# Patient Record
Sex: FEMALE | Race: WHITE | NOT HISPANIC OR LATINO | Employment: UNEMPLOYED | URBAN - METROPOLITAN AREA
[De-identification: names, ages, dates, MRNs, and addresses within clinical notes are randomized per-mention and may not be internally consistent; named-entity substitution may affect disease eponyms.]

---

## 2017-01-06 ENCOUNTER — COMMUNICATION - HEALTHEAST (OUTPATIENT)
Dept: FAMILY MEDICINE | Facility: CLINIC | Age: 62
End: 2017-01-06

## 2017-02-09 ENCOUNTER — COMMUNICATION - HEALTHEAST (OUTPATIENT)
Dept: FAMILY MEDICINE | Facility: CLINIC | Age: 62
End: 2017-02-09

## 2017-03-06 ENCOUNTER — OFFICE VISIT - HEALTHEAST (OUTPATIENT)
Dept: FAMILY MEDICINE | Facility: CLINIC | Age: 62
End: 2017-03-06

## 2017-03-06 ENCOUNTER — RECORDS - HEALTHEAST (OUTPATIENT)
Dept: GENERAL RADIOLOGY | Facility: CLINIC | Age: 62
End: 2017-03-06

## 2017-03-06 ENCOUNTER — COMMUNICATION - HEALTHEAST (OUTPATIENT)
Dept: FAMILY MEDICINE | Facility: CLINIC | Age: 62
End: 2017-03-06

## 2017-03-06 ENCOUNTER — RECORDS - HEALTHEAST (OUTPATIENT)
Dept: MAMMOGRAPHY | Facility: CLINIC | Age: 62
End: 2017-03-06

## 2017-03-06 DIAGNOSIS — M17.0 PRIMARY OSTEOARTHRITIS OF BOTH KNEES: ICD-10-CM

## 2017-03-06 DIAGNOSIS — M50.90 CERVICAL DISC DISEASE: ICD-10-CM

## 2017-03-06 DIAGNOSIS — M79.674 PAIN OF TOE OF RIGHT FOOT: ICD-10-CM

## 2017-03-06 DIAGNOSIS — M17.0 BILATERAL PRIMARY OSTEOARTHRITIS OF KNEE: ICD-10-CM

## 2017-03-06 DIAGNOSIS — R60.9 EDEMA: ICD-10-CM

## 2017-03-06 DIAGNOSIS — Z12.31 ENCOUNTER FOR SCREENING MAMMOGRAM FOR MALIGNANT NEOPLASM OF BREAST: ICD-10-CM

## 2017-03-06 DIAGNOSIS — G56.03 CARPAL TUNNEL SYNDROME ON BOTH SIDES: ICD-10-CM

## 2017-03-12 ENCOUNTER — COMMUNICATION - HEALTHEAST (OUTPATIENT)
Dept: FAMILY MEDICINE | Facility: CLINIC | Age: 62
End: 2017-03-12

## 2017-03-13 ENCOUNTER — COMMUNICATION - HEALTHEAST (OUTPATIENT)
Dept: FAMILY MEDICINE | Facility: CLINIC | Age: 62
End: 2017-03-13

## 2017-03-23 ENCOUNTER — OFFICE VISIT - HEALTHEAST (OUTPATIENT)
Dept: BEHAVIORAL HEALTH | Facility: CLINIC | Age: 62
End: 2017-03-23

## 2017-03-23 DIAGNOSIS — F32.A DEPRESSION, UNSPECIFIED DEPRESSION TYPE: ICD-10-CM

## 2017-03-23 DIAGNOSIS — F41.9 ANXIETY DISORDER, UNSPECIFIED TYPE: ICD-10-CM

## 2017-03-23 DIAGNOSIS — F43.10 PTSD (POST-TRAUMATIC STRESS DISORDER): ICD-10-CM

## 2017-03-27 ENCOUNTER — RECORDS - HEALTHEAST (OUTPATIENT)
Dept: ADMINISTRATIVE | Facility: OTHER | Age: 62
End: 2017-03-27

## 2017-04-04 ENCOUNTER — OFFICE VISIT - HEALTHEAST (OUTPATIENT)
Dept: FAMILY MEDICINE | Facility: CLINIC | Age: 62
End: 2017-04-04

## 2017-04-04 DIAGNOSIS — G56.03 CARPAL TUNNEL SYNDROME ON BOTH SIDES: ICD-10-CM

## 2017-04-04 DIAGNOSIS — Z01.818 PREOP EXAMINATION: ICD-10-CM

## 2017-04-04 DIAGNOSIS — R60.9 EDEMA: ICD-10-CM

## 2017-04-04 DIAGNOSIS — M19.049 PRIMARY LOCALIZED OSTEOARTHROSIS, HAND, UNSPECIFIED LATERALITY: ICD-10-CM

## 2017-04-04 DIAGNOSIS — J43.9 COPD (CHRONIC OBSTRUCTIVE PULMONARY DISEASE) WITH EMPHYSEMA (H): ICD-10-CM

## 2017-04-04 ASSESSMENT — MIFFLIN-ST. JEOR: SCORE: 1595.17

## 2017-04-11 ENCOUNTER — RECORDS - HEALTHEAST (OUTPATIENT)
Dept: ADMINISTRATIVE | Facility: OTHER | Age: 62
End: 2017-04-11

## 2017-04-16 ENCOUNTER — COMMUNICATION - HEALTHEAST (OUTPATIENT)
Dept: FAMILY MEDICINE | Facility: CLINIC | Age: 62
End: 2017-04-16

## 2017-04-16 DIAGNOSIS — K21.00 REFLUX ESOPHAGITIS: ICD-10-CM

## 2017-04-17 ENCOUNTER — RECORDS - HEALTHEAST (OUTPATIENT)
Dept: ADMINISTRATIVE | Facility: OTHER | Age: 62
End: 2017-04-17

## 2017-04-19 ENCOUNTER — OFFICE VISIT - HEALTHEAST (OUTPATIENT)
Dept: PODIATRY | Facility: CLINIC | Age: 62
End: 2017-04-19

## 2017-04-19 DIAGNOSIS — L57.0 KERATOMA: ICD-10-CM

## 2017-04-20 ENCOUNTER — COMMUNICATION - HEALTHEAST (OUTPATIENT)
Dept: SCHEDULING | Facility: CLINIC | Age: 62
End: 2017-04-20

## 2017-04-20 DIAGNOSIS — R60.9 EDEMA: ICD-10-CM

## 2017-04-27 ENCOUNTER — OFFICE VISIT - HEALTHEAST (OUTPATIENT)
Dept: BEHAVIORAL HEALTH | Facility: CLINIC | Age: 62
End: 2017-04-27

## 2017-04-27 DIAGNOSIS — F32.A DEPRESSION, UNSPECIFIED DEPRESSION TYPE: ICD-10-CM

## 2017-04-27 DIAGNOSIS — F43.10 PTSD (POST-TRAUMATIC STRESS DISORDER): ICD-10-CM

## 2017-04-27 DIAGNOSIS — F41.9 ANXIETY DISORDER, UNSPECIFIED TYPE: ICD-10-CM

## 2017-04-28 ENCOUNTER — COMMUNICATION - HEALTHEAST (OUTPATIENT)
Dept: FAMILY MEDICINE | Facility: CLINIC | Age: 62
End: 2017-04-28

## 2017-04-28 ENCOUNTER — COMMUNICATION - HEALTHEAST (OUTPATIENT)
Dept: BEHAVIORAL HEALTH | Facility: CLINIC | Age: 62
End: 2017-04-28

## 2017-04-28 DIAGNOSIS — J43.9 COPD (CHRONIC OBSTRUCTIVE PULMONARY DISEASE) WITH EMPHYSEMA (H): ICD-10-CM

## 2017-05-01 ENCOUNTER — AMBULATORY - HEALTHEAST (OUTPATIENT)
Dept: FAMILY MEDICINE | Facility: CLINIC | Age: 62
End: 2017-05-01

## 2017-05-01 DIAGNOSIS — F43.10 POST TRAUMATIC STRESS DISORDER: ICD-10-CM

## 2017-05-01 DIAGNOSIS — F33.9 MAJOR DEPRESSIVE DISORDER, RECURRENT EPISODE (H): ICD-10-CM

## 2017-05-08 ENCOUNTER — RECORDS - HEALTHEAST (OUTPATIENT)
Dept: ADMINISTRATIVE | Facility: OTHER | Age: 62
End: 2017-05-08

## 2017-05-10 ENCOUNTER — OFFICE VISIT - HEALTHEAST (OUTPATIENT)
Dept: BEHAVIORAL HEALTH | Facility: CLINIC | Age: 62
End: 2017-05-10

## 2017-05-10 DIAGNOSIS — F33.9 MAJOR DEPRESSIVE DISORDER, RECURRENT EPISODE (H): ICD-10-CM

## 2017-05-10 ASSESSMENT — MIFFLIN-ST. JEOR: SCORE: 1587.91

## 2017-05-16 ENCOUNTER — COMMUNICATION - HEALTHEAST (OUTPATIENT)
Dept: SCHEDULING | Facility: CLINIC | Age: 62
End: 2017-05-16

## 2017-05-17 ENCOUNTER — OFFICE VISIT - HEALTHEAST (OUTPATIENT)
Dept: FAMILY MEDICINE | Facility: CLINIC | Age: 62
End: 2017-05-17

## 2017-05-17 DIAGNOSIS — B37.2 CANDIDAL INTERTRIGO: ICD-10-CM

## 2017-05-17 DIAGNOSIS — L85.3 DRY SKIN DERMATITIS: ICD-10-CM

## 2017-05-31 ENCOUNTER — OFFICE VISIT - HEALTHEAST (OUTPATIENT)
Dept: BEHAVIORAL HEALTH | Facility: CLINIC | Age: 62
End: 2017-05-31

## 2017-05-31 DIAGNOSIS — F43.10 PTSD (POST-TRAUMATIC STRESS DISORDER): ICD-10-CM

## 2017-05-31 DIAGNOSIS — F33.2 SEVERE EPISODE OF RECURRENT MAJOR DEPRESSIVE DISORDER, WITHOUT PSYCHOTIC FEATURES (H): ICD-10-CM

## 2017-06-04 ENCOUNTER — COMMUNICATION - HEALTHEAST (OUTPATIENT)
Dept: SCHEDULING | Facility: CLINIC | Age: 62
End: 2017-06-04

## 2017-06-04 DIAGNOSIS — R60.9 EDEMA: ICD-10-CM

## 2017-06-05 ENCOUNTER — AMBULATORY - HEALTHEAST (OUTPATIENT)
Dept: NURSING | Facility: CLINIC | Age: 62
End: 2017-06-05

## 2017-06-05 ENCOUNTER — COMMUNICATION - HEALTHEAST (OUTPATIENT)
Dept: FAMILY MEDICINE | Facility: CLINIC | Age: 62
End: 2017-06-05

## 2017-06-08 ENCOUNTER — AMBULATORY - HEALTHEAST (OUTPATIENT)
Dept: NURSING | Facility: CLINIC | Age: 62
End: 2017-06-08

## 2017-06-08 DIAGNOSIS — Z59.89 DISTRESSED ABOUT HOUSING ISSUES: ICD-10-CM

## 2017-06-08 SDOH — ECONOMIC STABILITY - INCOME SECURITY: OTHER PROBLEMS RELATED TO HOUSING AND ECONOMIC CIRCUMSTANCES: Z59.89

## 2017-06-12 ENCOUNTER — RECORDS - HEALTHEAST (OUTPATIENT)
Dept: ADMINISTRATIVE | Facility: OTHER | Age: 62
End: 2017-06-12

## 2017-06-28 ENCOUNTER — OFFICE VISIT - HEALTHEAST (OUTPATIENT)
Dept: FAMILY MEDICINE | Facility: CLINIC | Age: 62
End: 2017-06-28

## 2017-06-28 DIAGNOSIS — R06.83 SNORING: ICD-10-CM

## 2017-06-28 DIAGNOSIS — R60.9 EDEMA: ICD-10-CM

## 2017-06-28 DIAGNOSIS — R60.0 BILATERAL EDEMA OF LOWER EXTREMITY: ICD-10-CM

## 2017-06-28 DIAGNOSIS — L98.9 SKIN LESION OF LEFT LEG: ICD-10-CM

## 2017-06-28 ASSESSMENT — MIFFLIN-ST. JEOR: SCORE: 1609.23

## 2017-07-02 ENCOUNTER — COMMUNICATION - HEALTHEAST (OUTPATIENT)
Dept: FAMILY MEDICINE | Facility: CLINIC | Age: 62
End: 2017-07-02

## 2017-07-02 DIAGNOSIS — R60.9 EDEMA: ICD-10-CM

## 2017-07-05 ENCOUNTER — OFFICE VISIT - HEALTHEAST (OUTPATIENT)
Dept: BEHAVIORAL HEALTH | Facility: CLINIC | Age: 62
End: 2017-07-05

## 2017-07-05 DIAGNOSIS — F33.2 SEVERE EPISODE OF RECURRENT MAJOR DEPRESSIVE DISORDER, WITHOUT PSYCHOTIC FEATURES (H): ICD-10-CM

## 2017-07-05 DIAGNOSIS — F43.10 PTSD (POST-TRAUMATIC STRESS DISORDER): ICD-10-CM

## 2017-07-05 DIAGNOSIS — F41.9 ANXIETY DISORDER, UNSPECIFIED TYPE: ICD-10-CM

## 2017-07-13 ENCOUNTER — COMMUNICATION - HEALTHEAST (OUTPATIENT)
Dept: FAMILY MEDICINE | Facility: CLINIC | Age: 62
End: 2017-07-13

## 2017-07-18 ENCOUNTER — COMMUNICATION - HEALTHEAST (OUTPATIENT)
Dept: FAMILY MEDICINE | Facility: CLINIC | Age: 62
End: 2017-07-18

## 2017-07-18 ENCOUNTER — OFFICE VISIT - HEALTHEAST (OUTPATIENT)
Dept: FAMILY MEDICINE | Facility: CLINIC | Age: 62
End: 2017-07-18

## 2017-07-18 DIAGNOSIS — K21.00 REFLUX ESOPHAGITIS: ICD-10-CM

## 2017-07-18 DIAGNOSIS — M25.561 ACUTE PAIN OF RIGHT KNEE: ICD-10-CM

## 2017-07-18 DIAGNOSIS — R60.0 BILATERAL EDEMA OF LOWER EXTREMITY: ICD-10-CM

## 2017-07-18 DIAGNOSIS — R60.9 EDEMA: ICD-10-CM

## 2017-07-18 DIAGNOSIS — F33.9 MAJOR DEPRESSIVE DISORDER, RECURRENT EPISODE (H): ICD-10-CM

## 2017-07-26 ENCOUNTER — AMBULATORY - HEALTHEAST (OUTPATIENT)
Dept: BEHAVIORAL HEALTH | Facility: CLINIC | Age: 62
End: 2017-07-26

## 2017-07-26 ENCOUNTER — OFFICE VISIT - HEALTHEAST (OUTPATIENT)
Dept: BEHAVIORAL HEALTH | Facility: CLINIC | Age: 62
End: 2017-07-26

## 2017-07-26 DIAGNOSIS — F43.10 PTSD (POST-TRAUMATIC STRESS DISORDER): ICD-10-CM

## 2017-07-26 DIAGNOSIS — F33.2 SEVERE EPISODE OF RECURRENT MAJOR DEPRESSIVE DISORDER, WITHOUT PSYCHOTIC FEATURES (H): ICD-10-CM

## 2017-07-27 ENCOUNTER — OFFICE VISIT - HEALTHEAST (OUTPATIENT)
Dept: SLEEP MEDICINE | Facility: CLINIC | Age: 62
End: 2017-07-27

## 2017-07-27 DIAGNOSIS — E66.9 OBESITY: ICD-10-CM

## 2017-07-27 DIAGNOSIS — G47.10 HYPERSOMNIA, UNSPECIFIED: ICD-10-CM

## 2017-07-27 DIAGNOSIS — G47.8 SLEEP DYSFUNCTION WITH SLEEP STAGE DISTURBANCE: ICD-10-CM

## 2017-07-27 DIAGNOSIS — R06.83 SNORING: ICD-10-CM

## 2017-07-27 ASSESSMENT — MIFFLIN-ST. JEOR: SCORE: 1613.77

## 2017-08-09 ENCOUNTER — OFFICE VISIT - HEALTHEAST (OUTPATIENT)
Dept: BEHAVIORAL HEALTH | Facility: CLINIC | Age: 62
End: 2017-08-09

## 2017-08-09 DIAGNOSIS — F33.2 SEVERE EPISODE OF RECURRENT MAJOR DEPRESSIVE DISORDER, WITHOUT PSYCHOTIC FEATURES (H): ICD-10-CM

## 2017-08-09 DIAGNOSIS — F41.9 ANXIETY DISORDER, UNSPECIFIED TYPE: ICD-10-CM

## 2017-08-09 DIAGNOSIS — F43.10 PTSD (POST-TRAUMATIC STRESS DISORDER): ICD-10-CM

## 2017-08-10 ENCOUNTER — RECORDS - HEALTHEAST (OUTPATIENT)
Dept: SLEEP MEDICINE | Age: 62
End: 2017-08-10

## 2017-08-10 ENCOUNTER — RECORDS - HEALTHEAST (OUTPATIENT)
Dept: ADMINISTRATIVE | Facility: OTHER | Age: 62
End: 2017-08-10

## 2017-08-10 DIAGNOSIS — R06.83 SNORING: ICD-10-CM

## 2017-08-10 DIAGNOSIS — G47.10 HYPERSOMNIA, UNSPECIFIED: ICD-10-CM

## 2017-08-10 DIAGNOSIS — G47.8 OTHER SLEEP DISORDERS: ICD-10-CM

## 2017-08-14 ENCOUNTER — OFFICE VISIT - HEALTHEAST (OUTPATIENT)
Dept: PODIATRY | Facility: CLINIC | Age: 62
End: 2017-08-14

## 2017-08-14 DIAGNOSIS — B35.1 NAIL FUNGUS: ICD-10-CM

## 2017-08-14 DIAGNOSIS — L60.2 ONYCHAUXIS: ICD-10-CM

## 2017-08-15 ENCOUNTER — OFFICE VISIT - HEALTHEAST (OUTPATIENT)
Dept: BEHAVIORAL HEALTH | Facility: CLINIC | Age: 62
End: 2017-08-15

## 2017-08-15 DIAGNOSIS — F33.2 SEVERE EPISODE OF RECURRENT MAJOR DEPRESSIVE DISORDER, WITHOUT PSYCHOTIC FEATURES (H): ICD-10-CM

## 2017-08-15 DIAGNOSIS — F33.9 MAJOR DEPRESSIVE DISORDER, RECURRENT EPISODE (H): ICD-10-CM

## 2017-08-15 ASSESSMENT — MIFFLIN-ST. JEOR: SCORE: 1615.13

## 2017-08-22 ENCOUNTER — COMMUNICATION - HEALTHEAST (OUTPATIENT)
Dept: SLEEP MEDICINE | Facility: CLINIC | Age: 62
End: 2017-08-22

## 2017-08-29 ENCOUNTER — RECORDS - HEALTHEAST (OUTPATIENT)
Dept: ADMINISTRATIVE | Facility: OTHER | Age: 62
End: 2017-08-29

## 2017-09-06 ENCOUNTER — OFFICE VISIT - HEALTHEAST (OUTPATIENT)
Dept: FAMILY MEDICINE | Facility: CLINIC | Age: 62
End: 2017-09-06

## 2017-09-06 ENCOUNTER — OFFICE VISIT - HEALTHEAST (OUTPATIENT)
Dept: BEHAVIORAL HEALTH | Facility: CLINIC | Age: 62
End: 2017-09-06

## 2017-09-06 DIAGNOSIS — E55.9 VITAMIN D DEFICIENCY: ICD-10-CM

## 2017-09-06 DIAGNOSIS — F33.2 SEVERE EPISODE OF RECURRENT MAJOR DEPRESSIVE DISORDER, WITHOUT PSYCHOTIC FEATURES (H): ICD-10-CM

## 2017-09-06 DIAGNOSIS — F43.10 PTSD (POST-TRAUMATIC STRESS DISORDER): ICD-10-CM

## 2017-09-06 DIAGNOSIS — R53.82 CHRONIC FATIGUE: ICD-10-CM

## 2017-09-06 DIAGNOSIS — F10.10 ALCOHOL ABUSE: ICD-10-CM

## 2017-09-07 ENCOUNTER — COMMUNICATION - HEALTHEAST (OUTPATIENT)
Dept: FAMILY MEDICINE | Facility: CLINIC | Age: 62
End: 2017-09-07

## 2017-09-13 ENCOUNTER — AMBULATORY - HEALTHEAST (OUTPATIENT)
Dept: SLEEP MEDICINE | Facility: CLINIC | Age: 62
End: 2017-09-13

## 2017-09-13 ENCOUNTER — OFFICE VISIT - HEALTHEAST (OUTPATIENT)
Dept: SLEEP MEDICINE | Facility: CLINIC | Age: 62
End: 2017-09-13

## 2017-09-13 DIAGNOSIS — G47.10 HYPERSOMNIA, UNSPECIFIED: ICD-10-CM

## 2017-09-13 DIAGNOSIS — G47.8 SLEEP DYSFUNCTION WITH SLEEP STAGE DISTURBANCE: ICD-10-CM

## 2017-09-13 DIAGNOSIS — G47.33 OSA (OBSTRUCTIVE SLEEP APNEA): ICD-10-CM

## 2017-09-13 DIAGNOSIS — G47.69 SLEEP-RELATED MOVEMENT DISORDER: ICD-10-CM

## 2017-09-13 ASSESSMENT — MIFFLIN-ST. JEOR: SCORE: 1605.6

## 2017-09-20 ENCOUNTER — OFFICE VISIT - HEALTHEAST (OUTPATIENT)
Dept: BEHAVIORAL HEALTH | Facility: CLINIC | Age: 62
End: 2017-09-20

## 2017-09-20 ENCOUNTER — AMBULATORY - HEALTHEAST (OUTPATIENT)
Dept: SLEEP MEDICINE | Facility: CLINIC | Age: 62
End: 2017-09-20

## 2017-09-20 DIAGNOSIS — F33.2 SEVERE EPISODE OF RECURRENT MAJOR DEPRESSIVE DISORDER, WITHOUT PSYCHOTIC FEATURES (H): ICD-10-CM

## 2017-10-04 ENCOUNTER — OFFICE VISIT - HEALTHEAST (OUTPATIENT)
Dept: FAMILY MEDICINE | Facility: CLINIC | Age: 62
End: 2017-10-04

## 2017-10-04 DIAGNOSIS — J06.9 ACUTE URI: ICD-10-CM

## 2017-10-11 ENCOUNTER — OFFICE VISIT - HEALTHEAST (OUTPATIENT)
Dept: BEHAVIORAL HEALTH | Facility: CLINIC | Age: 62
End: 2017-10-11

## 2017-10-11 DIAGNOSIS — F43.10 PTSD (POST-TRAUMATIC STRESS DISORDER): ICD-10-CM

## 2017-10-11 DIAGNOSIS — F33.2 SEVERE EPISODE OF RECURRENT MAJOR DEPRESSIVE DISORDER, WITHOUT PSYCHOTIC FEATURES (H): ICD-10-CM

## 2017-10-17 ENCOUNTER — OFFICE VISIT - HEALTHEAST (OUTPATIENT)
Dept: FAMILY MEDICINE | Facility: CLINIC | Age: 62
End: 2017-10-17

## 2017-10-17 DIAGNOSIS — R20.0 NUMBNESS OF TOES: ICD-10-CM

## 2017-10-17 DIAGNOSIS — L91.8 SKIN TAG: ICD-10-CM

## 2017-10-17 ASSESSMENT — MIFFLIN-ST. JEOR: SCORE: 1610.59

## 2017-10-25 ENCOUNTER — OFFICE VISIT - HEALTHEAST (OUTPATIENT)
Dept: BEHAVIORAL HEALTH | Facility: CLINIC | Age: 62
End: 2017-10-25

## 2017-10-25 DIAGNOSIS — F43.10 PTSD (POST-TRAUMATIC STRESS DISORDER): ICD-10-CM

## 2017-10-25 DIAGNOSIS — F33.2 SEVERE EPISODE OF RECURRENT MAJOR DEPRESSIVE DISORDER, WITHOUT PSYCHOTIC FEATURES (H): ICD-10-CM

## 2017-11-08 ENCOUNTER — OFFICE VISIT - HEALTHEAST (OUTPATIENT)
Dept: BEHAVIORAL HEALTH | Facility: CLINIC | Age: 62
End: 2017-11-08

## 2017-11-08 DIAGNOSIS — F43.10 PTSD (POST-TRAUMATIC STRESS DISORDER): ICD-10-CM

## 2017-11-08 DIAGNOSIS — F33.2 SEVERE EPISODE OF RECURRENT MAJOR DEPRESSIVE DISORDER, WITHOUT PSYCHOTIC FEATURES (H): ICD-10-CM

## 2017-11-22 ENCOUNTER — OFFICE VISIT - HEALTHEAST (OUTPATIENT)
Dept: BEHAVIORAL HEALTH | Facility: CLINIC | Age: 62
End: 2017-11-22

## 2017-11-22 DIAGNOSIS — F43.10 PTSD (POST-TRAUMATIC STRESS DISORDER): ICD-10-CM

## 2017-11-22 DIAGNOSIS — F33.2 SEVERE EPISODE OF RECURRENT MAJOR DEPRESSIVE DISORDER, WITHOUT PSYCHOTIC FEATURES (H): ICD-10-CM

## 2017-11-25 ENCOUNTER — COMMUNICATION - HEALTHEAST (OUTPATIENT)
Dept: BEHAVIORAL HEALTH | Facility: CLINIC | Age: 62
End: 2017-11-25

## 2017-11-25 DIAGNOSIS — F33.9 MAJOR DEPRESSIVE DISORDER, RECURRENT EPISODE (H): ICD-10-CM

## 2017-11-29 ENCOUNTER — OFFICE VISIT - HEALTHEAST (OUTPATIENT)
Dept: SLEEP MEDICINE | Facility: CLINIC | Age: 62
End: 2017-11-29

## 2017-11-29 DIAGNOSIS — G47.8 SLEEP DYSFUNCTION WITH SLEEP STAGE DISTURBANCE: ICD-10-CM

## 2017-11-29 DIAGNOSIS — G47.10 HYPERSOMNIA: ICD-10-CM

## 2017-11-29 DIAGNOSIS — G47.33 OSA ON CPAP: ICD-10-CM

## 2017-11-29 ASSESSMENT — MIFFLIN-ST. JEOR: SCORE: 1587.91

## 2017-12-14 ENCOUNTER — OFFICE VISIT - HEALTHEAST (OUTPATIENT)
Dept: BEHAVIORAL HEALTH | Facility: CLINIC | Age: 62
End: 2017-12-14

## 2017-12-14 DIAGNOSIS — F43.10 PTSD (POST-TRAUMATIC STRESS DISORDER): ICD-10-CM

## 2017-12-14 DIAGNOSIS — F33.2 SEVERE EPISODE OF RECURRENT MAJOR DEPRESSIVE DISORDER, WITHOUT PSYCHOTIC FEATURES (H): ICD-10-CM

## 2017-12-26 ENCOUNTER — OFFICE VISIT - HEALTHEAST (OUTPATIENT)
Dept: BEHAVIORAL HEALTH | Facility: CLINIC | Age: 62
End: 2017-12-26

## 2017-12-26 DIAGNOSIS — F33.2 SEVERE EPISODE OF RECURRENT MAJOR DEPRESSIVE DISORDER, WITHOUT PSYCHOTIC FEATURES (H): ICD-10-CM

## 2017-12-26 DIAGNOSIS — F33.9 MAJOR DEPRESSIVE DISORDER, RECURRENT EPISODE (H): ICD-10-CM

## 2017-12-26 ASSESSMENT — MIFFLIN-ST. JEOR: SCORE: 1601.52

## 2017-12-27 ENCOUNTER — COMMUNICATION - HEALTHEAST (OUTPATIENT)
Dept: FAMILY MEDICINE | Facility: CLINIC | Age: 62
End: 2017-12-27

## 2017-12-27 DIAGNOSIS — R60.0 BILATERAL EDEMA OF LOWER EXTREMITY: ICD-10-CM

## 2017-12-29 ENCOUNTER — OFFICE VISIT - HEALTHEAST (OUTPATIENT)
Dept: BEHAVIORAL HEALTH | Facility: CLINIC | Age: 62
End: 2017-12-29

## 2017-12-29 DIAGNOSIS — F33.2 SEVERE EPISODE OF RECURRENT MAJOR DEPRESSIVE DISORDER, WITHOUT PSYCHOTIC FEATURES (H): ICD-10-CM

## 2017-12-29 DIAGNOSIS — F43.10 PTSD (POST-TRAUMATIC STRESS DISORDER): ICD-10-CM

## 2018-01-04 ENCOUNTER — OFFICE VISIT - HEALTHEAST (OUTPATIENT)
Dept: BEHAVIORAL HEALTH | Facility: CLINIC | Age: 63
End: 2018-01-04

## 2018-01-04 DIAGNOSIS — F33.2 SEVERE EPISODE OF RECURRENT MAJOR DEPRESSIVE DISORDER, WITHOUT PSYCHOTIC FEATURES (H): ICD-10-CM

## 2018-01-04 DIAGNOSIS — F43.10 PTSD (POST-TRAUMATIC STRESS DISORDER): ICD-10-CM

## 2018-01-05 ENCOUNTER — OFFICE VISIT - HEALTHEAST (OUTPATIENT)
Dept: FAMILY MEDICINE | Facility: CLINIC | Age: 63
End: 2018-01-05

## 2018-01-05 DIAGNOSIS — F10.10 ALCOHOL ABUSE: ICD-10-CM

## 2018-01-05 DIAGNOSIS — Z23 NEED FOR TETANUS BOOSTER: ICD-10-CM

## 2018-01-05 DIAGNOSIS — G56.03 CARPAL TUNNEL SYNDROME ON BOTH SIDES: ICD-10-CM

## 2018-01-05 DIAGNOSIS — J43.9 COPD (CHRONIC OBSTRUCTIVE PULMONARY DISEASE) WITH EMPHYSEMA (H): ICD-10-CM

## 2018-01-05 DIAGNOSIS — E66.01 MORBID OBESITY (H): ICD-10-CM

## 2018-01-05 DIAGNOSIS — Z00.00 ROUTINE GENERAL MEDICAL EXAMINATION AT A HEALTH CARE FACILITY: ICD-10-CM

## 2018-01-05 DIAGNOSIS — R79.89 ELEVATED LFTS: ICD-10-CM

## 2018-01-05 DIAGNOSIS — E55.9 VITAMIN D DEFICIENCY: ICD-10-CM

## 2018-01-05 DIAGNOSIS — R60.0 BILATERAL EDEMA OF LOWER EXTREMITY: ICD-10-CM

## 2018-01-05 DIAGNOSIS — F33.2 SEVERE EPISODE OF RECURRENT MAJOR DEPRESSIVE DISORDER, WITHOUT PSYCHOTIC FEATURES (H): ICD-10-CM

## 2018-01-05 LAB
ALBUMIN SERPL-MCNC: 3.6 G/DL (ref 3.5–5)
ALP SERPL-CCNC: 92 U/L (ref 45–120)
ALT SERPL W P-5'-P-CCNC: 17 U/L (ref 0–45)
ANION GAP SERPL CALCULATED.3IONS-SCNC: 10 MMOL/L (ref 5–18)
AST SERPL W P-5'-P-CCNC: 15 U/L (ref 0–40)
BASOPHILS # BLD AUTO: 0 THOU/UL (ref 0–0.2)
BASOPHILS NFR BLD AUTO: 1 % (ref 0–2)
BILIRUB SERPL-MCNC: 0.8 MG/DL (ref 0–1)
BUN SERPL-MCNC: 9 MG/DL (ref 8–22)
CALCIUM SERPL-MCNC: 9.6 MG/DL (ref 8.5–10.5)
CHLORIDE BLD-SCNC: 100 MMOL/L (ref 98–107)
CHOLEST SERPL-MCNC: 224 MG/DL
CO2 SERPL-SCNC: 30 MMOL/L (ref 22–31)
CREAT SERPL-MCNC: 0.66 MG/DL (ref 0.6–1.1)
EOSINOPHIL # BLD AUTO: 0.4 THOU/UL (ref 0–0.4)
EOSINOPHIL NFR BLD AUTO: 5 % (ref 0–6)
ERYTHROCYTE [DISTWIDTH] IN BLOOD BY AUTOMATED COUNT: 12.5 % (ref 11–14.5)
FASTING STATUS PATIENT QL REPORTED: YES
GFR SERPL CREATININE-BSD FRML MDRD: >60 ML/MIN/1.73M2
GLUCOSE BLD-MCNC: 99 MG/DL (ref 70–125)
HCT VFR BLD AUTO: 45.1 % (ref 35–47)
HDLC SERPL-MCNC: 55 MG/DL
HGB BLD-MCNC: 15.1 G/DL (ref 12–16)
LDLC SERPL CALC-MCNC: 146 MG/DL
LYMPHOCYTES # BLD AUTO: 2.6 THOU/UL (ref 0.8–4.4)
LYMPHOCYTES NFR BLD AUTO: 36 % (ref 20–40)
MCH RBC QN AUTO: 31.4 PG (ref 27–34)
MCHC RBC AUTO-ENTMCNC: 33.4 G/DL (ref 32–36)
MCV RBC AUTO: 94 FL (ref 80–100)
MONOCYTES # BLD AUTO: 0.5 THOU/UL (ref 0–0.9)
MONOCYTES NFR BLD AUTO: 7 % (ref 2–10)
NEUTROPHILS # BLD AUTO: 3.7 THOU/UL (ref 2–7.7)
NEUTROPHILS NFR BLD AUTO: 51 % (ref 50–70)
PLATELET # BLD AUTO: 280 THOU/UL (ref 140–440)
PMV BLD AUTO: 6.8 FL (ref 7–10)
POTASSIUM BLD-SCNC: 4.8 MMOL/L (ref 3.5–5)
PROT SERPL-MCNC: 6.8 G/DL (ref 6–8)
RBC # BLD AUTO: 4.8 MILL/UL (ref 3.8–5.4)
SODIUM SERPL-SCNC: 140 MMOL/L (ref 136–145)
TRIGL SERPL-MCNC: 114 MG/DL
TSH SERPL DL<=0.005 MIU/L-ACNC: 0.63 UIU/ML (ref 0.3–5)
WBC: 7.2 THOU/UL (ref 4–11)

## 2018-01-05 ASSESSMENT — MIFFLIN-ST. JEOR: SCORE: 1556.62

## 2018-01-08 ENCOUNTER — COMMUNICATION - HEALTHEAST (OUTPATIENT)
Dept: FAMILY MEDICINE | Facility: CLINIC | Age: 63
End: 2018-01-08

## 2018-01-08 LAB — 25(OH)D3 SERPL-MCNC: 22.8 NG/ML (ref 30–80)

## 2018-01-09 ENCOUNTER — AMBULATORY - HEALTHEAST (OUTPATIENT)
Dept: PODIATRY | Age: 63
End: 2018-01-09

## 2018-01-09 DIAGNOSIS — B35.1 NAIL FUNGUS: ICD-10-CM

## 2018-01-09 DIAGNOSIS — L60.2 ONYCHAUXIS: ICD-10-CM

## 2018-01-16 ENCOUNTER — AMBULATORY - HEALTHEAST (OUTPATIENT)
Dept: FAMILY MEDICINE | Facility: CLINIC | Age: 63
End: 2018-01-16

## 2018-01-16 ENCOUNTER — COMMUNICATION - HEALTHEAST (OUTPATIENT)
Dept: FAMILY MEDICINE | Facility: CLINIC | Age: 63
End: 2018-01-16

## 2018-01-16 ENCOUNTER — OFFICE VISIT - HEALTHEAST (OUTPATIENT)
Dept: BEHAVIORAL HEALTH | Facility: CLINIC | Age: 63
End: 2018-01-16

## 2018-01-16 DIAGNOSIS — E55.9 VITAMIN D DEFICIENCY: ICD-10-CM

## 2018-01-16 DIAGNOSIS — F43.10 PTSD (POST-TRAUMATIC STRESS DISORDER): ICD-10-CM

## 2018-01-16 DIAGNOSIS — F33.2 SEVERE EPISODE OF RECURRENT MAJOR DEPRESSIVE DISORDER, WITHOUT PSYCHOTIC FEATURES (H): ICD-10-CM

## 2018-01-24 ENCOUNTER — COMMUNICATION - HEALTHEAST (OUTPATIENT)
Dept: BEHAVIORAL HEALTH | Facility: CLINIC | Age: 63
End: 2018-01-24

## 2018-01-31 ENCOUNTER — OFFICE VISIT - HEALTHEAST (OUTPATIENT)
Dept: BEHAVIORAL HEALTH | Facility: CLINIC | Age: 63
End: 2018-01-31

## 2018-01-31 DIAGNOSIS — F33.2 SEVERE EPISODE OF RECURRENT MAJOR DEPRESSIVE DISORDER, WITHOUT PSYCHOTIC FEATURES (H): ICD-10-CM

## 2018-01-31 DIAGNOSIS — F43.10 PTSD (POST-TRAUMATIC STRESS DISORDER): ICD-10-CM

## 2018-02-07 ENCOUNTER — OFFICE VISIT - HEALTHEAST (OUTPATIENT)
Dept: BEHAVIORAL HEALTH | Facility: CLINIC | Age: 63
End: 2018-02-07

## 2018-02-07 DIAGNOSIS — F33.2 SEVERE EPISODE OF RECURRENT MAJOR DEPRESSIVE DISORDER, WITHOUT PSYCHOTIC FEATURES (H): ICD-10-CM

## 2018-02-07 DIAGNOSIS — F43.10 PTSD (POST-TRAUMATIC STRESS DISORDER): ICD-10-CM

## 2018-02-12 ENCOUNTER — COMMUNICATION - HEALTHEAST (OUTPATIENT)
Dept: BEHAVIORAL HEALTH | Facility: CLINIC | Age: 63
End: 2018-02-12

## 2018-02-12 DIAGNOSIS — F33.9 MAJOR DEPRESSIVE DISORDER, RECURRENT EPISODE (H): ICD-10-CM

## 2018-02-12 DIAGNOSIS — F33.2 SEVERE EPISODE OF RECURRENT MAJOR DEPRESSIVE DISORDER, WITHOUT PSYCHOTIC FEATURES (H): ICD-10-CM

## 2018-02-14 ENCOUNTER — OFFICE VISIT - HEALTHEAST (OUTPATIENT)
Dept: BEHAVIORAL HEALTH | Facility: CLINIC | Age: 63
End: 2018-02-14

## 2018-02-14 DIAGNOSIS — F43.10 PTSD (POST-TRAUMATIC STRESS DISORDER): ICD-10-CM

## 2018-02-14 DIAGNOSIS — F33.2 SEVERE EPISODE OF RECURRENT MAJOR DEPRESSIVE DISORDER, WITHOUT PSYCHOTIC FEATURES (H): ICD-10-CM

## 2018-02-19 ENCOUNTER — AMBULATORY - HEALTHEAST (OUTPATIENT)
Dept: FAMILY MEDICINE | Facility: CLINIC | Age: 63
End: 2018-02-19

## 2018-02-19 DIAGNOSIS — Z23 NEED FOR TD VACCINE: ICD-10-CM

## 2018-02-20 ENCOUNTER — OFFICE VISIT - HEALTHEAST (OUTPATIENT)
Dept: BEHAVIORAL HEALTH | Facility: CLINIC | Age: 63
End: 2018-02-20

## 2018-02-20 DIAGNOSIS — F33.2 SEVERE EPISODE OF RECURRENT MAJOR DEPRESSIVE DISORDER, WITHOUT PSYCHOTIC FEATURES (H): ICD-10-CM

## 2018-02-20 ASSESSMENT — MIFFLIN-ST. JEOR: SCORE: 1540.29

## 2018-03-20 ENCOUNTER — COMMUNICATION - HEALTHEAST (OUTPATIENT)
Dept: FAMILY MEDICINE | Facility: CLINIC | Age: 63
End: 2018-03-20

## 2018-03-20 DIAGNOSIS — R60.9 EDEMA: ICD-10-CM

## 2018-03-21 ENCOUNTER — OFFICE VISIT - HEALTHEAST (OUTPATIENT)
Dept: FAMILY MEDICINE | Facility: CLINIC | Age: 63
End: 2018-03-21

## 2018-03-21 ENCOUNTER — COMMUNICATION - HEALTHEAST (OUTPATIENT)
Dept: FAMILY MEDICINE | Facility: CLINIC | Age: 63
End: 2018-03-21

## 2018-03-21 ENCOUNTER — RECORDS - HEALTHEAST (OUTPATIENT)
Dept: GENERAL RADIOLOGY | Facility: CLINIC | Age: 63
End: 2018-03-21

## 2018-03-21 ENCOUNTER — OFFICE VISIT - HEALTHEAST (OUTPATIENT)
Dept: BEHAVIORAL HEALTH | Facility: CLINIC | Age: 63
End: 2018-03-21

## 2018-03-21 DIAGNOSIS — R05.9 COUGH: ICD-10-CM

## 2018-03-21 DIAGNOSIS — F43.10 PTSD (POST-TRAUMATIC STRESS DISORDER): ICD-10-CM

## 2018-03-21 DIAGNOSIS — J20.9 ACUTE BRONCHITIS: ICD-10-CM

## 2018-03-21 DIAGNOSIS — F33.2 SEVERE EPISODE OF RECURRENT MAJOR DEPRESSIVE DISORDER, WITHOUT PSYCHOTIC FEATURES (H): ICD-10-CM

## 2018-03-21 DIAGNOSIS — R60.9 EDEMA: ICD-10-CM

## 2018-03-21 DIAGNOSIS — M17.11 PRIMARY OSTEOARTHRITIS OF RIGHT KNEE: ICD-10-CM

## 2018-03-21 LAB
FLUAV AG SPEC QL IA: NORMAL
FLUBV AG SPEC QL IA: NORMAL

## 2018-04-09 ENCOUNTER — AMBULATORY - HEALTHEAST (OUTPATIENT)
Dept: NURSING | Facility: CLINIC | Age: 63
End: 2018-04-09

## 2018-04-13 ENCOUNTER — COMMUNICATION - HEALTHEAST (OUTPATIENT)
Dept: BEHAVIORAL HEALTH | Facility: CLINIC | Age: 63
End: 2018-04-13

## 2018-04-20 ENCOUNTER — OFFICE VISIT - HEALTHEAST (OUTPATIENT)
Dept: BEHAVIORAL HEALTH | Facility: CLINIC | Age: 63
End: 2018-04-20

## 2018-04-20 DIAGNOSIS — G47.00 INSOMNIA: ICD-10-CM

## 2018-04-20 DIAGNOSIS — F10.20 SEVERE ALCOHOL USE DISORDER (H): ICD-10-CM

## 2018-04-20 LAB
AMPHETAMINES UR QL SCN: NORMAL
BARBITURATES UR QL: NORMAL
BENZODIAZ UR QL: NORMAL
CANNABINOIDS UR QL SCN: NORMAL
COCAINE UR QL: NORMAL
CREAT UR-MCNC: 320.8 MG/DL
METHADONE UR QL SCN: NORMAL
OPIATES UR QL SCN: NORMAL
OXYCODONE UR QL: NORMAL
PCP UR QL SCN: NORMAL

## 2018-04-20 ASSESSMENT — MIFFLIN-ST. JEOR: SCORE: 1537.45

## 2018-04-23 LAB
MISCELLANEOUS TEST DEPT. - HE HISTORICAL: NORMAL
PERFORMING LAB: NORMAL
SPECIMEN STATUS: NORMAL
TEST NAME: NORMAL

## 2018-05-03 ENCOUNTER — OFFICE VISIT - HEALTHEAST (OUTPATIENT)
Dept: BEHAVIORAL HEALTH | Facility: CLINIC | Age: 63
End: 2018-05-03

## 2018-05-03 DIAGNOSIS — F43.10 PTSD (POST-TRAUMATIC STRESS DISORDER): ICD-10-CM

## 2018-05-03 DIAGNOSIS — F33.2 SEVERE EPISODE OF RECURRENT MAJOR DEPRESSIVE DISORDER, WITHOUT PSYCHOTIC FEATURES (H): ICD-10-CM

## 2018-05-03 DIAGNOSIS — F10.20 SEVERE ALCOHOL USE DISORDER (H): ICD-10-CM

## 2018-05-22 ENCOUNTER — COMMUNICATION - HEALTHEAST (OUTPATIENT)
Dept: PHARMACY | Facility: CLINIC | Age: 63
End: 2018-05-22

## 2018-06-07 ENCOUNTER — OFFICE VISIT - HEALTHEAST (OUTPATIENT)
Dept: BEHAVIORAL HEALTH | Facility: CLINIC | Age: 63
End: 2018-06-07

## 2018-06-07 DIAGNOSIS — F43.10 PTSD (POST-TRAUMATIC STRESS DISORDER): ICD-10-CM

## 2018-06-07 DIAGNOSIS — F33.2 SEVERE EPISODE OF RECURRENT MAJOR DEPRESSIVE DISORDER, WITHOUT PSYCHOTIC FEATURES (H): ICD-10-CM

## 2018-06-12 ENCOUNTER — OFFICE VISIT - HEALTHEAST (OUTPATIENT)
Dept: FAMILY MEDICINE | Facility: CLINIC | Age: 63
End: 2018-06-12

## 2018-06-12 DIAGNOSIS — K21.00 REFLUX ESOPHAGITIS: ICD-10-CM

## 2018-06-12 DIAGNOSIS — R60.0 PERIPHERAL EDEMA: ICD-10-CM

## 2018-06-12 DIAGNOSIS — F33.9 MAJOR DEPRESSIVE DISORDER, RECURRENT EPISODE (H): ICD-10-CM

## 2018-06-12 DIAGNOSIS — E87.6 HYPOKALEMIA: ICD-10-CM

## 2018-06-12 DIAGNOSIS — F10.20 ALCOHOL USE DISORDER, SEVERE, DEPENDENCE (H): ICD-10-CM

## 2018-06-12 LAB
ALBUMIN SERPL-MCNC: 3.8 G/DL (ref 3.5–5)
ALP SERPL-CCNC: 88 U/L (ref 45–120)
ALT SERPL W P-5'-P-CCNC: 45 U/L (ref 0–45)
ANION GAP SERPL CALCULATED.3IONS-SCNC: 10 MMOL/L (ref 5–18)
AST SERPL W P-5'-P-CCNC: 42 U/L (ref 0–40)
BILIRUB DIRECT SERPL-MCNC: 0.2 MG/DL
BILIRUB SERPL-MCNC: 0.5 MG/DL (ref 0–1)
BUN SERPL-MCNC: 8 MG/DL (ref 8–22)
CALCIUM SERPL-MCNC: 9.8 MG/DL (ref 8.5–10.5)
CHLORIDE BLD-SCNC: 104 MMOL/L (ref 98–107)
CO2 SERPL-SCNC: 29 MMOL/L (ref 22–31)
CREAT SERPL-MCNC: 0.67 MG/DL (ref 0.6–1.1)
GFR SERPL CREATININE-BSD FRML MDRD: >60 ML/MIN/1.73M2
GLUCOSE BLD-MCNC: 108 MG/DL (ref 70–125)
POTASSIUM BLD-SCNC: 4.3 MMOL/L (ref 3.5–5)
PROT SERPL-MCNC: 6.6 G/DL (ref 6–8)
SODIUM SERPL-SCNC: 143 MMOL/L (ref 136–145)

## 2018-06-12 ASSESSMENT — MIFFLIN-ST. JEOR: SCORE: 1585.08

## 2018-06-13 ENCOUNTER — COMMUNICATION - HEALTHEAST (OUTPATIENT)
Dept: FAMILY MEDICINE | Facility: CLINIC | Age: 63
End: 2018-06-13

## 2018-06-21 ENCOUNTER — AMBULATORY - HEALTHEAST (OUTPATIENT)
Dept: NURSING | Facility: CLINIC | Age: 63
End: 2018-06-21

## 2018-06-21 ENCOUNTER — OFFICE VISIT - HEALTHEAST (OUTPATIENT)
Dept: BEHAVIORAL HEALTH | Facility: CLINIC | Age: 63
End: 2018-06-21

## 2018-06-21 DIAGNOSIS — F33.2 SEVERE EPISODE OF RECURRENT MAJOR DEPRESSIVE DISORDER, WITHOUT PSYCHOTIC FEATURES (H): ICD-10-CM

## 2018-06-21 DIAGNOSIS — F17.200 TOBACCO USE DISORDER: ICD-10-CM

## 2018-06-21 DIAGNOSIS — F10.20 ALCOHOL USE DISORDER, SEVERE, DEPENDENCE (H): ICD-10-CM

## 2018-06-21 DIAGNOSIS — F10.20 SEVERE ALCOHOL USE DISORDER (H): ICD-10-CM

## 2018-06-21 LAB
AMPHETAMINES UR QL SCN: ABNORMAL
BARBITURATES UR QL: ABNORMAL
BENZODIAZ UR QL: ABNORMAL
CANNABINOIDS UR QL SCN: ABNORMAL
COCAINE UR QL: ABNORMAL
CREAT UR-MCNC: 222 MG/DL
METHADONE UR QL SCN: ABNORMAL
OPIATES UR QL SCN: ABNORMAL
OXYCODONE UR QL: ABNORMAL
PCP UR QL SCN: ABNORMAL

## 2018-06-21 ASSESSMENT — MIFFLIN-ST. JEOR: SCORE: 1548.8

## 2018-06-24 LAB
ETHYL GLUCURONIDE UR CFM-MCNC: NORMAL NG/ML
ETHYL SULFATE UR CFM-MCNC: NORMAL NG/ML

## 2018-06-26 ENCOUNTER — OFFICE VISIT - HEALTHEAST (OUTPATIENT)
Dept: BEHAVIORAL HEALTH | Facility: CLINIC | Age: 63
End: 2018-06-26

## 2018-06-26 DIAGNOSIS — F10.20 SEVERE ALCOHOL USE DISORDER (H): ICD-10-CM

## 2018-06-26 DIAGNOSIS — F33.2 SEVERE EPISODE OF RECURRENT MAJOR DEPRESSIVE DISORDER, WITHOUT PSYCHOTIC FEATURES (H): ICD-10-CM

## 2018-06-26 DIAGNOSIS — F43.10 PTSD (POST-TRAUMATIC STRESS DISORDER): ICD-10-CM

## 2018-06-26 LAB
7-NH-CLONAZEPAM-BY GC/MS: NEGATIVE NG/ML
7-NH-FLUNITRAZEPAM-BY GC/MS: NEGATIVE NG/ML
ALPHA OH-ALPRAZOLAM-BY GC/MS: NEGATIVE NG/ML
ALPHA OH-TRIAZOLAM-BY GC/MS: NEGATIVE NG/ML
INTERPRETATION: NORMAL
LORAZEPAM-BY GC/MS: NEGATIVE NG/ML
NORDIAZEPAM-BY GC/MS: NEGATIVE NG/ML
OH-ETHYL-FLURAZEPAM-BY GC/MS: NEGATIVE NG/ML
OXAZEPAM-BY GC/MS: 372 NG/ML
TEMAZEPAM-BY GC/MS: NEGATIVE NG/ML

## 2018-07-06 ENCOUNTER — COMMUNICATION - HEALTHEAST (OUTPATIENT)
Dept: BEHAVIORAL HEALTH | Facility: CLINIC | Age: 63
End: 2018-07-06

## 2018-07-09 ENCOUNTER — OFFICE VISIT - HEALTHEAST (OUTPATIENT)
Dept: FAMILY MEDICINE | Facility: CLINIC | Age: 63
End: 2018-07-09

## 2018-07-09 DIAGNOSIS — J43.9 COPD (CHRONIC OBSTRUCTIVE PULMONARY DISEASE) WITH EMPHYSEMA (H): ICD-10-CM

## 2018-07-09 DIAGNOSIS — F33.9 MAJOR DEPRESSION, RECURRENT (H): ICD-10-CM

## 2018-07-09 DIAGNOSIS — J30.2 SEASONAL ALLERGIES: ICD-10-CM

## 2018-07-09 DIAGNOSIS — M17.11 PRIMARY OSTEOARTHRITIS OF RIGHT KNEE: ICD-10-CM

## 2018-07-09 DIAGNOSIS — E66.01 MORBID OBESITY (H): ICD-10-CM

## 2018-07-09 DIAGNOSIS — M19.049 PRIMARY LOCALIZED OSTEOARTHROSIS OF HAND, UNSPECIFIED LATERALITY: ICD-10-CM

## 2018-07-09 DIAGNOSIS — M17.12 PRIMARY OSTEOARTHRITIS OF LEFT KNEE: ICD-10-CM

## 2018-07-16 ENCOUNTER — COMMUNICATION - HEALTHEAST (OUTPATIENT)
Dept: BEHAVIORAL HEALTH | Facility: CLINIC | Age: 63
End: 2018-07-16

## 2018-07-16 DIAGNOSIS — F10.20 ALCOHOL USE DISORDER, SEVERE, DEPENDENCE (H): ICD-10-CM

## 2018-07-20 ENCOUNTER — OFFICE VISIT - HEALTHEAST (OUTPATIENT)
Dept: BEHAVIORAL HEALTH | Facility: CLINIC | Age: 63
End: 2018-07-20

## 2018-07-20 DIAGNOSIS — F43.10 PTSD (POST-TRAUMATIC STRESS DISORDER): ICD-10-CM

## 2018-07-20 DIAGNOSIS — F33.2 SEVERE EPISODE OF RECURRENT MAJOR DEPRESSIVE DISORDER, WITHOUT PSYCHOTIC FEATURES (H): ICD-10-CM

## 2018-07-20 DIAGNOSIS — F10.20 ALCOHOL USE DISORDER, SEVERE, DEPENDENCE (H): ICD-10-CM

## 2018-07-24 ENCOUNTER — OFFICE VISIT - HEALTHEAST (OUTPATIENT)
Dept: BEHAVIORAL HEALTH | Facility: CLINIC | Age: 63
End: 2018-07-24

## 2018-07-24 DIAGNOSIS — F10.20 SEVERE ALCOHOL USE DISORDER (H): ICD-10-CM

## 2018-07-31 ENCOUNTER — OFFICE VISIT - HEALTHEAST (OUTPATIENT)
Dept: BEHAVIORAL HEALTH | Facility: CLINIC | Age: 63
End: 2018-07-31

## 2018-07-31 ENCOUNTER — AMBULATORY - HEALTHEAST (OUTPATIENT)
Dept: BEHAVIORAL HEALTH | Facility: CLINIC | Age: 63
End: 2018-07-31

## 2018-07-31 DIAGNOSIS — F10.20 SEVERE ALCOHOL USE DISORDER (H): ICD-10-CM

## 2018-07-31 DIAGNOSIS — F43.10 PTSD (POST-TRAUMATIC STRESS DISORDER): ICD-10-CM

## 2018-07-31 DIAGNOSIS — F33.2 SEVERE EPISODE OF RECURRENT MAJOR DEPRESSIVE DISORDER, WITHOUT PSYCHOTIC FEATURES (H): ICD-10-CM

## 2018-07-31 LAB
AMPHETAMINES UR QL SCN: ABNORMAL
BARBITURATES UR QL: ABNORMAL
BENZODIAZ UR QL: ABNORMAL
CANNABINOIDS UR QL SCN: ABNORMAL
COCAINE UR QL: ABNORMAL
CREAT UR-MCNC: 241.1 MG/DL
METHADONE UR QL SCN: ABNORMAL
OPIATES UR QL SCN: ABNORMAL
OXYCODONE UR QL: ABNORMAL
PCP UR QL SCN: ABNORMAL

## 2018-07-31 ASSESSMENT — MIFFLIN-ST. JEOR: SCORE: 1576.01

## 2018-08-01 ENCOUNTER — COMMUNICATION - HEALTHEAST (OUTPATIENT)
Dept: FAMILY MEDICINE | Facility: CLINIC | Age: 63
End: 2018-08-01

## 2018-08-01 DIAGNOSIS — K21.00 REFLUX ESOPHAGITIS: ICD-10-CM

## 2018-08-02 LAB
ETHYL GLUCURONIDE UR CFM-MCNC: NORMAL NG/ML
ETHYL SULFATE UR CFM-MCNC: 8830 NG/ML

## 2018-08-03 ENCOUNTER — COMMUNICATION - HEALTHEAST (OUTPATIENT)
Dept: FAMILY MEDICINE | Facility: CLINIC | Age: 63
End: 2018-08-03

## 2018-08-07 ENCOUNTER — COMMUNICATION - HEALTHEAST (OUTPATIENT)
Dept: BEHAVIORAL HEALTH | Facility: CLINIC | Age: 63
End: 2018-08-07

## 2018-08-09 ENCOUNTER — AMBULATORY - HEALTHEAST (OUTPATIENT)
Dept: PODIATRY | Facility: CLINIC | Age: 63
End: 2018-08-09

## 2018-08-09 DIAGNOSIS — B35.1 NAIL FUNGUS: ICD-10-CM

## 2018-08-09 DIAGNOSIS — L60.2 ONYCHAUXIS: ICD-10-CM

## 2018-08-15 ENCOUNTER — COMMUNICATION - HEALTHEAST (OUTPATIENT)
Dept: FAMILY MEDICINE | Facility: CLINIC | Age: 63
End: 2018-08-15

## 2018-08-15 DIAGNOSIS — R60.0 PERIPHERAL EDEMA: ICD-10-CM

## 2018-08-21 ENCOUNTER — OFFICE VISIT - HEALTHEAST (OUTPATIENT)
Dept: BEHAVIORAL HEALTH | Facility: CLINIC | Age: 63
End: 2018-08-21

## 2018-08-21 DIAGNOSIS — F33.2 SEVERE EPISODE OF RECURRENT MAJOR DEPRESSIVE DISORDER, WITHOUT PSYCHOTIC FEATURES (H): ICD-10-CM

## 2018-08-21 DIAGNOSIS — F43.10 PTSD (POST-TRAUMATIC STRESS DISORDER): ICD-10-CM

## 2018-08-21 DIAGNOSIS — F10.20 SEVERE ALCOHOL USE DISORDER (H): ICD-10-CM

## 2018-09-04 ENCOUNTER — COMMUNICATION - HEALTHEAST (OUTPATIENT)
Dept: FAMILY MEDICINE | Facility: CLINIC | Age: 63
End: 2018-09-04

## 2018-09-04 ENCOUNTER — COMMUNICATION - HEALTHEAST (OUTPATIENT)
Dept: BEHAVIORAL HEALTH | Facility: CLINIC | Age: 63
End: 2018-09-04

## 2018-09-04 DIAGNOSIS — F10.20 ALCOHOL USE DISORDER, SEVERE, DEPENDENCE (H): ICD-10-CM

## 2018-09-04 DIAGNOSIS — F33.9 MAJOR DEPRESSIVE DISORDER, RECURRENT EPISODE (H): ICD-10-CM

## 2018-09-05 ENCOUNTER — COMMUNICATION - HEALTHEAST (OUTPATIENT)
Dept: FAMILY MEDICINE | Facility: CLINIC | Age: 63
End: 2018-09-05

## 2018-09-07 ENCOUNTER — AMBULATORY - HEALTHEAST (OUTPATIENT)
Dept: BEHAVIORAL HEALTH | Facility: CLINIC | Age: 63
End: 2018-09-07

## 2018-09-13 ENCOUNTER — AMBULATORY - HEALTHEAST (OUTPATIENT)
Dept: BEHAVIORAL HEALTH | Facility: CLINIC | Age: 63
End: 2018-09-13

## 2018-09-13 DIAGNOSIS — F10.20 SEVERE ALCOHOL USE DISORDER (H): ICD-10-CM

## 2018-09-13 LAB
AMPHETAMINES UR QL SCN: NORMAL
BARBITURATES UR QL: NORMAL
BENZODIAZ UR QL: NORMAL
CANNABINOIDS UR QL SCN: NORMAL
COCAINE UR QL: NORMAL
CREAT UR-MCNC: 177.5 MG/DL
METHADONE UR QL SCN: NORMAL
OPIATES UR QL SCN: NORMAL
OXYCODONE UR QL: NORMAL
PCP UR QL SCN: NORMAL

## 2018-09-16 LAB
ETHYL GLUCURONIDE UR CFM-MCNC: NORMAL NG/ML
ETHYL SULFATE UR CFM-MCNC: NORMAL NG/ML

## 2018-09-17 ENCOUNTER — OFFICE VISIT - HEALTHEAST (OUTPATIENT)
Dept: BEHAVIORAL HEALTH | Facility: CLINIC | Age: 63
End: 2018-09-17

## 2018-09-17 DIAGNOSIS — F10.20 SEVERE ALCOHOL USE DISORDER (H): ICD-10-CM

## 2018-09-17 DIAGNOSIS — F43.10 PTSD (POST-TRAUMATIC STRESS DISORDER): ICD-10-CM

## 2018-09-17 DIAGNOSIS — F33.2 SEVERE EPISODE OF RECURRENT MAJOR DEPRESSIVE DISORDER, WITHOUT PSYCHOTIC FEATURES (H): ICD-10-CM

## 2018-09-27 ENCOUNTER — OFFICE VISIT - HEALTHEAST (OUTPATIENT)
Dept: BEHAVIORAL HEALTH | Facility: CLINIC | Age: 63
End: 2018-09-27

## 2018-09-27 DIAGNOSIS — F10.20 SEVERE ALCOHOL USE DISORDER (H): ICD-10-CM

## 2018-09-27 DIAGNOSIS — F10.20 ALCOHOL USE DISORDER, SEVERE, DEPENDENCE (H): ICD-10-CM

## 2018-09-27 LAB
AMPHETAMINES UR QL SCN: NORMAL
BARBITURATES UR QL: NORMAL
BENZODIAZ UR QL: NORMAL
CANNABINOIDS UR QL SCN: NORMAL
COCAINE UR QL: NORMAL
CREAT UR-MCNC: 224.2 MG/DL
METHADONE UR QL SCN: NORMAL
OPIATES UR QL SCN: NORMAL
OXYCODONE UR QL: NORMAL
PCP UR QL SCN: NORMAL

## 2018-09-27 ASSESSMENT — MIFFLIN-ST. JEOR: SCORE: 1576.01

## 2018-09-30 LAB
ETHYL GLUCURONIDE UR CFM-MCNC: NORMAL NG/ML
ETHYL SULFATE UR CFM-MCNC: NORMAL NG/ML

## 2018-10-08 ENCOUNTER — COMMUNICATION - HEALTHEAST (OUTPATIENT)
Dept: BEHAVIORAL HEALTH | Facility: CLINIC | Age: 63
End: 2018-10-08

## 2018-10-08 ENCOUNTER — OFFICE VISIT - HEALTHEAST (OUTPATIENT)
Dept: BEHAVIORAL HEALTH | Facility: CLINIC | Age: 63
End: 2018-10-08

## 2018-10-08 DIAGNOSIS — F10.20 SEVERE ALCOHOL USE DISORDER (H): ICD-10-CM

## 2018-10-09 ENCOUNTER — AMBULATORY - HEALTHEAST (OUTPATIENT)
Dept: BEHAVIORAL HEALTH | Facility: CLINIC | Age: 63
End: 2018-10-09

## 2018-10-10 ENCOUNTER — OFFICE VISIT - HEALTHEAST (OUTPATIENT)
Dept: BEHAVIORAL HEALTH | Facility: CLINIC | Age: 63
End: 2018-10-10

## 2018-10-10 DIAGNOSIS — F10.20 ALCOHOL USE DISORDER, SEVERE, DEPENDENCE (H): ICD-10-CM

## 2018-10-10 DIAGNOSIS — F33.2 SEVERE EPISODE OF RECURRENT MAJOR DEPRESSIVE DISORDER, WITHOUT PSYCHOTIC FEATURES (H): ICD-10-CM

## 2018-10-10 DIAGNOSIS — F43.10 PTSD (POST-TRAUMATIC STRESS DISORDER): ICD-10-CM

## 2018-10-22 ENCOUNTER — OFFICE VISIT - HEALTHEAST (OUTPATIENT)
Dept: FAMILY MEDICINE | Facility: CLINIC | Age: 63
End: 2018-10-22

## 2018-10-22 DIAGNOSIS — L82.1 SEBORRHEIC KERATOSIS: ICD-10-CM

## 2018-10-22 DIAGNOSIS — N89.8 VAGINAL DISCHARGE: ICD-10-CM

## 2018-10-22 DIAGNOSIS — R39.9 UTI SYMPTOMS: ICD-10-CM

## 2018-10-22 LAB
ALBUMIN UR-MCNC: NEGATIVE MG/DL
APPEARANCE UR: CLEAR
BILIRUB UR QL STRIP: NEGATIVE
CLUE CELLS: NORMAL
COLOR UR AUTO: YELLOW
GLUCOSE UR STRIP-MCNC: NEGATIVE MG/DL
HGB UR QL STRIP: NEGATIVE
KETONES UR STRIP-MCNC: NEGATIVE MG/DL
LEUKOCYTE ESTERASE UR QL STRIP: NEGATIVE
NITRATE UR QL: NEGATIVE
PH UR STRIP: 5.5 [PH] (ref 5–8)
SP GR UR STRIP: 1.02 (ref 1–1.03)
TRICHOMONAS, WET PREP: NORMAL
UROBILINOGEN UR STRIP-ACNC: NORMAL
YEAST, WET PREP: NORMAL

## 2018-10-23 LAB
C TRACH DNA SPEC QL PROBE+SIG AMP: NEGATIVE
N GONORRHOEA DNA SPEC QL NAA+PROBE: NEGATIVE

## 2018-10-30 ENCOUNTER — COMMUNICATION - HEALTHEAST (OUTPATIENT)
Dept: FAMILY MEDICINE | Facility: CLINIC | Age: 63
End: 2018-10-30

## 2018-11-26 ENCOUNTER — COMMUNICATION - HEALTHEAST (OUTPATIENT)
Dept: FAMILY MEDICINE | Facility: CLINIC | Age: 63
End: 2018-11-26

## 2018-11-30 ENCOUNTER — OFFICE VISIT - HEALTHEAST (OUTPATIENT)
Dept: FAMILY MEDICINE | Facility: CLINIC | Age: 63
End: 2018-11-30

## 2018-11-30 DIAGNOSIS — J06.9 ACUTE URI: ICD-10-CM

## 2019-01-03 ENCOUNTER — OFFICE VISIT - HEALTHEAST (OUTPATIENT)
Dept: BEHAVIORAL HEALTH | Facility: CLINIC | Age: 64
End: 2019-01-03

## 2019-01-03 DIAGNOSIS — F43.10 PTSD (POST-TRAUMATIC STRESS DISORDER): ICD-10-CM

## 2019-01-03 DIAGNOSIS — F10.20 ALCOHOL USE DISORDER, SEVERE, DEPENDENCE (H): ICD-10-CM

## 2019-01-03 DIAGNOSIS — F33.0 MILD EPISODE OF RECURRENT MAJOR DEPRESSIVE DISORDER (H): ICD-10-CM

## 2019-01-04 ENCOUNTER — OFFICE VISIT - HEALTHEAST (OUTPATIENT)
Dept: FAMILY MEDICINE | Facility: CLINIC | Age: 64
End: 2019-01-04

## 2019-01-04 DIAGNOSIS — J43.8 OTHER EMPHYSEMA (H): ICD-10-CM

## 2019-01-04 DIAGNOSIS — K21.00 REFLUX ESOPHAGITIS: ICD-10-CM

## 2019-01-04 DIAGNOSIS — L82.1 SEBORRHEIC KERATOSIS: ICD-10-CM

## 2019-01-04 DIAGNOSIS — H69.92 DYSFUNCTION OF LEFT EUSTACHIAN TUBE: ICD-10-CM

## 2019-01-07 ENCOUNTER — RECORDS - HEALTHEAST (OUTPATIENT)
Dept: ADMINISTRATIVE | Facility: OTHER | Age: 64
End: 2019-01-07

## 2019-01-13 ENCOUNTER — COMMUNICATION - HEALTHEAST (OUTPATIENT)
Dept: BEHAVIORAL HEALTH | Facility: CLINIC | Age: 64
End: 2019-01-13

## 2019-01-13 DIAGNOSIS — F10.20 SEVERE ALCOHOL USE DISORDER (H): ICD-10-CM

## 2019-02-19 ENCOUNTER — OFFICE VISIT - HEALTHEAST (OUTPATIENT)
Dept: BEHAVIORAL HEALTH | Facility: CLINIC | Age: 64
End: 2019-02-19

## 2019-02-19 ENCOUNTER — AMBULATORY - HEALTHEAST (OUTPATIENT)
Dept: BEHAVIORAL HEALTH | Facility: CLINIC | Age: 64
End: 2019-02-19

## 2019-02-19 DIAGNOSIS — F43.10 PTSD (POST-TRAUMATIC STRESS DISORDER): ICD-10-CM

## 2019-02-19 DIAGNOSIS — F10.20 SEVERE ALCOHOL USE DISORDER (H): ICD-10-CM

## 2019-02-19 DIAGNOSIS — F33.0 MILD EPISODE OF RECURRENT MAJOR DEPRESSIVE DISORDER (H): ICD-10-CM

## 2019-03-19 ENCOUNTER — AMBULATORY - HEALTHEAST (OUTPATIENT)
Dept: BEHAVIORAL HEALTH | Facility: CLINIC | Age: 64
End: 2019-03-19

## 2019-03-23 ENCOUNTER — COMMUNICATION - HEALTHEAST (OUTPATIENT)
Dept: SCHEDULING | Facility: CLINIC | Age: 64
End: 2019-03-23

## 2019-03-24 ENCOUNTER — RECORDS - HEALTHEAST (OUTPATIENT)
Dept: ADMINISTRATIVE | Facility: OTHER | Age: 64
End: 2019-03-24

## 2019-03-27 ENCOUNTER — COMMUNICATION - HEALTHEAST (OUTPATIENT)
Dept: CARE COORDINATION | Facility: CLINIC | Age: 64
End: 2019-03-27

## 2019-04-01 ENCOUNTER — OFFICE VISIT - HEALTHEAST (OUTPATIENT)
Dept: BEHAVIORAL HEALTH | Facility: CLINIC | Age: 64
End: 2019-04-01

## 2019-04-01 DIAGNOSIS — F10.20 SEVERE ALCOHOL USE DISORDER (H): ICD-10-CM

## 2019-04-01 LAB
AMPHETAMINES UR QL SCN: NORMAL
BARBITURATES UR QL: NORMAL
BENZODIAZ UR QL: NORMAL
CANNABINOIDS UR QL SCN: NORMAL
COCAINE UR QL: NORMAL
CREAT UR-MCNC: 109.5 MG/DL
METHADONE UR QL SCN: NORMAL
OPIATES UR QL SCN: NORMAL
OXYCODONE UR QL: NORMAL
PCP UR QL SCN: NORMAL

## 2019-04-01 ASSESSMENT — MIFFLIN-ST. JEOR: SCORE: 1616.83

## 2019-04-03 ENCOUNTER — COMMUNICATION - HEALTHEAST (OUTPATIENT)
Dept: BEHAVIORAL HEALTH | Facility: CLINIC | Age: 64
End: 2019-04-03

## 2019-04-04 LAB
ETHYL GLUCURONIDE UR CFM-MCNC: NORMAL NG/ML
ETHYL SULFATE UR CFM-MCNC: NORMAL NG/ML
FENTANYL UR-MCNC: <1 NG/ML
NORFENTANYL UR-MCNC: <1 NG/ML

## 2019-04-11 ENCOUNTER — COMMUNICATION - HEALTHEAST (OUTPATIENT)
Dept: SCHEDULING | Facility: CLINIC | Age: 64
End: 2019-04-11

## 2019-05-07 ENCOUNTER — COMMUNICATION - HEALTHEAST (OUTPATIENT)
Dept: BEHAVIORAL HEALTH | Facility: CLINIC | Age: 64
End: 2019-05-07

## 2019-05-07 DIAGNOSIS — F10.20 ALCOHOL USE DISORDER, SEVERE, DEPENDENCE (H): ICD-10-CM

## 2019-05-09 ENCOUNTER — COMMUNICATION - HEALTHEAST (OUTPATIENT)
Dept: CARE COORDINATION | Facility: CLINIC | Age: 64
End: 2019-05-09

## 2019-05-13 ENCOUNTER — COMMUNICATION - HEALTHEAST (OUTPATIENT)
Dept: BEHAVIORAL HEALTH | Facility: CLINIC | Age: 64
End: 2019-05-13

## 2019-05-13 ENCOUNTER — OFFICE VISIT - HEALTHEAST (OUTPATIENT)
Dept: FAMILY MEDICINE | Facility: CLINIC | Age: 64
End: 2019-05-13

## 2019-05-13 DIAGNOSIS — R14.0 ABDOMINAL BLOATING: ICD-10-CM

## 2019-05-13 DIAGNOSIS — F10.20 SEVERE ALCOHOL USE DISORDER (H): ICD-10-CM

## 2019-05-13 DIAGNOSIS — M54.42 CHRONIC LEFT-SIDED LOW BACK PAIN WITH LEFT-SIDED SCIATICA: ICD-10-CM

## 2019-05-13 DIAGNOSIS — G89.29 CHRONIC LEFT-SIDED LOW BACK PAIN WITH LEFT-SIDED SCIATICA: ICD-10-CM

## 2019-05-13 LAB
ALBUMIN SERPL-MCNC: 3.7 G/DL (ref 3.5–5)
ALP SERPL-CCNC: 82 U/L (ref 45–120)
ALT SERPL W P-5'-P-CCNC: 55 U/L (ref 0–45)
ANION GAP SERPL CALCULATED.3IONS-SCNC: 15 MMOL/L (ref 5–18)
AST SERPL W P-5'-P-CCNC: 44 U/L (ref 0–40)
BASOPHILS # BLD AUTO: 0 THOU/UL (ref 0–0.2)
BASOPHILS NFR BLD AUTO: 1 % (ref 0–2)
BILIRUB SERPL-MCNC: 0.3 MG/DL (ref 0–1)
BUN SERPL-MCNC: 5 MG/DL (ref 8–22)
CALCIUM SERPL-MCNC: 10.1 MG/DL (ref 8.5–10.5)
CHLORIDE BLD-SCNC: 100 MMOL/L (ref 98–107)
CO2 SERPL-SCNC: 26 MMOL/L (ref 22–31)
CREAT SERPL-MCNC: 0.65 MG/DL (ref 0.6–1.1)
EOSINOPHIL # BLD AUTO: 0.3 THOU/UL (ref 0–0.4)
EOSINOPHIL NFR BLD AUTO: 4 % (ref 0–6)
ERYTHROCYTE [DISTWIDTH] IN BLOOD BY AUTOMATED COUNT: 12.8 % (ref 11–14.5)
GFR SERPL CREATININE-BSD FRML MDRD: >60 ML/MIN/1.73M2
GLUCOSE BLD-MCNC: 85 MG/DL (ref 70–125)
HCT VFR BLD AUTO: 44.3 % (ref 35–47)
HGB BLD-MCNC: 14.9 G/DL (ref 12–16)
LYMPHOCYTES # BLD AUTO: 1.9 THOU/UL (ref 0.8–4.4)
LYMPHOCYTES NFR BLD AUTO: 30 % (ref 20–40)
MCH RBC QN AUTO: 32.3 PG (ref 27–34)
MCHC RBC AUTO-ENTMCNC: 33.6 G/DL (ref 32–36)
MCV RBC AUTO: 96 FL (ref 80–100)
MONOCYTES # BLD AUTO: 0.6 THOU/UL (ref 0–0.9)
MONOCYTES NFR BLD AUTO: 10 % (ref 2–10)
NEUTROPHILS # BLD AUTO: 3.4 THOU/UL (ref 2–7.7)
NEUTROPHILS NFR BLD AUTO: 55 % (ref 50–70)
PLATELET # BLD AUTO: 246 THOU/UL (ref 140–440)
PMV BLD AUTO: 10 FL (ref 8.5–12.5)
POTASSIUM BLD-SCNC: 3.8 MMOL/L (ref 3.5–5)
PROT SERPL-MCNC: 7 G/DL (ref 6–8)
RBC # BLD AUTO: 4.62 MILL/UL (ref 3.8–5.4)
SODIUM SERPL-SCNC: 141 MMOL/L (ref 136–145)
WBC: 6.2 THOU/UL (ref 4–11)

## 2019-05-14 ENCOUNTER — COMMUNICATION - HEALTHEAST (OUTPATIENT)
Dept: FAMILY MEDICINE | Facility: CLINIC | Age: 64
End: 2019-05-14

## 2019-05-14 ENCOUNTER — HOSPITAL ENCOUNTER (OUTPATIENT)
Dept: CT IMAGING | Facility: CLINIC | Age: 64
Discharge: HOME | End: 2019-05-14
Attending: FAMILY MEDICINE

## 2019-05-14 DIAGNOSIS — R14.0 ABDOMINAL BLOATING: ICD-10-CM

## 2019-05-16 ENCOUNTER — OFFICE VISIT - HEALTHEAST (OUTPATIENT)
Dept: BEHAVIORAL HEALTH | Facility: CLINIC | Age: 64
End: 2019-05-16

## 2019-05-16 DIAGNOSIS — F10.20 ALCOHOL USE DISORDER, SEVERE, DEPENDENCE (H): ICD-10-CM

## 2019-05-16 DIAGNOSIS — F10.20 SEVERE ALCOHOL USE DISORDER (H): ICD-10-CM

## 2019-05-16 DIAGNOSIS — F17.200 TOBACCO USE DISORDER: ICD-10-CM

## 2019-05-16 ASSESSMENT — MIFFLIN-ST. JEOR: SCORE: 1619.1

## 2019-05-21 ENCOUNTER — OFFICE VISIT - HEALTHEAST (OUTPATIENT)
Dept: BEHAVIORAL HEALTH | Facility: CLINIC | Age: 64
End: 2019-05-21

## 2019-05-21 ENCOUNTER — OFFICE VISIT - HEALTHEAST (OUTPATIENT)
Dept: PHYSICAL THERAPY | Facility: REHABILITATION | Age: 64
End: 2019-05-21

## 2019-05-21 DIAGNOSIS — M54.50 BILATERAL LOW BACK PAIN WITHOUT SCIATICA, UNSPECIFIED CHRONICITY: ICD-10-CM

## 2019-05-21 DIAGNOSIS — J43.9 COPD (CHRONIC OBSTRUCTIVE PULMONARY DISEASE) WITH EMPHYSEMA (H): ICD-10-CM

## 2019-05-21 DIAGNOSIS — F43.10 PTSD (POST-TRAUMATIC STRESS DISORDER): ICD-10-CM

## 2019-05-21 DIAGNOSIS — F33.2 SEVERE EPISODE OF RECURRENT MAJOR DEPRESSIVE DISORDER, WITHOUT PSYCHOTIC FEATURES (H): ICD-10-CM

## 2019-05-21 DIAGNOSIS — F10.20 SEVERE ALCOHOL USE DISORDER (H): ICD-10-CM

## 2019-05-21 LAB
ETHYL GLUCURONIDE UR CFM-MCNC: NORMAL NG/ML
ETHYL SULFATE UR CFM-MCNC: NORMAL NG/ML

## 2019-05-24 ENCOUNTER — AMBULATORY - HEALTHEAST (OUTPATIENT)
Dept: ADDICTION MEDICINE | Facility: CLINIC | Age: 64
End: 2019-05-24

## 2019-05-24 ENCOUNTER — OFFICE VISIT - HEALTHEAST (OUTPATIENT)
Dept: ADDICTION MEDICINE | Facility: CLINIC | Age: 64
End: 2019-05-24

## 2019-05-24 DIAGNOSIS — F10.20 ALCOHOL USE DISORDER, SEVERE, DEPENDENCE (H): ICD-10-CM

## 2019-05-29 ENCOUNTER — OFFICE VISIT - HEALTHEAST (OUTPATIENT)
Dept: ADDICTION MEDICINE | Facility: CLINIC | Age: 64
End: 2019-05-29

## 2019-05-29 DIAGNOSIS — F10.20 ALCOHOL USE DISORDER, SEVERE, DEPENDENCE (H): ICD-10-CM

## 2019-05-30 ENCOUNTER — OFFICE VISIT - HEALTHEAST (OUTPATIENT)
Dept: BEHAVIORAL HEALTH | Facility: CLINIC | Age: 64
End: 2019-05-30

## 2019-05-30 ENCOUNTER — AMBULATORY - HEALTHEAST (OUTPATIENT)
Dept: ADDICTION MEDICINE | Facility: CLINIC | Age: 64
End: 2019-05-30

## 2019-05-30 DIAGNOSIS — F33.2 SEVERE EPISODE OF RECURRENT MAJOR DEPRESSIVE DISORDER, WITHOUT PSYCHOTIC FEATURES (H): ICD-10-CM

## 2019-05-30 DIAGNOSIS — F41.9 ANXIETY: ICD-10-CM

## 2019-05-30 DIAGNOSIS — F10.20 SEVERE ALCOHOL USE DISORDER (H): ICD-10-CM

## 2019-05-30 DIAGNOSIS — F10.20 ALCOHOL USE DISORDER, SEVERE, DEPENDENCE (H): ICD-10-CM

## 2019-05-30 ASSESSMENT — MIFFLIN-ST. JEOR: SCORE: 1614.56

## 2019-05-31 ENCOUNTER — COMMUNICATION - HEALTHEAST (OUTPATIENT)
Dept: ADDICTION MEDICINE | Facility: CLINIC | Age: 64
End: 2019-05-31

## 2019-06-01 LAB
ETHYL GLUCURONIDE UR CFM-MCNC: 673 NG/ML
ETHYL SULFATE UR CFM-MCNC: 104 NG/ML

## 2019-06-03 ENCOUNTER — OFFICE VISIT - HEALTHEAST (OUTPATIENT)
Dept: ADDICTION MEDICINE | Facility: CLINIC | Age: 64
End: 2019-06-03

## 2019-06-03 DIAGNOSIS — F10.20 ALCOHOL USE DISORDER, SEVERE, DEPENDENCE (H): ICD-10-CM

## 2019-06-04 ENCOUNTER — AMBULATORY - HEALTHEAST (OUTPATIENT)
Dept: BEHAVIORAL HEALTH | Facility: CLINIC | Age: 64
End: 2019-06-04

## 2019-06-05 ENCOUNTER — OFFICE VISIT - HEALTHEAST (OUTPATIENT)
Dept: ADDICTION MEDICINE | Facility: CLINIC | Age: 64
End: 2019-06-05

## 2019-06-05 DIAGNOSIS — F10.20 ALCOHOL USE DISORDER, SEVERE, DEPENDENCE (H): ICD-10-CM

## 2019-06-06 ENCOUNTER — AMBULATORY - HEALTHEAST (OUTPATIENT)
Dept: ADDICTION MEDICINE | Facility: CLINIC | Age: 64
End: 2019-06-06

## 2019-06-07 ENCOUNTER — COMMUNICATION - HEALTHEAST (OUTPATIENT)
Dept: ADDICTION MEDICINE | Facility: CLINIC | Age: 64
End: 2019-06-07

## 2019-06-07 ENCOUNTER — AMBULATORY - HEALTHEAST (OUTPATIENT)
Dept: ADDICTION MEDICINE | Facility: CLINIC | Age: 64
End: 2019-06-07

## 2019-06-11 ENCOUNTER — COMMUNICATION - HEALTHEAST (OUTPATIENT)
Dept: ADDICTION MEDICINE | Facility: CLINIC | Age: 64
End: 2019-06-11

## 2019-06-13 ENCOUNTER — AMBULATORY - HEALTHEAST (OUTPATIENT)
Dept: ADDICTION MEDICINE | Facility: CLINIC | Age: 64
End: 2019-06-13

## 2019-06-14 ENCOUNTER — AMBULATORY - HEALTHEAST (OUTPATIENT)
Dept: ADDICTION MEDICINE | Facility: CLINIC | Age: 64
End: 2019-06-14

## 2019-06-17 ENCOUNTER — AMBULATORY - HEALTHEAST (OUTPATIENT)
Dept: ADDICTION MEDICINE | Facility: CLINIC | Age: 64
End: 2019-06-17

## 2019-06-18 ENCOUNTER — COMMUNICATION - HEALTHEAST (OUTPATIENT)
Dept: BEHAVIORAL HEALTH | Facility: CLINIC | Age: 64
End: 2019-06-18

## 2019-06-20 ENCOUNTER — OFFICE VISIT - HEALTHEAST (OUTPATIENT)
Dept: BEHAVIORAL HEALTH | Facility: CLINIC | Age: 64
End: 2019-06-20

## 2019-06-20 ENCOUNTER — AMBULATORY - HEALTHEAST (OUTPATIENT)
Dept: BEHAVIORAL HEALTH | Facility: CLINIC | Age: 64
End: 2019-06-20

## 2019-06-20 DIAGNOSIS — F33.2 SEVERE EPISODE OF RECURRENT MAJOR DEPRESSIVE DISORDER, WITHOUT PSYCHOTIC FEATURES (H): ICD-10-CM

## 2019-06-20 DIAGNOSIS — F10.20 SEVERE ALCOHOL USE DISORDER (H): ICD-10-CM

## 2019-06-20 DIAGNOSIS — F43.10 PTSD (POST-TRAUMATIC STRESS DISORDER): ICD-10-CM

## 2019-06-21 ENCOUNTER — OFFICE VISIT - HEALTHEAST (OUTPATIENT)
Dept: ADDICTION MEDICINE | Facility: CLINIC | Age: 64
End: 2019-06-21

## 2019-06-21 DIAGNOSIS — F10.20 ALCOHOL USE DISORDER, SEVERE, DEPENDENCE (H): ICD-10-CM

## 2019-06-25 ENCOUNTER — OFFICE VISIT - HEALTHEAST (OUTPATIENT)
Dept: ADDICTION MEDICINE | Facility: CLINIC | Age: 64
End: 2019-06-25

## 2019-06-25 ENCOUNTER — AMBULATORY - HEALTHEAST (OUTPATIENT)
Dept: ADDICTION MEDICINE | Facility: CLINIC | Age: 64
End: 2019-06-25

## 2019-06-25 ENCOUNTER — COMMUNICATION - HEALTHEAST (OUTPATIENT)
Dept: ADDICTION MEDICINE | Facility: CLINIC | Age: 64
End: 2019-06-25

## 2019-06-25 DIAGNOSIS — F10.20 ALCOHOL USE DISORDER, SEVERE, DEPENDENCE (H): ICD-10-CM

## 2019-07-03 ENCOUNTER — OFFICE VISIT - HEALTHEAST (OUTPATIENT)
Dept: BEHAVIORAL HEALTH | Facility: CLINIC | Age: 64
End: 2019-07-03

## 2019-07-03 DIAGNOSIS — F43.10 PTSD (POST-TRAUMATIC STRESS DISORDER): ICD-10-CM

## 2019-07-03 DIAGNOSIS — F33.2 SEVERE EPISODE OF RECURRENT MAJOR DEPRESSIVE DISORDER, WITHOUT PSYCHOTIC FEATURES (H): ICD-10-CM

## 2019-07-03 DIAGNOSIS — F10.20 SEVERE ALCOHOL USE DISORDER (H): ICD-10-CM

## 2019-07-05 ENCOUNTER — COMMUNICATION - HEALTHEAST (OUTPATIENT)
Dept: BEHAVIORAL HEALTH | Facility: CLINIC | Age: 64
End: 2019-07-05

## 2019-07-05 ENCOUNTER — AMBULATORY - HEALTHEAST (OUTPATIENT)
Dept: ADDICTION MEDICINE | Facility: CLINIC | Age: 64
End: 2019-07-05

## 2019-07-05 ENCOUNTER — OFFICE VISIT - HEALTHEAST (OUTPATIENT)
Dept: ADDICTION MEDICINE | Facility: CLINIC | Age: 64
End: 2019-07-05

## 2019-07-05 DIAGNOSIS — F41.9 ANXIETY: ICD-10-CM

## 2019-07-05 DIAGNOSIS — F10.20 ALCOHOL USE DISORDER, SEVERE, DEPENDENCE (H): ICD-10-CM

## 2019-07-08 ENCOUNTER — OFFICE VISIT - HEALTHEAST (OUTPATIENT)
Dept: ADDICTION MEDICINE | Facility: CLINIC | Age: 64
End: 2019-07-08

## 2019-07-08 DIAGNOSIS — F10.20 ALCOHOL USE DISORDER, SEVERE, DEPENDENCE (H): ICD-10-CM

## 2019-07-10 ENCOUNTER — OFFICE VISIT - HEALTHEAST (OUTPATIENT)
Dept: ADDICTION MEDICINE | Facility: CLINIC | Age: 64
End: 2019-07-10

## 2019-07-10 DIAGNOSIS — F10.20 ALCOHOL USE DISORDER, SEVERE, DEPENDENCE (H): ICD-10-CM

## 2019-07-11 ENCOUNTER — AMBULATORY - HEALTHEAST (OUTPATIENT)
Dept: ADDICTION MEDICINE | Facility: CLINIC | Age: 64
End: 2019-07-11

## 2019-07-12 ENCOUNTER — COMMUNICATION - HEALTHEAST (OUTPATIENT)
Dept: FAMILY MEDICINE | Facility: CLINIC | Age: 64
End: 2019-07-12

## 2019-07-12 ENCOUNTER — OFFICE VISIT - HEALTHEAST (OUTPATIENT)
Dept: ADDICTION MEDICINE | Facility: CLINIC | Age: 64
End: 2019-07-12

## 2019-07-12 DIAGNOSIS — F10.20 ALCOHOL USE DISORDER, SEVERE, DEPENDENCE (H): ICD-10-CM

## 2019-07-15 ENCOUNTER — OFFICE VISIT - HEALTHEAST (OUTPATIENT)
Dept: BEHAVIORAL HEALTH | Facility: CLINIC | Age: 64
End: 2019-07-15

## 2019-07-15 ENCOUNTER — COMMUNICATION - HEALTHEAST (OUTPATIENT)
Dept: BEHAVIORAL HEALTH | Facility: CLINIC | Age: 64
End: 2019-07-15

## 2019-07-15 ENCOUNTER — OFFICE VISIT - HEALTHEAST (OUTPATIENT)
Dept: ADDICTION MEDICINE | Facility: CLINIC | Age: 64
End: 2019-07-15

## 2019-07-15 DIAGNOSIS — F10.20 ALCOHOL USE DISORDER, SEVERE, DEPENDENCE (H): ICD-10-CM

## 2019-07-15 DIAGNOSIS — F33.1 MODERATE EPISODE OF RECURRENT MAJOR DEPRESSIVE DISORDER (H): ICD-10-CM

## 2019-07-15 LAB
AMPHETAMINES UR QL SCN: NORMAL
BARBITURATES UR QL: NORMAL
BENZODIAZ UR QL: NORMAL
CANNABINOIDS UR QL SCN: NORMAL
COCAINE UR QL: NORMAL
CREAT UR-MCNC: 226.8 MG/DL
METHADONE UR QL SCN: NORMAL
OPIATES UR QL SCN: NORMAL
OXYCODONE UR QL: NORMAL
PCP UR QL SCN: NORMAL

## 2019-07-15 ASSESSMENT — MIFFLIN-ST. JEOR: SCORE: 1614.56

## 2019-07-17 ENCOUNTER — COMMUNICATION - HEALTHEAST (OUTPATIENT)
Dept: ADDICTION MEDICINE | Facility: CLINIC | Age: 64
End: 2019-07-17

## 2019-07-18 LAB
ETHYL GLUCURONIDE UR CFM-MCNC: NORMAL NG/ML
ETHYL SULFATE UR CFM-MCNC: NORMAL NG/ML

## 2019-07-19 ENCOUNTER — AMBULATORY - HEALTHEAST (OUTPATIENT)
Dept: ADDICTION MEDICINE | Facility: CLINIC | Age: 64
End: 2019-07-19

## 2019-07-19 ENCOUNTER — OFFICE VISIT - HEALTHEAST (OUTPATIENT)
Dept: ADDICTION MEDICINE | Facility: CLINIC | Age: 64
End: 2019-07-19

## 2019-07-19 DIAGNOSIS — F10.20 ALCOHOL USE DISORDER, SEVERE, DEPENDENCE (H): ICD-10-CM

## 2019-07-22 ENCOUNTER — COMMUNICATION - HEALTHEAST (OUTPATIENT)
Dept: ADDICTION MEDICINE | Facility: CLINIC | Age: 64
End: 2019-07-22

## 2019-07-24 ENCOUNTER — OFFICE VISIT - HEALTHEAST (OUTPATIENT)
Dept: ADDICTION MEDICINE | Facility: CLINIC | Age: 64
End: 2019-07-24

## 2019-07-24 ENCOUNTER — OFFICE VISIT - HEALTHEAST (OUTPATIENT)
Dept: FAMILY MEDICINE | Facility: CLINIC | Age: 64
End: 2019-07-24

## 2019-07-24 DIAGNOSIS — F10.20 ALCOHOL USE DISORDER, SEVERE, DEPENDENCE (H): ICD-10-CM

## 2019-07-24 DIAGNOSIS — R07.89 ATYPICAL CHEST PAIN: ICD-10-CM

## 2019-07-24 DIAGNOSIS — R06.02 SHORTNESS OF BREATH: ICD-10-CM

## 2019-07-24 DIAGNOSIS — Z23 NEED FOR TETANUS BOOSTER: ICD-10-CM

## 2019-07-24 DIAGNOSIS — L98.9 SKIN LESION OF RIGHT LEG: ICD-10-CM

## 2019-07-24 DIAGNOSIS — Z82.49 FAMILY HISTORY OF CORONARY ARTERY DISEASE: ICD-10-CM

## 2019-07-24 ASSESSMENT — MIFFLIN-ST. JEOR: SCORE: 1617.28

## 2019-07-26 ENCOUNTER — OFFICE VISIT - HEALTHEAST (OUTPATIENT)
Dept: ADDICTION MEDICINE | Facility: CLINIC | Age: 64
End: 2019-07-26

## 2019-07-26 DIAGNOSIS — F10.20 ALCOHOL USE DISORDER, SEVERE, DEPENDENCE (H): ICD-10-CM

## 2019-07-28 ENCOUNTER — AMBULATORY - HEALTHEAST (OUTPATIENT)
Dept: ADDICTION MEDICINE | Facility: CLINIC | Age: 64
End: 2019-07-28

## 2019-07-29 ENCOUNTER — OFFICE VISIT - HEALTHEAST (OUTPATIENT)
Dept: ADDICTION MEDICINE | Facility: CLINIC | Age: 64
End: 2019-07-29

## 2019-07-29 ENCOUNTER — AMBULATORY - HEALTHEAST (OUTPATIENT)
Dept: LAB | Facility: CLINIC | Age: 64
End: 2019-07-29

## 2019-07-29 DIAGNOSIS — F10.20 ALCOHOL USE DISORDER, SEVERE, DEPENDENCE (H): ICD-10-CM

## 2019-07-29 DIAGNOSIS — F10.20 SEVERE ALCOHOL USE DISORDER (H): ICD-10-CM

## 2019-07-29 LAB
AMPHETAMINES UR QL SCN: NORMAL
BARBITURATES UR QL: NORMAL
BENZODIAZ UR QL: NORMAL
CANNABINOIDS UR QL SCN: NORMAL
COCAINE UR QL: NORMAL
CREAT UR-MCNC: 297.6 MG/DL
ETHANOL UR CFM-MCNC: <10 MG/DL
METHADONE UR QL SCN: NORMAL
OPIATES UR QL SCN: NORMAL
OXYCODONE UR QL: NORMAL
PCP UR QL SCN: NORMAL

## 2019-07-31 ENCOUNTER — OFFICE VISIT - HEALTHEAST (OUTPATIENT)
Dept: BEHAVIORAL HEALTH | Facility: CLINIC | Age: 64
End: 2019-07-31

## 2019-07-31 ENCOUNTER — OFFICE VISIT - HEALTHEAST (OUTPATIENT)
Dept: ADDICTION MEDICINE | Facility: CLINIC | Age: 64
End: 2019-07-31

## 2019-07-31 DIAGNOSIS — F10.20 ALCOHOL USE DISORDER, SEVERE, DEPENDENCE (H): ICD-10-CM

## 2019-07-31 DIAGNOSIS — F33.1 MODERATE EPISODE OF RECURRENT MAJOR DEPRESSIVE DISORDER (H): ICD-10-CM

## 2019-07-31 DIAGNOSIS — F43.10 PTSD (POST-TRAUMATIC STRESS DISORDER): ICD-10-CM

## 2019-07-31 LAB — DRUGS UR SCN NOM: NORMAL

## 2019-08-01 ENCOUNTER — AMBULATORY - HEALTHEAST (OUTPATIENT)
Dept: ADDICTION MEDICINE | Facility: CLINIC | Age: 64
End: 2019-08-01

## 2019-08-02 ENCOUNTER — OFFICE VISIT - HEALTHEAST (OUTPATIENT)
Dept: ADDICTION MEDICINE | Facility: CLINIC | Age: 64
End: 2019-08-02

## 2019-08-02 DIAGNOSIS — F10.20 ALCOHOL USE DISORDER, SEVERE, DEPENDENCE (H): ICD-10-CM

## 2019-08-06 ENCOUNTER — AMBULATORY - HEALTHEAST (OUTPATIENT)
Dept: ADDICTION MEDICINE | Facility: CLINIC | Age: 64
End: 2019-08-06

## 2019-08-07 ENCOUNTER — AMBULATORY - HEALTHEAST (OUTPATIENT)
Dept: ADDICTION MEDICINE | Facility: CLINIC | Age: 64
End: 2019-08-07

## 2019-08-08 ENCOUNTER — AMBULATORY - HEALTHEAST (OUTPATIENT)
Dept: ADDICTION MEDICINE | Facility: CLINIC | Age: 64
End: 2019-08-08

## 2019-08-08 ENCOUNTER — COMMUNICATION - HEALTHEAST (OUTPATIENT)
Dept: ADDICTION MEDICINE | Facility: CLINIC | Age: 64
End: 2019-08-08

## 2019-08-09 ENCOUNTER — OFFICE VISIT - HEALTHEAST (OUTPATIENT)
Dept: ADDICTION MEDICINE | Facility: CLINIC | Age: 64
End: 2019-08-09

## 2019-08-09 ENCOUNTER — AMBULATORY - HEALTHEAST (OUTPATIENT)
Dept: ADDICTION MEDICINE | Facility: CLINIC | Age: 64
End: 2019-08-09

## 2019-08-09 DIAGNOSIS — F10.20 ALCOHOL USE DISORDER, SEVERE, DEPENDENCE (H): ICD-10-CM

## 2019-08-12 ENCOUNTER — OFFICE VISIT - HEALTHEAST (OUTPATIENT)
Dept: ADDICTION MEDICINE | Facility: CLINIC | Age: 64
End: 2019-08-12

## 2019-08-12 ENCOUNTER — OFFICE VISIT - HEALTHEAST (OUTPATIENT)
Dept: BEHAVIORAL HEALTH | Facility: CLINIC | Age: 64
End: 2019-08-12

## 2019-08-12 DIAGNOSIS — F33.1 MODERATE EPISODE OF RECURRENT MAJOR DEPRESSIVE DISORDER (H): ICD-10-CM

## 2019-08-12 DIAGNOSIS — F10.20 ALCOHOL USE DISORDER, SEVERE, DEPENDENCE (H): ICD-10-CM

## 2019-08-12 DIAGNOSIS — F43.10 POSTTRAUMATIC STRESS DISORDER: ICD-10-CM

## 2019-08-12 DIAGNOSIS — F41.9 ANXIETY: ICD-10-CM

## 2019-08-12 LAB
AMPHETAMINES UR QL SCN: NORMAL
BARBITURATES UR QL: NORMAL
BENZODIAZ UR QL: NORMAL
CANNABINOIDS UR QL SCN: NORMAL
COCAINE UR QL: NORMAL
CREAT UR-MCNC: 48.7 MG/DL
METHADONE UR QL SCN: NORMAL
OPIATES UR QL SCN: NORMAL
OXYCODONE UR QL: NORMAL
PCP UR QL SCN: NORMAL

## 2019-08-12 ASSESSMENT — MIFFLIN-ST. JEOR: SCORE: 1628.17

## 2019-08-15 ENCOUNTER — AMBULATORY - HEALTHEAST (OUTPATIENT)
Dept: ADDICTION MEDICINE | Facility: CLINIC | Age: 64
End: 2019-08-15

## 2019-08-15 LAB
ETHYL GLUCURONIDE UR CFM-MCNC: NORMAL NG/ML
ETHYL SULFATE UR CFM-MCNC: 5010 NG/ML

## 2019-08-16 ENCOUNTER — AMBULATORY - HEALTHEAST (OUTPATIENT)
Dept: ADDICTION MEDICINE | Facility: CLINIC | Age: 64
End: 2019-08-16

## 2019-08-16 ENCOUNTER — OFFICE VISIT - HEALTHEAST (OUTPATIENT)
Dept: ADDICTION MEDICINE | Facility: CLINIC | Age: 64
End: 2019-08-16

## 2019-08-16 DIAGNOSIS — F10.20 ALCOHOL USE DISORDER, SEVERE, DEPENDENCE (H): ICD-10-CM

## 2019-08-17 ENCOUNTER — COMMUNICATION - HEALTHEAST (OUTPATIENT)
Dept: BEHAVIORAL HEALTH | Facility: CLINIC | Age: 64
End: 2019-08-17

## 2019-08-17 DIAGNOSIS — F33.1 MODERATE EPISODE OF RECURRENT MAJOR DEPRESSIVE DISORDER (H): ICD-10-CM

## 2019-08-20 ENCOUNTER — AMBULATORY - HEALTHEAST (OUTPATIENT)
Dept: ADDICTION MEDICINE | Facility: CLINIC | Age: 64
End: 2019-08-20

## 2019-08-23 ENCOUNTER — OFFICE VISIT - HEALTHEAST (OUTPATIENT)
Dept: BEHAVIORAL HEALTH | Facility: CLINIC | Age: 64
End: 2019-08-23

## 2019-08-23 DIAGNOSIS — F10.20 ALCOHOL USE DISORDER, SEVERE, DEPENDENCE (H): ICD-10-CM

## 2019-08-23 DIAGNOSIS — F43.10 PTSD (POST-TRAUMATIC STRESS DISORDER): ICD-10-CM

## 2019-08-23 DIAGNOSIS — F33.1 MODERATE EPISODE OF RECURRENT MAJOR DEPRESSIVE DISORDER (H): ICD-10-CM

## 2019-08-26 ENCOUNTER — RECORDS - HEALTHEAST (OUTPATIENT)
Dept: ADMINISTRATIVE | Facility: OTHER | Age: 64
End: 2019-08-26

## 2019-08-27 ENCOUNTER — OFFICE VISIT - HEALTHEAST (OUTPATIENT)
Dept: CARDIOLOGY | Facility: CLINIC | Age: 64
End: 2019-08-27

## 2019-08-27 DIAGNOSIS — R07.2 PRECORDIAL PAIN: ICD-10-CM

## 2019-08-27 DIAGNOSIS — R06.02 SOB (SHORTNESS OF BREATH): ICD-10-CM

## 2019-08-27 ASSESSMENT — MIFFLIN-ST. JEOR: SCORE: 1632.7

## 2019-08-29 ENCOUNTER — OFFICE VISIT - HEALTHEAST (OUTPATIENT)
Dept: BEHAVIORAL HEALTH | Facility: CLINIC | Age: 64
End: 2019-08-29

## 2019-08-29 DIAGNOSIS — F10.20 ALCOHOL USE DISORDER, SEVERE, DEPENDENCE (H): ICD-10-CM

## 2019-08-29 DIAGNOSIS — F33.1 MODERATE EPISODE OF RECURRENT MAJOR DEPRESSIVE DISORDER (H): ICD-10-CM

## 2019-08-29 DIAGNOSIS — F43.10 PTSD (POST-TRAUMATIC STRESS DISORDER): ICD-10-CM

## 2019-09-05 ENCOUNTER — HOSPITAL ENCOUNTER (OUTPATIENT)
Dept: NUCLEAR MEDICINE | Facility: CLINIC | Age: 64
Discharge: HOME | End: 2019-09-05
Attending: INTERNAL MEDICINE

## 2019-09-05 ENCOUNTER — HOSPITAL ENCOUNTER (OUTPATIENT)
Dept: CARDIOLOGY | Facility: CLINIC | Age: 64
Discharge: HOME | End: 2019-09-05
Attending: INTERNAL MEDICINE

## 2019-09-05 ENCOUNTER — RECORDS - HEALTHEAST (OUTPATIENT)
Dept: FAMILY MEDICINE | Facility: CLINIC | Age: 64
End: 2019-09-05

## 2019-09-05 ENCOUNTER — COMMUNICATION - HEALTHEAST (OUTPATIENT)
Dept: FAMILY MEDICINE | Facility: CLINIC | Age: 64
End: 2019-09-05

## 2019-09-05 DIAGNOSIS — R06.02 SOB (SHORTNESS OF BREATH): ICD-10-CM

## 2019-09-05 DIAGNOSIS — R60.0 PERIPHERAL EDEMA: ICD-10-CM

## 2019-09-05 DIAGNOSIS — R07.2 PRECORDIAL PAIN: ICD-10-CM

## 2019-09-05 DIAGNOSIS — K21.00 REFLUX ESOPHAGITIS: ICD-10-CM

## 2019-09-05 LAB
AORTIC ROOT: 3.1 CM
AORTIC VALVE MEAN VELOCITY: 101 CM/S
ASCENDING AORTA: 2.9 CM
AV DIMENSIONLESS INDEX VTI: 0.9
AV MEAN GRADIENT: 5 MMHG
AV PEAK GRADIENT: 9.5 MMHG
AV VALVE AREA: 3.1 CM2
AV VELOCITY RATIO: 0.9
BSA FOR ECHO PROCEDURE: 2.22 M2
CV BLOOD PRESSURE: ABNORMAL MMHG
CV ECHO HEIGHT: 64 IN
CV ECHO WEIGHT: 242 LBS
CV STRESS CURRENT BP HE: NORMAL
CV STRESS CURRENT HR HE: 78
CV STRESS CURRENT HR HE: 80
CV STRESS CURRENT HR HE: 81
CV STRESS CURRENT HR HE: 82
CV STRESS CURRENT HR HE: 82
CV STRESS CURRENT HR HE: 83
CV STRESS CURRENT HR HE: 85
CV STRESS CURRENT HR HE: 88
CV STRESS CURRENT HR HE: 88
CV STRESS CURRENT HR HE: 95
CV STRESS CURRENT HR HE: 96
CV STRESS CURRENT HR HE: 99
CV STRESS CURRENT HR HE: 99
CV STRESS DEVIATION TIME HE: NORMAL
CV STRESS ECHO PERCENT HR HE: NORMAL
CV STRESS EXERCISE STAGE HE: NORMAL
CV STRESS FINAL RESTING BP HE: NORMAL
CV STRESS FINAL RESTING HR HE: 78
CV STRESS MAX HR HE: 99
CV STRESS MAX TREADMILL GRADE HE: 0
CV STRESS MAX TREADMILL SPEED HE: 0
CV STRESS PEAK DIA BP HE: NORMAL
CV STRESS PEAK SYS BP HE: NORMAL
CV STRESS PHASE HE: NORMAL
CV STRESS PROTOCOL HE: NORMAL
CV STRESS RESTING PT POSITION HE: NORMAL
CV STRESS ST DEVIATION AMOUNT HE: NORMAL
CV STRESS ST DEVIATION ELEVATION HE: NORMAL
CV STRESS ST EVELATION AMOUNT HE: NORMAL
CV STRESS TEST TYPE HE: NORMAL
CV STRESS TOTAL STAGE TIME MIN 1 HE: NORMAL
DOP CALC AO PEAK VEL: 154 CM/S
DOP CALC AO VTI: 30.5 CM
DOP CALC LVOT AREA: 3.46 CM2
DOP CALC LVOT DIAMETER: 2.1 CM
DOP CALC LVOT PEAK VEL: 132 CM/S
DOP CALC LVOT STROKE VOLUME: 93.1 CM3
DOP CALCLVOT PEAK VEL VTI: 26.9 CM
EJECTION FRACTION: 69 % (ref 55–75)
FRACTIONAL SHORTENING: 37 % (ref 28–44)
INTERVENTRICULAR SEPTUM IN END DIASTOLE: 1.2 CM (ref 0.6–0.9)
IVS/PW RATIO: 1.1
LA AREA 1: 15.6 CM2
LA AREA 2: 17.6 CM2
LEFT ATRIUM LENGTH: 5.56 CM
LEFT ATRIUM SIZE: 3.2 CM
LEFT ATRIUM VOLUME INDEX: 18.9 ML/M2
LEFT ATRIUM VOLUME: 42 ML
LEFT VENTRICLE CARDIAC INDEX: 2.9 L/MIN/M2
LEFT VENTRICLE CARDIAC OUTPUT: 6.4 L/MIN
LEFT VENTRICLE DIASTOLIC VOLUME INDEX: 38.7 CM3/M2 (ref 29–61)
LEFT VENTRICLE DIASTOLIC VOLUME: 86 CM3 (ref 46–106)
LEFT VENTRICLE HEART RATE: 69 BPM
LEFT VENTRICLE MASS INDEX: 86.9 G/M2
LEFT VENTRICLE SYSTOLIC VOLUME INDEX: 12.2 CM3/M2 (ref 8–24)
LEFT VENTRICLE SYSTOLIC VOLUME: 27 CM3 (ref 14–42)
LEFT VENTRICULAR INTERNAL DIMENSION IN DIASTOLE: 4.6 CM (ref 3.8–5.2)
LEFT VENTRICULAR INTERNAL DIMENSION IN SYSTOLE: 2.9 CM (ref 2.2–3.5)
LEFT VENTRICULAR MASS: 192.9 G
LEFT VENTRICULAR OUTFLOW TRACT MEAN GRADIENT: 3 MMHG
LEFT VENTRICULAR OUTFLOW TRACT MEAN VELOCITY: 84.8 CM/S
LEFT VENTRICULAR OUTFLOW TRACT PEAK GRADIENT: 7 MMHG
LEFT VENTRICULAR POSTERIOR WALL IN END DIASTOLE: 1.1 CM (ref 0.6–0.9)
LV STROKE VOLUME INDEX: 41.9 ML/M2
MITRAL VALVE E/A RATIO: 0.7
MV AVERAGE E/E' RATIO: 5.6 CM/S
MV DECELERATION TIME: 222 MS
MV E'TISSUE VEL-LAT: 13.1 CM/S
MV E'TISSUE VEL-MED: 8.87 CM/S
MV LATERAL E/E' RATIO: 4.7
MV MEDIAL E/E' RATIO: 7
MV PEAK A VELOCITY: 84.4 CM/S
MV PEAK E VELOCITY: 61.7 CM/S
NUC REST DIASTOLIC VOLUME INDEX: 3872 LBS
NUC REST SYSTOLIC VOLUME INDEX: 64 IN
NUC STRESS EJECTION FRACTION: 78 %
STRESS ECHO BASELINE BP: NORMAL
STRESS ECHO BASELINE HR: 78
STRESS ECHO CALCULATED PERCENT HR: 63 %
STRESS ECHO LAST STRESS BP: NORMAL
STRESS ECHO LAST STRESS HR: 88
TRICUSPID VALVE ANULAR PLANE SYSTOLIC EXCURSION: 2.2 CM

## 2019-09-05 ASSESSMENT — MIFFLIN-ST. JEOR: SCORE: 1632.7

## 2019-09-25 ENCOUNTER — AMBULATORY - HEALTHEAST (OUTPATIENT)
Dept: BEHAVIORAL HEALTH | Facility: CLINIC | Age: 64
End: 2019-09-25

## 2019-09-25 ENCOUNTER — OFFICE VISIT - HEALTHEAST (OUTPATIENT)
Dept: BEHAVIORAL HEALTH | Facility: CLINIC | Age: 64
End: 2019-09-25

## 2019-09-25 DIAGNOSIS — F33.1 MODERATE EPISODE OF RECURRENT MAJOR DEPRESSIVE DISORDER (H): ICD-10-CM

## 2019-09-25 DIAGNOSIS — F10.20 ALCOHOL USE DISORDER, SEVERE, DEPENDENCE (H): ICD-10-CM

## 2019-09-25 DIAGNOSIS — F43.10 PTSD (POST-TRAUMATIC STRESS DISORDER): ICD-10-CM

## 2019-09-25 ASSESSMENT — ANXIETY QUESTIONNAIRES
1. FEELING NERVOUS, ANXIOUS, OR ON EDGE: NEARLY EVERY DAY
7. FEELING AFRAID AS IF SOMETHING AWFUL MIGHT HAPPEN: MORE THAN HALF THE DAYS
IF YOU CHECKED OFF ANY PROBLEMS ON THIS QUESTIONNAIRE, HOW DIFFICULT HAVE THESE PROBLEMS MADE IT FOR YOU TO DO YOUR WORK, TAKE CARE OF THINGS AT HOME, OR GET ALONG WITH OTHER PEOPLE: EXTREMELY DIFFICULT
5. BEING SO RESTLESS THAT IT IS HARD TO SIT STILL: SEVERAL DAYS
GAD7 TOTAL SCORE: 18
2. NOT BEING ABLE TO STOP OR CONTROL WORRYING: NEARLY EVERY DAY
6. BECOMING EASILY ANNOYED OR IRRITABLE: NEARLY EVERY DAY
4. TROUBLE RELAXING: NEARLY EVERY DAY
3. WORRYING TOO MUCH ABOUT DIFFERENT THINGS: NEARLY EVERY DAY

## 2019-10-02 ENCOUNTER — COMMUNICATION - HEALTHEAST (OUTPATIENT)
Dept: BEHAVIORAL HEALTH | Facility: CLINIC | Age: 64
End: 2019-10-02

## 2019-10-02 DIAGNOSIS — F33.1 MODERATE EPISODE OF RECURRENT MAJOR DEPRESSIVE DISORDER (H): ICD-10-CM

## 2019-10-10 ENCOUNTER — OFFICE VISIT - HEALTHEAST (OUTPATIENT)
Dept: BEHAVIORAL HEALTH | Facility: CLINIC | Age: 64
End: 2019-10-10

## 2019-10-10 DIAGNOSIS — F43.10 POSTTRAUMATIC STRESS DISORDER: ICD-10-CM

## 2019-10-10 DIAGNOSIS — F10.20 SEVERE ALCOHOL USE DISORDER (H): ICD-10-CM

## 2019-10-10 DIAGNOSIS — F41.9 ANXIETY: ICD-10-CM

## 2019-10-10 DIAGNOSIS — F10.20 ALCOHOL USE DISORDER, SEVERE, DEPENDENCE (H): ICD-10-CM

## 2019-10-10 DIAGNOSIS — F33.1 MODERATE EPISODE OF RECURRENT MAJOR DEPRESSIVE DISORDER (H): ICD-10-CM

## 2019-10-10 LAB
AMPHETAMINES UR QL SCN: NORMAL
BARBITURATES UR QL: NORMAL
BENZODIAZ UR QL: NORMAL
CANNABINOIDS UR QL SCN: NORMAL
COCAINE UR QL: NORMAL
CREAT UR-MCNC: 157.2 MG/DL
METHADONE UR QL SCN: NORMAL
OPIATES UR QL SCN: NORMAL
OXYCODONE UR QL: NORMAL
PCP UR QL SCN: NORMAL

## 2019-10-13 LAB
ETHYL GLUCURONIDE UR CFM-MCNC: NORMAL NG/ML
ETHYL SULFATE UR CFM-MCNC: NORMAL NG/ML

## 2019-10-15 ENCOUNTER — AMBULATORY - HEALTHEAST (OUTPATIENT)
Dept: BEHAVIORAL HEALTH | Facility: CLINIC | Age: 64
End: 2019-10-15

## 2019-10-15 ENCOUNTER — OFFICE VISIT - HEALTHEAST (OUTPATIENT)
Dept: BEHAVIORAL HEALTH | Facility: CLINIC | Age: 64
End: 2019-10-15

## 2019-10-15 DIAGNOSIS — F43.10 PTSD (POST-TRAUMATIC STRESS DISORDER): ICD-10-CM

## 2019-10-15 DIAGNOSIS — F33.1 MODERATE EPISODE OF RECURRENT MAJOR DEPRESSIVE DISORDER (H): ICD-10-CM

## 2019-10-15 DIAGNOSIS — F10.20 SEVERE ALCOHOL USE DISORDER (H): ICD-10-CM

## 2019-10-15 ASSESSMENT — PATIENT HEALTH QUESTIONNAIRE - PHQ9: SUM OF ALL RESPONSES TO PHQ QUESTIONS 1-9: 7

## 2019-10-18 ENCOUNTER — AMBULATORY - HEALTHEAST (OUTPATIENT)
Dept: PULMONOLOGY | Facility: OTHER | Age: 64
End: 2019-10-18

## 2019-10-18 ENCOUNTER — OFFICE VISIT - HEALTHEAST (OUTPATIENT)
Dept: FAMILY MEDICINE | Facility: CLINIC | Age: 64
End: 2019-10-18

## 2019-10-18 DIAGNOSIS — R06.09 DOE (DYSPNEA ON EXERTION): ICD-10-CM

## 2019-10-18 DIAGNOSIS — R06.09 DYSPNEA ON EXERTION: ICD-10-CM

## 2019-10-18 DIAGNOSIS — E87.6 HYPOKALEMIA: ICD-10-CM

## 2019-10-18 DIAGNOSIS — J43.9 PULMONARY EMPHYSEMA, UNSPECIFIED EMPHYSEMA TYPE (H): ICD-10-CM

## 2019-10-21 ASSESSMENT — PATIENT HEALTH QUESTIONNAIRE - PHQ9: SUM OF ALL RESPONSES TO PHQ QUESTIONS 1-9: 4

## 2019-10-24 ENCOUNTER — COMMUNICATION - HEALTHEAST (OUTPATIENT)
Dept: FAMILY MEDICINE | Facility: CLINIC | Age: 64
End: 2019-10-24

## 2019-10-24 DIAGNOSIS — J43.9 COPD (CHRONIC OBSTRUCTIVE PULMONARY DISEASE) WITH EMPHYSEMA (H): ICD-10-CM

## 2019-11-01 ENCOUNTER — COMMUNICATION - HEALTHEAST (OUTPATIENT)
Dept: FAMILY MEDICINE | Facility: CLINIC | Age: 64
End: 2019-11-01

## 2019-11-01 DIAGNOSIS — J43.9 COPD (CHRONIC OBSTRUCTIVE PULMONARY DISEASE) WITH EMPHYSEMA (H): ICD-10-CM

## 2019-11-02 ENCOUNTER — COMMUNICATION - HEALTHEAST (OUTPATIENT)
Dept: RESPIRATORY THERAPY | Facility: CLINIC | Age: 64
End: 2019-11-02

## 2019-11-04 ENCOUNTER — COMMUNICATION - HEALTHEAST (OUTPATIENT)
Dept: RESPIRATORY THERAPY | Facility: CLINIC | Age: 64
End: 2019-11-04

## 2019-11-04 ENCOUNTER — AMBULATORY - HEALTHEAST (OUTPATIENT)
Dept: CARE COORDINATION | Facility: CLINIC | Age: 64
End: 2019-11-04

## 2019-11-04 DIAGNOSIS — F10.20 CHRONIC ALCOHOL DEPENDENCE, CONTINUOUS (H): ICD-10-CM

## 2019-11-04 DIAGNOSIS — J43.9 PULMONARY EMPHYSEMA, UNSPECIFIED EMPHYSEMA TYPE (H): ICD-10-CM

## 2019-11-04 DIAGNOSIS — R06.09 DYSPNEA ON EXERTION: ICD-10-CM

## 2019-11-05 ENCOUNTER — COMMUNICATION - HEALTHEAST (OUTPATIENT)
Dept: SCHEDULING | Facility: CLINIC | Age: 64
End: 2019-11-05

## 2019-11-05 ENCOUNTER — COMMUNICATION - HEALTHEAST (OUTPATIENT)
Dept: FAMILY MEDICINE | Facility: CLINIC | Age: 64
End: 2019-11-05

## 2019-11-05 ENCOUNTER — OFFICE VISIT - HEALTHEAST (OUTPATIENT)
Dept: BEHAVIORAL HEALTH | Facility: CLINIC | Age: 64
End: 2019-11-05

## 2019-11-05 DIAGNOSIS — F33.1 MODERATE EPISODE OF RECURRENT MAJOR DEPRESSIVE DISORDER (H): ICD-10-CM

## 2019-11-05 DIAGNOSIS — R05.9 COUGH: ICD-10-CM

## 2019-11-05 DIAGNOSIS — F10.20 SEVERE ALCOHOL USE DISORDER (H): ICD-10-CM

## 2019-11-05 DIAGNOSIS — F43.10 PTSD (POST-TRAUMATIC STRESS DISORDER): ICD-10-CM

## 2019-11-06 ENCOUNTER — COMMUNICATION - HEALTHEAST (OUTPATIENT)
Dept: NURSING | Facility: CLINIC | Age: 64
End: 2019-11-06

## 2019-11-07 ENCOUNTER — COMMUNICATION - HEALTHEAST (OUTPATIENT)
Dept: NURSING | Facility: CLINIC | Age: 64
End: 2019-11-07

## 2019-11-08 ENCOUNTER — COMMUNICATION - HEALTHEAST (OUTPATIENT)
Dept: RESPIRATORY THERAPY | Facility: CLINIC | Age: 64
End: 2019-11-08

## 2019-11-08 ENCOUNTER — OFFICE VISIT - HEALTHEAST (OUTPATIENT)
Dept: BEHAVIORAL HEALTH | Facility: CLINIC | Age: 64
End: 2019-11-08

## 2019-11-08 DIAGNOSIS — F33.1 MODERATE EPISODE OF RECURRENT MAJOR DEPRESSIVE DISORDER (H): ICD-10-CM

## 2019-11-08 DIAGNOSIS — F10.20 ALCOHOL USE DISORDER, SEVERE, DEPENDENCE (H): ICD-10-CM

## 2019-11-08 DIAGNOSIS — F10.20 SEVERE ALCOHOL USE DISORDER (H): ICD-10-CM

## 2019-11-08 LAB
AMPHETAMINES UR QL SCN: ABNORMAL
BARBITURATES UR QL: ABNORMAL
BENZODIAZ UR QL: ABNORMAL
CANNABINOIDS UR QL SCN: ABNORMAL
COCAINE UR QL: ABNORMAL
CREAT UR-MCNC: 112.8 MG/DL
METHADONE UR QL SCN: ABNORMAL
OPIATES UR QL SCN: ABNORMAL
OXYCODONE UR QL: ABNORMAL
PCP UR QL SCN: ABNORMAL

## 2019-11-08 ASSESSMENT — ANXIETY QUESTIONNAIRES
1. FEELING NERVOUS, ANXIOUS, OR ON EDGE: MORE THAN HALF THE DAYS
2. NOT BEING ABLE TO STOP OR CONTROL WORRYING: MORE THAN HALF THE DAYS
7. FEELING AFRAID AS IF SOMETHING AWFUL MIGHT HAPPEN: SEVERAL DAYS
3. WORRYING TOO MUCH ABOUT DIFFERENT THINGS: MORE THAN HALF THE DAYS
GAD7 TOTAL SCORE: 13
6. BECOMING EASILY ANNOYED OR IRRITABLE: MORE THAN HALF THE DAYS
5. BEING SO RESTLESS THAT IT IS HARD TO SIT STILL: MORE THAN HALF THE DAYS
4. TROUBLE RELAXING: MORE THAN HALF THE DAYS

## 2019-11-08 ASSESSMENT — PATIENT HEALTH QUESTIONNAIRE - PHQ9: SUM OF ALL RESPONSES TO PHQ QUESTIONS 1-9: 10

## 2019-11-08 ASSESSMENT — MIFFLIN-ST. JEOR: SCORE: 1625.91

## 2019-11-12 ENCOUNTER — OFFICE VISIT - HEALTHEAST (OUTPATIENT)
Dept: ADDICTION MEDICINE | Facility: CLINIC | Age: 64
End: 2019-11-12

## 2019-11-12 DIAGNOSIS — F10.20 ALCOHOL USE DISORDER, SEVERE, DEPENDENCE (H): ICD-10-CM

## 2019-11-15 ENCOUNTER — COMMUNICATION - HEALTHEAST (OUTPATIENT)
Dept: RESPIRATORY THERAPY | Facility: CLINIC | Age: 64
End: 2019-11-15

## 2019-11-26 ENCOUNTER — RECORDS - HEALTHEAST (OUTPATIENT)
Dept: PULMONOLOGY | Facility: OTHER | Age: 64
End: 2019-11-26

## 2019-11-26 ENCOUNTER — OFFICE VISIT - HEALTHEAST (OUTPATIENT)
Dept: PULMONOLOGY | Facility: OTHER | Age: 64
End: 2019-11-26

## 2019-11-26 ENCOUNTER — RECORDS - HEALTHEAST (OUTPATIENT)
Dept: ADMINISTRATIVE | Facility: OTHER | Age: 64
End: 2019-11-26

## 2019-11-26 DIAGNOSIS — R06.09 OTHER FORMS OF DYSPNEA: ICD-10-CM

## 2019-11-26 DIAGNOSIS — F17.200 TOBACCO DEPENDENCE SYNDROME: ICD-10-CM

## 2019-11-26 ASSESSMENT — MIFFLIN-ST. JEOR: SCORE: 1597.55

## 2019-11-27 ENCOUNTER — COMMUNICATION - HEALTHEAST (OUTPATIENT)
Dept: PULMONOLOGY | Facility: OTHER | Age: 64
End: 2019-11-27

## 2019-12-13 ENCOUNTER — OFFICE VISIT - HEALTHEAST (OUTPATIENT)
Dept: ADDICTION MEDICINE | Facility: CLINIC | Age: 64
End: 2019-12-13

## 2019-12-13 ENCOUNTER — AMBULATORY - HEALTHEAST (OUTPATIENT)
Dept: ADDICTION MEDICINE | Facility: CLINIC | Age: 64
End: 2019-12-13

## 2019-12-13 DIAGNOSIS — F10.20 ALCOHOL USE DISORDER, SEVERE, DEPENDENCE (H): ICD-10-CM

## 2019-12-17 ENCOUNTER — AMBULATORY - HEALTHEAST (OUTPATIENT)
Dept: ADDICTION MEDICINE | Facility: CLINIC | Age: 64
End: 2019-12-17

## 2019-12-17 ENCOUNTER — AMBULATORY - HEALTHEAST (OUTPATIENT)
Dept: PULMONOLOGY | Facility: OTHER | Age: 64
End: 2019-12-17

## 2019-12-17 DIAGNOSIS — R07.81 PLEURITIC CHEST PAIN: ICD-10-CM

## 2019-12-19 ENCOUNTER — COMMUNICATION - HEALTHEAST (OUTPATIENT)
Dept: CARE COORDINATION | Facility: CLINIC | Age: 64
End: 2019-12-19

## 2019-12-20 ENCOUNTER — AMBULATORY - HEALTHEAST (OUTPATIENT)
Dept: ADDICTION MEDICINE | Facility: CLINIC | Age: 64
End: 2019-12-20

## 2019-12-23 ENCOUNTER — OFFICE VISIT - HEALTHEAST (OUTPATIENT)
Dept: FAMILY MEDICINE | Facility: CLINIC | Age: 64
End: 2019-12-23

## 2019-12-23 DIAGNOSIS — B49 FUNGAL INFECTION OF LUNG: ICD-10-CM

## 2019-12-23 DIAGNOSIS — R60.0 BILATERAL EDEMA OF LOWER EXTREMITY: ICD-10-CM

## 2019-12-23 DIAGNOSIS — F10.10 ALCOHOL ABUSE: ICD-10-CM

## 2019-12-24 ENCOUNTER — AMBULATORY - HEALTHEAST (OUTPATIENT)
Dept: ADDICTION MEDICINE | Facility: CLINIC | Age: 64
End: 2019-12-24

## 2019-12-27 ENCOUNTER — OFFICE VISIT - HEALTHEAST (OUTPATIENT)
Dept: BEHAVIORAL HEALTH | Facility: CLINIC | Age: 64
End: 2019-12-27

## 2019-12-27 DIAGNOSIS — F10.20 ALCOHOL USE DISORDER, SEVERE, DEPENDENCE (H): ICD-10-CM

## 2019-12-27 LAB
AMPHETAMINES UR QL SCN: NORMAL
BARBITURATES UR QL: NORMAL
BENZODIAZ UR QL: NORMAL
CANNABINOIDS UR QL SCN: NORMAL
COCAINE UR QL: NORMAL
CREAT UR-MCNC: 9.9 MG/DL
METHADONE UR QL SCN: NORMAL
OPIATES UR QL SCN: NORMAL
OXYCODONE UR QL: NORMAL
PCP UR QL SCN: NORMAL

## 2019-12-27 ASSESSMENT — ANXIETY QUESTIONNAIRES
6. BECOMING EASILY ANNOYED OR IRRITABLE: SEVERAL DAYS
1. FEELING NERVOUS, ANXIOUS, OR ON EDGE: SEVERAL DAYS
5. BEING SO RESTLESS THAT IT IS HARD TO SIT STILL: NOT AT ALL
3. WORRYING TOO MUCH ABOUT DIFFERENT THINGS: NOT AT ALL
GAD7 TOTAL SCORE: 2
4. TROUBLE RELAXING: NOT AT ALL
7. FEELING AFRAID AS IF SOMETHING AWFUL MIGHT HAPPEN: NOT AT ALL
2. NOT BEING ABLE TO STOP OR CONTROL WORRYING: NOT AT ALL

## 2019-12-27 ASSESSMENT — MIFFLIN-ST. JEOR: SCORE: 1641.78

## 2019-12-27 ASSESSMENT — PATIENT HEALTH QUESTIONNAIRE - PHQ9: SUM OF ALL RESPONSES TO PHQ QUESTIONS 1-9: 2

## 2019-12-30 LAB
ETHYL GLUCURONIDE UR CFM-MCNC: NORMAL NG/ML
ETHYL SULFATE UR CFM-MCNC: 7190 NG/ML

## 2020-01-06 ENCOUNTER — COMMUNICATION - HEALTHEAST (OUTPATIENT)
Dept: FAMILY MEDICINE | Facility: CLINIC | Age: 65
End: 2020-01-06

## 2020-01-13 ENCOUNTER — OFFICE VISIT - HEALTHEAST (OUTPATIENT)
Dept: FAMILY MEDICINE | Facility: CLINIC | Age: 65
End: 2020-01-13

## 2020-01-13 DIAGNOSIS — L98.9 SKIN LESION: ICD-10-CM

## 2020-01-13 DIAGNOSIS — B02.9 HERPES ZOSTER WITHOUT COMPLICATION: ICD-10-CM

## 2020-01-28 ENCOUNTER — HOSPITAL ENCOUNTER (OUTPATIENT)
Dept: CT IMAGING | Facility: HOSPITAL | Age: 65
Discharge: HOME | End: 2020-01-28
Attending: INTERNAL MEDICINE

## 2020-01-28 ENCOUNTER — OFFICE VISIT - HEALTHEAST (OUTPATIENT)
Dept: PULMONOLOGY | Facility: OTHER | Age: 65
End: 2020-01-28

## 2020-01-28 DIAGNOSIS — F17.200 TOBACCO DEPENDENCE SYNDROME: ICD-10-CM

## 2020-01-28 DIAGNOSIS — R07.81 PLEURITIC CHEST PAIN: ICD-10-CM

## 2020-01-28 DIAGNOSIS — G47.33 OSA (OBSTRUCTIVE SLEEP APNEA): ICD-10-CM

## 2020-01-28 ASSESSMENT — MIFFLIN-ST. JEOR: SCORE: 1630.44

## 2020-01-29 ENCOUNTER — COMMUNICATION - HEALTHEAST (OUTPATIENT)
Dept: FAMILY MEDICINE | Facility: CLINIC | Age: 65
End: 2020-01-29

## 2020-01-29 ENCOUNTER — COMMUNICATION - HEALTHEAST (OUTPATIENT)
Dept: CARE COORDINATION | Facility: CLINIC | Age: 65
End: 2020-01-29

## 2020-01-29 ENCOUNTER — AMBULATORY - HEALTHEAST (OUTPATIENT)
Dept: NURSING | Facility: CLINIC | Age: 65
End: 2020-01-29

## 2020-01-29 DIAGNOSIS — Z86.59 HISTORY OF MAJOR DEPRESSION: ICD-10-CM

## 2020-01-29 DIAGNOSIS — F10.10 ALCOHOL ABUSE: ICD-10-CM

## 2020-01-29 DIAGNOSIS — J44.1 COPD EXACERBATION (H): ICD-10-CM

## 2020-01-30 ENCOUNTER — COMMUNICATION - HEALTHEAST (OUTPATIENT)
Dept: NURSING | Facility: CLINIC | Age: 65
End: 2020-01-30

## 2020-01-31 ENCOUNTER — COMMUNICATION - HEALTHEAST (OUTPATIENT)
Dept: NURSING | Facility: CLINIC | Age: 65
End: 2020-01-31

## 2020-02-03 ENCOUNTER — COMMUNICATION - HEALTHEAST (OUTPATIENT)
Dept: BEHAVIORAL HEALTH | Facility: CLINIC | Age: 65
End: 2020-02-03

## 2020-02-03 DIAGNOSIS — F33.1 MODERATE EPISODE OF RECURRENT MAJOR DEPRESSIVE DISORDER (H): ICD-10-CM

## 2020-02-03 DIAGNOSIS — F41.9 ANXIETY: ICD-10-CM

## 2020-02-11 ENCOUNTER — COMMUNICATION - HEALTHEAST (OUTPATIENT)
Dept: BEHAVIORAL HEALTH | Facility: CLINIC | Age: 65
End: 2020-02-11

## 2020-02-18 ENCOUNTER — OFFICE VISIT - HEALTHEAST (OUTPATIENT)
Dept: BEHAVIORAL HEALTH | Facility: CLINIC | Age: 65
End: 2020-02-18

## 2020-02-18 DIAGNOSIS — F10.20 ALCOHOL USE DISORDER, SEVERE, DEPENDENCE (H): ICD-10-CM

## 2020-02-18 ASSESSMENT — ANXIETY QUESTIONNAIRES
4. TROUBLE RELAXING: NEARLY EVERY DAY
7. FEELING AFRAID AS IF SOMETHING AWFUL MIGHT HAPPEN: SEVERAL DAYS
6. BECOMING EASILY ANNOYED OR IRRITABLE: NEARLY EVERY DAY
2. NOT BEING ABLE TO STOP OR CONTROL WORRYING: NEARLY EVERY DAY
3. WORRYING TOO MUCH ABOUT DIFFERENT THINGS: NEARLY EVERY DAY
5. BEING SO RESTLESS THAT IT IS HARD TO SIT STILL: NEARLY EVERY DAY
1. FEELING NERVOUS, ANXIOUS, OR ON EDGE: NEARLY EVERY DAY
GAD7 TOTAL SCORE: 19

## 2020-02-18 ASSESSMENT — PATIENT HEALTH QUESTIONNAIRE - PHQ9: SUM OF ALL RESPONSES TO PHQ QUESTIONS 1-9: 20

## 2020-02-18 ASSESSMENT — MIFFLIN-ST. JEOR: SCORE: 1628.17

## 2020-02-24 ENCOUNTER — COMMUNICATION - HEALTHEAST (OUTPATIENT)
Dept: BEHAVIORAL HEALTH | Facility: CLINIC | Age: 65
End: 2020-02-24

## 2020-02-25 ENCOUNTER — OFFICE VISIT - HEALTHEAST (OUTPATIENT)
Dept: BEHAVIORAL HEALTH | Facility: CLINIC | Age: 65
End: 2020-02-25

## 2020-02-25 DIAGNOSIS — F10.20 ALCOHOL USE DISORDER, SEVERE, DEPENDENCE (H): ICD-10-CM

## 2020-02-25 ASSESSMENT — ANXIETY QUESTIONNAIRES
3. WORRYING TOO MUCH ABOUT DIFFERENT THINGS: NOT AT ALL
1. FEELING NERVOUS, ANXIOUS, OR ON EDGE: MORE THAN HALF THE DAYS
GAD7 TOTAL SCORE: 5
4. TROUBLE RELAXING: NOT AT ALL
5. BEING SO RESTLESS THAT IT IS HARD TO SIT STILL: NOT AT ALL
6. BECOMING EASILY ANNOYED OR IRRITABLE: MORE THAN HALF THE DAYS
2. NOT BEING ABLE TO STOP OR CONTROL WORRYING: SEVERAL DAYS
7. FEELING AFRAID AS IF SOMETHING AWFUL MIGHT HAPPEN: NOT AT ALL

## 2020-02-25 ASSESSMENT — PATIENT HEALTH QUESTIONNAIRE - PHQ9: SUM OF ALL RESPONSES TO PHQ QUESTIONS 1-9: 3

## 2020-02-25 ASSESSMENT — MIFFLIN-ST. JEOR: SCORE: 1619.1

## 2020-02-28 ENCOUNTER — OFFICE VISIT - HEALTHEAST (OUTPATIENT)
Dept: BEHAVIORAL HEALTH | Facility: CLINIC | Age: 65
End: 2020-02-28

## 2020-02-28 DIAGNOSIS — F10.20 ALCOHOL USE DISORDER, SEVERE, DEPENDENCE (H): ICD-10-CM

## 2020-02-28 DIAGNOSIS — F17.200 TOBACCO DEPENDENCE SYNDROME: ICD-10-CM

## 2020-02-28 ASSESSMENT — ANXIETY QUESTIONNAIRES
GAD7 TOTAL SCORE: 5
4. TROUBLE RELAXING: NOT AT ALL
3. WORRYING TOO MUCH ABOUT DIFFERENT THINGS: SEVERAL DAYS
1. FEELING NERVOUS, ANXIOUS, OR ON EDGE: SEVERAL DAYS
2. NOT BEING ABLE TO STOP OR CONTROL WORRYING: MORE THAN HALF THE DAYS
6. BECOMING EASILY ANNOYED OR IRRITABLE: SEVERAL DAYS
5. BEING SO RESTLESS THAT IT IS HARD TO SIT STILL: NOT AT ALL
7. FEELING AFRAID AS IF SOMETHING AWFUL MIGHT HAPPEN: NOT AT ALL

## 2020-02-28 ASSESSMENT — MIFFLIN-ST. JEOR: SCORE: 1610.02

## 2020-02-28 ASSESSMENT — PATIENT HEALTH QUESTIONNAIRE - PHQ9: SUM OF ALL RESPONSES TO PHQ QUESTIONS 1-9: 7

## 2020-03-02 LAB
ETHYL GLUCURONIDE UR CFM-MCNC: 2010 NG/ML
ETHYL SULFATE UR CFM-MCNC: 613 NG/ML

## 2020-03-03 ENCOUNTER — OFFICE VISIT - HEALTHEAST (OUTPATIENT)
Dept: BEHAVIORAL HEALTH | Facility: CLINIC | Age: 65
End: 2020-03-03

## 2020-03-03 DIAGNOSIS — F33.1 MODERATE EPISODE OF RECURRENT MAJOR DEPRESSIVE DISORDER (H): ICD-10-CM

## 2020-03-03 DIAGNOSIS — F43.10 PTSD (POST-TRAUMATIC STRESS DISORDER): ICD-10-CM

## 2020-03-03 DIAGNOSIS — F10.20 ALCOHOL USE DISORDER, SEVERE, DEPENDENCE (H): ICD-10-CM

## 2020-03-03 ASSESSMENT — ANXIETY QUESTIONNAIRES
2. NOT BEING ABLE TO STOP OR CONTROL WORRYING: NOT AT ALL
4. TROUBLE RELAXING: NOT AT ALL
7. FEELING AFRAID AS IF SOMETHING AWFUL MIGHT HAPPEN: NOT AT ALL
6. BECOMING EASILY ANNOYED OR IRRITABLE: SEVERAL DAYS
3. WORRYING TOO MUCH ABOUT DIFFERENT THINGS: NOT AT ALL
1. FEELING NERVOUS, ANXIOUS, OR ON EDGE: SEVERAL DAYS
GAD7 TOTAL SCORE: 2
5. BEING SO RESTLESS THAT IT IS HARD TO SIT STILL: NOT AT ALL

## 2020-03-03 ASSESSMENT — PATIENT HEALTH QUESTIONNAIRE - PHQ9: SUM OF ALL RESPONSES TO PHQ QUESTIONS 1-9: 3

## 2020-03-03 ASSESSMENT — MIFFLIN-ST. JEOR: SCORE: 1605.49

## 2020-03-17 ENCOUNTER — COMMUNICATION - HEALTHEAST (OUTPATIENT)
Dept: BEHAVIORAL HEALTH | Facility: HOSPITAL | Age: 65
End: 2020-03-17

## 2020-03-17 ENCOUNTER — OFFICE VISIT - HEALTHEAST (OUTPATIENT)
Dept: BEHAVIORAL HEALTH | Facility: CLINIC | Age: 65
End: 2020-03-17

## 2020-03-17 DIAGNOSIS — F33.1 MODERATE EPISODE OF RECURRENT MAJOR DEPRESSIVE DISORDER (H): ICD-10-CM

## 2020-03-17 DIAGNOSIS — F10.20 ALCOHOL USE DISORDER, SEVERE, DEPENDENCE (H): ICD-10-CM

## 2020-03-17 DIAGNOSIS — F10.20 CHRONIC ALCOHOL DEPENDENCE, CONTINUOUS (H): ICD-10-CM

## 2020-03-24 ENCOUNTER — OFFICE VISIT - HEALTHEAST (OUTPATIENT)
Dept: BEHAVIORAL HEALTH | Facility: CLINIC | Age: 65
End: 2020-03-24

## 2020-03-24 DIAGNOSIS — F10.20 ALCOHOL USE DISORDER, SEVERE, DEPENDENCE (H): ICD-10-CM

## 2020-03-24 DIAGNOSIS — F33.1 MODERATE EPISODE OF RECURRENT MAJOR DEPRESSIVE DISORDER (H): ICD-10-CM

## 2020-04-03 ENCOUNTER — COMMUNICATION - HEALTHEAST (OUTPATIENT)
Dept: BEHAVIORAL HEALTH | Facility: CLINIC | Age: 65
End: 2020-04-03

## 2020-04-03 ENCOUNTER — COMMUNICATION - HEALTHEAST (OUTPATIENT)
Dept: FAMILY MEDICINE | Facility: CLINIC | Age: 65
End: 2020-04-03

## 2020-04-03 DIAGNOSIS — F33.1 MODERATE EPISODE OF RECURRENT MAJOR DEPRESSIVE DISORDER (H): ICD-10-CM

## 2020-04-03 DIAGNOSIS — R60.0 BILATERAL EDEMA OF LOWER EXTREMITY: ICD-10-CM

## 2020-04-16 ENCOUNTER — OFFICE VISIT - HEALTHEAST (OUTPATIENT)
Dept: SLEEP MEDICINE | Facility: CLINIC | Age: 65
End: 2020-04-16

## 2020-04-16 DIAGNOSIS — Z91.199 NO-SHOW FOR APPOINTMENT: ICD-10-CM

## 2020-04-17 ENCOUNTER — OFFICE VISIT - HEALTHEAST (OUTPATIENT)
Dept: BEHAVIORAL HEALTH | Facility: CLINIC | Age: 65
End: 2020-04-17

## 2020-04-17 ENCOUNTER — COMMUNICATION - HEALTHEAST (OUTPATIENT)
Dept: FAMILY MEDICINE | Facility: CLINIC | Age: 65
End: 2020-04-17

## 2020-04-17 DIAGNOSIS — F10.20 ALCOHOL USE DISORDER, SEVERE, DEPENDENCE (H): ICD-10-CM

## 2020-04-17 DIAGNOSIS — F51.01 PRIMARY INSOMNIA: ICD-10-CM

## 2020-04-17 DIAGNOSIS — J30.2 SEASONAL ALLERGIES: ICD-10-CM

## 2020-04-17 DIAGNOSIS — F41.1 GENERALIZED ANXIETY DISORDER: ICD-10-CM

## 2020-04-17 DIAGNOSIS — F43.10 PTSD (POST-TRAUMATIC STRESS DISORDER): ICD-10-CM

## 2020-04-17 ASSESSMENT — ANXIETY QUESTIONNAIRES
2. NOT BEING ABLE TO STOP OR CONTROL WORRYING: NEARLY EVERY DAY
4. TROUBLE RELAXING: NEARLY EVERY DAY
6. BECOMING EASILY ANNOYED OR IRRITABLE: NEARLY EVERY DAY
5. BEING SO RESTLESS THAT IT IS HARD TO SIT STILL: NOT AT ALL
GAD7 TOTAL SCORE: 17
3. WORRYING TOO MUCH ABOUT DIFFERENT THINGS: NEARLY EVERY DAY
1. FEELING NERVOUS, ANXIOUS, OR ON EDGE: NEARLY EVERY DAY
7. FEELING AFRAID AS IF SOMETHING AWFUL MIGHT HAPPEN: MORE THAN HALF THE DAYS

## 2020-04-17 ASSESSMENT — PATIENT HEALTH QUESTIONNAIRE - PHQ9: SUM OF ALL RESPONSES TO PHQ QUESTIONS 1-9: 16

## 2020-04-27 ENCOUNTER — COMMUNICATION - HEALTHEAST (OUTPATIENT)
Dept: PULMONOLOGY | Facility: OTHER | Age: 65
End: 2020-04-27

## 2020-04-27 ENCOUNTER — OFFICE VISIT - HEALTHEAST (OUTPATIENT)
Dept: PULMONOLOGY | Facility: OTHER | Age: 65
End: 2020-04-27

## 2020-04-27 DIAGNOSIS — F17.200 TOBACCO DEPENDENCE: ICD-10-CM

## 2020-04-27 DIAGNOSIS — J32.9 CHRONIC SINUSITIS, UNSPECIFIED LOCATION: ICD-10-CM

## 2020-04-27 ASSESSMENT — MIFFLIN-ST. JEOR: SCORE: 1600.95

## 2020-04-28 ENCOUNTER — AMBULATORY - HEALTHEAST (OUTPATIENT)
Dept: PULMONOLOGY | Facility: OTHER | Age: 65
End: 2020-04-28

## 2020-04-28 DIAGNOSIS — H69.92 DYSFUNCTION OF LEFT EUSTACHIAN TUBE: ICD-10-CM

## 2020-05-07 ENCOUNTER — OFFICE VISIT - HEALTHEAST (OUTPATIENT)
Dept: SLEEP MEDICINE | Facility: CLINIC | Age: 65
End: 2020-05-07

## 2020-05-07 DIAGNOSIS — G47.10 HYPERSOMNIA: ICD-10-CM

## 2020-05-07 DIAGNOSIS — G47.33 OBSTRUCTIVE SLEEP APNEA: ICD-10-CM

## 2020-05-14 ENCOUNTER — COMMUNICATION - HEALTHEAST (OUTPATIENT)
Dept: BEHAVIORAL HEALTH | Facility: CLINIC | Age: 65
End: 2020-05-14

## 2020-05-14 ENCOUNTER — COMMUNICATION - HEALTHEAST (OUTPATIENT)
Dept: SCHEDULING | Facility: CLINIC | Age: 65
End: 2020-05-14

## 2020-05-18 ENCOUNTER — OFFICE VISIT - HEALTHEAST (OUTPATIENT)
Dept: BEHAVIORAL HEALTH | Facility: CLINIC | Age: 65
End: 2020-05-18

## 2020-05-18 DIAGNOSIS — F10.20 ALCOHOL USE DISORDER, SEVERE, DEPENDENCE (H): ICD-10-CM

## 2020-05-18 DIAGNOSIS — F43.10 PTSD (POST-TRAUMATIC STRESS DISORDER): ICD-10-CM

## 2020-05-18 DIAGNOSIS — F41.1 GENERALIZED ANXIETY DISORDER: ICD-10-CM

## 2020-05-18 DIAGNOSIS — F33.1 MODERATE EPISODE OF RECURRENT MAJOR DEPRESSIVE DISORDER (H): ICD-10-CM

## 2020-05-18 ASSESSMENT — ANXIETY QUESTIONNAIRES
3. WORRYING TOO MUCH ABOUT DIFFERENT THINGS: NOT AT ALL
2. NOT BEING ABLE TO STOP OR CONTROL WORRYING: SEVERAL DAYS
GAD7 TOTAL SCORE: 8
6. BECOMING EASILY ANNOYED OR IRRITABLE: NEARLY EVERY DAY
5. BEING SO RESTLESS THAT IT IS HARD TO SIT STILL: SEVERAL DAYS
4. TROUBLE RELAXING: SEVERAL DAYS
1. FEELING NERVOUS, ANXIOUS, OR ON EDGE: MORE THAN HALF THE DAYS
7. FEELING AFRAID AS IF SOMETHING AWFUL MIGHT HAPPEN: NOT AT ALL

## 2020-05-18 ASSESSMENT — PATIENT HEALTH QUESTIONNAIRE - PHQ9: SUM OF ALL RESPONSES TO PHQ QUESTIONS 1-9: 6

## 2020-06-09 ENCOUNTER — OFFICE VISIT - HEALTHEAST (OUTPATIENT)
Dept: BEHAVIORAL HEALTH | Facility: CLINIC | Age: 65
End: 2020-06-09

## 2020-06-09 DIAGNOSIS — F10.20 ALCOHOL USE DISORDER, SEVERE, DEPENDENCE (H): ICD-10-CM

## 2020-06-09 DIAGNOSIS — F43.10 PTSD (POST-TRAUMATIC STRESS DISORDER): ICD-10-CM

## 2020-06-09 DIAGNOSIS — F33.1 MODERATE EPISODE OF RECURRENT MAJOR DEPRESSIVE DISORDER (H): ICD-10-CM

## 2020-06-18 ENCOUNTER — COMMUNICATION - HEALTHEAST (OUTPATIENT)
Dept: SCHEDULING | Facility: CLINIC | Age: 65
End: 2020-06-18

## 2020-06-18 ENCOUNTER — OFFICE VISIT - HEALTHEAST (OUTPATIENT)
Dept: FAMILY MEDICINE | Facility: CLINIC | Age: 65
End: 2020-06-18

## 2020-06-18 ENCOUNTER — NURSE TRIAGE (OUTPATIENT)
Dept: NURSING | Facility: CLINIC | Age: 65
End: 2020-06-18

## 2020-06-18 DIAGNOSIS — Z20.822 SUSPECTED COVID-19 VIRUS INFECTION: ICD-10-CM

## 2020-07-02 ENCOUNTER — COMMUNICATION - HEALTHEAST (OUTPATIENT)
Dept: BEHAVIORAL HEALTH | Facility: CLINIC | Age: 65
End: 2020-07-02

## 2020-07-02 ENCOUNTER — OFFICE VISIT - HEALTHEAST (OUTPATIENT)
Dept: BEHAVIORAL HEALTH | Facility: CLINIC | Age: 65
End: 2020-07-02

## 2020-07-02 DIAGNOSIS — F43.10 PTSD (POST-TRAUMATIC STRESS DISORDER): ICD-10-CM

## 2020-07-02 DIAGNOSIS — F33.1 MODERATE EPISODE OF RECURRENT MAJOR DEPRESSIVE DISORDER (H): ICD-10-CM

## 2020-07-02 DIAGNOSIS — F10.20 ALCOHOL USE DISORDER, SEVERE, DEPENDENCE (H): ICD-10-CM

## 2020-07-07 ENCOUNTER — AMBULATORY - HEALTHEAST (OUTPATIENT)
Dept: BEHAVIORAL HEALTH | Facility: CLINIC | Age: 65
End: 2020-07-07

## 2020-07-07 ENCOUNTER — OFFICE VISIT - HEALTHEAST (OUTPATIENT)
Dept: BEHAVIORAL HEALTH | Facility: CLINIC | Age: 65
End: 2020-07-07

## 2020-07-07 DIAGNOSIS — F41.1 GENERALIZED ANXIETY DISORDER: ICD-10-CM

## 2020-07-07 DIAGNOSIS — F33.0 MILD EPISODE OF RECURRENT MAJOR DEPRESSIVE DISORDER (H): ICD-10-CM

## 2020-07-07 DIAGNOSIS — F43.10 PTSD (POST-TRAUMATIC STRESS DISORDER): ICD-10-CM

## 2020-07-07 DIAGNOSIS — F10.20 ALCOHOL USE DISORDER, SEVERE, DEPENDENCE (H): ICD-10-CM

## 2020-07-14 ENCOUNTER — COMMUNICATION - HEALTHEAST (OUTPATIENT)
Dept: BEHAVIORAL HEALTH | Facility: CLINIC | Age: 65
End: 2020-07-14

## 2020-07-23 ENCOUNTER — OFFICE VISIT - HEALTHEAST (OUTPATIENT)
Dept: PULMONOLOGY | Facility: OTHER | Age: 65
End: 2020-07-23

## 2020-07-23 DIAGNOSIS — F17.200 TOBACCO DEPENDENCE SYNDROME: ICD-10-CM

## 2020-07-24 ENCOUNTER — OFFICE VISIT - HEALTHEAST (OUTPATIENT)
Dept: BEHAVIORAL HEALTH | Facility: CLINIC | Age: 65
End: 2020-07-24

## 2020-07-24 DIAGNOSIS — F41.1 GENERALIZED ANXIETY DISORDER: ICD-10-CM

## 2020-07-24 DIAGNOSIS — F43.10 PTSD (POST-TRAUMATIC STRESS DISORDER): ICD-10-CM

## 2020-07-24 DIAGNOSIS — F10.20 ALCOHOL USE DISORDER, SEVERE, DEPENDENCE (H): ICD-10-CM

## 2020-07-24 ASSESSMENT — ANXIETY QUESTIONNAIRES
5. BEING SO RESTLESS THAT IT IS HARD TO SIT STILL: NOT AT ALL
4. TROUBLE RELAXING: NOT AT ALL
7. FEELING AFRAID AS IF SOMETHING AWFUL MIGHT HAPPEN: MORE THAN HALF THE DAYS
1. FEELING NERVOUS, ANXIOUS, OR ON EDGE: NEARLY EVERY DAY
2. NOT BEING ABLE TO STOP OR CONTROL WORRYING: SEVERAL DAYS
3. WORRYING TOO MUCH ABOUT DIFFERENT THINGS: SEVERAL DAYS
6. BECOMING EASILY ANNOYED OR IRRITABLE: MORE THAN HALF THE DAYS
GAD7 TOTAL SCORE: 9

## 2020-07-24 ASSESSMENT — PATIENT HEALTH QUESTIONNAIRE - PHQ9: SUM OF ALL RESPONSES TO PHQ QUESTIONS 1-9: 2

## 2020-07-28 ENCOUNTER — COMMUNICATION - HEALTHEAST (OUTPATIENT)
Dept: BEHAVIORAL HEALTH | Facility: CLINIC | Age: 65
End: 2020-07-28

## 2020-08-03 ENCOUNTER — COMMUNICATION - HEALTHEAST (OUTPATIENT)
Dept: PHARMACY | Facility: CLINIC | Age: 65
End: 2020-08-03

## 2020-08-03 DIAGNOSIS — K21.00 REFLUX ESOPHAGITIS: ICD-10-CM

## 2020-08-26 ENCOUNTER — COMMUNICATION - HEALTHEAST (OUTPATIENT)
Dept: BEHAVIORAL HEALTH | Facility: CLINIC | Age: 65
End: 2020-08-26

## 2020-08-26 DIAGNOSIS — F33.1 MODERATE EPISODE OF RECURRENT MAJOR DEPRESSIVE DISORDER (H): ICD-10-CM

## 2020-08-27 ENCOUNTER — OFFICE VISIT - HEALTHEAST (OUTPATIENT)
Dept: FAMILY MEDICINE | Facility: CLINIC | Age: 65
End: 2020-08-27

## 2020-08-27 ENCOUNTER — COMMUNICATION - HEALTHEAST (OUTPATIENT)
Dept: SCHEDULING | Facility: CLINIC | Age: 65
End: 2020-08-27

## 2020-08-27 DIAGNOSIS — R09.81 NASAL CONGESTION: ICD-10-CM

## 2020-08-27 DIAGNOSIS — H92.02 LEFT EAR PAIN: ICD-10-CM

## 2020-08-27 DIAGNOSIS — J02.9 PHARYNGITIS, UNSPECIFIED ETIOLOGY: ICD-10-CM

## 2020-08-27 DIAGNOSIS — J43.9 PULMONARY EMPHYSEMA, UNSPECIFIED EMPHYSEMA TYPE (H): ICD-10-CM

## 2020-09-03 ENCOUNTER — COMMUNICATION - HEALTHEAST (OUTPATIENT)
Dept: BEHAVIORAL HEALTH | Facility: CLINIC | Age: 65
End: 2020-09-03

## 2020-09-29 ENCOUNTER — OFFICE VISIT - HEALTHEAST (OUTPATIENT)
Dept: FAMILY MEDICINE | Facility: CLINIC | Age: 65
End: 2020-09-29

## 2020-09-29 ENCOUNTER — RECORDS - HEALTHEAST (OUTPATIENT)
Dept: MAMMOGRAPHY | Facility: CLINIC | Age: 65
End: 2020-09-29

## 2020-09-29 DIAGNOSIS — R63.5 UNEXPLAINED WEIGHT GAIN: ICD-10-CM

## 2020-09-29 DIAGNOSIS — L82.0 INFLAMED SEBORRHEIC KERATOSIS: ICD-10-CM

## 2020-09-29 DIAGNOSIS — E83.42 HYPOMAGNESEMIA: ICD-10-CM

## 2020-09-29 DIAGNOSIS — Z12.31 ENCOUNTER FOR SCREENING MAMMOGRAM FOR MALIGNANT NEOPLASM OF BREAST: ICD-10-CM

## 2020-09-29 DIAGNOSIS — K76.0 FATTY (CHANGE OF) LIVER, NOT ELSEWHERE CLASSIFIED: ICD-10-CM

## 2020-09-29 DIAGNOSIS — G60.9 HEREDITARY AND IDIOPATHIC PERIPHERAL NEUROPATHY: ICD-10-CM

## 2020-09-29 DIAGNOSIS — R79.89 ELEVATED LFTS: ICD-10-CM

## 2020-09-29 DIAGNOSIS — Z86.59 HISTORY OF MAJOR DEPRESSION: ICD-10-CM

## 2020-09-29 DIAGNOSIS — E55.9 VITAMIN D DEFICIENCY: ICD-10-CM

## 2020-09-29 DIAGNOSIS — Z12.31 VISIT FOR SCREENING MAMMOGRAM: ICD-10-CM

## 2020-09-29 DIAGNOSIS — Z11.59 ENCOUNTER FOR HCV SCREENING TEST FOR LOW RISK PATIENT: ICD-10-CM

## 2020-09-29 DIAGNOSIS — R22.42 LUMP OF LEFT THIGH: ICD-10-CM

## 2020-09-29 DIAGNOSIS — Z23 NEED FOR TETANUS BOOSTER: ICD-10-CM

## 2020-09-29 DIAGNOSIS — J43.9 PULMONARY EMPHYSEMA, UNSPECIFIED EMPHYSEMA TYPE (H): ICD-10-CM

## 2020-09-29 DIAGNOSIS — Z00.00 ENCOUNTER FOR MEDICARE ANNUAL WELLNESS EXAM: ICD-10-CM

## 2020-09-29 DIAGNOSIS — Z12.11 COLON CANCER SCREENING: ICD-10-CM

## 2020-09-29 LAB
ALBUMIN SERPL-MCNC: 4 G/DL (ref 3.5–5)
ALP SERPL-CCNC: 99 U/L (ref 45–120)
ALT SERPL W P-5'-P-CCNC: 79 U/L (ref 0–45)
ANION GAP SERPL CALCULATED.3IONS-SCNC: 13 MMOL/L (ref 5–18)
AST SERPL W P-5'-P-CCNC: 103 U/L (ref 0–40)
BASOPHILS # BLD AUTO: 0.1 THOU/UL (ref 0–0.2)
BASOPHILS NFR BLD AUTO: 1 % (ref 0–2)
BILIRUB SERPL-MCNC: 0.5 MG/DL (ref 0–1)
BUN SERPL-MCNC: 8 MG/DL (ref 8–22)
CALCIUM SERPL-MCNC: 9.3 MG/DL (ref 8.5–10.5)
CHLORIDE BLD-SCNC: 102 MMOL/L (ref 98–107)
CHOLEST SERPL-MCNC: 200 MG/DL
CO2 SERPL-SCNC: 26 MMOL/L (ref 22–31)
CREAT SERPL-MCNC: 0.62 MG/DL (ref 0.6–1.1)
EOSINOPHIL # BLD AUTO: 0.2 THOU/UL (ref 0–0.4)
EOSINOPHIL NFR BLD AUTO: 3 % (ref 0–6)
ERYTHROCYTE [DISTWIDTH] IN BLOOD BY AUTOMATED COUNT: 11.7 % (ref 11–14.5)
FASTING STATUS PATIENT QL REPORTED: NO
GFR SERPL CREATININE-BSD FRML MDRD: >60 ML/MIN/1.73M2
GLUCOSE BLD-MCNC: 98 MG/DL (ref 70–125)
HCT VFR BLD AUTO: 47.8 % (ref 35–47)
HDLC SERPL-MCNC: 64 MG/DL
HGB BLD-MCNC: 15.7 G/DL (ref 12–16)
LDLC SERPL CALC-MCNC: 113 MG/DL
LYMPHOCYTES # BLD AUTO: 2.4 THOU/UL (ref 0.8–4.4)
LYMPHOCYTES NFR BLD AUTO: 38 % (ref 20–40)
MAGNESIUM SERPL-MCNC: 1.8 MG/DL (ref 1.8–2.6)
MCH RBC QN AUTO: 32.4 PG (ref 27–34)
MCHC RBC AUTO-ENTMCNC: 32.9 G/DL (ref 32–36)
MCV RBC AUTO: 99 FL (ref 80–100)
MONOCYTES # BLD AUTO: 0.6 THOU/UL (ref 0–0.9)
MONOCYTES NFR BLD AUTO: 10 % (ref 2–10)
NEUTROPHILS # BLD AUTO: 2.9 THOU/UL (ref 2–7.7)
NEUTROPHILS NFR BLD AUTO: 48 % (ref 50–70)
PLATELET # BLD AUTO: 206 THOU/UL (ref 140–440)
PMV BLD AUTO: 7.7 FL (ref 7–10)
POTASSIUM BLD-SCNC: 3.9 MMOL/L (ref 3.5–5)
PROT SERPL-MCNC: 7.3 G/DL (ref 6–8)
RBC # BLD AUTO: 4.85 MILL/UL (ref 3.8–5.4)
SODIUM SERPL-SCNC: 141 MMOL/L (ref 136–145)
TRIGL SERPL-MCNC: 117 MG/DL
TSH SERPL DL<=0.005 MIU/L-ACNC: 0.75 UIU/ML (ref 0.3–5)
VIT B12 SERPL-MCNC: 285 PG/ML (ref 213–816)
WBC: 6.1 THOU/UL (ref 4–11)

## 2020-09-29 ASSESSMENT — MIFFLIN-ST. JEOR: SCORE: 1613.43

## 2020-09-30 ENCOUNTER — COMMUNICATION - HEALTHEAST (OUTPATIENT)
Dept: FAMILY MEDICINE | Facility: CLINIC | Age: 65
End: 2020-09-30

## 2020-09-30 LAB
25(OH)D3 SERPL-MCNC: 21 NG/ML (ref 30–80)
HCV AB SERPL QL IA: NEGATIVE

## 2020-10-12 ENCOUNTER — OFFICE VISIT - HEALTHEAST (OUTPATIENT)
Dept: BEHAVIORAL HEALTH | Facility: CLINIC | Age: 65
End: 2020-10-12

## 2020-10-12 DIAGNOSIS — F10.20 ALCOHOL USE DISORDER, SEVERE, DEPENDENCE (H): ICD-10-CM

## 2020-10-12 DIAGNOSIS — F43.10 PTSD (POST-TRAUMATIC STRESS DISORDER): ICD-10-CM

## 2020-10-12 DIAGNOSIS — F41.1 GENERALIZED ANXIETY DISORDER: ICD-10-CM

## 2020-10-12 DIAGNOSIS — F33.1 MODERATE EPISODE OF RECURRENT MAJOR DEPRESSIVE DISORDER (H): ICD-10-CM

## 2020-10-13 ENCOUNTER — RECORDS - HEALTHEAST (OUTPATIENT)
Dept: ADMINISTRATIVE | Facility: OTHER | Age: 65
End: 2020-10-13

## 2020-10-13 ENCOUNTER — COMMUNICATION - HEALTHEAST (OUTPATIENT)
Dept: FAMILY MEDICINE | Facility: CLINIC | Age: 65
End: 2020-10-13

## 2020-10-13 ENCOUNTER — HOSPITAL ENCOUNTER (INPATIENT)
Facility: CLINIC | Age: 65
LOS: 2 days | Discharge: HOME | DRG: 897 | End: 2020-10-15
Attending: EMERGENCY MEDICINE | Admitting: PSYCHIATRY & NEUROLOGY
Payer: MEDICARE

## 2020-10-13 ENCOUNTER — AMBULATORY - HEALTHEAST (OUTPATIENT)
Dept: FAMILY MEDICINE | Facility: CLINIC | Age: 65
End: 2020-10-13

## 2020-10-13 ENCOUNTER — TELEPHONE (OUTPATIENT)
Dept: BEHAVIORAL HEALTH | Facility: CLINIC | Age: 65
End: 2020-10-13

## 2020-10-13 DIAGNOSIS — F10.220 ALCOHOL DEPENDENCE WITH UNCOMPLICATED INTOXICATION (H): ICD-10-CM

## 2020-10-13 DIAGNOSIS — Z20.828 CONTACT WITH AND (SUSPECTED) EXPOSURE TO OTHER VIRAL COMMUNICABLE DISEASES: ICD-10-CM

## 2020-10-13 DIAGNOSIS — E55.9 VITAMIN D DEFICIENCY: ICD-10-CM

## 2020-10-13 DIAGNOSIS — F17.200 NICOTINE DEPENDENCE, UNCOMPLICATED, UNSPECIFIED NICOTINE PRODUCT TYPE: Primary | ICD-10-CM

## 2020-10-13 DIAGNOSIS — E53.8 VITAMIN B12 DEFICIENCY (NON ANEMIC): ICD-10-CM

## 2020-10-13 LAB
ALBUMIN SERPL-MCNC: 3.4 G/DL (ref 3.4–5)
ALCOHOL BREATH TEST: 0.01 (ref 0–0.01)
ALP SERPL-CCNC: 101 U/L (ref 40–150)
ALT SERPL W P-5'-P-CCNC: 85 U/L (ref 0–50)
AMPHETAMINES UR QL SCN: NEGATIVE
ANION GAP SERPL CALCULATED.3IONS-SCNC: 4 MMOL/L (ref 3–14)
AST SERPL W P-5'-P-CCNC: 121 U/L (ref 0–45)
BARBITURATES UR QL: NEGATIVE
BASOPHILS # BLD AUTO: 0 10E9/L (ref 0–0.2)
BASOPHILS NFR BLD AUTO: 0.7 %
BENZODIAZ UR QL: NEGATIVE
BILIRUB SERPL-MCNC: 0.3 MG/DL (ref 0.2–1.3)
BUN SERPL-MCNC: 9 MG/DL (ref 7–30)
CALCIUM SERPL-MCNC: 9.2 MG/DL (ref 8.5–10.1)
CANNABINOIDS UR QL SCN: NEGATIVE
CHLORIDE SERPL-SCNC: 106 MMOL/L (ref 94–109)
CO2 SERPL-SCNC: 30 MMOL/L (ref 20–32)
COCAINE UR QL: NEGATIVE
CREAT SERPL-MCNC: 0.52 MG/DL (ref 0.52–1.04)
DIFFERENTIAL METHOD BLD: NORMAL
EOSINOPHIL # BLD AUTO: 0.2 10E9/L (ref 0–0.7)
EOSINOPHIL NFR BLD AUTO: 3.6 %
ERYTHROCYTE [DISTWIDTH] IN BLOOD BY AUTOMATED COUNT: 14.6 % (ref 10–15)
ETHANOL UR QL SCN: NEGATIVE
GFR SERPL CREATININE-BSD FRML MDRD: >90 ML/MIN/{1.73_M2}
GGT SERPL-CCNC: 475 U/L (ref 0–40)
GLUCOSE SERPL-MCNC: 111 MG/DL (ref 70–99)
HCT VFR BLD AUTO: 45.5 % (ref 35–47)
HGB BLD-MCNC: 15.1 G/DL (ref 11.7–15.7)
IMM GRANULOCYTES # BLD: 0 10E9/L (ref 0–0.4)
IMM GRANULOCYTES NFR BLD: 0.2 %
LIPASE SERPL-CCNC: 229 U/L (ref 73–393)
LYMPHOCYTES # BLD AUTO: 1.2 10E9/L (ref 0.8–5.3)
LYMPHOCYTES NFR BLD AUTO: 25.6 %
MCH RBC QN AUTO: 31.9 PG (ref 26.5–33)
MCHC RBC AUTO-ENTMCNC: 33.2 G/DL (ref 31.5–36.5)
MCV RBC AUTO: 96 FL (ref 78–100)
MONOCYTES # BLD AUTO: 0.4 10E9/L (ref 0–1.3)
MONOCYTES NFR BLD AUTO: 9.4 %
NEUTROPHILS # BLD AUTO: 2.7 10E9/L (ref 1.6–8.3)
NEUTROPHILS NFR BLD AUTO: 60.5 %
NRBC # BLD AUTO: 0 10*3/UL
NRBC BLD AUTO-RTO: 0 /100
OPIATES UR QL SCN: NEGATIVE
PLATELET # BLD AUTO: 182 10E9/L (ref 150–450)
POTASSIUM SERPL-SCNC: 4.1 MMOL/L (ref 3.4–5.3)
PROT SERPL-MCNC: 7.5 G/DL (ref 6.8–8.8)
RBC # BLD AUTO: 4.73 10E12/L (ref 3.8–5.2)
SARS-COV-2 RNA SPEC QL NAA+PROBE: NORMAL
SODIUM SERPL-SCNC: 140 MMOL/L (ref 133–144)
SPECIMEN SOURCE: NORMAL
TSH SERPL DL<=0.005 MIU/L-ACNC: 0.99 MU/L (ref 0.4–4)
VIT B12 SERPL-MCNC: 292 PG/ML (ref 193–986)
WBC # BLD AUTO: 4.5 10E9/L (ref 4–11)

## 2020-10-13 PROCEDURE — 80307 DRUG TEST PRSMV CHEM ANLYZR: CPT | Performed by: EMERGENCY MEDICINE

## 2020-10-13 PROCEDURE — 99285 EMERGENCY DEPT VISIT HI MDM: CPT | Mod: 25 | Performed by: EMERGENCY MEDICINE

## 2020-10-13 PROCEDURE — 96361 HYDRATE IV INFUSION ADD-ON: CPT | Performed by: EMERGENCY MEDICINE

## 2020-10-13 PROCEDURE — 82607 VITAMIN B-12: CPT | Performed by: EMERGENCY MEDICINE

## 2020-10-13 PROCEDURE — 250N000013 HC RX MED GY IP 250 OP 250 PS 637: Performed by: EMERGENCY MEDICINE

## 2020-10-13 PROCEDURE — 258N000003 HC RX IP 258 OP 636

## 2020-10-13 PROCEDURE — 80320 DRUG SCREEN QUANTALCOHOLS: CPT | Performed by: EMERGENCY MEDICINE

## 2020-10-13 PROCEDURE — 96374 THER/PROPH/DIAG INJ IV PUSH: CPT | Performed by: EMERGENCY MEDICINE

## 2020-10-13 PROCEDURE — 85025 COMPLETE CBC W/AUTO DIFF WBC: CPT | Performed by: EMERGENCY MEDICINE

## 2020-10-13 PROCEDURE — 80053 COMPREHEN METABOLIC PANEL: CPT | Performed by: EMERGENCY MEDICINE

## 2020-10-13 PROCEDURE — 128N000004 HC R&B CD ADULT

## 2020-10-13 PROCEDURE — 83690 ASSAY OF LIPASE: CPT | Performed by: EMERGENCY MEDICINE

## 2020-10-13 PROCEDURE — 99284 EMERGENCY DEPT VISIT MOD MDM: CPT | Performed by: EMERGENCY MEDICINE

## 2020-10-13 PROCEDURE — U0003 INFECTIOUS AGENT DETECTION BY NUCLEIC ACID (DNA OR RNA); SEVERE ACUTE RESPIRATORY SYNDROME CORONAVIRUS 2 (SARS-COV-2) (CORONAVIRUS DISEASE [COVID-19]), AMPLIFIED PROBE TECHNIQUE, MAKING USE OF HIGH THROUGHPUT TECHNOLOGIES AS DESCRIBED BY CMS-2020-01-R: HCPCS | Performed by: EMERGENCY MEDICINE

## 2020-10-13 PROCEDURE — 84443 ASSAY THYROID STIM HORMONE: CPT | Performed by: EMERGENCY MEDICINE

## 2020-10-13 PROCEDURE — C9803 HOPD COVID-19 SPEC COLLECT: HCPCS | Performed by: EMERGENCY MEDICINE

## 2020-10-13 PROCEDURE — 82075 ASSAY OF BREATH ETHANOL: CPT | Performed by: EMERGENCY MEDICINE

## 2020-10-13 PROCEDURE — 82977 ASSAY OF GGT: CPT | Performed by: EMERGENCY MEDICINE

## 2020-10-13 PROCEDURE — 250N000011 HC RX IP 250 OP 636: Performed by: EMERGENCY MEDICINE

## 2020-10-13 RX ORDER — LOPERAMIDE HCL 2 MG
2 CAPSULE ORAL 4 TIMES DAILY PRN
Status: DISCONTINUED | OUTPATIENT
Start: 2020-10-13 | End: 2020-10-15 | Stop reason: HOSPADM

## 2020-10-13 RX ORDER — ALBUTEROL SULFATE 90 UG/1
2 AEROSOL, METERED RESPIRATORY (INHALATION) EVERY 4 HOURS PRN
Status: DISCONTINUED | OUTPATIENT
Start: 2020-10-13 | End: 2020-10-15 | Stop reason: HOSPADM

## 2020-10-13 RX ORDER — DULOXETIN HYDROCHLORIDE 30 MG/1
30 CAPSULE, DELAYED RELEASE ORAL DAILY
COMMUNITY
Start: 2020-04-03 | End: 2021-02-17

## 2020-10-13 RX ORDER — FOLIC ACID 1 MG/1
1 TABLET ORAL DAILY
Status: DISCONTINUED | OUTPATIENT
Start: 2020-10-13 | End: 2020-10-13

## 2020-10-13 RX ORDER — LANOLIN ALCOHOL/MO/W.PET/CERES
100 CREAM (GRAM) TOPICAL DAILY
Status: DISCONTINUED | OUTPATIENT
Start: 2020-10-14 | End: 2020-10-13

## 2020-10-13 RX ORDER — ALUMINA, MAGNESIA, AND SIMETHICONE 2400; 2400; 240 MG/30ML; MG/30ML; MG/30ML
30 SUSPENSION ORAL EVERY 4 HOURS PRN
Status: DISCONTINUED | OUTPATIENT
Start: 2020-10-13 | End: 2020-10-15 | Stop reason: HOSPADM

## 2020-10-13 RX ORDER — MULTIPLE VITAMINS W/ MINERALS TAB 9MG-400MCG
1 TAB ORAL DAILY
Status: DISCONTINUED | OUTPATIENT
Start: 2020-10-14 | End: 2020-10-15 | Stop reason: HOSPADM

## 2020-10-13 RX ORDER — HYDROXYZINE HYDROCHLORIDE 25 MG/1
25 TABLET, FILM COATED ORAL EVERY 4 HOURS PRN
Status: DISCONTINUED | OUTPATIENT
Start: 2020-10-13 | End: 2020-10-14 | Stop reason: DRUGHIGH

## 2020-10-13 RX ORDER — AMMONIUM LACTATE 12 G/100G
CREAM TOPICAL 2 TIMES DAILY PRN
Status: DISCONTINUED | OUTPATIENT
Start: 2020-10-13 | End: 2020-10-15 | Stop reason: HOSPADM

## 2020-10-13 RX ORDER — SODIUM CHLORIDE, SODIUM LACTATE, POTASSIUM CHLORIDE, CALCIUM CHLORIDE 600; 310; 30; 20 MG/100ML; MG/100ML; MG/100ML; MG/100ML
INJECTION, SOLUTION INTRAVENOUS
Status: COMPLETED
Start: 2020-10-13 | End: 2020-10-13

## 2020-10-13 RX ORDER — ONDANSETRON 2 MG/ML
4 INJECTION INTRAMUSCULAR; INTRAVENOUS ONCE
Status: COMPLETED | OUTPATIENT
Start: 2020-10-13 | End: 2020-10-13

## 2020-10-13 RX ORDER — TRAZODONE HYDROCHLORIDE 50 MG/1
50 TABLET, FILM COATED ORAL
Status: DISCONTINUED | OUTPATIENT
Start: 2020-10-13 | End: 2020-10-15 | Stop reason: HOSPADM

## 2020-10-13 RX ORDER — LANOLIN ALCOHOL/MO/W.PET/CERES
100 CREAM (GRAM) TOPICAL DAILY
Status: DISCONTINUED | OUTPATIENT
Start: 2020-10-13 | End: 2020-10-15 | Stop reason: HOSPADM

## 2020-10-13 RX ORDER — ACETAMINOPHEN 325 MG/1
650 TABLET ORAL EVERY 4 HOURS PRN
Status: DISCONTINUED | OUTPATIENT
Start: 2020-10-13 | End: 2020-10-15 | Stop reason: HOSPADM

## 2020-10-13 RX ORDER — ATENOLOL 50 MG/1
50 TABLET ORAL DAILY PRN
Status: DISCONTINUED | OUTPATIENT
Start: 2020-10-13 | End: 2020-10-15 | Stop reason: HOSPADM

## 2020-10-13 RX ORDER — NALTREXONE HYDROCHLORIDE 50 MG/1
100 TABLET, FILM COATED ORAL DAILY
COMMUNITY
End: 2021-02-17

## 2020-10-13 RX ORDER — POTASSIUM CHLORIDE 1500 MG/1
20 TABLET, EXTENDED RELEASE ORAL DAILY
COMMUNITY
Start: 2020-04-06 | End: 2021-10-25

## 2020-10-13 RX ORDER — NICOTINE 21 MG/24HR
1 PATCH, TRANSDERMAL 24 HOURS TRANSDERMAL DAILY
Status: DISCONTINUED | OUTPATIENT
Start: 2020-10-14 | End: 2020-10-13

## 2020-10-13 RX ORDER — DIAZEPAM 5 MG
5-20 TABLET ORAL EVERY 30 MIN PRN
Status: DISCONTINUED | OUTPATIENT
Start: 2020-10-13 | End: 2020-10-13

## 2020-10-13 RX ORDER — HYDROXYZINE PAMOATE 25 MG/1
25 CAPSULE ORAL 3 TIMES DAILY PRN
Status: ON HOLD | COMMUNITY
Start: 2020-07-24 | End: 2020-10-15

## 2020-10-13 RX ORDER — NICOTINE 21 MG/24HR
1 PATCH, TRANSDERMAL 24 HOURS TRANSDERMAL EVERY 24 HOURS
Status: DISCONTINUED | OUTPATIENT
Start: 2020-10-13 | End: 2020-10-15 | Stop reason: HOSPADM

## 2020-10-13 RX ORDER — ONDANSETRON 4 MG/1
4 TABLET, ORALLY DISINTEGRATING ORAL EVERY 6 HOURS PRN
Status: DISCONTINUED | OUTPATIENT
Start: 2020-10-13 | End: 2020-10-15 | Stop reason: HOSPADM

## 2020-10-13 RX ORDER — DIAZEPAM 5 MG
5-20 TABLET ORAL EVERY 30 MIN PRN
Status: DISCONTINUED | OUTPATIENT
Start: 2020-10-13 | End: 2020-10-15 | Stop reason: HOSPADM

## 2020-10-13 RX ORDER — AMMONIUM LACTATE 12 G/100G
CREAM TOPICAL DAILY PRN
COMMUNITY
Start: 2019-11-04 | End: 2021-10-25

## 2020-10-13 RX ORDER — BISACODYL 10 MG
10 SUPPOSITORY, RECTAL RECTAL DAILY PRN
Status: DISCONTINUED | OUTPATIENT
Start: 2020-10-13 | End: 2020-10-15 | Stop reason: HOSPADM

## 2020-10-13 RX ORDER — GABAPENTIN 300 MG/1
300 CAPSULE ORAL DAILY
Status: ON HOLD | COMMUNITY
Start: 2020-07-24 | End: 2020-10-15

## 2020-10-13 RX ORDER — BUDESONIDE AND FORMOTEROL FUMARATE DIHYDRATE 80; 4.5 UG/1; UG/1
2 AEROSOL RESPIRATORY (INHALATION) 2 TIMES DAILY
COMMUNITY
Start: 2019-12-11 | End: 2021-05-26

## 2020-10-13 RX ORDER — ALBUTEROL SULFATE 90 UG/1
2 AEROSOL, METERED RESPIRATORY (INHALATION) EVERY 4 HOURS PRN
COMMUNITY
Start: 2019-11-02 | End: 2022-11-02

## 2020-10-13 RX ORDER — FOLIC ACID 1 MG/1
1 TABLET ORAL DAILY
Status: DISCONTINUED | OUTPATIENT
Start: 2020-10-14 | End: 2020-10-15 | Stop reason: HOSPADM

## 2020-10-13 RX ORDER — CETIRIZINE HYDROCHLORIDE 10 MG/1
10 TABLET ORAL DAILY PRN
Status: ON HOLD | COMMUNITY
Start: 2020-04-17 | End: 2021-02-19

## 2020-10-13 RX ORDER — DULOXETIN HYDROCHLORIDE 30 MG/1
30 CAPSULE, DELAYED RELEASE ORAL DAILY
Status: DISCONTINUED | OUTPATIENT
Start: 2020-10-14 | End: 2020-10-15 | Stop reason: HOSPADM

## 2020-10-13 RX ORDER — BUMETANIDE 1 MG/1
1 TABLET ORAL DAILY
COMMUNITY
Start: 2020-04-06 | End: 2022-11-02

## 2020-10-13 RX ORDER — BUDESONIDE AND FORMOTEROL FUMARATE DIHYDRATE 80; 4.5 UG/1; UG/1
2 AEROSOL RESPIRATORY (INHALATION) 2 TIMES DAILY
Status: DISCONTINUED | OUTPATIENT
Start: 2020-10-14 | End: 2020-10-13 | Stop reason: CLARIF

## 2020-10-13 RX ORDER — CETIRIZINE HYDROCHLORIDE 10 MG/1
10 TABLET ORAL DAILY
Status: DISCONTINUED | OUTPATIENT
Start: 2020-10-14 | End: 2020-10-15 | Stop reason: HOSPADM

## 2020-10-13 RX ORDER — FUROSEMIDE 40 MG
40 TABLET ORAL
COMMUNITY
End: 2020-10-13

## 2020-10-13 RX ADMIN — FOLIC ACID 1 MG: 1 TABLET ORAL at 13:06

## 2020-10-13 RX ADMIN — DIAZEPAM 10 MG: 5 TABLET ORAL at 13:03

## 2020-10-13 RX ADMIN — DIAZEPAM 10 MG: 5 TABLET ORAL at 17:26

## 2020-10-13 RX ADMIN — DIAZEPAM 5 MG: 5 TABLET ORAL at 15:22

## 2020-10-13 RX ADMIN — SODIUM CHLORIDE, POTASSIUM CHLORIDE, SODIUM LACTATE AND CALCIUM CHLORIDE 1000 ML: 600; 310; 30; 20 INJECTION, SOLUTION INTRAVENOUS at 12:19

## 2020-10-13 RX ADMIN — ONDANSETRON 4 MG: 2 INJECTION INTRAMUSCULAR; INTRAVENOUS at 13:03

## 2020-10-13 RX ADMIN — THIAMINE HCL TAB 100 MG 100 MG: 100 TAB at 13:06

## 2020-10-13 RX ADMIN — NICOTINE 1 PATCH: 14 PATCH TRANSDERMAL at 15:18

## 2020-10-13 NOTE — ED NOTES
ED to Behavioral Floor Handoff    SITUATION  Patsy Santamaria is a 65 year old female who speaks English and lives in a home alone The patient arrived in the ED by private car from home with a complaint of Withdrawal (Her psychiatrist, Dr. Grey, recommended she come here.  pt blew a 0.007. Last drink at 9am.  Relapsed 10/3/20 after being sober for about 13-15 months.  Has been drinking 1-2 pints fátima per day.  Seeking detox.  She first sought treatment in 2016 and was drinking more alcohol at the time and didn't know she needed to detox.  She did have a couple seizures that time.  After that, she has not had anymore seizures.  She is a smoker and smokes 1 pack daily.  She would like a patch but not the strongest)  .The patient's current symptoms started/worsened 1 day(s) ago and during this time the symptoms have increased.   In the ED, pt was diagnosed with   Final diagnoses:   None        Initial vitals were: BP: (!) 140/95  Pulse: 88  Temp: 97.6  F (36.4  C)  Resp: 16  Weight: 112 kg (247 lb)  SpO2: 97 %   --------  Is the patient diabetic? No   If yes, last blood glucose? --     If yes, was this treated in the ED? --  --------  Is the patient inebriated (ETOH) Yes or Impaired on other substances? No  MSSA done? Yes  Last MSSA score: --    Were withdrawal symptoms treated? Yes  Does the patient have a seizure history? Yes. If yes, date of most recent seizure--  --------  Is the patient patient experiencing suicidal ideation? denies current or recent suicidal ideation     Homicidal ideation? denies current or recent homicidal ideation or behaviors.    Self-injurious behavior/urges? denies current or recent self injurious behavior or ideation.  ------  Was pt aggressive in the ED No  Was a code called No  Is the pt now cooperative? Yes  -------  Meds given in ED:   Medications   diazepam (VALIUM) tablet 5-20 mg (5 mg Oral Given 10/13/20 5102)   thiamine (B-1) tablet 100 mg (100 mg Oral Given 10/13/20 1306)    folic acid (FOLVITE) tablet 1 mg (1 mg Oral Given 10/13/20 1306)   nicotine Patch in Place (has no administration in time range)   nicotine (NICODERM CQ) 14 MG/24HR 24 hr patch 1 patch (1 patch Transdermal Patch/Med Applied 10/13/20 3328)   lactated ringers BOLUS 1,000 mL (1,000 mLs Intravenous New Bag 10/13/20 1219)   ondansetron (ZOFRAN) injection 4 mg (4 mg Intravenous Given 10/13/20 1303)      Family present during ED course? No  Family currently present? No    BACKGROUND  Does the patient have a cognitive impairment or developmental disability? No  Allergies:   Allergies   Allergen Reactions     Sulfa Drugs    .   Social demographics are   Social History     Socioeconomic History     Marital status: Single     Spouse name: Not on file     Number of children: Not on file     Years of education: Not on file     Highest education level: Not on file   Occupational History     Not on file   Social Needs     Financial resource strain: Not on file     Food insecurity     Worry: Not on file     Inability: Not on file     Transportation needs     Medical: Not on file     Non-medical: Not on file   Tobacco Use     Smoking status: Current Every Day Smoker     Packs/day: 1.25     Years: 25.00     Pack years: 31.25     Types: Cigarettes     Smokeless tobacco: Never Used   Substance and Sexual Activity     Alcohol use: Not on file     Drug use: Not on file     Sexual activity: Not on file   Lifestyle     Physical activity     Days per week: Not on file     Minutes per session: Not on file     Stress: Not on file   Relationships     Social connections     Talks on phone: Not on file     Gets together: Not on file     Attends Bahai service: Not on file     Active member of club or organization: Not on file     Attends meetings of clubs or organizations: Not on file     Relationship status: Not on file     Intimate partner violence     Fear of current or ex partner: Not on file     Emotionally abused: Not on file      Physically abused: Not on file     Forced sexual activity: Not on file   Other Topics Concern     Not on file   Social History Narrative     Not on file        ASSESSMENT  Labs results   Labs Ordered and Resulted from Time of ED Arrival Up to the Time of Departure from the ED   COMPREHENSIVE METABOLIC PANEL - Abnormal; Notable for the following components:       Result Value    Glucose 111 (*)     ALT 85 (*)      (*)     All other components within normal limits   ALCOHOL BREATH TEST POCT - Normal   DRUG ABUSE SCREEN 6 CHEM DEP URINE ([facility])   CBC WITH PLATELETS DIFFERENTIAL   LIPASE   COVID-19 VIRUS (CORONAVIRUS) BY PCR   CARDIAC CONTINUOUS MONITORING   PERIPHERAL IV CATHETER   MSSA SCORE AND VS   NOTIFY      Imaging Studies: No results found for this or any previous visit (from the past 24 hour(s)).   Most recent vital signs BP (!) 148/83   Pulse 78   Temp 97.6  F (36.4  C) (Oral)   Resp 12   Wt 112 kg (247 lb)   SpO2 98%   BMI 42.40 kg/m     Abnormal labs/tests/findings requiring intervention:---   Pain control: good  Nausea control: good    RECOMMENDATION  Are any infection precautions needed (MRSA, VRE, etc.)? No If yes, what infection? --  ---  Does the patient have mobility issues? independently. If yes, what device does the pt use? ---  ---  Is patient on 72 hour hold or commitment? No If on 72 hour hold, have hold and rights been given to patient? No  Are admitting orders written if after 10 p.m. ?No  Tasks needing to be completed:---     Bob Pierre RN   [facility]-- [phone] [place] [facility]   [phone] [facility]

## 2020-10-13 NOTE — TELEPHONE ENCOUNTER
Pt presents in [place] ed seekingdetox.  B: pt relapsed on etoh about 3-4 eks ago after 1 year sobriety. Drinks to intoxication, currently 0.07 breathalyzer. Hx withdrawla seizure 2016. Denies any MH symptoms, no medical issues besides currently havingmild withdrawal symptoms.   A: etoh detox. Cooperative, vol.  R:   Patient cleared and ready for behavioral bed placement: Yes

## 2020-10-13 NOTE — ED NOTES
Sign out Provider: Vasquez  Sign out Plan: 65-year-old female with a history of alcohol dependence who presents for detox.  Patient does have detox bed and is currently awaiting admission.  [facility] protocol has been written for.    Reassessment: No acute events.  Patient admitted to detox.      Disposition: Admit           Shayy Palm MD  10/14/20 0023

## 2020-10-13 NOTE — PROGRESS NOTES
10/13/20 6714   Patient Belongings   Did you bring any home meds/supplements to the hospital?  No   Patient Belongings locker;returned to patient at discharge   Patient Belongings Remaining with Patient other (see comments)   Patient Belongings Put in Hospital Secure Location (Security or Locker, etc.) other (see comments)   Belongings Search Yes   Clothing Search Yes   Second Staff Barb W   Comment see note   Storage Bin   Jacket, purse, shoes w/laces  Medical Bin   Cell phone, , keys, makeup, inhalers, cigarettes   Security Envelope   $40, [place] 's license, Medicare health insurance card, [employer] visa card  A             Admission:  I am responsible for any personal items that are not sent to the safe or pharmacy.  [facility] is not responsible for loss, theft or damage of any property in my possession.  Signature:  _________________________________ Date: _______  Time: _____                                              Staff Signature:  ____________________________ Date: ________  Time: _____      2nd Staff person, if patient is unable/unwilling to sign:    Signature: ________________________________ Date: ________  Time: _____   Discharge:  [facility] has returned all of my personal belongings:  Signature: _________________________________ Date: ________  Time: _____                                          Staff Signature:  ____________________________ Date: ________  Time: _____

## 2020-10-13 NOTE — ED NOTES
ED to Behavioral Floor Handoff    SITUATION  Patsy Santamaria is a 65 year old female who speaks English and lives in a home alone The patient arrived in the ED by private car from clinic with a complaint of Withdrawal (Her psychiatrist, Dr. Grey, recommended she come here.  pt blew a 0.007. Last drink at 9am.  Relapsed 10/3/20 after being sober for about 13-15 months.  Has been drinking 1-2 pints fátima per day.  Seeking detox.  She first sought treatment in 2016 and was drinking more alcohol at the time and didn't know she needed to detox.  She did have a couple seizures that time.  After that, she has not had anymore seizures.  She is a smoker and smokes 1 pack daily.  She would like a patch but not the strongest)  .The patient's current symptoms started/worsened 10 day(s) ago and during this time the symptoms have increased.   In the ED, pt was diagnosed with   Final diagnoses:   None        Initial vitals were: BP: (!) 140/95  Pulse: 88  Temp: 97.6  F (36.4  C)  Resp: 16  Weight: 112 kg (247 lb)  SpO2: 97 %   --------  Is the patient diabetic? No   If yes, last blood glucose? --     If yes, was this treated in the ED? --  --------  Is the patient inebriated (ETOH) Yes or Impaired on other substances? No  MSSA done? Yes  Last MSSA score: --    Were withdrawal symptoms treated? Yes  Does the patient have a seizure history? Yes. If yes, date of most recent seizure--2016  --------  Is the patient patient experiencing suicidal ideation? denies current or recent suicidal ideation     Homicidal ideation? denies current or recent homicidal ideation or behaviors.    Self-injurious behavior/urges? denies current or recent self injurious behavior or ideation.  ------  Was pt aggressive in the ED No  Was a code called No  Is the pt now cooperative? Yes  -------  Meds given in ED:   Medications   lactated ringers BOLUS 1,000 mL (has no administration in time range)   lactated ringers injection (has no administration in  time range)      Family present during ED course? No  Family currently present? No    BACKGROUND  Does the patient have a cognitive impairment or developmental disability? No  Allergies:   Allergies   Allergen Reactions     Sulfa Drugs    .   Social demographics are   Social History     Socioeconomic History     Marital status:      Spouse name: Not on file     Number of children: Not on file     Years of education: Not on file     Highest education level: Not on file   Occupational History     Not on file   Social Needs     Financial resource strain: Not on file     Food insecurity     Worry: Not on file     Inability: Not on file     Transportation needs     Medical: Not on file     Non-medical: Not on file   Tobacco Use     Smoking status: Current Every Day Smoker     Packs/day: 1.25     Years: 25.00     Pack years: 31.25     Types: Cigarettes     Smokeless tobacco: Never Used   Substance and Sexual Activity     Alcohol use: Not on file     Drug use: Not on file     Sexual activity: Not on file   Lifestyle     Physical activity     Days per week: Not on file     Minutes per session: Not on file     Stress: Not on file   Relationships     Social connections     Talks on phone: Not on file     Gets together: Not on file     Attends Religion service: Not on file     Active member of club or organization: Not on file     Attends meetings of clubs or organizations: Not on file     Relationship status: Not on file     Intimate partner violence     Fear of current or ex partner: Not on file     Emotionally abused: Not on file     Physically abused: Not on file     Forced sexual activity: Not on file   Other Topics Concern     Not on file   Social History Narrative     Not on file        ASSESSMENT  Labs results   Labs Ordered and Resulted from Time of ED Arrival Up to the Time of Departure from the ED   ALCOHOL BREATH TEST POCT - Normal   DRUG ABUSE SCREEN 6 CHEM DEP URINE ([facility])   CBC WITH PLATELETS  DIFFERENTIAL   COMPREHENSIVE METABOLIC PANEL   LIPASE   CARDIAC CONTINUOUS MONITORING   PERIPHERAL IV CATHETER      Imaging Studies: No results found for this or any previous visit (from the past 24 hour(s)).   Most recent vital signs BP (!) 140/95   Pulse 88   Temp 97.6  F (36.4  C) (Oral)   Resp 16   Wt 112 kg (247 lb)   SpO2 96%   BMI 42.40 kg/m     Abnormal labs/tests/findings requiring intervention:---   Pain control: none  Nausea control: none    RECOMMENDATION  Are any infection precautions needed (MRSA, VRE, etc.)? No If yes, what infection? --  ---  Does the patient have mobility issues? independently. If yes, what device does the pt use? ---  ---  Is patient on 72 hour hold or commitment? No If on 72 hour hold, have hold and rights been given to patient? N/A  Are admitting orders written if after 10 p.m. ?N/A  Tasks needing to be completed:---     Trisha Schultz RN   [facility]-- [phone] [facility]

## 2020-10-13 NOTE — ED PROVIDER NOTES
[facility] EMERGENCY DEPARTMENT ([facility])    10/13/20      History     Chief Complaint   Patient presents with     Withdrawal     Her psychiatrist, Dr. Grey, recommended she come here.  pt blew a 0.007. Last drink at 9am.  Relapsed 10/3/20 after being sober for about 13-15 months.  Has been drinking 1-2 pints fátima per day.  Seeking detox.  She first sought treatment in 2016 and was drinking more alcohol at the time and didn't know she needed to detox.  She did have a couple seizures that time.  After that, she has not had anymore seizures.  She is a smoker and smokes 1 pack daily.  She would like a patch but not the strongest     HPI  Patsy Santamaria is a 65 year old female with a past medical history significant for alcohol abuse, PTSD, anxiety, and depression who presents to the Emergency Department for tremulousness with relapsing alcohol use.  She contacted her psychiatrist who recommended presentation to the emergency department for inpatient detox services.  She reports last drink at 0900 this morning, relapsed on 10/3/2020 after greater than one year of sobriety.  She reports a history of withdrawal seizures in 2016.  She denies fever/chills/sweats, denies headache, denies blurred vision, denies chest pain, denies acute shortness of breath, denies abdominal pain or bloody stools although relates recent increased frequency of loose stools, denies hematuria/dysuria, denies again rash.  Due to nausea, she has not been able to take her prescribed Lasix and has noted some lower extremity edema.      I have reviewed the Medications, Allergies, Past Medical and Surgical History, and Social History in the Light[employer] system.  PAST MEDICAL HISTORY:   Past Medical History:   Diagnosis Date     GERD (gastroesophageal reflux disease)      Leg edema        PAST SURGICAL HISTORY:   Past Surgical History:   Procedure Laterality Date     APPENDECTOMY        SECTION         Past medical history, past  surgical history, medications, and allergies were reviewed with the patient. Additional pertinent items: None    FAMILY HISTORY: No family history on file.    SOCIAL HISTORY:   Social History     Tobacco Use     Smoking status: Current Every Day Smoker     Packs/day: 1.25     Years: 25.00     Pack years: 31.25     Types: Cigarettes     Smokeless tobacco: Never Used   Substance Use Topics     Alcohol use: Not on file     Social history was reviewed with the patient. Additional pertinent items: None      Patient's Medications   New Prescriptions    No medications on file   Previous Medications    ALBUTEROL (PROAIR HFA/PROVENTIL HFA/VENTOLIN HFA) 108 (90 BASE) MCG/ACT INHALER    Inhale 2 puffs into the lungs    AMMONIUM LACTATE (AMLACTIN) 12 % EXTERNAL CREAM    Apply 2 Application topically    BUDESONIDE-FORMOTEROL (SYMBICORT) 80-4.5 MCG/ACT INHALER    Inhale 2 puffs into the lungs    BUMETANIDE (BUMEX) 1 MG TABLET    Take 1 mg by mouth    CETIRIZINE (ZYRTEC) 10 MG TABLET    Take 10 mg by mouth    DULOXETINE (CYMBALTA) 30 MG CAPSULE    Take 90 mg by mouth    GABAPENTIN (NEURONTIN) 300 MG CAPSULE    Use 1-2 tablets three times daily    HYDROXYZINE (VISTARIL) 25 MG CAPSULE    Take 25 mg by mouth    NALTREXONE (DEPADE/REVIA) 50 MG TABLET    Take 100 mg by mouth    OMEPRAZOLE (PRILOSEC) 20 MG DR CAPSULE    Take 20 mg by mouth    POTASSIUM CHLORIDE ER (KLOR-CON M) 20 MEQ CR TABLET    Take 20 mEq by mouth    TRAZODONE HCL OR    As needed for sleep.   Modified Medications    No medications on file   Discontinued Medications    FLUCONAZOLE 100 MG OR TABS    2 tabs po today, then 1 tab daily for 2 weeks    FUROSEMIDE (LASIX) 40 MG TABLET    Take 40 mg by mouth    HYDROCODONE-ACETAMINOPHEN 5-325 MG OR TABS    1-2 TABLET EVERY 4 TO 6 HOURS AS NEEDED    HYDROXYZINE (ATARAX) 25 MG TABLET    Take 1-2 tablets (25-50 mg) by mouth every 6 hours as needed for itching          Allergies   Allergen Reactions     Sulfa Drugs         Review  of Systems  A complete review of systems was performed with pertinent positives and negatives noted in the HPI, and all other systems negative.    Physical Exam   BP: (!) 140/95  Pulse: 88  Temp: 97.6  F (36.4  C)  Resp: 16  Weight: 112 kg (247 lb)  SpO2: 97 %      Physical Exam  Vitals signs and nursing note reviewed.   Constitutional:       Appearance: Normal appearance. She is not toxic-appearing.   HENT:      Head: Normocephalic and atraumatic.      Nose: No rhinorrhea.      Mouth/Throat:      Mouth: Mucous membranes are moist.      Pharynx: Oropharynx is clear.   Eyes:      General: No scleral icterus.     Extraocular Movements: Extraocular movements intact.      Conjunctiva/sclera: Conjunctivae normal.      Pupils: Pupils are equal, round, and reactive to light.   Neck:      Musculoskeletal: Normal range of motion and neck supple.   Cardiovascular:      Rate and Rhythm: Normal rate and regular rhythm.   Pulmonary:      Effort: Pulmonary effort is normal. No respiratory distress.      Breath sounds: No stridor.   Abdominal:      General: Abdomen is flat. There is no distension.   Musculoskeletal:      Comments: Mild bilateral lower extremity edema   Skin:     General: Skin is warm and dry.      Capillary Refill: Capillary refill takes less than 2 seconds.      Coloration: Skin is not jaundiced or pale.   Neurological:      General: No focal deficit present.      Mental Status: She is alert and oriented to person, place, and time.      Cranial Nerves: No cranial nerve deficit.   Psychiatric:         Attention and Perception: Attention normal.         Mood and Affect: Mood is depressed.         Speech: Speech normal.         Behavior: Behavior is cooperative.         Thought Content: Thought content does not include homicidal or suicidal ideation.         Cognition and Memory: Cognition is not impaired.         ED Course   12:08 PM  The patient was seen and examined by Rasheed Ovalle MD in Room ED07.     ED Course  as of Oct 13 1613   Tue Oct 13, 2020   1341 Will be on inpatient detox waitlist      1401 AST(!): 121   1448 ALT(!): 85   1448 Creatinine: 0.52   1449 Alcohol Breath Test: 0.007   1449 Anion Gap: 4   1531 Lipase: 229   1554 Detox bed now available          Results for orders placed or performed during the hospital encounter of 10/13/20 (from the past 24 hour(s))   Alcohol breath test POCT   Result Value Ref Range    Alcohol Breath Test 0.007 0.00 - 0.01     Medications   lactated ringers BOLUS 1,000 mL (has no administration in time range)             Assessments & Plan (with Medical Decision Making)   This is a 65-year-old woman presenting for evaluation of recurrent alcohol use with requested consideration for inpatient detoxification.  Differential diagnosis includes but is not limited to alcoholic ketoacidosis, alcoholic hepatitis, electrolyte derangement, dehydration, high risk withdrawal, vitamin deficiency.  Diagnostic evaluation will include screening CBC/CMP/urine drug screen per protocol.  MSSA orders placed.  At time of initial evaluation, there is no immediate inpatient detoxification bed placement, will be placed on wait list.    I have reviewed the nursing notes.    I have reviewed the findings, diagnosis, plan and need for follow up with the patient.    Final Diagnosis:  Alcohol withdrawal    Disposition:  Inpatient alcohol detoxification    I, Bony Hernandez, am serving as a trained medical scribe to document services personally performed by Rasheed Ovalle MD, based on the provider's statements to me.      IRasheed MD, was physically present and have reviewed and verified the accuracy of this note documented by Bony Hernandez.     10/13/2020   [facility] EMERGENCY DEPARTMENT     Wilmar Ovalle MD  10/13/20 1613

## 2020-10-13 NOTE — ED NOTES
Her psychiatrist, Dr. Grey, recommended she come here.    pt blew a 0.007. Last drink at 9am.    Relapsed 10/3/20 after being sober for about 13-15 months.  Has been drinking 1-2 pints fátima per day.  Seeking detox.    She first sought treatment in 2016 and was drinking more alcohol at the time and didn't know she needed to detox.  She did have a couple seizures that time.  After that, she has not had anymore seizures.      She is a smoker and smokes 1 pack daily.  She would like a patch but not the strongest patch because a high dose patch will give her nightmares.    Patient is voluntary.    She has been informed of safety screen, cameras, & that the detox unit is a locked unit.  She is aware that we need to collect covid and blood specimens.

## 2020-10-13 NOTE — TELEPHONE ENCOUNTER
BAILEY rahman/Dennis    - intake awaiting collected covid19 before being able to proceed with admission 340PM ed aware (covid19 now collected)  - 351PM intake spoke to ed RN and pt is not needed on continuous cardiac monitoring and is officially medically cleared and pt was placed on cardiac monitoring due to order set there is no medical need  - ed and unit aware of admission 430PM

## 2020-10-14 ENCOUNTER — RECORDS - HEALTHEAST (OUTPATIENT)
Dept: ADMINISTRATIVE | Facility: OTHER | Age: 65
End: 2020-10-14

## 2020-10-14 LAB
LABORATORY COMMENT REPORT: NORMAL
SARS-COV-2 RNA SPEC QL NAA+PROBE: NEGATIVE
SPECIMEN SOURCE: NORMAL

## 2020-10-14 PROCEDURE — 250N000013 HC RX MED GY IP 250 OP 250 PS 637: Performed by: NURSE PRACTITIONER

## 2020-10-14 PROCEDURE — 36415 COLL VENOUS BLD VENIPUNCTURE: CPT | Performed by: EMERGENCY MEDICINE

## 2020-10-14 PROCEDURE — 250N000011 HC RX IP 250 OP 636: Performed by: NURSE PRACTITIONER

## 2020-10-14 PROCEDURE — 99207 PR CONSULT E&M CHANGED TO SUBSEQUENT LEVEL: CPT | Performed by: PHYSICIAN ASSISTANT

## 2020-10-14 PROCEDURE — 82010 KETONE BODYS QUAN: CPT | Performed by: EMERGENCY MEDICINE

## 2020-10-14 PROCEDURE — H2032 ACTIVITY THERAPY, PER 15 MIN: HCPCS

## 2020-10-14 PROCEDURE — 250N000013 HC RX MED GY IP 250 OP 250 PS 637: Performed by: EMERGENCY MEDICINE

## 2020-10-14 PROCEDURE — 250N000013 HC RX MED GY IP 250 OP 250 PS 637: Performed by: PHYSICIAN ASSISTANT

## 2020-10-14 PROCEDURE — 250N000013 HC RX MED GY IP 250 OP 250 PS 637: Performed by: PSYCHIATRY & NEUROLOGY

## 2020-10-14 PROCEDURE — 99223 1ST HOSP IP/OBS HIGH 75: CPT | Mod: AI | Performed by: PSYCHIATRY & NEUROLOGY

## 2020-10-14 PROCEDURE — A9270 NON-COVERED ITEM OR SERVICE: HCPCS | Performed by: NURSE PRACTITIONER

## 2020-10-14 PROCEDURE — HZ2ZZZZ DETOXIFICATION SERVICES FOR SUBSTANCE ABUSE TREATMENT: ICD-10-PCS | Performed by: INTERNAL MEDICINE

## 2020-10-14 PROCEDURE — 128N000004 HC R&B CD ADULT

## 2020-10-14 PROCEDURE — 99232 SBSQ HOSP IP/OBS MODERATE 35: CPT | Performed by: PHYSICIAN ASSISTANT

## 2020-10-14 RX ORDER — HYDROXYZINE PAMOATE 25 MG/1
25 CAPSULE ORAL 3 TIMES DAILY PRN
Status: DISCONTINUED | OUTPATIENT
Start: 2020-10-14 | End: 2020-10-15 | Stop reason: HOSPADM

## 2020-10-14 RX ORDER — BUMETANIDE 1 MG/1
1 TABLET ORAL DAILY
Status: DISCONTINUED | OUTPATIENT
Start: 2020-10-14 | End: 2020-10-15 | Stop reason: HOSPADM

## 2020-10-14 RX ORDER — POTASSIUM CHLORIDE 1.5 G/1.58G
40 POWDER, FOR SOLUTION ORAL DAILY
Status: DISCONTINUED | OUTPATIENT
Start: 2020-10-14 | End: 2020-10-15 | Stop reason: HOSPADM

## 2020-10-14 RX ADMIN — ALBUTEROL SULFATE 2 PUFF: 90 AEROSOL, METERED RESPIRATORY (INHALATION) at 16:28

## 2020-10-14 RX ADMIN — DICLOFENAC SODIUM 2 G: 10 GEL TOPICAL at 12:53

## 2020-10-14 RX ADMIN — POTASSIUM CHLORIDE 40 MEQ: 1.5 POWDER, FOR SOLUTION ORAL at 12:53

## 2020-10-14 RX ADMIN — TRAZODONE HYDROCHLORIDE 50 MG: 50 TABLET ORAL at 20:48

## 2020-10-14 RX ADMIN — FLUTICASONE FUROATE AND VILANTEROL TRIFENATATE 1 PUFF: 100; 25 POWDER RESPIRATORY (INHALATION) at 08:00

## 2020-10-14 RX ADMIN — HYDROXYZINE HYDROCHLORIDE 25 MG: 25 TABLET, FILM COATED ORAL at 00:34

## 2020-10-14 RX ADMIN — BUMETANIDE 1 MG: 1 TABLET ORAL at 12:53

## 2020-10-14 RX ADMIN — OMEPRAZOLE 20 MG: 20 CAPSULE, DELAYED RELEASE ORAL at 07:59

## 2020-10-14 RX ADMIN — FOLIC ACID 1 MG: 1 TABLET ORAL at 07:59

## 2020-10-14 RX ADMIN — DICLOFENAC SODIUM 2 G: 10 GEL TOPICAL at 16:32

## 2020-10-14 RX ADMIN — DIAZEPAM 5 MG: 5 TABLET ORAL at 07:59

## 2020-10-14 RX ADMIN — DICLOFENAC SODIUM 2 G: 10 GEL TOPICAL at 20:48

## 2020-10-14 RX ADMIN — DIAZEPAM 5 MG: 5 TABLET ORAL at 00:34

## 2020-10-14 RX ADMIN — CETIRIZINE HYDROCHLORIDE 10 MG: 10 TABLET, FILM COATED ORAL at 07:59

## 2020-10-14 RX ADMIN — MULTIPLE VITAMINS W/ MINERALS TAB 1 TABLET: TAB at 07:59

## 2020-10-14 RX ADMIN — DIAZEPAM 10 MG: 5 TABLET ORAL at 12:52

## 2020-10-14 RX ADMIN — ONDANSETRON 4 MG: 4 TABLET, ORALLY DISINTEGRATING ORAL at 00:34

## 2020-10-14 RX ADMIN — THIAMINE HCL TAB 100 MG 100 MG: 100 TAB at 07:59

## 2020-10-14 RX ADMIN — DULOXETINE HYDROCHLORIDE 30 MG: 30 CAPSULE, DELAYED RELEASE ORAL at 07:59

## 2020-10-14 SDOH — HEALTH STABILITY: MENTAL HEALTH: HOW OFTEN DO YOU HAVE A DRINK CONTAINING ALCOHOL?: NOT ASKED

## 2020-10-14 SDOH — HEALTH STABILITY: MENTAL HEALTH: HOW OFTEN DO YOU HAVE 6 OR MORE DRINKS ON ONE OCCASION?: NOT ASKED

## 2020-10-14 SDOH — HEALTH STABILITY: MENTAL HEALTH: HOW MANY STANDARD DRINKS CONTAINING ALCOHOL DO YOU HAVE ON A TYPICAL DAY?: NOT ASKED

## 2020-10-14 ASSESSMENT — ACTIVITIES OF DAILY LIVING (ADL)
DRESS: INDEPENDENT
HYGIENE/GROOMING: INDEPENDENT
LAUNDRY: WITH SUPERVISION
ORAL_HYGIENE: INDEPENDENT

## 2020-10-14 NOTE — PROGRESS NOTES
Spoke with medicine regarding use of tylenol given pt's elevated liver functions.   Medicine states tylenol is okay to use in very limited amounts.

## 2020-10-14 NOTE — PROGRESS NOTES
[place] Prescription Drug Control Monitoring Note:      NARX SCORES  Narcotic  060  Sedative  030  Stimulant  000  Explanation and Guidance  OVERDOSE RISK SCORE     190    (Range 000-999)  Explanation and Guidance  ADDITIONAL RISK INDICATORS ( 0 )     Explanation and Guidance   This [facility] report is based on search criteria supplied and the data entered by the dispensing pharmacy. For more information about any prescription, please contact the dispensing pharmacy or the prescriber. [facility] scores and reports are intended to aid, not replace, medical decision making. None of the information presented should be used as sole justification for providing or refusing to provide medications. The information on this report is not warranted as accurate or complete.   Graphs   RX GRAPH  Narcotic Buprenorphine Sedative Stimulant Other     All Prescribers    Prescribers    2 - Yanique Wade    1 - Emily A Brunner,     Timeline  10/14  2m  6m  1y  2y    Buprenorphine mg    16    4    0  28    Timeline  10/14  2m  6m  1y  2y  Morphine MgEq (MME)    200    80    0  320    Timeline  10/14  2m  6m  1y  2y    Lorazepam MgEq (LME)    10    2    0  18    Timeline  10/14  2m  6m  1y  2y  *Per CDC guidance, the MME conversion factors prescribed or provided as part of the medication-assisted treatment for opioid use disorder should not be used to benchmark against dosage thresholds meant for opioids prescribed for pain. Buprenorphine products have no agreed upon morphine equivalency, and as partial opioid agonists, are not expected to be associated with overdose risk in the same dose-dependent manner as doses for full agonist opioids. MME = morphine milligram equivalents. LME = Lorazepam milligram equivalents. mg = dose in milligrams.     Summary     Summary  Total Prescriptions:     3   Total Prescribers:     2   Total Pharmacies:     1   Narcotics* (excluding Buprenorphine)  Current Qty:     0   Current MME/day:     0.00  30  Day Avg MME/day:     0.00   Sedatives*  Current Qty:     0   Current LME/day:    0.00  30 Day Avg LME/day:    0.00  Buprenorphine*  Current Qty:     0   Current mg/day:    0.00  30 Day Avg mg/day:    0.00  Rx Data   PRESCRIPTIONS  Total Prescriptions:  3  Total Private Pay:  0  Fill Date  ID  Written  Sold  Drug  Qty  Days  Prescriber  Rx #  Pharmacy  Refill  Daily Dose *  Pymt Type    07/08/2020   1   06/18/2020 07/08/2020   Virtussin Ac  Mg/5 Ml Lq   180.00  9  [facility]   [id number]   [facility] ([id number])   0/0  6.00 MME  [place]   [place]  06/01/2020   1   05/18/2020 06/03/2020   Gabapentin 300 Mg Capsule   60.00  10  Em Bru   [id number]   [facility] ([id number])   0/0    Medicare   [place]  04/17/2020   1   04/17/2020 04/17/2020   Gabapentin 300 Mg Capsule   60.00  10  Em Bru   [id number]   [facility] ([id number])   0/0    Medicare [place]  *Per CDC guidance, the MME conversion factors prescribed or provided as part of the medication-assisted treatment for opioid use disorder should not be used to benchmark against dosage thresholds meant for opioids prescribed for pain. Buprenorphine products have no agreed upon morphine equivalency, and as partial opioid agonists, are not expected to be associated with overdose risk in the same dose-dependent manner as doses for full agonist opioids. MME = morphine milligram equivalents. LME = Lorazepam milligram equivalents. mg = dose in milligrams.     Providers  Total Providers: 2   Name   Address   City   State   Zipcode   Phone   Emily A Brunner, MD  [address]  [address]   [place]  [phone]  Yanique Cali  [address] [place]  [address] [phone]  Pharmacies  Total Pharmacies: 1   Name   Address   City   State   Zipcode   Phone   [facility] ([id number])  [address]  [address]   [place]  [phone]    The report provided is based upon the search criteria entered and the corresponding data as it has been reported by dispenser(s). If  erroneous information is identified or additional information is needed, please contact the dispenser or the prescriber provided on the report. Date Sold signifies the date the prescription was sold (left the pharmacy). The absence of Date Sold does not necessarily indicate the prescription was not dispensed. Fill Date represents the date the medication was filled or prepared by the pharmacy. Note, federal regulation (CFR Title 42: Part 2) requires patient consent prior to releasing certain patient data from federally funded opioid treatment programs (OTPs). As such, controlled substances dispensed from OTPs for medication-assisted treatment may not appear in the [place]  report. Morphine milligram equivalent (MME) conversion factors published by the CDC are used in the MME calculation. Per the CDC, the MME conversion factor is intended only for analytic purposes where prescription data are used to retrospectively calculate daily MME to inform analyses of risks associated with opioid prescribing. This value does not constitute clinical guidance or recommendations for converting patients from one form of opioid analgesic to another. Per the CDC, the conversion factors for drugs prescribed or provided, as part of medication-assisted treatment for opioid use disorder should not be used to benchmark against MME dosage thresholds meant for opioids prescribed for pain. Buprenorphine products listed in the CDC s MME file do not have an associated conversion factor. Lastly, the CDC notes, in clinical practice, calculating MME for methadone often involves a sliding-scale approach, whereby the conversion factor increases with increasing dose. The conversion factor of 3 for methadone presented in this file could underestimate MME for a given patient. This report contains confidential information, including patient identifiers, and is not a public record. The information on this report must be treated as protected health  information and is only to be disclosed to others as authorized by applicable state and Federal regulations.

## 2020-10-14 NOTE — PLAN OF CARE
"  Problem: Alcohol Withdrawal  Goal: Alcohol Withdrawal Symptom Control  Outcome: Declining     SBAR        S = Situation:   Voluntary Admit      B  = Background:   65 year old female with hx of COPD, PTSD, Depression, and Anxiety admitted to [facility] for alcohol detox.  Pt states that she relapsed 10/3/20 after 14 months of sobriety. She states she was going through some emotional problems with family and \"couldn't hold it together anymore.\" Pt states she binge drinks 2 pints of fátima. Her last use was around 8am today. She called her psychiatrist today and was told to come to detox. Pt states she has had one prior withdrawal seizure in 2016. Denies SI/HI/SIB/ Dts.       A  =  Assessment:  Pt MSSA scores of 8 and 5 given 10 mg of valium per unit protocol. Pt states she hasnt been taking her bumetanide for 3 days and has lower extremities edema. Pt otherwise appears comfortable. Mood is calm. Affect is blunted/flat.     /89   Pulse 79   Temp 97.7  F (36.5  C) (Temporal)   Resp 16   Ht 1.588 m (5' 2.5\")   Wt 112 kg (247 lb)   SpO2 97%   BMI 44.46 kg/m          R =   Request or Recommendation:   Continue to monitor the patient's withdrawal and to medicate her as needed.     Pt wants detox only           "

## 2020-10-14 NOTE — PHARMACY-ADMISSION MEDICATION HISTORY
Admission Medication History Completed by Pharmacy    See [facility] Admission Navigator for allergy information, preferred outpatient pharmacy, prior to admission medications and immunization status.     Medication History Sources:     Patient, Dispense Report, Care Everywhere     Changes made to PTA medication list (reason):    Added: None    Deleted: None    Changed:   o Albuterol 108 mcg/act inhaler ---> added directions (per patient and Care Everywhere)  o Budesonide formoterol 80-4.5 mcg/act inhaler: inhale 2 puffs --> inhale 2 puffs into the lungs 2 times daily (per patient and Care Everywhere)  o Bumetanide --> added frequency  o Cetrizine --> added frequency  o Duloxetine 30 mg capsule: take 90 mg by mouth --> take 30 mg by mouth daily (per patient)  o Gabapentin 300 mg cap: use 1-2 tablets 3 times daily --> take 300 mg by mouth daily (per patient)  o Hydroxyzine 25 mg cap: take 25 mg by mouth --> take 25 mg by mouth 3 times daily as needed (per patient)  o Natrexone --> added frequency  o Omeprazole --> added frequency  o Potassium chloride ER 20 mEq tab: take 20 mEq by mouth --> take 40 mEq by mouth daily (per patient)  o Trazodone 50 mg tablet: as needed for sleep --> take 75 mg by mouth nightly as needed (per patient)    Additional Information:    The patient confirms use of albuterol and Symbicort inhalers at this time.     The patient states recently just starting gabapentin again. She expressed concern that this medication may be causing weight gain.     She states that she is supposed to take 3 tablets of Duloxetine daily but only takes 1 tablet daily currently.     She mentions not always taking her potassium tablets as they are large and hard to swallow.     The patient states using 1.5 tablets of trazodone nightly. When asked regarding current directions for 0.5 tablet at night, she states this change was made months ago.     She mentions taking 2 hydroxyzine tablets daily on average.     Patient  confirms current allergies and state no new allergies at this time.     Patient preferred pharmacy: [facility]       Prior to Admission medications    Medication Sig Last Dose Taking? Auth Provider   albuterol (PROAIR HFA/PROVENTIL HFA/VENTOLIN HFA) 108 (90 Base) MCG/ACT inhaler Inhale 2 puffs into the lungs every 4 hours as needed  10/12/2020 Yes Reported, Patient   ammonium lactate (AMLACTIN) 12 % external cream Apply 2 Application topically 10/13/2020 at am Yes Reported, Patient   budesonide-formoterol (SYMBICORT) 80-4.5 MCG/ACT Inhaler Inhale 2 puffs into the lungs 2 times daily  10/13/2020 at am Yes Reported, Patient   bumetanide (BUMEX) 1 MG tablet Take 1 mg by mouth daily  10/11/2020 Yes Reported, Patient   cetirizine (ZYRTEC) 10 MG tablet Take 10 mg by mouth daily as needed  Past Month Yes Reported, Patient   DULoxetine (CYMBALTA) 30 MG capsule Take 30 mg by mouth daily  Past Week Yes Reported, Patient   gabapentin (NEURONTIN) 300 MG capsule Take 300 mg by mouth daily  10/12/2020 at am Yes Reported, Patient   hydrOXYzine (VISTARIL) 25 MG capsule Take 25 mg by mouth 3 times daily as needed  Past Month Yes Reported, Patient   naltrexone (DEPADE/REVIA) 50 MG tablet Take 100 mg by mouth daily  10/12/2020 at am Yes Reported, Patient   omeprazole (PRILOSEC) 20 MG DR capsule Take 20 mg by mouth daily  10/12/2020 at am Yes Reported, Patient   potassium chloride ER (KLOR-CON M) 20 MEQ CR tablet Take 40 mEq by mouth daily  Past Month Yes Reported, Patient   traZODone (DESYREL) 50 MG tablet Take 75 mg by mouth nightly as needed  Past Month Yes Reported, Patient       Date completed: 10/13/20    Medication history completed by: ESDRAS OrtizII Pharmacy Intern

## 2020-10-14 NOTE — H&P
Telemedicine Visit: The patient's condition can be safely assessed and treated via synchronous audio and visual telemedicine encounter.   Start Time: 10.23  Stop Time: 10.41  Reason for Telemedicine Visit: Covid-19   Originating Site (Patient Location): [facility]  Distant Site (Provider Location): Provider Remote Setting   Consent: The patient/guardian has verbally consented to: the potential risks and benefits of telemedicine (video visit) versus in person care; bill my insurance or make self-payment for services provided; and responsibility for payment of non-covered services.   Mode of Communication: Video Conference via polycom  As the provider I attest to compliance with applicable laws and regulations related to telemedicine.       The patient/guardian has been notified of the following:   This telemedicine visit is conducted live between you and your clinician. We have found that certain health care needs can be provided without the need for a physical exam. This service lets us provide the care you need with a telemedicine conversation.      Patsy Santamaria is a 65 year old female who was referred by by her addiction doctor  CHIEF COMPLAINT: Relapse    HISTORY OF PRESENT ILLNESS:    Patient came to the emergency room wanting help  She has been sober for 13 to 15 months and then after dealing with a stressful family situation she relapsed  She reports that she started drinking in October and got out of control she is drinking more and more she got sick and talk to her doctor who advised  her to the hospital  Patient has been using the following substances:   Started at age 30s, became a problem at 2007    Patient has tolerance, withdrawal, progressive use, loss of control, spending more time and more amount than intended. Patient has made attempts to quit, is experiencing cravings, and reports negative consequences.  She is drinking 1 to 2 pints of fátima  She does have history of seizures  She lost her job  and money in the past from it    Denies using any drugs  Smokes 1 pack a day    She stopped taking her Cymbalta for depression and is feeling very depressed isolating sleeping too much not having any motivation her energy is down her appetite is down she lost interest          Denies thoughts of suicide or harming others.      Denies auditory or visual hallucinations.         Substance Age first use First became regular or problematic Most recent use   Alcohol   as above       Cannabis  none     Cocaine NONE       Stimulants NONE       Opioids NONE       Sedatives NONE       Hallucinogens NONE       Inhalants NONE       Other         OTC drugs NONE       Nicotine         PSYCHIATRIC REVIEW OF SYSTEMS:         Psychiatric Review of Systems:   Depression:    As above    [place]:     Denies: sleeplessness, increased goal-directed activities, abrupt increase in energy, pressured speech  Psychosis:     Denies: visual hallucinations, auditory hallucinations, paranoia  Anxiety:    Denied excessive worries that are difficult to control for the past 6 months,   Chronic anxiety , not able to stop worrying impacting sleep, poor conc, irritable , muscle tension    panic attacks sob, heart racing sweaty shaky , nausea    Denies: worries that are difficult to control for the past 6 months, panic attacks  PTSD: She does not like people behind her she does not like things around her neck she gets jumpy if someone is behind her she has nightmares and flashbacks  Reports: re-experiencing past trauma, nightmares, itrust issues, flashbacks,increased arousal, avoidance of traumatic stimuli, impaired function.    OCD:     Denies: obsessions, checking, symmetry, cleaning, skin picking.  ED:     Denies: restriction, binging, purging.                  PSYCHIATRIC HISTORY     Previous diagnoses:   Depression PTSD      Past court commitments: none  SIB /SUICIDE ATTEMPTS NONE  Psych Hosp : Once she was drinking and made suicidal  statements    Inpatient cd trt for inpatient  Out pt cd trt she denied        SOCIAL HISTORY                                                                         Her father  when she was 8 years old for his single parent parent.  Eighth grade level of education        Family History:   Denies any family history of mental illness or addiction           Physical ROS:   The patient endorsed needing a water pill because her legs are swollen up. The remainder of 10-point review of systems was negative except as noted in HPI.         PTA Medications:     Medications Prior to Admission   Medication Sig Dispense Refill Last Dose     albuterol (PROAIR HFA/PROVENTIL HFA/VENTOLIN HFA) 108 (90 Base) MCG/ACT inhaler Inhale 2 puffs into the lungs every 4 hours as needed    10/12/2020     ammonium lactate (AMLACTIN) 12 % external cream Apply 2 Application topically   10/13/2020 at am     budesonide-formoterol (SYMBICORT) 80-4.5 MCG/ACT Inhaler Inhale 2 puffs into the lungs 2 times daily    10/13/2020 at am     bumetanide (BUMEX) 1 MG tablet Take 1 mg by mouth daily    10/11/2020     cetirizine (ZYRTEC) 10 MG tablet Take 10 mg by mouth daily as needed    Past Month     DULoxetine (CYMBALTA) 30 MG capsule Take 30 mg by mouth daily    Past Week     gabapentin (NEURONTIN) 300 MG capsule Take 300 mg by mouth daily    10/12/2020 at am     hydrOXYzine (VISTARIL) 25 MG capsule Take 25 mg by mouth 3 times daily as needed    Past Month     naltrexone (DEPADE/REVIA) 50 MG tablet Take 100 mg by mouth daily    10/12/2020 at am     omeprazole (PRILOSEC) 20 MG DR capsule Take 20 mg by mouth daily    10/12/2020 at am     potassium chloride ER (KLOR-CON M) 20 MEQ CR tablet Take 40 mEq by mouth daily    Past Month     traZODone (DESYREL) 50 MG tablet Take 75 mg by mouth nightly as needed    Past Month          Allergies:     Allergies   Allergen Reactions     Sulfa Drugs           Labs:     Recent Results (from the past 48 hour(s))   Alcohol  breath test POCT    Collection Time: 10/13/20 11:39 AM   Result Value Ref Range    Alcohol Breath Test 0.007 0.00 - 0.01   CBC with platelets differential    Collection Time: 10/13/20 12:22 PM   Result Value Ref Range    WBC 4.5 4.0 - 11.0 10e9/L    RBC Count 4.73 3.8 - 5.2 10e12/L    Hemoglobin 15.1 11.7 - 15.7 g/dL    Hematocrit 45.5 35.0 - 47.0 %    MCV 96 78 - 100 fl    MCH 31.9 26.5 - 33.0 pg    MCHC 33.2 31.5 - 36.5 g/dL    RDW 14.6 10.0 - 15.0 %    Platelet Count 182 150 - 450 10e9/L    Diff Method Automated Method     % Neutrophils 60.5 %    % Lymphocytes 25.6 %    % Monocytes 9.4 %    % Eosinophils 3.6 %    % Basophils 0.7 %    % Immature Granulocytes 0.2 %    Nucleated RBCs 0 0 /100    Absolute Neutrophil 2.7 1.6 - 8.3 10e9/L    Absolute Lymphocytes 1.2 0.8 - 5.3 10e9/L    Absolute Monocytes 0.4 0.0 - 1.3 10e9/L    Absolute Eosinophils 0.2 0.0 - 0.7 10e9/L    Absolute Basophils 0.0 0.0 - 0.2 10e9/L    Abs Immature Granulocytes 0.0 0 - 0.4 10e9/L    Absolute Nucleated RBC 0.0    Comprehensive metabolic panel    Collection Time: 10/13/20 12:22 PM   Result Value Ref Range    Sodium 140 133 - 144 mmol/L    Potassium 4.1 3.4 - 5.3 mmol/L    Chloride 106 94 - 109 mmol/L    Carbon Dioxide 30 20 - 32 mmol/L    Anion Gap 4 3 - 14 mmol/L    Glucose 111 (H) 70 - 99 mg/dL    Urea Nitrogen 9 7 - 30 mg/dL    Creatinine 0.52 0.52 - 1.04 mg/dL    GFR Estimate >90 >60 mL/min/[1.73_m2]    GFR Estimate If Black >90 >60 mL/min/[1.73_m2]    Calcium 9.2 8.5 - 10.1 mg/dL    Bilirubin Total 0.3 0.2 - 1.3 mg/dL    Albumin 3.4 3.4 - 5.0 g/dL    Protein Total 7.5 6.8 - 8.8 g/dL    Alkaline Phosphatase 101 40 - 150 U/L    ALT 85 (H) 0 - 50 U/L     (H) 0 - 45 U/L   Lipase    Collection Time: 10/13/20 12:22 PM   Result Value Ref Range    Lipase 229 73 - 393 U/L   GGT    Collection Time: 10/13/20 12:22 PM   Result Value Ref Range     (H) 0 - 40 U/L   TSH with free T4 reflex    Collection Time: 10/13/20 12:22 PM   Result  "Value Ref Range    TSH 0.99 0.40 - 4.00 mU/L   Vitamin B12    Collection Time: 10/13/20 12:22 PM   Result Value Ref Range    Vitamin B12 292 193 - 986 pg/mL   Drug abuse screen 6 urine (chem dep)    Collection Time: 10/13/20 12:23 PM   Result Value Ref Range    Amphetamine Qual Urine Negative NEG^Negative    Barbiturates Qual Urine Negative NEG^Negative    Benzodiazepine Qual Urine Negative NEG^Negative    Cannabinoids Qual Urine Negative NEG^Negative    Cocaine Qual Urine Negative NEG^Negative    Ethanol Qual Urine Negative NEG^Negative    Opiates Qualitative Urine Negative NEG^Negative   Asymptomatic COVID-19 Virus (Coronavirus) by PCR    Collection Time: 10/13/20  3:44 PM    Specimen: Nasopharyngeal   Result Value Ref Range    COVID-19 Virus PCR to [facility] - Source Nasopharyngeal     COVID-19 Virus PCR to [facility] - Result       Test received-See reflex to IDDL test SARS CoV2 (COVID-19) Virus RT-PCR          Physical and Psychiatric Examination:     BP (!) 156/100   Pulse 69   Temp 97.3  F (36.3  C) (Temporal)   Resp 16   Ht 1.588 m (5' 2.5\")   Wt 112 kg (247 lb)   SpO2 96%   BMI 44.46 kg/m    Weight is 247 lbs 0 oz  Body mass index is 44.46 kg/m .    Physical Exam:     ROS: 10 point ROS neg other than the symptoms noted above in the HPI.            Past Medical History:   PAST MEDICAL HISTORY:   Past Medical History:   Diagnosis Date     Alcohol abuse      Anxiety      Chronic Lower Extremity Edema      COPD (chronic obstructive pulmonary disease) (H)      Depression      GERD (gastroesophageal reflux disease)      Peripheral neuropathy      Withdrawal seizures (H)        PAST SURGICAL HISTORY:   Past Surgical History:   Procedure Laterality Date     APPENDECTOMY        SECTION         -    -           MENTAL STATUS EXAM:      Constitutional: General appearance of patient:      Appearance:  awake, alert, appeared as age stated, adequate groomed and slightly unkempt  Attitude:  cooperative  Eye " Contact:  good  Mood:   Anxious  Affect:  congruent   Speech:  clear, coherent normal rate   Psychomotor Behavior:  no evidence of tardive dyskinesia, dystonia, or tics  Thought Process:  logical, linear and goal oriented  Associations:  no loose associations  Thought Content:  no evidence of psychotic thought and active suicidal ideation present  Denied any active suicidal /homicidation ideation plan intent   Insight:  fair  Judgment:  fair  Oriented to:  time, person, and place  Attention Span and Concentration:  intact  Recent and Remote Memory:  intact  Language:  english with appropriate syntax and vocabulary  Fund of Knowledge: appropriate  Muscle Strength and Tone: normal  Gait and Station: Normal     There are no abnormal or psychotic thoughts, no preoccupations, no overvalued ideas, no rumination, no obsessions, no compulsions, no somatic concerns, no hypochrondriasis, no ideas of reference, and no delusions.  Patient denies homicidal thoughts.   Patient denies suicidal thoughts.  Patient appears to have good judgment and good insight.     Musculoskeletal: Patient shows no abnormalities of motor activity: there is no tremor, no tic, and no dystonia.  There is no apparent muscle atrophy, strength and tone appear normal, and there are no abnormal movements.  Patient has normal gait and stance.    DISCUSSION:         Assessment:   Inpatient psychiatric hospitalization is warranted at this time for safety, stabilization, and possible adjustment in medications.          Diagnoses:       Alcohol use disorder severe alcohol withdrawal severe  Major depressive disorder moderate recurrent without psychosis  PTSD chronic  Nicotine use disorder       Plan:   Psychiatric treatment/inteventions:  Medications:    Patient be detox of alcohol using [facility] protocol on Valium  Patient has elevated blood pressure 156/100 tremor agitation sweats  Patient has required 35 mg of Valium since admission    PTSD chronic  Patient be  "put back on Cymbalta  Patient will be on add Vistaril 25 mg 3 times daily  Major depressive disorder recurrent moderate  Without psychosis    Patient report back on Cymbalta    Elevated transaminases as below   ALT 85 most likely from alcoholism nonviral suspected but we will put an internal medicine consult    Edema  Patient complains of edema she takes\"'s a water pill  Last medicine to review it and ordered as patient needs to be on it    Laboratory/Imaging:    Liver Function Studies -   Recent Labs   Lab Test 10/13/20  1222   PROTTOTAL 7.5   ALBUMIN 3.4   BILITOTAL 0.3   ALKPHOS 101   *   ALT 85*      Last Comprehensive Metabolic Panel:  Sodium   Date Value Ref Range Status   10/13/2020 140 133 - 144 mmol/L Final     Potassium   Date Value Ref Range Status   10/13/2020 4.1 3.4 - 5.3 mmol/L Final     Chloride   Date Value Ref Range Status   10/13/2020 106 94 - 109 mmol/L Final     Carbon Dioxide   Date Value Ref Range Status   10/13/2020 30 20 - 32 mmol/L Final     Anion Gap   Date Value Ref Range Status   10/13/2020 4 3 - 14 mmol/L Final     Glucose   Date Value Ref Range Status   10/13/2020 111 (H) 70 - 99 mg/dL Final     Urea Nitrogen   Date Value Ref Range Status   10/13/2020 9 7 - 30 mg/dL Final     Creatinine   Date Value Ref Range Status   10/13/2020 0.52 0.52 - 1.04 mg/dL Final     GFR Estimate   Date Value Ref Range Status   10/13/2020 >90 >60 mL/min/[1.73_m2] Final     Comment:     Non  GFR Calc  Starting 12/18/2018, serum creatinine based estimated GFR (eGFR) will be   calculated using the Chronic Kidney Disease Epidemiology Collaboration   (CKD-EPI) equation.       Calcium   Date Value Ref Range Status   10/13/2020 9.2 8.5 - 10.1 mg/dL Final     Bilirubin Total   Date Value Ref Range Status   10/13/2020 0.3 0.2 - 1.3 mg/dL Final     Alkaline Phosphatase   Date Value Ref Range Status   10/13/2020 101 40 - 150 U/L Final     ALT   Date Value Ref Range Status   10/13/2020 85 " (H) 0 - 50 U/L Final     AST   Date Value Ref Range Status   10/13/2020 121 (H) 0 - 45 U/L Final                  Patient will be treated in therapeutic milieu with appropriate individual and group therapies as described.     Medical treatment/interventions:  Medical concerns:   - Consults: IM consult placed. Appreciate assistance.     Legal Status: Voluntary     Safety Assessment:   Checks: Status 15  Pt has not required locked seclusion or restraints in the past 24 hours to maintain safety, please refer to RN documentation for further details.    The risks, benefits, alternatives and side effects have been discussed and are understood by the patient.     Disposition: Pending clinical stabilization. Pt does  appear interested in just detox wants to follow-up with her therapist      Patient has been unable to stop using drugs in the community due to both physical and psychological symptoms.  Continued use will put the patient at risk for medical and/or psychiatric complications.    I HAVE REVIEWED LABS WITH PT AND TALKED ABOUT RESULTS WITH PT  I HAVE REVIEWED AND SUMMARIZED OLD RECORDS including his medication reconcilation of his home medications  and PDMP   I HAVE SPOKEN WITH RN ABOUT MEDICATIONS AND DETOX SCORES  I HAVE SPOKEN WITH CM ABOUT PTS TREATMETN OPTIONS     Patient be transferred to Dr. Loaiza

## 2020-10-14 NOTE — PLAN OF CARE
Problem: Alcohol Withdrawal  Goal: Alcohol Withdrawal Symptom Control  Description: 1. Detoxification from Alcohol using the [facility] valium protocol  2. Patient will complete assessment paperwork  3.  Patient will meet with  to discuss treatment and discharge planning  4. Physical examination and Lab evaluation by MD  5. Patients oral intake will be greater than 75 % of meals to meet estimated needs  6. Adequate fluid intake    Outcome: No Change    Pt being monitored for alcohol withdrawal. Pt medicated x 2 with valium 5 mg in am and 10 mg at noon.     Pt has received a total of 45 mg of valium since admission.     BP elevated. + Hand tremors.   Pt out on unit.   Good oral intake.   Pt reports her anxiety level a 6-7 on a 0-10 severe scale.   States her depression is related to her situation of her relapse. Denies SI.     Pt states she is currently on disability for her MH/CD issues.     Pt started on medications for her lower extremity edema.     Pt given potassium replacement per orders.     Discharge plans pending.

## 2020-10-14 NOTE — PLAN OF CARE
Behavioral Team Discussion: (10/14/2020)    Continued Stay Criteria/Rationale: Patient admitted for Chemical Use Issues.  Plan: The following services will be provided to the patient; psychiatric assessment, medication management, therapeutic milieu, individual and group support, and skills groups.   Participants: 3A Provider: Dr. Cornelio Collins MD; 3A RN's: Patsy Francis, RN; 3A CM's: Rosalie Barth.  Summary/Recommendation: Providers will assess today for treatment recommendations, discharge planning, and aftercare plans. CM will meet with pt for discharge planning.   Medical/Physical: Per ED note:    GERD (gastroesophageal reflux disease)       Leg edema      Precautions:   Behavioral Orders   Procedures     Code 1 - Restrict to Unit     Routine Programming     As clinically indicated     Seizure precautions     Status 15     Every 15 minutes.     Withdrawal precautions     Rationale for change in precautions or plan: N/A  Progress: Initial.

## 2020-10-14 NOTE — CONSULTS
[facility]   Consult Note - Hospitalist Service     Date of Admission: 10/13/2020  Consult Requested by: Sonia Mccallum  Reason for Consult: Alcohol Withdrawal    Assessment & Plan   Patsy Santamaria is a 65 year old female with a history of COPD, alcohol abuse, withdrawal seizures, chronic BLE edema, peripheral neuropathy, GERD, OA, depression, and anxiety admitted to  for alcohol detox.    #Alcohol Use Disorder  #Alcohol Withdrawal  #Hx of Alcohol Withdrawal Seizure (2016)  Drinking 2 pints daily PTA. One prior withdrawal seizure in 2016. No hx of DTs.  - Seizure Precautions  - Remainder of cares per Psychiatry.    #COPD - Baseline GAR. Slight dry cough. No hypoxia or wheezing.   - Continue PTA Symbicort (sub for Breo Ellipta here) and Albuterol inhaler PRN  - Notify Medicine if fevers, wheezing, hypoxia, or worsening SOB.    #Elevated BP without diagnosis of HTN - Likely due to withdrawal. BP 150s/90s-100s today.  - Monitor. Notify Medicine if BP >180/110.    #Transmanitis - , ALT 85, TBili and Alk Phos wnl on 10/13. LFTs stable from prior. Likely due to ETOH use and hepatic steatosis visualized on OSH US from 1/2020.  - Follow up with PCP for repeat LFTs in 1-2 weeks.    #Chronic BLE Edema - Echo (2/2020) with normal LVEF and RV size/fxn, no valvular abnormalities. Non-compliant with meds PTA.  - Continue PTA Bumex 1 mg daily and KCl 40 mEq daily.    #Left Thumb Pain - Likely due to bone spurs/OA. Hx of R thumb bone spurs requiring surgery. No recent injuries. Exam unremarkable.  - Start Diclofenac gel QID  - Orthopedic follow up    #Peripheral Neuropathy - Discontinued Gabapentin due to weight gain. Vit B12 wnl.  - Follow up with PCP for management.     #GERD  - Continue PTA Prilosec    Medicine will sign off. Please page the on-call JINA for any intercurrent medical issues which arise.    SHABBIR Stroud  Hospitalist  Service  _____________________________________________________________________    Chief Complaint   Alcohol Detox    History is obtained from the patient    History of Present Illness   Patsy Santamaria is a 65 year old female with a history of COPD, alcohol abuse, withdrawal seizures, chronic BLE edema, peripheral neuropathy, GERD, OA, depression, and anxiety admitted to  for alcohol detox. Prior to admission, she had been drinking 2 pints daily since October. One prior withdrawal seizure which occurred in 2016. Denies any hitsory of DTs. Current withdrawal symptoms include diaphoresis, headache, and tingling. She previously had tremors, n/v/d, and abdominal pain which have since resolved.    Aside from withdrawal, she endorses a slight dry cough. She has chronic dyspnea on exertion which she feels has slightly worsened lately due to weight gain and non-compliance with Bumex. She has L thumb pain which she feels is secondary to bone spurs as she had similar symptoms of the R thumb due to bone spurs which required surgical removal. She denies any injury to the L thumb or hand and is requesting Diclofenac gel for management. Denies fevers, rhinorrhea, sore throat, chest pain, or dysuria.    Review of Systems   The 10 point Review of Systems is negative other than noted in the HPI or here.     Past Medical History    I have reviewed this patient's medical history and updated it with pertinent information if needed.   Past Medical History:   Diagnosis Date     Alcohol abuse      Anxiety      Chronic Lower Extremity Edema      COPD (chronic obstructive pulmonary disease) (H)      Depression      GERD (gastroesophageal reflux disease)      Osteoarthritis     Bilateral Knees     Peripheral neuropathy      Withdrawal seizures (H)     x 1 in 2016       Past Surgical History   I have reviewed this patient's surgical history and updated it with pertinent information if needed.  Past Surgical History:   Procedure Laterality  Date     APPENDECTOMY       ARTHROSCOPY KNEE Right       SECTION       THUMB SURGERY      Removal of bone spurs       Social History   I have reviewed this patient's social history and updated it with pertinent information if needed.  Social History     Tobacco Use     Smoking status: Current Every Day Smoker     Packs/day: 1.25     Years: 25.00     Pack years: 31.25     Types: Cigarettes     Smokeless tobacco: Never Used   Substance Use Topics     Alcohol use: 2 pints daily          Drug use: Denies       Family History   I have reviewed this patient's family history and updated it with pertinent information if needed.   Family History   Problem Relation Age of Onset     CABG Mother      Anuerysm Father          of ruptured anuerysm at 46     Medications   Medications Prior to Admission   Medication Sig Dispense Refill Last Dose     albuterol (PROAIR HFA/PROVENTIL HFA/VENTOLIN HFA) 108 (90 Base) MCG/ACT inhaler Inhale 2 puffs into the lungs every 4 hours as needed    10/12/2020     ammonium lactate (AMLACTIN) 12 % external cream Apply 2 Application topically   10/13/2020 at am     budesonide-formoterol (SYMBICORT) 80-4.5 MCG/ACT Inhaler Inhale 2 puffs into the lungs 2 times daily    10/13/2020 at am     bumetanide (BUMEX) 1 MG tablet Take 1 mg by mouth daily    10/11/2020     cetirizine (ZYRTEC) 10 MG tablet Take 10 mg by mouth daily as needed    Past Month     DULoxetine (CYMBALTA) 30 MG capsule Take 30 mg by mouth daily    Past Week     gabapentin (NEURONTIN) 300 MG capsule Take 300 mg by mouth daily    10/12/2020 at am     hydrOXYzine (VISTARIL) 25 MG capsule Take 25 mg by mouth 3 times daily as needed    Past Month     naltrexone (DEPADE/REVIA) 50 MG tablet Take 100 mg by mouth daily    10/12/2020 at am     omeprazole (PRILOSEC) 20 MG DR capsule Take 20 mg by mouth daily    10/12/2020 at am     potassium chloride ER (KLOR-CON M) 20 MEQ CR tablet Take 40 mEq by mouth daily    Past Month     traZODone  (DESYREL) 50 MG tablet Take 75 mg by mouth nightly as needed    Past Month       Allergies   Allergies   Allergen Reactions     Sulfa Drugs        Physical Exam   Vital Signs: Temp: 98  F (36.7  C) Temp src: Temporal BP: (!) 154/96 Pulse: 64   Resp: 16 SpO2: 96 % O2 Device: None (Room air)    Weight: 247 lbs 0 oz    General: Awake. Non-toxic appearing. NAD.  HEENT: NC/AT. Anicteric sclera. MMM.  CV: RRR  Respiratory: Normal effort on RA. Lungs CTAB.  GI: Abdomen is soft, non-tender, and non-distended. Bowel sounds present.  Extremities: 1+ BLE non-pitting edema. Warm and well perfused.  Neuro: Alert. Answers questions appropriately. Moves all extremities.  Skin: No rashes or jaundice on exposed areas.      Data   Results for orders placed or performed during the hospital encounter of 10/13/20 (from the past 24 hour(s))   CBC with platelets differential   Result Value Ref Range    WBC 4.5 4.0 - 11.0 10e9/L    RBC Count 4.73 3.8 - 5.2 10e12/L    Hemoglobin 15.1 11.7 - 15.7 g/dL    Hematocrit 45.5 35.0 - 47.0 %    MCV 96 78 - 100 fl    MCH 31.9 26.5 - 33.0 pg    MCHC 33.2 31.5 - 36.5 g/dL    RDW 14.6 10.0 - 15.0 %    Platelet Count 182 150 - 450 10e9/L    Diff Method Automated Method     % Neutrophils 60.5 %    % Lymphocytes 25.6 %    % Monocytes 9.4 %    % Eosinophils 3.6 %    % Basophils 0.7 %    % Immature Granulocytes 0.2 %    Nucleated RBCs 0 0 /100    Absolute Neutrophil 2.7 1.6 - 8.3 10e9/L    Absolute Lymphocytes 1.2 0.8 - 5.3 10e9/L    Absolute Monocytes 0.4 0.0 - 1.3 10e9/L    Absolute Eosinophils 0.2 0.0 - 0.7 10e9/L    Absolute Basophils 0.0 0.0 - 0.2 10e9/L    Abs Immature Granulocytes 0.0 0 - 0.4 10e9/L    Absolute Nucleated RBC 0.0    Comprehensive metabolic panel   Result Value Ref Range    Sodium 140 133 - 144 mmol/L    Potassium 4.1 3.4 - 5.3 mmol/L    Chloride 106 94 - 109 mmol/L    Carbon Dioxide 30 20 - 32 mmol/L    Anion Gap 4 3 - 14 mmol/L    Glucose 111 (H) 70 - 99 mg/dL    Urea Nitrogen 9 7 -  30 mg/dL    Creatinine 0.52 0.52 - 1.04 mg/dL    GFR Estimate >90 >60 mL/min/[1.73_m2]    GFR Estimate If Black >90 >60 mL/min/[1.73_m2]    Calcium 9.2 8.5 - 10.1 mg/dL    Bilirubin Total 0.3 0.2 - 1.3 mg/dL    Albumin 3.4 3.4 - 5.0 g/dL    Protein Total 7.5 6.8 - 8.8 g/dL    Alkaline Phosphatase 101 40 - 150 U/L    ALT 85 (H) 0 - 50 U/L     (H) 0 - 45 U/L   Lipase   Result Value Ref Range    Lipase 229 73 - 393 U/L   GGT   Result Value Ref Range     (H) 0 - 40 U/L   TSH with free T4 reflex   Result Value Ref Range    TSH 0.99 0.40 - 4.00 mU/L   Vitamin B12   Result Value Ref Range    Vitamin B12 292 193 - 986 pg/mL   Drug abuse screen 6 urine (chem dep)   Result Value Ref Range    Amphetamine Qual Urine Negative NEG^Negative    Barbiturates Qual Urine Negative NEG^Negative    Benzodiazepine Qual Urine Negative NEG^Negative    Cannabinoids Qual Urine Negative NEG^Negative    Cocaine Qual Urine Negative NEG^Negative    Ethanol Qual Urine Negative NEG^Negative    Opiates Qualitative Urine Negative NEG^Negative   Asymptomatic COVID-19 Virus (Coronavirus) by PCR    Specimen: Nasopharyngeal   Result Value Ref Range    COVID-19 Virus PCR to [facility] - Source Nasopharyngeal     COVID-19 Virus PCR to [facility] - Result       Test received-See reflex to IDDL test SARS CoV2 (COVID-19) Virus RT-PCR   SARS-CoV-2 COVID-19 Virus (Coronavirus) RT-PCR Nasopharyngeal    Specimen: Nasopharyngeal   Result Value Ref Range    SARS-CoV-2 Virus Specimen Source Nasopharyngeal     SARS-CoV-2 PCR Result NEGATIVE     SARS-CoV-2 PCR Comment       Testing was performed using the Simplexa COVID-19 Direct Assay on the [employer] MDX   instrument. Additional information about this Emergency Use Authorization (EUA) assay can   be found via the Lab Guide.

## 2020-10-15 ENCOUNTER — RECORDS - HEALTHEAST (OUTPATIENT)
Dept: ADMINISTRATIVE | Facility: OTHER | Age: 65
End: 2020-10-15

## 2020-10-15 VITALS
SYSTOLIC BLOOD PRESSURE: 126 MMHG | DIASTOLIC BLOOD PRESSURE: 88 MMHG | TEMPERATURE: 97.6 F | WEIGHT: 247 LBS | RESPIRATION RATE: 16 BRPM | HEIGHT: 63 IN | HEART RATE: 62 BPM | BODY MASS INDEX: 43.77 KG/M2 | OXYGEN SATURATION: 96 %

## 2020-10-15 LAB — B-OH-BUTYR SERPL-MCNC: 2.1 MG/DL (ref 0–3)

## 2020-10-15 PROCEDURE — 250N000013 HC RX MED GY IP 250 OP 250 PS 637: Performed by: NURSE PRACTITIONER

## 2020-10-15 PROCEDURE — 250N000013 HC RX MED GY IP 250 OP 250 PS 637: Performed by: EMERGENCY MEDICINE

## 2020-10-15 PROCEDURE — 250N000013 HC RX MED GY IP 250 OP 250 PS 637: Performed by: PHYSICIAN ASSISTANT

## 2020-10-15 RX ORDER — HYDROXYZINE PAMOATE 25 MG/1
25 CAPSULE ORAL 3 TIMES DAILY PRN
Qty: 90 CAPSULE | Refills: 1 | Status: ON HOLD | OUTPATIENT
Start: 2020-10-15 | End: 2021-02-19

## 2020-10-15 RX ORDER — MULTIPLE VITAMINS W/ MINERALS TAB 9MG-400MCG
1 TAB ORAL DAILY
Qty: 90 EACH | Refills: 1 | Status: SHIPPED | OUTPATIENT
Start: 2020-10-16 | End: 2021-02-17

## 2020-10-15 RX ORDER — FOLIC ACID 1 MG/1
1 TABLET ORAL DAILY
Qty: 90 TABLET | Refills: 1 | Status: SHIPPED | OUTPATIENT
Start: 2020-10-16 | End: 2021-02-17

## 2020-10-15 RX ADMIN — OMEPRAZOLE 20 MG: 20 CAPSULE, DELAYED RELEASE ORAL at 07:16

## 2020-10-15 RX ADMIN — MULTIPLE VITAMINS W/ MINERALS TAB 1 TABLET: TAB at 08:34

## 2020-10-15 RX ADMIN — FOLIC ACID 1 MG: 1 TABLET ORAL at 08:34

## 2020-10-15 RX ADMIN — DICLOFENAC SODIUM 2 G: 10 GEL TOPICAL at 08:37

## 2020-10-15 RX ADMIN — POTASSIUM CHLORIDE 40 MEQ: 1.5 POWDER, FOR SOLUTION ORAL at 08:34

## 2020-10-15 RX ADMIN — DULOXETINE HYDROCHLORIDE 30 MG: 30 CAPSULE, DELAYED RELEASE ORAL at 08:34

## 2020-10-15 RX ADMIN — BUMETANIDE 1 MG: 1 TABLET ORAL at 08:34

## 2020-10-15 RX ADMIN — ACETAMINOPHEN 650 MG: 325 TABLET, FILM COATED ORAL at 07:16

## 2020-10-15 RX ADMIN — DICLOFENAC SODIUM 2 G: 10 GEL TOPICAL at 13:57

## 2020-10-15 RX ADMIN — THIAMINE HCL TAB 100 MG 100 MG: 100 TAB at 08:34

## 2020-10-15 RX ADMIN — FLUTICASONE FUROATE AND VILANTEROL TRIFENATATE 1 PUFF: 100; 25 POWDER RESPIRATORY (INHALATION) at 08:37

## 2020-10-15 ASSESSMENT — ACTIVITIES OF DAILY LIVING (ADL)
ORAL_HYGIENE: INDEPENDENT
HYGIENE/GROOMING: INDEPENDENT
DRESS: INDEPENDENT
LAUNDRY: WITH SUPERVISION

## 2020-10-15 NOTE — DISCHARGE SUMMARY
Cornelio Collins MD   Physician   Behavioral Health   H&P   Signed   Date of Service: 10/14/2020 10:27 AM   Creation Time: 10/14/2020 10:27 AM          Substance Age first use First became regular or problematic   Alcohol as above    Cannabis none    Cocaine NONE    Stimulants NONE    Opioids NONE    Sedatives NONE    Hallucinogens NONE    Inhalants NONE    Other     OTC drugs NONE    Nicotine     PSYCHIATRIC REVIEW OF SYSTEMS:    Psychiatric Review of Systems:   Depression:   As above  [place]:     Denies: sleeplessness, increased goal-directed activities, abrupt increase in energy, pressured speech   Psychosis:     Denies: visual hallucinations, auditory hallucinations, paranoia   Anxiety:   Denied excessive worries that are difficult to control for the past 6 months,   Chronic anxiety , not able to stop worrying impacting sleep, poor conc, irritable , muscle tension   panic attacks sob, heart racing sweaty shaky , nausea     Denies: worries that are difficult to control for the past 6 months, panic attacks   PTSD: She does not like people behind her she does not like things around her neck she gets jumpy if someone is behind her she has nightmares and flashbacks  Reports: re-experiencing past trauma, nightmares, itrust issues, flashbacks,increased arousal, avoidance of traumatic stimuli, impaired function.  OCD:     Denies: obsessions, checking, symmetry, cleaning, skin picking.   ED:     Denies: restriction, binging, purging.    PSYCHIATRIC HISTORY   Previous diagnoses:   Depression PTSD   Past court commitments: none   SIB /SUICIDE ATTEMPTS NONE   Psych Hosp : Once she was drinking and made suicidal statements   Inpatient cd trt for inpatient   Out pt cd trt she denied    SOCIAL HISTORY    Her father  when she was 8 years old for his single parent parent. Eighth grade level of education    Family History:   Denies any family history of mental illness or addiction    Physical ROS:   PTA Medications:    Prescriptions Prior to Admission        Medications Prior to Admission      10/13/2020 at am      10/11/2020      Past Month      Past Week      10/12/2020 at am      Past Month      10/12/2020 at am      10/12/2020 at am      Past Month      Past Month     She stabilized and completed dertox.  She was discharged to [facility].      Current Facility-Administered Medications:      acetaminophen (TYLENOL) tablet 650 mg, 650 mg, Oral, Q4H PRN, Michelle Mccallum APRN CNP, 650 mg at 10/15/20 0716     albuterol (PROAIR HFA/PROVENTIL HFA/VENTOLIN HFA) 108 (90 Base) MCG/ACT inhaler 2 puff, 2 puff, Inhalation, Q4H PRN, Michelle Mccallum APRN CNP, 2 puff at 10/14/20 1628     alum & mag hydroxide-simethicone (MAALOX  ES) suspension 30 mL, 30 mL, Oral, Q4H PRN, Michelle Mccallum APRN CNP     ammonium lactate (AMLACTIN) 12 % cream, , Topical, BID PRN, Michelle Mccallum APRN CNP     atenolol (TENORMIN) tablet 50 mg, 50 mg, Oral, Daily PRN, Michelle Mccallum APRN CNP     bisacodyl (DULCOLAX) Suppository 10 mg, 10 mg, Rectal, Daily PRN, Michelle Mccallum APRN CNP     bumetanide (BUMEX) tablet 1 mg, 1 mg, Oral, Daily, Valeria Gray PA, 1 mg at 10/15/20 0834     cetirizine (zyrTEC) tablet 10 mg, 10 mg, Oral, Daily, Michelle Mccallum APRN CNP, 10 mg at 10/14/20 0759     diazepam (VALIUM) tablet 5-20 mg, 5-20 mg, Oral, Q30 Min PRN, Wilmar Ovalle MD, 10 mg at 10/14/20 1252     diclofenac (VOLTAREN) 1 % topical gel 2 g, 2 g, Transdermal, 4x Daily, Valeria Gray PA, 2 g at 10/15/20 1357     DULoxetine (CYMBALTA) DR capsule 30 mg, 30 mg, Oral, Daily, Michelle Mccallum APRN CNP, 30 mg at 10/15/20 0834     fluticasone-vilanterol (BREO ELLIPTA) 100-25 MCG/INH inhaler 1 puff, 1 puff, Inhalation, Daily, Cornelio Collins MD, 1 puff at 10/15/20 0837     folic acid (FOLVITE) tablet 1 mg, 1 mg, Oral, Daily, Michelle Mccallum APRN CNP, 1 mg at 10/15/20 0834     hydrOXYzine  (VISTARIL) capsule 25 mg, 25 mg, Oral, TID PRN, Cornelio Collins MD     loperamide (IMODIUM) capsule 2 mg, 2 mg, Oral, 4x Daily PRN, Michelle Mccallum APRN CNP     magnesium hydroxide (MILK OF MAGNESIA) suspension 30 mL, 30 mL, Oral, At Bedtime PRN, Michelle Mccallum APRN CNP     multivitamin w/minerals (THERA-VIT-M) tablet 1 tablet, 1 tablet, Oral, Daily, Michelle Mccallum APRN CNP, 1 tablet at 10/15/20 0834     nicotine (NICODERM CQ) 14 MG/24HR 24 hr patch 1 patch, 1 patch, Transdermal, Q24H, Wilmar Ovalle MD, Stopped at 10/14/20 1436     nicotine Patch in Place, , Transdermal, Q8H, Wilmar Ovalle MD, Stopped at 10/14/20 0600     omeprazole (priLOSEC) CR capsule 20 mg, 20 mg, Oral, QAM AC, Michelle Mccallum APRN CNP, 20 mg at 10/15/20 0716     ondansetron (ZOFRAN-ODT) ODT tab 4 mg, 4 mg, Oral, Q6H PRN, Michelle Mccallum APRN CNP, 4 mg at 10/14/20 0034     potassium chloride (KLOR-CON) Packet 40 mEq, 40 mEq, Oral, Daily, Valeria Gray PA, 40 mEq at 10/15/20 0834     thiamine (B-1) tablet 100 mg, 100 mg, Oral, Daily, Wilmar Ovalle MD, 100 mg at 10/15/20 0834     traZODone (DESYREL) tablet 50 mg, 50 mg, Oral, At Bedtime PRN, Michelle Mccallum APRN CNP, 50 mg at 10/14/20 2048    Current Outpatient Medications:      albuterol (PROAIR HFA/PROVENTIL HFA/VENTOLIN HFA) 108 (90 Base) MCG/ACT inhaler, Inhale 2 puffs into the lungs every 4 hours as needed , Disp: , Rfl:      ammonium lactate (AMLACTIN) 12 % external cream, Apply 2 Application topically, Disp: , Rfl:      budesonide-formoterol (SYMBICORT) 80-4.5 MCG/ACT Inhaler, Inhale 2 puffs into the lungs 2 times daily , Disp: , Rfl:      bumetanide (BUMEX) 1 MG tablet, Take 1 mg by mouth daily , Disp: , Rfl:      cetirizine (ZYRTEC) 10 MG tablet, Take 10 mg by mouth daily as needed , Disp: , Rfl:      DULoxetine (CYMBALTA) 30 MG capsule, Take 30 mg by mouth daily , Disp: , Rfl:      [START ON 10/16/2020] folic acid  (FOLVITE) 1 MG tablet, Take 1 tablet (1 mg) by mouth daily, Disp: 90 tablet, Rfl: 1     hydrOXYzine (VISTARIL) 25 MG capsule, Take 1 capsule (25 mg) by mouth 3 times daily as needed for anxiety, Disp: 90 capsule, Rfl: 1     [START ON 10/16/2020] multivitamin w/minerals (THERA-VIT-M) tablet, Take 1 tablet by mouth daily, Disp: 90 each, Rfl: 1     naltrexone (DEPADE/REVIA) 50 MG tablet, Take 100 mg by mouth daily , Disp: , Rfl:      nicotine (NICODERM CQ) 7 MG/24HR 24 hr patch, Place 1 patch onto the skin every 24 hours, Disp: 30 patch, Rfl: 1     omeprazole (PRILOSEC) 20 MG DR capsule, Take 20 mg by mouth daily , Disp: , Rfl:      potassium chloride ER (KLOR-CON M) 20 MEQ CR tablet, Take 40 mEq by mouth daily , Disp: , Rfl:      traZODone (DESYREL) 50 MG tablet, Take 75 mg by mouth nightly as needed , Disp: , Rfl:   Recent Results (from the past 168 hour(s))   Alcohol breath test POCT    Collection Time: 10/13/20 11:39 AM   Result Value Ref Range    Alcohol Breath Test 0.007 0.00 - 0.01   CBC with platelets differential    Collection Time: 10/13/20 12:22 PM   Result Value Ref Range    WBC 4.5 4.0 - 11.0 10e9/L    RBC Count 4.73 3.8 - 5.2 10e12/L    Hemoglobin 15.1 11.7 - 15.7 g/dL    Hematocrit 45.5 35.0 - 47.0 %    MCV 96 78 - 100 fl    MCH 31.9 26.5 - 33.0 pg    MCHC 33.2 31.5 - 36.5 g/dL    RDW 14.6 10.0 - 15.0 %    Platelet Count 182 150 - 450 10e9/L    Diff Method Automated Method     % Neutrophils 60.5 %    % Lymphocytes 25.6 %    % Monocytes 9.4 %    % Eosinophils 3.6 %    % Basophils 0.7 %    % Immature Granulocytes 0.2 %    Nucleated RBCs 0 0 /100    Absolute Neutrophil 2.7 1.6 - 8.3 10e9/L    Absolute Lymphocytes 1.2 0.8 - 5.3 10e9/L    Absolute Monocytes 0.4 0.0 - 1.3 10e9/L    Absolute Eosinophils 0.2 0.0 - 0.7 10e9/L    Absolute Basophils 0.0 0.0 - 0.2 10e9/L    Abs Immature Granulocytes 0.0 0 - 0.4 10e9/L    Absolute Nucleated RBC 0.0    Comprehensive metabolic panel    Collection Time: 10/13/20 12:22  PM   Result Value Ref Range    Sodium 140 133 - 144 mmol/L    Potassium 4.1 3.4 - 5.3 mmol/L    Chloride 106 94 - 109 mmol/L    Carbon Dioxide 30 20 - 32 mmol/L    Anion Gap 4 3 - 14 mmol/L    Glucose 111 (H) 70 - 99 mg/dL    Urea Nitrogen 9 7 - 30 mg/dL    Creatinine 0.52 0.52 - 1.04 mg/dL    GFR Estimate >90 >60 mL/min/[1.73_m2]    GFR Estimate If Black >90 >60 mL/min/[1.73_m2]    Calcium 9.2 8.5 - 10.1 mg/dL    Bilirubin Total 0.3 0.2 - 1.3 mg/dL    Albumin 3.4 3.4 - 5.0 g/dL    Protein Total 7.5 6.8 - 8.8 g/dL    Alkaline Phosphatase 101 40 - 150 U/L    ALT 85 (H) 0 - 50 U/L     (H) 0 - 45 U/L   Lipase    Collection Time: 10/13/20 12:22 PM   Result Value Ref Range    Lipase 229 73 - 393 U/L   GGT    Collection Time: 10/13/20 12:22 PM   Result Value Ref Range     (H) 0 - 40 U/L   TSH with free T4 reflex    Collection Time: 10/13/20 12:22 PM   Result Value Ref Range    TSH 0.99 0.40 - 4.00 mU/L   Vitamin B12    Collection Time: 10/13/20 12:22 PM   Result Value Ref Range    Vitamin B12 292 193 - 986 pg/mL   Drug abuse screen 6 urine (chem dep)    Collection Time: 10/13/20 12:23 PM   Result Value Ref Range    Amphetamine Qual Urine Negative NEG^Negative    Barbiturates Qual Urine Negative NEG^Negative    Benzodiazepine Qual Urine Negative NEG^Negative    Cannabinoids Qual Urine Negative NEG^Negative    Cocaine Qual Urine Negative NEG^Negative    Ethanol Qual Urine Negative NEG^Negative    Opiates Qualitative Urine Negative NEG^Negative   Asymptomatic COVID-19 Virus (Coronavirus) by PCR    Collection Time: 10/13/20  3:44 PM    Specimen: Nasopharyngeal   Result Value Ref Range    COVID-19 Virus PCR to [facility] - Source Nasopharyngeal     COVID-19 Virus PCR to [facility] - Result       Test received-See reflex to IDDL test SARS CoV2 (COVID-19) Virus RT-PCR   SARS-CoV-2 COVID-19 Virus (Coronavirus) RT-PCR Nasopharyngeal    Collection Time: 10/13/20  3:44 PM    Specimen: Nasopharyngeal   Result Value Ref Range     SARS-CoV-2 Virus Specimen Source Nasopharyngeal     SARS-CoV-2 PCR Result NEGATIVE     SARS-CoV-2 PCR Comment       Testing was performed using the Simplexa COVID-19 Direct Assay on the [employer] MDX   instrument. Additional information about this Emergency Use Authorization (EUA) assay can   be found via the Lab Guide.     Beta hydroxybutyrate level    Collection Time: 10/14/20  7:33 AM   Result Value Ref Range    Betahydroxybutyrate 2.1 0.0 - 3.0 mg/dL       Video-Visit Details    Type of service:  Video Visit    Video Start Time (time video started): 1430    Video End Time (time video stopped): 1450    Originating Location (pt. Location): [facility]    Distant Location (provider location): Provider remote location    Mode of Communication:  Video Conference via [employer]    Physician has received verbal consent for a Video Visit from the patient? Yes      Jose Loaiza MD

## 2020-10-15 NOTE — PLAN OF CARE
Problem: Behavioral Health Plan of Care  Goal: Plan of Care Review  Outcome: Adequate for Discharge  Flowsheets  Taken 10/15/2020 1143  Plan of Care Reviewed With: patient  Patient Agreement with Plan of Care: agrees  Taken 10/15/2020 1112  Plan of Care Reviewed With: patient  Patient Agreement with Plan of Care: agrees      Pt out on unit. Alcohol is complete. Pt reports her mood as good/positive.  Denies feeling depressed or anxious. Denies SI.   Reports sleep and appetite as good.   Denies SE of medications.   Pt out on unit.     Pt plans to return home and attend meetings and Muslim group.     Pt social with peers.     Reviewed discharge instructions with patient and she verbalized understanding.     Pt plans to take a private cab ride home.     BP low at noon but WNL upon recheck.   Pt states voltaren cream has been helpful for arthritis in her thumb.     See discharge instructions.

## 2020-10-15 NOTE — PROGRESS NOTES
10/14/20 1200   Art Therapy   Type of Intervention structured groups   Response participates with encouragement   Hours 1   Treatment Detail    (Art Therapy)12 step art/ discussion   Art Therapy Goal-to cope, express, contribute, regulate and sublimate emotions through the creative arts process and Art Therapy directives within a group setting.     Outcome- pt attended group . She reported feeling good. She did her project about step 12 . She made an image about gonzalez and connection to others and helping/ kindness to  others. Pt was pleasant, cooperative and engaged.

## 2020-10-15 NOTE — PLAN OF CARE
Problem: Alcohol Withdrawal  Goal: Alcohol Withdrawal Symptom Control  Description: 1. Detoxification from Alcohol using the [facility] valium protocol  2. Patient will complete assessment paperwork  3.  Patient will meet with  to discuss treatment and discharge planning  4. Physical examination and Lab evaluation by MD  5. Patients oral intake will be greater than 75 % of meals to meet estimated needs  6. Adequate fluid intake    Outcome: Completed    No medications for alcohol withdrawal x 24 hours. Pt alcohol withdrawal is complete.

## 2020-10-15 NOTE — DISCHARGE INSTRUCTIONS
Behavioral Discharge Planning and Instructions  THANK YOU FOR CHOOSING [address]  [phone]    Summary: You were admitted to [facility] on 10/13/20 for detoxification from alcohol.  A medical exam was performed that included lab work. You have met with a  and opted to return home and follow-up with therapist and addiction medicine psychiatrist.  Please take care and make your recovery a daily priority, Patsy! It was a pleasure working with you and the entire treatment team here wishes you the very best in your recovery!     Recommendation:  Therapy, psychiatry, and sober support groups and network.     Main Diagnoses:  Per Dr. Cornelio Collins MD: psychiatrist  Alcohol use disorder severe alcohol withdrawal severe  Major depressive disorder moderate recurrent without psychosis  PTSD chronic  Nicotine use disorder    Major Treatments, Procedures and Findings: Your alcohol withdrawal was treated with valium using the Modified Selective Severity Assessment (MSSA) protocol  You declined a chemical dependency assessment. You had labs drawn and those results were reviewed with you. Please take a copy of your lab work with you to your next primary care physician appointment.    Symptoms to Report:  If you experience more anxiety, confusion, sleeplessness, deep sadness or thoughts of suicide, notify your treatment team or notify your primary care physician. IF ANY OF THE SYMPTOMS YOU ARE EXPERIENCING ARE A MEDICAL EMERGENCY CALL 911 IMMEDIATELY.     Lifestyle Adjustment: Health Action Plan:  1.Create a daily schedule  2. Eat Healthy  3. Plan Enjoyable Sober Activities  4. Use Problem Solving Skills and Deal with Issues as they Arise.   5. Be Physically Active  6. Take your medications as prescribed  7. Get enough restful sleep  8. Practice Relaxation  9. Spend time with Supportive People  10. No use of alcohol, illegal drugs or addictive medications other than what is currently prescribed.    11.AA, NA Sponsor are excellent resources for support      Disposition: Home    Facts about COVID19 at www.cdc.gov/COVID19 and www.[place].gov[place]covid[place]    Keeping hands clean is one of the most important steps we can take to avoid getting sick and spreading germs to others.  Please wash your hands frequently and lather with soap for at least 20 seconds!    Medical Follow-Up:  Dr. Roberto  [facility]   [address] [place] [address]  [phone]  You are aware you should make a follow up appointment with your primary care doctor for medical and medication management      Psychiatry Follow-Up:  [facility]  Dr. Grey  Appointment: October 24th at 11:00 am  [address] [place], [place] [id number] [phone]    Therapy Follow-Up:  [facility]  Appointment: you have indicated you will call to schedule a follow-up appointment  [address]  [place] [address]  [phone]  Resources:     Recovery apps for your phone to locate current in person and zoom recovery meetings  [place] - meeting don  AA  - meeting don  Meeting guide - meeting don  Quick NA meeting - meeting don  Mk- has various apps          *due to covid-19 AA/NA meetings are being held online*  AA meetings can be found online; search for them at: http://aa-intergroup.org/directory.php  AA meetings via Z[place]OM for [place] area can be found online at: https://a[place].org/find-a-meeting/holiday-closings/  NA meetings via [place]OM for [place] area can be found online at: https://sites.google.com/view/[place]regionofnarcoticsanonymous/home?authuser=2  AA/NA and Sponsors are excellent resources for support and you can find one at any support group meeting.   Alcoholics Anonymous (www.alcoholics-anonymous.org): for local information 24 hours/day  AA Intergroup service office in [place] (http://www.aastpaul.org/) [phone]  AA Intergroup service office in [facility]: [phone]. (http://www.a[place].org/)  Narcotics  [place] (www.nam[place].org) (657) 525-4044  https://aafairviewriverside.org/meetings  SMART Recovery - self management for addiction recovery:  www.smartrecBevalleyy.org  Pathways ~ A Health Crisis Resource & Support Center:  747.608.4260.  https://prescribetoprevent.org/patient-education/videos/  http://www.harmreduction.org  [facility] [phone]  Support Group:  AA/NA and Sponsor/support.  National [place] on Mental Illness (www.[place].nba.org): 335.472.7308 or 824-647-5518.  Alcoholics Anonymous (www.alcoholics-anonymous.org): Check your phone book for your local chapter.  Suicide Awareness Voices of Education (SAVE) (www.save.org): 583-269-IUVB (7483)  National Suicide Prevention Line (www.mentalhealthmn.org): 451-682-UZJA (5812)  Mental Health Consumer/Survivor Network of [place] (www.mhcsn.net): 401.405.5685 or 859-295-3102  Mental Health Association of [place] (www.mentalhealth.org): 918.746.4330 or 377-238-3643   Substance Abuse and Mental Health Services (www.samhsa.gov)  [place] Opioid Prevention Coalition: www.opioidcoalition.org    [place] Recovery Connection ([facility])  [facility] connects people seeking recovery to resources that help foster and sustain long-term recovery.  Whether you are seeking resources for treatment, transportation, housing, job training, education, health care or other pathways to recovery, [facility] is a great place to start.  220.939.8562.  www.[facility].org    General Medication Instructions:   See your medication sheet(s) for instructions.   Take all medications as prescribed.  Make no changes unless your doctor suggests them.   Go to all your doctor visits.  Be sure to have all your required lab tests. This way, your medicines can be refilled on time.  Do not use any forms of alcohol.    Please Note:  If you have any questions at anytime after you are discharged please call [facility] detox unit [facility] at [phone].  [facility], Behavioral Intake  [phone]  Medical Records call [phone]  Outpatient Behavioral Intake call [phone]  LP+ Wait List/Bed Availability call [phone]    Please remember to take all of your behavioral discharge planning and lab paperwork to any follow up appointments, it contains your lab results, diagnosis, medication list and discharge recommendations.      THANK YOU FOR CHOOSING [facility]

## 2020-11-16 ENCOUNTER — COMMUNICATION - HEALTHEAST (OUTPATIENT)
Dept: FAMILY MEDICINE | Facility: CLINIC | Age: 65
End: 2020-11-16

## 2020-11-16 DIAGNOSIS — K13.0 ANGULAR CHEILITIS: ICD-10-CM

## 2020-12-10 ENCOUNTER — COMMUNICATION - HEALTHEAST (OUTPATIENT)
Dept: CARE COORDINATION | Facility: CLINIC | Age: 65
End: 2020-12-10

## 2020-12-28 ENCOUNTER — COMMUNICATION - HEALTHEAST (OUTPATIENT)
Dept: SCHEDULING | Facility: CLINIC | Age: 65
End: 2020-12-28

## 2020-12-31 ENCOUNTER — AMBULATORY - HEALTHEAST (OUTPATIENT)
Dept: SURGERY | Facility: CLINIC | Age: 65
End: 2020-12-31

## 2020-12-31 DIAGNOSIS — Z11.59 ENCOUNTER FOR SCREENING FOR OTHER VIRAL DISEASES: ICD-10-CM

## 2021-01-04 ENCOUNTER — RECORDS - HEALTHEAST (OUTPATIENT)
Dept: ADMINISTRATIVE | Facility: OTHER | Age: 66
End: 2021-01-04

## 2021-01-11 ENCOUNTER — AMBULATORY - HEALTHEAST (OUTPATIENT)
Dept: PULMONOLOGY | Facility: OTHER | Age: 66
End: 2021-01-11

## 2021-01-11 ENCOUNTER — OFFICE VISIT - HEALTHEAST (OUTPATIENT)
Dept: PULMONOLOGY | Facility: OTHER | Age: 66
End: 2021-01-11

## 2021-01-11 DIAGNOSIS — G47.33 OSA (OBSTRUCTIVE SLEEP APNEA): ICD-10-CM

## 2021-01-11 DIAGNOSIS — F17.200 TOBACCO DEPENDENCE SYNDROME: ICD-10-CM

## 2021-01-11 DIAGNOSIS — J44.9 CHRONIC OBSTRUCTIVE PULMONARY DISEASE, UNSPECIFIED COPD TYPE (H): ICD-10-CM

## 2021-01-11 ASSESSMENT — MIFFLIN-ST. JEOR: SCORE: 1675.8

## 2021-01-12 ENCOUNTER — AMBULATORY - HEALTHEAST (OUTPATIENT)
Dept: PULMONOLOGY | Facility: OTHER | Age: 66
End: 2021-01-12

## 2021-01-18 ENCOUNTER — OFFICE VISIT - HEALTHEAST (OUTPATIENT)
Dept: FAMILY MEDICINE | Facility: CLINIC | Age: 66
End: 2021-01-18

## 2021-01-18 DIAGNOSIS — J44.9 CHRONIC OBSTRUCTIVE PULMONARY DISEASE, UNSPECIFIED COPD TYPE (H): ICD-10-CM

## 2021-01-18 DIAGNOSIS — Z01.818 PREOP EXAMINATION: ICD-10-CM

## 2021-01-18 DIAGNOSIS — L60.8 TOENAIL DEFORMITY: ICD-10-CM

## 2021-01-18 DIAGNOSIS — F10.20 SEVERE ALCOHOL USE DISORDER (H): ICD-10-CM

## 2021-01-18 DIAGNOSIS — E66.01 MORBID OBESITY (H): ICD-10-CM

## 2021-01-18 DIAGNOSIS — K92.2 GASTROINTESTINAL HEMORRHAGE, UNSPECIFIED GASTROINTESTINAL HEMORRHAGE TYPE: ICD-10-CM

## 2021-01-18 DIAGNOSIS — F33.1 MODERATE EPISODE OF RECURRENT MAJOR DEPRESSIVE DISORDER (H): ICD-10-CM

## 2021-01-18 LAB
ANION GAP SERPL CALCULATED.3IONS-SCNC: 17 MMOL/L (ref 5–18)
BUN SERPL-MCNC: 9 MG/DL (ref 8–22)
CALCIUM SERPL-MCNC: 9.3 MG/DL (ref 8.5–10.5)
CHLORIDE BLD-SCNC: 99 MMOL/L (ref 98–107)
CO2 SERPL-SCNC: 26 MMOL/L (ref 22–31)
CREAT SERPL-MCNC: 0.65 MG/DL (ref 0.6–1.1)
GFR SERPL CREATININE-BSD FRML MDRD: >60 ML/MIN/1.73M2
GLUCOSE BLD-MCNC: 117 MG/DL (ref 70–125)
HGB BLD-MCNC: 15.5 G/DL (ref 12–16)
POTASSIUM BLD-SCNC: 3.6 MMOL/L (ref 3.5–5)
SODIUM SERPL-SCNC: 142 MMOL/L (ref 136–145)

## 2021-01-18 ASSESSMENT — MIFFLIN-ST. JEOR: SCORE: 1646.88

## 2021-02-01 ENCOUNTER — COMMUNICATION - HEALTHEAST (OUTPATIENT)
Dept: FAMILY MEDICINE | Facility: CLINIC | Age: 66
End: 2021-02-01

## 2021-02-02 ENCOUNTER — OFFICE VISIT - HEALTHEAST (OUTPATIENT)
Dept: FAMILY MEDICINE | Facility: CLINIC | Age: 66
End: 2021-02-02

## 2021-02-02 DIAGNOSIS — M48.02 SPINAL STENOSIS IN CERVICAL REGION: ICD-10-CM

## 2021-02-02 DIAGNOSIS — M54.12 CERVICAL RADICULOPATHY: ICD-10-CM

## 2021-02-04 ENCOUNTER — COMMUNICATION - HEALTHEAST (OUTPATIENT)
Dept: FAMILY MEDICINE | Facility: CLINIC | Age: 66
End: 2021-02-04

## 2021-02-04 DIAGNOSIS — M54.2 NECK PAIN: ICD-10-CM

## 2021-02-04 DIAGNOSIS — M54.12 CERVICAL RADICULOPATHY: ICD-10-CM

## 2021-02-04 DIAGNOSIS — M48.02 SPINAL STENOSIS IN CERVICAL REGION: ICD-10-CM

## 2021-02-05 ENCOUNTER — OFFICE VISIT - HEALTHEAST (OUTPATIENT)
Dept: PODIATRY | Facility: CLINIC | Age: 66
End: 2021-02-05

## 2021-02-05 DIAGNOSIS — B35.1 ONYCHOMYCOSIS: ICD-10-CM

## 2021-02-06 ENCOUNTER — COMMUNICATION - HEALTHEAST (OUTPATIENT)
Dept: SCHEDULING | Facility: CLINIC | Age: 66
End: 2021-02-06

## 2021-02-09 ENCOUNTER — HOSPITAL ENCOUNTER (OUTPATIENT)
Dept: PHYSICAL MEDICINE AND REHAB | Facility: CLINIC | Age: 66
Discharge: HOME | End: 2021-02-09
Attending: FAMILY MEDICINE

## 2021-02-09 DIAGNOSIS — R29.2 HYPER REFLEXIA: ICD-10-CM

## 2021-02-09 DIAGNOSIS — M48.02 SPINAL STENOSIS IN CERVICAL REGION: ICD-10-CM

## 2021-02-09 DIAGNOSIS — M54.12 CERVICAL RADICULOPATHY: ICD-10-CM

## 2021-02-09 DIAGNOSIS — M48.02 CERVICAL STENOSIS OF SPINAL CANAL: ICD-10-CM

## 2021-02-09 DIAGNOSIS — M54.16 LUMBAR RADICULAR PAIN: ICD-10-CM

## 2021-02-09 DIAGNOSIS — M54.12 CERVICAL RADICULAR PAIN: ICD-10-CM

## 2021-02-09 ASSESSMENT — MIFFLIN-ST. JEOR: SCORE: 1714.92

## 2021-02-10 ENCOUNTER — COMMUNICATION - HEALTHEAST (OUTPATIENT)
Dept: PHYSICAL MEDICINE AND REHAB | Facility: CLINIC | Age: 66
End: 2021-02-10

## 2021-02-11 ENCOUNTER — RECORDS - HEALTHEAST (OUTPATIENT)
Dept: ADMINISTRATIVE | Facility: OTHER | Age: 66
End: 2021-02-11

## 2021-02-11 ENCOUNTER — COMMUNICATION - HEALTHEAST (OUTPATIENT)
Dept: PHYSICAL MEDICINE AND REHAB | Facility: CLINIC | Age: 66
End: 2021-02-11

## 2021-02-11 DIAGNOSIS — M48.02 CERVICAL STENOSIS OF SPINAL CANAL: ICD-10-CM

## 2021-02-11 ASSESSMENT — MIFFLIN-ST. JEOR: SCORE: 1646.88

## 2021-02-16 ENCOUNTER — SURGERY - HEALTHEAST (OUTPATIENT)
Dept: SURGERY | Facility: CLINIC | Age: 66
End: 2021-02-16
Payer: COMMERCIAL

## 2021-02-17 ENCOUNTER — TELEPHONE (OUTPATIENT)
Dept: BEHAVIORAL HEALTH | Facility: CLINIC | Age: 66
End: 2021-02-17

## 2021-02-17 ENCOUNTER — RECORDS - HEALTHEAST (OUTPATIENT)
Dept: ADMINISTRATIVE | Facility: OTHER | Age: 66
End: 2021-02-17

## 2021-02-17 ENCOUNTER — HOSPITAL ENCOUNTER (INPATIENT)
Facility: CLINIC | Age: 66
LOS: 2 days | Discharge: HOME | DRG: 897 | End: 2021-02-19
Attending: EMERGENCY MEDICINE | Admitting: PSYCHIATRY & NEUROLOGY
Payer: COMMERCIAL

## 2021-02-17 DIAGNOSIS — F10.10 ALCOHOL ABUSE: ICD-10-CM

## 2021-02-17 DIAGNOSIS — Z11.52 ENCOUNTER FOR SCREENING LABORATORY TESTING FOR SEVERE ACUTE RESPIRATORY SYNDROME CORONAVIRUS 2 (SARS-COV-2): ICD-10-CM

## 2021-02-17 DIAGNOSIS — F10.220 ALCOHOL DEPENDENCE WITH UNCOMPLICATED INTOXICATION (H): Primary | ICD-10-CM

## 2021-02-17 LAB
ALBUMIN SERPL-MCNC: 3.6 G/DL (ref 3.4–5)
ALCOHOL BREATH TEST: 0.16 (ref 0–0.01)
ALP SERPL-CCNC: 120 U/L (ref 40–150)
ALT SERPL W P-5'-P-CCNC: 114 U/L (ref 0–50)
AMPHETAMINES UR QL SCN: NEGATIVE
ANION GAP SERPL CALCULATED.3IONS-SCNC: 8 MMOL/L (ref 3–14)
AST SERPL W P-5'-P-CCNC: 157 U/L (ref 0–45)
BARBITURATES UR QL: NEGATIVE
BASOPHILS # BLD AUTO: 0 10E9/L (ref 0–0.2)
BASOPHILS NFR BLD AUTO: 0.7 %
BENZODIAZ UR QL: NEGATIVE
BILIRUB SERPL-MCNC: 0.4 MG/DL (ref 0.2–1.3)
BUN SERPL-MCNC: 12 MG/DL (ref 7–30)
CALCIUM SERPL-MCNC: 9.8 MG/DL (ref 8.5–10.1)
CANNABINOIDS UR QL SCN: NEGATIVE
CHLORIDE SERPL-SCNC: 105 MMOL/L (ref 94–109)
CO2 SERPL-SCNC: 29 MMOL/L (ref 20–32)
COCAINE UR QL: NEGATIVE
CREAT SERPL-MCNC: 0.52 MG/DL (ref 0.52–1.04)
DIFFERENTIAL METHOD BLD: NORMAL
EOSINOPHIL # BLD AUTO: 0.2 10E9/L (ref 0–0.7)
EOSINOPHIL NFR BLD AUTO: 3.6 %
ERYTHROCYTE [DISTWIDTH] IN BLOOD BY AUTOMATED COUNT: 13.2 % (ref 10–15)
ETHANOL UR QL SCN: POSITIVE
GFR SERPL CREATININE-BSD FRML MDRD: >90 ML/MIN/{1.73_M2}
GLUCOSE SERPL-MCNC: 117 MG/DL (ref 70–99)
HCT VFR BLD AUTO: 45.4 % (ref 35–47)
HGB BLD-MCNC: 15.5 G/DL (ref 11.7–15.7)
IMM GRANULOCYTES # BLD: 0 10E9/L (ref 0–0.4)
IMM GRANULOCYTES NFR BLD: 0.3 %
LABORATORY COMMENT REPORT: NORMAL
LYMPHOCYTES # BLD AUTO: 1.9 10E9/L (ref 0.8–5.3)
LYMPHOCYTES NFR BLD AUTO: 33.1 %
MCH RBC QN AUTO: 32.9 PG (ref 26.5–33)
MCHC RBC AUTO-ENTMCNC: 34.1 G/DL (ref 31.5–36.5)
MCV RBC AUTO: 96 FL (ref 78–100)
MONOCYTES # BLD AUTO: 0.6 10E9/L (ref 0–1.3)
MONOCYTES NFR BLD AUTO: 9.4 %
NEUTROPHILS # BLD AUTO: 3.1 10E9/L (ref 1.6–8.3)
NEUTROPHILS NFR BLD AUTO: 52.9 %
NRBC # BLD AUTO: 0 10*3/UL
NRBC BLD AUTO-RTO: 0 /100
OPIATES UR QL SCN: NEGATIVE
PLATELET # BLD AUTO: 182 10E9/L (ref 150–450)
POTASSIUM SERPL-SCNC: 4.1 MMOL/L (ref 3.4–5.3)
PROT SERPL-MCNC: 7.4 G/DL (ref 6.8–8.8)
RBC # BLD AUTO: 4.71 10E12/L (ref 3.8–5.2)
SARS-COV-2 RNA RESP QL NAA+PROBE: NEGATIVE
SODIUM SERPL-SCNC: 142 MMOL/L (ref 133–144)
SPECIMEN SOURCE: NORMAL
WBC # BLD AUTO: 5.9 10E9/L (ref 4–11)

## 2021-02-17 PROCEDURE — 250N000013 HC RX MED GY IP 250 OP 250 PS 637: Performed by: EMERGENCY MEDICINE

## 2021-02-17 PROCEDURE — 99285 EMERGENCY DEPT VISIT HI MDM: CPT | Performed by: EMERGENCY MEDICINE

## 2021-02-17 PROCEDURE — 80320 DRUG SCREEN QUANTALCOHOLS: CPT | Performed by: EMERGENCY MEDICINE

## 2021-02-17 PROCEDURE — U0003 INFECTIOUS AGENT DETECTION BY NUCLEIC ACID (DNA OR RNA); SEVERE ACUTE RESPIRATORY SYNDROME CORONAVIRUS 2 (SARS-COV-2) (CORONAVIRUS DISEASE [COVID-19]), AMPLIFIED PROBE TECHNIQUE, MAKING USE OF HIGH THROUGHPUT TECHNOLOGIES AS DESCRIBED BY CMS-2020-01-R: HCPCS | Performed by: EMERGENCY MEDICINE

## 2021-02-17 PROCEDURE — C9803 HOPD COVID-19 SPEC COLLECT: HCPCS | Performed by: EMERGENCY MEDICINE

## 2021-02-17 PROCEDURE — U0005 INFEC AGEN DETEC AMPLI PROBE: HCPCS | Performed by: EMERGENCY MEDICINE

## 2021-02-17 PROCEDURE — 250N000013 HC RX MED GY IP 250 OP 250 PS 637: Performed by: PSYCHIATRY & NEUROLOGY

## 2021-02-17 PROCEDURE — HZ2ZZZZ DETOXIFICATION SERVICES FOR SUBSTANCE ABUSE TREATMENT: ICD-10-PCS | Performed by: EMERGENCY MEDICINE

## 2021-02-17 PROCEDURE — 80307 DRUG TEST PRSMV CHEM ANLYZR: CPT | Performed by: EMERGENCY MEDICINE

## 2021-02-17 PROCEDURE — 85025 COMPLETE CBC W/AUTO DIFF WBC: CPT | Performed by: EMERGENCY MEDICINE

## 2021-02-17 PROCEDURE — 128N000004 HC R&B CD ADULT

## 2021-02-17 PROCEDURE — 99284 EMERGENCY DEPT VISIT MOD MDM: CPT | Performed by: EMERGENCY MEDICINE

## 2021-02-17 PROCEDURE — 250N000011 HC RX IP 250 OP 636: Performed by: EMERGENCY MEDICINE

## 2021-02-17 PROCEDURE — 80053 COMPREHEN METABOLIC PANEL: CPT | Performed by: EMERGENCY MEDICINE

## 2021-02-17 RX ORDER — IBUPROFEN 600 MG/1
600 TABLET, FILM COATED ORAL 2 TIMES DAILY
Status: DISCONTINUED | OUTPATIENT
Start: 2021-02-17 | End: 2021-02-19 | Stop reason: HOSPADM

## 2021-02-17 RX ORDER — ONDANSETRON 4 MG/1
4 TABLET, ORALLY DISINTEGRATING ORAL ONCE
Status: DISCONTINUED | OUTPATIENT
Start: 2021-02-17 | End: 2021-02-19 | Stop reason: HOSPADM

## 2021-02-17 RX ORDER — GABAPENTIN 300 MG/1
300-600 CAPSULE ORAL AT BEDTIME
COMMUNITY
End: 2022-03-15

## 2021-02-17 RX ORDER — MAGNESIUM HYDROXIDE/ALUMINUM HYDROXICE/SIMETHICONE 120; 1200; 1200 MG/30ML; MG/30ML; MG/30ML
30 SUSPENSION ORAL EVERY 4 HOURS PRN
Status: DISCONTINUED | OUTPATIENT
Start: 2021-02-17 | End: 2021-02-19 | Stop reason: HOSPADM

## 2021-02-17 RX ORDER — CICLOPIROX 80 MG/ML
1 SOLUTION TOPICAL DAILY
Status: ON HOLD | COMMUNITY
Start: 2021-02-05 | End: 2021-02-19

## 2021-02-17 RX ORDER — HYDROXYZINE HYDROCHLORIDE 25 MG/1
25 TABLET, FILM COATED ORAL EVERY 4 HOURS PRN
Status: DISCONTINUED | OUTPATIENT
Start: 2021-02-17 | End: 2021-02-19 | Stop reason: HOSPADM

## 2021-02-17 RX ORDER — POTASSIUM CHLORIDE 750 MG/1
20 TABLET, EXTENDED RELEASE ORAL DAILY
Status: DISCONTINUED | OUTPATIENT
Start: 2021-02-18 | End: 2021-02-19 | Stop reason: HOSPADM

## 2021-02-17 RX ORDER — DIAZEPAM 5 MG
5-20 TABLET ORAL EVERY 30 MIN PRN
Status: DISCONTINUED | OUTPATIENT
Start: 2021-02-17 | End: 2021-02-19 | Stop reason: HOSPADM

## 2021-02-17 RX ORDER — LANOLIN ALCOHOL/MO/W.PET/CERES
100 CREAM (GRAM) TOPICAL DAILY
Status: DISCONTINUED | OUTPATIENT
Start: 2021-02-18 | End: 2021-02-19 | Stop reason: HOSPADM

## 2021-02-17 RX ORDER — MULTIPLE VITAMINS W/ MINERALS TAB 9MG-400MCG
1 TAB ORAL DAILY
Status: DISCONTINUED | OUTPATIENT
Start: 2021-02-18 | End: 2021-02-19 | Stop reason: HOSPADM

## 2021-02-17 RX ORDER — TRAZODONE HYDROCHLORIDE 50 MG/1
50 TABLET, FILM COATED ORAL
Status: DISCONTINUED | OUTPATIENT
Start: 2021-02-17 | End: 2021-02-19 | Stop reason: HOSPADM

## 2021-02-17 RX ORDER — ONDANSETRON 4 MG/1
4 TABLET, ORALLY DISINTEGRATING ORAL EVERY 6 HOURS PRN
Status: DISCONTINUED | OUTPATIENT
Start: 2021-02-17 | End: 2021-02-19 | Stop reason: HOSPADM

## 2021-02-17 RX ORDER — FOLIC ACID 1 MG/1
1 TABLET ORAL DAILY
Status: DISCONTINUED | OUTPATIENT
Start: 2021-02-18 | End: 2021-02-19 | Stop reason: HOSPADM

## 2021-02-17 RX ORDER — IBUPROFEN 600 MG/1
600 TABLET, FILM COATED ORAL ONCE
Status: COMPLETED | OUTPATIENT
Start: 2021-02-17 | End: 2021-02-17

## 2021-02-17 RX ORDER — ALBUTEROL SULFATE 90 UG/1
2 AEROSOL, METERED RESPIRATORY (INHALATION) EVERY 4 HOURS PRN
Status: DISCONTINUED | OUTPATIENT
Start: 2021-02-17 | End: 2021-02-19 | Stop reason: HOSPADM

## 2021-02-17 RX ORDER — IBUPROFEN 600 MG/1
600 TABLET, FILM COATED ORAL 2 TIMES DAILY
Status: ON HOLD | COMMUNITY
End: 2021-02-19

## 2021-02-17 RX ORDER — ONDANSETRON 4 MG/1
4 TABLET, ORALLY DISINTEGRATING ORAL ONCE
Status: COMPLETED | OUTPATIENT
Start: 2021-02-17 | End: 2021-02-17

## 2021-02-17 RX ORDER — GABAPENTIN 300 MG/1
300-600 CAPSULE ORAL AT BEDTIME
Status: DISCONTINUED | OUTPATIENT
Start: 2021-02-17 | End: 2021-02-19 | Stop reason: HOSPADM

## 2021-02-17 RX ORDER — LORAZEPAM 1 MG/1
1 TABLET ORAL ONCE
Status: COMPLETED | OUTPATIENT
Start: 2021-02-17 | End: 2021-02-17

## 2021-02-17 RX ORDER — AMMONIUM LACTATE 12 G/100G
CREAM TOPICAL 2 TIMES DAILY PRN
Status: DISCONTINUED | OUTPATIENT
Start: 2021-02-17 | End: 2021-02-19 | Stop reason: HOSPADM

## 2021-02-17 RX ORDER — BUMETANIDE 0.5 MG/1
1 TABLET ORAL DAILY
Status: DISCONTINUED | OUTPATIENT
Start: 2021-02-18 | End: 2021-02-19 | Stop reason: HOSPADM

## 2021-02-17 RX ADMIN — GABAPENTIN 300 MG: 300 CAPSULE ORAL at 21:53

## 2021-02-17 RX ADMIN — LORAZEPAM 1 MG: 1 TABLET ORAL at 18:05

## 2021-02-17 RX ADMIN — IBUPROFEN 600 MG: 600 TABLET, FILM COATED ORAL at 18:07

## 2021-02-17 RX ADMIN — IBUPROFEN 600 MG: 600 TABLET, FILM COATED ORAL at 21:53

## 2021-02-17 RX ADMIN — ONDANSETRON 4 MG: 4 TABLET, ORALLY DISINTEGRATING ORAL at 18:07

## 2021-02-17 RX ADMIN — DIAZEPAM 5 MG: 5 TABLET ORAL at 21:53

## 2021-02-17 RX ADMIN — HYDROXYZINE HYDROCHLORIDE 25 MG: 25 TABLET, FILM COATED ORAL at 21:53

## 2021-02-17 ASSESSMENT — ACTIVITIES OF DAILY LIVING (ADL)
DRESS: SCRUBS (BEHAVIORAL HEALTH);INDEPENDENT
HYGIENE/GROOMING: HANDWASHING;INDEPENDENT
ADL_ASSESSMENT: WDL
ORAL_HYGIENE: INDEPENDENT

## 2021-02-17 ASSESSMENT — MIFFLIN-ST. JEOR: SCORE: 1619.1

## 2021-02-17 NOTE — TELEPHONE ENCOUNTER
S: Dr Fernandez, calling with clinical for possible IP CD detox admission. Pt is in the [facility] ED.     B: 65 YO female presented to ED for alcohol detox; pt reports she's been drinking several pints to one liter fátima per day. Hx of withdrawal symptoms including seizure in 2016, no hx of  DT's. Denies other drug use. Denies SI / HI. No recent illness. Alcohol level .161. Chronic medical: COPD - no oxygen. Asymptomatic for COVID. Lab work and COVID swab to be collected.    UDS: in process  CMP: needs to be collected  CBC: needs to be collected  COVID: needs to be collected    Per epic review: hx on 3A in October 2020 / [place].      A: Voluntary and cooperative    R: Pt added to work list for possible detox admission; medical clearance is pending the outcome of the labs  1510: Medical clearance is pending the outcome of the lab work; passed to evening staff to track.     Patient cleared and ready for behavioral bed placement: No

## 2021-02-17 NOTE — ED PROVIDER NOTES
ED Provider Note  [facility]      History     Chief Complaint   Patient presents with     Addiction Problem     detoxing for alcohol, pt reported her psychiatrist adviced her to go to detox, pt was in treatment before when she started drinking again, last drink this morning 11am     The history is provided by the patient and medical records.     Patsy Santamaria is a 66 year old female with a past medical history significant for alcohol dependence, alcohol withdrawal seizures, COPD, anxiety and depression who presents to the ED for detox.  Patient has been drinking between 1 pint and 1 L of fátima per day.  She does have a history of withdrawal seizures 4 years ago.  Last use was earlier today.  She denies any other drug use.  No suicidal or homicidal ideation.  No recent illness.  Past Medical History  Past Medical History:   Diagnosis Date     Alcohol abuse      Anxiety      Chronic Lower Extremity Edema      COPD (chronic obstructive pulmonary disease) (H)      Depression      GERD (gastroesophageal reflux disease)      Osteoarthritis     Bilateral Knees     Peripheral neuropathy      Withdrawal seizures (H)     x 1 in      Past Surgical History:   Procedure Laterality Date     APPENDECTOMY       ARTHROSCOPY KNEE Right       SECTION       THUMB SURGERY      Removal of bone spurs     albuterol (PROAIR HFA/PROVENTIL HFA/VENTOLIN HFA) 108 (90 Base) MCG/ACT inhaler  ammonium lactate (AMLACTIN) 12 % external cream  budesonide-formoterol (SYMBICORT) 80-4.5 MCG/ACT Inhaler  bumetanide (BUMEX) 1 MG tablet  cetirizine (ZYRTEC) 10 MG tablet  DULoxetine (CYMBALTA) 30 MG capsule  folic acid (FOLVITE) 1 MG tablet  hydrOXYzine (VISTARIL) 25 MG capsule  multivitamin w/minerals (THERA-VIT-M) tablet  naltrexone (DEPADE/REVIA) 50 MG tablet  nicotine (NICODERM CQ) 7 MG/24HR 24 hr patch  omeprazole (PRILOSEC) 20 MG DR capsule  potassium chloride ER (KLOR-CON M) 20 MEQ CR tablet  traZODone  (DESYREL) 50 MG tablet      Allergies   Allergen Reactions     Sulfa Drugs      Family History  Family History   Problem Relation Age of Onset     CABG Mother      Anuerysm Father          of ruptured anuerysm at 46     Social History   Social History     Tobacco Use     Smoking status: Current Every Day Smoker     Packs/day: 1.25     Years: 25.00     Pack years: 31.25     Types: Cigarettes     Smokeless tobacco: Never Used   Substance Use Topics     Alcohol use: Not on file     Comment: 2 pints daily     Drug use: Not on file     Comment: Denies      Past medical history, past surgical history, medications, allergies, family history, and social history were reviewed with the patient. No additional pertinent items.       Care everywhere was reviewed and the following past medical history was obtained: Alcohol withdrawal seizures, alcoholic cirrhosis, COPD, anxiety and depression    Review of Systems  A complete review of systems was performed with pertinent positives and negatives noted in the HPI, and all other systems negative.    Physical Exam   BP: 111/64  Pulse: 78  Temp: 97.3  F (36.3  C)  Resp: 18  Weight: 111.1 kg (245 lb)  SpO2: 94 %  Physical Exam  Vitals signs and nursing note reviewed.   Constitutional:       General: She is not in acute distress.     Appearance: She is not diaphoretic.      Comments: Smells of alcohol.    HENT:      Head: Atraumatic.      Mouth/Throat:      Pharynx: No oropharyngeal exudate.   Eyes:      General: No scleral icterus.     Pupils: Pupils are equal, round, and reactive to light.   Cardiovascular:      Heart sounds: Normal heart sounds.   Pulmonary:      Effort: No respiratory distress.      Breath sounds: Normal breath sounds.   Abdominal:      General: Bowel sounds are normal.      Palpations: Abdomen is soft.      Tenderness: There is no abdominal tenderness.   Musculoskeletal:         General: No tenderness.   Skin:     General: Skin is warm.      Findings: No rash.    Psychiatric:         Thought Content: Thought content does not include homicidal or suicidal ideation.         ED Course      Procedures                      No results found for any visits on 02/17/21.  Medications - No data to display     Assessments & Plan (with Medical Decision Making)   This is a 66-year-old female who presents for detox from alcohol.  Patient has been drinking approximately 1 pint to 1 L of fátima per day.  No other drug use.  No suicidal or homicidal ideation.  No recent illness.  Patient is awake alert and cooperative.  Alcohol level is 0.161.  Lab work is pending at this time.  We will admit to detox pending lab work and Covid screen.    I have reviewed the nursing notes. I have reviewed the findings, diagnosis, plan and need for follow up with the patient.    New Prescriptions    No medications on file       Final diagnoses:   None       --  Vinay Fernandez DO  [facility] EMERGENCY DEPARTMENT  2/17/2021     Vinay Fernandez,   02/17/21 1748

## 2021-02-18 ENCOUNTER — RECORDS - HEALTHEAST (OUTPATIENT)
Dept: ADMINISTRATIVE | Facility: OTHER | Age: 66
End: 2021-02-18

## 2021-02-18 LAB
CHOLEST SERPL-MCNC: 226 MG/DL
DEPRECATED CALCIDIOL+CALCIFEROL SERPL-MC: 21 UG/L (ref 20–75)
FOLATE SERPL-MCNC: 5.8 NG/ML
GGT SERPL-CCNC: 648 U/L (ref 0–40)
HCT VFR BLD AUTO: 47.4 % (ref 35–47)
HDLC SERPL-MCNC: 59 MG/DL
LDLC SERPL CALC-MCNC: 132 MG/DL
NONHDLC SERPL-MCNC: 167 MG/DL
TRIGL SERPL-MCNC: 174 MG/DL
TSH SERPL DL<=0.005 MIU/L-ACNC: 0.66 MU/L (ref 0.4–4)
VIT B12 SERPL-MCNC: 381 PG/ML (ref 193–986)

## 2021-02-18 PROCEDURE — 99207 PR CONSULT E&M CHANGED TO INITIAL LEVEL: CPT | Performed by: NURSE PRACTITIONER

## 2021-02-18 PROCEDURE — 128N000004 HC R&B CD ADULT

## 2021-02-18 PROCEDURE — 82746 ASSAY OF FOLIC ACID SERUM: CPT | Performed by: PSYCHIATRY & NEUROLOGY

## 2021-02-18 PROCEDURE — 250N000013 HC RX MED GY IP 250 OP 250 PS 637: Performed by: PSYCHIATRY & NEUROLOGY

## 2021-02-18 PROCEDURE — 99222 1ST HOSP IP/OBS MODERATE 55: CPT | Performed by: NURSE PRACTITIONER

## 2021-02-18 PROCEDURE — 80061 LIPID PANEL: CPT | Performed by: PSYCHIATRY & NEUROLOGY

## 2021-02-18 PROCEDURE — 99223 1ST HOSP IP/OBS HIGH 75: CPT | Mod: AI | Performed by: PSYCHIATRY & NEUROLOGY

## 2021-02-18 PROCEDURE — 250N000013 HC RX MED GY IP 250 OP 250 PS 637: Performed by: NURSE PRACTITIONER

## 2021-02-18 PROCEDURE — 84443 ASSAY THYROID STIM HORMONE: CPT | Performed by: PSYCHIATRY & NEUROLOGY

## 2021-02-18 PROCEDURE — 85014 HEMATOCRIT: CPT | Performed by: PSYCHIATRY & NEUROLOGY

## 2021-02-18 PROCEDURE — 82306 VITAMIN D 25 HYDROXY: CPT | Performed by: PSYCHIATRY & NEUROLOGY

## 2021-02-18 PROCEDURE — 82607 VITAMIN B-12: CPT | Performed by: PSYCHIATRY & NEUROLOGY

## 2021-02-18 PROCEDURE — 36415 COLL VENOUS BLD VENIPUNCTURE: CPT | Performed by: PSYCHIATRY & NEUROLOGY

## 2021-02-18 PROCEDURE — 82977 ASSAY OF GGT: CPT | Performed by: PSYCHIATRY & NEUROLOGY

## 2021-02-18 RX ORDER — LIDOCAINE 4 G/G
1-3 PATCH TOPICAL
Status: DISCONTINUED | OUTPATIENT
Start: 2021-02-18 | End: 2021-02-19 | Stop reason: HOSPADM

## 2021-02-18 RX ORDER — DULOXETIN HYDROCHLORIDE 20 MG/1
20 CAPSULE, DELAYED RELEASE ORAL 2 TIMES DAILY
Status: DISCONTINUED | OUTPATIENT
Start: 2021-02-18 | End: 2021-02-19 | Stop reason: HOSPADM

## 2021-02-18 RX ORDER — METHOCARBAMOL 500 MG/1
500 TABLET, FILM COATED ORAL 4 TIMES DAILY PRN
Status: DISCONTINUED | OUTPATIENT
Start: 2021-02-18 | End: 2021-02-19 | Stop reason: HOSPADM

## 2021-02-18 RX ADMIN — DIAZEPAM 10 MG: 5 TABLET ORAL at 08:50

## 2021-02-18 RX ADMIN — BUMETANIDE 1 MG: 0.5 TABLET ORAL at 08:50

## 2021-02-18 RX ADMIN — ALBUTEROL SULFATE 2 PUFF: 90 AEROSOL, METERED RESPIRATORY (INHALATION) at 21:07

## 2021-02-18 RX ADMIN — METHOCARBAMOL 500 MG: 500 TABLET ORAL at 16:28

## 2021-02-18 RX ADMIN — UMECLIDINIUM 1 PUFF: 62.5 AEROSOL, POWDER ORAL at 08:43

## 2021-02-18 RX ADMIN — LIDOCAINE 3 PATCH: 560 PATCH PERCUTANEOUS; TOPICAL; TRANSDERMAL at 21:08

## 2021-02-18 RX ADMIN — DULOXETINE HYDROCHLORIDE 20 MG: 20 CAPSULE, DELAYED RELEASE ORAL at 12:13

## 2021-02-18 RX ADMIN — OMEPRAZOLE 20 MG: 20 CAPSULE, DELAYED RELEASE ORAL at 08:50

## 2021-02-18 RX ADMIN — FLUTICASONE FUROATE AND VILANTEROL TRIFENATATE 1 PUFF: 100; 25 POWDER RESPIRATORY (INHALATION) at 08:42

## 2021-02-18 RX ADMIN — GABAPENTIN 300 MG: 300 CAPSULE ORAL at 21:09

## 2021-02-18 RX ADMIN — IBUPROFEN 600 MG: 600 TABLET, FILM COATED ORAL at 08:50

## 2021-02-18 RX ADMIN — METHOCARBAMOL 500 MG: 500 TABLET ORAL at 21:10

## 2021-02-18 RX ADMIN — MULTIPLE VITAMINS W/ MINERALS TAB 1 TABLET: TAB at 08:50

## 2021-02-18 RX ADMIN — DULOXETINE HYDROCHLORIDE 20 MG: 20 CAPSULE, DELAYED RELEASE ORAL at 21:10

## 2021-02-18 RX ADMIN — DIAZEPAM 10 MG: 5 TABLET ORAL at 12:13

## 2021-02-18 RX ADMIN — HYDROXYZINE HYDROCHLORIDE 25 MG: 25 TABLET, FILM COATED ORAL at 21:09

## 2021-02-18 RX ADMIN — POTASSIUM CHLORIDE 20 MEQ: 750 TABLET, EXTENDED RELEASE ORAL at 08:49

## 2021-02-18 RX ADMIN — FOLIC ACID 1 MG: 1 TABLET ORAL at 08:50

## 2021-02-18 RX ADMIN — THIAMINE HCL TAB 100 MG 100 MG: 100 TAB at 08:50

## 2021-02-18 ASSESSMENT — ACTIVITIES OF DAILY LIVING (ADL)
HYGIENE/GROOMING: HANDWASHING;SHOWER;INDEPENDENT
DRESS: SCRUBS (BEHAVIORAL HEALTH);INDEPENDENT
ORAL_HYGIENE: INDEPENDENT

## 2021-02-18 NOTE — PROGRESS NOTES
02/17/21 1421   Patient Belongings   Did you bring any home meds/supplements to the hospital?  No   Patient Belongings locker;returned to patient at discharge   Patient Belongings Put in Hospital Secure Location (Security or Locker, etc.) other (see comments)   Belongings Search Yes   Clothing Search Yes   Second Staff Ifeoma RN, and Meagan ATKINS   Comment see note   Storage bin   Scarf, mask, shoes w/laces, gloves, jacket  Medical room Bin   Cell phone, 2x inhalers, lighter, , keys  Security Envelope   [place], [place] card, badge, [employer] visa card, social security card, $40  A             Admission:  I am responsible for any personal items that are not sent to the safe or pharmacy.  [facility] is not responsible for loss, theft or damage of any property in my possession.  Signature:  _________________________________ Date: _______  Time: _____                                              Staff Signature:  ____________________________ Date: ________  Time: _____      2nd Staff person, if patient is unable/unwilling to sign:    Signature: ________________________________ Date: ________  Time: _____   Discharge:  [place] has returned all of my personal belongings:  Signature: _________________________________ Date: ________  Time: _____                                          Staff Signature:  ____________________________ Date: ________  Time: _____

## 2021-02-18 NOTE — PHARMACY-ADMISSION MEDICATION HISTORY
Admission Medication History Completed by Pharmacy    See [facility] Admission Navigator for allergy information, preferred outpatient pharmacy, prior to admission medications and immunization status.     Medication History Sources:     [facility], Surescripts    Changes made to PTA medication list (reason):    Added: ciclopriox, gabapentin, ibuprofen, Incruse Ellipta, diclofenac gel    Deleted: folic acid, multivitamin, naltrexone, nicotine patch    Changed:   o Duloxetine from 30 mg to 60 mg twice daily  o Potassium chloride from 40 mEq to 20 mEq once daily  o Trazodone from 75 mg at bedtime as needed to 100 mg at bedtime as needed    Additional Information:    Patient reports she is not taking her duloxetine consistently, she reports that it has been about 2 weeks since she took her last dose.    Reports inconsistent with her potassium as well, she takes this on and off and has not filled this in a long time. Reports a similar story with her Bumex.    Gabapentin prescribed 300 mg 3 times daily, in Care everywhere and per patient she is taking 300-600 mg at bedtime.    Prior to Admission medications    Medication Sig Last Dose Taking? Auth Provider   albuterol (PROAIR HFA/PROVENTIL HFA/VENTOLIN HFA) 108 (90 Base) MCG/ACT inhaler Inhale 2 puffs into the lungs every 4 hours as needed  PRN Yes Reported, Patient   ammonium lactate (AMLACTIN) 12 % external cream Apply 2 Application topically 2/17/2021 at Unknown time Yes Reported, Patient   budesonide-formoterol (SYMBICORT) 80-4.5 MCG/ACT Inhaler Inhale 2 puffs into the lungs 2 times daily  Past Week at Unknown time Yes Reported, Patient   bumetanide (BUMEX) 1 MG tablet Take 1 mg by mouth daily  2/17/2021 at Unknown time Yes Reported, Patient   cetirizine (ZYRTEC) 10 MG tablet Take 10 mg by mouth daily as needed  PRN Yes Reported, Patient   ciclopirox (PENLAC) 8 % external solution Apply 1 Application topically daily  2/16/2021 at Unknown time Yes  Reported, Patient   diclofenac (VOLTAREN) 1 % topical gel Apply 2 g topically 3 times daily as needed  2/16/2021 at Unknown time Yes Reported, Patient   DULoxetine HCl 60 MG CSDR Take 60 mg by mouth 2 times daily Past Month at Unknown time Yes Reported, Patient   gabapentin (NEURONTIN) 300 MG capsule Take 300-600 mg by mouth At Bedtime  Past Week at Unknown time Yes Reported, Patient   hydrOXYzine (VISTARIL) 25 MG capsule Take 1 capsule (25 mg) by mouth 3 times daily as needed for anxiety PRN Yes Jose Loaiza MD   ibuprofen (ADVIL/MOTRIN) 600 MG tablet Take 600 mg by mouth 2 times daily 2/16/2021 at Unknown time Yes Reported, Patient   omeprazole (PRILOSEC) 20 MG DR capsule Take 20 mg by mouth daily  2/16/2021 at Unknown time Yes Reported, Patient   potassium chloride ER (KLOR-CON M) 20 MEQ CR tablet Take 20 mEq by mouth daily  Past Week at Unknown time Yes Reported, Patient   traZODone (DESYREL) 100 MG tablet Take 100 mg by mouth nightly as needed  Past Week at Unknown time Yes Reported, Patient   umeclidinium (INCRUSE ELLIPTA) 62.5 MCG/INH inhaler Inhale 1 puff into the lungs daily Past Week at Unknown time Yes Reported, Patient       Date completed: 02/17/21    Medication history completed by: Meka Hays

## 2021-02-18 NOTE — PLAN OF CARE
Problem: Alcohol Withdrawal  Goal: Alcohol Withdrawal Symptom Control  Outcome: No Change    66 yr-old-female voluntarily admitted to 3A detox for alcohol withdrawal. Patient drinking 2 liters Rahel daily x 2 weeks following a relapse and smoking 5-10 cigarettes daily. Blood alcohol level of .161 after driving herself to the [facility] ED because she is unable to stop drinking on her own. Covid Negative, elevated liver enzymes. Patient reports recent weight gain over than 10 lbs last 2 months and newly diagnosed DDD, after MRI of spine/neck due to neck and right shoulder pain and reports still needing to follow up with Spinal Doctor.    Hx of COPD, seizures in 2016, when last here on 3A, denies any other drug use.   Arrive to unit at 1915, cooperative with safety search and admission; writer observes edema in feet and slight limitation when bending over to reach her feet, but sits on bed and elevates feet on bed and completes safely with time. Patient speaking in full 8-10 word sentences, declines need for medical bed. She states she was fine last time and given extra pillows to elevate HOB. Patient able to communicate needs, Alert and orient x 4, gait balanced and steady, states she lives alone and has 4 kids she is in communication with.     Stressors include covid, argument with her child, chronic arthritic pain and new neck and right shoulder pain related to degenerative disc disease.    Patient receive ativan, ibuprofen and zofran in ED at 1800 for MSSA of 8 prior to coming on unit.    Patient denies any SI/SIB/HI and completes admission interview with writer. O/C provider consulted for orders, reconciliation of PTA medications following review from pharmacy IP Consult. Withdrawal, seizure precautions and MSSA with valium and comfort medications. All PTA medications resumed, EXCEPT Zyrtec, Cymbalta and Ciclopirox NOT ordered/TORB and placed.    2100 MSSA score of 8, valium 5 mg given, prn hydroxyzine,  ibuprofen, gabapentin 300 mg given at HS.

## 2021-02-18 NOTE — PROGRESS NOTES
PDMP as of 2/18/2021:     Patsy Santamaria   Risk Indicators   NARX SCORES  Narcotic  160  Sedative  080  Stimulant  000  Explanation and Guidance  OVERDOSE RISK SCORE     190    (Range 000-999)  Explanation and Guidance  ADDITIONAL RISK INDICATORS ( 0 )     Explanation and Guidance   This [facility] report is based on search criteria supplied and the data entered by the dispensing pharmacy. For more information about any prescription, please contact the dispensing pharmacy or the prescriber. [facility] scores and reports are intended to aid, not replace, medical decision making. None of the information presented should be used as sole justification for providing or refusing to provide medications. The information on this report is not warranted as accurate or complete.   Graphs   RX GRAPH  Narcotic Buprenorphine Sedative Stimulant Other     All Prescribers    Prescribers    3 - Brandon Martinez    2 - Emily A Brunner,     1 - Yanique Wade    Timeline  02/18  2m  6m  1y  2y    Buprenorphine mg    16    4    0  28    Timeline  02/18  2m  6m  1y  2y  Morphine MgEq (MME)    200    80    0  320    Timeline  02/18  2m  6m  1y  2y    Lorazepam MgEq (LME)    10    2    0  18    Timeline  02/18  2m  6m  1y  2y  *Per CDC guidance, the MME conversion factors prescribed or provided as part of the medication-assisted treatment for opioid use disorder should not be used to benchmark against dosage thresholds meant for opioids prescribed for pain. Buprenorphine products have no agreed upon morphine equivalency, and as partial opioid agonists, are not expected to be associated with overdose risk in the same dose-dependent manner as doses for full agonist opioids. MME = morphine milligram equivalents. LME = Lorazepam milligram equivalents. mg = dose in milligrams.     Summary     Summary  Total Prescriptions:     7   Total Prescribers:     3   Total Pharmacies:     1   Narcotics* (excluding Buprenorphine)  Current Qty:     0    Current MME/day:     0.00  30 Day Avg MME/day:     6.50   Sedatives*  Current Qty:     0   Current LME/day:    0.00  30 Day Avg LME/day:    0.00  Buprenorphine*  Current Qty:     0   Current mg/day:    0.00  30 Day Avg mg/day:    0.00  Rx Data   PRESCRIPTIONS  Total Prescriptions:  7  Total Private Pay:  0  Fill Date  ID  Written  Sold  Drug  Qty  Days  Prescriber  Rx #  Pharmacy  Refill  Daily Dose *  Pymt Type    02/09/2021   1   02/09/2021 02/09/2021   Oxycodone-Acetaminophen 5-325   10.00  4  Ja Hol   [id number]   [facility] ([id number])   0/0  18.75 MME  [place]  02/05/2021   1   02/05/2021 02/05/2021   Oxycodone Hcl 5 Mg Tablet   8.00  2  Ch Uls   [id number]   [facility] ([id number])   0/0  30.00 MME  [place]  02/02/2021   1   02/02/2021 02/02/2021   Oxycodone Hcl 5 Mg Tablet   8.00  2  [facility]   [id number]   [facility] ([id number])   0/0  30.00 MME  [place]  01/13/2021   1   01/13/2021 01/13/2021   Gabapentin 300 Mg Capsule   90.00  30  Em Bru   [id number]   [facility] ([id number])   0/0    [place]  07/08/2020   1   06/18/2020 07/08/2020   Virtussin Ac  Mg/5 Ml Lq   180.00  9  [facility]   [id number]   [facility] ([id number])   0/0  6.00 MME  [facility]   [place]  06/01/2020   1   05/18/2020 06/03/2020   Gabapentin 300 Mg Capsule   60.00  10  Em Bru   [id number]   [facility] ([id number])   0/0    [place]  04/17/2020   1   04/17/2020 04/17/2020   Gabapentin 300 Mg Capsule   60.00  10  Em Bru   [id number]   [facility] ([id number])   0/0    [place]  *Per CDC guidance, the MME conversion factors prescribed or provided as part of the medication-assisted treatment for opioid use disorder should not be used to benchmark against dosage thresholds meant for opioids prescribed for pain. Buprenorphine products have no agreed upon morphine equivalency, and as partial opioid agonists, are not expected to be associated with overdose risk in the same dose-dependent manner as doses for full agonist opioids. MME = morphine milligram equivalents. LME = Lorazepam milligram equivalents. mg  = dose in milligrams.     Providers  Total Providers: 3   Name   Address   City   State   Zipcode   Phone   Brandon Martinez  [address]   [facility]  [address]  [phone]  Yanique Cali  [address] [place]  [address]  [phone]  Emily A Brunner, MD  [address]   [place]  [address]  [phone]  Pharmacies  Total Pharmacies: 1   Name   Address   City   State   Zipcode   Phone   [facility] ([id number])  [address]   [place]  [address]  [phone]    The report provided is based upon the search criteria entered and the corresponding data as it has been reported by dispenser(s). If erroneous information is identified or additional information is needed, please contact the dispenser or the prescriber provided on the report. Date Sold signifies the date the prescription was sold (left the pharmacy). The absence of Date Sold does not necessarily indicate the prescription was not dispensed. Fill Date represents the date the medication was filled or prepared by the pharmacy. Note, federal regulation (CFR Title 42: Part 2) requires patient consent prior to releasing certain patient data from federally funded opioid treatment programs (OTPs). As such, controlled substances dispensed from OTPs for medication-assisted treatment may not appear in the [place]  report. Morphine milligram equivalent (MME) conversion factors published by the CDC are used in the MME calculation. Per the CDC, the MME conversion factor is intended only for analytic purposes where prescription data are used to retrospectively calculate daily MME to inform analyses of risks associated with opioid prescribing. This value does not constitute clinical guidance or recommendations for converting patients from one form of opioid analgesic to another. Per the CDC, the conversion factors for drugs prescribed or provided, as part of medication-assisted treatment for opioid use  disorder should not be used to benchmark against MME dosage thresholds meant for opioids prescribed for pain. Buprenorphine products listed in the CDC s MME file do not have an associated conversion factor. Lastly, the CDC notes, in clinical practice, calculating MME for methadone often involves a sliding-scale approach, whereby the conversion factor increases with increasing dose. The conversion factor of 3 for methadone presented in this file could underestimate MME for a given patient. This report contains confidential information, including patient identifiers, and is not a public record. The information on this report must be treated as protected health information and is only to be disclosed to others as authorized by applicable state and Federal regulations.           Powered By       [place] Prescription Monitoring Program  [place] Board of Pharmacy  [facility] [facility] [address]  [place] [address]  1 [phone]     [employer] 2021. All Rights Reserved. Privacy Policy

## 2021-02-18 NOTE — PLAN OF CARE
Behavioral Team Discussion: (2/18/2021)    Continued Stay Criteria/Rationale: Patient admitted for alcohol withdrawal, complicated.  Plan: The following services will be provided to the patient; psychiatric assessment, medication management, therapeutic milieu, individual and group support, and skills groups.   Participants: 3A Provider: Dr. Cornelio Collins MD; 3A RN's: Jovanni Flowers, RN; 3A CM's: Jojo Mcdermott [facility]  Summary/Recommendation: Providers will assess today for treatment recommendations, discharge planning, and aftercare plans. CM will meet with pt for discharge planning.   Medical/Physical: Internal medicine consult to be completed 2/18/2021 .  Precautions:   Behavioral Orders   Procedures     Code 1 - Restrict to Unit     Routine Programming     As clinically indicated     Seizure precautions     Status 15     Every 15 minutes.     Withdrawal precautions     Rationale for change in precautions or plan: N/A  Progress: No Change.

## 2021-02-18 NOTE — PROGRESS NOTES
Met with Pt to initiate discharge planning.  Pt is planning to contact [facility] following discharge to coordinate OP treatment.  Offered Pt assessment and referral to [facility] but she adamantly refused stating she knew the intake person well and would be more comfortable following up on her own.  Advised Pt to seek assistance if needs arise.  Pt acknowledged.  Pt signed and was provided copy of rights under medicare form.

## 2021-02-18 NOTE — CONSULTS
Internal Medicine Consult - Initial Visit       Patsy Santamaria MRN# [id number]   YOB: 1955 Age: 66 year old   Date of Admission: 2/17/2021  PCP: Yanique Cali  Date of Service: 2/18/2021    Referring Provider: Cornelio Collins MD  Reason for Consult: Medical co-management of detox          Assessment and Recommendations:   Patsy Santamaria is a 66 year old female with a history of alcohol use disorder, withdrawal seizures, COPD, MELVI, osteoarthritis, degenerative disc disease, depression, anxiety, and recently diagnosed spinal stenosis admitted to station 3A for alcohol withdrawal and detox.        # Alcohol withdrawal, hx of alcohol use disorder - MSSA 9 this shift.  Drinking about 1-2 pints of hard liquor daily for the last 2 1/2 to 3 weeks. Started drinking mostly to manage her worsening shoulder and neck pain. Reports history of withdrawal seizures occurring last in 2016.  Not currently on anti-seizure meds.   - Seizure precautions   - Continue MSSA   - Folvite, multi-vites, thiamine supplementation   - Further management per Psychiatry     # Elevated LFTs - , .  Likely 2/2 alcohol use.    - Repeat in 1-2 days to ensure downtrend   - Please notify IM if new/worsening abdominal pain, nausea, vomiting     # Recent hematochezia - Presented to ED at [facility] on 12/28/2020 w/ complaints of bloody stools and LLQ pain c/f GI bleed.  Tagged RBC scan at that time without e/o acute bleed.  Scheduled for EGD/colonoscopy but cancelled due to issues w/ neck and shoulder pain, as below.  Plans to reschedule in the future.    Hgb currently stable at 15.5.    - Hold NSAIDs for now   - Follow up outpatient w/ GI to re-scheduled EGD/colonoscopy  - Please notify IM if pt having bloody stools or melena     # COPD - Stable.  Follows w/ Pulmonology, last seen by Dr. Paige on 1/11/21.  On Symbicort BID, Incruse Ellipta daily, and PRN Albuterol PTA.  Hx tobacco use.   - Continue PTA  Symbicort BID, Incruse Ellipta daily, and PRN Albuterol  - Prefers nicotine gum to patch     # MELVI - Recently started using CPAP at home, as she was able to obtain a special mask that helps her feel less claustrophobic.  Does not want us to order one here.      # Acute on chronic neck and shoulder pain 2/2 spinal stenosis   # Degenerative disc disease   # Arthritis   Follows w/ Ortho/Spine providers outpatient.  Recently seen in ED for worsening neck and shoulder pain.  MRI on with severe spinal stenosis at C4-C5 and C5-C6 w/ potential for spinal cord signal abnormality, degenerative changes at L5-S1, and stenosis at L4-S1.  Had been prescribed Percocet outpatient but caused terrible nausea and vomiting.  Started on Flexeril, but did not find that helpful either.      - Per chart review, pt has been referred for Neurosurgery evaluation but appointment not yet scheduled  - Pain management:  Start lidocaine patches, Robaxin 500mg QID PRN, Tylenol 975mg TID PRN; ordered soft care mattress   - Continue PTA Gabapentin 300-600mg at HS, Voltaren gel PRN    - No NSAIDs due to recent GI bleed     # GERD  # Hx H.pylori    - Continue daily PPI     # Chronic bilateral lower extremity swelling - On Bumex 1mg daily PTA for about the last year.  Was on Lasix prior to that.  Also takes potassium supplements.  Reports that she has regularly taken the Bumex, but has difficulty w/ taking the potassium due to pill size.   - Continue PTA Bumex      # Depression, anxiety, PTSD -  On Duloxetine 20mg BID and Trazodone 100mg at HS PRN.  Pleasant mood and affect.   - Defer to Psychiatry     Medicine will continue to follow along peripherally for repeat labs.  Recommendations relayed to primary team via this progress note.  Thank you for the opportunity to be involved in this patient's care.      Dari Wong, CNP, APRN  Internal Medicine [facility] Hospitalist  [facility]  Pager [phone]           History of Present  Illness:   History is obtained from the patient and medical record.     This patient is a 66 year old female with a history of alcohol use disorder, withdrawal seizures, COPD, MELVI, osteoarthritis, degenerative disc disease, depression, anxiety, and recently diagnosed spinal stenosis admitted to station 3A for alcohol withdrawal and detox.        Internal Medicine service was asked to see patient for medical co-management of detox.  Patsy is seen in her room.  She is feeling okay today.  She reports feeling a little shaky, and tends to feel anxious and irritable with withdrawals.  She reports drinking about 1 to 2 pints of hard liquor over the last 2-1/2 to 3 weeks as a means to control her pain.  She reports having withdrawal seizures in the past but none since 2016.  She is not currently on any antiseizure medications.  She was recently diagnosed with spinal stenosis affecting her cervical and lumbar spine, and has been referred to neurosurgery for further work-up.  She has chronic lower extremity edema for which she takes Bumex.  She also has sleep apnea, and has only recently started to use CPAP.  She did not bring her home machine, but does not want to use ours as it makes her too claustrophobic.  Currently she denies chest pain, dyspnea, abdominal pain, nausea, vomiting, and dysuria.  Denies numbness and tingling in bilateral upper extremities today.            Review of Systems:   A 10 point ROS was performed and negative unless otherwise noted in HPI.           Past Medical History:   Reviewed and updated in Epic.  Past Medical History:   Diagnosis Date     Alcohol abuse      Anxiety      Chronic Lower Extremity Edema      COPD (chronic obstructive pulmonary disease) (H)      Depression      GERD (gastroesophageal reflux disease)      Osteoarthritis     Bilateral Knees     Peripheral neuropathy      Withdrawal seizures (H)     x 1 in 2016             Past Surgical History:   Reviewed and updated in Epic.  Past  Surgical History:   Procedure Laterality Date     APPENDECTOMY       ARTHROSCOPY KNEE Right       SECTION       THUMB SURGERY      Removal of bone spurs             Social History:   Reviewed and updated in [facility].  Social History     Socioeconomic History     Marital status: Single     Spouse name: Not on file     Number of children: Not on file     Years of education: Not on file     Highest education level: Not on file   Occupational History     Not on file   Social Needs     Financial resource strain: Not on file     Food insecurity     Worry: Not on file     Inability: Not on file     Transportation needs     Medical: Not on file     Non-medical: Not on file   Tobacco Use     Smoking status: Current Every Day Smoker     Packs/day: 1.25     Years: 25.00     Pack years: 31.25     Types: Cigarettes     Smokeless tobacco: Never Used   Substance and Sexual Activity     Alcohol use: Not on file     Comment: 2 pints daily     Drug use: Not on file     Comment: Denies     Sexual activity: Not on file   Lifestyle     Physical activity     Days per week: Not on file     Minutes per session: Not on file     Stress: Not on file   Relationships     Social connections     Talks on phone: Not on file     Gets together: Not on file     Attends Yazdanism service: Not on file     Active member of club or organization: Not on file     Attends meetings of clubs or organizations: Not on file     Relationship status: Not on file     Intimate partner violence     Fear of current or ex partner: Not on file     Emotionally abused: Not on file     Physically abused: Not on file     Forced sexual activity: Not on file   Other Topics Concern     Not on file   Social History Narrative     Not on file              Family History:   Reviewed and updated in Epic.  Family History   Problem Relation Age of Onset     CABG Mother      Anuerysm Father          of ruptured anuerysm at 46             Allergies:     Allergies   Allergen  "Reactions     Percocet [Oxycodone-Acetaminophen]      Patient reports \"vomiting,grossly ill two weeks ago\"     [facility]              Medications:     Current Facility-Administered Medications   Medication     albuterol (PROAIR HFA/PROVENTIL HFA/VENTOLIN HFA) 108 (90 Base) MCG/ACT inhaler 2 puff     alum & mag hydroxide-simethicone (MAALOX) suspension 30 mL     ammonium lactate (AMLACTIN) 12 % cream     bumetanide (BUMEX) tablet 1 mg     diazepam (VALIUM) tablet 5-20 mg     diclofenac (VOLTAREN) 1 % topical gel 2 g     fluticasone-vilanterol (BREO ELLIPTA) 100-25 MCG/INH inhaler 1 puff     folic acid (FOLVITE) tablet 1 mg     gabapentin (NEURONTIN) capsule 300-600 mg     hydrOXYzine (ATARAX) tablet 25 mg     ibuprofen (ADVIL/MOTRIN) tablet 600 mg     multivitamin w/minerals (THERA-VIT-M) tablet 1 tablet     nicotine (NICORETTE) gum 2-4 mg     omeprazole (priLOSEC) CR capsule 20 mg     ondansetron (ZOFRAN-ODT) ODT tab 4 mg     ondansetron (ZOFRAN-ODT) ODT tab 4 mg     potassium chloride ER (KLOR-CON M) CR tablet 20 mEq     thiamine (B-1) tablet 100 mg     traZODone (DESYREL) tablet 50 mg     umeclidinium (INCRUSE ELLIPTA) 62.5 MCG/INH inhaler 1 puff            Physical Exam:   Blood pressure (!) 157/93, pulse 74, temperature 97.8  F (36.6  C), temperature source Temporal, resp. rate 16, height 1.575 m (5' 2\"), weight 111.6 kg (246 lb), SpO2 94 %, not currently breastfeeding.  Body mass index is 44.99 kg/m .    GENERAL: Alert and oriented x 3. Well nourished, well developed.  No acute distress.    HEENT: Normocephalic, atraumatic. Anicteric sclera. Mucous membranes moist.   CV: RRR. S1, S2. No murmurs appreciated.   RESPIRATORY: Effort normal on room air. Lungs CTAB with no wheezing, rales, or rhonchi.   GI: Abdomen soft and non distended, bowel sounds present x all 4 quadrants. No tenderness, rebound, or guarding.   NEUROLOGICAL: No focal deficits. Follows commands.  Strength equal in upper and lower extremities. " Sensation intact.   MUSCULOSKELETAL: No joint swelling or tenderness. Moves all extremities.   EXTREMITIES: No gross deformities. Trace 1+ dependent edema in lower extremities. Dry skin.   SKIN: Grossly warm, dry, and intact. No jaundice. No rashes.             Data:   I personally reviewed the following studies:    ROUTINE IP LABS (Last four results)  CMP   Recent Labs   Lab 02/17/21  1517      POTASSIUM 4.1   CHLORIDE 105   CO2 29   ANIONGAP 8   *   BUN 12   CR 0.52   JOSEFA 9.8   PROTTOTAL 7.4   ALBUMIN 3.6   BILITOTAL 0.4   ALKPHOS 120   *   *     CBC   Recent Labs   Lab 02/18/21  0804 02/17/21  1517   WBC  --  5.9   RBC  --  4.71   HGB  --  15.5   HCT 47.4* 45.4   MCV  --  96   MCH  --  32.9   MCHC  --  34.1   RDW  --  13.2   PLT  --  182     INR No lab results found in last 7 days.        Unresulted Labs Ordered in the Past 30 Days of this Admission     Date and Time Order Name Status Description    2/18/2021 0030 Vitamin D In process     2/18/2021 0030 Folate In process     2/18/2021 0030 Vitamin B12 In process     2/18/2021 0030 TSH with free T4 reflex and/or T3 as indicated In process     2/18/2021 0030 Lipid panel In process     2/18/2021 0030 GGT In process

## 2021-02-18 NOTE — PLAN OF CARE
Patient experienced a largely positive day on [place] 3A. She reports absence of any suicidal ideation, and is of generally good spirits.

## 2021-02-18 NOTE — ED NOTES
ED to Behavioral Floor Handoff    SITUATION  Patsy Santamaria is a 66 year old female who speaks English and lives in a home alone The patient arrived in the ED by private car from home with a complaint of Addiction Problem (detoxing for alcohol, pt reported her psychiatrist adviced her to go to detox, pt was in treatment before when she started drinking again, last drink this morning 11am)  .The patient's current symptoms started/worsened 2 week(s) ago and during this time the symptoms have increased.   In the ED, pt was diagnosed with   Final diagnoses:   None        Initial vitals were: BP: 111/64  Pulse: 78  Temp: 97.3  F (36.3  C)  Resp: 18  Weight: 111.1 kg (245 lb)  SpO2: 94 %   --------  Is the patient diabetic? No   If yes, last blood glucose? --     If yes, was this treated in the ED? --  --------  Is the patient inebriated (ETOH) Yes or Impaired on other substances? Yes  MSSA done? Yes  Last MSSA score: -- 8   Were withdrawal symptoms treated? Yes  Does the patient have a seizure history? Yes. If yes, date of most recent seizure--2016  --------  Is the patient patient experiencing suicidal ideation? denies current or recent suicidal ideation     Homicidal ideation? denies current or recent homicidal ideation or behaviors.    Self-injurious behavior/urges? denies current or recent self injurious behavior or ideation.  ------  Was pt aggressive in the ED No  Was a code called No  Is the pt now cooperative? Yes  -------  Meds given in ED:   Medications   LORazepam (ATIVAN) tablet 1 mg (1 mg Oral Given 2/17/21 1805)   ibuprofen (ADVIL/MOTRIN) tablet 600 mg (600 mg Oral Given 2/17/21 1807)   ondansetron (ZOFRAN-ODT) ODT tab 4 mg (4 mg Oral Given 2/17/21 1807)      Family present during ED course? No  Family currently present? No    BACKGROUND  Does the patient have a cognitive impairment or developmental disability? No  Allergies:   Allergies   Allergen Reactions     Sulfa Drugs    .   Social demographics are    Social History     Socioeconomic History     Marital status: Single     Spouse name: Not on file     Number of children: Not on file     Years of education: Not on file     Highest education level: Not on file   Occupational History     Not on file   Social Needs     Financial resource strain: Not on file     Food insecurity     Worry: Not on file     Inability: Not on file     Transportation needs     Medical: Not on file     Non-medical: Not on file   Tobacco Use     Smoking status: Current Every Day Smoker     Packs/day: 1.25     Years: 25.00     Pack years: 31.25     Types: Cigarettes     Smokeless tobacco: Never Used   Substance and Sexual Activity     Alcohol use: Not on file     Comment: 2 pints daily     Drug use: Not on file     Comment: Denies     Sexual activity: Not on file   Lifestyle     Physical activity     Days per week: Not on file     Minutes per session: Not on file     Stress: Not on file   Relationships     Social connections     Talks on phone: Not on file     Gets together: Not on file     Attends Ep[employer] service: Not on file     Active member of club or organization: Not on file     Attends meetings of clubs or organizations: Not on file     Relationship status: Not on file     Intimate partner violence     Fear of current or ex partner: Not on file     Emotionally abused: Not on file     Physically abused: Not on file     Forced sexual activity: Not on file   Other Topics Concern     Not on file   Social History Narrative     Not on file        ASSESSMENT  Labs results   Labs Ordered and Resulted from Time of ED Arrival Up to the Time of Departure from the ED   COMPREHENSIVE METABOLIC PANEL - Abnormal; Notable for the following components:       Result Value    Glucose 117 (*)      (*)      (*)     All other components within normal limits   DRUG ABUSE SCREEN 6 CHEM DEP URINE ([facility]) - Abnormal; Notable for the following components:    Ethanol Qual Urine Positive (*)      All other components within normal limits   ALCOHOL BREATH TEST POCT - Abnormal; Notable for the following components:    Alcohol Breath Test 0.161 (*)     All other components within normal limits   CBC WITH PLATELETS DIFFERENTIAL   SARS-COV-2 (COVID-19) VIRUS RT-PCR      Imaging Studies: No results found for this or any previous visit (from the past 24 hour(s)).   Most recent vital signs BP (!) 142/84   Pulse 91   Temp 97.7  F (36.5  C) (Oral)   Resp 18   Wt 111.1 kg (245 lb)   SpO2 95%   Breastfeeding No   BMI 44.10 kg/m     Abnormal labs/tests/findings requiring intervention:---   Pain control: good  Nausea control: good    RECOMMENDATION  Are any infection precautions needed (MRSA, VRE, etc.)? No If yes, what infection? --  ---  Does the patient have mobility issues? independently. If yes, what device does the pt use? ---  ---  Is patient on 72 hour hold or commitment? No If on 72 hour hold, have hold and rights been given to patient? N/A  Are admitting orders written if after 10 p.m. ?N/A  Tasks needing to be completed:---     LIOR ROY, RN   ascom--    [phone] [place] [facility]   [phone] [facility]

## 2021-02-18 NOTE — H&P
Patsy Santamaria is a 65 year old female who was referred by by her addiction doctor  CHIEF COMPLAINT: Relapse     HISTORY OF PRESENT ILLNESS:    Patsy Santamaria is a 66 year old female with a past medical history significant for alcohol dependence, alcohol withdrawal seizures, COPD, anxiety and depression.     she is here after a relapse for 2.5 weeks.she was here in detox in 10/20. She is under the care of DR Grey .   Patient has been drinking between 1 pint and 1 L of fátima per day.  She does have a history of withdrawal seizures 4 years ago.  Patient has been using the following substances:   Started at age 30s, became a problem at 2007     Patient has tolerance, withdrawal, progressive use, loss of control, spending more time and more amount than intended. Patient has made attempts to quit, is experiencing cravings, and reports negative consequences.    She does have history of seizures  She lost her job and money in the past from it     Denies using any drugs  Smokes 1 pack a day     She  Stopped  taking her Cymbalta for depression and is feeling very depressed isolating sleeping too much not having any motivation her energy is down her appetite is down she lost interest        Denies thoughts of suicide or harming others.       Denies auditory or visual hallucinations.            Substance Age first use First became regular or problematic Most recent use   Alcohol   as above       Cannabis  none       Cocaine NONE       Stimulants NONE       Opioids NONE       Sedatives NONE       Hallucinogens NONE       Inhalants NONE       Other         OTC drugs NONE       Nicotine            PSYCHIATRIC REVIEW OF SYSTEMS:          Psychiatric Review of Systems:   Depression:    As above     [place]pana:     Denies: sleeplessness, increased goal-directed activities, abrupt increase in energy, pressured speech  Psychosis:     Denies: visual hallucinations, auditory hallucinations, paranoia  Anxiety:    Denied excessive  "worries that are difficult to control for the past 6 months,   Chronic anxiety , not able to stop worrying impacting sleep, poor conc, irritable , muscle tension     panic attacks sob, heart racing sweaty shaky , nausea    Denies: worries that are difficult to control for the past 6 months, panic attacks  PTSD: She does not like people behind her she does not like things around her neck she gets jumpy if someone is behind her she has nightmares and flashbacks  Reports: re-experiencing past trauma, nightmares, itrust issues, flashbacks,increased arousal, avoidance of traumatic stimuli, impaired function.     OCD:     Denies: obsessions, checking, symmetry, cleaning, skin picking.  ED:     Denies: restriction, binging, purging.                        PSYCHIATRIC HISTORY      Previous diagnoses:   Depression PTSD        Past court commitments: none  SIB /SUICIDE ATTEMPTS NONE  Psych Hosp : Once she was drinking and made suicidal statements     Inpatient cd trt for inpatient  Out pt cd trt she denied           SOCIAL HISTORY                                                                         Her father  when she was 8 years old for his single parent parent.  Eighth grade level of education         Family History:   Denies any family history of mental illness or addiction             Physical ROS:   The patient endorsed needing a water pill because her legs are swollen up. The remainder of 10-point review of systems was negative except as noted in HPI.          PTA Medications:      Prescriptions Prior to Admission          Allergies:           Allergies   Allergen Reactions     Sulfa Drugs             Labs:      Recent Results          Physical and Psychiatric Examination:      Blood pressure 134/86, pulse 79, temperature 97.5  F (36.4  C), temperature source Temporal, resp. rate 16, height 1.575 m (5' 2\"), weight 111.6 kg (246 lb), SpO2 94 %, not currently breastfeeding.       Physical Exam:      ROS: 10 point " ROS neg other than the symptoms noted above in the HPI.            Past Medical History:   PAST MEDICAL HISTORY:   Past Medical History        Past Medical History:   Diagnosis Date     Alcohol abuse       Anxiety       Chronic Lower Extremity Edema       COPD (chronic obstructive pulmonary disease) (H)       Depression       GERD (gastroesophageal reflux disease)       Peripheral neuropathy       Withdrawal seizures (H)              PAST SURGICAL HISTORY:   Past Surgical History         Past Surgical History:   Procedure Laterality Date     APPENDECTOMY          SECTION                -     -                         MENTAL STATUS EXAM:        Constitutional: General appearance of patient:        Appearance:  awake, alert, appeared as age stated, adequate groomed and slightly unkempt  Attitude:  cooperative  Eye Contact:  good  Mood:   not too bad   Affect:  congruent   Speech:  clear, coherent normal rate   Psychomotor Behavior:  no evidence of tardive dyskinesia, dystonia, or tics  Thought Process:  logical, linear and goal oriented  Associations:  no loose associations  Thought Content:  no evidence of psychotic thought and active suicidal ideation present  Denied any active suicidal /homicidation ideation plan intent   Insight:  fair  Judgment:  fair  Oriented to:  time, person, and place  Attention Span and Concentration:  intact  Recent and Remote Memory:  intact  Language:  english with appropriate syntax and vocabulary  Fund of Knowledge: appropriate  Muscle Strength and Tone: normal  Gait and Station: Normal      There are no abnormal or psychotic thoughts, no preoccupations, no overvalued ideas, no rumination, no obsessions, no compulsions, no somatic concerns, no hypochrondriasis, no ideas of reference, and no delusions.  Patient denies homicidal thoughts.   Patient denies suicidal thoughts.  Patient appears to have good judgment and good insight.      Musculoskeletal: Patient shows no  abnormalities of motor activity: there is no tremor, no tic, and no dystonia.  There is no apparent muscle atrophy, strength and tone appear normal, and there are no abnormal movements.  Patient has normal gait and stance.     DISCUSSION:           Assessment:   Inpatient psychiatric hospitalization is warranted at this time for safety, stabilization, and possible adjustment in medications.          Diagnoses:       Alcohol use disorder severe alcohol withdrawal severe  Major depressive disorder moderate recurrent without psychosis  PTSD chronic  Nicotine use disorder  Medication noncompliance       Plan:      Alcohol use disorder severe alcohol withdrawal severe    Patient has elevated blood pressure 134/86 pulse is 79 patient has eating disturbance tremor agitation sweats patient scored a 9 received 10 mg of diazepam since her admission patient required 50 mg of diazepam and 1 mg of Ativan      Major depressive disorder moderate recurrent without psychosis  Patient has stopped taking her Cymbalta was working well for her when she was sober will restart it at 20 mg twice a day    Patient was taking naltrexone for craving but she stopped it we will restart it    Patient has elevated liver enzymes AST is 157 ALT is 114 most likely from alcoholism nonviral suspected elevated transaminases from chronic alcoholism    Nicotine replacement given to patient  Patient has hematocrit of 47.4 we will put internal medicine consult    A 10-point review of systems is reviewed and is negative except for psychiatric symptoms above.       Allergies reviewed    Patient wants to do outpatient treatment at [facility] she will work with the         Patient has severe exacerbationof his chronic alcoholism  , he been unable to stop using drugs in the community due to both physical and psychological symptoms.  Continued use will put the patient at risk for medical and/or psychiatric complications.   I HAVE REVIEWED LABS WITH  PT AND TALKED ABOUT RESULTS WITH PT  I HAVE REVIEWED AND SUMMARIZED OLD RECORDS including his medication reconcilation of his home medications  and PDMP   I HAVE SPOKEN WITH RN ABOUT MEDICATIONS AND withdrawl SCORES  I HAVE SPOKEN WITH CM ABOUT PTS TREATMETN OPTIONS

## 2021-02-19 VITALS
HEIGHT: 62 IN | RESPIRATION RATE: 16 BRPM | TEMPERATURE: 97.8 F | BODY MASS INDEX: 45.27 KG/M2 | DIASTOLIC BLOOD PRESSURE: 93 MMHG | HEART RATE: 75 BPM | OXYGEN SATURATION: 96 % | SYSTOLIC BLOOD PRESSURE: 146 MMHG | WEIGHT: 246 LBS

## 2021-02-19 PROCEDURE — 250N000013 HC RX MED GY IP 250 OP 250 PS 637: Performed by: PSYCHIATRY & NEUROLOGY

## 2021-02-19 PROCEDURE — 99239 HOSP IP/OBS DSCHRG MGMT >30: CPT | Performed by: PSYCHIATRY & NEUROLOGY

## 2021-02-19 RX ORDER — DULOXETIN HYDROCHLORIDE 20 MG/1
20 CAPSULE, DELAYED RELEASE ORAL 2 TIMES DAILY
Qty: 60 CAPSULE | Refills: 0 | Status: SHIPPED | OUTPATIENT
Start: 2021-02-19 | End: 2021-05-26

## 2021-02-19 RX ORDER — TRAZODONE HYDROCHLORIDE 50 MG/1
50 TABLET, FILM COATED ORAL
Qty: 30 TABLET | Refills: 0 | Status: ON HOLD | OUTPATIENT
Start: 2021-02-19 | End: 2021-05-30

## 2021-02-19 RX ORDER — MULTIPLE VITAMINS W/ MINERALS TAB 9MG-400MCG
1 TAB ORAL DAILY
Qty: 30 TABLET | Refills: 1 | Status: SHIPPED | OUTPATIENT
Start: 2021-02-20 | End: 2021-05-26

## 2021-02-19 RX ORDER — LANOLIN ALCOHOL/MO/W.PET/CERES
100 CREAM (GRAM) TOPICAL DAILY
Qty: 30 TABLET | Refills: 0 | Status: SHIPPED | OUTPATIENT
Start: 2021-02-20 | End: 2021-05-26

## 2021-02-19 RX ADMIN — MULTIPLE VITAMINS W/ MINERALS TAB 1 TABLET: TAB at 08:54

## 2021-02-19 RX ADMIN — FOLIC ACID 1 MG: 1 TABLET ORAL at 08:55

## 2021-02-19 RX ADMIN — POTASSIUM CHLORIDE 20 MEQ: 750 TABLET, EXTENDED RELEASE ORAL at 08:55

## 2021-02-19 RX ADMIN — OMEPRAZOLE 20 MG: 20 CAPSULE, DELAYED RELEASE ORAL at 08:54

## 2021-02-19 RX ADMIN — BUMETANIDE 1 MG: 0.5 TABLET ORAL at 08:56

## 2021-02-19 RX ADMIN — FLUTICASONE FUROATE AND VILANTEROL TRIFENATATE 1 PUFF: 100; 25 POWDER RESPIRATORY (INHALATION) at 08:57

## 2021-02-19 RX ADMIN — DULOXETINE HYDROCHLORIDE 20 MG: 20 CAPSULE, DELAYED RELEASE ORAL at 08:54

## 2021-02-19 RX ADMIN — THIAMINE HCL TAB 100 MG 100 MG: 100 TAB at 08:55

## 2021-02-19 RX ADMIN — UMECLIDINIUM 1 PUFF: 62.5 AEROSOL, POWDER ORAL at 08:57

## 2021-02-19 ASSESSMENT — ACTIVITIES OF DAILY LIVING (ADL)
LAUNDRY: WITH SUPERVISION
HYGIENE/GROOMING: INDEPENDENT
DRESS: INDEPENDENT
ORAL_HYGIENE: INDEPENDENT

## 2021-02-19 NOTE — PLAN OF CARE
Problem: Adult Inpatient Plan of Care  Goal: Readiness for Transition of Care  Outcome: Adequate for Discharge     Problem: Behavioral Health Plan of Care  Goal: Absence of New-Onset Illness or Injury  Outcome: Adequate for Discharge    Pt visible in the milieu this shift. A&O x4. Up independently. Calm and cooperative. Denies SI/SIB/HI/AH/VH. Elevated BP. Pt denies acute physical distress. Denies pain. Med compliant. No PRNs given.

## 2021-02-19 NOTE — PROGRESS NOTES
Pt states she is ready for discharge. RN updated AVS with follow-up appointments, advance care directive information, and medication instructions. Reviewed lab results, AVS, medications, and belongings with patient and signed documents. CM provided pt with information to retreive taxi cab for discharge. Pt reports feeling safe and denies SI/SIB/HI/AH/VH. Pt left unit ambulatory at around 1355.

## 2021-02-19 NOTE — PLAN OF CARE
"  Problem: Alcohol Withdrawal  Goal: Alcohol Withdrawal Symptom Control  Outcome: Improving    Patient remains in detox for alcohol, MSSA scores of 2 and 5; patient reports \"feeling much better than earlier today,\" denies any SI/SIB, GI upset, abdominal pain or bloody stools although reports slight loose stool x 2 today; using prn Lidocaine patches and Robaxin for Neck/Right shoulder pain- and prn hydroxyzine for anxiety- helpful, reports appetite improving, no tremor, takes shower and in lounge watching television and coloring. No fall/seizure and appears to have had a pleasant evening.     "

## 2021-02-19 NOTE — DISCHARGE INSTRUCTIONS
Behavioral Discharge Planning and Instructions  THANK YOU FOR CHOOSING THE [facility]  3A  [phone]    Summary: You were admitted to Station 3A on 2/17/21 for detoxification from alcohol.  A medical exam was performed that included lab work. You have met with a  and opted to pursue treatment at [facility] on your own.  You have declined assessment and referral assistance from us.  Please take care and make your recovery a priority, Patsy!    Advance Directives:   Scanned document on file with [place]? No scanned doc  Is document scanned? No. Copy Requested.  Honoring Choices Your Rights Handout: Informed and given  Was more information offered? Materials given    Recommendation:  If you need more support to maintain sobriety call [phone] to schedule a chemical assessment and follow the recommendations of that assessment.      Main Diagnosis: Per Dr. Cornelio Collins MD;  303.90 (F10.20) Alcohol Use Disorder Severe      Major Treatments, Procedures and Findings:  You were detoxed from alcohol with the Modified Selective Severity Protocol using Valium. You have met with a  to develop a treatment plan for discharge.  You have had labs drawn and a copy of those labs will be sent home with you.  Please bring your lab results with to your follow up doctor appointment.    Symptoms to Report:  If you experience more anxiety, confusion, sleeplessness, deep sadness or thoughts of suicide, notify your treatment team or notify your primary care physician. IF ANY OF THE SYMPTOMS YOU ARE EXPERIENCING ARE A MEDICAL EMERGENCY CALL 911 IMMEDIATELY.     Lifestyle Adjustment: Adjust your lifestyle to get enough sleep, relaxation, exercise and  good nutrition. Continue to develop healthy coping skills to decrease stress and promote a sober living environment. Do not use alcohol, illegal drugs or addictive medications other than what is currently prescribed. AA, NA, and  Sponsor  "are excellent resources for support.     Primary Provider: Dr. Black     Disposition: Home      Facts about COVID19 at www.cdc.gov/COVID19 and www.[place].gov/covid19    Keeping hands clean is one of the most important steps we can take to avoid getting sick and spreading germs to others.  Please wash your hands frequently and lather with soap for at least 20 seconds!    Follow-up Appointment:   Appointment Date/Time: 03/01/2021 at 2:00 PM  Psychiatrist/Primary Care Giver: Dr. Black  Address: [facility]  Phone Number: [phone]  Appointment Date/Time: 02/23/2021 at 11:30 AM Psychiatrist/Primary Care Giver: Dr. Grey Address: [address] [place] [address] [place], [place] Phone Number: [phone]. [phone]   Resources:     Resources for on line recovery meetings:      *due to covid-19 AA/NA meetings are being held online*      AA meetings can be found online; search for them at: http://aa-intergroup.org/directory.php  AA meetings via [place] for [place] area can be found online at: https://a[place].org/find-a-meeting/holiday-closings/  NA meetings via [place] for [place] area can be found online at: https://sites.iHandle.com/view/[place]regionofnarcoticsanonymous/home?authuser=2    Www.[employer]  has online resources for meeting and recovery care including Podcast \"Let's Talk:Addiction & Recovery Podcasts    Www.[place]recovery.org     DISCHARGE RESOURCES:  -SMART Recovery - self management for addiction recovery:  www.smartrecovery.org    -Pathways ~ A Health Crisis Resource & Support Center: 150.146.7726.  -[facility] 607-373-2202   -[facility] Behavioral Intake 134-997-8893 or 827-294-9384.  -Suicide Awareness Voices of Education (SAVE) (www.save.org): 817-346-OUAT (8642)  -National Suicide Prevention Line (www.mentalhealthmn.org): 495-437-VMKM (1891)  -National Andrews on Mental Illness (www.[place].nba.org): 591-458-0361 or 914-675-2656.  -Kxwu8eozu: text the word LIFE to 27656 for immediate support " and crisis intervention  -Mental Health Consumer/Survivor Network of [place] (www.mhcsn.net): 343.580.4562 or 231-095-3535  -Mental Health Association of [place] (www.mentalhealth.org): 951.237.1240 or 002-605-0290     -Substance Abuse and Mental Health Services (www.samhsa.gov)  -Harm Reduction Coalition (www. Harmreduction.org)  -www.prescribetoprevent.org or http://prescribetoprevent.org/video  -Poison control 1-015-236-0293   **[place] Opioid Prevention Coalition: www.opioidcoalition.org    Sober Support Group Information:  AA/NA & Sponsor/Support  -Alcoholics Anonymous (www.alcoholics-anonymous.org): for local information 24 hours/day  -AA Intergroup service office in [place] (http://www.aastpaul.org/) 539.438.4621  -AA Intergroup service office in [facility]: 378.606.2734. (http://www.a[place].org/)  -Narcotics Anonymous (www.nam[place].org) (265) 989-9809   **Sober Fun Activities: www.soberFacishareactivities.[employer]/[facility]//[facility] Recovery Connection ([facility])  [facility] connects people seeking recovery to resources that help foster and sustain long-term recovery.  Whether you are seeking resources for treatment, transportation, housing, job training, education, health care or other pathways to recovery, [facility] is a great place to start.    Phone: 665.847.1805.  www.[employer] (Great listing of all types of recovery and non-recovery related resources)      General Medication Instructions:   See your medication sheet(s) for instructions.   Take all medicines as directed.  Make no changes unless your doctor suggests them.   Go to all your doctor visits.  Be sure to have all your required lab tests. This way, your medicines can be refilled on time.  Do not use any drugs not prescribed by your provider.  AA/NA and Sponsors are excellent resources for support  Avoid alcohol.    Any follow up concerns:  Nursing questions call the Unit -[facility] [phone]  Medical Record call  [phone]  Outpatient Behavioral Intake call [phone]  LP+ Wait List/Bed Availability call [phone]    The entire treatment team has appreciated the opportunity to work with you Ptasy.  We wish you the best in the future and with your lifelong recovery goals. Please bring this discharge folder with you to all follow up appointments.  It contains your lab results, diagnosis, medication list and discharge recommendations.    THANK YOU FOR CHOOSING THE [facility]

## 2021-02-19 NOTE — PROGRESS NOTES
MSSA score of 5, patient required no valium for alcohol withdrawal this shift and is observed to sleep throughout the noc.

## 2021-02-19 NOTE — DISCHARGE SUMMARY
Patsy Santamaria MRN# [id number]   Age: 66 year old YOB: 1955     Date of Admission:  2/17/2021  Date of Discharge:  2/19/2021  Admitting Physician:  Cornelio Collins MD  Discharge Physician:  Cornelio Collins MD      DISCHARGE  DX     Alcohol use disorder severe   Major depressive disorder moderate recurrent without psychosis  PTSD chronic  Nicotine use disorder  Medication noncompliance         Event Leading to Hospitalization:     See Admission note by admitting provider for patient encounter. for additional details.          Hospital Course:   PATIENT was admitted to [facility] attending  under DR collins, please review the detailed admit note on    The patient was placed under status 15 (15 minute checks) to ensure patient safety.   MSSA protocol was initiated due to the patient's history of alcohol abuse and concern for withdrawal symptoms.  CBC, BMP and utox obtained.    All outpatient medications were continued    PATIENTdid participate in groups and was visible in the milieu.     The patient's symptoms of alcohol withdrawal improved.     Patients energy motivation , sleep appetite improved.  Pt completed detox . It was un eventful.      Discussed with patient medications for craving.  Spoke with patient about triggers coping skills relapse prevention.    CONSULTS DONE DURING PATIENTS HOSPITALIZATION.  Patient was seen by medicine on date2/18/21    This as per their medical consult          Assessment and Recommendations:   Patsy Santamaria is a 66 year old female with a history of alcohol use disorder, withdrawal seizures, COPD, MELVI, osteoarthritis, degenerative disc disease, depression, anxiety, and recently diagnosed spinal stenosis admitted to station 3[facility] for alcohol withdrawal and detox.         # Alcohol withdrawal, hx of alcohol use disorder - MSSA 9 this shift.  Drinking about 1-2 pints of hard liquor daily for the last 2 1/2 to 3 weeks. Started drinking mostly to manage her  worsening shoulder and neck pain. Reports history of withdrawal seizures occurring last in 2016.  Not currently on anti-seizure meds.   - Seizure precautions   - Continue MSSA   - Folvite, multi-vites, thiamine supplementation   - Further management per Psychiatry      # Elevated LFTs - , .  Likely 2/2 alcohol use.    - Repeat in 1-2 days to ensure downtrend   - Please notify IM if new/worsening abdominal pain, nausea, vomiting      # Recent hematochezia - Presented to ED at [facility] on 12/28/2020 w/ complaints of bloody stools and LLQ pain c/f GI bleed.  Tagged RBC scan at that time without e/o acute bleed.  Scheduled for EGD/colonoscopy but cancelled due to issues w/ neck and shoulder pain, as below.  Plans to reschedule in the future.    Hgb currently stable at 15.5.    - Hold NSAIDs for now   - Follow up outpatient w/ GI to re-scheduled EGD/colonoscopy  - Please notify IM if pt having bloody stools or melena      # COPD - Stable.  Follows w/ Pulmonology, last seen by Dr. Paige on 1/11/21.  On Symbicort BID, Incruse Ellipta daily, and PRN Albuterol PTA.  Hx tobacco use.   - Continue PTA Symbicort BID, Incruse Ellipta daily, and PRN Albuterol  - Prefers nicotine gum to patch      # MELVI - Recently started using CPAP at home, as she was able to obtain a special mask that helps her feel less claustrophobic.  Does not want us to order one here.       # Acute on chronic neck and shoulder pain 2/2 spinal stenosis   # Degenerative disc disease   # Arthritis   Follows w/ Ortho/Spine providers outpatient.  Recently seen in ED for worsening neck and shoulder pain.  MRI on with severe spinal stenosis at C4-C5 and C5-C6 w/ potential for spinal cord signal abnormality, degenerative changes at L5-S1, and stenosis at L4-S1.  Had been prescribed Percocet outpatient but caused terrible nausea and vomiting.  Started on Flexeril, but did not find that helpful either.      - Per chart review, pt has been referred for  Neurosurgery evaluation but appointment not yet scheduled  - Pain management:  Start lidocaine patches, Robaxin 500mg QID PRN, Tylenol 975mg TID PRN; ordered soft care mattress   - Continue PTA Gabapentin 300-600mg at HS, Voltaren gel PRN    - No NSAIDs due to recent GI bleed      # GERD  # Hx H.pylori    - Continue daily PPI      # Chronic bilateral lower extremity swelling - On Bumex 1mg daily PTA for about the last year.  Was on Lasix prior to that.  Also takes potassium supplements.  Reports that she has regularly taken the Bumex, but has difficulty w/ taking the potassium due to pill size.   - Continue PTA Bumex       # Depression, anxiety, PTSD -  On Duloxetine 20mg BID and Trazodone 100mg at HS PRN.  Pleasant mood and affect.   - Defer to Psychiatry            Pt was seen by cm  As per recommendations from cm  Met with Pt to initiate discharge planning.  Pt is planning to contact [facility] following discharge to coordinate OP treatment.  Offered Pt assessment and referral to [facility] but she adamantly refused stating she knew the intake person well and would be more comfortable following up on her own.  Advised Pt to seek assistance if needs arise.  Pt acknowledged.  Pt signed and was provided copy of rights under medicare           Labs:reviewed with patient       Recent Results (from the past 48 hour(s))   Alcohol breath test POCT    Collection Time: 02/17/21  1:03 PM   Result Value Ref Range    Alcohol Breath Test 0.161 (A) 0.00 - 0.01   Drug abuse screen 6 urine (chem dep)    Collection Time: 02/17/21  2:58 PM   Result Value Ref Range    Amphetamine Qual Urine Negative NEG^Negative    Barbiturates Qual Urine Negative NEG^Negative    Benzodiazepine Qual Urine Negative NEG^Negative    Cannabinoids Qual Urine Negative NEG^Negative    Cocaine Qual Urine Negative NEG^Negative    Ethanol Qual Urine Positive (A) NEG^Negative    Opiates Qualitative Urine Negative NEG^Negative   CBC with platelets differential     Collection Time: 02/17/21  3:17 PM   Result Value Ref Range    WBC 5.9 4.0 - 11.0 10e9/L    RBC Count 4.71 3.8 - 5.2 10e12/L    Hemoglobin 15.5 11.7 - 15.7 g/dL    Hematocrit 45.4 35.0 - 47.0 %    MCV 96 78 - 100 fl    MCH 32.9 26.5 - 33.0 pg    MCHC 34.1 31.5 - 36.5 g/dL    RDW 13.2 10.0 - 15.0 %    Platelet Count 182 150 - 450 10e9/L    Diff Method Automated Method     % Neutrophils 52.9 %    % Lymphocytes 33.1 %    % Monocytes 9.4 %    % Eosinophils 3.6 %    % Basophils 0.7 %    % Immature Granulocytes 0.3 %    Nucleated RBCs 0 0 /100    Absolute Neutrophil 3.1 1.6 - 8.3 10e9/L    Absolute Lymphocytes 1.9 0.8 - 5.3 10e9/L    Absolute Monocytes 0.6 0.0 - 1.3 10e9/L    Absolute Eosinophils 0.2 0.0 - 0.7 10e9/L    Absolute Basophils 0.0 0.0 - 0.2 10e9/L    Abs Immature Granulocytes 0.0 0 - 0.4 10e9/L    Absolute Nucleated RBC 0.0    Comprehensive metabolic panel    Collection Time: 02/17/21  3:17 PM   Result Value Ref Range    Sodium 142 133 - 144 mmol/L    Potassium 4.1 3.4 - 5.3 mmol/L    Chloride 105 94 - 109 mmol/L    Carbon Dioxide 29 20 - 32 mmol/L    Anion Gap 8 3 - 14 mmol/L    Glucose 117 (H) 70 - 99 mg/dL    Urea Nitrogen 12 7 - 30 mg/dL    Creatinine 0.52 0.52 - 1.04 mg/dL    GFR Estimate >90 >60 mL/min/[1.73_m2]    GFR Estimate If Black >90 >60 mL/min/[1.73_m2]    Calcium 9.8 8.5 - 10.1 mg/dL    Bilirubin Total 0.4 0.2 - 1.3 mg/dL    Albumin 3.6 3.4 - 5.0 g/dL    Protein Total 7.4 6.8 - 8.8 g/dL    Alkaline Phosphatase 120 40 - 150 U/L     (H) 0 - 50 U/L     (H) 0 - 45 U/L   Asymptomatic SARS-CoV-2 COVID-19 Virus (Coronavirus) by PCR    Collection Time: 02/17/21  3:18 PM    Specimen: Nasopharyngeal   Result Value Ref Range    SARS-CoV-2 Virus Specimen Source Nasopharyngeal     SARS-CoV-2 PCR Result NEGATIVE     SARS-CoV-2 PCR Comment       Testing was performed using the [employer] Xpress SARS-CoV-2 Assay on the Cepheid Gene-Xpert   Instrument Systems. Additional information about this Emergency  Use Authorization (EUA)   assay can be found via the Lab Guide.     GGT    Collection Time: 02/18/21  8:04 AM   Result Value Ref Range     (H) 0 - 40 U/L   Lipid panel    Collection Time: 02/18/21  8:04 AM   Result Value Ref Range    Cholesterol 226 (H) <200 mg/dL    Triglycerides 174 (H) <150 mg/dL    HDL Cholesterol 59 >49 mg/dL    LDL Cholesterol Calculated 132 (H) <100 mg/dL    Non HDL Cholesterol 167 (H) <130 mg/dL   TSH with free T4 reflex and/or T3 as indicated    Collection Time: 02/18/21  8:04 AM   Result Value Ref Range    TSH 0.66 0.40 - 4.00 mU/L   Vitamin B12    Collection Time: 02/18/21  8:04 AM   Result Value Ref Range    Vitamin B12 381 193 - 986 pg/mL   Folate    Collection Time: 02/18/21  8:04 AM   Result Value Ref Range    Folate 5.8 >5.4 ng/mL   Hematocrit    Collection Time: 02/18/21  8:04 AM   Result Value Ref Range    Hematocrit 47.4 (H) 35.0 - 47.0 %   Vitamin D    Collection Time: 02/18/21  8:04 AM   Result Value Ref Range    Vitamin D Deficiency screening 21 20 - 75 ug/L         Recent Results (from the past 240 hour(s))   Alcohol breath test POCT    Collection Time: 02/17/21  1:03 PM   Result Value Ref Range    Alcohol Breath Test 0.161 (A) 0.00 - 0.01   Drug abuse screen 6 urine (chem dep)    Collection Time: 02/17/21  2:58 PM   Result Value Ref Range    Amphetamine Qual Urine Negative NEG^Negative    Barbiturates Qual Urine Negative NEG^Negative    Benzodiazepine Qual Urine Negative NEG^Negative    Cannabinoids Qual Urine Negative NEG^Negative    Cocaine Qual Urine Negative NEG^Negative    Ethanol Qual Urine Positive (A) NEG^Negative    Opiates Qualitative Urine Negative NEG^Negative   CBC with platelets differential    Collection Time: 02/17/21  3:17 PM   Result Value Ref Range    WBC 5.9 4.0 - 11.0 10e9/L    RBC Count 4.71 3.8 - 5.2 10e12/L    Hemoglobin 15.5 11.7 - 15.7 g/dL    Hematocrit 45.4 35.0 - 47.0 %    MCV 96 78 - 100 fl    MCH 32.9 26.5 - 33.0 pg    MCHC 34.1 31.5 -  36.5 g/dL    RDW 13.2 10.0 - 15.0 %    Platelet Count 182 150 - 450 10e9/L    Diff Method Automated Method     % Neutrophils 52.9 %    % Lymphocytes 33.1 %    % Monocytes 9.4 %    % Eosinophils 3.6 %    % Basophils 0.7 %    % Immature Granulocytes 0.3 %    Nucleated RBCs 0 0 /100    Absolute Neutrophil 3.1 1.6 - 8.3 10e9/L    Absolute Lymphocytes 1.9 0.8 - 5.3 10e9/L    Absolute Monocytes 0.6 0.0 - 1.3 10e9/L    Absolute Eosinophils 0.2 0.0 - 0.7 10e9/L    Absolute Basophils 0.0 0.0 - 0.2 10e9/L    Abs Immature Granulocytes 0.0 0 - 0.4 10e9/L    Absolute Nucleated RBC 0.0    Comprehensive metabolic panel    Collection Time: 02/17/21  3:17 PM   Result Value Ref Range    Sodium 142 133 - 144 mmol/L    Potassium 4.1 3.4 - 5.3 mmol/L    Chloride 105 94 - 109 mmol/L    Carbon Dioxide 29 20 - 32 mmol/L    Anion Gap 8 3 - 14 mmol/L    Glucose 117 (H) 70 - 99 mg/dL    Urea Nitrogen 12 7 - 30 mg/dL    Creatinine 0.52 0.52 - 1.04 mg/dL    GFR Estimate >90 >60 mL/min/[1.73_m2]    GFR Estimate If Black >90 >60 mL/min/[1.73_m2]    Calcium 9.8 8.5 - 10.1 mg/dL    Bilirubin Total 0.4 0.2 - 1.3 mg/dL    Albumin 3.6 3.4 - 5.0 g/dL    Protein Total 7.4 6.8 - 8.8 g/dL    Alkaline Phosphatase 120 40 - 150 U/L     (H) 0 - 50 U/L     (H) 0 - 45 U/L   Asymptomatic SARS-CoV-2 COVID-19 Virus (Coronavirus) by PCR    Collection Time: 02/17/21  3:18 PM    Specimen: Nasopharyngeal   Result Value Ref Range    SARS-CoV-2 Virus Specimen Source Nasopharyngeal     SARS-CoV-2 PCR Result NEGATIVE     SARS-CoV-2 PCR Comment       Testing was performed using the Xpert Xpress SARS-CoV-2 Assay on the Cepheid Gene-Xpert   Instrument Systems. Additional information about this Emergency Use Authorization (EUA)   assay can be found via the Lab Guide.     GGT    Collection Time: 02/18/21  8:04 AM   Result Value Ref Range     (H) 0 - 40 U/L   Lipid panel    Collection Time: 02/18/21  8:04 AM   Result Value Ref Range    Cholesterol 226 (H)  <200 mg/dL    Triglycerides 174 (H) <150 mg/dL    HDL Cholesterol 59 >49 mg/dL    LDL Cholesterol Calculated 132 (H) <100 mg/dL    Non HDL Cholesterol 167 (H) <130 mg/dL   TSH with free T4 reflex and/or T3 as indicated    Collection Time: 02/18/21  8:04 AM   Result Value Ref Range    TSH 0.66 0.40 - 4.00 mU/L   Vitamin B12    Collection Time: 02/18/21  8:04 AM   Result Value Ref Range    Vitamin B12 381 193 - 986 pg/mL   Folate    Collection Time: 02/18/21  8:04 AM   Result Value Ref Range    Folate 5.8 >5.4 ng/mL   Hematocrit    Collection Time: 02/18/21  8:04 AM   Result Value Ref Range    Hematocrit 47.4 (H) 35.0 - 47.0 %   Vitamin D    Collection Time: 02/18/21  8:04 AM   Result Value Ref Range    Vitamin D Deficiency screening 21 20 - 75 ug/L            Because this patient meets criteria for an Alcohol Use Disorder, I performed the following brief intervention on the date of this note:              1) Expressed concern that the patient is drinking at unhealthy levels known to increase their risk of alcohol related problems              2) Gave feedback linking alcohol use and health, including personalized feedback explaining how alcohol use can interact with their medical and/or psychiatric problems, and with prescribed medications.              3) Advised patient to abstain.    PT counseled on nicotine cessation and nicotine replacement provided    Discussed with patient many issues of addiction,triggers, relapse, and establishing a solid recovery program.    DISCHARGE MENTAL STATUS EXAMINATION:  The patient is alert, oriented x3.  Good fund of knowledge.  Good use of language.  Recent and remote memory, language, fund of knowledge are all adequate.  Euthymic mood congruent affect  Speech normal rate/rhythm linear tp no loose asso,The patient does not have any active suicidal or homicidal ideation.  Does not have any auditory or visual hallucination.  Fair insight/judgment At this time, the patient was  stable to be discharged.        Pt was not determined to not be a danger to himself or others. At the current time of discharge, the patient does not meet criteria for involuntary hospitalization. On the day of discharge, the patient reports that they do not have suicidal or homicidal ideation and would never hurt themselves or others. Steps taken to minimize risk include: assessing patient s behavior and thought process daily during hospital stay, discharging patient with adequate plan for follow up for mental and physical health and discussing safety plan of returning to the hospital should the patient ever have thoughts of harming themselves or others. Therefore, based on all available evidence including the factors cited above, the patient does not appear to be at imminent risk for self-harm, and is appropriate for outpatient level of care.     Educated about side effects/risk vs benefits /alternative including non treatment.Pt consented to be on medication.     .Total time spent on discharge summary more than 35 min  More than  20 min  planning, coordination of care, medication reconciliation and performance of physical exam on day of discharge.Care was coordinated with unit RN and unit therapist       Patsy Santamaria   [place] Medication Instructions URI:[id number]    Printed on:02/19/21 1023   Medication Information                      albuterol (PROAIR HFA/PROVENTIL HFA/VENTOLIN HFA) 108 (90 Base) MCG/ACT inhaler  Inhale 2 puffs into the lungs every 4 hours as needed              ammonium lactate (AMLACTIN) 12 % external cream  Apply 2 Application topically             budesonide-formoterol (SYMBICORT) 80-4.5 MCG/ACT Inhaler  Inhale 2 puffs into the lungs 2 times daily              bumetanide (BUMEX) 1 MG tablet  Take 1 mg by mouth daily              cetirizine (ZYRTEC) 10 MG tablet  Take 10 mg by mouth daily as needed              ciclopirox (PENLAC) 8 % external solution  Apply 1 Application topically  "daily              diclofenac (VOLTAREN) 1 % topical gel  Apply 2 g topically 3 times daily as needed              DULoxetine HCl 60 MG CSDR  Take 60 mg by mouth 2 times daily             gabapentin (NEURONTIN) 300 MG capsule  Take 300-600 mg by mouth At Bedtime              hydrOXYzine (VISTARIL) 25 MG capsule  Take 1 capsule (25 mg) by mouth 3 times daily as needed for anxiety             ibuprofen (ADVIL/MOTRIN) 600 MG tablet  Take 600 mg by mouth 2 times daily             omeprazole (PRILOSEC) 20 MG DR capsule  Take 20 mg by mouth daily              potassium chloride ER (KLOR-CON M) 20 MEQ CR tablet  Take 20 mEq by mouth daily              traZODone (DESYREL) 100 MG tablet  Take 100 mg by mouth nightly as needed              umeclidinium (INCRUSE ELLIPTA) 62.5 MCG/INH inhaler  Inhale 1 puff into the lungs daily                Primary Provider: Dr. Black      Disposition: Home        Facts about COVID19 at www.cdc.gov/COVID19 and www.[place].gov/covid19     Keeping hands clean is one of the most important steps we can take to avoid getting sick and spreading germs to others.  Please wash your hands frequently and lather with soap for at least 20 seconds!     Follow-up Appointment:   Appointment Date/Time: 03/01/2021 at 2:00 PM  Psychiatrist/Primary Care Giver: Dr. Black  Address: [facility] ([facility]  Phone Number: [phone]  Appointment Date/Time: 02/23/2021 at 11:30 AM Psychiatrist/Primary Care Giver: Dr. Grey Address: [address] [place] [address] [address] [place] Phone Number: [phone]   Resources:      Resources for on line recovery meetings:        *due to covid-19 AA/NA meetings are being held online*          \"Much or all of the text in this note was generated through the use of Dragon Dictate voice to text software. Errors in spelling or words which appear to be out of contact are unintentional, may be present due having escaped editing\"     "

## 2021-02-23 ENCOUNTER — RECORDS - HEALTHEAST (OUTPATIENT)
Dept: RADIOLOGY | Facility: CLINIC | Age: 66
End: 2021-02-23

## 2021-02-23 ENCOUNTER — AMBULATORY - HEALTHEAST (OUTPATIENT)
Dept: NEUROSURGERY | Facility: CLINIC | Age: 66
End: 2021-02-23

## 2021-02-23 DIAGNOSIS — M54.9 BACK PAIN: ICD-10-CM

## 2021-02-23 DIAGNOSIS — M54.2 NECK PAIN: ICD-10-CM

## 2021-02-25 ENCOUNTER — HOSPITAL ENCOUNTER (OUTPATIENT)
Dept: RADIOLOGY | Facility: HOSPITAL | Age: 66
Discharge: HOME | End: 2021-02-25
Attending: NEUROLOGICAL SURGERY

## 2021-02-25 ENCOUNTER — COMMUNICATION - HEALTHEAST (OUTPATIENT)
Dept: FAMILY MEDICINE | Facility: CLINIC | Age: 66
End: 2021-02-25

## 2021-02-25 ENCOUNTER — OFFICE VISIT - HEALTHEAST (OUTPATIENT)
Dept: NEUROSURGERY | Facility: CLINIC | Age: 66
End: 2021-02-25

## 2021-02-25 DIAGNOSIS — D17.79 EPIDURAL LIPOMATOSIS: ICD-10-CM

## 2021-02-25 DIAGNOSIS — G95.20 CERVICAL SPINAL CORD COMPRESSION (H): ICD-10-CM

## 2021-02-25 DIAGNOSIS — M54.2 NECK PAIN: ICD-10-CM

## 2021-02-25 DIAGNOSIS — M50.30 DDD (DEGENERATIVE DISC DISEASE), CERVICAL: ICD-10-CM

## 2021-02-25 DIAGNOSIS — M54.9 BACK PAIN: ICD-10-CM

## 2021-02-25 DIAGNOSIS — G89.29 CHRONIC BILATERAL LOW BACK PAIN WITHOUT SCIATICA: ICD-10-CM

## 2021-02-25 DIAGNOSIS — M47.22 OSTEOARTHRITIS OF SPINE WITH RADICULOPATHY, CERVICAL REGION: ICD-10-CM

## 2021-02-25 DIAGNOSIS — M54.50 CHRONIC BILATERAL LOW BACK PAIN WITHOUT SCIATICA: ICD-10-CM

## 2021-02-25 DIAGNOSIS — R60.0 BILATERAL EDEMA OF LOWER EXTREMITY: ICD-10-CM

## 2021-02-25 ASSESSMENT — MIFFLIN-ST. JEOR: SCORE: 1657.31

## 2021-03-08 ENCOUNTER — OFFICE VISIT - HEALTHEAST (OUTPATIENT)
Dept: FAMILY MEDICINE | Facility: CLINIC | Age: 66
End: 2021-03-08

## 2021-03-08 DIAGNOSIS — F10.20 CHRONIC ALCOHOL DEPENDENCE, CONTINUOUS (H): ICD-10-CM

## 2021-03-08 DIAGNOSIS — E87.6 HYPOKALEMIA: ICD-10-CM

## 2021-03-08 LAB
ALBUMIN SERPL-MCNC: 3.8 G/DL (ref 3.5–5)
ALP SERPL-CCNC: 116 U/L (ref 45–120)
ALT SERPL W P-5'-P-CCNC: 69 U/L (ref 0–45)
ANION GAP SERPL CALCULATED.3IONS-SCNC: 12 MMOL/L (ref 5–18)
AST SERPL W P-5'-P-CCNC: 72 U/L (ref 0–40)
BILIRUB SERPL-MCNC: 0.6 MG/DL (ref 0–1)
BUN SERPL-MCNC: 10 MG/DL (ref 8–22)
CALCIUM SERPL-MCNC: 10.1 MG/DL (ref 8.5–10.5)
CHLORIDE BLD-SCNC: 93 MMOL/L (ref 98–107)
CO2 SERPL-SCNC: 35 MMOL/L (ref 22–31)
CREAT SERPL-MCNC: 0.68 MG/DL (ref 0.6–1.1)
GFR SERPL CREATININE-BSD FRML MDRD: >60 ML/MIN/1.73M2
GLUCOSE BLD-MCNC: 134 MG/DL (ref 70–125)
POTASSIUM BLD-SCNC: 4 MMOL/L (ref 3.5–5)
PROT SERPL-MCNC: 7.1 G/DL (ref 6–8)
SODIUM SERPL-SCNC: 140 MMOL/L (ref 136–145)

## 2021-03-16 ENCOUNTER — AMBULATORY - HEALTHEAST (OUTPATIENT)
Dept: BEHAVIORAL HEALTH | Facility: CLINIC | Age: 66
End: 2021-03-16

## 2021-03-16 ENCOUNTER — OFFICE VISIT - HEALTHEAST (OUTPATIENT)
Dept: BEHAVIORAL HEALTH | Facility: CLINIC | Age: 66
End: 2021-03-16

## 2021-03-16 DIAGNOSIS — F41.1 GENERALIZED ANXIETY DISORDER: ICD-10-CM

## 2021-03-16 DIAGNOSIS — F10.20 ALCOHOL USE DISORDER, SEVERE, DEPENDENCE (H): ICD-10-CM

## 2021-03-16 DIAGNOSIS — F33.1 MODERATE EPISODE OF RECURRENT MAJOR DEPRESSIVE DISORDER (H): ICD-10-CM

## 2021-03-16 ASSESSMENT — PATIENT HEALTH QUESTIONNAIRE - PHQ9: SUM OF ALL RESPONSES TO PHQ QUESTIONS 1-9: 11

## 2021-03-16 ASSESSMENT — ANXIETY QUESTIONNAIRES
4. TROUBLE RELAXING: MORE THAN HALF THE DAYS
3. WORRYING TOO MUCH ABOUT DIFFERENT THINGS: SEVERAL DAYS
7. FEELING AFRAID AS IF SOMETHING AWFUL MIGHT HAPPEN: NOT AT ALL
IF YOU CHECKED OFF ANY PROBLEMS ON THIS QUESTIONNAIRE, HOW DIFFICULT HAVE THESE PROBLEMS MADE IT FOR YOU TO DO YOUR WORK, TAKE CARE OF THINGS AT HOME, OR GET ALONG WITH OTHER PEOPLE: SOMEWHAT DIFFICULT
2. NOT BEING ABLE TO STOP OR CONTROL WORRYING: SEVERAL DAYS
GAD7 TOTAL SCORE: 7
5. BEING SO RESTLESS THAT IT IS HARD TO SIT STILL: NOT AT ALL
6. BECOMING EASILY ANNOYED OR IRRITABLE: MORE THAN HALF THE DAYS
1. FEELING NERVOUS, ANXIOUS, OR ON EDGE: SEVERAL DAYS

## 2021-03-17 ENCOUNTER — COMMUNICATION - HEALTHEAST (OUTPATIENT)
Dept: FAMILY MEDICINE | Facility: CLINIC | Age: 66
End: 2021-03-17

## 2021-03-27 ENCOUNTER — AMBULATORY - HEALTHEAST (OUTPATIENT)
Dept: NURSING | Facility: CLINIC | Age: 66
End: 2021-03-27

## 2021-03-29 ENCOUNTER — COMMUNICATION - HEALTHEAST (OUTPATIENT)
Dept: PHARMACY | Facility: CLINIC | Age: 66
End: 2021-03-29

## 2021-03-29 ENCOUNTER — OFFICE VISIT - HEALTHEAST (OUTPATIENT)
Dept: BEHAVIORAL HEALTH | Facility: CLINIC | Age: 66
End: 2021-03-29

## 2021-03-29 DIAGNOSIS — F10.20 ALCOHOL USE DISORDER, SEVERE, DEPENDENCE (H): ICD-10-CM

## 2021-03-29 LAB
AMPHETAMINES UR QL SCN: ABNORMAL
BARBITURATES UR QL: ABNORMAL
BENZODIAZ UR QL: ABNORMAL
CANNABINOIDS UR QL SCN: ABNORMAL
COCAINE UR QL: ABNORMAL
CREAT UR-MCNC: 159.6 MG/DL
METHADONE UR QL SCN: ABNORMAL
OPIATES UR QL SCN: ABNORMAL
OXYCODONE UR QL: ABNORMAL
PCP UR QL SCN: ABNORMAL

## 2021-03-29 ASSESSMENT — ANXIETY QUESTIONNAIRES
GAD7 TOTAL SCORE: 2
3. WORRYING TOO MUCH ABOUT DIFFERENT THINGS: NOT AT ALL
IF YOU CHECKED OFF ANY PROBLEMS ON THIS QUESTIONNAIRE, HOW DIFFICULT HAVE THESE PROBLEMS MADE IT FOR YOU TO DO YOUR WORK, TAKE CARE OF THINGS AT HOME, OR GET ALONG WITH OTHER PEOPLE: SOMEWHAT DIFFICULT
2. NOT BEING ABLE TO STOP OR CONTROL WORRYING: SEVERAL DAYS
1. FEELING NERVOUS, ANXIOUS, OR ON EDGE: SEVERAL DAYS
4. TROUBLE RELAXING: NOT AT ALL
6. BECOMING EASILY ANNOYED OR IRRITABLE: NOT AT ALL
5. BEING SO RESTLESS THAT IT IS HARD TO SIT STILL: NOT AT ALL
7. FEELING AFRAID AS IF SOMETHING AWFUL MIGHT HAPPEN: NOT AT ALL

## 2021-03-29 ASSESSMENT — PATIENT HEALTH QUESTIONNAIRE - PHQ9: SUM OF ALL RESPONSES TO PHQ QUESTIONS 1-9: 6

## 2021-04-01 LAB
ETHYL GLUCURONIDE UR CFM-MCNC: NORMAL NG/ML
ETHYL SULFATE UR CFM-MCNC: 9334 NG/ML

## 2021-04-12 ENCOUNTER — COMMUNICATION - HEALTHEAST (OUTPATIENT)
Dept: BEHAVIORAL HEALTH | Facility: CLINIC | Age: 66
End: 2021-04-12

## 2021-04-13 ENCOUNTER — COMMUNICATION - HEALTHEAST (OUTPATIENT)
Dept: FAMILY MEDICINE | Facility: CLINIC | Age: 66
End: 2021-04-13

## 2021-04-19 ENCOUNTER — COMMUNICATION - HEALTHEAST (OUTPATIENT)
Dept: SCHEDULING | Facility: CLINIC | Age: 66
End: 2021-04-19

## 2021-04-20 ENCOUNTER — COMMUNICATION - HEALTHEAST (OUTPATIENT)
Dept: SCHEDULING | Facility: CLINIC | Age: 66
End: 2021-04-20

## 2021-04-21 ENCOUNTER — COMMUNICATION - HEALTHEAST (OUTPATIENT)
Dept: FAMILY MEDICINE | Facility: CLINIC | Age: 66
End: 2021-04-21

## 2021-04-21 ENCOUNTER — AMBULATORY - HEALTHEAST (OUTPATIENT)
Dept: FAMILY MEDICINE | Facility: CLINIC | Age: 66
End: 2021-04-21

## 2021-04-21 DIAGNOSIS — Z20.822 SUSPECTED COVID-19 VIRUS INFECTION: ICD-10-CM

## 2021-04-26 ENCOUNTER — OFFICE VISIT - HEALTHEAST (OUTPATIENT)
Dept: BEHAVIORAL HEALTH | Facility: CLINIC | Age: 66
End: 2021-04-26

## 2021-04-26 DIAGNOSIS — F10.20 ALCOHOL USE DISORDER, SEVERE, DEPENDENCE (H): ICD-10-CM

## 2021-04-26 DIAGNOSIS — F33.1 MODERATE EPISODE OF RECURRENT MAJOR DEPRESSIVE DISORDER (H): ICD-10-CM

## 2021-04-27 ENCOUNTER — COMMUNICATION - HEALTHEAST (OUTPATIENT)
Dept: ADDICTION MEDICINE | Facility: CLINIC | Age: 66
End: 2021-04-27

## 2021-04-27 ENCOUNTER — OFFICE VISIT - HEALTHEAST (OUTPATIENT)
Dept: ADDICTION MEDICINE | Facility: CLINIC | Age: 66
End: 2021-04-27

## 2021-04-27 DIAGNOSIS — F17.200 TOBACCO USE DISORDER, MODERATE, DEPENDENCE: ICD-10-CM

## 2021-04-27 DIAGNOSIS — F10.20 ALCOHOL USE DISORDER, SEVERE, DEPENDENCE (H): ICD-10-CM

## 2021-05-04 ENCOUNTER — AMBULATORY - HEALTHEAST (OUTPATIENT)
Dept: NURSING | Facility: CLINIC | Age: 66
End: 2021-05-04

## 2021-05-07 ENCOUNTER — COMMUNICATION - HEALTHEAST (OUTPATIENT)
Dept: BEHAVIORAL HEALTH | Facility: CLINIC | Age: 66
End: 2021-05-07

## 2021-05-10 ENCOUNTER — COMMUNICATION - HEALTHEAST (OUTPATIENT)
Dept: ADDICTION MEDICINE | Facility: CLINIC | Age: 66
End: 2021-05-10

## 2021-05-13 ENCOUNTER — TELEPHONE (OUTPATIENT)
Dept: ADDICTION MEDICINE | Facility: CLINIC | Age: 66
End: 2021-05-13

## 2021-05-13 NOTE — TELEPHONE ENCOUNTER
Pt called in to writer asking about status on waitlist. Pt has Medicare for insurance and [facility] is unable to accept pts with Medicare. Pt was upset about this news and hung up on writer.

## 2021-05-25 ENCOUNTER — RECORDS - HEALTHEAST (OUTPATIENT)
Dept: ADMINISTRATIVE | Facility: CLINIC | Age: 66
End: 2021-05-25

## 2021-05-26 ENCOUNTER — RECORDS - HEALTHEAST (OUTPATIENT)
Dept: ADMINISTRATIVE | Facility: OTHER | Age: 66
End: 2021-05-26

## 2021-05-26 ENCOUNTER — APPOINTMENT (OUTPATIENT)
Dept: GENERAL RADIOLOGY | Facility: CLINIC | Age: 66
DRG: 897 | End: 2021-05-26
Attending: FAMILY MEDICINE
Payer: COMMERCIAL

## 2021-05-26 ENCOUNTER — HOSPITAL ENCOUNTER (INPATIENT)
Facility: CLINIC | Age: 66
LOS: 4 days | Discharge: HOME | DRG: 897 | End: 2021-05-30
Attending: FAMILY MEDICINE | Admitting: HOSPITALIST
Payer: COMMERCIAL

## 2021-05-26 DIAGNOSIS — F33.9 EPISODE OF RECURRENT MAJOR DEPRESSIVE DISORDER, UNSPECIFIED DEPRESSION EPISODE SEVERITY (H): ICD-10-CM

## 2021-05-26 DIAGNOSIS — J44.1 COPD EXACERBATION (H): Primary | ICD-10-CM

## 2021-05-26 DIAGNOSIS — F10.229 ALCOHOL DEPENDENCE WITH INTOXICATION WITH COMPLICATION (H): ICD-10-CM

## 2021-05-26 DIAGNOSIS — Z11.52 ENCOUNTER FOR SCREENING LABORATORY TESTING FOR SEVERE ACUTE RESPIRATORY SYNDROME CORONAVIRUS 2 (SARS-COV-2): ICD-10-CM

## 2021-05-26 DIAGNOSIS — F17.210 CIGARETTE SMOKER: ICD-10-CM

## 2021-05-26 DIAGNOSIS — R60.9 EDEMA, UNSPECIFIED TYPE: ICD-10-CM

## 2021-05-26 DIAGNOSIS — R06.2 WHEEZING: ICD-10-CM

## 2021-05-26 LAB
ALBUMIN SERPL-MCNC: 3.5 G/DL (ref 3.4–5)
ALBUMIN UR-MCNC: 10 MG/DL
ALCOHOL BREATH TEST: 0 (ref 0–0.01)
ALP SERPL-CCNC: 130 U/L (ref 40–150)
ALT SERPL W P-5'-P-CCNC: 91 U/L (ref 0–50)
AMPHETAMINES UR QL SCN: NEGATIVE
ANION GAP SERPL CALCULATED.3IONS-SCNC: 10 MMOL/L (ref 3–14)
APPEARANCE UR: CLEAR
AST SERPL W P-5'-P-CCNC: 194 U/L (ref 0–45)
BACTERIA #/AREA URNS HPF: ABNORMAL /HPF
BARBITURATES UR QL: NEGATIVE
BASOPHILS # BLD AUTO: 0 10E9/L (ref 0–0.2)
BASOPHILS NFR BLD AUTO: 0.7 %
BENZODIAZ UR QL: NEGATIVE
BILIRUB SERPL-MCNC: 2.3 MG/DL (ref 0.2–1.3)
BILIRUB UR QL STRIP: ABNORMAL
BUN SERPL-MCNC: 10 MG/DL (ref 7–30)
CALCIUM SERPL-MCNC: 9.3 MG/DL (ref 8.5–10.1)
CANNABINOIDS UR QL SCN: NEGATIVE
CHLORIDE SERPL-SCNC: 95 MMOL/L (ref 94–109)
CO2 SERPL-SCNC: 32 MMOL/L (ref 20–32)
COCAINE UR QL: NEGATIVE
COLOR UR AUTO: ABNORMAL
CREAT SERPL-MCNC: 0.61 MG/DL (ref 0.52–1.04)
D DIMER PPP FEU-MCNC: <0.3 UG/ML FEU (ref 0–0.5)
DIFFERENTIAL METHOD BLD: ABNORMAL
EOSINOPHIL # BLD AUTO: 0.1 10E9/L (ref 0–0.7)
EOSINOPHIL NFR BLD AUTO: 1.2 %
ERYTHROCYTE [DISTWIDTH] IN BLOOD BY AUTOMATED COUNT: 14.6 % (ref 10–15)
ETHANOL UR QL SCN: NEGATIVE
GFR SERPL CREATININE-BSD FRML MDRD: >90 ML/MIN/{1.73_M2}
GLUCOSE SERPL-MCNC: 109 MG/DL (ref 70–99)
GLUCOSE UR STRIP-MCNC: NEGATIVE MG/DL
HCT VFR BLD AUTO: 45.1 % (ref 35–47)
HGB BLD-MCNC: 15.4 G/DL (ref 11.7–15.7)
HGB UR QL STRIP: NEGATIVE
IMM GRANULOCYTES # BLD: 0 10E9/L (ref 0–0.4)
IMM GRANULOCYTES NFR BLD: 0.3 %
INTERPRETATION ECG - MUSE: NORMAL
KETONES UR STRIP-MCNC: NEGATIVE MG/DL
LABORATORY COMMENT REPORT: NORMAL
LEUKOCYTE ESTERASE UR QL STRIP: NEGATIVE
LIPASE SERPL-CCNC: 158 U/L (ref 73–393)
LYMPHOCYTES # BLD AUTO: 1.5 10E9/L (ref 0.8–5.3)
LYMPHOCYTES NFR BLD AUTO: 24.9 %
MCH RBC QN AUTO: 31.6 PG (ref 26.5–33)
MCHC RBC AUTO-ENTMCNC: 34.1 G/DL (ref 31.5–36.5)
MCV RBC AUTO: 93 FL (ref 78–100)
MONOCYTES # BLD AUTO: 0.8 10E9/L (ref 0–1.3)
MONOCYTES NFR BLD AUTO: 13.7 %
NEUTROPHILS # BLD AUTO: 3.5 10E9/L (ref 1.6–8.3)
NEUTROPHILS NFR BLD AUTO: 59.2 %
NITRATE UR QL: NEGATIVE
NRBC # BLD AUTO: 0 10*3/UL
NRBC BLD AUTO-RTO: 0 /100
NT-PROBNP SERPL-MCNC: 70 PG/ML (ref 0–900)
OPIATES UR QL SCN: NEGATIVE
PH UR STRIP: 6 PH (ref 5–7)
PLATELET # BLD AUTO: 144 10E9/L (ref 150–450)
POTASSIUM SERPL-SCNC: 3.1 MMOL/L (ref 3.4–5.3)
PROT SERPL-MCNC: 7.7 G/DL (ref 6.8–8.8)
RBC # BLD AUTO: 4.87 10E12/L (ref 3.8–5.2)
RBC #/AREA URNS AUTO: <1 /HPF (ref 0–2)
SARS-COV-2 RNA RESP QL NAA+PROBE: NEGATIVE
SODIUM SERPL-SCNC: 137 MMOL/L (ref 133–144)
SOURCE: ABNORMAL
SP GR UR STRIP: 1.02 (ref 1–1.03)
SPECIMEN SOURCE: NORMAL
SQUAMOUS #/AREA URNS AUTO: 11 /HPF (ref 0–1)
TROPONIN I BLD-MCNC: 0 UG/L (ref 0–0.08)
TROPONIN I SERPL-MCNC: <0.015 UG/L (ref 0–0.04)
TROPONIN I SERPL-MCNC: <0.015 UG/L (ref 0–0.04)
TSH SERPL DL<=0.005 MIU/L-ACNC: 1.8 MU/L (ref 0.4–4)
UROBILINOGEN UR STRIP-MCNC: 12 MG/DL (ref 0–2)
WBC # BLD AUTO: 6 10E9/L (ref 4–11)
WBC #/AREA URNS AUTO: 1 /HPF (ref 0–5)

## 2021-05-26 PROCEDURE — 99285 EMERGENCY DEPT VISIT HI MDM: CPT | Mod: 25 | Performed by: FAMILY MEDICINE

## 2021-05-26 PROCEDURE — 99223 1ST HOSP IP/OBS HIGH 75: CPT | Mod: AI | Performed by: HOSPITALIST

## 2021-05-26 PROCEDURE — HZ2ZZZZ DETOXIFICATION SERVICES FOR SUBSTANCE ABUSE TREATMENT: ICD-10-PCS | Performed by: HOSPITALIST

## 2021-05-26 PROCEDURE — 250N000009 HC RX 250: Performed by: FAMILY MEDICINE

## 2021-05-26 PROCEDURE — 120N000002 HC R&B MED SURG/OB UMMC

## 2021-05-26 PROCEDURE — 96375 TX/PRO/DX INJ NEW DRUG ADDON: CPT | Performed by: FAMILY MEDICINE

## 2021-05-26 PROCEDURE — 36415 COLL VENOUS BLD VENIPUNCTURE: CPT | Performed by: HOSPITALIST

## 2021-05-26 PROCEDURE — 85025 COMPLETE CBC W/AUTO DIFF WBC: CPT | Performed by: FAMILY MEDICINE

## 2021-05-26 PROCEDURE — 80320 DRUG SCREEN QUANTALCOHOLS: CPT | Performed by: FAMILY MEDICINE

## 2021-05-26 PROCEDURE — 94640 AIRWAY INHALATION TREATMENT: CPT | Performed by: FAMILY MEDICINE

## 2021-05-26 PROCEDURE — 96374 THER/PROPH/DIAG INJ IV PUSH: CPT | Performed by: FAMILY MEDICINE

## 2021-05-26 PROCEDURE — 84443 ASSAY THYROID STIM HORMONE: CPT | Performed by: FAMILY MEDICINE

## 2021-05-26 PROCEDURE — 83690 ASSAY OF LIPASE: CPT | Performed by: FAMILY MEDICINE

## 2021-05-26 PROCEDURE — 99285 EMERGENCY DEPT VISIT HI MDM: CPT | Performed by: FAMILY MEDICINE

## 2021-05-26 PROCEDURE — 80053 COMPREHEN METABOLIC PANEL: CPT | Performed by: FAMILY MEDICINE

## 2021-05-26 PROCEDURE — 83880 ASSAY OF NATRIURETIC PEPTIDE: CPT | Performed by: FAMILY MEDICINE

## 2021-05-26 PROCEDURE — 81001 URINALYSIS AUTO W/SCOPE: CPT | Performed by: FAMILY MEDICINE

## 2021-05-26 PROCEDURE — 84484 ASSAY OF TROPONIN QUANT: CPT | Performed by: FAMILY MEDICINE

## 2021-05-26 PROCEDURE — 80307 DRUG TEST PRSMV CHEM ANLYZR: CPT | Performed by: FAMILY MEDICINE

## 2021-05-26 PROCEDURE — 87635 SARS-COV-2 COVID-19 AMP PRB: CPT | Performed by: FAMILY MEDICINE

## 2021-05-26 PROCEDURE — C9803 HOPD COVID-19 SPEC COLLECT: HCPCS | Performed by: FAMILY MEDICINE

## 2021-05-26 PROCEDURE — 5A09457 ASSISTANCE WITH RESPIRATORY VENTILATION, 24-96 CONSECUTIVE HOURS, CONTINUOUS POSITIVE AIRWAY PRESSURE: ICD-10-PCS | Performed by: HOSPITALIST

## 2021-05-26 PROCEDURE — 71046 X-RAY EXAM CHEST 2 VIEWS: CPT

## 2021-05-26 PROCEDURE — 250N000011 HC RX IP 250 OP 636: Performed by: FAMILY MEDICINE

## 2021-05-26 PROCEDURE — 93005 ELECTROCARDIOGRAM TRACING: CPT | Performed by: FAMILY MEDICINE

## 2021-05-26 PROCEDURE — 84484 ASSAY OF TROPONIN QUANT: CPT

## 2021-05-26 PROCEDURE — 85379 FIBRIN DEGRADATION QUANT: CPT | Performed by: FAMILY MEDICINE

## 2021-05-26 PROCEDURE — 250N000013 HC RX MED GY IP 250 OP 250 PS 637: Performed by: FAMILY MEDICINE

## 2021-05-26 PROCEDURE — 84484 ASSAY OF TROPONIN QUANT: CPT | Performed by: HOSPITALIST

## 2021-05-26 RX ORDER — ONDANSETRON 2 MG/ML
4 INJECTION INTRAMUSCULAR; INTRAVENOUS ONCE
Status: COMPLETED | OUTPATIENT
Start: 2021-05-26 | End: 2021-05-26

## 2021-05-26 RX ORDER — NICOTINE 21 MG/24HR
1 PATCH, TRANSDERMAL 24 HOURS TRANSDERMAL DAILY
Status: DISCONTINUED | OUTPATIENT
Start: 2021-05-27 | End: 2021-05-30 | Stop reason: HOSPADM

## 2021-05-26 RX ORDER — IPRATROPIUM BROMIDE AND ALBUTEROL SULFATE 2.5; .5 MG/3ML; MG/3ML
3 SOLUTION RESPIRATORY (INHALATION) ONCE
Status: COMPLETED | OUTPATIENT
Start: 2021-05-26 | End: 2021-05-26

## 2021-05-26 RX ORDER — BUMETANIDE 1 MG/1
1 TABLET ORAL DAILY
Status: DISCONTINUED | OUTPATIENT
Start: 2021-05-27 | End: 2021-05-26

## 2021-05-26 RX ORDER — BUMETANIDE 1 MG/1
1 TABLET ORAL DAILY
Status: DISCONTINUED | OUTPATIENT
Start: 2021-05-26 | End: 2021-05-30 | Stop reason: HOSPADM

## 2021-05-26 RX ORDER — LANOLIN ALCOHOL/MO/W.PET/CERES
100 CREAM (GRAM) TOPICAL DAILY
Status: DISCONTINUED | OUTPATIENT
Start: 2021-05-27 | End: 2021-05-30 | Stop reason: HOSPADM

## 2021-05-26 RX ORDER — ONDANSETRON 2 MG/ML
4 INJECTION INTRAMUSCULAR; INTRAVENOUS EVERY 6 HOURS PRN
Status: DISCONTINUED | OUTPATIENT
Start: 2021-05-26 | End: 2021-05-30 | Stop reason: HOSPADM

## 2021-05-26 RX ORDER — DIAZEPAM 5 MG
5-20 TABLET ORAL EVERY 30 MIN PRN
Status: DISCONTINUED | OUTPATIENT
Start: 2021-05-26 | End: 2021-05-26

## 2021-05-26 RX ORDER — DIAZEPAM 5 MG
5-20 TABLET ORAL EVERY 30 MIN PRN
Status: DISCONTINUED | OUTPATIENT
Start: 2021-05-26 | End: 2021-05-30 | Stop reason: HOSPADM

## 2021-05-26 RX ORDER — PROCHLORPERAZINE 25 MG
25 SUPPOSITORY, RECTAL RECTAL EVERY 12 HOURS PRN
Status: DISCONTINUED | OUTPATIENT
Start: 2021-05-26 | End: 2021-05-30 | Stop reason: HOSPADM

## 2021-05-26 RX ORDER — MAGNESIUM HYDROXIDE/ALUMINUM HYDROXICE/SIMETHICONE 120; 1200; 1200 MG/30ML; MG/30ML; MG/30ML
30 SUSPENSION ORAL EVERY 4 HOURS PRN
Status: DISCONTINUED | OUTPATIENT
Start: 2021-05-26 | End: 2021-05-30 | Stop reason: HOSPADM

## 2021-05-26 RX ORDER — POTASSIUM CHLORIDE 750 MG/1
20 TABLET, EXTENDED RELEASE ORAL DAILY
Status: DISCONTINUED | OUTPATIENT
Start: 2021-05-27 | End: 2021-05-30 | Stop reason: HOSPADM

## 2021-05-26 RX ORDER — PREDNISONE 10 MG/1
40 TABLET ORAL DAILY
Status: DISCONTINUED | OUTPATIENT
Start: 2021-05-27 | End: 2021-05-30 | Stop reason: HOSPADM

## 2021-05-26 RX ORDER — POLYETHYLENE GLYCOL 3350 17 G/17G
17 POWDER, FOR SOLUTION ORAL DAILY PRN
Status: DISCONTINUED | OUTPATIENT
Start: 2021-05-26 | End: 2021-05-30 | Stop reason: HOSPADM

## 2021-05-26 RX ORDER — DIAZEPAM 10 MG
10 TABLET ORAL ONCE
Status: COMPLETED | OUTPATIENT
Start: 2021-05-26 | End: 2021-05-26

## 2021-05-26 RX ORDER — FOLIC ACID 1 MG/1
1 TABLET ORAL DAILY
Status: DISCONTINUED | OUTPATIENT
Start: 2021-05-27 | End: 2021-05-30 | Stop reason: HOSPADM

## 2021-05-26 RX ORDER — MULTIPLE VITAMINS W/ MINERALS TAB 9MG-400MCG
1 TAB ORAL DAILY
Status: DISCONTINUED | OUTPATIENT
Start: 2021-05-27 | End: 2021-05-30 | Stop reason: HOSPADM

## 2021-05-26 RX ORDER — IPRATROPIUM BROMIDE AND ALBUTEROL SULFATE 2.5; .5 MG/3ML; MG/3ML
3 SOLUTION RESPIRATORY (INHALATION)
Status: DISCONTINUED | OUTPATIENT
Start: 2021-05-27 | End: 2021-05-30 | Stop reason: HOSPADM

## 2021-05-26 RX ORDER — LIDOCAINE 40 MG/G
CREAM TOPICAL
Status: DISCONTINUED | OUTPATIENT
Start: 2021-05-26 | End: 2021-05-30 | Stop reason: HOSPADM

## 2021-05-26 RX ORDER — POTASSIUM CHLORIDE 1.5 G/1.58G
40 POWDER, FOR SOLUTION ORAL ONCE
Status: COMPLETED | OUTPATIENT
Start: 2021-05-26 | End: 2021-05-26

## 2021-05-26 RX ORDER — NALTREXONE HYDROCHLORIDE 50 MG/1
50 TABLET, FILM COATED ORAL DAILY
COMMUNITY
End: 2021-10-25

## 2021-05-26 RX ORDER — ACETAMINOPHEN 500 MG
1000 TABLET ORAL ONCE
Status: COMPLETED | OUTPATIENT
Start: 2021-05-26 | End: 2021-05-26

## 2021-05-26 RX ORDER — NALTREXONE HYDROCHLORIDE 50 MG/1
50 TABLET, FILM COATED ORAL DAILY
Status: DISCONTINUED | OUTPATIENT
Start: 2021-05-27 | End: 2021-05-30 | Stop reason: HOSPADM

## 2021-05-26 RX ORDER — HYDROXYZINE HYDROCHLORIDE 25 MG/1
25-50 TABLET, FILM COATED ORAL 3 TIMES DAILY PRN
Status: DISCONTINUED | OUTPATIENT
Start: 2021-05-26 | End: 2021-05-30 | Stop reason: HOSPADM

## 2021-05-26 RX ORDER — LEVOFLOXACIN 500 MG/1
500 TABLET, FILM COATED ORAL DAILY
Status: DISCONTINUED | OUTPATIENT
Start: 2021-05-27 | End: 2021-05-30 | Stop reason: HOSPADM

## 2021-05-26 RX ORDER — ONDANSETRON 4 MG/1
4 TABLET, ORALLY DISINTEGRATING ORAL EVERY 6 HOURS PRN
Status: DISCONTINUED | OUTPATIENT
Start: 2021-05-26 | End: 2021-05-30 | Stop reason: HOSPADM

## 2021-05-26 RX ORDER — METHOCARBAMOL 750 MG/1
750 TABLET, FILM COATED ORAL 3 TIMES DAILY PRN
Status: DISCONTINUED | OUTPATIENT
Start: 2021-05-26 | End: 2021-05-30 | Stop reason: HOSPADM

## 2021-05-26 RX ORDER — HYDROXYZINE HYDROCHLORIDE 25 MG/1
25-50 TABLET, FILM COATED ORAL 3 TIMES DAILY PRN
COMMUNITY
End: 2022-07-01

## 2021-05-26 RX ORDER — METHOCARBAMOL 750 MG/1
750 TABLET, FILM COATED ORAL 3 TIMES DAILY PRN
COMMUNITY
End: 2021-10-25

## 2021-05-26 RX ORDER — DIAZEPAM 10 MG/2ML
5 INJECTION, SOLUTION INTRAMUSCULAR; INTRAVENOUS ONCE
Status: COMPLETED | OUTPATIENT
Start: 2021-05-26 | End: 2021-05-26

## 2021-05-26 RX ORDER — TRAZODONE HYDROCHLORIDE 50 MG/1
50 TABLET, FILM COATED ORAL
Status: DISCONTINUED | OUTPATIENT
Start: 2021-05-26 | End: 2021-05-30 | Stop reason: HOSPADM

## 2021-05-26 RX ORDER — PROCHLORPERAZINE MALEATE 5 MG
10 TABLET ORAL EVERY 6 HOURS PRN
Status: DISCONTINUED | OUTPATIENT
Start: 2021-05-26 | End: 2021-05-30 | Stop reason: HOSPADM

## 2021-05-26 RX ADMIN — POTASSIUM CHLORIDE 40 MEQ: 1.5 POWDER, FOR SOLUTION ORAL at 13:41

## 2021-05-26 RX ADMIN — IPRATROPIUM BROMIDE AND ALBUTEROL SULFATE 3 ML: .5; 3 SOLUTION RESPIRATORY (INHALATION) at 14:35

## 2021-05-26 RX ADMIN — ACETAMINOPHEN 1000 MG: 500 TABLET, FILM COATED ORAL at 17:31

## 2021-05-26 RX ADMIN — DIAZEPAM 10 MG: 10 TABLET ORAL at 18:03

## 2021-05-26 RX ADMIN — BUMETANIDE 1 MG: 1 TABLET ORAL at 17:31

## 2021-05-26 RX ADMIN — ONDANSETRON 4 MG: 2 INJECTION INTRAMUSCULAR; INTRAVENOUS at 14:37

## 2021-05-26 RX ADMIN — DIAZEPAM 5 MG: 5 INJECTION, SOLUTION INTRAMUSCULAR; INTRAVENOUS at 14:38

## 2021-05-26 ASSESSMENT — PATIENT HEALTH QUESTIONNAIRE - PHQ9
SUM OF ALL RESPONSES TO PHQ QUESTIONS 1-9: 7
SUM OF ALL RESPONSES TO PHQ QUESTIONS 1-9: 10
SUM OF ALL RESPONSES TO PHQ QUESTIONS 1-9: 4
SUM OF ALL RESPONSES TO PHQ QUESTIONS 1-9: 2

## 2021-05-26 NOTE — TELEPHONE ENCOUNTER
"RN Triage [facility]    RN Phone Assessment  Pt fell on Tuesday 03/19/19 and hit her head.   Slipped on wet floor in bathroom, misjudged toilet seat and slipped. She was not at home, she [place]  She did not lose consciousness.   Pulled herself up and went to her car and fell asleep in the parking lot for about an hour.   She did not drive home, she was accompanied.   Hit the back of her head on the left side, still has a bump about the size of golf ball. Bump is very tender to touch.   No bleeding.  Nausea and vomiting a few days ago but she can't remember when, could be related to drinking but not sure.   She states she has had a headache ever since.  \"plus I am drinking again\", patient states she is drinking alcohol  She asked for a rule 28 but could not get it.   \"Equilibrium is off\"  Is able to move head up, down, side to side.   Head feels heavy at times.   Left eye is blurry, more so since she hit her head. Prescription glasses do not correct the vision.  Tingling in hands.   Short of breath, smoker.   Pt has taken Tylenol PM for her headache but did not relieve pain.     Reviewed Care Advice per Protocol  Due to ongoing symptoms of severe headache, blurred vision, and tingling in hands since patient fell, recommend ED for assessment. She has someone who will drive her. Pt verbalizes understanding. States no further questions. Encouraged to call back as needed.     Shi Amaya RN, [facility] Nurse Triage    Reason for Disposition    Large swelling or bruise > 2 inches (5 cm)    [1] SEVERE headache AND [2] not improved 2 hours after pain medicine/ice packs    Protocols used: HEAD INJURY-A-AH      "

## 2021-05-26 NOTE — ED PROVIDER NOTES
"    [facility] [place] ([facility])  21     History     Chief Complaint   Patient presents with     Shortness of Breath     Drug / Alcohol Assessment     HPI  Patsy Santamaria is a 66 year old female with a past medical history significant for alcohol abuse with a history of withdrawal seizures (2016), COPD, GERD, peripheral neuropathy, and anxiety who presents here to the Emergency Department due to increasing shortness of breath as well as requesting detox from alcohol.  Patient reports she drinks 1 L daily, her last drink was about 12 hours prior to arrival.     Past Medical History  Past Medical History:   Diagnosis Date     Alcohol abuse      Anxiety      Chronic Lower Extremity Edema      COPD (chronic obstructive pulmonary disease) (H)      Depression      GERD (gastroesophageal reflux disease)      Osteoarthritis     Bilateral Knees     Peripheral neuropathy      Withdrawal seizures (H)     x 1 in 2016     Past Surgical History:   Procedure Laterality Date     APPENDECTOMY       ARTHROSCOPY KNEE Right       SECTION       THUMB SURGERY      Removal of bone spurs     bumetanide (BUMEX) 1 MG tablet  nicotine (NICORETTE) 2 MG gum  omeprazole (PRILOSEC) 20 MG DR capsule  traZODone (DESYREL) 50 MG tablet  albuterol (PROAIR HFA/PROVENTIL HFA/VENTOLIN HFA) 108 (90 Base) MCG/ACT inhaler  ammonium lactate (AMLACTIN) 12 % external cream  budesonide-formoterol (SYMBICORT) 80-4.5 MCG/ACT Inhaler  diclofenac (VOLTAREN) 1 % topical gel  DULoxetine (CYMBALTA) 20 MG capsule  gabapentin (NEURONTIN) 300 MG capsule  multivitamin w/minerals (THERA-VIT-M) tablet  potassium chloride ER (KLOR-CON M) 20 MEQ CR tablet  thiamine (B-1) 100 MG tablet  umeclidinium (INCRUSE ELLIPTA) 62.5 MCG/INH inhaler      Allergies   Allergen Reactions     Percocet [Oxycodone-Acetaminophen]      Patient reports \"vomiting,grossly ill two weeks ago\"     Sulfa Drugs      Family History  Family History   Problem Relation Age " of Onset     CABG Mother      Anuerysm Father          of ruptured anuerysm at 46     Social History   Social History     Tobacco Use     Smoking status: Current Every Day Smoker     Packs/day: 1.25     Years: 25.00     Pack years: 31.25     Types: Cigarettes     Smokeless tobacco: Never Used   Substance Use Topics     Alcohol use: Not on file     Comment: 2 pints daily     Drug use: Not on file     Comment: Denies      Past medical history, past surgical history, medications, allergies, family history, and social history were reviewed with the patient. No additional pertinent items.       Review of Systems  A complete review of systems was performed with pertinent positives and negatives noted in the HPI, and all other systems negative.    Physical Exam   BP: 138/88  Pulse: 96  Temp: 98.2  F (36.8  C)  Resp: 18  Weight: 116.1 kg (256 lb)  SpO2: 96 %  Physical Exam  Constitutional:       General: She is not in acute distress.     Appearance: She is not diaphoretic.   HENT:      Head: Atraumatic.      Mouth/Throat:      Pharynx: No oropharyngeal exudate.   Eyes:      General: No scleral icterus.     Pupils: Pupils are equal, round, and reactive to light.   Cardiovascular:      Heart sounds: Normal heart sounds.   Pulmonary:      Effort: No respiratory distress.      Breath sounds: Wheezing present.   Abdominal:      General: Bowel sounds are normal.      Palpations: Abdomen is soft.      Tenderness: There is no abdominal tenderness.   Musculoskeletal:         General: No tenderness.      Right lower leg: Edema present.      Left lower leg: Edema present.   Skin:     General: Skin is warm.      Findings: No rash.   Neurological:      General: No focal deficit present.      Mental Status: She is oriented to person, place, and time.      Cranial Nerves: No cranial nerve deficit.      Motor: No weakness.      Coordination: Coordination normal.   Psychiatric:         Mood and Affect: Mood is anxious and depressed.          Thought Content: Thought content does not include suicidal ideation.         ED Course      Procedures        The medical record was reviewed and interpreted.  Current labs reviewed and interpreted.       Results for orders placed or performed during the hospital encounter of 05/26/21   XR Chest 2 Views     Status: None    Narrative    CHEST TWO VIEWS   5/26/2021 11:54 AM     HISTORY: Shortness of breath.    COMPARISON: None available      Impression    IMPRESSION: PA and lateral views of the chest were obtained.  Cardiomediastinal silhouette is within normal limits. Mild left  basilar pulmonary opacities, likely atelectasis, otherwise no  suspicious focal pulmonary opacities. No significant pleural effusion  or pneumothorax.    MIHAELA OBRIEN MD   CBC with platelets differential     Status: Abnormal   Result Value Ref Range    WBC 6.0 4.0 - 11.0 10e9/L    RBC Count 4.87 3.8 - 5.2 10e12/L    Hemoglobin 15.4 11.7 - 15.7 g/dL    Hematocrit 45.1 35.0 - 47.0 %    MCV 93 78 - 100 fl    MCH 31.6 26.5 - 33.0 pg    MCHC 34.1 31.5 - 36.5 g/dL    RDW 14.6 10.0 - 15.0 %    Platelet Count 144 (L) 150 - 450 10e9/L    Diff Method Automated Method     % Neutrophils 59.2 %    % Lymphocytes 24.9 %    % Monocytes 13.7 %    % Eosinophils 1.2 %    % Basophils 0.7 %    % Immature Granulocytes 0.3 %    Nucleated RBCs 0 0 /100    Absolute Neutrophil 3.5 1.6 - 8.3 10e9/L    Absolute Lymphocytes 1.5 0.8 - 5.3 10e9/L    Absolute Monocytes 0.8 0.0 - 1.3 10e9/L    Absolute Eosinophils 0.1 0.0 - 0.7 10e9/L    Absolute Basophils 0.0 0.0 - 0.2 10e9/L    Abs Immature Granulocytes 0.0 0 - 0.4 10e9/L    Absolute Nucleated RBC 0.0    Comprehensive metabolic panel     Status: Abnormal   Result Value Ref Range    Sodium 137 133 - 144 mmol/L    Potassium 3.1 (L) 3.4 - 5.3 mmol/L    Chloride 95 94 - 109 mmol/L    Carbon Dioxide 32 20 - 32 mmol/L    Anion Gap 10 3 - 14 mmol/L    Glucose 109 (H) 70 - 99 mg/dL    Urea Nitrogen 10 7 - 30 mg/dL     Creatinine 0.61 0.52 - 1.04 mg/dL    GFR Estimate >90 >60 mL/min/[1.73_m2]    GFR Estimate If Black >90 >60 mL/min/[1.73_m2]    Calcium 9.3 8.5 - 10.1 mg/dL    Bilirubin Total 2.3 (H) 0.2 - 1.3 mg/dL    Albumin 3.5 3.4 - 5.0 g/dL    Protein Total 7.7 6.8 - 8.8 g/dL    Alkaline Phosphatase 130 40 - 150 U/L    ALT 91 (H) 0 - 50 U/L     (H) 0 - 45 U/L   Lipase     Status: None   Result Value Ref Range    Lipase 158 73 - 393 U/L   Troponin I     Status: None   Result Value Ref Range    Troponin I ES <0.015 0.000 - 0.045 ug/L   TSH     Status: None   Result Value Ref Range    TSH 1.80 0.40 - 4.00 mU/L   UA with Microscopic     Status: Abnormal   Result Value Ref Range    Color Urine Orange     Appearance Urine Clear     Glucose Urine Negative NEG^Negative mg/dL    Bilirubin Urine Small (A) NEG^Negative    Ketones Urine Negative NEG^Negative mg/dL    Specific Gravity Urine 1.020 1.003 - 1.035    Blood Urine Negative NEG^Negative    pH Urine 6.0 5.0 - 7.0 pH    Protein Albumin Urine 10 (A) NEG^Negative mg/dL    Urobilinogen mg/dL 12.0 (H) 0.0 - 2.0 mg/dL    Nitrite Urine Negative NEG^Negative    Leukocyte Esterase Urine Negative NEG^Negative    Source Unspecified Urine     WBC Urine 1 0 - 5 /HPF    RBC Urine <1 0 - 2 /HPF    Bacteria Urine None (A) NEG^Negative /HPF    Squamous Epithelial /HPF Urine 11 (H) 0 - 1 /HPF   Drug abuse screen 6 urine (chem dep)     Status: None   Result Value Ref Range    Amphetamine Qual Urine Negative NEG^Negative    Barbiturates Qual Urine Negative NEG^Negative    Benzodiazepine Qual Urine Negative NEG^Negative    Cannabinoids Qual Urine Negative NEG^Negative    Cocaine Qual Urine Negative NEG^Negative    Ethanol Qual Urine Negative NEG^Negative    Opiates Qualitative Urine Negative NEG^Negative   D dimer quantitative     Status: None   Result Value Ref Range    D Dimer <0.3 0.0 - 0.50 ug/ml FEU   Nt probnp inpatient (BNP)     Status: None   Result Value Ref Range    N-Terminal Pro BNP  Inpatient 70 0 - 900 pg/mL   Asymptomatic SARS-CoV-2 COVID-19 Virus (Coronavirus) by PCR     Status: None    Specimen: Nasopharyngeal   Result Value Ref Range    SARS-CoV-2 Virus Specimen Source Nasopharyngeal     SARS-CoV-2 PCR Result NEGATIVE     SARS-CoV-2 PCR Comment (Note)    EKG 12 lead     Status: None   Result Value Ref Range    Interpretation ECG Click View Image link to view waveform and result    Troponin POCT     Status: None   Result Value Ref Range    Troponin I 0.00 0.00 - 0.08 ug/L   Alcohol breath test POCT     Status: Normal   Result Value Ref Range    Alcohol Breath Test 0.000 0.00 - 0.01     Medications   bumetanide (BUMEX) tablet 1 mg (has no administration in time range)   acetaminophen (TYLENOL) tablet 1,000 mg (has no administration in time range)   potassium chloride (KLOR-CON) Packet 40 mEq (40 mEq Oral Given 5/26/21 1341)   ondansetron (ZOFRAN) injection 4 mg (4 mg Intravenous Given 5/26/21 1437)   diazepam (VALIUM) injection 5 mg (5 mg Intravenous Given 5/26/21 1438)   ipratropium - albuterol 0.5 mg/2.5 mg/3 mL (DUONEB) neb solution 3 mL (3 mLs Nebulization Given 5/26/21 1435)        Assessments & Plan (with Medical Decision Making)       I have reviewed the nursing notes. I have reviewed the findings, diagnosis, plan and need for follow up with the patient.    Patient with alcohol dependence wheezing peripheral edema at this time patient will be admitted medically to initiate detox and make sure that she is medically stable prior to transfer to station 3A.    Final diagnoses:   Alcohol dependence with intoxication with complication (H)   Wheezing   Edema, unspecified type       --  Francis Morgan MD  [facility] EMERGENCY DEPARTMENT  5/26/2021     Francis Morgan MD  05/26/21 [id number]

## 2021-05-26 NOTE — ED NOTES
Lab called wondering about orange urine output from Patsy. Pt asked, and stated that she has not been drinking water, only Rahel (liquor). Lab informed- satisfy with pt's response. Will continue to monitor urine output.

## 2021-05-26 NOTE — ED NOTES
"[facility]   ED Nurse to Floor Handoff     Patsy Santamaria is a 66 year old female who speaks English and lives alone,  in a home  They arrived in the ED by car from home    ED Chief Complaint: Shortness of Breath and Drug / Alcohol Assessment    ED Dx;   Final diagnoses:   None         Needed?: No    Allergies:   Allergies   Allergen Reactions     Percocet [Oxycodone-Acetaminophen]      Patient reports \"vomiting,grossly ill two weeks ago\"     Sulfa Drugs    .  Past Medical Hx:   Past Medical History:   Diagnosis Date     Alcohol abuse      Anxiety      Chronic Lower Extremity Edema      COPD (chronic obstructive pulmonary disease) (H)      Depression      GERD (gastroesophageal reflux disease)      Osteoarthritis     Bilateral Knees     Peripheral neuropathy      Withdrawal seizures (H)     x 1 in 2016      Baseline Mental status: WDL  Current Mental Status changes: at basesline    Infection present or suspected this encounter: no  Sepsis suspected: No  Isolation type: No active isolations  Patient tested for COVID 19 prior to admission: YES     Activity level - Baseline/Home:  Independent  Activity Level - Current:   Stand with Assist    Bariatric equipment needed?: No    In the ED these meds were given:   Medications   potassium chloride (KLOR-CON) Packet 40 mEq (40 mEq Oral Given 5/26/21 1341)   ondansetron (ZOFRAN) injection 4 mg (4 mg Intravenous Given 5/26/21 1437)   diazepam (VALIUM) injection 5 mg (5 mg Intravenous Given 5/26/21 1438)   ipratropium - albuterol 0.5 mg/2.5 mg/3 mL (DUONEB) neb solution 3 mL (3 mLs Nebulization Given 5/26/21 1435)       Drips running?  No    Home pump  No    Current LDAs  Peripheral IV 05/26/21 Left Upper arm (Active)   Site Assessment WDL 05/26/21 1104   Line Status Saline locked 05/26/21 1104   Dressing Intervention New dressing  05/26/21 1104   Phlebitis Scale 0-->no symptoms 05/26/21 1104   Number of days: 0 "       Labs results:   Labs Ordered and Resulted from Time of ED Arrival Up to the Time of Departure from the ED   CBC WITH PLATELETS DIFFERENTIAL - Abnormal; Notable for the following components:       Result Value    Platelet Count 144 (*)     All other components within normal limits   COMPREHENSIVE METABOLIC PANEL - Abnormal; Notable for the following components:    Potassium 3.1 (*)     Glucose 109 (*)     Bilirubin Total 2.3 (*)     ALT 91 (*)      (*)     All other components within normal limits   ALCOHOL BREATH TEST POCT - Normal   LIPASE   TROPONIN I   TSH   ROUTINE UA WITH MICROSCOPIC   DRUG ABUSE SCREEN 6 CHEM DEP URINE ([facility])   D DIMER QUANTITATIVE   NT PROBNP INPATIENT   SARS-COV-2 (COVID-19) VIRUS RT-PCR   ISTAT TROPONIN NURSING POCT   TROPONIN POCT       Imaging Studies:   Recent Results (from the past 24 hour(s))   XR Chest 2 Views    Narrative    CHEST TWO VIEWS   5/26/2021 11:54 AM     HISTORY: Shortness of breath.    COMPARISON: None available      Impression    IMPRESSION: PA and lateral views of the chest were obtained.  Cardiomediastinal silhouette is within normal limits. Mild left  basilar pulmonary opacities, likely atelectasis, otherwise no  suspicious focal pulmonary opacities. No significant pleural effusion  or pneumothorax.    MIHAELA OBRIEN MD       Recent vital signs:   /83   Pulse 84   Temp 98.2  F (36.8  C) (Oral)   Resp 23   Wt 116.1 kg (256 lb)   SpO2 96%   BMI 46.82 kg/m      Tu Coma Scale Score: 15 (05/26/21 1031)       Cardiac Rhythm: Other  Pt needs tele? Yes  Skin/wound Issues: None    Code Status: Full Code    Pain control: pt had none    Nausea control: fair    Abnormal labs/tests/findings requiring intervention: see Epic    Family present during ED course? No   Family Comments/Social Situation comments: lives alone in apartment    Tasks needing completion: see BARBRA Vang, RN  asc --   [phone] [facility]  [phone] [facility]

## 2021-05-26 NOTE — PHARMACY-ADMISSION MEDICATION HISTORY
Admission Medication History Completed by Pharmacy    See [facility] Admission Navigator for allergy information, preferred outpatient pharmacy, prior to admission medications and immunization status.     Medication History Sources:     Patient    Care Everywhere    Dispense Report/Fill Hx    Changes made to PTA medication list (reason):    Added:   o Hydroxyzine 25 mg tabs; 1-2T PO TID PRN anxiety (per patient and fill hx)  o Methocarbamol 750 mg tabs; 1T PO TID PRN neck pain/muscle spasms (per patient and fill hx)  o Naltrexone 50 mg tabs; 1T PO daily (per patient and fill hx)    Deleted:   o Budesonide-formoterol 80-4.5 mcg/act inhaler; 2P PO BID (per patient and fill hx)  o Duloxetine 20 mg caps; 1C PO BID (stopped per patient and fill hx)  o Multivitamin tabs; 1T PO daily (stopped per patient)  o Thiamine 100 mg tabs; 1T PO daily (stopped per patient)    Changed:   o Ammonium lactate 12% cream; apply topically daily --> apply topically daily PRN dry skin (per patient and Care Everywhere)    Additional Information:    Patient reports that she is not confident in reported last doses due to EtOH use. She states she has taken most of her medications in the past week, but struggled to remember the exact day.     Duloxetine: patient states that she stopped taking this medication ~ 2.5 weeks ago because she felt as though she was not seeing any benefit for her depression.     Potassium: patient reports that she should be taking this medication daily, but instead takes it off and on due to difficulty swallowing the large tablet.     Prior to Admission medications    Medication Sig Last Dose Taking? Auth Provider   albuterol (PROAIR HFA/PROVENTIL HFA/VENTOLIN HFA) 108 (90 Base) MCG/ACT inhaler Inhale 2 puffs into the lungs every 4 hours as needed  5/25/2021 Yes Reported, Patient   ammonium lactate (AMLACTIN) 12 % external cream Apply topically daily as needed for dry skin  Past Week Yes Reported, Patient   bumetanide  (BUMEX) 1 MG tablet Take 1 mg by mouth daily  5/25/2021 Yes Reported, Patient   diclofenac (VOLTAREN) 1 % topical gel Apply 2 g topically 3 times daily as needed (knee pain)  Past Week Yes Reported, Patient   gabapentin (NEURONTIN) 300 MG capsule Take 300-600 mg by mouth At Bedtime  Past Week Yes Reported, Patient   hydrOXYzine (ATARAX) 25 MG tablet Take 25-50 mg by mouth 3 times daily as needed for anxiety 5/26/2021 at AM Yes Unknown, Entered By History   methocarbamol (ROBAXIN) 750 MG tablet Take 750 mg by mouth 3 times daily as needed for muscle spasms or other (neck pain) Past Week Yes Unknown, Entered By History   naltrexone (DEPADE/REVIA) 50 MG tablet Take 50 mg by mouth daily Past Week Yes Unknown, Entered By History   nicotine (NICORETTE) 2 MG gum Place 1 each (2 mg) inside cheek every hour as needed for smoking cessation Past Month Yes Cornelio Collins MD   omeprazole (PRILOSEC) 20 MG DR capsule Take 20 mg by mouth daily  5/25/2021 Yes Reported, Patient   potassium chloride ER (KLOR-CON M) 20 MEQ CR tablet Take 20 mEq by mouth daily  Past Month Yes Reported, Patient   traZODone (DESYREL) 50 MG tablet Take 1 tablet (50 mg) by mouth nightly as needed for sleep (may repeat after 60 minutes) Past Week Yes Cornelio Collins MD   umeclidinium (INCRUSE ELLIPTA) 62.5 MCG/INH inhaler Inhale 1 puff into the lungs daily Past Week Yes Reported, Patient     Date completed: 05/26/21    Medication history completed by:  Kiersten Swanson, Pharmacy Intern

## 2021-05-26 NOTE — ED TRIAGE NOTES
Patient states she has had increasing SOB x 1 week. Patient also here for ETOH detox. Patient drinking 1L per day. Last drink 2300 last night. Reports seizure hx in 2016.

## 2021-05-27 ENCOUNTER — RECORDS - HEALTHEAST (OUTPATIENT)
Dept: ADMINISTRATIVE | Facility: CLINIC | Age: 66
End: 2021-05-27

## 2021-05-27 ENCOUNTER — APPOINTMENT (OUTPATIENT)
Dept: CARDIOLOGY | Facility: CLINIC | Age: 66
DRG: 897 | End: 2021-05-27
Attending: HOSPITALIST
Payer: COMMERCIAL

## 2021-05-27 ENCOUNTER — DOCUMENTATION ONLY (OUTPATIENT)
Dept: MEDSURG UNIT | Facility: CLINIC | Age: 66
End: 2021-05-27

## 2021-05-27 ENCOUNTER — APPOINTMENT (OUTPATIENT)
Dept: ULTRASOUND IMAGING | Facility: CLINIC | Age: 66
DRG: 897 | End: 2021-05-27
Attending: HOSPITALIST
Payer: COMMERCIAL

## 2021-05-27 VITALS — HEIGHT: 64 IN | BODY MASS INDEX: 40.12 KG/M2 | WEIGHT: 235 LBS

## 2021-05-27 VITALS — BODY MASS INDEX: 45.18 KG/M2 | WEIGHT: 255 LBS | HEIGHT: 63 IN

## 2021-05-27 LAB
ANION GAP SERPL CALCULATED.3IONS-SCNC: 6 MMOL/L (ref 3–14)
BUN SERPL-MCNC: 14 MG/DL (ref 7–30)
CALCIUM SERPL-MCNC: 9 MG/DL (ref 8.5–10.1)
CHLORIDE SERPL-SCNC: 97 MMOL/L (ref 94–109)
CO2 SERPL-SCNC: 34 MMOL/L (ref 20–32)
CREAT SERPL-MCNC: 0.7 MG/DL (ref 0.52–1.04)
ERYTHROCYTE [DISTWIDTH] IN BLOOD BY AUTOMATED COUNT: 14.8 % (ref 10–15)
GFR SERPL CREATININE-BSD FRML MDRD: >90 ML/MIN/{1.73_M2}
GLUCOSE SERPL-MCNC: 118 MG/DL (ref 70–99)
HBA1C MFR BLD: 5.9 % (ref 0–5.6)
HCT VFR BLD AUTO: 42.4 % (ref 35–47)
HGB BLD-MCNC: 14.3 G/DL (ref 11.7–15.7)
MAGNESIUM SERPL-MCNC: 1.3 MG/DL (ref 1.6–2.3)
MAGNESIUM SERPL-MCNC: 1.5 MG/DL (ref 1.6–2.3)
MCH RBC QN AUTO: 32.2 PG (ref 26.5–33)
MCHC RBC AUTO-ENTMCNC: 33.7 G/DL (ref 31.5–36.5)
MCV RBC AUTO: 96 FL (ref 78–100)
PLATELET # BLD AUTO: 131 10E9/L (ref 150–450)
POTASSIUM SERPL-SCNC: 3.5 MMOL/L (ref 3.4–5.3)
RBC # BLD AUTO: 4.44 10E12/L (ref 3.8–5.2)
SODIUM SERPL-SCNC: 137 MMOL/L (ref 133–144)
TROPONIN I SERPL-MCNC: <0.015 UG/L (ref 0–0.04)
WBC # BLD AUTO: 5 10E9/L (ref 4–11)

## 2021-05-27 PROCEDURE — 94640 AIRWAY INHALATION TREATMENT: CPT | Mod: 76

## 2021-05-27 PROCEDURE — 93970 EXTREMITY STUDY: CPT | Mod: 26 | Performed by: RADIOLOGY

## 2021-05-27 PROCEDURE — 93970 EXTREMITY STUDY: CPT

## 2021-05-27 PROCEDURE — 83735 ASSAY OF MAGNESIUM: CPT | Performed by: HOSPITALIST

## 2021-05-27 PROCEDURE — 99222 1ST HOSP IP/OBS MODERATE 55: CPT | Performed by: PSYCHIATRY & NEUROLOGY

## 2021-05-27 PROCEDURE — 250N000009 HC RX 250: Performed by: HOSPITALIST

## 2021-05-27 PROCEDURE — 85027 COMPLETE CBC AUTOMATED: CPT | Performed by: HOSPITALIST

## 2021-05-27 PROCEDURE — 255N000002 HC RX 255 OP 636: Performed by: INTERNAL MEDICINE

## 2021-05-27 PROCEDURE — 250N000013 HC RX MED GY IP 250 OP 250 PS 637: Performed by: FAMILY MEDICINE

## 2021-05-27 PROCEDURE — 250N000013 HC RX MED GY IP 250 OP 250 PS 637: Performed by: INTERNAL MEDICINE

## 2021-05-27 PROCEDURE — 999N000157 HC STATISTIC RCP TIME EA 10 MIN

## 2021-05-27 PROCEDURE — 83735 ASSAY OF MAGNESIUM: CPT | Performed by: INTERNAL MEDICINE

## 2021-05-27 PROCEDURE — 250N000013 HC RX MED GY IP 250 OP 250 PS 637: Performed by: HOSPITALIST

## 2021-05-27 PROCEDURE — 999N000208 ECHOCARDIOGRAM COMPLETE

## 2021-05-27 PROCEDURE — 250N000009 HC RX 250: Performed by: INTERNAL MEDICINE

## 2021-05-27 PROCEDURE — 99232 SBSQ HOSP IP/OBS MODERATE 35: CPT | Performed by: INTERNAL MEDICINE

## 2021-05-27 PROCEDURE — 84484 ASSAY OF TROPONIN QUANT: CPT | Performed by: HOSPITALIST

## 2021-05-27 PROCEDURE — 93306 TTE W/DOPPLER COMPLETE: CPT | Mod: 26 | Performed by: INTERNAL MEDICINE

## 2021-05-27 PROCEDURE — 36415 COLL VENOUS BLD VENIPUNCTURE: CPT | Performed by: HOSPITALIST

## 2021-05-27 PROCEDURE — 250N000011 HC RX IP 250 OP 636: Performed by: HOSPITALIST

## 2021-05-27 PROCEDURE — 120N000002 HC R&B MED SURG/OB UMMC

## 2021-05-27 PROCEDURE — 94640 AIRWAY INHALATION TREATMENT: CPT

## 2021-05-27 PROCEDURE — 83036 HEMOGLOBIN GLYCOSYLATED A1C: CPT | Performed by: HOSPITALIST

## 2021-05-27 PROCEDURE — 250N000012 HC RX MED GY IP 250 OP 636 PS 637: Performed by: HOSPITALIST

## 2021-05-27 PROCEDURE — 36415 COLL VENOUS BLD VENIPUNCTURE: CPT | Performed by: INTERNAL MEDICINE

## 2021-05-27 PROCEDURE — 80048 BASIC METABOLIC PNL TOTAL CA: CPT | Performed by: HOSPITALIST

## 2021-05-27 PROCEDURE — 250N000011 HC RX IP 250 OP 636: Performed by: INTERNAL MEDICINE

## 2021-05-27 RX ORDER — MAGNESIUM SULFATE HEPTAHYDRATE 40 MG/ML
4 INJECTION, SOLUTION INTRAVENOUS ONCE
Status: COMPLETED | OUTPATIENT
Start: 2021-05-27 | End: 2021-05-27

## 2021-05-27 RX ORDER — MIRTAZAPINE 15 MG/1
15 TABLET, FILM COATED ORAL AT BEDTIME
Status: DISCONTINUED | OUTPATIENT
Start: 2021-05-27 | End: 2021-05-30 | Stop reason: HOSPADM

## 2021-05-27 RX ORDER — BUDESONIDE 0.5 MG/2ML
0.5 INHALANT ORAL 2 TIMES DAILY
Status: DISCONTINUED | OUTPATIENT
Start: 2021-05-27 | End: 2021-05-30 | Stop reason: HOSPADM

## 2021-05-27 RX ORDER — ESCITALOPRAM OXALATE 10 MG/1
10 TABLET ORAL DAILY
Status: DISCONTINUED | OUTPATIENT
Start: 2021-05-28 | End: 2021-05-30 | Stop reason: HOSPADM

## 2021-05-27 RX ADMIN — BUMETANIDE 1 MG: 1 TABLET ORAL at 12:04

## 2021-05-27 RX ADMIN — OMEPRAZOLE 20 MG: 20 CAPSULE, DELAYED RELEASE ORAL at 08:58

## 2021-05-27 RX ADMIN — HUMAN ALBUMIN MICROSPHERES AND PERFLUTREN 5 ML: 10; .22 INJECTION, SOLUTION INTRAVENOUS at 11:58

## 2021-05-27 RX ADMIN — NICOTINE 1 PATCH: 21 PATCH, EXTENDED RELEASE TRANSDERMAL at 08:59

## 2021-05-27 RX ADMIN — ONDANSETRON 4 MG: 2 INJECTION INTRAMUSCULAR; INTRAVENOUS at 02:59

## 2021-05-27 RX ADMIN — MIRTAZAPINE 15 MG: 15 TABLET, FILM COATED ORAL at 22:08

## 2021-05-27 RX ADMIN — LEVOFLOXACIN 500 MG: 500 TABLET, FILM COATED ORAL at 08:58

## 2021-05-27 RX ADMIN — METHOCARBAMOL 750 MG: 750 TABLET ORAL at 03:38

## 2021-05-27 RX ADMIN — ONDANSETRON 4 MG: 4 TABLET, ORALLY DISINTEGRATING ORAL at 16:58

## 2021-05-27 RX ADMIN — BUDESONIDE 0.5 MG: 0.5 INHALANT RESPIRATORY (INHALATION) at 22:16

## 2021-05-27 RX ADMIN — HYDROXYZINE HYDROCHLORIDE 25 MG: 25 TABLET, FILM COATED ORAL at 03:38

## 2021-05-27 RX ADMIN — POTASSIUM CHLORIDE 20 MEQ: 750 TABLET, EXTENDED RELEASE ORAL at 08:58

## 2021-05-27 RX ADMIN — MULTIPLE VITAMINS W/ MINERALS TAB 1 TABLET: TAB at 08:57

## 2021-05-27 RX ADMIN — IPRATROPIUM BROMIDE AND ALBUTEROL SULFATE 3 ML: .5; 3 SOLUTION RESPIRATORY (INHALATION) at 08:03

## 2021-05-27 RX ADMIN — IPRATROPIUM BROMIDE AND ALBUTEROL SULFATE 3 ML: .5; 3 SOLUTION RESPIRATORY (INHALATION) at 16:40

## 2021-05-27 RX ADMIN — DIAZEPAM 10 MG: 5 TABLET ORAL at 14:04

## 2021-05-27 RX ADMIN — DIAZEPAM 10 MG: 5 TABLET ORAL at 17:59

## 2021-05-27 RX ADMIN — FOLIC ACID 1 MG: 1 TABLET ORAL at 08:58

## 2021-05-27 RX ADMIN — PROCHLORPERAZINE MALEATE 10 MG: 5 TABLET ORAL at 14:38

## 2021-05-27 RX ADMIN — ONDANSETRON 4 MG: 2 INJECTION INTRAMUSCULAR; INTRAVENOUS at 10:49

## 2021-05-27 RX ADMIN — MAGNESIUM SULFATE IN WATER 4 G: 40 INJECTION, SOLUTION INTRAVENOUS at 18:41

## 2021-05-27 RX ADMIN — IPRATROPIUM BROMIDE AND ALBUTEROL SULFATE 3 ML: .5; 3 SOLUTION RESPIRATORY (INHALATION) at 22:15

## 2021-05-27 RX ADMIN — DIAZEPAM 5 MG: 5 TABLET ORAL at 10:49

## 2021-05-27 RX ADMIN — PROCHLORPERAZINE MALEATE 10 MG: 5 TABLET ORAL at 22:08

## 2021-05-27 RX ADMIN — IPRATROPIUM BROMIDE AND ALBUTEROL SULFATE 3 ML: .5; 3 SOLUTION RESPIRATORY (INHALATION) at 12:18

## 2021-05-27 RX ADMIN — DIAZEPAM 10 MG: 5 TABLET ORAL at 22:08

## 2021-05-27 RX ADMIN — DIAZEPAM 5 MG: 5 TABLET ORAL at 08:55

## 2021-05-27 RX ADMIN — THIAMINE HCL TAB 100 MG 100 MG: 100 TAB at 08:58

## 2021-05-27 RX ADMIN — NALTREXONE HYDROCHLORIDE 50 MG: 50 TABLET, FILM COATED ORAL at 08:58

## 2021-05-27 RX ADMIN — PREDNISONE 40 MG: 10 TABLET ORAL at 08:59

## 2021-05-27 NOTE — TELEPHONE ENCOUNTER
Phone call to CD inpt. Brandie Rosales.  Per Brandie, the rule 25 from [employer] Has not been received yet.  This has been communicated to Dr. Brunner.  Per Brandie, it can take up to 10 days to receive the rule 25, lizbeth is aware and waiting for the fax.

## 2021-05-27 NOTE — TELEPHONE ENCOUNTER
FYI : In the pt's chart, it shows that pt went to the ER yesterday and is still there. I did call and left a message for the patient to call back.    Please let us know if you want us to do anything further. Thanks.

## 2021-05-27 NOTE — PROGRESS NOTES
[facility]    Medicine Progress Note - Hospitalist Service       Date of Admission:  2021  Assessment & Plan       66 year old female  with Hx of COPD, tobacco use disorder 1 pack/day, alcohol use disorder, ongoing withdrawal seizures, chronic lower extremity edema, obesity, GERD, anxiety, depression, obstructive sleep apnea noncompliant to CPAP.  Patient came to the ED for evaluation of shortness of breath and seeking detox from alcohol.     # Shortness of breath with cough.    - Suspect this is COPD exacerbation.  She could have some acute bronchitis triggering this.  She smokes 1 pack/day.  Over last 1 week she has had increased shortness of breath and cough with yellowish expectoration.  Chest x-ray was negative for any pneumonia.  Differential diagnosis includes cardiomyopathy from alcohol abuse, coronary artery disease. PE is in the differential diagnosis was considered less likely.  -Start prednisone 40 mg p.o. daily, DuoNeb inhalation 4 times a day, Levaquin 500 mg p.o. daily for 5 days  -Troponin negative. TTE as below with normal EF.   -At some point of time she needs a stress test.  She has family history of coronary disease in her father who  at the age of 46 from heart attack  -Recommend tobacco cessation  -Patient has gained 30 pounds of weight in last 1 year.  She is physically very deconditioned as well.  -She needs to start using her CPAP back again  -Do good incentive spirometry     #Alcohol use disorder, history of alcohol withdrawal seizures.  -[facility] protocol with Valium  -CD consult. SW consult.   -Start multivitamin, folic acid, thiamine     #Depression  -Psychiatry recommended to start Lexapro and Remeron     #Obesity, obstructive sleep apnea  -Resume CPAP  -Recommend to lose weight as outpatient  -Check HbA1c in AM to look for Diabetes     #Bilateral lower extremity edema.  It is chronic in nature she is chronically on Bumex 1  mg p.o. daily  -Continue Bumex 1 mg p.o. daily along with potassium supplements  -Get bilateral lower extremity DVT scans     #Tobacco use disorder  -Start nicotine patch 1 pack/day  -Recommend tobacco cessation       Diet: Regular Diet Adult    DVT Prophylaxis: Pneumatic Compression Devices  Viveros Catheter: not present  Code Status: Full Code           Disposition Plan   Expected discharge: TBD   Entered: Robert Chapman MD 05/27/2021, 3:00 PM       The patient's care was discussed with the Patient.    Robert Chapman MD  Hospitalist Service  [facility]  Contact information available via [facility] Paging/Directory    ______________________________________________________________________    Interval History   No acute events overnight  Breathing is improving.   Alcohol withdrawal is improving, mild tremors   No chest pain or palpitations   No nausea or vomit.     Data reviewed today: I reviewed all medications, new labs and imaging results over the last 24 hours. I personally reviewed no images or EKG's today.    Physical Exam   Vital Signs: Temp: 97.1  F (36.2  C) Temp src: Oral BP: 106/63 Pulse: 87   Resp: 14 SpO2: 92 % O2 Device: Nasal cannula Oxygen Delivery: 2 LPM  Weight: 256 lbs 0 oz  Constitutional: Obesity noted awake, alert, cooperative, no apparent distress.  Eyes: Conjunctiva and pupils examined and normal.  Respiratory: Air entry is good on both side did not really appreciate any wheezing or crackles.  ED physician however told me she had some wheezing on exam   Cardiovascular: Regular rate and rhythm, normal S1 and S2, and no murmur noted.  GI: Soft, non-distended, non-tender, normal bowel sounds.  Lymph/Hematologic: No anterior cervical or supraclavicular adenopathy.  Skin: No rashes, no cyanosis.  Bilateral lower extremity edema noted.  1+.  Musculoskeletal: No joint swelling, erythema or tenderness.  Neurologic: Cranial nerves 2-12 intact, normal  strength and sensation.  Psychiatric: Alert, flat affect     Data   Recent Labs   Lab 05/27/21  0228 05/26/21  2245 05/26/21  1134 05/26/21  1103   WBC 5.0  --   --  6.0   HGB 14.3  --   --  15.4   MCV 96  --   --  93   *  --   --  144*     --   --  137   POTASSIUM 3.5  --   --  3.1*   CHLORIDE 97  --   --  95   CO2 34*  --   --  32   BUN 14  --   --  10   CR 0.70  --   --  0.61   ANIONGAP 6  --   --  10   JOSEFA 9.0  --   --  9.3   *  --   --  109*   ALBUMIN  --   --   --  3.5   PROTTOTAL  --   --   --  7.7   BILITOTAL  --   --   --  2.3*   ALKPHOS  --   --   --  130   ALT  --   --   --  91*   AST  --   --   --  194*   LIPASE  --   --   --  158   TROPI <0.015 <0.015  --  <0.015   TROPONIN  --   --  0.00  --      Recent Results (from the past 24 hour(s))   US Lower Extremity Venous Duplex Bilateral    Narrative    Exam: Ultrasound of the deep venous system of bilateral legs dated  5/27/2021 10:00 AM    Clinical information: Rule out DVT, edema and swelling    Comparison: None    Ordering provider: Thaddeus Renteria    Technique: Gray-scale evaluation with compression and Doppler  assessment of deep venous system for spontaneous and phasic flow, as  well as the presence of distal augmentation. Color flow images  obtained as needed. Gray-scale images with compression of the great  saphenous vein obtained as needed.    Findings:    Right leg:    CFV: Thrombus: No, Phasic: Yes  Femoral vein, proximal: Thrombus: No, Phasic: Yes  Femoral vein, mid: Thrombus: No, Phasic: Yes  Femoral vein, distal: Thrombus: No, Phasic: Yes  Popliteal vein: Thrombus: No, Phasic: Yes  PTV: Thrombus: No  Peroneal vein: Thrombus: No    Left leg:    CFV: Thrombus: No, Phasic: Yes  Femoral vein, proximal: Thrombus: No, Phasic: Yes  Femoral vein, mid: Thrombus: No, Phasic: Yes  Femoral vein, distal: Thrombus: No, Phasic: Yes  Popliteal vein: Thrombus: No, Phasic: Yes  PTV: Thrombus: Thrombus: No  Peroneal vein: Thrombus: No    There  "is a 2.0 x 1.4 x 3.0 cm cyst in the left popliteal fossa with no  associated vascularity.      Impression    Impression:    Right leg: No deep venous thrombosis.     Left leg: No deep venous thrombosis. 3 cm popliteal fossa cyst.    Reference: \"Duplex Ultrasound in the Diagnosis of Lower-Extremity Deep  Venous Thrombosis\"- Dianelys Dickens MD, S; Ubaldo Cuellar MD  (Circulation. 2014;129:917-921. http://circ.ahajournals.org )    I have personally reviewed the examination and initial interpretation  and I agree with the findings.    ELDER NAZARIO   Echo Complete    Narrative    [id number]  [id number]  [id number]  [id number]^[email or web address]^TOBIN     [facility] [place]  Echocardiography Laboratory  [address] [place] [id number]     Name: MINNA WOOD  MRN: [id number]  : 1955  Study Date: 2021 11:15 AM  Age: 66 yrs  Gender: Female  [place] [facility] [place]  Reason For Study: SOB  Ordering Physician: TOBIN BYERS  Performed By: NIGEL Skelton     BSA: 2.1 m2  Height: 62 in  Weight: 256 lb  HR: 93  BP: 124/63 mmHg  ______________________________________________________________________________  Procedure  Complete Portable Echo Adult. Contrast Optison. Technically difficult  study.Extremely difficult acoustic windows despite the use of contrast for  endcardial border definition. Patient was given 5 ml mixture of 3 ml Optison  and 6 ml saline. 4 ml wasted.  ______________________________________________________________________________  Interpretation Summary  Technically difficult study.Extremely difficult acoustic windows despite the  use of contrast for endcardial border definition. Minimal information  available.  Global and regional left ventricular function is normal with an EF of 60-65%.  Based on limited views the global right ventricular function is probably  normal.     There is no prior study for direct " comparison.  ______________________________________________________________________________  Left Ventricle  Global and regional left ventricular function is normal with an EF of 60-65%.     Right Ventricle  Based on limited views the global right ventricular function is probably  normal.     Atria  The atria cannot be assessed.     Mitral Valve  The mitral valve is normal.     Aortic Valve  The aortic valve cannot be assessed.     Tricuspid Valve  The tricuspid valve cannot be assessed.     Pulmonic Valve  The pulmonic valve cannot be assessed.     Vessels  The aorta root cannot be assessed. The thoracic aorta cannot be assessed. The  inferior vena cava cannot be assessed.     Pericardium  No pericardial effusion is present.     Compared to Previous Study  There is no prior study for direct comparison.  ______________________________________________________________________________  MMode/2D Measurements & Calculations     IVSd: 0.91 cm  LVIDd: 5.3 cm  LVIDs: 3.7 cm  LVPWd: 1.0 cm  FS: 30.4 %  LV mass(C)d: 191.6 grams  LV mass(C)dI: 90.2 grams/m2  asc Aorta Diam: 2.8 cm     EF(MOD-bp): 69.9 %  RWT: 0.38     Doppler Measurements & Calculations  MV E max bailey: 68.1 cm/sec  MV A max bailey: 69.1 cm/sec  MV E/A: 0.99  MV dec time: 0.17 sec  E/E' av.3  Lateral E/e': 6.6  Medial E/e': 8.0     ______________________________________________________________________________  Report approved by: Kim Hanley 2021 12:29 PM           Medications       bumetanide  1 mg Oral Daily     [START ON 2021] escitalopram  10 mg Oral Daily     folic acid  1 mg Oral Daily     ipratropium - albuterol 0.5 mg/2.5 mg/3 mL  3 mL Nebulization 4x daily     levofloxacin  500 mg Oral Daily     mirtazapine  15 mg Oral At Bedtime     multivitamin w/minerals  1 tablet Oral Daily     naltrexone  50 mg Oral Daily     nicotine  1 patch Transdermal Daily     nicotine   Transdermal Q8H     omeprazole  20 mg Oral Daily     potassium  chloride ER  20 mEq Oral Daily     predniSONE  40 mg Oral Daily     sodium chloride (PF)  3 mL Intracatheter Q8H     thiamine  100 mg Oral Daily

## 2021-05-27 NOTE — TELEPHONE ENCOUNTER
Agree patient needs to be seen - urgently.  ER is best so her alcohol use can be addressed as well ( readily available).

## 2021-05-27 NOTE — PLAN OF CARE
VS: VSS   O2: Requiring 2-3L to keep sats >90%   Output: Voiding adequately   Last BM: 5/27; +fl. C/o sharp abdominal pain this afternoon; MD notified; no new orders at this time.   Activity: WBAT; ind in room   Up for meals? Yes   Skin: CDI   Pain: Robaxin early this AM   CMS: Tingling in toes   Dressing: None   Diet: Regular; nausea managed with zofran and compazine.   LDA: PIV SL   Equipment: IV pole   Plan: TBD; psychiatry saw pt today; pt is interested in inpatient treatment. CD consult placed.    Additional Info: Seizure pads in place  MSSA protocol.   Magnesium replaced for level of 1.3; recheck ordered tomorrow.

## 2021-05-27 NOTE — TELEPHONE ENCOUNTER
SHE HAD A RULE 25 WITH [facility] (SHE BELIEVES ON Monday).  HAD A MESSAGE FROM KAYLAH TO CALL CLINIC.  SAW DR BRUNNER April 1.  WAS TOLD THEY WOULD TRY TO EXPEDITE THE PROCESS SO THAT SHE COULD GET INTO INPATIENT CD TREATMENT

## 2021-05-27 NOTE — H&P
[facility]    History and Physical  Hospitalist       Date of Admission:  2021  Date of Service (when I saw the patient): 21    Assessment & Plan     Patsy Santamaria is a 66 year old female  with Hx of COPD, tobacco use disorder 1 pack/day, alcohol use disorder, ongoing withdrawal seizures, chronic lower extremity edema, obesity, GERD, anxiety, depression, obstructive sleep apnea noncompliant to CPAP.  Patient came to the ED for evaluation of shortness of breath and seeking detox from alcohol.    # Shortness of breath with cough.  Suspect this is COPD exacerbation.  She could have some acute bronchitis triggering this.  She smokes 1 pack/day.  Over last 1 week she has had increased shortness of breath and cough with yellowish expectoration.  Chest x-ray was negative for any pneumonia.  Differential diagnosis includes cardiomyopathy from alcohol abuse, coronary artery disease. PE is in the differential diagnosis was considered less likely.  -Start prednisone 40 mg p.o. daily, DuoNeb inhalation 4 times a day, Levaquin 500 mg p.o. daily for 5 days  -Cycle troponins, get 2D echocardiogram in the morning  -At some point of time she needs a stress test.  She has family history of coronary disease in her father who  at the age of 46 from heart attack  -Recommend tobacco cessation  -Patient has gained 30 pounds of weight in last 1 year.  She is physically very deconditioned as well.  -She needs to start using her CPAP back again  -Do good incentive spirometry    #Alcohol use disorder, history of alcohol withdrawal seizures.  -Start [facility] protocol with Valium  -CD consult, she is interested in inpatient  Treatment for alcohol abuse after she is done with the detox  -Start multivitamin, folic acid, thiamine    #Depression, likely exacerbated by alcohol use problem.  She reports that her medications are not working.  Denies any suicidal ideation or homicidal  thoughts.  -Consult psychiatry  -May benefit from psychology evaluation at some point of time  #Obesity, obstructive sleep apnea  -Resume CPAP  -Recommend to lose weight as outpatient  -Check HbA1c in AM to look for Diabetes    #Bilateral lower extremity edema.  It is chronic in nature she is chronically on Bumex 1 mg p.o. daily  -Continue Bumex 1 mg p.o. daily along with potassium supplements  -Get bilateral lower extremity DVT scans    #Tobacco use disorder  -Start nicotine patch 1 pack/day  -Recommend tobacco cessation    # Acute Hepatitis from ETOH. Trend LFTs. Abstaining from ETOH.   -Trend LFTs    DVT Prophylaxis: Pneumatic Compression Devices  Code Status: Full Code    Disposition: TBD.    Thaddeus Renteria MD    Primary Care Physician   EJ WELLER    Chief Complaint   Sob, Alcohol withdrawl    History of Present Illness      Patsy Santamaria is a 66 year old female  with Hx of COPD, tobacco use disorder 1 pack/day, alcohol use disorder, ongoing withdrawal seizures, chronic lower extremity edema, obesity, GERD, anxiety, depression, obstructive sleep apnea noncompliant to CPAP.  She came to the ER seeking alcohol withdrawal treatment and shortness of breath.  Patient lives alone in her apartment.  She has been depressed lately.  Since last 1 month or so she has not been taking her usual depression medications.  She has gained 30 pounds of weight over the last 1 year.  She smokes 1 pack/day.  For last 1 week she has been coughing more than usual.  She has some yellowish expectoration.  She is more short of breath.  Walking from her bed to the bathroom will make her short of breath.  She has occasional wheezing.  She has been more short of breath for last 1 week.  She typically starts drinking fátima around 1230 1:00 in the afternoon and she will keep drinking rest of the day until she goes to bed at 11 or 12 in the night.  She has been drinking and smoking for very long time.  She has a  history of alcohol withdrawal seizures before.  Last drink was yesterday night at 11 PM.  Patient came to the ED for evaluation of shortness of breath and alcohol withdrawal as she has not drank today.  Her alcohol breath test was negative.  Urine drug screen was negative.  Patient is being admitted for further evaluation treatment.    She denies any fever sweats or chills.  There is no chest pain.  However when she walks she feels short of breath and pounding in her heart.  Denies any orthopnea or PND.  She does have chronic leg edema and she takes Bumex for that.  There is no history of DVT or PE.  No history of coronary disease no history of heart failure.    She has Incruse Ellipta at home and she has not been using it regularly.  She does have albuterol inhaler sometimes she uses that.  She smokes 1 pack/day.  She also has obstructive sleep apnea but has not been using her CPAP.      Past Medical History    I have reviewed this patient's medical history and updated it with pertinent information if needed.   Past Medical History:   Diagnosis Date     Alcohol abuse      Anxiety      Chronic Lower Extremity Edema      COPD (chronic obstructive pulmonary disease) (H)      Depression      GERD (gastroesophageal reflux disease)      Osteoarthritis     Bilateral Knees     Peripheral neuropathy      Withdrawal seizures (H)     x 1 in 2016       Past Surgical History   I have reviewed this patient's surgical history and updated it with pertinent information if needed.  Past Surgical History:   Procedure Laterality Date     APPENDECTOMY       ARTHROSCOPY KNEE Right       SECTION       THUMB SURGERY      Removal of bone spurs       Prior to Admission Medications   Prior to Admission Medications   Prescriptions Last Dose Informant Patient Reported? Taking?   albuterol (PROAIR HFA/PROVENTIL HFA/VENTOLIN HFA) 108 (90 Base) MCG/ACT inhaler 2021 Self Yes Yes   Sig: Inhale 2 puffs into the lungs every 4 hours as  "needed    ammonium lactate (AMLACTIN) 12 % external cream Past Week Self Yes Yes   Sig: Apply topically daily as needed for dry skin    bumetanide (BUMEX) 1 MG tablet 5/25/2021 Self Yes Yes   Sig: Take 1 mg by mouth daily    diclofenac (VOLTAREN) 1 % topical gel Past Week Self Yes Yes   Sig: Apply 2 g topically 3 times daily as needed (knee pain)    gabapentin (NEURONTIN) 300 MG capsule Past Week Self Yes Yes   Sig: Take 300-600 mg by mouth At Bedtime    hydrOXYzine (ATARAX) 25 MG tablet 5/26/2021 at AM  Yes Yes   Sig: Take 25-50 mg by mouth 3 times daily as needed for anxiety   methocarbamol (ROBAXIN) 750 MG tablet Past Week  Yes Yes   Sig: Take 750 mg by mouth 3 times daily as needed for muscle spasms or other (neck pain)   naltrexone (DEPADE/REVIA) 50 MG tablet Past Week  Yes Yes   Sig: Take 50 mg by mouth daily   nicotine (NICORETTE) 2 MG gum Past Month  No Yes   Sig: Place 1 each (2 mg) inside cheek every hour as needed for smoking cessation   omeprazole (PRILOSEC) 20 MG DR capsule 5/25/2021 Self Yes Yes   Sig: Take 20 mg by mouth daily    potassium chloride ER (KLOR-CON M) 20 MEQ CR tablet Past Month Self Yes Yes   Sig: Take 20 mEq by mouth daily    traZODone (DESYREL) 50 MG tablet Past Week  No Yes   Sig: Take 1 tablet (50 mg) by mouth nightly as needed for sleep (may repeat after 60 minutes)   umeclidinium (INCRUSE ELLIPTA) 62.5 MCG/INH inhaler Past Week Self Yes Yes   Sig: Inhale 1 puff into the lungs daily      Facility-Administered Medications: None     Allergies   Allergies   Allergen Reactions     Percocet [Oxycodone-Acetaminophen]      Patient reports \"vomiting,grossly ill two weeks ago\"     Sulfa Drugs Itching and Rash       Social History   I have reviewed this patient's social history and updated it with pertinent information if needed. Patsy Santamaria  reports that she has been smoking cigarettes. She has a 31.25 pack-year smoking history. She has never used smokeless tobacco.  Drinks a liter of " fátima every day    Family History   I have reviewed this patient's family history and updated it with pertinent information if needed.   Family History   Problem Relation Age of Onset     CABG Mother      Anuerysm Father          of ruptured anuerysm at 46       Review of Systems   The 10 point Review of Systems is negative other than noted in the HPI or here.     Physical Exam   Temp: 97.8  F (36.6  C) Temp src: Oral BP: 124/63 Pulse: 78   Resp: 20 SpO2: 95 % O2 Device: None (Room air)    Vital Signs with Ranges  Temp:  [97.8  F (36.6  C)-98.3  F (36.8  C)] 97.8  F (36.6  C)  Pulse:  [68-96] 78  Resp:  [18-31] 20  BP: ()/(62-88) 124/63  SpO2:  [91 %-99 %] 95 %  256 lbs 0 oz    Constitutional: Obesity noted awake, alert, cooperative, no apparent distress.  Eyes: Conjunctiva and pupils examined and normal.  HEENT: Moist mucous membranes, neck is thick difficult to appreciate any jugular venous distention  Respiratory: Air entry is good on both side did not really appreciate any wheezing or crackles.  ED physician however told me she had some wheezing on exam   Cardiovascular: Regular rate and rhythm, normal S1 and S2, and no murmur noted.  GI: Soft, non-distended, non-tender, normal bowel sounds.  Lymph/Hematologic: No anterior cervical or supraclavicular adenopathy.  Skin: No rashes, no cyanosis.  Bilateral lower extremity edema noted.  1+.  Musculoskeletal: No joint swelling, erythema or tenderness.  Neurologic: Cranial nerves 2-12 intact, normal strength and sensation.  Psychiatric: Alert, oriented to person, place and time, no obvious anxiety or depression.      Data   Data reviewed today:      EKG: Sinus, STT changes noted  Imaging: CXR see findings below.     Recent Labs   Lab 21  1134 21  1103   WBC  --  6.0   HGB  --  15.4   MCV  --  93   PLT  --  144*   NA  --  137   POTASSIUM  --  3.1*   CHLORIDE  --  95   CO2  --  32   BUN  --  10   CR  --  0.61   ANIONGAP  --  10   JOSEFA  --  9.3    GLC  --  109*   ALBUMIN  --  3.5   PROTTOTAL  --  7.7   BILITOTAL  --  2.3*   ALKPHOS  --  130   ALT  --  91*   AST  --  194*   LIPASE  --  158   TROPI  --  <0.015   TROPONIN 0.00  --        Recent Results (from the past 24 hour(s))   XR Chest 2 Views    Narrative    CHEST TWO VIEWS   5/26/2021 11:54 AM     HISTORY: Shortness of breath.    COMPARISON: None available      Impression    IMPRESSION: PA and lateral views of the chest were obtained.  Cardiomediastinal silhouette is within normal limits. Mild left  basilar pulmonary opacities, likely atelectasis, otherwise no  suspicious focal pulmonary opacities. No significant pleural effusion  or pneumothorax.    MIHAELA OBRIEN MD

## 2021-05-27 NOTE — PROGRESS NOTES
Correct pharmacy verified with patient and confirmed in snapshot? [x] yes []no    Charge captured ? [x] yes  [] no    Medications Phoned  to Pharmacy [] yes [x]no  Name of Pharmacist:  List Medications, including dose, quantity and instructions      Medication Prescriptions given to patient   [] yes  [x] no   List the name of the drug the prescription was written for.       Medications ordered this visit were e-scribed.  Verified by order class [x] yes  [] no    Medication changes or discontinuations were communicated to patient's pharmacy: [] yes  [x] no    UA collected [x] yes  [] no    [place] Prescription Monitoring Program Reviewed? [x] yes  [] no    Referrals were made to:  Inpatient cd    Future appointment was made: [] yes  [x] no    Dictation completed at time of chart check: [] yes  [x] no    I have checked the documentation for today s encounters and the above information has been reviewed and completed.

## 2021-05-27 NOTE — UTILIZATION REVIEW
Inpatient appropriate    Admission Status; Secondary Review Determination      Under the authority of the Utilization Management Committee, the utilization review process indicated a secondary review on the above patient. The review outcome is based on review of the medical records, discussions with staff, and applying clinical experience noted on the date of the review.    (x) Inpatient Status Appropriate - This patient's medical care is consistent with medical management for inpatient care and reasonable inpatient medical practice.    RATIONALE FOR DETERMINATION  48-year-old female with history of COPD, tobacco use disorder, alcohol use disorder, alcohol withdrawal seizures was admitted to [facility] on 5/26/2021 for COPD acute exacerbation and alcohol withdrawal.  Patient was hypoxic at admission with O2 sats of 87%.  She is on supplemental oxygen and is being treated with oral antibiotics and steroid.  She also has alcohol withdrawal and is being treated with symptoms triggered diazepam.  Patient will need ongoing hospitalization for management of both her COPD acute exacerbation and alcohol withdrawal after which she will likely be discharged to adult chemical dependency treatment.  Inpatient care is appropriate at this time.    At the time of admission with the information available to the attending physician more than 2 nights Hospital complex care was anticipated, based on patient risk of adverse outcome if treated as outpatient and complex care required. Inpatient admission is appropriate based on the Medicare guidelines.    This document was produced using voice recognition software      The information on this document is developed by the utilization review team in order for the business office to ensure compliance. This only denotes the appropriateness of proper admission status and does not reflect the quality of care rendered.  The definitions of Inpatient Status and Observation Status used in making the  determination above are those provided in the CMS Coverage Manual, Chapter 1 and Chapter 6, section 70.4.    Sincerely,    Utilization Review  Physician Advisor  [facility].

## 2021-05-27 NOTE — PLAN OF CARE
Patient has been medicated with Valium 5 mg for MSSA scores of 11 and 8.  She is mildly tremulous.  Feeling nauseated this mid-morning, after returning from US of lower extremity.  She is going to be seen by ECHO today, also.  She has been able to ambulate with standby assist.  Remains oriented and cooperative.  Has been using oxygen @ 2L/NC.  Have weaned down to 1 L, maintaining sats in low 90s, and will continue to wean once done with ECHO.

## 2021-05-27 NOTE — TELEPHONE ENCOUNTER
"Triage call:   Relapse with ETOH - went to ER and was given Librium 4/6/19- not helping. Patient has started drinking again as she is afraid of having seizures. Patient is experiencing shortness of breath- anytime she moves. Reports pain in her left back and she can hardly walk. Indicates a Headache for 2 weeks. Intermittent abdominal pain. Patient has had minimal appetite and has been trying to keep hydrated. Constipation to diarrhea. Patient indicates that she is waiting to hear about rule 25, Patient indicates that she called and left a message for social work as instructed from ER visit but hasn't heard back. Patient states \"I'm at the end of my rope here, I just don't know what to do.\"    Triaged to go back to the ER- advised her to be open to a detox facility as offered at previous ER visit if that is the only option- she indicated that if that was her only option then she would go. Patient wants help. Patient indicates that she will go back to the ER and will not drive herself.    Elsi Gonzalez RN [facility] Care Connection Triage/Med Refill 4/11/2019 8:57 AM    Reason for Disposition    Patient sounds very sick or weak to the triager    Protocols used: ALCOHOL ABUSE AND [id number]      "

## 2021-05-27 NOTE — PROGRESS NOTES
"TCM DISCHARGE FOLLOW UP CALL    Discharge Date:  3/25/2019  Reason for hospital stay (discharge diagnosis)::  Alcohol withdrawal  Are you feeling better, the same or worse since your discharge?:  Patient is feeling the same (States \"I'm not doing too good; went back to drinking.\"  Pt acknowledges she needs help and has a Rule 25 assessment tomorrow and plans to go to treatment asap.  She has a f/u w/her psychiatrist Dr. Brunner on 4/1/19)  Do you feel like you have a plan in the event of a health emergency?: Yes (Call clinic, ER)    As part of your discharge plan, were  home care services ordered for you?: No    Did you receive any new medications, or was there a change to your medications?: Yes    Are you taking those medications, or do you have any established regiment?:  RN reviewed discharge medications w/pt.  Pt states she hasn't started the new medications yet as she had to wait until she got paid; she's going to  her prescriptions today from the pharmacy, and will ask [facility] where to find the magnesium oxide & thiamine.  Pt states she hasn't been using Afrin, she uses Flonase nasal spray for sinus congestion & allergies.  Do you have any follow up visits scheduled with your PCP or Specialist?:  Yes, with PCP and Yes, with Specialist (Dr. Wade 3/29/19)  (RN) Is PCP appt scheduled soon enough (within 14 days of discharge date)?: Yes    Who are you seeing and when is it scheduled?:  Dr. Brunner (psychiatrist) 4/1/19      Meka Styles RN Care Manager, Population Health    "

## 2021-05-27 NOTE — PLAN OF CARE
Alert and oriented. SBA to bathroom. Voiding well. Pt on 2L oxygen NC due to de-sating when sleeping. According to pt she uses CPAP at home, even though she says she does not use her CPAP as much. MSSA score was 5 at the start of shift. Will monitor for withdrawal symptoms. Able to make her needs known. Using call light as it is within reach.

## 2021-05-27 NOTE — PROGRESS NOTES
Prior Authorization **INITIATED**    Medication: Methocarbamol 750mg tablets  Insurance Company: [employer] - Phone [phone] Fax [phone]  Pharmacy Filling the Rx: n/a - Patient has not yet been discharged, and there are no outpatient orders for this medication yet  Start Date: 5/27/2021  Reference #: CoverMyMeds Key: [id number]  Comments:  Proactive Prior Authorization      Tatiana Sawyer CPhT  [place] [facility]charge Pharmacy Liaison  Pronouns: She/Her/Hers    [facility]  [facility]  [address] [place] [address] [address] [address]   [email or web address]  [email or web address]   Phone: [phone]  Pager: [phone]  Fax: [phone]

## 2021-05-27 NOTE — CONSULTS
"Consult Date: 05/27/2021    This was a [employer] virtual consultation.  Prior to interviewing the patient the nursing staff got permission from the patient to use a video consultation.  I was in the  work room and the patient was in her room at the [facility] in 505, bed 1.  The interview lasted from 1:25 to 1:36.    IDENTIFICATION:  Ms. Patsy Santamaria is a 66-year-old / white female, mother of 4, who is currently hospitalized with alcohol withdrawal, depression and an exacerbation of COPD.  I am asked to evaluate her depression by Dr. Renteria.    Prior to interviewing this patient, I had an opportunity to review the electronic medical record and note the patient has had multiple previous detox hospitalizations and has carried a diagnosis of depression for some time.    In interviewing Ms. Santamaria.  She reports that she had been on Cymbalta.  She was not sure if it worked or not, so she stopped it and has not been on it for several months.  In reviewing the electronic medical record I note that her AST has slowly been creeping up.  Therefore, for now, I am going to try to avoid using Cymbalta.  Currently, the patient reports very poor sleep and if she uses trazodone she still sleeps poorly, but then feels tired all day.  She has low energy \"low ambition\" decreased interest, anhedonia, some feelings of hopelessness, but no suicidal ideation.  She reports that she has a psychiatrist named Dr. Grey, but she apparently has not seen Dr. Grey very often since COVID.  She also has a therapist she liked very much named, Araceli, but she has had a great deal of difficulty getting through to make an appointment with Araceli.  She has found this extremely frustrating.  Similarly in April of this year, she had a chemical dependency assessment.  She was planning to do inpatient at [facility] but she was never able to get a hold of anybody.  She says it takes 30-40 minutes just to talk to someone and she " "seems somewhat upset with \"[facility].\"  I think it would be quite beneficial if she could get a CD consult during this hospitalization and perhaps some social work help to get into a chemical dependency program.  She does meet full criteria for depression and I am going to recommend Lexapro 10 in the morning and Remeron 15 at night.  She did have a bad experience with Zoloft.  This may also happen with Lexapro.  It appeared she had some type of panic attack.  I did discuss this with the patient.  I also suggested that Remeron could offer similar hangover feelings that trazodone had, but I think it is worth a try and I suggested that if it was a problem she simply should not take the Remeron.  I strongly encouraged her to do her best to get back in with her therapist and her psychiatrist.    PAST MEDICAL HISTORY:  Alcohol abuse, anxiety, chronic lower extremity edema, COPD, depression, gastroesophageal reflux disease, osteoarthritis, peripheral neuropathy and a history of withdrawal seizures.    ALLERGIES:  SHE HAS ALLERGIES TO PERCOCET AND SULFA DRUGS.    FAMILY HISTORY:  The patient's mother drank quite a bit, but it is unclear to the patient whether there is any other family members with alcohol use disorder.  She is unaware of any family history of psychiatric illness.    SOCIAL HISTORY:  The patient is  and her  has passed away.  She has 4 children, a number of grandchildren and 1 great grandchild.  She smokes about a pack a day and she drinks about a liter of fátima every day.  She is living alone.  She does have family members who are supportive.  She reports that her sister is always trying to get her into chemical dependency treatment and she gets along with most of her children.    REVIEW OF SYSTEMS:  On my interview, the patient denied headache or problems with vision or hearing.  She does have shortness of breath, particularly with exercise.  She denied chest pain, abdominal pain, " diarrhea or constipation, genitourinary symptoms or problems with muscles, skin or joints, other than her osteoarthritis.    MENTAL STATUS EXAM:  On my interview, the patient was pleasant and cooperative.  Her mood was described as depressed.  Her affect was slightly restricted.  Her speech was coherent and goal oriented.  Her associations were tight.  Her thought process is logical and linear.  Content of thought was without current psychosis and she denies suicidal ideation.  Recent and remote memory, concentration, fund of knowledge and use of language were at baseline.  She is alert and oriented x3.  Insight and judgment are intact.  Muscle strength and tone appear to be at her baseline.  She reports that walking is going a little better for her now and her recent vitals include a blood pressure of 106/63, temperature of 97.6, pulse of 68, respiration rate of 16 with 96% oxygen saturation.    IMPRESSION:  Alcohol use disorder and major depressive disorder.    RECOMMENDATIONS:    1.  Chemical dependency consult.  2.  Social work consult.  3.  Lexapro 10 mg p.o. q.a.m.  4.  Remeron 15 mg at bedtime.     We really should do whatever we can to help this patient get into a Chem depth treatment.  She would like to go inpatient.    Wilmar Carrion MD        D: 2021   T: 2021   MT: lynn    Name:     MINNA WOOD  MRN:      [id number]        Account:      [id number]   :      1955           Consult Date: 2021     Document: [id number]

## 2021-05-28 ENCOUNTER — RECORDS - HEALTHEAST (OUTPATIENT)
Dept: ADMINISTRATIVE | Facility: OTHER | Age: 66
End: 2021-05-28

## 2021-05-28 LAB — MAGNESIUM SERPL-MCNC: 2.3 MG/DL (ref 1.6–2.3)

## 2021-05-28 PROCEDURE — 250N000012 HC RX MED GY IP 250 OP 636 PS 637: Performed by: HOSPITALIST

## 2021-05-28 PROCEDURE — 94640 AIRWAY INHALATION TREATMENT: CPT

## 2021-05-28 PROCEDURE — 99232 SBSQ HOSP IP/OBS MODERATE 35: CPT | Performed by: INTERNAL MEDICINE

## 2021-05-28 PROCEDURE — 999N000157 HC STATISTIC RCP TIME EA 10 MIN

## 2021-05-28 PROCEDURE — 83735 ASSAY OF MAGNESIUM: CPT | Performed by: INTERNAL MEDICINE

## 2021-05-28 PROCEDURE — 120N000002 HC R&B MED SURG/OB UMMC

## 2021-05-28 PROCEDURE — 36416 COLLJ CAPILLARY BLOOD SPEC: CPT | Performed by: INTERNAL MEDICINE

## 2021-05-28 PROCEDURE — 999N000216 HC STATISTIC ADULT CD FACE TO FACE-NO CHRG

## 2021-05-28 PROCEDURE — 250N000013 HC RX MED GY IP 250 OP 250 PS 637: Performed by: HOSPITALIST

## 2021-05-28 PROCEDURE — 250N000013 HC RX MED GY IP 250 OP 250 PS 637: Performed by: INTERNAL MEDICINE

## 2021-05-28 PROCEDURE — 250N000013 HC RX MED GY IP 250 OP 250 PS 637: Performed by: FAMILY MEDICINE

## 2021-05-28 PROCEDURE — 94640 AIRWAY INHALATION TREATMENT: CPT | Mod: 76

## 2021-05-28 PROCEDURE — 250N000009 HC RX 250: Performed by: HOSPITALIST

## 2021-05-28 PROCEDURE — 250N000009 HC RX 250: Performed by: INTERNAL MEDICINE

## 2021-05-28 RX ADMIN — DIAZEPAM 5 MG: 5 TABLET ORAL at 22:31

## 2021-05-28 RX ADMIN — NALTREXONE HYDROCHLORIDE 50 MG: 50 TABLET, FILM COATED ORAL at 09:42

## 2021-05-28 RX ADMIN — IPRATROPIUM BROMIDE AND ALBUTEROL SULFATE 3 ML: .5; 3 SOLUTION RESPIRATORY (INHALATION) at 16:01

## 2021-05-28 RX ADMIN — FOLIC ACID 1 MG: 1 TABLET ORAL at 09:42

## 2021-05-28 RX ADMIN — PREDNISONE 40 MG: 10 TABLET ORAL at 09:40

## 2021-05-28 RX ADMIN — BUDESONIDE 0.5 MG: 0.5 INHALANT RESPIRATORY (INHALATION) at 21:56

## 2021-05-28 RX ADMIN — DIAZEPAM 10 MG: 5 TABLET ORAL at 05:11

## 2021-05-28 RX ADMIN — IPRATROPIUM BROMIDE AND ALBUTEROL SULFATE 3 ML: .5; 3 SOLUTION RESPIRATORY (INHALATION) at 21:56

## 2021-05-28 RX ADMIN — IPRATROPIUM BROMIDE AND ALBUTEROL SULFATE 3 ML: .5; 3 SOLUTION RESPIRATORY (INHALATION) at 07:51

## 2021-05-28 RX ADMIN — DIAZEPAM 10 MG: 5 TABLET ORAL at 10:03

## 2021-05-28 RX ADMIN — IPRATROPIUM BROMIDE AND ALBUTEROL SULFATE 3 ML: .5; 3 SOLUTION RESPIRATORY (INHALATION) at 11:20

## 2021-05-28 RX ADMIN — MULTIPLE VITAMINS W/ MINERALS TAB 1 TABLET: TAB at 09:41

## 2021-05-28 RX ADMIN — ESCITALOPRAM OXALATE 10 MG: 10 TABLET ORAL at 09:41

## 2021-05-28 RX ADMIN — BUDESONIDE 0.5 MG: 0.5 INHALANT RESPIRATORY (INHALATION) at 07:51

## 2021-05-28 RX ADMIN — MIRTAZAPINE 15 MG: 15 TABLET, FILM COATED ORAL at 22:31

## 2021-05-28 RX ADMIN — METHOCARBAMOL 750 MG: 750 TABLET ORAL at 18:55

## 2021-05-28 RX ADMIN — HYDROXYZINE HYDROCHLORIDE 25 MG: 25 TABLET, FILM COATED ORAL at 22:31

## 2021-05-28 RX ADMIN — POTASSIUM CHLORIDE 20 MEQ: 750 TABLET, EXTENDED RELEASE ORAL at 09:41

## 2021-05-28 RX ADMIN — BUMETANIDE 1 MG: 1 TABLET ORAL at 09:42

## 2021-05-28 RX ADMIN — THIAMINE HCL TAB 100 MG 100 MG: 100 TAB at 09:41

## 2021-05-28 RX ADMIN — METHOCARBAMOL 750 MG: 750 TABLET ORAL at 12:52

## 2021-05-28 RX ADMIN — NICOTINE 1 PATCH: 21 PATCH, EXTENDED RELEASE TRANSDERMAL at 09:42

## 2021-05-28 RX ADMIN — HYDROXYZINE HYDROCHLORIDE 25 MG: 25 TABLET, FILM COATED ORAL at 15:05

## 2021-05-28 RX ADMIN — LEVOFLOXACIN 500 MG: 500 TABLET, FILM COATED ORAL at 09:41

## 2021-05-28 RX ADMIN — OMEPRAZOLE 20 MG: 20 CAPSULE, DELAYED RELEASE ORAL at 09:41

## 2021-05-28 RX ADMIN — PROCHLORPERAZINE MALEATE 10 MG: 5 TABLET ORAL at 12:52

## 2021-05-28 ASSESSMENT — ANXIETY QUESTIONNAIRES
GAD7 TOTAL SCORE: 7
GAD7 TOTAL SCORE: 5
GAD7 TOTAL SCORE: 19
GAD7 TOTAL SCORE: 9
GAD7 TOTAL SCORE: 17
GAD7 TOTAL SCORE: 13
GAD7 TOTAL SCORE: 5
GAD7 TOTAL SCORE: 2
GAD7 TOTAL SCORE: 18
GAD7 TOTAL SCORE: 2
GAD7 TOTAL SCORE: 8
GAD7 TOTAL SCORE: 2

## 2021-05-28 NOTE — PLAN OF CARE
VS:     Pt A/O X 4. Afebrile. VSS. Lungs-clear bilaterally with both anterior and posterior. IS encouraged. Denies chest pain. Pt having shortness of breath, is on 3 LPM O2 via nasal cannula with O2 sats at 97%.      Output:     Bowels- active in all four quadrants. Last BM 5/28, pt had BM this am. Voids spontaneously without difficulty in the bathroom.      Activity:     Pt up in room and to bathroom independently with steady gait.     Skin: Intact.     Pain:     Pt has generalized pain that is well tolerated with prn Robaxin and prn Atarax.      CMS:     CMS and Neuro's are intact. Denies numbness and tingling in all extremities.      Dressing:     No dressing in place.    Diet:     Pt is on a regular diet and appetite was fair this shift.       LDA:     PIV is patent in the left arm and SL.      Equipment:     Seizure pad on bed. No PCDs in place, pt up in room independently ad tre and ambulates frequently.      Plan:     Pt is able to make needs known and the call light is within the pt's reach. Continue to monitor.       Additional Info:     MSSA scores of 9 and 5. Given prn Valium per MSSA protocol.

## 2021-05-28 NOTE — PROGRESS NOTES
Nicotine Inhaler not covered by insurance, so this was discontinued through [facility], and they stated the Nicotine Nasal Spray was accepted by the insurance.  Call placed to Patsy about the change, and message left for her to call so we could explain the change, but if she does not reach us by 1530, that her pharmacy would also be able to discuss the change with her.

## 2021-05-28 NOTE — PROGRESS NOTES
ASSESSMENT/PLAN:  1. Abdominal bloating  CT Abdomen Pelvis With Oral With IV Contrast    Comprehensive Metabolic Panel    HM1(CBC and Differential)    HM1 (CBC with Diff)   2. Chronic left-sided low back pain with left-sided sciatica  Ambulatory referral to PT/OT   3. Severe alcohol use disorder (H)  CANCELED: Ethyl Glucuronide and Ethyl Sulfate, Urine, Quantitative (CDCO ETG/S)       This is a 63 yo female with:  1.  Recent hospitalization for alcoholism - 4/16/19-5/7/19 - remains sober currently; feels better  2.  Abdominal bloating - likely related to alcoholism - check CT abdomen/pelvis, check labs  3.  Chronic left sided low back pain - left sciatica :  No clear h/o trauma - refer to PT  Return in about 1 month (around 6/10/2019).      There are no discontinued medications.  There are no Patient Instructions on file for this visit.    Chief Complaint:  Chief Complaint   Patient presents with     Follow-up       HPI:   Patsy Santamaria is a 64 y.o. female c/o  Has been having back problems for months now - workup was negative for UTI/kidney stones.  Got some muscle relaxers - didn't help  Can't walk more than 2 blocks  Doesn't matter what kind of shoes she wears - the back hurts all the time   Lost 12 pounds - stomach is all bloated and fat     Out of hospital on 5/7 - [place] - 1/2 block away from hospital  Has psychiatrist appointment on 5/16 - is going to ask her about pet therapy dog  Going back to meetings    Stomach is bloated - just in past month   Weight had gone up - was 252#, then lost 12 pounds -     Back of knee is tight - on left -   Comes from back down leg    PMH:   Patient Active Problem List    Diagnosis Date Noted     Dyspnea on exertion      Anxiety 03/25/2019     Hypomagnesemia 03/25/2019     Primary osteoarthritis of left knee 07/10/2018     Seasonal allergies 07/09/2018     Severe episode of recurrent major depressive disorder, without psychotic features (H)       Alcoholic hepatitis      Peripheral edema      Diuretic-induced hypokalemia      Alcohol use disorder, severe, dependence (H) 2018     Primary osteoarthritis of right knee 2018     Alcohol withdrawal (H) 2018     Chronic alcohol dependence, continuous (H) 2018     Low backache 2018     Morbid obesity (H) 2018     Bilateral edema of lower extremity 2017     Snoring 2017     Carpal tunnel syndrome on both sides 2017     Trochanteric bursitis of left hip 10/25/2016     Alcohol withdrawal seizure without complication (H) 2016     Hypoxia 2016     Alcohol abuse 2016     Elevated LFTs 2016     New onset seizure (H) 2016     Claustrophobia 2015     Cervical disc disease 2015     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) 2015     Post traumatic stress disorder 2015     Chronic Reflux Esophagitis      Peripheral Neuropathy      Localized Primary Osteoarthritis Of The Carpometacarpal Joint      Ganglion Of The Right Foot      Major depression, recurrent (H)      Nicotine Dependence      Vitamin D deficiency      Past Medical History:   Diagnosis Date     Acute solar dermatitis      Alcohol abuse      Anxiety      Arthritis      Chronic alcohol dependence, continuous (H) 3/16/2018     Chronic reflux esophagitis      COPD (chronic obstructive pulmonary disease) (H)      Dermatitis      Ganglion     right foot     H. pylori infection      Low backache 3/16/2018     Menopause     age 52     Past Surgical History:   Procedure Laterality Date     APPENDECTOMY       [place] APPENDECTOMY      Description: Appendectomy;  Recorded: 2008;  Comments: childhood     [place]  DELIVERY ONLY      Description:  Section;  Recorded: 2008;     [place] EXCIS TENDN/CAPSULE LESN,FOOT      Description: Excision Of Cyst Of Tendon Sheath Of Foot;  Proc Date: 10/28/2011;  Comments: [facility]     [place]  HEMORRHOIDECTOMY INTERNAL RUBBER BAND LIGATIONS      Description: Hemorrhoidectomy;  Recorded: 09/01/2008;     PA KNEE SCOPE,DIAGNOSTIC      Description: Arthroscopy Knee Right;  Proc Date: 10/11/2005;  Comments: for right knee patella subluxation with lateral retinacular release; subpatellar chondroplasty     PA LATERAL RETINACULAR RELEASE OPEN      Description: Knee Lateral Retinacular Release;  Proc Date: 10/11/2005;     PA LIGATE FALLOPIAN TUBE      Description: Tubal Ligation;  Recorded: 09/01/2008;     SKIN BIOPSY       TONSILLECTOMY       Social History     Socioeconomic History     Marital status: Single     Spouse name: Not on file     Number of children: Not on file     Years of education: Not on file     Highest education level: Not on file   Occupational History     Occupation: [employer]     Employer: [employer]     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[facility])   Social Needs     Financial resource strain: Not on file     Food insecurity:     Worry: Not on file     Inability: Not on file     Transportation needs:     Medical: Not on file     Non-medical: Not on file   Tobacco Use     Smoking status: Current Every Day Smoker     Packs/day: 1.00     Years: 48.00     Pack years: 48.00     Types: Cigarettes     Smokeless tobacco: Never Used     Tobacco comment: 1 pack per day   Substance and Sexual Activity     Alcohol use: Not Currently     Comment: 1 liter a day- vodka     Drug use: No     Sexual activity: Yes     Partners: Male     Birth control/protection: None   Lifestyle     Physical activity:     Days per week: Not on file     Minutes per session: Not on file     Stress: Not on file   Relationships     Social connections:     Talks on phone: Not on file     Gets together: Not on file     Attends Zoroastrianism service: Not on file     Active member of club or organization: Not on file     Attends meetings of clubs or organizations: Not on file     Relationship status: Not on file     Intimate partner  violence:     Fear of current or ex partner: Not on file     Emotionally abused: Not on file     Physically abused: Not on file     Forced sexual activity: Not on file   Other Topics Concern     Not on file   Social History Narrative     Not on file     Family History   Problem Relation Age of Onset     Heart disease Mother      Heart disease Father        Meds:    Current Outpatient Medications:      albuterol (PROAIR HFA;PROVENTIL HFA;VENTOLIN HFA) 90 mcg/actuation inhaler, Inhale 2 puffs 4 (four) times a day as needed for wheezing or shortness of breath., Disp: 1 Inhaler, Rfl: 0     budesonide-formoterol (SYMBICORT) 80-4.5 mcg/actuation inhaler, Inhale 2 puffs 2 (two) times a day., Disp: 1 Inhaler, Rfl: 12     cetirizine (ZYRTEC) 10 MG tablet, Take 10 mg by mouth daily as needed for allergies., Disp: , Rfl:      cyclobenzaprine (FLEXERIL) 10 MG tablet, Take 1 tablet (10 mg total) by mouth at bedtime as needed for muscle spasms., Disp: 30 tablet, Rfl: 0     diclofenac sodium (VOLTAREN) 1 % Gel, Apply 1 g topically 2 (two) times a day as needed (pain)., Disp: 100 g, Rfl: 0     DULoxetine (CYMBALTA) 60 MG capsule, Take 1 capsule (60 mg total) by mouth at bedtime., Disp: 30 capsule, Rfl: 0     fluticasone (FLONASE) 50 mcg/actuation nasal spray, Apply 1 spray into each nostril daily as needed for rhinitis., Disp: , Rfl:      furosemide (LASIX) 80 MG tablet, TAKE ONE TABLET BY MOUTH ONE TIME DAILY , Disp: 90 tablet, Rfl: 2     gabapentin (NEURONTIN) 300 MG capsule, Take 1 capsule (300 mg total) by mouth at bedtime., Disp: 30 capsule, Rfl: 0     hydrOXYzine pamoate (VISTARIL) 50 MG capsule, Take 1 capsule (50 mg total) by mouth at bedtime as needed (anxiety/insomnia)., Disp: 30 capsule, Rfl: 0     ipratropium-albuterol (DUO-NEB) 0.5-2.5 mg/3 mL nebulizer, Take 3 mL by nebulization every 6 (six) hours as needed (wheezing, short of breath)., Disp: 90 mL, Rfl: 1     omeprazole (PRILOSEC) 20 MG capsule, Take 1 capsule (20  mg total) by mouth at bedtime., Disp: 30 capsule, Rfl: 0     potassium chloride (K-DUR,KLOR-CON) 20 MEQ tablet, Take 1 tablet (20 mEq total) by mouth daily., Disp: , Rfl: 0     naltrexone (DEPADE) 50 mg tablet, Take 2 tablets (100 mg total) by mouth daily., Disp: 60 tablet, Rfl: 1     nicotine (NICOTROL) 10 mg/mL Spry, 1-2 sprays every hour up to no more than 20 daily, Disp: 40 mL, Rfl: 2    Allergies:  Allergies   Allergen Reactions     Codeine Nausea Only     Hydrochlorothiazide Rash     phototoxicity - med was d/lora       Diclofenac Nausea Only     Tolerates the topical gel     Sulfa (Sulfonamide Antibiotics) Rash     Sulfasalazine Rash       ROS:  Pertinent positives as noted in HPI; otherwise 12 point ROS negative.      Physical Exam:  EXAM:  /70 (Patient Site: Right Arm, Patient Position: Sitting, Cuff Size: Adult Large)   Pulse 82   Wt (!) 242 lb (109.8 kg)   LMP 03/06/2002   SpO2 97%   Breastfeeding? No   BMI 41.54 kg/m     Gen:  NAD, appears well, well-hydrated  HEENT:  TMs nl, oropharynx benign, nasal mucosa nl, conjunctiva clear  Neck:  Supple, no adenopathy, no thyromegaly, no carotid bruits, no JVD  Lungs:  Clear to auscultation bilaterally  Cor:  RRR no murmur  Abd:  Soft, nontender, sl distended; BS+, no masses, no guarding or rebound, no HSM  Extr:  Neg.  Neuro:  No asymmetry, Nl motor tone/strength, nl sensation, reflexes =, gait nl, nl coordination, CN intact,   Skin:  Warm/dry        Results:  Results for orders placed or performed in visit on 05/13/19   Comprehensive Metabolic Panel   Result Value Ref Range    Sodium 141 136 - 145 mmol/L    Potassium 3.8 3.5 - 5.0 mmol/L    Chloride 100 98 - 107 mmol/L    CO2 26 22 - 31 mmol/L    Anion Gap, Calculation 15 5 - 18 mmol/L    Glucose 85 70 - 125 mg/dL    BUN 5 (L) 8 - 22 mg/dL    Creatinine 0.65 0.60 - 1.10 mg/dL    GFR MDRD Af Amer >60 >60 mL/min/1.73m2    GFR MDRD Non Af Amer >60 >60 mL/min/1.73m2    Bilirubin, Total 0.3 0.0 - 1.0 mg/dL     Calcium 10.1 8.5 - 10.5 mg/dL    Protein, Total 7.0 6.0 - 8.0 g/dL    Albumin 3.7 3.5 - 5.0 g/dL    Alkaline Phosphatase 82 45 - 120 U/L    AST 44 (H) 0 - 40 U/L    ALT 55 (H) 0 - 45 U/L   HM1 (CBC with Diff)   Result Value Ref Range    WBC 6.2 4.0 - 11.0 thou/uL    RBC 4.62 3.80 - 5.40 mill/uL    Hemoglobin 14.9 12.0 - 16.0 g/dL    Hematocrit 44.3 35.0 - 47.0 %    MCV 96 80 - 100 fL    MCH 32.3 27.0 - 34.0 pg    MCHC 33.6 32.0 - 36.0 g/dL    RDW 12.8 11.0 - 14.5 %    Platelets 246 140 - 440 thou/uL    MPV 10.0 8.5 - 12.5 fL    Neutrophils % 55 50 - 70 %    Lymphocytes % 30 20 - 40 %    Monocytes % 10 2 - 10 %    Eosinophils % 4 0 - 6 %    Basophils % 1 0 - 2 %    Neutrophils Absolute 3.4 2.0 - 7.7 thou/uL    Lymphocytes Absolute 1.9 0.8 - 4.4 thou/uL    Monocytes Absolute 0.6 0.0 - 0.9 thou/uL    Eosinophils Absolute 0.3 0.0 - 0.4 thou/uL    Basophils Absolute 0.0 0.0 - 0.2 thou/uL

## 2021-05-28 NOTE — TELEPHONE ENCOUNTER
Since Dr Brunner had a 915 inpt spot open on 5.16 the same time pt was scheduled to see Oliverio, I just moved the patient over to Brunner on 5.16

## 2021-05-28 NOTE — PROGRESS NOTES
A/Ox's 4. Pt denied pain. MSSA Scored 9. Pt did not want to wake up for additional scoring and wanted to sleep. Pt is on 3L oxygen via NC. CMS intact. Nausea better after PO compazine per patient.  Denied any, CP, SOB, lightheadedness or dizziness. Voiding without pain or difficulty. Passing flatus. Up with SBA. Resting in bed at this time with call light in reach. Able to make needs known. Continue to monitor.

## 2021-05-28 NOTE — PROGRESS NOTES
"TCM DISCHARGE FOLLOW UP CALL    Discharge Date:  5/7/2019  Reason for hospital stay (discharge diagnosis)::  Alcohol use disorder, severe, dependence, Major depression, recurrent, COPD  Are you feeling better, the same or worse since your discharge?:  Patient is feeling better (Doing well since home.  Still having back pain.)  Do you feel like you have a plan in the event of a health emergency?: Yes (Call friend, [phone])    As part of your discharge plan, were  home care services ordered for you?: No    Did you receive any new medications, or was there a change to your medications?: Yes    Are you taking those medications, or do you have any established regiment?:  RN reviewed discharge medications w/pt.  Pt states \"I'm following the same routine they had me on at the hospital,\" taking all my medications except naltrexone (waiting for refill from pharmacy, wasn't ready yesterday), and Symbicort as she didn't get a prescription for it and doesn't have it at home; she is taking her Albuterol inhaler PRN.  States she is taking Flexeril at HS for back pain, Vistaril at HS, \"not as anxious when I wake up in the morning,\" Voltaren gel for knee & back pain, & potassium once a day.  Pt confirmed she's stopped folic acid, hydroxyzine HCl, mupirocin, & thiamine, as instructed.  Do you have any follow up visits scheduled with your PCP or Specialist?:  Yes, with PCP and Yes, with Specialist (Dr. Wade 5/13/19)  (RN) Is PCP appt scheduled soon enough (within 14 days of discharge date)?: Yes    Who are you seeing and when is it scheduled?:  Dr. Brunner (psychiatrist) 5/16/19      Meka Styles RN Care Manager, Population Health    "

## 2021-05-28 NOTE — PROGRESS NOTES
[facility]    Medicine Progress Note - Hospitalist Service       Date of Admission:  2021  Assessment & Plan       66 year old female  with Hx of COPD, tobacco use disorder 1 pack/day, alcohol use disorder, ongoing withdrawal seizures, chronic lower extremity edema, obesity, GERD, anxiety, depression, obstructive sleep apnea noncompliant to CPAP.  Patient came to the ED for evaluation of shortness of breath and seeking detox from alcohol.     # Shortness of breath with cough.    - Suspect this is COPD exacerbation.  She could have some acute bronchitis triggering this.  She smokes 1 pack/day.  Over last 1 week she has had increased shortness of breath and cough with yellowish expectoration.  Chest x-ray was negative for any pneumonia.  Differential diagnosis includes cardiomyopathy from alcohol abuse, coronary artery disease. PE is in the differential diagnosis was considered less likely.  -Continue on prednisone 40 mg p.o. daily, DuoNeb inhalation 4 times a day, Levaquin 500 mg p.o. daily for 5 days  -Troponin negative. TTE as below with normal EF.   -At some point of time she needs a stress test. She has family history of coronary disease in her father who  at the age of 46 from heart attack  -Recommend tobacco cessation  -Patient has gained 30 pounds of weight in last 1 year.  She is physically very deconditioned as well. PT / OT  -She needs to start using her CPAP back again  -Incentive spirometry     #Alcohol use disorder, history of alcohol withdrawal seizures.  -Continue on MSSA protocol with Valium  -CD consult. SW consult.   -Continue on multivitamin, folic acid, thiamine  -Alcohol withdrawal is improving     #Depression  -Psychiatry recommended to start Lexapro and Remeron     #Obesity, obstructive sleep apnea  -Continue on CPAP  -Recommend to lose weight as outpatient  -Check HbA1c in AM to look for Diabetes     #Bilateral lower extremity edema.  It  is chronic in nature she is chronically on Bumex 1 mg p.o. daily  -Continue Bumex 1 mg p.o. daily along with potassium supplements  -Bilateral No deep venous thrombosis     #Tobacco use disorder  -Start nicotine patch 1 pack/day  -Recommend tobacco cessation       Diet: Regular Diet Adult    DVT Prophylaxis: Pneumatic Compression Devices  Viveros Catheter: not present  Code Status: Full Code           Disposition Plan   Expected discharge: TBD   Entered: Robert Chapman MD 05/28/2021, 2:49 PM       The patient's care was discussed with the Patient.    Robert Chapman MD  Hospitalist Service  [facility]  Contact information available via [facility] Paging/Directory    ______________________________________________________________________    Interval History   No acute events overnight  She was pleasant   Breathing is improving.   Alcohol withdrawal is improving  No chest pain or palpitations   No nausea or vomit.     Data reviewed today: I reviewed all medications, new labs and imaging results over the last 24 hours. I personally reviewed no images or EKG's today.    Physical Exam   Vital Signs: Temp: 95.7  F (35.4  C) Temp src: Oral BP: 133/66 Pulse: 75   Resp: 18 SpO2: 97 % O2 Device: Nasal cannula Oxygen Delivery: 2 LPM  Weight: 256 lbs 0 oz  Constitutional: Obesity noted awake, alert, cooperative, no apparent distress.  Eyes: Conjunctiva and pupils examined and normal.  Respiratory: Normal respiratory effort, bilateral wheezing, no crackles.   Cardiovascular: RRR, normal S1 and S2, and no murmur noted.  GI: Soft, non-distended, non-tender, normal bowel sounds.  Lymph/Hematologic: No anterior cervical or supraclavicular adenopathy.  Skin: No rashes, no cyanosis.  Bilateral lower extremity edema noted.  1+.  Musculoskeletal: No joint swelling, erythema or tenderness.  Neurologic: Cranial nerves 2-12 intact, normal strength and sensation.  Psychiatric: Alert, flat  affect     Data   Recent Labs   Lab 05/27/21  0228 05/26/21  2245 05/26/21  1134 05/26/21  1103   WBC 5.0  --   --  6.0   HGB 14.3  --   --  15.4   MCV 96  --   --  93   *  --   --  144*     --   --  137   POTASSIUM 3.5  --   --  3.1*   CHLORIDE 97  --   --  95   CO2 34*  --   --  32   BUN 14  --   --  10   CR 0.70  --   --  0.61   ANIONGAP 6  --   --  10   JOSEFA 9.0  --   --  9.3   *  --   --  109*   ALBUMIN  --   --   --  3.5   PROTTOTAL  --   --   --  7.7   BILITOTAL  --   --   --  2.3*   ALKPHOS  --   --   --  130   ALT  --   --   --  91*   AST  --   --   --  194*   LIPASE  --   --   --  158   TROPI <0.015 <0.015  --  <0.015   TROPONIN  --   --  0.00  --      No results found for this or any previous visit (from the past 24 hour(s)).  Medications       budesonide  0.5 mg Nebulization BID     bumetanide  1 mg Oral Daily     escitalopram  10 mg Oral Daily     folic acid  1 mg Oral Daily     ipratropium - albuterol 0.5 mg/2.5 mg/3 mL  3 mL Nebulization 4x daily     levofloxacin  500 mg Oral Daily     mirtazapine  15 mg Oral At Bedtime     multivitamin w/minerals  1 tablet Oral Daily     naltrexone  50 mg Oral Daily     nicotine  1 patch Transdermal Daily     nicotine   Transdermal Q8H     omeprazole  20 mg Oral Daily     potassium chloride ER  20 mEq Oral Daily     predniSONE  40 mg Oral Daily     sodium chloride (PF)  3 mL Intracatheter Q8H     thiamine  100 mg Oral Daily

## 2021-05-28 NOTE — PROGRESS NOTES
Acupuncture Clinical Internship Intake and Treatment Documentation   [facility]    Date:  5/28/2021  Patient Name:  Patsy Santamaria   YOB: 1955     Repeat Patient:  no  Has patient had acupoint/acupressure treatment before:  no    Signed consent placed in the medical record:  yes  Patient/Family verbalizes understanding of risks and benefits:  yes  Required information provided to patient:  yes    Diagnosis:  Wheezing [R06.2]  Alcohol dependence with intoxication with complication (H) [F10.229]  Edema, unspecified type [R60.9]    Patient condition and treatment:  Wheezing, low back pain, anxiety    Reason for Intervention Today/Chief Complaint:  Wheezing, low back pain, anxierty    Isolation:  No  Type:  None    PRE-SCORE:  moderate    Other Western medical information:  Chronic edema, alcohol withdrawal    Medications   Current Facility-Administered Medications:      alum & mag hydroxide-simethicone (MAALOX) suspension 30 mL, 30 mL, Oral, Q4H PRN, Thaddeus Renteria MD     budesonide (PULMICORT) neb solution 0.5 mg, 0.5 mg, Nebulization, BID, Robert Chapman MD, 0.5 mg at 05/28/21 0751     bumetanide (BUMEX) tablet 1 mg, 1 mg, Oral, Daily, Francis Morgan MD, 1 mg at 05/28/21 0942     diazepam (VALIUM) tablet 5-20 mg, 5-20 mg, Oral, Q30 Min PRN, Thaddeus Renteria MD, 10 mg at 05/28/21 1003     escitalopram (LEXAPRO) tablet 10 mg, 10 mg, Oral, Daily, Robert Chapman MD, 10 mg at 05/28/21 0941     folic acid (FOLVITE) tablet 1 mg, 1 mg, Oral, Daily, Thaddeus Renteria MD, 1 mg at 05/28/21 0942     hydrOXYzine (ATARAX) tablet 25-50 mg, 25-50 mg, Oral, TID PRN, Thaddeus Renteria MD, 25 mg at 05/27/21 0338     ipratropium - albuterol 0.5 mg/2.5 mg/3 mL (DUONEB) neb solution 3 mL, 3 mL, Nebulization, 4x daily, Thaddeus Renteria MD, 3 mL at 05/28/21 1120     levofloxacin (LEVAQUIN) tablet 500 mg, 500 mg, Oral, Daily, Thaddeus Renteria MD, 500 mg at 05/28/21  0941     lidocaine (LMX4) cream, , Topical, Q1H PRN, Thaddeus Renteria MD     lidocaine 1 % 0.1-1 mL, 0.1-1 mL, Other, Q1H PRN, Thaddeus Renteria MD     melatonin tablet 1 mg, 1 mg, Oral, At Bedtime PRN, Thaddeus Renteria MD     methocarbamol (ROBAXIN) tablet 750 mg, 750 mg, Oral, TID PRN, Thaddeus Renteria MD, 750 mg at 05/28/21 1252     mirtazapine (REMERON) tablet 15 mg, 15 mg, Oral, At Bedtime, oRbert Chapman MD, 15 mg at 05/27/21 2208     multivitamin w/minerals (THERA-VIT-M) tablet 1 tablet, 1 tablet, Oral, Daily, Thaddeus Renteria MD, 1 tablet at 05/28/21 0941     naltrexone (DEPADE/REVIA) tablet 50 mg, 50 mg, Oral, Daily, Thaddeus Renteria MD, 50 mg at 05/28/21 0942     nicotine (NICODERM CQ) 21 MG/24HR 24 hr patch 1 patch, 1 patch, Transdermal, Daily, Thaddeus Renteria MD, 1 patch at 05/28/21 0942     nicotine Patch in Place, , Transdermal, Q8H, Thaddeus Renteria MD     omeprazole (priLOSEC) CR capsule 20 mg, 20 mg, Oral, Daily, Thaddeus Renteria MD, 20 mg at 05/28/21 0941     ondansetron (ZOFRAN-ODT) ODT tab 4 mg, 4 mg, Oral, Q6H PRN, 4 mg at 05/27/21 1658 **OR** ondansetron (ZOFRAN) injection 4 mg, 4 mg, Intravenous, Q6H PRN, Thaddeus Renteria MD, 4 mg at 05/27/21 1049     polyethylene glycol (MIRALAX) Packet 17 g, 17 g, Oral, Daily PRN, Thaddeus Renteria MD     potassium chloride ER (KLOR-CON M) CR tablet 20 mEq, 20 mEq, Oral, Daily, Thaddeus Renteria MD, 20 mEq at 05/28/21 0941     predniSONE (DELTASONE) tablet 40 mg, 40 mg, Oral, Daily, Thaddeus Renteria MD, 40 mg at 05/28/21 0940     prochlorperazine (COMPAZINE) injection 10 mg, 10 mg, Intravenous, Q6H PRN **OR** prochlorperazine (COMPAZINE) tablet 10 mg, 10 mg, Oral, Q6H PRN, 10 mg at 05/28/21 1252 **OR** prochlorperazine (COMPAZINE) suppository 25 mg, 25 mg, Rectal, Q12H PRN, Thaddeus Renteria MD     sodium chloride (PF) 0.9% PF flush 3 mL, 3 mL, Intracatheter, Q8H, Thaddeus Renteria MD, 3 mL at 05/28/21 0950     sodium chloride (PF) 0.9% PF flush 3 mL, 3  mL, Intracatheter, q1 min prn, Thaddeus Renteria MD     thiamine (B-1) tablet 100 mg, 100 mg, Oral, Daily, Thaddeus Renteria MD, 100 mg at 05/28/21 0941     traZODone (DESYREL) tablet 50 mg, 50 mg, Oral, At Bedtime PRN, Thaddeus Renteria MD  No current outpatient medications on file.       Pre-Treatment Assessment  Chief Complaint/ Reason for Intervention Today:  Anxiety, alcohol withdrawal, L low back pain  Chief Complaint Pre-Score:  moderate   Describe:  Achy low back pain, anxiety, trembling hands  Pain Location:  L low back  Pre Session Pain:  Moderate  Pre Session Anxiety:  Moderate  Pre Session Nausea:  Moderate    10 Traditional Chinese Medicine Assessment Questions  - Cold/ Heat:  NA   - Sweat:  NA  - Headaches/Body aches:  SEE MC   - Chest/Abdomen:  SOB (COPD) A  - Digestion:  Nausea, low appetite,   - Bowel Movement/Urination:  At least one BM day   - Hearing/Vision:  NA  - Sleep (prior to hospital):  Has trouble falling asleep  - Energy:  Low energy    - Emotions:  Anxiety, depression, irritability on and off   - Ob Gyn:  NA  - Miscellaneous:  NA    Traditional Chinese Medicine Assessment  - TONGUE:  Not observed due to covid   - PULSE:  Thin, weak in kidney position   - OBSERVATIONS:  Tired, water accumulation      Traditional Chinese Medicine Diagnosis  - BRANCH:  Stomach Qi rebelling, water accumulation, UB AND GB stagnation  - ROOT:  Blood deficiency, kidney deficiency, liver stagnation     TP: regulate lung, calm lazo, tonify kidney, open UB/GB channel    Traditional Chinese Medicine Treatment  - ACUPUNCTURE:  Ki 3  Ub 60  NADA  GB 34 L  Gb 31 R  Kathie 3 L  YIN RESTREPO  Trent mariana gabriel R  PC 6 R  ABIMBOLA 7 R  HT 7 R          - NEEDLE COUNT: In: 22  Out: 22    Time In: 2:25     Magnet informed consent signed and given:  no    Post Treatment Assessment  Chief complaint post score:  better  Post Session Observation:  Patient seemed happy   Patient/Family Education:  yes  Verbal information provided:  yes  Written  information provided:  no  All questions answered at time of treatment:  yes    Treatment/Procedure(s) performed by:  Radha Adames    Date: 5/28/2021     I attest that this acupuncture treatment was done under my supervision and this note is complete and true.     Supervising acupuncturist:  Sean Gregg LAc [facility] license #: [id number]  P: [phone]

## 2021-05-28 NOTE — CONSULTS
Care Management Initial Consult    General Information  Assessment completed with: Patient, VM-chart review  Type of CM/SW Visit: Initial Assessment    Primary Care Provider verified and updated as needed: Yes   Readmission within the last 30 days: no previous admission in last 30 days         Advance Care Planning: Advance Care Planning Reviewed: no concerns identified        Communication Assessment  Patient's communication style: spoken language (English or Bilingual)    Hearing Difficulty or Deaf: no   Wear Glasses or Blind: no    Cognitive  Cognitive/Neuro/Behavioral: WDL                      Living Environment:   People in home: alone     Current living Arrangements: apartment      Able to return to prior arrangements: yes     Family/Social Support:  Care provided by: self  Provides care for: no one  Marital Status: Single  Sibling(s)          Description of Support System: Supportive, Involved    Support Assessment: Adequate family and caregiver support  Pt reported that she has two sisters that live in the [facility] area and they are supportive.     Current Resources:   Patient receiving home care services: No  Community Resources: None  Equipment currently used at home: None  Supplies currently used at home: None    Employment/Financial:  Employment Status:  Not discussed   Financial Concerns: No concerns identified      Lifestyle & Psychosocial Needs:        Socioeconomic History     Marital status: Single     Spouse name: Not on file     Number of children: Not on file     Years of education: Not on file     Highest education level: Not on file     Tobacco Use     Smoking status: Current Every Day Smoker     Packs/day: 1.25     Years: 25.00     Pack years: 31.25     Types: Cigarettes     Smokeless tobacco: Never Used       Functional Status:  Prior to admission patient needed assistance: Pt was ind with ADLS/IADLS PTA.        Mental Health Status:  Mental Health Status: Current Concern. Pt is diagnosed with  anxiety and depression - see Dr. Carrion's (psychiatriy) note from 05/27/21. Pt has an outpatient psychiatrist and therapist that she sees.     Chemical Dependency Status:  Chemical Dependency Status: Current Concern. Pt is diagnosed with alcohol use disorder - see Dr. Carrion's (psychiatry) note from 05/27/21.  The pt was also seen by PRINCE Dewitt for a CD consult, see her note from 05/28/21.  The pt plans to pursue outpatient CD treatment with [facility].         Values/Beliefs:  Spiritual, Cultural Beliefs, Mu-ism Practices, Values that affect care: no             Additional Information:  SW met with pt at bedside. SW introduced herself and explained the role of SW in discharge planning.  The pt was alert and oriented X3. The pt was very pleasant during the assessment.     JESU provided the pt with a print off from PRINCE Dewitt containing the contact information for [facility] for CD treatment (see 's 5/28/21 note).  The pt indicated that she plans to follow up with [facility] for outpatient treatment.     Regarding transportation, the pt stated that she does not think she has enough money to pay for a cab home. SW suggested asking if one of her sisters could transport her - the pt stated that she does not like to ask them for favors because it is embarrassing. SW provided validation and support.  SW relayed that transportation could be arranged via  EMS, but that it would be expensive (base rate of $70, plus $4 per mile).  The pt relayed that she would call and ask her sister for transportation.  The pt denied having any other concerns.     GOOD Larry  [facility]  5 Ortho & 8A   Ph: [phone]  Pager: [phone]

## 2021-05-28 NOTE — TELEPHONE ENCOUNTER
This medication was not ordered on discharge May 7,2019.    Date of Last Office Visit: 4/1/19  Date of Next Office Visit: 5/16/19  [facility]  No shows since last visit: none  Cancellations since last visit: none  ED visits since last visit:  4/6/19 for ETOH intoxication; Inpt. CD 4/16-5/7/19  Medication naltrexone 50mg tablet, two tablets daily date last ordered: 10/19/2018  Qty: 60  Refills: 0  Lapse in therapy greater than 7 days: most likely  Medication refill request verified as identical to current order: current refill request is the same as previous orders from the clinic but the naloxone on the medication list is 50mg daily, but order not printed or e-scripted and from provider Dr. Liu.  Result of Last DAM, VPA, Li+ Level, CBC, or Carbamazepine Level (at or since last visit):  4/1/19  DAM negative; Ethyl gluc. >96824, Ethyl sulfate >62851; fentanyl and metabolite  4/22/19   BNP and D-dimer  4/26/19 wet prep, vaginal  4/29/19   Basic metabolic panel (AST = 111, ALT - 153  4/30/19   Chlamydia = negative  4/6/19   Thyroid cascade; Hemogram with PLT (wbc - 3.3, RDW = 15, platelets 132) and DAM = positive for benzodiazepines  5/3/19   Urinalysis = cloudy, trace leukucytes, many bacteria,wbc 5-10, squam. Epithial. >100, few mucous and urine culture = negative     [] Medication refilled per [facility] M-1.   [x] Medication unable to be refilled by RN due to criteria not met as indicated below:     []Eligibility - not seen in last year    []Supervision - no future appointment    [x]Compliance     []Verification - order discrepancy    []Controlled Medication    []Medication not included in RN Protocol    []90 - day supply request    []Other   Current Medication list:    Patsy Santamaria    (MRN [id number])   Your Current Medications Are     albuterol (PROAIR HFA;PROVENTIL HFA;VENTOLIN HFA) 90 mcg/actuation inhaler Inhale 2 puffs 4 (four) times a day as needed for wheezing or shortness of breath.    budesonide-formoterol (SYMBICORT) 80-4.5 mcg/actuation inhaler Inhale 2 puffs 2 (two) times a day.   cetirizine (ZYRTEC) 10 MG tablet Take 10 mg by mouth daily as needed for allergies.   cyclobenzaprine (FLEXERIL) 10 MG tablet Take 1 tablet (10 mg total) by mouth at bedtime as needed for muscle spasms.   diclofenac sodium (VOLTAREN) 1 % Gel Apply 1 g topically 2 (two) times a day as needed (pain).   DULoxetine (CYMBALTA) 60 MG capsule Take 1 capsule (60 mg total) by mouth at bedtime.   fluticasone (FLONASE) 50 mcg/actuation nasal spray Apply 1 spray into each nostril daily as needed for rhinitis.   furosemide (LASIX) 80 MG tablet TAKE ONE TABLET BY MOUTH ONE TIME DAILY    gabapentin (NEURONTIN) 300 MG capsule Take 1 capsule (300 mg total) by mouth at bedtime.   hydrOXYzine pamoate (VISTARIL) 50 MG capsule Take 1 capsule (50 mg total) by mouth at bedtime as needed (anxiety/insomnia).   ipratropium-albuterol (DUO-NEB) 0.5-2.5 mg/3 mL nebulizer Take 3 mL by nebulization every 6 (six) hours as needed (wheezing, short of breath).   naltrexone (DEPADE) 50 mg tablet Take 1 tablet (50 mg total) by mouth daily.   omeprazole (PRILOSEC) 20 MG capsule Take 1 capsule (20 mg total) by mouth at bedtime.   potassium chloride (K-DUR,KLOR-CON) 20 MEQ tablet Take 1 tablet (20 mEq total) by mouth daily.   diclofenac sodium (VOLTAREN) 1 % Gel (Discontinued) Apply to affected area three times a day prn   DULoxetine (CYMBALTA) 60 MG capsule (Discontinued) TAKE ONE CAPSULE BY MOUTH AT BEDTIME    folic acid (FOLVITE) 1 MG tablet (Discontinued) Take 1 tablet (1 mg total) by mouth daily.   hydrOXYzine HCl (ATARAX) 50 MG tablet (Discontinued) Take 1 tablet (50 mg total) by mouth at bedtime as needed for anxiety (insomnia).   KLOR-CON M20 20 mEq tablet (Discontinued) TAKE ONE TABLET BY MOUTH TWICE DAILY    mupirocin (BACTROBAN) 2 % ointment (Discontinued) Apply topically 2 (two) times a day as needed.   omeprazole (PRILOSEC) 20 MG capsule  (Discontinued) TAKE ONE CAPSULE BY MOUTH AT BEDTIME    thiamine 100 MG tablet (Discontinued) Take 1 tablet (100 mg total) by mouth daily.   Allergies     Codeine   Hydrochlorothiazide   Diclofenac   Sulfa (sulfonamide Antibiotics)   Sulfasalazine   Preferred Pharmacy     [id number] - [place], [place] - [address]   [address] [place] [address]   Phone: [phone] Fax: [phone]       Medication Plan of Care at last office visit with MD/CNP:      Diagnoses/Plan:  1. Patient had her medication of naltrexone discontinued, as she has been drinking on a daily basis for the past 2 weeks while on this medication, and it clearly has not been effective at reducing her cravings, and there is evidence for this medication elevating liver enzymes at times.  2. Recommended that Patsy restart gabapentin at night, and prescribed 300 mg to use prn at bedtime. We reviewed risks and benefits of this medication.  3. I strongly reiterated alcohol use can lead to death from both intoxication and withdrawal, and we discussed the symptoms of both these syndromes. Patient was recommended not to stop drinking abruptly without being under the care of a physician.   4. ETG positive, fentanyl negative, pdmp pending and DAM was negative.   5. Patient to continue on current psychiatric medications for depression and PTSD. May need psychiatric consult as an inpatient, but that is not clear at this time.  6. Patient encouraged to attend 12 step meetings and find a sponsor.  7. I contacted Anita Kenny, inpatient admission coordinator, to see if I could expedite the patient's admission to 2700. Patient was asked to obtain a Rule 25, and reported she had done so, however we were unable to obtain a copy of this record despite clinic staff calling over to the clinic that performed the rule 25 multiple times.  7. Patient to be seen in 2 weeks and sooner prn.

## 2021-05-28 NOTE — PROGRESS NOTES
Correct pharmacy verified with patient and confirmed in snapshot? [x] yes []no    Charge captured ? [x] yes  [] no    Medications Phoned  to Pharmacy [] yes [x]no  Name of Pharmacist:  List Medications, including dose, quantity and instructions      Medication Prescriptions given to patient   [] yes  [x] no   List the name of the drug the prescription was written for.       Medications ordered this visit were e-scribed.  Verified by order class [x] yes  [] no  Naltrexone 50 mg  Nicotrol    Medication changes or discontinuations were communicated to patient's pharmacy: [] yes  [x] no    UA collected [x] yes  [] no    [place] Prescription Monitoring Program Reviewed? [x] yes  [] no    Referrals were made to:  none    Future appointment was made: [x] yes  [] no    Dictation completed at time of chart check: [] yes  [x] no    I have checked the documentation for today s encounters and the above information has been reviewed and completed.

## 2021-05-29 ENCOUNTER — RECORDS - HEALTHEAST (OUTPATIENT)
Dept: ADMINISTRATIVE | Facility: CLINIC | Age: 66
End: 2021-05-29

## 2021-05-29 LAB — MAGNESIUM SERPL-MCNC: 2.1 MG/DL (ref 1.6–2.3)

## 2021-05-29 PROCEDURE — 250N000013 HC RX MED GY IP 250 OP 250 PS 637: Performed by: HOSPITALIST

## 2021-05-29 PROCEDURE — 250N000013 HC RX MED GY IP 250 OP 250 PS 637: Performed by: INTERNAL MEDICINE

## 2021-05-29 PROCEDURE — 999N000157 HC STATISTIC RCP TIME EA 10 MIN

## 2021-05-29 PROCEDURE — 94640 AIRWAY INHALATION TREATMENT: CPT

## 2021-05-29 PROCEDURE — 36415 COLL VENOUS BLD VENIPUNCTURE: CPT | Performed by: HOSPITALIST

## 2021-05-29 PROCEDURE — 120N000002 HC R&B MED SURG/OB UMMC

## 2021-05-29 PROCEDURE — 250N000009 HC RX 250: Performed by: INTERNAL MEDICINE

## 2021-05-29 PROCEDURE — 94640 AIRWAY INHALATION TREATMENT: CPT | Mod: 76

## 2021-05-29 PROCEDURE — 250N000012 HC RX MED GY IP 250 OP 636 PS 637: Performed by: HOSPITALIST

## 2021-05-29 PROCEDURE — 250N000013 HC RX MED GY IP 250 OP 250 PS 637: Performed by: FAMILY MEDICINE

## 2021-05-29 PROCEDURE — 83735 ASSAY OF MAGNESIUM: CPT | Performed by: HOSPITALIST

## 2021-05-29 PROCEDURE — 250N000009 HC RX 250: Performed by: HOSPITALIST

## 2021-05-29 PROCEDURE — 99232 SBSQ HOSP IP/OBS MODERATE 35: CPT | Performed by: INTERNAL MEDICINE

## 2021-05-29 RX ADMIN — IPRATROPIUM BROMIDE AND ALBUTEROL SULFATE 3 ML: .5; 3 SOLUTION RESPIRATORY (INHALATION) at 20:54

## 2021-05-29 RX ADMIN — OMEPRAZOLE 20 MG: 20 CAPSULE, DELAYED RELEASE ORAL at 08:19

## 2021-05-29 RX ADMIN — IPRATROPIUM BROMIDE AND ALBUTEROL SULFATE 3 ML: .5; 3 SOLUTION RESPIRATORY (INHALATION) at 16:22

## 2021-05-29 RX ADMIN — MIRTAZAPINE 15 MG: 15 TABLET, FILM COATED ORAL at 21:29

## 2021-05-29 RX ADMIN — BUMETANIDE 1 MG: 1 TABLET ORAL at 08:18

## 2021-05-29 RX ADMIN — NALTREXONE HYDROCHLORIDE 50 MG: 50 TABLET, FILM COATED ORAL at 08:19

## 2021-05-29 RX ADMIN — DIAZEPAM 5 MG: 5 TABLET ORAL at 16:10

## 2021-05-29 RX ADMIN — HYDROXYZINE HYDROCHLORIDE 25 MG: 25 TABLET, FILM COATED ORAL at 17:12

## 2021-05-29 RX ADMIN — ESCITALOPRAM OXALATE 10 MG: 10 TABLET ORAL at 08:19

## 2021-05-29 RX ADMIN — BUDESONIDE 0.5 MG: 0.5 INHALANT RESPIRATORY (INHALATION) at 20:54

## 2021-05-29 RX ADMIN — FOLIC ACID 1 MG: 1 TABLET ORAL at 08:19

## 2021-05-29 RX ADMIN — LEVOFLOXACIN 500 MG: 500 TABLET, FILM COATED ORAL at 08:19

## 2021-05-29 RX ADMIN — IPRATROPIUM BROMIDE AND ALBUTEROL SULFATE 3 ML: .5; 3 SOLUTION RESPIRATORY (INHALATION) at 11:21

## 2021-05-29 RX ADMIN — POTASSIUM CHLORIDE 20 MEQ: 750 TABLET, EXTENDED RELEASE ORAL at 08:19

## 2021-05-29 RX ADMIN — HYDROXYZINE HYDROCHLORIDE 25 MG: 25 TABLET, FILM COATED ORAL at 08:32

## 2021-05-29 RX ADMIN — THIAMINE HCL TAB 100 MG 100 MG: 100 TAB at 08:19

## 2021-05-29 RX ADMIN — MULTIPLE VITAMINS W/ MINERALS TAB 1 TABLET: TAB at 08:18

## 2021-05-29 RX ADMIN — PREDNISONE 40 MG: 10 TABLET ORAL at 08:18

## 2021-05-29 RX ADMIN — DIAZEPAM 5 MG: 5 TABLET ORAL at 12:01

## 2021-05-29 NOTE — PROGRESS NOTES
Correct pharmacy verified with patient and confirmed in snapshot? [x] yes []no    Charge captured ? [x] yes  [] no    Medications Phoned  to Pharmacy [] yes [x]no  Name of Pharmacist:  List Medications, including dose, quantity and instructions      Medication Prescriptions given to patient   [] yes  [x] no   List the name of the drug the prescription was written for.       Medications ordered this visit were e-scribed.  Verified by order class [x] yes  [] no  Hydroxyzine 50 mg  Naltrexone 50 mg  Cymbalta 60 mg    Medication changes or discontinuations were communicated to patient's pharmacy: [] yes  [x] no    UA collected [x] yes  [] no    [place] Prescription Monitoring Program Reviewed? [x] yes  [] no    Referrals were made to: none     Future appointment was made: [x] yes  [] no    Dictation completed at time of chart check: [] yes  [x] no    I have checked the documentation for today s encounters and the above information has been reviewed and completed.

## 2021-05-29 NOTE — PROGRESS NOTES
"Addiction Services - Initial Services Plan     Patient  Name: Patsy Santamaria  MRN: [id number]   : 1955  Admit Date: 19       Patient describes their immediate need: To learn recovery skills to prevent relapse.     Are there any immediate Safety Needs such as (physical, stability, mobility):  Pt is able to get medical care as needed. Pt denies immediate concerns.     Immediate Health Needs and Plan:   None    Vulnerable Adult: No     Issues to be addressed in the first sessions:   Orientation to program    Patient strengths and needs:   Strengths identified as \"I'm caring, giving, supportive, good worker, reliable, dependable.\"  Needs identified as \"My anxiey stops me from being a lot of people. Big crowds I get irritated in. I don;t have the patience. Maybe upgrading my computer skills because it is hindering at my age. I don;t have a lot of confidence in my computer skills.\"    Plan for patient for time between intake and completion of the treatment plan:   Attend all group therapy sessions as directed, complete all written and oral assignments as directed, and remain clean and sober. A relapse, if any, must be reported to staff immediately in order to ensure you are receiving the proper level of care. If you cannot attend a group therapy session you must call contact information provided in intake folder and leave a message before or during group hours.           Vulnerable Adult Review   [X] Review of the Facility Abuse Prevention plan was reviewed with the patient   [X] No Individual Abuse Plan is necessary   [ ] In addition to the Facility Abuse Prevention plan, an Individual Abuse Plan will be put in place       Staff Name/Title: Farshad Cox   Date: 2019  Time: 10:51 AM        "

## 2021-05-29 NOTE — PLAN OF CARE
VS: /71 (BP Location: Right arm)   Pulse 70   Temp 97.3  F (36.3  C) (Oral)   Resp 18   Wt 116.1 kg (256 lb)   SpO2 94%   BMI 46.82 kg/m       O2: Room air >90%   Output: Voiding adequately and spontaneously    Last BM: 5/28   Activity: Up ad tre in room. Steady gait. Ambulated with writer in hallway and tolerated well   Up for meals? yes   Skin: intact   Pain: Denies. Robaxin available    CMS: A&O x4. Pt was given valium at 1600 for MSSA of 9. Recheck 4 hr later was 3. Pt was given PRN atarax at 1700. Pt reported minimal anxiety from 3724-7877.   Dressing: none   Diet: Regular. Tolerating well. Pt reports improving appetite, though had some nausea after lunch. Pt ate dinner with no nausea and requested a snack before bedtime    LDA: PIV SL. Flushes well   Equipment: Seizure pads, call lights    Plan: Pt to discharge home with outpatient chemical dependency pending clearance from medical team.    Additional Info: MSSA due at 0000

## 2021-05-29 NOTE — PLAN OF CARE
VS:     VSS, declined continuous pulse ox   Output:     Last BM 5/28. Voids spontaneously without difficulty in the bathroom.    Activity:     Pt up in room and to bathroom independently with steady gait.      Skin: Intact.      Pain:     Pt has generalized pain that is well tolerated with prn Robaxin and prn Atarax.       CMS:     CMS and Neuro's are intact. Denies numbness and tingling in all extremities.      Dressing:     No dressing in place.    Diet:     Regular diet      LDA:     SL PIV      Equipment:     Seizure pad on bed. No PCDs in place, pt up in room independently ad tre and ambulates frequently.       Plan:     Pt is able to make needs known and the call light is within the pt's reach. Continue to monitor. Possible discharge today w/ outpt chem dep.      Additional Info:     MSSA score of 9, given one dose of PRN valium.

## 2021-05-29 NOTE — PROGRESS NOTES
Weekly Progress Note  Patsy Santamaria  1955  [id number]      D) Pt attended 1 groups this week with 0 absences. Patient attended 1 individual sessions this week. Patient is in phase 1 of SRP.  A) Staff facilitated groups and reviewed tx progress. Assessed for [facility]. R) No VAP needed at this time.   Any significant events, defines as events that impact patients relationship with others inside and outside of treatment: Not at this time   Indicate any changes or monitoring of physical or mental health problems: Patient is seeing Dr. Brunner in the [facility] for concerns as well as Araceli Hamilton.     Indicate involvement by any outside supports: Family and friends   IAPP reviewed and modified as needed. NA  Pt working on the following dimensions:  Dimension #1 - Withdrawal Potential - Risk 0. Patient reports last use of alcohol on 05/16/19. Patient denies withdrawal symptoms at this time  Specific goals from treatment plan addressed this week: The patient goal is to maintain abstinence. The patient has not endorsed any use over the last week and reports no current concerns.    Effectiveness of strategies: Strategies appear to be effective    Dimension #2 - Biomedical - Risk 1. Patient reports COPD, Osteoarthritis, and chronic back pain. Patient has primary care provider and is able to seek cares as needed. The patient is medication compliant as well.   Specific goals from treatment plan addressed this week: The patient goal is to manage biomedical concerns. She did report some issues with sleeping over the last week. She reports she was advised to increase her anxiety medication to help with sleep disturbances.    Effectiveness of strategies: Strategies appear to be effective.     Dimension #3 - Emotional/Behavioral/Cognitive - Risk 2. Patient reports depression and PTSD. Patient has mental health care provider. Patient reports recently experiencing mental health symptoms. Patient denies suicidal or homicidal ideation.  Patient reports previous suicide attempt.  Specific goals from treatment plan addressed this week: The patient goal is to manage mental health symptoms. She is medication compliant at this time and meeting with providers as scheduled. The patient reports she has been struggling with anger and frustration from her sister wanting to know everything about her attending group-we discussed boundary setting and she does not have to disclose things should she choose not to.    Effectiveness of strategies: Strategies appear to be effective.    Dimension #4 - Treatment Acceptance/Resistance - Risk 0. Patient verbalizes a desire for change, she has internal motivation and is ready to engage.   Specific goals from treatment plan addressed this week: Patient goal is to engage in the group. She reports her motivation to be at an 8-9/10. She is an active and engaged group member.   Effectiveness of strategies: Strategies appear to be effective.     Dimension #5 - Relapse Potential - Risk 3. Patient lacks healthy, positive coping skills. Patient lacks skills to prevent relapse. Patient reports having difficulty maintaining sobriety outside of a structured facility. Patient has had multiple treatment episodes. Patient has achieved periods of sobriety in the past  Specific goals from treatment plan addressed this week: The patient goal is to develop coping skills and discuss in group how they were effective or ineffective. The patient reported that her anger was a huge trigger for her over the last week she attributed it to not having her air conditioner put it and for a coping skills she talked with her sister and slept.    Effectiveness of strategies: Strategies appear to be effective    Dimension #6 - Recovery Environment - Risk 3. Patient is unemployed. Patient has stable housing. Patient is not engaged in structured daily activities, she does report a desire to engage in volunteer work. Patient reports positive, sober support.  Patient denies current legals. History of DUI.   Specific goals from treatment plan addressed this week: The patient goal is to develop structured activities. She reports she has been gardening and engaging in community activities at her apartment complex. She reports she is also looking for a part time job.    Effectiveness of strategies: Strategies appear to be effective  T) Treatment plan updated no.  Patient notified and in agreement No.  Patient educated on welcome. Patient has completed 10 of 108 program hours at this time. Projected discharge date is 8/30/19. Current discharge plan is phase III.     Marisel Matthews [facility]  11/2/2018, 12:11 PM        Psycho-Educational Curriculum  Date Attended  Psycho-Educational Curriculum  Date Attended    8 Dimensions of wellness  6/3 Grief and Loss     Emotional   Stages of grief    Physical  6/5 Memorialized     Intellectual  6/5 Letters to loved ones     Occupational  6/5 Grief group    Spiritual  6/5 Loneliness    Environmental   Purpose and Hobbies    Financial  6/5 Values    Social  6/5 Hobbies    Access to Care   Tana      Service Access   Volunteer Work     Pain Management   Experiential Learning     Memory   Value of movement     Physical Wellness  Relationships     Medication   Self-Love     PAWS   Resentment    Hygiene (Sleep, Physical, etc)   Communication Skills     Emotional Wellbeing   Amends     Healthy vs. Unhealthy Feelings  Family     Affirmations  Social Anxieties     Co-Occurring Disorders  Legacy     Anxiety   Support(+ & -)    Depression  Assertive Communication     Grounding  Codependency    Trauma/Victim Identity   Boundaries    MH/CD Acceptance   Defense Mechanisms     Sober Structure   Relapse Prevention     Sober support groups   Triggers and High Risk Situations    Needds  Relapse Prevention Plan     Spirituality   Coping Skills     Schedule   Addictive Thoughts    Sobriety Vs. Recovery   Relapse Process     Power of Now   What is Addiction      Truth in life   Impulsive/Compulsive Behaviors     Recovery Investement   Cross Addiction     Recovery Influences   Early Recovery     Educational Videos   Mindfulness    No Kidding Me 2!   Wise mind  5/29   Anonymous People       Torrey's Story       Pleasure Unwoven

## 2021-05-29 NOTE — PROGRESS NOTES
Patsy Santamaria attended 3 hours of group therapy today.    Total group size of 4.    6/12/2019 1:28 PM Marisel Matthews     --LATE ENTRY---

## 2021-05-29 NOTE — PROGRESS NOTES
[facility] SUBSTANCE USE DISORDER  DISCHARGE SUMMARY  Name:  Patsy Santamaria   :  PRINCE Dominguez   Admit Date: 19   Discharge Date: 19   :  1955   Hours Completed: 10   Initial Diagnosis:  Alcohol Use Disorder, Severe   Final Diagnosis:  Alcohol Use Disorder, Sever    Discharge Address:    [address] [place] [address] [address] [place] [address] Funding Source:    Medicare A & B      Discharge Type:  Absent Without Leave (AWOL)    Client was receiving residential services at the time of discharge:   No    Reasons for and circumstances of service termination:  The patient has not attended group since 19. The patient did call in for group on 19 reporting that she had drank and was not feeling well. She was a NCNS for group on 6/10/19, 19 and 19. This writer attempted to make contact with the patient on 19 with no success. Patient will be recommended to have an updated assessment for treatment service recommendation.      If program discharge status was At Staff Request, the license [place] must identify the following:    Other interested parties conferred with: Dr. Brunner     Referrals provided: Patient will be referred to Ivana Polanco to obtain an updated assessment.     Alternatives considered and attempted before deciding to discharge:  NA     Dimension/Course of Treatment/Individualized Care:   1.  Withdrawal Potential - Intake Risk level -  0 Discharge Risk level - 1  Narrative supporting risk description:  The patient reported at time of intake that her date of last use was on 19. She did call on 19 and reported she drank on 19.   Treatment plan goals and progress towards those goals:  The patient goal was to maintain abstinence. She was not successful in this as evidence by her use. At this time it is unknown if the patient has continued to use or if there are any withdrawal concerns.    2.  Biomedical Conditions and Complications - Intake Risk  level -  1 Discharge Risk level - 1  Narrative supporting risk description:  The patient has a diagnoses of COPD, Osteoarthritis and chronic back pain. The patient has a primary care provider whom she can get her needs met through. The patient did report a desire to engage in silver sneakers while enrolled however it is unknown if she was able to do so.   Treatment plan goals and progress towards those goals:  The patient goal was to manage biomedical concerns. While enrolled she did not endorse any emergent biomedical concerns.    3.  Emotional/Behavioral/Cognitive Conditions and Complications - Intake Risk level -  2 Discharge Risk level - 2  Narrative supporting risk description:  The patient has a mental health diagnoses of Depression and PTSD. She sees Dr. Brunner in the [facility] for her medication needs and Araceli Hamilton for her psychotherapy. The patient reported no SI/SIB/HI while enrolled in treatment services.   Treatment plan goals and progress towards those goals:  The patient goal was to manage her mental health and follow through with all appointments. She did report missing an appointment with Araceli Hamilton however had intentions to reschedule. The patient reported no emergent mental health concerns while enrolled. Her next appointment with Dr. Brunner is on 6/18/[facility] at [facility] and 6/20/19 with Araceli Hamilton.    4.  Readiness for Change - Intake Risk level -  0 Discharge Risk level - 1  Narrative supporting risk description:  The patient has been AWOL since 6/10/19 with date of last contact being on 6/7/19. She has not been following treatment expectations with her attendance as well as with reported use. At this time it is unknown what the patients level of motivation is.   Treatment plan goals and progress towards those goals:  The patient goal was to follow through with intentions to treat chemical dependency concerns. She has not been successful in this as evidence by her AWOL status.   "  5.  Relapse/Continued Use/Continued Problem Potential - Intake Risk level -  3 Discharge Risk level - 3  Narrative supporting risk description:  The patient has some coping skills however they are rarely and inconsistently applied. The patient has some understanding of addiction concepts. She did report use while enrolled in programming and at this time it is unknown what her date of last use was. Patient remains at a a high risk for further substance use.   Treatment plan goals and progress towards those goals:  The patient goal was to develop and implement coping skills to prevent relapse or increase in mental health concerns. She was minimally successful in this she did report a relapse and has been minimally engaged in her coping strategies.    6.  Recovery Environment - Intake Risk level -  3 Discharge Risk level - 3  Narrative supporting risk description:  The patient is residing on her own in an apartment where she reports feeling safe. She reports she is not employed however is looking for work. She has minimal structured activities as well as minimal sober support outside of her family. At this time she has no legal involvement.   Treatment plan goals and progress towards those goals:  The patient goal was to develop sober structured activities- she was minimally successful in this as she reported attending some sober support groups however outside of that she was primarily in her home. Patient has no set daily structure or activities at this time.    Strengths:  Patient reports \"I'm caring, giving, supportive, good worker, reliable, dependable.\"  Needs: Reassessment of chemical health needs, sober structure, medication management   Services Provided: Intake, assessment, treatment planning, education, group discussion, film, lectures, 1x1 therapy, and recommendations at discharge.      Program Involvement: Poor  Attendance: Poor  Ability to relate in group/   Other program activities: Poor  Assignment " Completion: Poor  Overall Behavior: Fair and When in group she was a great participant and engaged  Reported Family/Significant   Other Involvement: Poor    Prognosis: Poor      Recommendations       Obtain new assessment for CD placement     Mental Health Referral  Individual Therapy and Med Compliance      Physical Health Referral:  Primary Care Provider        Counselor Name and Title:  PRINCE Dominguez        Date:  6/17/2019  Time:  3:05 PM    Reviewed this charting and agree with patient's need for a repeat assessment for return residential placement

## 2021-05-29 NOTE — PROGRESS NOTES
LATE ENTRY:     The patient and this writer met for a 45 minute 1x1 session to discuss patient goals and treatment plan. The patient reported no emergent concerns she did report some urges however stated she was managing them well and was utilizing her coping skills. The patient reported she was interested in joining silver sneakers and engaging in exercise groups. She reported she is working with Araceli Stewart for individual therapy and reports that it has been beneficial for her. She has some relational issues with men and reports most recently she has been struggling with her relationship with her son. The patient goals at this time are to work on employment, boundaries and relationships. She did not endorse any SI/SIB/HI at the time of this appointment.     PRINCE Dominguez 6/13/2019 11:50 AM

## 2021-05-29 NOTE — PROGRESS NOTES
Patsy Santamaria attended 3 hours of group therapy today.    Total group size of 3.    6/13/2019 9:48 AM Marisel Matthews     --LATE ENTRY --

## 2021-05-29 NOTE — PROGRESS NOTES
Per outpatient staffing discussion on 6/13/19 the patient would be discharged if NS or no call on 6/14/19. The patient did not call or present to group on 6/14/19 so is being discharged AWOL. Look for a discharge summary in the next 5 calendar days.     PRINCE Dominguez 6/14/2019 12:17 PM

## 2021-05-29 NOTE — PROGRESS NOTES
"Intake Note:     D) Patsy Santamaria is a 64 y.o.   White or  female who is referred to SR via 2700 with funding from Medicare. Patient orientated x 3. Patient meets criteria for Alcohol Use Disorder, Severe, (F10.20) (303.90).  Patient appears appropriate for SR.   A) Met with patient for 45 minutes.  Completed intake assessment and preliminary paperwork. Patient was given and explained counselor & supervisor license number and contact info, Patient Bill of Rights, program rules/regulations, Program Abuse Prevention Plan, confidentiality & HIPPA policies, grievance procedure, presented ROIs, TB & HIV/AIDS policies & resources, and Vulnerable Adult policy.   Conducted Vulnerable Adult Assessment.   R)No special Vulnerable Adult needed at this time.  Patient signed and agreed to counselor & supervisor license number and contact info., Patient Bill of Rights, group rules/regulations, Program Abuse Prevention Plan, confidentiality & HIPPA policies, grievance procedure,  ROIs, TB & HIV/AIDS policies & resources, and Vulnerable Adult policy. Patient scored low risk on C-SSRS screen. Patient denied suicidal ideation/intent/plan/means at this time.     Opioid Use Disorder: No   Provided \"Options for Opioid Treatment in [place] and Overdose Prevention\" NA     Dimension #1 - Withdrawal Potential - Risk 0. Patient reports last use of alcohol on 05/16/19. Patient denies withdrawal symptoms at this time.    Dimension #2 - Biomedical - Risk 1. Patient reports COPD, Osteoarthritis, and chronic back pain. Patient has primary care provider. Patient is able to seek cares as needed.    Dimension #3 - Emotional , Behavioral and Cognitive - Risk 2. Patient reports depression and PTSD. Patient has mental health care provider. Patient reports recently experiencing mental health symptoms. Patient denies suicidal or homicidal ideation. Patient reports previous suicide attempt.     Dimension #4 - Readiness for Change - " Risk 0. Patient verbalizes a desire for change.    Dimension #5 - Relapse Potential - Risk 3. Patient lacks healthy, positive coping skills. Patient lacks skills to prevent relapse. Patient reports having difficulty maintaining sobriety outside of a structured facility. Patient has had multiple treatment episodes. Patient has achieved periods of sobriety in the past.    Dimension #6 - Recovery Environment - Risk 3. Patient is unemployed. Patient has stable housing. Patient is not engaged in structured daily activities. Patient reports positive, sober support. Patient denies current legals. History of DUI.    T) Explained counselor & supervisor license number and contact info, Patient Bill of Rights, program rules/regulations, Program Abuse Prevention Plan, confidentiality & HIPPA policies, grievance procedure, presented ROIs, TB & HIV/AIDS policies & resources, and Vulnerable Adult policy. Patient expected to start group on 05/29/19.      Farshad Cox  5/24/2019, 10:50 AM

## 2021-05-29 NOTE — PROGRESS NOTES
[facility]    Medicine Progress Note - Hospitalist Service       Date of Admission:  2021  Assessment & Plan       66 year old female  with Hx of COPD, tobacco use disorder 1 pack/day, alcohol use disorder, ongoing withdrawal seizures, chronic lower extremity edema, obesity, GERD, anxiety, depression, obstructive sleep apnea noncompliant to CPAP.  Patient came to the ED for evaluation of shortness of breath and seeking detox from alcohol.     # Shortness of breath with cough.    - Suspect this is COPD exacerbation.  She could have some acute bronchitis triggering this.  She smokes 1 pack/day.  Over last 1 week she has had increased shortness of breath and cough with yellowish expectoration.  Chest x-ray was negative for any pneumonia.  Differential diagnosis includes cardiomyopathy from alcohol abuse, coronary artery disease. PE is in the differential diagnosis was considered less likely.  -Continue on prednisone 40 mg p.o. daily, DuoNeb inhalation 4 times a day, Levaquin 500 mg p.o. daily for 5 days. Pulmicort BID.  -Troponin negative. TTE as below with normal EF.   -At some point of time she needs a stress test. She has family history of coronary disease in her father who  at the age of 46 from heart attack  -Recommend tobacco cessation  -Patient has gained 30 pounds of weight in last 1 year.  She is physically very deconditioned as well. PT / OT  -She needs to start using her CPAP back again  -Incentive spirometry     #Alcohol use disorder, history of alcohol withdrawal seizures.  -Continue on MSSA protocol with Valium  -CD consult. SW consult.   -Continue on multivitamin, folic acid, thiamine  -Alcohol withdrawal is improving     #Depression  -Psychiatry recommended to start Lexapro and Remeron     #Obesity, obstructive sleep apnea  -Continue on CPAP  -Recommend to lose weight as outpatient  -HbA1c normal       #Bilateral lower extremity edema.  It is  chronic in nature she is chronically on Bumex 1 mg p.o. daily  -Continue Bumex 1 mg p.o. daily along with potassium supplements  -Bilateral No deep venous thrombosis     #Tobacco use disorder  -Start nicotine patch 1 pack/day  -Recommend tobacco cessation       Diet: Regular Diet Adult    DVT Prophylaxis: Pneumatic Compression Devices  Viveros Catheter: not present  Code Status: Full Code           Disposition Plan   Expected discharge: TBD   Entered: Robert Chapman MD 05/29/2021, 1:30 PM       The patient's care was discussed with the Patient.    Robert Chapman MD  Hospitalist Service  [facility]  Contact information available via [facility] Paging/Directory    ______________________________________________________________________    Interval History   No acute events overnight  She was pleasant   Breathing is improving.   Alcohol withdrawal is improving  No chest pain or palpitations   No nausea or vomit.     Data reviewed today: I reviewed all medications, new labs and imaging results over the last 24 hours. I personally reviewed no images or EKG's today.    Physical Exam   Vital Signs: Temp: 97.7  F (36.5  C) Temp src: Oral BP: 114/64 Pulse: 67   Resp: 18 SpO2: 94 % O2 Device: None (Room air) Oxygen Delivery: 1 LPM  Weight: 256 lbs 0 oz  Constitutional: Obesity noted awake, alert, cooperative, no apparent distress.  Respiratory: Normal respiratory effort, mild bilateral wheezing, no crackles.   Cardiovascular: RRR, normal S1 and S2, and no murmur noted.  GI: Soft, non-distended, non-tender, normal bowel sounds.  Neurologic: Cranial nerves 2-12 intact, normal strength and sensation.  Psychiatric: Alert, flat affect     Data   Recent Labs   Lab 05/27/21  0228 05/26/21  2245 05/26/21  1134 05/26/21  1103   WBC 5.0  --   --  6.0   HGB 14.3  --   --  15.4   MCV 96  --   --  93   *  --   --  144*     --   --  137   POTASSIUM 3.5  --   --  3.1*   CHLORIDE  97  --   --  95   CO2 34*  --   --  32   BUN 14  --   --  10   CR 0.70  --   --  0.61   ANIONGAP 6  --   --  10   JOSEFA 9.0  --   --  9.3   *  --   --  109*   ALBUMIN  --   --   --  3.5   PROTTOTAL  --   --   --  7.7   BILITOTAL  --   --   --  2.3*   ALKPHOS  --   --   --  130   ALT  --   --   --  91*   AST  --   --   --  194*   LIPASE  --   --   --  158   TROPI <0.015 <0.015  --  <0.015   TROPONIN  --   --  0.00  --      No results found for this or any previous visit (from the past 24 hour(s)).  Medications       budesonide  0.5 mg Nebulization BID     bumetanide  1 mg Oral Daily     escitalopram  10 mg Oral Daily     folic acid  1 mg Oral Daily     ipratropium - albuterol 0.5 mg/2.5 mg/3 mL  3 mL Nebulization 4x daily     levofloxacin  500 mg Oral Daily     mirtazapine  15 mg Oral At Bedtime     multivitamin w/minerals  1 tablet Oral Daily     naltrexone  50 mg Oral Daily     nicotine  1 patch Transdermal Daily     nicotine   Transdermal Q8H     omeprazole  20 mg Oral Daily     potassium chloride ER  20 mEq Oral Daily     predniSONE  40 mg Oral Daily     sodium chloride (PF)  3 mL Intracatheter Q8H     thiamine  100 mg Oral Daily

## 2021-05-29 NOTE — PROGRESS NOTES
"Mental Health Visit Note    6/20/2019    Start time: 11:00am    Stop Time: 11:45am   Session # 4    Session Type: Patient is presenting for an Individual session.    Patsy Santamaria is a 64 y.o. female is being seen today for    Chief Complaint   Patient presents with      Follow Up     Psychotherapy follow-up visit for depression, anxiety and substance use   .     New symptoms or complaints: Ongoing alcohol use    Functional Impairment:   Personal: 4  Family: 3  Work: 4  Social:4    Clinical assessment of mental status:   Grooming: Well groomed  Attire: Appropriate  Age: Appears Stated  Behavior Towards Examiner: Cooperative  Motor Activity: Excessive and Agitated   Eye Contact: Appropriate  Mood: Elevated  Affect: Labile, Tearful, Irritable and Anxious  Speech/Language: Rapid, Loud, Slurred and Stuttering/Slammering  Attention: Distractible  Concentration: Brief  Thought Process: Flight of ideas  Thought Content: Hallucinations: none reported  Delusions: none reported  Orientation: X 3  Memory: No Evidence of Impairment  Judgement: Impairment and Severe  Estimated Intelligence: Average  Demonstrated Insight: Adequate  Fund of Knowledge: adequate    Suicidal/Homicidal Ideation present: None Reported This Session    Patient's impression of their current status:   The patient initiated session by stating, \"I am not in a good place.\" She was shaking and crying, admitting to ongoing alcohol use. She has already been drinking today. She reportedly stopped going to treatment and expressed shame and regret about this. She had stopped going to AA meetings. Her son came to stay with her for a few days and she felt sad when he left. She continues to feel sad about missing her family and \"not having a family unit anymore.\" She feels very alone and is hurting in many ways. Her SO broke-up with her yesterday which was devastating. She feels very upset and scared to be alone again. She expressed feelings of worthlessness and " "hopelessness. She stated, \"I feel disgusted with myself.\" The patient expressed self-blame for \"not being able to stop drinking.\" She eventually agreed that she is struggling with an addiction that she can't fight alone. She agreed to receive help and reengage in treatment.      Therapist impression of patients current state:   The patient arrived on-time for a follow-up psychotherapy visit. The patient appeared intoxicated today, agitated and very tearful. Her language and speech was slurred and rapid. The patient expressed deep regret and shame about current alcohol use patterns. She appeared very open and honest with the therapist throughout the visit. It was clear that the patient needs to return for CD treatment as soon as possible. Therapist called Valeria Tran at [facility] and was able to schedule an assessment for patient on 6/21/19 at 10:30am. The therapist provided unconditional positive regard and reassurance, informing patient that today is her opportunity to reset and get help. The patient was affirmed for her ability to show-up today and was provided with lots of encouragement about moving forward in the right direction. The patient is agreeable to participate in CD treatment again, understanding that she needs help and has benefited from treatment in the past. The therapist plans to coordinate with her care team in regards to future care plans. The patient will benefit from following any recommendations resulting after tomorrow's evaluation.     Type of psychotherapeutic technique provided: Client centered and Solution-focused    Progress toward short term goals:The patient demonstrated courage today by presenting for a follow-up therapy visit after relapsing with alcohol. She appeared intoxicated today and agreed that she must return for CD treatment. She was cooperative during the planning process, agreeable to participate in a CD assessment tomorrow, 6/21.    Review of long term goals: " Treatment Plan updated   Due to patient's intoxication and relapse, treatment plan should be updated again when patient returns for her next therapy encounter.    Diagnosis:   1. Severe alcohol use disorder (H)    2. PTSD (post-traumatic stress disorder)    3. Severe episode of recurrent major depressive disorder, without psychotic features (H)        Plan and Follow up: The patient is scheduled for a CD assessment at [facility] on 6/21/2019 at 10:30am. Therapist helped create a calendar event with reminders on patient's phone in addition to providing a paper reminder card.   Therapist will plan to meet again with patient after learning about her substance use treatment plan.       Discharge Criteria/Planning: Client has chronic symptoms and ongoing therapy for maintenance stability recommended.   Patient strongly recommended to participate in chemical dependency treatment, reestablishing care before returning for outpatient psychotherapy. Therapist is willing to see patient while she participates in CD treatment as well.     Performed and documented by JULIO Mendoza 6/20/2019

## 2021-05-29 NOTE — PROGRESS NOTES
[facility] Certification Request    May 21, 2019    Patient: Patsy Santamaria  MR Number: [id number]  YOB: 1955  Date of Visit: 5/21/2019      Dear , :    Thank you for this referral.   We are seeing Patsy Santamaria for Physical Therapy of Chronic left-sided low back pain with left-sided sciatica.    Medicare and/or Medicaid requires physician review and approval of the treatment plan. Please review the plan of care and verify that you agree with the therapy plan of care by co-signing this note.      Plan of Care  Authorization / Certification Start Date: 05/21/19  Authorization / Certification End Date: 07/31/19  Authorization / Certification Number of Visits: Medicare  Communication with: Referral Source  Patient Related Instruction: Nature of Condition;Treatment plan and rationale;Self Care instruction;Basis of treatment;Body mechanics;Posture;Next steps  Times per Week: 1-2  Number of Weeks: 8-10  Number of Visits: up to 12  Discharge Planning: home program, self management  Precautions / Restrictions : none  Therapeutic Exercise: ROM;Stretching;Strengthening  Neuromuscular Reeducation: posture;kinesio tape;core  Manual Therapy: strain counterstrain;myofascial release      Goals:  Pt. will demonstrate/verbalize independence in self-management of condition in : 12 weeks  Pt. will be independent with home exercise program in : 6 weeks  Pt. will have improved quality of sleep: waking less times/night;in 6 weeks;Comment  Comment:: not waking due to pain, with bed mobility  Pt. will be able to walk : 30 minutes;with less pain;with less difficulty;for household mobility;for community mobility;for exercise/recreation;in 12 weeks  Patient will stand : 30 minutes;with less pain;with less difficultty;for home chores;in 6 weeks  Pt. will bend: to dress;to clean;with less pain;with less difficulty;in 6 weeks;Comment;for self care  Comment:: able to dress lower body without back  pain    No data recorded      If you have any questions or concerns, please don't hesitate to call.    Sincerely,      Carmen Ahmadi, PT        Physician recommendation:     ___ Follow therapist's recommendation        ___ Modify therapy      *Physician co-signature indicates they certify the need for these services furnished within this plan and while under their care.    [facility]   [facility]-Pelvic Initial Evaluation    Patient Name: Patsy Santamaria  Date of evaluation:5/21/2019  Visit #1  Referral Diagnosis: Chronic left-sided low back pain with left-sided sciatica  Referring provider: Suzanne Cali*  Visit Diagnosis:     ICD-10-CM    1. Bilateral low back pain without sciatica, unspecified chronicity M54.5    2. 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 female who presents to therapy today with chief complaints of low back pain. Onset date of sx was 4/19.  Functional impairments include standing, walking, bending, household tasks, lifting, sleeping.  Clinical findings include posture deficits, decreased trunk ROM with pain, (-) neural tension tests, decreased LE/hip flexibility, tenderness of SI joint, lateral thigh, ant pelvis, pain/decreased segmental mobility of with PA pressures.          Pt. is appropriate for skilled PT intervention as outlined in the Plan of Care (POC).    Goals:  Pt. will demonstrate/verbalize independence in self-management of condition in : 12 weeks  Pt. will be independent with home exercise program in : 6 weeks  Pt. will have improved quality of sleep: waking less times/night;in 6 weeks;Comment  Comment:: not waking due to pain, with bed mobility  Pt. will be able to walk : 30 minutes;with less pain;with less difficulty;for household mobility;for community mobility;for exercise/recreation;in 12 weeks  Patient will stand : 30 minutes;with less pain;with less difficultty;for home chores;in  "6 weeks  Pt. will bend: to dress;to clean;with less pain;with less difficulty;in 6 weeks;Comment;for self care  Comment:: able to dress lower body without back pain    No data recorded    Patient's expectations/goals are realistic.    Barriers to Learning or Achieving Goals:  No Barriers.       Plan / Patient Instructions:        Plan of Care:   Authorization / Certification Start Date: 05/21/19  Authorization / Certification End Date: 07/31/19  Authorization / Certification Number of Visits: Medicare  Communication with: Referral Source  Patient Related Instruction: Nature of Condition;Treatment plan and rationale;Self Care instruction;Basis of treatment;Body mechanics;Posture;Next steps  Times per Week: 1-2  Number of Weeks: 8-10  Number of Visits: up to 12  Discharge Planning: home program, self management  Precautions / Restrictions : none  Therapeutic Exercise: ROM;Stretching;Strengthening  Neuromuscular Reeducation: posture;kinesio tape;core  Manual Therapy: strain counterstrain;myofascial release      POC and pathology of condition were reviewed with patient.  Pt. is in agreement with the Plan of Care  A Home Exercise Program (HEP) was initiated today.    Plan for next visit: manual therapy, posture/body mechanics education, progression of home program.      Subjective:         Social information:   Living Situation:apartment   Occupation:unemployed   Work Status:NA   Equipment Available: None    History of Present Illness:    Patsy is a 64 y.o. female who presents to therapy today with complaints of (L) low back pain, radiates to (B) LB. Denies LE symptoms. Date of onset/duration of symptoms is 4/19. Onset was related to a fall, fell backwards onto the floor, missing the toilet. It was sore for a while afterwards, then \"It went away\". Symptoms are intermittent and not improving. She denies history of similar symptoms. She describes their previous level of function as not limited  She likes to walk, but " limited by pain. Pain with standing, walking, turning in bed.  She was in inpatient treatment for chemical dependence, completed on 19. She was taking mm relaxants during hospital admission. Has been using arthritis cream on back.     Pain Ratin  Pain rating at best: 1  Pain rating at worst: 10  Pain description: pain    Functional limitations are described as occurring with:   bending  lifting  performing routine daily activities  sleeping  standing 10 min  walking 10 min  sitting 15 min  Waking 2x/night with turning in bed  Vacuuming    Patient reports benefit from:  sitting, arthritis cream, sleeping with pillow between knees    Imaging per Epic:  EXAM: CT ABDOMEN PELVIS W ORAL W IV CONTRAST  LOCATION: [facility]  DATE/TIME: 2019 9:49 AM     INDICATION: Increased girth/ bloating.  COMPARISON: None.  TECHNIQUE: Helical enhanced thin-section CT scan of the abdomen and pelvis was performed following injection of IV contrast. Multiplanar reformats were obtained. Dose reduction techniques were used.  CONTRAST: Iohexol (Omni) 100 mL.   FINDINGS:   LUNG BASES: Negative.  ABDOMEN: No significant findings in the liver, spleen, kidneys, pancreas, or adrenal glands. Moderate amount of stool throughout the colon, but this is within normal limits. No masses or ascites.  PELVIS: Probable small fibroid in the uterus. Pelvis otherwise negative. No adnexal masses.  MUSCULOSKELETAL: Negative.  IMPRESSION:   CONCLUSION:   1.  No significant findings to explain the patient's symptoms. No masses or ascites. No obstruction.       Past Medical History:   Diagnosis Date     Acute solar dermatitis      Alcohol abuse      Anxiety      Arthritis      Chronic alcohol dependence, continuous (H) 3/16/2018     Chronic reflux esophagitis      COPD (chronic obstructive pulmonary disease) (H)      Dermatitis      Ganglion     right foot     H. pylori infection      Low backache 3/16/2018     Menopause     age 52     Past  Surgical History:   Procedure Laterality Date     APPENDECTOMY       RI APPENDECTOMY      Description: Appendectomy;  Recorded: 2008;  Comments: childhood     RI  DELIVERY ONLY      Description:  Section;  Recorded: 2008;     RI EXCIS TENDN/CAPSULE LESN,FOOT      Description: Excision Of Cyst Of Tendon Sheath Of Foot;  Proc Date: 10/28/2011;  Comments: [facility]     RI HEMORRHOIDECTOMY INTERNAL RUBBER BAND LIGATIONS      Description: Hemorrhoidectomy;  Recorded: 2008;     RI KNEE SCOPE,DIAGNOSTIC      Description: Arthroscopy Knee Right;  Proc Date: 10/11/2005;  Comments: for right knee patella subluxation with lateral retinacular release; subpatellar chondroplasty     RI LATERAL RETINACULAR RELEASE OPEN      Description: Knee Lateral Retinacular Release;  Proc Date: 10/11/2005;     RI LIGATE FALLOPIAN TUBE      Description: Tubal Ligation;  Recorded: 2008;     SKIN BIOPSY       TONSILLECTOMY       Patient Active Problem List   Diagnosis     Chronic Reflux Esophagitis     Peripheral Neuropathy     Localized Primary Osteoarthritis Of The Carpometacarpal Joint     Ganglion Of The Right Foot     Major depression, recurrent (H)     Nicotine Dependence     Vitamin D deficiency     Post traumatic stress disorder     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)     Cervical disc disease     Claustrophobia     New onset seizure (H)     Hypoxia     Alcohol abuse     Elevated LFTs     Alcohol withdrawal seizure without complication (H)     Trochanteric bursitis of left hip     Carpal tunnel syndrome on both sides     Bilateral edema of lower extremity     Snoring     Morbid obesity (H)     Chronic alcohol dependence, continuous (H)     Low backache     Alcohol withdrawal (H)     Primary osteoarthritis of right knee     Alcohol use disorder, severe, dependence (H)     Alcoholic hepatitis     Peripheral edema     Diuretic-induced hypokalemia     Severe  episode of recurrent major depressive disorder, without psychotic features (H)     Seasonal allergies     Primary osteoarthritis of left knee     Anxiety     Hypomagnesemia     Dyspnea on exertion            Objective:      Note: Items left blank indicates the item was not performed or not indicated at the time of the evaluation.    Patient Outcome Measures :    Modified Oswestry Low Back Pain Disablity Questionnaire  in %: 46     Scores range from 0-100%, where a score of 0% represents minimal pain and maximal function. The minimal clinically important difference is a score reduction of 12%.    Examination  1. Bilateral low back pain without sciatica, unspecified chronicity     2. 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)       Precautions/Restrictions: None  Involved side: Bilateral  Posture Observation:      General sitting posture is  fair.  General standing posture is fair.  Lumbopelvic complex: Mildly increased lumbar lordosis  midl (L) trunk shift  (R) shoulder elevated    Lumbar ROM:    % of normal  Date:      *Indicate scale AROM AROM AROM   Lumbar Flexion 75% (L) LB     Lumbar Extension 25-30% (L) LB      Right Left Right Left Right Left   Lumbar Sidebending 60% 70% (L) LB       Lumbar Rotation         Thoracic Flexion      Thoracic Extension      Thoracic Sidebending         Thoracic Rotation           Lower Extremity Strength:     Date:      LE strength/5 Right Left Right Left Right Left   Hip Flexion (L1-3)         Hip Extension (L5-S1)         Hip Abduction (L4-5)         Hip Adduction (L2-3)         Hip External Rotation         Hip Internal Rotation         Knee Extension (L3-4)         Knee Flexion         Ankle Dorsiflexion (L4-5)         Great Toe Extension (L5)         Ankle Plantar flexion (S1)         Abdominals        Sensation           Reflex Testing  Lumbar Dermatomes Right Left UE Reflexes Right Left   Iliac Crest and Groin (L1)   Biceps (C5-6)     Anterior Medial  Thigh (L2)   Brachioradialis (C5-6)     Anterior Thigh, Medial Epicondyle Femur (L3)   Triceps (C7-8)     Lateral Thigh, Anterior Knee, Medial Leg/Malleolus (L4)   Felicity s test     Lateral Leg, Dorsal Foot (L5)   LE Reflexes     Lateral Foot (S1)   Patellar (L3-4)     Posterior Leg (S2)   Achilles (S1-2)     Other:   Babinski Response         Lumbar Special Tests:     Lumbar Special Tests Right Left SI Tests Right  Left   Quadrant test   SI Compression     Straight leg raise 67 (-) 65 (-) SI Distraction     Crossover response   POSH Test     Slump neg neg Sacral Thrust     Sit-up test  FADIR     Trunk extensor endurance test  ZACK     Prone instability test  Resisted Abduction     Pubic shotgun  Other:       LE Screen/flexibility:  Hip IR  (R) WFL     (L) 17 (+)  Hip ER (R) WFL    (L) WFL (+)    Palpation: tenderness L>R iliacus, (L) SI joint, (B) lat thigh    Passive Mobility - Joint Integrity:  Mod pain with PA pressures to L3-5 .  Moderately decreased segmental mobility of lumbar spine .    Treatment Today     TREATMENT MINUTES COMMENTS   Evaluation 35    Self-care/ Home management     Manual therapy 10 Induction, indirect, direct techniques utilized as appropriate for optimal tissue release.   MFR/SCS - (L) LPL5, prone LS traction   Neuromuscular Re-education     Therapeutic Activity     Therapeutic Exercises 13 Plan of care and goals developed in collaboration with patient.   Discussed findings, anatomy, instructed in exercises.  Exercises per flow sheet.    Gait training     Modality__________________                Total 58    Blank areas are intentional and mean the treatment did not include these items.     PT Evaluation Code: (Please list factors)  Patient History/Comorbidities: peripheral neuropathy, PTSD, depression, COPD, see problem list  Examination: 2  Clinical Presentation: evolving  Clinical Decision Making: low    Patient History/  Comorbidities Examination  (body structures and functions,  activity limitations, and/or participation restrictions) Clinical Presentation Clinical Decision Making (Complexity)   No documented Comorbidities or personal factors 1-2 Elements Stable and/or uncomplicated Low   1-2 documented comorbidities or personal factor 3 Elements Evolving clinical presentation with changing characteristics Moderate   3-4 documented comorbidities or personal factors 4 or more Unstable and unpredictable High                Carmen OROZCO Galdino  5/21/2019  4:49 PM      [facility] Discharge Summary  Patient Name: Patsy Santamaria  Date: 6/26/2019  Referral Diagnosis: Chronic left-sided low back pain with left-sided sciatica  Referring provider: Delmer Cali  Visit Diagnosis:   1. Bilateral low back pain without sciatica, unspecified chronicity     2. 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)         Goals:  Pt. will demonstrate/verbalize independence in self-management of condition in : 12 weeks  Pt. will be independent with home exercise program in : 6 weeks  Pt. will have improved quality of sleep: waking less times/night;in 6 weeks;Comment  Comment:: not waking due to pain, with bed mobility  Pt. will be able to walk : 30 minutes;with less pain;with less difficulty;for household mobility;for community mobility;for exercise/recreation;in 12 weeks  Patient will stand : 30 minutes;with less pain;with less difficultty;for home chores;in 6 weeks  Pt. will bend: to dress;to clean;with less pain;with less difficulty;in 6 weeks;Comment;for self care  Comment:: able to dress lower body without back pain    No data recorded    Patient was seen for 1 visit with no missed appointments.  The patient discontinued therapy, did not return.   Patient did not schedule follow up appointments.  She was instructed in a home program.   Patient's current objective and goal status is not known.     Therapy will be discontinued at this time.  The patient will need a new referral  to resume.    Thank you for your referral.  Carmen Ahmadi  6/26/2019  9:22 AM

## 2021-05-29 NOTE — PROGRESS NOTES
Weekly Progress Note  Patsy Santamaria  1955  [id number]      D) Pt attended 1 groups this week with 0 absences. Patient attended 0 individual sessions this week. Patient is in phase 1 of SRP.  A) Staff facilitated groups and reviewed tx progress. Assessed for [facility]. R) No VAP needed at this time.   Any significant events, defines as events that impact patients relationship with others inside and outside of treatment: Not at this time   Indicate any changes or monitoring of physical or mental health problems: Patient is seeing Dr. Brunner in the [facility] for concerns as well as Araceli Hamilton.     Indicate involvement by any outside supports: Family and friends   IAPP reviewed and modified as needed. NA  Pt working on the following dimensions:  Dimension #1 - Withdrawal Potential - Risk 0. Patient reports last use of alcohol on 05/16/19. Patient denies withdrawal symptoms at this time  Specific goals from treatment plan addressed this week: The patient goal is to maintain abstinence. She reports no use over the last week.   Effectiveness of strategies: Strategies appear to be effective    Dimension #2 - Biomedical - Risk 1. Patient reports COPD, Osteoarthritis, and chronic back pain. Patient has primary care provider and is able to seek cares as needed. The patient is medication compliant as well.   Specific goals from treatment plan addressed this week: The patient goal is to manage biomedical concerns. She did not endorse any concerns over the last week.   Effectiveness of strategies: Strategies appear to be effective.     Dimension #3 - Emotional/Behavioral/Cognitive - Risk 2. Patient reports depression and PTSD. Patient has mental health care provider. Patient reports recently experiencing mental health symptoms. Patient denies suicidal or homicidal ideation. Patient reports previous suicide attempt.  Specific goals from treatment plan addressed this week: The patient goal is to manage mental health symptoms. She is  medication compliant at this time and meeting with providers as scheduled. She has not endorsed any emergent emotional or behavioral concerns. She did report that she needs to work on relationships and anger with those close to her.   Effectiveness of strategies: Strategies appear to be effective.    Dimension #4 - Treatment Acceptance/Resistance - Risk 0. Patient verbalizes a desire for change, she has internal motivation and is ready to engage.   Specific goals from treatment plan addressed this week: The patient verbalized a need to try something different and engage in outpatient treatment. She was an active and engaged group member.   Effectiveness of strategies: Strategies appear to be effective.     Dimension #5 - Relapse Potential - Risk 3. Patient lacks healthy, positive coping skills. Patient lacks skills to prevent relapse. Patient reports having difficulty maintaining sobriety outside of a structured facility. Patient has had multiple treatment episodes. Patient has achieved periods of sobriety in the past  Specific goals from treatment plan addressed this week: The patient goal is to develop coping skills and discuss in group how they were effective or ineffective. She discussed some urges and triggers related to people using in her building however reports she went to her unit and engaged in sober hobbies.   Effectiveness of strategies: Strategies appear to be effective    Dimension #6 - Recovery Environment - Risk 3. Patient is unemployed. Patient has stable housing. Patient is not engaged in structured daily activities, she does report a desire to engage in volunteer work. Patient reports positive, sober support. Patient denies current legals. History of DUI.   Specific goals from treatment plan addressed this week: Patient set goals related to structure and getting out of the home. She will review these goals weekly and set new ones. She reports getting around sober people.   Effectiveness of  strategies: Strategies appear to be effective  T) Treatment plan updated yes and co-signed by Dr. Brunner.  Patient notified and in agreement Yes.  Patient educated on welcome. Patient has completed 3 of 108 program hours at this time. Projected discharge date is 8/30/19. Current discharge plan is phase III.     Marisel SWIFT Matthews [facility]  11/2/2018, 12:11 PM        Psycho-Educational Curriculum  Date Attended  Psycho-Educational Curriculum  Date Attended    8 Dimensions of wellness   Grief and Loss     Emotional   Stages of grief    Physical   Memorialized     Intellectual   Letters to loved ones     Occupational   Grief group    Spiritual   Loneliness    Environmental   Purpose and Hobbies    Financial   Values    Social   Hobbies    Access to Care   Tana      Service Access   Volunteer Work     Pain Management   Experiential Learning     Memory   Value of movement     Physical Wellness  Relationships     Medication   Self-Love     PAWS   Resentment    Hygiene (Sleep, Physical, etc)   Communication Skills     Emotional Wellbeing   Amends     Healthy vs. Unhealthy Feelings  Family     Affirmations  Social Anxieties     Co-Occurring Disorders  Legacy     Anxiety   Support(+ & -)    Depression  Assertive Communication     Grounding  Codependency    Trauma/Victim Identity   Boundaries    MH/CD Acceptance   Defense Mechanisms     Sober Structure   Relapse Prevention     Sober support groups   Triggers and High Risk Situations    Needds  Relapse Prevention Plan     Spirituality   Coping Skills     Schedule   Addictive Thoughts    Sobriety Vs. Recovery   Relapse Process     Power of Now   What is Addiction     Truth in life   Impulsive/Compulsive Behaviors     Recovery Investement   Cross Addiction     Recovery Influences   Early Recovery     Educational Videos   Mindfulness    No Kidding Me 2!   Wise mind  5/29   Anonymous People       Torrey's Story       Pleasure Unwoven

## 2021-05-29 NOTE — PROGRESS NOTES
Therapist called and left voice message for patient inquiring about status, as she was a no-show for today's therapy visit scheduled for 3pm. Therapist requested patient call back in order to reschedule.

## 2021-05-29 NOTE — PROGRESS NOTES
Outpatient Mental Health Treatment Plan    Name:  Patsy Santamaria  :  1955  MRN:  [id number]    Treatment Plan:  Updated Treatment Plan  Intake/initial treatment plan date:    Intake: 3/23/2017  Initial treatment plan date: 2017  Benefit and risks and alternatives have been discussed: Yes  Is this treatment appropriate with minimal intrusion/restrictions: Yes  Estimated duration of treatment:  Approximately 5 sessions.  Anticipated frequency of services:  Every 2-3 weeks  Necessity for frequency: This frequency is needed to establish therapeutic goals and for continuity of care in order to monitor progress.  Necessity for treatment: To address cognitive, behavioral, and/or emotional barriers in order to work toward goals and to improve quality of life.    Session Type: Patient is presenting for an Individual session.    Plan:           ?   ? Anxiety    Goal:  Decrease average anxiety level from 3 to 1.   Strategies: ? [x]Learn and practice relaxation techniques and other coping strategies (e.g., thought stopping, reframing, meditation)     ? [x] Increase involvement in meaningful activities     ? [x] Discuss sleep hygiene     ? [x] Explore thoughts and expectations about self and others     ? [x] Identify and monitor triggers for panic/anxiety symptoms     ? [x] Implement physical activity routine (with physician approval)     ? [x] Consider introduction of bibliotherapy and/or videos     ? [x] Continue compliance with medical treatment plan (or explore barriers)   ?Degree to which this is a problem: 3  Degree to which goal is met: 1  Date of Review: 2019       ? Depression    Goal:  Decrease average depression level from 4 to 3.   Strategies:    ?[x] Decrease social isolation     [x] Increase involvement in meaningful activities     ?[x] Discuss sleep hygiene     ?[x] Explore thoughts and expectations about self and others     ?[x] Process grief (loss of significant person, independence, role,  etc.)     ?[x] Assess for suicide risk     ?[x] Implement physical activity routine (with physician approval)     [x] Consider introduction of bibliotherapy and/or videos     [x] Continue compliance with medical treatment plan (or explore barriers) ?  Degree to which this is a problem: 4  Degree to which goal is met: 1  Date of Review: September 2019    Substance use  Goal:  Maintain sobriety from alcohol use.   Strategies: ? [x] Participate in outpatient chemical dependency program (patient has intake appointment at [facility] on 6/21/19 at 10:30am)     ? [x] Discuss barriers to participating in AA or other peer-facilitated groups         [x] Address environmental factors which may interfere with sobriety     ? [x] Explore short-term versus long-term consequences of use     ? [x] Continue compliance with medical treatment plan (or explore barriers)  Degree to which this is a problem: 4  Degree to which goal is met: 1  Date of Review: September 2019       Functional Impairment:  1=Not at all/Rarely  2=Some days  3=Most Days  4=Every Day    Personal : 3  Family : 2  Social : 3   Work/school : 4    Diagnosis:  (EXAMPLE of DSM V: Major depressive disorder, recurrent, moderate; Generalized Anxiety disorder; borderline personality per patient PHI; fibromyalgia, History of breast cancer in remission; Problem with primary relationship.)   1. PTSD (post-traumatic stress disorder)    2. Alcohol use disorder, severe, dependence (H)    3. Mild episode of recurrent major depressive disorder (H)        Clinical assessments and measures completed:.   WHODAS 2.0 12-item version 17   Scores presented in qualifiers to represent level of disability.  MILD Problem (slight, low, ...) 5-24%  H1= 7  H2= 10  H3= 10     PHQ-9 = 4 (2/19/2019)  PHQ-9 = 25 (6/20/2019)    ALISIA-7 = 7 (2/19/2019)  ALISIA-7 = 19 (6/20/2019)     Strengths:  The patient is resourceful and articulate. She is a good advocate for her needs. She is very independent.  "  Limitations:  The patient often feels uncomfortable talking about her feelings and past problems. There are some barriers to obtaining employment. Patient is often isolated.  Cultural Considerations: The patient is a 64 year old  female with a history of complex trauma. She was born in [place]. [place] in [place] before relocating to [place] [facility] of [place] around age 10. She noted the difficult transition and shared that moving from [place] small town to a big city was a \"culture shock.\" Patient became pregnant at age 16 and has lived on her own ever since.    Persons responsible for this plan: Patient and Provider            Psychotherapist Signature           Patient Signature:              Guardian Signature             Provider: Performed and documented by JULIO Mendoza   Date:  6/20/2019      "

## 2021-05-29 NOTE — PROGRESS NOTES
No weekly will be entered for the week as the patient has not attended group since 6/5/19 she reported use on 6/6/19 and was encouraged to attend group on 6/7/19. At this time there has been attempted contact with no success. Due to the patients lack of attendance this writer has been unable to assess patient needs so no weekly will be entered.     PRINCE Dominguez 6/13/2019 12:23 PM

## 2021-05-29 NOTE — PROGRESS NOTES
This writer talked with the patient for 5 minutes. Patient reports via phone she drank 1/2 pint of alcohol on 6/6/19. She stated she was dealing with stress and family concerns and wasn't thinking and went back to old habits. She reports she is feeling well. She has a structured weekend plan and is looking forward to following that. She reports she will be present for group on 6/10/19. She did not endorse any SI/SIB/HI on the phone.     PRINCE Dominguez 6/7/2019 3:08 PM

## 2021-05-29 NOTE — PROGRESS NOTES
Mental Health Visit Note    5/21/2019    Start time: 2:00pm    Stop Time: 2:45pm   Session # 3    Session Type: Patient is presenting for an Individual session.    Patsy Santamaria is a 64 y.o. female is being seen today for    Chief Complaint   Patient presents with      Follow Up     Psychotherapy follow-up visit for depression and alcohol use/relapse   .     New symptoms or complaints: Relapse with alcohol March 2019    Functional Impairment:   Personal: 3  Family: 3  Work: 4  Social:3    Clinical assessment of mental status:   Grooming: Well groomed  Attire: Appropriate  Age: Appears Stated  Behavior Towards Examiner: Cooperative and slightly guarded  Motor Activity: Within normal   Eye Contact: Appropriate  Mood: Anxious  Affect: Congruent w/content of speech, Flat, Anxious and Depressed  Speech/Language: Within normal  Attention: Distractible  Concentration: Brief  Thought Process: Within normal  Thought Content: Hallucinations: none reported  Delusions: none reported  Orientation: X 3  Memory: No Evidence of Impairment  Judgement: Impairment and Minimal  Estimated Intelligence: Average  Demonstrated Insight: Adequate  Fund of Knowledge: adequate    Suicidal/Homicidal Ideation present: None Reported This Session    Patient's impression of their current status:   The patient reported that she made a choice to participate in substance abuse treatment. She relapsed in March 2019 and was eventually admitted to [facility] from 4/16/19/-5/7/19. She is planning to start outpatient treatment through [facility] and attend AA meetings again. She was prompted to review factors leading up to relapse. She was eventually able to outline a combination of factors that contributed to feeling more depressed, returning to old habits and increasing high risk exposures. For instance, she started to feel lonely and bored at her apartment. She started to isolate more from others. She stopped taking her medications consistently. She  started engaging in memories about the past, triggered by an invitation to her step-daughter's wedding. This made her think about the family that she lost. She was able to hide her relapse for a while but slowly started drinking more and more each day (up to 1 liter/day before inpatient). She now reports that she can't do sobriety on her own - she is seeking help and assistance towards recovery.     She requested to end session early today in order to be on time for a physical therapy visit across town.      Therapist impression of patients current state:   The patient arrived on-time for a follow-up psychotherapy visit. Her last visit occurred on 2/19/19. She no-showed her visit on 3/19/19 and was unreachable for some time. The therapist did suspect possible relapse but was unable to get in touch with patient until now. She did choose to be hospitalized and is very agreeable to participate in outpatient treatment after her most recent relapse with alcohol in March 2019. She was initially guarded about suspected reasons or contributing factors to the relapse. With prompting, she was able to recognize that there were a combination of factors that led to the inevitable relapse. She is willing to follow treatment recommendations again and work towards sobriety. She requested help obtaining a service animal and was instructed to check with her building management about requirements. She was also affirmed for coming back to therapy today. She agreed that it would be beneficial for her to re-engage in therapy visits on a biweekly basis while she rebuilds her skills with the goal of returning to a sober lifestyle.     Type of psychotherapeutic technique provided: Client centered and Solution-focused    Progress toward short term goals:Progress as expected, as patient recognizes that returning for more frequent and consistent therapy visits will be a coping strategy for sobriety maintenance    Review of long term goals: Not  done at today's visit   Treatment plan last updated on 2/19/19.  Therapist will update tx plan with patient during next follow-up visit in June 2019.    Diagnosis:   1. Severe alcohol use disorder (H)    2. Severe episode of recurrent major depressive disorder, without psychotic features (H)    3. PTSD (post-traumatic stress disorder)        Plan and Follow up: The patient will be scheduled for a follow-up session on 6/4. She was instructed to call the clinic to reschedule if this date conflicts with outpatient CD treatment.  She is scheduled to start outpatient CD treatment at [facility] next week.      Discharge Criteria/Planning: Client has chronic symptoms and ongoing therapy for maintenance stability recommended.     Performed and documented by JULIO Mendoza 5/21/2019

## 2021-05-29 NOTE — PLAN OF CARE
VS: Blood pressure 114/64, pulse 67, temperature 97.7  F (36.5  C), temperature source Oral, resp. rate 18, weight 116.1 kg (256 lb), SpO2 94 %, not currently breastfeeding.  Denies SOB, CP, new N/T   O2: RA. LS CTA, diminished bilaterally   Output: Voids sponatneous w/o diff in BR   Last BM: 5/28 per report   Activity: Up in room independently   Skin: Intact   Pain: Denies. Robaxin available   CMS: AxO. Intact.   Reports anxiety, pt takes anxiety medication every morning at home. PRN atarax given   Dressing: NA   Diet: Regular   LDA: PIV SL   Equipment: Seizure pads. Call light. IS   Plan: Discharge to home with outpt chem dep pending medical clearance   Additional Info: MSSA score 4 and 9 this shift. 5 mg valium given x1, continue to monitor

## 2021-05-29 NOTE — PROGRESS NOTES
Patsy Santamaria attended 3 hours of group therapy today.    Total group size of 3.    6/12/2019 12:26 PM Marisel Matthews     --LATE ENTRY---

## 2021-05-29 NOTE — TELEPHONE ENCOUNTER
Writer called patient due to no show today with Dr. Boles, left message to call back to reschedule.

## 2021-05-30 VITALS
RESPIRATION RATE: 16 BRPM | TEMPERATURE: 97.3 F | WEIGHT: 256 LBS | OXYGEN SATURATION: 99 % | BODY MASS INDEX: 46.82 KG/M2 | SYSTOLIC BLOOD PRESSURE: 115 MMHG | DIASTOLIC BLOOD PRESSURE: 73 MMHG | HEART RATE: 71 BPM

## 2021-05-30 VITALS — WEIGHT: 233 LBS | BODY MASS INDEX: 39.78 KG/M2 | HEIGHT: 64 IN

## 2021-05-30 VITALS — WEIGHT: 229 LBS | BODY MASS INDEX: 41.22 KG/M2

## 2021-05-30 VITALS — WEIGHT: 234.6 LBS | BODY MASS INDEX: 40.05 KG/M2 | HEIGHT: 64 IN

## 2021-05-30 PROCEDURE — 999N000157 HC STATISTIC RCP TIME EA 10 MIN

## 2021-05-30 PROCEDURE — 250N000013 HC RX MED GY IP 250 OP 250 PS 637: Performed by: FAMILY MEDICINE

## 2021-05-30 PROCEDURE — 94640 AIRWAY INHALATION TREATMENT: CPT

## 2021-05-30 PROCEDURE — 250N000012 HC RX MED GY IP 250 OP 636 PS 637: Performed by: HOSPITALIST

## 2021-05-30 PROCEDURE — 250N000009 HC RX 250: Performed by: INTERNAL MEDICINE

## 2021-05-30 PROCEDURE — 250N000013 HC RX MED GY IP 250 OP 250 PS 637: Performed by: HOSPITALIST

## 2021-05-30 PROCEDURE — 250N000009 HC RX 250: Performed by: HOSPITALIST

## 2021-05-30 PROCEDURE — 250N000013 HC RX MED GY IP 250 OP 250 PS 637: Performed by: INTERNAL MEDICINE

## 2021-05-30 PROCEDURE — 99239 HOSP IP/OBS DSCHRG MGMT >30: CPT | Performed by: INTERNAL MEDICINE

## 2021-05-30 RX ORDER — LEVOFLOXACIN 500 MG/1
500 TABLET, FILM COATED ORAL DAILY
Qty: 1 TABLET | Refills: 0 | Status: SHIPPED | OUTPATIENT
Start: 2021-05-31 | End: 2021-06-01

## 2021-05-30 RX ORDER — ESCITALOPRAM OXALATE 10 MG/1
10 TABLET ORAL DAILY
Qty: 30 TABLET | Refills: 0 | Status: SHIPPED | OUTPATIENT
Start: 2021-05-31 | End: 2022-03-15

## 2021-05-30 RX ORDER — PREDNISONE 20 MG/1
40 TABLET ORAL DAILY
Qty: 2 TABLET | Refills: 0 | Status: SHIPPED | OUTPATIENT
Start: 2021-05-31 | End: 2021-06-01

## 2021-05-30 RX ORDER — FOLIC ACID 1 MG/1
1 TABLET ORAL DAILY
Qty: 30 TABLET | Refills: 0 | Status: SHIPPED | OUTPATIENT
Start: 2021-05-31 | End: 2021-10-25

## 2021-05-30 RX ORDER — MIRTAZAPINE 15 MG/1
15 TABLET, FILM COATED ORAL AT BEDTIME
Qty: 30 TABLET | Refills: 0 | Status: SHIPPED | OUTPATIENT
Start: 2021-05-30 | End: 2022-03-15

## 2021-05-30 RX ORDER — IPRATROPIUM BROMIDE AND ALBUTEROL SULFATE 2.5; .5 MG/3ML; MG/3ML
3 SOLUTION RESPIRATORY (INHALATION) EVERY 4 HOURS PRN
Qty: 15 ML | Refills: 1 | Status: SHIPPED | OUTPATIENT
Start: 2021-05-30 | End: 2022-04-01

## 2021-05-30 RX ORDER — NICOTINE 21 MG/24HR
1 PATCH, TRANSDERMAL 24 HOURS TRANSDERMAL DAILY
Qty: 14 PATCH | Refills: 0 | Status: SHIPPED | OUTPATIENT
Start: 2021-05-31 | End: 2021-10-25

## 2021-05-30 RX ADMIN — ESCITALOPRAM OXALATE 10 MG: 10 TABLET ORAL at 07:51

## 2021-05-30 RX ADMIN — BUDESONIDE 0.5 MG: 0.5 INHALANT RESPIRATORY (INHALATION) at 08:40

## 2021-05-30 RX ADMIN — HYDROXYZINE HYDROCHLORIDE 50 MG: 25 TABLET, FILM COATED ORAL at 10:19

## 2021-05-30 RX ADMIN — LEVOFLOXACIN 500 MG: 500 TABLET, FILM COATED ORAL at 07:51

## 2021-05-30 RX ADMIN — OMEPRAZOLE 20 MG: 20 CAPSULE, DELAYED RELEASE ORAL at 07:51

## 2021-05-30 RX ADMIN — POTASSIUM CHLORIDE 20 MEQ: 750 TABLET, EXTENDED RELEASE ORAL at 07:51

## 2021-05-30 RX ADMIN — BUMETANIDE 1 MG: 1 TABLET ORAL at 07:51

## 2021-05-30 RX ADMIN — IPRATROPIUM BROMIDE AND ALBUTEROL SULFATE 3 ML: .5; 3 SOLUTION RESPIRATORY (INHALATION) at 08:40

## 2021-05-30 RX ADMIN — NALTREXONE HYDROCHLORIDE 50 MG: 50 TABLET, FILM COATED ORAL at 07:51

## 2021-05-30 RX ADMIN — MULTIPLE VITAMINS W/ MINERALS TAB 1 TABLET: TAB at 07:52

## 2021-05-30 RX ADMIN — PREDNISONE 40 MG: 10 TABLET ORAL at 07:51

## 2021-05-30 RX ADMIN — THIAMINE HCL TAB 100 MG 100 MG: 100 TAB at 07:51

## 2021-05-30 RX ADMIN — FOLIC ACID 1 MG: 1 TABLET ORAL at 07:51

## 2021-05-30 NOTE — PROGRESS NOTES
Patsy Santamaria attended 3 hours of group on 7/26/2019.     The group topic was Relapse Prevention, patient was responsive to topic.     Patients engagement in the group session: high     Total number of patients present 7.     Supervising MD: Dr. Carlos Matthews, [facility], 7/26/2019, 1:42 PM

## 2021-05-30 NOTE — PLAN OF CARE
VS: VSS. Denies CP/SOB    O2: >90% on RA    Output: Voiding adequate amounts w/o pain or difficulty    Last BM: 5/30   Activity: Up independently, steady gait    Up for meals? Declined    Skin: Intact    Pain: Denies    CMS: Intact    Dressing: None    Diet: Regular ,tolerating well    LDA: PIV removed    Equipment: Seizure pads    Additional Info: MSSA of 5     DISCHARGE SUMMARY    Pt discharging to: Home  Transportation: Sister   AVS given and discussed: Yes, no further questions   Medications given: Yes   Belongings returned: Yes  Comments: Escorted safely to elevators

## 2021-05-30 NOTE — PROGRESS NOTES
Individual Treatment Plan [facility]     Patient  Name: Patsy Santamaria  MRN: [id number]   : 1955  Admit Date: 19  Date of Initial Service Plan: 19  Tentative Discharge Date: 10/03/19  Counselor: Farshad Cox  Diagnoses: Alcohol Use Disorder, Severe, (F10.20) (303.90)    Dimension 1: Acute Intoxication/Withdrawal Potential, Risk level: 0  Problem Statement from Comprehensive Assessment:   Patient reports last use of alcohol on 19. Patient has experienced withdrawal symptoms in the past.    Problem: Patient reports date of last use of alcohol to be 19  Goal: Maintain abstinence  Must be reached to complete treatment? Yes  Methods/Strategies (must include amount and frequency):   1. Attend group Monday, Wednesday and Friday 8:30am-11:30pm. Share thoughts, feelings, and urges to use.   2. Report any relapses to counselor immediately.  Target Date: 10/03/19  Completion Date:       Dimension 2: Biomedical Conditions/Complications, Risk level: 1  Problem Statement from Comprehensive Assessment:  Patient reports COPD, Osteoarthritis, and chronic back pain. Patient has primary care provider. Patient is able to seek cares as needed.    Problem: Patient reports COPD, Osteoarthritis, and chronic back pain  Goal: Stable health  Must be reached to complete treatment? yes  Methods/Strategies (must include amount and frequency):   1. Continue to follow recommendations from your personal care provider regarding medical concerns.   2. Inform staff immediately of any changes in your health that may affect your active participation in group therapy or attendance.   Target Date: 10/03/19  Completion Date:     Problem: Patient reports current tobacco use.   Goal: Patient to receive information about smoking cessation.   Must be reached to complete treatment? No  Methods/Strategies (must include amount and frequency):   1. Staff to provide patient with nicotine cessation information and help  on how to quit use.   2. Patient to report any progress on stopping nicotine use to staff.   Target Date: 10/03/19  Completion Date:     Problem: Patient is required to meet with provider in the clinic. Medicare appointment scheduled for 07/15/19   Goal: Patient will follow-up with Dr. Brunner for Medicare appointment in the [facility] as directed.  Must be reached to completed treatment? Yes  Methods/Strategies (must include amount and frequency):   1. Patient will meet with Dr. Brunner as scheduled to go over treatment plan and individual needs as required.   2. Patient will attend follow-up if MD requires.   Target Date: 10/03/19  Completion Date:     Dimension 3: Emotional/Behavioral/Cognitive, Risk level: 2  Problem Statement from Comprehensive Assessment:  Patient reports depression and PTSD. Patient has mental health care provider. Patient reports recently experiencing mental health symptoms. Patient denies suicidal or homicidal ideation. Patient reports previous suicide attempt.    Problem: Patient has a diagnoses of depression and PTSD  Goal: Manage mental health symptoms   Must be reached to complete treatment? No  Methods/Strategies (must include amount and frequency):   1. Patient to begin using coping skills learned in therapeutic group (such as grounding, breathing, thought challenging, mindfulness, etc), and share in daily check-in any benefits or challenges that you experience using these skills.  Target Date: 10/03/19  Completion Date:     Dimension 4: Readiness to Change, Risk level 0  Problem Statement from Comprehensive Assessment:  Patient verbalizes a desire for change.    Problem: Ongoing motivation.  Goal: Follow through with intentions to treat chemical dependency concerns while meeting OP treatment expectations in order to graduate successfully from the program.   Must be reached to complete treatment? Yes   Methods/Strategies (must include amount and frequency):   1. Complete all requested  assignments, participate in group and follow through with aftercare plans.   2. If for any reason you will not be in group or will be tardy please contact staff at [phone] ([place])   Target Date: 10/03/19  Completion Date:     Dimension 5: Relapse/Continued Use/Continued Problem Potential, Risk level: 3  Problem Statement from Comprehensive Assessment:  Patient lacks healthy, positive coping skills. Patient lacks skills to prevent relapse. Patient may not fully recognize impact substance use has on mental health. Patient has had multiple treatment episodes. Patient has achieved periods of sobriety in the past.    Problem: Continued use.  Goal: Develop relapse prevention skills  Must be reached to complete treatment? Yes  Methods/Strategies (must include amount and frequency):   Patient to share in daily check-in any urges and addictive thinking to better understand her pattern of use and to prevent relapse in the future.   Target Date: 10/03/19  Completion Date:     Dimension 6: Recovery Environment, Risk level: 3  Problem Statement from Comprehensive Assessment:  Patient is unemployed. Patient has stable housing. Patient is not engaged in structured daily activities. Patient reports positive, sober support. Patient denies current legals. History of DUI.     Problem: Lack of sober support   Goal: Gain sober support  Must be reached to complete treatment? yes  Methods/Strategies (must include amount and frequency):   1. Explore and identify sober support meetings to regularly attend.   2. Obtain a sponsor prior to phase II of treatment.   Target Date: 10/03/19  Completion Date:       Resources  Resources to which the patient is being referred for problems when problems are to be addressed concurrently by another provider:       By signing this document, I am acknowledging that I was actively and directly involved in the development of my treatment plan.           Patient   Signature_________________________________________         Date__________________        Staff Signature  Farshad Cox    Date: 7/5/2019, 12:22 PM

## 2021-05-30 NOTE — PROGRESS NOTES
Patsy Santamaria attended 3 hours of group on 7/10/2019.     The group topic was Sober Structure, patient was responsive to topic.     Patients engagement in the group session: high     Total number of patients present 8.     Supervising MD: Dr. Oliverio Matthews, [facility], 7/10/2019, 3:41 PM

## 2021-05-30 NOTE — PROGRESS NOTES
Patsy Santamaria attended 3 hours of group on 7/29/2019.     The group topic was PAWS, patient was responsive to topic.     Patients engagement in the group session: medium     Total number of patients present 9.     Supervising MD: Dr. Oliverio Matthews, [facility], 7/29/2019, 2:43 PM

## 2021-05-30 NOTE — PROGRESS NOTES
Patsy Santamaria attended 2 hours of group on 7/15/2019.     The group topic was Relapse Prevention-Grief Issues, patient was responsive to topic.     Patients engagement in the group session: high     Total number of patients present 9.     Supervising MD: Dr. Oliverio Chavez, [facility], 7/15/2019, 12:07 PM

## 2021-05-30 NOTE — PROGRESS NOTES
Patsy Santamaria attended 3 hours of group on 7/19/2019.     The group topic was Relapse Prevention, patient was responsive to topic.     Patients engagement in the group session: medium     Total number of patients present 8.     Supervising MD: Dr. Oliverio Matthews, [facility], 7/19/2019, 2:29 PM

## 2021-05-30 NOTE — PLAN OF CARE
VS:     VSS   Output:     Last BM 5/28. Voids spontaneously without difficulty in the bathroom.   Activity:     Pt up in room and to bathroom independently with steady gait.   Skin: Intact.   Pain:     Pt has generalized pain, no PRNs requested overnight.   CMS:     CMS and Neuro's are intact. Denies numbness and tingling in all extremities.   Dressing:     N/A   Diet:     Regular diet   LDA:     SL PIV   Equipment:     Seizure pad on bed. No PCDs in place, pt up in room independently ad tre and ambulates frequently.       Plan:     Pt is able to make needs known and the call light is within the pt's reach. Continue to monitor. Possible discharge today w/ outpt chem dep pending medical clearance.   Additional Info:     MSSA score of 2; 24 hours without valium will be this afternoon.

## 2021-05-30 NOTE — DISCHARGE SUMMARY
[facility]  Hospitalist Discharge Summary      Date of Admission:  5/26/2021  Date of Discharge:  5/30/2021  Discharging Provider: Robert Chapman MD      Discharge Diagnoses   Alcohol withdrawal   COPD exacerbation     Follow-ups Needed After Discharge   Follow-up Appointments     Adult [facility] Follow-up and recommended labs and tests      Follow up with primary care provider, EJ WELLER,   within 7 days for hospital follow- up.  No follow up labs or test are   needed.      Appointments on [place] and/or [facility] (with [facility] or [facility]   provider or service). Call [phone] if you haven't heard regarding   these appointments within 7 days of discharge.             Unresulted Labs Ordered in the Past 30 Days of this Admission     No orders found from 4/26/2021 to 5/27/2021.          Discharge Disposition   Discharged to home  Condition at discharge: Stable      Hospital Course   Patsy Santamaria is a 66 year old female  with Hx of COPD, tobacco use disorder 1 pack/day, alcohol use disorder, ongoing withdrawal seizures, chronic lower extremity edema, obesity, GERD, anxiety, depression, obstructive sleep apnea noncompliant to CPAP. Patient came to the ED for evaluation of shortness of breath and seeking detox from alcohol. She was admitted to the medical floor and received supportive management. It was considered she had a COPD exacerbation and was started on PO Prednisone, PO Levaquin and frequent nebulization's. Respiratory status improved. She also was placed on a MSSA protocol and received valium as needed. Alcohol withdrawal resolved without complications. CD and SW evaluated the patient, she will follow-up outpatient for possible inpatient alcohol dependence treatment.   Psychiatry evaluated the patient and recommended to start Lexapro and Remeron for depression.   She was hemodynamically stable on the day of discharge.         Consultations This Hospital Stay   PSYCHIATRY IP CONSULT  CHEMICAL DEPENDENCY IP CONSULT  CARE MANAGEMENT / SOCIAL WORK IP CONSULT    Code Status   Full Code    Time Spent on this Encounter   I, Robert Chapman MD, personally saw the patient today and spent greater than 30 minutes discharging this patient.       Robert Chapman MD  [facility] MED SURG ORTHOPEDIC  [address] [address]  Phone: [phone]  Fax: [phone]  ______________________________________________________________________    Physical Exam   Vital Signs: Temp: 97.3  F (36.3  C) Temp src: Oral BP: 115/73 Pulse: 71   Resp: 16 SpO2: 99 % O2 Device: None (Room air)    Weight: 256 lbs 0 oz  Constitutional: Obesity noted awake, alert, cooperative, no apparent distress.  Eyes: Conjunctiva and pupils examined and normal.  Respiratory: Normal respiratory effort, bilateral wheezing, no crackles.   Cardiovascular: RRR, normal S1 and S2, and no murmur noted.  GI: Soft, non-distended, non-tender, normal bowel sounds.  Musculoskeletal: No joint swelling, erythema or tenderness.  Neurologic: Cranial nerves 2-12 intact, normal strength and sensation.  Psychiatric: Alert, flat affect           Primary Care Physician   EJ WELLER    Discharge Orders      Reason for your hospital stay    66 year old female  with Hx of COPD, tobacco use disorder 1 pack/day, alcohol use disorder, ongoing withdrawal seizures, chronic lower extremity edema, obesity, GERD, anxiety, depression, obstructive sleep apnea noncompliant to CPAP.  Patient came to the ED for evaluation of shortness of breath and seeking detox from alcohol.        Adult [facility] Follow-up and recommended labs and tests    Follow up with primary care provider, EJ WELLER, within 7 days for hospital follow- up.  No follow up labs or test are needed.      Appointments on [place] and/or [facility] (with [facility] or [facility]  provider or service). Call [phone] if you haven't heard regarding these appointments within 7 days of discharge.     Activity    Your activity upon discharge: activity as tolerated     Full Code     Diet    Follow this diet upon discharge: Orders Placed This Encounter      Regular Diet Adult       Significant Results and Procedures   Results for orders placed or performed during the hospital encounter of 05/26/21   XR Chest 2 Views    Narrative    CHEST TWO VIEWS   5/26/2021 11:54 AM     HISTORY: Shortness of breath.    COMPARISON: None available      Impression    IMPRESSION: PA and lateral views of the chest were obtained.  Cardiomediastinal silhouette is within normal limits. Mild left  basilar pulmonary opacities, likely atelectasis, otherwise no  suspicious focal pulmonary opacities. No significant pleural effusion  or pneumothorax.    MIHAELA OBRIEN MD   US Lower Extremity Venous Duplex Bilateral    Narrative    Exam: Ultrasound of the deep venous system of bilateral legs dated  5/27/2021 10:00 AM    Clinical information: Rule out DVT, edema and swelling    Comparison: None    Ordering provider: Thaddeus Renteria    Technique: Gray-scale evaluation with compression and Doppler  assessment of deep venous system for spontaneous and phasic flow, as  well as the presence of distal augmentation. Color flow images  obtained as needed. Gray-scale images with compression of the great  saphenous vein obtained as needed.    Findings:    Right leg:    CFV: Thrombus: No, Phasic: Yes  Femoral vein, proximal: Thrombus: No, Phasic: Yes  Femoral vein, mid: Thrombus: No, Phasic: Yes  Femoral vein, distal: Thrombus: No, Phasic: Yes  Popliteal vein: Thrombus: No, Phasic: Yes  PTV: Thrombus: No  Peroneal vein: Thrombus: No    Left leg:    CFV: Thrombus: No, Phasic: Yes  Femoral vein, proximal: Thrombus: No, Phasic: Yes  Femoral vein, mid: Thrombus: No, Phasic: Yes  Femoral vein, distal: Thrombus: No, Phasic: Yes  Popliteal  "vein: Thrombus: No, Phasic: Yes  PTV: Thrombus: Thrombus: No  Peroneal vein: Thrombus: No    There is a 2.0 x 1.4 x 3.0 cm cyst in the left popliteal fossa with no  associated vascularity.      Impression    Impression:    Right leg: No deep venous thrombosis.     Left leg: No deep venous thrombosis. 3 cm popliteal fossa cyst.    Reference: \"Duplex Ultrasound in the Diagnosis of Lower-Extremity Deep  Venous Thrombosis\"- Dianelys Dickens MD, S; Ubaldo Cuellar MD  (Circulation. 2014;129:917-921. http://circ.ahajournals.org )    I have personally reviewed the examination and initial interpretation  and I agree with the findings.    ELDER NAZARIO   Echo Complete    Narrative    [id number]  [id number]  [id number]  [id number]^ZEESHAN^TOBIN     [facility]  Echocardiography Laboratory  [address] [place] [id number]     Name: MINNA WOOD  MRN: [id number]  : 1955  Study Date: 2021 11:15 AM  Age: 66 yrs  Gender: Female  Patient Location: [facility]  Reason For Study: SOB  Ordering Physician: TOBIN BYERS  Performed By: NIGEL Skelton     BSA: 2.1 m2  Height: 62 in  Weight: 256 lb  HR: 93  BP: 124/63 mmHg  ______________________________________________________________________________  Procedure  Complete Portable Echo Adult. Contrast Optison. Technically difficult  study.Extremely difficult acoustic windows despite the use of contrast for  endcardial border definition. Patient was given 5 ml mixture of 3 ml Optison  and 6 ml saline. 4 ml wasted.  ______________________________________________________________________________  Interpretation Summary  Technically difficult study.Extremely difficult acoustic windows despite the  use of contrast for endcardial border definition. Minimal information  available.  Global and regional left ventricular function is normal with an EF of 60-65%.  Based on limited views the global right ventricular function is " probably  normal.     There is no prior study for direct comparison.  ______________________________________________________________________________  Left Ventricle  Global and regional left ventricular function is normal with an EF of 60-65%.     Right Ventricle  Based on limited views the global right ventricular function is probably  normal.     Atria  The atria cannot be assessed.     Mitral Valve  The mitral valve is normal.     Aortic Valve  The aortic valve cannot be assessed.     Tricuspid Valve  The tricuspid valve cannot be assessed.     Pulmonic Valve  The pulmonic valve cannot be assessed.     Vessels  The aorta root cannot be assessed. The thoracic aorta cannot be assessed. The  inferior vena cava cannot be assessed.     Pericardium  No pericardial effusion is present.     Compared to Previous Study  There is no prior study for direct comparison.  ______________________________________________________________________________  MMode/2D Measurements & Calculations     IVSd: 0.91 cm  LVIDd: 5.3 cm  LVIDs: 3.7 cm  LVPWd: 1.0 cm  FS: 30.4 %  LV mass(C)d: 191.6 grams  LV mass(C)dI: 90.2 grams/m2  asc Aorta Diam: 2.8 cm     EF(MOD-bp): 69.9 %  RWT: 0.38     Doppler Measurements & Calculations  MV E max bailey: 68.1 cm/sec  MV A max bailey: 69.1 cm/sec  MV E/A: 0.99  MV dec time: 0.17 sec  E/E' av.3  Lateral E/e': 6.6  Medial E/e': 8.0     ______________________________________________________________________________  Report approved by: Kim Hanley 2021 12:29 PM               Discharge Medications   Current Discharge Medication List      START taking these medications    Details   escitalopram (LEXAPRO) 10 MG tablet Take 1 tablet (10 mg) by mouth daily  Qty: 30 tablet, Refills: 0    Associated Diagnoses: Episode of recurrent major depressive disorder, unspecified depression episode severity (H)      folic acid (FOLVITE) 1 MG tablet Take 1 tablet (1 mg) by mouth daily  Qty: 30 tablet, Refills: 0     Associated Diagnoses: Alcohol dependence with intoxication with complication (H)      ipratropium - albuterol 0.5 mg/2.5 mg/3 mL (DUONEB) 0.5-2.5 (3) MG/3ML neb solution Take 1 vial (3 mLs) by nebulization every 4 hours as needed for shortness of breath / dyspnea or wheezing  Qty: 15 mL, Refills: 1    Associated Diagnoses: COPD exacerbation (H)      levofloxacin (LEVAQUIN) 500 MG tablet Take 1 tablet (500 mg) by mouth daily for 1 day  Qty: 1 tablet, Refills: 0    Associated Diagnoses: COPD exacerbation (H)      mirtazapine (REMERON) 15 MG tablet Take 1 tablet (15 mg) by mouth At Bedtime  Qty: 30 tablet, Refills: 0    Associated Diagnoses: Episode of recurrent major depressive disorder, unspecified depression episode severity (H)      nicotine (NICODERM CQ) 21 MG/24HR 24 hr patch Place 1 patch onto the skin daily  Qty: 14 patch, Refills: 0    Associated Diagnoses: Alcohol dependence with intoxication with complication (H)      predniSONE (DELTASONE) 20 MG tablet Take 2 tablets (40 mg) by mouth daily for 1 dose  Qty: 2 tablet, Refills: 0    Associated Diagnoses: COPD exacerbation (H)         CONTINUE these medications which have NOT CHANGED    Details   albuterol (PROAIR HFA/PROVENTIL HFA/VENTOLIN HFA) 108 (90 Base) MCG/ACT inhaler Inhale 2 puffs into the lungs every 4 hours as needed     Comments: Pharmacy may dispense brand covered by insurance (Proair, or proventil or ventolin or generic albuterol inhaler)      ammonium lactate (AMLACTIN) 12 % external cream Apply topically daily as needed for dry skin       bumetanide (BUMEX) 1 MG tablet Take 1 mg by mouth daily       diclofenac (VOLTAREN) 1 % topical gel Apply 2 g topically 3 times daily as needed (knee pain)       gabapentin (NEURONTIN) 300 MG capsule Take 300-600 mg by mouth At Bedtime       hydrOXYzine (ATARAX) 25 MG tablet Take 25-50 mg by mouth 3 times daily as needed for anxiety      methocarbamol (ROBAXIN) 750 MG tablet Take 750 mg by mouth 3 times  "daily as needed for muscle spasms or other (neck pain)      naltrexone (DEPADE/REVIA) 50 MG tablet Take 50 mg by mouth daily      nicotine (NICORETTE) 2 MG gum Place 1 each (2 mg) inside cheek every hour as needed for smoking cessation  Qty: 30 each, Refills: 0    Associated Diagnoses: Alcohol dependence with uncomplicated intoxication (H)      omeprazole (PRILOSEC) 20 MG DR capsule Take 20 mg by mouth daily       potassium chloride ER (KLOR-CON M) 20 MEQ CR tablet Take 20 mEq by mouth daily       umeclidinium (INCRUSE ELLIPTA) 62.5 MCG/INH inhaler Inhale 1 puff into the lungs daily         STOP taking these medications       traZODone (DESYREL) 50 MG tablet Comments:   Reason for Stopping:             Allergies   Allergies   Allergen Reactions     Percocet [Oxycodone-Acetaminophen]      Patient reports \"vomiting,grossly ill two weeks ago\"     Sulfa Drugs Itching and Rash     "

## 2021-05-30 NOTE — PROGRESS NOTES
Correct pharmacy verified with patient and confirmed in snapshot? [x] yes []no    Charge captured ? [x] yes  [] no    Medications Phoned  to Pharmacy [] yes [x]no  Name of Pharmacist:  List Medications, including dose, quantity and instructions      Medication Prescriptions given to patient   [] yes  [x] no   List the name of the drug the prescription was written for.       Medications ordered this visit were e-scribed.  Verified by order class [x] yes  [] no    Medication changes or discontinuations were communicated to patient's pharmacy: [] yes  [x] no    UA collected [x] yes  [] no    [place] Prescription Monitoring Program Reviewed? [x] yes  [] no    Referrals were made to:  none  Future appointment was made: [x] yes  [] no    Dictation completed at time of chart check: [] yes  [x] no    I have checked the documentation for today s encounters and the above information has been reviewed and completed.

## 2021-05-30 NOTE — PROGRESS NOTES
Weekly Progress Note  Patsy Santamaria  1955  [id number]      D) Pt attended 2 groups this week with 0 absences. Patient attended 0 individual sessions this week. Patient is in phase I of SRP.  A) Staff facilitated groups and reviewed tx progress. [facility] for [facility]. R) No VAP needed at this time.   Any significant events, defines as events that impact patients relationship with others inside and outside of treatment: Lack of boundaries in relationships  Indicate any changes or monitoring of physical or mental health problems: None at this time     Indicate involvement by any outside supports: Family and friends   IAPP reviewed and modified as needed. NA  Patient was staffed with Dr. Duron and Latoya Wyatt [facility] on 7/11/19.   Pt working on the following dimensions:  Dimension #1 - Withdrawal Potential - Risk 0. Patient reports last use of alcohol on 06/25/19. Patient has experienced withdrawal symptoms in the past.  Specific goals from treatment plan addressed this week: The patient goal is to maintain abstinence. She reports no use over the last week.   Effectiveness of strategies: Strategies appear to be effective     Dimension #2 - Biomedical - Risk 1. Patient reports COPD, Osteoarthritis, and chronic back pain. Patient has primary care provider. Patient is able to seek cares as needed.  Specific goals from treatment plan addressed this week: The patient goal is to manage biomedical concerns. Over the last week she has not endorsed any emergent biomedical concerns.   Effectiveness of strategies: Strategies appear to be effective.     Dimension #3 - Emotional/Behavioral/Cognitive - Risk 2. Patient reports depression and PTSD. Patient has mental health care provider. Patient reports recently experiencing mental health symptoms. Patient denies suicidal or homicidal ideation. Patient reports previous suicide attempt. .  Specific goals from treatment plan addressed this week: The patient goal is to manage  mental health. She reports upcoming appointments with Dr. Brunner and Araceli botello her therapist.   Effectiveness of strategies: Strategies appear to be effective     Dimension #4 - Treatment Acceptance/Resistance - Risk 0. Patient verbalizes a desire for change  Specific goals from treatment plan addressed this week: The patient goal is to follow though with intentions to treat chemical dependency concerns. She reports she is motivated for change at 10/10. She is an active and engaged group member.   Effectiveness of strategies: Strategies appear to be effective.     Dimension #5 - Relapse Potential - Risk 3. Patient lacks healthy, positive coping skills. Patient lacks skills to prevent relapse. Patient may not fully recognize impact substance use has on mental health. Patient has had multiple treatment episodes. Patient has achieved periods of sobriety in the past  Specific goals from treatment plan addressed this week: The patient goal is to identify an implement coping skills. She reports some using dreams over the last week however she talked with friends and went out to lunch to cope with them.   Effectiveness of strategies: Strategies appear to be effective.     Dimension #6 - Recovery Environment - Risk 3. Patient is unemployed. Patient has stable housing. Patient is not engaged in structured daily activities. Patient reports positive, sober support. Patient denies current legals. History of DUI  Specific goals from treatment plan addressed this week: The patient goal is to develop sober structure. She reports she is engaged in volunteer work at the [employer] and is looking to get a part time job as well. She is going to sober support groups at this time.   Effectiveness of strategies: Strategies appear to be effective.     T) Treatment plan updated yes and co-signed by Dr. Duron .  Patient notified and in agreement Yes.  Patient educated on Sober Structure. Patient has completed 6 of 108 program  hours at this time. Projected discharge date is 10/23/2019. Current discharge plan is Phase III.     Marisel MatthewsPRINCE  7/11/2019, 4:36 PM          Psycho-Educational Curriculum  Date Attended  Psycho-Educational Curriculum  Date Attended    8 Dimensions of wellness   Grief and Loss     Emotional   Stages of grief    Physical   Memorialized     Intellectual   Letters to loved ones     Occupational   Grief group    Spiritual   Loneliness    Environmental   Purpose and Hobbies    Financial   Values    Social   Hobbies    Access to Care   Tana      Service Access   Volunteer Work     Pain Management   Experiential Learning     Memory   Value of movement     Physical Wellness  Relationships     Medication   Self-Love     PAWS   Resentment    Hygiene (Sleep, Physical, etc)   Communication Skills     Emotional Wellbeing   Amends     Healthy vs. Unhealthy Feelings  Family     Affirmations  Social Anxieties     Co-Occurring Disorders  Legacy     Anxiety   Support(+ & -)    Depression  Assertive Communication     Grounding  Codependency    Trauma/Victim Identity   Boundaries    MH/CD Acceptance   Defense Mechanisms     Sober Structure  7/8-7/12 Relapse Prevention     Sober support groups   Triggers and High Risk Situations    Needds  Relapse Prevention Plan     Spirituality   Coping Skills     Schedule   Addictive Thoughts    Sobriety Vs. Recovery   Relapse Process     Power of Now   What is Addiction     Truth in life   Impulsive/Compulsive Behaviors     Recovery Investement   Cross Addiction     Recovery Influences   Early Recovery     Educational Videos   Mindfulness    No Kidding Me 2!       Anonymous People       Torrey's Story       Pleasure Unwoven

## 2021-05-30 NOTE — PROGRESS NOTES
----LATE ENTRY---  Weekly Progress Note  Patsy Santamaria  1955  [id number]      D) Pt attended 2 groups this week with 1 absences. Patient attended 0 individual sessions this week. Patient is in phase I of SRP.  A) Staff facilitated groups and reviewed tx progress. Assessed for [facility]. R) No VAP needed at this time.   Any significant events, defines as events that impact patients relationship with others inside and outside of treatment: Lack of boundaries in relationships  Indicate any changes or monitoring of physical or mental health problems: None at this time     Indicate involvement by any outside supports: Family and friends   IAPP reviewed and modified as needed. NA  Patient was staffed with Dr. Duron and Latoya Wyatt [facility] on 7/25/19.   Pt working on the following dimensions:  Dimension #1 - Withdrawal Potential - Risk 0. Patient reports last use of alcohol on 06/25/19. Patient has experienced withdrawal symptoms in the past.  Specific goals from treatment plan addressed this week: The patient goal is to maintain abstinence. The patient continues to report abstinence.   Effectiveness of strategies: Strategies appear to be effective     Dimension #2 - Biomedical - Risk 1. Patient reports COPD, Osteoarthritis, and chronic back pain. Patient has primary care provider. Patient is able to seek cares as needed.  Specific goals from treatment plan addressed this week: The patient goal is to manage biomedical concerns. The patient reports she did go and see her PCP over the last week and felt that the appointment was effective and good.   Effectiveness of strategies: Strategies appear to be effective.     Dimension #3 - Emotional/Behavioral/Cognitive - Risk 2. Patient reports depression and PTSD. Patient has mental health care provider. Patient reports recently experiencing mental health symptoms. Patient denies suicidal or homicidal ideation. Patient reports previous suicide attempt. .  Specific goals from  treatment plan addressed this week: The patient goal is to manage mental health. The patient reports upcoming appointments with provider in the clinics. Stable mental health at this time. No changes   Effectiveness of strategies: Strategies appear to be effective     Dimension #4 - Treatment Acceptance/Resistance - Risk 0. Patient verbalizes a desire for change  Specific goals from treatment plan addressed this week: The patient goal is to follow though with intentions to treat chemical dependency concerns. The patient verbalizes a desire to initiate changes and has been motivated and engaged in the group setting. She is on a treatment contract and has been compliant with that.    Effectiveness of strategies: Strategies appear to be effective.     Dimension #5 - Relapse Potential - Risk 3. Patient lacks healthy, positive coping skills. Patient lacks skills to prevent relapse. Patient may not fully recognize impact substance use has on mental health. Patient has had multiple treatment episodes. Patient has achieved periods of sobriety in the past  Specific goals from treatment plan addressed this week: The patient goal is to identify an implement coping skills. The patient reports no relapses, urges or triggers and appears to be implementing her coping skills.   Effectiveness of strategies: Strategies appear to be effective.     Dimension #6 - Recovery Environment - Risk 3. Patient is unemployed. Patient has stable housing. Patient is not engaged in structured daily activities. Patient reports positive, sober support. Patient denies current legals. History of DUI  Specific goals from treatment plan addressed this week: The patient goal is to develop sober structure. The patient has been working to attend sober support groups  Effectiveness of strategies: Strategies appear to be partially effective.     T) Treatment plan updated no.  Patient notified and in agreement NA.  Patient educated on Relapse Prevention .   Patient has completed 16 of 108 program hours at this time. Projected discharge date is 10/23/2019. Current discharge plan is Phase III.     PRINCE Dominguez  7/28/2019, 8:15 AM          Psycho-Educational Curriculum  Date Attended  Psycho-Educational Curriculum  Date Attended    8 Dimensions of wellness   Grief and Loss     Emotional   Stages of grief    Physical   Memorialized     Intellectual   Letters to loved ones     Occupational   Grief group    Spiritual   Loneliness    Environmental   Purpose and Hobbies    Financial   Values    Social   Hobbies    Access to Care   Tana      Service Access   Volunteer Work     Pain Management   Experiential Learning     Memory   Value of movement     Physical Wellness  Relationships     Medication   Self-Love     PAWS   Resentment    Hygiene (Sleep, Physical, etc)   Communication Skills     Emotional Wellbeing   Amends     Healthy vs. Unhealthy Feelings  Family     Affirmations  Social Anxieties     Co-Occurring Disorders  Legacy     Anxiety   Support(+ & -)    Depression  Assertive Communication     Grounding  Codependency    Trauma/Victim Identity   Boundaries    MH/CD Acceptance   Defense Mechanisms     Sober Structure  7/8-7/12 Relapse Prevention  7/15-7/19   Sober support groups   Triggers and High Risk Situations    Needds  Relapse Prevention Plan     Spirituality   Coping Skills     Schedule   Addictive Thoughts    Sobriety Vs. Recovery   Relapse Process     Power of Now   What is Addiction     Truth in life   Impulsive/Compulsive Behaviors     Recovery Investement   Cross Addiction     Recovery Influences   Early Recovery     Educational Videos   Mindfulness    No Kidding Me 2!       Anonymous People       Torrey's Story       Pleasure Unwoven

## 2021-05-30 NOTE — PROGRESS NOTES
Patsy Santamaria attended 2 hours of group on 7/24/2019.     The group topic was Relapse Prevention, patient was responsive to topic.     Patients engagement in the group session: high     Total number of patients present 11.     Supervising MD: Dr. Oliverio Matthews, [facility], 7/24/2019, 1:35 PM

## 2021-05-30 NOTE — PROGRESS NOTES
Weekly Progress Note  Patsy Santamaria  1955  [id number]      D) Pt attended 2 groups this week with 1 absences. Patient attended 0 individual sessions this week. Patient is in phase I of SRP.  A) Staff facilitated groups and reviewed tx progress. [facility] for [facility]. R) No VAP needed at this time.   Any significant events, defines as events that impact patients relationship with others inside and outside of treatment: Lack of boundaries in relationships  Indicate any changes or monitoring of physical or mental health problems: None at this time     Indicate involvement by any outside supports: Family and friends   IAPP reviewed and modified as needed. NA  Patient was staffed with Dr. Duron and Latoya Wyatt [facility] on 7/11/19.   Pt working on the following dimensions:  Dimension #1 - Withdrawal Potential - Risk 0. Patient reports last use of alcohol on 06/25/19. Patient has experienced withdrawal symptoms in the past.  Specific goals from treatment plan addressed this week: The patient goal is to maintain abstinence. The patient reports maintained abstinence over the last week.   Effectiveness of strategies: Strategies appear to be effective     Dimension #2 - Biomedical - Risk 1. Patient reports COPD, Osteoarthritis, and chronic back pain. Patient has primary care provider. Patient is able to seek cares as needed.  Specific goals from treatment plan addressed this week: The patient goal is to manage biomedical concerns. The patient has not endorsed any new or worsening biomedical concerns.   Effectiveness of strategies: Strategies appear to be effective.     Dimension #3 - Emotional/Behavioral/Cognitive - Risk 2. Patient reports depression and PTSD. Patient has mental health care provider. Patient reports recently experiencing mental health symptoms. Patient denies suicidal or homicidal ideation. Patient reports previous suicide attempt. .  Specific goals from treatment plan addressed this week: The patient  goal is to manage mental health. The patient reports upcoming appointments with provider in the clinics. Stable mental health at this time.   Effectiveness of strategies: Strategies appear to be effective     Dimension #4 - Treatment Acceptance/Resistance - Risk 0. Patient verbalizes a desire for change  Specific goals from treatment plan addressed this week: The patient goal is to follow though with intentions to treat chemical dependency concerns. The patient verbalizes a desire to initiate changes and has been motivated and engaged in the group setting.    Effectiveness of strategies: Strategies appear to be effective.     Dimension #5 - Relapse Potential - Risk 3. Patient lacks healthy, positive coping skills. Patient lacks skills to prevent relapse. Patient may not fully recognize impact substance use has on mental health. Patient has had multiple treatment episodes. Patient has achieved periods of sobriety in the past  Specific goals from treatment plan addressed this week: The patient goal is to identify an implement coping skills. The patient reports her biggest trigger at this time is the heat, she reports it makes it hard for her to breathe and she wants to cool down so beer sounds good to her-instead she engages with family and friends,.   Effectiveness of strategies: Strategies appear to be effective.     Dimension #6 - Recovery Environment - Risk 3. Patient is unemployed. Patient has stable housing. Patient is not engaged in structured daily activities. Patient reports positive, sober support. Patient denies current legals. History of DUI  Specific goals from treatment plan addressed this week: The patient goal is to develop sober structure. No sober support groups at this time she has been encouraged.    Effectiveness of strategies: Strategies appear to be partially effective.     T) Treatment plan updated no.  Patient notified and in agreement NA.  Patient educated on Sober Structure  & Relapse  Prevention.  Patient has completed 12 of 108 program hours at this time. Projected discharge date is 10/23/2019. Current discharge plan is Phase III.     MariselPRINCE Berger  7/19/2019, 2:57 PM          Psycho-Educational Curriculum  Date Attended  Psycho-Educational Curriculum  Date Attended    8 Dimensions of wellness   Grief and Loss     Emotional   Stages of grief    Physical   Memorialized     Intellectual   Letters to loved ones     Occupational   Grief group    Spiritual   Loneliness    Environmental   Purpose and Hobbies    Financial   Values    Social   Hobbies    Access to Care   Tana      Service Access   Volunteer Work     Pain Management   Experiential Learning     Memory   Value of movement     Physical Wellness  Relationships     Medication   Self-Love     PAWS   Resentment    Hygiene (Sleep, Physical, etc)   Communication Skills     Emotional Wellbeing   Amends     Healthy vs. Unhealthy Feelings  Family     Affirmations  Social Anxieties     Co-Occurring Disorders  Legacy     Anxiety   Support(+ & -)    Depression  Assertive Communication     Grounding  Codependency    Trauma/Victim Identity   Boundaries    MH/CD Acceptance   Defense Mechanisms     Sober Structure  7/8-7/12 Relapse Prevention  7/15-7/19   Sober support groups   Triggers and High Risk Situations    Needds  Relapse Prevention Plan     Spirituality   Coping Skills     Schedule   Addictive Thoughts    Sobriety Vs. Recovery   Relapse Process     Power of Now   What is Addiction     Truth in life   Impulsive/Compulsive Behaviors     Recovery Investement   Cross Addiction     Recovery Influences   Early Recovery     Educational Videos   Mindfulness    No Kidding Me 2!       Anonymous People       Torrey's Story       Pleasure Unwoven

## 2021-05-30 NOTE — PROGRESS NOTES
Patsy Santamaria attended 3 hours of group on 7/12/2019.     The group topic was Sober Structure, patient was responsive to topic.     Patients engagement in the group session: high     Total number of patients present 8.     Supervising MD: Dr. Oliverio Matthews, [facility], 7/12/2019, 11:33 AM

## 2021-05-30 NOTE — PROGRESS NOTES
"Intake Note:     D) Patsy Santamaria is a 64 y.o.   White or  female who is referred to SR OP via Self with funding from Medicare. Patient orientated x 3. Patient meets criteria for Alcohol Use Disorder, Severe, (F10.20) (303.90).  Patient appears appropriate for SR OP.   A) Met with patient for 45 minutes.  Completed intake assessment and preliminary paperwork. Patient was given and explained counselor & supervisor license number and contact info, Patient Bill of Rights, program rules/regulations, Program Abuse Prevention Plan, confidentiality & HIPPA policies, grievance procedure, presented ROIs, TB & HIV/AIDS policies & resources, and Vulnerable Adult policy.   Conducted Vulnerable Adult Assessment.   R)No special Vulnerable Adult needed at this time.  Patient signed and agreed to counselor & supervisor license number and contact info., Patient Bill of Rights, group rules/regulations, Program Abuse Prevention Plan, confidentiality & HIPPA policies, grievance procedure,  ROIs, TB & HIV/AIDS policies & resources, and Vulnerable Adult policy. Patient scored low risk on C-SSRS screen. Patient denied suicidal ideation/intent/plan/means at this time.     Opioid Use Disorder: No   Provided \"Options for Opioid Treatment in [place] and Overdose Prevention\" NA     Dimension #1 - Withdrawal Potential - Risk 0. Patient reports last use of alcohol on 06/25/19. Patient has experienced withdrawal symptoms in the past.    Dimension #2 - Biomedical - Risk 1. Patient reports COPD, Osteoarthritis, and chronic back pain. Patient has primary care provider. Patient is able to seek cares as needed.    Dimension #3 - Emotional , Behavioral and Cognitive - Risk 2. Patient reports depression and PTSD. Patient has mental health care provider. Patient reports recently experiencing mental health symptoms. Patient denies suicidal or homicidal ideation. Patient reports previous suicide attempt.     Dimension #4 - Readiness " for Change - Risk 0. Patient verbalizes a desire for change.    Dimension #5 - Relapse Potential - Risk 3. Patient lacks healthy, positive coping skills. Patient lacks skills to prevent relapse. Patient may not fully recognize impact substance use has on mental health. Patient has had multiple treatment episodes. Patient has achieved periods of sobriety in the past.    Dimension #6 - Recovery Environment - Risk 3. Patient is unemployed. Patient has stable housing. Patient is not engaged in structured daily activities. Patient reports positive, sober support. Patient denies current legals. History of DUI.    T) Explained counselor & supervisor license number and contact info, Patient Bill of Rights, program rules/regulations, Program Abuse Prevention Plan, confidentiality & HIPPA policies, grievance procedure, presented ROIs, TB & HIV/AIDS policies & resources, and Vulnerable Adult policy. Patient signed treatment contract at today's intake appointment. Patient expected to start group on 07/08/19.      Farshad Cox  7/5/2019, 11:49 AM

## 2021-05-30 NOTE — PROGRESS NOTES
"ASSESSMENT/PLAN:  1. Shortness of breath  Ambulatory referral to Cardiology   2. Atypical chest pain  Ambulatory referral to Cardiology   3. Family history of coronary artery disease  Ambulatory referral to Cardiology   4. Need for tetanus booster     5. Skin lesion of right leg  Ambulatory referral to Dermatology       This is a 65 yo female with:  1.  Shortness of breath/Atypical chest pain/family history of heart disease:  Patient has some issues with shortness of breath - vague symptoms, but perhaps associated with some chest pain.  She had mentioned this to her mental health provider who told her she needs an echo.  I think it is reasonable to have cardiology evaluation; may need stress testing.  2.  Skin lesion - right leg - distal lesion, possible dermatofibroma - will seek advice per Dermatology  3.  Health Maintenance - due for tetanus booster.  Return in about 3 months (around 10/24/2019) for Recheck.      There are no discontinued medications.  There are no Patient Instructions on file for this visit.    Chief Complaint:  Chief Complaint   Patient presents with     sleep issues     Shortness of Breath       HPI:   Patsy Santamaria is a 64 y.o. female c/o  Psych wants her evaluated for echocardiogram due to shortness of breath    Can't sleep at night   Taking 2 Vistaril  (100mg) at night; \"taroxolone\" (?Naltrexone), duloxetine  \"I just can't get sleepy at night\"   Was just seen last week -     Still having swelling in lower extremities in spite of diuretics    Still smoking       PMH:   Patient Active Problem List    Diagnosis Date Noted     Dyspnea on exertion      Anxiety 03/25/2019     Hypomagnesemia 03/25/2019     Primary osteoarthritis of left knee 07/10/2018     Seasonal allergies 07/09/2018     Severe episode of recurrent major depressive disorder, without psychotic features (H)      Alcoholic hepatitis      Peripheral edema      Diuretic-induced hypokalemia      Alcohol use disorder, severe, " dependence (H) 2018     Primary osteoarthritis of right knee 2018     Alcohol withdrawal (H) 2018     Chronic alcohol dependence, continuous (H) 2018     Low backache 2018     Morbid obesity (H) 2018     Bilateral edema of lower extremity 2017     Snoring 2017     Carpal tunnel syndrome on both sides 2017     Trochanteric bursitis of left hip 10/25/2016     Alcohol withdrawal seizure without complication (H) 2016     Hypoxia 2016     Alcohol abuse 2016     Elevated LFTs 2016     New onset seizure (H) 2016     Claustrophobia 2015     Cervical disc disease 2015     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) 2015     Post traumatic stress disorder 2015     Chronic Reflux Esophagitis      Peripheral Neuropathy      Localized Primary Osteoarthritis Of The Carpometacarpal Joint      Ganglion Of The Right Foot      Major depression, recurrent (H)      Nicotine Dependence      Vitamin D deficiency      Past Medical History:   Diagnosis Date     Acute solar dermatitis      Alcohol abuse      Anxiety      Arthritis      Chronic alcohol dependence, continuous (H) 3/16/2018     Chronic reflux esophagitis      COPD (chronic obstructive pulmonary disease) (H)      Dermatitis      Ganglion     right foot     H. pylori infection      Low backache 3/16/2018     Menopause     age 52     Past Surgical History:   Procedure Laterality Date     APPENDECTOMY       DE APPENDECTOMY      Description: Appendectomy;  Recorded: 2008;  Comments: childhood     DE  DELIVERY ONLY      Description:  Section;  Recorded: 2008;     DE EXCIS TENDN/CAPSULE LESN,FOOT      Description: Excision Of Cyst Of Tendon Sheath Of Foot;  Proc Date: 10/28/2011;  Comments: [facility]     DE HEMORRHOIDECTOMY INTERNAL RUBBER BAND LIGATIONS      Description: Hemorrhoidectomy;  Recorded: 2008;     DE  KNEE SCOPE,DIAGNOSTIC      Description: Arthroscopy Knee Right;  Proc Date: 10/11/2005;  Comments: for right knee patella subluxation with lateral retinacular release; subpatellar chondroplasty     TX LATERAL RETINACULAR RELEASE OPEN      Description: Knee Lateral Retinacular Release;  Proc Date: 10/11/2005;     TX LIGATE FALLOPIAN TUBE      Description: Tubal Ligation;  Recorded: 09/01/2008;     SKIN BIOPSY       TONSILLECTOMY       Social History     Socioeconomic History     Marital status: Single     Spouse name: Not on file     Number of children: Not on file     Years of education: Not on file     Highest education level: Not on file   Occupational History     Occupation: [employer]     Employer: [employer]     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[facility])   Social Needs     Financial resource strain: Not on file     Food insecurity:     Worry: Not on file     Inability: Not on file     Transportation needs:     Medical: Not on file     Non-medical: Not on file   Tobacco Use     Smoking status: Current Every Day Smoker     Packs/day: 1.00     Years: 48.00     Pack years: 48.00     Types: Cigarettes     Smokeless tobacco: Never Used     Tobacco comment: 1 pack per day   Substance and Sexual Activity     Alcohol use: Not Currently     Comment: 1 liter a day- vodka     Drug use: No     Sexual activity: Yes     Partners: Male     Birth control/protection: None   Lifestyle     Physical activity:     Days per week: Not on file     Minutes per session: Not on file     Stress: Not on file   Relationships     Social connections:     Talks on phone: Not on file     Gets together: Not on file     Attends Church service: Not on file     Active member of club or organization: Not on file     Attends meetings of clubs or organizations: Not on file     Relationship status: Not on file     Intimate partner violence:     Fear of current or ex partner: Not on file     Emotionally abused: Not on file     Physically abused:  Not on file     Forced sexual activity: Not on file   Other Topics Concern     Not on file   Social History Narrative     Not on file     Family History   Problem Relation Age of Onset     Heart disease Mother      Heart disease Father        Meds:    Current Outpatient Medications:      albuterol (PROAIR HFA;PROVENTIL HFA;VENTOLIN HFA) 90 mcg/actuation inhaler, Inhale 2 puffs 4 (four) times a day as needed for wheezing or shortness of breath., Disp: 1 Inhaler, Rfl: 0     cetirizine (ZYRTEC) 10 MG tablet, Take 10 mg by mouth daily as needed for allergies., Disp: , Rfl:      diclofenac sodium (VOLTAREN) 1 % Gel, Apply 1 g topically 2 (two) times a day as needed (pain)., Disp: 100 g, Rfl: 0     DULoxetine (CYMBALTA) 60 MG capsule, Take 1 capsule (60 mg total) by mouth daily., Disp: 30 capsule, Rfl: 0     fluticasone (FLONASE) 50 mcg/actuation nasal spray, Apply 1 spray into each nostril daily as needed for rhinitis., Disp: , Rfl:      furosemide (LASIX) 80 MG tablet, TAKE ONE TABLET BY MOUTH ONE TIME DAILY , Disp: 90 tablet, Rfl: 2     hydrOXYzine pamoate (VISTARIL) 50 MG capsule, TAKE TWO CAPSULES BY MOUTH DAILY AT BEDTIME AS NEEDED FOR ANXIETY/insomnia, Disp: 60 capsule, Rfl: 0     ipratropium-albuterol (DUO-NEB) 0.5-2.5 mg/3 mL nebulizer, Take 3 mL by nebulization every 6 (six) hours as needed (wheezing, short of breath)., Disp: 90 mL, Rfl: 1     naltrexone (DEPADE) 50 mg tablet, Take 2 tablets (100 mg total) by mouth daily., Disp: 60 tablet, Rfl: 0     omeprazole (PRILOSEC) 20 MG capsule, Take 1 capsule (20 mg total) by mouth at bedtime., Disp: 30 capsule, Rfl: 0     potassium chloride (K-DUR,KLOR-CON) 20 MEQ tablet, Take 1 tablet (20 mEq total) by mouth daily., Disp: , Rfl: 0     nicotine (NICOTROL) 10 mg/mL Spry, 1-2 sprays every hour up to no more than 20 daily, Disp: 40 mL, Rfl: 2    Allergies:  Allergies   Allergen Reactions     Codeine Nausea Only     Hydrochlorothiazide Rash     phototoxicity - med was  "d/lora       Diclofenac Nausea Only     Tolerates the topical gel     Sulfa (Sulfonamide Antibiotics) Rash     Sulfasalazine Rash       ROS:  Pertinent positives as noted in HPI; otherwise 12 point ROS negative.      Physical Exam:  EXAM:  /90 (Patient Site: Left Arm, Patient Position: Sitting, Cuff Size: Adult Large)   Pulse 72   Temp 98.2  F (36.8  C) (Oral)   Resp 16   Ht 5' 4\" (1.626 m)   Wt (!) 238 lb 9.6 oz (108.2 kg)   LMP 03/06/2002   SpO2 98% Comment: ra  Breastfeeding? No   BMI 40.96 kg/m     Gen:  NAD, appears well, well-hydrated  HEENT:  TMs nl, oropharynx benign, nasal mucosa nl, conjunctiva clear  Neck:  Supple, no adenopathy, no thyromegaly, no carotid bruits, no JVD  Lungs:  Clear to auscultation bilaterally  Cor:  RRR no murmur  Abd:  Soft, nontender, BS+, no masses, no guarding or rebound, no HSM  Extr:  + tr LE edema; some DJD - knees;   Neuro:  No asymmetry, Nl motor tone/strength, nl sensation, reflexes =, gait nl, nl coordination, CN intact,   Skin:  Warm/dry        Results:  Results for orders placed or performed in visit on 05/13/19   Comprehensive Metabolic Panel   Result Value Ref Range    Sodium 141 136 - 145 mmol/L    Potassium 3.8 3.5 - 5.0 mmol/L    Chloride 100 98 - 107 mmol/L    CO2 26 22 - 31 mmol/L    Anion Gap, Calculation 15 5 - 18 mmol/L    Glucose 85 70 - 125 mg/dL    BUN 5 (L) 8 - 22 mg/dL    Creatinine 0.65 0.60 - 1.10 mg/dL    GFR MDRD Af Amer >60 >60 mL/min/1.73m2    GFR MDRD Non Af Amer >60 >60 mL/min/1.73m2    Bilirubin, Total 0.3 0.0 - 1.0 mg/dL    Calcium 10.1 8.5 - 10.5 mg/dL    Protein, Total 7.0 6.0 - 8.0 g/dL    Albumin 3.7 3.5 - 5.0 g/dL    Alkaline Phosphatase 82 45 - 120 U/L    AST 44 (H) 0 - 40 U/L    ALT 55 (H) 0 - 45 U/L   HM1 (CBC with Diff)   Result Value Ref Range    WBC 6.2 4.0 - 11.0 thou/uL    RBC 4.62 3.80 - 5.40 mill/uL    Hemoglobin 14.9 12.0 - 16.0 g/dL    Hematocrit 44.3 35.0 - 47.0 %    MCV 96 80 - 100 fL    MCH 32.3 27.0 - 34.0 pg    " MCHC 33.6 32.0 - 36.0 g/dL    RDW 12.8 11.0 - 14.5 %    Platelets 246 140 - 440 thou/uL    MPV 10.0 8.5 - 12.5 fL    Neutrophils % 55 50 - 70 %    Lymphocytes % 30 20 - 40 %    Monocytes % 10 2 - 10 %    Eosinophils % 4 0 - 6 %    Basophils % 1 0 - 2 %    Neutrophils Absolute 3.4 2.0 - 7.7 thou/uL    Lymphocytes Absolute 1.9 0.8 - 4.4 thou/uL    Monocytes Absolute 0.6 0.0 - 0.9 thou/uL    Eosinophils Absolute 0.3 0.0 - 0.4 thou/uL    Basophils Absolute 0.0 0.0 - 0.2 thou/uL

## 2021-05-30 NOTE — PROGRESS NOTES
"Addiction Services - Initial Services Plan     Patient  Name: Patsy Santamaria  MRN: [id number]   : 1955  Admit Date: 19      Patient describes their immediate need: To learn recovery skills to prevent relapse.     Are there any immediate Safety Needs such as (physical, stability, mobility):  Pt is able to get medical care as needed. Pt denies immediate concerns.     Immediate Health Needs and Plan:   None    Vulnerable Adult: No     Issues to be addressed in the first sessions:   Orientation to program.    Patient strengths and needs:   Strengths identified as \"[place] at times.\"  Needs identified as \"Exercising. Eating a better, healthier diet.\"    Plan for patient for time between intake and completion of the treatment plan:   Attend all group therapy sessions as directed, complete all written and oral assignments as directed, and remain clean and sober. A relapse, if any, must be reported to staff immediately in order to ensure you are receiving the proper level of care. If you cannot attend a group therapy session you must call contact information provided in intake folder and leave a message before or during group hours.         Vulnerable Adult Review   [X] Review of the Facility Abuse Prevention plan was reviewed with the patient   [X] No Individual Abuse Plan is necessary   [ ] In addition to the Facility Abuse Prevention plan, an Individual Abuse Plan will be put in place       Staff Name/Title: Farshad Cox   Date: 2019  Time: 11:45 AM        "

## 2021-05-30 NOTE — TELEPHONE ENCOUNTER
Date of Last Office Visit: 5/30/19  Date of Next Office Visit: 7/15/19  No shows since last visit: 6/18/19  Cancellations since last visit: 6/6/19  ED visits since last visit:  None   Medication hydroxyzine pamoate 50mg date last ordered: 5/30/19  Qty: 60  Refills: 0  Lapse in therapy greater than 7 days: yes (PRN)  Medication refill request verified as identical to current order: yes  Result of Last DAM, VPA, Li+ Level, CBC, or Carbamazepine Level (at or since last visit): N/A     [] Medication refilled per [facility] M-1.   [x] Medication unable to be refilled by RN due to criteria not met as indicated below:     []Eligibility - not seen in last year    []Supervision - no future appointment    [x]Compliance     []Verification - order discrepancy    []Controlled Medication    []Medication not included in RN Protocol    []90 - day supply request    []Other   Current Medication list:    albuterol (PROAIR HFA;PROVENTIL HFA;VENTOLIN HFA) 90 mcg/actuation inhaler Inhale 2 puffs 4 (four) times a day as needed for wheezing or shortness of breath.   budesonide-formoterol (SYMBICORT) 80-4.5 mcg/actuation inhaler Inhale 2 puffs 2 (two) times a day.   cetirizine (ZYRTEC) 10 MG tablet Take 10 mg by mouth daily as needed for allergies.   diclofenac sodium (VOLTAREN) 1 % Gel Apply 1 g topically 2 (two) times a day as needed (pain).   fluticasone (FLONASE) 50 mcg/actuation nasal spray Apply 1 spray into each nostril daily as needed for rhinitis.   furosemide (LASIX) 80 MG tablet TAKE ONE TABLET BY MOUTH ONE TIME DAILY    gabapentin (NEURONTIN) 300 MG capsule Take 1 capsule (300 mg total) by mouth at bedtime.   hydrOXYzine pamoate (VISTARIL) 50 MG capsule Take 2 capsules (100 mg total) by mouth at bedtime as needed (anxiety/insomnia).   ipratropium-albuterol (DUO-NEB) 0.5-2.5 mg/3 mL nebulizer Take 3 mL by nebulization every 6 (six) hours as needed (wheezing, short of breath).   naltrexone (DEPADE) 50 mg tablet Take 2 tablets (100 mg  total) by mouth daily.   nicotine (NICOTROL) 10 mg/mL Spry 1-2 sprays every hour up to no more than 20 daily   omeprazole (PRILOSEC) 20 MG capsule Take 1 capsule (20 mg total) by mouth at bedtime.   potassium chloride (K-DUR,KLOR-CON) 20 MEQ tablet Take 1 tablet (20 mEq total) by mouth daily.       Medication Plan of Care at last office visit with MD/CNP:    Diagnoses/Plan:  1. Increased naltrexone to 100 mg po daily. 30-day followed by 1 refill.   2. Patient to continue with her regular psychotherapy. Recommended considering increasing this to weekly therapy.  Will continue to monitor mood. Continue on gabapentin 300 mg po at bedtime. Cymbalta 60 mg po daily. Hydroxyzine  mg po prn.   3. Patient recommended to quit smoking. She was not interested in any prescriptions today. I prescribed an inhaler at her last visit, but she was not able to fill this as her insurance substituted a nasal inhaler, which she did not want to obtain.       4. etg 673/ets 104 indicating some ongoing use is continuing  5. Patient to be seen in 2 weeks and sooner prn. I will communicate continued use with patient's outpatient cd counselor so that a therapeutic plan can also be made in the CD program.

## 2021-05-31 ENCOUNTER — RECORDS - HEALTHEAST (OUTPATIENT)
Dept: ADMINISTRATIVE | Facility: CLINIC | Age: 66
End: 2021-05-31

## 2021-05-31 ENCOUNTER — PATIENT OUTREACH (OUTPATIENT)
Dept: CARE COORDINATION | Facility: CLINIC | Age: 66
End: 2021-05-31

## 2021-05-31 VITALS — WEIGHT: 238 LBS | HEIGHT: 64 IN | BODY MASS INDEX: 40.63 KG/M2

## 2021-05-31 VITALS — BODY MASS INDEX: 41 KG/M2 | WEIGHT: 237 LBS

## 2021-05-31 VITALS — WEIGHT: 229.6 LBS | HEIGHT: 63 IN | BODY MASS INDEX: 40.68 KG/M2

## 2021-05-31 VITALS — WEIGHT: 233 LBS | BODY MASS INDEX: 40.31 KG/M2

## 2021-05-31 VITALS — WEIGHT: 238 LBS | BODY MASS INDEX: 41.17 KG/M2

## 2021-05-31 VITALS — HEIGHT: 64 IN | WEIGHT: 238.7 LBS | BODY MASS INDEX: 40.75 KG/M2

## 2021-05-31 VITALS — WEIGHT: 236 LBS | BODY MASS INDEX: 40.29 KG/M2 | HEIGHT: 64 IN

## 2021-05-31 VITALS — BODY MASS INDEX: 39.78 KG/M2 | HEIGHT: 64 IN | WEIGHT: 233 LBS

## 2021-05-31 VITALS — BODY MASS INDEX: 41.17 KG/M2 | WEIGHT: 238 LBS

## 2021-05-31 VITALS — BODY MASS INDEX: 40.58 KG/M2 | WEIGHT: 237.7 LBS | HEIGHT: 64 IN

## 2021-05-31 VITALS — BODY MASS INDEX: 40.45 KG/M2 | HEIGHT: 64 IN | WEIGHT: 236.9 LBS

## 2021-05-31 VITALS — HEIGHT: 64 IN | BODY MASS INDEX: 40.8 KG/M2 | WEIGHT: 239 LBS

## 2021-05-31 NOTE — PROGRESS NOTES
Weekly Progress Note  Patsy Santamaria  1955  [id number]      D) Pt attended 2 groups this week with 1 absences. Patient attended 0 individual sessions this week. Patient is in phase I of SRP.  A) Staff facilitated groups and reviewed tx progress. Assessed for [facility]. R) No VAP needed at this time.   Any significant events, defines as events that impact patients relationship with others inside and outside of treatment: Patient is experiencing depression and conflict in her relationship with her significant other.    Indicate any changes or monitoring of physical or mental health problems: The patient identified some depressive symptoms that have prevented her from attending group.     Indicate involvement by any outside supports: Family and friends   IAPP reviewed and modified as needed. NA  Patient was staffed with Dr. Duron and Latoya [facility] on 8/8/19.   Pt working on the following dimensions:  Dimension #1 - Withdrawal Potential - Risk 1. Patient reports last use of alcohol on 06/25/19. Patient has experienced withdrawal symptoms in the past.  Specific goals from treatment plan addressed this week:   1. The patient goal is to maintain abstinence.   Effectiveness of strategies: Strategies appear to not be effective. The patient reports no use however her UA's have been positive. Patient dimensions change as she may be at risk for withdrawal potential.     Dimension #2 - Biomedical - Risk 1. Patient reports COPD, Osteoarthritis, and chronic back pain. Patient has primary care provider. Patient is able to seek cares as needed.  Specific goals from treatment plan addressed this week:   1. The patient goal is to manage biomedical concerns.   Effectiveness of strategies: Strategies appear to be effective. The patient is having a cardiology appointment on 8/27/19 she is able to get her medical needs met at this time. At this time she is not endorsing any new or emergent biomedical concerns. Should she  continue to use she may be at an increased risk for biomedical concerns.     Dimension #3 - Emotional/Behavioral/Cognitive - Risk 2. Patient reports depression and PTSD. Patient has mental health care provider. Patient reports recently experiencing mental health symptoms. Patient denies suicidal or homicidal ideation. Patient reports previous suicide attempt.   Specific goals from treatment plan addressed this week:   1. The patient goal is to manage mental health.   Effectiveness of strategies: Strategies appear to be partially effective. The patient reports depressive symptoms, her medications were changed by Dr. Brunner at the time of their appointment. The patient did not present to group on 8/14/19 to discuss medication changes so at this time it is unknown if the patient has remained medication compliant or if there are concerns.      Dimension #4 - Treatment Acceptance/Resistance - Risk 2. Patient verbalizes a desire for change  Specific goals from treatment plan addressed this week:   1. The patient goal is to follow though with intentions to treat chemical dependency concerns.   2. Follow treatment contract   Effectiveness of strategies: Strategies appear to not be effective. The patient is currently on a treatment contract and with her lack of communication and continued use she is at risk for discharge. We will review the treatment contract when she presents again and ensure she is in the correct level of care. Risk rating changed to reflect lack of follow through     Dimension #5 - Relapse Potential - Risk 4. Patient lacks healthy, positive coping skills. Patient lacks skills to prevent relapse. Patient may not fully recognize impact substance use has on mental health. Patient has had multiple treatment episodes. Patient has achieved periods of sobriety in the past  Specific goals from treatment plan addressed this week:   1. The patient goal is to identify an implement coping skills.   Effectiveness of  strategies: Strategies appear to not be effective. The patient reports no use however at this time her UA results continue to be positive. The patient has minimal coping skills to arrest use at this time. Risk rating changed to reflect most recent use     Dimension #6 - Recovery Environment - Risk 3. Patient is unemployed. Patient has stable housing. Patient is not engaged in structured daily activities. Patient reports positive, sober support. Patient denies current legals. History of DUI  Specific goals from treatment plan addressed this week:   1. The patient goal is to develop sober structure.   2. The patient goal is to attend sober support groups  Effectiveness of strategies: Strategies appear to not be effective. The patient has been isolating over the last week and has engaged in minimal activities. She reports no engagement outside of her appartment    T) Treatment plan updated no.  Patient notified and in agreement NA.  Patient educated on Access to Care .  Patient has completed 31 of 108 program hours at this time. Projected discharge date is 10/23/2019. Current discharge plan is Phase III.     Marisel Matthews [facility]  8/15/2019, 3:22 PM    ---THE PATIENT HAS NOT PRESENTED SINCE 8/12/19. AT THIS TIME IT IS UNKNOWN IF THE PATIENT IS STILL USING ALCOHOL. THE PATIENT WILL BE ASSESSED FOR APPROPRIATE LEVEL OF CARE. THE PATIENTS TREATMENT PLAN IS NOT BEING UPDATED AT THIS TIME DUE TO A LACK OF KNOWLEDGE OF WHAT IS GOING ON WITH THE PATIENT. ONCE THIS WRITER AND THE PATIENT DISCUSS USE/UA RESULTS A NEW TREATMENT PLAN WILL BE CREATED OR A DISCHARGE SUMMARY WILL BE ENTERED---      Psycho-Educational Curriculum  Date Attended  Psycho-Educational Curriculum  Date Attended    8 Dimensions of wellness  8/5-8/9 Grief and Loss  7/29-8/2   Emotional   Stages of grief    Physical   Memorialized     Intellectual   Letters to loved ones     Occupational   Grief group    Spiritual   Loneliness    Environmental   Purpose  and Hobbies    Financial   Values    Social   Hobbies    Access to Care  8/12-8/16 Tana      Service Access   Volunteer Work     Pain Management   Experiential Learning     Memory   Value of movement     Physical Wellness  Relationships     Medication   Self-Love     PAWS   Resentment    Hygiene (Sleep, Physical, etc)   Communication Skills     Emotional Wellbeing   Amends     Healthy vs. Unhealthy Feelings  Family     Affirmations  Social Anxieties     Co-Occurring Disorders  Legacy     Anxiety   Support(+ & -)    Depression  Assertive Communication     Grounding  Codependency    Trauma/Victim Identity   Boundaries    MH/CD Acceptance   Defense Mechanisms     Sober Structure  7/8-7/12 Relapse Prevention  7/15-7/19   Sober support groups   Triggers and High Risk Situations    Needds  Relapse Prevention Plan     Spirituality   Coping Skills     Schedule   Addictive Thoughts    Sobriety Vs. Recovery   Relapse Process     Power of Now   What is Addiction     Truth in life   Impulsive/Compulsive Behaviors     Recovery Investement   Cross Addiction     Recovery Influences   Early Recovery     Educational Videos   Mindfulness    No Kidding Me 2!       Anonymous People       Torrey's Story       Pleasure Unwoven

## 2021-05-31 NOTE — PROGRESS NOTES
Mental Health Visit Note    7/31/2019    Start time: 1:00pm    Stop Time: 1:50pm   Session # 6    Session Type: Patient is presenting for an Individual session.     Persons Present: Patient and Therapist    Patsy Santamaria is a 64 y.o. female is being seen today for    Chief Complaint   Patient presents with      Follow Up     Psychotherapy follow-up visit for anxiety, depression, PTSD and alcohol use disorder   .     New symptoms or complaints: None    Functional Impairment:   Personal: 4  Family: 4  Work: 4  Social:4    Clinical assessment of mental status:   Grooming: Well groomed  Attire: Appropriate  Age: Appears Stated  Behavior Towards Examiner: Cooperative  Motor Activity: Within normal   Eye Contact: Appropriate  Mood: Anxious  Affect: Anxious and Depressed  Speech/Language: Within normal  Attention: Distractible  Concentration: Brief  Thought Process: Anxious  Thought Content: Hallucinations: none reported  Delusions: none reported  Orientation: X 3  Memory: No Evidence of Impairment  Judgement: No Evidence of Impairment  Estimated Intelligence: Average  Demonstrated Insight: Adequate  Fund of Knowledge: adequate    Suicidal/Homicidal Ideation present: None Reported This Session    Patient's impression of their current status:   The patient discussed status update with partner. She has reportedly accepted that she wants more than he is willing to give. She wants to leave it as is because the friendship and companionship is more important to her than the intimacy. She does plan to discuss future plans and intentions with him next week, setting some clearer boundaries and parameters around the relationship. The patient has been attending group at [facility]. She enjoys the outpatient group and believes she is learning a lot. She reported that she has been participating in group discussions but hasn't been sharing at . She is still searching for a sponsor. She reported that her mood has been up and down.  She agrees that there are many unresolved problems in her life especially associated with her family relationships. She is trying to reduce patterns of social isolation but still has difficulty trusting others. She continues have trouble sleeping but hasn't used her CPAP machine yet.      Therapist impression of patients current state:   The patient arrived on-time for a follow-up psychotherapy visit. She presented with a fairly euthymic yet anxious mood. She appears more regulated compared to previous sessions and was receptive to therapist prompting to support more emotional processing using CBT modality.   The therapist strongly encouraged patient to improve her sleep hygiene and bed time habits in between visits. The therapist provided some suggestions regarding opportunities for meditation and relaxation before bed. She was strongly encouraged to use her CPAP machine, as it has been prescribed for some time. She was instructed to tell her doctor about sleep problems.   The patient appears to be taking her sobriety very seriously. She has been consistently attending her outpatient treatment group in addition to attending AA meetings. She has yet to find a sponsor and has been encouraged to share more about her personal story while attending group and meetings in order to build shame resilience.   Ongoing participation in psychotherapy services is strongly recommended, as patient continues to struggle with many unresolved feelings associated with painful life experiences.     Type of psychotherapeutic technique provided: Client centered, Solution-focused and CBT    Progress toward short term goals:Progress as expected, as patient has maintained sobriety from alcohol and is following her treatment plan for relapse prevention. She is making progress toward goals to improve self-care and reduce social isolation.    Review of long term goals: Not done at today's visit   Treatment plan updated on 6/20/19  Date of next  review: August 2019    Diagnosis:   1. Alcohol use disorder, severe, dependence (H)    2. Moderate episode of recurrent major depressive disorder (H)    3. PTSD (post-traumatic stress disorder)        Plan and Follow up: The patient is scheduled to return for a follow-up psychotherapy visit on 8/15/19.  Goal: improve sleep hygiene       Discharge Criteria/Planning: Client has chronic symptoms and ongoing therapy for maintenance stability recommended.     Performed and documented by JULIO Mendoza

## 2021-05-31 NOTE — PROGRESS NOTES
Individual Treatment Plan [facility]     Review per Dr. Brunner    Concerning patient missed her last appointment, also this sobriety date reported appears to be incorrect per urine toxicology. Patient had an etg/ets which was >10,000/10,000 as of 7/15/2019 and I would recommend that this be addressed with patient as soon as possible as she may need a return to a higher level of care.     Patient  Name: Patsy Santamaria  MRN: [id number]   : 1955  Admit Date: 19  Date of Initial Service Plan: 19  Tentative Discharge Date: 10/03/19  Counselor: Marisel Matthews [facility]  Diagnoses: Alcohol Use Disorder, Severe, (F10.20) (303.90)    Dimension 1: Acute Intoxication/Withdrawal Potential, Risk level: 0  Problem Statement from Comprehensive Assessment:   Patient reports last use of alcohol on 19. Patient has experienced withdrawal symptoms in the past.    Problem: Patient reports date of last use of alcohol to be 19  Goal: Maintain abstinence  Must be reached to complete treatment? Yes  Methods/Strategies (must include amount and frequency):   1. Attend group Monday, Wednesday and Friday 8:30am-11:30pm. Share thoughts, feelings, and urges to use.   2. Report any relapses to counselor immediately.  Target Date: 10/03/19  Completion Date:       Dimension 2: Biomedical Conditions/Complications, Risk level: 1  Problem Statement from Comprehensive Assessment:  Patient reports COPD, Osteoarthritis, and chronic back pain. Patient has primary care provider. Patient is able to seek cares as needed.    Problem: Patient reports COPD, Osteoarthritis, and chronic back pain  Goal: Stable health  Must be reached to complete treatment? yes  Methods/Strategies (must include amount and frequency):   1. Continue to follow recommendations from your personal care provider regarding medical concerns.   2. Inform staff immediately of any changes in your health that may affect your active participation in  group therapy or attendance.   Target Date: 10/03/19  Completion Date:     Problem: Patient reports current tobacco use.   Goal: Patient to receive information about smoking cessation.   Must be reached to complete treatment? No  Methods/Strategies (must include amount and frequency):   1. Staff to provide patient with nicotine cessation information and help on how to quit use.   2. Patient to report any progress on stopping nicotine use to staff.   Target Date: 10/03/19  Completion Date:     Problem: Patient is required to meet with provider in the clinic. Last Medicare appointment was scheduled on 07/15/19   Goal: Patient will follow-up with Dr. Brunner for Medicare appointment in the [facility] as directed.  Must be reached to completed treatment? Yes  Methods/Strategies (must include amount and frequency):   1. Patient will meet with Dr. Brunner as scheduled to go over treatment plan and individual needs as required.   2. Patient will attend follow-up if MD requires.   Target Date: 10/03/19  Completion Date:     Dimension 3: Emotional/Behavioral/Cognitive, Risk level: 2  Problem Statement from Comprehensive Assessment:  Patient reports depression and PTSD. Patient has mental health care provider. Patient reports recently experiencing mental health symptoms. Patient denies suicidal or homicidal ideation. Patient reports previous suicide attempt.    Problem: Patient has a diagnoses of depression and PTSD  Goal: Manage mental health symptoms   Must be reached to complete treatment? No  Methods/Strategies (must include amount and frequency):   1. Patient to begin using coping skills learned in therapeutic group (such as grounding, breathing, thought challenging, mindfulness, etc), and share in daily check-in any benefits or challenges that you experience using these skills.  Target Date: 10/03/19  Completion Date:     Dimension 4: Readiness to Change, Risk level 0  Problem Statement from Comprehensive Assessment:  Patient  verbalizes a desire for change.    Problem: Ongoing motivation.  Goal: Follow through with intentions to treat chemical dependency concerns while meeting OP treatment expectations in order to graduate successfully from the program.   Must be reached to complete treatment? Yes   Methods/Strategies (must include amount and frequency):   1. Complete all requested assignments, participate in group and follow through with aftercare plans.   2. If for any reason you will not be in group or will be tardy please contact staff at [phone] (Marisel)   Target Date: 10/03/19  Completion Date:     Problem: Patient has lacked follow through with goals  Goal: Follow through with goals in order to successfully complete OP  Must be reached to completed treatment? Yes  Methods/Strategies (must include amount and frequency):   1. Patient was placed on treatment contract.   2. Follow treatment contract expectations  3. Report any new concerns to counselor.   Target Date: 10/3/19  Completion Date:     Dimension 5: Relapse/Continued Use/Continued Problem Potential, Risk level: 3  Problem Statement from Comprehensive Assessment:  Patient lacks healthy, positive coping skills. Patient lacks skills to prevent relapse. Patient may not fully recognize impact substance use has on mental health. Patient has had multiple treatment episodes. Patient has achieved periods of sobriety in the past.    Problem: Continued use.  Goal: Develop relapse prevention skills  Must be reached to complete treatment? Yes  Methods/Strategies (must include amount and frequency):   Patient to share in daily check-in any urges and addictive thinking to better understand her pattern of use and to prevent relapse in the future.   Target Date: 10/03/19  Completion Date:     Dimension 6: Recovery Environment, Risk level: 3  Problem Statement from Comprehensive Assessment:  Patient is unemployed. Patient has stable housing. Patient is not engaged in structured  daily activities. Patient reports positive, sober support. Patient denies current legals. History of DUI.     Problem: Lack of sober support   Goal: Gain sober support  Must be reached to complete treatment? yes  Methods/Strategies (must include amount and frequency):   1. Explore and identify sober support meetings to regularly attend.   2. Obtain a sponsor prior to phase II of treatment.   Target Date: 10/03/19  Completion Date:       Resources  Resources to which the patient is being referred for problems when problems are to be addressed concurrently by another provider: Individual therapy, [facility] Providers       By signing this document, I am acknowledging that I was actively and directly involved in the development of my treatment plan.           Patient  Signature_________________________________________         Date__________________        Staff Signature  PRINCE Dominguez    Date: 8/6/2019, 12:22 PM

## 2021-05-31 NOTE — PROGRESS NOTES
Patsy Santamaria attended 3 hours of group on 8/16/2019.     The group topic was Access to Care, patient was responsive to topic.     Patients engagement in the group session: medium     Total number of patients present 11.     Supervising MD: Dr. Oliverio Matthews, [facility]  8/16/2019, 2:56 PM

## 2021-05-31 NOTE — PROGRESS NOTES
This writer talked with the patient and reported that due to continued use she does not meet criteria for this level of care. She did state that she is not wanting to stop drinking at this time.  She reports she would like an 2700 referral however does not for 100% certainty know if she would take it. The patient reports that she would like to continue to see Dr. Brunner and will continue with medication management. The patients discarge summary will be entered in patient medical records within the next 5 calendar days.     PRINCE Dominguez 8/16/2019 3:01 PM

## 2021-05-31 NOTE — PROGRESS NOTES
Mental Health Visit Note    8/29/2019    Start time: 10:02am    Stop Time: 10:45am   Session # 8    Session Type: Patient is presenting for an Individual session.     Persons Present: Patient and Therapist    Patsy Santamaria is a 64 y.o. female is being seen today for    Chief Complaint   Patient presents with      Follow Up     Psychotherapy follow-up visit for depression, PTSD and alcoholism    .     New symptoms or complaints: None    Functional Impairment:   Personal: 4  Family: 4  Work: 4  Social:4    Clinical assessment of mental status:   Grooming: Well groomed  Attire: Appropriate  Age: Appears Stated  Behavior Towards Examiner: Cooperative  Motor Activity: Within normal   Eye Contact: Appropriate  Mood: Anxious  Affect: Anxious and Depressed  Speech/Language: Within normal  Attention: Distractible  Concentration: Brief  Thought Process: Anxious  Thought Content: Hallucinations: none reported  Delusions: none reported  Orientation: X 3  Memory: No Evidence of Impairment  Judgement: Impairment and Minimal  Estimated Intelligence: Average  Demonstrated Insight: Adequate  Fund of Knowledge: adequate   I've re-evaluated the mental status of the patient and no changes were noted since the date of the last encounter, 8/23/19.      Suicidal/Homicidal Ideation present: None Reported This Session    Patient's impression of their current status:   The patient's impression of current status is that she feels tired today but has been working on maintaining sobriety over the last week. She missed her appointment with Dr. Brunner and plans to reschedule. She went to an NA meeting on Monday and found it helpful - she plans to return. She continues to experience interpersonal stress due to conflicts with her children. She discussed beliefs and thoughts about asking for help. She is fiercely independent and afraid of rejection. She agreed that learning how to ask for help would help her with her sobriety. She agreed to  talk to her friend about how to better support her (asking for what she needs).     Therapist impression of patients current state:   The patient arrived on-time for a follow-up psychotherapy visit. Therapist's impression of patient's current state is that she appeared tired and distracted, as evidenced by constant yawning and brief concentration. Her mood appeared slightly depressed but she had a pleasant demeanor toward the therapist. She was able to engage in dialogue and gain awareness about underlying beliefs. She was receptive to CBT strategies to help adjust unhelpful beliefs associated with asking for help. Learning how to ask for what she needs is her short-term goal which she will work on by completing an assigned homework task.     Type of psychotherapeutic technique provided: Client centered, Solution-focused and CBT    Progress toward short term goals:Progress as expected, as patient reportedly maintained sobriety over the past week. She is in the contemplative stage of change.    Review of long term goals: Not done at today's visit   Treatment plan updated on 6/20/19  Date of next review: September 2019    Diagnosis:   1. Alcohol use disorder, severe, dependence (H)    2. Moderate episode of recurrent major depressive disorder (H)    3. PTSD (post-traumatic stress disorder)        Plan and Follow up: The patient is scheduled to return for a follow-up psychotherapy visit on 9/25/19.  Patient plans to talk to support system and reflect upon how she might learn how to ask for help.      Discharge Criteria/Planning: Client has chronic symptoms and ongoing therapy for maintenance stability recommended.     Performed and documented by JULIO Mendoza

## 2021-05-31 NOTE — PROGRESS NOTES
Weekly Progress Note  Patsy Santamaria  1955  [id number]      D) Pt attended 3 groups this week with 0 absences. Patient attended 0 individual sessions this week. Patient is in phase I of SRP.  A) Staff facilitated groups and reviewed tx progress. Assessed for [facility]. R) No VAP needed at this time.   Any significant events, defines as events that impact patients relationship with others inside and outside of treatment: Lack of boundaries in relationships  Indicate any changes or monitoring of physical or mental health problems: None at this time     Indicate involvement by any outside supports: Family and friends   IAPP reviewed and modified as needed. NA  Patient was staffed with Dr. Duron and Latoya Wyatt [facility] on 8/1/19.   Pt working on the following dimensions:  Dimension #1 - Withdrawal Potential - Risk 0. Patient reports last use of alcohol on 06/25/19. Patient has experienced withdrawal symptoms in the past.  Specific goals from treatment plan addressed this week:   1. The patient goal is to maintain abstinence.   Effectiveness of strategies: Strategies appear to be effective. The patient reports maintained abstinence and UA results are congruent with this.     Dimension #2 - Biomedical - Risk 1. Patient reports COPD, Osteoarthritis, and chronic back pain. Patient has primary care provider. Patient is able to seek cares as needed.  Specific goals from treatment plan addressed this week:   1. The patient goal is to manage biomedical concerns.   Effectiveness of strategies: Strategies appear to be effective. The patient reports she saw her PCP and they have made referrals to other providers. She feels as though her needs are being met at this time.     Dimension #3 - Emotional/Behavioral/Cognitive - Risk 2. Patient reports depression and PTSD. Patient has mental health care provider. Patient reports recently experiencing mental health symptoms. Patient denies suicidal or homicidal ideation. Patient  reports previous suicide attempt.   Specific goals from treatment plan addressed this week:   1. The patient goal is to manage mental health.   Effectiveness of strategies: Strategies appear to be effective. The patient reports stable mental health at this time. She reports her biggest stressor and complaint at this time is her lack of sleep and the concerns related to that. She reports she is medication compliant at this time. She has been encouraged to schedule an appointment with her provider in the [facility].     Dimension #4 - Treatment Acceptance/Resistance - Risk 0. Patient verbalizes a desire for change  Specific goals from treatment plan addressed this week:   1. The patient goal is to follow though with intentions to treat chemical dependency concerns.   2. Follow treatment contract   Effectiveness of strategies: Strategies appear to be effective. The patient is a very active and engaged group member. She is compliant with the treatment contract at this time. No concerns.     Dimension #5 - Relapse Potential - Risk 3. Patient lacks healthy, positive coping skills. Patient lacks skills to prevent relapse. Patient may not fully recognize impact substance use has on mental health. Patient has had multiple treatment episodes. Patient has achieved periods of sobriety in the past  Specific goals from treatment plan addressed this week:   1. The patient goal is to identify an implement coping skills.   Effectiveness of strategies: Strategies appear to be effective. The patient reports no urges or triggers at this time. The patient is able to identify times when coping skills are needed.     Dimension #6 - Recovery Environment - Risk 3. Patient is unemployed. Patient has stable housing. Patient is not engaged in structured daily activities. Patient reports positive, sober support. Patient denies current legals. History of DUI  Specific goals from treatment plan addressed this week:   1. The patient goal is to develop  sober structure.   2. The patient goal is to attend sober support groups  Effectiveness of strategies: Strategies appear to effective. She is engaging outside of her apartment daily and is engaging in sober support groups.     T) Treatment plan updated no.  Patient notified and in agreement NA.  Patient educated on Grief and Loss .  Patient has completed 25 of 108 program hours at this time. Projected discharge date is 10/23/2019. Current discharge plan is Phase III.     PRINCE Dominguez  8/1/2019, 3:22 PM          Psycho-Educational Curriculum  Date Attended  Psycho-Educational Curriculum  Date Attended    8 Dimensions of wellness   Grief and Loss  7/29-8/2   Emotional   Stages of grief    Physical   Memorialized     Intellectual   Letters to loved ones     Occupational   Grief group    Spiritual   Loneliness    Environmental   Purpose and Hobbies    Financial   Values    Social   Hobbies    Access to Care   Tana      Service Access   Volunteer Work     Pain Management   Experiential Learning     Memory   Value of movement     Physical Wellness  Relationships     Medication   Self-Love     PAWS   Resentment    Hygiene (Sleep, Physical, etc)   Communication Skills     Emotional Wellbeing   Amends     Healthy vs. Unhealthy Feelings  Family     Affirmations  Social Anxieties     Co-Occurring Disorders  Legacy     Anxiety   Support(+ & -)    Depression  Assertive Communication     Grounding  Codependency    Trauma/Victim Identity   Boundaries    MH/CD Acceptance   Defense Mechanisms     Sober Structure  7/8-7/12 Relapse Prevention  7/15-7/19   Sober support groups   Triggers and High Risk Situations    Needds  Relapse Prevention Plan     Spirituality   Coping Skills     Schedule   Addictive Thoughts    Sobriety Vs. Recovery   Relapse Process     Power of Now   What is Addiction     Truth in life   Impulsive/Compulsive Behaviors     Recovery Investement   Cross Addiction     Recovery Influences   Early Recovery      Educational Videos   Mindfulness    No Kidding Me 2!       Anonymous People       Torrey's Story       Pleasure Unwoven

## 2021-05-31 NOTE — PROGRESS NOTES
Correct pharmacy verified with patient and confirmed in snapshot? [x] yes []no    Charge captured ? [x] yes  [] no    Medications Phoned  to Pharmacy [] yes [x]no  Name of Pharmacist:  List Medications, including dose, quantity and instructions      Medication Prescriptions given to patient   [] yes  [x] no   List the name of the drug the prescription was written for.       Medications ordered this visit were e-scribed.  Verified by order class [x] yes  [] no    Medication changes or discontinuations were communicated to patient's pharmacy: [] yes  [x] no    UA collected [x] yes  [] no    [place] Prescription Monitoring Program Reviewed? [x] yes  [] no    Referrals were made to: none     Future appointment was made: [x] yes  [] no    Dictation completed at time of chart check: [x] yes  [] no    I have checked the documentation for today s encounters and the above information has been reviewed and completed.

## 2021-05-31 NOTE — PROGRESS NOTES
[facility] SUBSTANCE USE DISORDER  DISCHARGE SUMMARY      Name:  Patsy Santamaria   :  PRINCE Dominguez   Admit Date: 19   Discharge Date: 19   :  1955   Hours Completed: 37   Initial Diagnosis:  Alcohol Use Disorder, severe (F10.20)   Final Diagnosis:  Alcohol Use Disorder, severe (F10.20)   Discharge Address:    [address] [place] [address]   Funding Source:    Medicare A&B     Discharge Type:  At Staff Request (ASR)    Client was receiving residential services at the time of discharge:   No    Reasons for and circumstances of service termination:  The has had multiple relapses while enrolled in the [facility] recovery program. She was placed on a treatment contract at the time of the intake so at this time is in violation of the contract and in need of a higher level of care. The patient had two positive UA results one on 7/15/19 and a second on 19. The patient reports continued use however stated that she did not know if she really wanted to be sober at this time. The patient did state that she was not 100% sure if she would go to inpatient at this time however was interested in a referral still being sent.      If program discharge status was At Staff Request, the license [place] must identify the following:    Other interested parties conferred with: Dr. Brunner     Referrals provided: [facility] at [facility]     Alternatives considered and attempted before deciding to discharge:  Patient was placed on a treatment contract.    Dimension/Course of Treatment/Individualized Care:   1.  Withdrawal Potential - Intake Risk level -  0 Discharge Risk level - 2  Narrative supporting risk description:  The patient reported ongoing use while enrolled in outpatient treatment with her date of last use being on 8/15/19. She reported no withdrawal concerns however had yet to abstain from alcohol for any period of time. The patient is at moderate risk for withdrawal  should she arrest use.   Treatment plan goals and progress towards those goals:  The patient goal was to maintain abstinence. She was not successful in this as evidence by her continued use.    2.  Biomedical Conditions and Complications - Intake Risk level -  1 Discharge Risk level - 1  Narrative supporting risk description:  The patient has medical conditions such as COPD, Osteoarthritis and chronic back pain. The patient does have a primary care provider and is able to seek medical care as needed.   Treatment plan goals and progress towards those goals:  The patient goal was to manage her biomedical concerns. She did report that she had some appointments with medical providers and a cardiologist. She did not endorse any emergent concerns while enrolled.    3.  Emotional/Behavioral/Cognitive Conditions and Complications - Intake Risk level -  2 Discharge Risk level - 2  Narrative supporting risk description:  The patient reports diagnoses as PTSD and Depression. She has a therapist (Araceli Hamilton) and an addiction medicine doctor (Dr. Brunner). She did endorse an increase in depression while enrolled however did discuss this with Dr. Brunner and medications were adjusted. The patient did not endorse any SI/SIB/HI while enrolled.   Treatment plan goals and progress towards those goals:  The patient goal was to manage mental health, she was partially successful in this as she continued to meet with providers when necessary. She did report an increase in depressive symptoms and reported that to be a reason why she had poor group attendance. She was willing to seek attention from her providers when experiencing the depression. She reports that although she will no longer be enrolled in the [facility] she would still like to see both of her providers on a regular basis.    4.  Readiness for Change - Intake Risk level -  0 Discharge Risk level - 2  Narrative supporting risk description:  The patient was verbally compliant  with the expectations of programming however lacked consistent behaviors. The patient was on a treatment contract however did not uphold the contract while enrolled. The patient has low motivation for change as evidence by her report that she was ambivalent about the desire to stop her use at this time.   Treatment plan goals and progress towards those goals:  The patient goal was to follow through with intentions to treat chemical dependency concerns and to follow through with treatment contract. She was not successful in either as evidence by her continued use and minimal motivation to seek additional services.    5.  Relapse/Continued Use/Continued Problem Potential - Intake Risk level -  3 Discharge Risk level - 4  Narrative supporting risk description:  The patient reports continued alcohol use while enrolled. At this time she has minimal recognition and understanding of relapse prevention. She is at a high risk for further substance use and mental health concerns. She does not have any coping skills to arrest use for any significant period of time. She has had multiple treatment episodes and has been able to achieve any significant periods of sobriety.   Treatment plan goals and progress towards those goals:  The patient goals to develop and implement coping skills. She was not successful in this as evidence by her continued use and lack of coping skills to prevent relapse.    6.  Recovery Environment - Intake Risk level -  3 Discharge Risk level - 3  Narrative supporting risk description:  The patient is unemployed and not currently looking for a job. She does have stable housing however would be better served in a sober environment. The patient is not engaged in any structured, sober daily activities. She reports some sober support however has minimal follow through with intentions to attend sober support groups. The patient does not currently have any legal involvement.   Treatment plan goals and progress  "towards those goals:  The patient goal was to develop sober structured schedule as well as additional sober support. She was not successful in this as she spends most of her time in her apartment alone. The patient attended minimal sober support groups and engaged with few sober supportive people.    Strengths: Patient reports \"willpower at times\"   Needs: Additional substance use services such as 2700, individual therapy, medication management, sober structured activities, sober support , health management.    Services Provided: Intake, assessment, treatment planning, education, group discussion, film, lectures, 1x1 therapy, and recommendations at discharge.      Program Involvement: Fair  Attendance: Poor  Ability to relate in group/   Other program activities: Fair  Assignment Completion: Poor  Overall Behavior: Poor  Reported Family/Significant   Other Involvement: NA    Prognosis: Poor      Recommendations       Identify and Maintain a Sober Social, Network of Friends and Referral to inpatient services or reassessment.     Mental Health Referral  Individual Therapy and Med Compliance    Physical Health Referral:    Primary Care Provider     Counselor Name and Title:  PRINCE Dominguez        Date:  8/20/2019  Time:  11:43 AM      "

## 2021-05-31 NOTE — PROGRESS NOTES
The patient and this writer met for 35 minutes to discuss patients goals and reports of depression. The patient reported that she has been experiencing some depression and believes it is related to her poor sleep routine. The patient reports she has been awake most of the evenings and sleeping a lot of the day. She reports that she believes her sleep routine is correlated to her depression. The patient and this writer discussed her sleep routine and habits and she reported she does not have a very consistent routine. The patient and this writer also talked about getting out of the house at minimum 1x per day and doing something for at least 5 minutes. She was open to this and willing to try. We will continue to monitor depressive symptoms on a regular basis. No SI/SIB/HI at time of encounter. Patient is also working on getting silver sneakers.     PRINCE Dominguez 8/12/2019 3:22 PM

## 2021-05-31 NOTE — PROGRESS NOTES
Patsy Santamaria attended 3 hours of group on 8/2/2019.     The group topic was Grief and Loss, patient was responsive to topic.     Patients engagement in the group session: medium     Total number of patients present 8.     Supervising MD: Dr. Carlos Matthews, [facility] [place]  8/2/2019, 1:58 PM

## 2021-05-31 NOTE — PROGRESS NOTES
Patsy Santamaria attended 3 hours of group on 8/12/2019.     The group topic was Access to Care, patient was responsive to topic.     Patients engagement in the group session: medium     Total number of patients present 8.     Supervising MD: Dr. Oliverio Matthews, [facility][note 3]  8/12/2019, 2:16 PM

## 2021-05-31 NOTE — PROGRESS NOTES
Patsy Santamaria attended 3 hours of group on 7/31/2019.     The group topic was Grief and Loss, patient was responsive to topic.     Patients engagement in the group session: medium     Total number of patients present 8.     Supervising MD: Dr. Oliverio Matthews, [facility]  7/31/2019, 1:39 PM

## 2021-05-31 NOTE — PROGRESS NOTES
The patient presented to group after being a [facility]NS for the last 2 group sessions. The patient reported that she just didn't have the motivation to call or come in. She reports she feels her depression is getting worse and that her medications are not working. She also stated that she is not sleeping well and attributes her increase in depression to the lack of sleep. The patient and this writer discussed a + UA result from 7/15/19 and she did report she drank around then. She reports she has not used since and stated she really wants to be in the group and sober. We discussed her treatment contract and the fact that we are not able to help her unless she is honest with the providers she is working with and attends her scheduled appointments. She reports she did reschedule an appointment with Dr. Brunner and is going to talk with her regarding her medications and sleep. When asked about relationship issues, she reported that her boyfriend and her have been arguing and not seeing eye to eye. We discussed boundaries and healthy relationships-she reported that this is not an abusive relationship and that she feels safe with her significant other. The patient did schedule a 1x1 with this writer for Monday, we will discuss further goals for programming at that time. The patient did not endorse any SI/SIB/HI at the time of this encounter.     PRINCE Dominguez 8/9/2019 12:22 PM

## 2021-05-31 NOTE — TELEPHONE ENCOUNTER
Refill request received for Duloxetine, but in speaking to [facility], they said it came in error and to refuse it, as they received refill for this on 08/12/2019.

## 2021-05-31 NOTE — PROGRESS NOTES
"Mental Health Visit Note    8/23/2019    Start time: 4:00pm    Stop Time: 4:40pm   Session # 7    Session Type: Patient is presenting for an Individual session.     Persons Present: Patient and Therapist    Patsy Santamaria is a 64 y.o. female is being seen today for    Chief Complaint   Patient presents with      Follow Up     Psychotherapy follow-up visit for depression and alcoholism   .     New symptoms or complaints: Relapse with alcohol, discontinuation of CD treatment    Functional Impairment:   Personal: 4  Family: 4  Work: 4  Social:4    Clinical assessment of mental status:   Grooming: Well groomed  Attire: Appropriate  Age: Appears Stated  Behavior Towards Examiner: Cooperative  Motor Activity: Within normal   Eye Contact: Appropriate  Mood: Anxious  Affect: Anxious and Depressed  Speech/Language: Within normal  Attention: Distractible  Concentration: Brief  Thought Process: Anxious  Thought Content: Hallucinations: none reported  Delusions: none reported  Orientation: X 3  Memory: No Evidence of Impairment  Judgement: Impairment and Minimal  Estimated Intelligence: Average  Demonstrated Insight: Adequate  Fund of Knowledge: adequate     Suicidal/Homicidal Ideation present: None Reported This Session    Patient's impression of their current status:   The patient's impression of current status is that she violated the contract of her CD treatment due to multiple relapse events with alcohol. She refused the recommendation for higher level of care, stating \"it won't do me any good.\" She reported that she continues to drink when feeling upset because \"it's what I know.\" After initially minimizing the extent of her alcohol abuse problem, patient shared that she \"feels like a piece of sh*t.\" She elaborated by expressing guilt, stating \"it was stupid to drink and get kicked out of group.\" She reported feeling down and depressed. She reported continued conflicts with her children which is a major source of " "distress in her life. She endorsed racing thoughts and reported that her \"mind won't shut down.\" She reported that she has not confided in her friend Ray or sister about relapsing because she is embarrassed. She understands that lying about her alcohol use is a major reflection of the extent to which it is a problem in her life, causing impairments in different areas of functioning.     Therapist impression of patients current state:   The patient arrived on-time for a follow-up psychotherapy visit. Therapist's initial impression was that patient was minimizing the extent to which her alcohol use is a problem (outlined above). After engaging in some processing, patient displayed a shift in her unhelpful thinking patterns, acknowledging underlying feelings of shame and guilt about continued alcohol use. Despite acknowledging the problem, patient exhibited little change talk in regards to problem solving. The therapist's impression is that patient's PTSD and depression is triggered by conflicts with her children. These conflicts exacerbate underlying symptoms and create overwhelming emotions with which the patient has difficulty managing without the use of alcohol. She would benefit from participating in a DBT skills group to emphasize self-regulation skills. She was encouraged to reconsider her interaction with members of her support system. She has some plans for relapse prevention but sobriety appears difficult at this time. Therapist strongly encouraged participation in CD treatment again.      Type of psychotherapeutic technique provided: Client centered, Solution-focused and CBT    Progress toward short term goals:Poor progress, as patient has experienced problems maintaining sobriety from alcohol.    Review of long term goals: Not done at today's visit   Treatment plan updated on 6/20/19  Date of next review: September 2019    Diagnosis:   1. Alcohol use disorder, severe, dependence (H)    2. Moderate episode " of recurrent major depressive disorder (H)    3. PTSD (post-traumatic stress disorder)        Plan and Follow up: The patient is scheduled to return for a follow-up psychotherapy visit on 8/29/19.  Therapy plan will be to schedule more frequent visits.  Patient encouraged to confide in members of support system because she is currently lying about her relapses.  Patient plans to obtain more community support.      Discharge Criteria/Planning: Client has chronic symptoms and ongoing therapy for maintenance stability recommended.     Performed and documented by JULIO Mendoza

## 2021-05-31 NOTE — PROGRESS NOTES
Weekly Progress Note  Patsy Santamaria  1955  [id number]      D) Pt attended 1 groups this week with 2 absences. Patient attended 0 individual sessions this week. Patient is in phase I of SRP.  A) Staff facilitated groups and reviewed tx progress. Assessed for [facility]. R) No VAP needed at this time.   Any significant events, defines as events that impact patients relationship with others inside and outside of treatment: Lack of boundaries in relationships. She has reported that she has had very low energy and attributes this to depression. She also reports that someone she is close with is having a hard time so she could not attend group. The patient will be assessed for mental health concerns, SI, HI, SIB, and DV when she presents to the clinic.   Indicate any changes or monitoring of physical or mental health problems: The patient identified some depressive symptoms that have prevented her from attending group.     Indicate involvement by any outside supports: Family and friends   IAPP reviewed and modified as needed. NA  Patient was staffed with Dr. Duron and Latoya Wyatt [facility] on 8/1/19.   Pt working on the following dimensions:  Dimension #1 - Withdrawal Potential - Risk 0. Patient reports last use of alcohol on 06/25/19. Patient has experienced withdrawal symptoms in the past.  Specific goals from treatment plan addressed this week:   1. The patient goal is to maintain abstinence.   Effectiveness of strategies: Strategies appear to be effective. The patient was given a UA on 7/29/19 that tested negative for alcohol      Dimension #2 - Biomedical - Risk 1. Patient reports COPD, Osteoarthritis, and chronic back pain. Patient has primary care provider. Patient is able to seek cares as needed.  Specific goals from treatment plan addressed this week:   1. The patient goal is to manage biomedical concerns.   Effectiveness of strategies: Strategies appear to be effective. The patient is having a cardiology  appointment on 8/27/19 she is able to get her medical needs met at this time. No emergent concerns.     Dimension #3 - Emotional/Behavioral/Cognitive - Risk 2. Patient reports depression and PTSD. Patient has mental health care provider. Patient reports recently experiencing mental health symptoms. Patient denies suicidal or homicidal ideation. Patient reports previous suicide attempt.   Specific goals from treatment plan addressed this week:   1. The patient goal is to manage mental health.   Effectiveness of strategies: Strategies appear to be effective. The patient reports her lack of attendance was due to depression. She also reports she missed her Dr. Brunner appointment due to not having the energy to come. She reports she will be in group on 8/9/19 so we will assess patient needs at that time.      Dimension #4 - Treatment Acceptance/Resistance - Risk 0. Patient verbalizes a desire for change  Specific goals from treatment plan addressed this week:   1. The patient goal is to follow though with intentions to treat chemical dependency concerns.   2. Follow treatment contract   Effectiveness of strategies: Strategies appear to be effective. The patient is currently on a treatment contract and with her lack of communication she was at risk for discharge. We will review the treatment contract when she presents again and ensure she is in the correct level of care.     Dimension #5 - Relapse Potential - Risk 3. Patient lacks healthy, positive coping skills. Patient lacks skills to prevent relapse. Patient may not fully recognize impact substance use has on mental health. Patient has had multiple treatment episodes. Patient has achieved periods of sobriety in the past  Specific goals from treatment plan addressed this week:   1. The patient goal is to identify an implement coping skills.   Effectiveness of strategies: Strategies appear to be effective. The patient reports she had been sober over the last week  however dealing with difficult emotions can be especially triggering for the patient.     Dimension #6 - Recovery Environment - Risk 3. Patient is unemployed. Patient has stable housing. Patient is not engaged in structured daily activities. Patient reports positive, sober support. Patient denies current legals. History of DUI  Specific goals from treatment plan addressed this week:   1. The patient goal is to develop sober structure.   2. The patient goal is to attend sober support groups  Effectiveness of strategies: Strategies appear to not be effective. The patient has been isolating over the last week and has engaged in minimal activities.     T) Treatment plan updated no.  Patient notified and in agreement NA.  Patient educated on 8 Dimensions of Wellness .  Patient has completed 28 of 108 program hours at this time. Projected discharge date is 10/23/2019. Current discharge plan is Phase III.     PRINCE Dominguez  8/8/2019, 12:28 PM          Psycho-Educational Curriculum  Date Attended  Psycho-Educational Curriculum  Date Attended    8 Dimensions of wellness  8/5-8/9 Grief and Loss  7/29-8/2   Emotional   Stages of grief    Physical   Memorialized     Intellectual   Letters to loved ones     Occupational   Grief group    Spiritual   Loneliness    Environmental   Purpose and Hobbies    Financial   Values    Social   Hobbies    Access to Care   Tana      Service Access   Volunteer Work     Pain Management   Experiential Learning     Memory   Value of movement     Physical Wellness  Relationships     Medication   Self-Love     PAWS   Resentment    Hygiene (Sleep, Physical, etc)   Communication Skills     Emotional Wellbeing   Amends     Healthy vs. Unhealthy Feelings  Family     Affirmations  Social Anxieties     Co-Occurring Disorders  Legacy     Anxiety   Support(+ & -)    Depression  Assertive Communication     Grounding  Codependency    Trauma/Victim Identity   Boundaries    MH/CD Acceptance   Defense  Mechanisms     Sober Structure  7/8-7/12 Relapse Prevention  7/15-7/19   Sober support groups   Triggers and High Risk Situations    Needds  Relapse Prevention Plan     Spirituality   Coping Skills     Schedule   Addictive Thoughts    Sobriety Vs. Recovery   Relapse Process     Power of Now   What is Addiction     Truth in life   Impulsive/Compulsive Behaviors     Recovery Investement   Cross Addiction     Recovery Influences   Early Recovery     Educational Videos   Mindfulness    No Kidding Me 2!       Anonymous People       Torrey's Story       Pleasure Unwoven

## 2021-05-31 NOTE — PROGRESS NOTES
Patsy Santamaria attended 3 hours of group on 8/9/2019.     The group topic was 8 Dimensions of Wellness, patient was responsive to topic.     Patients engagement in the group session: high     Total number of patients present 7.     Supervising MD: Dr. Carlos Matthews, [facility]  8/9/2019, 12:15 PM

## 2021-06-01 ENCOUNTER — RECORDS - HEALTHEAST (OUTPATIENT)
Dept: ADMINISTRATIVE | Facility: CLINIC | Age: 66
End: 2021-06-01

## 2021-06-01 ENCOUNTER — COMMUNICATION - HEALTHEAST (OUTPATIENT)
Dept: PULMONOLOGY | Facility: OTHER | Age: 66
End: 2021-06-01

## 2021-06-01 VITALS — HEIGHT: 63 IN | WEIGHT: 226 LBS | BODY MASS INDEX: 40.04 KG/M2

## 2021-06-01 VITALS — WEIGHT: 235 LBS | BODY MASS INDEX: 41.64 KG/M2 | HEIGHT: 63 IN

## 2021-06-01 VITALS — HEIGHT: 63 IN | WEIGHT: 233 LBS | BODY MASS INDEX: 41.29 KG/M2

## 2021-06-01 VITALS — BODY MASS INDEX: 41.88 KG/M2 | WEIGHT: 229 LBS

## 2021-06-01 VITALS — WEIGHT: 234.5 LBS | BODY MASS INDEX: 41.54 KG/M2

## 2021-06-01 VITALS — HEIGHT: 63 IN | BODY MASS INDEX: 40.22 KG/M2 | WEIGHT: 227 LBS

## 2021-06-01 VITALS — HEIGHT: 62 IN | WEIGHT: 228 LBS | BODY MASS INDEX: 41.96 KG/M2

## 2021-06-01 NOTE — TELEPHONE ENCOUNTER
Refill Approved    Rx renewed per Medication Renewal Policy. Medication was last renewed on 5/6/19.    Saima Hanks, [facility] Triage/Med Refill 9/5/2019     Requested Prescriptions   Pending Prescriptions Disp Refills     omeprazole (PRILOSEC) 20 MG capsule 30 capsule 0     Sig: Take 1 capsule (20 mg total) by mouth at bedtime.       GI Medications Refill Protocol Passed - 9/5/2019  1:17 PM        Passed - PCP or prescribing provider visit in last 12 or next 3 months.     Last office visit with prescriber/PCP: 7/24/2019 Yanique Cali MD OR same dept: 7/24/2019 Yanique Cali MD OR same specialty: 7/24/2019 Yanique Cali MD  Last physical: 1/5/2018 Last MTM visit: Visit date not found   Next visit within 3 mo: Visit date not found  Next physical within 3 mo: Visit date not found  Prescriber OR PCP: Yanique Cali MD  Last diagnosis associated with med order: 1. Chronic Reflux Esophagitis  - omeprazole (PRILOSEC) 20 MG capsule; Take 1 capsule (20 mg total) by mouth at bedtime.  Dispense: 30 capsule; Refill: 0    If protocol passes may refill for 12 months if within 3 months of last provider visit (or a total of 15 months).

## 2021-06-01 NOTE — PROGRESS NOTES
Mental Health Visit Note    9/25/2019    Start time: 3:05pm    Stop Time: 3:50pm   Session # 9    Session Type: Patient is presenting for an Individual session.     Persons Present: Patient and Therapist    Patsy Santamaria is a 64 y.o. female is being seen today for    Chief Complaint   Patient presents with      Follow Up     Psychotherapy follow-up visit for depression, alcohol abuse and PTSD   .     New symptoms or complaints: None    Functional Impairment:   Personal: 4  Family: 4  Work: 4  Social:4    Clinical assessment of mental status:   Grooming: Well groomed  Attire: Appropriate  Age: Appears Stated  Behavior Towards Examiner: Cooperative  Motor Activity: Within normal   Eye Contact: Appropriate  Mood: Anxious  Affect: Anxious and Depressed  Speech/Language: Within normal  Attention: Distractible  Concentration: Brief  Thought Process: Anxious  Thought Content: Hallucinations: none reported  Delusions: none reported  Orientation: X 3  Memory: No Evidence of Impairment  Judgement: Impairment and Minimal  Estimated Intelligence: Average  Demonstrated Insight: Adequate  Fund of Knowledge: adequate   I've re-evaluated the mental status of the patient and no changes were noted since the date of the last encounter, 8/29/19.      Suicidal/Homicidal Ideation present: None Reported This Session    Patient's impression of their current status:   The patient's impression of current status is that she has been feeling more anxious especially due to recent sobriety. She notices episodes of paranoia and irrational fears. She noted increased irritability, trouble relaxing and lack of sleep. She reported feeling nervous in crowds and feeling uncomfortable leaving her house. She noted many patterns of avoidance due to becoming easily overwhelmed. She reported racing thoughts and noted that her mind is always buzzing.   She recently heard about a job offer and plans to continue pursuing part-time work.      Therapist  impression of patients current state:   The patient arrived on-time for a follow-up psychotherapy visit. Therapist's impression of patient's current state is that she is exhibiting signs of anxiety associated with her PTSD. For instance, she endorsed patterns of hypervigilance, becoming easily overwhelmed and startled, feeling irritable and patterns of avoidance. The therapist provided additional psycho-education today regarding PTSD to improve patient's understanding and awareness of current symptoms. The patient will most likely benefit from continued practice of exposure therapy techniques which will be reviewed during future visits. The patient does agree to start improving her sleep habits and create a more relaxing bed time routine. She reported that she is attending NA on Monday nights in addition to avoiding high risk exposures.      Type of psychotherapeutic technique provided: Client centered, Solution-focused and CBT    Progress toward short term goals:Progress as expected, as patient has reportedly maintained sobriety from alcohol. She has been feeling more anxious and expressed a desire to improve strategies for managing symptoms.    Review of long term goals: Treatment Plan updated   Date of next review: December 2019  *Will complete PHQ-9 during next session  ALISIA-7 completed today, with score of 18, indicating severe symptoms of anxiety    Diagnosis:   1. Alcohol use disorder, severe, dependence (H)    2. Moderate episode of recurrent major depressive disorder (H)    3. PTSD (post-traumatic stress disorder)        Plan and Follow up: The patient is scheduled to return for a follow-up psychotherapy visit on 10/15/19.  Patient encouraged to continue seeking support for alcohol use disorder such as attending community based support groups.  Therapist and patient discussed exposure therapy techniques for help managing triggers.  Patient was strongly advised to consider how she might improve her sleep  hygiene.  Therapy plan is to continue helping patient learn techniques to manage symptoms of anxiety, which have increased in sobriety.       Discharge Criteria/Planning: Client has chronic symptoms and ongoing therapy for maintenance stability recommended.     Performed and documented by JULIO Mendoza

## 2021-06-01 NOTE — PROGRESS NOTES
Outpatient Mental Health Treatment Plan    Name:  Patsy Santamaria  :  1955  MRN:  [id number]    Treatment Plan:  Updated Treatment Plan  Intake/initial treatment plan date:    Intake: 3/23/2017  Initial treatment plan date: 2017  Benefit and risks and alternatives have been discussed: Yes  Is this treatment appropriate with minimal intrusion/restrictions: Yes  Estimated duration of treatment:  Approximately 5 sessions.  Anticipated frequency of services:  Every 2-3 weeks  Necessity for frequency: This frequency is needed to establish therapeutic goals and for continuity of care in order to monitor progress.  Necessity for treatment: To address cognitive, behavioral, and/or emotional barriers in order to work toward goals and to improve quality of life.    Session Type: Patient is presenting for an Individual session.    Plan:           ?   ? Anxiety    Goal:  Decrease average anxiety level from 4 to 2.   Strategies: ? [x]Learn and practice relaxation techniques and other coping strategies (e.g., thought stopping, reframing, meditation)     ? [x] Increase involvement in meaningful activities     ? [x] Discuss sleep hygiene     ? [x] Explore thoughts and expectations about self and others     ? [x] Identify and monitor triggers for panic/anxiety symptoms     ? [x] Implement physical activity routine (with physician approval)     ? [x] Consider introduction of bibliotherapy and/or videos     ? [x] Continue compliance with medical treatment plan (or explore barriers)   ?Degree to which this is a problem: 4  Degree to which goal is met: 1  Date of Review: 2019       ? Depression    Goal:  Decrease average depression level from 3 to 2.   Strategies:    ?[x] Decrease social isolation     [x] Increase involvement in meaningful activities     ?[x] Discuss sleep hygiene     ?[x] Explore thoughts and expectations about self and others     ?[x] Process grief (loss of significant person, independence, role,  etc.)     ?[x] Assess for suicide risk     ?[x] Implement physical activity routine (with physician approval)     [x] Consider introduction of bibliotherapy and/or videos     [x] Continue compliance with medical treatment plan (or explore barriers) ?  Degree to which this is a problem: 2.5  Degree to which goal is met: 1  Date of Review: December 2019    Substance use  Goal:  Maintain sobriety from alcohol use.   Strategies: ? [x] Participate in outpatient chemical dependency program (patient has intake appointment at [facility] on 6/21/19 at 10:30am)     ? [x] Discuss barriers to participating in AA or other peer-facilitated groups         [x] Address environmental factors which may interfere with sobriety     ? [x] Explore short-term versus long-term consequences of use     ? [x] Continue compliance with medical treatment plan (or explore barriers)  Degree to which this is a problem: 2  Degree to which goal is met: 3  Date of Review: December 2019       Functional Impairment:  1=Not at all/Rarely  2=Some days  3=Most Days  4=Every Day    Personal : 3  Family : 2  Social : 3   Work/school : 4    Diagnosis:  (EXAMPLE of DSM V: Major depressive disorder, recurrent, moderate; Generalized Anxiety disorder; borderline personality per patient PHI; fibromyalgia, History of breast cancer in remission; Problem with primary relationship.)   1. PTSD (post-traumatic stress disorder)    2. Alcohol use disorder, severe, dependence (H)    3. Mild episode of recurrent major depressive disorder (H)        Clinical assessments and measures completed:.   WHODAS 2.0 12-item version 17   Scores presented in qualifiers to represent level of disability.  MILD Problem (slight, low, ...) 5-24%  H1= 7  H2= 10  H3= 10     PHQ-9 = 4 (2/19/2019)  PHQ-9 = 25 (6/20/2019)  PHQ-9 = Did not complete during today's visit; will complete next session    ALISIA-7 = 7 (2/19/2019)  ALISIA-7 = 19 (6/20/2019)  ALISIA-7 = 18 (9/25/2019)     Strengths:  The patient is  "resourceful and articulate. She is a good advocate for her needs. She is very independent.   Limitations:  The patient often feels uncomfortable talking about her feelings and past problems. There are some barriers to obtaining employment. Patient is often isolated.  Cultural Considerations: The patient is a 64 year old  female with a history of complex trauma. She was born in [place]. [place] in [facility] [place] before relocating to \"the big city\" of [place] around age 10. She noted the difficult transition and shared that moving from a small town to a big city was a \"culture shock.\" Patient became pregnant at age 16 and has lived on her own ever since.    Persons responsible for this plan: Patient and Provider            Psychotherapist Signature           Patient Signature:              Guardian Signature             Provider: Performed and documented by JULIO Mendoza   Date:  9/25/2019      "

## 2021-06-01 NOTE — TELEPHONE ENCOUNTER
Medication Request  Medication name: Omeprazole 20 mg, once a day, 90 supply  Pharmacy Name and Location: [facility] #[id number]  Reason for request: Current medication  When did you use medication last?:  Yesterday  Patient offered appointment:  patient declined  Okay to leave a detailed message: yes

## 2021-06-02 ENCOUNTER — OFFICE VISIT - HEALTHEAST (OUTPATIENT)
Dept: ADDICTION MEDICINE | Facility: CLINIC | Age: 66
End: 2021-06-02

## 2021-06-02 VITALS — WEIGHT: 240 LBS | BODY MASS INDEX: 42.51 KG/M2

## 2021-06-02 VITALS — WEIGHT: 233 LBS | HEIGHT: 63 IN | BODY MASS INDEX: 41.29 KG/M2

## 2021-06-02 VITALS — BODY MASS INDEX: 42.88 KG/M2 | WEIGHT: 242 LBS | HEIGHT: 63 IN

## 2021-06-02 VITALS — BODY MASS INDEX: 40.92 KG/M2 | WEIGHT: 231 LBS

## 2021-06-02 VITALS — BODY MASS INDEX: 41.63 KG/M2 | WEIGHT: 235 LBS

## 2021-06-02 VITALS — BODY MASS INDEX: 43.09 KG/M2 | WEIGHT: 243.25 LBS

## 2021-06-02 DIAGNOSIS — F10.20 ALCOHOL USE DISORDER, SEVERE, DEPENDENCE (H): ICD-10-CM

## 2021-06-02 NOTE — PROGRESS NOTES
Outpatient Mental Health Treatment Plan    Name:  Patsy Santamaria  :  1955  MRN:  [id number]    Treatment Plan:  Updated Treatment Plan  Intake/initial treatment plan date:    Intake: 3/23/2017  Initial treatment plan date: 2017  Benefit and risks and alternatives have been discussed: Yes  Is this treatment appropriate with minimal intrusion/restrictions: Yes  Estimated duration of treatment:  Approximately 5 sessions.  Anticipated frequency of services:  Every 2-3 weeks  Necessity for frequency: This frequency is needed to establish therapeutic goals and for continuity of care in order to monitor progress.  Necessity for treatment: To address cognitive, behavioral, and/or emotional barriers in order to work toward goals and to improve quality of life.    Session Type: Patient is presenting for an Individual session.    Plan:           ?   ? Anxiety    Goal:  Decrease average anxiety level from 4 to 2.   Strategies: ? [x]Learn and practice relaxation techniques and other coping strategies (e.g., thought stopping, reframing, meditation)     ? [x] Increase involvement in meaningful activities     ? [x] Discuss sleep hygiene     ? [x] Explore thoughts and expectations about self and others     ? [x] Identify and monitor triggers for panic/anxiety symptoms     ? [x] Implement physical activity routine (with physician approval)     ? [x] Consider introduction of bibliotherapy and/or videos     ? [x] Continue compliance with medical treatment plan (or explore barriers)   ?Degree to which this is a problem: 4  Degree to which goal is met: 1  Date of Review: 2019       ? Depression    Goal:  Decrease average depression level from 3 to 2.   Strategies:    ?[x] Decrease social isolation     [x] Increase involvement in meaningful activities     ?[x] Discuss sleep hygiene     ?[x] Explore thoughts and expectations about self and others     ?[x] Process grief (loss of significant person, independence, role,  etc.)     ?[x] Assess for suicide risk     ?[x] Implement physical activity routine (with physician approval)     [x] Consider introduction of bibliotherapy and/or videos     [x] Continue compliance with medical treatment plan (or explore barriers) ?  Degree to which this is a problem: 2.5  Degree to which goal is met: 1  Date of Review: December 2019    Substance use  Goal:  Maintain sobriety from alcohol use.   Strategies: ? [x] Participate in outpatient chemical dependency program (patient has intake appointment at [facility] on 6/21/19 at 10:30am)     ? [x] Discuss barriers to participating in AA or other peer-facilitated groups         [x] Address environmental factors which may interfere with sobriety     ? [x] Explore short-term versus long-term consequences of use     ? [x] Continue compliance with medical treatment plan (or explore barriers)  Degree to which this is a problem: 2  Degree to which goal is met: 3  Date of Review: December 2019       Functional Impairment:  1=Not at all/Rarely  2=Some days  3=Most Days  4=Every Day    Personal : 3  Family : 2  Social : 3   Work/school : 4    Diagnosis:  (EXAMPLE of DSM V: Major depressive disorder, recurrent, moderate; Generalized Anxiety disorder; borderline personality per patient PHI; fibromyalgia, History of breast cancer in remission; Problem with primary relationship.)   1. PTSD (post-traumatic stress disorder)    2. Alcohol use disorder, severe, dependence (H)    3. Mild episode of recurrent major depressive disorder (H)        Clinical assessments and measures completed:.   WHODAS 2.0 12-item version 17   Scores presented in qualifiers to represent level of disability.  MILD Problem (slight, low, ...) 5-24%  H1= 7  H2= 10  H3= 10     PHQ-9 = 4 (2/19/2019)  PHQ-9 = 25 (6/20/2019)  PHQ-9 = 7 (10/15/2019)    ALISIA-7 = 7 (2/19/2019)  ALISIA-7 = 19 (6/20/2019)  ALISIA-7 = 18 (9/25/2019)     Strengths:  The patient is resourceful and articulate. She is a good advocate  "for her needs. She is very independent.   Limitations:  The patient often feels uncomfortable talking about her feelings and past problems. There are some barriers to obtaining employment. Patient is often isolated.  Cultural Considerations: The patient is a 64 year old  female with a history of complex trauma. She was born in [place]. She [place] in [facility] [facility] before relocating to \"[place] [facility] of [place] around age 10. She noted the difficult transition and shared that moving from a small town to a big city was a \"culture shock.\" Patient became pregnant at age 16 and has lived on her own ever since.    Persons responsible for this plan: Patient and Provider            Psychotherapist Signature           Patient Signature:              Guardian Signature             Provider: Performed and documented by JULIO Mendoza   Date:  10/15/2019      "

## 2021-06-02 NOTE — TELEPHONE ENCOUNTER
Refill Approved    Rx renewed per Medication Renewal Policy. Medication was last renewed on 1/5/18.    Yamel Reyes, [facility] Triage/Med Refill 10/24/2019     Requested Prescriptions   Pending Prescriptions Disp Refills     ipratropium-albuterol (DUO-NEB) 0.5-2.5 mg/3 mL nebulizer 90 mL 1     Sig: Take 3 mL by nebulization every 6 (six) hours as needed (wheezing, short of breath).       Asthma Medications Refill Protocol Passed - 10/24/2019 12:45 PM        Passed - PCP or prescribing provider visit in last year     Last office visit with prescriber/PCP: 10/18/2019 Yanique Cali MD OR same dept: 10/18/2019 Yanique Cali MD OR same specialty: 10/18/2019 Yanique Cali MD  Last physical: 1/5/2018 Last MTM visit: Visit date not found    Next appt within 3 mo: Visit date not found Next physical within 3 mo: Visit date not found  Prescriber OR PCP: Yanique Cali MD  Last diagnosis associated with med order: 1. 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  Dispense: 90 mL; Refill: 1    If protocol passes may refill for 6 months if within 3 months of last provider visit (or a total of 9 months).

## 2021-06-02 NOTE — PROGRESS NOTES
Correct pharmacy verified with patient and confirmed in snapshot? [x] yes []no    Charge captured ? [x] yes  [] no    Medications Phoned  to Pharmacy [] yes [x]no  Name of Pharmacist:  List Medications, including dose, quantity and instructions      Medication Prescriptions given to patient   [] yes  [x] no   List the name of the drug the prescription was written for.       Medications ordered this visit were e-scribed.  Verified by order class [x] yes  [] no   Fluoxetime and cymbalta  Medication changes or discontinuations were communicated to patient's pharmacy: [] yes  [x] no    UA collected [x] yes  [] no    [place] Prescription Monitoring Program Reviewed? [x] yes  [] no    Referrals were made to:      Future appointment was made: [x] yes  [] no  10/22/19  Dictation completed at time of chart check: [] yes  [x] no    I have checked the documentation for today s encounters and the above information has been reviewed and completed.

## 2021-06-02 NOTE — PROGRESS NOTES
Prior Authorization **APPROVED**    Authorization Effective Date: 5/27/2021  Authorization Expiration Date: 5/27/2022  Medication: Methocarbamol 750mg tablets **APPROVED**  Approved Dose/Quantity: Up to 3 tablets daily  Reference #: CoverMyMeds Key: [id number]   Insurance Company: [employer] - Phone [phone] Fax [phone]  Expected CoPay: $3.70     CoPay Card Available: No    Foundation Assistance Needed: n/a  Which Pharmacy is filling the prescription (Not needed for infusion/clinic administered): n/a - Patient has not yet been discharged, and there are no outpatient orders for this medication yet  Comments:  Proactive Prior Authorization      Tatiana Sawyer CPhT  [facility] Discharge Pharmacy Liaison  Pronouns: She/Her/Hers    [facility]  [facility]  [address] [place] [address] [address], [place] [address]   [email or web address]  [email or web address]   Phone: [phone]  Pager: [phone]  Fax: [phone]

## 2021-06-02 NOTE — PROGRESS NOTES
[facility]: Post-Discharge Note  SITUATION                                                      Admission:    Admission Date: 05/26/21   Reason for Admission: Alcohol withdrawal  Discharge:   Discharge Date: 05/30/21  Discharge Diagnosis: Alcohol withdrawal    BACKGROUND                                                      Patsy Santamaria is a 66 year old female  with Hx of COPD, tobacco use disorder 1 pack/day, alcohol use disorder, ongoing withdrawal seizures, chronic lower extremity edema, obesity, GERD, anxiety, depression, obstructive sleep apnea noncompliant to CPAP. Patient came to the ED for evaluation of shortness of breath and seeking detox from alcohol. She was admitted to the medical floor and received supportive management. It was considered she had a COPD exacerbation and was started on PO Prednisone, PO Levaquin and frequent nebulization's. Respiratory status improved. She also was placed on a MSSA protocol and received valium as needed. Alcohol withdrawal resolved without complications. CD and SW evaluated the patient, she will follow-up outpatient for possible inpatient alcohol dependence treatment.   Psychiatry evaluated the patient and recommended to start Lexapro and Remeron for depression.   She was hemodynamically stable on the day of discharge.        ASSESSMENT      Discharge Assessment  Patient reports symptoms are: Improved  Does the patient have all of their medications?: Yes  Does patient know what their new medications are for?: Yes  Does patient have a follow-up appointment scheduled?: Yes  Does patient have any other questions or concerns?: No    Post-op  Did the patient have surgery or a procedure: No  Fever: No  Chills: No  Eating & Drinking: eating and drinking without complaints/concerns  PO Intake: regular diet  Bowel Function: normal  Urinary Status: voiding without complaint/concerns        PLAN                                                      Outpatient Plan:      Follow-up Appointments     Adult [facility] Follow-up and recommended labs and tests      Follow up with primary care provider, EJ WELLER,   within 7 days for hospital follow- up.  No follow up labs or test are   needed.       Appointments on [place] and/or [facility] (with [facility]   provider or service). Call [phone] if you haven't heard regarding   these appointments within 7 days of discharge    No future appointments.        Meli Calderón, CMA

## 2021-06-02 NOTE — TELEPHONE ENCOUNTER
Left message for pt. To call back and schedule appointment and discuss medications    Date of Last Office Visit: 7/15/19  Date of Next Office Visit: None, patient was left message to call to schedule  No shows since last visit: 8/5/19, 8/27/19  Cancellations since last visit: 8/15/19  ED visits since last visit:  none  Medication duloxetine 60mg date last ordered: 8/12/19  Qty: 30  Refills: 0  Lapse in therapy greater than 7 days: Yes  Medication refill request verified as identical to current order: yes  Result of Last DAM, VPA, Li+ Level, CBC, or Carbamazepine Level (at or since last visit): N/A     [] Medication refilled per [facility] M-1.   [x] Medication unable to be refilled by RN due to criteria not met as indicated below:     []Eligibility - not seen in last year    [x]Supervision - no future appointment    [x]Compliance     []Verification - order discrepancy    []Controlled Medication    []Medication not included in RN Protocol    []90 - day supply request    []Other   Current Medication list:    albuterol (PROAIR HFA;PROVENTIL HFA;VENTOLIN HFA) 90 mcg/actuation inhaler Inhale 2 puffs 4 (four) times a day as needed for wheezing or shortness of breath.   cetirizine (ZYRTEC) 10 MG tablet Take 10 mg by mouth daily as needed for allergies.   diclofenac sodium (VOLTAREN) 1 % Gel Apply 1 g topically 2 (two) times a day as needed (pain).   DULoxetine (CYMBALTA) 60 MG capsule Take 1 capsule (60 mg total) by mouth daily.   FLUoxetine (PROZAC) 10 MG capsule Take 1 capsule (10 mg total) by mouth daily.   fluticasone (FLONASE) 50 mcg/actuation nasal spray Apply 1 spray into each nostril daily as needed for rhinitis.   furosemide (LASIX) 80 MG tablet Take 1 tablet (80 mg total) by mouth daily.   hydrOXYzine pamoate (VISTARIL) 50 MG capsule Take 1 capsule (50 mg total) by mouth at bedtime.   ipratropium-albuterol (DUO-NEB) 0.5-2.5 mg/3 mL nebulizer Take 3 mL by nebulization every 6 (six) hours as needed (wheezing, short of  breath).   naltrexone (DEPADE) 50 mg tablet Take 2 tablets (100 mg total) by mouth daily.   omeprazole (PRILOSEC) 20 MG capsule Take 1 capsule (20 mg total) by mouth at bedtime.   potassium chloride (K-DUR,KLOR-CON) 20 MEQ tablet Take 1 tablet (20 mEq total) by mouth daily.   prazosin (MINIPRESS) 1 MG capsule Take 1 capsule (1 mg total) by mouth at bedtime.       Medication Plan of Care at last office visit with MD/CNP:    Diagnoses/Plan:  1. Continue on naltrexone 100 mg po daily. Reports she does not need any more refills.  2. Patient to continue with her regular psychotherapy. Recommended considering increasing this to weekly therapy.  Will continue to monitor mood. Continue on gabapentin 300 mg po at bedtime. Cymbalta 60 mg po daily. Hydroxyzine  mg po prn.   3. Patient recommended to quit smoking. She will try calling [email or web address] to ask about obtaining a nicotine inhaler.      4. etg/ets >10,000/10,000  5. I did write patient a note stating that she would be appropriate for obtaining an emotional support animal, as she continues to feel this would benefit her, and has a history of relapsing multiple times while feeling bored.   6. Patient to be seen in 30 days and sooner prn. I will contact her for an earlier recommended appointment if etg/ets is positive, and she agreed today to see her pcp in 1-2 weeks to discuss her fatigue and shortness of breath, and I communicated with Dr. Wade via messenger today.

## 2021-06-02 NOTE — PROGRESS NOTES
Mental Health Visit Note    10/15/2019    Start time: 12:00pm    Stop Time: 12:50pm   Session # 10    Session Type: Patient is presenting for an Individual session.     Persons Present: Patient and Therapist    Patsy Santamaria is a 64 y.o. female is being seen today for    Chief Complaint   Patient presents with      Follow Up     Psychotherapy follow-up visit for depression and PTSD   .     New symptoms or complaints: None    Functional Impairment:   Personal: 4  Family: 4  Work: 4  Social:4    Clinical assessment of mental status:   Grooming: Well groomed  Attire: Appropriate  Age: Appears Stated  Behavior Towards Examiner: Cooperative  Motor Activity: Within normal   Eye Contact: Appropriate  Mood: Anxious  Affect: Anxious and Depressed  Speech/Language: Within normal  Attention: Distractible  Concentration: Brief  Thought Process: Anxious  Thought Content: Hallucinations: none reported  Delusions: none reported  Orientation: X 3  Memory: No Evidence of Impairment  Judgement: Impairment and Minimal  Estimated Intelligence: Average  Demonstrated Insight: Adequate  Fund of Knowledge: adequate   I've re-evaluated the mental status of the patient and no changes were noted since the date of the last encounter, 9/25/19.      Suicidal/Homicidal Ideation present: None Reported This Session    Patient's impression of their current status:   The patient's impression of current status is that she has been feeling more depressed with a bad attitude. She relapsed with alcohol but did report this to her doctor. She has difficulty asking for help and taking accountability but is making efforts to change this behavior pattern. She maintains the unhelpful belief that she is fine and can manage on her own which often leads to social isolation and high risk exposures. She feels discouraged about not being able to work. She hasn't found an AA or NA meeting that she really likes but also agrees that she hasn't fully engaged in the  process yet. She is caught in a cycle of inaction and avoidance.   While processing through grief and loss today, she understands that she continues to feel lonely and misses being part of a community - she doesn't have a sense of belonging that she had in the past and is looking to find this again in healthier ways.      Therapist impression of patients current state:   The patient arrived on-time for a follow-up psychotherapy visit. Therapist's impression of patient's current state is that continues to exhibit signs of PTSD including patterns of hypervigilance, becoming easily overwhelmed and startled, and feeling irritable. PTSD symptoms are largely maintained due to severe patterns of avoidance. She is struggling to re-frame and adjust unhelpful cognitions which is likely reinforcing unhelpful and unproductive behavior patterns. She did appear receptive to therapist efforts to help her engage in cognitive and emotional processing today, noting that she felt better at the end of session. She challenged her avoidance patterns today by attending her scheduled visit.     Type of psychotherapeutic technique provided: Client centered, Solution-focused and CBT    Progress toward short term goals:Progress as expected, as patient adequately engaged in cognitive processing today. She was honest and open about her continued struggle to maintain sobriety. She developed a plan of action to address current barriers to change.    Review of long term goals: Treatment Plan updated - including PHQ-9 score and review of goals  Date of next review: December 2019      Diagnosis:   1. PTSD (post-traumatic stress disorder)    2. Moderate episode of recurrent major depressive disorder (H)    3. Severe alcohol use disorder (H)        Plan and Follow up: The patient is scheduled to return for a follow-up psychotherapy visit on 11/5/19.  Patient is scheduled to see her PCP on 10/18 and Dr. Brunner on 10/22.    Patient encouraged to  continue seeking support for alcohol use disorder such as attending community based support groups. Expanding her social support network will be a mendoza factor in rebuilding a sense of community.    Therapy plan is to continue helping patient engage in cognitive processing, seeking resolution and reparation from negative life events. Therapy plan is associated with grief work. Exposure therapy techniques will also be emphasized to reduce patient's avoidance patterns.       Discharge Criteria/Planning: Client has chronic symptoms and ongoing therapy for maintenance stability recommended.     Performed and documented by JULIO Mendoza

## 2021-06-02 NOTE — TELEPHONE ENCOUNTER
"Medication Request  Medication name:   Cough syrup  Pharmacy Name and Location:   [id number]  Reason for request:   Patient states \"I am having bronchial issues right now.\"  Patient is coughing a lot.  When did you use medication last?:    Unknown  Patient offered appointment:  No, patient was seen by Provider on 10/18/19  Okay to leave a detailed message: yes    "

## 2021-06-02 NOTE — TELEPHONE ENCOUNTER
Refill Approved    Rx renewed per Medication Renewal Policy. Medication was last renewed on 5/24/19 .    Meka Castañeda, [facility] Triage/Med Refill 11/2/2019     Requested Prescriptions   Pending Prescriptions Disp Refills     VENTOLIN HFA 90 mcg/actuation inhaler [Pharmacy Med Name: Ventolin HFA Inhalation Aerosol Solution 108 (90 Base) MCG/ACT] 18 g 0     Sig: INHALE 2 PUFFS BY MOUTH EVERY 4 HOURS AS NEEDED FOR WHEEZING       Albuterol/Levalbuterol Refill Protocol Passed - 11/1/2019  1:17 PM        Passed - PCP or prescribing provider visit in last year     Last office visit with prescriber/PCP: 10/18/2019 Yanique Cali MD OR same dept: 10/18/2019 Yanique Cali MD OR same specialty: 10/18/2019 Yanique Cali MD Last physical: 1/5/2018       Next appt within 3 mo: Visit date not found  Next physical within 3 mo: Visit date not found  Prescriber OR PCP: Yanique Cali MD  Last diagnosis associated with med order: 1. 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 If patient requesting >1 inhaler per month refill x 6 months and have patient make appointment with provider.

## 2021-06-02 NOTE — PROGRESS NOTES
"ASSESSMENT/PLAN:  1. Hypokalemia  potassium chloride (KLOR-CON) 20 mEq packet   2. Pulmonary emphysema, unspecified emphysema type (H)  Ambulatory referral to Pulmonology   3. Dyspnea on exertion  Ambulatory referral to Pulmonology       This is a 65 yo female with:  1.  Shortness of breath/ dyspnea on exertion/ h/o COPD/ general fatigue - patient has had similar complaints for quite some time.  She notes that she has had a cardiac workup (we reviewed this) and wasn't found to have cardiac concerns.  Still worried that she isn't any better.  We discussed that her smoking/COPD probably plays a factor.  Will have pulmonary evaluation.      2.  Hypokalemia - hasn't been taking her potassium supplement because the pill is too big.  Will try different form (packets).   Return in about 3 months (around 1/18/2020) for Recheck, BP Check.      Medications Discontinued During This Encounter   Medication Reason     potassium chloride (K-DUR,KLOR-CON) 20 MEQ tablet Alternate therapy     There are no Patient Instructions on file for this visit.    Chief Complaint:  Chief Complaint   Patient presents with     Abdominal Pain     Shortness of Breath     Flu Vaccine       HPI:   Patsy Santamaria is a 64 y.o. female c/o  Still smoking  Shortness of breath x a few months  Saw the cardiologist - \"my heart is in impeccable condition\"  Gets winded very easily - can walk a block -  \"it depends on what I'm doing\"  Some pain in stomach sometimes by bellybutton - feels like a big knot  Doesn't come with eating     Having problem taking potassium pill      Supposed to get a dog today - pit/terrier mix  From [email or web address]   Hopeful that she can walk more and get more exercise        PMH:   Patient Active Problem List    Diagnosis Date Noted     Dyspnea on exertion      Anxiety 03/25/2019     Hypomagnesemia 03/25/2019     Primary osteoarthritis of left knee 07/10/2018     Seasonal allergies 07/09/2018     Severe episode of recurrent major " depressive disorder, without psychotic features (H)      Alcoholic hepatitis      Peripheral edema      Diuretic-induced hypokalemia      Alcohol use disorder, severe, dependence (H) 2018     Primary osteoarthritis of right knee 2018     Alcohol withdrawal (H) 2018     Chronic alcohol dependence, continuous (H) 2018     Low backache 2018     Morbid obesity (H) 2018     Bilateral edema of lower extremity 2017     Snoring 2017     Carpal tunnel syndrome on both sides 2017     Trochanteric bursitis of left hip 10/25/2016     Alcohol withdrawal seizure without complication (H) 2016     Hypoxia 2016     Alcohol abuse 2016     Elevated LFTs 2016     New onset seizure (H) 2016     Claustrophobia 2015     Cervical disc disease 2015     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) 2015     Post traumatic stress disorder 2015     Chronic Reflux Esophagitis      Peripheral Neuropathy      Localized Primary Osteoarthritis Of The Carpometacarpal Joint      Ganglion Of The Right Foot      Major depression, recurrent (H)      Nicotine Dependence      Vitamin D deficiency      Past Medical History:   Diagnosis Date     Acute solar dermatitis      Alcohol abuse      Anxiety      Arthritis      Chronic alcohol dependence, continuous (H) 3/16/2018     Chronic reflux esophagitis      COPD (chronic obstructive pulmonary disease) (H)      Dermatitis      Ganglion     right foot     H. pylori infection      Low backache 3/16/2018     Menopause     age 52     Past Surgical History:   Procedure Laterality Date     APPENDECTOMY       N[place] APPENDECTOMY      Description: Appendectomy;  Recorded: 2008;  Comments: childhood     NJ  DELIVERY ONLY      Description:  Section;  Recorded: 2008;     NJ EXCIS TENDN/CAPSULE LESN,FOOT      Description: Excision Of Cyst Of Tendon Sheath Of Foot;  Proc  Date: 10/28/2011;  Comments: [facility]     CO HEMORRHOIDECTOMY INTERNAL RUBBER BAND LIGATIONS      Description: Hemorrhoidectomy;  Recorded: 09/01/2008;     CO KNEE SCOPE,DIAGNOSTIC      Description: Arthroscopy Knee Right;  Proc Date: 10/11/2005;  Comments: for right knee patella subluxation with lateral retinacular release; subpatellar chondroplasty     CO LATERAL RETINACULAR RELEASE OPEN      Description: Knee Lateral Retinacular Release;  Proc Date: 10/11/2005;     CO LIGATE FALLOPIAN TUBE      Description: Tubal Ligation;  Recorded: 09/01/2008;     SKIN BIOPSY       TONSILLECTOMY       Social History     Socioeconomic History     Marital status: Single     Spouse name: Not on file     Number of children: Not on file     Years of education: Not on file     Highest education level: Not on file   Occupational History     Occupation: [employer]     Employer: [employer]     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[facility])   Social Needs     Financial resource strain: Not on file     Food insecurity:     Worry: Not on file     Inability: Not on file     Transportation needs:     Medical: Not on file     Non-medical: Not on file   Tobacco Use     Smoking status: Current Every Day Smoker     Packs/day: 1.00     Years: 48.00     Pack years: 48.00     Types: Cigarettes     Smokeless tobacco: Never Used     Tobacco comment: 1 pack per day   Substance and Sexual Activity     Alcohol use: Yes     Comment: one pint every 3 days for 2 weeks     Drug use: No     Sexual activity: Yes     Partners: Male     Birth control/protection: None   Lifestyle     Physical activity:     Days per week: Not on file     Minutes per session: Not on file     Stress: Not on file   Relationships     Social connections:     Talks on phone: Not on file     Gets together: Not on file     Attends Buddhist service: Not on file     Active member of club or organization: Not on file     Attends meetings of clubs or organizations: Not on  file     Relationship status: Not on file     Intimate partner violence:     Fear of current or ex partner: Not on file     Emotionally abused: Not on file     Physically abused: Not on file     Forced sexual activity: Not on file   Other Topics Concern     Not on file   Social History Narrative     Not on file     Family History   Problem Relation Age of Onset     Heart disease Mother      Heart disease Father        Meds:    Current Outpatient Medications:      acamprosate (CAMPRAL) 333 mg tablet, Take 2 tablets (666 mg total) by mouth 3 (three) times a day., Disp: 180 tablet, Rfl: 1     albuterol (PROAIR HFA;PROVENTIL HFA;VENTOLIN HFA) 90 mcg/actuation inhaler, Inhale 2 puffs 4 (four) times a day as needed for wheezing or shortness of breath., Disp: 1 Inhaler, Rfl: 0     cetirizine (ZYRTEC) 10 MG tablet, Take 10 mg by mouth daily as needed for allergies., Disp: , Rfl:      diclofenac sodium (VOLTAREN) 1 % Gel, Apply 1 g topically 2 (two) times a day as needed (pain)., Disp: 100 g, Rfl: 0     DULoxetine (CYMBALTA) 60 MG capsule, TAKE ONE CAPSULE BY MOUTH ONE TIME DAILY , Disp: 30 capsule, Rfl: 0     FLUoxetine (PROZAC) 10 MG capsule, Take 1 capsule (10 mg total) by mouth daily., Disp: 30 capsule, Rfl: 0     fluticasone (FLONASE) 50 mcg/actuation nasal spray, Apply 1 spray into each nostril daily as needed for rhinitis., Disp: , Rfl:      furosemide (LASIX) 80 MG tablet, Take 1 tablet (80 mg total) by mouth daily., Disp: 90 tablet, Rfl: 2     hydrOXYzine pamoate (VISTARIL) 50 MG capsule, Take 1 capsule (50 mg total) by mouth at bedtime. (Patient taking differently: Take 50 mg by mouth as needed.    ), Disp: 60 capsule, Rfl: 0     ipratropium-albuterol (DUO-NEB) 0.5-2.5 mg/3 mL nebulizer, Take 3 mL by nebulization every 6 (six) hours as needed (wheezing, short of breath)., Disp: 90 mL, Rfl: 1     naltrexone (DEPADE) 50 mg tablet, Take 2 tablets (100 mg total) by mouth daily., Disp: 60 tablet, Rfl: 0     omeprazole  (PRILOSEC) 20 MG capsule, Take 1 capsule (20 mg total) by mouth at bedtime., Disp: 90 capsule, Rfl: 3     potassium chloride (KLOR-CON) 20 mEq packet, Take 20 mEq by mouth 2 (two) times a day. Mix with water., Disp: 60 packet, Rfl: 3     prazosin (MINIPRESS) 1 MG capsule, Take 1 capsule (1 mg total) by mouth at bedtime., Disp: 30 capsule, Rfl: 0    Allergies:  Allergies   Allergen Reactions     Codeine Nausea Only     Hydrochlorothiazide Rash     phototoxicity - med was d/lora       Diclofenac Nausea Only     Tolerates the topical gel     Sulfa (Sulfonamide Antibiotics) Rash     Sulfasalazine Rash       ROS:  Pertinent positives as noted in HPI; otherwise 12 point ROS negative.      Physical Exam:  EXAM:  /82 (Patient Site: Left Arm, Patient Position: Sitting, Cuff Size: Adult Large)   Pulse 82   Resp 28   Wt (!) 250 lb (113.4 kg)   LMP 03/06/2002   SpO2 96%   BMI 42.91 kg/m     Gen:  NAD, appears well, well-hydrated, obese  HEENT:  TMs nl, oropharynx benign, nasal mucosa nl, conjunctiva clear  Neck:  Supple, no adenopathy, no thyromegaly, no carotid bruits, no JVD  Lungs:  Clear to auscultation bilaterally  Cor:  RRR no murmur  Abd:  Soft, nontender, BS+, no masses, no guarding or rebound, no HSM  Extr:  Neg., tr edema lower extremities  Neuro:  No asymmetry  Skin:  Warm/dry        Results:  Results for orders placed or performed during the hospital encounter of 09/05/19   Echo Complete   Result Value Ref Range    LV volume diastolic 86 46 - 106 cm3    LV volume systolic 27 14 - 42 cm3    HR 69 bpm    IVSd 1.2 (!) 0.6 - 0.9 cm    LVIDd 4.6 3.8 - 5.2 cm    LVIDs 2.9 2.2 - 3.5 cm    LVOT diam 2.1 cm    LVOT mean gradient 3 mmHg    LVOT peak VTI 26.9 cm    LVOT mean bailey 84.8 cm/s    LVOT peak bailey 132 cm/s    LVOT peak gradient 7 mmHg    LV PWd 1.1 (!) 0.6 - 0.9 cm    MV E' lat bailey 13.1 cm/s    MV E' med bailey 8.87 cm/s    AV mean bailey 101 cm/s    AV mean gradient 5 mmHg    AV VTI 30.5 cm    AV peak bailey 154  cm/s    AO root 3.1 cm    AO ascending 2.9 cm    LA size 3.2 cm    MV decel time 222 ms    MV peak A bailey 84.4 cm/s    MV peak E bailey 61.7 cm/s    LA area 2 17.6 cm2    LA area 1 15.6 cm2    LA length 5.56 cm    TAPSE 2.2 cm    BSA 2.22 m2    Hieght 64 in    Weight 3,872 lbs    /78 mmHg    IVS/PW ratio 1.1     LV FS 37.0 28 - 44 %    Echo LVEF calculated 69 55 - 75 %    LA volume 42.0 mL    LV mass 192.9 g    AV area 3.1 cm2    AV DIM IND bailey 0.9     MV E/A Ratio 0.7     LVOT area 3.46 cm2    LVOT SV 93.1 cm3    AV peak gradient 9.5 mmHg    LV systolic volume index 12.2 8 - 24 cm3/m2    LV diastolic volume index 38.7 29 - 61 cm3/m2    LA volume index 18.9 mL/m2    LV mass index 86.9 g/m2    LV SVi 41.9 ml/m2    MV med E/e' ratio 7.0     MV lat E/e' ratio 4.7     LV CO 6.4 l/min    LV Ci 2.9 l/min/m2    Height 64.0 in    Weight 242 lbs    MV Avg E/e' Ratio 5.6 cm/s    AV DIM IND VTI 0.9

## 2021-06-02 NOTE — TELEPHONE ENCOUNTER
"Returning Patsy's phone call and she already made a follow up for tomorrow morning. Pt reports being out of Cymbalta for 2 weeks and says her emotions are up and down. She endorses feeling restless, said she cries very easy, having hard time sleeping and feels manic - \"high\". She reports beng short of breath as well but told that her heart is OK. Pt denies any SI/HI. Advised to call 911 or go to the ED if any changes or safety concerns.     "

## 2021-06-02 NOTE — TELEPHONE ENCOUNTER
Phone call to Patsy.  Let her know her Cymbalta was refilled.  Asked her how she was doing.  Said she will be in to see Dr. Brunner tomorrow for her 2:15pm appointment, will speak with Dr. Brunner then.

## 2021-06-02 NOTE — TELEPHONE ENCOUNTER
Left VM for pt asking to give as a call back for Dr. Brunner's directives. Updated that she did appove her refill request.

## 2021-06-02 NOTE — TELEPHONE ENCOUNTER
Refill Request  Did you contact pharmacy: No  Medication name:   Requested Prescriptions     Pending Prescriptions Disp Refills     ipratropium-albuterol (DUO-NEB) 0.5-2.5 mg/3 mL nebulizer 90 mL 1     Sig: Take 3 mL by nebulization every 6 (six) hours as needed (wheezing, short of breath).     Who prescribed the medication:   EJ WELLER  Pharmacy Name and Location:   [facility] #[id number]  Is patient out of medication: Yes  Patient notified refills processed in 72 hours:  yes  Okay to leave a detailed message: yes    Patient would like to  medication today.

## 2021-06-03 VITALS — HEIGHT: 64 IN | WEIGHT: 238.6 LBS | BODY MASS INDEX: 40.74 KG/M2

## 2021-06-03 VITALS
SYSTOLIC BLOOD PRESSURE: 136 MMHG | BODY MASS INDEX: 42.91 KG/M2 | OXYGEN SATURATION: 96 % | RESPIRATION RATE: 28 BRPM | HEART RATE: 82 BPM | DIASTOLIC BLOOD PRESSURE: 82 MMHG | WEIGHT: 250 LBS

## 2021-06-03 VITALS — HEIGHT: 64 IN | BODY MASS INDEX: 40.63 KG/M2 | WEIGHT: 238 LBS

## 2021-06-03 VITALS — WEIGHT: 239 LBS | HEIGHT: 64 IN | BODY MASS INDEX: 40.8 KG/M2

## 2021-06-03 VITALS — WEIGHT: 242 LBS | BODY MASS INDEX: 41.32 KG/M2 | HEIGHT: 64 IN

## 2021-06-03 VITALS
OXYGEN SATURATION: 94 % | HEART RATE: 84 BPM | HEIGHT: 63 IN | DIASTOLIC BLOOD PRESSURE: 77 MMHG | WEIGHT: 244 LBS | BODY MASS INDEX: 43.23 KG/M2 | SYSTOLIC BLOOD PRESSURE: 118 MMHG

## 2021-06-03 VITALS
BODY MASS INDEX: 41.2 KG/M2 | HEART RATE: 93 BPM | WEIGHT: 240 LBS | RESPIRATION RATE: 18 BRPM | DIASTOLIC BLOOD PRESSURE: 95 MMHG | SYSTOLIC BLOOD PRESSURE: 145 MMHG | TEMPERATURE: 98.4 F

## 2021-06-03 VITALS — WEIGHT: 238 LBS | BODY MASS INDEX: 40.63 KG/M2 | HEIGHT: 64 IN

## 2021-06-03 VITALS — WEIGHT: 241 LBS | BODY MASS INDEX: 41.15 KG/M2 | HEIGHT: 64 IN

## 2021-06-03 VITALS — WEIGHT: 242 LBS | HEIGHT: 64 IN | BODY MASS INDEX: 41.32 KG/M2

## 2021-06-03 VITALS — WEIGHT: 242 LBS | BODY MASS INDEX: 41.54 KG/M2

## 2021-06-03 NOTE — TELEPHONE ENCOUNTER
Prior Authorization Request  Who s requesting:  Pharmacy  Pharmacy Name and Location: [facility] pharmacy , [facility].  Medication Name: Nicotrol 10 mg INH  Insurance Plan: Medicare A & B  Insurance Member ID Number:  [id number]  Informed patient that prior authorizations can take up to 10 business days for response:   Yes  Okay to leave a detailed message: Yes

## 2021-06-03 NOTE — PATIENT INSTRUCTIONS - HE
It was good to meet you in clinic today. This is what we discussed:    1. It is crucial that you quit smoking.  2. Use the nicotine patch 14 mg daily.  3. I sent Nicotrol inhaler to your pharmacy. They can prescribe this in the hospital for you to use as needed. The alternative is nicotine gum.  4. Continue Symbicort for now, two inhalations twice daily. Rinse, gargle, and spit water after use.  5. Try using Mucinex or Tessalon Perles as needed for cough.  6. Use the rescue inhaler as needed.  7. Use Duoneb up to four times daily as needed.  8. Use omeprazole (Prilosec) one pill daily.  9. I will see you in about 3 months.  10. Call any time with questions or concerning symptoms.    Vinay Paige MD  Pulmonary and Critical Care Medicine  [facility]  Office [phone]    [facility] [employer]  [phone] ([phone])  [email or web address]    For help in Slovenian, call  [phone].  People with hearing impairments may call  [phone] (EZY) Call [employer] for:    Free, one-to-one counseling    A quitting plan    Successful quitting techniques    Information on medications    Support services from your health plan  You may call [employer] at any time. If there are no operators on duty, you may listen to a taped message or leave your name and number and an   will call you.  [employer] hours are:  7 a.m. to 11 p.m. (Mon. thru Thurs.)  7 a.m. to 7 p.m. (Friday)  8 a.m. to 7 p.m. (Saturday/Sunday)     Source Program Program information   Clean Break Smokers  Treatment Program  [facility]  [place], [place]  [phone] ([phone])  -call for program locations  [email or web address]  Treatment  Program    Unique cognitive approach    Individualized follow-up    Regular support Five two-hour classes over  eight days. Three-month  program follow-up. 5-day free  trial period. Alternative phone  classes for disabled.  Payment plan for low income.  Regular fee is $420.     Source Program Program  information   American Lung  Association (ALA) [address]  [phone]  -OR-  [phone]([phone])    [facility] offers a wide variety of classes and services:    [place] from Smoking   on-line at www.lungusa.org   Eight-session program. Fee is $90    Audiotape program. Fee is $5    Self-help manual. Fee is $10    Free 24-hour web-based support.     Support groups for non-smokers  Nicotine Anonymous [phone]  Follows the model of Alcoholic Anonymous 12 steps  Program sites and times vary in [facility]. Call for more information.    The benefits of becoming a nonsmoker      Family, friends and co-workers won t be exposed to your second-hand smoke.    Your clothes, hair and fingers won t smell of tobacco.    You ll feel energized and have more stamina.    You ll eliminate the chance of causing a fire.    Chronic irritation of your throat will be reduced; your speaking voice may improve.    Shortness of breath and annoying cough will decrease.    You ll reduce your risk of developing an ulcer in your mouth.    Your children will be less likely to develop bronchitis and pneumonia.    You won t have to bear the 30  below zero temperature in the outdoor smoking section.    If you re a woman using birth control pills, you ll reduce your risk of stroke.    You ll have fewer colds (on average, nonsmokers have fewer colds).    You ll reduce your risk of disability and death from coronary heart disease and lung diseases,  such as cancer, chronic bronchitis and emphysema.    Your blood circulation will improve.    You ll save money (and have more to spend!) when you stop buying cigarettes.    You ll look and feel healthier and have a stronger sense of personal control.    For women, your risk of having a low birth weight infant will be reduced. Your baby s risk of  sudden infant death syndrome (SIDS) will decrease.   Your senses of taste and smell will improve.     You ll reduce your risk of infertility.    You ll be less likely to develop deep lines around the corners of your mouth and eyes.    Your teeth won t be stained and yellow.    You ll reduce your risk for tooth loss due to periodontal disease (bone loss around your teeth).  Research shows that the tooth loss rate for males is three teeth for every 10 years of smoking.  The rate for females is one and one-half teeth for every 10 years of smoking.    Wounds will heal more quickly, and you ll recover from surgery faster than a smoker.

## 2021-06-03 NOTE — PROGRESS NOTES
11-7-19  [facility] referral  Patient was trying to apply for MA while inpatient. Hx of ETOH. SW & Nursing Assessment needed due to insurance and medical needs.    Community Health Worker called and left a message for the patient.  If the patient is returning my call, please transfer the patient to Jackson Ronquillo CHW at [phone].   Patient has been mailed a unreachable letter and was provided with CHW contact information if they are interested in accessing Clinic Care Coordination.    Order for Care Management has been closed, no further outreach will be done at this time and patient can be re-referred.     Upcoming appt with PCP 11-18-19 at 3pm

## 2021-06-03 NOTE — PROGRESS NOTES
Correct pharmacy verified with patient and confirmed in snapshot? [x] yes []no    Charge captured ? [x] yes  [] no    Medications Phoned  to Pharmacy [] yes [x]no  Name of Pharmacist:  List Medications, including dose, quantity and instructions      Medication Prescriptions given to patient   [] yes  [x] no   List the name of the drug the prescription was written for.       Medications ordered this visit were e-scribed.  Verified by order class [x] yes  [] no  Campral 333 mg  Cymbalta 60 mg    Medication changes or discontinuations were communicated to patient's pharmacy: [] yes  [x] no    UA collected [x] yes  [] no    [place] Prescription Monitoring Program Reviewed? [x] yes  [] no    Referrals were made to: none     Future appointment was made: [x] yes  [] no    Dictation completed at time of chart check: [x] yes  [] no    I have checked the documentation for today s encounters and the above information has been reviewed and completed.

## 2021-06-03 NOTE — TELEPHONE ENCOUNTER
Spoke with Patsy, doing okay better from when she was in the ED earlier in the week, no questions for me to day. no issues getting and/or taking their medications.  Reminded when we will call again and to call before if there is a question.  Elena Hicks, LRT, COPD Educator

## 2021-06-03 NOTE — PROGRESS NOTES
Pulmonary Clinic Outpatient Consultation    Assessment and Plan:   64 year old female with a history of longstanding and active tobacco dependence, alcohol dependence currently undergoing inpatient treatment, MELVI, h/o alcohol withdrawal seizure, H pylori gastritis, depression, possible COPD, peripheral neuropathy, GERD, osteoarthritis, vitamin D deficiency, presenting for evaluation of dyspnea.    Tobacco dependence, dyspnea: PFTs are nonspecific, with a possible obstructive component suggestive of asthma or COPD, though FEV1/FVC ratio is nonobstructive, normal TLC and DLCO, no significant bronchodilator response. Obesity likely playing a significant role. Clearly, smoking cessation needs to be the primary focus.    Plan:  - nicotine 14-mg patch  - start nicotine inhaler as needed for tobacco craving after hospital discharge; sent to her pharmacy  - quit smoking  - can continue low-dose budesonide-formoterol two inhalations two times a day for now; advised her to rinse/gargle/spit water after use  - guaifenesin or benzonatate as needed for cough; should start to improve if she remains abstinent from tobacco  - albuterol HFA or nebulized ipratropium-albuterol as needed  - follow up in 3 months  - encouraged her to call any time with questions or concerning symptoms    I appreciate the opportunity to participate in the care of Ms. Santamaria. Please feel free to contact me at any time.    CCx: chronic dyspnea, tobacco dependence    HPI: 64 year old female with a history of longstanding and active tobacco dependence, alcohol dependence currently undergoing inpatient treatment, [facility]VI, h/o alcohol withdrawal seizure, H pylori gastritis, depression, possible COPD, peripheral neuropathy, GERD, osteoarthritis, vitamin D deficiency, presenting for evaluation of dyspnea. Has had years of dyspnea, with episodes of worsening dyspnea and cough, treated occasionally for bronchitis with prednisone and antibiotics. Recently treated for  RLL pneumonia. Started smoking at age 15, up to 1 ppd, currently smoking 1 ppd until admission to the hospital for alcohol dependence. She is inpatient currently. Wants to quit. PFTs with obesity-associated changes, possible obstructive flow-volume loop but normal TLC and DLCO, nonobstructive FEV1/FVC ratio, and no bronchodilator response.    ROS:  A 12-system review was obtained and was negative with the exception of the symptoms endorsed in the history of present illness.    PMH:  Past Medical History:   Diagnosis Date     Acute solar dermatitis      Alcohol abuse      Anxiety      Arthritis      Cancer (H) 2019    melanoma on left upper arm     Chronic alcohol dependence, continuous (H) 3/16/2018     Chronic reflux esophagitis      COPD (chronic obstructive pulmonary disease) (H)      Depression      Dermatitis      Ganglion     right foot     H. pylori infection      Low backache 3/16/2018     Menopause     age 52     Seizure (H) 2016    during alcohol withdrawal     Sleep apnea     has cpap, does not use       PSH:  Past Surgical History:   Procedure Laterality Date     APPENDECTOMY       NE APPENDECTOMY      Description: Appendectomy;  Recorded: 2008;  Comments: childhood     NE  DELIVERY ONLY      Description:  Section;  Recorded: 2008;     NE EXCIS TENDN/CAPSULE LESN,FOOT      Description: Excision Of Cyst Of Tendon Sheath Of Foot;  Proc Date: 10/28/2011;  Comments: [facility]     NE HEMORRHOIDECTOMY INTERNAL RUBBER BAND LIGATIONS      Description: Hemorrhoidectomy;  Recorded: 2008;     NE KNEE SCOPE,DIAGNOSTIC      Description: Arthroscopy Knee Right;  Proc Date: 10/11/2005;  Comments: for right knee patella subluxation with lateral retinacular release; subpatellar chondroplasty     NE LATERAL RETINACULAR RELEASE OPEN      Description: Knee Lateral Retinacular Release;  Proc Date: 10/11/2005;     NE LIGATE FALLOPIAN TUBE      Description: Tubal Ligation;   Recorded: 09/01/2008;     SKIN BIOPSY Left 07/2019    left upper arm     TONSILLECTOMY         Allergies:  Allergies   Allergen Reactions     Codeine Nausea Only     Hydrochlorothiazide Rash     phototoxicity - med was d/lora       Diclofenac Nausea Only     Tolerates the topical gel     Sulfa (Sulfonamide Antibiotics) Rash     Sulfasalazine Rash       Family HX:  Family History   Problem Relation Age of Onset     Heart disease Mother      Heart disease Father        Social Hx:  Social History     Socioeconomic History     Marital status: Single     Spouse name: Not on file     Number of children: Not on file     Years of education: Not on file     Highest education level: Not on file   Occupational History     Occupation: [employer]     Employer: [email or web address]     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[facility])   Social Needs     Financial resource strain: Not on file     Food insecurity:     Worry: Not on file     Inability: Not on file     Transportation needs:     Medical: Not on file     Non-medical: Not on file   Tobacco Use     Smoking status: Current Every Day Smoker     Packs/day: 1.00     Years: 49.00     Pack years: 49.00     Types: Cigarettes     Smokeless tobacco: Never Used     Tobacco comment: 1 pack per day   Substance and Sexual Activity     Alcohol use: Yes     Comment: one pint daily/vodka     Drug use: No     Sexual activity: Yes     Partners: Male     Birth control/protection: None   Lifestyle     Physical activity:     Days per week: Not on file     Minutes per session: Not on file     Stress: Not on file   Relationships     Social connections:     Talks on phone: Not on file     Gets together: Not on file     Attends Hindu service: Not on file     Active member of club or organization: Not on file     Attends meetings of clubs or organizations: Not on file     Relationship status: Not on file     Intimate partner violence:     Fear of current or ex partner: Not on file     Emotionally abused:  Not on file     Physically abused: Not on file     Forced sexual activity: Not on file   Other Topics Concern     Not on file   Social History Narrative     Not on file       Current Meds:  No current facility-administered medications for this visit.      Current Outpatient Medications   Medication Sig Dispense Refill     nicotine (NICOTROL) 10 mg inhaler Inhale 1 puff as needed for smoking cessation. 168 each 11     Facility-Administered Medications Ordered in Other Visits   Medication Dose Route Frequency Provider Last Rate Last Dose     acamprosate tablet 666 mg (CAMPRAL)  666 mg Oral TID Jennifer Lopez MD   666 mg at 11/26/19 1315     acetaminophen tablet 650 mg (TYLENOL)  650 mg Oral Q4H PRN Elpidio French PA-C   650 mg at 11/23/19 0204     albuterol inhaler 2 puff (PROAIR HFA;PROVENTIL HFA;VENTOLIN HFA)  2 puff Inhalation Q4H PRN Jennifer Lopez MD   2 puff at 11/25/19 1826     aluminum-magnesium hydroxide-simethicone 200-200-20 mg/5 mL suspension 30 mL (MAALOX ADVANCED)  30 mL Oral Q4H PRN Elpidio French PA-C         budesonide-formoterol 80-4.5 mcg/actuation inhaler 2 puff (SYMBICORT)  2 puff Inhalation BID Jennifer Lopez MD   2 puff at 11/26/19 0847     cetirizine tablet 10 mg (ZyrTEC)  10 mg Oral Daily PRN Jennifer Lopez MD         cholecalciferol (vitamin D3) tablet 400 Units  400 Units Oral DAILY Jennifer Lopez MD   400 Units at 11/26/19 0848     diclofenac sodium 1 % gel 1 g (VOLTAREN)  1 g Topical BID PRN Jennifer Lopez MD         DULoxetine DR capsule 60 mg (CYMBALTA)  60 mg Oral DAILY Jennifer Lopez MD   60 mg at 11/26/19 0848     fluticasone propionate 50 mcg/actuation nasal spray 1 spray (FLONASE)  1 spray Each Nare Daily PRN Jennifer Lopez MD         folic acid tablet 1 mg (FOLVITE)  1 mg Oral DAILY Elpidio French PA-C   1 mg at 11/26/19 0848     furosemide tablet 80 mg (LASIX)  80 mg Oral DAILY Jennifer Lopez MD   80 mg at 11/26/19 0848     guaiFENesin ER 12 hr tablet  "600 mg (MUCINEX)  600 mg Oral BID Jennifer Lopez MD   600 mg at 11/26/19 0848     hydrOXYzine pamoate capsule 25 mg (VISTARIL)  25 mg Oral TID PRN Jennifer Lopez MD   25 mg at 11/26/19 0854     hydrOXYzine pamoate capsule 50 mg (VISTARIL)  50 mg Oral Bedtime PRN Jennifer Lopez MD   50 mg at 11/25/19 2034     ipratropium-albuterol 0.5-2.5 mg/3 mL nebulizer solution 3 mL (DUO-NEB)  3 mL Nebulization Q6H PRN Jennifer Lopez MD   3 mL at 11/25/19 1005     melatonin tablet 3 mg  3 mg Oral Bedtime PRN Sperl, Elpidio D, PA-C         multivitamin therapeutic tablet 1 tablet  1 tablet Oral DAILY Sperl, Elpidio HYATT, PA-C   1 tablet at 11/26/19 0848     nicotine polacrilex gum 2 mg (NICORETTE)  2 mg Mouth/Throat Q2H PRN Jennifer Lopez MD         omeprazole capsule 20 mg (PriLOSEC)  20 mg Oral QHS Jennifer Lopez MD   20 mg at 11/25/19 2035     ondansetron injection 4 mg (ZOFRAN)  4 mg Intravenous Q6H PRN Sperl, Elpidio D, PA-C        Or     ondansetron injection 4 mg (ZOFRAN)  4 mg Intramuscular Q6H PRN Sperl, Elpidio HYATT, PA-C        Or     ondansetron tablet 4 mg (ZOFRAN)  4 mg Oral Q6H PRN Sperl, Elpidio D, PA-C   4 mg at 11/26/19 0853     polyethylene glycol packet 17 g (MIRALAX)  17 g Oral Daily PRN Sperl, Elpidio D, PA-C         potassium chloride packet 20 mEq (KLOR-CON)  20 mEq Oral BID Jennifer Lopez MD   20 mEq at 11/26/19 0848     senna-docusate 8.6-50 mg tablet 1 tablet (PERICOLACE)  1 tablet Oral BID PRN Sperl, Elpidio D, PA-C         thiamine tablet 100 mg  100 mg Oral DAILY Sperl, Elpidio D, PA-C   100 mg at 11/26/19 0848     traZODone tablet 50 mg (DESYREL)  50 mg Oral Bedtime PRN may repeat x1 Elpidio French PA-C   50 mg at 11/25/19 2035       Physical Exam:  /70   Pulse 96   Ht 5' 3.5\" (1.613 m)   Wt (!) 236 lb (107 kg)   LMP 03/06/2002   SpO2 94%   Breastfeeding No   BMI 41.15 kg/m    Gen: alert, oriented, no distress  HEENT: nasal turbinates are unremarkable, no oropharyngeal lesions, no cervical or " supraclavicular lymphadenopathy  CV: tachy, regular, no M/G/R  Resp: CTAB, no focal crackles or wheezes  Abd: soft, nontender, no palpable organomegaly  Skin: no apparent rashes  Ext: no cyanosis, clubbing or edema  Neuro: alert, nonfocal    Labs:  reviewed    Imaging studies:  CXR (11/8/19):  - images directly reviewed, formal interpretation follows:  IMPRESSION:   Heart size and vascularity are normal. Airspace infiltrate right lower lobe compatible with pneumonitis. Left lung is clear.    Pharmacologic MPI (9/5/19):  - no ischemic  - EF 78%    PFT's (11/26/19):  FEV1/FVC is 0.77 and is normal.  FEV1 is 1.82 L (76% predicted) and is reduced.  FVC is 2.36 L (78% predicted) and reduced.  There was no improvement in spirometry after a single inhaled dose of bronchodilator.  TLC is 4.54 L (93% predicted) and is normal.  RV is 2.22 L (115% predicted) and is normal.  RV/TLC is 0.49 and is increased.  DLCO is 103% predicted and is normal when it is corrected for hemoglobin.    Vinay Paige MD  [facility]  Cell [phone]  Office [phone]  Pager [phone]

## 2021-06-03 NOTE — PROGRESS NOTES
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 time during a period of 12 months or longer (with the exception that Criterion A4,  Craving or strong desire or urge to use alcohol/drug  may be met).   Specify if:   This additional specifier is used if the individual is in an environment where access to alcohol is restricted.    Mild: Presence of 2-3 symptoms  Moderate: Presence of 4-5 symptoms  Severe: Presence of 6 or more symptoms    This document is being reviewed and co-signed by a medical doctor. A follow up appointment will be scheduled if necessary to determine medical necessity for treatment services.     Staff Name and Title: Radha Doll   Date:  11/12/2019  Time:  1:42 PM

## 2021-06-03 NOTE — TELEPHONE ENCOUNTER
Central PA team  [phone]  Pool: HE PA MED ([id number])          PA has been initiated.       PA form completed and faxed insurance via Cover My Meds     Key:  [id number]      Medication:  NICOTROL 10MG INHALER     Insurance:  [employer]        Response will be received via fax and may take up to 5-10 business days depending on plan

## 2021-06-03 NOTE — TELEPHONE ENCOUNTER
Received MTM referral from RICHARD     Patient was not reachable after several attempts, will route to MTM Pharmacist/Provider as an FYI. Left MTM scheduling information on patients voicemail.    Thank you for the referral,    See Richi [facility] Pharmacy Coordinator

## 2021-06-03 NOTE — TELEPHONE ENCOUNTER
RN cannot approve Refill Request    RN can NOT refill this medication med is not covered by policy/route to provider     . Last office visit: 10/18/2019 Yanique Cali MD Last Physical: 1/5/2018 Last MTM visit: Visit date not found Last visit same specialty: Visit date not found.  Next visit within 3 mo: Visit date not found  Next physical within 3 mo: Visit date not found      Saima Hanks, [facility] Triage/Med Refill 11/5/2019    Requested Prescriptions   Pending Prescriptions Disp Refills     benzonatate (TESSALON) 100 MG capsule 15 capsule 0     Sig: Take 1 capsule (100 mg total) by mouth 3 (three) times a day as needed for cough.       There is no refill protocol information for this order

## 2021-06-03 NOTE — PROGRESS NOTES
Mental Health Visit Note    11/5/2019    Start time: 1:00pm    Stop Time: 1:50pm   Session # 11    Session Type: Patient is presenting for an Individual session.     Persons Present: Patient and Therapist    Patsy Santamaria is a 64 y.o. female is being seen today for    Chief Complaint   Patient presents with      Follow Up     Psychotherapy follow-up visit for depression and anxiety   .     New symptoms or complaints: None    Functional Impairment:   Personal: 4  Family: 4  Work: 4  Social:4    Clinical assessment of mental status:   Grooming: Well groomed  Attire: Appropriate  Age: Appears Stated  Behavior Towards Examiner: Cooperative  Motor Activity: Within normal   Eye Contact: Appropriate  Mood: Anxious  Affect: Anxious and Depressed  Speech/Language: Within normal  Attention: Distractible  Concentration: Brief  Thought Process: Anxious  Thought Content: Hallucinations: none reported  Delusions: none reported  Orientation: X 3  Memory: No Evidence of Impairment  Judgement: Impairment and Minimal  Estimated Intelligence: Average  Demonstrated Insight: Adequate  Fund of Knowledge: adequate   I've re-evaluated the mental status of the patient and no changes were noted since the date of the last encounter, 10/15/19.      Suicidal/Homicidal Ideation present: None Reported This Session    Patient's impression of their current status:   The patient's impression of current status is that recent health problems have exacerbated underlying anxiety symptoms. She was admitted to the hospital from Wednesday to Friday last week due to issues with COPD. She has follow-up appointments scheduled with her providers to address current health problems.  She described recent events while exploring thoughts and feelings in response to different situations. She still has a tendency to avoid situations that cause discomfort but she is working on this. She is slowly rebuilding relationships with family members. She discussed her  thoughts and feelings about the upcoming holiday season while reviewing expectations of self and others.      Therapist impression of patients current state:   The patient arrived on-time for a follow-up psychotherapy visit. Therapist's impression of patient's current state is that she continues to exhibit signs and symptoms of PTSD including an observed state of hyperarousal and hypervigilance with some intrusive sensory experiences and persecutory delucions. PTSD symptoms are largely maintained due to avoidant behavior patterns although she is working to adjust these maladaptive coping strategies. Recently, physical health problems have significantly impacted patient's overall status but she is taking steps toward wellness. She did appear receptive to therapist efforts to help her engage in cognitive and emotional processing today, demonstrating increased insight and receptiveness to CBT interventions.      Type of psychotherapeutic technique provided: Client centered, Solution-focused and CBT    Progress toward short term goals:Progress as expected, as patient was receptive to CBT interventions, evidenced by demonstrated insight about the connection between thoughts and feelings. Patient is repairing relationships within her support network and is challenging avoidant behavior patterns. She is trying to make changes to improve her physical health.    Review of long term goals: Not done at today's visit   Treatment plan updated on 10/15/19  Date of next review: December 2019      Diagnosis:   1. PTSD (post-traumatic stress disorder)    2. Moderate episode of recurrent major depressive disorder (H)    3. Severe alcohol use disorder (H)        Plan and Follow up: The patient is scheduled to return for a follow-up psychotherapy visit on 11/22/19.  Patient is scheduled to see her PCP on 11/18/19 and Dr. Brunner on 11/7/19.  Patient also has appointment on 11/26/19 at [facility].    Patient encouraged to continue  seeking support for alcohol use disorder such as attending community based support groups. Expanding her social support network will be a mendoza factor in rebuilding a sense of community.    Therapy plan is to help patient engage in cognitive processing using CBT interventions to alleviate signs and symptoms of depression. Therapy plan will be to incorporate grief work as needed. Exposure therapy techniques will also be emphasized to reduce patient's avoidance patterns with the long-term goal of reducing PTSD symptoms.       Discharge Criteria/Planning: Client has chronic symptoms and ongoing therapy for maintenance stability recommended.     Performed and documented by JULIO Mendoza

## 2021-06-04 VITALS
BODY MASS INDEX: 41.71 KG/M2 | WEIGHT: 243 LBS | OXYGEN SATURATION: 97 % | SYSTOLIC BLOOD PRESSURE: 134 MMHG | TEMPERATURE: 97.7 F | DIASTOLIC BLOOD PRESSURE: 86 MMHG | RESPIRATION RATE: 18 BRPM | HEART RATE: 82 BPM

## 2021-06-04 VITALS
SYSTOLIC BLOOD PRESSURE: 110 MMHG | DIASTOLIC BLOOD PRESSURE: 70 MMHG | BODY MASS INDEX: 40.29 KG/M2 | HEIGHT: 64 IN | WEIGHT: 236 LBS | OXYGEN SATURATION: 94 % | HEART RATE: 96 BPM

## 2021-06-04 VITALS
DIASTOLIC BLOOD PRESSURE: 84 MMHG | HEART RATE: 87 BPM | SYSTOLIC BLOOD PRESSURE: 135 MMHG | BODY MASS INDEX: 41.66 KG/M2 | WEIGHT: 244 LBS | HEIGHT: 64 IN

## 2021-06-04 NOTE — PROGRESS NOTES
Discharge Note    Patsy Santamaria is a 64 y.o.   White or  female who completed an intake on 12/13/19.  Patient  never presented to group and there has been no contact with client since.    Counselor Name and Title:  Radha Doll [facility]        Date:  12/27/2019  Time:  9:20 PM

## 2021-06-04 NOTE — PROGRESS NOTES
Weekly Progress Note 12/16/19--12/20/19  Patsy Santamaria  1955  [id number]      D) Pt attended ZERO groups  this week with 3 absences. A) Staff facilitated groups and reviewed tx progress. Assessed for [facility]. R) Unable to assess along six dimensions or for [facility] due to lack of attendance.   T) Patient has completed 1 of 120 program hours at this time. Patient is currently in phase 1. Projected discharge date is 06/13/2020. Current discharge plan is MH services/community supports. If patient does not present to group by 12/27/19, patient will be discharged AWOL.     OLIVIER Rossi, [facility]  12/20/2019, 4:32 PM       Weekly Educational Topics Date   1. Dual Diagnoses, week 1    2. Dual Diagnoses, week 2    3. Stress Management    4. Feelings/Emotions    5. Thinking    6. Change    7. Recovery Support    8. Relationships/Communication    9. Addiction 101    10. Relapse Prevention    11. Grief and Loss    12. Strengths    13. Wellness

## 2021-06-04 NOTE — PROGRESS NOTES
"TCM DISCHARGE FOLLOW UP CALL    Discharge Date:  12/17/2019  Reason for hospital stay (discharge diagnosis)::  Pneumonia of right lower lobe  Are you feeling better, the same or worse since your discharge?:  Patient is feeling better (Breathing improved.  Still has a little bit of CP & soreness under her breast & shoulder blade when taking deep breaths, but better today.)  Do you feel like you have a plan in the event of a health emergency?: Yes ([facility])    As part of your discharge plan, were  home care services ordered for you?: No    Did you receive any new medications, or was there a change to your medications?: Yes    Are you taking those medications, or do you have any established regiment?:  Pt states she's taking Augmentin 1 tab twice a day, and took the last dose of 60 mg of prednisone today, tomorrow will start 40 mg of prednisone for 3 days, then 20 mg for 3 days to complete steroid course.  States she's taking her inhalers as prescribed, albuterol and Symbicort, but hasn't taken her Duo-Nebs.  Pt asked if ok to take OTC Robitussin DM to loosen congestion.  RN advised calling her pharmacy and asking if she can take Mucinex or Robitussin w/her current medications; pt states she'll ask about Robitussin but won't take Mucinex \"because it made my cough so dry it was worse.\"  Encouraged pt to use her Duo-Nebs to help open airways so when coughing, it's more productive.  Pt agrees to use nebs as directed.   Do you have any follow up visits scheduled with your PCP or Specialist?:  Yes, with PCP and Yes, with Specialist  (RN) Is PCP appt scheduled soon enough (within 14 days of discharge date)?: Yes (Dr. Wade 12/23/19)    Who are you seeing and when is it scheduled?:  Pulmonologist, Dr. Paige, 1/28/20      Meka Styles RN Clinic Care Coordinator   "

## 2021-06-04 NOTE — PROGRESS NOTES
"ASSESSMENT/PLAN:  1. Fungal infection of lung  nystatin (MYCOSTATIN) 100,000 unit/mL suspension   2. Bilateral edema of lower extremity  bumetanide (BUMEX) 1 MG tablet    potassium chloride (K-DUR,KLOR-CON) 20 MEQ tablet   3. Alcohol abuse         This is a 65 yo female here for hospital follow up:  I have reviewed available hospital records, consults, notes, discharge summary as well as imaging and lab results.  In addition I have reviewed her medication list and made any adjustments as indicated in orders.    Post Discharge Medication Reconciliation Status: discharge medications reconciled, continue medications without change   1.  Fungal infection of the lung - patient had bronchoscopy with removal of \"foreign\" material - cultures suggest fungal infection.  Her breathing/dyspnea has improved; will treat with Nystatin suspension  2.  Bilateral Lower extremity edema - she continues to have edema despite use of Furosemide - will add Bumetanide, stop Furosemide;  Add potassium due to current hypokalemia  3.  Alcohol Abuse - patient was seen for alcohol abuse initially, but shortness of breath continued and worsened.  Discussed - patient notes she is currently sober and working hard to remain so.      Return in about 2 weeks (around 1/6/2020) for Recheck.      Medications Discontinued During This Encounter   Medication Reason     potassium chloride (KLOR-CON) 20 mEq packet Formulary change     There are no Patient Instructions on file for this visit.    Chief Complaint:  Chief Complaint   Patient presents with     Hospital Visit Follow Up       HPI:   Patsy Santamaria is a 64 y.o. female c/o  Was short of breath -   Pulmonary testing - \"saw something\" - sent her home with Advair two times a day  Has Albuterol for emergency if needs it    Started drinking again -   Was depressed -     Couldn't deal with pain/ \"couldn't breathe\" - though she was dying  Sore behind right rib  Has follow up in February with lung " clinic    Staying sober/clean for now -   Was supposed to start outpatient group last Monday, but was back in hospital -   EXAM: CTA CHEST PE RUN  LOCATION: [facility]  DATE/TIME: 12/13/2019 1:45 PM     INDICATION: Right pleuritic chest pain. Abnormal same-day x-ray. Opacities and fluid.  COMPARISON: 12/13/2019 radiograph.  TECHNIQUE: CT angiogram chest during arterial phase injection IV contrast. 2D and 3D MIP reconstructions were performed by the CT technologist. Dose reduction techniques were used.   CONTRAST: Iohexol (Omni) 80mL.     FINDINGS:  ANGIOGRAM CHEST: No pulmonary artery embolism. Nonaneurysmal aorta without dissection.     LUNGS AND PLEURA: Moderate to severe right lower lobe volume loss with atelectasis. Extensive right lower lobe predominant hypodense endobronchial contents. Small amount of atelectasis or scarring elsewhere bilaterally. Small right pleural effusion with   redistribution and loculation, including the major fissure. No pneumothorax.     MEDIASTINUM/AXILLAE: Mild mediastinal adenopathy with example low right peritracheal node measuring 15 x 24 mm (series 5, image 96). Few upper normal right hilar nodes. No pericardial effusion.     UPPER ABDOMEN: Cirrhotic liver morphology. Hepatic steatosis. Distended gallbladder.     MUSCULOSKELETAL: Changes spine.     IMPRESSION:      2.  No pulmonary embolism. No aortic aneurysm or dissection.     3.  Moderate to severe right lower lobe volume loss with extensive right lower lobe endobronchial contents; aspiration a differential versus mucoid impaction. Cannot exclude an obstructing endobronchial lesion or superimposed pneumonitis. Recommend 3   month follow-up chest CT for clearing.     4.  Small hypodense right pleural effusion with mild redistribution and loculation.     5.  Mild mediastinal adenopathy, recommend attention on follow-up as well.     6.  Cirrhotic liver morphology.                PMH:   Patient Active Problem List     Diagnosis Date Noted     Airway obstruction due to foreign body, initial encounter      Pneumonia of right lower lobe due to infectious organism (H) 12/14/2019     Severe alcohol use disorder (H) 11/21/2019     Chronic obstructive pulmonary disease, unspecified COPD type (H) 10/30/2019     Dyspnea on exertion      Anxiety 03/25/2019     Hypomagnesemia 03/25/2019     Primary osteoarthritis of left knee 07/10/2018     Seasonal allergies 07/09/2018     Mild episode of recurrent major depressive disorder (H)      Alcoholic hepatitis      Peripheral edema      Diuretic-induced hypokalemia      Alcohol use disorder, severe, dependence (H) 05/07/2018     Primary osteoarthritis of right knee 03/21/2018     Alcohol withdrawal (H) 03/17/2018     Chronic alcohol dependence, continuous (H) 03/16/2018     Low backache 03/16/2018     Morbid obesity (H) 01/05/2018     Bilateral edema of lower extremity 06/28/2017     Snoring 06/28/2017     Carpal tunnel syndrome on both sides 03/06/2017     Trochanteric bursitis of left hip 10/25/2016     Alcohol withdrawal seizure without complication (H) 05/14/2016     Hypoxia 05/13/2016     Alcohol abuse 05/13/2016     Elevated LFTs 05/13/2016     New onset seizure (H) 05/12/2016     Claustrophobia 12/18/2015     Cervical disc disease 12/14/2015     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) 09/16/2015     PTSD (post-traumatic stress disorder) 02/13/2015     Chronic Reflux Esophagitis      Peripheral Neuropathy      Localized Primary Osteoarthritis Of The Carpometacarpal Joint      Ganglion Of The Right Foot      Major depression, recurrent (H)      Nicotine Dependence      Vitamin D deficiency      Past Medical History:   Diagnosis Date     Alcoholic cirrhosis (H)      Anxiety      Arthritis      Chronic alcohol dependence, continuous (H) 03/16/2018    inpatient 11/2019, sober since then     Chronic reflux esophagitis      COPD (chronic obstructive pulmonary disease) (H)       Depression      Dermatitis      Ganglion     right foot     H. pylori infection      Melanoma (H) 2019    left upper arm     Menopause     age 52     Obesity (BMI 35.0-39.9 without comorbidity)      Seizure (H) 2016    during alcohol withdrawal     Sleep apnea     mild, doesnt tolerate pap therapy     Past Surgical History:   Procedure Laterality Date     APPENDECTOMY       DC APPENDECTOMY      Description: Appendectomy;  Recorded: 2008;  Comments: childhood     DC  DELIVERY ONLY      Description:  Section;  Recorded: 2008;     DC EXCIS TENDN/CAPSULE LESN,FOOT      Description: Excision Of Cyst Of Tendon Sheath Of Foot;  Proc Date: 10/28/2011;  Comments: [facility]     DC HEMORRHOIDECTOMY INTERNAL RUBBER BAND LIGATIONS      Description: Hemorrhoidectomy;  Recorded: 2008;     DC KNEE SCOPE,DIAGNOSTIC      Description: Arthroscopy Knee Right;  Proc Date: 10/11/2005;  Comments: for right knee patella subluxation with lateral retinacular release; subpatellar chondroplasty     DC LATERAL RETINACULAR RELEASE OPEN      Description: Knee Lateral Retinacular Release;  Proc Date: 10/11/2005;     DC LIGATE FALLOPIAN TUBE      Description: Tubal Ligation;  Recorded: 2008;     SKIN BIOPSY Left 2019    left upper arm     TONSILLECTOMY       Social History     Socioeconomic History     Marital status: Single     Spouse name: Not on file     Number of children: Not on file     Years of education: Not on file     Highest education level: Not on file   Occupational History     Occupation: lauren     Employer: [employer]     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[facility])   Social Needs     Financial resource strain: Not on file     Food insecurity:     Worry: Not on file     Inability: Not on file     Transportation needs:     Medical: Not on file     Non-medical: Not on file   Tobacco Use     Smoking status: Current Every Day Smoker     Packs/day: 1.00     Years:  49.00     Pack years: 49.00     Types: Cigarettes     Smokeless tobacco: Never Used     Tobacco comment: last 11/2019   Substance and Sexual Activity     Alcohol use: Not Currently     Comment: sober since 11/19/2019     Drug use: No     Sexual activity: Yes     Partners: Male     Birth control/protection: None   Lifestyle     Physical activity:     Days per week: Not on file     Minutes per session: Not on file     Stress: Not on file   Relationships     Social connections:     Talks on phone: Not on file     Gets together: Not on file     Attends Mu-ism service: Not on file     Active member of club or organization: Not on file     Attends meetings of clubs or organizations: Not on file     Relationship status: Not on file     Intimate partner violence:     Fear of current or ex partner: Not on file     Emotionally abused: Not on file     Physically abused: Not on file     Forced sexual activity: Not on file   Other Topics Concern     Not on file   Social History Narrative     Not on file     Family History   Problem Relation Age of Onset     Heart disease Mother      Heart disease Father        Meds:    Current Outpatient Medications:      albuterol (VENTOLIN HFA) 90 mcg/actuation inhaler, Inhale 2 puffs every 4 (four) hours as needed for wheezing., Disp: 18 g, Rfl: 3     ammonium lactate (AMLACTIN) 12 % cream, Apply 2 application topically 2 (two) times a day as needed. Apply 1-3 grams to each foot twice daily as needed, Disp: , Rfl: 11     benzonatate (TESSALON) 100 MG capsule, Take 1 capsule (100 mg total) by mouth 3 (three) times a day as needed for cough., Disp: 65 capsule, Rfl: 0     budesonide-formoterol (SYMBICORT) 80-4.5 mcg/actuation inhaler, Inhale 2 puffs 2 (two) times a day., Disp: 1 Inhaler, Rfl: 1     cetirizine (ZYRTEC) 10 MG tablet, Take 10 mg by mouth daily as needed for allergies., Disp: , Rfl:      diclofenac sodium (VOLTAREN) 1 % Gel, Apply 1 g topically 2 (two) times a day as needed  (pain)., Disp: 100 g, Rfl: 0     DULoxetine (CYMBALTA) 60 MG capsule, Take 1 capsule (60 mg total) by mouth daily., Disp: 30 capsule, Rfl: 0     fluticasone (FLONASE) 50 mcg/actuation nasal spray, Apply 1 spray into each nostril daily as needed for rhinitis., Disp: , Rfl:      hydrOXYzine pamoate (VISTARIL) 25 MG capsule, Take 1 capsule (25 mg total) by mouth 4 (four) times a day as needed for anxiety., Disp: 65 capsule, Rfl: 0     ipratropium-albuterol (DUO-NEB) 0.5-2.5 mg/3 mL nebulizer, Take 3 mL by nebulization every 6 (six) hours as needed (wheezing, short of breath)., Disp: 90 mL, Rfl: 1     lidocaine 4 % patch, Place 1 patch on the skin daily as needed for pain. Remove and discard patch with 12 hours or as directed by MD., Disp: 30 patch, Rfl: 0     nicotine (NICOTROL) 10 mg inhaler, Inhale 1 puff as needed for smoking cessation., Disp: 168 each, Rfl: 11     omeprazole (PRILOSEC) 20 MG capsule, Take 1 capsule (20 mg total) by mouth at bedtime., Disp: 90 capsule, Rfl: 3     traZODone (DESYREL) 50 MG tablet, Take 1 tablet (50 mg total) by mouth at bedtime as needed, may repeat once for sleep., Disp: 32 tablet, Rfl: 0     acamprosate (CAMPRAL) 333 mg tablet, Take 2 tablets (666 mg total) by mouth 3 (three) times a day., Disp: 180 tablet, Rfl: 0     bumetanide (BUMEX) 1 MG tablet, Take 1 tablet (1 mg total) by mouth daily., Disp: 30 tablet, Rfl: 1     nystatin (MYCOSTATIN) 100,000 unit/mL suspension, Take 5 mL (500,000 Units total) by mouth 4 (four) times a day for 10 days., Disp: 200 mL, Rfl: 0     potassium chloride (K-DUR,KLOR-CON) 20 MEQ tablet, Take 1 tablet (20 mEq total) by mouth 2 (two) times a day., Disp: 60 tablet, Rfl: 1    Allergies:  Allergies   Allergen Reactions     Codeine Nausea Only     Hydrochlorothiazide Rash     phototoxicity - med was d/lora       Diclofenac Nausea Only     Tolerates the topical gel     Sulfa (Sulfonamide Antibiotics) Rash     Sulfasalazine Rash       ROS:  Pertinent  positives as noted in HPI; otherwise 12 point ROS negative.      Physical Exam:  EXAM:  /86 (Patient Site: Right Arm, Patient Position: Sitting, Cuff Size: Adult Regular)   Pulse 82   Temp 97.7  F (36.5  C) (Oral)   Resp 18   Wt (!) 243 lb (110.2 kg)   LMP 03/06/2002   SpO2 97%   BMI 41.71 kg/m     Gen:  NAD, appears well, well-hydrated  HEENT:  TMs nl, oropharynx benign, nasal mucosa nl, conjunctiva clear  Neck:  Supple, no adenopathy, no thyromegaly, no carotid bruits, no JVD  Lungs: decreased breath sounds in bases of lungs bilaterally  Cor:  RRR no murmur  Abd:  Soft, nontender, BS+, no masses, no guarding or rebound, no HSM  Extr:  Neg.  Feet:  Sl decreased sensation at plantar feet bilaterally  Neuro:  No asymmetry  Skin:  Warm/dry        Results:  Results for orders placed or performed during the hospital encounter of 12/14/19   Culture/Gram Stain: Bronchial   Result Value Ref Range    Culture Usual Millie     Gram Stain Result 3+ Polymorphonuclear leukocytes     Gram Stain Result 1+ Gram negative bacilli     Gram Stain Result 1+ Gram positive bacilli     Gram Stain Result 1+ Gram positive cocci    KOH Prep   Result Value Ref Range    KOH Prep No Yeast or Fungal Elements Seen No Yeast or Fungal Elements Seen   Blood culture from PERIPHERAL SITE   Result Value Ref Range    Anaerobic Blood Culture Bottle No Growth No Growth, No organisms seen, bottle returned to instrument, Specimen not received, No Growth at 24 hours, No Growth at 48 hours, No Growth at 72 hours, No Growth at 96 hours, No Growth at 120 hours    Aerobic Blood Culture Bottle No Growth No Growth, No organisms seen, bottle returned to instrument, Specimen not received, No Growth at 24 hours, No Growth at 120 hours, No Growth at 48 hours, No Growth at 72 hours, No Growth at 96 hours   Blood Culture from PERIPHERAL SITE (2nd one)   Result Value Ref Range    Anaerobic Blood Culture Bottle No Growth No Growth, No organisms seen, bottle  returned to instrument, Specimen not received, No Growth at 24 hours, No Growth at 48 hours, No Growth at 72 hours, No Growth at 96 hours, No Growth at 120 hours    Aerobic Blood Culture Bottle No Growth No Growth, No organisms seen, bottle returned to instrument, Specimen not received, No Growth at 24 hours, No Growth at 120 hours, No Growth at 48 hours, No Growth at 72 hours, No Growth at 96 hours   Comprehensive Metabolic Panel   Result Value Ref Range    Sodium 138 136 - 145 mmol/L    Potassium 3.7 3.5 - 5.0 mmol/L    Chloride 101 98 - 107 mmol/L    CO2 27 22 - 31 mmol/L    Anion Gap, Calculation 10 5 - 18 mmol/L    Glucose 167 (H) 70 - 125 mg/dL    BUN 15 8 - 22 mg/dL    Creatinine 0.78 0.60 - 1.10 mg/dL    GFR MDRD Af Amer >60 >60 mL/min/1.73m2    GFR MDRD Non Af Amer >60 >60 mL/min/1.73m2    Bilirubin, Total 0.4 0.0 - 1.0 mg/dL    Calcium 9.7 8.5 - 10.5 mg/dL    Protein, Total 6.8 6.0 - 8.0 g/dL    Albumin 2.5 (L) 3.5 - 5.0 g/dL    Alkaline Phosphatase 78 45 - 120 U/L    AST 22 0 - 40 U/L    ALT 31 0 - 45 U/L   Erythrocyte Sedimentation Rate   Result Value Ref Range    Sed Rate 81 (H) 0 - 20 mm/hr   C-Reactive Protein   Result Value Ref Range    CRP 21.5 (H) 0.0 - 0.8 mg/dL   Procalcitonin   Result Value Ref Range    Procalcitonin 0.33 0.00 - 0.49 ng/mL   HM1 (CBC with Diff)   Result Value Ref Range    WBC 13.3 (H) 4.0 - 11.0 thou/uL    RBC 4.45 3.80 - 5.40 mill/uL    Hemoglobin 13.9 12.0 - 16.0 g/dL    Hematocrit 41.7 35.0 - 47.0 %    MCV 94 80 - 100 fL    MCH 31.2 27.0 - 34.0 pg    MCHC 33.3 32.0 - 36.0 g/dL    RDW 12.4 11.0 - 14.5 %    Platelets 280 140 - 440 thou/uL    MPV 9.2 8.5 - 12.5 fL    Neutrophils % 84 (H) 50 - 70 %    Lymphocytes % 9 (L) 20 - 40 %    Monocytes % 7 2 - 10 %    Eosinophils % 0 0 - 6 %    Basophils % 0 0 - 2 %    Neutrophils Absolute 11.2 (H) 2.0 - 7.7 thou/uL    Lymphocytes Absolute 1.2 0.8 - 4.4 thou/uL    Monocytes Absolute 0.9 0.0 - 0.9 thou/uL    Eosinophils Absolute 0.0 0.0 -  0.4 thou/uL    Basophils Absolute 0.0 0.0 - 0.2 thou/uL   Lactic Acid   Result Value Ref Range    Lactic Acid 0.9 0.5 - 2.2 mmol/L   Bronchial Alveolar Lavage Cell Count   Result Value Ref Range    BAL Cells/uL 616 /uL    Appearance, Fluid Cloudy     Volume, Lavage      Neutrophil % 94 (H) <=25 %    Lymphocyte % 4 <=78 %    Macrophage % 3 <=71 %    Eosinophil %      Epithelial %      Other Cells %     Vancomycin (Vancocin )   Result Value Ref Range    Vancomycin 15.4 <=25.0 ug/mL   Platelet Count - every other day x 3   Result Value Ref Range    Platelets 293 140 - 440 thou/uL   ECG 12 lead nursing unit performed   Result Value Ref Range    SYSTOLIC BLOOD PRESSURE 106 mmHg    DIASTOLIC BLOOD PRESSURE 61 mmHg    VENTRICULAR RATE 82 BPM    ATRIAL RATE 82 BPM    P-R INTERVAL 146 ms    QRS DURATION 94 ms    Q-T INTERVAL 378 ms    QTC CALCULATION (BEZET) 441 ms    P Axis 55 degrees    R AXIS 13 degrees    T AXIS 29 degrees    MUSE DIAGNOSIS       Normal sinus rhythm  Low voltage QRS  Nonspecific T wave abnormality  Abnormal ECG  When compared with ECG of 13-DEC-2019 11:22,  No significant change was found  Confirmed by SEE ED PROVIDER NOTE FOR, ECG INTERPRETATION ([id number]),  VINNIE JOHN ([id number]) on 12/14/2019 12:36:26 PM     Medical Cytology   Result Value Ref Range    Case Report       Medical Cytology                                  Case: [id number]                                   Authorizing Provider:  Lashae Shields MD        Collected:           12/15/2019 1107              Ordering Location:     [place] [address] Received:            12/16/2019 1231                                     Cardiac/Neuro ICU                                                            Pathologist:           Johann Jacinto MD                                                      Specimen:    Lung, Bronchial Alveolar Lavage, Lower Lobe, Right                                         Final Diagnosis        BRONCHOALVEOLAR LAVAGE, LOWER LOBE OF RIGHT LUNG:    -  OVERTLY PURULENT ACUTE INFLAMMATORY EXUDATE    -  NEGATIVE FOR ATYPICAL OR MALIGNANT CELLS    -  POSITIVE FOR CLUSTERS OF FUNGAL HYPHAE BY HISTOCHEMISTRY    Comment       The fungal clusters may represent an oropharyngeal contaminant incidentally picked up during the lavage. However, given the intensely purulent exudate in the specimen, this may represent a true fungal pneumonitis.    Positive and negative tissue controls stain appropriately.    Microscopic Description       Material examined consists of cell block sections and one monolayer preparation.  These demonstrate a large amount of mucoid debris and an intense or overtly purulent acute inflammatory exudate. Cell block sections also contain fragments of granular but acellular debris, and occasional compact clusters of GMS-positive tangles of fungal hyphae. Rare macrophages and bronchial epithelial cells are present in the monolayer preparation. No atypical or malignant features are identified.    Clinical Information       64yoF smoker with atelectasis, foreign body in airway    Specimen Description       25 ml cloudy red fluid.    1 [employer] slide    1 Cell block    Specimen Processing      Charges CPT:  19709, 38460, 61153   ICD-10:  J98.11     General Path Interpretation Negative for malignant cells Negative for malignant cells, Non-Diagnostic   Surgical pathology exam   Result Value Ref Range    Case Report       Surgical Pathology Report                         Case: [id number]                                    Authorizing Provider:  Lashae Shields MD        Collected:           12/15/2019 1107              Ordering Location:     [place] [address] Received:            12/16/2019 1114                                     Cardiac/Neuro ICU                                                            Pathologist:           Veronica Shen MD                                                     "    Specimen:    Bronchus, RLL Bite per container                                                           Final Diagnosis       LUNG, RIGHT LOWER LOBE, BRONCHUS, BITE BIOPSY:     -   AMORPHOUS DEBRIS, BACTERIAL ORGANISMS AND FOOD PARTICLES    -   NEGATIVE FOR MALIGNANCY     -   SEE COMMENT    Comment       The specimen is relatively scant consisting primarily of amorphous debris, bacterial organisms and food particles. There is no evidence of malignancy. Clinical correlation recommended.    Microscopic Description       Microscopic examination performed, substantiating the above diagnosis.    Clinical Information       Clinical history: 64yoF smoker with airway obstruction  Reason for procedure: Rule out malignancy    Gross Description       The specimen is received in formalin, labeled with the patient's name and \"bronchoscopic RLL bite,\" and consists of a nubbin of red-brown tissue 2 mm in diameter. The tissue is totally submitted. JPL:marshall    Charges CPT: 59103  ICD-10: J98.8     Result Flag               "

## 2021-06-04 NOTE — TELEPHONE ENCOUNTER
New Appointment Needed  What is the reason for the visit:    Same Date/Next Day Appt Request  What is the reason for your visit?: shingles    Provider Preference: Any available  How soon do you need to be seen?: tomorrow  Waitlist offered?: No  Okay to leave a detailed message:  Yes
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
## 2021-06-04 NOTE — PROGRESS NOTES
"Outpatient Substance Use Disorder Treatment - Initial Services Plan     Patient  Name: Patsy Santamaria  MRN: [id number]   : 1955  Admit Date: 19       Patient describes their immediate need: To learn recovery skills to prevent relapse. Dealing with emotions    Are there any immediate Safety Needs such as (physical, stability, mobility):  Pt is able to get medical care as needed. Pt denies immediate concerns.     Immediate Health Needs and Plan:   Patient is struggling with shortness of breath. Plan to present to ER.    Vulnerable Adult: No     Issues to be addressed in the first sessions:   Orientation to program    Patient strengths and needs:   Strengths identified as \"I love cooking. I'm good at listening to people. I'm a caring person. I love my family, love my grandchildren. I think I am easy to get along with.\"  Needs identified as \"To be a little more assertive and not aggressive.\"    Plan for patient for time between intake and completion of the treatment plan:   Attend all group therapy sessions as directed, complete all written and oral assignments as directed, and remain clean and sober. A relapse, if any, must be reported to staff immediately in order to ensure you are receiving the proper level of care. If you cannot attend a group therapy session you must call contact information provided in intake folder and leave a message before or during group hours.       Vulnerable Adult Review   [X] Review of the Facility Abuse Prevention plan was reviewed with the patient   [X] No Individual Abuse Plan is necessary   [ ] In addition to the Facility Abuse Prevention plan, an Individual Abuse Plan will be put in place       Staff Name/Title: Farshad Cox   Date: 2019  Time: 10:49 AM        "

## 2021-06-04 NOTE — PROGRESS NOTES
Weekly Progress Note  Patsy Santamaria  1955  [id number]      D) Pt attended 0/0 groups this week with, patient to start group on 12/16/19. Patient attended 1 individual sessions this week to complete intake.    A) Staff facilitated groups and reviewed tx progress. Assessed for [facility]. R) No VAP needed at this time.     Any significant events, defines as events that impact patients relationship with others inside and outside of treatment: No  Indicate any changes or monitoring of physical or mental health problems: Yes: patient reported pain at intake appointment, was escorted by staff to ED.   Indicate involvement by any outside supports: No  IAPP reviewed and modified as needed: NA    Pt working on the following dimensions:  Dimension #1 - Withdrawal Potential - Risk 0. Patient reports last use of alcohol as 11/9/19. Patient denies withdrawal symptoms at this time.  Specific goals from treatment plan addressed this week:  Patient has not yet attended group, intake completed on 12/13/19.   Effectiveness of strategies:  Patient will sign treatment plan during first group session.  Dimension #2 - Biomedical - Risk 1.Patient reports medical conditions are currently stable. Patient has primary care provider. Patient is able to seek cares as needed.  Specific goals from treatment plan addressed this week:  Patient has not yet attended group, intake completed on 12/13/19.   Effectiveness of strategies:  Patient will sign treatment plan during first group session.  Dimension #3 - Emotional/Behavioral/Cognitive - Risk 2. Patient has not yet attended group, intake completed on 12/13/19.   Active interventions to stabilize mental health symptoms:  Patient reports taking medications as prescribed.   Specific goals from treatment plan addressed this week:  Patient has not yet attended group, intake completed on 12/13/19.   Effectiveness of strategies:  Patient will sign treatment plan during first group session.  Dimension #4 -  Readiness for Change - Risk 1. Patient reports her use is a problem, and that she is motivated to address it, but patient has had multiple admission to treatment programs this year without successful completion.   Specific goals from treatment plan addressed this week:  Patient has not yet attended group, intake completed on 12/13/19.   Effectiveness of strategies:  Patient will sign treatment plan during first group session.  Dimension #5 - Relapse Potential - Risk 3. Patient lacks healthy, positive coping skills. Patient lacks skills to prevent relapse. Patient may not fully recognize impact substance cathy has on mental health. Patient has achieved periods of sobriety in the past. Patient reports multiple treatment episodes.  Specific goals from treatment plan addressed this week:  Patient has not yet attended group, intake completed on 12/13/19.   Effectiveness of strategies:  Patient will sign treatment plan during first group session.  Dimension #6 - Recovery Environment - Risk 3. Patient is retired. Patient has stable housing. Patient reports positive support system. Patient is not engaged in structured daily activities. Patient denies current legals. Patient reports prior legals.  Specific goals from treatment plan addressed this week:  Patient has not yet attended group, intake completed on 12/13/19.   Effectiveness of strategies:  Patient will sign treatment plan during first group session.  T) Treatment plan updated: Yes Patient notified and in agreement: No, patient to sign plan on 12/16/19.   Patient has completed 1 of 120 program hours at this time. Patient is currently in phase 1. Projected discharge date is 06/13/2020. Current discharge plan is MH services/community supports.     OLIVIER Rossi, [facility]  12/13/2019, 5:18 PM       Weekly Educational Topics Date   1. Dual Diagnoses, week 1    2. Dual Diagnoses, week 2    3. Stress Management    4. Feelings/Emotions    5. Thinking    6. Change    7.  Recovery Support    8. Relationships/Communication    9. Addiction 101    10. Relapse Prevention    11. Grief and Loss    12. Strengths    13. Wellness

## 2021-06-04 NOTE — PROGRESS NOTES
Correct pharmacy verified with patient and confirmed in snapshot? [x] yes []no    Charge captured ? [x] yes  [] no    Medications Phoned  to Pharmacy [] yes [x]no  Name of Pharmacist:  List Medications, including dose, quantity and instructions      Medication Prescriptions given to patient   [] yes  [x] no   List the name of the drug the prescription was written for.       Medications ordered this visit were e-scribed.  Verified by order class [] yes  [x] no    Medication changes or discontinuations were communicated to patient's pharmacy: [] yes  [x] no    UA collected [x] yes  [] no    [place] Prescription Monitoring Program Reviewed? [x] yes  [] no    Referrals were made to:none      Future appointment was made: [x] yes  [] no    Dictation completed at time of chart check: [] yes  [x] no    I have checked the documentation for today s encounters and the above information has been reviewed and completed.

## 2021-06-04 NOTE — PROGRESS NOTES
"Substance Use Disorder Outpatient Treatment  Intake Note:     D) Patsy Santamaria is a 64 y.o.   White or  female who is referred to [facility] via Self/2700 with funding from Medicare A&B. Patient orientated x 3. Patient meets criteria for Alcohol Use Disorder, severe (F10.20).  Patient appears appropriate for [facility].   A) Met with patient for 35 minutes.  Completed intake assessment and preliminary paperwork. Patient was given and explained counselor & supervisor license number and contact info, Patient Bill of Rights, program rules/regulations, Program Abuse Prevention Plan, confidentiality & HIPPA policies, grievance procedure, presented ROIs, TB & HIV/AIDS policies & resources, and Vulnerable Adult policy.   Conducted Vulnerable Adult Assessment.   R)No special Vulnerable Adult needed at this time.  Patient signed and agreed to counselor & supervisor license number and contact info, Patient Bill of Rights, group rules/regulations, Program Abuse Prevention Plan, confidentiality & HIPPA policies, grievance procedure,  ROIs, TB & HIV/AIDS policies & resources, and Vulnerable Adult policy. Patient scored low risk on C-SSRS screen. Patient denied suicidal ideation/intent/plan/means at this time.     Opioid Use Disorder: No   Provided \"Options for Opioid Treatment in [place] and Overdose Prevention\" NA     Dimension #1 - Withdrawal Potential - Risk 0. Patient reports last use of alcohol as 11/9/19. Patient denies withdrawal symptoms at this time.    Dimension #2 - Biomedical - Risk 1. Patient reports medical conditions are currently stable. Patient has primary care provider. Patient is able to seek cares as needed.    Dimension #3 - Emotional , Behavioral and Cognitive - Risk 2. Patient reports depression, anxiety, and PTSD. Patient has mental health care provider. Patient reports recently experiencing mental health symptoms. Patient denies suicidal or homicidal ideation.     Dimension #4 - Readiness for " Change - Risk 1. Patient is cooperative throughout assessment.    Dimension #5 - Relapse Potential - Risk 3. Patient lacks healthy, positive coping skills. Patient lacks skills to prevent relapse. Patient may not fully recognize impact substance cathy has on mental health. Patient has achieved periods of sobriety in the past. Patient reports multiple treatment episodes.    Dimension #6 - Recovery Environment - Risk 3. Patient is retired. Patient has stable housing. Patient reports positive support system. Patient is not engaged in structured daily activities. Patient denies current legals. Patient reports prior legals.    T) Explained counselor & supervisor license number and contact info, Patient Bill of Rights, program rules/regulations, Program Abuse Prevention Plan, confidentiality & HIPPA policies, grievance procedure, presented ROIs, TB & HIV/AIDS policies & resources, and Vulnerable Adult policy. Patient expected to start group on 12/16/19.      Farshad Cox  12/13/2019, 10:50 AM

## 2021-06-04 NOTE — PROGRESS NOTES
Patient completed Diagnostic Assessment for Mental Health on 11/26/19 with   Zion Dejesus, SHMUEL. Diagnoses at that time were:   Major Depressive Disorder, Recurrent, Mild (by history of outpatient therapist)   Post Traumatic Stress Disorder  Alcohol Use Disorder, Severe, Dependence     Assessment was reviewed by Co-occurring [facility] staff.     OLIVIER Rossi, [facility]  12/17/2019, 2:47 PM

## 2021-06-05 VITALS
BODY MASS INDEX: 42.56 KG/M2 | WEIGHT: 246 LBS | WEIGHT: 246 LBS | BODY MASS INDEX: 42.56 KG/M2 | BODY MASS INDEX: 42.56 KG/M2 | HEIGHT: 64 IN | HEIGHT: 64 IN | BODY MASS INDEX: 42.56 KG/M2

## 2021-06-05 VITALS — WEIGHT: 261 LBS | HEIGHT: 64 IN | BODY MASS INDEX: 44.56 KG/M2

## 2021-06-05 NOTE — TELEPHONE ENCOUNTER
Patient has an appointment 1/17/2020. But wants to be seem tomorrow. There are some openings Please call and help make appointment for patient to come in to be seen.

## 2021-06-05 NOTE — PROGRESS NOTES
Patient instructed in use of symbicort inhaler with a spacer device.  Patient states good understanding of how to use the inhaler device with a spacer.  Return demo completed in clinic with good technique demonstrated.  Printed instructions as well as phone numbers to call with any questions sent home with patient. Patient also instructed to rinse, gargle, spit after each use.

## 2021-06-05 NOTE — PROGRESS NOTES
"TCM DISCHARGE FOLLOW UP CALL    Discharge Date:  1/27/2020  Reason for hospital stay (discharge diagnosis)::  COPD exacerbation  Are you feeling better, the same or worse since your discharge?:  Patient is feeling the same (\"I feel like crap.\" Has GAR doing ADLs, decreased energy. Pt has drunk ~one pint of alcohol since discharge. She has slurring and stumbling over words.  Denies fever, Sputum is clear.)  Do you feel like you have a plan in the event of a health emergency?: Yes (Sister)    (RN) Patient provided with information to call us in the event of a health emergency: Yes (Instructed pt to call pulmology or primary clinic if she has increased dyspnea.)    As part of your discharge plan, were  home care services ordered for you?: No    Did you receive any new medications, or was there a change to your medications?: Yes    Are you taking those medications, or do you have any established regiment?:  Pt is taking prednisone daily. Using albuterol nebs four times a day. Instructed pt to start using her flutter valve. Pt states she is taking Campral and is drinking alcohol. She  Is using a spacer with Symbicort and is rinsing her mouth after.  Do you have any follow up visits scheduled with your PCP or Specialist?:  No  I'm glad to hear you're doing well and we want you to continue to do well. Your PCP would like to see you for a follow-up visit. Can we help set that up for your today?: No    (RN) Provided patient the PCP's phone number to call if they have any questions or concerns?: No    RN NOTES::  Pt saw pulmonologist yesterday.    "

## 2021-06-05 NOTE — PROGRESS NOTES
Pulmonary Clinic Follow-up Visit    Assessment and Plan:   64 year old female with a history of longstanding and active tobacco dependence, alcohol dependence, PTSD, untreated MELVI, h/o alcohol withdrawal seizure, H pylori gastritis, obesity, depression, bronchiectasis, peripheral neuropathy, GERD, osteoarthritis, vitamin D deficiency, presenting for follow-up.     Tobacco dependence, bronchiectasis, MELVI: Patient has been hospitalized repeatedly for respiratory failure, though in at least one case she had been drinking and aspirated food and mucus, requiring bronchoscopy for airway clearance. Continues to smoke and continues to have exertional dyspnea. Has not yet picked up the ipratropium-albuterol nebs. PFTs are nonspecific; FEV1/FVC ratio is nonobstructive, normal TLC and DLCO, no significant bronchodilator response. Obesity likely playing a significant role along with deconditioning. She continues to smoke 0.5 ppd. She continues to not use CPAP due to claustrophobia in the setting of PTSD; has an oronasal mask currently. Had been drinking heavily even after inpatient treatment (likely a significant factor in her aspiration and mucous plugging), but no alcohol since recent hospital discharge.     Plan:  - again advised her to quit smoking  - nicotine 14-mg patch  - use nicotine inhaler for cravings  - advised her to continue to stay alcohol-free and continue to attend AA meetings  - provided multiple resources in the AVS including contact information for [facility]  - advised her to fill the ipratropium-albuterol nebs and start using this four times a day with in-line Aerobika flutter valve  - continue budesonide-formoterol 80-4.5 mcg two inhalations two times a day; rinse/gargle/spit water after use  - advised her to contact her DME to get a different CPAP interface and will refer her to sleep medicine clinic  - albuterol HFA or nebulized ipratropium-albuterol as needed  - annual influenza vaccination; received for  this season  - administered Pneumovax-23 today in clinic  - follow up in 3 months  - encouraged her to call any time with questions or concerning symptoms     I appreciate the opportunity to participate in the care of Ms. Santamaria. Please feel free to contact me at any time.     CCx: chronic dyspnea, tobacco dependence    HPI: 64 year old female with a history of longstanding and active tobacco dependence, alcohol dependence, PTSD, untreated MELVI, h/o alcohol withdrawal seizure, H pylori gastritis, obesity, depression, possible COPD, peripheral neuropathy, GERD, osteoarthritis, vitamin D deficiency, presenting for follow-up. Patient has been hospitalized repeatedly for respiratory failure, though in at least one case she had been drinking and aspirated food and mucus, requiring bronchoscopy for airway clearance. Continues to smoke and continues to have exertional dyspnea. Has not yet picked up the ipratropium-albuterol nebs. PFTs are nonspecific, with a possible obstructive component suggestive of asthma or COPD, though FEV1/FVC ratio is nonobstructive, normal TLC and DLCO, no significant bronchodilator response. Obesity likely playing a significant role along with deconditioning. She continues to smoke 0.5 ppd. She continues to not use CPAP due to claustrophobia in the setting of PTSD; has an oronasal mask currently. Had been drinking heavily even after inpatient treatment (likely a significant factor in her aspiration and mucous plugging), but no alcohol since recent hospital discharge.    ROS:  A 12-system review was obtained and was negative with the exception of the symptoms endorsed in the history of present illness.    PMH:  Past Medical History:   Diagnosis Date     Alcohol withdrawal seizure without complication (H) 5/14/2016     Alcoholic cirrhosis (H)      Anxiety      Arthritis      Chronic alcohol dependence, continuous (H) 03/16/2018    inpatient 11/2019, sober since then     Chronic reflux esophagitis       COPD (chronic obstructive pulmonary disease) (H)      Depression      Dermatitis      Ganglion     right foot     H. pylori infection      Melanoma (H) 2019    left upper arm     Menopause     age 52     Obesity (BMI 35.0-39.9 without comorbidity)      Seizure (H) 2016    during alcohol withdrawal     Sleep apnea     mild, doesnt tolerate pap therapy     PSH:  Past Surgical History:   Procedure Laterality Date     APPENDECTOMY       OK APPENDECTOMY      Description: Appendectomy;  Recorded: 2008;  Comments: childhood     OK  DELIVERY ONLY      Description:  Section;  Recorded: 2008;     OK EXCIS TENDN/CAPSULE LESN,FOOT      Description: Excision Of Cyst Of Tendon Sheath Of Foot;  Proc Date: 10/28/2011;  Comments: [facility]     OK HEMORRHOIDECTOMY INTERNAL RUBBER BAND LIGATIONS      Description: Hemorrhoidectomy;  Recorded: 2008;     OK KNEE SCOPE,DIAGNOSTIC      Description: Arthroscopy Knee Right;  Proc Date: 10/11/2005;  Comments: for right knee patella subluxation with lateral retinacular release; subpatellar chondroplasty     OK LATERAL RETINACULAR RELEASE OPEN      Description: Knee Lateral Retinacular Release;  Proc Date: 10/11/2005;     OK LIGATE FALLOPIAN TUBE      Description: Tubal Ligation;  Recorded: 2008;     SKIN BIOPSY Left 2019    left upper arm     TONSILLECTOMY         Allergies:  Allergies   Allergen Reactions     Codeine Nausea Only     Hydrochlorothiazide Rash     phototoxicity - med was d/lora       Diclofenac Nausea Only     Tolerates the topical gel     Sulfa (Sulfonamide Antibiotics) Rash     Sulfasalazine Rash       Family HX:  Family History   Problem Relation Age of Onset     Heart disease Mother      Heart disease Father        Social Hx:  Social History     Socioeconomic History     Marital status: Single     Spouse name: Not on file     Number of children: Not on file     Years of education: Not on file     Highest education  level: Not on file   Occupational History     Occupation: lauren     Employer: [employer]     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)   Social Needs     Financial resource strain: Not on file     Food insecurity:     Worry: Not on file     Inability: Not on file     Transportation needs:     Medical: Not on file     Non-medical: Not on file   Tobacco Use     Smoking status: Current Every Day Smoker     Packs/day: 1.00     Years: 49.00     Pack years: 49.00     Types: Cigarettes     Smokeless tobacco: Never Used     Tobacco comment: last 11/2019   Substance and Sexual Activity     Alcohol use: Not Currently     Comment: sober since 11/19/2019     Drug use: No     Sexual activity: Yes     Partners: Male     Birth control/protection: None   Lifestyle     Physical activity:     Days per week: Not on file     Minutes per session: Not on file     Stress: Not on file   Relationships     Social connections:     Talks on phone: Not on file     Gets together: Not on file     Attends [employer] service: Not on file     Active member of club or organization: Not on file     Attends meetings of clubs or organizations: Not on file     Relationship status: Not on file     Intimate partner violence:     Fear of current or ex partner: Not on file     Emotionally abused: Not on file     Physically abused: Not on file     Forced sexual activity: Not on file   Other Topics Concern     Not on file   Social History Narrative     Not on file       Current Meds:  Current Outpatient Medications   Medication Sig Dispense Refill     acamprosate (CAMPRAL) 333 mg tablet Take 2 tablets (666 mg total) by mouth 3 (three) times a day. 180 tablet 0     albuterol (ACCUNEB) 0.63 mg/3 mL nebulizer solution Take 3 mL (0.63 mg total) by nebulization every 2 (two) hours as needed for wheezing. 10 vial 0     albuterol (VENTOLIN HFA) 90 mcg/actuation inhaler Inhale 2 puffs every 4 (four) hours as needed for wheezing. 18 g 3     ammonium lactate  "(AMLACTIN) 12 % cream Apply 2 application topically 2 (two) times a day as needed. Apply 1-3 grams to each foot twice daily as needed  11     budesonide-formoterol (SYMBICORT) 80-4.5 mcg/actuation inhaler Inhale 2 puffs 2 (two) times a day. 1 Inhaler 1     bumetanide (BUMEX) 1 MG tablet Take 1 tablet (1 mg total) by mouth daily. 30 tablet 1     cetirizine (ZYRTEC) 10 MG tablet Take 10 mg by mouth daily as needed for allergies.       diclofenac sodium (VOLTAREN) 1 % Gel Apply 1 g topically 2 (two) times a day as needed (pain). 100 g 0     DULoxetine (CYMBALTA) 60 MG capsule Take 1 capsule (60 mg total) by mouth daily. 30 capsule 0     fluticasone (FLONASE) 50 mcg/actuation nasal spray Apply 1 spray into each nostril daily as needed for rhinitis.       hydrOXYzine pamoate (VISTARIL) 25 MG capsule Take 1 capsule (25 mg total) by mouth 4 (four) times a day as needed for anxiety. 65 capsule 0     ipratropium-albuterol (DUO-NEB) 0.5-2.5 mg/3 mL nebulizer Take 3 mL by nebulization every 6 (six) hours as needed (wheezing, short of breath). 90 mL 1     nicotine (NICOTROL) 10 mg inhaler Inhale 1 puff as needed for smoking cessation. 168 each 11     nystatin (MYCOSTATIN) 100,000 unit/mL suspension Take 500,000 Units by mouth 2 (two) times a day.       omeprazole (PRILOSEC) 20 MG capsule Take 1 capsule (20 mg total) by mouth at bedtime. 90 capsule 3     potassium chloride (K-DUR,KLOR-CON) 20 MEQ tablet Take 1 tablet (20 mEq total) by mouth 2 (two) times a day. 60 tablet 1     predniSONE (DELTASONE) 5 MG tablet Take 20 mg by mouth daily with breakfast for 5 days. 20 tablet 0     traZODone (DESYREL) 50 MG tablet Take 1 tablet (50 mg total) by mouth at bedtime as needed, may repeat once for sleep. 32 tablet 0     nicotine (NICODERM CQ) 14 mg/24 hr Place 1 patch on the skin daily. 28 patch 11     No current facility-administered medications for this visit.        Physical Exam:  /80   Pulse 75   Resp 12   Ht 5' 3\" (1.6 m)  "  Wt (!) 245 lb (111.1 kg)   LMP 03/06/2002   SpO2 92%   Breastfeeding No   BMI 43.40 kg/m    Gen: alert, oriented, appears fatigued  HEENT: left nasal polyp, no oropharyngeal lesions, no cervical or supraclavicular lymphadenopathy  CV: RRR, no M/G/R  Resp: wheezing bilaterally, shallow breaths  Abd: soft, nontender, no palpable organomegaly  Skin: no apparent rashes  Ext: trace bilateral ankle edema  Neuro: alert, nonfocal    Labs:  reviewed    FINDINGS:   CT ANGIOGRAM CHEST, ABDOMEN, AND PELVIS: No thoracoabdominal aortic aneurysm nor dissection. 4 vessels arise from the aortic arch without significant stenosis. No central pulmonary embolus. Mild coronary artery calcifications.     The celiac, SMA and ARIELLA are widely patent. Single right and 2 left renal arteries are widely patent. Mild aortoiliac atherosclerosis with no significant stenosis.     LUNGS AND PLEURA: Bilateral lower lobe tubular bronchiectasis. There is some peripheral endobronchial mucous plugging in the right lower lobe with some peripheral subsegmental atelectasis. No focal airspace consolidation. Trace right pleural effusion +/-   some mild pleural thickening.     MEDIASTINUM/AXILLAE: No adenopathy. No pericardial effusion.     HEPATOBILIARY: Diffuse hepatic steatosis. Faint density within the gallbladder lumen could represent sludge +/- stones. No bile duct dilatation.     PANCREAS: No significant mass, duct dilatation, or inflammatory change.     SPLEEN: Normal.     ADRENAL GLANDS: Normal.     KIDNEYS/BLADDER: No significant mass, stones, or hydronephrosis.     BOWEL: Diverticulosis in the colon. No acute inflammatory change. No obstruction.      LYMPH NODES: No lymphadenopathy.     PELVIC ORGANS: Myomatous uterus. Normal-appearing ovaries. No abnormal fluid collection.     OTHER: Tiny fat-containing paraumbilical and bilateral inguinal hernias.     MUSCULOSKELETAL: Degenerative changes lower lumbar spine. No suspicious osseous  lesions.     IMPRESSION:   1.  CTA negative for thoracoabdominal aortic aneurysm/dissection and pulmonary embolus. No abnormality to account for patient's symptoms.  2.  Bilateral lower lobe bronchiectasis with some peripheral endobronchial mucous plugging and subsegmental atelectasis in the right lower lobe. Trace right pleural effusion +/- pleural thickening.  3.  Diffuse hepatic steatosis.  4.  Faint density within the gallbladder lumen could represent gallbladder sludge +/- stones. No bile duct dilatation.  5.  Colonic diverticulosis.    PFT's (11/26/19):  FEV1/FVC is 0.77 and is normal.  FEV1 is 1.82 L (76% predicted) and is reduced.  FVC is 2.36 L (78% predicted) and reduced.  There was no improvement in spirometry after a single inhaled dose of bronchodilator.  TLC is 4.54 L (93% predicted) and is normal.  RV is 2.22 L (115% predicted) and is normal.  RV/TLC is 0.49 and is increased.  DLCO is 103% predicted and is normal when it is corrected for hemoglobin.    Vinay Paige MD  Pulmonary and Critical Care Medicine  [facility]  Cell [phone]  Office [phone]  Pager [phone]

## 2021-06-05 NOTE — PROGRESS NOTES
Pt is requesting assist with maintaining sobriety, housekeeping assistance and home PT for weakness.

## 2021-06-05 NOTE — PROGRESS NOTES
Enrollment Attempt 1:  Community Health Worker left a message for the patient about CCC enrollment.  If the patient is trying to call the CHW back transfer them to [facility] at [phone].    Next Outreach: 1/31/20

## 2021-06-05 NOTE — PROGRESS NOTES
Community Health Worker called and left a message for the patient.  If the patient is returning my call, please transfer the patient to [facility] at ext. [phone].   Patient has been mailed a unreachable letter and was provided with CHW contact information if they are interested in accessing Clinic Care Coordination.  Order for Care Management has been closed, no further outreach will be done at this time and patient can be re-referred.

## 2021-06-05 NOTE — TELEPHONE ENCOUNTER
Date of Last Office Visit: 12/27/2020  Date of Next Office Visit: 02/06/2020  No shows since last visit: 1  1/13/2020  Cancellations since last visit: 0  ED visits since last visit:  None  Medication duloxetine 60 mg capsule date last ordered: 11/08/2019  Qty: 30 Refills: 0  Medication hydroxyzine pamoate 25 mg capsule date last ordered: 12/11/2019  Qty: 65  Refills: 0  Lapse in therapy greater than 7 days: No  Medication refill request verified as identical to current order: Yes  Result of Last DAM, VPA, Li+ Level, CBC, or Carbamazepine Level (at or since last visit):   12/27/2019 DAM=POS Benzo's  12/27/2019 ETG=POS Glu >70003 & Sul 7190   [] Medication refilled per [facility] M-1.   [] Medication unable to be refilled by RN due to criteria not met as indicated below:     []Eligibility - not seen in last year    []Supervision - no future appointment    []Compliance     []Verification - order discrepancy    []Controlled Medication    []Medication not included in RN Protocol    []90 - day supply request    [x]Other CMA pending medication refills   Current Medication list:   acamprosate (CAMPRAL) 333 mg tablet Take 2 tablets (666 mg total) by mouth 3 (three) times a day.   albuterol (ACCUNEB) 0.63 mg/3 mL nebulizer solution Take 3 mL (0.63 mg total) by nebulization every 2 (two) hours as needed for wheezing.   albuterol (VENTOLIN HFA) 90 mcg/actuation inhaler Inhale 2 puffs every 4 (four) hours as needed for wheezing.   ammonium lactate (AMLACTIN) 12 % cream Apply 2 application topically 2 (two) times a day as needed. Apply 1-3 grams to each foot twice daily as needed   budesonide-formoterol (SYMBICORT) 80-4.5 mcg/actuation inhaler Inhale 2 puffs 2 (two) times a day.   bumetanide (BUMEX) 1 MG tablet Take 1 tablet (1 mg total) by mouth daily.   cetirizine (ZYRTEC) 10 MG tablet Take 10 mg by mouth daily as needed for allergies.   diclofenac sodium (VOLTAREN) 1 % Gel Apply 1 g topically 2 (two) times a day as needed (pain).    DULoxetine (CYMBALTA) 60 MG capsule Take 1 capsule (60 mg total) by mouth daily.   fluticasone (FLONASE) 50 mcg/actuation nasal spray Apply 1 spray into each nostril daily as needed for rhinitis.   hydrOXYzine pamoate (VISTARIL) 25 MG capsule Take 1 capsule (25 mg total) by mouth 4 (four) times a day as needed for anxiety.   ipratropium-albuterol (DUO-NEB) 0.5-2.5 mg/3 mL nebulizer Take 3 mL by nebulization every 6 (six) hours as needed (wheezing, short of breath).   nicotine (NICODERM CQ) 14 mg/24 hr Place 1 patch on the skin daily.   nicotine (NICOTROL) 10 mg inhaler Inhale 1 puff as needed for smoking cessation.   nystatin (MYCOSTATIN) 100,000 unit/mL suspension Take 500,000 Units by mouth 2 (two) times a day.   omeprazole (PRILOSEC) 20 MG capsule Take 1 capsule (20 mg total) by mouth at bedtime.   potassium chloride (K-DUR,KLOR-CON) 20 MEQ tablet Take 1 tablet (20 mEq total) by mouth 2 (two) times a day.   predniSONE (DELTASONE) 5 MG tablet () Take 20 mg by mouth daily with breakfast for 5 days.   traZODone (DESYREL) 50 MG tablet Take 1 tablet (50 mg total) by mouth at bedtime as needed, may repeat once for sleep.       Medication Plan of Care at last office visit with MD/CNP:    Diagnoses/Plan:  1. Again changed patient from naltrexone to acamprosate per her request as she reports naltrexone is not helping her at this time and she finds acamprosate effective. This medication was changed in the med list, but not prescribed as she reports having a 2-week supply at home at this time.   2. Patient's mood is likely an ongoing issue, and will continue to monitor.   3. Patient again recommended to quit smoking, but appears to be precontemplative.   4. Patient etg/ets is positive with etg >10,000/ ets 7190, which was not what she reported verbally; will communicate this with her cd team.   5. Patient was referred to outpatient cd medicare treatment to be managed for ongoing alcohol use disorder. Ultimately,  patient may need to live in sober housing to maintain sobriety.  6. Patient to follow-up with PCP as scheduled for pneumonia.   7. Patient to rtc in 2 weeks and sooner prn.

## 2021-06-05 NOTE — PATIENT INSTRUCTIONS - HE
It was good to see you in clinic today. This is what we discussed:    1. Use the nicotine patch. Put one patch on per day.  2. Use the Nicotrol inhaler if you have cravings.  3. Quit smoking. I know you can do it!  4. Stay active with walking as much as possible.  5. Continue Symbicort two puffs twice daily through the spacer. Rinse, gargle, and spit water after use.  6. Use the nebulized ipratropium and albuterol (Duo-Neb) four times daily with the flutter valve in-line with the tubing or you can exhale through the flutter valve about 10 time after using the neb.  7. Contact your CPAP medical supplier about getting a different interface.  8. We will get you into the sleep medicine clinic.  9. We gave you bacterial pneumonia vaccine (Pneumovax-23) today in clinic.  10. I will see you in about 3 months.  11. Call any time with questions or concerning symptoms.    [facility] 21GRAMS  [phone] ([phone])  [email or web address]    For help in English, call  [phone].  People with hearing impairments may call  [phone] (VPY) Call 21GRAMS for:    Free, one-to-one counseling    A quitting plan    Successful quitting techniques    Information on medications    Support services from your health plan  You may call [facility] at any time. If there are no operators on duty, you may listen to a taped message or leave your name and number and an   will call you.  QUITPLAN hours are:  7 a.m. to 11 p.m. (Mon. thru Thurs.)  7 a.m. to 7 p.m. (Friday)  8 a.m. to 7 p.m. (Saturday/Sunday)     Source Program Program information   Clean Break Smokers  Treatment Program  [facility] [facility] [address]  [place], [place]  [phone] ([phone]  -call for program locations  [email or web address]  Treatment  Program    Unique cognitive approach    Individualized follow-up    Regular support Five two-hour classes over  eight days. Three-month  program follow-up. 5-day free  trial period. Alternative phone  classes for  disabled.  Payment plan for low income.  Regular fee is $420.     Source Program Program information   American Lung  Association ([facility]) [address]  [phone]  -OR-  [phone])    [facility] offers a wide variety of classes and services:    [place] from Smoking   on-line at www.lungusa.org   Eight-session program. Fee is $90    Audiotape program. Fee is $5    Self-help manual. Fee is $10    Free 24-hour web-based support.     Support groups for non-smokers  Nicotine Anonymous [phone]  Follows the model of Alcoholic Anonymous 12 steps  Program sites and times vary in [facility]. Call for more information.    The benefits of becoming a nonsmoker      Family, friends and co-workers won t be exposed to your second-hand smoke.    Your clothes, hair and fingers won t smell of tobacco.    You ll feel energized and have more stamina.    You ll eliminate the chance of causing a fire.    Chronic irritation of your throat will be reduced; your speaking voice may improve.    Shortness of breath and annoying cough will decrease.    You ll reduce your risk of developing an ulcer in your mouth.    Your children will be less likely to develop bronchitis and pneumonia.    You won t have to bear the 30  below zero temperature in the outdoor smoking section.    If you re a woman using birth control pills, you ll reduce your risk of stroke.    You ll have fewer colds (on average, nonsmokers have fewer colds).    You ll reduce your risk of disability and death from coronary heart disease and lung diseases,  such as cancer, chronic bronchitis and emphysema.    Your blood circulation will improve.    You ll save money (and have more to spend!) when you stop buying cigarettes.    You ll look and feel healthier and have a stronger sense of personal control.    For women, your risk of having a low birth weight infant will be reduced. Your baby s risk of  sudden infant  death syndrome (SIDS) will decrease.   Your senses of taste and smell will improve.    You ll reduce your risk of infertility.    You ll be less likely to develop deep lines around the corners of your mouth and eyes.    Your teeth won t be stained and yellow.    You ll reduce your risk for tooth loss due to periodontal disease (bone loss around your teeth).  Research shows that the tooth loss rate for males is three teeth for every 10 years of smoking.  The rate for females is one and one-half teeth for every 10 years of smoking.    Wounds will heal more quickly, and you ll recover from surgery faster than a smoker.

## 2021-06-05 NOTE — PROGRESS NOTES
ASSESSMENT/PLAN:  1. Herpes zoster without complication  gabapentin (NEURONTIN) 100 MG capsule   2. Skin lesion         This is a 65 yo female with painful rash on right posterior thoracic back (near T6-T8 distribution).  This is somewhat crusted now, but appears consistent with a resolving shingles rash.  Discussed etiology of this rash, discussed that it is too late to have effective relief from any acute intervention.  Focus now would be on neuralgia.  Will start Gabapentin - titrating up with medication.  Patient notes understanding of this ramping up of doses.     2.  Skin Lesion - on left arm - had previous biopsy/removal - patient tells me it was a melanoma.  No follow up.  We have no paperwork from specialist.  We requested this today and I reviewed it before patient left the clinic - it appears this was not a melanoma as identified by patient.  I would still recommend routine derm follow up (at least yearly for skin checks) for this patient - discussed at length.    Return in about 1 month (around 2/13/2020) for Recheck.      There are no discontinued medications.  There are no Patient Instructions on file for this visit.    Chief Complaint:  Chief Complaint   Patient presents with     poss shingles     Medication Refill       HPI:   Patsy Santamaria is a 64 y.o. female c/o  1.  Had skin cancer on left arm last July - ?melanoma - no recheck -   Now with dark spots on right face - worried about that  [facility] -   2.  Shingles - about T4 right side  X 1 week      PMH:   Patient Active Problem List    Diagnosis Date Noted     Airway obstruction due to foreign body, initial encounter      Pneumonia of right lower lobe due to infectious organism (H) 12/14/2019     Severe alcohol use disorder (H) 11/21/2019     Chronic obstructive pulmonary disease, unspecified COPD type (H) 10/30/2019     Dyspnea on exertion      Anxiety 03/25/2019     Hypomagnesemia 03/25/2019     Primary osteoarthritis of left knee  07/10/2018     Seasonal allergies 07/09/2018     Mild episode of recurrent major depressive disorder (H)      Alcoholic hepatitis      Peripheral edema      Diuretic-induced hypokalemia      Alcohol use disorder, severe, dependence (H) 05/07/2018     Primary osteoarthritis of right knee 03/21/2018     Alcohol withdrawal (H) 03/17/2018     Chronic alcohol dependence, continuous (H) 03/16/2018     Low backache 03/16/2018     Morbid obesity (H) 01/05/2018     Bilateral edema of lower extremity 06/28/2017     Snoring 06/28/2017     Carpal tunnel syndrome on both sides 03/06/2017     Trochanteric bursitis of left hip 10/25/2016     Alcohol withdrawal seizure without complication (H) 05/14/2016     Hypoxia 05/13/2016     Alcohol abuse 05/13/2016     Elevated LFTs 05/13/2016     New onset seizure (H) 05/12/2016     Claustrophobia 12/18/2015     Cervical disc disease 12/14/2015     Skin lesion 10/20/2015     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) 09/16/2015     PTSD (post-traumatic stress disorder) 02/13/2015     Chronic Reflux Esophagitis      Peripheral Neuropathy      Localized Primary Osteoarthritis Of The Carpometacarpal Joint      Ganglion Of The Right Foot      Major depression, recurrent (H)      Nicotine Dependence      Vitamin D deficiency      Past Medical History:   Diagnosis Date     Alcoholic cirrhosis (H)      Anxiety      Arthritis      Chronic alcohol dependence, continuous (H) 03/16/2018    inpatient 11/2019, sober since then     Chronic reflux esophagitis      COPD (chronic obstructive pulmonary disease) (H)      Depression      Dermatitis      Ganglion     right foot     H. pylori infection      Melanoma (H) 07/2019    left upper arm     Menopause     age 52     Obesity (BMI 35.0-39.9 without comorbidity)      Seizure (H) 2016    during alcohol withdrawal     Sleep apnea     mild, doesnt tolerate pap therapy     Past Surgical History:   Procedure Laterality Date     APPENDECTOMY        IN APPENDECTOMY      Description: Appendectomy;  Recorded: 2008;  Comments: childhood     IN  DELIVERY ONLY      Description:  Section;  Recorded: 2008;     IN EXCIS TENDN/CAPSULE LESN,FOOT      Description: Excision Of Cyst Of Tendon Sheath Of Foot;  Proc Date: 10/28/2011;  Comments: [facility]     IN HEMORRHOIDECTOMY INTERNAL RUBBER BAND LIGATIONS      Description: Hemorrhoidectomy;  Recorded: 2008;     IN KNEE SCOPE,DIAGNOSTIC      Description: Arthroscopy Knee Right;  Proc Date: 10/11/2005;  Comments: for right knee patella subluxation with lateral retinacular release; subpatellar chondroplasty     IN LATERAL RETINACULAR RELEASE OPEN      Description: Knee Lateral Retinacular Release;  Proc Date: 10/11/2005;     IN LIGATE FALLOPIAN TUBE      Description: Tubal Ligation;  Recorded: 2008;     SKIN BIOPSY Left 2019    left upper arm     TONSILLECTOMY       Social History     Socioeconomic History     Marital status: Single     Spouse name: Not on file     Number of children: Not on file     Years of education: Not on file     Highest education level: Not on file   Occupational History     Occupation: [employer]     Employer: [email or web address]     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[facility])   Social Needs     Financial resource strain: Not on file     Food insecurity:     Worry: Not on file     Inability: Not on file     Transportation needs:     Medical: Not on file     Non-medical: Not on file   Tobacco Use     Smoking status: Current Every Day Smoker     Packs/day: 1.00     Years: 49.00     Pack years: 49.00     Types: Cigarettes     Smokeless tobacco: Never Used     Tobacco comment: last 2019   Substance and Sexual Activity     Alcohol use: Not Currently     Comment: sober since 2019     Drug use: No     Sexual activity: Yes     Partners: Male     Birth control/protection: None   Lifestyle     Physical activity:     Days per week: Not on file      Minutes per session: Not on file     Stress: Not on file   Relationships     Social connections:     Talks on phone: Not on file     Gets together: Not on file     Attends Spiritism service: Not on file     Active member of club or organization: Not on file     Attends meetings of clubs or organizations: Not on file     Relationship status: Not on file     Intimate partner violence:     Fear of current or ex partner: Not on file     Emotionally abused: Not on file     Physically abused: Not on file     Forced sexual activity: Not on file   Other Topics Concern     Not on file   Social History Narrative     Not on file     Family History   Problem Relation Age of Onset     Heart disease Mother      Heart disease Father        Meds:    Current Outpatient Medications:      acamprosate (CAMPRAL) 333 mg tablet, Take 2 tablets (666 mg total) by mouth 3 (three) times a day., Disp: 180 tablet, Rfl: 0     albuterol (VENTOLIN HFA) 90 mcg/actuation inhaler, Inhale 2 puffs every 4 (four) hours as needed for wheezing., Disp: 18 g, Rfl: 3     ammonium lactate (AMLACTIN) 12 % cream, Apply 2 application topically 2 (two) times a day as needed. Apply 1-3 grams to each foot twice daily as needed, Disp: , Rfl: 11     benzonatate (TESSALON) 100 MG capsule, Take 1 capsule (100 mg total) by mouth 3 (three) times a day as needed for cough., Disp: 65 capsule, Rfl: 0     budesonide-formoterol (SYMBICORT) 80-4.5 mcg/actuation inhaler, Inhale 2 puffs 2 (two) times a day., Disp: 1 Inhaler, Rfl: 1     bumetanide (BUMEX) 1 MG tablet, Take 1 tablet (1 mg total) by mouth daily., Disp: 30 tablet, Rfl: 1     cetirizine (ZYRTEC) 10 MG tablet, Take 10 mg by mouth daily as needed for allergies., Disp: , Rfl:      diclofenac sodium (VOLTAREN) 1 % Gel, Apply 1 g topically 2 (two) times a day as needed (pain)., Disp: 100 g, Rfl: 0     DULoxetine (CYMBALTA) 60 MG capsule, Take 1 capsule (60 mg total) by mouth daily., Disp: 30 capsule, Rfl: 0      fluticasone (FLONASE) 50 mcg/actuation nasal spray, Apply 1 spray into each nostril daily as needed for rhinitis., Disp: , Rfl:      hydrOXYzine pamoate (VISTARIL) 25 MG capsule, Take 1 capsule (25 mg total) by mouth 4 (four) times a day as needed for anxiety., Disp: 65 capsule, Rfl: 0     ipratropium-albuterol (DUO-NEB) 0.5-2.5 mg/3 mL nebulizer, Take 3 mL by nebulization every 6 (six) hours as needed (wheezing, short of breath)., Disp: 90 mL, Rfl: 1     lidocaine 4 % patch, Place 1 patch on the skin daily as needed for pain. Remove and discard patch with 12 hours or as directed by MD., Disp: 30 patch, Rfl: 0     nicotine (NICOTROL) 10 mg inhaler, Inhale 1 puff as needed for smoking cessation., Disp: 168 each, Rfl: 11     omeprazole (PRILOSEC) 20 MG capsule, Take 1 capsule (20 mg total) by mouth at bedtime., Disp: 90 capsule, Rfl: 3     potassium chloride (K-DUR,KLOR-CON) 20 MEQ tablet, Take 1 tablet (20 mEq total) by mouth 2 (two) times a day., Disp: 60 tablet, Rfl: 1     traZODone (DESYREL) 50 MG tablet, Take 1 tablet (50 mg total) by mouth at bedtime as needed, may repeat once for sleep., Disp: 32 tablet, Rfl: 0     gabapentin (NEURONTIN) 100 MG capsule, Take 100 mg by mouth every morning  mg at bedtime., Disp: 120 capsule, Rfl: 1    Allergies:  Allergies   Allergen Reactions     Codeine Nausea Only     Hydrochlorothiazide Rash     phototoxicity - med was d/lora       Diclofenac Nausea Only     Tolerates the topical gel     Sulfa (Sulfonamide Antibiotics) Rash     Sulfasalazine Rash       ROS:  Pertinent positives as noted in HPI; otherwise 12 point ROS negative.      Physical Exam:  EXAM:  /89 (Patient Site: Left Arm, Patient Position: Sitting, Cuff Size: Adult Large)   Pulse (!) 102   Resp 16   Wt (!) 245 lb 14.4 oz (111.5 kg)   LMP 03/06/2002   BMI 42.21 kg/m     Gen:  NAD, appears well, well-hydrated  HEENT:  TMs nl, oropharynx benign, nasal mucosa nl, conjunctiva clear  Neck:  Supple, no  adenopathy, no thyromegaly, no carotid bruits, no JVD  Lungs:  Clear to auscultation bilaterally  Cor:  RRR no murmur  Abd:  Soft, nontender, BS+, no masses, no guarding or rebound, no HSM  Extr:  Neg.  Neuro:  No asymmetry  Skin:  Warm/dry; right posterior thoracic - about T6-T8 - crusting vesicular lesions - on a patch; also has odd looking previously biopsied lesion on left arm just proximal to lateral epicondyle        Results:

## 2021-06-05 NOTE — TELEPHONE ENCOUNTER
Received [facility]TM referral from Transition of Care     Patient was not reachable after several attempts, will route to MTM Pharmacist/Provider as an FYI. Left [facility]TM scheduling information on patients voicemail.    Thank you for the referral,  See [facility] Pharmacy Coordinator

## 2021-06-06 NOTE — TELEPHONE ENCOUNTER
Please connect with the patient regarding her withdrawal symptoms: shakes, tingling over her body, hot & cold chills, decreased appetite.      The patient went to the ER on Sunday 2.9.2020. Please reference notes.     The patient does have a return appt scheduled for 2.14.2020 w. Dr. Brunner.     Please connect when available.

## 2021-06-06 NOTE — TELEPHONE ENCOUNTER
Patient is calling back  She did go to ER this weekend  and was kept for 8 hrs and she said was shunned and sent home even though she could not walk. Stated she is drinking but only because she has to to avoid potential withdrawal issues., She is not feeling suicidal but wants to quit drinking and needs Dr Brunner's help. She said she did everything she was told and ended up being sent home [phone] pls advise

## 2021-06-06 NOTE — TELEPHONE ENCOUNTER
Called patient and she is calling central scheduling to make an appointment with Dr Brunner.  She said she will continue to drink to prevent withdrawal.  She has gone to ER, but she said keeps getting sent home.  She denies SI.  Patient was encouraged to go to ER if needing help with withdrawl or feeling unsafe.

## 2021-06-06 NOTE — TELEPHONE ENCOUNTER
Pt requesting a call back regarding some symptoms of a new medication she was prescribed while inpatient. Pt was started on Topiramate and states that ever since starting that medication she has been very shaky.

## 2021-06-06 NOTE — TELEPHONE ENCOUNTER
Talked with patient who stated that the Topamax was making her shake badly.  I offered her an appointment to see Dr. Brunner today but she stated that she was busy with other appointments.  She did stop taking the Topamax last night and will not resume this medication until she is seen in the office and can discuss this with Dr. Brunner at that time.

## 2021-06-06 NOTE — TELEPHONE ENCOUNTER
Spoke with patient and she said she has been trying to take vistaril and naltrexone to manage ETOH withdrawl, but it is not helping much.  She said in ER (2-9-20) she was given ativan which helped her withdrawal from ETOH and she is requesting a prescriptions of ativan until seen by Dr Brunner Friday 2-14-20.  Patient states she started drinking again on Tuesday to help with the shakes.  She denies SI or HI.  Patient was encourage to go to ER if she felt unsafe or experiencing withdrawal symptoms again for her safety.  She was informed that Dr. Brunner was out of the office today, but this would get sent to the doctor covering for her to address.

## 2021-06-06 NOTE — PROGRESS NOTES
Correct pharmacy verified with patient and confirmed in snapshot? [x] yes []no    Charge captured ? [x] yes  [] no    Medications Phoned  to Pharmacy [x] yes [x]no  Name of Pharmacist:Hebert  List Medications, including dose, quantity and instructions  Trazodone 50 mg  Take 1/2 tablet by mouth at bedtime, #15, refill 1    Medication Prescriptions given to patient   [] yes  [x] no   List the name of the drug the prescription was written for.       Medications ordered this visit were e-scribed.  Verified by order class [x] yes  [] no  Baclofen 10 mg  Hydroxyzine 25 mg    Medication changes or discontinuations were communicated to patient's pharmacy: [] yes  [x] no    UA collected [] yes  [x] no    [place] Prescription Monitoring Program Reviewed? [x] yes  [] no    Referrals were made to: none     Future appointment was made: [x] yes  [] no    Dictation completed at time of chart check: [] yes  [x] no    I have checked the documentation for today s encounters and the above information has been reviewed and completed.

## 2021-06-06 NOTE — TELEPHONE ENCOUNTER
VM left for patient :  Per Dr. Brunner's instructions, please call the patient and have her get on DR. Brunner's schedule for next Tuesday (3-) as patient missed phone visit today with Dr. Brunner.  Patient is aware this visit will be done via the phone.

## 2021-06-06 NOTE — PROGRESS NOTES
Correct pharmacy verified with patient and confirmed in snapshot? [x] yes []no    Charge captured ? [x] yes  [] no    Medications Phoned  to Pharmacy [] yes [x]no  Name of Pharmacist:  List Medications, including dose, quantity and instructions      Medication Prescriptions given to patient   [] yes  [x] no   List the name of the drug the prescription was written for.       Medications ordered this visit were e-scribed.  Verified by order class [x] yes  [] no  Baclofen 10 mg    Medication changes or discontinuations were communicated to patient's pharmacy: [] yes  [x] no    UA collected [] yes  [x] no    [place] Prescription Monitoring Program Reviewed? [x] yes  [] no    Referrals were made to: none     Future appointment was made: [x] yes  [] no    Dictation completed at time of chart check: [] yes  [x] no    I have checked the documentation for today s encounters and the above information has been reviewed and completed.

## 2021-06-06 NOTE — PROGRESS NOTES
Correct pharmacy verified with patient and confirmed in snapshot? [x] yes []no    Charge captured ? [x] yes  [] no    Medications Phoned  to Pharmacy [] yes [x]no  Name of Pharmacist:  List Medications, including dose, quantity and instructions      Medication Prescriptions given to patient   [] yes  [x] no   List the name of the drug the prescription was written for.       Medications ordered this visit were e-scribed.  Verified by order class [x] yes  [] no  Nicotrol 10 mg    Medication changes or discontinuations were communicated to patient's pharmacy: [] yes  [x] no    UA collected [x] yes  [] no    [place] Prescription Monitoring Program Reviewed? [x] yes  [] no    Referrals were made to:none      Future appointment was made: [x] yes  [] no    Dictation completed at time of chart check: [x] yes  [] no    I have checked the documentation for today s encounters and the above information has been reviewed and completed.

## 2021-06-06 NOTE — TELEPHONE ENCOUNTER
Please contact the patient and advise that she needs to go to the emergency room to be evaluated and managed for alcohol withdrawal with complications of alcohol withdrawal seizures in the past.  She does have multiple medical condition and is at high risk for detoxification at home.

## 2021-06-06 NOTE — TELEPHONE ENCOUNTER
Called patient and communicated Dr. Duron's directives.  Patient was agreeable to go to ER.  I asked her if she had a ride as she cannot drink and drive.  Patient said she only lives a half a block from [facility] so will walk over.

## 2021-06-07 NOTE — TELEPHONE ENCOUNTER
Medication Request  Medication name:    Disp  Refills  Start  End     cetirizine (ZYRTEC) 10 MG tablet         Sig - Route: Take 10 mg by mouth daily as needed for allergies. - Oral     Class: Historical Med       Requested Pharmacy: [facility]  Reason for request: Allergies  When did you use medication last?:  2 days ago  Patient offered appointment:  n/a  Okay to leave a detailed message: yes  [phone]      FYI: This medication is listed as an historical medication on the patient's current medication list. Patient stated that she's been buying OTC but has got very pricey and would like a prescription. Patient declined nurse triage.

## 2021-06-07 NOTE — PROGRESS NOTES
Medications Phoned  to Pharmacy [] yes [x]no  Name of Pharmacist:  List Medications, including dose, quantity and instructions    Medications ordered this visit were e-scribed.  Verified by order class [] yes  [x] no    Medication changes or discontinuations were communicated to patient's pharmacy: [] yes  [x] no    Future appointment was made: [] yes  [x] No    Dictation completed at time of chart check: [x] yes  [] no    I have checked the documentation for today s encounters and the above information has been reviewed and completed.

## 2021-06-07 NOTE — TELEPHONE ENCOUNTER
Prior Authorization Request  Who s requesting:  Pharmacy  Pharmacy Name and Location: [facility]  Medication Name: veramyst 27.5mcg/spray susp  Insurance Plan: Medicare A & B  Insurance Member ID Number:  [id number]  CoverMyMeds Key: N/A  Informed patient that prior authorizations can take up to 10 business days for response:   No  Okay to leave a detailed message: No

## 2021-06-07 NOTE — PROGRESS NOTES
"Patsy Santamaria is a 65 y.o. female who is being evaluated via a billable telephone visit.      The patient has been notified of following:     \"This telephone visit will be conducted via a call between you and your physician/provider. We have found that certain health care needs can be provided without the need for a physical exam.  This service lets us provide the care you need with a short phone conversation.  If a prescription is necessary we can send it directly to your pharmacy.  If lab work is needed we can place an order for that and you can then stop by our lab to have the test done at a later time.    Telephone visits are billed at different rates depending on your insurance coverage. During this emergency period, for some insurers they may be billed the same as an in-person visit.  Please reach out to your insurance provider with any questions.    If during the course of the call the physician/provider feels a telephone visit is not appropriate, you will not be charged for this service.\"    Patient has given verbal consent to a Telephone visit? Yes    What phone number would you like to be contacted at? [phone]     Patient would like to receive their AVS by AVS Preference: Mail a copy.    Sharee Cunningham LPN    Purpose of Call:    Since the patient's last clinical visit with me, she has not been using her CPAP machine because of an ill fitting mask. She needs a new prescription from me to get that mask.    Ideal Sleep-Wake Cycle(devoid of societal pressure):    Patient would try to initiate sleep at around 10-10:30PM with a sleep latency of variable length. The patient would have multiple awakenings. Final wake up time is around 10-12 in the morning.    Compliance Download data for 30 Days:  Pressure setting:APAP 6-10 cwp  Residual AHI:3.2 events per hour  Leak:Large  Compliance:zero  Mask Tolerance:Poor  Skin irritation:Yes    Assessment/Plan:  1. Obstructive sleep apnea     2. Hypersomnia        I " wrote for the patient to get a new mask from her DME. I would like her to increase her hours of usage and follow up with me annually.    I have reviewed the note as documented above.  This accurately captures the substance of my conversation with the patient.    Phone call contact time: 7 minutes    Sarmad Brown DO  Board Certified in Internal Medicine and Sleep Medicine    Patient verbalized understanding of these issues, agrees with the plan and all questions were answered today. Patient was given an opportuntity to voice any other symptoms or concerns not listed above. Patient did not have any other symptoms or concerns.

## 2021-06-07 NOTE — TELEPHONE ENCOUNTER
Date of Last Office Visit: 3/3/2020  Date of Next Office Visit: none (central scheduling will contact patient)  No shows since last visit: 1  Cancellations since last visit: 1 provider initiated  ED visits since last visit:  None    Medication Cymbalta 30 mg date last ordered: 2/2/2020  Qty: 90  Refills: 0    Lapse in therapy greater than 7 days: yes  Medication refill request verified as identical to current order: yes  Result of Last DAM, VPA, Li+ Level, CBC, or Carbamazepine Level (at or since last visit): n/a     [] Medication refilled per [facility] M-1.   [x] Medication unable to be refilled by RN due to criteria not met as indicated below:     []Eligibility - not seen in last year    []Supervision - no future appointment    [x]Compliance     []Verification - order discrepancy    []Controlled Medication    []Medication not included in RN Protocol    []90 - day supply request    []Other     Current Medication list:  Patsy Santamaria    (MRN [id number])   Your Current Medications Are     albuterol (ACCUNEB) 0.63 mg/3 mL nebulizer solution  Take 3 mL (0.63 mg total) by nebulization every 2 (two) hours as needed for wheezing.    albuterol (VENTOLIN HFA) 90 mcg/actuation inhaler  Inhale 2 puffs every 4 (four) hours as needed for wheezing.    ammonium lactate (AMLACTIN) 12 % cream  Apply 2 application topically 2 (two) times a day as needed. Apply 1-3 grams to each foot twice daily as needed    baclofen (LIORESAL) 10 MG tablet  Take 1 tablet (10 mg total) by mouth 3 (three) times a day.    budesonide-formoterol (SYMBICORT) 80-4.5 mcg/actuation inhaler  Inhale 2 puffs 2 (two) times a day.    bumetanide (BUMEX) 1 MG tablet  Take 1 tablet (1 mg total) by mouth daily.    cetirizine (ZYRTEC) 10 MG tablet  Take 10 mg by mouth daily as needed for allergies.    DULoxetine (CYMBALTA) 30 MG capsule  Take 3 capsules (90 mg total) by mouth daily.    fluticasone (FLONASE) 50 mcg/actuation nasal spray  Apply 1 spray into each nostril  daily as needed for rhinitis.    hydrOXYzine pamoate (VISTARIL) 25 MG capsule  Take 1 capsule (25 mg total) by mouth 4 (four) times a day as needed for anxiety.    naltrexone (DEPADE) 50 mg tablet  Take 100 mg by mouth daily.    nicotine (NICOTROL) 10 mg inhaler  Inhale 1 puff as needed for smoking cessation.    omeprazole (PRILOSEC) 20 MG capsule  Take 1 capsule (20 mg total) by mouth at bedtime.    potassium chloride (K-DUR,KLOR-CON) 20 MEQ tablet  Take 1 tablet (20 mEq total) by mouth 2 (two) times a day.    traZODone (DESYREL) 50 MG tablet  Take 0.5 tablets (25 mg total) by mouth at bedtime.        Medication Plan of Care at last office visit with MD/CNP:  Diagnoses/Plan:  1. Patient to continue on naltrexone 50 mg po daily, as well as baclofen 10 mg po three times a day. She was prescribed a 30-day supply of baclofen today to help with her alcohol use disorder.   2. Patient strongly encouraged to follow with her therapist, and reports she is open to doing so.   3. Patient also encouraged to continue follow-up with mobile SUDS and outpatient CD treatment.   4. We discussed close follow-up at this time, with patient to return to clinic in 2 weeks. If patient calls with issues, would consider increasing dose of baclofen to 20 mg po three times a day.   5. Urine toxicology is currently pending.

## 2021-06-07 NOTE — TELEPHONE ENCOUNTER
Printed script called into [facility]. Spoke to [facility] pharmacist. Cymbalta 30 mg, takes three times a day, #90, 0-RF. Verbal order read back to ensure accuracy.

## 2021-06-07 NOTE — TELEPHONE ENCOUNTER
Refill Approved    Rx renewed per Medication Renewal Policy. Medication was last renewed on 12/23/19.    Saima Hanks, [facility] Triage/Med Refill 4/6/2020     Requested Prescriptions   Pending Prescriptions Disp Refills     bumetanide (BUMEX) 1 MG tablet 30 tablet 1     Sig: Take 1 tablet (1 mg total) by mouth daily.       Diuretics/Combination Diuretics Refill Protocol  Passed - 4/3/2020  4:29 PM        Passed - Visit with PCP or prescribing provider visit in past 12 months     Last office visit with prescriber/PCP: 1/13/2020 Yanique Cali MD OR same dept: 1/13/2020 Yanique Cali MD OR same specialty: 1/13/2020 Yanique Cali MD  Last physical: 1/5/2018 Last MTM visit: Visit date not found   Next visit within 3 mo: Visit date not found  Next physical within 3 mo: Visit date not found  Prescriber OR PCP: Yanique Cali MD  Last diagnosis associated with med order: 1. Bilateral edema of lower extremity  - bumetanide (BUMEX) 1 MG tablet; Take 1 tablet (1 mg total) by mouth daily.  Dispense: 30 tablet; Refill: 1  - potassium chloride (K-DUR,KLOR-CON) 20 MEQ tablet; Take 1 tablet (20 mEq total) by mouth 2 (two) times a day.  Dispense: 60 tablet; Refill: 1    If protocol passes may refill for 12 months if within 3 months of last provider visit (or a total of 15 months).             Passed - Serum Potassium in past 12 months      Lab Results   Component Value Date    Potassium 4.0 02/18/2020             Passed - Serum Sodium in past 12 months      Lab Results   Component Value Date    Sodium 141 02/18/2020             Passed - Blood pressure on file in past 12 months     BP Readings from Last 1 Encounters:   02/21/20 122/88             Passed - Serum Creatinine in past 12 months      Creatinine   Date Value Ref Range Status   02/18/2020 0.65 0.60 - 1.10 mg/dL Final                potassium chloride (K-DUR,KLOR-CON) 20 MEQ tablet 60 tablet 1     Sig: Take 1  tablet (20 mEq total) by mouth 2 (two) times a day.       Potassium Supplements Refill Protocol Passed - 4/3/2020  4:29 PM        Passed - PCP or prescribing provider visit in past 12 months       Last office visit with prescriber/PCP: 1/13/2020 Yanique Cali MD OR same dept: 1/13/2020 Yanique Cali MD OR same specialty: 1/13/2020 Yanique Cali MD  Last physical: 1/5/2018 Last MTM visit: Visit date not found   Next visit within 3 mo: Visit date not found  Next physical within 3 mo: Visit date not found  Prescriber OR PCP: Yanique Cali MD  Last diagnosis associated with med order: 1. Bilateral edema of lower extremity  - bumetanide (BUMEX) 1 MG tablet; Take 1 tablet (1 mg total) by mouth daily.  Dispense: 30 tablet; Refill: 1  - potassium chloride (K-DUR,KLOR-CON) 20 MEQ tablet; Take 1 tablet (20 mEq total) by mouth 2 (two) times a day.  Dispense: 60 tablet; Refill: 1    If protocol passes may refill for 12 months if within 3 months of last provider visit (or a total of 15 months).             Passed - Potassium level in last 12 months     Lab Results   Component Value Date    Potassium 4.0 02/18/2020

## 2021-06-07 NOTE — TELEPHONE ENCOUNTER
Refill Request  Did you contact pharmacy: No  Medication name:   Requested Prescriptions     Pending Prescriptions Disp Refills     bumetanide (BUMEX) 1 MG tablet 30 tablet 1     Sig: Take 1 tablet (1 mg total) by mouth daily.     potassium chloride (K-DUR,KLOR-CON) 20 MEQ tablet 60 tablet 1     Sig: Take 1 tablet (20 mEq total) by mouth 2 (two) times a day.     Who prescribed the medication:   EJ WELLER  Requested Pharmacy: [facility] [address]  Is patient out of medication: Yes  Patient notified refills processed in 3 business days:  yes  Okay to leave a detailed message: yes

## 2021-06-07 NOTE — PROGRESS NOTES
This video/telephone visit will be conducted via a call between you and your physician/provider. We have found that certain health care needs can be provided without the need for an in-person physical exam. This service lets us provide the care you need with a video /telephone conversation. If a prescription is necessary we can send it directly to your pharmacy. If lab work is needed we can place an order for that and you can then stop by our lab to have the test done at a later time.    Just as we bill insurance for in-person visits, we also bill insurance for video/telephone visits. If you have questions about your insurance coverage, we recommend that you speak with your insurance company.    Patient has given verbal consent for video/Telephone visit? yes  CMA/LPN Kimberley GRAY    Patient verified allergies, medications and pharmacy via phone. PHQ : and ALISIA:  done verbally with writer. Patient states she is ready for visit.  PHQ-16  ALISIA-17    Nothing reported on [place]     ________________________________________  Medications Phoned  to Pharmacy [] yes [x]no  Name of Pharmacist:  List Medications, including dose, quantity and instructions    Medications ordered this visit were e-scribed.  Verified by order class [x] yes  [] no  Gabapentin 300 mg  Minipress 1 mg    Medication changes or discontinuations were communicated to patient's pharmacy: [] yes  [x] no    Dictation completed at time of chart check: [x] yes  [] no    I have checked the documentation for today s encounters and the above information has been reviewed and completed.

## 2021-06-07 NOTE — PROGRESS NOTES
"Patsy Santamaria is a 65 y.o. female who is being evaluated via a billable telephone visit.      The patient has been notified of following:     \"This telephone visit will be conducted via a call between you and your physician/provider. We have found that certain health care needs can be provided without the need for a physical exam.  This service lets us provide the care you need with a short phone conversation.  If a prescription is necessary we can send it directly to your pharmacy.  If lab work is needed we can place an order for that and you can then stop by our lab to have the test done at a later time.    Telephone visits are billed at different rates depending on your insurance coverage. During this emergency period, for some insurers they may be billed the same as an in-person visit.  Please reach out to your insurance provider with any questions.    If during the course of the call the physician/provider feels a telephone visit is not appropriate, you will not be charged for this service.\"    Patient has given verbal consent to a Telephone visit? Yes    Patient would like to receive their AVS by AVS Preference: Mail a copy.    Phone call duration: 11 minutes    Carlotta Wiggins CMA    Pulmonary Clinic Telephone Follow-up Visit    Assessment and Plan:   65 year old female with a history of longstanding and active tobacco dependence, alcohol dependence, PTSD, MELVI, h/o alcohol withdrawal seizure, H pylori gastritis, obesity, depression, bronchiectasis, peripheral neuropathy, GERD, osteoarthritis, vitamin D deficiency, presenting for telephone follow-up.     Tobacco dependence, bronchiectasis, MELVI: Occasional cough with phlegm. Has sinus congestion and drainage. Has cetirizine but has not refilled it yet. Her nasal fluticasone has . Doing some walking outside with no dyspnea. Some dyspnea with stairs. Using budesonide-formoterol two inhalations two times a day. Not needing nebulized " ipratropium-albuterol. Smoking about 0.5 ppd currently. Using nicotine inhaler, but with boredom with sheltering in place, it has been hard to quit. Nicotine patch gives nightmares. She is interested in trying bupropion. Has a support person calling every 2 weeks for alcohol dependence. She is going to call [facility] to get a nasal mask because the oronasal mask is too constricting; did not answer the phone for a recent sleep medicine telephone consultation for unclear reasons. Patient has been hospitalized repeatedly for respiratory failure in the past, though in at least one case she had been drinking alcohol and aspirated food and mucus, requiring bronchoscopy for airway clearance. Baseline FEV1/FVC ratio is nonobstructive, normal TLC and DLCO, no significant bronchodilator response. Obesity likely playing a significant role along with deconditioning.     Plan:  - start bupropion 150 mg two times a day  - continue use of nicotine inhaler  - quit smoking  - has telephone follow-up for alcohol dependence every 2 weeks; encouraged ongoing alcohol abstinence  - previously provided multiple smoking cessation resources including contact information for QUIT[employer]AN  - continue budesonide-formoterol 80-4.5 mcg two inhalations two times a day; rinse/gargle/spit water after use  - nebulized ipratropium-albuterol or albuterol HFA as needed; scheduled nebulized therapy would be ideal, but she prefers to use it as needed  - she will be contacting [facility] to change from an oronasal to a nasal mask to improve CPAP tolerance  - annual influenza vaccination  - received Pneumovax-23 today in January 2020; plan for Prevnar-13 at age 67 and booster of Pneumovax-23 in 2025  - follow up in 3 months  - encouraged her to call any time with questions or concerning symptoms    Vinay Paige MD  Pulmonary and Critical Care Medicine  [facility]  Cell [phone]  Office [phone]  Pager  [phone]    CCx: tobacco dependence, bronchiectasis, MELVI    HPI: 65 year old female with a history of longstanding and active tobacco dependence, alcohol dependence, PTSD, MELVI, h/o alcohol withdrawal seizure, H pylori gastritis, obesity, depression, bronchiectasis, peripheral neuropathy, GERD, osteoarthritis, vitamin D deficiency, presenting for telephone follow-up. Occasional cough with phlegm. Has sinus congestion and drainage. Has cetirizine but has not refilled it yet. Her nasal fluticasone has . Doing some walking outside with no dyspnea. Some dyspnea with stairs. Using budesonide-formoterol two inhalations two times a day. Not needing nebulized ipratropium-albuterol. Smoking about 0.5 ppd currently. Using nicotine inhaler, but with boredom with sheltering in place, it has been hard to quit. Nicotine patch gives nightmares. She is interested in trying bupropion. Has a support person calling every 2 weeks for alcohol dependence. She is going to call [facility] to get a nasal mask because the oronasal mask is too constricting; did not answer the phone for a recent sleep medicine telephone consultation for unclear reasons. Patient has been hospitalized repeatedly for respiratory failure in the past, though in at least one case she had been drinking alcohol and aspirated food and mucus, requiring bronchoscopy for airway clearance. Baseline FEV1/FVC ratio is nonobstructive, normal TLC and DLCO, no significant bronchodilator response. Obesity likely playing a significant role along with deconditioning.    ROS:  A 12-system review was obtained and was negative with the exception of the symptoms endorsed in the history of present illness.    PMH:  Past Medical History:   Diagnosis Date     Alcohol withdrawal seizure without complication (H) 2016     Alcoholic cirrhosis (H)      Anxiety      Arthritis      Chronic alcohol dependence, continuous (H) 2018    inpatient 2019, sober since  then     Chronic reflux esophagitis      COPD (chronic obstructive pulmonary disease) (H)      Depression      Dermatitis      Ganglion     right foot     H. pylori infection      Melanoma (H) 2019    left upper arm     Menopause     age 52     Obesity (BMI 35.0-39.9 without comorbidity)      Seizure (H) 2016    during alcohol withdrawal     Sleep apnea     mild, doesnt tolerate pap therapy       PSH:  Past Surgical History:   Procedure Laterality Date     APPENDECTOMY       MN APPENDECTOMY      Description: Appendectomy;  Recorded: 2008;  Comments: childhood     MN  DELIVERY ONLY      Description:  Section;  Recorded: 2008;     MN EXCIS TENDN/CAPSULE LESN,FOOT      Description: Excision Of Cyst Of Tendon Sheath Of Foot;  Proc Date: 10/28/2011;  Comments: [facility]     MN HEMORRHOIDECTOMY INTERNAL RUBBER BAND LIGATIONS      Description: Hemorrhoidectomy;  Recorded: 2008;     MN KNEE SCOPE,DIAGNOSTIC      Description: Arthroscopy Knee Right;  Proc Date: 10/11/2005;  Comments: for right knee patella subluxation with lateral retinacular release; subpatellar chondroplasty     MN LATERAL RETINACULAR RELEASE OPEN      Description: Knee Lateral Retinacular Release;  Proc Date: 10/11/2005;     MN LIGATE FALLOPIAN TUBE      Description: Tubal Ligation;  Recorded: 2008;     SKIN BIOPSY Left 2019    left upper arm     TONSILLECTOMY         Allergies:  Allergies   Allergen Reactions     Codeine Nausea Only     Hydrochlorothiazide Rash     phototoxicity - med was d/lora       Topamax [Topiramate]      Diclofenac Nausea Only     Tolerates the topical gel     Sulfa (Sulfonamide Antibiotics) Rash     Sulfasalazine Rash       Family HX:  Family History   Problem Relation Age of Onset     Heart disease Mother      Heart disease Father        Social Hx:  Social History     Socioeconomic History     Marital status: Single     Spouse name: Not on file     Number of children: Not  on file     Years of education: Not on file     Highest education level: Not on file   Occupational History     Occupation: lauren     Employer: [employer]     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[facility])   Social Needs     Financial resource strain: Not on file     Food insecurity     Worry: Not on file     Inability: Not on file     Transportation needs     Medical: Not on file     Non-medical: Not on file   Tobacco Use     Smoking status: Current Every Day Smoker     Packs/day: 1.00     Years: 49.00     Pack years: 49.00     Types: Cigarettes     Smokeless tobacco: Never Used     Tobacco comment: last 11/2019   Substance and Sexual Activity     Alcohol use: Not Currently     Comment: sober since 11/19/2019     Drug use: No     Sexual activity: Yes     Partners: Male     Birth control/protection: None   Lifestyle     Physical activity     Days per week: Not on file     Minutes per session: Not on file     Stress: Not on file   Relationships     Social connections     Talks on phone: Not on file     Gets together: Not on file     Attends Advent service: Not on file     Active member of club or organization: Not on file     Attends meetings of clubs or organizations: Not on file     Relationship status: Not on file     Intimate partner violence     Fear of current or ex partner: Not on file     Emotionally abused: Not on file     Physically abused: Not on file     Forced sexual activity: Not on file   Other Topics Concern     Not on file   Social History Narrative     Not on file       Current Meds:  Current Outpatient Medications   Medication Sig Dispense Refill     albuterol (ACCUNEB) 0.63 mg/3 mL nebulizer solution Take 3 mL (0.63 mg total) by nebulization every 2 (two) hours as needed for wheezing. 10 vial 0     albuterol (VENTOLIN HFA) 90 mcg/actuation inhaler Inhale 2 puffs every 4 (four) hours as needed for wheezing. 18 g 3     ammonium lactate (AMLACTIN) 12 % cream Apply 2 application topically 2  (two) times a day as needed. Apply 1-3 grams to each foot twice daily as needed  11     baclofen (LIORESAL) 10 MG tablet Take 1 tablet (10 mg total) by mouth 3 (three) times a day. 90 tablet 0     budesonide-formoterol (SYMBICORT) 80-4.5 mcg/actuation inhaler Inhale 2 puffs 2 (two) times a day. 1 Inhaler 1     bumetanide (BUMEX) 1 MG tablet Take 1 tablet (1 mg total) by mouth daily. 90 tablet 2     cetirizine (ZYRTEC) 10 MG tablet Take 1 tablet (10 mg total) by mouth daily as needed for allergies. 30 tablet 5     DULoxetine (CYMBALTA) 30 MG capsule Take 3 capsules (90 mg total) by mouth daily. 90 capsule 0     gabapentin (NEURONTIN) 300 MG capsule Use 1-2 tablets three times daily 60 capsule 0     hydrOXYzine pamoate (VISTARIL) 25 MG capsule Take 1 capsule (25 mg total) by mouth 4 (four) times a day as needed for anxiety. 120 capsule 1     naltrexone (DEPADE) 50 mg tablet Take 100 mg by mouth daily.       nicotine (NICOTROL) 10 mg inhaler Inhale 1 puff as needed for smoking cessation. 168 each 11     omeprazole (PRILOSEC) 20 MG capsule Take 1 capsule (20 mg total) by mouth at bedtime. 90 capsule 3     potassium chloride (K-DUR,KLOR-CON) 20 MEQ tablet Take 1 tablet (20 mEq total) by mouth 2 (two) times a day. 180 tablet 2     prazosin (MINIPRESS) 1 MG capsule Take 1 capsule (1 mg total) by mouth at bedtime. 30 capsule 0     traZODone (DESYREL) 50 MG tablet Take 0.5 tablets (25 mg total) by mouth at bedtime. 45 tablet 0     No current facility-administered medications for this visit.        Physical Exam:  No exam due to telephone visit    Labs:  reviewed    Imaging studies:  CTA C/A/P (January 2020):  - images directly reviewed, formal interpretation follows:  FINDINGS:   CT ANGIOGRAM CHEST, ABDOMEN, AND PELVIS: No thoracoabdominal aortic aneurysm nor dissection. 4 vessels arise from the aortic arch without significant stenosis. No central pulmonary embolus. Mild coronary artery calcifications.     The celiac, SMA  and ARIELLA are widely patent. Single right and 2 left renal arteries are widely patent. Mild aortoiliac atherosclerosis with no significant stenosis.     LUNGS AND PLEURA: Bilateral lower lobe tubular bronchiectasis. There is some peripheral endobronchial mucous plugging in the right lower lobe with some peripheral subsegmental atelectasis. No focal airspace consolidation. Trace right pleural effusion +/-   some mild pleural thickening.     MEDIASTINUM/AXILLAE: No adenopathy. No pericardial effusion.     HEPATOBILIARY: Diffuse hepatic steatosis. Faint density within the gallbladder lumen could represent sludge +/- stones. No bile duct dilatation.     PANCREAS: No significant mass, duct dilatation, or inflammatory change.     SPLEEN: Normal.     ADRENAL GLANDS: Normal.     KIDNEYS/BLADDER: No significant mass, stones, or hydronephrosis.     BOWEL: Diverticulosis in the colon. No acute inflammatory change. No obstruction.      LYMPH NODES: No lymphadenopathy.     PELVIC ORGANS: Myomatous uterus. Normal-appearing ovaries. No abnormal fluid collection.     OTHER: Tiny fat-containing paraumbilical and bilateral inguinal hernias.     MUSCULOSKELETAL: Degenerative changes lower lumbar spine. No suspicious osseous lesions.     IMPRESSION:   1.  CTA negative for thoracoabdominal aortic aneurysm/dissection and pulmonary embolus. No abnormality to account for patient's symptoms.  2.  Bilateral lower lobe bronchiectasis with some peripheral endobronchial mucous plugging and subsegmental atelectasis in the right lower lobe. Trace right pleural effusion +/- pleural thickening.  3.  Diffuse hepatic steatosis.  4.  Faint density within the gallbladder lumen could represent gallbladder sludge +/- stones. No bile duct dilatation.  5.  Colonic diverticulosis.    CXR (February 2020):  - images directly reviewed, formal interpretation follows:  IMPRESSION:   Linear scarring or atelectasis at the right base is unchanged. Lungs are otherwise  clear. Heart size and pulmonary vascularity are normal. No pneumothorax or pleural effusion. No bony fracture.     PFT's (11/26/19):  FEV1/FVC is 0.77 and is normal.  FEV1 is 1.82 L (76% predicted) and is reduced.  FVC is 2.36 L (78% predicted) and reduced.  There was no improvement in spirometry after a single inhaled dose of bronchodilator.  TLC is 4.54 L (93% predicted) and is normal.  RV is 2.22 L (115% predicted) and is normal.  RV/TLC is 0.49 and is increased.  DLCO is 103% predicted and is normal when it is corrected for hemoglobin.

## 2021-06-07 NOTE — PROGRESS NOTES
"Patsy Santamaria is a 65 y.o. female who is being evaluated via a billable telephone visit.      The patient has been notified of following:     \"This telephone visit will be conducted via a call between you and your physician/provider. We have found that certain health care needs can be provided without the need for a physical exam.  This service lets us provide the care you need with a short phone conversation.  If a prescription is necessary we can send it directly to your pharmacy.  If lab work is needed we can place an order for that and you can then stop by our lab to have the test done at a later time.    Telephone visits are billed at different rates depending on your insurance coverage. During this emergency period, for some insurers they may be billed the same as an in-person visit.  Please reach out to your insurance provider with any questions.    If during the course of the call the physician/provider feels a telephone visit is not appropriate, you will not be charged for this service.\"    Patient has given verbal consent to a Telephone visit? Yes    Patient would like to receive their AVS by AVS Preference: Mail a copy.    Patient did not answer the phone.  No visit was conducted.   "

## 2021-06-07 NOTE — TELEPHONE ENCOUNTER
Central PA team  [phone]  Pool: HE PA MED ([id number])          PA has been initiated.       PA form completed and faxed insurance via Cover My Meds     Key:  [id number]     Medication:  Flonase Sensimist    Insurance:  [employer]        Response will be received via fax and may take up to 5-10 business days depending on plan

## 2021-06-08 ENCOUNTER — OFFICE VISIT - HEALTHEAST (OUTPATIENT)
Dept: FAMILY MEDICINE | Facility: CLINIC | Age: 66
End: 2021-06-08

## 2021-06-08 DIAGNOSIS — J44.9 CHRONIC OBSTRUCTIVE PULMONARY DISEASE, UNSPECIFIED COPD TYPE (H): ICD-10-CM

## 2021-06-08 DIAGNOSIS — J43.9 COPD (CHRONIC OBSTRUCTIVE PULMONARY DISEASE) WITH EMPHYSEMA (H): ICD-10-CM

## 2021-06-08 DIAGNOSIS — J30.2 SEASONAL ALLERGIES: ICD-10-CM

## 2021-06-08 DIAGNOSIS — E66.01 MORBID OBESITY (H): ICD-10-CM

## 2021-06-08 ASSESSMENT — MIFFLIN-ST. JEOR: SCORE: 1683.17

## 2021-06-08 NOTE — TELEPHONE ENCOUNTER
Spoke to Patsy and her PCP Dr. Paige ordered the Wellbutrin for tobacco dependence.  She will report her symptoms to him and get a directive.

## 2021-06-08 NOTE — PROGRESS NOTES
Order for Durable Medical Equipment was processed and equipment ordered.     DME provider: [facility]    Date Faxed: 5/7/2020    Ordering Provider: Sarmad Brown DO    PAP Order Type: Mask/Supply order    [facility] Number[facility]

## 2021-06-08 NOTE — PATIENT INSTRUCTIONS - HE
Equipment Instructions    We will process your PAP order and send it to a Durable Medical Equipment (DME) provider.    The medical equipment company should call you within 7 days.  If you have not heard from the company, please contact them to see if they received your order and are planning to call you.    Please call us at [phone] if you are unable to contact the medical equipment company or if they do not have the order.    If you are starting a new PAP machine, please call us after you use it the first night to let us know how it went. This call also helps us know that you received your equipment and that everything is ready. Please use our central phone number [phone]    Contact information for [employer] company:    [facility] Tel: [phone]

## 2021-06-08 NOTE — PROGRESS NOTES
"Mental Health tele Visit Note    Patient: Patsy Santamaria    : 1955 MRN: [id number]    Date: 2020  Start time: 4:00pm   Stop Time: 4:40pm   Session # 1    The patient has been notified of the following:   \"We have found that certain health care needs can be provided without the need for a face to face visit.  This service lets us provide the care you need with a phone conversation.  I will have full access to your [facility] medical record during this entire phone call.   I will be taking notes for your medical record. Since this is like an office visit, we will bill your insurance company for this service.  There are potential benefits and risks of telephone visits (e.g. limits to patient confidentiality) that differ from in-person visits.?  Confidentiality still applies for telephone services, and nobody will record the visit.  It is important to be in a quiet, private space that is free of distractions (including cell phone or other devices) during the visit.?? If during the course of the call I believe a telephone visit is not appropriate, you will not be charged for this service\"  Consent has been obtained for this service by care team member: Yes, per verbal agreement   Session Type: Patient is participating in a telemedicine phone visit.  Patient does not know how to set up video visit due to lack of knowledge of technology.     Chief Complaint   Patient presents with      Follow Up     Psychotherapy follow-up visit for depression and alcohol dependency with a history of trauma     New symptoms or complaints: None reported    Functional Impairment:   Personal: 3  Family: 3  Work: 4  Social:2        ASSESSMENT: Current Emotional / Mental Status (status of significant symptoms):              Risk status (Self / Other harm or suicidal ideation)              Patient denies risks to personal safety              Patient denies current or recent suicidal ideation or behaviors.              Patient denies " "current or recent homicidal ideation or behaviors.              Patient denies current or recent self injurious behavior or ideation.              Patient denies other safety concerns.              Patient denies changes to risk factors (alcohol dependence; social isolation; unemployment)              Patient reports changes to protective factors including new male partner in her life              Recommended that patient call 911 or go to the local ED should there be a change in any of these risk factors.                Attitude:                                   Cooperative               Orientation:                             x3              Speech                          Rate / Production:       Normal                           Volume:                       Normal               Mood:                                      Irritable  Normal              Thought Content:                    Clear               Thought Form:                        Coherent  Logical               Insight:                                     Good     Patient's impression of their current status:   Patient reports feeling better physically. Patient would like to start walking again and building up strength. Patient reports trying to quit smoking but \"it's not happening.\" Patient reports occasionally drinking alcohol when feeling upset. Patient reports that her mood is up and down. Patient reports that she is with a new male partner about which she feels quite excited.    Patient does talk to SUDS person on the phone. Patient reports feeling slightly optimistic - \"I have been praying a lot and I think a change is coming.\" Patient states \"I'm not doing too bad.\"     Therapist impression of patients current state:   Patient's last visit occurred on 11/5/2019. Today's appointment was necessary to re-establish care and determine future care plans. Therapist and patient will update her treatment plan next visit. Patient requested that the " "therapist check-in to stay on track - \"I feel safer when I'm talking with you.\"    Type of psychotherapeutic technique provided: Client centered and CBT    Progress toward short term goals: Progress as expected, with patient initiating care after a gap in services    Review of long term goals:   Not done at today's visit  Treatment plan last updated on 10/15/2019  PHQ-9 on 5/18/2020 = 6   ALISIA-7 on 5/18/2020 = 8    Diagnosis:  1. Alcohol use disorder, severe, dependence (H)    2. Moderate episode of recurrent major depressive disorder (H)    3. PTSD (post-traumatic stress disorder)      Plan and Follow up:   May need updated standard DA - initial DA completed by this provider on 3/23/2017 -     Substance Use Disorder Treatment Comprehensive Assessment completed on 12/13/2019     Discharge Criteria/Planning: Client has chronic symptoms and ongoing therapy for maintenance stability recommended.    I have reviewed the note as documented above.  This accurately captures the substance of my conversation with the patient.  As the provider I attest to compliance with applicable laws and regulations related to telemedicine.  JULIO Mendoza    "

## 2021-06-08 NOTE — TELEPHONE ENCOUNTER
Pt called and has some questions regarding her wellbutrin.  She reports she took it yesterday but is reporting side effects and is wondering if she should continue taking it.  Experienced nausea, abdominal pain, and anxiety.  This has been prescribed to help her quit smoking.  Please call to discuss. She can be reached at [phone].

## 2021-06-08 NOTE — TELEPHONE ENCOUNTER
"RN Triage  Patsy calling today with complaint of cough and body aches.   Lives in a large building. She states her cough has been mucousy and worse than normal. She reports she still smokes frequently. She reports pain in her shoulders. She does report she feels short of breath at rest at times. She had chest pain \"the other day.\"     Given shortness of breath, worse than normal, I advised Patsy to be seen in ER. She agrees to this plan.    Kerri Koenig RN  [facility] Nurse Advisor    Reason for Disposition    HIGH RISK patient (e.g., age > 64 years, diabetes, heart or lung disease, weak immune system)    MODERATE difficulty breathing (e.g., speaks in phrases, SOB even at rest, pulse 100-120)    Protocols used: CORONAVIRUS (COVID-19) DIAGNOSED OR [id number] 4.22.20    COVID 19 Nurse Triage Plan/Patient Instructions    Please be aware that novel coronavirus (COVID-19) may be circulating in the community. If you develop symptoms such as fever, cough, or SOB or if you have concerns about the presence of another infection including coronavirus (COVID-19), please contact your health care provider or visit [email or web address].     Disposition/Instructions    Patient to go to ED and follow protocol based instructions. Follow System Ambulatory Workflow for COVID 19.     Bring Your Own Device:  Please also bring your smart device(s) (smart phones, tablets, laptops) and their charging cables for your personal use and to communicate with your care team during your visit.      Thank you for limiting contact with others, wearing a simple mask to cover your cough, practice good hand hygiene habits and accessing our virtual services where possible to limit the spread of this virus.    For more information about COVID19 and options for caring for yourself at home, please visit the CDC website at https://www.cdc.gov/coronavirus/2019-ncov/about/steps-when-sick.html  For more options for care at [facility], please visit " our website at [email or web address]    For more information, please use the [place] Department of Health COVID-19 Website: https://www.health.[facility].[place]/diseases/coronavirus/index.html  [place] Department of Health ([facility]) COVID-19 Hotlines (Interpreters available):      Health questions: Phone Number: [phone] or [phone] and Hours: 7 a.m. to 7 p.m.    Schools and  questions: Phone Number: [phone] or [phone] and Hours 7 a.m. to 7 p.m.

## 2021-06-08 NOTE — PROGRESS NOTES
This video/telephone visit will be conducted via a call between you and your physician/provider. We have found that certain health care needs can be provided without the need for an in-person physical exam. This service lets us provide the care you need with a video /telephone conversation. If a prescription is necessary we can send it directly to your pharmacy. If lab work is needed we can place an order for that and you can then stop by our lab to have the test done at a later time.    Just as we bill insurance for in-person visits, we also bill insurance for video/telephone visits. If you have questions about your insurance coverage, we recommend that you speak with your insurance company.    Patient has given verbal consent for a Telephone visit? yes  Patient would like the phone call to [phone]  Patient verified allergies, medications and pharmacy via phone. PHQ : 6 and ALISIA: 8 done verbally with writer. Patient states she is ready for visit.    Devi Vance, CMA

## 2021-06-09 NOTE — PROGRESS NOTES
Outpatient Mental Health Treatment Plan    Name:  Patsy Santamaria  :  1955  MRN:  [id number]    Treatment Plan:  Updated Treatment Plan  Intake/initial treatment plan date:    Intake: 3/23/2017  Initial treatment plan date: 2017  Benefit and risks and alternatives have been discussed: Yes  Is this treatment appropriate with minimal intrusion/restrictions: Yes  Estimated duration of treatment:  Approximately 5 sessions.  Anticipated frequency of services:  Every 2-3 weeks  Necessity for frequency: This frequency is needed to establish therapeutic goals and for continuity of care in order to monitor progress.  Necessity for treatment: To address cognitive, behavioral, and/or emotional barriers in order to work toward goals and to improve quality of life.    Session Type: Patient is presenting for an Individual session.via telephone due to COVID19    Plan:           ?   ? Anxiety    Goal:  Decrease average anxiety level from 3 to 2.   Strategies: ? [x]Learn and practice relaxation techniques and other coping strategies (e.g., thought stopping, reframing, meditation)     ? [x] Increase involvement in meaningful activities     ? [x] Discuss sleep hygiene     ? [x] Explore thoughts and expectations about self and others     ? [x] Identify and monitor triggers for panic/anxiety symptoms     ? [x] Implement physical activity routine (with physician approval)     ? [x] Consider introduction of bibliotherapy and/or videos     ? [x] Continue compliance with medical treatment plan (or explore barriers)   ?Degree to which this is a problem: 2  Degree to which goal is met: 2  Date of Review: 2020       ? Depression    Goal:  Decrease average depression level from 2 to 1.   Strategies:    ?[x] Decrease social isolation     [x] Increase involvement in meaningful activities     ?[x] Discuss sleep hygiene     ?[x] Explore thoughts and expectations about self and others     ?[x] Process grief (loss of significant  person, independence, role, etc.)     ?[x] Assess for suicide risk     ?[x] Implement physical activity routine (with physician approval)     [x] Consider introduction of bibliotherapy and/or videos     [x] Continue compliance with medical treatment plan (or explore barriers) ?  Degree to which this is a problem: 2.5  Degree to which goal is met: 2  Date of Review: September 2020    Substance use  Goal:  Maintain sobriety from alcohol use.   Strategies: ? [x] Participate in outpatient chemical dependency program (patient has intake appointment at [facility] on 6/21/19 at 10:30am)     ? [x] Discuss barriers to participating in AA or other peer-facilitated groups         [x] Address environmental factors which may interfere with sobriety     ? [x] Explore short-term versus long-term consequences of use     ? [x] Continue compliance with medical treatment plan (or explore barriers)  Degree to which this is a problem: 2  Degree to which goal is met: 3  Date of Review: September 2020       Functional Impairment:  1=Not at all/Rarely  2=Some days  3=Most Days  4=Every Day    Personal : 3  Family : 2  Social : 3   Work/school : 4    Diagnosis:  (EXAMPLE of DSM V: Major depressive disorder, recurrent, moderate; Generalized Anxiety disorder; borderline personality per patient PHI; fibromyalgia, History of breast cancer in remission; Problem with primary relationship.)   1. PTSD (post-traumatic stress disorder)    2. Alcohol use disorder, severe, dependence (H)    3. Mild episode of recurrent major depressive disorder (H)        Clinical assessments and measures completed:.   WHODAS 2.0 12-item version 17   Scores presented in qualifiers to represent level of disability.  MILD Problem (slight, low, ...) 5-24%  H1= 7  H2= 10  H3= 10     PHQ-9 = 4 (2/19/2019)  PHQ-9 = 25 (6/20/2019)  PHQ-9 = 7 (10/15/2019)  PHQ-9 = 6 (5/18/2020)     ALISIA-7 = 7 (2/19/2019)  ALISIA-7 = 19 (6/20/2019)  ALISIA-7 = 18 (9/25/2019)  ALISIA-7 = 8  (5/18/2020)    "  Strengths:  The patient is resourceful and articulate. She is a good advocate for her needs. She is very independent.   Limitations:  The patient often feels uncomfortable talking about her feelings and past problems. There are some barriers to obtaining employment. Patient is often isolated.  Cultural Considerations: The patient is a 64 year old  female with a history of complex trauma. She was born in [place]. [place] in [place] before relocating to [place] [facility] of [place] around age 10. She noted the difficult transition and shared that moving from a small town to a big city was a \"culture shock.\" Patient became pregnant at age 16 and has lived on her own ever since.    Persons responsible for this plan: Patient and Provider  Pt not able to sign due to virtual visit          Psychotherapist Signature           Patient Signature:              Guardian Signature             Provider: Performed and documented by JULIO Mendoza   Date:  7/7/2020      "

## 2021-06-09 NOTE — PROGRESS NOTES
________________________________________  Medications Phoned  to Pharmacy [] yes [x]N/A  Name of Pharmacist:  List Medications, including dose, quantity and instructions    Medications ordered this visit were e-scribed.  Verified by order class [x] yes  [] no    Medication changes or discontinuations were communicated to patient's pharmacy: [] yes  [x] N/A    Dictation completed at time of chart check: [x] yes  [] no    I have checked the documentation for today s encounters and the above information has been reviewed and completed.

## 2021-06-09 NOTE — PROGRESS NOTES
Patient was scheduled for a telephone therapy follow-up visit on 7/2/20 at 1:00pm. Patient informed therapist that she had company with her today so requested to reschedule. Therapist agreed to conduct a follow-up telephone visit next week instead.

## 2021-06-09 NOTE — PROGRESS NOTES
ASSESSMENT/PLAN:  1. Primary osteoarthritis of both knees  XR Knee Bilateral Plus [place] VW    Ambulatory referral to Orthopedics   2. Carpal tunnel syndrome on both sides  Ambulatory referral to Orthopedics   3. Pain of toe of right foot  Ambulatory referral to Podiatry   4. Cervical disc disease     5. Edema  furosemide (LASIX) 20 MG tablet    potassium chloride (KLOR-CON) 10 MEQ CR tablet    Basic Metabolic Panel       This is a 62-year-old female, seen today for several concerns    1.  Patient complains of bilateral knee pain.  X-rays were obtained, and would suggest osteoarthritis of bilateral knees.  There is some chronic appearing bone density perhaps from previous injury.  We will review radiologic review as well and see if the radiologist sees things we have missed.  I think the patient would benefit from orthopedic referral, and further discussion about conservative versus operative measurement.  2.  Patient has some numbness and tingling in her arms and pain that radiates into her hands, specifically on the thumb and forefinger.  I suspect that this is consistent with carpal tunnel syndrome.  Will refer to hand surgery for this.  3.  Patient has pain in the of her right foot.  Other than some redness from pressure and irritation, I do not see much here.  She feels that this is somewhat disabling to her.  We will refer to podiatry for further evaluation.  4.  She does have history of cervical disc disease, and this may attribute to her hand tingling, but again I am suspicious of carpal tunnel.  Will refer to hand for further evaluation probable EMG testing.  5.  Patient does have some lower extremity edema.  Feels that she is not doing well with the hydrochlorothiazide therapy and feels like she needs something stronger.  We will switch to furosemide therapy despite the fact that both hydrochlorothiazide and furosemide are listed as allergies.  She has had some intermittent rashes associated with these  "medications, none of that really corroborated and certainly not anaphylactic in nature.  I think it is reasonable to switch medications and follow.        Medications Discontinued During This Encounter   Medication Reason     escitalopram oxalate (LEXAPRO) 20 MG tablet Duplicate order     gabapentin (NEURONTIN) 300 MG capsule Non-compliance     omeprazole (PRILOSEC) 20 MG capsule Non-compliance     cholecalciferol, vitamin D3, 2,000 unit Tab Non-compliance     multivitamin therapeutic (THERAGRAN) tablet Non-compliance     triamterene-hydrochlorothiazide (MAXZIDE-25) 37.5-25 mg per tablet Alternate therapy     There are no Patient Instructions on file for this visit.    Chief Complaint:  Chief Complaint   Patient presents with     Numbness     pt c/o of numbness in arms and pain in her hands       HPI:   Patsy Santamaria is a 62 y.o. female c/o  Here for c/o numbness in arms and hands  When lifting - gets numb in hands  Right hand - pain at base of thumb - CMC arthritis - has had 2 cortisone shots - \"bone on bone\" - was told she may need surgery  Has osteoarthritis in knee - has had arthroscopic knee surgery in past   Needs medical rides  - due to knees - getting off/on buses is difficult  When sits for long periods of time, has stiffness  When moving, not too bad  If sits for more than an hour, \"I am so stiff\"  Not taking Gabapentin any more due to not helping; cancelled her medical assistance  Not taking Vitamin D  Stopped taking Nortriptyline  Taking Lexapro and water pill  When working, ankles swelled up so bad - even with water pill on board  Felt puffy and swollen    Quit working (started January 10 and quit January 28)  Couldn't do it - [place] 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; cooking, sweeping and mopping    Right middle finger hurts the most  Hasn't had any particular injury since she last worked  \"I know I've had a disc problem in my neck\" - had a cortisone shot in her neck  \"of course I went " "back to work then\"    Has a sore small toe - right foot  Doesn't know what's going on with this    Both hands are numb - but now, really just right hand  Numbness goes away - after getting up and flicking her hands  Never tested for carpal tunnel        PMH:   Patient Active Problem List    Diagnosis Date Noted     Carpal tunnel syndrome on both sides 2017     Trochanteric bursitis of left hip 10/25/2016     Alcohol withdrawal seizure without complication 2016     Hypoxia 2016     Alcohol abuse 2016     Elevated LFTs 2016     New onset seizure 2016     Claustrophobia 2015     Cervical disc disease 2015     COPD (chronic obstructive pulmonary disease) with emphysema 2015     Post traumatic stress disorder 2015     Chronic Reflux Esophagitis      Peripheral Neuropathy      Localized Primary Osteoarthritis Of The Carpometacarpal Joint      Ganglion Of The Right Foot      Major Depression, Recurrent      Nicotine Dependence      Vitamin D Deficiency      Past Medical History:   Diagnosis Date     Acute solar dermatitis      Anxiety      Chronic reflux esophagitis      Dermatitis      Ganglion     right foot     H. pylori infection      Menopause     age 52     Past Surgical History:   Procedure Laterality Date     CA APPENDECTOMY      Description: Appendectomy;  Recorded: 2008;  Comments: childhood     CA  DELIVERY ONLY      Description:  Section;  Recorded: 2008;     CA EXCIS TENDN/CAPSULE LESN,FOOT      Description: Excision Of Cyst Of Tendon Sheath Of Foot;  Proc Date: 10/28/2011;  Comments: [facility]     CA HEMORRHOIDECTOMY INTERNAL RUBBER BAND LIGATIONS      Description: Hemorrhoidectomy;  Recorded: 2008;     CA KNEE SCOPE,DIAGNOSTIC      Description: Arthroscopy Knee Right;  Proc Date: 10/11/2005;  Comments: for right knee patella subluxation with lateral retinacular release; subpatellar chondroplasty     CA " LATERAL RETINACULAR RELEASE OPEN      Description: Knee Lateral Retinacular Release;  Proc Date: 10/11/2005;     TX LIGATE FALLOPIAN TUBE      Description: Tubal Ligation;  Recorded: 09/01/2008;     Social History     Social History     Marital status: Single     Spouse name: N/A     Number of children: N/A     Years of education: N/A     Occupational History     [employer]     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)     Social History Main Topics     Smoking status: Current Every Day Smoker     Packs/day: 1.00     Types: Cigarettes     Smokeless tobacco: Not on file     Alcohol use Yes     Drug use: No     Sexual activity: Not on file     Other Topics Concern     Not on file     Social History Narrative       Meds:    Current Outpatient Prescriptions:      escitalopram oxalate (LEXAPRO) 20 MG tablet, Take 1 tablet (20 mg total) by mouth daily., Disp: 90 tablet, Rfl: 1     furosemide (LASIX) 20 MG tablet, Take 1 tablet (20 mg total) by mouth daily., Disp: 30 tablet, Rfl: 3     potassium chloride (KLOR-CON) 10 MEQ CR tablet, Take 1 tablet (10 mEq total) by mouth daily., Disp: 30 tablet, Rfl: 3    Allergies:  Allergies   Allergen Reactions     Codeine Nausea Only     Hydrochlorothiazide Rash     phototoxicity - med was d/lora       Sulfa (Sulfonamide Antibiotics)      Diclofenac Rash     Furosemide Rash       ROS:  Pertinent positives as noted in HPI; otherwise 12 point ROS negative.      Physical Exam:  EXAM:  Visit Vitals     /75 (Patient Site: Right Arm, Patient Position: Sitting, Cuff Size: Adult Large)     Pulse 76     Temp 97.8  F (36.6  C) (Oral)     Resp 20     Wt (!) 229 lb (103.9 kg)     LMP 03/06/2002     BMI 41.22 kg/m2      Gen:  NAD, appears well, well-hydrated  HEENT:  TMs nl, oropharynx benign, nasal mucosa nl, conjunctiva clear  Neck:  Supple, no adenopathy, no thyromegaly, no carotid bruits, no JVD  Lungs:  Clear to auscultation bilaterally  Cor:  RRR no murmur  Abd:  Soft, nontender,  BS+, no masses, no guarding or rebound, no HSM  Extr:  DJD deformities bilateral knees, tender to palpation at medial knees; neg Phalen/Tinel sign; bilateral LE edema 1+  Feet:  Small toe of right foot has redness at distal digit, but no callus  Neuro:  No asymmetry  Skin:  Warm/dry        Results:  Results for orders placed or performed in visit on 03/06/17   Basic Metabolic Panel   Result Value Ref Range    Sodium 141 136 - 145 mmol/L    Potassium 3.9 3.5 - 5.0 mmol/L    Chloride 102 98 - 107 mmol/L    CO2 29 22 - 31 mmol/L    Anion Gap, Calculation 10 5 - 18 mmol/L    Glucose 91 70 - 125 mg/dL    Calcium 9.5 8.5 - 10.5 mg/dL    BUN 13 8 - 22 mg/dL    Creatinine 0.65 0.60 - 1.10 mg/dL    GFR MDRD Af Amer >60 >60 mL/min/1.73m2    GFR MDRD Non Af Amer >60 >60 mL/min/1.73m2

## 2021-06-09 NOTE — TELEPHONE ENCOUNTER
Patsy calls with fever of 100 and  Headache and worsening cough.   Cough for about 2 weeks .  Today was overwhelming with cough. She was having difficulty due to the intense coughing today and gave herself a nebulizer treatment.  Her nephew just came back from [place] and has had interaction with him in recent days.  She has several  symptoms of coronavirus. Scheduled her for a telephone visit with Dr. Wade at 4:40 pm today. Patsy agrees to this plan.     Reason for Disposition    [1] Continuous (nonstop) coughing interferes with work or school AND [2] no improvement using cough treatment per protocol    Additional Information    Negative: SEVERE difficulty breathing (e.g., struggling for each breath, speaks in single words)    Negative: Difficult to awaken or acting confused (e.g., disoriented, slurred speech)    Negative: Bluish (or gray) lips or face now    Negative: Shock suspected (e.g., cold/pale/clammy skin, too weak to stand, low BP, rapid pulse)    Negative: Sounds like a life-threatening emergency to the triager    Negative: [1] COVID-19 exposure AND [2] no symptoms    Negative: COVID-19 and Breastfeeding, questions about    Negative: [1] Adult with possible COVID-19 symptoms AND [2] triager concerned about severity of symptoms or other causes    Negative: SEVERE or constant chest pain or pressure (Exception: mild central chest pain, present only when coughing)    Negative: MODERATE difficulty breathing (e.g., speaks in phrases, SOB even at rest, pulse 100-120)    Negative: Patient sounds very sick or weak to the triager    Negative: MILD difficulty breathing (e.g., minimal/no SOB at rest, SOB with walking, pulse <100)    Negative: Chest pain or pressure    Negative: Fever > 103 F (39.4 C)    Negative: [1] Fever > 101 F (38.3 C) AND [2] age > 60    Negative: [1] Fever > 100.0 F (37.8 C) AND [2] bedridden (e.g., nursing home patient, CVA, chronic illness, recovering from surgery)    Negative: HIGH RISK  patient (e.g., age > 64 years, diabetes, heart or lung disease, weak immune system)    Negative: Fever present > 3 days (72 hours)    Negative: [1] Fever returns after gone for over 24 hours AND [2] symptoms worse or not improved    Protocols used: CORONAVIRUS (COVID-19) DIAGNOSED OR [id number] 5.16.20

## 2021-06-09 NOTE — PROGRESS NOTES
"Patsy Santamaria is a 65 y.o. female who is being evaluated via a billable telephone visit.      The patient has been notified of following:     \"This telephone visit will be conducted via a call between you and your physician/provider. We have found that certain health care needs can be provided without the need for a physical exam.  This service lets us provide the care you need with a short phone conversation.  If a prescription is necessary we can send it directly to your pharmacy.  If lab work is needed we can place an order for that and you can then stop by our lab to have the test done at a later time.    Telephone visits are billed at different rates depending on your insurance coverage. During this emergency period, for some insurers they may be billed the same as an in-person visit.  Please reach out to your insurance provider with any questions.    If during the course of the call the physician/provider feels a telephone visit is not appropriate, you will not be charged for this service.\"    Patient has given verbal consent to a Telephone visit? Yes    What phone number would you like to be contacted at? [phone]    Patient would like to receive their AVS by AVS Preference: Mail a copy.    Additional provider notes:      ASSESSMENT/PLAN:  1. Suspected COVID-19 virus infection  Symptomatic COVID-19 Virus (CORONAVIRUS) PCR    codeine-guaiFENesin (GUAIFENESIN AC)  mg/5 mL liquid       This is a 66 yo female with cough, back pain (thought related to coughing).  She had cough x 2 weeks which she attributed to allergies, but today had acute worsening.  She used a neb with some relief earlier today.  She has no known exposure to COVID-19, but was with a nephew who had been in [place] a week ago, and lives in a 16-floor building.  She does have low grade fever.  It is certainly possible that she has COVID-19; it is also certainly possible that this is another viral respiratory infection vs more intense " "allergy symptoms.  I am willing to send some cough syrup (she tells me she does tolerate codeine in this version - we discussed that she cannot use this with Naltrexone ... she assures me that she rarely takes Naltrexone, using Gabapentin instead).  Will order COVID-19 testing as well at this time - she should anticipate a call from someone to schedule this.  If symptoms worsen more acutely, she should be seen more promptly.  She is agreeable.  We discussed social isolation/distancing while she is under investigation for COVID-19.   No follow-ups on file.      There are no discontinued medications.  There are no Patient Instructions on file for this visit.    Chief Complaint:  Chief Complaint   Patient presents with     COVID symptoms       HPI:   Patsy Santamaria is a 65 y.o. female c/o  Coughing and coughing  Cough x couple weeks - thought it was allergies  Today, couldn't lay down, couldn't stop  Did a neb today - that felt better -   Saw her grandson (he wasn't sick, but he did sneeze) the other day (he had been in [place] about a week ago) -     Had a little fever, but it's gone down, 99  Lives by self, but in a 16 floor building  Has been wearing mask    Back/ribs/shoulders hurt from coughing  Pain is what is getting her    Took some Vistaril to lessen the anxiety  Hasn't felt this bad in a long time     Has mucus \"stuck\" in her throat  Feels like she is being \"choked\" when she lays down  Coughed so hard today,      PMH:   Patient Active Problem List    Diagnosis Date Noted     Abdominal pain, epigastric      History of major depression 01/23/2020     Biliary colic      COPD exacerbation (H) 01/22/2020     Airway obstruction due to foreign body, initial encounter      Pneumonia of right lower lobe due to infectious organism 12/14/2019     Severe alcohol use disorder (H) 11/21/2019     Chronic obstructive pulmonary disease, unspecified COPD type (H) 10/30/2019     Dyspnea on exertion      Anxiety 03/25/2019     " Hypomagnesemia 03/25/2019     Primary osteoarthritis of left knee 07/10/2018     Seasonal allergies 07/09/2018     Mild episode of recurrent major depressive disorder (H)      Alcoholic hepatitis      Peripheral edema      Diuretic-induced hypokalemia      Alcohol use disorder, severe, dependence (H) 05/07/2018     Primary osteoarthritis of right knee 03/21/2018     Alcohol withdrawal syndrome without complication (H) 03/17/2018     Chronic alcohol dependence, continuous (H) 03/16/2018     Low backache 03/16/2018     Morbid obesity (H) 01/05/2018     Bilateral edema of lower extremity 06/28/2017     Snoring 06/28/2017     Carpal tunnel syndrome on both sides 03/06/2017     Trochanteric bursitis of left hip 10/25/2016     Hypoxia 05/13/2016     Alcohol abuse 05/13/2016     Elevated LFTs 05/13/2016     New onset seizure (H) 05/12/2016     Claustrophobia 12/18/2015     Cervical disc disease 12/14/2015     Skin lesion 10/20/2015     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) 09/16/2015     PTSD (post-traumatic stress disorder) 02/13/2015     Chronic Reflux Esophagitis      Peripheral Neuropathy      Localized Primary Osteoarthritis Of The Carpometacarpal Joint      Ganglion Of The Right Foot      Major depression, recurrent (H)      Nicotine Dependence      Vitamin D deficiency      Past Medical History:   Diagnosis Date     Alcohol withdrawal seizure without complication (H) 5/14/2016     Alcoholic cirrhosis (H)      Anxiety      Arthritis      Chronic alcohol dependence, continuous (H) 03/16/2018    inpatient 11/2019, sober since then     Chronic reflux esophagitis      COPD (chronic obstructive pulmonary disease) (H)      Depression      Dermatitis      Ganglion     right foot     H. pylori infection      Melanoma (H) 07/2019    left upper arm     Menopause     age 52     Obesity (BMI 35.0-39.9 without comorbidity)      Seizure (H) 2016    during alcohol withdrawal     Sleep apnea     mild,  doesnt tolerate pap therapy     Past Surgical History:   Procedure Laterality Date     APPENDECTOMY       MI APPENDECTOMY      Description: Appendectomy;  Recorded: 2008;  Comments: childhood     MI  DELIVERY ONLY      Description:  Section;  Recorded: 2008;     MI EXCIS TENDN/CAPSULE LESN,FOOT      Description: Excision Of Cyst Of Tendon Sheath Of Foot;  Proc Date: 10/28/2011;  Comments: [facility]     MI HEMORRHOIDECTOMY INTERNAL RUBBER BAND LIGATIONS      Description: Hemorrhoidectomy;  Recorded: 2008;     MI KNEE SCOPE,DIAGNOSTIC      Description: Arthroscopy Knee Right;  Proc Date: 10/11/2005;  Comments: for right knee patella subluxation with lateral retinacular release; subpatellar chondroplasty     MI LATERAL RETINACULAR RELEASE OPEN      Description: Knee Lateral Retinacular Release;  Proc Date: 10/11/2005;     MI LIGATE FALLOPIAN TUBE      Description: Tubal Ligation;  Recorded: 2008;     SKIN BIOPSY Left 2019    left upper arm     TONSILLECTOMY       Social History     Socioeconomic History     Marital status: Single     Spouse name: Not on file     Number of children: Not on file     Years of education: Not on file     Highest education level: Not on file   Occupational History     Occupation: [employer]     Employer: [employer]     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[facility])   Social Needs     Financial resource strain: Not on file     Food insecurity     Worry: Not on file     Inability: Not on file     Transportation needs     Medical: Not on file     Non-medical: Not on file   Tobacco Use     Smoking status: Current Every Day Smoker     Packs/day: 1.00     Years: 49.00     Pack years: 49.00     Types: Cigarettes     Smokeless tobacco: Never Used     Tobacco comment: last 2019   Substance and Sexual Activity     Alcohol use: Not Currently     Comment: sober since 2019     Drug use: No     Sexual activity: Yes     Partners: Male      Birth control/protection: None   Lifestyle     Physical activity     Days per week: Not on file     Minutes per session: Not on file     Stress: Not on file   Relationships     Social connections     Talks on phone: Not on file     Gets together: Not on file     Attends Restorationist service: Not on file     Active member of club or organization: Not on file     Attends meetings of clubs or organizations: Not on file     Relationship status: Not on file     Intimate partner violence     Fear of current or ex partner: Not on file     Emotionally abused: Not on file     Physically abused: Not on file     Forced sexual activity: Not on file   Other Topics Concern     Not on file   Social History Narrative     Not on file     Family History   Problem Relation Age of Onset     Heart disease Mother      Heart disease Father        Meds:    Current Outpatient Medications:      albuterol (ACCUNEB) 0.63 mg/3 mL nebulizer solution, Take 3 mL (0.63 mg total) by nebulization every 2 (two) hours as needed for wheezing., Disp: 10 vial, Rfl: 0     albuterol (VENTOLIN HFA) 90 mcg/actuation inhaler, Inhale 2 puffs every 4 (four) hours as needed for wheezing., Disp: 18 g, Rfl: 3     ammonium lactate (AMLACTIN) 12 % cream, Apply 2 application topically 2 (two) times a day as needed. Apply 1-3 grams to each foot twice daily as needed, Disp: , Rfl: 11     baclofen (LIORESAL) 10 MG tablet, Take 1 tablet (10 mg total) by mouth 3 (three) times a day., Disp: 90 tablet, Rfl: 0     budesonide-formoterol (SYMBICORT) 80-4.5 mcg/actuation inhaler, Inhale 2 puffs 2 (two) times a day., Disp: 1 Inhaler, Rfl: 1     bumetanide (BUMEX) 1 MG tablet, Take 1 tablet (1 mg total) by mouth daily., Disp: 90 tablet, Rfl: 2     cetirizine (ZYRTEC) 10 MG tablet, Take 1 tablet (10 mg total) by mouth daily as needed for allergies., Disp: 30 tablet, Rfl: 5     DULoxetine (CYMBALTA) 30 MG capsule, Take 3 capsules (90 mg total) by mouth daily., Disp: 90 capsule, Rfl:  0     gabapentin (NEURONTIN) 300 MG capsule, Use 1-2 tablets three times daily, Disp: 60 capsule, Rfl: 0     hydrOXYzine pamoate (VISTARIL) 25 MG capsule, Take 1 capsule (25 mg total) by mouth 4 (four) times a day as needed for anxiety., Disp: 120 capsule, Rfl: 1     naltrexone (DEPADE) 50 mg tablet, Take 100 mg by mouth daily., Disp: , Rfl:      nicotine (NICOTROL) 10 mg inhaler, Inhale 1 puff as needed for smoking cessation., Disp: 168 each, Rfl: 11     omeprazole (PRILOSEC) 20 MG capsule, Take 1 capsule (20 mg total) by mouth at bedtime., Disp: 90 capsule, Rfl: 3     potassium chloride (K-DUR,KLOR-CON) 20 MEQ tablet, Take 1 tablet (20 mEq total) by mouth 2 (two) times a day., Disp: 180 tablet, Rfl: 2     prazosin (MINIPRESS) 1 MG capsule, Take 1 capsule (1 mg total) by mouth at bedtime., Disp: 30 capsule, Rfl: 0     traZODone (DESYREL) 50 MG tablet, Take 0.5 tablets (25 mg total) by mouth at bedtime., Disp: 45 tablet, Rfl: 0     codeine-guaiFENesin (GUAIFENESIN AC)  mg/5 mL liquid, Take 5 mL by mouth every 6 (six) hours as needed for cough., Disp: 180 mL, Rfl: 0    Allergies:  Allergies   Allergen Reactions     Wellbutrin [Bupropion Hcl] Diarrhea     Codeine Nausea Only     Hydrochlorothiazide Rash     phototoxicity - med was d/lora       Topamax [Topiramate]      Diclofenac Nausea Only     Tolerates the topical gel     Sulfa (Sulfonamide Antibiotics) Rash     Sulfasalazine Rash       ROS:  Pertinent positives as noted in HPI; otherwise 12 point ROS negative.      Physical Exam:  EXAM:  LMP 03/06/2002      This is a telephone visit       Results:  Results for orders placed or performed in visit on 02/28/20   Ethyl Glucuronide and Ethyl Sulfate, Urine, Quantitative ([facility])   Result Value Ref Range    Ethyl Glucuronide, Urn, Quant 2010 ng/mL    Ethyl Sulfate, Urn, Quant 613 ng/mL       Phone call duration: 12 minutes  1659 - 1711

## 2021-06-09 NOTE — PROGRESS NOTES
Assessment/Plan:      Visit for Preoperative Exam.    1. Preop examination  Basic Metabolic Panel    Hemoglobin   2. Primary localized osteoarthrosis, hand, unspecified laterality     3. Carpal tunnel syndrome on both sides     4. COPD (chronic obstructive pulmonary disease) with emphysema     5. Edema           61 yo female with bilateral painful hands - scheduled for right CMC arthroplasty and right carpal tunnel repair.  Medically stable. Potassium slightly elevated - will have patient d/c potassium.  OK for surgery.      Subjective:     Scheduled Procedure: remove bone spur on right hand, carpal tunnel   Surgery Date:  04/11/2017  Surgery Location:  [facility] in [place]  Surgeon:  Dr. Olivarez    61 yo female with pain in right hand - off/on x years; worse in last year.  Now with swelling and pain at base of thumb.  Has been seen by Hand Surgeon and felt to be candidate for surgery - removal of bone spurs, and carpal tunnel repair.     Current Outpatient Prescriptions   Medication Sig Dispense Refill     escitalopram oxalate (LEXAPRO) 20 MG tablet Take 1 tablet (20 mg total) by mouth daily. 90 tablet 1     furosemide (LASIX) 20 MG tablet Take 1 tablet (20 mg total) by mouth daily. 30 tablet 3     potassium chloride (KLOR-CON) 10 MEQ CR tablet Take 1 tablet (10 mEq total) by mouth daily. 30 tablet 3     diclofenac (VOLTAREN) 75 MG EC tablet   1     omeprazole (PRILOSEC) 20 MG capsule   2     No current facility-administered medications for this visit.        Allergies   Allergen Reactions     Codeine Nausea Only     Hydrochlorothiazide Rash     phototoxicity - med was d/lora       Sulfa (Sulfonamide Antibiotics)      Diclofenac Rash       Immunization History   Administered Date(s) Administered     DT (pediatric) 01/01/2002     Influenza, inj, historic 10/17/2015     Influenza, seasonal,quad inj 6-35 mos 12/10/2010     Tdap 07/16/2008       Patient Active Problem List   Diagnosis     Chronic Reflux Esophagitis      Peripheral Neuropathy     Localized Primary Osteoarthritis Of The Carpometacarpal Joint     Ganglion Of The Right Foot     Major Depression, Recurrent     Nicotine Dependence     Vitamin D Deficiency     Post traumatic stress disorder     COPD (chronic obstructive pulmonary disease) with emphysema     Cervical disc disease     Claustrophobia     New onset seizure     Hypoxia     Alcohol abuse     Elevated LFTs     Alcohol withdrawal seizure without complication     Trochanteric bursitis of left hip     Carpal tunnel syndrome on both sides       Past Medical History:   Diagnosis Date     Acute solar dermatitis      Anxiety      Chronic reflux esophagitis      Dermatitis      Ganglion     right foot     H. 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[employer])     Social History Main Topics     Smoking status: Current Every Day Smoker     Packs/day: 1.00     Types: Cigarettes     Smokeless tobacco: Not on file     Alcohol use Yes     Drug use: No     Sexual activity: Not on file     Other Topics Concern     Not on file     Social History Narrative       Past Surgical History:   Procedure Laterality Date     NC APPENDECTOMY      Description: Appendectomy;  Recorded: 2008;  Comments: childhood     NC  DELIVERY ONLY      Description:  Section;  Recorded: 2008;     NC EXCIS TENDN/CAPSULE LESN,FOOT      Description: Excision Of Cyst Of Tendon Sheath Of Foot;  Proc Date: 10/28/2011;  Comments: [facility]     NC HEMORRHOIDECTOMY INTERNAL RUBBER BAND LIGATIONS      Description: Hemorrhoidectomy;  Recorded: 2008;     NC KNEE SCOPE,DIAGNOSTIC      Description: Arthroscopy Knee Right;  Proc Date: 10/11/2005;  Comments: for right knee patella subluxation with  "lateral retinacular release; subpatellar chondroplasty     IN LATERAL RETINACULAR RELEASE OPEN      Description: Knee Lateral Retinacular Release;  Proc Date: 10/11/2005;     IN LIGATE FALLOPIAN TUBE      Description: Tubal Ligation;  Recorded: 09/01/2008;       History of Present Illness  Recent Health  Fever: no  Chills: no  Fatigue: yes  Chest Pain: no  Cough: no  Dyspnea: yes when climbing stairs  Urinary Frequency: no  Nausea: no  Vomiting: no  Diarrhea: no  Abdominal Pain: no  Easy Bruising: yes  Lower Extremity Swelling: yes  Poor Exercise Tolerance: yes    Most recent Health Maintenance Visit:  6 month(s) ago    Pertinent History  Prior Anesthesia: yes  Previous Anesthesia Reaction:  no  Diabetes: no  Cardiovascular Disease: no  Pulmonary Disease: yes  Renal Disease: no  GI Disease: no  Sleep Apnea: no  Thromboembolic Problems: no  Clotting Disorder: no  Bleeding Disorder: no  Transfusion Reaction: no  Impaired Immunity: no  Steroid use in the last 6 months: no  Frequent Aspirin use: no    Family history of MI, Aneurysm, sudden death, clotting disorder and bleeding disorder   Family History   Problem Relation Age of Onset     Heart disease Mother      Heart disease Father          Social history of patient wears dentures or partial plates, there is no transfusion refusal, NSAID use and there are no concerns regarding care after surgery    After surgery, the patient plans to recover at home with home care.    Review of Systems  Pertinent positives as noted in HPI; otherwise 12 point ROS negative.              Objective:         Vitals:    04/04/17 1325   BP: 100/56   Pulse: 76   Resp: 16   Temp: 98  F (36.7  C)   TempSrc: Oral   Weight: (!) 234 lb 9.6 oz (106.4 kg)   Height: 5' 3.75\" (1.619 m)       Physical Exam:  EXAM:  /56  Pulse 76  Temp 98  F (36.7  C) (Oral)   Resp 16  Ht 5' 3.75\" (1.619 m)  Wt (!) 234 lb 9.6 oz (106.4 kg)  LMP 03/06/2002  BMI 40.59 kg/m2   Gen:  NAD, appears well, " well-hydrated, morbid obesity  HEENT:  TMs nl, oropharynx benign, nasal mucosa nl, conjunctiva clear  Neck:  Supple, no adenopathy, no thyromegaly, no carotid bruits, no JVD  Lungs:  Clear to auscultation bilaterally  Cor:  RRR no murmur  Abd:  Soft, nontender, BS+, no masses, no guarding or rebound, no HSM  Extr:  1st CMC joint swelling/tenderness right hand, decreased ROM of right thumb  Neuro:  No asymmetry  Skin:  Warm/dry          Results for orders placed or performed in visit on 04/04/17   Basic Metabolic Panel   Result Value Ref Range    Sodium 143 136 - 145 mmol/L    Potassium 5.1 (H) 3.5 - 5.0 mmol/L    Chloride 106 98 - 107 mmol/L    CO2 27 22 - 31 mmol/L    Anion Gap, Calculation 10 5 - 18 mmol/L    Glucose 99 70 - 125 mg/dL    Calcium 9.4 8.5 - 10.5 mg/dL    BUN 8 8 - 22 mg/dL    Creatinine 0.73 0.60 - 1.10 mg/dL    GFR MDRD Af Amer >60 >60 mL/min/1.73m2    GFR MDRD Non Af Amer >60 >60 mL/min/1.73m2   Hemoglobin   Result Value Ref Range    Hemoglobin 14.1 12.0 - 16.0 g/dL

## 2021-06-09 NOTE — PROGRESS NOTES
"Patsy Santamaria is a 65 y.o. female who is being evaluated via a billable telephone visit.      The patient has been notified of following:     \"This telephone visit will be conducted via a call between you and your physician/provider. We have found that certain health care needs can be provided without the need for a physical exam.  This service lets us provide the care you need with a short phone conversation.  If a prescription is necessary we can send it directly to your pharmacy.  If lab work is needed we can place an order for that and you can then stop by our lab to have the test done at a later time.    Telephone visits are billed at different rates depending on your insurance coverage. During this emergency period, for some insurers they may be billed the same as an in-person visit.  Please reach out to your insurance provider with any questions.    If during the course of the call the physician/provider feels a telephone visit is not appropriate, you will not be charged for this service.\"    Patient has given verbal consent to a Telephone visit? Yes    What phone number would you like to be contacted at? [phone]    Patient would like to receive their AVS by AVS Preference: Mail a copy.    Phone call duration: 10 minutes    Dianelys Regalado LPN    Pulmonary Clinic Follow-up Visit    Assessment and Plan:   65 year old female with a history of longstanding and active tobacco dependence, alcohol dependence, PTSD, MELVI, h/o alcohol withdrawal seizure, H pylori gastritis, obesity, depression, bronchiectasis, peripheral neuropathy, GERD, osteoarthritis, vitamin D deficiency, presenting for telephone follow-up.     Tobacco dependence, bronchiectasis, MELVI, obesity, possible asthma:  Humidity causing worse dyspnea. Otherwise no significant changes. Does not go out when it is hot and humid; has not been exercising much. Still has not been able to change the CPAP mask from oronasal to nasal; plans to contact " [facility]. Stopped bupropion due to lightheadedness, nausea, pruritus, diarrhea. Using nicotine inhaler and trying to cut back. Still smoking 0.5 ppd most days, some days no cigarettes at all. Using budesonide-formoterol regularly. She uses nebulized ipratropium-albuterol as needed rather than scheduled; has been using it four times a day when congested or chest heaviness, otherwise does not use it. Social distancing and wearing mask in public. Had nightmares with higher dose nicotine patch in the hospital but willing to try the 7-mg dose. Her son and other people in the building smoke around her in the back near the garden. Recall that she has been hospitalized repeatedly for respiratory failure in the past, though in at least one case she had been drinking alcohol and aspirated food and mucus, requiring bronchoscopy for airway clearance. Baseline FEV1/FVC ratio is nonobstructive, normal TLC and DLCO, no significant bronchodilator response. Obesity likely playing a significant role along with deconditioning.     Plan:  - bupropion stopped due to side effects  - start nicotine 7-mg patch  - continue use of nicotine inhaler  - again advised to quit smoking  - advised to avoid those smoking outside in her building and to encourage her son to quit smoking  - previously provided multiple smoking cessation resources including contact information for [employer]  - continue budesonide-formoterol 80-4.5 mcg two inhalations two times a day; rinse/gargle/spit water after use  - nebulized ipratropium-albuterol or albuterol HFA as needed; scheduled nebulized PEDRO-WALTER therapy would be ideal, but she prefers to use it as needed  - she will be contacting [facility] to change from an oronasal to a nasal mask to improve CPAP tolerance  - annual influenza vaccination  - received Pneumovax-23 today in January 2020; plan for Prevnar-13 at age 67 and booster of Pneumovax-23 in 2025  - follow up in 6  months  - encouraged her to call any time with questions or concerning symptoms    I appreciate the opportunity to participate in the care of Ms. Santamaria.  Please call any time if needed.    Vinay Paige MD  Pulmonary and Critical Care Medicine  [facility]  Cell [phone]  Office [phone]  Pager [phone]    CCx: tobacco dependence, bronchiectasis, MELVI, obesity, possible asthma    HPI: 65 year old female with a history of longstanding and active tobacco dependence, alcohol dependence, PTSD, MELVI, h/o alcohol withdrawal seizure, H pylori gastritis, obesity, depression, bronchiectasis, peripheral neuropathy, GERD, osteoarthritis, vitamin D deficiency, presenting for telephone follow-up. Humidity causing worse dyspnea. Otherwise no significant changes. Does not go out when it is hot and humid; has not been exercising much. Still has not been able to change the CPAP mask from oronasal to nasal; plans to contact [facility]. Stopped bupropion due to lightheadedness, nausea, pruritus, diarrhea. Using nicotine inhaler and trying to cut back. Still smoking 0.5 ppd most days, some days no cigarettes at all. Using budesonide-formoterol regularly. She uses nebulized ipratropium-albuterol as needed rather than scheduled; has been using it four times a day when congested or chest heaviness, otherwise does not use it. Social distancing and wearing mask in public. Had nightmares with higher dose nicotine patch in the hospital but willing to try the 7-mg dose. Her son and other people in the building smoke around her in the back near the garden. Recall that she has been hospitalized repeatedly for respiratory failure in the past, though in at least one case she had been drinking alcohol and aspirated food and mucus, requiring bronchoscopy for airway clearance. Baseline FEV1/FVC ratio is nonobstructive, normal TLC and DLCO, no significant bronchodilator response. Obesity likely playing a  significant role along with deconditioning.    ROS:  A 12-system review was obtained and was negative with the exception of the symptoms endorsed in the history of present illness.    PMH:  Past Medical History:   Diagnosis Date     Alcohol withdrawal seizure without complication (H) 2016     Alcoholic cirrhosis (H)      Anxiety      Arthritis      Chronic alcohol dependence, continuous (H) 2018    inpatient 2019, sober since then     Chronic reflux esophagitis      Depression      Dermatitis      Ganglion     right foot     H. pylori infection      Melanoma (H) 2019    left upper arm     Menopause     age 52     Obesity (BMI 35.0-39.9 without comorbidity)      Seizure (H) 2016    during alcohol withdrawal     Sleep apnea     mild, doesnt tolerate pap therapy       PSH:  Past Surgical History:   Procedure Laterality Date     APPENDECTOMY       OK APPENDECTOMY      Description: Appendectomy;  Recorded: 2008;  Comments: childhood     OK  DELIVERY ONLY      Description:  Section;  Recorded: 2008;     OK EXCIS TENDN/CAPSULE LESN,FOOT      Description: Excision Of Cyst Of Tendon Sheath Of Foot;  Proc Date: 10/28/2011;  Comments: [facility]     OK HEMORRHOIDECTOMY INTERNAL RUBBER BAND LIGATIONS      Description: Hemorrhoidectomy;  Recorded: 2008;     OK KNEE SCOPE,DIAGNOSTIC      Description: Arthroscopy Knee Right;  Proc Date: 10/11/2005;  Comments: for right knee patella subluxation with lateral retinacular release; subpatellar chondroplasty     OK LATERAL RETINACULAR RELEASE OPEN      Description: Knee Lateral Retinacular Release;  Proc Date: 10/11/2005;     OK LIGATE FALLOPIAN TUBE      Description: Tubal Ligation;  Recorded: 2008;     SKIN BIOPSY Left 2019    left upper arm     TONSILLECTOMY         Allergies:  Allergies   Allergen Reactions     Wellbutrin [Bupropion Hcl] Diarrhea     Codeine Nausea Only     Hydrochlorothiazide Rash      phototoxicity - med was d/lora       Topamax [Topiramate]      Diclofenac Nausea Only     Tolerates the topical gel     Sulfa (Sulfonamide Antibiotics) Rash     Sulfasalazine Rash       Family HX:  Family History   Problem Relation Age of Onset     Heart disease Mother      Heart disease Father        Social Hx:  Social History     Socioeconomic History     Marital status: Single     Spouse name: Not on file     Number of children: Not on file     Years of education: Not on file     Highest education level: Not on file   Occupational History     Occupation: [employer]     Employer: [employer]     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[facility])   Social Needs     Financial resource strain: Not on file     Food insecurity     Worry: Not on file     Inability: Not on file     Transportation needs     Medical: Not on file     Non-medical: Not on file   Tobacco Use     Smoking status: Current Every Day Smoker     Packs/day: 1.00     Years: 49.00     Pack years: 49.00     Types: Cigarettes     Smokeless tobacco: Never Used     Tobacco comment: last 11/2019   Substance and Sexual Activity     Alcohol use: Not Currently     Comment: sober since 11/19/2019     Drug use: No     Sexual activity: Yes     Partners: Male     Birth control/protection: None   Lifestyle     Physical activity     Days per week: Not on file     Minutes per session: Not on file     Stress: Not on file   Relationships     Social connections     Talks on phone: Not on file     Gets together: Not on file     Attends [employer] service: Not on file     Active member of club or organization: Not on file     Attends meetings of clubs or organizations: Not on file     Relationship status: Not on file     Intimate partner violence     Fear of current or ex partner: Not on file     Emotionally abused: Not on file     Physically abused: Not on file     Forced sexual activity: Not on file   Other Topics Concern     Not on file   Social History Narrative     Not on  file       Current Meds:  Current Outpatient Medications   Medication Sig Dispense Refill     albuterol (ACCUNEB) 0.63 mg/3 mL nebulizer solution Take 3 mL (0.63 mg total) by nebulization every 2 (two) hours as needed for wheezing. 10 vial 0     albuterol (VENTOLIN HFA) 90 mcg/actuation inhaler Inhale 2 puffs every 4 (four) hours as needed for wheezing. 18 g 3     ammonium lactate (AMLACTIN) 12 % cream Apply 2 application topically 2 (two) times a day as needed. Apply 1-3 grams to each foot twice daily as needed  11     baclofen (LIORESAL) 10 MG tablet Take 1 tablet (10 mg total) by mouth 3 (three) times a day. 90 tablet 0     budesonide-formoterol (SYMBICORT) 80-4.5 mcg/actuation inhaler Inhale 2 puffs 2 (two) times a day. 1 Inhaler 1     bumetanide (BUMEX) 1 MG tablet Take 1 tablet (1 mg total) by mouth daily. 90 tablet 2     cetirizine (ZYRTEC) 10 MG tablet Take 1 tablet (10 mg total) by mouth daily as needed for allergies. 30 tablet 5     DULoxetine (CYMBALTA) 30 MG capsule Take 3 capsules (90 mg total) by mouth daily. 90 capsule 0     gabapentin (NEURONTIN) 300 MG capsule Use 1-2 tablets three times daily 60 capsule 0     hydrOXYzine pamoate (VISTARIL) 25 MG capsule Take 1 capsule (25 mg total) by mouth 4 (four) times a day as needed for anxiety. 120 capsule 1     nicotine (NICOTROL) 10 mg inhaler Inhale 1 puff as needed for smoking cessation. 168 each 11     omeprazole (PRILOSEC) 20 MG capsule Take 1 capsule (20 mg total) by mouth at bedtime. 90 capsule 3     potassium chloride (K-DUR,KLOR-CON) 20 MEQ tablet Take 1 tablet (20 mEq total) by mouth 2 (two) times a day. 180 tablet 2     traZODone (DESYREL) 50 MG tablet Take 0.5 tablets (25 mg total) by mouth at bedtime. 45 tablet 0     naltrexone (DEPADE) 50 mg tablet Take 100 mg by mouth daily.       No current facility-administered medications for this visit.        Physical Exam:  no exam due to virtual visit    Labs:  reviewed    Imaging studies:  CTA C/A/P  (January 2020):  - images directly reviewed, formal interpretation follows:  FINDINGS:   CT ANGIOGRAM CHEST, ABDOMEN, AND PELVIS: No thoracoabdominal aortic aneurysm nor dissection. 4 vessels arise from the aortic arch without significant stenosis. No central pulmonary embolus. Mild coronary artery calcifications.     The celiac, SMA and ARIELLA are widely patent. Single right and 2 left renal arteries are widely patent. Mild aortoiliac atherosclerosis with no significant stenosis.     LUNGS AND PLEURA: Bilateral lower lobe tubular bronchiectasis. There is some peripheral endobronchial mucous plugging in the right lower lobe with some peripheral subsegmental atelectasis. No focal airspace consolidation. Trace right pleural effusion +/-   some mild pleural thickening.     MEDIASTINUM/AXILLAE: No adenopathy. No pericardial effusion.     HEPATOBILIARY: Diffuse hepatic steatosis. Faint density within the gallbladder lumen could represent sludge +/- stones. No bile duct dilatation.     PANCREAS: No significant mass, duct dilatation, or inflammatory change.     SPLEEN: Normal.     ADRENAL GLANDS: Normal.     KIDNEYS/BLADDER: No significant mass, stones, or hydronephrosis.     BOWEL: Diverticulosis in the colon. No acute inflammatory change. No obstruction.      LYMPH NODES: No lymphadenopathy.     PELVIC ORGANS: Myomatous uterus. Normal-appearing ovaries. No abnormal fluid collection.     OTHER: Tiny fat-containing paraumbilical and bilateral inguinal hernias.     MUSCULOSKELETAL: Degenerative changes lower lumbar spine. No suspicious osseous lesions.     IMPRESSION:   1.  CTA negative for thoracoabdominal aortic aneurysm/dissection and pulmonary embolus. No abnormality to account for patient's symptoms.  2.  Bilateral lower lobe bronchiectasis with some peripheral endobronchial mucous plugging and subsegmental atelectasis in the right lower lobe. Trace right pleural effusion +/- pleural thickening.  3.  Diffuse hepatic  steatosis.  4.  Faint density within the gallbladder lumen could represent gallbladder sludge +/- stones. No bile duct dilatation.  5.  Colonic diverticulosis.     CXR (February 2020):  - images directly reviewed, formal interpretation follows:  IMPRESSION:   Linear scarring or atelectasis at the right base is unchanged. Lungs are otherwise clear. Heart size and pulmonary vascularity are normal. No pneumothorax or pleural effusion. No bony fracture.    TTE (February 2020):  - Normal left ventricular size and wall thickness.  - Left ventricle ejection fraction is normal. The estimated left ventricular ejection fraction is 65%.  - Normal right ventricular size and systolic function.  - No hemodynamically significant valvular heart abnormalities.  - When compared to the previous study dated 9/5/2019, no significant change.     PFT's (November 2019):  FEV1/FVC is 0.77 and is normal.  FEV1 is 1.82 L (76% predicted) and is reduced.  FVC is 2.36 L (78% predicted) and reduced.  There was no improvement in spirometry after a single inhaled dose of bronchodilator.  TLC is 4.54 L (93% predicted) and is normal.  RV is 2.22 L (115% predicted) and is normal.  RV/TLC is 0.49 and is increased.  DLCO is 103% predicted and is normal when it is corrected for hemoglobin.

## 2021-06-09 NOTE — PROGRESS NOTES
"Mental Health tele Visit Note    Patient: Patsy Santamaria    : 1955 MRN: [id number]    Date: 2020  Start time: 2:00pm   Stop Time: 2:40pm   Session # 2    The patient has been notified of the following:   \"We have found that certain health care needs can be provided without the need for a face to face visit.  This service lets us provide the care you need with a phone conversation.  I will have full access to your [place] medical record during this entire phone call.   I will be taking notes for your medical record. Since this is like an office visit, we will bill your insurance company for this service.  There are potential benefits and risks of telephone visits (e.g. limits to patient confidentiality) that differ from in-person visits.?  Confidentiality still applies for telephone services, and nobody will record the visit.  It is important to be in a quiet, private space that is free of distractions (including cell phone or other devices) during the visit.?? If during the course of the call I believe a telephone visit is not appropriate, you will not be charged for this service\"  Consent has been obtained for this service by care team member: Yes, per verbal agreement   Session Type: Patient is participating in a telemedicine phone visit.  Patient does not know how to set up video visit due to lack of knowledge of technology.     Chief Complaint   Patient presents with      Follow Up     Psychotherapy follow-up visit for anxiety, depression and alcohol use disorder     New symptoms or complaints: None reported    Functional Impairment:   Personal: 3  Family: 3  Work: 4  Social:2        ASSESSMENT: Current Emotional / Mental Status (status of significant symptoms):              Risk status (Self / Other harm or suicidal ideation)              Patient denies risks to personal safety              Patient denies current or recent suicidal ideation or behaviors.              Patient denies current or " "recent homicidal ideation or behaviors.              Patient denies current or recent self injurious behavior or ideation.              Patient denies other safety concerns.              Patient denies changes to risk factors (alcohol dependence; social isolation; unemployment)              Patient reports changes to protective factors including new male partner in her life              Recommended that patient call 911 or go to the local ED should there be a change in any of these risk factors.                Attitude:                                   Cooperative               Orientation:                             x3              Speech                          Rate / Production:       Normal                           Volume:                       Normal               Mood:                                      Irritable  Normal              Thought Content:                    Clear               Thought Form:                        Coherent  Logical               Insight:                                     Good     Patient's impression of their current status:   Patient reports feeling better and \"having a better attitude.\" Her new relationship has been very positive which is helping her self-esteem and depression. She reports taking her medication as prescribed and having more energy.   She recently obtained a new air conditioner and is excited to sleep better.   Patient reports that she is sober and \"maintaining.\" She is feeling upset about not hearing very much from her SUDS person. She is supposed to receive a weekly check-in but \"this hasn't been happening.\"   Patient describes difficulty asking for help from others because she is fiercely independent but \"I'm learning little by little.\" Patient describes feeling guarded in order to protect herself from getting hurt again.    Therapist impression of patients current state:   Therapist prompted patient to express thoughts and feelings following a CBT " "framework. Therapist normalized patient emotions and validated patient experiences.   Patient requested that the therapist check-in to stay on track - \"I feel safer when I'm talking with you.\"    Type of psychotherapeutic technique provided: Client centered and CBT    Progress toward short term goals: Progress as expected, with patient's overall health improving.    Review of long term goals:   Treatment plan last updated today    Diagnosis:  1. Alcohol use disorder, severe, dependence (H)    2. Mild episode of recurrent major depressive disorder (H)    3. Generalized anxiety disorder    4. PTSD (post-traumatic stress disorder)      Plan and Follow up:   May need updated standard DA - initial DA completed by this provider on 3/23/2017 -     Substance Use Disorder Treatment Comprehensive Assessment completed on 12/13/2019     Discharge Criteria/Planning: Client has chronic symptoms and ongoing therapy for maintenance stability recommended.    I have reviewed the note as documented above.  This accurately captures the substance of my conversation with the patient.  As the provider I attest to compliance with applicable laws and regulations related to telemedicine.  JULIO Mendoza    "
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
## 2021-06-10 NOTE — PROGRESS NOTES
Correct pharmacy verified with patient and confirmed in snapshot? [x] yes []no    Medications Phoned  to Pharmacy [] yes [x]no  Name of Pharmacist:  List Medications, including dose, quantity and instructions      Medication Prescriptions given to patient   [] yes  [x] no   List the name of the drug the prescription was written for.       Medications ordered this visit were e-scribed.  Verified by order class [x] yes  [] no  Cymbalta 30  mg    Medication changes or discontinuations were communicated to patient's pharmacy: [x] yes  [] no  Lexapro 20 mg    UA collected [] yes    [x] no    [place] Prescription Monitoring Program Reviewed? [] yes  [x] no    Referrals were made to: none    Future appointment was made: [x] yes  [] no    Dictation completed at time of chart check: [x] yes  [] no    I have checked the documentation for today s encounters and the above information has been reviewed and completed.

## 2021-06-10 NOTE — TELEPHONE ENCOUNTER
"Pt is calling in about a sore throat she has had for about 4 days. Pt reports throat pain is about a \"5\", and it does hurt to swallow some foods. Pt is able to open her mouth completely, and does not feel she is dehydrated. Pt denies any difficulty breathing, chest pain,  cough, fever,body aches or chills.   Care advise given, use of warm chicken broth, or apple juice, and increasing fluids, and per protocol, pt would like to be evaluated by a physician today. Pt agrees with plan, and was transferred to scheduling and was able to make a telephone appointment for today. Pt was advised to call back if symptoms worsen. Pt verbalized understanding.    Ej Adair RN [facility] Triage/Medication Refill    Reason for Disposition    Patient wants to be seen    Additional Information    Negative: SEVERE difficulty breathing (e.g., struggling for each breath, speaks in single words)    Negative: Sounds like a life-threatening emergency to the triager    Negative: Throat culture results, call about    Negative: Productive cough is the main symptom    Negative: Runny nose is the main symptom    Negative: Drooling or spitting out saliva (because can't swallow)    Negative: Unable to open mouth completely    Negative: Drinking very little and has signs of dehydration (e.g., no urine > 12 hours, very dry mouth, very lightheaded)    Negative: Patient sounds very sick or weak to the triager    Negative: Difficulty breathing (per caller) but not severe    Negative: Fever > 103 F (39.4 C)    Negative: Refuses to drink anything for > 12 hours    Negative: SEVERE sore throat pain    Negative: Pus on tonsils (back of throat) and swollen neck lymph nodes ('glands')    Negative: Earache also present    Negative: Widespread rash (especially chest and abdomen)    Negative: Diabetes mellitus or weak immune system (e.g., HIV positive, cancer chemo, splenectomy, organ transplant, chronic steroids)    Negative: History of rheumatic " fever    Protocols used: SORE THROAT-A-OH

## 2021-06-10 NOTE — TELEPHONE ENCOUNTER
Therapist called and left patient voice message to call behavioral access in order to reschedule today's missed appointment.

## 2021-06-10 NOTE — TELEPHONE ENCOUNTER
Date of Last Office Visit:7/24/2020  Date of Next Office Visit: none  No shows since last visit: 1  Cancellations since last visit:none  ED visits since last visit:  none    Medication Trazodone 50 mg date last ordered:5/18/2020  Qty:45  Refills:0    Lapse in therapy greater than 7 days: yes  Medication refill request verified as identical to current order: yes  Result of Last DAM, VPA, Li+ Level, CBC, or Carbamazepine Level (at or since last visit): n/a     [] Medication refilled per [facility] M-1.   [x] Medication unable to be refilled by RN due to criteria not met as indicated below:     []Eligibility - not seen in last year    []Supervision - no future appointment    [x]Compliance      []Verification - order discrepancy    []Controlled Medication    []Medication not included in RN Protocol    []90 - day supply request    [x]Other Provider last week in clinic.  Current Medication list: Patsy Santamaria    (MRN [id number])   Your Current Medications Are     albuterol (ACCUNEB) 0.63 mg/3 mL nebulizer solution  Take 3 mL (0.63 mg total) by nebulization every 2 (two) hours as needed for wheezing.    albuterol (VENTOLIN HFA) 90 mcg/actuation inhaler  Inhale 2 puffs every 4 (four) hours as needed for wheezing.    ammonium lactate (AMLACTIN) 12 % cream  Apply 2 application topically 2 (two) times a day as needed. Apply 1-3 grams to each foot twice daily as needed    baclofen (LIORESAL) 10 MG tablet  Take 1 tablet (10 mg total) by mouth 3 (three) times a day.    budesonide-formoterol (SYMBICORT) 80-4.5 mcg/actuation inhaler  Inhale 2 puffs 2 (two) times a day.    bumetanide (BUMEX) 1 MG tablet  Take 1 tablet (1 mg total) by mouth daily.    cetirizine (ZYRTEC) 10 MG tablet  Take 1 tablet (10 mg total) by mouth daily as needed for allergies.    DULoxetine (CYMBALTA) 30 MG capsule  Take 3 capsules (90 mg total) by mouth daily.    gabapentin (NEURONTIN) 300 MG capsule  Use 1-2 tablets three times daily    hydrOXYzine pamoate  (VISTARIL) 25 MG capsule  Take 1 capsule (25 mg total) by mouth 4 (four) times a day as needed for anxiety.    naltrexone (DEPADE) 50 mg tablet  Take 100 mg by mouth daily.    nicotine (NICODERM CQ) 7 mg/24 hr  Place 1 patch on the skin daily.    nicotine (NICOTROL) 10 mg inhaler  Inhale 1 puff as needed for smoking cessation.    omeprazole (PRILOSEC) 20 MG capsule  Take 1 capsule (20 mg total) by mouth daily before breakfast.    potassium chloride (K-DUR,KLOR-CON) 20 MEQ tablet  Take 1 tablet (20 mEq total) by mouth 2 (two) times a day.    traZODone (DESYREL) 50 MG tablet  Take 0.5 tablets (25 mg total) by mouth at bedtime.        Medication Plan of Care at last office visit with MD/CNP:  Plan:  1. Patient to continue on gabapentin 300-600 mg po three times a day, naltrexone 50 mg po daily and baclofen 10 mg po three times a day. Understand she does not use naltrexone every day.   2. Patient encouraged to work on connecting to [employer] for increased support. She reports interest in doing so.   3. Continue on current mental health medications, including hydroxyzine prn anxiety and trazodone prn insomnia.   4. Urine toxicology will not be done given coronavirus epidemic.  5. Patient to follow-up in 1 month and sooner prn. Applauded patient on her ability to stay sober.

## 2021-06-10 NOTE — PROGRESS NOTES
Admission History & Physical  Patsy Santamaria, 1955, [id number]    [facility]  Yanqiue Cali MD, [phone]    Extended Emergency Contact Information  Primary Emergency Contact: Dary Galeas   [facility]  Home Phone: [phone]  Relation: Child  Secondary Emergency Contact: Radha Stewart   [place]  Mobile Phone: [phone]  Relation: Sibling     Assessment and Plan:   Assessment: Keratoma fifth toe right foot  Plan: Debrided hyperkeratotic lesion fifth toe right foot  Active Problems:    * No active hospital problems. *      Chief Complaint:  painful corn fifth toe right foot      HPI:    Patsy Santamaria is a 62 y.o. old female who presented to the clinic today complaining of a very painful corn along the outside of the toenail on her fifth toe.  The patient stated this can be quite painful when wearing normal shoes.  She has not had any associated redness or swelling.  It is a sharp pain which is relieved after removing her shoes.  She denies any trauma to the toe.  She denies any other previous treatment.  She has had this particular problem for several months.  History is provided by patient    Medical History  Active Ambulatory (Non-Hospital) Problems    Diagnosis     Carpal tunnel syndrome on both sides     Trochanteric bursitis of left hip     Alcohol withdrawal seizure without complication     Hypoxia     Alcohol abuse     Elevated LFTs     New onset seizure     Claustrophobia     Cervical disc disease     COPD (chronic obstructive pulmonary disease) with emphysema     Post traumatic stress disorder     Chronic Reflux Esophagitis     Peripheral Neuropathy     Localized Primary Osteoarthritis Of The Carpometacarpal Joint     Ganglion Of The Right Foot     Major Depression, Recurrent     Nicotine Dependence     Vitamin D Deficiency     Past Medical History:   Diagnosis Date     Acute solar dermatitis      Anxiety      Chronic reflux  esophagitis      Dermatitis      Ganglion      H. pylori infection      Menopause      Patient Active Problem List    Diagnosis Date Noted     Carpal tunnel syndrome on both sides 2017     Trochanteric bursitis of left hip 10/25/2016     Alcohol withdrawal seizure without complication 2016     Hypoxia 2016     Alcohol abuse 2016     Elevated LFTs 2016     New onset seizure 2016     Claustrophobia 2015     Cervical disc disease 2015     COPD (chronic obstructive pulmonary disease) with emphysema 2015     Post traumatic stress disorder 2015     Chronic Reflux Esophagitis      Peripheral Neuropathy      Localized Primary Osteoarthritis Of The Carpometacarpal Joint      Ganglion Of The Right Foot      Major Depression, Recurrent      Nicotine Dependence      Vitamin D Deficiency      Surgical History  She  has a past surgical history that includes  delivery only; appendectomy; ligate fallopian tube; hemorrhoidectomy internal rubber band ligations; knee scope,diagnostic; lateral retinacular release open; and excis tendn/capsule lesn,foot.   Past Surgical History:   Procedure Laterality Date     IA APPENDECTOMY      Description: Appendectomy;  Recorded: 2008;  Comments: childhood     IA  DELIVERY ONLY      Description:  Section;  Recorded: 2008;     IA EXCIS TENDN/CAPSULE LESN,FOOT      Description: Excision Of Cyst Of Tendon Sheath Of Foot;  Proc Date: 10/28/2011;  Comments: [facility]     IA HEMORRHOIDECTOMY INTERNAL RUBBER BAND LIGATIONS      Description: Hemorrhoidectomy;  Recorded: 2008;     IA KNEE SCOPE,DIAGNOSTIC      Description: Arthroscopy Knee Right;  Proc Date: 10/11/2005;  Comments: for right knee patella subluxation with lateral retinacular release; subpatellar chondroplasty     IA LATERAL RETINACULAR RELEASE OPEN      Description: Knee Lateral Retinacular Release;  Proc Date: 10/11/2005;     IA  LIGATE FALLOPIAN TUBE      Description: Tubal Ligation;  Recorded: 09/01/2008;    Social History  Reviewed, and she  reports that she has been smoking Cigarettes.  She has been smoking about 1.00 pack per day. She does not have any smokeless tobacco history on file. She reports that she drinks alcohol. She reports that she does not use illicit drugs.  Social History   Substance Use Topics     Smoking status: Current Every Day Smoker     Packs/day: 1.00     Types: Cigarettes     Smokeless tobacco: Not on file     Alcohol use Yes      Allergies  Allergies   Allergen Reactions     Codeine Nausea Only     Hydrochlorothiazide Rash     phototoxicity - med was d/lora       Sulfa (Sulfonamide Antibiotics)      Diclofenac Rash    Family History  Reviewed, and family history includes Heart disease in her father and mother.   Psychosocial Needs  Social History     Social History Narrative     Additional psychosocial needs reviewed per nursing assessment.       Prior to Admission Medications     (Not in a hospital admission)        Review of Systems - Negative     /76  Pulse 80  LMP 03/06/2002  SpO2 97%    Objective findings: Gen: The patient is alert and in no acute distress:      Integument: Nails bilateral feet are normal length but slightly thickened and discolored.  The fifth toenail right foot is 2 times normal thickness.  Skin bilaterally warm and intact.  There is a thick hyperkeratotic nucleated lesion along the lateral border of the fifth toenail right foot.      Vascular: DP and PT pulses +2/4 bilateral feet. Capillary refill less than 2 seconds bilateral feet.      Neurologic: Negative clonus, negative Babinski bilaterally.      Musculoskeletal: Range of motion within normal limits bilateral feet. Muscle power is 5 over 5 bilaterally in all compartments.        Assessment: Keratoma fifth toe right foot      Plan: Debrided the hyperkeratotic lesion fifth toe right foot.  I recommended the patient padded the  area as needed.  I recommended she return to the clinic if this lesion recurs quickly and I may recommend surgical excision with an exostectomy of the lateral border of the distal phalanx fifth toe right foot to prevent recurrence.

## 2021-06-10 NOTE — PROGRESS NOTES
"ASSESSMENT/PLAN:  1. Candidal intertrigo  nystatin (MYCOSTATIN) cream   2. Dry skin dermatitis  min oil-petrolat (AQUAPHOR) ointment       This is a 61 yo female - doing fairly well, staying straight at this point - comes in for rashes.  Today has bright red excoriated rash in skin folds consistent with candidal intertrigo.  Will treat with Nystatin cream.  Discussed keeping skin dry - using cotton to protect the skin folds.  Secondly, she has generally dry skin on arms and legs - no clear \"rash\" or skin lesions  I would recommend keeping skin moist with lotion/ointment.  I have written a prescription for Aquaphor.  Use liberally.         There are no discontinued medications.  There are no Patient Instructions on file for this visit.    Chief Complaint:  Chief Complaint   Patient presents with     heat rash in Rt groin since the weekend     dry skin on arms and legs     lotion not helping       HPI:   Patsy Santamaria is a 62 y.o. female c/o  1.  Rash in left groin - red/irritated  2.  Dry skin - itchy - had greasy cream in past      PMH:   Patient Active Problem List    Diagnosis Date Noted     Carpal tunnel syndrome on both sides 03/06/2017     Trochanteric bursitis of left hip 10/25/2016     Alcohol withdrawal seizure without complication 05/14/2016     Hypoxia 05/13/2016     Alcohol abuse 05/13/2016     Elevated LFTs 05/13/2016     New onset seizure 05/12/2016     Claustrophobia 12/18/2015     Cervical disc disease 12/14/2015     COPD (chronic obstructive pulmonary disease) with emphysema 09/16/2015     Post traumatic stress disorder 02/13/2015     Chronic Reflux Esophagitis      Peripheral Neuropathy      Localized Primary Osteoarthritis Of The Carpometacarpal Joint      Ganglion Of The Right Foot      Major Depression, Recurrent      Nicotine Dependence      Vitamin D Deficiency      Past Medical History:   Diagnosis Date     Acute solar dermatitis      Anxiety      Chronic reflux esophagitis      Dermatitis  "     Ganglion     right foot     H. pylori infection      Menopause     age 52     Past Surgical History:   Procedure Laterality Date     KS APPENDECTOMY      Description: Appendectomy;  Recorded: 2008;  Comments: childhood     KS  DELIVERY ONLY      Description:  Section;  Recorded: 2008;     KS EXCIS TENDN/CAPSULE LESN,FOOT      Description: Excision Of Cyst Of Tendon Sheath Of Foot;  Proc Date: 10/28/2011;  Comments: [facility]     KS HEMORRHOIDECTOMY INTERNAL RUBBER BAND LIGATIONS      Description: Hemorrhoidectomy;  Recorded: 2008;     KS KNEE SCOPE,DIAGNOSTIC      Description: Arthroscopy Knee Right;  Proc Date: 10/11/2005;  Comments: for right knee patella subluxation with lateral retinacular release; subpatellar chondroplasty     KS LATERAL RETINACULAR RELEASE OPEN      Description: Knee Lateral Retinacular Release;  Proc Date: 10/11/2005;     KS LIGATE FALLOPIAN TUBE      Description: Tubal Ligation;  Recorded: 2008;     Social History     Social History     Marital status: Single     Spouse name: N/A     Number of children: N/A     Years of education: N/A     Occupational History     [employer]     group [facility] [employer]     Social History Main Topics     Smoking status: Current Every Day Smoker     Packs/day: 1.00     Types: Cigarettes     Smokeless tobacco: Not on file      Comment: 1 pack per day     Alcohol use No      Comment: sober since 18     Drug use: No     Sexual activity: Not on file     Other Topics Concern     Not on file     Social History Narrative       Meds:    Current Outpatient Prescriptions:      diclofenac (VOLTAREN) 75 MG EC tablet, Take 75 mg by mouth daily. , Disp: , Rfl: 1     DULoxetine (CYMBALTA) 30 MG capsule, Take 1 capsule (30 mg total) by mouth daily., Disp: 30 capsule, Rfl: 1     furosemide (LASIX) 20 MG tablet, Take 2 tablets (40 mg total) by mouth daily., Disp: 60 tablet, Rfl: 0      "ipratropium-albuterol (DUO-NEB) 0.5-2.5 mg/3 mL nebulizer, Take 3 mL by nebulization every 6 (six) hours as needed (wheezing, short of breath)., Disp: 90 mL, Rfl: 1     omeprazole (PRILOSEC) 20 MG capsule, TAKE ONE CAPSULE BY MOUTH ONE TIME DAILY , Disp: 30 capsule, Rfl: 1     potassium chloride (KLOR-CON) 10 MEQ CR tablet, Take 1 tablet (10 mEq total) by mouth daily., Disp: 30 tablet, Rfl: 3     min oil-petrolat (AQUAPHOR) ointment, Apply topically to dry skin TID as needed, Disp: 396 g, Rfl: 1     nystatin (MYCOSTATIN) cream, Apply topically sparingly TID to affected areas only, Disp: 45 g, Rfl: 1     oxyCODONE-acetaminophen (PERCOCET) 5-325 mg per tablet, , Disp: , Rfl: 0    Allergies:  Allergies   Allergen Reactions     Codeine Nausea Only     Hydrochlorothiazide Rash     phototoxicity - med was d/lora       Sulfa (Sulfonamide Antibiotics)      Diclofenac Rash       ROS:  Pertinent positives as noted in HPI; otherwise 12 point ROS negative.      Physical Exam:  EXAM:  /60 (Patient Site: Left Arm, Patient Position: Sitting, Cuff Size: Adult Large)  Pulse 84  Temp 98.2  F (36.8  C) (Oral)   Wt (!) 233 lb (105.7 kg)  LMP 03/06/2002  BMI 40.31 kg/m2   Gen:  NAD, appears well, well-hydrated  HEENT:  TMs nl, oropharynx benign, nasal mucosa nl, conjunctiva clear  Neck:  Supple, no adenopathy, no thyromegaly, no carotid bruits, no JVD  Lungs:  Clear to auscultation bilaterally  Cor:  RRR no murmur  Abd:  Soft, nontender, BS+, no masses, no guarding or rebound, no HSM  Extr:  Neg.  Neuro:  No asymmetry  Skin:  Warm/dry, skin folds ([place] abdomen) - bright red sl excoriated rash; also has scaly/dry skin on extremities - no clear \"rash\"       "

## 2021-06-10 NOTE — PROGRESS NOTES
"Patsy Santamaria is a 65 y.o. female who is being evaluated via a billable telephone visit.      The patient has been notified of following:     \"This telephone visit will be conducted via a call between you and your physician/provider. We have found that certain health care needs can be provided without the need for a physical exam.  This service lets us provide the care you need with a short phone conversation.  If a prescription is necessary we can send it directly to your pharmacy.  If lab work is needed we can place an order for that and you can then stop by our lab to have the test done at a later time.    Telephone visits are billed at different rates depending on your insurance coverage. During this emergency period, for some insurers they may be billed the same as an in-person visit.  Please reach out to your insurance provider with any questions.    If during the course of the call the physician/provider feels a telephone visit is not appropriate, you will not be charged for this service.\"    Patient has given verbal consent to a Telephone visit? Yes    What phone number would you like to be contacted at? [phone]     Patient would like to receive their AVS by AVS Preference: Mail a copy.    Additional provider notes:       Subjective: This patient had a telephone visit, virtual visit, due to the coronavirus pandemic.    Patient has had a history of COPD he is on Symbicort    She is had a sore throat for 5 days earache at night no fever no cough.    She has had some nasal drainage.    She denies any COVID-19 exposures.    Patient states there is been no diarrhea no vomiting    Pain is been in the left ear.    Symptoms started about 5 days ago.    No rashes    Tobacco status: She  reports that she has been smoking cigarettes. She has a 24.50 pack-year smoking history. She has never used smokeless tobacco.    Patient Active Problem List    Diagnosis Date Noted     Abdominal pain, epigastric      History " of major depression 01/23/2020     Biliary colic      COPD exacerbation (H) 01/22/2020     Airway obstruction due to foreign body, initial encounter      Pneumonia of right lower lobe due to infectious organism 12/14/2019     Severe alcohol use disorder (H) 11/21/2019     Chronic obstructive pulmonary disease, unspecified COPD type (H) 10/30/2019     Dyspnea on exertion      Anxiety 03/25/2019     Hypomagnesemia 03/25/2019     Primary osteoarthritis of left knee 07/10/2018     Seasonal allergies 07/09/2018     Mild episode of recurrent major depressive disorder (H)      Alcoholic hepatitis      Peripheral edema      Diuretic-induced hypokalemia      Alcohol use disorder, severe, dependence (H) 05/07/2018     Primary osteoarthritis of right knee 03/21/2018     Alcohol withdrawal syndrome without complication (H) 03/17/2018     Chronic alcohol dependence, continuous (H) 03/16/2018     Low backache 03/16/2018     Morbid obesity (H) 01/05/2018     Bilateral edema of lower extremity 06/28/2017     Snoring 06/28/2017     Carpal tunnel syndrome on both sides 03/06/2017     Trochanteric bursitis of left hip 10/25/2016     Hypoxia 05/13/2016     Alcohol abuse 05/13/2016     Elevated LFTs 05/13/2016     New onset seizure (H) 05/12/2016     Claustrophobia 12/18/2015     Cervical disc disease 12/14/2015     Skin lesion 10/20/2015     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) 09/16/2015     PTSD (post-traumatic stress disorder) 02/13/2015     Chronic Reflux Esophagitis      Peripheral Neuropathy      Localized Primary Osteoarthritis Of The Carpometacarpal Joint      Ganglion Of The Right Foot      Major depression, recurrent (H)      Nicotine Dependence      Vitamin D deficiency        Current Outpatient Medications   Medication Sig Dispense Refill     albuterol (ACCUNEB) 0.63 mg/3 mL nebulizer solution Take 3 mL (0.63 mg total) by nebulization every 2 (two) hours as needed for wheezing. 10 vial 0      albuterol (VENTOLIN HFA) 90 mcg/actuation inhaler Inhale 2 puffs every 4 (four) hours as needed for wheezing. 18 g 3     ammonium lactate (AMLACTIN) 12 % cream Apply 2 application topically 2 (two) times a day as needed. Apply 1-3 grams to each foot twice daily as needed  11     baclofen (LIORESAL) 10 MG tablet Take 1 tablet (10 mg total) by mouth 3 (three) times a day. 90 tablet 0     budesonide-formoterol (SYMBICORT) 80-4.5 mcg/actuation inhaler Inhale 2 puffs 2 (two) times a day. 1 Inhaler 1     bumetanide (BUMEX) 1 MG tablet Take 1 tablet (1 mg total) by mouth daily. 90 tablet 2     cetirizine (ZYRTEC) 10 MG tablet Take 1 tablet (10 mg total) by mouth daily as needed for allergies. 30 tablet 5     DULoxetine (CYMBALTA) 30 MG capsule Take 3 capsules (90 mg total) by mouth daily. 90 capsule 0     gabapentin (NEURONTIN) 300 MG capsule Use 1-2 tablets three times daily 60 capsule 0     hydrOXYzine pamoate (VISTARIL) 25 MG capsule Take 1 capsule (25 mg total) by mouth 4 (four) times a day as needed for anxiety. 120 capsule 1     naltrexone (DEPADE) 50 mg tablet Take 100 mg by mouth daily.       nicotine (NICOTROL) 10 mg inhaler Inhale 1 puff as needed for smoking cessation. 168 each 11     omeprazole (PRILOSEC) 20 MG capsule Take 1 capsule (20 mg total) by mouth daily before breakfast. 90 capsule 3     potassium chloride (K-DUR,KLOR-CON) 20 MEQ tablet Take 1 tablet (20 mEq total) by mouth 2 (two) times a day. 180 tablet 2     traZODone (DESYREL) 50 MG tablet Take 0.5 tablets (25 mg total) by mouth at bedtime. 45 tablet 1     azithromycin (ZITHROMAX Z-RICA) 250 MG tablet Take 2 tablets (500 mg) on  Day 1,  followed by 1 tablet (250 mg) once daily on Days 2 through 5. 6 tablet 0     nicotine (NICODERM CQ) 7 mg/24 hr Place 1 patch on the skin daily. 30 patch 11     No current facility-administered medications for this visit.        ROS:   10 point review of systems positive as outlined above otherwise  negative    Objective:    LMP 03/06/2002   There is no height or weight on file to calculate BMI.      General: No acute distress    HEENT: States that her throat is sore, she feels some tenderness in the anterior cervical area but no adenopathy.  No posterior nodes    When she looks in her throat no exudate.    Patient denies any wheezing presently.  Nonlabored breathing when she talks.    No palpitations or rapid heartbeat regarding her heart.    Abdomen nontender    Extremities without edema    Skin was normal no rashes.        Results for orders placed or performed during the hospital encounter of 02/18/20   Basic Metabolic Panel   Result Value Ref Range    Sodium 141 136 - 145 mmol/L    Potassium 4.0 3.5 - 5.0 mmol/L    Chloride 97 (L) 98 - 107 mmol/L    CO2 31 22 - 31 mmol/L    Anion Gap, Calculation 13 5 - 18 mmol/L    Glucose 120 70 - 125 mg/dL    Calcium 9.2 8.5 - 10.5 mg/dL    BUN 6 (L) 8 - 22 mg/dL    Creatinine 0.65 0.60 - 1.10 mg/dL    GFR MDRD Af Amer >60 >60 mL/min/1.73m2    GFR MDRD Non Af Amer >60 >60 mL/min/1.73m2   ETOH Level   Result Value Ref Range    Alcohol, Blood 131 (H) None detected mg/dL   HM1 (CBC with Diff)   Result Value Ref Range    WBC 3.0 (L) 4.0 - 11.0 thou/uL    RBC 4.65 3.80 - 5.40 mill/uL    Hemoglobin 14.6 12.0 - 16.0 g/dL    Hematocrit 43.4 35.0 - 47.0 %    MCV 93 80 - 100 fL    MCH 31.4 27.0 - 34.0 pg    MCHC 33.6 32.0 - 36.0 g/dL    RDW 15.5 (H) 11.0 - 14.5 %    Platelets 140 140 - 440 thou/uL    MPV 9.4 8.5 - 12.5 fL    Neutrophils % 40 (L) 50 - 70 %    Lymphocytes % 41 (H) 20 - 40 %    Monocytes % 15 (H) 2 - 10 %    Eosinophils % 3 0 - 6 %    Basophils % 1 0 - 2 %    Neutrophils Absolute 1.2 (L) 2.0 - 7.7 thou/uL    Lymphocytes Absolute 1.2 0.8 - 4.4 thou/uL    Monocytes Absolute 0.4 0.0 - 0.9 thou/uL    Eosinophils Absolute 0.1 0.0 - 0.4 thou/uL    Basophils Absolute 0.0 0.0 - 0.2 thou/uL   Blood alcohol level   Result Value Ref Range    Alcohol, Blood <10 None detected  mg/dL   Uric Acid   Result Value Ref Range    Uric Acid 6.8 2.0 - 7.5 mg/dL   Echo Complete   Result Value Ref Range    BSA 2.21 m2    Hieght 64 in    Weight 3,840 lbs    /74 mmHg    HR 74 bpm    IVSd 0.881 0.6 - 0.9 cm    LVIDd 4.14 3.8 - 5.2 cm    LVIDs 2.59 2.2 - 3.5 cm    LVOT diam 2 cm    LVOT mean gradient 2 mmHg    LVOT peak VTI 19.1 cm    LVOT mean bailey 65 cm/s    LVOT peak bailey 99.2 cm/s    LVOT peak gradient 4 mmHg    LV PWd 0.923 (!) 0.6 - 0.9 cm    MV decel time 243 ms    MV peak A bailey 75 cm/s    MV peak E bailey 69 cm/s    MV mean bailey 60 cm/s    MV mean gradient 2 mmHg    MV VTI 27.4 cm    MV peak Velocity 84.6 cm/s    IVS/PW ratio 1.0     LV FS 37.4 28 - 44 %    LV mass 116.3 g    MV area cont eq 2.2 cm2    MV E/A Ratio 0.9     LVOT area 3.14 cm2    LVOT SV 60.0 cm3    MV peak gradient 2.9 mmHg    LV mass index 52.6 g/m2    LV SVi 27.1 ml/m2    LV CO 4.4 l/min    LV Ci 2.0 l/min/m2    Height 64.0 in    Weight 240 lbs    MVA VTI 2.19 cm2    Echo LVEF Estimated 65 %       Assessment:  1. Pharyngitis, unspecified etiology  azithromycin (ZITHROMAX Z-RICA) 250 MG tablet    Symptomatic COVID-19 Virus (CORONAVIRUS) PCR   2. Pulmonary emphysema, unspecified emphysema type (H)  Symptomatic COVID-19 Virus (CORONAVIRUS) PCR   3. Left ear pain  azithromycin (ZITHROMAX Z-RICA) 250 MG tablet    Symptomatic COVID-19 Virus (CORONAVIRUS) PCR   4. Nasal congestion       Pharyngitis: We will cover with a azithromycin history of ear pain as well question otitis media    Rule out COVID-19    Continue Symbicort regularly and albuterol as needed    Plan: Push fluids use Tylenol    Should be contacted regarding results of wound: 19    This transcription uses voice recognition software, which may contain typographical errors.    Phone call duration: 11 minutes, from 3:40 PM through 3:51 PM    Tom Cordoba MD
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
## 2021-06-10 NOTE — PROGRESS NOTES
Patient here today to initiate psychiatric care. States sober since 7/29/2016. States she sleeps about 4 hours a night, states its inturrupted sleep, states she has been taking naps lately. States she is starting to feel depressed, like she has no energy. States she has some anxiety, doesn't like people coming up behind her, crowds and loud. States she has been on lexapro for a couple years and feels like it isn't working as it use to.   States she has a lot of stuff from the past she has not delt with. States she is still in therapy. States she has had a lot of weight gain ruiz she is sad about that. States physically can not work full time anymore.

## 2021-06-11 NOTE — PROGRESS NOTES
"Mental Health Visit Note    This is a dual signature report. My supervising clinician is JULIO Kasper.    5/31/2017   Start time: 2:00pm    Stop Time: 3:00pm   Session # 2    Patsy Santamaria is a 62 y.o. female is being seen today for    Chief Complaint   Patient presents with      Follow Up     Psychotherapy follow-up visit to address sobriety maintenance, mental health symptoms and recurring bad memories/dreams     New symptoms or complaints: The patient expressed several significant sources of stress and recent challenges to her sobriety    Functional Impairment:   Personal: 4  Family: 3  Work: 4  Social:3    Clinical assessment of mental status:   Grooming: Well groomed  Attire: Appropriate  Age: Appears Stated  Behavior Towards Examiner: Cooperative  Motor Activity: Within normal   Eye Contact: Appropriate  Mood: Sad  Affect: Tearful and Depressed  Speech/Language: Within normal  Attention: Within normal  Concentration: Within normal  Thought Process: Within normal  Thought Content: Hallucinations: Within noraml  Delusions: Within normal  Orientation: X 3  Memory: No Evidence of Impairment  Judgement: No Evidence of Impairment  Estimated Intelligence: Average  Demonstrated Insight: Adequate  Fund of Knowledge: adequate    Suicidal/Homicidal Ideation present: None Reported This Session    Patient's impression of their current status:   The patient presented for a follow-up psychotherapy visit and reported that she recently attended her first visit with the psychiatrist. She reported initially feeling fearful due to the stigma attached to seeing a psychiatrist but reported that she is hopeful about the medication change and is eager to observe any benefits.   The patient reported that she is still living at the transitional home and reported that she has gained over 25 pounds since living there. The patient reported that there has been \"lots of chaos\" at the house lately. She reported that there " "have been 3 relapses and \"lots of negative energy.\" She described feeling stuck living in the home and shared that she no longer feels comfortable. The patient discussed how being around other people who are relapsing causes her to experience some \"stinkin thinkin\" patterns common for individuals in recovery. For instance, she shared having the thought that she could \"have a drink and there would be no consequences at the house.\" She shared how others in the home are getting away with cheating and using which causes her to feel irritated and question her own reasons for sobriety such as \"Why should I stay sober if nobody else is?\" On the other hand, the patient then described how she continues to attend AA meetings and is able to recognize when she is experiencing negative thought patterns regarding her sobriety. She described being influenced by her environment and sensitive to external stimuli. She also described her ability to identify relapse thinking patterns and apply relapse prevention skills. She stated, \"I definitely do not want to use and I understand the consequences of what using would do to me.\" She shared how relapsing would mean letting herself down. She noted how she constantly remembers the consequences attached to a relapse and reported being able to use her learned coping strategies when needed. Overall, she did not indicate any desire to use but instead highlighted the difficulties in being surrounding by and living with individuals who are not as committed to their sobriety as she is.  In addition to feeling irritated about her living situation, the patient reported that she has recently been experiencing bad dreams about her ex-partner. She reported that holidays are hard and cause her to feel lonely and empty. She reported that she has been dreaming about her ex-partner for the past 3 weeks and stated, \"I just don't know how to get past it; I thought I was over it.\" She expressed a desire to " "ask him why he left her and understand how he could hurt her by leaving the relationship unexpectedly. She reported that being reminded of her ex causes her to question her own self-worth and feel scared about dating anyone again for fear of getting hurt again. The patient described the difficulties attached to being alone and became tearful when describing the emptiness.    The patient reported that another stressor in her life is in regards to her son. She reportedly visits him every weekend and often attempts to help him around the house. She described a strained relationship with her daughter-in-law and reported that it's difficult to observe her son's relationship with his wife. She described her daughter-in-law as a \"bad mother\" and identified this situation as a significant source of stress.  The patient also reported that her 1 year anniversary of sobriety is occurring on 7/29/2017. She shared how it's typical for addicts to become emotional or overwhelmed or \"begin to fall apart\" around anniversary dates. The patient highlighted how she hadn't considered that this could be contributing to her stress but acknowledged being very determined to make it to her 1 year anniversary date.     Therapist impression of patients current state:   The patient appeared open-minded and cooperative during today's session. She utilized the session to outline various environmental stressors and exhibited an ability to gain insight regarding the ways in which she has been emotionally affected by external stimuli. She described her tendency to \"bottle up emotions\" and shared that she actually felt better after expressing her feelings during session. The patient appeared very tearful throughout session but also exhibited an ability to gain insight and increase self-awareness. The patient demonstrated a commitment to her sobriety and expressed a desire to share her honest thoughts even if they are related to using because " otherwise she can not obtain any help. The patient is beginning to learn how to utilize her support system in order to get her needs met. For instance, she articulated an understanding regarding benefits of attending psychotherapy and agreed to attend more frequently in order to process through her thoughts and feelings in a safe and supportive space. The patient appeared visibly more relaxed by the end of session after expressing her feelings. The therapist intends to continue providing positive reinforcement regarding the patient's demonstrated skills and efforts to learn strategies for self-improvement to achieve long-term change.     Type of psychotherapeutic technique provided: Insight oriented and CBT    Progress toward short term goals:Progress as expected, as the patient appeared open-minded and cooperative, demonstrating an ability to gain trust in the therapist    Review of long term goals: The patient highlighted several personal long-term goals such as gaining self-acceptance, maintaining sobriety and working through unresolved issues from her past.    Diagnosis:   1. Severe episode of recurrent major depressive disorder, without psychotic features    2. PTSD (post-traumatic stress disorder)        Plan and Follow up: The patient was encouraged to increase the frequency of sessions during this stressful time. She will return in 2 weeks.     Discharge Criteria/Planning: Client has chronic symptoms and ongoing therapy for maintenance stability recommended.    Performed and documented by GOOD Mendoza    I have read, discussed and reviewed the documentation as presented by GOOD Mendoza LICSW

## 2021-06-11 NOTE — PROGRESS NOTES
I met with Patsy today. She is currently living in transitional housing. She is not working and is attending AA. She is looking for new housing options. She can live at the Transitional housing for up to 2 years but does not want to live there that long. She applied for [facility] 8 housing through [facility]. We completed an online.See care coordination note.

## 2021-06-11 NOTE — PROGRESS NOTES
ASSESSMENT/PLAN:  1. Skin lesion of left leg  desoximetasone (TOPICORT) 0.25 % ointment   2. Bilateral edema of lower extremity  Compression stockings    furosemide (LASIX) 40 MG tablet   3. Edema     4. Snoring  Ambulatory referral to Sleep Medicine       This is a 62-year-old overweight female, here today for several concerns.  Biggest concern is for    1.  Skin lesion on her left leg.  She thinks that this is a bite of some sort.  Now has, scaled excoriations, and not necessarily open.  This certainly may be the result of a bite, but would treat with topical steroids for now.  If this does not improve, needs further evaluation.  Will treat with does not seem that is down to 0.25% ointment to be applied topically sparingly twice daily for no longer than 10-14 days.  If there is no improvement in that time, I need to see this back.  2.  Patient still has bilateral lower extremity edema, and would benefit from compression stockings.  I have sent her with a prescription for compression stockings, and encouraged her to take her furosemide therapy regularly.  We will recheck in 2 weeks as well.  During this time.  3.  Patient complains of significant snoring.  She has been told by people she lives with now that she has heavy snoring.  It is not clear if she has any sort of breathlessness during this time.  I would recommend a sleep study.  She is reluctantly agreeable.  I think this would help her insomnia as well as she probably does have some sleep apnea.      Medications Discontinued During This Encounter   Medication Reason     furosemide (LASIX) 20 MG tablet Reorder     There are no Patient Instructions on file for this visit.    Chief Complaint:  Chief Complaint   Patient presents with     Leg Swelling     Cough     Snoring     Insect Bite     left leg       HPI:   Patsy Santamaria is a 62 y.o. female c/o  1.  Has sore on left mid lateral calf - x 2 months  Thinks it's a bite  Pruritic - patient scratches at it  "frequently    2.  Legs still swell - taking water pill  No compression stockings    3.  Snores a lot - doesn't feel rested  People at the house tell her that she snores  Wakes up a lot at night - \"I've never slept a full night\"    4.  \"sinus\" is going around at the house      PMH:   Patient Active Problem List    Diagnosis Date Noted     Bilateral edema of lower extremity 2017     Snoring 2017     Carpal tunnel syndrome on both sides 2017     Trochanteric bursitis of left hip 10/25/2016     Alcohol withdrawal seizure without complication 2016     Hypoxia 2016     Alcohol abuse 2016     Elevated LFTs 2016     New onset seizure 2016     Claustrophobia 2015     Cervical disc disease 2015     COPD (chronic obstructive pulmonary disease) with emphysema 2015     Post traumatic stress disorder 2015     Chronic Reflux Esophagitis      Peripheral Neuropathy      Localized Primary Osteoarthritis Of The Carpometacarpal Joint      Ganglion Of The Right Foot      Major Depression, Recurrent      Nicotine Dependence      Vitamin D Deficiency      Past Medical History:   Diagnosis Date     Acute solar dermatitis      Anxiety      Chronic reflux esophagitis      Dermatitis      Ganglion     right foot     H. pylori infection      Menopause     age 52     Past Surgical History:   Procedure Laterality Date     RI APPENDECTOMY      Description: Appendectomy;  Recorded: 2008;  Comments: childhood     RI  DELIVERY ONLY      Description:  Section;  Recorded: 2008;     RI EXCIS TENDN/CAPSULE LESN,FOOT      Description: Excision Of Cyst Of Tendon Sheath Of Foot;  Proc Date: 10/28/2011;  Comments: [facility]     RI HEMORRHOIDECTOMY INTERNAL RUBBER BAND LIGATIONS      Description: Hemorrhoidectomy;  Recorded: 2008;     RI KNEE SCOPE,DIAGNOSTIC      Description: Arthroscopy Knee Right;  Proc Date: 10/11/2005;  Comments: for " right knee patella subluxation with lateral retinacular release; subpatellar chondroplasty     OK LATERAL RETINACULAR RELEASE OPEN      Description: Knee Lateral Retinacular Release;  Proc Date: 10/11/2005;     OK LIGATE FALLOPIAN TUBE      Description: Tubal Ligation;  Recorded: 09/01/2008;     Social History     Social History     Marital status: Single     Spouse name: N/A     Number of children: N/A     Years of education: N/A     Occupational History     [employer]     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[facility])     Social History Main Topics     Smoking status: Current Every Day Smoker     Packs/day: 1.00     Types: Cigarettes     Smokeless tobacco: Not on file      Comment: 1 pack per day     Alcohol use No      Comment: sober since 7/29/16     Drug use: No     Sexual activity: Not on file     Other Topics Concern     Not on file     Social History Narrative       Meds:    Current Outpatient Prescriptions:      diclofenac (VOLTAREN) 75 MG EC tablet, Take 75 mg by mouth daily. , Disp: , Rfl: 1     DULoxetine (CYMBALTA) 30 MG capsule, Take 1 capsule (30 mg total) by mouth daily., Disp: 30 capsule, Rfl: 1     furosemide (LASIX) 40 MG tablet, Take 1 tablet (40 mg total) by mouth 2 (two) times a day., Disp: 60 tablet, Rfl: 3     ipratropium-albuterol (DUO-NEB) 0.5-2.5 mg/3 mL nebulizer, Take 3 mL by nebulization every 6 (six) hours as needed (wheezing, short of breath)., Disp: 90 mL, Rfl: 1     min oil-petrolat (AQUAPHOR) ointment, Apply topically to dry skin TID as needed, Disp: 396 g, Rfl: 1     nystatin (MYCOSTATIN) cream, Apply topically sparingly TID to affected areas only, Disp: 45 g, Rfl: 1     omeprazole (PRILOSEC) 20 MG capsule, TAKE ONE CAPSULE BY MOUTH ONE TIME DAILY , Disp: 30 capsule, Rfl: 1     desoximetasone (TOPICORT) 0.25 % ointment, Apply topically sparingly BID to affected area only  - no longer than 14 days, Disp: 30 g, Rfl: 0     HYDROPHOR 42 % Oint ointment, , Disp: , Rfl: 1      "oxyCODONE-acetaminophen (PERCOCET) 5-325 mg per tablet, , Disp: , Rfl: 0     potassium chloride (KLOR-CON) 10 MEQ CR tablet, TAKE ONE TABLET BY MOUTH ONE TIME DAILY , Disp: 30 tablet, Rfl: 2     traMADol (ULTRAM) 50 mg tablet, , Disp: , Rfl: 1    Allergies:  Allergies   Allergen Reactions     Codeine Nausea Only     Hydrochlorothiazide Rash     phototoxicity - med was d/lora       Sulfa (Sulfonamide Antibiotics)      Diclofenac Rash     Sulfasalazine Rash       ROS:  Pertinent positives as noted in HPI; otherwise 12 point ROS negative.      Physical Exam:  EXAM:  /60  Pulse 80  Temp 98.1  F (36.7  C) (Oral)   Resp 16  Ht 5' 3.75\" (1.619 m)  Wt (!) 237 lb 11.2 oz (107.8 kg)  LMP 03/06/2002  BMI 41.12 kg/m2   Gen:  NAD, appears well, well-hydrated  HEENT:  TMs nl, oropharynx benign, nasal mucosa nl, conjunctiva clear  Neck:  Supple, no adenopathy, no thyromegaly, no carotid bruits, no JVD  Lungs:  Clear to auscultation bilaterally  Cor:  RRR no murmur  Abd:  Soft, nontender, BS+, no masses, no guarding or rebound, no HSM  Extr:  Neg.  Neuro:  No asymmetry  Skin:  Warm/dry        Results:  Results for orders placed or performed in visit on 04/04/17   Basic Metabolic Panel   Result Value Ref Range    Sodium 143 136 - 145 mmol/L    Potassium 5.1 (H) 3.5 - 5.0 mmol/L    Chloride 106 98 - 107 mmol/L    CO2 27 22 - 31 mmol/L    Anion Gap, Calculation 10 5 - 18 mmol/L    Glucose 99 70 - 125 mg/dL    Calcium 9.4 8.5 - 10.5 mg/dL    BUN 8 8 - 22 mg/dL    Creatinine 0.73 0.60 - 1.10 mg/dL    GFR MDRD Af Amer >60 >60 mL/min/1.73m2    GFR MDRD Non Af Amer >60 >60 mL/min/1.73m2   Hemoglobin   Result Value Ref Range    Hemoglobin 14.1 12.0 - 16.0 g/dL             "

## 2021-06-11 NOTE — PROGRESS NOTES
"  Assessment and Plan:     1. Encounter for Medicare annual wellness exam     2. Visit for screening mammogram  Mammo Screening Bilateral   3. Encounter for HCV screening test for low risk patient  Hepatitis C Antibody (Anti-HCV)   4. Need for tetanus booster  Tdap vaccine,  8yo or older,  IM   5. Colon cancer screening  Ambulatory referral for Colonoscopy   6. Peripheral Neuropathy  Vitamin B12   7. Vitamin D deficiency  Vitamin D, Total (25-Hydroxy)   8. Hypomagnesemia  Magnesium   9. History of major depression     10. Pulmonary emphysema, unspecified emphysema type (H)     11. Elevated LFTs  Comprehensive Metabolic Panel   12. Unexplained weight gain  Thyroid Stimulating Hormone (TSH)    Comprehensive Metabolic Panel    Lipid Cascade FASTING    HM1(CBC and Differential)   13. Fatty (change of) liver, not elsewhere classified   Lipid Cascade FASTING   14. Inflamed seborrheic keratosis  lidocaine 1%-EPINEPHrine 1:100,000 1 %-1:100,000 injection 5 mL (XYLOCAINE W/EPI)   15. Lump of left thigh     This is a 66 yo female here for annual wellness visit:  1.  Peripheral neuropathy - chronic - will check Vitamin B12 level (and TSH)   2.  Vitamin D deficiency - check Vitamin D  3.  Hypomagnesemia - check mg level - replace if necessary  4.  H/o major depression   PHQ-9 Total Score: 2 (7/24/2020 10:00 AM)  stable currently - notes she is doing well - currently dating a \"good\" person, life is less stressed  5.  Pulmonary emphysema - no new symptoms  6.  Elevated LFT - felt secondary to alcohol use/fatty liver - recheck   7.  Unexplained weight gain - feels like she is more active than usual (ever) and is still gaining weight - we discussed that she is replacing empty (alcohol) calories with food calories.  Will check metabolic labs as well.  8.  Fatty liver - as above  9.  Inflamed seborrheic keratoses - x 3 - on back and left breast - these were removed today - all of these are inflamed/irritated and rub on " bra/clothing  10.  Lump left leg - has soft tissue lump on mid lateral left thigh - non tender, no fluctuance, likely benign.  Will watch this and recheck in 2-3 months.     Patient has been advised of split billing requirements and indicates understanding: Yes      The patient's current medical problems were reviewed.    I have had an Advance Directives discussion with the patient.  The following health maintenance schedule was reviewed with the patient and provided in printed form in the after visit summary:   Health Maintenance   Topic Date Due     DEPRESSION ACTION PLAN  1955     COPD ACTION PLAN  1955     HEPATITIS B VACCINES (1 of 3 - Risk 3-dose series) 01/31/1976     ZOSTER VACCINES (1 of 2) 01/31/2007     MEDICARE ANNUAL WELLNESS VISIT  01/05/2019     COLORECTAL CANCER SCREENING  07/26/2020     PAP SMEAR  09/16/2020     HPV TEST  09/16/2020     TD 18+ HE  10/29/2020 (Originally 7/16/2018)     Pneumococcal Vaccine: Pediatrics (0 to 5 Years) and At-Risk Patients (6 to 64 Years) (2 of 3 - PCV13) 01/28/2021     MAMMOGRAM  09/29/2022     LIPID  09/29/2025     ADVANCE CARE PLANNING  09/29/2025     HEPATITIS C SCREENING  Completed     HIV SCREENING  Completed     SPIROMETRY  Completed     INFLUENZA VACCINE RULE BASED  Completed     TDAP ADULT ONE TIME DOSE  Completed        Subjective:   Chief Complaint: Patsy Santamaria is an 65 y.o. female here for an Annual Wellness visit.   HPI:    1.  Skin things on back - wants frozen - can feel when scratches with back scratcher   one under breast -   bump on nose  2.  Cold sores - wearing mask - gets crack in side of lips - dry  3.  Lump -leg - left - wuarter sized - lateral calf - midway between knee/ankle - x 1 year or more not getting any bigger; tender if pushed on  4.  When walking - (has been more active, walking more) - back on left side hurts - just stops and sits down a little while - annoying - no radiation to legs  Will get sharp pain when standing up  "to brush teeth - has to sit down  5. Wants mammogram   6.  Weight is going up - no alcohol - has been walking - more active than ever been  Quit the Gabapentin - doesn't take it because it makes her gain weight  7.  Blood work -   8.  Fill out release form for psychiatrist - Dr. Grey - used to be at [facility] - but this programming is being \"dismantled\" - has moved to [place]   9.  Hep C screenng -discussed  10.  COPD - uses inhalers - Albuterol , and two times a day ?Azmacort; has nebs if needs  11. Advanced care planning -              Review of Systems:    Please see above.  The rest of the review of systems are negative for all systems.    Patient Care Team:  Yanique Cali MD as PCP - General  Yanique Cali MD as Assigned PCP     Patient Active Problem List   Diagnosis     Chronic Reflux Esophagitis     Peripheral Neuropathy     Localized Primary Osteoarthritis Of The Carpometacarpal Joint     Ganglion Of The Right Foot     Major depression, recurrent (H)     Nicotine Dependence     Vitamin D deficiency     PTSD (post-traumatic stress disorder)     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)     Skin lesion     Cervical disc disease     Claustrophobia     New onset seizure (H)     Hypoxia     Alcohol abuse     Elevated LFTs     Trochanteric bursitis of left hip     Carpal tunnel syndrome on both sides     Bilateral edema of lower extremity     Snoring     Morbid obesity (H)     Chronic alcohol dependence, continuous (H)     Low backache     Alcohol withdrawal syndrome without complication (H)     Primary osteoarthritis of right knee     Alcohol use disorder, severe, dependence (H)     Alcoholic hepatitis     Peripheral edema     Diuretic-induced hypokalemia     Mild episode of recurrent major depressive disorder (H)     Seasonal allergies     Primary osteoarthritis of left knee     Anxiety     Hypomagnesemia     Dyspnea on exertion     Chronic obstructive " pulmonary disease, unspecified COPD type (H)     Severe alcohol use disorder (H)     Pneumonia of right lower lobe due to infectious organism     Airway obstruction due to foreign body, initial encounter     COPD exacerbation (H)     Biliary colic     History of major depression     Abdominal pain, epigastric     Past Medical History:   Diagnosis Date     Alcohol withdrawal seizure without complication (H) 2016     Alcoholic cirrhosis (H)      Anxiety      Arthritis      Chronic alcohol dependence, continuous (H) 2018    inpatient 2019, sober since then     Chronic reflux esophagitis      Depression      Dermatitis      Ganglion     right foot     H. pylori infection      Melanoma (H) 2019    left upper arm     Menopause     age 52     Obesity (BMI 35.0-39.9 without comorbidity)      Seizure (H)     during alcohol withdrawal     Sleep apnea     mild, doesnt tolerate pap therapy      Past Surgical History:   Procedure Laterality Date     APPENDECTOMY       CA APPENDECTOMY      Description: Appendectomy;  Recorded: 2008;  Comments: childhood     CA  DELIVERY ONLY      Description:  Section;  Recorded: 2008;     CA EXCIS TENDN/CAPSULE LESN,FOOT      Description: Excision Of Cyst Of Tendon Sheath Of Foot;  Proc Date: 10/28/2011;  Comments: [facility]     CA HEMORRHOIDECTOMY INTERNAL RUBBER BAND LIGATIONS      Description: Hemorrhoidectomy;  Recorded: 2008;     CA KNEE SCOPE,DIAGNOSTIC      Description: Arthroscopy Knee Right;  Proc Date: 10/11/2005;  Comments: for right knee patella subluxation with lateral retinacular release; subpatellar chondroplasty     CA LATERAL RETINACULAR RELEASE OPEN      Description: Knee Lateral Retinacular Release;  Proc Date: 10/11/2005;     CA LIGATE FALLOPIAN TUBE      Description: Tubal Ligation;  Recorded: 2008;     SKIN BIOPSY Left 2019    left upper arm     TONSILLECTOMY        Family History   Problem Relation  Age of Onset     Heart disease Mother      Heart disease Father       Social History     Socioeconomic History     Marital status: Single     Spouse name: Not on file     Number of children: Not on file     Years of education: Not on file     Highest education level: Not on file   Occupational History     Occupation: lauren     Employer: [employer]     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[facility])   Social Needs     Financial resource strain: Not on file     Food insecurity     Worry: Not on file     Inability: Not on file     Transportation needs     Medical: Not on file     Non-medical: Not on file   Tobacco Use     Smoking status: Current Every Day Smoker     Packs/day: 0.50     Years: 49.00     Pack years: 24.50     Types: Cigarettes     Smokeless tobacco: Never Used     Tobacco comment: last 11/2019   Substance and Sexual Activity     Alcohol use: Not Currently     Comment: sober since 11/19/2019     Drug use: No     Sexual activity: Yes     Partners: Male     Birth control/protection: None   Lifestyle     Physical activity     Days per week: Not on file     Minutes per session: Not on file     Stress: Not on file   Relationships     Social connections     Talks on phone: Not on file     Gets together: Not on file     Attends Episcopal service: Not on file     Active member of club or organization: Not on file     Attends meetings of clubs or organizations: Not on file     Relationship status: Not on file     Intimate partner violence     Fear of current or ex partner: Not on file     Emotionally abused: Not on file     Physically abused: Not on file     Forced sexual activity: Not on file   Other Topics Concern     Not on file   Social History Narrative     Not on file      Current Outpatient Medications   Medication Sig Dispense Refill     albuterol (ACCUNEB) 0.63 mg/3 mL nebulizer solution Take 3 mL (0.63 mg total) by nebulization every 2 (two) hours as needed for wheezing. 10 vial 0     albuterol  "(VENTOLIN HFA) 90 mcg/actuation inhaler Inhale 2 puffs every 4 (four) hours as needed for wheezing. 18 g 3     ammonium lactate (AMLACTIN) 12 % cream Apply 2 application topically 2 (two) times a day as needed. Apply 1-3 grams to each foot twice daily as needed  11     DULoxetine (CYMBALTA) 30 MG capsule Take 3 capsules (90 mg total) by mouth daily. 90 capsule 0     hydrOXYzine pamoate (VISTARIL) 25 MG capsule Take 1 capsule (25 mg total) by mouth 4 (four) times a day as needed for anxiety. 120 capsule 1     baclofen (LIORESAL) 10 MG tablet Take 1 tablet (10 mg total) by mouth 3 (three) times a day. 90 tablet 0     budesonide-formoterol (SYMBICORT) 80-4.5 mcg/actuation inhaler Inhale 2 puffs 2 (two) times a day. 1 Inhaler 1     bumetanide (BUMEX) 1 MG tablet Take 1 tablet (1 mg total) by mouth daily. 90 tablet 2     cetirizine (ZYRTEC) 10 MG tablet Take 1 tablet (10 mg total) by mouth daily as needed for allergies. 30 tablet 5     gabapentin (NEURONTIN) 300 MG capsule Use 1-2 tablets three times daily 60 capsule 0     naltrexone (DEPADE) 50 mg tablet Take 100 mg by mouth daily.       nicotine (NICODERM CQ) 7 mg/24 hr Place 1 patch on the skin daily. 30 patch 11     nicotine (NICOTROL) 10 mg inhaler Inhale 1 puff as needed for smoking cessation. 168 each 11     omeprazole (PRILOSEC) 20 MG capsule Take 1 capsule (20 mg total) by mouth daily before breakfast. 90 capsule 3     potassium chloride (K-DUR,KLOR-CON) 20 MEQ tablet Take 1 tablet (20 mEq total) by mouth 2 (two) times a day. 180 tablet 2     traZODone (DESYREL) 50 MG tablet Take 0.5 tablets (25 mg total) by mouth at bedtime. 45 tablet 1     No current facility-administered medications for this visit.       Objective:   Vital Signs:   Visit Vitals  /80 (Patient Site: Right Arm, Patient Position: Sitting, Cuff Size: Adult Large)   Pulse 77   Temp 97.6  F (36.4  C) (Tympanic)   Resp 18   Ht 5' 2.5\" (1.588 m)   Wt (!) 243 lb (110.2 kg)   LMP 03/06/2002   BMI " "43.74 kg/m           VisionScreening:   Hearing Screening    125Hz 250Hz 500Hz 1000Hz 2000Hz 3000Hz 4000Hz 6000Hz 8000Hz   Right ear:            Left ear:               Visual Acuity Screening    Right eye Left eye Both eyes   Without correction: 10/16 10/16 10/16   With correction:           PHYSICAL EXAM  EXAM:  /80 (Patient Site: Right Arm, Patient Position: Sitting, Cuff Size: Adult Large)   Pulse 77   Temp 97.6  F (36.4  C) (Tympanic)   Resp 18   Ht 5' 2.5\" (1.588 m)   Wt (!) 243 lb (110.2 kg)   LMP 03/06/2002   BMI 43.74 kg/m     Gen:  NAD, appears well, well-hydrated  HEENT:  TMs nl, oropharynx benign, nasal mucosa nl, conjunctiva clear  Neck:  Supple, no adenopathy, no thyromegaly, no carotid bruits, no JVD  Lungs:  Clear to auscultation bilaterally  Breast exam:  No breast lumps, no skin changes, no nipple discharge, no axillary adenopathy  Cor:  RRR no murmur  Abd:  Soft, nontender, BS+, no masses, no guarding or rebound, no HSM  Extr:  Neg.; 1.5 cm soft tissue lump of left mid lateral thigh  Neuro:  No asymmetry  Skin:  Warm/dry, has inflamed seborrheic keratoses, back/under left breast        No flowsheet data found.  A Mini-Cog score of 0-2 suggests the possibility of dementia, score of 3-5 suggests no dementia  Fall risk:  Decreased risk related to alcohol abstinence  Cognitive risk:  Increased due to years of alcohol use    Identified Health Risks:       Shave Biopsy Procedure Note    Pre-operative Diagnosis: inflamed seborrheic keratoses    Post-operative Diagnosis: same    Locations  2 on mid back, 1 under left breast    Indications: inflamed lesions that irritate and rub on clothing/bra    Anesthesia: Lidocaine 1% with epinephrine    Procedure Details   History of allergy to iodine: no    Patient informed of the risks (including bleeding and infection) and benefits of the   procedure and Verbal informed consent obtained.    The lesion and surrounding area were given a sterile prep " using betadyne and draped in the usual sterile fashion. A scalpel was used to shave an area of skin approximately 3 mm. (x 3 lesions)   Hemostasis achieved with hyfrecation. . A sterile dressing was applied.  The specimen was sent for pathologic examination. The patient tolerated the procedure well.    EBL: 2 ml    Findings:  3 inflamed seborrheic keratoses    Condition:  Stable    Complications:  none.    Plan:  1. Instructed to keep the wound dry and covered for 24-48h and clean thereafter.  2. Warning signs of infection were reviewed.    3. Recommended that the patient use OTC analgesics (Acetaminophen or Ibuprofen - if not allergic or intolerant) as needed for pain.   4. Return  Prn

## 2021-06-11 NOTE — PROGRESS NOTES
ASSESSMENT/PLAN:  1. Acute pain of right knee  diclofenac (VOLTAREN) 75 MG EC tablet    lidocaine HCl 4 % Crea    CANCELED: XR Knee Right 1 or 2 VWS   2. Edema  potassium chloride (KLOR-CON) 10 MEQ CR tablet   3. Bilateral edema of lower extremity  furosemide (LASIX) 40 MG tablet   4. Major depressive disorder, recurrent episode  DULoxetine (CYMBALTA) 30 MG capsule       This is a 62-year-old female, seen today for right knee pain.  The pain is been worse at night, and has been present for at least 2 weeks.  It is difficult for her to sleep due to the pain, it is difficult for her to get up and out of a chair after she has been seated.  We initially discussed an x-ray, but reviewed chart and reviewed her x-ray findings from March of this year.  These are indicated below.  There is some degenerative findings there.  She does have some varicosities overlying the area of her greatest pain, and I suspect she may have some mild thrombophlebitis.  I think it is reasonable for her to try some diclofenac as an anti-inflammatory, using this regularly.  Will use Diclofenac 75 mg daily to BID for 3-5 days (with food) and then back off to prn.  She could certainly use some pain relieving cream, will give her a prescription for lidocaine cream.  We will see her back in 3-4 weeks to assess the efficacy of this intervention.  We did discuss the role of heat in the nature of treatment of thrombophlebitis as well.    Patient has history of lower extremity edema, notes that she thinks her water pill is not working.  We have adjusted the dosing on her water pill, and her potassium.  We will see her back again in about 3-4 weeks to assess.    Patient has a history of major depressive disorder, has been on duloxetine therapy which is probably reasonable in terms of her other pain syndromes.  I have asked her to refill the fluoxetine and continue with this      Medications Discontinued During This Encounter   Medication Reason      oxyCODONE-acetaminophen (PERCOCET) 5-325 mg per tablet Therapy completed     potassium chloride (KLOR-CON) 10 MEQ CR tablet Reorder     furosemide (LASIX) 40 MG tablet Reorder     DULoxetine (CYMBALTA) 30 MG capsule Reorder     diclofenac (VOLTAREN) 75 MG EC tablet Reorder     There are no Patient Instructions on file for this visit.    Chief Complaint:  Chief Complaint   Patient presents with     Knee Pain     2 weeks, her right knee gets stiff when she sits. Her pain gets worse at night, making her unable to sleep.       HPI:   Patsy Santamaria is a 62 y.o. female c/o  Pain in right knee - stiff when sitting  Pain worse at night  No injury  Feels like she uses right knee more getting up in son's truck  Always uses right knee    Left lower leg lesion has healed - still discolored - soft in middle but not fluctuant and does not appear infected    PMH:   Patient Active Problem List    Diagnosis Date Noted     Bilateral edema of lower extremity 06/28/2017     Snoring 06/28/2017     Carpal tunnel syndrome on both sides 03/06/2017     Trochanteric bursitis of left hip 10/25/2016     Alcohol withdrawal seizure without complication 05/14/2016     Hypoxia 05/13/2016     Alcohol abuse 05/13/2016     Elevated LFTs 05/13/2016     New onset seizure 05/12/2016     Claustrophobia 12/18/2015     Cervical disc disease 12/14/2015     COPD (chronic obstructive pulmonary disease) with emphysema 09/16/2015     Post traumatic stress disorder 02/13/2015     Chronic Reflux Esophagitis      Peripheral Neuropathy      Localized Primary Osteoarthritis Of The Carpometacarpal Joint      Ganglion Of The Right Foot      Major Depression, Recurrent      Nicotine Dependence      Vitamin D Deficiency      Past Medical History:   Diagnosis Date     Acute solar dermatitis      Anxiety      Chronic reflux esophagitis      Dermatitis      Ganglion     right foot     H. pylori infection      Menopause     age 52     Past Surgical History:   Procedure  Laterality Date     MO APPENDECTOMY      Description: Appendectomy;  Recorded: 2008;  Comments: childhood     MO  DELIVERY ONLY      Description:  Section;  Recorded: 2008;     MO EXCIS TENDN/CAPSULE LESN,FOOT      Description: Excision Of Cyst Of Tendon Sheath Of Foot;  Proc Date: 10/28/2011;  Comments: [facility]     MO HEMORRHOIDECTOMY INTERNAL RUBBER BAND LIGATIONS      Description: Hemorrhoidectomy;  Recorded: 2008;     MO KNEE SCOPE,DIAGNOSTIC      Description: Arthroscopy Knee Right;  Proc Date: 10/11/2005;  Comments: for right knee patella subluxation with lateral retinacular release; subpatellar chondroplasty     MO LATERAL RETINACULAR RELEASE OPEN      Description: Knee Lateral Retinacular Release;  Proc Date: 10/11/2005;     MO LIGATE FALLOPIAN TUBE      Description: Tubal Ligation;  Recorded: 2008;     Social History     Social History     Marital status: Single     Spouse name: N/A     Number of children: N/A     Years of education: N/A     Occupational History     [employer]     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[employer])     Social History Main Topics     Smoking status: Current Every Day Smoker     Packs/day: 1.00     Types: Cigarettes     Smokeless tobacco: Never Used      Comment: 1 pack per day     Alcohol use No      Comment: sober since 18     Drug use: No     Sexual activity: Not on file     Other Topics Concern     Not on file     Social History Narrative       Meds:    Current Outpatient Prescriptions:      desoximetasone (TOPICORT) 0.25 % ointment, Apply topically sparingly BID to affected area only  - no longer than 14 days, Disp: 30 g, Rfl: 0     DULoxetine (CYMBALTA) 30 MG capsule, Take 1 capsule (30 mg total) by mouth daily., Disp: 30 capsule, Rfl: 1     furosemide (LASIX) 40 MG tablet, Take 1 tablet (40 mg total) by mouth 2 (two) times a day., Disp: 60 tablet, Rfl: 3     HYDROPHOR 42 % Oint ointment, , Disp: , Rfl: 1      "ipratropium-albuterol (DUO-NEB) 0.5-2.5 mg/3 mL nebulizer, Take 3 mL by nebulization every 6 (six) hours as needed (wheezing, short of breath)., Disp: 90 mL, Rfl: 1     min oil-petrolat (AQUAPHOR) ointment, Apply topically to dry skin TID as needed, Disp: 396 g, Rfl: 1     nystatin (MYCOSTATIN) cream, Apply topically sparingly TID to affected areas only, Disp: 45 g, Rfl: 1     potassium chloride (KLOR-CON) 10 MEQ CR tablet, TAKE ONE TABLET BY MOUTH ONE TIME DAILY, Disp: 30 tablet, Rfl: 2     diclofenac (VOLTAREN) 75 MG EC tablet, Take 1 tablet (75 mg total) by mouth 2 (two) times a day. Take with food., Disp: 60 tablet, Rfl: 1     lidocaine HCl 4 % Crea, Apply 1 application topically 2 (two) times a day as needed (pain)., Disp: 120 mL, Rfl: 0     omeprazole (PRILOSEC) 20 MG capsule, TAKE ONE CAPSULE BY MOUTH ONE TIME DAILY , Disp: 90 capsule, Rfl: 2     traMADol (ULTRAM) 50 mg tablet, , Disp: , Rfl: 1    Allergies:  Allergies   Allergen Reactions     Codeine Nausea Only     Hydrochlorothiazide Rash     phototoxicity - med was d/lora       Sulfa (Sulfonamide Antibiotics)      Diclofenac Rash     Sulfasalazine Rash       ROS:  Pertinent positives as noted in HPI; otherwise 12 point ROS negative.      Physical Exam:  EXAM:  /66 (Patient Site: Right Arm, Patient Position: Sitting, Cuff Size: Adult Large)  Pulse 80  Resp 16  Wt (!) 238 lb (108 kg)  LMP 03/06/2002  BMI 41.17 kg/m2   Gen:  NAD, appears well, well-hydrated  HEENT:  TMs nl, oropharynx benign, nasal mucosa nl, conjunctiva clear  Neck:  Supple, no adenopathy, no thyromegaly, no carotid bruits, no JVD  Lungs:  Clear to auscultation bilaterally  Cor:  RRR no murmur  Abd:  Soft, nontender, BS+, no masses, no guarding or rebound, no HSM  Extr: tender along lateral aspect right knee; swollen inflamed varicosities  Neuro:  No asymmetry  Skin:  Warm/dry, left lower leg lesion - now closed, no fluctuance although central lesion is still \"soft\" - "         [facility]  [facility] VW3/6/2017 9:01 AMINDICATION: Bilateral knee pain.COMPARISON: None.FINDINGS: Right knee: No fracture or dislocation. Mild osteophytic change in the patellofemoral compartment in the medial   compartment. No joint effusion.Left knee: There is a well-corticated bony density along the medial femoral epicondyle consistent with Hannah-Stieda lesion from presumed old trauma the medial collateral ligament. No acute fracture or dislocation.   There is mild-to-moderate osteoarthritic change in the patellofemoral compartment most pronounced in the lateral facet. No joint effusion.This report was electronically interpreted by: Dr. Oswald Couch MD ON 03/06/2017 at 10:01

## 2021-06-11 NOTE — PATIENT INSTRUCTIONS - HE
Patient Education   Personalized Prevention Plan  You are due for the preventive services outlined below.  Your care team is available to assist you in scheduling these services.  If you have already completed any of these items, please share that information with your care team to update in your medical record.  Health Maintenance   Topic Date Due     DEPRESSION ACTION PLAN  1955     HEPATITIS C SCREENING  1955     COPD ACTION PLAN  1955     HEPATITIS B VACCINES (1 of 3 - Risk 3-dose series) 01/31/1976     ZOSTER VACCINES (1 of 2) 01/31/2007     ADVANCE CARE PLANNING  04/29/2015     TD 18+ HE  07/16/2018     MEDICARE ANNUAL WELLNESS VISIT  01/05/2019     MAMMOGRAM  03/06/2019     COLORECTAL CANCER SCREENING  07/26/2020     PAP SMEAR  09/16/2020     HPV TEST  09/16/2020     LIPID  01/05/2023     HIV SCREENING  Completed     SPIROMETRY  Completed     PNEUMOCOCCAL IMMUNIZATION 19-64 MEDIUM RISK  Completed     INFLUENZA VACCINE RULE BASED  Completed     TDAP ADULT ONE TIME DOSE  Completed           Call your insurance company (number on back of your card) and ask them:  1.  Does my insurance cover the Shingrix (the new shingles shot)? , and if so, then  2.  Do I need to get that at my doctor's office or at my pharmacy - does it matter?

## 2021-06-11 NOTE — TELEPHONE ENCOUNTER
Outreach attempt # 2 - no answer.  Therapist left voice message for patient to call behavioral access scheduling line in order to set up a psychotherapy visit.

## 2021-06-11 NOTE — PROGRESS NOTES
Mental Health Visit Note    This is a dual signature report. My supervising clinician is JULIO Kasper.    7/5/2017   Start time: 2:00pm    Stop Time: 3:00pm   Session # 3    Patsy Santamaria is a 62 y.o. female is being seen today for    Chief Complaint   Patient presents with     MH Follow Up     Psychotherapy follow-up to address symptoms of depression, anxiety and evaluate maintenance of sobriety in addition to efforts to manage environmental stressors     New symptoms or complaints: The patient endorsed symptoms of anxiety that have increased due to stress associated with her current living situation    Functional Impairment:   Personal: 3  Family: 2  Work: 4  Social:3    Clinical assessment of mental status:   Grooming: Well groomed  Attire: Appropriate  Age: Appears Stated  Behavior Towards Examiner: Cooperative  Motor Activity: Within normal   Eye Contact: Appropriate  Mood: Mostly euthymic, at times anxious and depressed  Affect: Congruent w/content of speech, Flat, Anxious and Depressed  Speech/Language: Within normal  Attention: Within normal  Concentration: Within normal  Thought Process: Within normal  Thought Content: Hallucinations: None reported  Delusions: none reported  Orientation: X 3  Memory: No Evidence of Impairment  Judgement: No Evidence of Impairment  Estimated Intelligence: Average  Demonstrated Insight: Adequate  Fund of Knowledge: adequate    Suicidal/Homicidal Ideation present: None Reported This Session    Patient's impression of their current status:   The patient presented for a follow-up psychotherapy visit. Her last encounter was on 5/31/2017 and she articulated having some difficulty rescheduling her appointment. The patient reviewed her progress in managing identified stressors from last session. She reported ongoing dreams about her ex-boyfriend of 20 years and shared about the content of recent dreams such as his death. She revisited ongoing issues with her  "daughter-in-law and discussed her efforts to avoid \"getting in the middle\" of her son's relationship. However, the patient identified her son and daughter-in-law's relationship as a source of stress especially when she is asked to babysit or when her son indicates his intention to leave his wife. The patient described her efforts to be a supportive mom and help her son when he asks. However, this has caused increased stress particularly as she does not live in her own home and does not have personal space in which to babysit the grandchildren. She was encouraged to speak with the director regarding rules for visitors. The patient shared that she met with the [facility] which she found helpful. She reported that she is waiting to hear about housing after applying for several places. She shared that she is not yet ready to return to work due to her physical condition (arthritis). However, she plans to return to work eventually after healing from surgery and using this time to focus on improving her mental and physical health. The patient described recently \"feeling stuck\" in her negative feelings during which she finds it difficult to problem solve. She shared that talking in therapy helps her work through her thoughts and feelings, as this used to be a trigger for drinking alcohol from which she is now sober. The patient's 1 year sobriety anniversary is coming up on 7/29/2017 and she discussed how she might be intentional in congratulating her efforts without drawing too much attention to herself (this may include a pedicure or delicious meal; she reported that she does not want a party or too much attention). The patient shared that she tries to attend an AA meeting everyday and utilizes her support network to get out of the house (transitional house) when feeling stressed or stuck. The patient identified ways in which she has been focusing on planning healthy activities to do with healthy people as part of her " long-term relapse prevention plan. The patient also reported that she will be participating in a sleep study at the end of the month to determine if she has sleep apnea.     Therapist impression of patients current state:   The patient appeared open-minded and cooperative during today's session. She utilized the session to outline various environmental stressors and exhibited an ability to gain insight regarding the ways in which she has been emotionally affected by external stimuli. She continues to demonstrate progress in her ability to openly discuss her thoughts and feelings which is a healthier approach she has learned in managing stress especially considering her long history of chemical dependency and alcohol abuse. The patient has exhibited increased self-awareness regarding her efforts to develop healthier coping strategies in order to achieve long-term sobriety. She was provided with much positive reinforcement regarding her efforts to engage with her support network. Her positive support network appears to be growing particularly in regards to her increase in healthy relationships with sober peers whom she can call for help or support at any time. The patient has a self-reported history of isolation which she has demonstrated progress in actively changing. The patient was encouraged to continue attending psychotherapy especially as her 1 year sobriety anniversary approaches. The patient is learning how to live a (new) sober and healthy lifestyle which requires continued support, positive reinforcement and guidance as needed. The patient has articulated how therapy helps her problem solve and make informed decisions in an empowering way. Thus, ongoing participation in psychotherapy is strongly recommended.     Type of psychotherapeutic technique provided: Insight oriented and CBT    Progress toward short term goals:Progress as expected, as the patient appeared open-minded and cooperative, demonstrating an  ability to gain trust in the therapist    Review of long term goals: The patient highlighted several personal long-term goals such as gaining self-acceptance, maintaining sobriety and working through unresolved issues from her past.    Diagnosis:   1. Severe episode of recurrent major depressive disorder, without psychotic features    2. PTSD (post-traumatic stress disorder)    3. Anxiety disorder, unspecified type    R/O Generalized Anxiety Disorder  R/O Bipolar Disorder    Plan and Follow up: The patient will return in 3 weeks which will be the week of her 1 year anniversary for sobriety.     Discharge Criteria/Planning: Client has chronic symptoms and ongoing therapy for maintenance stability recommended.    Performed and documented by GOOD Mendoza    I have read, discussed and reviewed the documentation as presented by GOOD Mendoza LICSW

## 2021-06-12 NOTE — PROGRESS NOTES
Mental Health Visit Note    This is a dual signature report. My supervising clinician is JULIO Kasper.    8/9/2017   Start time: 9:00am    Stop Time: 10:00am   Session # 5    Patsy Santamaria is a 62 y.o. female is being seen today for    Chief Complaint   Patient presents with     MH Follow Up     Psychotherapy follow-up visit to address symptoms of anxiety, depression and PTSD in addition to assessing for sobriety maintenance     New symptoms or complaints: None    Functional Impairment:   Personal: 3  Family: 2  Work: 4  Social:3    Clinical assessment of mental status:   Grooming: Well groomed  Attire: Appropriate  Age: Appears Stated  Behavior Towards Examiner: Cooperative  Motor Activity: Within normal   Eye Contact: Appropriate  Mood: Mostly euthymic, at times anxious and depressed  Affect: Congruent w/content of speech, Flat, Anxious and Depressed  Speech/Language: Within normal  Attention: Within normal  Concentration: Within normal  Thought Process: Within normal  Thought Content: Hallucinations: None reported  Delusions: none reported  Orientation: X 3  Memory: No Evidence of Impairment  Judgement: No Evidence of Impairment  Estimated Intelligence: Average  Demonstrated Insight: Adequate  Fund of Knowledge: adequate    Suicidal/Homicidal Ideation present: None Reported This Session    Patient's impression of their current status:   The patient presented on-time for a follow-up psychotherapy visit. The patient spent a majority of time processing an event which transpired with her son regarding his children when she was babysitting. The patient described how her feelings were hurt due to comments he made towards her. The patient processed how she felt afraid that he may not have forgiven her for the past - she elaborated by sharing how she had to give up her children for adoption and shared that they recently came into her life again. She is currently contemplating how to support her children  "without sacrificing her own needs. She described feeling as though she is \"trying to make up for the past\" without being sure how to move forward.     Therapist impression of patients current state:   The patient appeared open-minded and cooperative during today's session. The patient appropriately utilized session to process difficult thoughts and feelings. The patient has developed increased awareness about the impact of past life events on current beliefs, thoughts and feelings. She exhibited an ability to reflect upon the meaning attached to certain events and began to brainstorm ways to resolve current conflicts. The patient was able to reduce symptoms of anxiety by engaging in cognitive behavioral exercises.  Future sessions will focus on establishing boundaries and creating healthy relationships with her children.    Type of psychotherapeutic technique provided: Insight oriented and CBT    Progress toward short term goals:Progress as expected, as the patient appeared open-minded and cooperative, demonstrating an ability to gain trust in the therapist    Review of long term goals: The patient highlighted several personal long-term goals such as gaining self-acceptance, maintaining sobriety and working through unresolved issues from her past.    Diagnosis:   1. Severe episode of recurrent major depressive disorder, without psychotic features    2. PTSD (post-traumatic stress disorder)    3. Anxiety disorder, unspecified type    Alcohol abuse, in remission  R/O Generalized Anxiety Disorder  R/O Bipolar Disorder    Plan and Follow up: The patient is scheduled to return for a follow-up appointment at [facility] on 8/23/17. She has been scheduled to transfer to see this provider at [facility] beginning September 2017.     Discharge Criteria/Planning: Client has chronic symptoms and ongoing therapy for maintenance stability recommended.    Performed and documented by GOOD Mendoza    I have read, " discussed and reviewed the documentation as presented by Araceli Hamilton, LGJESU Francisco, ROBSONSW

## 2021-06-12 NOTE — PROGRESS NOTES
"Mental Health tele Visit Note    Patient: Patsy Santamaria    : 1955 MRN: [id number]    Date: 10/12/2020  Start time: 3:10pm   Stop Time: 3:50pm   Session # 3    The patient has been notified of the following:   \"We have found that certain health care needs can be provided without the need for a face to face visit.  This service lets us provide the care you need with a phone conversation.  I will have full access to your [facility] medical record during this entire phone call.   I will be taking notes for your medical record. Since this is like an office visit, we will bill your insurance company for this service.  There are potential benefits and risks of telephone visits (e.g. limits to patient confidentiality) that differ from in-person visits.?  Confidentiality still applies for telephone services, and nobody will record the visit.  It is important to be in a quiet, private space that is free of distractions (including cell phone or other devices) during the visit.?? If during the course of the call I believe a telephone visit is not appropriate, you will not be charged for this service\"  Consent has been obtained for this service by care team member: Yes, per verbal agreement   Session Type: Patient is participating in a telemedicine phone visit.  Patient does not know how to set up video visit due to lack of knowledge of technology.     Chief Complaint   Patient presents with      Follow Up     Psychotherapy follow-up visit for depression and alcohol abuse     New symptoms or complaints: relapse with alcohol     Functional Impairment:   Personal: 3  Family: 3  Work: 4  Social:2        ASSESSMENT: Current Emotional / Mental Status (status of significant symptoms):              Risk status (Self / Other harm or suicidal ideation)              Patient denies risks to personal safety              Patient denies current or recent suicidal ideation or behaviors.              Patient denies current or recent " "homicidal ideation or behaviors.              Patient denies current or recent self injurious behavior or ideation.              Patient denies other safety concerns.              Patient denies changes to risk factors (alcohol dependence; social isolation; unemployment)              Patient reports changes to protective factors including new male partner in her life              Recommended that patient call 911 or go to the local ED should there be a change in any of these risk factors.                Attitude:                                   Cooperative               Orientation:                             x3              Speech                          Rate / Production:       Normal                           Volume:                       Normal               Mood:                                      Irritable  Normal              Thought Content:                    Clear               Thought Form:                        Coherent  Logical               Insight:                                     Good     Patient's impression of their current status:   Patient reports \"not doing well.\" She has relapsed with alcohol. She has plans to check into treatment at [facility]. She shares about recent stressors. She shares about her fears of being judged and having trouble asking for help from members of her family.     Therapist impression of patients current state:   Therapist prompted patient to express thoughts and feelings following a CBT framework. Therapist normalized patient emotions and validated patient experiences while also encouraging to follow through with plans to check into treatment.     Type of psychotherapeutic technique provided: Client centered and CBT    Progress toward short term goals: Poor progress, with patient returning to alcohol abuse and avoiding seeking help until now    Review of long term goals:   Treatment plan updated 7/7/20  Will update after patient completes chemical dependency " treatment     Diagnosis:  1. PTSD (post-traumatic stress disorder)    2. Alcohol use disorder, severe, dependence (H)    3. Generalized anxiety disorder    4. Moderate episode of recurrent major depressive disorder (H)      Plan and Follow up:   May need updated standard DA - initial DA completed by this provider on 3/23/2017 but haven't been able to update due to patient's poor compliance with scheduled appointments. She is recommended to participate in CD treatment before returning to outpatient psychotherapy services.     Substance Use Disorder Treatment Comprehensive Assessment completed on 12/13/2019     Discharge Criteria/Planning: Client has chronic symptoms and ongoing therapy for maintenance stability recommended. and Other: Patient needs to participate in alcohol dependency treatment before participating in outpatient psychotherapy services    I have reviewed the note as documented above.  This accurately captures the substance of my conversation with the patient.  As the provider I attest to compliance with applicable laws and regulations related to telemedicine.  ROBSON MendozaSW

## 2021-06-12 NOTE — PROGRESS NOTES
Subjective findings: The patient presented to the clinic today complaining about long thick painful nails both feet.  She stated that particularly both great toenails are quite thick and painful.  She indicated she is no longer able to trim her nails.    Objective findings: Nails bilateral feet are elongated and 2 times normal thickness.  Both great toenails are 3 times normal thickness.  Skin bilaterally warm and intact.  DP and PT pulses +2/4 bilateral feet. Capillary refill less than 2 seconds bilateral feet.  Negative clonus, negative Babinski bilaterally.  Range of motion within normal limits bilateral feet. Muscle power is 5 over 5 bilaterally in all compartments.        Assessment: Onychomycosis, onychauxis      Plan: Debrided nails 1 through 5 both feet today.  I recommended the patient return to the clinic every 3 months for continued foot care.
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
## 2021-06-12 NOTE — PROGRESS NOTES
Dear Dr. Yanique Cali MD  [address] [place] [address],    Thank you for the opportunity to participate in the care of Patsy Santamaria.     She is a 62 y.o.  female patient who comes to the sleep medicine clinic for review of her sleep study. The study was completed on 08/10/17 which showed the the patient had mild obstructive sleep apnea with an apnea hypopnea index of 8.6 events per hour with the lowest O2 sat of 74%.  The patient also had periodic limb movement sleep.      Past Medical History:   Diagnosis Date     Acute solar dermatitis      Anxiety      Chronic reflux esophagitis      Dermatitis      Ganglion     right foot     H. pylori infection      Menopause     age 52       Past Surgical History:   Procedure Laterality Date     WI APPENDECTOMY      Description: Appendectomy;  Recorded: 2008;  Comments: childhood     WI  DELIVERY ONLY      Description:  Section;  Recorded: 2008;     WI EXCIS TENDN/CAPSULE LESN,FOOT      Description: Excision Of Cyst Of Tendon Sheath Of Foot;  Proc Date: 10/28/2011;  Comments: [facility]     WI HEMORRHOIDECTOMY INTERNAL RUBBER BAND LIGATIONS      Description: Hemorrhoidectomy;  Recorded: 2008;     WI KNEE SCOPE,DIAGNOSTIC      Description: Arthroscopy Knee Right;  Proc Date: 10/11/2005;  Comments: for right knee patella subluxation with lateral retinacular release; subpatellar chondroplasty     WI LATERAL RETINACULAR RELEASE OPEN      Description: Knee Lateral Retinacular Release;  Proc Date: 10/11/2005;     WI LIGATE FALLOPIAN TUBE      Description: Tubal Ligation;  Recorded: 2008;       Social History     Social History     Marital status: Single     Spouse name: N/A     Number of children: N/A     Years of education: N/A     Occupational History     [employer]     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[facility])     Social History Main Topics     Smoking status: Current Every Day Smoker     Packs/day:  1.00     Types: Cigarettes     Smokeless tobacco: Never Used      Comment: 1 pack per day     Alcohol use No      Comment: sober since 7/29/16     Drug use: No     Sexual activity: Not on file     Other Topics Concern     Not on file     Social History Narrative         Current Outpatient Prescriptions   Medication Sig Dispense Refill     amoxicillin (AMOXIL) 500 MG capsule   0     desoximetasone (TOPICORT) 0.25 % ointment Apply topically sparingly BID to affected area only  - no longer than 14 days 30 g 0     diclofenac (VOLTAREN) 75 MG EC tablet Take 1 tablet (75 mg total) by mouth 2 (two) times a day. Take with food. 60 tablet 1     DULoxetine (CYMBALTA) 60 MG capsule Take 1 capsule (60 mg total) by mouth daily. 30 capsule 2     furosemide (LASIX) 40 MG tablet Take 1 tablet (40 mg total) by mouth 2 (two) times a day. 60 tablet 3     HYDROcodone-acetaminophen (NORCO )  mg per tablet   0     HYDROPHOR 42 % Oint ointment   1     hydrOXYzine (ATARAX) 25 MG tablet Take 25-50 mg by mouth.       ibuprofen (ADVIL,MOTRIN) 600 MG tablet   0     ipratropium-albuterol (DUO-NEB) 0.5-2.5 mg/3 mL nebulizer Take 3 mL by nebulization every 6 (six) hours as needed (wheezing, short of breath). 90 mL 1     lidocaine HCl 4 % Crea Apply 1 application topically 2 (two) times a day as needed (pain). 120 mL 0     min oil-petrolat (AQUAPHOR) ointment Apply topically to dry skin TID as needed 396 g 1     nystatin (MYCOSTATIN) cream Apply topically sparingly TID to affected areas only 45 g 1     omeprazole (PRILOSEC) 20 MG capsule TAKE ONE CAPSULE BY MOUTH ONE TIME DAILY  90 capsule 2     potassium chloride (KLOR-CON) 10 MEQ CR tablet TAKE ONE TABLET BY MOUTH ONE TIME DAILY 30 tablet 2     No current facility-administered medications for this visit.        Allergies   Allergen Reactions     Codeine Nausea Only     Hydrochlorothiazide Rash     phototoxicity - med was d/lora       Sulfa (Sulfonamide Antibiotics)      Diclofenac  "Rash     Sulfasalazine Rash       Physical Exam:  BP (!) 112/4 (Patient Site: Right Arm, Patient Position: Sitting, Cuff Size: Adult Large)  Pulse 73  Ht 5' 3.75\" (1.619 m)  Wt (!) 236 lb 14.4 oz (107.5 kg)  LMP 03/06/2002  SpO2 96%  BMI 40.98 kg/m2  BMI:Body mass index is 40.98 kg/(m^2).   GEN: NAD, obese  Psych: normal mood, normal affect     Labs/Studies:  - We reviewed the results of the overnight PSG as described on the HPI.     Lab Results   Component Value Date    WBC 7.3 09/06/2017    HGB 15.1 09/06/2017    HCT 47.0 09/06/2017    MCV 92 09/06/2017     09/06/2017         Chemistry        Component Value Date/Time     09/06/2017 1115    K 4.4 09/06/2017 1115     09/06/2017 1115    CO2 27 09/06/2017 1115    BUN 9 09/06/2017 1115    CREATININE 0.67 09/06/2017 1115    GLU 96 09/06/2017 1115        Component Value Date/Time    CALCIUM 9.8 09/06/2017 1115    ALKPHOS 104 09/06/2017 1115    AST 12 09/06/2017 1115    ALT 12 09/06/2017 1115    BILITOT 0.5 09/06/2017 1115            No results found for: FERRITIN        Assessment and Plan:  In summary Patsy Santamaria is a 62 y.o. year old female here for review of her sleep study.  1. Obstructive Sleep Apnea  We had an extensive conversation to review the results of her sleep study and to  her on the importance of treating sleep apnea. We discussed treatment options including oral appliance versus CPAP. Patient decided to proceed with CPAP. She will start using the device as soon as she receives it with the intention to use if for the entire night. We discussed some tips to increase PAP tolerance as well as the normal curve of adaptation. CPAP is going to provide improved respiratory function during the night but it can cause some sleep disruption that tends to improve with continuous usage. She should return to the clinic in 6 weeks to review compliance and efficacy monitoring.  2.  Hypersomnia  3.  Other sleep disturbance     Patient " verbalized understanding of these issues, agrees with the plan and all questions were answered today. Patient was given an opportuntity to voice any other symptoms or concerns not listed above. Patient did not have any other symptoms or concerns.      Patient told to return in 6 weeks. Patient instructed to stop at  to schedule appointment before leaving today.    Sarmad Brown DO  Board Certified in Internal Medicine and Sleep Medicine  [facility].    We spent a total of 15 minutes of face-to-face encounter and more than 50% of the encounter was used for counseling or coordination of care.    (Note created with Dragon voice recognition and unintended spelling errors and word substitutions may occur)

## 2021-06-12 NOTE — PROGRESS NOTES
Patient here today for follow up of medication management. States unsure if Cymbalta is working for her. States about 4 hours of seep with trouble staying asleep, states had a sleep study last week has not got results yet. States low energy. States depression 4/5 denies SI/HI, states anxiety only when upset.

## 2021-06-12 NOTE — PROGRESS NOTES
"Mental Health Visit Note    This is a dual signature report. My supervising clinician is JULIO Kasper.    7/26/2017   Start time: 9:00am    Stop Time: 10:00am   Session # 4    Patsy Santamaria is a 62 y.o. female is being seen today for    Chief Complaint   Patient presents with     MH Follow Up     Psychotherapy follow-up visit to address symptoms of depression and PTSD     New symptoms or complaints: None    Functional Impairment:   Personal: 3  Family: 2  Work: 4  Social:3    Clinical assessment of mental status:   Grooming: Well groomed  Attire: Appropriate  Age: Appears Stated  Behavior Towards Examiner: Cooperative  Motor Activity: Within normal   Eye Contact: Appropriate  Mood: Mostly euthymic, at times anxious and depressed  Affect: Congruent w/content of speech, Flat, Anxious and Depressed  Speech/Language: Within normal  Attention: Within normal  Concentration: Within normal  Thought Process: Within normal  Thought Content: Hallucinations: None reported  Delusions: none reported  Orientation: X 3  Memory: No Evidence of Impairment  Judgement: No Evidence of Impairment  Estimated Intelligence: Average  Demonstrated Insight: Adequate  Fund of Knowledge: adequate    Suicidal/Homicidal Ideation present: None Reported This Session    Patient's impression of their current status:   The patient presented for a follow-up psychotherapy visit. The patient reported that she recently applied for public housing and was informed that the background check will take about 2 months to complete. Thus, the patient reported being aware that it is a long process but she expressed feeling motivated about beginning the process to live more independently. The patient had previously requested that the therapist write a letter of recommendation for housing. The patient reported that no letter is needed at the moment.   The patient reflected upon beliefs when sharing about a \"fear of being judged\" which stems from " "difficult family relationships and experiences. The patient reported that her sisters were \"good girls\" and she was identified as the \"bad girl.\" The patient believes that this \"bad girl identity\" originated after the death of her father when she was 9 or 10 years old which was the catalyst for a series of significant life events. The patient shared that her father  in front of her from a heart attack. She described being unsure what was wrong with him and remembers watching him being taken away by an ambulance, after which point she never saw him again. She shared that she was never allowed to properly grieve, as her mother did not allow her to attend her father's  and his death was never discussed or processed amongst family members. The patient shared that after her dad , she and her sisters moved with their mom to [place] which was the beginning of a troubled life for the patient. She stated, \"I always wondered what would have happened to me if my dad hadn't ...I don't think I'd be where I am today or have had all of the problems in my life.\" The patient acknowledged attempts to fight feelings of sadness when reflecting upon the events surrounding the death of her father. However, the patient shared that only recently has she started to reflect upon life events that truly impacted her whereas before she would drink alcohol in order to avoid thinking about past experiences which she realized only made matters worse. She reported that she is finally realizing how everything is so interconnected which is allowing her to begin forgiving herself and move on in life in a more positive direction. The patient shared that she is motivated to explore the connections between life events which caused issues or emotional problems now that she is sober.     *The patient's 1 year sobriety date is 2017.     Therapist impression of patients current state:   The patient appeared open-minded and " "cooperative during today's session. The patient exhibited increased self-awareness during today's session when reflecting upon thoughts and feelings related to significant life events. The patient shared that she only recently starting reflecting upon past events when engaging in outpatient CD treatment for the second time and then extending this exploration into psychotherapy sessions with this provider. She shared how gaining self-awareness by reflecting upon significant life events \"helps me to understand that I'm not crazy - it helps me understand what happened to me.\" The patient agreed that self-exploration has given her an opportunity to begin dealing with and healing from deep emotional wounds that have remained open and untouched for many years. In this way, the patient was also able to indicate a strong source of motivation to continue with sobriety maintenance. Furthermore, the patient continues to demonstrate a strong willingness to continue engaging in the therapeutic process.  The patient discussed plans for her upcoming 1 year sobriety date. She indicated a desire to refrain from gaining too much attention and is not yet sure she wants to be celebrated or acknowledged at her two AA groups. The therapist empathized while also encouraged the patient to embrace this opportunity to celebrate a major achievement in her life. The therapist and patient discussed how reluctance for celebration may be connected to low self-esteem causing her to feel embarrassed or ashamed to acknowledge the victory. On the other hand, the patient wants to continue to live a sober life and stated, \"I'm not doing this for anyone else but myself.\" The therapist provided positive reinforcement and brainstormed options for the patient to quietly celebrate her accomplishment without shame or guilt. The therapist believes that acknowledging an important milestone would benefit the patient's self-confidence and empower her to practice " forgiveness, as she still harbors feelings of self-loathing and has difficulty identifying personal strengths. Future sessions will be dedicated to providing unconditional positive regard and supporting the patient's willingness to learn and practice strategies for self-improvement and change.     The patient was informed that the therapist would be transitioning over to the [facility] full-time beginning in September 2017. The patient was informed that she has a choice to continue seeing this provider at [facility] or transfer care to the new therapist starting at [facility]. The patient reported that she would be willing to attend therapy at [facility] in order to stay with this provider. The patient was scheduled for a follow-up visit at [facility] on 8/9/2017, at which time the therapist will assist the patient in scheduling future appointments at [facility].      Type of psychotherapeutic technique provided: Insight oriented and CBT    Progress toward short term goals:Progress as expected, as the patient appeared open-minded and cooperative, demonstrating an ability to gain trust in the therapist    Review of long term goals: The patient highlighted several personal long-term goals such as gaining self-acceptance, maintaining sobriety and working through unresolved issues from her past.    Diagnosis:   1. Severe episode of recurrent major depressive disorder, without psychotic features    2. PTSD (post-traumatic stress disorder)    Alcohol abuse, in remission  R/O Generalized Anxiety Disorder  R/O Bipolar Disorder    Plan and Follow up: The patient is scheduled to return in 2 weeks.     Discharge Criteria/Planning: Client has chronic symptoms and ongoing therapy for maintenance stability recommended.    Performed and documented by GOOD Mendoza    I have read, discussed and reviewed the documentation as [facility]

## 2021-06-12 NOTE — PROGRESS NOTES
Dear Dr. Yanique Martinez Md  [address] [place], [place] [id number]    Thank you for the opportunity to participate in the care of Ms. Patsy Santamaria.    She is a 62 y.o. female who comes to the clinic with a chief complaint of excessive daytime sleepiness that has been going on for about a month.  While she denies any episodes of witnessed apnea she has been told that she does snore loudly at night.  She also complains of frequent nocturnal awakening and fatigue for at least 5 years.  Her review of systems significant for weight fluctuations and heat and cold intolerance.     Past Medical History  Past Medical History:   Diagnosis Date     Acute solar dermatitis      Anxiety      Chronic reflux esophagitis      Dermatitis      Ganglion     right foot     H. pylori infection      Menopause     age 52        Past Surgical History  Past Surgical History:   Procedure Laterality Date     [place] APPENDECTOMY      Description: Appendectomy;  Recorded: 2008;  Comments: childhood     [place]  DELIVERY ONLY      Description:  Section;  Recorded: 2008;     [place] EXCIS TENDN/CAPSULE LESN,FOOT      Description: Excision Of Cyst Of Tendon Sheath Of Foot;  Proc Date: 10/28/2011;  Comments: [facility]     [place] HEMORRHOIDECTOMY INTERNAL RUBBER BAND LIGATIONS      Description: Hemorrhoidectomy;  Recorded: 2008;     KY KNEE SCOPE,DIAGNOSTIC      Description: Arthroscopy Knee Right;  Proc Date: 10/11/2005;  Comments: for right knee patella subluxation with lateral retinacular release; subpatellar chondroplasty     KY LATERAL RETINACULAR RELEASE OPEN      Description: Knee Lateral Retinacular Release;  Proc Date: 10/11/2005;     KY LIGATE FALLOPIAN TUBE      Description: Tubal Ligation;  Recorded: 2008;        Meds  Current Outpatient Prescriptions   Medication Sig Dispense Refill     desoximetasone (TOPICORT) 0.25 % ointment Apply topically sparingly BID to affected area only  - no longer  than 14 days 30 g 0     diclofenac (VOLTAREN) 75 MG EC tablet Take 1 tablet (75 mg total) by mouth 2 (two) times a day. Take with food. 60 tablet 1     DULoxetine (CYMBALTA) 30 MG capsule Take 1 capsule (30 mg total) by mouth daily. 30 capsule 1     furosemide (LASIX) 40 MG tablet Take 1 tablet (40 mg total) by mouth 2 (two) times a day. 60 tablet 3     HYDROPHOR 42 % Oint ointment   1     ipratropium-albuterol (DUO-NEB) 0.5-2.5 mg/3 mL nebulizer Take 3 mL by nebulization every 6 (six) hours as needed (wheezing, short of breath). 90 mL 1     lidocaine HCl 4 % Crea Apply 1 application topically 2 (two) times a day as needed (pain). 120 mL 0     min oil-petrolat (AQUAPHOR) ointment Apply topically to dry skin TID as needed 396 g 1     nystatin (MYCOSTATIN) cream Apply topically sparingly TID to affected areas only 45 g 1     omeprazole (PRILOSEC) 20 MG capsule TAKE ONE CAPSULE BY MOUTH ONE TIME DAILY  90 capsule 2     potassium chloride (KLOR-CON) 10 MEQ CR tablet TAKE ONE TABLET BY MOUTH ONE TIME DAILY 30 tablet 2     traMADol (ULTRAM) 50 mg tablet   1     No current facility-administered medications for this visit.         Allergies  Codeine; Hydrochlorothiazide; Sulfa (sulfonamide antibiotics); Diclofenac; and Sulfasalazine     Social History  Social History     Social History     Marital status: Single     Spouse name: N/A     Number of children: N/A     Years of education: N/A     Occupational History     [employer]     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[facility])     Social History Main Topics     Smoking status: Current Every Day Smoker     Packs/day: 1.00     Types: Cigarettes     Smokeless tobacco: Never Used      Comment: 1 pack per day     Alcohol use No      Comment: sober since 7/29/16     Drug use: No     Sexual activity: Not on file     Other Topics Concern     Not on file     Social History Narrative        Family History  Family History   Problem Relation Age of Onset     Heart disease Mother       Heart disease Father      Review of Systems:  Constitutional: Negative except as noted in HPI.   Eyes: Negative except as noted in HPI.   ENT: Negative except as noted in HPI.   Cardiovascular: Negative except as noted in HPI.   Respiratory: Negative except as noted in HPI.   Gastrointestinal: Negative except as noted in HPI.   Genitourinary: Negative except as noted in HPI.   Musculoskeletal: Negative except as noted in HPI.   Integumentary: Negative except as noted in HPI.   Neurological: Negative except as noted in HPI.   Psychiatric: Negative except as noted in HPI.   Endocrine: Negative except as noted in HPI.   Hematologic/Lymphatic: Negative except as noted in HPI.      STOP BANG 7/27/2017   Do you snore loudly (louder than talking or loud enough to be heard through closed doors)? 0   Do you often feel tired, fatigued, or sleepy during daytime? 1   Has anyone observed you stop breathing in your sleep? 0   Do you have or are you being treated for high blood pressure? 1   BMI more than 35 kg/m2 1   Age over 50 years old? 1   Neck circumference greater than 16 inches? 0   Gender male? 0   Total Score 4   Epworths Sleepiness Scale 7/27/2017   Sitting and reading 2   Watching TV 2   Sitting, inactive in a public place (e.g. a theatre or a meeting) 0   As a passenger in a car for an hour without a break 2   Lying down to rest in the afternoon when circumstances permit 3   Sitting and talking to someone 0   Sitting quietly after a lunch without alcohol 2   In a car, while stopped for a few minutes in traffic 0   Total score 11   Rooming 7/27/2017   Usual bedtime 1230-1 a   Sleep Latency 30+ mn   Awakenings 5   Wake Up Time 630   Energy Drinks 0   Coffee all day   Cola 0   Difficulty falling asleep Yes   Difficulty staying asleep Yes   Excessive daytime tiredness Yes   Excessive daytime sleepiness Yes   Dozing off while driving No   Shift Worker No   Sleep Walking? No   Sleep Talking? Yes   Kicking or punching?  "Yes   Restless legs symptoms Yes       Physical Exam:  Pulse 74  Ht 5' 3.75\" (1.619 m)  Wt (!) 238 lb 11.2 oz (108.3 kg)  LMP 03/06/2002  SpO2 96%  BMI 41.29 kg/m2  BMI:Body mass index is 41.29 kg/(m^2).   GEN: NAD, morbidly obese  Head: Normocephalic.  EYES: PERRLA, EOMI  ENT: Oropharynx is clear, mallampatti class 4+ airway.   Nasal mucosa is moist without erythema  Neck : Thyroid is within normal limits. Neck circ 15.5 inches  CV: Regular rate and rhythm, S1 & S2 positive.  LUNGS: Bilateral breathsounds heard.   ABDOMEN: Positive bowel sounds in all quadrants, soft, no rebound or guarding  MUSCULOSKELETAL: Bilateral 2+ leg swelling  SKIN: warm, dry, no rashes  Neurological: Alert, oriented to time, place, and person.  Psych: normal mood, normal affect     Labs/Studies:     Lab Results   Component Value Date    WBC 5.1 10/17/2016    HGB 14.1 04/04/2017    HCT 44.3 10/17/2016    MCV 96 10/17/2016     10/17/2016         Chemistry        Component Value Date/Time     04/04/2017 1414    K 5.1 (H) 04/04/2017 1414     04/04/2017 1414    CO2 27 04/04/2017 1414    BUN 8 04/04/2017 1414    CREATININE 0.73 04/04/2017 1414    GLU 99 04/04/2017 1414        Component Value Date/Time    CALCIUM 9.4 04/04/2017 1414    ALKPHOS 67 10/17/2016 0830    AST 19 10/17/2016 0830    ALT 19 10/17/2016 0830    BILITOT 0.4 10/17/2016 0830            No results found for: FERRITIN  Lab Results   Component Value Date    TSH 1.59 10/17/2016         Assessment and Plan:  In summary Patsy Santamaria is a 62 y.o. year old female here for sleep disturbance.  1.  Hypersomnia   Ms. Patsy Santamaria has high risk for obstructive sleep apnea based on the history of hypersomnia, snoring and a crowded airway. I educated the patient on the underlying pathophysiology of obstructive sleep apnea. We reviewed the risks associated with sleep apnea, including increased cardiovascular risk and overall death. We talked about treatments " briefly. I recommend getting an split-night nocturnal polysomnography. The patient should return to the clinic to discuss results and treatment option in a patient-centered approach.  2.  Snoring  3.  Other sleep disturbance  4.  Obesity    Patient verbalized understanding of these issues, agrees with the plan and all questions were answered today. Patient was given an opportuntity to voice any other symptoms or concerns not listed above. Patient did not have any other symptoms or concerns.      Patient told to return in one week after the sleep study is interpreted. Patient instructed to stop at  to schedule appointment before leaving today.      Sarmad Brown DO  Board Certified in Internal Medicine and Sleep Medicine  [facility].    (Note created with Dragon voice recognition and unintended spelling errors and word substitutions may occur)

## 2021-06-12 NOTE — PROGRESS NOTES
Correct pharmacy verified with patient and confirmed in snapshot? [x] yes []no    Medications Phoned  to Pharmacy [] yes [x]no  Name of Pharmacist:  List Medications, including dose, quantity and instructions      Medication Prescriptions given to patient   [] yes  [x] no   List the name of the drug the prescription was written for.       Medications ordered this visit were e-scribed.  Verified by order class [x] yes  [] no  Cymbalta 60 mg    Medication changes or discontinuations were communicated to patient's pharmacy: [] yes  [x] no    UA collected [] yes    [x] no    [place] Prescription Monitoring Program Reviewed? [] yes  [x] no    Referrals were made to: none    Future appointment was made: [x] yes  [] no    Dictation completed at time of chart check: [x] yes  [] no    I have checked the documentation for today s encounters and the above information has been reviewed and completed.

## 2021-06-12 NOTE — PROGRESS NOTES
Outpatient Mental Health Treatment Plan    Name:  Patsy Santamaria  :  1955  MRN:  [id number]    Treatment Plan:  Initial Treatment Plan  Intake/initial treatment plan date:  3/23/2017  Benefit and risks and alternatives have been discussed: Yes  Is this treatment appropriate with minimal intrusion/restrictions: Yes  Estimated duration of treatment:  Approximately 20 sessions.  Anticipated frequency of services:  Every 2 weeks  Necessity for frequency: This frequency is needed to establish therapeutic goals and for continuity of care in order to monitor progress.  Necessity for treatment: To address cognitive, behavioral, and/or emotional barriers in order to work toward goals and to improve quality of life.    Plan:      ? Depression    Goal:  Decrease average depression level from severe to mild.   Strategies:    ?[] Decrease social isolation     [] Increase involvement in meaningful activities     ?[] Discuss sleep hygiene     ?[] Explore thoughts and expectations about self and others     ?[] Process grief (loss of significant person, independence, role, etc.)     ?[] Assess for suicide risk     ?[] Implement physical activity routine (with physician approval)     [] Consider introduction of bibliotherapy and/or videos     [] Continue compliance with medical treatment plan (or explore barriers)    Degree to which this is a problem: 4  Degree to which goal is met: 1  Date of Review: 10/2/2017    Substance use  Goal:  Maintain sobriety from alochol.   Strategies: ? [] Continue attending AA and other peer-facilitated groups     ? [] Continue building positive and sober support network         [] Address environmental factors as they present and apply problem-solving skills     ? [] Continue to live in a sober environment      ? [] Continue compliance with medical and mental health treatment plan     Degree to which this is a problem: 1  Degree to which goal is met: 4  Date of Review: 10/2/2017     Functional  Impairment:  1=Not at all/Rarely  2=Some days  3=Most Days  4=Every Day    Personal : 4  Family : 3  Social : 3   Work/school : 2    Diagnosis:  (EXAMPLE of DSM V: Major depressive disorder, recurrent, moderate; Generalized Anxiety disorder; borderline personality per patient PHI; fibromyalgia, History of breast cancer in remission; Problem with primary relationship.)   1. Severe episode of recurrent major depressive disorder, without psychotic features    2. PTSD (post-traumatic stress disorder)    R/O Generalized Anxiety Disorder  R/O Bipolar Disorder      WHODAS 2.0 12-item version: 9       Scores presented in qualifiers to represent level of disability.  MILD Problem (slight, low, ...) 5-24%     H1= 10  H2= 5  H3= 25      Clinical assessments and measures completed:. ALISIA-7, PHQ-9, CAGE-AID and PANSI     Strengths:  Patient is highly motivated and willing to participate in psychotherapy services. She has been sober for almost 1 year (one year sobriety date is 7/29/2017) and has successful completed outpatient CD treatment.   Limitations:  The patient has never engaged in ongoing mental health treatment before. Patient is also living in a transitional home and is unable to work.  Cultural Considerations: Patient is a 62 year  female.    Persons responsible for this plan: Patient and Provider            Psychotherapist Signature           Patient Signature:              Guardian Signature             Provider: Performed and documented by GOOD Mendoza   Date:  7/26/2017      This note was created with help of Dragon dictation software.  Grammatical / typing errors are not intentional and inherent to the software.

## 2021-06-12 NOTE — PROGRESS NOTES
Order for Durable Medical Equipment was processed and equipment ordered.   DME provider: [facility]  Date Faxed: 9/13/17  Ordering Provider:   Equipment ordered: Cpap

## 2021-06-12 NOTE — PROGRESS NOTES
Left ear sx slight.  4 days    Wonders vit D    Always tired.  Groggy.  Denies bone pain    Had sleep study.  Results pending    No energy    62 cymbalta increased in august increased due to still depressed.  Wonders about the increase causing the fatigue    No bone pain    Has been in recovery for a year.  From alcohol.   No etoh for a year July 29.  No icterus    Obesity denies polyuria    ROS: as noted above    OBJECTIVE:   Vitals:    09/06/17 1054   BP: 104/74   Pulse: 68   Resp: 20   Temp: 97.5  F (36.4  C)      Head: atraumatic   Eyes: nl eom, anicteric   Ears: nl external ears   Neck: nl nodes, supple   Lungs: clear to ausc   Heart: regular rhythm  Back: no tenderness  Abd: soft nontender   Joints: uninflamed   Ext: nontender calves   Mental: euthymic  Neuro: no weakness  Gait: normal  bmi 41    ASSESSMENT/PLAN:    1. Vitamin D deficiency  Vitamin D, Total (25-Hydroxy)   2. Alcohol abuse  Comprehensive Metabolic Panel   3. Chronic fatigue  HM2(CBC w/o Differential)    Comprehensive Metabolic Panel    Thyroid Stimulating Hormone (TSH)   r/o other sources of sxs  Educated on waiting for cymbalta dose increase  follow up per labs  follow up primary

## 2021-06-13 NOTE — PROGRESS NOTES
Subjective: This patient comes in for evaluation is a 62-year-old female.  She has a skin tag or cutaneous horn in the right medial some orbital area along the nasal labial fold.  We discussed options elected to treat with liquid nitrogen rather than to cauterize.  It was fairly small we will see how she does with that please see below    Also she has had some numbness in the second toe along the medial aspect.  She denies any numbness elsewhere does not have any swelling or redness    In looking at her shoe wear she does have thong type sandals and she has some pressure along the base of the second toe medially where the thong hits the base of the toe.    Otherwise denies problems        Tobacco status: She  reports that she has been smoking Cigarettes.  She has been smoking about 1.00 pack per day. She has never used smokeless tobacco.    Patient Active Problem List    Diagnosis Date Noted     Bilateral edema of lower extremity 06/28/2017     Snoring 06/28/2017     Carpal tunnel syndrome on both sides 03/06/2017     Trochanteric bursitis of left hip 10/25/2016     Alcohol withdrawal seizure without complication 05/14/2016     Hypoxia 05/13/2016     Alcohol abuse 05/13/2016     Elevated LFTs 05/13/2016     New onset seizure 05/12/2016     Claustrophobia 12/18/2015     Cervical disc disease 12/14/2015     COPD (chronic obstructive pulmonary disease) with emphysema 09/16/2015     Post traumatic stress disorder 02/13/2015     Chronic Reflux Esophagitis      Peripheral Neuropathy      Localized Primary Osteoarthritis Of The Carpometacarpal Joint      Ganglion Of The Right Foot      Major Depression, Recurrent      Nicotine Dependence      Vitamin D Deficiency        Current Outpatient Prescriptions   Medication Sig Dispense Refill     DULoxetine (CYMBALTA) 60 MG capsule Take 1 capsule (60 mg total) by mouth daily. 30 capsule 2     furosemide (LASIX) 40 MG tablet Take 1 tablet (40 mg total) by mouth 2 (two) times a day.  "60 tablet 3     HYDROPHOR 42 % Oint ointment   1     ipratropium-albuterol (DUO-NEB) 0.5-2.5 mg/3 mL nebulizer Take 3 mL by nebulization every 6 (six) hours as needed (wheezing, short of breath). 90 mL 1     lidocaine HCl 4 % Crea Apply 1 application topically 2 (two) times a day as needed (pain). 120 mL 0     min oil-petrolat (AQUAPHOR) ointment Apply topically to dry skin TID as needed 396 g 1     omeprazole (PRILOSEC) 20 MG capsule TAKE ONE CAPSULE BY MOUTH ONE TIME DAILY  90 capsule 2     potassium chloride (KLOR-CON) 10 MEQ CR tablet TAKE ONE TABLET BY MOUTH ONE TIME DAILY 30 tablet 2     No current facility-administered medications for this visit.        ROS:   Review of systems negative other than as outlined above    Objective:    /80 (Patient Site: Right Arm, Patient Position: Sitting, Cuff Size: Adult Large)  Pulse 77  Temp 97.8  F (36.6  C) (Oral)   Resp 20  Ht 5' 3.75\" (1.619 m)  Wt (!) 238 lb (108 kg)  LMP 03/06/2002  SpO2 94% Comment: at rest with room air  BMI 41.17 kg/m2  Body mass index is 41.17 kg/(m^2).    General appearance no acute distress    Patient has been 3 mm growth question skin tag versus early cutaneous horn below the right eye medially.  No redness no swelling.  Eye exam was normal face otherwise normal no worrisome skin lesions.    Left foot as outlined above with numbness along medial edge of the second toe.  Mainly the numbness is distal.    There is no swelling through the foot normal pulse    Skin was normal otherwise.    Assessment:  1. Skin tag     2. Numbness of toes       Treatment with liquid nitrogen as discussed above.    Change in footwear.  I think she has a digital nerve compression that should resolve when she wears different foot wear.  Plan: As outlined above    This transcription uses voice recognition software, which may contain typographical errors.  "

## 2021-06-13 NOTE — PROGRESS NOTES
Chart Reviewed by Clinical Product Navigator    Review Results: Patient is current on annual preventive care exam; follows closely with care team, no present care gaps.     Clinical Product Navigator will remain available to patient and care team for ongoing care navigation needs.     Yamel Alcaraz RN  Clinical Product Navigator

## 2021-06-13 NOTE — PROGRESS NOTES
"Mental Health Visit Note    This is a dual signature report. My supervising clinician is JULIO Kasper.    10/25/2017   Start time: 9:00am    Stop Time: 9:55am   Session # 9    Patsy Santamaria is a 62 y.o. female is being seen today for    Chief Complaint   Patient presents with     MH Follow Up     Psychotherapy follow-up visit to address symptoms of depression, anxiety and PTSD in addition to monitoring sobriety from alcohol     New symptoms or complaints: None    Functional Impairment:   Personal: 3  Family: 3  Work: 4  Social:3    Clinical assessment of mental status:   Grooming: Well groomed  Attire: Appropriate  Age: Appears Stated  Behavior Towards Examiner: Cooperative  Motor Activity: Within normal   Eye Contact: Appropriate  Mood: Mostly euthymic, at times anxious and depressed  Affect: Congruent w/content of speech, Flat, Anxious and Depressed  Speech/Language: Within normal  Attention: Within normal  Concentration: Within normal  Thought Process: Within normal  Thought Content: Hallucinations: None reported  Delusions: none reported  Orientation: X 3  Memory: No Evidence of Impairment  Judgement: No Evidence of Impairment  Estimated Intelligence: Average  Demonstrated Insight: Adequate  Fund of Knowledge: adequate    Suicidal/Homicidal Ideation present: None Reported This Session    Patient's impression of their current status:   The patient recently updated her hair color and discussed how it feels important to her to keep up her appearance.  The patient explored thoughts and feelings related to a new possible romantic interest. The patient identified her efforts to establish clear expectations and assert her needs such as \"taking it slowly.\" The patient expressed feeling happy about the way things are developing with her new male friend. The patient shared that it's important to her that he treat her in a respectful way. She reflected upon previous relationships that were unhealthy. 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 She is planning on discussing possible payment or reviewing expectations with her son. The therapist helped the patient gain insight regarding how she is actually making demonstrated efforts to assert her needs, establish healthy boundaries and discuss expectations that reflect her new values in all areas of her life. This is a new skill that has developed from the introspective work she has been doing in treatment. The patient stated, \"hiding everything doesn't work. You just fill up with anger and resentment. I never knew myself before.\" The patient reflected upon the benefits of participating in psychotherapy, especially as she has embraced the opportunity to explore her past and gain acceptance about the impact of life events.    The patient plans to explore past holiday experiences in preparation for the upcoming holiday season.    Therapist impression of patients current state:   The patient presented on-time for a follow-up psychotherapy visit. The patient appeared open-minded and cooperative during today's session. The patient appropriately utilized session to process thoughts and feelings. The patient has developed increased awareness about the impact of past life events on current beliefs, thoughts and feelings. She exhibited an ability to reflect upon the meaning attached to certain events and began to brainstorm ways to resolve current conflicts. In this way, the patient has exhibited improved critical reasoning skills and increased self-awareness. The patient has demonstrated a strong commitment to her recovery and sobriety from alcohol. She has also demonstrated a strong effort to improve her overall health. The patient would like to eventually return to work and participate in a job that makes her feel happy. The patient is currently hoping to improve her sleep patterns after a recent sleep study. She will report back on the impact " of using the CPAP. The therapist will continue to encourage and guide the patient in practicing CBT techniques during future sessions to alleviate signs and symptoms of depression.    Type of psychotherapeutic technique provided: Insight oriented and CBT    Progress toward short term goals:Progress as expected, as the patient appeared open-minded and cooperative, demonstrating an ability to gain trust in the therapist    Review of long term goals: Not done at today's visit    Diagnosis:   1. Severe episode of recurrent major depressive disorder, without psychotic features    2. PTSD (post-traumatic stress disorder)    Alcohol abuse, in remission  R/O Generalized Anxiety Disorder  R/O Bipolar Disorder    Plan and Follow up: The patient is scheduled to return for a follow-up appointment on 11/8/2017.    Discharge Criteria/Planning: Client has chronic symptoms and ongoing therapy for maintenance stability recommended.    Performed and documented by GOOD Mendoza    I have read, discussed and reviewed the documentation as presented by GOOD Mendoza [facility]

## 2021-06-13 NOTE — PROGRESS NOTES
"Mental Health Visit Note    This is a dual signature report. My supervising clinician is JULIO Kasper.    9/20/2017   Start time: 9:00am    Stop Time: 9:55am   Session # 7    Patsy Santamaria is a 62 y.o. female is being seen today for    Chief Complaint   Patient presents with     MH Follow Up     Psychotherapy follow-up visit to address symptoms of depression     New symptoms or complaints: None    Functional Impairment:   Personal: 3  Family: 3  Work: 4  Social:3    Clinical assessment of mental status:   Grooming: Well groomed  Attire: Appropriate  Age: Appears Stated  Behavior Towards Examiner: Cooperative  Motor Activity: Within normal   Eye Contact: Appropriate  Mood: Mostly euthymic, at times anxious and depressed  Affect: Congruent w/content of speech, Flat, Anxious and Depressed  Speech/Language: Within normal  Attention: Within normal  Concentration: Within normal  Thought Process: Within normal  Thought Content: Hallucinations: None reported  Delusions: none reported  Orientation: X 3  Memory: No Evidence of Impairment  Judgement: No Evidence of Impairment  Estimated Intelligence: Average  Demonstrated Insight: Adequate  Fund of Knowledge: adequate    Suicidal/Homicidal Ideation present: None Reported This Session    Patient's impression of their current status:   Patient initiated session by sharing that she had \"another using dream\" during which she consumed alcohol and felt heavy (\"weighted down\"). She described feeling \"icky\" and foggy during the dream. She denied feeling triggered by this dream, as her reaction to the dream was negative and undesirable. For instance, she stated, \"I definitely do not want to drink again and I didn't like how I felt in the dream.\" The patient reported that she is glad to be sober.  The patient reported that her youngest son is visiting from out of town and shared some concerns about his current status. The patient also shared her thoughts and feelings about " "her other son's marriage. 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 The patient revisited the possibility of seeing her former significant other and is still undecided about the encounter. The patient did follow through on asking for more feedback from trusted individuals about whether or not she should see her ex. The patient reported that she is still waiting on housing and provided a detailed update about her current living situation. The patient discussed the importance of accountability and shared that her living situation does hold her accountable which she appreciates. The patient described feeling slightly complacent at the alf house but is actively searching for alternative options. At this time, there are no other safer options. The patient continues to describe feeling confident about her sobriety. She continues to attend daily AA meetings and attends recovery [employer] [facility]. The patient discussed her progress in therapy thus far and shared that she has learned how to \"open up\" and be more vulnerable while exploring past issues which contributed to substance abuse patterns. The patient shared that she has been able to explore her \"core issues\" which she used to feel embarrassed talking about. The patient described how she is making an effort to improve her overall health.     Therapist impression of patients current state:   The patient presented on-time for a follow-up psychotherapy visit. The patient appeared open-minded and cooperative during today's session. The patient appropriately utilized session to process thoughts and feelings. The patient has developed increased awareness about the impact of past life events on current beliefs, thoughts and feelings. She exhibited an ability to reflect upon the meaning attached to certain events and began to brainstorm ways to resolve current conflicts. The patient has demonstrated a strong commitment to her recovery and " sobriety from alcohol. She has also demonstrated a strong effort to improve her overall health. The patient would like to eventually return to work and participate in a job that makes her feel happy. The patient is currently hoping to improve her sleep patterns after a recent sleep study. She will report back on the impact of using the CPAP. The therapist will continue to encourage and guide the patient in practicing CBT techniques during future sessions to alleviate signs and symptoms of depression.    Type of psychotherapeutic technique provided: Insight oriented and CBT    Progress toward short term goals:Progress as expected, as the patient appeared open-minded and cooperative, demonstrating an ability to gain trust in the therapist    Review of long term goals: Not done at today's visit    Diagnosis:   1. Severe episode of recurrent major depressive disorder, without psychotic features    Alcohol abuse, in remission  R/O Generalized Anxiety Disorder  R/O Bipolar Disorder    Plan and Follow up: The patient is scheduled to return for a follow-up appointment on 10/11/2017.    Discharge Criteria/Planning: Client has chronic symptoms and ongoing therapy for maintenance stability recommended.    Performed and documented by GOOD Mendoza    I have read, discussed and reviewed the documentation as presented by GOOD [facility]endoza [facility]

## 2021-06-13 NOTE — PROGRESS NOTES
Mental Health Visit Note    This is a dual signature report. My supervising clinician is JULIO Kasper.    10/11/2017   Start time: 9:00am    Stop Time: 9:55am   Session # 8    Pasty Santamaria is a 62 y.o. female is being seen today for    Chief Complaint   Patient presents with     MH Follow Up     Psychotherapy follow-up visit to address symptoms of depression and anxiety in addition to monitoring sobriety maintenance     New symptoms or complaints: None    Functional Impairment:   Personal: 3  Family: 3  Work: 4  Social:3    Clinical assessment of mental status:   Grooming: Well groomed  Attire: Appropriate  Age: Appears Stated  Behavior Towards Examiner: Cooperative  Motor Activity: Within normal   Eye Contact: Appropriate  Mood: Mostly euthymic, at times anxious and depressed  Affect: Congruent w/content of speech, Flat, Anxious and Depressed  Speech/Language: Within normal  Attention: Within normal  Concentration: Within normal  Thought Process: Within normal  Thought Content: Hallucinations: None reported  Delusions: none reported  Orientation: X 3  Memory: No Evidence of Impairment  Judgement: No Evidence of Impairment  Estimated Intelligence: Average  Demonstrated Insight: Adequate  Fund of Knowledge: adequate    Suicidal/Homicidal Ideation present: None Reported This Session    Patient's impression of their current status:   The patient reported that she recently moved into a private room at her transitional housing facility. She is slightly worried that she may isolate more but is aware and working to find options that will help her challenge this tendency especially now that she will be living alone.   The patient reported that she hasn't tried her CPAP machine yet due to being sick for a week.   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  The patient noted slightly increased  symptoms of depression such as feeling down with low energy. She noted that trouble sleeping continues to be a problem. The patient will trying reading at bed time and improving her bed time routine. She will also try using her CPAP machine.  The patient discussed the potential of obtaining new romantic relationships while in recovery.      Therapist impression of patients current state:   The patient presented on-time for a follow-up psychotherapy visit. The patient appeared open-minded and cooperative during today's session. The patient appropriately utilized session to process thoughts and feelings. The patient has developed increased awareness about the impact of past life events on current beliefs, thoughts and feelings. She exhibited an ability to reflect upon the meaning attached to certain events and began to brainstorm ways to resolve current conflicts. In this way, the patient has exhibited improved critical reasoning skills and increased self-awareness. The patient has demonstrated a strong commitment to her recovery and sobriety from alcohol. She has also demonstrated a strong effort to improve her overall health. The patient would like to eventually return to work and participate in a job that makes her feel happy. The patient is currently hoping to improve her sleep patterns after a recent sleep study. She will report back on the impact of using the CPAP. The therapist will continue to encourage and guide the patient in practicing CBT techniques during future sessions to alleviate signs and symptoms of depression.    Type of psychotherapeutic technique provided: Insight oriented and CBT    Progress toward short term goals:Progress as expected, as the patient appeared open-minded and cooperative, demonstrating an ability to gain trust in the therapist    Review of long term goals: Not done at today's visit    Diagnosis:   1. Severe episode of recurrent major depressive disorder, without psychotic  features    2. PTSD (post-traumatic stress disorder)    Alcohol abuse, in remission  R/O Generalized Anxiety Disorder  R/O Bipolar Disorder    Plan and Follow up: The patient is scheduled to return for a follow-up appointment on 10/25/2017.    Discharge Criteria/Planning: Client has chronic symptoms and ongoing therapy for maintenance stability recommended.    Performed and documented by GOOD Mendoza    I have read, discussed and reviewed the documentation as presented by GOOD Mendoza LICSW

## 2021-06-13 NOTE — TELEPHONE ENCOUNTER
Medication Request  Medication name: The patient is requesting a medication for the cracks on both sides of her mouth.  Requested Pharmacy: [facility], [place]  Reason for request: The patient states she saw Yanique Cali MD a month ago and showed her the crack on one the side of her mouth but now has cracks on both sides.The patient states has tried Vaseline, Desitin and some type of ointment Desoximetasone without any relief.  When did you use medication last?:  today  Patient offered appointment:  N/A - electronic request  Okay to leave a detailed message: yes

## 2021-06-13 NOTE — PROGRESS NOTES
Patient was offered choice of vendor and chose [facility].  Patient Patsy Santamaria was set up at [facility] on 9/20/17. Patient received a [employer]med [id number] Auto. Pressures were set at 6-16 CM H2O.   Patient s ramp is 6 cm H2O for Auto and FLEX/EPR is EPR.  Patient received a [employer] ([employer] Mask name: Eson2  Nasal Size Small, Heated tubing and heated humidifier.    Pacee Her

## 2021-06-14 ENCOUNTER — OFFICE VISIT - HEALTHEAST (OUTPATIENT)
Dept: BEHAVIORAL HEALTH | Facility: CLINIC | Age: 66
End: 2021-06-14

## 2021-06-14 DIAGNOSIS — F10.20 ALCOHOL USE DISORDER, SEVERE, DEPENDENCE (H): ICD-10-CM

## 2021-06-14 DIAGNOSIS — F33.1 MODERATE EPISODE OF RECURRENT MAJOR DEPRESSIVE DISORDER (H): ICD-10-CM

## 2021-06-14 DIAGNOSIS — F43.10 PTSD (POST-TRAUMATIC STRESS DISORDER): ICD-10-CM

## 2021-06-14 NOTE — TELEPHONE ENCOUNTER
"Triage Call:    -Patient returning call as she is currently having severe pain in her R. Shoulder and R. Neck.  -Patient is scheduled for an appointment tomorrow, but cannot wait until tomorrow as her pain is causing her problems now.   -Patient states she has tried Tylenol, Gabapentin which is not helping her sx along with icing, which is not helping with her pain.   -Patient denies any injury to her shoulder and neck recently.   -Denies chest pain and shortness of breath currently.  -Pain states her pain is 10/10 currently.     -Per protocol, recommendations are for patient to go to ED now or PCP triage. Per protocol secondary triage is required and provider needs to make final decision regarding recommendatios   -Routing to PCP/Care Team to advise before clinic closes. Do you recommend patient go into the ED or [facility] tonight?    -Call patient back at [phone]   -Okay to leave a detailed message? YES    Soledad Copeland RN, BSN Nurse Triage Advisor 4:37 PM 2/1/2021       Reason for Disposition    [1] SEVERE pain AND [2] not improved 2 hours after pain medicine    Patient sounds very sick or weak to the triager    Additional Information    Negative: Passed out (i.e., lost consciousness, collapsed and was not responding)    Negative: Shock suspected (e.g., cold/pale/clammy skin, too weak to stand, low BP, rapid pulse)    Negative: [1] Similar pain previously AND [2] it was from \"heart attack\"    Negative: [1] Similar pain previously AND [2] it was from \"angina\" AND [3] not relieved by nitroglycerin    Negative: Sounds like a life-threatening emergency to the triager    Negative: Followed a shoulder injury    Negative: Chest pain    Negative: Difficulty breathing or unusual sweating (e.g., sweating without exertion)     Patient has COPD.    Negative: [1] Pain lasting > 5 minutes AND [2] pain also present in chest  (Exception: pain is clearly made worse by movement)    Negative: [1] Age > 40 AND [2] no obvious " "cause AND [3] pain even when not moving the arm    (Exception: pain is clearly made worse by moving arm or bending neck)    Negative: Shock suspected (e.g., cold/pale/clammy skin, too weak to stand, low BP, rapid pulse)    Negative: Difficult to awaken or acting confused (e.g., disoriented, slurred speech)    Negative: [1] Similar pain previously AND [2] it was from \"heart attack\"    Negative: [1] Similar pain previously AND [2] it was from \"angina\" AND [3] not relieved by nitroglycerin    Negative: Sounds like a life-threatening emergency to the triager    Negative: Followed a neck injury (e.g., MVA, sports, impact or collision)    Negative: Chest pain    Negative: Lymph node in the neck is swollen or painful to the touch    Negative: Sore throat is main symptom    Negative: [1] Stiff neck (can't put chin to chest) AND [2] headache    Negative: Difficulty breathing or unusual sweating (e.g., sweating without exertion)    Negative: [1] Stiff neck (can't put chin to chest) AND [2] fever    Negative: Weakness of an arm or hand    Negative: Problems with bowel or bladder control    Negative: Head is twisting to one side (or ask \"is it turning against your will?\")    Protocols used: SHOULDER PAIN-A-AH, NECK PAIN OR [id number]  COVID 19 Nurse Triage Plan/Patient Instructions    Please be aware that novel coronavirus (COVID-19) may be circulating in the community. If you develop symptoms such as fever, cough, or SOB or if you have concerns about the presence of another infection including coronavirus (COVID-19), please contact your health care provider or visit [email or web address].     Disposition/Instructions    ED Visit recommended. Follow protocol based instructions.      Bring Your Own Device:  Please also bring your smart device(s) (smart phones, tablets, laptops) and their charging cables for your personal use and to communicate with your care team during your visit.      Thank you for taking steps to prevent the " spread of this virus.  o Limit your contact with others.  o Wear a simple mask to cover your cough.  o Wash your hands well and often.    Resources    [facility]: About COVID-19: [email or web address]    CDC: What to Do If You're Sick: www.cdc.gov/coronavirus/2019-ncov/about/steps-when-sick.html    CDC: Ending Home Isolation: www.cdc.gov/coronavirus/2019-ncov/hcp/disposition-in-home-patients.html     CDC: Caring for Someone: www.cdc.gov/coronavirus/2019-ncov/if-you-are-sick/care-for-someone.html     [facility]: Interim Guidance for Hospital Discharge to Home: www.[email or web address].[email or web address]/diseases/coronavirus/hcp/hospdischarge.pdf    [facility] clinical trials (COVID-19 research studies): clinicalaffairs.[facility]-clinical-trials     Below are the COVID-19 hotlines at the [place] [facility] ([facility]). Interpreters are available.   o For health questions: Call 530-001-1715 or 1-293.147.6939 (7 a.m. to 7 p.m.)  For questions about schools and childcare: Call 093-069-9155 or 1-918.906.7402 (7 a.m. to 7 p.m.)

## 2021-06-14 NOTE — PROGRESS NOTES
[address] [address]  Dept: [phone]  Dept Fax: [phone]  Primary Provider: Yanique Cali MD  Pre-op Performing Provider: YANIQUE CALI    PREOPERATIVE EVALUATION:  Today's date: 1/18/2021    Patsy Santamaria is a 65 y.o. female who presents for a preoperative evaluation.    Surgical Information:  Surgery/Procedure: Colonoscopy  Surgery Location: [facility] OR  Surgeon: Scar Castro DO  Surgery Date: 2/16/2021  Time of Surgery: 7:30 am  Where patient plans to recover: At home alone  Fax number for surgical facility: Note does not need to be faxed, will be available electronically in Epic.    Type of Anesthesia Anticipated: to be determined    Subjective     HPI related to upcoming procedure: recent hospitalization for GI bleeding (blood in stool) - has recurrent constipation, no further bleeding since in the hospital;   Uses Miralax - 1-2x/week   Colonoscopy ordered - to be done at hospital due to COPD, weight    Preop Questions 1/18/2021   Have you ever had a heart attack or stroke? No   Have you ever had surgery on your heart or blood vessels, such as a stent placement, a coronary artery bypass, or surgery on an artery in your head, neck, heart, or legs? No   Do you have chest pain with activity? No   Do you have a history of  heart failure? No   Do you currently have a cold, bronchitis or symptoms of other infection? No   Do you have a cough, shortness of breath, or wheezing? YES - has COPD   Do you or anyone in your family have previous history of blood clots? YES - family history    Do you or does anyone in your family have a serious bleeding problem such as prolonged bleeding following surgeries or cuts? No   Have you ever had problems with anemia or been told to take iron pills? No   Have you had any abnormal blood loss such as black, tarry or bloody stools, or abnormal vaginal bleeding? YES - blood stools  "  Have you ever had a blood transfusion? No   Are you willing to have a blood transfusion if it is medically needed before, during, or after your surgery? Yes   Have you or any of your relatives ever had problems with anesthesia? No   Do you have sleep apnea, excessive snoring or daytime drowsiness? YES - sleep apnea   Do you have a CPAP machine? Yes   Do you have any artifical heart valves or other implanted medical devices like a pacemaker, defibrillator, or continuous glucose monitor? No   Do you have artificial joints? No   Are you allergic to latex? No     Health Care Directive:  Patient does not have a Health Care Directive or Living Will: Discussed advance care planning with patient; however, patient declined at this time.    Preoperative Review of :    reviewed - has had Gabapentin (x 3 rx) in last year; has also had one cough syrup prescription (with codeine)    See problem list for active medical problems.  Problems all longstanding and stable, except as noted/documented.  See ROS for pertinent symptoms related to these conditions.    DEPRESSION - Patient has a long history of Depression of moderate severity requiring medication for control with recent symptoms being stable..Current symptoms of depression include depressed mood.       Review of Systems   Breathing is \"off/on\" - has new inhaler now - Incruse Ellipta; has CPAP - will be starting to use that  Still smoking - 1 ppd   More short of breath in winter than summertime  Uses nebs instead of inhalers when more short of breath -     CONSTITUTIONAL: NEGATIVE for fever, chills, change in weight  INTEGUMENTARY/SKIN: NEGATIVE for worrisome rashes, moles or lesions  EYES: NEGATIVE for vision changes or irritation  ENT/MOUTH: NEGATIVE for ear, mouth and throat problems  RESP: NEGATIVE for significant cough or SOB  BREAST: NEGATIVE for masses, tenderness or discharge  CV: NEGATIVE for chest pain, palpitations or peripheral edema  GI: NEGATIVE for " nausea, abdominal pain, heartburn, or change in bowel habits  : NEGATIVE for frequency, dysuria, or hematuria  MUSCULOSKELETAL: NEGATIVE for significant arthralgias or myalgia  NEURO: NEGATIVE for weakness, dizziness or paresthesias  ENDOCRINE: NEGATIVE for temperature intolerance, skin/hair changes  HEME: NEGATIVE for bleeding problems  PSYCHIATRIC: NEGATIVE for changes in mood or affect    Patient Active Problem List    Diagnosis Date Noted     GI bleed 12/28/2020     Homeless 12/28/2020     Vitamin B12 deficiency (non anemic) 10/13/2020     Abdominal pain, epigastric      History of major depression 01/23/2020     Biliary colic      COPD exacerbation (H) 01/22/2020     Airway obstruction due to foreign body, initial encounter      Pneumonia of right lower lobe due to infectious organism 12/14/2019     Severe alcohol use disorder (H) 11/21/2019     Chronic obstructive pulmonary disease, unspecified COPD type (H) 10/30/2019     Dyspnea on exertion      Anxiety 03/25/2019     Hypomagnesemia 03/25/2019     Primary osteoarthritis of left knee 07/10/2018     Seasonal allergies 07/09/2018     Mild episode of recurrent major depressive disorder (H)      Alcoholic hepatitis      Peripheral edema      Diuretic-induced hypokalemia      Alcohol use disorder, severe, dependence (H) 05/07/2018     Primary osteoarthritis of right knee 03/21/2018     Chronic alcohol dependence, continuous (H) 03/16/2018     Low backache 03/16/2018     Morbid obesity (H) 01/05/2018     Bilateral edema of lower extremity 06/28/2017     Snoring 06/28/2017     Carpal tunnel syndrome on both sides 03/06/2017     Trochanteric bursitis of left hip 10/25/2016     Hypoxia 05/13/2016     Alcohol abuse 05/13/2016     Elevated LFTs 05/13/2016     Claustrophobia 12/18/2015     Cervical disc disease 12/14/2015     Skin lesion 10/20/2015     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) 09/16/2015     PTSD (post-traumatic stress disorder)  2015     Chronic Reflux Esophagitis      Peripheral Neuropathy      Localized Primary Osteoarthritis Of The Carpometacarpal Joint      Ganglion Of The Right Foot      Major depression, recurrent (H)      Nicotine Dependence      Vitamin D deficiency      Past Medical History:   Diagnosis Date     Alcohol withdrawal seizure without complication (H) 2016     Alcoholic cirrhosis (H)      Anxiety      Arthritis      Chronic alcohol dependence, continuous (H) 2018    inpatient 2019, sober since then     Chronic reflux esophagitis      Depression      Dermatitis      Ganglion     right foot     H. pylori infection      Melanoma (H) 2019    left upper arm     Menopause     age 52     Obesity (BMI 35.0-39.9 without comorbidity)      Seizure (H) 2016    during alcohol withdrawal     Sleep apnea     mild, doesnt tolerate pap therapy     Past Surgical History:   Procedure Laterality Date     APPENDECTOMY       DE APPENDECTOMY      Description: Appendectomy;  Recorded: 2008;  Comments: childhood     DE  DELIVERY ONLY      Description:  Section;  Recorded: 2008;     DE EXCIS TENDN/CAPSULE LESN,FOOT      Description: Excision Of Cyst Of Tendon Sheath Of Foot;  Proc Date: 10/28/2011;  Comments: [facility]     DE HEMORRHOIDECTOMY INTERNAL RUBBER BAND LIGATIONS      Description: Hemorrhoidectomy;  Recorded: 2008;     DE KNEE SCOPE,DIAGNOSTIC      Description: Arthroscopy Knee Right;  Proc Date: 10/11/2005;  Comments: for right knee patella subluxation with lateral retinacular release; subpatellar chondroplasty     DE LATERAL RETINACULAR RELEASE OPEN      Description: Knee Lateral Retinacular Release;  Proc Date: 10/11/2005;     DE LIGATE FALLOPIAN TUBE      Description: Tubal Ligation;  Recorded: 2008;     SKIN BIOPSY Left 2019    left upper arm     TONSILLECTOMY       Current Outpatient Medications   Medication Sig Dispense Refill     albuterol (ACCUNEB)  0.63 mg/3 mL nebulizer solution Take 3 mL (0.63 mg total) by nebulization every 2 (two) hours as needed for wheezing. 10 vial 0     albuterol (VENTOLIN HFA) 90 mcg/actuation inhaler Inhale 2 puffs every 4 (four) hours as needed for wheezing. 18 g 3     ammonium lactate (AMLACTIN) 12 % cream Apply 2 application topically 2 (two) times a day as needed. Apply 1-3 grams to each foot twice daily as needed  11     budesonide-formoterol (SYMBICORT) 80-4.5 mcg/actuation inhaler Inhale 2 puffs 2 (two) times a day. 1 Inhaler 1     bumetanide (BUMEX) 1 MG tablet Take 1 tablet (1 mg total) by mouth daily. 90 tablet 2     cetirizine (ZYRTEC) 10 MG tablet Take 1 tablet (10 mg total) by mouth daily as needed for allergies. 30 tablet 5     diclofenac sodium (VOLTAREN) 1 % Gel Apply 2 g topically 3 (three) times a day as needed.       DULoxetine (CYMBALTA) 30 MG capsule Take 3 capsules (90 mg total) by mouth daily. 90 capsule 0     hydrOXYzine pamoate (VISTARIL) 25 MG capsule Take 1 capsule (25 mg total) by mouth 4 (four) times a day as needed for anxiety. 120 capsule 1     naltrexone (DEPADE) 50 mg tablet Take 100 mg by mouth daily.       omeprazole (PRILOSEC) 20 MG capsule Take 1 capsule (20 mg total) by mouth daily before breakfast. 90 capsule 3     polyethylene glycol (MIRALAX) 17 gram packet Take 1 packet (17 g total) by mouth daily. 30 packet 0     potassium chloride (KLOR-CON) 10 MEQ CR tablet Take 20 mEq by mouth daily.       prazosin (MINIPRESS) 1 MG capsule Take 1 mg by mouth at bedtime.       traZODone (DESYREL) 100 MG tablet Take 100 mg by mouth at bedtime.       umeclidinium (INCRUSE ELLIPTA) 62.5 mcg/actuation DsDv inhaler Inhale 1 puff daily. 30 each 11     baclofen (LIORESAL) 10 MG tablet Take 10 mg by mouth 3 (three) times a day as needed.       nicotine polacrilex (NICORETTE) 4 MG gum Chew one piece of gum every 30 minutes as needed for nicotine craving. 170 each 11     No current facility-administered medications  "for this visit.        Allergies   Allergen Reactions     Wellbutrin [Bupropion Hcl] Diarrhea     Codeine Nausea Only     Hydrochlorothiazide Rash     phototoxicity - med was d/lora       Topamax [Topiramate]      Diclofenac Nausea Only     Tolerates the topical gel     Sulfa (Sulfonamide Antibiotics) Rash     Sulfasalazine Rash       Social History     Tobacco Use     Smoking status: Current Every Day Smoker     Packs/day: 0.50     Years: 49.00     Pack years: 24.50     Types: Cigarettes     Smokeless tobacco: Never Used     Tobacco comment: last 11/2019   Substance Use Topics     Alcohol use: Not Currently     Comment: sober since 11/19/2019      Family History   Problem Relation Age of Onset     Heart disease Mother      Heart disease Father      Social History     Substance and Sexual Activity   Drug Use No        Objective     /80 (Patient Site: Right Arm, Patient Position: Sitting, Cuff Size: Adult Large)   Pulse 78   Temp 97.4  F (36.3  C) (Temporal)   Resp 16   Ht 5' 3.75\" (1.619 m)   Wt (!) 246 lb (111.6 kg)   LMP 03/06/2002   SpO2 96%   BMI 42.56 kg/m    Physical Exam    GENERAL APPEARANCE: healthy, alert and no distress     EYES: EOMI, PERRL     HENT: ear canals and TM's normal and nose and mouth without ulcers or lesions     NECK: no adenopathy, no asymmetry, masses, or scars and thyroid normal to palpation     RESP: lungs clear to auscultation - no rales, rhonchi or wheezes     BREAST: normal without masses, tenderness or nipple discharge and no palpable axillary masses or adenopathy     CV: regular rates and rhythm, normal S1 S2, no S3 or S4 and no murmur, click or rub     ABDOMEN:  soft, nontender, no HSM or masses and bowel sounds normal     MS: extremities normal- no gross deformities noted, no evidence of inflammation in joints, FROM in all extremities.     SKIN: no suspicious lesions or rashes     NEURO: Normal strength and tone, sensory exam grossly normal, mentation intact and " speech normal     PSYCH: mentation appears normal. and affect normal/bright     LYMPHATICS: No cervical adenopathy    Recent Labs   Lab Test 01/18/21  1221 12/29/20  1138 12/28/20  1802 12/28/20  1802 12/28/20  1209 09/29/20  1437   HGB 15.5 13.9   < > 14.3 15.4 15.7   PLT  --   --   --   --  186 206   INR  --   --   --  1.02 1.01  --      --   --   --  140 141   K 3.6  --   --   --  3.6 3.9   CREATININE 0.65  --   --   --  0.65 0.62    < > = values in this interval not displayed.        PRE-OP Diagnostics:     Recent Results (from the past 240 hour(s))   Hemoglobin    Collection Time: 01/18/21 12:21 PM   Result Value Ref Range    Hemoglobin 15.5 12.0 - 16.0 g/dL   Basic Metabolic Panel    Collection Time: 01/18/21 12:21 PM   Result Value Ref Range    Sodium 142 136 - 145 mmol/L    Potassium 3.6 3.5 - 5.0 mmol/L    Chloride 99 98 - 107 mmol/L    CO2 26 22 - 31 mmol/L    Anion Gap, Calculation 17 5 - 18 mmol/L    Glucose 117 70 - 125 mg/dL    Calcium 9.3 8.5 - 10.5 mg/dL    BUN 9 8 - 22 mg/dL    Creatinine 0.65 0.60 - 1.10 mg/dL    GFR MDRD Af Amer >60 >60 mL/min/1.73m2    GFR MDRD Non Af Amer >60 >60 mL/min/1.73m2     ECG 12 lead nursing unit performed  Order: [id number]  Status:  Final result   Visible to patient:  No (not released)   Next appt:  01/26/2021 at 02:00 PM in Podiatry (Keyon Kapoor DPM)    Ref Range & Units 12/28/20 1158    SYSTOLIC BLOOD PRESSURE      DIASTOLIC BLOOD PRESSURE      VENTRICULAR RATE BPM 66     ATRIAL RATE BPM 66     P-R INTERVAL ms 150     QRS DURATION ms 94     Q-T INTERVAL ms 464     QTC CALCULATION (BEZET) ms 486     P Axis degrees 68     R AXIS degrees 13     T AXIS degrees 58     MUSE DIAGNOSIS  Normal sinus rhythm   Normal ECG   When compared with ECG of 15-FEB-2020 17:31,   No significant change was found   Confirmed by SEE ED PROVIDER NOTE FOR, ECG INTERPRETATION ([id number]),  SARA MURRELL ([id number]) on 12/28/2020 7:41:50 PM            Xr Chest 2 Views    Result Date:  1/18/2021  EXAM: XR CHEST 2 VIEWS LOCATION: [facility] DATE/TIME: 1/18/2021 12:37 PM INDICATION: Encounter for other preprocedural examination COMPARISON: PA and lateral views of the chest 02/15/2020     Diminished thickness of a band of subsegmental atelectasis in the lateral right base. Unchanged bands of atelectasis in the left base. No new airspace opacity or interstitial thickening. No peribronchial fluid cuffs. No pleural fluid or pneumothorax. Cardiac silhouette is normal in size. Mediastinal borders and hilar contours are normal.    Nm Gi Bleed    Result Date: 12/28/2020  EXAM: NM GI BLEED LOCATION: [facility] DATE/TIME: 12/28/2020 4:38 PM INDICATION: GI bleed LLQ pain with GIB COMPARISON: None. TECHNIQUE: 26.2 mCi technetium-99m, in vitro tagged RBCs, IV. Dynamic anterior planar imaging of the abdomen for 60 minutes. FINDINGS: Normal background activity. No scintigraphic evidence of gastrointestinal bleeding during the imaging period.     1.  No scintigraphic evidence of gastrointestinal bleeding during the imaging period.      REVISED CARDIAC RISK INDEX (RCRI)   The patient has the following serious cardiovascular risks for perioperative complications:   - No serious cardiac risks = 0 points    RCRI INTERPRETATION: 0 points: Class I (very low risk - 0.4% complication rate)           Assessment & Plan      The proposed surgical procedure is considered LOW risk.    1. Preop examination  XR Chest 2 Views    Hemoglobin    Basic Metabolic Panel   2. Gastrointestinal hemorrhage, unspecified gastrointestinal hemorrhage type     3. Severe alcohol use disorder (H)     4. Moderate episode of recurrent major depressive disorder (H)     5. Morbid obesity (H)     6. Chronic obstructive pulmonary disease, unspecified COPD type (H)     7. Toenail deformity  Ambulatory referral to Podiatry - [facility] (includes [facility] groups)     This is a 66 yo female with  recent h/o GI bleeding - needs preop prior to planned EGD - scheduled to be done at hospital.  Patient has alcohol dependence - overuses many times; this is underlying risk for her bleeding as well.     She has underlying history of depression - continue to encourage mental health follow up.   As well, she has COPD - generally stable - starting to use CPAP for ?sleep apnea as well.    Possible Sleep Apnea: using CPAP       Risks and Recommendations:  The patient has the following additional risks and recommendations for perioperative complications:   - No identified additional risk factors other than previously addressed    Medication Instructions:  Patient is to take all scheduled medications on the day of surgery    RECOMMENDATION:  APPROVAL GIVEN to proceed with proposed procedure, without further diagnostic evaluation.    Signed Electronically by: Yanique Cali MD    Copy of this evaluation report is provided to requesting physician.    Preop [facility] Preop Guidelines    Revised Cardiac Risk Index
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
## 2021-06-14 NOTE — PROGRESS NOTES
Mental Health Visit Note    This is a dual signature report. My supervising clinician is JULIO Kasper.    11/8/2017   Start time: 9:00am    Stop Time: 9:55am   Session # 10    Patsy Santamaria is a 62 y.o. female is being seen today for    Chief Complaint   Patient presents with     MH Follow Up     Psychotherapy follow-up visit to address symptoms of depression, anxiety and sobriety from alcohol     New symptoms or complaints: None    Functional Impairment:   Personal: 3  Family: 3  Work: 4  Social:3    Clinical assessment of mental status:   Grooming: Well groomed  Attire: Appropriate  Age: Appears Stated  Behavior Towards Examiner: Cooperative  Motor Activity: Within normal   Eye Contact: Appropriate  Mood: Mostly euthymic, at times anxious and depressed  Affect: Congruent w/content of speech, Flat, Anxious and Depressed  Speech/Language: Within normal  Attention: Within normal  Concentration: Within normal  Thought Process: Within normal  Thought Content: Hallucinations: None reported  Delusions: none reported  Orientation: X 3  Memory: No Evidence of Impairment  Judgement: No Evidence of Impairment  Estimated Intelligence: Average  Demonstrated Insight: Adequate  Fund of Knowledge: adequate    Suicidal/Homicidal Ideation present: None Reported This Session    Patient's impression of their current status:   The patient reported feeling tired today. She reported difficulty using her CPAP machine and was strongly encouraged to call her doctor to address this concern. The patient is still not achieving restful or quality sleep, which she noticed is impacting her mood during the day. She is currently struggling with low energy and low motivation. She discussed having many thoughts, ambitions and plans with little follow-through. This feels frustrating to her. The patient processed steps to begin addressing this problem. She agreed to utilize a daily schedule and create realistic expectations for daily  activities. She discussed plans to increase her amount of daily physical activity and may pursue a gym membership. She is thinking about obtaining part-time employment and is planning to ask her sister for help with applications. The patient is also contemplating what she would like to do for Thanksgiving.  The patient reported that she continues to attend AA meetings and denied any issues with sobriety maintenance.     Therapist impression of patients current state:   The patient presented on-time for a follow-up psychotherapy visit. The patient appeared open-minded and cooperative during today's session. The patient appropriately utilized session to process thoughts and feelings. The patient has developed increased awareness about the impact of past life events on current beliefs, thoughts and feelings. She exhibited an ability to reflect upon the meaning attached to certain events and began to brainstorm ways to resolve current conflicts. In this way, the patient has exhibited improved critical reasoning skills and increased self-awareness. The patient has demonstrated a strong commitment to her recovery and sobriety from alcohol. She has also demonstrated a strong effort to improve her overall health. The patient would like to eventually return to work and participate in a job that makes her feel happy. The patient is currently hoping to improve her sleep patterns after a recent sleep study. She will report back on the impact of using the CPAP. The therapist will continue to encourage and guide the patient in practicing CBT techniques during future sessions to alleviate signs and symptoms of depression.    Type of psychotherapeutic technique provided: Insight oriented and CBT    Progress toward short term goals:Progress as expected, as the patient appeared open-minded and cooperative, demonstrating an ability to appropriately utilize therapy session time    Review of long term goals: Not done at today's  visit    Diagnosis:   1. Severe episode of recurrent major depressive disorder, without psychotic features    2. PTSD (post-traumatic stress disorder)    Alcohol abuse, in remission  R/O Generalized Anxiety Disorder  R/O Bipolar Disorder    Plan and Follow up: The patient is scheduled to return for a follow-up appointment on 11/22/2017.    Discharge Criteria/Planning: Client has chronic symptoms and ongoing therapy for maintenance stability recommended.    Performed and documented by GOOD Mendoza    I have read, discussed and reviewed the documentation as presented by GOOD Mendoza LICSW

## 2021-06-14 NOTE — PROGRESS NOTES
"Mental Health Visit Note    This is a dual signature report. My supervising clinician is JULIO Kasper.    11/22/2017   Start time: 9:00am    Stop Time: 9:55am   Session # 11    Patsy Santamaria is a 62 y.o. female is being seen today for    Chief Complaint   Patient presents with     MH Follow Up     Psychotherapy follow-up visit to address symptoms of depression, anxiety and sobriety maintenance     New symptoms or complaints: None    Functional Impairment:   Personal: 3  Family: 3  Work: 4  Social:3    Clinical assessment of mental status:   Grooming: Well groomed  Attire: Appropriate  Age: Appears Stated  Behavior Towards Examiner: Cooperative  Motor Activity: Within normal   Eye Contact: Appropriate  Mood: Mostly euthymic, at times anxious and depressed  Affect: Congruent w/content of speech, Flat, Anxious and Depressed  Speech/Language: Within normal  Attention: Within normal  Concentration: Within normal  Thought Process: Within normal  Thought Content: Hallucinations: None reported  Delusions: none reported  Orientation: X 3  Memory: No Evidence of Impairment  Judgement: No Evidence of Impairment  Estimated Intelligence: Average  Demonstrated Insight: Adequate  Fund of Knowledge: adequate    Suicidal/Homicidal Ideation present: None Reported This Session    Patient's impression of their current status:   The patient reported that she currently feels uncomfortable about her weight. She described ways in which she is trying to eat healthier. She is planning on going to the gym with a friend who has a gym membership so the patient can go for free. The patient shared that weight loss will help alleviate pain and pressure on her knees.  The patient shared that she had a difficult time \"getting started\" with the daily schedules. She and the therapist discussed how to utilize the schedule and the therapist reminded her that there was no wrong way to begin to use it. The patient admitted that she felt " "nervous about doing the assignment (completing the daily schedules) incorrectly. The therapist and patient addressed this concern and created a new plan so that she would feel more comfortable with the assignment over the next 2 weeks.  The patient shared that she has been feeling \"ugh.\" She described feeling badly that she does not have a place to host Thanksgiving for her family - this brought up sad memories related to grief and loss from her past. The patient became tearful and shared that she misses her family unit she used to have. The patient described \"missing the love and warmth of being together.\" The therapist and patient processed these feelings.  The patient shared that she would just prefer to stay at the transitional home for Thanksgiving. She shared that she feels thankful everyday and \"didn't want Thanksgiving to be a huge production.\" The patient agreed that holidays create pressure and expectations that often result in disappointment. The patient and therapist discussed ways in which she could decide how to spend her Thanksgiving without feeling guilty.    The patient reported that she missed her psychiatry appointment. She is planning to reschedule.      Therapist impression of patients current state:   The patient presented on-time for a follow-up psychotherapy visit. The patient appeared open-minded and cooperative during today's session. The patient appropriately utilized session to process thoughts and feelings. The patient has developed increased awareness about the impact of past life events on current beliefs, thoughts and feelings. She exhibited an ability to reflect upon the meaning attached to certain events and began to brainstorm ways to resolve current conflicts. In this way, the patient has exhibited improved critical reasoning skills and increased self-awareness.   The patient has demonstrated a strong commitment to her recovery and sobriety from alcohol. She has also demonstrated " a strong effort to improve her overall health. She is interested in adapting more healthy eating habits and attending the gym with a friend. The patient would like to eventually return to work and participate in a job that makes her feel happy. The patient is currently hoping to improve her sleep patterns after a recent sleep study. She will report back on the impact of using the CPAP. The patient is motivated to utilize a daily schedule to increase her motivation and help with follow through. The therapist will continue to encourage and guide the patient in practicing CBT techniques during future sessions to alleviate signs and symptoms of depression.    Type of psychotherapeutic technique provided: Insight oriented and CBT    Progress toward short term goals:Progress as expected, as the patient appeared open-minded and cooperative, demonstrating an ability to appropriately utilize therapy session time    Review of long term goals: Not done at today's visit    Diagnosis:   1. Severe episode of recurrent major depressive disorder, without psychotic features    2. PTSD (post-traumatic stress disorder)    Alcohol abuse, in remission  R/O Generalized Anxiety Disorder  R/O Bipolar Disorder    Plan and Follow up: The patient is scheduled to return for a follow-up appointment on 12/6/2017.    Discharge Criteria/Planning: Client has chronic symptoms and ongoing therapy for maintenance stability recommended.    Performed and documented by GOOD Mendoza    I have read, discussed and reviewed the documentation as presented by GOOD Mendoza LICSW

## 2021-06-14 NOTE — TELEPHONE ENCOUNTER
Patient calling . Reports abdominal pain is serious , and having rectal bleeding with this pain. She was sent to ER .    Diana Barbosa RN  [facility] Triage/refill nurse    Reason for Disposition    SEVERE rectal bleeding (large blood clots; on and off, or constant bleeding)    Additional Information    SEVERE abdominal pain (e.g., excruciating)    Protocols used: RECTAL BLEEDING-A-[place]

## 2021-06-14 NOTE — PROGRESS NOTES
Patsy Santamaria is a 65 y.o. female who is being evaluated via a billable telephone visit.      What phone number would you like to be contacted at?[phone]    Phone call duration: 12 minutes    Pulmonary Clinic Follow-up Visit    Assessment and Plan:   65 year old female with a history of longstanding and active tobacco dependence, alcohol dependence, GI bleed, PTSD, MELVI, h/o alcohol withdrawal seizure, H pylori gastritis, obesity, depression, bronchiectasis, peripheral neuropathy, GERD, osteoarthritis, vitamin D deficiency, presenting for telephone follow-up.     Tobacco dependence, bronchiectasis, MELVI, obesity, possible asthma: Hospitalized for one night in late December with GI bleed; has planned colonoscopy in February 2021. Occasional dyspnea; stable. Using nebulized ipratropium-albuterol once daily. Wants to try a LAMA; will send prescription for umeclidinium. Currently smoking 1 ppd, which is up from 0-0.5 ppd when I last spoke with her in July 2020, likely worsened by alcohol dependence. Did not start the low-dose nicotine patch because nicotine patches have given her nightmares in the past when used during hospitalizations. Was not able to take bupropion in the past due to being on duloxetine and naltrexone. Received influenza vaccine for this season. She plans to get COVID-19 vaccine when more widely available. Recall from prior visits that she has been hospitalized repeatedly for respiratory failure in the past, though in at least one case she had been drinking alcohol and aspirated food and mucus, requiring bronchoscopy for airway clearance. Baseline FEV1/FVC ratio is nonobstructive, normal TLC and DLCO, no significant bronchodilator response. Obesity likely playing a significant role along with deconditioning.     Plan:  - start nicotine 4-mg gum as needed for cravings  - continue use of nicotine inhaler  - advised to quit smoking  - continues to work on alcohol dependence, which is likely  contributing to tobacco dependence  - previously provided multiple smoking cessation resources  - start umeclidinium one inhalation daily  - continue budesonide-formoterol 80-4.5 mcg two inhalations two times a day; rinse/gargle/spit water after use  - nebulized ipratropium-albuterol or albuterol HFA as needed  - continue CPAP via nasal mask; DME is [facility]  - annual influenza vaccination; received for this season  - received Pneumovax-23 in January 2020; plan for Prevnar-13 at age 67 and booster of Pneumovax-23 in 2025  - she plans to receive COVID-19 vaccination when more widely available  - follow up in 6 months or sooner if needed  - encouraged her to call any time with questions or concerning symptoms    Vinay Paige MD  Pulmonary and Critical Care Medicine  [facility]  Cell [phone]  Office [phone]  Pager [phone]    CCx: tobacco dependence, bronchiectasis, MELVI, obesity, possible asthma    HPI: 65 year old female with a history of longstanding and active tobacco dependence, alcohol dependence, GI bleed, PTSD, MELVI, h/o alcohol withdrawal seizure, H pylori gastritis, obesity, depression, bronchiectasis, peripheral neuropathy, GERD, osteoarthritis, vitamin D deficiency, presenting for telephone follow-up. Hospitalized for one night in late December with GI bleed; has planned colonoscopy in February 2021. Occasional dyspnea; stable. Using nebulized ipratropium-albuterol once daily. Wants to try a LAMA; will send prescription for umeclidinium. Currently smoking 1 ppd, which is up from 0-0.5 ppd when I last spoke with her in July 2020, likely worsened by alcohol dependence. Did not start the low-dose nicotine patch because nicotine patches have given her nightmares in the past when used during hospitalizations. Was not able to take bupropion in the past due to being on duloxetine and naltrexone. Received influenza vaccine for this season. She plans to get COVID-19  vaccine when more widely available. Recall from prior visits that she has been hospitalized repeatedly for respiratory failure in the past, though in at least one case she had been drinking alcohol and aspirated food and mucus, requiring bronchoscopy for airway clearance. Baseline FEV1/FVC ratio is nonobstructive, normal TLC and DLCO, no significant bronchodilator response. Obesity likely playing a significant role along with deconditioning.    ROS:  A 12-system review was obtained and was negative with the exception of the symptoms endorsed in the history of present illness.    PMH:  Past Medical History:   Diagnosis Date     Alcohol withdrawal seizure without complication (H) 2016     Alcoholic cirrhosis (H)      Anxiety      Arthritis      Chronic alcohol dependence, continuous (H) 2018    inpatient 2019, sober since then     Chronic reflux esophagitis      Depression      Dermatitis      Ganglion     right foot     H. pylori infection      Melanoma (H) 2019    left upper arm     Menopause     age 52     Obesity (BMI 35.0-39.9 without comorbidity)      Seizure (H)     during alcohol withdrawal     Sleep apnea     mild, doesnt tolerate pap therapy       PSH:  Past Surgical History:   Procedure Laterality Date     APPENDECTOMY       SC APPENDECTOMY      Description: Appendectomy;  Recorded: 2008;  Comments: childhood     SC  DELIVERY ONLY      Description:  Section;  Recorded: 2008;     SC EXCIS TENDN/CAPSULE LESN,FOOT      Description: Excision Of Cyst Of Tendon Sheath Of Foot;  Proc Date: 10/28/2011;  Comments: [facility]     SC HEMORRHOIDECTOMY INTERNAL RUBBER BAND LIGATIONS      Description: Hemorrhoidectomy;  Recorded: 2008;     SC KNEE SCOPE,DIAGNOSTIC      Description: Arthroscopy Knee Right;  Proc Date: 10/11/2005;  Comments: for right knee patella subluxation with lateral retinacular release; subpatellar chondroplasty     SC LATERAL  RETINACULAR RELEASE OPEN      Description: Knee Lateral Retinacular Release;  Proc Date: 10/11/2005;     PA LIGATE FALLOPIAN TUBE      Description: Tubal Ligation;  Recorded: 09/01/2008;     SKIN BIOPSY Left 07/2019    left upper arm     TONSILLECTOMY         Allergies:  Allergies   Allergen Reactions     Wellbutrin [Bupropion Hcl] Diarrhea     Codeine Nausea Only     Hydrochlorothiazide Rash     phototoxicity - med was d/lora       Topamax [Topiramate]      Diclofenac Nausea Only     Tolerates the topical gel     Sulfa (Sulfonamide Antibiotics) Rash     Sulfasalazine Rash       Family HX:  Family History   Problem Relation Age of Onset     Heart disease Mother      Heart disease Father        Social Hx:  Social History     Socioeconomic History     Marital status: Single     Spouse name: Not on file     Number of children: Not on file     Years of education: Not on file     Highest education level: Not on file   Occupational History     Occupation: [employer]     Employer: [employer]     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[facility])   Social Needs     Financial resource strain: Not on file     Food insecurity     Worry: Not on file     Inability: Not on file     Transportation needs     Medical: Not on file     Non-medical: Not on file   Tobacco Use     Smoking status: Current Every Day Smoker     Packs/day: 0.50     Years: 49.00     Pack years: 24.50     Types: Cigarettes     Smokeless tobacco: Never Used     Tobacco comment: last 11/2019   Substance and Sexual Activity     Alcohol use: Not Currently     Comment: sober since 11/19/2019     Drug use: No     Sexual activity: Yes     Partners: Male     Birth control/protection: None   Lifestyle     Physical activity     Days per week: Not on file     Minutes per session: Not on file     Stress: Not on file   Relationships     Social connections     Talks on phone: Not on file     Gets together: Not on file     Attends Restorationist service: Not on file     Active  member of club or organization: Not on file     Attends meetings of clubs or organizations: Not on file     Relationship status: Not on file     Intimate partner violence     Fear of current or ex partner: Not on file     Emotionally abused: Not on file     Physically abused: Not on file     Forced sexual activity: Not on file   Other Topics Concern     Not on file   Social History Narrative     Not on file       Current Meds:  Current Outpatient Medications   Medication Sig Dispense Refill     albuterol (ACCUNEB) 0.63 mg/3 mL nebulizer solution Take 3 mL (0.63 mg total) by nebulization every 2 (two) hours as needed for wheezing. 10 vial 0     albuterol (VENTOLIN HFA) 90 mcg/actuation inhaler Inhale 2 puffs every 4 (four) hours as needed for wheezing. 18 g 3     ammonium lactate (AMLACTIN) 12 % cream Apply 2 application topically 2 (two) times a day as needed. Apply 1-3 grams to each foot twice daily as needed  11     baclofen (LIORESAL) 10 MG tablet Take 10 mg by mouth 3 (three) times a day as needed.       budesonide-formoterol (SYMBICORT) 80-4.5 mcg/actuation inhaler Inhale 2 puffs 2 (two) times a day. 1 Inhaler 1     bumetanide (BUMEX) 1 MG tablet Take 1 tablet (1 mg total) by mouth daily. 90 tablet 2     DULoxetine (CYMBALTA) 30 MG capsule Take 3 capsules (90 mg total) by mouth daily. 90 capsule 0     hydrOXYzine pamoate (VISTARIL) 25 MG capsule Take 1 capsule (25 mg total) by mouth 4 (four) times a day as needed for anxiety. 120 capsule 1     naltrexone (DEPADE) 50 mg tablet Take 100 mg by mouth daily.       omeprazole (PRILOSEC) 20 MG capsule Take 1 capsule (20 mg total) by mouth daily before breakfast. 90 capsule 3     polyethylene glycol (MIRALAX) 17 gram packet Take 1 packet (17 g total) by mouth daily. 30 packet 0     traZODone (DESYREL) 100 MG tablet Take 100 mg by mouth at bedtime.       cetirizine (ZYRTEC) 10 MG tablet Take 1 tablet (10 mg total) by mouth daily as needed for allergies. 30 tablet 5      diclofenac sodium (VOLTAREN) 1 % Gel Apply 2 g topically 3 (three) times a day as needed.       potassium chloride (KLOR-CON) 10 MEQ CR tablet Take 20 mEq by mouth daily.       No current facility-administered medications for this visit.        Physical Exam:  no exam due to virtual visit    Labs:  reviewed    Imaging studies:  CTA C/A/P (January 2020):  - images directly reviewed, formal interpretation follows:  FINDINGS:   CT ANGIOGRAM CHEST, ABDOMEN, AND PELVIS: No thoracoabdominal aortic aneurysm nor dissection. 4 vessels arise from the aortic arch without significant stenosis. No central pulmonary embolus. Mild coronary artery calcifications.     The celiac, SMA and ARIELLA are widely patent. Single right and 2 left renal arteries are widely patent. Mild aortoiliac atherosclerosis with no significant stenosis.     LUNGS AND PLEURA: Bilateral lower lobe tubular bronchiectasis. There is some peripheral endobronchial mucous plugging in the right lower lobe with some peripheral subsegmental atelectasis. No focal airspace consolidation. Trace right pleural effusion +/-   some mild pleural thickening.     MEDIASTINUM/AXILLAE: No adenopathy. No pericardial effusion.     HEPATOBILIARY: Diffuse hepatic steatosis. Faint density within the gallbladder lumen could represent sludge +/- stones. No bile duct dilatation.     PANCREAS: No significant mass, duct dilatation, or inflammatory change.     SPLEEN: Normal.     ADRENAL GLANDS: Normal.     KIDNEYS/BLADDER: No significant mass, stones, or hydronephrosis.     BOWEL: Diverticulosis in the colon. No acute inflammatory change. No obstruction.      LYMPH NODES: No lymphadenopathy.     PELVIC ORGANS: Myomatous uterus. Normal-appearing ovaries. No abnormal fluid collection.     OTHER: Tiny fat-containing paraumbilical and bilateral inguinal hernias.     MUSCULOSKELETAL: Degenerative changes lower lumbar spine. No suspicious osseous lesions.     IMPRESSION:   1.  CTA negative for  thoracoabdominal aortic aneurysm/dissection and pulmonary embolus. No abnormality to account for patient's symptoms.  2.  Bilateral lower lobe bronchiectasis with some peripheral endobronchial mucous plugging and subsegmental atelectasis in the right lower lobe. Trace right pleural effusion +/- pleural thickening.  3.  Diffuse hepatic steatosis.  4.  Faint density within the gallbladder lumen could represent gallbladder sludge +/- stones. No bile duct dilatation.  5.  Colonic diverticulosis.     CXR (February 2020):  - images directly reviewed, formal interpretation follows:  IMPRESSION:   Linear scarring or atelectasis at the right base is unchanged. Lungs are otherwise clear. Heart size and pulmonary vascularity are normal. No pneumothorax or pleural effusion. No bony fracture.     TTE (February 2020):  - Normal left ventricular size and wall thickness.  - Left ventricle ejection fraction is normal. The estimated left ventricular ejection fraction is 65%.  - Normal right ventricular size and systolic function.  - No hemodynamically significant valvular heart abnormalities.  - When compared to the previous study dated 9/5/2019, no significant change.     PFT's (November 2019):  FEV1/FVC is 0.77 and is normal.  FEV1 is 1.82 L (76% predicted) and is reduced.  FVC is 2.36 L (78% predicted) and reduced.  There was no improvement in spirometry after a single inhaled dose of bronchodilator.  TLC is 4.54 L (93% predicted) and is normal.  RV is 2.22 L (115% predicted) and is normal.  RV/TLC is 0.49 and is increased.  DLCO is 103% predicted and is normal when it is corrected for hemoglobin.

## 2021-06-14 NOTE — PROGRESS NOTES
"Patsy Santamaria is a 66 y.o. female who is being evaluated via a billable telephone visit.      What phone number would you like to be contacted at? [phone]  How would you like to obtain your AVS? AVS Preference: Mail a copy.    ASSESSMENT/PLAN:  1. Spinal stenosis in cervical region  Ambulatory referral to [facility]    predniSONE (DELTASONE) 20 MG tablet    DISCONTINUED: predniSONE (DELTASONE) 20 MG tablet    DISCONTINUED: oxyCODONE (ROXICODONE) 5 MG immediate release tablet    C5-6 stenosis - all on the right side   2. Cervical radiculopathy  Ambulatory referral to [facility]    predniSONE (DELTASONE) 20 MG tablet    DISCONTINUED: predniSONE (DELTASONE) 20 MG tablet    DISCONTINUED: oxyCODONE (ROXICODONE) 5 MG immediate release tablet       This is a 65 yo female with recent (last night) ER visit.     I have reviewed available ER records,  notes, as well as imaging and lab results.  In addition I have reviewed the medication list and made any adjustments as indicated in orders.  1.  Cervical Spinal stenosis with radiculopathy - patient continues to have significant pain - was convinced by ER staff that this was related to the narrowing in her spine.  Will refer to [facility]; will refill pain meds for minimal amounts; will add Prednisone for anti-inflammatory effect.    No follow-ups on file.      Medications Discontinued During This Encounter   Medication Reason     predniSONE (DELTASONE) 20 MG tablet Reorder     There are no Patient Instructions on file for this visit.    Chief Complaint:  Chief Complaint   Patient presents with     shoulder and neck pain     motrin&ibuprofen give last night at ER but not working PAIN WAS AT 12 WHEN AT ED NOW IT IS AT 9       HPI:   Patsy Santamaria is a 66 y.o. female c/o  Went to [place] - did a CT scan - thinks there is narrowing of the spine and pinched a nerve in there  Pain is at \"9.5 today, was 12 yesterday\" - \"it was off the charts with pain\"  Gave her Tylenol, " "Ibuprofen, muscle relaxer -   \"they didn't do anything for me\"  Hurts to lift it up;     Pain started 3-4 days ago -   The week before, had headaches in the back of her head  Then last week - didn't do anything to cause it;    Did have a disc problem in the early nineties - got an injection in a disc  Feels like someone is scraping the inside of her neck/shoulder - just pulling on it     Right hand / fingers - move normally - normal strength  If puts arm up above head - \"can't\", because of pain/pulling    Denies any injury - didn't lift anything, didn't fall  Neck and shoulder hurt - just above where your head is  - goes     PMH:   Patient Active Problem List    Diagnosis Date Noted     GI bleed 12/28/2020     Homeless 12/28/2020     Vitamin B12 deficiency (non anemic) 10/13/2020     Abdominal pain, epigastric      History of major depression 01/23/2020     Biliary colic      COPD exacerbation (H) 01/22/2020     Airway obstruction due to foreign body, initial encounter      Pneumonia of right lower lobe due to infectious organism 12/14/2019     Severe alcohol use disorder (H) 11/21/2019     Chronic obstructive pulmonary disease, unspecified COPD type (H) 10/30/2019     Dyspnea on exertion      Anxiety 03/25/2019     Hypomagnesemia 03/25/2019     Primary osteoarthritis of left knee 07/10/2018     Seasonal allergies 07/09/2018     Mild episode of recurrent major depressive disorder (H)      Alcoholic hepatitis      Peripheral edema      Diuretic-induced hypokalemia      Alcohol use disorder, severe, dependence (H) 05/07/2018     Primary osteoarthritis of right knee 03/21/2018     Chronic alcohol dependence, continuous (H) 03/16/2018     Low backache 03/16/2018     Morbid obesity (H) 01/05/2018     Bilateral edema of lower extremity 06/28/2017     Snoring 06/28/2017     Carpal tunnel syndrome on both sides 03/06/2017     Trochanteric bursitis of left hip 10/25/2016     Hypoxia 05/13/2016     Alcohol abuse 05/13/2016 "     Elevated LFTs 2016     Claustrophobia 2015     Cervical disc disease 2015     Skin lesion 10/20/2015     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) 2015     PTSD (post-traumatic stress disorder) 2015     Chronic Reflux Esophagitis      Peripheral Neuropathy      Localized Primary Osteoarthritis Of The Carpometacarpal Joint      Ganglion Of The Right Foot      Major depression, recurrent (H)      Nicotine Dependence      Vitamin D deficiency      Past Medical History:   Diagnosis Date     Alcohol withdrawal seizure without complication (H) 2016     Alcoholic cirrhosis (H)      Anxiety      Arthritis      Chronic alcohol dependence, continuous (H) 2018    inpatient 2019, sober since then     Chronic reflux esophagitis      Depression      Dermatitis      Ganglion     right foot     H. pylori infection      Melanoma (H) 2019    left upper arm     Menopause     age 52     Obesity (BMI 35.0-39.9 without comorbidity)      Seizure (H)     during alcohol withdrawal     Sleep apnea     mild, doesnt tolerate pap therapy     Past Surgical History:   Procedure Laterality Date     APPENDECTOMY       AK APPENDECTOMY      Description: Appendectomy;  Recorded: 2008;  Comments: childhood     AK  DELIVERY ONLY      Description:  Section;  Recorded: 2008;     AK EXCIS TENDN/CAPSULE LESN,FOOT      Description: Excision Of Cyst Of Tendon Sheath Of Foot;  Proc Date: 10/28/2011;  Comments: [facility]     AK HEMORRHOIDECTOMY INTERNAL RUBBER BAND LIGATIONS      Description: Hemorrhoidectomy;  Recorded: 2008;     AK KNEE SCOPE,DIAGNOSTIC      Description: Arthroscopy Knee Right;  Proc Date: 10/11/2005;  Comments: for right knee patella subluxation with lateral retinacular release; subpatellar chondroplasty     AK LATERAL RETINACULAR RELEASE OPEN      Description: Knee Lateral Retinacular Release;  Proc Date: 10/11/2005;      MI LIGATE FALLOPIAN TUBE      Description: Tubal Ligation;  Recorded: 09/01/2008;     SKIN BIOPSY Left 07/2019    left upper arm     TONSILLECTOMY       Social History     Socioeconomic History     Marital status: Single     Spouse name: Not on file     Number of children: Not on file     Years of education: Not on file     Highest education level: Not on file   Occupational History     Occupation: lauren     Employer: [employer]     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[facility])   Social Needs     Financial resource strain: Not on file     Food insecurity     Worry: Not on file     Inability: Not on file     Transportation needs     Medical: Not on file     Non-medical: Not on file   Tobacco Use     Smoking status: Current Every Day Smoker     Packs/day: 0.50     Years: 49.00     Pack years: 24.50     Types: Cigarettes     Smokeless tobacco: Never Used     Tobacco comment: last 11/2019   Substance and Sexual Activity     Alcohol use: Not Currently     Comment: sober since 11/19/2019     Drug use: No     Sexual activity: Yes     Partners: Male     Birth control/protection: None   Lifestyle     Physical activity     Days per week: Not on file     Minutes per session: Not on file     Stress: Not on file   Relationships     Social connections     Talks on phone: Not on file     Gets together: Not on file     Attends Hoahaoism service: Not on file     Active member of club or organization: Not on file     Attends meetings of clubs or organizations: Not on file     Relationship status: Not on file     Intimate partner violence     Fear of current or ex partner: Not on file     Emotionally abused: Not on file     Physically abused: Not on file     Forced sexual activity: Not on file   Other Topics Concern     Not on file   Social History Narrative     Not on file     Family History   Problem Relation Age of Onset     Heart disease Mother      Heart disease Father        Meds:    Current Outpatient Medications:       albuterol (ACCUNEB) 0.63 mg/3 mL nebulizer solution, Take 3 mL (0.63 mg total) by nebulization every 2 (two) hours as needed for wheezing., Disp: 10 vial, Rfl: 0     albuterol (VENTOLIN HFA) 90 mcg/actuation inhaler, Inhale 2 puffs every 4 (four) hours as needed for wheezing., Disp: 18 g, Rfl: 3     ammonium lactate (AMLACTIN) 12 % cream, Apply 2 application topically 2 (two) times a day as needed. Apply 1-3 grams to each foot twice daily as needed, Disp: , Rfl: 11     baclofen (LIORESAL) 10 MG tablet, Take 10 mg by mouth 3 (three) times a day as needed., Disp: , Rfl:      budesonide-formoterol (SYMBICORT) 80-4.5 mcg/actuation inhaler, Inhale 2 puffs 2 (two) times a day., Disp: 1 Inhaler, Rfl: 1     bumetanide (BUMEX) 1 MG tablet, Take 1 tablet (1 mg total) by mouth daily., Disp: 90 tablet, Rfl: 2     cetirizine (ZYRTEC) 10 MG tablet, Take 1 tablet (10 mg total) by mouth daily as needed for allergies., Disp: 30 tablet, Rfl: 5     diclofenac sodium (VOLTAREN) 1 % Gel, Apply 2 g topically 3 (three) times a day as needed., Disp: , Rfl:      DULoxetine (CYMBALTA) 30 MG capsule, Take 3 capsules (90 mg total) by mouth daily. (Patient taking differently: Take 90 mg by mouth 2 (two) times a day. ), Disp: 90 capsule, Rfl: 0     gabapentin (NEURONTIN) 300 MG capsule, , Disp: , Rfl:      hydrOXYzine pamoate (VISTARIL) 25 MG capsule, Take 1 capsule (25 mg total) by mouth 4 (four) times a day as needed for anxiety., Disp: 120 capsule, Rfl: 1     ibuprofen (ADVIL,MOTRIN) 600 MG tablet, Take 1 tablet (600 mg total) by mouth every 6 (six) hours as needed for pain., Disp: 30 tablet, Rfl: 0     naltrexone (DEPADE) 50 mg tablet, Take 100 mg by mouth daily., Disp: , Rfl:      nicotine polacrilex (NICORETTE) 4 MG gum, Chew one piece of gum every 30 minutes as needed for nicotine craving., Disp: 170 each, Rfl: 11     omeprazole (PRILOSEC) 20 MG capsule, Take 1 capsule (20 mg total) by mouth daily before breakfast., Disp: 90 capsule,  Rfl: 3     potassium chloride (KLOR-CON) 10 MEQ CR tablet, Take 20 mEq by mouth daily., Disp: , Rfl:      prazosin (MINIPRESS) 1 MG capsule, Take 1 mg by mouth at bedtime., Disp: , Rfl:      traZODone (DESYREL) 100 MG tablet, Take 100 mg by mouth at bedtime., Disp: , Rfl:      umeclidinium (INCRUSE ELLIPTA) 62.5 mcg/actuation DsDv inhaler, Inhale 1 puff daily., Disp: 30 each, Rfl: 11     ciclopirox (PENLAC) 8 % solution, Apply topically daily. Apply daily to toenals, Disp: 1 Bottle, Rfl: 6     cyclobenzaprine (FLEXERIL) 10 MG tablet, Take 1 tablet (10 mg total) by mouth 2 (two) times a day as needed for muscle spasms., Disp: 20 tablet, Rfl: 0     oxyCODONE (ROXICODONE) 5 MG immediate release tablet, Take 1 tablet (5 mg total) by mouth every 6 (six) hours as needed for pain., Disp: 8 tablet, Rfl: 0     predniSONE (DELTASONE) 20 MG tablet, Take 40 mg by mouth daily for 5 days., Disp: 10 tablet, Rfl: 0    Allergies:  Allergies   Allergen Reactions     Wellbutrin [Bupropion Hcl] Diarrhea     Codeine Nausea Only     Hydrochlorothiazide Rash     phototoxicity - med was d/lora       Topamax [Topiramate]      Diclofenac Nausea Only     Tolerates the topical gel     Sulfa (Sulfonamide Antibiotics) Rash     Sulfasalazine Rash       ROS:  Pertinent positives as noted in HPI; otherwise 12 point ROS negative.      Physical Exam:  EXAM:  LMP 03/06/2002      This is a telephone visit      Results:  Results for orders placed or performed in visit on 01/18/21   Hemoglobin   Result Value Ref Range    Hemoglobin 15.5 12.0 - 16.0 g/dL   Basic Metabolic Panel   Result Value Ref Range    Sodium 142 136 - 145 mmol/L    Potassium 3.6 3.5 - 5.0 mmol/L    Chloride 99 98 - 107 mmol/L    CO2 26 22 - 31 mmol/L    Anion Gap, Calculation 17 5 - 18 mmol/L    Glucose 117 70 - 125 mg/dL    Calcium 9.3 8.5 - 10.5 mg/dL    BUN 9 8 - 22 mg/dL    Creatinine 0.65 0.60 - 1.10 mg/dL    GFR MDRD Af Amer >60 >60 mL/min/1.73m2    GFR MDRD Non Af Amer >60 >60  mL/min/1.73m2       Phone call duration: 13 minutes  1:35 pm - 1:48 pm

## 2021-06-14 NOTE — TELEPHONE ENCOUNTER
Spoke with Dr. Wade and per Dr. Wade if patient is in severe pain she should go to the ER for further evualation or schedule an appointment to be seen tomorrow. Patient stated that she is going to the ER tonight because she can't take the pain anymore.

## 2021-06-14 NOTE — PROGRESS NOTES
Dear Dr. Yanique Cali MD  [address] [place] [id number],    Thank you for the opportunity to participate in the care of Patsy Santamaria.     She is a 62 y.o. y/o female patient who comes to the sleep medicine clinic for follow up.  This is the patient's first clinical visit since starting CPAP therapy.  She states that on the days that she was able to use the machine, she did feel a little bit better compared to before.  However she untangled her mask straps and did know how to put it back on and therefore stopped using it.  She is in clinic today to ask for help to reassemble her straps.    Compliance Download data for 30 days:  Pressure settin-16 CWP  Residual AHI: 1.9 events per hour  Leak: Minimal  Compliance: 10%  Mask Tolerance: Good  Skin irritation: None      Past Medical History:   Diagnosis Date     Acute solar dermatitis      Anxiety      Chronic reflux esophagitis      Dermatitis      Ganglion     right foot     H. pylori infection      Menopause     age 52       Past Surgical History:   Procedure Laterality Date     CA APPENDECTOMY      Description: Appendectomy;  Recorded: 2008;  Comments: childhood     CA  DELIVERY ONLY      Description:  Section;  Recorded: 2008;     CA EXCIS TENDN/CAPSULE LESN,FOOT      Description: Excision Of Cyst Of Tendon Sheath Of Foot;  Proc Date: 10/28/2011;  Comments: [facility]     CA HEMORRHOIDECTOMY INTERNAL RUBBER BAND LIGATIONS      Description: Hemorrhoidectomy;  Recorded: 2008;     CA KNEE SCOPE,DIAGNOSTIC      Description: Arthroscopy Knee Right;  Proc Date: 10/11/2005;  Comments: for right knee patella subluxation with lateral retinacular release; subpatellar chondroplasty     CA LATERAL RETINACULAR RELEASE OPEN      Description: Knee Lateral Retinacular Release;  Proc Date: 10/11/2005;     CA LIGATE FALLOPIAN TUBE      Description: Tubal Ligation;  Recorded: 2008;       Social History  "    Social History     Marital status: Single     Spouse name: N/A     Number of children: N/A     Years of education: N/A     Occupational History     [employer]     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[facility])     Social History Main Topics     Smoking status: Current Every Day Smoker     Packs/day: 1.00     Types: Cigarettes     Smokeless tobacco: Never Used      Comment: 1 pack per day     Alcohol use No      Comment: sober since 7/29/16     Drug use: No     Sexual activity: Not on file     Other Topics Concern     Not on file     Social History Narrative       Current Outpatient Prescriptions   Medication Sig Dispense Refill     DULoxetine (CYMBALTA) 60 MG capsule TAKE ONE CAPSULE BY MOUTH ONE TIME DAILY  30 capsule 1     furosemide (LASIX) 40 MG tablet Take 1 tablet (40 mg total) by mouth 2 (two) times a day. 60 tablet 3     HYDROPHOR 42 % Oint ointment   1     ipratropium-albuterol (DUO-NEB) 0.5-2.5 mg/3 mL nebulizer Take 3 mL by nebulization every 6 (six) hours as needed (wheezing, short of breath). 90 mL 1     lidocaine HCl 4 % Crea Apply 1 application topically 2 (two) times a day as needed (pain). 120 mL 0     min oil-petrolat (AQUAPHOR) ointment Apply topically to dry skin TID as needed 396 g 1     omeprazole (PRILOSEC) 20 MG capsule TAKE ONE CAPSULE BY MOUTH ONE TIME DAILY  90 capsule 2     potassium chloride (KLOR-CON) 10 MEQ CR tablet TAKE ONE TABLET BY MOUTH ONE TIME DAILY 30 tablet 2     No current facility-administered medications for this visit.        Allergies   Allergen Reactions     Codeine Nausea Only     Hydrochlorothiazide Rash     phototoxicity - med was d/lora       Sulfa (Sulfonamide Antibiotics)      Diclofenac Rash     Sulfasalazine Rash       Physical Exam:  /68  Pulse 69  Ht 5' 3.75\" (1.619 m)  Wt (!) 233 lb (105.7 kg)  LMP 03/06/2002  SpO2 98%  BMI 40.31 kg/m2  BMI:Body mass index is 40.31 kg/(m^2).   GEN: NAD, obese  Psych: normal mood, normal " affect    Labs/Studies:     I reviewed the efficacy and compliance report from his device. Data summarized on the HPI and the CPAP compliance flow sheet.     Lab Results   Component Value Date    WBC 7.3 09/06/2017    HGB 15.1 09/06/2017    HCT 47.0 09/06/2017    MCV 92 09/06/2017     09/06/2017         Chemistry        Component Value Date/Time     09/06/2017 1115    K 4.4 09/06/2017 1115     09/06/2017 1115    CO2 27 09/06/2017 1115    BUN 9 09/06/2017 1115    CREATININE 0.67 09/06/2017 1115    GLU 96 09/06/2017 1115        Component Value Date/Time    CALCIUM 9.8 09/06/2017 1115    ALKPHOS 104 09/06/2017 1115    AST 12 09/06/2017 1115    ALT 12 09/06/2017 1115    BILITOT 0.5 09/06/2017 1115            No results found for: FERRITIN         Assessment and Plan:  In summary Patsy Santamaria is a 62 y.o. year old female who is here for compliance download review.    1.  Obstructive sleep apnea on CPAP  I will narrow the patient's CPAP pressure range to 6-10 CWP.  I asked my durable medical  to help me put the mask back together and we educated the patient how to properly maintenance her equipment.  2.  Hypersomnia  3.  Other sleep disturbance     Patient verbalized understanding of these issues, agrees with the plan and all questions were answered today. Patient was given an opportuntity to voice any other symptoms or concerns not listed above. Patient did not have any other symptoms or concerns.      Patient told to return in 6 weeks months. Patient instructed to stop at  to schedule appointment before leaving today.    Sarmad Brown DO  Board Certified in Internal Medicine and Sleep Medicine  [facility].    I spent a total of 15 minutes of face-to-face encounter and more than 50% of the encounter was used for counseling or coordination of care.    (Note created with Dragon voice recognition and unintended spelling errors and word substitutions  may occur)

## 2021-06-14 NOTE — TELEPHONE ENCOUNTER
RN called COREY Slade at [facility] prior to them closing and informed her of message that will need to be addressed before clinic closes. TC will inform PCP right now. RN to follow up if message not addressed.     Soledad Copeland RN, BSN Nurse Triage Advisor 4:50 PM 2/1/2021

## 2021-06-14 NOTE — PROGRESS NOTES
DME Provider: Replaced by [facility]  Date Faxed: 1/11/21  Ordering Provider: Dr. Paige  Equipment ordered: CPAP supplies

## 2021-06-14 NOTE — TELEPHONE ENCOUNTER
Reason for call:  Patient reporting a symptom    Symptom or request: terrible pain right shoulder and neck    Duration (how long have symptoms been present): 3 days    Have you been treated for this before? No    Additional comments: n/a    Phone Number patient can be reached at:  Home number on file [phone] (home)    Best Time:  asap    Can we leave a detailed message on this number: Yes    Call taken on 2/1/2021 at 8:29 AM by Shereen Hidalgo

## 2021-06-15 NOTE — PROGRESS NOTES
Outpatient Mental Health Treatment Plan    Name:  Patsy Santamaria  :  1955  MRN:  [id number]    Treatment Plan:  Updated Treatment Plan  Intake/initial treatment plan date:    Intake: 3/23/2017  Initial treatment plan date: 2017  Benefit and risks and alternatives have been discussed: Yes  Is this treatment appropriate with minimal intrusion/restrictions: Yes  Estimated duration of treatment:  Approximately 5 sessions.  Anticipated frequency of services:  Every 2-3 weeks  Necessity for frequency: This frequency is needed to establish therapeutic goals and for continuity of care in order to monitor progress.  Necessity for treatment: To address cognitive, behavioral, and/or emotional barriers in order to work toward goals and to improve quality of life.    Session Type: Patient is presenting for an Individual session.via telephone due to COVID19    Plan:           ?   ? Anxiety    Goal:  Decrease average anxiety level from 3 to 2.   Strategies: ? [x]Learn and practice relaxation techniques and other coping strategies (e.g., thought stopping, reframing, meditation)     ? [x] Increase involvement in meaningful activities     ? [x] Discuss sleep hygiene     ? [x] Explore thoughts and expectations about self and others     ? [x] Identify and monitor triggers for panic/anxiety symptoms     ? [x] Implement physical activity routine (with physician approval)     ? [x] Consider introduction of bibliotherapy and/or videos     ? [x] Continue compliance with medical treatment plan (or explore barriers)   ?Degree to which this is a problem: 3  Degree to which goal is met: 1  Date of Review: 2021       ? Depression    Goal:  Decrease average depression level from 3 to 2.   Strategies:    ?[x] Decrease social isolation     [x] Increase involvement in meaningful activities     ?[x] Discuss sleep hygiene     ?[x] Explore thoughts and expectations about self and others     ?[x] Process grief (loss of significant  person, independence, role, etc.)     ?[x] Assess for suicide risk     ?[x] Implement physical activity routine (with physician approval)     [x] Consider introduction of bibliotherapy and/or videos     [x] Continue compliance with medical treatment plan (or explore barriers) ?  Degree to which this is a problem: 3  Degree to which goal is met: 1  Date of Review: June 2021    Substance use  Goal:  Achieve to sobriety from alcohol    Strategies: ? [x] Participate in outpatient chemical dependency program      ? [x] Discuss barriers to participating in AA or other peer-facilitated groups         [x] Address environmental factors which may interfere with sobriety     ? [x] Explore short-term versus long-term consequences of use     ? [x] Continue compliance with medical treatment plan (or explore barriers)  Degree to which this is a problem: 4  Degree to which goal is met: 1  Date of Review: June 2021       Functional Impairment:  1=Not at all/Rarely  2=Some days  3=Most Days  4=Every Day    Personal : 4  Family : 2  Social : 3   Work/school : 4    Diagnosis:  (EXAMPLE of DSM V: Major depressive disorder, recurrent, moderate; Generalized Anxiety disorder; borderline personality per patient PHI; fibromyalgia, History of breast cancer in remission; Problem with primary relationship.)   1. PTSD (post-traumatic stress disorder)    2. Alcohol use disorder, severe, dependence (H)    3. Mild episode of recurrent major depressive disorder (H)        Clinical assessments and measures completed:.   WHODAS 2.0 12-item version 17   Scores presented in qualifiers to represent level of disability.  MILD Problem (slight, low, ...) 5-24%  H1= 7  H2= 10  H3= 10     PHQ-9 = 4 (2/19/2019)  PHQ-9 = 25 (6/20/2019)  PHQ-9 = 7 (10/15/2019)  PHQ-9 = 6 (5/18/2020)   PHQ- 9 = 11 (3/16/2021)    ALISIA-7 = 7 (2/19/2019)  ALISIA-7 = 19 (6/20/2019)  ALISIA-7 = 18 (9/25/2019)  ALISIA-7 = 8  (5/18/2020)  ALISIA-7 = 7 (3/16/2021)     Strengths:  The patient is  "resourceful and articulate. She is a good advocate for her needs. She is very independent.   Limitations:  The patient often feels uncomfortable talking about her feelings and past problems. There are some barriers to obtaining employment. Patient is often isolated.  Cultural Considerations: The patient is a 64 year old  female with a history of complex trauma. She was born in [place]. [place] in [place] before relocating [place] \"the [place] city\" of [place] around age 10. She noted the difficult transition and shared that moving from a small town to a big city was a \"culture shock.\" Patient became pregnant at age 16 and has lived on her own ever since.    Persons responsible for this plan: Patient and Provider  Pt not able to sign due to virtual visit          Psychotherapist Signature           Patient Signature:              Guardian Signature             Provider: Performed and documented by JULIO Mendoza   Date:  3/16/2021      "

## 2021-06-15 NOTE — PROGRESS NOTES
Patient here today for follow up of medication management. States relapsed on Friday with alcohol, states about 8 glasses of wine and lonely that day. States relapse was due to the  of her son's father. States depression 4/5 denies SI/HI, anxiety 3/5. States sleeps about 5 hours a night with trouble staying asleep, see a sleep dr. States she feels as the medication has not been as effective.

## 2021-06-15 NOTE — PROGRESS NOTES
Neurosurgery consultation was requested by: Dr. Brandon Martinez   Pain: Neck pain and back pain   Radicular Pain is present: Neck pain radiates into right shoulder and upper arm. Back pain radiates into both hips   Lhermitte sign: no   Motor complaints: Weakness in right hand   Sensory complaints: Numbness on and off in both arms, right arm worse   Gait and balance issues:No   Bowel or bladder issues: Denies   Duration of SX is: Neck pain for about a month and back pain for 2-3 weeks   The symptoms are worse with: Lifting and certain laying positions   The symptoms are better with: none   Injury: denies   Severity is: Acute   Patient has tried the following conservative measures: None   JSlázaro,CMA

## 2021-06-15 NOTE — PROGRESS NOTES
FOOT AND ANKLE SURGERY/PODIATRY Progress Note        ASSESSMENT:   Onychomycosis      TREATMENT:  -There are dystrophic changes to bilateral hallux nails. I discussed topical and oral anti-fungal medication today including possible elevated LFT's with oral medication. She would like to avoid oral medication.    -Rx Penlac. She will use medication as directed and follow-up with me as concerns develop.     Jigar Doty DPM  [facility] Podiatry/Foot & Ankle Surgery      HPI: Patsy Santamaria was seen today for concerns related to nail discoloration on both great toenails. She denies pain and states that the distal aspect of the right hallux nail fell off recently. Lisa trauma.     Past Medical History:   Diagnosis Date     Alcohol withdrawal seizure without complication (H) 2016     Alcoholic cirrhosis (H)      Anxiety      Arthritis      Chronic alcohol dependence, continuous (H) 2018    inpatient 2019, sober since then     Chronic reflux esophagitis      Depression      Dermatitis      Ganglion     right foot     H. pylori infection      Melanoma (H) 2019    left upper arm     Menopause     age 52     Obesity (BMI 35.0-39.9 without comorbidity)      Seizure (H)     during alcohol withdrawal     Sleep apnea     mild, doesnt tolerate pap therapy       Past Surgical History:   Procedure Laterality Date     APPENDECTOMY       NH APPENDECTOMY      Description: Appendectomy;  Recorded: 2008;  Comments: childhood     NH  DELIVERY ONLY      Description:  Section;  Recorded: 2008;     NH EXCIS TENDN/CAPSULE LESN,FOOT      Description: Excision Of Cyst Of Tendon Sheath Of Foot;  Proc Date: 10/28/2011;  Comments: [facility]     NH HEMORRHOIDECTOMY INTERNAL RUBBER BAND LIGATIONS      Description: Hemorrhoidectomy;  Recorded: 2008;     NH KNEE SCOPE,DIAGNOSTIC      Description: Arthroscopy Knee Right;  Proc Date: 10/11/2005;  Comments: for right knee  patella subluxation with lateral retinacular release; subpatellar chondroplasty     MO LATERAL RETINACULAR RELEASE OPEN      Description: Knee Lateral Retinacular Release;  Proc Date: 10/11/2005;     MO LIGATE FALLOPIAN TUBE      Description: Tubal Ligation;  Recorded: 09/01/2008;     SKIN BIOPSY Left 07/2019    left upper arm     TONSILLECTOMY         Allergies   Allergen Reactions     Wellbutrin [Bupropion Hcl] Diarrhea     Codeine Nausea Only     Hydrochlorothiazide Rash     phototoxicity - med was d/lora       Topamax [Topiramate]      Diclofenac Nausea Only     Tolerates the topical gel     Sulfa (Sulfonamide Antibiotics) Rash     Sulfasalazine Rash         Current Outpatient Medications:      albuterol (ACCUNEB) 0.63 mg/3 mL nebulizer solution, Take 3 mL (0.63 mg total) by nebulization every 2 (two) hours as needed for wheezing., Disp: 10 vial, Rfl: 0     albuterol (VENTOLIN HFA) 90 mcg/actuation inhaler, Inhale 2 puffs every 4 (four) hours as needed for wheezing., Disp: 18 g, Rfl: 3     ammonium lactate (AMLACTIN) 12 % cream, Apply 2 application topically 2 (two) times a day as needed. Apply 1-3 grams to each foot twice daily as needed, Disp: , Rfl: 11     baclofen (LIORESAL) 10 MG tablet, Take 10 mg by mouth 3 (three) times a day as needed., Disp: , Rfl:      budesonide-formoterol (SYMBICORT) 80-4.5 mcg/actuation inhaler, Inhale 2 puffs 2 (two) times a day., Disp: 1 Inhaler, Rfl: 1     bumetanide (BUMEX) 1 MG tablet, Take 1 tablet (1 mg total) by mouth daily., Disp: 90 tablet, Rfl: 2     cetirizine (ZYRTEC) 10 MG tablet, Take 1 tablet (10 mg total) by mouth daily as needed for allergies., Disp: 30 tablet, Rfl: 5     cyclobenzaprine (FLEXERIL) 10 MG tablet, Take 1 tablet (10 mg total) by mouth 2 (two) times a day as needed for muscle spasms., Disp: 20 tablet, Rfl: 0     diclofenac sodium (VOLTAREN) 1 % Gel, Apply 2 g topically 3 (three) times a day as needed., Disp: , Rfl:      DULoxetine (CYMBALTA) 30 MG  capsule, Take 3 capsules (90 mg total) by mouth daily. (Patient taking differently: Take 90 mg by mouth 2 (two) times a day. ), Disp: 90 capsule, Rfl: 0     gabapentin (NEURONTIN) 300 MG capsule, , Disp: , Rfl:      hydrOXYzine pamoate (VISTARIL) 25 MG capsule, Take 1 capsule (25 mg total) by mouth 4 (four) times a day as needed for anxiety., Disp: 120 capsule, Rfl: 1     ibuprofen (ADVIL,MOTRIN) 600 MG tablet, Take 1 tablet (600 mg total) by mouth every 6 (six) hours as needed for pain., Disp: 30 tablet, Rfl: 0     naltrexone (DEPADE) 50 mg tablet, Take 100 mg by mouth daily., Disp: , Rfl:      nicotine polacrilex (NICORETTE) 4 MG gum, Chew one piece of gum every 30 minutes as needed for nicotine craving., Disp: 170 each, Rfl: 11     omeprazole (PRILOSEC) 20 MG capsule, Take 1 capsule (20 mg total) by mouth daily before breakfast., Disp: 90 capsule, Rfl: 3     oxyCODONE (ROXICODONE) 5 MG immediate release tablet, Take 1 tablet (5 mg total) by mouth every 6 (six) hours as needed for pain., Disp: 8 tablet, Rfl: 0     potassium chloride (KLOR-CON) 10 MEQ CR tablet, Take 20 mEq by mouth daily., Disp: , Rfl:      prazosin (MINIPRESS) 1 MG capsule, Take 1 mg by mouth at bedtime., Disp: , Rfl:      predniSONE (DELTASONE) 20 MG tablet, Take 40 mg by mouth daily for 5 days., Disp: 10 tablet, Rfl: 0     traZODone (DESYREL) 100 MG tablet, Take 100 mg by mouth at bedtime., Disp: , Rfl:      umeclidinium (INCRUSE ELLIPTA) 62.5 mcg/actuation DsDv inhaler, Inhale 1 puff daily., Disp: 30 each, Rfl: 11     ciclopirox (PENLAC) 8 % solution, Apply topically daily. 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[facility])   Social Needs     Financial resource strain: Not on file     Food insecurity     Worry: Not on file     Inability: Not on file     Transportation needs     Medical: Not on file     Non-medical: Not on file   Tobacco Use     Smoking status: Current Every Day Smoker     Packs/day: 0.50     Years: 49.00     Pack years: 24.50     Types: Cigarettes     Smokeless tobacco: Never Used     Tobacco comment: last 11/2019   Substance and Sexual Activity     Alcohol use: Not Currently     Comment: sober since 11/19/2019     Drug use: No     Sexual activity: Yes     Partners: Male     Birth control/protection: None   Lifestyle     Physical activity     Days per week: Not on file     Minutes per session: Not on file     Stress: Not on file   Relationships     Social connections     Talks on phone: Not on file     Gets together: Not on file     Attends Yazdanism service: Not on file     Active member of club or organization: Not on file     Attends meetings of clubs or organizations: Not on file     Relationship status: Not on file     Intimate partner violence     Fear of current or ex partner: Not on file     Emotionally abused: Not on file     Physically abused: Not on file     Forced sexual activity: Not on file   Other Topics Concern     Not on file   Social History Narrative     Not on file       10 point Review of Systems is negative except for nail fungus which is noted in HPI.       Vitals:    02/05/21 1254   BP: 128/80   Pulse: 76   Resp: 20   Temp: 97.4  F (36.3  C)       BMI= There is no height or weight on file to calculate BMI.    OBJECTIVE:  General appearance: Patient is alert and fully cooperative with history & exam.  No sign of distress is noted during the visit.  Vascular: Dorsalis pedis and posterior tibial pulses are palpable. There is pedal hair growth bilateral.  CFT < 3 sec from  anterior tibial surface to distal digits bilateral. There is no appreciable edema noted.  Dermatologic: Dystrophic nails bilateral hallux. No erythema bilateral.   Neurologic: All epicritic and proprioceptive sensations are grossly intact bilateral.  Musculoskeletal: Contracted digits bilateral.     Imaging:     Xr Chest 2 Views    Result Date: 1/18/2021  EXAM: XR CHEST 2 VIEWS LOCATION: [facility] DATE/TIME: 1/18/2021 12:37 PM INDICATION: Encounter for other preprocedural examination COMPARISON: PA and lateral views of the chest 02/15/2020     Diminished thickness of a band of subsegmental atelectasis in the lateral right base. Unchanged bands of atelectasis in the left base. No new airspace opacity or interstitial thickening. No peribronchial fluid cuffs. No pleural fluid or pneumothorax. Cardiac silhouette is normal in size. Mediastinal borders and hilar contours are normal.    Ct Cervical Spine Without Contrast    Result Date: 2/1/2021  EXAM: C5-C6 severe central canal stenosis. LOCATION: [facility] DATE/TIME: 2/1/2021 10:25 PM INDICATION: cervical midline pain with pain in the right cervical paraspinal muscles COMPARISON: None. TECHNIQUE: Routine CT Cervical Spine without IV contrast. Multiplanar reformats. Dose reduction techniques were used. FINDINGS: VERTEBRA: Normal vertebral body heights. No fracture or posttraumatic subluxation. CANAL/FORAMINA: Multilevel spondylosis results in various levels and degrees of central canal stenosis and neuroforaminal stenosis. C5-C6 severe central canal stenosis. C4-C5 moderate to severe central canal stenosis. C2-C3 and C3-C4 mild grade 1 anterolisthesis likely degenerative. PARASPINAL: Bilateral extracranial carotid arteries demonstrate a retropharyngeal medially deviated course impressing upon the aerodigestive airway, which thickens the prevertebral soft tissues limiting assessment.     1.  No acute fracture. 2.   Multilevel spondylosis described above. 3.  C5-C6 severe central canal stenosis.

## 2021-06-15 NOTE — TELEPHONE ENCOUNTER
I reviewed the patient's MRI from [facility].  The report is not yet available but she does appear to have severe spinal stenosis at C4-5 and C5-6 with potential for spinal cord signal abnormality at C5-6.    MRI of the lumbar spine show some degenerative changes most significant L5-S1 and also appears to be stenosis at the L4-S1 levels.    Given the significant/severe stenosis in the cervical spine I would recommend surgical referral.  Orders placed.    With regards to the medication, she in need of more pain medication?  I can provide a few tablets of Vicodin as she did not tolerate Percocet.  Please have her return the Percocet to the pharmacy/bring to the pharmacy to their pill disposal bin.   checked and appropriate with the last prescription from me on February 9 for oxycodone/acetaminophen.     Please also confirm that she is taking gabapentin and see how she is tolerating that medication.
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
## 2021-06-15 NOTE — TELEPHONE ENCOUNTER
"PSP:  Brandon Martinez DO  Last clinic visit:  2/9/21 new consult  Reason for call: Different pain medication due to violent vomiting with Percocet prescribed at consult. Also reports she had her MRI done today. \"Do I wait for the doctor to call me?\"   Clinical information:  Reports she has been able to take Vicodin in past without issue. Has itching with Tylenol #3.   Advice given to patient: Explained results process to patient. The imaging exam is completed and then read by radiologist. Once the PSP has received these results as well as the images and had a chance to review them, the findings as well as any recommendations are then sent on to nurse navigation. One of the navigators will then call the patient back to discuss results and next steps in care as recommended by the PSP. Stated understanding.   Provider to address: Different pain medication and MRI results   "

## 2021-06-15 NOTE — TELEPHONE ENCOUNTER
"PSP: Brandon Martinez DO  Last clinic visit:  Yesterday for new consult  Reason for call: Patient calling to find out if PSP would prescribe something to help her calm down for her scheduled x-rays and MRI tomorrow at [facility].   Clinical information:  Chart reviewed. She does have Vistaril that she has for anxiety.   Advice given to patient: Explained she could take a Percocet if needed for the pain. She reports she has been vomiting with that and she will not take anymore. \"I had a similar reaction before.\" Explained she could take 1 Vistaril for the MRI. She should have a  take her to MRI. She said she will not be driving.  Provider to address: [place]  "

## 2021-06-15 NOTE — PROGRESS NOTES
NEUROSURGERY CONSULTATION NOTE    2/25/2021       CHIEF COMPLAINT: Neck pain, radiating right arm pain, low back pain    HPI:    Patsy Santamaria is a 66 y.o. female who is sent to us in consultation by Brandon Martinez  for evaluation of neck pain, cervical radiculopathy.       Onset: 4 weeks without inciting event  Character: Constant; Initially she first noticed a headache with a dull ache in the back part of the neck. Had a stiff neck that would not resolve- initially this was miserable- couldn't turn her head to the right, couldn't talk on the phone, was tearful. It had gradually improved since then- the severity of the pain has improved but it is not as intense. Continues with pain in the right side of the neck which radiates out the right trap and into the shoulder and occasionally down into the bicep. She denies any pain past the elbow but will get intermittent tingling in the arm below the elbow.   Average pain: 4-5/10  Worst pain: 10/10- has not experienced for a week or more    Numbness/tingling: Intermittent In the right hand only- notes a history of carpal tunnel release. She has intermittent tingling in all 5 fingers which feels more noticeable in the thumb and adjacent three digits.   Weakness:Denies    Aggravating factors: Lifting - even light weights will aggravated. Overhead lifting. Right lateral head turn. Avoiding household chore like mopping.   Relieving factors: Cold helps mildly. Balcofen    Pain management: Tried heat, arthritis gel without relief. Percocet/oxycodone makes her nauseated, vomiting. Flexeril without benefit. Use to take baclofen but does not anymore.     Denies issues with imbalance or incoordination  Does note difficulties with opening jars/bottles  Sometimes is dropping objects- notes poor - feels like it is increasing more- not all the time but more often than in the past.  Denies difficulty zipping zippers, buttoning buttons,    Currently smoking 1ppd. Been smoking for  Neurosurgery input noted  For OR today  Cervical collar 40yrs+. Has quit once before- 1 year.    Low back issues intermittently bothering off and on for a few years- nothing predictable about when it will be irritating. Pain will be in her bilateral low back and it radiates out into the bl hips. Will feel like her symptoms are primarily with walking or prolonged sitting. Will get an intense burning in the back that makes her feel like she needs to sit down. Denies any pain radiating into the LE. Denies any numbness, or tingling in the legs or the feet. Gets a lot of cramping, bro[place]ly[place] horses on the left side and in the foot.     Past Medical History:   Diagnosis Date     Alcohol withdrawal seizure without complication (H) 2016     Alcoholic cirrhosis (H)      Anxiety      Arthritis      Chronic alcohol dependence, continuous (H) 2018    inpatient 2019, sober since then     Chronic reflux esophagitis      Depression      Dermatitis      Ganglion     right foot     H. pylori infection      Melanoma (H) 2019    left upper arm     Menopause     age 52     Obesity (BMI 35.0-39.9 without comorbidity)      Seizure (H)     during alcohol withdrawal     Sleep apnea     mild, doesnt tolerate pap therapy     Past Surgical History:   Procedure Laterality Date     APPENDECTOMY       CA APPENDECTOMY      Description: Appendectomy;  Recorded: 2008;  Comments: childhood     CA  DELIVERY ONLY      Description:  Section;  Recorded: 2008;     CA EXCIS TENDN/CAPSULE LESN,FOOT      Description: Excision Of Cyst Of Tendon Sheath Of Foot;  Proc Date: 10/28/2011;  Comments: [facility]     CA HEMORRHOIDECTOMY INTERNAL RUBBER BAND LIGATIONS      Description: Hemorrhoidectomy;  Recorded: 2008;     CA KNEE SCOPE,DIAGNOSTIC      Description: Arthroscopy Knee Right;  Proc Date: 10/11/2005;  Comments: for right knee patella subluxation with lateral retinacular release; subpatellar chondroplasty     CA LATERAL RETINACULAR RELEASE OPEN       Description: Knee Lateral Retinacular Release;  Proc Date: 10/11/2005;     [place] LIGATE FALLOPIAN TUBE      Description: Tubal Ligation;  Recorded: 09/01/2008;     SKIN BIOPSY Left 07/2019    left upper arm     TONSILLECTOMY       REVIEW OF SYSTEMS:  Pt denies changes in bowel or bladder habits. No incontinence. A full 14 point review of systems was otherwise completed and is negative aside from that mentioned above in the HPI    MEDICATIONS:    Current Outpatient Medications   Medication Sig Dispense Refill     albuterol (ACCUNEB) 0.63 mg/3 mL nebulizer solution Take 3 mL (0.63 mg total) by nebulization every 2 (two) hours as needed for wheezing. 10 vial 0     albuterol (VENTOLIN HFA) 90 mcg/actuation inhaler Inhale 2 puffs every 4 (four) hours as needed for wheezing. 18 g 3     ammonium lactate (AMLACTIN) 12 % cream Apply 2 application topically 2 (two) times a day as needed. Apply 1-3 grams to each foot twice daily as needed  11     baclofen (LIORESAL) 10 MG tablet Take 10 mg by mouth 3 (three) times a day as needed. Presently not taking  (noted 2/11/21)       budesonide-formoterol (SYMBICORT) 80-4.5 mcg/actuation inhaler Inhale 2 puffs 2 (two) times a day. 1 Inhaler 1     bumetanide (BUMEX) 1 MG tablet Take 1 tablet (1 mg total) by mouth daily. 90 tablet 2     cetirizine (ZYRTEC) 10 MG tablet Take 1 tablet (10 mg total) by mouth daily as needed for allergies. 30 tablet 5     ciclopirox (PENLAC) 8 % solution Apply topically daily. Apply daily to toenals 1 Bottle 6     diclofenac sodium (VOLTAREN) 1 % Gel Apply 2 g topically 3 (three) times a day as needed.       DULoxetine (CYMBALTA) 30 MG capsule Take 3 capsules (90 mg total) by mouth daily. (Patient taking differently: Take 90 mg by mouth 2 (two) times a day. ) 90 capsule 0     gabapentin (NEURONTIN) 300 MG capsule 1-2 at bed time.       HYDROcodone-acetaminophen 5-325 mg per tablet Take 1 tablet by mouth every 8 (eight) hours as needed for pain. 10 tablet 0      hydrOXYzine pamoate (VISTARIL) 25 MG capsule Take 1 capsule (25 mg total) by mouth 4 (four) times a day as needed for anxiety. 120 capsule 1     naltrexone (DEPADE) 50 mg tablet Take 100 mg by mouth daily.       nicotine polacrilex (NICORETTE) 4 MG gum Chew one piece of gum every 30 minutes as needed for nicotine craving. 170 each 11     omeprazole (PRILOSEC) 20 MG capsule Take 1 capsule (20 mg total) by mouth daily before breakfast. 90 capsule 3     oxyCODONE-acetaminophen (PERCOCET/ENDOCET) 5-325 mg per tablet Take 1 tablet by mouth every 8 (eight) hours as needed for pain. (Patient taking differently: Take 1 tablet by mouth every 8 (eight) hours as needed for pain. Presently not taking) 10 tablet 0     potassium chloride (KLOR-CON) 10 MEQ CR tablet Take 20 mEq by mouth daily.       prazosin (MINIPRESS) 1 MG capsule Take 1 mg by mouth at bedtime. Presently not taking (noted 2/11/21)       traZODone (DESYREL) 100 MG tablet Take 100 mg by mouth at bedtime.       umeclidinium (INCRUSE ELLIPTA) 62.5 mcg/actuation DsDv inhaler Inhale 1 puff daily. 30 each 11     No current facility-administered medications for this visit.          ALLERGIES/SENSITIVITIES:     Allergies   Allergen Reactions     Wellbutrin [Bupropion Hcl] Diarrhea     Codeine Nausea Only     Hydrochlorothiazide Rash     phototoxicity - med was d/lora       Percocet [Oxycodone-Acetaminophen] Nausea And Vomiting     Topamax [Topiramate] Unknown     Diclofenac Nausea Only     Tolerates the topical gel     Sulfa (Sulfonamide Antibiotics) Rash     Sulfasalazine Rash       PERTINENT SOCIAL HISTORY:   Social History     Socioeconomic History     Marital status: Single     Spouse name: None     Number of children: None     Years of education: None     Highest education level: None   Occupational History     Occupation: [place]     Employer: [employer]     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[facility])   Social Needs     Financial resource strain: None  "    Food insecurity     Worry: None     Inability: None     Transportation needs     Medical: None     Non-medical: None   Tobacco Use     Smoking status: Current Every Day Smoker     Packs/day: 0.50     Years: 49.00     Pack years: 24.50     Types: Cigarettes     Smokeless tobacco: Never Used     Tobacco comment: last 11/2019   Substance and Sexual Activity     Alcohol use: Not Currently     Comment: sober since 11/19/2019     Drug use: No     Sexual activity: Yes     Partners: Male     Birth control/protection: None   Lifestyle     Physical activity     Days per week: None     Minutes per session: None     Stress: None   Relationships     Social connections     Talks on phone: None     Gets together: None     Attends Evangelical service: None     Active member of club or organization: None     Attends meetings of clubs or organizations: None     Relationship status: None     Intimate partner violence     Fear of current or ex partner: None     Emotionally abused: None     Physically abused: None     Forced sexual activity: None   Other Topics Concern     None   Social History Narrative     None         FAMILY HISTORY:  Family History   Problem Relation Age of Onset     Heart disease Mother      Heart disease Father         PHYSICAL EXAM:     Constitution: /79   Pulse 69   Ht 5' 3.75\" (1.619 m)   Wt (!) 248 lb 4.8 oz (112.6 kg)   LMP 03/06/2002   SpO2 94%   BMI 42.96 kg/m  .     General: Awake, alert and in NAD  Eyes: Conjugate gaze. Conjunctiva benign without icterus or injection  Heart: RRR  Lungs: Non-labored respiration without accessory muscle use  Skin: No obvious rash or lesion  Psych: Appropriate mood and affect, alert and oriented x 3  Mental Status:  Speech is fluent.  Recent and remote memory are intact.  Attention span and concentration are normal.     Motor: Normal bulk and tone all muscle groups of upper and lower extremities.     Right Left  Right Left   Deltoid 5 5 Hip flexion 5 5   Biceps " 5 5 Hip extension 5 5   Triceps 5 5 Knee flexion 5 5   Wrist ex 5 5 Knee ex 5 5   Wrist flex 5 5 Dorsiflex 5 5   Finger ex 5 5 Plantar flex 5 5   Hand intrinsic 5 5 EHL 5 5    Full Full         Sensory: Sensation intact bilaterally to light touch and temperature throughout. Vibratory sense is diminished throughout the right lower extremity at the knee, ankle, great toe.     Coordination:  Gait is WNL. Pt is able to heel and toe walk without difficulty. Tandem gait is characterized by moderate incoordination. OPAL in the UE is WNL     Reflexes; muted supinator, biceps, triceps reflexes. 2+ patellar and 1+ achilles. No melton. No clonus. Toes are down-going bilaterally    Musculoskeletal: Negative straight leg raise bilaterally. Negative NELI testing. Negative Elham finger test    IMAGING: I personally reviewed all radiographic images    MRI cervical spine 2/11/2021: patient has evidence of multilevel cervical spondylosis multilevel degenerative disc disease where there is anterior listhesis of C3 on C4 where there is uncovertebral joint hypertrophy resulting in severe right foraminal stenosis.  At C4-5, patient has a broad-based disc osteophyte complex, bilateral uncovertebral joint hypertrophy which results in moderate central canal stenosis with ventral cord flattening as well as severe foraminal stenosis bilaterally.  At C5-6, there is a broad-based disc osteophyte complex eccentric to the right which is causing severe central canal stenosis with cord deformation and there is severe left and moderate right foraminal stenosis.  At C6-7, patient has a broad-based disc osteophyte complex, uncovertebral joint hypertrophy bilaterally worse on the left which is causing severe foraminal stenosis.    Cervical spine flexion-extension x-rays 2/25/2021: Patient has evidence of cervical kyphosis in neutral imaging which is difficult to visualize completely as the C7-T1 interspace is obscured by her body habitus.  In  neutral imaging there is a mild anterior listhesis listhesis of C2 on C3 which measures approximately 3 mm.  At C3 on C4 there is segmental kyphosis which measures approximately 3.5 mm.  In extension the anterior listhesis of C2 on 3 appears to completely reduce.  Listhesis of C3 on C4 appears unchanged.    MRI lumbar spine 2/11/2021: Patient has mild multilevel lumbar spondylosis with lumbar degenerative disc disease most pronounced at L5-S1 where there is severe disc height loss.  Of note patient has prominent ventral epidural lipomatosis.  At L3-4, there is a broad-based disc osteophyte complex, bilateral facet arthropathy without evidence of significant central canal or lateral recess stenosis.  No significant foraminal stenosis.  Similar findings are noted at L4-5.  At L5-S1 there is severe disc height loss which results in mild right, mild to moderate left foraminal stenosis.    Lumbar spine flexion-extension x-rays 2/25/2021: Patient has again noted severe disc height loss at L5-S1.  There is no evidence of spondylolisthesis.  No dynamic instability.    CONSULTATION ASSESSMENT AND PLAN:    Patsy Santamaira is a 66 y.o. female who has a PMH sig for alcohol abuse with alcoholic liver cirrhosis, chronic tobacco abuse, morbid obesity who has multilevel cervical spondylosis, cervical spondylolisthesis, cervical kyphosis, multilevel cervical spinal stenosis with cervical cord compression who has signs and symptoms of cervical radiculopathy.  She additionally has lumbar spondylosis, lumbar degenerative disc disease, extensive ventral lumbar epidural lipomatosis with signs and symptoms of intermittent, chronic low back pain.    I reviewed Patsy's imaging with her today in clinic.  In her low back, I do not believe there are any surgical interventions available that will offer her any benefit to her intermittent low back pain symptoms.  I noted she may have improvement with weight loss.  With regard to her cervical  spine, she has multilevel cervical spinal stenosis as well as foraminal stenosis.  We discussed the option of a C3-4, C4-5, C5-6 ACDF.  We discussed the risks, benefits alternatives to surgery.  Given that she does not have any signs of neurologic injury (no constant/unremitting pain, numbness, tingling, weakness) or signs of cervical myelopathy, I noted she had time to optimize her success with surgical intervention.  I noted that for an ACDF, ideally she would be nicotine free for at least 2 months prior to surgical intervention.  Given that she feels like her neck pain has been improving over time since this most recent flare and aggravation, she would like to move forward with physical therapy and try muscle relaxant.  I have sent a prescription for Robaxin to her pharmacy and given her an external referral for physical therapy so that she could find something near her home.  She will be in contact with us if she wishes to discuss surgery further, or if she has any worsening symptoms related to cervical radiculopathy or myelopathy.  We did discuss the natural history of cervical spinal stenosis, cord compression, cervical myelopathy.     I spent more than 60 minutes in this apt, examining the pt, reviewing the scans, reviewing notes from chart, discussing treatment options with risks and benefits and coordinating care. >50 % clinic time was spent in face to face counseling and coordinating care    Cecily Tripp MD      Cc:   Yanique Cali MD

## 2021-06-15 NOTE — PROGRESS NOTES
"Mental Health Psychotherapy Telephone Note    Patient: Patsy Santamaria    : 1955 MRN: [id number]    Completed a 2 point identification verification: patient verbalized full name and date of birth.     Date: 3/16/2021  Start time: 9:00am   Stop Time: 9:45am   Session # 1    The patient has been notified of the following:   \"We have found that certain health care needs can be provided without the need for a face to face visit.  This service lets us provide the care you need with a phone conversation.  I will have full access to your [facility] medical record during this entire phone call.   I will be taking notes for your medical record. Since this is like an office visit, we will bill your insurance company for this service.  There are potential benefits and risks of telephone visits (e.g. limits to patient confidentiality) that differ from in-person visits.?  Confidentiality still applies for telephone services, and nobody will record the visit.  It is important to be in a quiet, private space that is free of distractions (including cell phone or other devices) during the visit.?? If during the course of the call I believe a telephone visit is not appropriate, you will not be charged for this service\"  Consent has been obtained for this service by care team member: Yes, per verbal agreement   Session Type: Patient is participating in a telemedicine phone visit       Those present for this session: patient and therapist      Chief Complaint   Patient presents with      Follow Up     Psychotherapy follow-up visit for depression and chemical dependency       New symptoms or complaints: None reported    Functional Impairment:   Personal: 4  Family: 2  Work: 4  Social:4          ASSESSMENT: Current Emotional / Mental Status (status of significant symptoms):              Risk status (Self / Other harm or suicidal ideation)              Patient denies risks to personal safety              Patient denies current or " recent suicidal ideation or behaviors.              Patient denies current or recent homicidal ideation or behaviors.              Patient denies current or recent self injurious behavior or ideation.              Patient denies other safety concerns.              Patient identifies risk factors (alcoholism, pandemic, history of trauma)              Patient identifies protective factors (children)              Recommended that patient call 911 or go to the local ED should there be a change in any of these risk factors.                Attitude:                                   Cooperative               Orientation:                             x3              Speech                          Rate / Production:       Normal/ Responsive                          Volume:                       Normal               Mood:                                      Anxious  Depressed  Irritable               Thought Content:                    Clear               Thought Form:                        Coherent  Logical               Insight:                                     Good       Patient's impression of their current status:   Patient reports that she was at [facility] for CD last month due to alcohol relapse - this was her second time at this facility - was there for 3 days.   Patient shares that she is having issues with her neck - she has been working with various doctors to resolve this issues. Eventually, she will have to undergo neck surgery but she will participate in PT first.   Patient reports that she has been less active during the pandemic causing her to gain weight.   Patient reports that she has been moderately depressed and mildly anxious, exacerbated by the pandemic. She is still drinking off and on.     Therapist impression of patients current state:   Patient presents for a therapy visit to address symptoms of depression, anxiety and chemical dependency problems. Therapist administered assessment  measurements to evaluate underlying mental health symptoms. Therapist recommends that patient participate in chemical dependency treatment again and therapist put in a referral today.     Type of psychotherapeutic technique provided: CBT and supportive counseling    Progress toward short term goals: Progress as expected, with patient calling to set up an appointment after significant gap in services.     Review of long term goals:   Treatment plan updated today  ALISIA-7 = 7  PHQ- 9 = 11     Diagnosis:  1. Alcohol use disorder, severe, dependence (H)    2. Moderate episode of recurrent major depressive disorder (H)    3. Generalized anxiety disorder          Plan and Follow up:   Patient will return for a follow up visit in 1 month  Patient agrees to participate in CD treatment  Will need an updated DA by next visit to continue therapy services    Discharge Criteria/Planning: Client has chronic symptoms and ongoing therapy for maintenance stability recommended.              I have reviewed the note as documented above.  This accurately captures the substance of my conversation with the patient.  As the provider I attest to compliance with applicable laws and regulations related to telemedicine.  JULIO Mendoza

## 2021-06-15 NOTE — TELEPHONE ENCOUNTER
Reason for Call:  Medication or medication refill:    Do you use a [facility]?  Name of the pharmacy and phone number for the current request: Yes    Name of the medication requested: oxyCODONE (ROXICODONE) 5 MG immediate release tablet, cyclobenzaprine (FLEXERIL) 10 MG tablet    Other request: Patient called to request a refill on these two medication. [facility] and she has 1 pill left of each medication. Please send refill to pharmacy on file.    Can we leave a detailed message on this number? Yes    Phone number patient can be reached at: Home number on file [phone] (home)    Best Time: any    Call taken on 2/4/2021 at 1:23 PM by Yany Gay

## 2021-06-15 NOTE — PROGRESS NOTES
"ASSESSMENT/PLAN:  1. Chronic alcohol dependence, continuous (H)  Comprehensive Metabolic Panel   2. Hypokalemia  Comprehensive Metabolic Panel       This is a 65 yo female with:  1.  Chronic alcohol dependence - recent relapse - was hospitalized x several days; now back home .  Recognizes her triggers - usually a \"fight\" with her son.  Will check CMP.  Continue follow up cares.  2.  Hypokalemia - will check levels.     Return in about 4 weeks (around 4/5/2021) for Recheck.      Medications Discontinued During This Encounter   Medication Reason     oxyCODONE-acetaminophen (PERCOCET/ENDOCET) 5-325 mg per tablet      There are no Patient Instructions on file for this visit.    Chief Complaint:  Chief Complaint   Patient presents with     Follow-up       HPI:   Patsy Santamaria is a 66 y.o. female c/o  Lots of depression issues  Fighting with her son - bringing up the past - aggravates her  Went on a binge - ended up in the hospital   Stopped her meds and then started drinking again  Went to hospital x 2-1/2 days,   They changed some of her meds -   Changed her Duloxetine  Wanted her to check her potassium , liver functions  Cholesterol was a little high    Something with her neck - saw spine specialist - then, neurosurgeon  \"now I know what's going on\"  Vertebra is pinching nerve in spine - spinal cord  3 of them need fusion -   Patient decided to start with therapy first - doesn't want surgery if she can get by without it  Had pain in neck x 2 weeks -     Sober again now - encouraged patient -       PMH:   Patient Active Problem List    Diagnosis Date Noted     GI bleed 12/28/2020     Homeless 12/28/2020     Vitamin B12 deficiency (non anemic) 10/13/2020     Abdominal pain, epigastric      History of major depression 01/23/2020     Biliary colic      COPD exacerbation (H) 01/22/2020     Airway obstruction due to foreign body, initial encounter      Pneumonia of right lower lobe due to infectious organism 12/14/2019     " Severe alcohol use disorder (H) 11/21/2019     Chronic obstructive pulmonary disease, unspecified COPD type (H) 10/30/2019     Dyspnea on exertion      Anxiety 03/25/2019     Hypomagnesemia 03/25/2019     Primary osteoarthritis of left knee 07/10/2018     Seasonal allergies 07/09/2018     Mild episode of recurrent major depressive disorder (H)      Alcoholic hepatitis      Peripheral edema      Diuretic-induced hypokalemia      Alcohol use disorder, severe, dependence (H) 05/07/2018     Primary osteoarthritis of right knee 03/21/2018     Chronic alcohol dependence, continuous (H) 03/16/2018     Low backache 03/16/2018     Morbid obesity (H) 01/05/2018     Bilateral edema of lower extremity 06/28/2017     Snoring 06/28/2017     Carpal tunnel syndrome on both sides 03/06/2017     Trochanteric bursitis of left hip 10/25/2016     Hypoxia 05/13/2016     Alcohol abuse 05/13/2016     Elevated LFTs 05/13/2016     Claustrophobia 12/18/2015     Cervical disc disease 12/14/2015     Skin lesion 10/20/2015     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) 09/16/2015     PTSD (post-traumatic stress disorder) 02/13/2015     Chronic Reflux Esophagitis      Peripheral Neuropathy      Localized Primary Osteoarthritis Of The Carpometacarpal Joint      Ganglion Of The Right Foot      Major depression, recurrent (H)      Nicotine Dependence      Vitamin D deficiency      Past Medical History:   Diagnosis Date     Alcohol withdrawal seizure without complication (H) 5/14/2016     Alcoholic cirrhosis (H)      Anxiety      Arthritis      Chronic alcohol dependence, continuous (H) 03/16/2018    inpatient 11/2019, sober since then     Chronic reflux esophagitis      Depression      Dermatitis      Ganglion     right foot     H. pylori infection      Melanoma (H) 07/2019    left upper arm     Menopause     age 52     Obesity (BMI 35.0-39.9 without comorbidity)      Seizure (H) 2016    during alcohol withdrawal     Sleep apnea      mild, doesnt tolerate pap therapy     Past Surgical History:   Procedure Laterality Date     APPENDECTOMY       IL APPENDECTOMY      Description: Appendectomy;  Recorded: 2008;  Comments: childhood     IL  DELIVERY ONLY      Description:  Section;  Recorded: 2008;     IL EXCIS TENDN/CAPSULE LESN,FOOT      Description: Excision Of Cyst Of Tendon Sheath Of Foot;  Proc Date: 10/28/2011;  Comments: [facility]     IL HEMORRHOIDECTOMY INTERNAL RUBBER BAND LIGATIONS      Description: Hemorrhoidectomy;  Recorded: 2008;     IL KNEE SCOPE,DIAGNOSTIC      Description: Arthroscopy Knee Right;  Proc Date: 10/11/2005;  Comments: for right knee patella subluxation with lateral retinacular release; subpatellar chondroplasty     IL LATERAL RETINACULAR RELEASE OPEN      Description: Knee Lateral Retinacular Release;  Proc Date: 10/11/2005;     IL LIGATE FALLOPIAN TUBE      Description: Tubal Ligation;  Recorded: 2008;     SKIN BIOPSY Left 2019    left upper arm     TONSILLECTOMY       Social History     Socioeconomic History     Marital status: Single     Spouse name: Not on file     Number of children: Not on file     Years of education: Not on file     Highest education level: Not on file   Occupational History     Occupation: [employer]     Employer: 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[facility])   Social Needs     Financial resource strain: Not on file     Food insecurity     Worry: Not on file     Inability: Not on file     Transportation needs     Medical: Not on file     Non-medical: Not on file   Tobacco Use     Smoking status: Current Every Day Smoker     Packs/day: 0.50     Years: 49.00     Pack years: 24.50     Types: Cigarettes     Smokeless tobacco: Never Used     Tobacco comment: last 2019   Substance and Sexual Activity     Alcohol use: Not Currently     Comment: sober since 2019     Drug use: No     Sexual activity: Yes     Partners:  Male     Birth control/protection: None   Lifestyle     Physical activity     Days per week: Not on file     Minutes per session: Not on file     Stress: Not on file   Relationships     Social connections     Talks on phone: Not on file     Gets together: Not on file     Attends Protestant service: Not on file     Active member of club or organization: Not on file     Attends meetings of clubs or organizations: Not on file     Relationship status: Not on file     Intimate partner violence     Fear of current or ex partner: Not on file     Emotionally abused: Not on file     Physically abused: Not on file     Forced sexual activity: Not on file   Other Topics Concern     Not on file   Social History Narrative     Not on file     Family History   Problem Relation Age of Onset     Heart disease Mother      Heart disease Father        Meds:    Current Outpatient Medications:      ammonium lactate (AMLACTIN) 12 % cream, Apply 2 application topically 2 (two) times a day as needed. Apply 1-3 grams to each foot twice daily as needed, Disp: , Rfl: 11     bumetanide (BUMEX) 1 MG tablet, Take 1 tablet (1 mg total) by mouth daily., Disp: 90 tablet, Rfl: 3     ciclopirox (PENLAC) 8 % solution, Apply topically daily. Apply daily to toenals, Disp: 1 Bottle, Rfl: 6     diclofenac sodium (VOLTAREN) 1 % Gel, Apply 2 g topically 3 (three) times a day as needed., Disp: , Rfl:      DULoxetine (CYMBALTA) 30 MG capsule, Take 3 capsules (90 mg total) by mouth daily. (Patient taking differently: Take 30 mg by mouth 2 (two) times a day. ), Disp: 90 capsule, Rfl: 0     gabapentin (NEURONTIN) 300 MG capsule, 1-2 at bed time., Disp: , Rfl:      hydrOXYzine pamoate (VISTARIL) 25 MG capsule, Take 1 capsule (25 mg total) by mouth 4 (four) times a day as needed for anxiety., Disp: 120 capsule, Rfl: 1     methocarbamoL (ROBAXIN) 750 MG tablet, Take 1 tablet (750 mg total) by mouth 3 (three) times a day as needed (muscle spasm, neck pain)., Disp: 60  tablet, Rfl: 0     naltrexone (DEPADE) 50 mg tablet, Take 100 mg by mouth daily., Disp: , Rfl:      omeprazole (PRILOSEC) 20 MG capsule, Take 1 capsule (20 mg total) by mouth daily before breakfast., Disp: 90 capsule, Rfl: 3     traZODone (DESYREL) 100 MG tablet, Take 100 mg by mouth at bedtime., Disp: , Rfl:      albuterol (ACCUNEB) 0.63 mg/3 mL nebulizer solution, Take 3 mL (0.63 mg total) by nebulization every 2 (two) hours as needed for wheezing., Disp: 10 vial, Rfl: 0     albuterol (VENTOLIN HFA) 90 mcg/actuation inhaler, Inhale 2 puffs every 4 (four) hours as needed for wheezing., Disp: 18 g, Rfl: 3     baclofen (LIORESAL) 10 MG tablet, Take 10 mg by mouth 3 (three) times a day as needed. Presently not taking  (noted 2/11/21), Disp: , Rfl:      budesonide-formoterol (SYMBICORT) 80-4.5 mcg/actuation inhaler, Inhale 2 puffs 2 (two) times a day., Disp: 1 Inhaler, Rfl: 1     cetirizine (ZYRTEC) 10 MG tablet, Take 1 tablet (10 mg total) by mouth daily as needed for allergies., Disp: 30 tablet, Rfl: 5     HYDROcodone-acetaminophen 5-325 mg per tablet, Take 1 tablet by mouth every 8 (eight) hours as needed for pain., Disp: 10 tablet, Rfl: 0     nicotine polacrilex (NICORETTE) 4 MG gum, Chew one piece of gum every 30 minutes as needed for nicotine craving., Disp: 170 each, Rfl: 11     potassium chloride (KLOR-CON) 10 MEQ CR tablet, Take 20 mEq by mouth daily., Disp: , Rfl:      prazosin (MINIPRESS) 1 MG capsule, Take 1 mg by mouth at bedtime. Presently not taking (noted 2/11/21), Disp: , Rfl:      umeclidinium (INCRUSE ELLIPTA) 62.5 mcg/actuation DsDv inhaler, Inhale 1 puff daily., Disp: 30 each, Rfl: 11    Allergies:  Allergies   Allergen Reactions     Wellbutrin [Bupropion Hcl] Diarrhea     Codeine Nausea Only     Hydrochlorothiazide Rash     phototoxicity - med was d/lora       Percocet [Oxycodone-Acetaminophen] Nausea And Vomiting     Topamax [Topiramate] Unknown     Diclofenac Nausea Only     Tolerates the topical  gel     Sulfa (Sulfonamide Antibiotics) Rash     Sulfasalazine Rash       ROS:  Pertinent positives as noted in HPI; otherwise 12 point ROS negative.      Physical Exam:  EXAM:  /84 (Patient Site: Right Arm, Patient Position: Sitting, Cuff Size: Adult Large)   Pulse 65   Temp 97.5  F (36.4  C) (Temporal)   Wt (!) 247 lb (112 kg)   LMP 03/06/2002   BMI 42.73 kg/m     Gen:  NAD, appears well, well-hydrated, anxious  HEENT:  TMs nl, oropharynx benign, nasal mucosa nl, conjunctiva clear  Neck:  Supple, no adenopathy, no thyromegaly, no carotid bruits, no JVD  Lungs:  Clear to auscultation bilaterally  Cor:  RRR no murmur  Abd:  Soft, nontender, BS+, no masses, no guarding or rebound, no HSM  Extr:  Neg., + LE Edema  Neuro:  No asymmetry  Skin:  Warm/dry        Results:  Results for orders placed or performed in visit on 03/08/21   Comprehensive Metabolic Panel   Result Value Ref Range    Sodium 140 136 - 145 mmol/L    Potassium 4.0 3.5 - 5.0 mmol/L    Chloride 93 (L) 98 - 107 mmol/L    CO2 35 (H) 22 - 31 mmol/L    Anion Gap, Calculation 12 5 - 18 mmol/L    Glucose 134 (H) 70 - 125 mg/dL    BUN 10 8 - 22 mg/dL    Creatinine 0.68 0.60 - 1.10 mg/dL    GFR MDRD Af Amer >60 >60 mL/min/1.73m2    GFR MDRD Non Af Amer >60 >60 mL/min/1.73m2    Bilirubin, Total 0.6 0.0 - 1.0 mg/dL    Calcium 10.1 8.5 - 10.5 mg/dL    Protein, Total 7.1 6.0 - 8.0 g/dL    Albumin 3.8 3.5 - 5.0 g/dL    Alkaline Phosphatase 116 45 - 120 U/L    AST 72 (H) 0 - 40 U/L    ALT 69 (H) 0 - 45 U/L

## 2021-06-15 NOTE — TELEPHONE ENCOUNTER
It sounds as if patient has already decided to go to ER.  She does have appointment for [facility] this [facility] as well.

## 2021-06-15 NOTE — PATIENT INSTRUCTIONS - HE
1. An MRI was ordered for you today.  You will be contacted by scheduling within 3 days.    If you are not contacted, please call Radiology at [phone].     2.   Oxycodone/acetaminophen  was prescribed for you today Please lock this medication up when you are not taking it. Do not share this medication with other people. Do not increase the dose without permission from your physician. Do not drink alcohol while you take this medication as this can lead to death. Do not take other pain medications without approval from your physician or this can also lead to death. If you need a refill of this medication, you must come in to clinic by appointment. Please call if you have any questions on how to take this medication. Do Not take Naltrexone with this pain medication    3. You may start your gabapentin  300 mg tablets, to be titrated up to 1 tablet  3 times a day as tolerated for your nerve pain. Please follow Gabapentin dosing chart below.    Gabapentin 300mg Dosing Chart    DATE  MORNING AFTERNOON BEDTIME    Day 1 0 0 1    Day 2 0 0 1    Day 3 0 0 1    Day 4 1 0 1    Day 5 1 0 1    Day 6 1 0 1    Day 7 1 1 1    Day 8 1 1 1    Day 9 1 1 1                                                                                                                                    Continue medication, taking 1 capsule three times daily    Please call if you have any questions regarding how to take your medication  Clinic Phone # [phone]

## 2021-06-15 NOTE — TELEPHONE ENCOUNTER
Refill Approved    Rx renewed per Medication Renewal Policy. Medication was last renewed on 04/06/2020.  Last office visit was 02/02/2021 with PCP.    Binta Bond, [facility] Triage/Med Refill 2/26/2021     Requested Prescriptions   Pending Prescriptions Disp Refills     bumetanide (BUMEX) 1 MG tablet 90 tablet 2     Sig: Take 1 tablet (1 mg total) by mouth daily.       Diuretics/Combination Diuretics Refill Protocol  Passed - 2/25/2021  2:02 PM        Passed - Visit with PCP or prescribing provider visit in past 12 months     Last office visit with prescriber/PCP: 1/13/2020 Yanique Cali MD OR same dept: Visit date not found OR same specialty: 1/13/2020 Yanique Cali MD  Last physical: 1/18/2021 Last MTM visit: Visit date not found   Next visit within 3 mo: Visit date not found  Next physical within 3 mo: Visit date not found  Prescriber OR PCP: Yanique Cali MD  Last diagnosis associated with med order: 1. Bilateral edema of lower extremity  - bumetanide (BUMEX) 1 MG tablet; Take 1 tablet (1 mg total) by mouth daily.  Dispense: 90 tablet; Refill: 2    If protocol passes may refill for 12 months if within 3 months of last provider visit (or a total of 15 months).             Passed - Serum Potassium in past 12 months      Lab Results   Component Value Date    Potassium 3.6 01/18/2021             Passed - Serum Sodium in past 12 months      Lab Results   Component Value Date    Sodium 142 01/18/2021             Passed - Blood pressure on file in past 12 months     BP Readings from Last 1 Encounters:   02/25/21 130/79             Passed - Serum Creatinine in past 12 months      Creatinine   Date Value Ref Range Status   01/18/2021 0.65 0.60 - 1.10 mg/dL Final

## 2021-06-15 NOTE — PROGRESS NOTES
"Mental Health Visit Note    This is a dual signature report. My supervising clinician is JULIO Kasper.    2018   Start time: 9:00am    Stop Time: 9:55am   Session # 16    Patsy Santamaria is a 63 y.o. female is being seen today for    Chief Complaint   Patient presents with     MH Follow Up     Psychotherapy follow-up visit to address depression, PTSD and sobriety from alcohol     New symptoms or complaints: Relapse with alcohol on 2017       Functional Impairment:   Personal: 4  Family: 4  Work: 4  Social:3    Clinical assessment of mental status:   Grooming: Well groomed  Attire: Appropriate  Age: Appears Stated  Behavior Towards Examiner: Cooperative  Motor Activity: Within normal   Eye Contact: Appropriate  Mood: Anxious and Depressed  Affect: Congruent w/content of speech, Flat, Anxious and Depressed  Speech/Language: Within normal  Attention: Within normal  Concentration: Within normal  Thought Process: Within normal  Thought Content: Hallucinations: None reported  Delusions: none reported  Orientation: X 3  Memory: No Evidence of Impairment  Judgement: No Evidence of Impairment  Estimated Intelligence: Average  Demonstrated Insight: Adequate  Fund of Knowledge: adequate    Suicidal/Homicidal Ideation present: None Reported This Session    Patient's impression of their current status:   Today is the patient's birthday. She did not report any major plans but may spend time with her sister. She shared that she has not \"really celebrated\" her birthday since her mother .  The patient shared that her 30-day restriction at the transitional home is over following her relapse with alcohol on 2017. She shared she can now leave the house and has been attending AA meetings. She denied any urges to drink and stated, \"I just know I don't want to - I can't go back to that no matter what.\"  The patient shared that she started writing down goals and plans. She discovered that she was approved " "for SSI in 2016 and will be able to receive back-pay. This unexpected financial change was a pleasant surprise. She shared that she may have the opportunity to now afford to live in a sober house. She shared, \"the doors are all opening at once.\" The patient denied any recent issues with her children.  The therapist inquired about the patient's emotional status. She shared that she has been thinking a lot about \"papa\" lately, her ex-partner of nearly 20 years. She stated, \"I still miss my family.\" She expressed a desire to obtain companionship while maintaining the ability to be independent.   She described a desire to go to the gym more often and shared that she would eventually like to obtain employment. She shared interest in becoming more involved in the community as well. She stated, \"this is a new chapter in my life - I really want to make if fulfilling for me.\"       Therapist impression of patients current state:   The patient presented on-time for a follow-up psychotherapy visit. The patient appeared open-minded and cooperative during today's session. She appeared motivated and goal-oriented today, often citing hopes and desires for the future. She outlined many goals and objectives while maintaining a positive outlook.  The patient is attempting to manage her thoughts and feelings in a healthy way by participating in therapy. The patient continues to gain insight regarding the psychological impact of trauma. Through cognitive processing, the patient realized her desire to obtain control. The perceived sense of control allows her to feel safe, as her world view has been affected by trauma and continues to influence the belief that she is unsafe and powerless.    The patient was encouraged to buy a journal to use in and outside of session - she followed through with this task and will begin to utilize the journal outside of session. The therapist intends to assign homework for patient to gain self-awareness " through journal work during future sessions and will utilize journal entries for cognitive processing while in session.   The patient would benefit from prolonged exposure therapy or another trauma-focused therapy protocol.   Long term goals to treat PTSD symptoms include:  -Reduce the negative impact that the traumatic event has had on many aspects of life and improve level of functioning  -Develop and implement effective coping skills to carry out normal responsibilities and participate constructively in relationships  -Recall the traumatic event without becoming overwhelmed with negative thoughts, feelings, or urges  -Terminate the destructive behaviors that serve to maintain escape and denial while implementing behaviors that promote healing, acceptance of the past events, and responsible living    Type of psychotherapeutic technique provided: Insight oriented and CBT    Progress toward short term goals:Progress as expected, as the patient appeared open-minded and cooperative, demonstrating an ability to appropriately utilize therapy session time.     Review of long term goals: Patient requested more frequent psychotherapy visits to address underlying emotional problems associated with traumatic life events    Diagnosis:   1. PTSD (post-traumatic stress disorder)    2. Severe episode of recurrent major depressive disorder, without psychotic features    Alcohol abuse in remission  R/O Generalized Anxiety Disorder  R/O Bipolar Disorder    Plan and Follow up: The patient is scheduled to return for a follow-up appointment on 2/7/2018. She requested to attend weekly appointments.    Discharge Criteria/Planning: Client has chronic symptoms and ongoing therapy for maintenance stability recommended.    Performed and documented by GOOD Mendoza    I have read, discussed and reviewed the documentation as presented by GOOD Mendoza [facility]

## 2021-06-15 NOTE — PROGRESS NOTES
Assessment/Plan:      Diagnoses and all orders for this visit:    Cervical radicular pain  -     MR Cervical Spine Without Contrast; Future; Expected date: 02/09/2021  -     oxyCODONE-acetaminophen (PERCOCET/ENDOCET) 5-325 mg per tablet; Take 1 tablet by mouth every 8 (eight) hours as needed for pain.  Dispense: 10 tablet; Refill: 0    Cervical stenosis of spinal canal  -     MR Cervical Spine Without Contrast; Future; Expected date: 02/09/2021  -     oxyCODONE-acetaminophen (PERCOCET/ENDOCET) 5-325 mg per tablet; Take 1 tablet by mouth every 8 (eight) hours as needed for pain.  Dispense: 10 tablet; Refill: 0    Lumbar radicular pain  -     MR Lumbar Spine Without Contrast; Future; Expected date: 02/09/2021  -     oxyCODONE-acetaminophen (PERCOCET/ENDOCET) 5-325 mg per tablet; Take 1 tablet by mouth every 8 (eight) hours as needed for pain.  Dispense: 10 tablet; Refill: 0    Hyper reflexia  -     MR Cervical Spine Without Contrast; Future; Expected date: 02/09/2021    Spinal stenosis in cervical region  Comments:  C5-6 stenosis - all on the right side    Cervical radiculopathy        Assessment: Pleasant 66 y.o. female with a history of anxiety, left arm melanoma, depression, liver disease, esophagitis, obesity, alcohol withdrawal with seizures and tobacco use with:    1.  3 to 4-week history of severe cervical spine and right arm pain paresthesias consistent with cervical radiculopathy versus cervical stenosis.  She does have upgoing toe on the left lower extremity no hyperreflexia in arms.  Severe central stenosis C5-6 on CT scan.  She also has spondylolisthesis of C3 and C4 that is mild.    2.  3 to 4-week history of lumbar spine pain and right lower extremity radicular pain which may be related to specific lumbar radiculopathy versus cervical myelopathy.      Discussion:    1.  We discussed the diagnosis and treatment options.  She is in severe pain today.  We discussed medications along with imaging to  determine the cause for her pain.  She does have severe central stenosis at C5-6 on CT scan.    2.  MRI cervical spine and lumbar spine to evaluate for severe central stenosis or nerve root impingement.    3.  She is in severe pain today and is out of her pain medication.  Will provide oxycodone/acetaminophen 5/325 1 tablet 3 times daily as needed for pain, #10.  I did check  and she was received 8 tablets of oxycodone immediate release from her primary care provider last prescription on 2/5/2021 and prior to that on February 2.  This is not a long-term medication.  This will be to help with severe pain until MRI is completed and more definitive plan of care is determined.    4.  She may restart gabapentin which she has at home.  She has 300 mg capsules tells me this is not helpful in general however she has only been taking this intermittently at night.  She will start 300 mg at bedtime increasing to 3 times daily.  Dosage chart provided.    5.  Encourage smoking cessation.    6.  Follow-up 1 week.    It was our pleasure caring for your patient today, if there any questions or concerns please do not hesitate to contact us.      Subjective:   Patient ID: Patsy Santamaria is a 66 y.o. female.    History of Present Illness: *Patient presents at the request of Dr. Bacon for an evaluation of cervical spine and right arm pain low back and right leg pain and paresthesias.  Patient reports pain started without any new trauma about 3 to 4 weeks ago.  The neck pain is dramatically worse than the low back pain.  This is constant right side of the neck right parascapular region with pain down the arm into the hand numbness and tingling into the thumb and her arm feels heavy.  Worse with any use of the arm and turning her head to the right nothing really makes the pain better.  Has tried heat and ice.  Pain is a 10/10 today.    Has been to the emergency department.  Note was reviewed.  She was given  cyclobenzaprine along with oxycodone in the emergency room and ibuprofen.  Was then seen by primary care provider given prednisone which may have given some mild benefit.  She also received oxycodone 5 mg on 2 occasions over the past couple of weeks.  She was taking 5 mg 3 times daily and the pain remains severe.  She no longer has any medication remaining.    She also has had low back pain for the past couple of weeks.  Intermittent low back pain with diffuse right leg pain numbness tingling and weakness worse with any moving nothing really makes it better.  The neck pain is dramatically worse in the low back.    She has been prescribed naltrexone for her alcohol use, but has not been taking it recently.  She has been drinking alcohol recently.    Imaging: CT report and images were personally reviewed and discussed with the patient.  A plastic model was utilized during the discussion.  CT of the cervical spine personally reviewed.  This shows multilevel degenerative disc disease with severe central stenosis at C5-6 moderate stenosis at C4-5 grade 1 spondylolisthesis C3 on C4.    No recent imaging lumbar spine.      Review of Systems: Patient complains of change in vision, chest pain, swelling of the feet, shortness of breath, wheezing, nausea and vomiting.  She has had some urinary frequency and leakage recently.  She has skin itching, poor sleep and anxiety.  Denies fevers, headaches, eye pain, abdominal pain, bowel incontinence, balance issues.  Remainder of 12 point review systems negative unless listed above.    Past Medical History:   Diagnosis Date     Alcohol withdrawal seizure without complication (H) 5/14/2016     Alcoholic cirrhosis (H)      Anxiety      Arthritis      Chronic alcohol dependence, continuous (H) 03/16/2018    inpatient 11/2019, sober since then     Chronic reflux esophagitis      Depression      Dermatitis      Ganglion     right foot     H. pylori infection      Melanoma (H) 07/2019     left upper arm     Menopause     age 52     Obesity (BMI 35.0-39.9 without comorbidity)      Seizure (H) 2016    during alcohol withdrawal     Sleep apnea     mild, doesnt tolerate pap therapy       Family History   Problem Relation Age of Onset     Heart disease Mother      Heart disease Father          Social History     Socioeconomic History     Marital status: Single     Spouse name: None     Number of children: None     Years of education: None     Highest education level: None   Occupational History     Occupation: [employer]     Employer: [employer]     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[facility])   Social Needs     Financial resource strain: None     Food insecurity     Worry: None     Inability: None     Transportation needs     Medical: None     Non-medical: None   Tobacco Use     Smoking status: Current Every Day Smoker     Packs/day: 0.50     Years: 49.00     Pack years: 24.50     Types: Cigarettes     Smokeless tobacco: Never Used     Tobacco comment: last 11/2019   Substance and Sexual Activity     Alcohol use: Yes     Comment: sober since 11/19/2019     Drug use: No     Sexual activity: Yes     Partners: Male     Birth control/protection: None   Lifestyle     Physical activity     Days per week: None     Minutes per session: None     Stress: None   Relationships     Social connections     Talks on phone: None     Gets together: None     Attends Oriental orthodox service: None     Active member of club or organization: None     Attends meetings of clubs or organizations: None     Relationship status: None     Intimate partner violence     Fear of current or ex partner: None     Emotionally abused: None     Physically abused: None     Forced sexual activity: None   Other Topics Concern     None   Social History Narrative     None     Social history: She is retired, smokes cigarettes.  Smoking cessation recommended.  She also is a heavy alcohol user.  Not taking Naltrexone on at this time she started  drinking again during her recent episode of pain.    The following portions of the patient's history were reviewed and updated as appropriate: allergies, current medications, past family history, past medical history, past social history, past surgical history and problem list.      WHO 5: 5    NDI Score: 44      Objective:   Physical Exam:    Vitals:    02/09/21 0936   BP: 100/59   Pulse: 70     Body mass index is 45.15 kg/m .      General:  Well-appearing female in no acute distress.  Obese, pleasant,   cooperative, and interactive throughout the examination and interview.  CV: 1+ bilateral lower extremity edema.  Lymphatics: No cervical lymphadenopathy palpated.  Eyes: sclera clear.  Skin: No rashes or lesions seen.  Respirations unlabored.  MSK: Gait is mildly wide-based gait cautious..  Able to heel-toe stand without difficulty.    Negative Romberg.  Spine: normal AP curves of the C, T, and L spine.  Ful dramatically reduced range of motion cervical spine in all planes and lumbar spine in flexion secondary to pain..  Palpation: Tenderness to palpation throughout the cervical paraspinals upper trapezius bilaterally no significant tenderness lumbar spine.  Extremities: Full range of motion of the shoulders in abduction, elbows, and wrists with no effusions or tenderness to palpation.  Negative arm drop, empty can, and Speed's test bilaterally.   Full range of motion of the hips, knees, and ankles from a seated position with no effusions or tenderness to palpation.    Neurologic exam: Mental status: Patient is alert and oriented with normal affect.  Attention, knowledge, memory, and language are intact.  Normal coordination throughout the examination.  Reflexes are 2+ and symmetric biceps, triceps, brachioradialis, patellar, and Achilles with upgoing toe on the left downgoing toe on the right, and Negative Felicity's.  Sensation is intact to light touch throughout the upper and lower extremities bilaterally.   Manual muscle testing reveals 5 out of 5 strength in the shoulder abductors, elbow   flexors/extensors, wrist extensors, interosseous, and finger flexors; 5 out of 5   in the hip flexors, knee flexors/extensors, ankle plantar flexors, ankle   dorsiflexors, and EHL.  Normal muscle bulk and tone.  Positive Spurling's to the right.   Negative seated   straight leg raise bilaterally.
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
## 2021-06-15 NOTE — TELEPHONE ENCOUNTER
"Phone call to patient to discuss results and the recommendation for neurosurgical referral. Results given and explained. Questions answered. Stated understanding and appreciation for call back. Contact information provided for [facility].      She does report she is taking the Gabapentin 1 300 mg capsule at bedtime. \"I just started those though.\"     Explained a Rx has been sent in for Hydrocodone to replace the Percocet. Explained she could take 1 ES Tylenol with each Hydrocone if needed as an adjunct to the Hydrocodone. Instructed to bring remaining Percocet to the pharmacy medication drop off bin. Stated understanding.   "

## 2021-06-15 NOTE — TELEPHONE ENCOUNTER
Patsy was into ER 2 days ago with neck and shoulder pain.  Patsy has an upcoming appointment with spine MD.  Using flexeril and oxycodone for pain.  Pain is in neck and shoulders on right side.  Patient is not getting adequate relief from pain medication.  Patient states that she is going to go to ER.    COVID 19 Nurse Triage Plan/Patient Instructions    Please be aware that novel coronavirus (COVID-19) may be circulating in the community. If you develop symptoms such as fever, cough, or SOB or if you have concerns about the presence of another infection including coronavirus (COVID-19), please contact your health care provider or visit [email or web address].     Disposition/Instructions    ED Visit recommended. Follow protocol based instructions.      Bring Your Own Device:  Please also bring your smart device(s) (smart phones, tablets, laptops) and their charging cables for your personal use and to communicate with your care team during your visit.      Thank you for taking steps to prevent the spread of this virus.  o Limit your contact with others.  o Wear a simple mask to cover your cough.  o Wash your hands well and often.    Resources    [facility]: About COVID-19: [email or web address]    CDC: What to Do If You're Sick: www.cdc.gov/coronavirus/2019-ncov/about/steps-when-sick.html    CDC: Ending Home Isolation: www.cdc.gov/coronavirus/2019-ncov/hcp/disposition-in-home-patients.html     CDC: Caring for Someone: www.cdc.gov/coronavirus/2019-ncov/if-you-are-sick/care-for-someone.html     [facility]: Interim Guidance for Hospital Discharge to Home: www.health.[facility].[place]/diseases/coronavirus/hcp/hospdischarge.pdf    [facility] clinical trials (COVID-19 research studies): clinicalaffairs.[email or web address]/um-clinical-trials     Below are the COVID-19 hotlines at the [place] Department of Health ([facility]). Interpreters are available.   o For health questions: Call 710-809-2037 or 1-546.705.6344 (7 a.m. to 7  "p.m.)  o For questions about schools and childcare: Call 645-215-1938 or 1-483.921.5417 (7 a.m. to 7 p.m.)       Reason for Disposition    Patient sounds very sick or weak to the triager    Additional Information    Negative: Shock suspected (e.g., cold/pale/clammy skin, too weak to stand, low BP, rapid pulse)    Negative: Difficult to awaken or acting confused (e.g., disoriented, slurred speech)    Negative: [1] Similar pain previously AND [2] it was from \"heart attack\"    Negative: [1] Similar pain previously AND [2] it was from \"angina\" AND [3] not relieved by nitroglycerin    Negative: Sounds like a life-threatening emergency to the triager    Negative: Difficulty breathing or unusual sweating (e.g., sweating without exertion)    Negative: [1] Stiff neck (can't put chin to chest) AND [2] headache    Negative: [1] Stiff neck (can't put chin to chest) AND [2] fever    Negative: Weakness of an arm or hand    Negative: Problems with bowel or bladder control    Negative: Head is twisting to one side (or ask \"is it turning against your will?\")    Protocols used: NECK PAIN OR [id number]      "
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
## 2021-06-15 NOTE — TELEPHONE ENCOUNTER
I have refilled these medications - but I will not refill them again.  She needs to try Acetaminophen in between for pain.

## 2021-06-15 NOTE — PROGRESS NOTES
Correct pharmacy verified with patient and confirmed in snapshot? [x] yes []no    Charge captured ? [x] yes  [] no    Medications Phoned  to Pharmacy [] yes [x]no  Name of Pharmacist:  List Medications, including dose, quantity and instructions      Medication Prescriptions given to patient   [] yes  [x] no   List the name of the drug the prescription was written for.       Medications ordered this visit were e-scribed.  Verified by order class [x] yes  [] no  Cymbalta 60 mg    Medication changes or discontinuations were communicated to patient's pharmacy: [] yes  [x] no    UA collected [] yes  [x] no    [place] Prescription Monitoring Program Reviewed? [] yes  [x] no    Referrals were made to: none     Future appointment was made: [x] yes  [] no    Dictation completed at time of chart check: [x] yes  [] no    I have checked the documentation for today s encounters and the above information has been reviewed and completed.

## 2021-06-15 NOTE — PATIENT INSTRUCTIONS - HE
Dr. Tripp will put in a order for Physical therapy.   Dr. Tripp will send in a prescription of Robaxin to your pharmacy.   Dr. Tripp discussed with you a C34 C45 AND C56 ANTERIOR CERVICAL DISKECTOMY AND FUSION.   You should have quite smoking for 2 months prior to surgery.   Call if symptoms are worsening such as radiating pain, balance issues, or numbness.

## 2021-06-16 NOTE — TELEPHONE ENCOUNTER
I'm not sure what this means.  No one needs an order for this vaccine - so why is this different?    Besides, there is an additional message from today regarding symptoms that sound like COVID - I just ordered a COVID test for her.

## 2021-06-16 NOTE — TELEPHONE ENCOUNTER
Telephone Encounter by Alejandra Lawrence at 12/2/2019  3:41 PM     Author: Alejandra Lawrence Service: -- Author Type: --    Filed: 12/2/2019  3:43 PM Encounter Date: 11/27/2019 Status: Addendum    : Alejandra Lawrence    Related Notes: Original Note by Alejandra Lawrence filed at 12/2/2019  3:42 PM       [place] APPROVED:    Approval start date: 12/2/2019  Approval end date:  12/31/2020    Pharmacy has been notified of approval and will contact patient when medication is ready for pickup.

## 2021-06-16 NOTE — PATIENT INSTRUCTIONS - HE
1. [facility] referral- someone should call you  2. Outpatient referral to the [facility]  3. Naltrexone 50mg DAILY in the morning. DO NOT STOP if you drink. It won't make you sick but, it may help you drink less.  4. Gabapentin 300mg take 3 times per day (instead of twice per day). This can help with anxiety, sobriety and pain.  5. Follow up with Dr. Zelaya

## 2021-06-16 NOTE — TELEPHONE ENCOUNTER
Obviously could be either - I can't tell by a message.  She likely needs COVID testing; I'll send that order.    She should be aware if she has been drinking or not, if this is alcohol withdrawal.    If not improving, needs to be seen.  But I will set up a COVID order now.

## 2021-06-16 NOTE — TELEPHONE ENCOUNTER
Reason for call:  Patient reporting a symptom    Symptom or request: pain behind left knee    Duration (how long have symptoms been present): off and on for 2 weeks   Getting worse    Have you been treated for this before?     Additional comments: n/a    Phone Number patient can be reached at:  Home number on file [phone]    Best Time:  any    Can we leave a detailed message on this number: Yes    Call taken on 4/13/2021 at 10:15 AM by Shereen Hidalgo

## 2021-06-16 NOTE — TELEPHONE ENCOUNTER
Patsy calling requesting to schedule for 2nd COVID 19 vaccine dose. Stating she missed her previous appointment.    Per [facility] patient has future COVID 19 order.     Warm transferred to Central Scheduling.    Jenny Alicea RN  [facility]      COVID 19 Nurse Triage Plan/Patient Instructions    Please be aware that novel coronavirus (COVID-19) may be circulating in the community. If you develop symptoms such as fever, cough, or SOB or if you have concerns about the presence of another infection including coronavirus (COVID-19), please contact your health care provider or visit  [email or web address].    Disposition/Instructions    In-Person Visit with provider recommended. Reference Visit Selection Guide.    Thank you for taking steps to prevent the spread of this virus.  o Limit your contact with others.  o Wear a simple mask to cover your cough.  o Wash your hands well and often.    Resources    [facility]: About COVID-19: [email or web address]    CDC: What to Do If You're Sick: www.cdc.gov/coronavirus/2019-ncov/about/steps-when-sick.html    CDC: Ending Home Isolation: www.cdc.gov/coronavirus/2019-ncov/hcp/disposition-in-home-patients.html     CDC: Caring for Someone: www.cdc.gov/coronavirus/2019-ncov/if-you-are-sick/care-for-someone.html     [facility]: Interim Guidance for Hospital Discharge to Home: www.health.[facility].[place]/diseases/coronavirus/hcp/hospdischarge.pdf    [facility] clinical trials (COVID-19 research studies): [email or web address].[email or web address]-clinical-trials     Below are the COVID-19 hotlines at the [facility] of [facility] ([facility]). Interpreters are available.   o For health questions: Call 740-767-6300 or 1-855.338.3673 (7 a.m. to 7 p.m.)  o For questions about schools and childcare: Call 737-638-7007 or 1-922.404.3186 (7 a.m. to 7 p.m.)     Reason for Disposition    Requesting regular office appointment    Additional Information    Negative: [1]  Caller is not with the adult (patient) AND [2] reporting urgent symptoms    Negative: Lab result questions    Negative: Medication questions    Negative: Caller can't be reached by phone    Negative: Caller has already spoken to PCP or another triager    Negative: RN needs further essential information from caller in order to complete triage    Protocols used: INFORMATION ONLY CALL-A-AH

## 2021-06-16 NOTE — TELEPHONE ENCOUNTER
Patient was No Show for Tele Hub visit today. Attempted to contact patient x3. Message left for patient to call and reschedule appt.

## 2021-06-16 NOTE — TELEPHONE ENCOUNTER
Patient states she went to ER yesterday at [facility], and they did nothing for her.  She is asking where to go today to get into treatment.    She was advised to call [facility].  And given the number.    She reports she is actively drinking alcohol at this time.  Diana Barbosa RN  Care Connection Triage/refill nurse       Reason for Disposition    Patient sounds very sick or weak to the triager    Patient sounds very anxious or agitated    Requesting admission for substance (drug) abuse    Protocols used: SUBSTANCE ABUSE AND [id number]

## 2021-06-16 NOTE — TELEPHONE ENCOUNTER
"Pt calls yet again, requesting to schedule her 2nd Pfizer covid vaccine dose.  Pt states \"They keep sending me back to a nurse.\"  (See previous telephone encounters of today with the following notes:  Warm transferred back to scheduling. Order for second dose is in chart.)    Now again warm-transferring pt back to [phone] -> choosing to warm transfer since no triage calls are currently in the RN queue .....     should look under \"Open Orders\" or \"Other Orders\" tabs.  There the \"Future\" open status order for Pfizer Covid-19 Vaccine 2nd Dose Appt can be found.  This means pt can now simply schedule the appointment for the 2nd Pfizer dose.    Reached  who states \"Cannot schedule based on something indicated as \"Future\". Needs to have a status of \"Active\" in order for the  to fulfill the appointment.  Therefore plan is to consult with RN Supervisor to determine next steps.    Now routing task back to clinic to request re-entering order for 2nd Pfizer covid vaccine dose.  Please replace the \"future\" order with an \"active\" one so the patient can schedule her follow up.  Thank you-    Pt hopes for a callback today -> [phone].    Mikayla Drummond RN  Care Connection Triage    Reason for Disposition    Caller requesting an appointment, triage offered and declined    Protocols used: PCP CALL - NO TRIAGE-A-      ___________________________    COVID 19 Nurse Triage Plan/Patient Instructions    Please be aware that novel coronavirus (COVID-19) may be circulating in the community. If you develop symptoms such as fever, cough, or SOB or if you have concerns about the presence of another infection including coronavirus (COVID-19), please contact your health care provider or visit  [email or web address].    Disposition/Instructions    Additional COVID19 information to add for patients.   How can I protect others?  If you have symptoms (fever, cough, body aches or trouble breathing): Stay " "home and away from others (self-isolate) until:    At least 10 days have passed since your symptoms started, And     You ve had no fever--and no medicine that reduces fever--for 1 full day (24 hours), And      Your other symptoms have resolved (gotten better).     If you don t have symptoms, but a test showed that you have COVID-19 (you tested positive):    Stay home and away from others (self-isolate). Follow the tips under \"How do I self-isolate?\" below for 10 days (20 days if you have a weak immune system).    You don't need to be retested for COVID-19 before going back to school or work. As long as you're fever-free and feeling better, you can go back to school, work and other activities after waiting the 10 or 20 days.     How do I self-isolate?    Stay in your own room, even for meals. Use your own bathroom if you can.     Stay away from others in your home. No hugging, kissing or shaking hands. No visitors.    Don t go to work, school or anywhere else.     Clean  high touch  surfaces often (doorknobs, counters, handles, etc.). Use a household cleaning spray or wipes. You ll find a full list on the EPA website:  www.epa.gov/pesticide-registration/list-n-disinfectants-use-against-sars-cov-2.    Cover your mouth and nose with a mask, tissue or washcloth to avoid spreading germs.    Wash your hands and face often. Use soap and water.    Caregivers in these groups are at risk for severe illness due to COVID-19:  o People 65 years and older  o People who live in a nursing home or long-term care facility  o People with chronic disease (lung, heart, cancer, diabetes, kidney, liver, immunologic)  o People who have a weakened immune system, including those who:  - Are in cancer treatment  - Take medicine that weakens the immune system, such as corticosteroids  - Had a bone marrow or organ transplant  - Have an immune deficiency  - Have poorly controlled HIV or AIDS  - Are obese (body mass index of 40 or " higher)  - Smoke regularly    Caregivers should wear gloves while washing dishes, handling laundry and cleaning bedrooms and bathrooms.    Use caution when washing and drying laundry: Don t shake dirty laundry, and use the warmest water setting that you can.    For more tips, go to www.cdc.gov/coronavirus/2019-ncov/downloads/10Things.pdf.    How can I take care of myself?  1. Get lots of rest. Drink extra fluids (unless a doctor has told you not to).     2. Take Tylenol (acetaminophen) for fever or pain. If you have liver or kidney problems, ask your family doctor if it s okay to take Tylenol.     Adults can take either:     650 mg (two 325 mg pills) every 4 to 6 hours, or     1,000 mg (two 500 mg pills) every 8 hours as needed.     Note: Don t take more than 3,000 mg in one day.   Acetaminophen is found in many medicines (both prescribed and over-the-counter medicines). Read all labels to be sure you don t take too much.     For children, check the Tylenol bottle for the right dose. The dose is based on the child s age or weight.    3. If you have other health problems (like cancer, heart failure, an organ transplant or severe kidney disease): Call your specialty clinic if you don t feel better in the next 2 days.    4. Know when to call 911: Emergency warning signs include:    Trouble breathing or shortness of breath    Pain or pressure in the chest that doesn t go away    Feeling confused like you haven t felt before, or not being able to wake up    Bluish-colored lips or face    What are the symptoms of COVID-19?     The most common symptoms are cough, fever and trouble breathing.     Less common symptoms include body aches, chills, diarrhea (loose, watery poops), fatigue (feeling very tired), headache, runny nose, sore throat and loss of smell.    COVID-19 can cause severe coughing (bronchitis) and lung infection (pneumonia).    How does it spread?     The virus may spread when a person coughs or sneezes into  the air. The virus can travel about 6 feet this way, and it can live on surfaces.      Common  (household disinfectants) will kill the virus.    Who is at risk?  Anyone can catch COVID-19 if they re around someone who has the virus.    How can others protect themselves?     Stay away from people who have COVID-19 (or symptoms of COVID-19).    Wash hands often with soap and water. Or, use hand  with at least 60% alcohol.    Avoid touching the eyes, nose or mouth.     Wear a face mask when you go out in public, when sick or when caring for a sick person.    Where can I get more information?     [facility]: About COVID-19: [email or web address]/    CDC: What to Do If You re Sick: www.cdc.gov/coronavirus/2019-ncov/about/steps-when-sick.html    CDC: Ending Home Isolation: www.cdc.gov/coronavirus/2019-ncov/hcp/disposition-in-home-patients.html     CDC: Caring for Someone: www.cdc.gov/coronavirus/2019-ncov/if-you-are-sick/care-for-someone.html     [facility]: Interim Guidance for Hospital Discharge to Home: www.health.Counts include 234 beds at the [facility].[place]./diseases/coronavirus/hcp/hospdischarge.pdf    [facility] clinical trials (COVID-19 research studies): clinicalaffairs.[facility]-clinical-trials     Below are the COVID-19 hotlines at the [place] Department of Health ([facility]). Interpreters are available.   o For health questions: Call 600-037-3350 or 1-673.732.6708 (7 a.m. to 7 p.m.)  o For questions about schools and childcare: Call 617-058-8124 or 1-161.291.7293 (7 a.m. to 7 p.m.)              Thank you for taking steps to prevent the spread of this virus.  o Limit your contact with others.  o Wear a simple mask to cover your cough.  o Wash your hands well and often.    Resources    [facility]: About COVID-19: www.[email or web address]/[email or web address]/    CDC: What to Do If You're Sick: www.cdc.gov/coronavirus/2019-ncov/about/steps-when-sick.html    CDC: Ending Home Isolation:  www.cdc.gov/coronavirus/2019-ncov/hcp/disposition-in-home-patients.html     CDC: Caring for Someone: www.cdc.gov/coronavirus/2019-ncov/if-you-are-sick/care-for-someone.html     [facility]: Interim Guidance for Hospital Discharge to Home: www.[facility].mn.us/diseases/coronavirus/hcp/hospdischarge.pdf    [facility] clinical trials (COVID-19 research studies): clinicalaffairs.[facility]/n-clinical-trials     Below are the COVID-19 hotlines at the [place] Department of Health ([facility]). Interpreters are available.   o For health questions: Call 286-608-0456 or 1-836.765.7000 (7 a.m. to 7 p.m.)  o For questions about schools and childcare: Call 556-323-4809 or 1-609.348.5457 (7 a.m. to 7 p.m.)

## 2021-06-16 NOTE — TELEPHONE ENCOUNTER
I would use acetaminophen and/or ibuprofen for pain.  Could use ice , 15 minutes every hour during day.    If not getting better would need to be seen.
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
## 2021-06-16 NOTE — PROGRESS NOTES
Mental Health Visit Note    This is a dual signature report. My supervising clinician is JULIO Kasper.    2/7/2018   Start time: 9:25am    Stop Time: 10:00am   Session # 17    Patsy Santamaria is a 63 y.o. female is being seen today for    Chief Complaint   Patient presents with     MH Follow Up     Psychotherapy follow-up visit to address symptoms of depression and PTSD     New symptoms or complaints: No new symptoms or complaints today.   Relapse with alcohol on 12/22/2017     Functional Impairment:   Personal: 3  Family: 4  Work: 4  Social:3    Clinical assessment of mental status:   Grooming: Well groomed  Attire: Appropriate  Age: Appears Stated  Behavior Towards Examiner: Cooperative  Motor Activity: Within normal   Eye Contact: Appropriate  Mood: Anxious and Depressed  Affect: Congruent w/content of speech, Flat, Anxious and Depressed  Speech/Language: Within normal  Attention: Within normal  Concentration: Within normal  Thought Process: Within normal  Thought Content: Hallucinations: None reported  Delusions: none reported  Orientation: X 3  Memory: No Evidence of Impairment  Judgement: No Evidence of Impairment  Estimated Intelligence: Average  Demonstrated Insight: Adequate  Fund of Knowledge: adequate    Suicidal/Homicidal Ideation present: None Reported This Session    Patient's impression of their current status:   The patient shared that she will be moving into a sober house by the end of the week. She has arranged to pay the owner of the sober house her down payment and has alerted management at her transitional home. She has been working with family members to arrange help for the move. She described the sober house environment and reported feeling very excited about the move. She believes she will get alone well with her roommate - a woman from her transitional home. She believes this will be a safe next step towards independence and maintaining a sober lifestyle.   The patient shared  ways in which she is cautiously optimistic about this major step and is utilizing support from her social network. She identified long-term goals such as managing legal bills and fees while saving money and planning for the future. She has been managing her underlying anxiety by planning ahead and maintaining a structured schedule to complete tasks.        Therapist impression of patients current state:   The patient presented 25 minutes late for a follow-up psychotherapy visit. She believed her appointment to be at 10am so presented late to her 9am scheduled appointment today. She was early enough to conduct a brief status update and check-in. She denied any major stressors and exhibited an ability to manage underlying symptoms of anxiety by planning ahead and utilizing help from social support system. The patient plans to continue attending weekly psychotherapy visits for maintenance stability, especially as she is about to obtain more independence.     Long term goals to treat PTSD symptoms include:  -Reduce the negative impact that the traumatic event has had on many aspects of life and improve level of functioning  -Develop and implement effective coping skills to carry out normal responsibilities and participate constructively in relationships  -Recall the traumatic event without becoming overwhelmed with negative thoughts, feelings, or urges  -Terminate the destructive behaviors that serve to maintain escape and denial while implementing behaviors that promote healing, acceptance of the past events, and responsible living    Type of psychotherapeutic technique provided: Insight oriented and CBT    Progress toward short term goals:Progress as expected, as the patient appeared open-minded and cooperative, demonstrating an ability to appropriately utilize therapy session time.     Review of long term goals: Patient requested more frequent psychotherapy visits to address underlying emotional problems associated  with traumatic life events    Diagnosis:   1. PTSD (post-traumatic stress disorder)    2. Severe episode of recurrent major depressive disorder, without psychotic features    Alcohol abuse in remission  R/O Generalized Anxiety Disorder  R/O Bipolar Disorder    Plan and Follow up: The patient is scheduled to return for a follow-up appointment on 2/14/2018. She requested to attend weekly appointments.    Discharge Criteria/Planning: Client has chronic symptoms and ongoing therapy for maintenance stability recommended.    Performed and documented by GOOD Mendoza    I have read, discussed and reviewed the documentation as presented by GOOD Mendoza LICSW

## 2021-06-16 NOTE — PROGRESS NOTES
This video/telephone visit will be conducted via a call between you and your physician/provider. We have found that certain health care needs can be provided without the need for an in-person physical exam. This service lets us provide the care you need with a video /telephone conversation. If a prescription is necessary we can send it directly to your pharmacy. If lab work is needed we can place an order for that and you can then stop by our lab to have the test done at a later time.    Just as we bill insurance for in-person visits, we also bill insurance for video/telephone visits. If you have questions about your insurance coverage, we recommend that you speak with your insurance company.    Patient has given verbal consent for video/Telephone visit? yes  Patient would like the video visit invitation sent by: SIP CALL to:  [email or web address]    MEENAKSHI/ASH : Can HYATT LPN  Pt is following up with Dr. Brunner at [facility], said she is trying to get in to outpatient treatment at [facility].  Reports smoking 1 PPD, drinks off and on, ~ 1 pint on weekends.   Patient verified allergies, medications and pharmacy via phone.   PHQ : 6  ALISIA: 2   Patient states she  is ready for visit.   UA collected and sent to the lab.    : No access for this provider  ________________________________________  Medications Phoned  to Pharmacy [] yes [x]no  Name of Pharmacist:  List Medications, including dose, quantity and instructions    Medications ordered this visit were e-scribed.  Verified by order class [] yes  [x] no    Medication changes or discontinuations were communicated to patient's pharmacy: [] yes  [x] no    Dictation completed at time of chart check: [] yes  [x] no    I have checked the documentation for today s encounters and the above information has been reviewed and completed.

## 2021-06-16 NOTE — PROGRESS NOTES
Patient here today for follow up of medication management. States she is having a hard time sleeping due to high anxiety, states anxiety 4/5 at night time due to mice at the new sober house and stating she is deathly afraid of mice. States depression is not to bad. Denies SI/HI. States she is havening low motivation.

## 2021-06-16 NOTE — PROGRESS NOTES
Mental Health Visit Note    This is a dual signature report. My supervising clinician is JULIO Kasper.    2/14/2018   Start time: 9:00am    Stop Time: 9:55am   Session # 18    Patsy Santamaria is a 63 y.o. female is being seen today for    Chief Complaint   Patient presents with     MH Follow Up     Psychotherapy follow-up visit to address depression and PTSD     New symptoms or complaints: No new symptoms or complaints today.   Relapse with alcohol on 12/22/2017     Functional Impairment:   Personal: 3  Family: 4  Work: 4  Social:3    Clinical assessment of mental status:   Grooming: Well groomed  Attire: Appropriate  Age: Appears Stated  Behavior Towards Examiner: Cooperative  Motor Activity: Within normal   Eye Contact: Appropriate  Mood: Anxious and Depressed  Affect: Congruent w/content of speech, Flat, Anxious and Depressed  Speech/Language: Within normal  Attention: Within normal  Concentration: Within normal  Thought Process: Within normal  Thought Content: Hallucinations: None reported  Delusions: none reported  Orientation: X 3  Memory: No Evidence of Impairment  Judgement: No Evidence of Impairment  Estimated Intelligence: Average  Demonstrated Insight: Adequate  Fund of Knowledge: adequate    Suicidal/Homicidal Ideation present: None Reported This Session    Patient's impression of their current status:   The patient reported that she moved out of the transitional home over the weekend. She moved into a sober house with a former roommate from the transitional home. She discussed unanticipated problems with the new home including mice. She described ways in which she has been busy cleaning the home and described the home as being in poor condition. She shared that her new roommate's boyfriend is living there even though he is not supposed to be which is causing her to feel uncomfortable. She shared that she only signed a 3-month lease so she believes that she'll be okay for now until something  "else becomes available. She believes that this living situation is an appropriate next step away from the transitional home. She feels ready to live independently. She shared ways in which she is managing impulses and creating daily schedules in addition to remaining busy with daily tasks. She is taking care of legal fees and paying bills. She stated, \"I feel like a normal person again.\" Overall, the patient maintains a positive outlook about her future.          Therapist impression of patients current state:   The patient presented on-time for a follow-up psychotherapy visit. She appeared euthymic and jovial while easily engaging in conversation. She exhibited an ability to manage underlying symptoms of anxiety by planning ahead and utilizing help from social support system. The patient exhibited an ability to manage environmental stressors while maintaining sobriety from alcohol. She demonstrated ways in which she is resolving problems by creating and following through with action plans. She is attempting to utilize a schedule and write in her journal. She identified a need to create a relapse prevention plan in order to manage high risk exposures.  The patient plans to continue attending weekly psychotherapy visits for maintenance stability, especially as she has obtained more independence in daily functioning after moving out of the transitional home.     Long term goals to treat PTSD symptoms include:  -Reduce the negative impact that the traumatic event has had on many aspects of life and improve level of functioning  -Develop and implement effective coping skills to carry out normal responsibilities and participate constructively in relationships  -Recall the traumatic event without becoming overwhelmed with negative thoughts, feelings, or urges  -Terminate the destructive behaviors that serve to maintain escape and denial while implementing behaviors that promote healing, acceptance of the past events, and " responsible living    Type of psychotherapeutic technique provided: Insight oriented and CBT    Progress toward short term goals:Progress as expected, as the patient appeared open-minded and cooperative, demonstrating an ability to appropriately utilize therapy session time. She is beginning to achieve more independence while maintaining a sober lifestyle.     Review of long term goals: Patient requested more frequent psychotherapy visits to address underlying emotional problems associated with traumatic life events    Diagnosis:   1. PTSD (post-traumatic stress disorder)    2. Severe episode of recurrent major depressive disorder, without psychotic features    Alcohol abuse in remission  R/O Generalized Anxiety Disorder  R/O Bipolar Disorder    Plan and Follow up: The patient is scheduled to return for a follow-up appointment on 2/21/2018. She requested to attend weekly appointments.    Discharge Criteria/Planning: Client has chronic symptoms and ongoing therapy for maintenance stability recommended.    Performed and documented by GOOD Mendoza    I have read, discussed and reviewed the documentation as [facility]

## 2021-06-16 NOTE — PROGRESS NOTES
"ASSESSMENT/PLAN:  1. Cough  XR Chest 2 Views    Influenza A/B Rapid Test   2. Edema  potassium chloride (KLOR-CON) 10 MEQ CR tablet    furosemide (LASIX) 40 MG tablet   3. Primary osteoarthritis of right knee  diclofenac sodium (VOLTAREN) 1 % Gel   4. Acute bronchitis  predniSONE (DELTASONE) 20 MG tablet    amoxicillin-clavulanate (AUGMENTIN) 875-125 mg per tablet       This is a 64 yo female here for:  1.  Hospital follow up - had recent hospitalization from 3/16-3/17, for alcohol abuse.  She had been sober for > 1 year, and started drinking again while with family on vacation.  She has returned to her aftercares/meetings  2.  Cough - developed in last day or two - looks ill.  CXR is performed and reviewed - negative.  Rapid influenza testing is negative.  Will treat for acute bronchitis with Prednisone, (generic) Augmentin.    3.  DJD - right knee - ongoing pain - add Diclofenac for pain  4.  LE Edema - continue Furosemide, potassium        Medications Discontinued During This Encounter   Medication Reason     potassium chloride (KLOR-CON) 10 MEQ CR tablet Reorder     diclofenac sodium (VOLTAREN) 1 % Gel Reorder     furosemide (LASIX) 40 MG tablet Reorder     There are no Patient Instructions on file for this visit.    Chief Complaint:  Chief Complaint   Patient presents with     Hospital Visit Follow Up     Admitted: 3/16/18 d/c: 3/17/18 at [facility] for Alcohol Abuse       HPI:   Patsy Santamaria is a 63 y.o. female c/o  1.  Admitted 3/16-3/17 for alcohol abuse  Was out of town with friends - \"they were all drinking, so I thought I could handle it\"  2.  Cough - developed couple days ago  No fever, no chills  Coughing - chest hurts from coughing  Started with sinus stuff  Did a neb the other night -       PMH:   Patient Active Problem List    Diagnosis Date Noted     Primary osteoarthritis of right knee 03/21/2018     Alcohol withdrawal 03/17/2018     Chronic alcohol dependence, continuous 03/16/2018     Low " backache 2018     Morbid obesity 2018     Bilateral edema of lower extremity 2017     Snoring 2017     Carpal tunnel syndrome on both sides 2017     Trochanteric bursitis of left hip 10/25/2016     Alcohol withdrawal seizure without complication 2016     Hypoxia 2016     Alcohol abuse 2016     Elevated LFTs 2016     New onset seizure 2016     Claustrophobia 2015     Cervical disc disease 2015     COPD (chronic obstructive pulmonary disease) with emphysema 2015     Post traumatic stress disorder 2015     Chronic Reflux Esophagitis      Peripheral Neuropathy      Localized Primary Osteoarthritis Of The Carpometacarpal Joint      Ganglion Of The Right Foot      Major Depression, Recurrent      Nicotine Dependence      Vitamin D Deficiency      Past Medical History:   Diagnosis Date     Acute solar dermatitis      Alcohol abuse      Anxiety      Arthritis      Chronic alcohol dependence, continuous 3/16/2018     Chronic reflux esophagitis      COPD (chronic obstructive pulmonary disease)      Dermatitis      Ganglion     right foot     H. pylori infection      Low backache 3/16/2018     Menopause     age 52     Past Surgical History:   Procedure Laterality Date     APPENDECTOMY       CT APPENDECTOMY      Description: Appendectomy;  Recorded: 2008;  Comments: childhood     CT  DELIVERY ONLY      Description:  Section;  Recorded: 2008;     CT EXCIS TENDN/CAPSULE LESN,FOOT      Description: Excision Of Cyst Of Tendon Sheath Of Foot;  Proc Date: 10/28/2011;  Comments: [facility]     CT HEMORRHOIDECTOMY INTERNAL RUBBER BAND LIGATIONS      Description: Hemorrhoidectomy;  Recorded: 2008;     CT KNEE SCOPE,DIAGNOSTIC      Description: Arthroscopy Knee Right;  Proc Date: 10/11/2005;  Comments: for right knee patella subluxation with lateral retinacular release; subpatellar chondroplasty     CT LATERAL  RETINACULAR RELEASE OPEN      Description: Knee Lateral Retinacular Release;  Proc Date: 10/11/2005;     UT LIGATE FALLOPIAN TUBE      Description: Tubal Ligation;  Recorded: 09/01/2008;     SKIN BIOPSY       TONSILLECTOMY       Social History     Social History     Marital status: Single     Spouse name: N/A     Number of children: N/A     Years of education: N/A     Occupational History     [place] [employer]     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[facility])     Social History Main Topics     Smoking status: Current Every Day Smoker     Packs/day: 1.00     Types: Cigarettes     Smokeless tobacco: Never Used      Comment: 1 pack per day     Alcohol use No      Comment: sober since 12/22/2017     Drug use: No     Sexual activity: Not on file     Other Topics Concern     Not on file     Social History Narrative       Meds:    Current Outpatient Prescriptions:      acetaminophen (TYLENOL) 650 MG CR tablet, Take 650 mg by mouth every 8 (eight) hours as needed for pain., Disp: , Rfl:      cholecalciferol, vitamin D3, 5,000 unit capsule, Take 5,000 Units by mouth daily., Disp: 30 capsule, Rfl: 5     cyclobenzaprine (FLEXERIL) 5 MG tablet, Take 1 tablet (5 mg total) by mouth at bedtime as needed for muscle spasms., Disp: 15 tablet, Rfl: 0     DULoxetine (CYMBALTA) 30 MG capsule, Take 90 mg by mouth at bedtime., Disp: , Rfl:      ipratropium-albuterol (DUO-NEB) 0.5-2.5 mg/3 mL nebulizer, Take 3 mL by nebulization every 6 (six) hours as needed (wheezing, short of breath)., Disp: 90 mL, Rfl: 1     multivitamin (ONE A DAY) per tablet, Take 1 tablet by mouth daily., Disp: 120 tablet, Rfl: 2     omeprazole (PRILOSEC) 20 MG capsule, Take 20 mg by mouth at bedtime., Disp: , Rfl:      oxyCODONE (ROXICODONE) 5 MG immediate release tablet, Take 0.5-1 tablets (2.5-5 mg total) by mouth every 8 (eight) hours as needed. Need to be cautious when taking narcotics as narcotics can make you drowsy and confused. Please do not take any further  doses if you feel drowsy or start feeling confused. Also avoid driving when taking narcotics as it can make you drowsy, Disp: 15 tablet, Rfl: 0     amoxicillin-clavulanate (AUGMENTIN) 875-125 mg per tablet, Take 1 tablet by mouth 2 (two) times a day for 10 days., Disp: 20 tablet, Rfl: 0     diclofenac sodium (VOLTAREN) 1 % Gel, Apply 1 application topically 4 (four) times a day as needed. Right knee, Disp: 100 g, Rfl: 1     furosemide (LASIX) 40 MG tablet, Take 2 tablets (80 mg total) by mouth daily., Disp: 60 tablet, Rfl: 4     potassium chloride (KLOR-CON) 10 MEQ CR tablet, Take 1 tablet (10 mEq total) by mouth daily., Disp: 30 tablet, Rfl: 4     predniSONE (DELTASONE) 20 MG tablet, Take 2 tablets (40 mg total) by mouth daily for 5 doses., Disp: 10 tablet, Rfl: 0    Allergies:  Allergies   Allergen Reactions     Codeine Nausea Only     Hydrochlorothiazide Rash     phototoxicity - med was d/lora       Diclofenac Rash     Tolerates the topical gel     Sulfa (Sulfonamide Antibiotics) Rash     Sulfasalazine Rash       ROS:  Pertinent positives as noted in HPI; otherwise 12 point ROS negative.      Physical Exam:  EXAM:  BP 92/62 (Patient Site: Left Arm, Patient Position: Sitting, Cuff Size: Adult Large)  Pulse 63  Temp 98.1  F (36.7  C) (Oral)   Wt (!) 229 lb (103.9 kg)  LMP 03/06/2002  SpO2 98%  Breastfeeding? No  BMI 41.88 kg/m2   Gen:  NAD, appears ill,  well-hydrated  HEENT:  TMs nl, oropharynx benign, nasal mucosa nl, conjunctiva clear  Neck:  Supple, no adenopathy, no thyromegaly, no carotid bruits, no JVD  Lungs: coarse rhonchi  Cor:  RRR no murmur  Abd:  Soft, nontender, BS+, no masses, no guarding or rebound, no HSM  Extr:  Neg.  Neuro:  No asymmetry  Skin:  Warm/dry        Results:  Results for orders placed or performed in visit on 03/21/18   Influenza A/B Rapid Test   Result Value Ref Range    Influenza  A, Rapid Antigen No Influenza A antigen detected No Influenza A antigen detected    Influenza B,  Rapid Antigen No Influenza B antigen detected No Influenza B antigen detected           XR CHEST 2 VIEWS  3/21/2018 11:03 AM     INDICATION: Cough  COMPARISON: None submitted.     FINDINGS: Minimal scarring in the left lower lobe. Right lung is clear. No signs of pneumonia or failure. Heart and pulmonary vascularity are normal

## 2021-06-16 NOTE — PROGRESS NOTES
Correct pharmacy verified with patient and confirmed in snapshot? [x] yes []no    Charge captured ? [x] yes  [] no    Medications Phoned  to Pharmacy [] yes [x]no  Name of Pharmacist:  List Medications, including dose, quantity and instructions      Medication Prescriptions given to patient   [] yes  [x] no   List the name of the drug the prescription was written for.       Medications ordered this visit were e-scribed.  Verified by order class [] yes  [x] no    Medication changes or discontinuations were communicated to patient's pharmacy: [] yes  [x] no    UA collected [] yes  [x] no    [place] Prescription Monitoring Program Reviewed? [] yes  [x] no    Referrals were made to:  none    Future appointment was made: [x] yes  [] no    Dictation completed at time of chart check: [x] yes  [] no    I have checked the documentation for today s encounters and the above information has been reviewed and completed.

## 2021-06-16 NOTE — PROGRESS NOTES
Mental Health Visit Note    This is a dual signature report. My supervising clinician is JULIO Kasper.    3/21/2018   Start time: 9:00am    Stop Time: 9:55am   Session # 19    Patsy Santamaria is a 63 y.o. female is being seen today for    Chief Complaint   Patient presents with     MH Follow Up     Psychotherapy follow-up visit to address symptoms of depression and recent relapse with alcohol     New symptoms or complaints: Relapse with alcohol on 3/7/2018.   Relapse with alcohol on 12/22/2017       Functional Impairment:   Personal: 4  Family: 3  Work: 4  Social:3    Clinical assessment of mental status:   Grooming: Well groomed  Attire: Appropriate  Age: Appears Stated  Behavior Towards Examiner: Cooperative  Motor Activity: Within normal   Eye Contact: Appropriate  Mood: Anxious and Depressed  Affect: Congruent w/content of speech, Flat, Anxious and Depressed  Speech/Language: Within normal  Attention: Within normal  Concentration: Within normal  Thought Process: Within normal  Thought Content: Hallucinations: None reported  Delusions: none reported  Orientation: X 3  Memory: No Evidence of Impairment  Judgement: No Evidence of Impairment  Estimated Intelligence: Average  Demonstrated Insight: Adequate  Fund of Knowledge: adequate    Suicidal/Homicidal Ideation present: None Reported This Session    Patient's impression of their current status:   The patient reported that she is not feeling well. She is not sure if she is still withdrawing from alcohol intoxication or sick with something else. She has a doctor's appointment scheduled for today.  She reported that she obtained a sponsor and is going to AA meetings.  She plans to meet with her sponsor every Monday. She believes that visiting family friends in [place] contributed to her relapse. She faced many high risk exposures and was vulnerable. She does not feel as vulnerable here in [place] but did discuss problems with her current living situation. She  "requested to meet with the clinic  to explore other living arrangements. She is currently staying in a sober house that has mice. She shared that her roommate's boyfriend also causes her to feel uncomfortable. Overall, she is very unhappy with her current living situation and believes that this is negatively impacting her mood. She does not believe the living environment will increase vulnerability for relapse and she plans to \"get back on track\" with the AA program. She stopped attending consistent meetings after moving into the sober house.       Therapist impression of patients current state:   The patient presented on-time for a follow-up psychotherapy visit. She appeared physically uncomfortable. She has an appointment scheduled with her PCP today to address medical concerns.   The patient presented to the ED on 3/16/2018 and was admitted over night. Per chart review:  Patient is a  63 yr old female with  H/o chronic alcohol dependence since 20 yrs. She was in treatment in the past 2 yrs back - inpatient treatment and outpatient treatment. Last time she was in treatment was in the past 2 yrs back - inpatient treatment. She has been sober for 14 months And then she has been drinking for a week straight. She has been drinking vodka about a liter per day. She was admitted with tremors and anxiety and acute non radiating low backache. She seems to have  acute alcohol intoxication and is at risk of  Alcohol withdrawal. She was put on CIWA protocol, iv protonix and did not score high on the CIWA.  XR of the low back and pelvis did not reveal any #'s and symptoms are likely from muscle spasm and have improved with  narcotics given prn. Patient has shown significant improvement in his condition clinically at this time.  Patient is being discharged in a stable condition to home.   The patient appeared open and honest while discussing the events of her most recent relapse. She obtained a sponsor and plans to " attend more frequent AA meetings. She does not believe that she needs to go back to treatment. She believes that her current living situation has caused stress which created the desire to escape to [place] where she visited with old friends and faced high risk exposures. She has not reportedly had anything to drink prior to [place] or since returning on 3/14/18. She does experience some cravings and urges but does not feel as vulnerable to relapse here in [place]. She compared her current thoughts and feelings to her situation prior to treatment - she does not feel as lost or hopeless. Thus, she believes she can receive adequate support at an outpatient level at this time. The therapist strongly encouraged re-evaluation while supported patient in her ability to obtain a sponsor so quickly.   The therapist will continue to address warning signs and precipitating factors contributing to recent relapse while encouraging patient to obtain more support in the community.       Type of psychotherapeutic technique provided: Insight oriented and CBT    Progress toward short term goals:Poor progress, as patient relapsed with alcohol on 3/7-3/14. However, patient was honest and forthcoming about the recent relapse during session and is motivated to get back on track.     Review of long term goals: Not done at today's visit    Diagnosis:   1. Severe episode of recurrent major depressive disorder, without psychotic features    2. PTSD (post-traumatic stress disorder)    Alcohol abuse  R/O Generalized Anxiety Disorder  R/O Bipolar Disorder    Plan and Follow up: The patient is scheduled to return for a follow-up appointment on 3/28/2018.   She requested to meet with clinic  to address housing concerns.     Discharge Criteria/Planning: Client has chronic symptoms and ongoing therapy for maintenance stability recommended.    Performed and documented by GOOD Mendoza    I have read, discussed and reviewed the documentation  as presented by Araceli Hamilton, SW  Nury Francisco LICSW

## 2021-06-16 NOTE — TELEPHONE ENCOUNTER
Warm transferred back to scheduling. Order for second dose is in chart.  MALIKA CORTEZ RN  [facility]

## 2021-06-17 NOTE — TELEPHONE ENCOUNTER
Noted. Agree with seeking treatment and 911 if emergency.    Betty Zelaya MD  Addiction Medicine

## 2021-06-17 NOTE — TELEPHONE ENCOUNTER
Telephone Encounter by Alejandra Lawrence at 4/28/2020  9:27 AM     Author: Alejandra Lawrence Service: -- Author Type: --    Filed: 4/28/2020  9:27 AM Encounter Date: 4/27/2020 Status: Signed    : Alejandra Lawrence       PRIOR AUTHORIZATION DENIED    Denial Rational: medicare excludes drugs considered over the counter.  Benefit exclusion        Appeal Information: This medication was denied. If physician would like to appeal because patient has contraindication or allergy to covered medication please write letter of medical necessity and route back to PA team to initiate.  If no further action is needed please close encounter thank you.

## 2021-06-17 NOTE — PROGRESS NOTES
"Individual Phone Session    Patsy Santamaria is a 66 y.o. female who is being evaluated via telephone visit.      The patient has been notified of the following:      \"We have found that certain health care needs can be provided without the need for a face to face visit.  This service lets us provide the care you need with a phone conversation. I will have full access to your [facility] medical record during this entire phone call. I will be taking notes for your medical record. Since this is like an office visit, we will bill your insurance company for this service. There are potential benefits and risks of telephone visits (e.g. limits to patient confidentiality) that differ from in-person visits.?  Confidentiality still applies for telephone services, and nobody will record the visit.  It is important to be in a quiet, private space that is free of distractions (including cell phone or other devices) during the visit.?If during the course of the call I believe a telephone visit is not appropriate, you will not be charged for this service\"    Counselor and patient spoke via phone for 48 minutes to complete drug/alcohol evaluation.    Call Notes: Counselor contacted patient for continuity of care during suspension of in person services.     Patient was actively and directly involved in the assessment interview and treatment planning. I have reviewed the note as documented above.  This accurately captures the substance of my conversation with the patient.    Provider location: remote  Patient location: home  Mode of Transmission: phone    Call Started at: 9:00 AM  Call Ended at: 9:48 AM    Patient's engagement in the telephone visit: high     Supervising MD: Dr Can Chavez, MEd, [facility]  4/27/2021, 10:24 AM   "

## 2021-06-17 NOTE — PROGRESS NOTES
Patsy was a no show today for this scheduled SW visit. This is her second time no showing. Sending message to PCP.

## 2021-06-17 NOTE — TELEPHONE ENCOUNTER
Faxed Pt's Drug/Alcohol assessment, LUDIN and face sheet to [facility] for referral to IP ORAL treatment.

## 2021-06-17 NOTE — PROGRESS NOTES
Correct pharmacy verified with patient and confirmed in snapshot? [x] yes []no    Charge captured ? [x] yes  [] no    Medications Phoned  to Pharmacy [] yes [x]no  Name of Pharmacist:  List Medications, including dose, quantity and instructions      Medication Prescriptions given to patient   [] yes  [x] no   List the name of the drug the prescription was written for.       Medications ordered this visit were e-scribed.  Verified by order class [x] yes  [] no  Gabapentin 300 mg    Medication changes or discontinuations were communicated to patient's pharmacy: [] yes  [x] no    UA collected [x] yes  [] no    [place] Prescription Monitoring Program Reviewed? [x] yes  [] no    Referrals were made to:none      Future appointment was made: [x] yes  [] no    Dictation completed at time of chart check: [x] yes  [] no    I have checked the documentation for today s encounters and the above information has been reviewed and completed.
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
## 2021-06-17 NOTE — TELEPHONE ENCOUNTER
Returned jovi to patient and she reported the following. Patient is waiting to get into treating and she reports she is on the fence about that. She wants Dr. Zelaya to know that she is not doing so well at the moment. Reminded patient to seek emergency care when needed at the nearest ER, urgent care or call 911. Patient verbalized understanding.

## 2021-06-17 NOTE — TELEPHONE ENCOUNTER
Called Pt, left message stating that I checked with [facility] and they do have the referral for IP ORAL treatment. As we discussed during your assessment, the waiting list can be 3+ weeks for [facility] IP - your assessment was on 4/26 and they received the referral on 4/27.  Questions about when you might be admitted to [facility] can be directed to [facility] - intake office is [phone]. If you have questions about this message, please call me back - [phone].

## 2021-06-17 NOTE — PROGRESS NOTES
"Mental Health Psychotherapy Telephone Note    Patient: Patsy Santamaria    : 1955 MRN: [id number]    Completed a 2 point identification verification: patient verbalized full name and date of birth.     Date: 2021  Start time: 10:00am   Stop Time: 10:35am   Session # 2    The patient has been notified of the following:   \"We have found that certain health care needs can be provided without the need for a face to face visit.  This service lets us provide the care you need with a phone conversation.  I will have full access to your [facility] medical record during this entire phone call.   I will be taking notes for your medical record. Since this is like an office visit, we will bill your insurance company for this service.  There are potential benefits and risks of telephone visits (e.g. limits to patient confidentiality) that differ from in-person visits.?  Confidentiality still applies for telephone services, and nobody will record the visit.  It is important to be in a quiet, private space that is free of distractions (including cell phone or other devices) during the visit.?? If during the course of the call I believe a telephone visit is not appropriate, you will not be charged for this service\"  Consent has been obtained for this service by care team member: Yes, per verbal agreement   Session Type: Patient is participating in a telemedicine phone visit       Those present for this session: patient and therapist      Chief Complaint   Patient presents with      Follow Up     Psychotherapy follow-up visit for alcohol dependence        New symptoms or complaints: None reported    Functional Impairment:   Personal: 4  Family: 3  Work: 4  Social:4          ASSESSMENT: Current Emotional / Mental Status (status of significant symptoms):              Risk status (Self / Other harm or suicidal ideation)              Patient denies risks to personal safety              Patient denies current or recent " suicidal ideation or behaviors.              Patient denies current or recent homicidal ideation or behaviors.              Patient denies current or recent self injurious behavior or ideation.              Patient denies other safety concerns.              Patient identifies risk factors (alcoholism, pandemic, history of trauma)              Patient identifies protective factors (children, boyfriend)              Recommended that patient call 911 or go to the local ED should there be a change in any of these risk factors.                Attitude:                                   Cooperative               Orientation:                             x3              Speech                          Rate / Production:       Normal/ Responsive                          Volume:                       Normal               Mood:                                      Anxious  Depressed  Irritable               Thought Content:                    Clear               Thought Form:                        Coherent  Logical               Insight:                                     Good   Mental status reviewed and no changes noted as of 4/26/21    Patient's impression of their current status:   Patient reports that her plan is to participate in inpatient treatment for alcoholism. She has an assessment tomorrow. She is feeling depressed and still drinking on and off. She has withdrawals when she tries to stop drinking.      Therapist impression of patients current state:   Patient presents for a therapy visit to address symptoms of depression, anxiety and chemical dependency problems. Therapist recommends that patient participate in inpatient chemical dependency treatment. Therapist will not update a mental health assessment until patient completes treatment.     Type of psychotherapeutic technique provided: CBT and supportive counseling    Progress toward short term goals: Progress as expected, with patient calling to set up an  appointment after significant gap in services.     Review of long term goals:   Not done at today's visit   Treatment plan updated on 3/16/21    Need to update standard diagnostic assessment if/when patient returns for outpatient psychotherapy services      Diagnosis:  1. Alcohol use disorder, severe, dependence (H)    2. Moderate episode of recurrent major depressive disorder (H)          Plan and Follow up:   Patient agrees to participate in CD treatment  Therapist will be notified about future care plans     Discharge Criteria/Planning: Client has chronic symptoms and ongoing therapy for maintenance stability recommended.              I have reviewed the note as documented above.  This accurately captures the substance of my conversation with the patient.  As the provider I attest to compliance with applicable laws and regulations related to telemedicine.  ROBSON MendozaSW

## 2021-06-17 NOTE — TELEPHONE ENCOUNTER
Pt called and left another message - I called back and we spoke. She expressed much frustration about her medicare not being accepted for IP treatment anywhere else but [facility], and that she had called and gotten different messages from different individuals at [facility]. I emphasized that if she was going to call [facility] to check on her status, that she should call the intake office, and not the IP unit directly (it sounds like that is where some miscommunication could have happened).

## 2021-06-17 NOTE — TELEPHONE ENCOUNTER
Reason for Call:  Other call back      Detailed comments: client called stating Elsi ref her to in/pt treatment @ [facility]   Client called [facility] they did not have any info re: client or ref.   client asked to be re-hubert with  next avail was [facility].   client is asking if someone from Dr. Zelayas office will call her   Back regarding the recommendations given @ the last appt     Phone Number Patient can be reached at: Home number on file [phone] (home)    Best Time: ASAP    Can we leave a detailed message on this number?: Yes    Call taken on 5/7/2021 at 2:32 PM by Gilberto Elizabeth

## 2021-06-18 NOTE — PROGRESS NOTES
"ASSESSMENT/PLAN:  1. Hypokalemia  Basic Metabolic Panel   2. Major Depression, Recurrent  DULoxetine (CYMBALTA) 60 MG capsule   3. Peripheral edema  furosemide (LASIX) 80 MG tablet    potassium chloride (K-DUR,KLOR-CON) 20 MEQ tablet   4. Alcohol use disorder, severe, dependence (H)  Hepatic Profile   5. Chronic Reflux Esophagitis  omeprazole (PRILOSEC) 20 MG capsule       This  Is a 62 yo female with:  1.  Alcohol use disorder - severe, recurrent - recently spent 30 days in inpatient treatment.  She had started drinking again when she was with family members.  She thought she could control it, but obviously couldn't.  Was getting \"out of control\" before.  Thought she needed help - didn't want to go through withdrawals on her own again.   Thinks she is doing okay - not doing specific outpatient therapy.    2.  During her evaluation she was found to be hypokalemic - needs recheck on levels today.  Does take diuretic therapy.    3.  Major Depression - continues to experience symptoms - continue medications; encourage follow up with psych.  4.  Chronic edema - continue diuretic therapy, potassium replacement - will check potassium  5  Chronic Reflux Esophagitis - continue OMeprazole.         Medications Discontinued During This Encounter   Medication Reason     DULoxetine (CYMBALTA) 60 MG capsule Reorder     furosemide (LASIX) 80 MG tablet Reorder     omeprazole (PRILOSEC) 20 MG capsule Reorder     potassium chloride (K-DUR,KLOR-CON) 20 MEQ tablet Reorder     There are no Patient Instructions on file for this visit.    Chief Complaint:  Chief Complaint   Patient presents with     ER follow up     Knee Pain       HPI:   Patsy Santamaria is a 63 y.o. female c/o  \"turned herself in\" for alcohol treatment  On Naltrexone -   \"I didn't know alcohol can kill you\" - \"that's why I had seizures before\"  Back on track now -   Did inpatient treatment; has outpatient AA meetings, has a sponsor;  Has done outpatient twice " "before  Waiting on housing now - between places; now living with a gabby that doesn't drink or do drugs (\"a true friend\") - trying to find a job  Doesn't want to go to outpatient again  Tries to keep busy as she can  Adjusted her meds - had \"too much depression medication\"  On potassium twice a day  Still going to therapy -   Still sees psych every 3 months    Now, concerned by black spots on back   (these are seborrheic keratoses) - one is hard and crusted, others are waxy and fairly consistent in color    PMH:   Patient Active Problem List    Diagnosis Date Noted     Severe episode of recurrent major depressive disorder, without psychotic features (H)      Alcoholic hepatitis      Peripheral edema      Diuretic-induced hypokalemia      Alcohol use disorder, severe, dependence (H) 05/07/2018     Primary osteoarthritis of right knee 03/21/2018     Alcohol withdrawal (H) 03/17/2018     Chronic alcohol dependence, continuous (H) 03/16/2018     Low backache 03/16/2018     Morbid obesity (H) 01/05/2018     Bilateral edema of lower extremity 06/28/2017     Snoring 06/28/2017     Carpal tunnel syndrome on both sides 03/06/2017     Trochanteric bursitis of left hip 10/25/2016     Alcohol withdrawal seizure without complication (H) 05/14/2016     Hypoxia 05/13/2016     Alcohol abuse 05/13/2016     Elevated LFTs 05/13/2016     New onset seizure (H) 05/12/2016     Claustrophobia 12/18/2015     Cervical disc disease 12/14/2015     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) 09/16/2015     Post traumatic stress disorder 02/13/2015     Chronic Reflux Esophagitis      Peripheral Neuropathy      Localized Primary Osteoarthritis Of The Carpometacarpal Joint      Ganglion Of The Right Foot      Major depression, recurrent (H)      Nicotine Dependence      Vitamin D Deficiency      Past Medical History:   Diagnosis Date     Acute solar dermatitis      Alcohol abuse      Anxiety      Arthritis      Chronic alcohol " dependence, continuous (H) 3/16/2018     Chronic reflux esophagitis      COPD (chronic obstructive pulmonary disease) (H)      Dermatitis      Ganglion     right foot     H. pylori infection      Low backache 3/16/2018     Menopause     age 52     Past Surgical History:   Procedure Laterality Date     APPENDECTOMY       HI APPENDECTOMY      Description: Appendectomy;  Recorded: 2008;  Comments: childhood     HI  DELIVERY ONLY      Description:  Section;  Recorded: 2008;     HI EXCIS TENDN/CAPSULE LESN,FOOT      Description: Excision Of Cyst Of Tendon Sheath Of Foot;  Proc Date: 10/28/2011;  Comments: [facility]     HI HEMORRHOIDECTOMY INTERNAL RUBBER BAND LIGATIONS      Description: Hemorrhoidectomy;  Recorded: 2008;     HI KNEE SCOPE,DIAGNOSTIC      Description: Arthroscopy Knee Right;  Proc Date: 10/11/2005;  Comments: for right knee patella subluxation with lateral retinacular release; subpatellar chondroplasty     HI LATERAL RETINACULAR RELEASE OPEN      Description: Knee Lateral Retinacular Release;  Proc Date: 10/11/2005;     HI LIGATE FALLOPIAN TUBE      Description: Tubal Ligation;  Recorded: 2008;     SKIN BIOPSY       TONSILLECTOMY       Social History     Social History     Marital status: Single     Spouse name: N/A     Number of children: N/A     Years of education: N/A     Occupational History     [employer]     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[employer])     Social History Main Topics     Smoking status: Current Every Day Smoker     Packs/day: 1.00     Years: 48.00     Types: Cigarettes     Smokeless tobacco: Never Used      Comment: 1 pack per day     Alcohol use 451.8 oz/week     3 Glasses of wine, 750 Shots of liquor per week     Drug use: No     Sexual activity: Yes     Partners: Male     Birth control/ protection: None     Other Topics Concern     Not on file     Social History Narrative       Meds:    Current Outpatient Prescriptions:      " albuterol (PROAIR HFA;PROVENTIL HFA;VENTOLIN HFA) 90 mcg/actuation inhaler, Inhale 2 puffs 4 (four) times a day as needed for wheezing or shortness of breath., Disp: 1 Inhaler, Rfl: 0     cholecalciferol, vitamin D3, 5,000 unit capsule, Take 5,000 Units by mouth daily., Disp: 30 capsule, Rfl: 5     DULoxetine (CYMBALTA) 60 MG capsule, Take 1 capsule (60 mg total) by mouth at bedtime., Disp: 30 capsule, Rfl: 0     furosemide (LASIX) 80 MG tablet, Take 1 tablet (80 mg total) by mouth daily., Disp: 30 tablet, Rfl: 0     ipratropium-albuterol (DUO-NEB) 0.5-2.5 mg/3 mL nebulizer, Take 3 mL by nebulization every 6 (six) hours as needed (wheezing, short of breath)., Disp: 90 mL, Rfl: 1     naltrexone (DEPADE) 50 mg tablet, Take 1 tablet (50 mg total) by mouth daily., Disp: 30 tablet, Rfl: 0     omeprazole (PRILOSEC) 20 MG capsule, Take 1 capsule (20 mg total) by mouth at bedtime., Disp: 30 capsule, Rfl: 0     potassium chloride (K-DUR,KLOR-CON) 20 MEQ tablet, Take 1 tablet (20 mEq total) by mouth 2 (two) times a day., Disp: 30 tablet, Rfl: 0     diclofenac sodium (VOLTAREN) 1 % Gel, Apply to affected area three times a day prn, Disp: 1 Tube, Rfl: 0     traZODone (DESYREL) 50 MG tablet, Take 1-2 tablets ( mg total) by mouth at bedtime as needed for sleep., Disp: 60 tablet, Rfl: 0    Allergies:  Allergies   Allergen Reactions     Codeine Nausea Only     Hydrochlorothiazide Rash     phototoxicity - med was d/lora       Diclofenac Nausea Only     Tolerates the topical gel     Sulfa (Sulfonamide Antibiotics) Rash     Sulfasalazine Rash       ROS:  Pertinent positives as noted in HPI; otherwise 12 point ROS negative.      Physical Exam:  EXAM:  /68  Pulse 76  Resp 20  Ht 5' 3\" (1.6 m)  Wt (!) 235 lb (106.6 kg)  LMP 03/06/2002  SpO2 95%  BMI 41.63 kg/m2   Gen:  NAD, appears well, well-hydrated, in good spirits today  HEENT:  TMs nl, oropharynx benign, nasal mucosa nl, conjunctiva clear  Neck:  Supple, no " adenopathy, no thyromegaly, no carotid bruits, no JVD  Lungs:  Clear to auscultation bilaterally  Cor:  RRR no murmur  Abd:  Soft, nontender, BS+, no masses, no guarding or rebound, no HSM  Extr:  DJD - knees  Neuro:  No asymmetry, Nl motor tone/strength, nl sensation, reflexes =, gait nl, nl coordination, CN intact,   Skin:  Warm/dry, multiple black colored exophytic raised spots on back - consistent with seborrheic keratoses        Results:  Results for orders placed or performed in visit on 06/12/18   Basic Metabolic Panel   Result Value Ref Range    Sodium 143 136 - 145 mmol/L    Potassium 4.3 3.5 - 5.0 mmol/L    Chloride 104 98 - 107 mmol/L    CO2 29 22 - 31 mmol/L    Anion Gap, Calculation 10 5 - 18 mmol/L    Glucose 108 70 - 125 mg/dL    Calcium 9.8 8.5 - 10.5 mg/dL    BUN 8 8 - 22 mg/dL    Creatinine 0.67 0.60 - 1.10 mg/dL    GFR MDRD Af Amer >60 >60 mL/min/1.73m2    GFR MDRD Non Af Amer >60 >60 mL/min/1.73m2   Hepatic Profile   Result Value Ref Range    Bilirubin, Total 0.5 0.0 - 1.0 mg/dL    Bilirubin, Direct 0.2 <=0.5 mg/dL    Protein, Total 6.6 6.0 - 8.0 g/dL    Albumin 3.8 3.5 - 5.0 g/dL    Alkaline Phosphatase 88 45 - 120 U/L    AST 42 (H) 0 - 40 U/L    ALT 45 0 - 45 U/L

## 2021-06-18 NOTE — PROGRESS NOTES
[facility] [place] Date: 18  Query Report Page#: 1  Patient Rx History Report  ISRAEL BUITRAGO  Search Criteria: Last Name 'israel' and First Name 'lata' and  = ' and Request Period = ' to  ' - 11 out of 11 Recipients Selected.  Fill Date Product, Str, Form Qty Days Pt ID Prescriber Written RX# N/R* Pharm **MED+  ---------- -------------------------------- ------ ---- --------- ---------- ---------- ------------ ----- --------- ------  2018 GABAPENTIN 300 MG CAPSULE 30.00 30 [id number] 2018 [id number] N [id number] 00.0  *N/R N=New R=Refill  +MED Daily  Prescribers for prescriptions listed  ----------------------------------------------------------------------------------------------------------------------------------  [id number] BRUNNER, EMILY A MD; [facility], [address] [place], [place] [id number], [place] [address]  Pharmacies that dispensed prescriptions listed  ----------------------------------------------------------------------------------------------------------------------------------  [id number] [facility]; [id number], [address] [facility] [address],  Patients that match search criteria  ----------------------------------------------------------------------------------------------------------------------------------  [id number] ISRAEL BUITRAGO, [facility]; [id number], [place] [address]  [id number] ISRAEL BUITRAGO, [facility]; [address], [place] [address]  [id number] ISRAEL BUITRAGO, [facility]; [address], [place] [address]  [id number] ISRAEL MOORE, [facility]; [address], [place] [address]  [id number] ISRAEL BUITRAGO, [facility]; [address], [place] [address]  [id number] ISRAEL MOORE,  57; [address], [place] [address]  [id number] ISRAEL BUITRAGO, [facility]; [address], [place] [address]  [id number] ISRAEL MOORE, [facility]; [address],  [place] [address]  [id number] ISRAEL MOORE,  57; [address], [place] [address]  [id number] ISRAEL MOORE,  57; [address], [place] [address]  [id number] ISRAEL MOORE,  57; [address], [place] [address]  MED Summary  This section displays cumulative MED values by unique recipient. The MED Max value is the maximum occurrence of cumulative MED  sustained for any 3 consecutive days. This value is calculated based on prescriptions dispensed during the date range requested.  -----------------------------------------------------------------------------------------------------------------------------------  0 Patsy Santamaria; 1955; [address], [place] [address]    Correct pharmacy verified with patient and confirmed in snapshot? [x] yes []no    Charge captured ? [x] yes  [] no    Medications Phoned  to Pharmacy [] yes [x]no  Name of Pharmacist:  List Medications, including dose, quantity and instructions      Medication Prescriptions given to patient   [] yes  [x] no   List the name of the drug the prescription was written for.       Medications ordered this visit were e-scribed.  Verified by order class [x] yes  [] no  Naltrexone 50 mg  Gabapentin 300 mg    Medication changes or discontinuations were communicated to patient's pharmacy: [x] yes  [] no    UA collected [x] yes  [] no    [place] Prescription Monitoring Program Reviewed? [x] yes  [] no    Referrals were made to: none     Future appointment was made: [x] yes  [] no    Dictation completed at time of chart check: [] yes  [x] no    I have checked the documentation for today s encounters and the above information has been reviewed and completed.

## 2021-06-18 NOTE — PROGRESS NOTES
Mental Health Visit Note    This is a dual signature report. My supervising clinician is JULIO Kasper.    6/7/2018   Start time: 2:00pm    Stop Time: 2:50pm   Session # 21    Patsy Santamaria is a 63 y.o. female is being seen today for    Chief Complaint   Patient presents with     MH Follow Up     Psychotherapy follow-up visit post-treatment discharge     New symptoms or complaints:  None   *Discharged from inpatient treatment on 5/27/2018.     Functional Impairment:   Personal: 4  Family: 4  Work: 4  Social:3    Clinical assessment of mental status:   Grooming: Well groomed  Attire: Appropriate  Age: Appears Stated  Behavior Towards Examiner: Cooperative  Motor Activity: Within normal   Eye Contact: Appropriate  Mood: Anxious and Depressed  Affect: Congruent w/content of speech, Tearful, Flat, Anxious and Depressed  Speech/Language: Within normal  Attention: Within normal  Concentration: Within normal  Thought Process: Within normal  Thought Content: Hallucinations: None reported  Delusions: none reported  Orientation: X 3  Memory: No Evidence of Impairment  Judgement: No Evidence of Impairment  Estimated Intelligence: Average  Demonstrated Insight: Adequate  Fund of Knowledge: adequate    Suicidal/Homicidal Ideation present: None Reported This Session    Patient's impression of their current status:   The patient reported that she had a positive experience in treatment at [facility]. She learned that she could die from alcohol withdrawals. She discussed how she attended groups and met several women her age with whom she connected. She plans to stay in touch and meet up with them now that they are all discharged from treatment. The patient expressed feeling concerned about her living situation. She had been staying with her male friend since April and returned to his apartment after treatment. He reportedly communicated his desire to have his independent space again so she is planning to move  "in with her son. Unfortunately, she has had many issues with her son before and is scared about living with him. She found her male friend to be extremely safe and stable so was hurt about his decision to ask her to leave. She does understand and wants to respect his space but described feeling very emotional about the whole situation. She plans to use assertive communication skills to set boundaries and establish healthy expectations with her son. She is also hoping to move into public housing soon. She recently applied for part-time work at [employer] and is waiting to hear back. She returned to her AA meetings in [place] and obtained a sponsor, with whom she meets weekly. She denied any alcohol cravings and denied any desire to drink. She is motivated to maintain sobriety and rebuild her life.        Therapist impression of patients current state:   The patient presented on-time for a follow-up psychotherapy visit. She appeared tearful and slightly on edge. However, she was very cooperative with the therapist and presented with a pleasant demeanor. Much of session time today focused on reviewing patient's experience in treatment at [facility]. The patient became very tearful at times when discussing feelings of disappointment and frustration about her relapse. She is attempting to \"dig herself out of this hole.\" The patient has obtained a sponsor and is attending AA meetings. Her biggest concern is lack of stable housing. She has an appointment with the clinic SW to address housing concerns. Her homework will be to create a written list of boundaries and expectations for her son, with whom she is planning to live starting tomorrow.  Future sessions will focus on helping patient regain stability in functioning while managing heightened emotions and environmental stressors in recovery.     Type of psychotherapeutic technique provided: Insight oriented and CBT    Progress toward short term goals:Progress " as expected, as patient completed inpatient treatment for alcohol at [facility] and was discharged on 5/27/2018. She is here today to reengage in outpatient mental health treatment.     Review of long term goals: Long-term goals reviewed and patient would like to maintain sobriety from alcohol. She also wants to obtain stable housing and find employment.    Diagnosis:   1. Severe episode of recurrent major depressive disorder, without psychotic features    2. PTSD (post-traumatic stress disorder)    R/O Generalized Anxiety Disorder  R/O Bipolar Disorder    Plan and Follow up: The patient is scheduled to return for a follow-up psychotherapy visit on 6/26/2018.      Discharge Criteria/Planning: Client has chronic symptoms and ongoing therapy for maintenance stability recommended.    Performed and documented by GOOD Mendoza    I have read, discussed and reviewed the documentation as presented by GOOD Mendoza LICSW

## 2021-06-18 NOTE — PROGRESS NOTES
Patsy came in to see JESU today. She has finally gotten notice that she is eligible and [facility] has an apartment open for her at [address] Her rent will be $475.00 a month. She has an appointment on Monday with [facility] and she needs to pay a $300.00 deposit and $30.00 fee for her key, she does not have resources until July 3rd. JESU referred her to [facility] and their crisis support program and to [facility] Emergency assistance for assistance for her deposit to prevent homelessness. She is very happy and relieved, JESU helped her apply for PHA  almost a year ago.
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
## 2021-06-19 NOTE — LETTER
Letter by Yanique Cali MD at      Author: Yanique Cali MD Service: -- Author Type: --    Filed:  Encounter Date: 5/14/2019 Status: (Other)         Patsy Santamaria  [address]  [place] [address]             May 14, 2019         Dear Ms. Santamaria,    Below are the results from your recent visit:    Resulted Orders   Comprehensive Metabolic Panel   Result Value Ref Range    Sodium 141 136 - 145 mmol/L    Potassium 3.8 3.5 - 5.0 mmol/L    Chloride 100 98 - 107 mmol/L    CO2 26 22 - 31 mmol/L    Anion Gap, Calculation 15 5 - 18 mmol/L    Glucose 85 70 - 125 mg/dL    BUN 5 (L) 8 - 22 mg/dL    Creatinine 0.65 0.60 - 1.10 mg/dL    GFR MDRD Af Amer >60 >60 mL/min/1.73m2    GFR MDRD Non Af Amer >60 >60 mL/min/1.73m2    Bilirubin, Total 0.3 0.0 - 1.0 mg/dL    Calcium 10.1 8.5 - 10.5 mg/dL    Protein, Total 7.0 6.0 - 8.0 g/dL    Albumin 3.7 3.5 - 5.0 g/dL    Alkaline Phosphatase 82 45 - 120 U/L    AST 44 (H) 0 - 40 U/L    ALT 55 (H) 0 - 45 U/L    Narrative    Fasting Glucose reference range is 70-99 mg/dL per  American Diabetes Association (ADA) guidelines.   HM1 (CBC with Diff)   Result Value Ref Range    WBC 6.2 4.0 - 11.0 thou/uL    RBC 4.62 3.80 - 5.40 mill/uL    Hemoglobin 14.9 12.0 - 16.0 g/dL    Hematocrit 44.3 35.0 - 47.0 %    MCV 96 80 - 100 fL    MCH 32.3 27.0 - 34.0 pg    MCHC 33.6 32.0 - 36.0 g/dL    RDW 12.8 11.0 - 14.5 %    Platelets 246 140 - 440 thou/uL    MPV 10.0 8.5 - 12.5 fL    Neutrophils % 55 50 - 70 %    Lymphocytes % 30 20 - 40 %    Monocytes % 10 2 - 10 %    Eosinophils % 4 0 - 6 %    Basophils % 1 0 - 2 %    Neutrophils Absolute 3.4 2.0 - 7.7 thou/uL    Lymphocytes Absolute 1.9 0.8 - 4.4 thou/uL    Monocytes Absolute 0.6 0.0 - 0.9 thou/uL    Eosinophils Absolute 0.3 0.0 - 0.4 thou/uL    Basophils Absolute 0.0 0.0 - 0.2 thou/uL       Your blood counts are normal.  Your liver tests (ALT, AST) have significantly improved since stopping the alcohol (although  still slightly elevated).  Please don't drink more alcohol!    Please call with questions or contact us using [employer].    Sincerely,        Electronically signed by Yanique Cali MD

## 2021-06-19 NOTE — LETTER
Letter by Yanique Cali MD at      Author: Yanique Cali MD Service: -- Author Type: --    Filed:  Encounter Date: 5/14/2019 Status: (Other)         Patsy Santamaria  [address]  [place] [address]             May 14, 2019         Dear Ms. Santamaria,    Below are the results from your recent visit:    Resulted Orders   CT Abdomen Pelvis With Oral With IV Contrast    Narrative    EXAM: CT ABDOMEN PELVIS W ORAL W IV CONTRAST  LOCATION: [facility]  DATE/TIME: 5/14/2019 9:49 AM    INDICATION: Increased girth/ bloating.  COMPARISON: None.  TECHNIQUE: Helical enhanced thin-section CT scan of the abdomen and pelvis was performed following injection of IV contrast. Multiplanar reformats were obtained. Dose reduction techniques were used.  CONTRAST: Iohexol (Omni) 100 mL.    FINDINGS:   LUNG BASES: Negative.    ABDOMEN: No significant findings in the liver, spleen, kidneys, pancreas, or adrenal glands. Moderate amount of stool throughout the colon, but this is within normal limits. No masses or ascites.    PELVIS: Probable small fibroid in the uterus. Pelvis otherwise negative. No adnexal masses.    MUSCULOSKELETAL: Negative.      Impression    CONCLUSION:   1.  No significant findings to explain the patient's symptoms. No masses or ascites. No obstruction.         Your CT scan is normal  - no unusual fluid in the abdomen; no masses or other concern.     Please call with questions or contact us using [facility].    Sincerely,        Electronically signed by Yanique Cali MD

## 2021-06-19 NOTE — PROGRESS NOTES
ASSESSMENT/PLAN:  1. Primary osteoarthritis of right knee     2. Primary osteoarthritis of left knee     3. Seasonal allergies  cetirizine (ZYRTEC) 10 MG tablet   4. Primary localized osteoarthrosis of hand, unspecified laterality     5. 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 Morbid obesity (H)     7. Major depression, recurrent (H)         This is a 62 yo female with:  1.  Joint pains - bilateral knees, and wrists - she has moved into a new apartment in [facility].  She has to park about 6 blocks away from building.  Finds it difficult to carry groceries from car to building because her knees and wrists hurt.  She would like a disability parking certificate completed today.  We reviewed her diagnoses, and reviewed the criteria on the form.  She does not meet any of the criteria set forth by the State as qualifying for this certificate.  Discussed at length.  She reports ability to walk 6-8 blocks; reports that she has a brace for her right knee but is variable in wearing it.  Even when she knows she will be shopping, she doesn't wear the brace.  I would encourage more regular use of NSAIDs if tolerates  - or Acetaminophen - for knee pain.  Encourage activity.  2.  Patient c/o seasonal allergies - hasn't been taking any medications recently.  Discussed options for treatment.  Will treat with Cetirizine daily prn.   3.  COPD - has underlying mild symptoms - would not qualify for disability parking certificate.   4.  Morbid Obesity - BMI >41 - encourage activity - walking would be encouraged  5.  Major Depression - patient is currently seeing therapist; encourage this.  Again - activity would be therapeutic as well.          There are no discontinued medications.  There are no Patient Instructions on file for this visit.    Chief Complaint:  Chief Complaint   Patient presents with     Form     Handicap parking      Knee Pain     bilater knee pain     wrist     bilateral wrist  "sore.     Medications     Needs allgery medications     bump     right arm and right leg       HPI:   Patsy Santamaria is a 63 y.o. female c/o  Has to park > 6 blocks away from residence  [place] - living independently  Lives on 16th floor of building  Able to walk 6-8 blocks, but it's hard to walk that far and carry groceries  Also has bad wrist - previous surgery    Gets shots in right knee from Ortho for DJD  Doesn't recall when her last shot was  Has a brace for knee, but doesn't wear it all the time  \"it's clunky\"    Also pain in wrist from previous surgery    Has allergy symptoms  Scratchy throat, itchy eyes, cough  Hasn't taken anything for allergies    In past year, \"everything is getting worse\"  Doesn't recall allergies when she was a kid          PMH:   Patient Active Problem List    Diagnosis Date Noted     Primary osteoarthritis of left knee 07/10/2018     Seasonal allergies 07/09/2018     Severe episode of recurrent major depressive disorder, without psychotic features (H)      Alcoholic hepatitis      Peripheral edema      Diuretic-induced hypokalemia      Alcohol use disorder, severe, dependence (H) 05/07/2018     Primary osteoarthritis of right knee 03/21/2018     Alcohol withdrawal (H) 03/17/2018     Chronic alcohol dependence, continuous (H) 03/16/2018     Low backache 03/16/2018     Morbid obesity (H) 01/05/2018     Bilateral edema of lower extremity 06/28/2017     Snoring 06/28/2017     Carpal tunnel syndrome on both sides 03/06/2017     Trochanteric bursitis of left hip 10/25/2016     Alcohol withdrawal seizure without complication (H) 05/14/2016     Hypoxia 05/13/2016     Alcohol abuse 05/13/2016     Elevated LFTs 05/13/2016     New onset seizure (H) 05/12/2016     Claustrophobia 12/18/2015     Cervical disc disease 12/14/2015     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) 09/16/2015     Post traumatic stress disorder 02/13/2015     Chronic " Reflux Esophagitis      Peripheral Neuropathy      Localized Primary Osteoarthritis Of The Carpometacarpal Joint      Ganglion Of The Right Foot      Major depression, recurrent (H)      Nicotine Dependence      Vitamin D Deficiency      Past Medical History:   Diagnosis Date     Acute solar dermatitis      Alcohol abuse      Anxiety      Arthritis      Chronic alcohol dependence, continuous (H) 3/16/2018     Chronic reflux esophagitis      COPD (chronic obstructive pulmonary disease) (H)      Dermatitis      Ganglion     right foot     H. pylori infection      Low backache 3/16/2018     Menopause     age 52     Past Surgical History:   Procedure Laterality Date     APPENDECTOMY       OH APPENDECTOMY      Description: Appendectomy;  Recorded: 2008;  Comments: childhood     OH  DELIVERY ONLY      Description:  Section;  Recorded: 2008;     OH EXCIS TENDN/CAPSULE LESN,FOOT      Description: Excision Of Cyst Of Tendon Sheath Of Foot;  Proc Date: 10/28/2011;  Comments: [facility]     OH HEMORRHOIDECTOMY INTERNAL RUBBER BAND LIGATIONS      Description: Hemorrhoidectomy;  Recorded: 2008;     OH KNEE SCOPE,DIAGNOSTIC      Description: Arthroscopy Knee Right;  Proc Date: 10/11/2005;  Comments: for right knee patella subluxation with lateral retinacular release; subpatellar chondroplasty     OH LATERAL RETINACULAR RELEASE OPEN      Description: Knee Lateral Retinacular Release;  Proc Date: 10/11/2005;     OH LIGATE FALLOPIAN TUBE      Description: Tubal Ligation;  Recorded: 2008;     SKIN BIOPSY       TONSILLECTOMY       Social History     Social History     Marital status: Single     Spouse name: N/A     Number of children: N/A     Years of education: N/A     Occupational History     [employer]     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[employer])     Social History Main Topics     Smoking status: Current Every Day Smoker     Packs/day: 1.00     Years: 48.00     Types:  Cigarettes     Smokeless tobacco: Never Used      Comment: 1 pack per day     Alcohol use No     Drug use: No     Sexual activity: Yes     Partners: Male     Birth control/ protection: None     Other Topics Concern     Not on file     Social History Narrative       Meds:    Current Outpatient Prescriptions:      albuterol (PROAIR HFA;PROVENTIL HFA;VENTOLIN HFA) 90 mcg/actuation inhaler, Inhale 2 puffs 4 (four) times a day as needed for wheezing or shortness of breath., Disp: 1 Inhaler, Rfl: 0     cholecalciferol, vitamin D3, 5,000 unit capsule, Take 5,000 Units by mouth daily., Disp: 30 capsule, Rfl: 5     diclofenac sodium (VOLTAREN) 1 % Gel, Apply to affected area three times a day prn, Disp: 1 Tube, Rfl: 0     DULoxetine (CYMBALTA) 60 MG capsule, Take 1 capsule (60 mg total) by mouth at bedtime., Disp: 30 capsule, Rfl: 0     furosemide (LASIX) 80 MG tablet, Take 1 tablet (80 mg total) by mouth daily., Disp: 30 tablet, Rfl: 0     gabapentin (NEURONTIN) 300 MG capsule, Take 1 capsule (300 mg total) by mouth 3 (three) times a day., Disp: 90 capsule, Rfl: 0     ipratropium-albuterol (DUO-NEB) 0.5-2.5 mg/3 mL nebulizer, Take 3 mL by nebulization every 6 (six) hours as needed (wheezing, short of breath)., Disp: 90 mL, Rfl: 1     naltrexone (DEPADE) 50 mg tablet, Take 2 tablets (100 mg total) by mouth daily., Disp: 60 tablet, Rfl: 0     omeprazole (PRILOSEC) 20 MG capsule, Take 1 capsule (20 mg total) by mouth at bedtime., Disp: 30 capsule, Rfl: 0     potassium chloride (K-DUR,KLOR-CON) 20 MEQ tablet, Take 1 tablet (20 mEq total) by mouth 2 (two) times a day., Disp: 30 tablet, Rfl: 0     cetirizine (ZYRTEC) 10 MG tablet, Take 1 tablet (10 mg total) by mouth daily., Disp: 30 tablet, Rfl: 5    Allergies:  Allergies   Allergen Reactions     Codeine Nausea Only     Hydrochlorothiazide Rash     phototoxicity - med was d/lora       Diclofenac Nausea Only     Tolerates the topical gel     Sulfa (Sulfonamide Antibiotics) Rash      Sulfasalazine Rash       ROS:  Pertinent positives as noted in HPI; otherwise 12 point ROS negative.      Physical Exam:  EXAM:  /80 (Patient Site: Right Arm, Patient Position: Sitting, Cuff Size: Adult Large)  Pulse 80  Temp 97.5  F (36.4  C) (Oral)   Resp 20  Wt (!) 234 lb 8 oz (106.4 kg)  LMP 03/06/2002  Breastfeeding? No  BMI 41.54 kg/m2   Gen:  NAD, appears well, well-hydrated  HEENT:  TMs nl, oropharynx benign, nasal mucosa congested/swollen, conjunctiva clear  Neck:  Supple, no adenopathy, no thyromegaly, no carotid bruits, no JVD  Lungs:  Clear to auscultation bilaterally  Cor:  RRR no murmur  Abd:  Soft, nontender, BS+, no masses, no guarding or rebound, no HSM  Extr:  Bilateral knee DJD; previous wrist surgical wound healing  Neuro:  No asymmetry  Skin:  Warm/dry; right arm palpable skin lesion just proximal to elbow crease - medially - measures 3 mm (?dermatofibroma);         Results:  Results for orders placed or performed in visit on 06/21/18   DAM    (Drug Abuse 1+, Urine)   Result Value Ref Range    Amphetamines Screen Negative Screen Negative    Benzodiazepines (!) Screen Negative     Screen Positive (Confirmation available on request)    Opiates Screen Negative Screen Negative    Phencyclidine Screen Negative Screen Negative    THC Screen Negative Screen Negative    Barbiturates Screen Negative Screen Negative    Cocaine Metabolite Screen Negative Screen Negative    Methadone Screen Negative Screen Negative    Oxycodone Screen Negative Screen Negative    Creatinine, Urine 222.0 mg/dL   Ethyl Glucuronide and Ethyl Sulfate, Urine, Quantitative ([facility] ETG/S)   Result Value Ref Range    Ethyl Glucuronide, Urn, Quant >59239 ng/mL    Ethyl Sulfate, Urn, Quant >63638 ng/mL   Benzodiazepines Confirmation, Urine   Result Value Ref Range    Nordiazepam by GC/MS Negative Cutoff: 100 ng/mL    Oxazepam by GCMS 372 Cutoff: 100 ng/mL    Lorazepam by GC/MS Negative Cutoff: 100 ng/mL    Temazepam by  GC/MS Negative Cutoff: 100 ng/mL    OH-Ethyl-Flurazepam by GC/MS Negative Cutoff: 100 ng/mL    7-NH-Clonazepam by GC/MS Negative Cutoff: 100 ng/mL    Alpha OH-Alprazolam by GC/MS Negative Cutoff: 100 ng/mL    3-SZ-Zoohlxnrtfkow by GC/MS Negative Cutoff: 50 ng/mL    Alpha OH-Triazolam by GC/MS Negative Cutoff: 100 ng/mL    Benzodiazapines Interpretation: Positive.

## 2021-06-19 NOTE — LETTER
Letter by Brunner, Emily A, MD at      Author: Brunner, Emily A, MD Service: -- Author Type: --    Filed:  Encounter Date: 7/15/2019 Status: (Other)       July 15, 2019        aPtsy Santamaria  [address]  [place] [address]         RE:  Patsy Santamaria  : 1955    To Whom It May Concern:    Patsy Santamaria is my patient. I feel she meets criteria for needing an emotional support animal to help with her diagnoses. Should you have additional questions, please do not hesitate to contact me.    Sincerely,  Emily A Brunner      [facility]  [address] [place]  [address]  P:  [phone]  F:  [phone]      Emily A Brunner, MD

## 2021-06-19 NOTE — LETTER
Letter by Jackson Ronquillo CHW at      Author: Jackson Ronquillo CHW Service: -- Author Type: --    Filed:  Encounter Date: 11/7/2019 Status: Signed         November 7, 2019          Dear Ms. Patsy Santamaria,                                                                             You were recently referred to the [facility]'s Clinic Care Coordination service.  This is a service offered through your Primary Care Clinic which can help you access resources, services in regard to your health and well-being goals. The clinic Community Health Worker has placed two calls to you to discuss the nature of this service and to offer enrollment.  If you are interested in learning more about Clinic Care Coordination, please call your Primary Care Clinic's Community Health Worker, Jackson Ronquillo, at [phone].                                                     Sincerely,                                                                                     TAVO Butler                                                                                          Clinic Care Coordination                                                  [facility]     [phone]

## 2021-06-19 NOTE — PROGRESS NOTES
"Addiction  Nurse Only Visit Note    7/31/2018  Date of last visit: 6/21/18 Dr. Brunner    Reason for today's visit:   Chief Complaint   Patient presents with     Follow-up       Vitals:    07/31/18 1250   BP: (!) 141/94   Patient Site: Right Arm   Patient Position: Sitting   Cuff Size: Adult Regular   Pulse: 74   Resp: 20   Temp: 98.1  F (36.7  C)   TempSrc: Oral   Weight: (!) 233 lb (105.7 kg)   Height: 5' 3\" (1.6 m)        If having pain, who will address pain:  Denies pain. Has PCP    Is pt assisted: No    Urine for toxicology sent today: yes    Last UA date: 6/21/18  Reviewed with patient: yes  Results: Positive for ETOH and Benzo's    AIMS: N/A    Pill count: NA  (Controlled substance only)  Medications needing renewal: N/A    Substance use history:    Using alcohol: No. Pt denies recent use. Last drink (wine) prior to last visit on 6/21/18.   Using street drugs or unprescribed medications: No  Using opioids other that Suboxone:No. Not on suboxone.   Using tobacco:Yes: Describe: Pack per day of cigarettes     Recovery environment:  Attending formal chemical dependency programming: No  Attending \"outside\" mutual help meetings: Yes: Describe: AA, one-two times per week.  Has a chemical dependency sponsor: Yes: Describe: Remains in contact.  Has other elements of structure: No. Babysit one time a week. Looking for employment part time. Attends meetings. Gets out of the house daily.   Current Living Situation: Lives alone in apartment.    Cravings: yes 3/5 for alcohol    Sleep: Not sleeping good.     Depression: yes 4/10. Depends on the day.     Anxiety: yes 4/10    Panic Attacks: yes Last one was a week ago when her car wouldn't start.     Paranoia: no    Hallucinations: no    Suicidal Ideation: no    Homicidal Ideation:no    Physical Problems: Elevated blood pressure. Cortisone injections in her right knee every 3 months. Arthritis.    Nursing Comments: Pt denies any alcohol or drug use. Pt states that she hasn't " had any alcohol since 18. Rates her cravings at 3/5 and triggered by change/stress of recent move. Pt feels the naltrexone helps reduce cravings, but doesn't feel the gabapentin has been helpful. She only takes one at night because she feels really groggy the next day. She also said it makes her very hungry and is causing her to gain weight. Also discussed positive DAM and alcohol on 18. Pt denies any type of benzo use. States she doesn't take anything that isn't prescribed. Pt states she's very worried that it was positive because she has no idea why it would be. UA collected today and sent to lab. Pt is scheduled to see Dr. Brunner on 18.       Call the clinic if any concerns: [facility] [phone]  [facility] [phone]  and Report to the emergency room if you feel like harming yourself or others or are psychotic        Catherine Samayoa    2018 12:57 PM    ISRAEL BUITRAGO  Search Criteria: Last Name 'israel' and First Name 'lata' and  = '[birth year]' and Request Period = '18' to  '18' - 11 out of 11 Recipients Selected.  Fill Date Product, Str, Form Qty Days Pt ID Prescriber Written RX# N/R* Pharm **MED+  ---------- -------------------------------- ------------ ---- --------- ---------- ---------- ------------ ----- --------- ------  2018 GABAPENTIN 300 MG CAPSULE [id number] 30 [id number] 2018 [id number] N [id number] 00.0  *N/R N=New R=Refill  +MED Daily  Prescribers for prescriptions listed  ----------------------------------------------------------------------------------------------------------------------------------  [id number] BRUNNER, EMILY A MD; [facility], [address] [place], [place] [id number], [place] [address]  Pharmacies that dispensed prescriptions listed  ----------------------------------------------------------------------------------------------------------------------------------  [id number] [facility];  [facility]  #[id number], [address] [facility] [address],  Patients that match search criteria  ----------------------------------------------------------------------------------------------------------------------------------  [id number] ISRAEL BUITRAGO, [facility]; [id number], [place] [address]  [id number] ISRAEL BUITRAGO, [facility] 57; [address], [place] [address]  [id number] ISRAEL BUITRAGO, [facility] 57; [address], [place] [address]  [id number] ISRAEL MOORE, [facility]; [address], [place] [address]  [id number] ISRAEL BUITRAGO, [facility] 57; [address], [place] [address]  [id number] ISRAEL MOORE, [facility] 57; [address], [place] [address]  [id number] ISRAEL BUITRAGO, [facility] 57; [address], [place] [address]  [id number] ISRAEL MOORE, [facility] 57; [address], [place] [address]  [id number] ISRAEL MOORE, [facility] 57; [address], [place] [address]  [id number] ISRAEL MOORE, [facility] 57; [address], [place] [address]  [id number] ISRAEL MOORE, [facility] 57; [address], [place] [address]  MED Summary  This section displays cumulative MED values by unique recipient. The MED Max value is the maximum occurrence of cumulative MED  sustained for any 3 consecutive days. This value is calculated based on prescriptions dispensed during the date range requested.  -----------------------------------------------------------------------------------------------------------------------------------  0 Patsy Santamaria; 1955; [address], [place] [address]

## 2021-06-19 NOTE — LETTER
Letter by Vinay Paige MD at      Author: Vinay Paige MD Service: -- Author Type: --    Filed:  Encounter Date: 11/26/2019 Status: Signed         Yanique Cali MD  [address] [facility] [address]                                  November 26, 2019    Patient: Patsy Santamaria   MR Number: [id number]   YOB: 1955   Date of Visit: 11/26/2019     Dear Dr. Viraj MD:    I saw Patsy Santamaria today at the [facility]. Below are the relevant portions of my assessment and plan of care.    If you have questions, please do not hesitate to call me. I look forward to following Patsy along with you.    Sincerely,        Vinay Paige MD          CC  No Recipients  Vinay Paige MD  11/26/2019  9:16 PM  Sign when Signing Visit  Pulmonary Clinic Outpatient Consultation    Assessment and Plan:   64 year old female with a history of longstanding and active tobacco dependence, alcohol dependence currently undergoing inpatient treatment, MELVI, h/o alcohol withdrawal seizure, H pylori gastritis, depression, possible COPD, peripheral neuropathy, GERD, osteoarthritis, vitamin D deficiency, presenting for evaluation of dyspnea.    Tobacco dependence, dyspnea: PFTs are nonspecific, with a possible obstructive component suggestive of asthma or COPD, though FEV1/FVC ratio is nonobstructive, normal TLC and DLCO, no significant bronchodilator response. Obesity likely playing a significant role. Clearly, smoking cessation needs to be the primary focus.    Plan:  - nicotine 14-mg patch  - start nicotine inhaler as needed for tobacco craving after hospital discharge; sent to her pharmacy  - quit smoking  - can continue low-dose budesonide-formoterol two inhalations two times a day for now; advised her to rinse/gargle/spit water after use  - guaifenesin or benzonatate as needed for cough; should start to improve if she remains abstinent from tobacco  - albuterol HFA or nebulized  ipratropium-albuterol as needed  - follow up in 3 months  - encouraged her to call any time with questions or concerning symptoms    I appreciate the opportunity to participate in the care of Ms. Santamaria. Please feel free to contact me at any time.    CCx: chronic dyspnea, tobacco dependence    HPI: 64 year old female with a history of longstanding and active tobacco dependence, alcohol dependence currently undergoing inpatient treatment, MELVI, h/o alcohol withdrawal seizure, H pylori gastritis, depression, possible COPD, peripheral neuropathy, GERD, osteoarthritis, vitamin D deficiency, presenting for evaluation of dyspnea. Has had years of dyspnea, with episodes of worsening dyspnea and cough, treated occasionally for bronchitis with prednisone and antibiotics. Recently treated for RLL pneumonia. Started smoking at age 15, up to 1 ppd, currently smoking 1 ppd until admission to the hospital for alcohol dependence. She is inpatient currently. Wants to quit. PFTs with obesity-associated changes, possible obstructive flow-volume loop but normal TLC and DLCO, nonobstructive FEV1/FVC ratio, and no bronchodilator response.    ROS:  A 12-system review was obtained and was negative with the exception of the symptoms endorsed in the history of present illness.    PMH:  Past Medical History:   Diagnosis Date   ? Acute solar dermatitis    ? Alcohol abuse    ? Anxiety    ? Arthritis    ? Cancer (H) 07/2019    melanoma on left upper arm   ? Chronic alcohol dependence, continuous (H) 3/16/2018   ? Chronic reflux esophagitis    ? COPD (chronic obstructive pulmonary disease) (H)    ? Depression    ? Dermatitis    ? Ganglion     right foot   ? H. pylori infection    ? Low backache 3/16/2018   ? Menopause     age 52   ? Seizure (H) 2016    during alcohol withdrawal   ? Sleep apnea     has cpap, does not use       PSH:  Past Surgical History:   Procedure Laterality Date   ? APPENDECTOMY     ? OR APPENDECTOMY      Description:  Appendectomy;  Recorded: 2008;  Comments: childhood   ? KS  DELIVERY ONLY      Description:  Section;  Recorded: 2008;   ? KS EXCIS TENDN/CAPSULE LESN,FOOT      Description: Excision Of Cyst Of Tendon Sheath Of Foot;  Proc Date: 10/28/2011;  Comments: [facility]   ? KS HEMORRHOIDECTOMY INTERNAL RUBBER BAND LIGATIONS      Description: Hemorrhoidectomy;  Recorded: 2008;   ? KS KNEE SCOPE,DIAGNOSTIC      Description: Arthroscopy Knee Right;  Proc Date: 10/11/2005;  Comments: for right knee patella subluxation with lateral retinacular release; subpatellar chondroplasty   ? KS LATERAL RETINACULAR RELEASE OPEN      Description: Knee Lateral Retinacular Release;  Proc Date: 10/11/2005;   ? KS LIGATE FALLOPIAN TUBE      Description: Tubal Ligation;  Recorded: 2008;   ? SKIN BIOPSY Left 2019    left upper arm   ? TONSILLECTOMY         Allergies:  Allergies   Allergen Reactions   ? Codeine Nausea Only   ? Hydrochlorothiazide Rash     phototoxicity - med was d/lora     ? Diclofenac Nausea Only     Tolerates the topical gel   ? Sulfa (Sulfonamide Antibiotics) Rash   ? Sulfasalazine Rash       Family HX:  Family History   Problem Relation Age of Onset   ? Heart disease Mother    ? Heart disease Father        Social Hx:  Social History     Socioeconomic History   ? Marital status: Single     Spouse name: Not on file   ? Number of children: Not on file   ? Years of education: Not on file   ? Highest education level: Not on file   Occupational History   ? 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 Financial resource strain: Not on file   ? Food insecurity:     Worry: Not on file     Inability: Not on file   ? Transportation needs:     Medical: Not on file     Non-medical: Not on file   Tobacco Use   ? Smoking status: Current Every Day Smoker     Packs/day: 1.00     Years: 49.00     Pack years: 49.00      Types: Cigarettes   ? Smokeless tobacco: Never Used   ? Tobacco comment: 1 pack per day   Substance and Sexual Activity   ? Alcohol use: Yes     Comment: one pint daily/vodka   ? Drug use: No   ? Sexual activity: Yes     Partners: Male     Birth control/protection: None   Lifestyle   ? Physical activity:     Days per week: Not on file     Minutes per session: Not on file   ? Stress: Not on file   Relationships   ? Social connections:     Talks on phone: Not on file     Gets together: Not on file     Attends Anglican service: Not on file     Active member of club or organization: Not on file     Attends meetings of clubs or organizations: Not on file     Relationship status: Not on file   ? Intimate partner violence:     Fear of current or ex partner: Not on file     Emotionally abused: Not on file     Physically abused: Not on file     Forced sexual activity: Not on file   Other Topics Concern   ? Not on file   Social History Narrative   ? Not on file       Current Meds:  No current facility-administered medications for this visit.      Current Outpatient Medications   Medication Sig Dispense Refill   ? nicotine (NICOTROL) 10 mg inhaler Inhale 1 puff as needed for smoking cessation. 168 each 11     Facility-Administered Medications Ordered in Other Visits   Medication Dose Route Frequency Provider Last Rate Last Dose   ? acamprosate tablet 666 mg (CAMPRAL)  666 mg Oral TID Jennifer Lopez MD   666 mg at 11/26/19 1315   ? acetaminophen tablet 650 mg (TYLENOL)  650 mg Oral Q4H PRN Elpidio French PA-C   650 mg at 11/23/19 0204   ? albuterol inhaler 2 puff (PROAIR HFA;PROVENTIL HFA;VENTOLIN HFA)  2 puff Inhalation Q4H PRN Jennifer Lopez MD   2 puff at 11/25/19 1826   ? aluminum-magnesium hydroxide-simethicone 200-200-20 mg/5 mL suspension 30 mL (MAALOX ADVANCED)  30 mL Oral Q4H PRN Elpidio French PA-C       ? budesonide-formoterol 80-4.5 mcg/actuation inhaler 2 puff (SYMBICORT)  2 puff Inhalation BID Jessica  Jennifer OROZCO MD   2 puff at 11/26/19 0847   ? cetirizine tablet 10 mg (ZyrTEC)  10 mg Oral Daily PRN Jennifer Lopez MD       ? cholecalciferol (vitamin D3) tablet 400 Units  400 Units Oral DAILY Jennifer Lopez MD   400 Units at 11/26/19 0848   ? diclofenac sodium 1 % gel 1 g (VOLTAREN)  1 g Topical BID PRN Jennifer Lopez MD       ? DULoxetine DR capsule 60 mg (CYMBALTA)  60 mg Oral DAILY Jennifer Lopez MD   60 mg at 11/26/19 0848   ? fluticasone propionate 50 mcg/actuation nasal spray 1 spray (FLONASE)  1 spray Each Nare Daily PRN Jennifer Lopez MD       ? folic acid tablet 1 mg (FOLVITE)  1 mg Oral DAILY Elpidio French PA-C   1 mg at 11/26/19 0848   ? furosemide tablet 80 mg (LASIX)  80 mg Oral DAILY Jennifer Lopez MD   80 mg at 11/26/19 0848   ? guaiFENesin ER 12 hr tablet 600 mg (MUCINEX)  600 mg Oral BID Jennifer Lopez MD   600 mg at 11/26/19 0848   ? hydrOXYzine pamoate capsule 25 mg (VISTARIL)  25 mg Oral TID PRN Jennifer Lopez MD   25 mg at 11/26/19 0854   ? hydrOXYzine pamoate capsule 50 mg (VISTARIL)  50 mg Oral Bedtime PRN Jennifer Lopez MD   50 mg at 11/25/19 2034   ? ipratropium-albuterol 0.5-2.5 mg/3 mL nebulizer solution 3 mL (DUO-NEB)  3 mL Nebulization Q6H PRN Jennifer Lopez MD   3 mL at 11/25/19 1005   ? melatonin tablet 3 mg  3 mg Oral Bedtime PRN Elpidio French PA-C       ? multivitamin therapeutic tablet 1 tablet  1 tablet Oral DAILY Elpidio French PA-C   1 tablet at 11/26/19 0848   ? nicotine polacrilex gum 2 mg (NICORETTE)  2 mg Mouth/Throat Q2H PRN Jennifer Lopez MD       ? omeprazole capsule 20 mg (PriLOSEC)  20 mg Oral QHS Jennifer Lopez MD   20 mg at 11/25/19 2035   ? ondansetron injection 4 mg (ZOFRAN)  4 mg Intravenous Q6H PRN Elpidio French PA-C        Or   ? ondansetron injection 4 mg (ZOFRAN)  4 mg Intramuscular Q6H PRN Elpidio French PA-C        Or   ? ondansetron tablet 4 mg (ZOFRAN)  4 mg Oral Q6H PRN Elpidio French PA-C   4 mg at 11/26/19 0853   ?  "polyethylene glycol packet 17 g (MIRALAX)  17 g Oral Daily PRN KeilarElpidio simons PA-C       ? potassium chloride packet 20 mEq (KLOR-CON)  20 mEq Oral BID Jennifer Lopez MD   20 mEq at 11/26/19 0848   ? senna-docusate 8.6-50 mg tablet 1 tablet (PERICOLACE)  1 tablet Oral BID PRN KeilarElpidio simons PA-C       ? thiamine tablet 100 mg  100 mg Oral DAILY KeilarlElpidio PA-C   100 mg at 11/26/19 0848   ? traZODone tablet 50 mg (DESYREL)  50 mg Oral Bedtime PRN may repeat x1 Elpidio French PA-C   50 mg at 11/25/19 2035       Physical Exam:  /70   Pulse 96   Ht 5' 3.5\" (1.613 m)   Wt (!) 236 lb (107 kg)   LMP 03/06/2002   SpO2 94%   Breastfeeding No   BMI 41.15 kg/m     Gen: alert, oriented, no distress  HEENT: nasal turbinates are unremarkable, no oropharyngeal lesions, no cervical or supraclavicular lymphadenopathy  CV: tachy, regular, no M/G/R  Resp: CTAB, no focal crackles or wheezes  Abd: soft, nontender, no palpable organomegaly  Skin: no apparent rashes  Ext: no cyanosis, clubbing or edema  Neuro: alert, nonfocal    Labs:  reviewed    Imaging studies:  CXR (11/8/19):  - images directly reviewed, formal interpretation follows:  IMPRESSION:   Heart size and vascularity are normal. Airspace infiltrate right lower lobe compatible with pneumonitis. Left lung is clear.    Pharmacologic MPI (9/5/19):  - no ischemic  - EF 78%    PFT's (11/26/19):  FEV1/FVC is 0.77 and is normal.  FEV1 is 1.82 L (76% predicted) and is reduced.  FVC is 2.36 L (78% predicted) and reduced.  There was no improvement in spirometry after a single inhaled dose of bronchodilator.  TLC is 4.54 L (93% predicted) and is normal.  RV is 2.22 L (115% predicted) and is normal.  RV/TLC is 0.49 and is increased.  DLCO is 103% predicted and is normal when it is corrected for hemoglobin.    Vinay Paige MD  [facility]  Cell [phone]  Office [phone]  Pager [phone]         "

## 2021-06-19 NOTE — PROGRESS NOTES
"Mental Health Visit Note    This is a dual signature report. My supervising clinician is JULIO Kasper.    7/31/2018   Start time: 11:00am    Stop Time: 11:45am   Session # 24    Patsy Santamaria is a 63 y.o. female is being seen today for    Chief Complaint   Patient presents with     MH Follow Up     Individual psychotherapy follow-up visit for depression     New symptoms or complaints:  None     Functional Impairment:   Personal: 4  Family: 4  Work: 4  Social:3    Clinical assessment of mental status:   Grooming: Well groomed  Attire: Appropriate  Age: Appears Stated  Behavior Towards Examiner: Cooperative  Motor Activity: Within normal   Eye Contact: Appropriate  Mood: Euthymic  Affect: Congruent w/content of speech  Speech/Language: Within normal  Attention: Distractible  Concentration: Brief  Thought Process: Within normal  Thought Content: Hallucinations: None reported  Delusions: none reported  Orientation: X 3  Memory: Impairment and Recent  Judgement: No Evidence of Impairment  Estimated Intelligence: Average  Demonstrated Insight: Adequate  Fund of Knowledge: adequate     Suicidal/Homicidal Ideation present: None Reported This Session    Patient's impression of their current status:   The patient reported that she started reading a new book called, \"The Twelve Step: Life Recovery Devotional.\" She shared that the book helps keep her mind occupied and maintain a positive outlook. She discussed recent temptations and cravings to drink.  She often feels lonely in her new apartment. She prefers living alone but does feel more tempted to drink. She is having dreams about her ex (Papa). She dreams about him approximately 3x/week. She recently looked at an old photo album and shared that she does miss having a family unit. She feels nostalgic about that time in her life but often tries to avoid thinking about it. She was invited to embrace these memories and bring in photos to her next session to allow " "for additional processing and healing. She identified feelings of boredom and complacency as triggers and warning signs. She tries to stay busy and fill her week with meaningful activities. She agreed that finding part-time employment would provide more structure in her weekly routine.  She shared that she has been going to Orthodox on Sundays. She attends AA meetings 1x/week but is feeling \"bored\" with her current group. She was encouraged to explore other AA meetings in the area. She continues to meet with her sponsor. 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 She is helping babysit her friend's children on Fridays. She has applied to several [employer] restaurants for part-time work. She reported that she is still not sleeping well at night. She also reported that she has not been in contact with her son and \"feels fine\" about this.     Therapist impression of patients current state:   The patient presented on-time for a follow-up psychotherapy visit. She was cooperative and receptive to feedback throughout today's session. She appeared more regulated and balanced today compared to previous sessions. She appeared alert and oriented with a more organized thought pattern today. At times, she appeared evasive in regards to underlying emotions. However, she admittedly has a tendency to avoid discussing or thinking about her feelings. She continues to be challenged to adjust this defense mechanism while in therapy session. The therapist believes that she has many past wounds from which she has not yet healed or adequately processed. The therapist suggested that patient bring in her old photo album to her next session in order to engage in cognitive processing about memories she often tries to avoid but still thinks about. Future sessions will continue to focus on strengthening coping skills and relapse prevention tools.         Type of psychotherapeutic technique provided: Insight oriented and " CBT    Progress toward short term goals:Progress as expected, as patient reported sobriety maintenance and efforts to regain structure and stability in her life. She is still trying to obtain part-time employment.      Review of long term goals: Not done at today's visit     Diagnosis:   1. Severe episode of recurrent major depressive disorder, without psychotic features (H)    2. PTSD (post-traumatic stress disorder)    3. Severe alcohol use disorder (H)    R/O Generalized Anxiety Disorder  R/O Bipolar Disorder    Plan and Follow up: The patient is scheduled to return for a follow-up psychotherapy visit on 8/21/2018.  Patient has psychiatry nurse-only visit scheduled for today at 1pm.      Discharge Criteria/Planning: Client has chronic symptoms and ongoing therapy for maintenance stability recommended.    Performed and documented by GOOD Mendoza    I have read, discussed and reviewed the documentation as presented by GOOD Mendoza LICSW

## 2021-06-19 NOTE — PROGRESS NOTES
" Mental Health Visit Note    This is a dual signature report. My supervising clinician is JULIO Kasper.    7/20/2018   Start time: 8:00am    Stop Time: 8:45am   Session # 23    Patsy Santamaria is a 63 y.o. female is being seen today for    Chief Complaint   Patient presents with      Follow Up     Psychotherapy follow-up visit for depression and anxiety     New symptoms or complaints:  None     Functional Impairment:   Personal: 4  Family: 4  Work: 4  Social:3    Clinical assessment of mental status:   Grooming: Well groomed  Attire: Appropriate  Age: Appears Stated  Behavior Towards Examiner: Cooperative; evasive at times  Motor Activity: Within normal   Eye Contact: Appropriate  Mood: Euthymic  Affect: Congruent w/content of speech and Jovial  Speech/Language: Within normal  Attention: Distractible  Concentration: Brief  Thought Process: Within normal  Thought Content: Hallucinations: None reported  Delusions: none reported  Orientation: X 3  Memory: Impairment and Recent  Judgement: No Evidence of Impairment  Estimated Intelligence: Average  Demonstrated Insight: Adequate  Fund of Knowledge: adequate     Suicidal/Homicidal Ideation present: None Reported This Session    Patient's impression of their current status:   The patient reported that she won her court case against her landlord. The previous landlord will be required to pay her money for the deposit and rent. She is settling into her new apartment in [facility]. She lives on the 16th floor and complains that it is very hot. She shared that she feels lonely sometimes but does enjoy having her own space. She is trying to walk and be more active but often experiences knee pain. She does not like to wear her knee brace. She is able to take the train or bus. She is still trying to find part-time employment. She believes that working will provide better structure for her weekly routine. She reported going to meetings but \"not as often\" and " "going to Jehovah's witness every Sunday. The patient reported feeling more positive overall. She is maintaining distance from her oldest son. She is still having a hard time sleeping but has yet to use her C-PAP machine. She endorsed nightmares about past relationships. She was prompted to explore underlying emotions associated with the content of these dreams. She agreed that she has been trying to avoid thinking about the past lately. She is not as willing to process past events during therapy sessions compared to before. She shared that she decided not to go to [place] for a wedding because this would be a high risk exposure.     Therapist impression of patients current state:   The patient presented on-time for a follow-up psychotherapy visit. She appeared energetic with a euthymic mood. At times, her thought process appeared disorganized and slightly distracted. She presented as forgetful regarding past appointments and provider recommendations for treatment.  She easily engaged in dialogue but was hesitant to engage in any cognitive processing. She admittedly prefers to avoid much discussion of underlying emotions. The patient became fairly evasive and guarded when the subject of medication management came up. She \"could not remember\" the last time she saw Dr. Brunner and denied any problems during the last visit. Per chart review on 7/6/18: Called pt to schedule an urgent Nurse Only visit, per Dr. Brunner's directive. Per Brunner's note, pt had a positive etg and positive benzo on 6/21/18 urine drug screen indicating active drinking and substance use. Unable to reach pt, left message instructing pt to call the clinic and schedule a Nurse Only visit.  The therapist did not directly confront patient about this but did prompt her to disclose important details pertaining to her last visit. The therapist asked leading questions about why patient has not yet returned for another visit. The therapist provided many opportunities " for patient to disclose any problems. The patient provided vague answers and appeared evasive and was even reluctant to reschedule. The therapist reviewed patient's long-term goals including medication compliance and attention to self-care. The therapist reminded patient that following up with her psychiatrist was an important aspect of long-term stability. She agreed and allowed therapist to contact [facility]  to set up an appointment.  The therapist will continue to provide opportunities for patient to disclose information about her use patterns while maintaining a safe and non-judgmental space during future visits.      Type of psychotherapeutic technique provided: Insight oriented and CBT    Progress toward short term goals:Progress as expected, as patient's mood is reportedly improving and she is excited to be living independently. She reported sobriety maintenance and is trying to obtain part-time employment.   *Therapist is concerned about potential relapse and evasiveness about substance use    Review of long term goals: Patient was strongly encouraged to follow-up consistently with psychiatrist regarding medications. She was reminded that following up with providers and following treatment recommendations is important for long-term stability.      Diagnosis:   1. Severe episode of recurrent major depressive disorder, without psychotic features (H)    2. PTSD (post-traumatic stress disorder)    3. Alcohol use disorder, severe, dependence (H)    R/O Generalized Anxiety Disorder  R/O Bipolar Disorder    Plan and Follow up: The patient is scheduled to return for a follow-up psychotherapy visit on 7/31/2018.  Patient is scheduled to meet with Dr. Brunner on 7/24/2018.      Discharge Criteria/Planning: Client has chronic symptoms and ongoing therapy for maintenance stability recommended.    Performed and documented by GOOD Mendoza    I have read, discussed and reviewed the documentation as  presented by Araceli Hamilton, [facility]  Nury Francisco, [facility] Center[facility]

## 2021-06-19 NOTE — LETTER
Letter by Yanique Cali MD at      Author: Yanique Cali MD Service: -- Author Type: --    Filed:  Encounter Date: 7/12/2019 Status: (Other)           July 12, 2019        Patsy Santamaria  [address]  [place] [address]        Dear Patsy,    Breast cancer is the most common type of cancer among American women. The earlier breast cancer is detected, the better the survival rate. Your best defense against breast cancer is having a screening mammogram on a regular basis. The American Cancer Society recommends that women with an average risk of breast cancer should undergo regular screening mammography starting at age 45 years.         Women aged 45 to 54 years should have a mammogram annually.     Women 55 years and older should have a mammogram at least every other year.     There may be important reasons for you to follow a more frequent screening plan or an earlier start for breast cancer screening, so please discuss your health history and your family health history with your primary care provider.       We reviewed our records and we do not see that you had a mammogram within the past two years. If you have not had a mammogram in the past two years, we strongly encourage you to call for your appointment today. For your convenience, we have included a phone number below for you to schedule your mammogram at a nearby [facility] location.      If you have had a recent mammogram or have questions about why you are receiving this letter, please contact your primary care clinic at [phone] or send a [employer] message  [email or web address]). Your clinic will answer any questions or help obtain recent mammogram reports for your chart at [facility] so we can keep record of your preventive care.       Sincerely,    Yanique Cali MD    and your primary care team at [facility]  Schedule your mammogram today at one of our 7  locations  Please call  [phone]

## 2021-06-19 NOTE — PROGRESS NOTES
Subjective findings: The patient presented to the clinic today complaining about long thick painful nails both feet.  She stated that particularly both great toenails are quite thick and painful.  She indicated she is no longer able to trim her nails.      Objective findings: Nails bilateral feet are elongated and 2 times normal thickness.  Both great toenails are 3 times normal thickness.  Skin bilaterally warm and intact.  DP and PT pulses +2/4 bilateral feet. Capillary refill less than 2 seconds bilateral feet.  Negative clonus, negative Babinski bilaterally.  Range of motion within normal limits bilateral feet. Muscle power is 5 over 5 bilaterally in all compartments.        Assessment: Onychomycosis, onychauxis      Plan: Debrided nails 1 through 5 both feet today.

## 2021-06-20 NOTE — LETTER
"Letter by Vinay Paige MD at      Author: Vinay Paige MD Service: -- Author Type: --    Filed:  Encounter Date: 2020 Status: (Other)       2020    Dear Dr. Cali,    See below for documentation of a telephone visit that I had today with Patsy Santamaria ( 1955). Please feel free to call me at any time if needed.    Sincerely,    Vinay Paige MD  Pulmonary and Critical Care Medicine  [facility]  Cell [phone]  Office [phone]  Pager [phone]    Patsy Santamaria is a 65 y.o. female who is being evaluated via a billable telephone visit.      The patient has been notified of following:     \"This telephone visit will be conducted via a call between you and your physician/provider. We have found that certain health care needs can be provided without the need for a physical exam.  This service lets us provide the care you need with a short phone conversation.  If a prescription is necessary we can send it directly to your pharmacy.  If lab work is needed we can place an order for that and you can then stop by our lab to have the test done at a later time.    Telephone visits are billed at different rates depending on your insurance coverage. During this emergency period, for some insurers they may be billed the same as an in-person visit.  Please reach out to your insurance provider with any questions.    If during the course of the call the physician/provider feels a telephone visit is not appropriate, you will not be charged for this service.\"    Patient has given verbal consent to a Telephone visit? Yes    Patient would like to receive their AVS by AVS Preference: Mail a copy.    Phone call duration: 11 minutes    Carlotta Wiggins [facility]    Pulmonary Clinic Telephone Follow-up Visit    Assessment and Plan:   65 year old female with a history of longstanding and active tobacco dependence, alcohol dependence, PTSD, MELVI, h/o alcohol withdrawal seizure, " H pylori gastritis, obesity, depression, bronchiectasis, peripheral neuropathy, GERD, osteoarthritis, vitamin D deficiency, presenting for telephone follow-up.     Tobacco dependence, bronchiectasis, MELVI: Occasional cough with phlegm. Has sinus congestion and drainage. Has cetirizine but has not refilled it yet. Her nasal fluticasone has . Doing some walking outside with no dyspnea. Some dyspnea with stairs. Using budesonide-formoterol two inhalations two times a day. Not needing nebulized ipratropium-albuterol. Smoking about 0.5 ppd currently. Using nicotine inhaler, but with boredom with sheltering in place, it has been hard to quit. Nicotine patch gives nightmares. She is interested in trying bupropion. Has a support person calling every 2 weeks for alcohol dependence. She is going to call [facility] to get a nasal mask because the oronasal mask is too constricting; did not answer the phone for a recent sleep medicine telephone consultation for unclear reasons. Patient has been hospitalized repeatedly for respiratory failure in the past, though in at least one case she had been drinking alcohol and aspirated food and mucus, requiring bronchoscopy for airway clearance. Baseline FEV1/FVC ratio is nonobstructive, normal TLC and DLCO, no significant bronchodilator response. Obesity likely playing a significant role along with deconditioning.     Plan:  - start bupropion 150 mg two times a day  - continue use of nicotine inhaler  - quit smoking  - has telephone follow-up for alcohol dependence every 2 weeks; encouraged ongoing alcohol abstinence  - previously provided multiple smoking cessation resources including contact information for QUITPLAN  - continue budesonide-formoterol 80-4.5 mcg two inhalations two times a day; rinse/gargle/spit water after use  - nebulized ipratropium-albuterol or albuterol HFA as needed; scheduled nebulized therapy would be ideal, but she prefers to use it as needed  -  she will be contacting [facility] to change from an oronasal to a nasal mask to improve CPAP tolerance  - annual influenza vaccination  - received Pneumovax-23 today in 2020; plan for Prevnar-13 at age 67 and booster of Pneumovax-23 in   - follow up in 3 months  - encouraged her to call any time with questions or concerning symptoms    Vinay Paige MD  Pulmonary and Critical Care Medicine  [facility]  Cell [phone]  Office [phone]  Pager [phone]    CCx: tobacco dependence, bronchiectasis, MELVI    HPI: 65 year old female with a history of longstanding and active tobacco dependence, alcohol dependence, PTSD, MELVI, h/o alcohol withdrawal seizure, H pylori gastritis, obesity, depression, bronchiectasis, peripheral neuropathy, GERD, osteoarthritis, vitamin D deficiency, presenting for telephone follow-up. Occasional cough with phlegm. Has sinus congestion and drainage. Has cetirizine but has not refilled it yet. Her nasal fluticasone has . Doing some walking outside with no dyspnea. Some dyspnea with stairs. Using budesonide-formoterol two inhalations two times a day. Not needing nebulized ipratropium-albuterol. Smoking about 0.5 ppd currently. Using nicotine inhaler, but with boredom with sheltering in place, it has been hard to quit. Nicotine patch gives nightmares. She is interested in trying bupropion. Has a support person calling every 2 weeks for alcohol dependence. She is going to call [facility] to get a nasal mask because the oronasal mask is too constricting; did not answer the phone for a recent sleep medicine telephone consultation for unclear reasons. Patient has been hospitalized repeatedly for respiratory failure in the past, though in at least one case she had been drinking alcohol and aspirated food and mucus, requiring bronchoscopy for airway clearance. Baseline FEV1/FVC ratio is nonobstructive, normal TLC and DLCO, no significant  bronchodilator response. Obesity likely playing a significant role along with deconditioning.    ROS:  A 12-system review was obtained and was negative with the exception of the symptoms endorsed in the history of present illness.    PMH:  Past Medical History:   Diagnosis Date   ? Alcohol withdrawal seizure without complication (H) 2016   ? Alcoholic cirrhosis (H)    ? Anxiety    ? Arthritis    ? Chronic alcohol dependence, continuous (H) 2018    inpatient 2019, sober since then   ? Chronic reflux esophagitis    ? COPD (chronic obstructive pulmonary disease) (H)    ? Depression    ? Dermatitis    ? Ganglion     right foot   ? H. pylori infection    ? Melanoma (H) 2019    left upper arm   ? Menopause     age 52   ? Obesity (BMI 35.0-39.9 without comorbidity)    ? Seizure (H) 2016    during alcohol withdrawal   ? Sleep apnea     mild, doesnt tolerate pap therapy       PSH:  Past Surgical History:   Procedure Laterality Date   ? APPENDECTOMY     ? WA APPENDECTOMY      Description: Appendectomy;  Recorded: 2008;  Comments: childhood   ? WA  DELIVERY ONLY      Description:  Section;  Recorded: 2008;   ? WA EXCIS TENDN/CAPSULE LESN,FOOT      Description: Excision Of Cyst Of Tendon Sheath Of Foot;  Proc Date: 10/28/2011;  Comments: [facility]   ? WA HEMORRHOIDECTOMY INTERNAL RUBBER BAND LIGATIONS      Description: Hemorrhoidectomy;  Recorded: 2008;   ? WA KNEE SCOPE,DIAGNOSTIC      Description: Arthroscopy Knee Right;  Proc Date: 10/11/2005;  Comments: for right knee patella subluxation with lateral retinacular release; subpatellar chondroplasty   ? WA LATERAL RETINACULAR RELEASE OPEN      Description: Knee Lateral Retinacular Release;  Proc Date: 10/11/2005;   ? WA LIGATE FALLOPIAN TUBE      Description: Tubal Ligation;  Recorded: 2008;   ? SKIN BIOPSY Left 2019    left upper arm   ? TONSILLECTOMY         Allergies:  Allergies   Allergen Reactions   ?  Codeine Nausea Only   ? Hydrochlorothiazide Rash     phototoxicity - med was d/lora     ? Topamax [Topiramate]    ? Diclofenac Nausea Only     Tolerates the topical gel   ? Sulfa (Sulfonamide Antibiotics) Rash   ? Sulfasalazine Rash       Family HX:  Family History   Problem Relation Age of Onset   ? Heart disease Mother    ? Heart disease Father        Social Hx:  Social History     Socioeconomic History   ? Marital status: Single     Spouse name: Not on file   ? Number of children: Not on file   ? Years of education: Not on file   ? Highest education level: Not on file   Occupational History   ? 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 Financial resource strain: Not on file   ? Food insecurity     Worry: Not on file     Inability: Not on file   ? Transportation needs     Medical: Not on file     Non-medical: Not on file   Tobacco Use   ? Smoking status: Current Every Day Smoker     Packs/day: 1.00     Years: 49.00     Pack years: 49.00     Types: Cigarettes   ? Smokeless tobacco: Never Used   ? Tobacco comment: last 11/2019   Substance and Sexual Activity   ? Alcohol use: Not Currently     Comment: sober since 11/19/2019   ? Drug use: No   ? Sexual activity: Yes     Partners: Male     Birth control/protection: None   Lifestyle   ? Physical activity     Days per week: Not on file     Minutes per session: Not on file   ? Stress: Not on file   Relationships   ? Social connections     Talks on phone: Not on file     Gets together: Not on file     Attends Scientology service: Not on file     Active member of club or organization: Not on file     Attends meetings of clubs or organizations: Not on file     Relationship status: Not on file   ? Intimate partner violence     Fear of current or ex partner: Not on file     Emotionally abused: Not on file     Physically abused: Not on file     Forced sexual activity: Not on file   Other Topics Concern    ? Not on file   Social History Narrative   ? Not on file       Current Meds:  Current Outpatient Medications   Medication Sig Dispense Refill   ? albuterol (ACCUNEB) 0.63 mg/3 mL nebulizer solution Take 3 mL (0.63 mg total) by nebulization every 2 (two) hours as needed for wheezing. 10 vial 0   ? albuterol (VENTOLIN HFA) 90 mcg/actuation inhaler Inhale 2 puffs every 4 (four) hours as needed for wheezing. 18 g 3   ? ammonium lactate (AMLACTIN) 12 % cream Apply 2 application topically 2 (two) times a day as needed. Apply 1-3 grams to each foot twice daily as needed  11   ? baclofen (LIORESAL) 10 MG tablet Take 1 tablet (10 mg total) by mouth 3 (three) times a day. 90 tablet 0   ? budesonide-formoterol (SYMBICORT) 80-4.5 mcg/actuation inhaler Inhale 2 puffs 2 (two) times a day. 1 Inhaler 1   ? bumetanide (BUMEX) 1 MG tablet Take 1 tablet (1 mg total) by mouth daily. 90 tablet 2   ? cetirizine (ZYRTEC) 10 MG tablet Take 1 tablet (10 mg total) by mouth daily as needed for allergies. 30 tablet 5   ? DULoxetine (CYMBALTA) 30 MG capsule Take 3 capsules (90 mg total) by mouth daily. 90 capsule 0   ? gabapentin (NEURONTIN) 300 MG capsule Use 1-2 tablets three times daily 60 capsule 0   ? hydrOXYzine pamoate (VISTARIL) 25 MG capsule Take 1 capsule (25 mg total) by mouth 4 (four) times a day as needed for anxiety. 120 capsule 1   ? naltrexone (DEPADE) 50 mg tablet Take 100 mg by mouth daily.     ? nicotine (NICOTROL) 10 mg inhaler Inhale 1 puff as needed for smoking cessation. 168 each 11   ? omeprazole (PRILOSEC) 20 MG capsule Take 1 capsule (20 mg total) by mouth at bedtime. 90 capsule 3   ? potassium chloride (K-DUR,KLOR-CON) 20 MEQ tablet Take 1 tablet (20 mEq total) by mouth 2 (two) times a day. 180 tablet 2   ? prazosin (MINIPRESS) 1 MG capsule Take 1 capsule (1 mg total) by mouth at bedtime. 30 capsule 0   ? traZODone (DESYREL) 50 MG tablet Take 0.5 tablets (25 mg total) by mouth at bedtime. 45 tablet 0     No current  facility-administered medications for this visit.        Physical Exam:  No exam due to telephone visit    Labs:  reviewed    Imaging studies:  CTA C/A/P (January 2020):  - images directly reviewed, formal interpretation follows:  FINDINGS:   CT ANGIOGRAM CHEST, ABDOMEN, AND PELVIS: No thoracoabdominal aortic aneurysm nor dissection. 4 vessels arise from the aortic arch without significant stenosis. No central pulmonary embolus. Mild coronary artery calcifications.     The celiac, SMA and ARIELLA are widely patent. Single right and 2 left renal arteries are widely patent. Mild aortoiliac atherosclerosis with no significant stenosis.     LUNGS AND PLEURA: Bilateral lower lobe tubular bronchiectasis. There is some peripheral endobronchial mucous plugging in the right lower lobe with some peripheral subsegmental atelectasis. No focal airspace consolidation. Trace right pleural effusion +/-   some mild pleural thickening.     MEDIASTINUM/AXILLAE: No adenopathy. No pericardial effusion.     HEPATOBILIARY: Diffuse hepatic steatosis. Faint density within the gallbladder lumen could represent sludge +/- stones. No bile duct dilatation.     PANCREAS: No significant mass, duct dilatation, or inflammatory change.     SPLEEN: Normal.     ADRENAL GLANDS: Normal.     KIDNEYS/BLADDER: No significant mass, stones, or hydronephrosis.     BOWEL: Diverticulosis in the colon. No acute inflammatory change. No obstruction.      LYMPH NODES: No lymphadenopathy.     PELVIC ORGANS: Myomatous uterus. Normal-appearing ovaries. No abnormal fluid collection.     OTHER: Tiny fat-containing paraumbilical and bilateral inguinal hernias.     MUSCULOSKELETAL: Degenerative changes lower lumbar spine. No suspicious osseous lesions.     IMPRESSION:   1.  CTA negative for thoracoabdominal aortic aneurysm/dissection and pulmonary embolus. No abnormality to account for patient's symptoms.  2.  Bilateral lower lobe bronchiectasis with some peripheral  endobronchial mucous plugging and subsegmental atelectasis in the right lower lobe. Trace right pleural effusion +/- pleural thickening.  3.  Diffuse hepatic steatosis.  4.  Faint density within the gallbladder lumen could represent gallbladder sludge +/- stones. No bile duct dilatation.  5.  Colonic diverticulosis.    CXR (February 2020):  - images directly reviewed, formal interpretation follows:  IMPRESSION:   Linear scarring or atelectasis at the right base is unchanged. Lungs are otherwise clear. Heart size and pulmonary vascularity are normal. No pneumothorax or pleural effusion. No bony fracture.     PFT's (11/26/19):  FEV1/FVC is 0.77 and is normal.  FEV1 is 1.82 L (76% predicted) and is reduced.  FVC is 2.36 L (78% predicted) and reduced.  There was no improvement in spirometry after a single inhaled dose of bronchodilator.  TLC is 4.54 L (93% predicted) and is normal.  RV is 2.22 L (115% predicted) and is normal.  RV/TLC is 0.49 and is increased.  DLCO is 103% predicted and is normal when it is corrected for hemoglobin.

## 2021-06-20 NOTE — PROGRESS NOTES
Correct pharmacy verified with patient and confirmed in snapshot? [x] yes []no    Charge captured ? [x] yes  [] no    Medications Phoned  to Pharmacy [] yes [x]no  Name of Pharmacist:  List Medications, including dose, quantity and instructions      Medication Prescriptions given to patient   [] yes  [x] no   List the name of the drug the prescription was written for.       Medications ordered this visit were e-scribed.  Verified by order class [x] yes  [] no  naltrexone    Medication changes or discontinuations were communicated to patient's pharmacy: [] yes  [x] no    UA collected [x] yes  [] no    [place] Prescription Monitoring Program Reviewed? [x] yes  [] no    Referrals were made to:none      Future appointment was made: [x] yes  [] no    Dictation completed at time of chart check: [] yes  [x] no    I have checked the documentation for today s encounters and the above information has been reviewed and completed.

## 2021-06-20 NOTE — PROGRESS NOTES
Mental Health Visit Note    This is a dual signature report. My supervising clinician is JULIO Kasper.    10/10/2018   Start time: 1:50pm    Stop Time: 2:30pm   Session # 27    Session Type: Patient is presenting for an Individual session.    Patsy Santamaria is a 63 y.o. female is being seen today for    Chief Complaint   Patient presents with     MH Follow Up     Psychotherapy follow-up visit to address anxiety and recent relapse with alcohol     New symptoms or complaints:  Relapse with alcohol last week     Functional Impairment:   Personal: 4  Family: 3  Work: 4  Social: 2    Clinical assessment of mental status:   Grooming: Well groomed  Attire: Appropriate  Age: Appears Stated  Behavior Towards Examiner: Cooperative  Motor Activity: Within normal   Eye Contact: Appropriate  Mood: Anxious  Affect: Congruent w/content of speech, Flat, Anxious and Depressed  Speech/Language: Within normal  Attention: Distractible  Concentration: Brief  Thought Process: Within normal and anxious and often distorted  Thought Content: Hallucinations: None reported  Delusions: none reported  Orientation: X 3  Memory: Impairment and Recent  Judgement: No Evidence of Impairment  Estimated Intelligence: Average  Demonstrated Insight: Adequate  Fund of Knowledge: adequate     Suicidal/Homicidal Ideation present: None Reported This Session    Patient's impression of their current status:   The patient reported that she did obtain an application for the [employer]. Her sister helped her create fliers for light housekeeping and cooking which she placed around her apartment building. She was recently re-certified for public housing which led her to believe that she was a victim of identity theft. After a week of anxious thought cycles and worst scenarios she discovered that the income was from a former company for whom she worked so it was not a result of identity theft. This episode of panic led to a relapse with alcohol. She has  since been to a meeting and created a relapse prevention plan with the therapist during today's visit.        Therapist impression of patients current state:   The patient arrived on-time for a follow-up psychotherapy visit. She presented with a pleasant demeanor towards the therapist with slightly depressed affect. The patient exhibited anxious thought patterns today while discussing how she was recently triggered due to a stressful event which led to a relapse with alcohol. She initially minimized the relapse but was able to identify contributing factors.   The patient and therapist created the following plan for future crises:  1. Go to a meeting  2. Find a new sponsor that you can rely on during a crisis  3. Call mental health hotline: [phone] for resources such as walk-in clinic or crisis counseling   4. Call for immediate appointment with Araceli - [phone]  5. If not able to get ahold of Araceli, call [facility] to setup an appointment [phone]  6. Call  at [employer]:   7. Call oldest sister and share about your anxiety or problem - ask for help  8. Implement learned techniques such as deep breathing, writing down thoughts and feelings in journal, distraction, etc.  9. Cook something you love  10.  Go out for a long walk  11. Go shopping or browse your favorite store even if you can t buy anything!      Type of psychotherapeutic technique provided: Insight oriented and CBT    Progress toward short term goals:Progress as expected, as patient addressed triggers and created a relapse prevention plan.     Review of long term goals: Not done at today's visit   Treatment plan will be updated during next visit on 11/1/18.     Diagnosis:   1. Severe episode of recurrent major depressive disorder, without psychotic features (H)    2. PTSD (post-traumatic stress disorder)    3. Alcohol use disorder, severe, dependence (H)    R/O Generalized Anxiety Disorder  R/O Bipolar Disorder    Plan and Follow  up: The patient is scheduled to return for a follow-up psychotherapy visit on 11/1/2018.  Patient instructed to call if needing an earlier appointment.  Therapist and patient created crisis plan for patient to follow if triggered with severe symptoms of anxiety and as part of a relapse prevention plan.  Patient instructed to call to reschedule missed appointment with Dr. Brunner.       Discharge Criteria/Planning: Client has chronic symptoms and ongoing therapy for maintenance stability recommended.    Performed and documented by GOOD Mendoza    I have read, discussed and reviewed the documentation as presented by GOOD Mendoza LICSW

## 2021-06-20 NOTE — PROGRESS NOTES
The patient was a no-show for her scheduled therapy visit today. She called to cancel her previously scheduled appointment on 9/4/18 and called the clinic requesting a follow-up visit which was rescheduled for 9/7/18. The patient will be notified about [facility] no-show policies. The therapist will attempt to call patient in order to discuss her attendance and inquire about rescheduling.
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
## 2021-06-20 NOTE — PROGRESS NOTES
"LM for patient to call the clinic back regarding Dr. Brunner's directives after her visit yesterday.  She needs a rule 25.     Patient called back and was communicated Dr Brunners directive.  She was given the intake phone number.  Patient said \"ok\" and \"is that it ?\"  Replies yes and she said thanks.  "

## 2021-06-20 NOTE — LETTER
Letter by Vinay Paige MD at      Author: Vinay Paige MD Service: -- Author Type: --    Filed:  Encounter Date: 1/28/2020 Status: (Other)         Yanique Cali MD  [address] [place] [id number]                                  January 28, 2020    Patient: Patsy Santamaria   MR Number: [id number]   YOB: 1955   Date of Visit: 1/28/2020     Dear Dr. Viraj MD:    I saw Patsy Santamaria today at the [facility]. Below are the relevant portions of my assessment and plan of care.    If you have questions, please do not hesitate to call me. I look forward to following Patsy along with you.    Sincerely,        Vinay Paige MD          CC  No Recipients  Vinay Paige MD  1/28/2020  5:28 PM  Sign when Signing Visit  Pulmonary Clinic Follow-up Visit    Assessment and Plan:   64 year old female with a history of longstanding and active tobacco dependence, alcohol dependence, PTSD, untreated MELVI, h/o alcohol withdrawal seizure, H pylori gastritis, obesity, depression, bronchiectasis, peripheral neuropathy, GERD, osteoarthritis, vitamin D deficiency, presenting for follow-up.     Tobacco dependence, bronchiectasis, MELVI: Patient has been hospitalized repeatedly for respiratory failure, though in at least one case she had been drinking and aspirated food and mucus, requiring bronchoscopy for airway clearance. Continues to smoke and continues to have exertional dyspnea. Has not yet picked up the ipratropium-albuterol nebs. PFTs are nonspecific; FEV1/FVC ratio is nonobstructive, normal TLC and DLCO, no significant bronchodilator response. Obesity likely playing a significant role along with deconditioning. She continues to smoke 0.5 ppd. She continues to not use CPAP due to claustrophobia in the setting of PTSD; has an oronasal mask currently. Had been drinking heavily even after inpatient treatment (likely a significant factor in her aspiration and mucous  plugging), but no alcohol since recent hospital discharge.     Plan:  - again advised her to quit smoking  - nicotine 14-mg patch  - use nicotine inhaler for cravings  - advised her to continue to stay alcohol-free and continue to attend AA meetings  - provided multiple resources in the AVS including contact information for [facility]  - advised her to fill the ipratropium-albuterol nebs and start using this four times a day with in-line Aerobika flutter valve  - continue budesonide-formoterol 80-4.5 mcg two inhalations two times a day; rinse/gargle/spit water after use  - advised her to contact her DME to get a different CPAP interface and will refer her to sleep medicine clinic  - albuterol HFA or nebulized ipratropium-albuterol as needed  - annual influenza vaccination; received for this season  - administered Pneumovax-23 today in clinic  - follow up in 3 months  - encouraged her to call any time with questions or concerning symptoms     I appreciate the opportunity to participate in the care of Ms. Santamaria. Please feel free to contact me at any time.     CCx: chronic dyspnea, tobacco dependence    HPI: 64 year old female with a history of longstanding and active tobacco dependence, alcohol dependence, PTSD, untreated MELVI, h/o alcohol withdrawal seizure, H pylori gastritis, obesity, depression, possible COPD, peripheral neuropathy, GERD, osteoarthritis, vitamin D deficiency, presenting for follow-up. Patient has been hospitalized repeatedly for respiratory failure, though in at least one case she had been drinking and aspirated food and mucus, requiring bronchoscopy for airway clearance. Continues to smoke and continues to have exertional dyspnea. Has not yet picked up the ipratropium-albuterol nebs. PFTs are nonspecific, with a possible obstructive component suggestive of asthma or COPD, though FEV1/FVC ratio is nonobstructive, normal TLC and DLCO, no significant bronchodilator response. Obesity likely playing a  significant role along with deconditioning. She continues to smoke 0.5 ppd. She continues to not use CPAP due to claustrophobia in the setting of PTSD; has an oronasal mask currently. Had been drinking heavily even after inpatient treatment (likely a significant factor in her aspiration and mucous plugging), but no alcohol since recent hospital discharge.    ROS:  A 12-system review was obtained and was negative with the exception of the symptoms endorsed in the history of present illness.    PMH:  Past Medical History:   Diagnosis Date   ? Alcohol withdrawal seizure without complication (H) 2016   ? Alcoholic cirrhosis (H)    ? Anxiety    ? Arthritis    ? Chronic alcohol dependence, continuous (H) 2018    inpatient 2019, sober since then   ? Chronic reflux esophagitis    ? COPD (chronic obstructive pulmonary disease) (H)    ? Depression    ? Dermatitis    ? Ganglion     right foot   ? H. pylori infection    ? Melanoma (H) 2019    left upper arm   ? Menopause     age 52   ? Obesity (BMI 35.0-39.9 without comorbidity)    ? Seizure (H) 2016    during alcohol withdrawal   ? Sleep apnea     mild, doesnt tolerate pap therapy     PSH:  Past Surgical History:   Procedure Laterality Date   ? APPENDECTOMY     ? ME APPENDECTOMY      Description: Appendectomy;  Recorded: 2008;  Comments: childhood   ? ME  DELIVERY ONLY      Description:  Section;  Recorded: 2008;   ? ME EXCIS TENDN/CAPSULE LESN,FOOT      Description: Excision Of Cyst Of Tendon Sheath Of Foot;  Proc Date: 10/28/2011;  Comments: [facility]   ? ME HEMORRHOIDECTOMY INTERNAL RUBBER BAND LIGATIONS      Description: Hemorrhoidectomy;  Recorded: 2008;   ? ME KNEE SCOPE,DIAGNOSTIC      Description: Arthroscopy Knee Right;  Proc Date: 10/11/2005;  Comments: for right knee patella subluxation with lateral retinacular release; subpatellar chondroplasty   ? ME LATERAL RETINACULAR RELEASE OPEN      Description:  Knee Lateral Retinacular Release;  Proc Date: 10/11/2005;   ? CO LIGATE FALLOPIAN TUBE      Description: Tubal Ligation;  Recorded: 09/01/2008;   ? SKIN BIOPSY Left 07/2019    left upper arm   ? TONSILLECTOMY         Allergies:  Allergies   Allergen Reactions   ? Codeine Nausea Only   ? Hydrochlorothiazide Rash     phototoxicity - med was d/lora     ? Diclofenac Nausea Only     Tolerates the topical gel   ? Sulfa (Sulfonamide Antibiotics) Rash   ? Sulfasalazine Rash       Family HX:  Family History   Problem Relation Age of Onset   ? Heart disease Mother    ? Heart disease Father        Social Hx:  Social History     Socioeconomic History   ? Marital status: Single     Spouse name: Not on file   ? Number of children: Not on file   ? Years of education: Not on file   ? Highest education level: Not on file   Occupational History   ? 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 Financial resource strain: Not on file   ? Food insecurity:     Worry: Not on file     Inability: Not on file   ? Transportation needs:     Medical: Not on file     Non-medical: Not on file   Tobacco Use   ? Smoking status: Current Every Day Smoker     Packs/day: 1.00     Years: 49.00     Pack years: 49.00     Types: Cigarettes   ? Smokeless tobacco: Never Used   ? Tobacco comment: last 11/2019   Substance and Sexual Activity   ? Alcohol use: Not Currently     Comment: sober since 11/19/2019   ? Drug use: No   ? Sexual activity: Yes     Partners: Male     Birth control/protection: None   Lifestyle   ? Physical activity:     Days per week: Not on file     Minutes per session: Not on file   ? Stress: Not on file   Relationships   ? Social connections:     Talks on phone: Not on file     Gets together: Not on file     Attends [employer] service: Not on file     Active member of club or organization: Not on file     Attends meetings of clubs or organizations: Not on file      Relationship status: Not on file   ? Intimate partner violence:     Fear of current or ex partner: Not on file     Emotionally abused: Not on file     Physically abused: Not on file     Forced sexual activity: Not on file   Other Topics Concern   ? Not on file   Social History Narrative   ? Not on file       Current Meds:  Current Outpatient Medications   Medication Sig Dispense Refill   ? acamprosate (CAMPRAL) 333 mg tablet Take 2 tablets (666 mg total) by mouth 3 (three) times a day. 180 tablet 0   ? albuterol (ACCUNEB) 0.63 mg/3 mL nebulizer solution Take 3 mL (0.63 mg total) by nebulization every 2 (two) hours as needed for wheezing. 10 vial 0   ? albuterol (VENTOLIN HFA) 90 mcg/actuation inhaler Inhale 2 puffs every 4 (four) hours as needed for wheezing. 18 g 3   ? ammonium lactate (AMLACTIN) 12 % cream Apply 2 application topically 2 (two) times a day as needed. Apply 1-3 grams to each foot twice daily as needed  11   ? budesonide-formoterol (SYMBICORT) 80-4.5 mcg/actuation inhaler Inhale 2 puffs 2 (two) times a day. 1 Inhaler 1   ? bumetanide (BUMEX) 1 MG tablet Take 1 tablet (1 mg total) by mouth daily. 30 tablet 1   ? cetirizine (ZYRTEC) 10 MG tablet Take 10 mg by mouth daily as needed for allergies.     ? diclofenac sodium (VOLTAREN) 1 % Gel Apply 1 g topically 2 (two) times a day as needed (pain). 100 g 0   ? DULoxetine (CYMBALTA) 60 MG capsule Take 1 capsule (60 mg total) by mouth daily. 30 capsule 0   ? fluticasone (FLONASE) 50 mcg/actuation nasal spray Apply 1 spray into each nostril daily as needed for rhinitis.     ? hydrOXYzine pamoate (VISTARIL) 25 MG capsule Take 1 capsule (25 mg total) by mouth 4 (four) times a day as needed for anxiety. 65 capsule 0   ? ipratropium-albuterol (DUO-NEB) 0.5-2.5 mg/3 mL nebulizer Take 3 mL by nebulization every 6 (six) hours as needed (wheezing, short of breath). 90 mL 1   ? nicotine (NICOTROL) 10 mg inhaler Inhale 1 puff as needed for smoking cessation. 168 each  "11   ? nystatin (MYCOSTATIN) 100,000 unit/mL suspension Take 500,000 Units by mouth 2 (two) times a day.     ? omeprazole (PRILOSEC) 20 MG capsule Take 1 capsule (20 mg total) by mouth at bedtime. 90 capsule 3   ? potassium chloride (K-DUR,KLOR-CON) 20 MEQ tablet Take 1 tablet (20 mEq total) by mouth 2 (two) times a day. 60 tablet 1   ? predniSONE (DELTASONE) 5 MG tablet Take 20 mg by mouth daily with breakfast for 5 days. 20 tablet 0   ? traZODone (DESYREL) 50 MG tablet Take 1 tablet (50 mg total) by mouth at bedtime as needed, may repeat once for sleep. 32 tablet 0   ? nicotine (NICODERM CQ) 14 mg/24 hr Place 1 patch on the skin daily. 28 patch 11     No current facility-administered medications for this visit.        Physical Exam:  /80   Pulse 75   Resp 12   Ht 5' 3\" (1.6 m)   Wt (!) 245 lb (111.1 kg)   LMP 03/06/2002   SpO2 92%   Breastfeeding No   BMI 43.40 kg/m     Gen: alert, oriented, appears fatigued  HEENT: left nasal polyp, no oropharyngeal lesions, no cervical or supraclavicular lymphadenopathy  CV: RRR, no M/G/R  Resp: wheezing bilaterally, shallow breaths  Abd: soft, nontender, no palpable organomegaly  Skin: no apparent rashes  Ext: trace bilateral ankle edema  Neuro: alert, nonfocal    Labs:  reviewed    FINDINGS:   CT ANGIOGRAM CHEST, ABDOMEN, AND PELVIS: No thoracoabdominal aortic aneurysm nor dissection. 4 vessels arise from the aortic arch without significant stenosis. No central pulmonary embolus. Mild coronary artery calcifications.     The celiac, SMA and ARIELLA are widely patent. Single right and 2 left renal arteries are widely patent. Mild aortoiliac atherosclerosis with no significant stenosis.     LUNGS AND PLEURA: Bilateral lower lobe tubular bronchiectasis. There is some peripheral endobronchial mucous plugging in the right lower lobe with some peripheral subsegmental atelectasis. No focal airspace consolidation. Trace right pleural effusion +/-   some mild pleural " thickening.     MEDIASTINUM/AXILLAE: No adenopathy. No pericardial effusion.     HEPATOBILIARY: Diffuse hepatic steatosis. Faint density within the gallbladder lumen could represent sludge +/- stones. No bile duct dilatation.     PANCREAS: No significant mass, duct dilatation, or inflammatory change.     SPLEEN: Normal.     ADRENAL GLANDS: Normal.     KIDNEYS/BLADDER: No significant mass, stones, or hydronephrosis.     BOWEL: Diverticulosis in the colon. No acute inflammatory change. No obstruction.      LYMPH NODES: No lymphadenopathy.     PELVIC ORGANS: Myomatous uterus. Normal-appearing ovaries. No abnormal fluid collection.     OTHER: Tiny fat-containing paraumbilical and bilateral inguinal hernias.     MUSCULOSKELETAL: Degenerative changes lower lumbar spine. No suspicious osseous lesions.     IMPRESSION:   1.  CTA negative for thoracoabdominal aortic aneurysm/dissection and pulmonary embolus. No abnormality to account for patient's symptoms.  2.  Bilateral lower lobe bronchiectasis with some peripheral endobronchial mucous plugging and subsegmental atelectasis in the right lower lobe. Trace right pleural effusion +/- pleural thickening.  3.  Diffuse hepatic steatosis.  4.  Faint density within the gallbladder lumen could represent gallbladder sludge +/- stones. No bile duct dilatation.  5.  Colonic diverticulosis.    PFT's (11/26/19):  FEV1/FVC is 0.77 and is normal.  FEV1 is 1.82 L (76% predicted) and is reduced.  FVC is 2.36 L (78% predicted) and reduced.  There was no improvement in spirometry after a single inhaled dose of bronchodilator.  TLC is 4.54 L (93% predicted) and is normal.  RV is 2.22 L (115% predicted) and is normal.  RV/TLC is 0.49 and is increased.  DLCO is 103% predicted and is normal when it is corrected for hemoglobin.    Vinay Paige MD  Pulmonary and Critical Care Medicine  [facility]  Cell [phone]  Office [phone]  Pager [phone]

## 2021-06-20 NOTE — LETTER
Letter by Yanique Cali MD at      Author: Yanique Cali MD Service: -- Author Type: --    Filed:  Encounter Date: 9/30/2020 Status: (Other)         Patsy Santamaria  [address]  [place] [address]             September 30, 2020         Dear Ms. Santamaria,    Below are the results from your recent visit:    Resulted Orders   Mammo Screening Bilateral    Narrative    BILATERAL FULL FIELD DIGITAL SCREENING MAMMOGRAM    Performed on: 9/29/20.      Compared to: 03/06/2017 Mammo Screening Bilateral and 12/22/2014 Mammo   Screening Bilateral    Findings: The breasts have scattered areas of fibroglandular densities.   There is no radiographic evidence of malignancy. This study was evaluated   with the assistance of Computer-Aided Detection. Routine screening   mammogram in one year is recommended.    ACR BI-RADS Category 1: Negative       Your mammogram is normal.     Please call with questions or contact us using [email or web address].    Sincerely,        Electronically signed by Yanique Cali MD

## 2021-06-20 NOTE — LETTER
Letter by Yanique Cali MD at      Author: Yanique Cali MD Service: -- Author Type: --    Filed:  Encounter Date: 10/13/2020 Status: (Other)         Patsy Santamaria  [address]  [place] [address]             October 13, 2020         Dear Ms. Santamaria,    Below are the results from your recent visit:    Resulted Orders   Hepatitis C Antibody (Anti-HCV)   Result Value Ref Range    Hepatitis C Ab Negative Negative   Thyroid Stimulating Hormone (TSH)   Result Value Ref Range    TSH 0.75 0.30 - 5.00 uIU/mL   Comprehensive Metabolic Panel   Result Value Ref Range    Sodium 141 136 - 145 mmol/L    Potassium 3.9 3.5 - 5.0 mmol/L    Chloride 102 98 - 107 mmol/L    CO2 26 22 - 31 mmol/L    Anion Gap, Calculation 13 5 - 18 mmol/L    Glucose 98 70 - 125 mg/dL    BUN 8 8 - 22 mg/dL    Creatinine 0.62 0.60 - 1.10 mg/dL    GFR MDRD Af Amer >60 >60 mL/min/1.73m2    GFR MDRD Non Af Amer >60 >60 mL/min/1.73m2    Bilirubin, Total 0.5 0.0 - 1.0 mg/dL    Calcium 9.3 8.5 - 10.5 mg/dL    Protein, Total 7.3 6.0 - 8.0 g/dL    Albumin 4.0 3.5 - 5.0 g/dL    Alkaline Phosphatase 99 45 - 120 U/L     (H) 0 - 40 U/L    ALT 79 (H) 0 - 45 U/L    Narrative    Fasting Glucose reference range is 70-99 mg/dL per  American Diabetes Association (ADA) guidelines.   Lipid [place] FASTING   Result Value Ref Range    Cholesterol 200 (H) <=199 mg/dL    Triglycerides 117 <=149 mg/dL    HDL Cholesterol 64 >=50 mg/dL    LDL Calculated 113 <=129 mg/dL    Patient Fasting > 8hrs? No    Magnesium   Result Value Ref Range    Magnesium 1.8 1.8 - 2.6 mg/dL   Vitamin D, Total (25-Hydroxy)   Result Value Ref Range    Vitamin D, Total (25-Hydroxy) 21.0 (L) 30.0 - 80.0 ng/mL    Narrative    Deficiency <10.0 ng/mL  Insufficiency 10.0-29.9 ng/mL  Sufficiency 30.0-80.0 ng/mL  Toxicity (possible) >100.0 ng/mL   Vitamin B12   Result Value Ref Range    Vitamin B-12 285 213 - 816 pg/mL   HM1 (CBC with Diff)   Result Value Ref  Range    WBC 6.1 4.0 - 11.0 thou/uL    RBC 4.85 3.80 - 5.40 mill/uL    Hemoglobin 15.7 12.0 - 16.0 g/dL    Hematocrit 47.8 (H) 35.0 - 47.0 %    MCV 99 80 - 100 fL    MCH 32.4 27.0 - 34.0 pg    MCHC 32.9 32.0 - 36.0 g/dL    RDW 11.7 11.0 - 14.5 %    Platelets 206 140 - 440 thou/uL    MPV 7.7 7.0 - 10.0 fL    Neutrophils % 48 (L) 50 - 70 %    Lymphocytes % 38 20 - 40 %    Monocytes % 10 2 - 10 %    Eosinophils % 3 0 - 6 %    Basophils % 1 0 - 2 %    Neutrophils Absolute 2.9 2.0 - 7.7 thou/uL    Lymphocytes Absolute 2.4 0.8 - 4.4 thou/uL    Monocytes Absolute 0.6 0.0 - 0.9 thou/uL    Eosinophils Absolute 0.2 0.0 - 0.4 thou/uL    Basophils Absolute 0.1 0.0 - 0.2 thou/uL       Your labs look pretty good.  The Vitamin D levels are still low - so are the Vitamin B12 levels .  I'll send in supplements for those.   Other labs look pretty good.     Please call with questions or contact us using [employer].    Sincerely,        Electronically signed by Yanique Cali MD

## 2021-06-20 NOTE — PROGRESS NOTES
The patient called and left a voice message for therapist on 10/5/18. Therapist was not able to return patient call until 10/9/18 at 12:00pm. Patient reported being unable to attend her scheduled follow-up session today at 1:00pm and requested to reschedule. She was calling on 10/5/18 to request a schedule change so today will be considered a late cancel appointment. She is scheduled to return for a follow-up visit on 10/10/18 at 2pm.
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
## 2021-06-20 NOTE — PROGRESS NOTES
"Addiction  Nurse Only Visit Note    9/13/2018  Date of last visit: 7/31/18    Reason for today's visit: follow up  Chief Complaint   Patient presents with     Follow-up       Vitals:    09/13/18 0930   BP: 143/85   Patient Site: Right Arm   Patient Position: Sitting   Cuff Size: Adult Regular   Pulse: 79   Resp: 18   Temp: 98.1  F (36.7  C)   TempSrc: Oral   Weight: (!) 231 lb (104.8 kg)        If having pain, who will address pain:  n/a    Is pt assisted: No    Urine for toxicology sent today: yes    Last UA date: 7/31/18  Reviewed with patient: yes  Results: positive for phencyclidine    AIMS:     Pill count: NA  (Controlled substance only)  Medications needing renewal: N/A    Substance use history:    Using alcohol:No  Using street drugs or unprescribed medications: No  Using opioids other that Suboxone:No  Using tobacco:Yes: Describe: smokes one pack daily, no plans to quit yet    Recovery environment:  Attending formal chemical dependency programming: No  Attending \"outside\" mutual help meetings: Yes: Describe: AA 2-3 times per week  Has a chemical dependency sponsor: Yes: Describe: meets with sponsor once every 2 weeks  Has other elements of structure: No - looking for part time work  Current Living Situation: lives alone    Cravings: no    Sleep: yes \"not good,\" falling asleep is hard and wakes up at night to urinate, gets 4-6 hours of sleep per night    Depression: no    Anxiety: yes depending on the situation. mostly around crowds. can get up to 9/10.    Panic Attacks: no    Paranoia: no      Hallucinations: no      Suicidal Ideation: no    Homicidal Ideation:no  Comments: N/A    Physical Problems: yes Foot is bothering her, she will make a orthopedic appointment.     Patient here for follow up. Reviewed the DAM result on 7/31 that was positive for PCP. Patient denies use saying, \"I haven't used any drugs. I don't know why I keep being positive.\" She says that her pharmacist says that the naltrexone might " "show up in a drug test and \"maybe that's what it is.\"     Patient is c/o not being able to sleep. She has trouble falling asleep and awakes at night. She would like to explore other medications for sleep. She says that gabapentin doesn't work. She has tried trazodone but it leaves her groggy the next day. Consulted with Dr. Brunner who will prescribe hydroxyzine 25-50mg at bedtime and re-assess at her next visit with Dr. Brunner on . The order was called to [facility].    Education Done Today  Patient Instructions:   Patient Instructions   Continue Medications as ordered.   No alcohol or unprescribed drug use.   No driving, if sedated.   Come to the Emergency Room if not feeling safe.   Call the clinic with any questions [phone].   Follow up as directed, for your appointments, per your After Visit Summary Form.        Call the clinic if any concerns: [facility] [phone]  [facility] [phone]  and Report to the emergency room if you feel like harming yourself or others or are psychotic        Terri Walker    2018 9:39 AM    [place] Date: 18  Query Report Page#: 1  Patient Rx History Report  ISRAEL BUITRAGO  Search Criteria: Last Name 'israel' and First Name 'lata' and  = ' and Request Period = '06/15/18' to  ' - 11 out of 11 Recipients Selected.  Fill Date Product, Str, Form Qty Days Pt ID Prescriber Written RX# N/R* Pharm **MED+  ---------- -------------------------------- ------------ ---- --------- ---------- ---------- ------------ ----- --------- ------  2018 GABAPENTIN 300 MG CAPSULE [id number] 30 [id number] 2018 [id number] N [id number] 00.0  *N/R N=New R=Refill  +MED Daily  Prescribers for prescriptions listed  ----------------------------------------------------------------------------------------------------------------------------------  [id number] BRUNNER, EMILY A MD; [facility], [address], [place] " [id number]  Pharmacies that dispensed prescriptions listed  ----------------------------------------------------------------------------------------------------------------------------------  [id number] Emida; [facility], [address] [facility] [address],  Patients that match search criteria  ----------------------------------------------------------------------------------------------------------------------------------  [id number] ISRAEL BUITRAGO, [facility]; [address], [place] [address]  [id number] ISRAEL BUITRAGO, [facility]; [address], [place] [address]  [id number] ISRAEL BUITRAGO, [facility]; [address], [place] [address]  [id number] ISRAEL MOORE,  57; [address], [place] [address]  [id number] ISRAEL BUITRAGO,  57; [address], [place] [address]  [id number] ISRAEL MOORE,  57; [address], [place] [address]  [id number] ISRAEL BUITRAGO, [facility]; [address], [place] [address]  [id number] ISRAEL MOORE,  57; [address], [place] [address]  [id number] ISRAEL MOORE,  57; [address], [place] [address]  [id number] ISRAEL MOORE,  57; [address], [place] [address]  [id number] ISRAEL MOORE,  57; [address], [place] [address]  MED Summary  This section displays cumulative MED values by unique recipient. The MED Max value is the maximum occurrence of cumulative MED  sustained for any 3 consecutive days. This value is calculated based on prescriptions dispensed during the date range requested.  -----------------------------------------------------------------------------------------------------------------------------------  0 Patsy Santamaria; 1955; [address], [place] [address]  **Per CDC guidance, the conversion factors and associated daily morphine milligram equivalents for drugs prescribed as part of  medication-assisted treatment for  opioid use disorder should not be used to benchmark against dosage thresholds meant for opioids  prescribed for pain.  Report Disclaimers:  The report provided above is based upon the search criteria and the data provided by the dispensing entities. For more information  about any prescription, please contact the dispenser or the prescriber.  This report contains confidential information, including patient identifiers, and is not a public record. The information on this  report must be treated as protected health information and is to be disclosed to others only as authorized by applicable state  and Federal regulations.

## 2021-06-20 NOTE — LETTER
Letter by Brunner, Emily A, MD at      Author: Brunner, Emily A, MD Service: -- Author Type: --    Filed:  Encounter Date: 4/17/2020 Status: (Other)         Yanique Cali MD  [address] [place] [id number]                                  April 17, 2020    Patient: Patsy Santamaria   MR Number: [id number]   YOB: 1955   Date of Visit: 4/17/2020     To Whom It May Concern:    Patsy reports significant anxiety worsening her mental health in her current living situation, especially after a recent suicide occurred in her building. Please allow her to move into a new apartment for her mental health.       If you have questions, please do not hesitate to call me.    Sincerely,        Emily A Brunner, MD          CC  Patsy Santamaria  Kimberley Malcolm, [facility]  4/17/2020 11:50 AM  Sign when Signing Visit  This video/telephone visit will be conducted via a call between you and your physician/provider. We have found that certain health care needs can be provided without the need for an in-person physical exam. This service lets us provide the care you need with a video /telephone conversation. If a prescription is necessary we can send it directly to your pharmacy. If lab work is needed we can place an order for that and you can then stop by our lab to have the test done at a later time.   Just as we bill insurance for in-person visits, we also bill insurance for video/telephone visits. If you have questions about your insurance coverage, we recommend that you speak with your insurance company.   Patient has given verbal consent for video/Telephone visit? yes  CMA/LPDIANA GRAY    Patient verified allergies, medications and pharmacy via phone. PHQ : and ALISIA:  done verbally with writer. Patient states she is ready for visit.  PHQ-16  ALISIA-17    Nothing reported on [place]

## 2021-06-20 NOTE — LETTER
Letter by Tata Bartlett RN at      Author: Tata Bartlett RN Service: -- Author Type: --    Filed:  Encounter Date: 7/2/2020 Status: (Other)       July 2, 2020          Patsy Santamaria  [address]  [place] [address]      Dear Ms. Santamaria:    Im writing to inform you that Emily Brunner, MD, will be leaving [facility] on August 28, 2020.   We apologize for this disruption in your care and we are committed to supporting your treatment needs.    If you have follow-up appointments after August 28, 2020, we will connect you with another addiction medicine provider within our system.     For medication refills, please contact your pharmacy and they will connect with us to initiate the refill process.  To schedule an appointment with Dr. Brunner before August 28, 2020 please call [facility] at [phone].    If you do not plan to return for ongoing medication management at this time, please let us know that as well, so we can note that in your medical record.   Again, we apologize for the inconvenience and look forward to continuing to provide you with the best possible care.    Sincerely,        Electronically signed by Tata Bartlett RN, CNP - Clinic Manager  [facility]

## 2021-06-21 NOTE — PROGRESS NOTES
"ASSESSMENT/PLAN:  1. UTI symptoms  Urinalysis-UC if Indicated   2. Vaginal discharge  Wet Prep, Vaginal    Chlamydia trachomatis & Neisseria gonorrhoeae, Amplified Detection   3. Seborrheic keratosis         This is a 64 yo female with:  1.  UTI symptoms - UA is unremarkable - doubt UTI  2.  Vaginal discharge - check wet prep and GC/Chlamydia today; wet prep negative, will let patient know if she has infection that requires treatment.  3.  Seborrheic keratosis - several lesions on back - all appear benign;  No intervention necessary        There are no discontinued medications.  There are no Patient Instructions on file for this visit.    Chief Complaint:  Chief Complaint   Patient presents with     moles on back     for 6 months       HPI:   Patsy Santamaria is a 63 y.o. female c/o  Here for :  1.  Thinks she has bacterial vaginosis -  Odor in vagina  2.  Worried about moles - looks like \"old paper\" peeling off skin  Mostly on back; now on upper arms as well      PMH:   Patient Active Problem List    Diagnosis Date Noted     Primary osteoarthritis of left knee 07/10/2018     Seasonal allergies 07/09/2018     Severe episode of recurrent major depressive disorder, without psychotic features (H)      Alcoholic hepatitis      Peripheral edema      Diuretic-induced hypokalemia      Alcohol use disorder, severe, dependence (H) 05/07/2018     Primary osteoarthritis of right knee 03/21/2018     Alcohol withdrawal (H) 03/17/2018     Chronic alcohol dependence, continuous (H) 03/16/2018     Low backache 03/16/2018     Morbid obesity (H) 01/05/2018     Bilateral edema of lower extremity 06/28/2017     Snoring 06/28/2017     Carpal tunnel syndrome on both sides 03/06/2017     Trochanteric bursitis of left hip 10/25/2016     Alcohol withdrawal seizure without complication (H) 05/14/2016     Hypoxia 05/13/2016     Alcohol abuse 05/13/2016     Elevated LFTs 05/13/2016     New onset seizure (H) 05/12/2016     Claustrophobia " 2015     Cervical disc disease 2015     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) 2015     Post traumatic stress disorder 2015     Chronic Reflux Esophagitis      Peripheral Neuropathy      Localized Primary Osteoarthritis Of The Carpometacarpal Joint      Ganglion Of The Right Foot      Major depression, recurrent (H)      Nicotine Dependence      Vitamin D Deficiency      Past Medical History:   Diagnosis Date     Acute solar dermatitis      Alcohol abuse      Anxiety      Arthritis      Chronic alcohol dependence, continuous (H) 3/16/2018     Chronic reflux esophagitis      COPD (chronic obstructive pulmonary disease) (H)      Dermatitis      Ganglion     right foot     H. pylori infection      Low backache 3/16/2018     Menopause     age 52     Past Surgical History:   Procedure Laterality Date     APPENDECTOMY       VT APPENDECTOMY      Description: Appendectomy;  Recorded: 2008;  Comments: childhood     VT  DELIVERY ONLY      Description:  Section;  Recorded: 2008;     VT EXCIS TENDN/CAPSULE LESN,FOOT      Description: Excision Of Cyst Of Tendon Sheath Of Foot;  Proc Date: 10/28/2011;  Comments: [facility]     VT HEMORRHOIDECTOMY INTERNAL RUBBER BAND LIGATIONS      Description: Hemorrhoidectomy;  Recorded: 2008;     VT KNEE SCOPE,DIAGNOSTIC      Description: Arthroscopy Knee Right;  Proc Date: 10/11/2005;  Comments: for right knee patella subluxation with lateral retinacular release; subpatellar chondroplasty     VT LATERAL RETINACULAR RELEASE OPEN      Description: Knee Lateral Retinacular Release;  Proc Date: 10/11/2005;     VT LIGATE FALLOPIAN TUBE      Description: Tubal Ligation;  Recorded: 2008;     SKIN BIOPSY       TONSILLECTOMY       Social History     Social History     Marital status: Single     Spouse name: N/A     Number of children: N/A     Years of education: N/A     Occupational History     lauren [employer]  [employer]     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[facility])     Social History Main Topics     Smoking status: Current Every Day Smoker     Packs/day: 1.00     Years: 48.00     Types: Cigarettes     Smokeless tobacco: Never Used      Comment: 1 pack per day     Alcohol use Yes      Comment: 1 pint a week     Drug use: No     Sexual activity: Yes     Partners: Male     Birth control/ protection: None     Other Topics Concern     Not on file     Social History Narrative     Family History   Problem Relation Age of Onset     Heart disease Mother      Heart disease Father        Meds:    Current Outpatient Prescriptions:      albuterol (PROAIR HFA;PROVENTIL HFA;VENTOLIN HFA) 90 mcg/actuation inhaler, Inhale 2 puffs 4 (four) times a day as needed for wheezing or shortness of breath., Disp: 1 Inhaler, Rfl: 0     cetirizine (ZYRTEC) 10 MG tablet, Take 1 tablet (10 mg total) by mouth daily., Disp: 30 tablet, Rfl: 5     cholecalciferol, vitamin D3, 5,000 unit capsule, Take 5,000 Units by mouth daily., Disp: 30 capsule, Rfl: 5     diclofenac sodium (VOLTAREN) 1 % Gel, Apply to affected area three times a day prn, Disp: 1 Tube, Rfl: 0     DULoxetine (CYMBALTA) 60 MG capsule, TAKE ONE CAPSULE BY MOUTH AT BEDTIME , Disp: 90 capsule, Rfl: 2     furosemide (LASIX) 80 MG tablet, TAKE ONE TABLET BY MOUTH ONE TIME DAILY , Disp: 90 tablet, Rfl: 2     hydrOXYzine HCl (ATARAX) 25 MG tablet, Take 1-2 tablets (25-50 mg total) by mouth at bedtime., Disp: 60 tablet, Rfl: 0     ipratropium-albuterol (DUO-NEB) 0.5-2.5 mg/3 mL nebulizer, Take 3 mL by nebulization every 6 (six) hours as needed (wheezing, short of breath)., Disp: 90 mL, Rfl: 1     KLOR-CON M20 20 mEq tablet, TAKE ONE TABLET BY MOUTH TWICE DAILY , Disp: 90 tablet, Rfl: 2     naltrexone (DEPADE) 50 mg tablet, Take 2 tablets (100 mg total) by mouth daily., Disp: 60 tablet, Rfl: 0     omeprazole (PRILOSEC) 20 MG capsule, TAKE ONE CAPSULE BY MOUTH AT BEDTIME , Disp: 90 capsule, Rfl:  3     multivitamin (ONE A DAY) per tablet, Take 1 tablet by mouth daily., Disp: 120 tablet, Rfl: 2    Allergies:  Allergies   Allergen Reactions     Codeine Nausea Only     Hydrochlorothiazide Rash     phototoxicity - med was d/lora       Diclofenac Nausea Only     Tolerates the topical gel     Sulfa (Sulfonamide Antibiotics) Rash     Sulfasalazine Rash       ROS:  Pertinent positives as noted in HPI; otherwise 12 point ROS negative.      Physical Exam:  EXAM:  /74 (Patient Site: Right Arm, Patient Position: Sitting, Cuff Size: Adult Large)  Pulse 70  Wt (!) 240 lb (108.9 kg)  LMP 03/06/2002  SpO2 96%  BMI 42.51 kg/m2   Gen:  NAD, appears well, well-hydrated  HEENT:  TMs nl, oropharynx benign, nasal mucosa nl, conjunctiva clear  Neck:  Supple, no adenopathy, no thyromegaly, no carotid bruits, no JVD  Lungs:  Clear to auscultation bilaterally  Cor:  RRR no murmur  Abd:  Soft, nontender, BS+, no masses, no guarding or rebound, no HSM, no CVAT  PELVIC EXAM:External genitalia: normal  Vaginal mucosa normal  Vaginal discharge: yellow, scant  Speculum exam shows a normal appearing cervix .   Bimanual exam: Cervix closed, firm, non tender  to motion.  Uterus  firm, regular, mobile, non tender to palpation. No adnexal masses or tenderness.   Extr:  Neg.  Neuro:  No asymmetry  Skin:  Warm/dry, multiple hyperpigmented lesions on back - waxy/papery -consistent with seorrheicb keratosis        Results:  Results for orders placed or performed in visit on 10/22/18   Wet Prep, Vaginal   Result Value Ref Range    Yeast Result No yeast seen No yeast seen    Trichomonas No Trichomonas seen No Trichomonas seen    Clue Cells, Wet Prep No Clue cells seen No Clue cells seen   Chlamydia trachomatis & Neisseria gonorrhoeae, Amplified Detection   Result Value Ref Range    Chlamydia trachomatis, Amplified Detection Negative Negative    Neisseria gonorrhoeae, Amplified Detection Negative Negative   Urinalysis-UC if Indicated    Result Value Ref Range    Color, UA Yellow Colorless, Yellow, Straw, Light Yellow    Clarity, UA Clear Clear    Glucose, UA Negative Negative    Bilirubin, UA Negative Negative    Ketones, UA Negative Negative    Specific Gravity, UA 1.025 1.005 - 1.030    Blood, UA Negative Negative    pH, UA 5.5 5.0 - 8.0    Protein, UA Negative Negative mg/dL    Urobilinogen, UA 0.2 E.U./dL 0.2 E.U./dL, 1.0 E.U./dL    Nitrite, UA Negative Negative    Leukocytes, UA Negative Negative

## 2021-06-21 NOTE — LETTER
Letter by Vinay Paige MD at      Author: Vinay Paige MD Service: -- Author Type: --    Filed:  Encounter Date: 2021 Status: (Other)       2021    Dear Dr. Cali,    See below for documentation of a virtual visit that I had with Patsy Santamaria ( 1955). Please feel free to call me at any time if needed.    Sincerely,    Vinay Paige MD  Pulmonary and Critical Care Medicine  [facility]  Cell [phone]  Office [phone]  Pager [phone]    Patsy Santamaria is a 65 y.o. female who is being evaluated via a billable telephone visit.      What phone number would you like to be contacted at?[phone]    Phone call duration: 12 minutes    Pulmonary Clinic Follow-up Visit    Assessment and Plan:   65 year old female with a history of longstanding and active tobacco dependence, alcohol dependence, GI bleed, PTSD, MELVI, h/o alcohol withdrawal seizure, H pylori gastritis, obesity, depression, bronchiectasis, peripheral neuropathy, GERD, osteoarthritis, vitamin D deficiency, presenting for telephone follow-up.     Tobacco dependence, bronchiectasis, MELVI, obesity, possible asthma: Hospitalized for one night in late December with GI bleed; has planned colonoscopy in 2021. Occasional dyspnea; stable. Using nebulized ipratropium-albuterol once daily. Wants to try a LAMA; will send prescription for umeclidinium. Currently smoking 1 ppd, which is up from 0-0.5 ppd when I last spoke with her in 2020, likely worsened by alcohol dependence. Did not start the low-dose nicotine patch because nicotine patches have given her nightmares in the past when used during hospitalizations. Was not able to take bupropion in the past due to being on duloxetine and naltrexone. Received influenza vaccine for this season. She plans to get COVID-19 vaccine when more widely available. Recall from prior visits that she has been hospitalized repeatedly for respiratory  failure in the past, though in at least one case she had been drinking alcohol and aspirated food and mucus, requiring bronchoscopy for airway clearance. Baseline FEV1/FVC ratio is nonobstructive, normal TLC and DLCO, no significant bronchodilator response. Obesity likely playing a significant role along with deconditioning.     Plan:  - start nicotine 4-mg gum as needed for cravings  - continue use of nicotine inhaler  - advised to quit smoking  - continues to work on alcohol dependence, which is likely contributing to tobacco dependence  - previously provided multiple smoking cessation resources  - start umeclidinium one inhalation daily  - continue budesonide-formoterol 80-4.5 mcg two inhalations two times a day; rinse/gargle/spit water after use  - nebulized ipratropium-albuterol or albuterol HFA as needed  - continue [place] via nasal mask[place] [facility] [place] [facility]  - annual influenza vaccination; received for this season  - received Pneumovax-23 in January 2020; plan for Prevnar-13 at age 67 and booster of Pneumovax-23 in 2025  - she plans to receive COVID-19 vaccination when more widely available  - follow up in 6 months or sooner if needed  - encouraged her to call any time with questions or concerning symptoms    Vinay Paige MD  Pulmonary and Critical Care Medicine  [facility]  Cell [phone]  Office [phone]  Pager [phone]    CCx: tobacco dependence, bronchiectasis, MELVI, obesity, possible asthma    HPI: 65 year old female with a history of longstanding and active tobacco dependence, alcohol dependence, GI bleed, PTSD, MELVI, h/o alcohol withdrawal seizure, H pylori gastritis, obesity, depression, bronchiectasis, peripheral neuropathy, GERD, osteoarthritis, vitamin D deficiency, presenting for telephone follow-up. Hospitalized for one night in late December with GI bleed; has planned colonoscopy in February 2021. Occasional dyspnea; stable. Using nebulized  ipratropium-albuterol once daily. Wants to try a LAMA; will send prescription for umeclidinium. Currently smoking 1 ppd, which is up from 0-0.5 ppd when I last spoke with her in 2020, likely worsened by alcohol dependence. Did not start the low-dose nicotine patch because nicotine patches have given her nightmares in the past when used during hospitalizations. Was not able to take bupropion in the past due to being on duloxetine and naltrexone. Received influenza vaccine for this season. She plans to get COVID-19 vaccine when more widely available. Recall from prior visits that she has been hospitalized repeatedly for respiratory failure in the past, though in at least one case she had been drinking alcohol and aspirated food and mucus, requiring bronchoscopy for airway clearance. Baseline FEV1/FVC ratio is nonobstructive, normal TLC and DLCO, no significant bronchodilator response. Obesity likely playing a significant role along with deconditioning.    ROS:  A 12-system review was obtained and was negative with the exception of the symptoms endorsed in the history of present illness.    PMH:  Past Medical History:   Diagnosis Date   ? Alcohol withdrawal seizure without complication (H) 2016   ? Alcoholic cirrhosis (H)    ? Anxiety    ? Arthritis    ? Chronic alcohol dependence, continuous (H) 2018    inpatient 2019, sober since then   ? Chronic reflux esophagitis    ? Depression    ? Dermatitis    ? Ganglion     right foot   ? H. pylori infection    ? Melanoma (H) 2019    left upper arm   ? Menopause     age 52   ? Obesity (BMI 35.0-39.9 without comorbidity)    ? Seizure (H) 2016    during alcohol withdrawal   ? Sleep apnea     mild, doesnt tolerate pap therapy       PSH:  Past Surgical History:   Procedure Laterality Date   ? APPENDECTOMY     ? [place] APPENDECTOMY      Description: Appendectomy;  Recorded: 2008;  Comments: childhood   ? [place]  DELIVERY ONLY      Description:   Section;  Recorded: 01/01/2008;   ? NM EXCIS TENDN/CAPSULE LESN,FOOT      Description: Excision Of Cyst Of Tendon Sheath Of Foot;  Proc Date: 10/28/2011;  Comments: [facility]   ? NM HEMORRHOIDECTOMY INTERNAL RUBBER BAND LIGATIONS      Description: Hemorrhoidectomy;  Recorded: 09/01/2008;   ? NM KNEE SCOPE,DIAGNOSTIC      Description: Arthroscopy Knee Right;  Proc Date: 10/11/2005;  Comments: for right knee patella subluxation with lateral retinacular release; subpatellar chondroplasty   ? NM LATERAL RETINACULAR RELEASE OPEN      Description: Knee Lateral Retinacular Release;  Proc Date: 10/11/2005;   ? NM LIGATE FALLOPIAN TUBE      Description: Tubal Ligation;  Recorded: 09/01/2008;   ? SKIN BIOPSY Left 07/2019    left upper arm   ? TONSILLECTOMY         Allergies:  Allergies   Allergen Reactions   ? Wellbutrin [Bupropion Hcl] Diarrhea   ? Codeine Nausea Only   ? Hydrochlorothiazide Rash     phototoxicity - med was d/lora     ? Topamax [Topiramate]    ? Diclofenac Nausea Only     Tolerates the topical gel   ? Sulfa (Sulfonamide Antibiotics) Rash   ? Sulfasalazine Rash       Family HX:  Family History   Problem Relation Age of Onset   ? Heart disease Mother    ? Heart disease Father        Social Hx:  Social History     Socioeconomic History   ? Marital status: Single     Spouse name: Not on file   ? Number of children: Not on file   ? Years of education: Not on file   ? Highest education level: Not on file   Occupational History   ? 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 [facility])   Social Needs   ? Financial resource strain: Not on file   ? Food insecurity     Worry: Not on file     Inability: Not on file   ? Transportation needs     Medical: Not on file     Non-medical: Not on file   Tobacco Use   ? Smoking status: Current Every Day Smoker     Packs/day: 0.50     Years: 49.00     Pack years: 24.50     Types: Cigarettes   ? Smokeless tobacco: Never Used    ? Tobacco comment: last 11/2019   Substance and Sexual Activity   ? Alcohol use: Not Currently     Comment: sober since 11/19/2019   ? Drug use: No   ? Sexual activity: Yes     Partners: Male     Birth control/protection: None   Lifestyle   ? Physical activity     Days per week: Not on file     Minutes per session: Not on file   ? Stress: Not on file   Relationships   ? Social connections     Talks on phone: Not on file     Gets together: Not on file     Attends Mandaeism service: Not on file     Active member of club or organization: Not on file     Attends meetings of clubs or organizations: Not on file     Relationship status: Not on file   ? Intimate partner violence     Fear of current or ex partner: Not on file     Emotionally abused: Not on file     Physically abused: Not on file     Forced sexual activity: Not on file   Other Topics Concern   ? Not on file   Social History Narrative   ? Not on file       Current Meds:  Current Outpatient Medications   Medication Sig Dispense Refill   ? albuterol (ACCUNEB) 0.63 mg/3 mL nebulizer solution Take 3 mL (0.63 mg total) by nebulization every 2 (two) hours as needed for wheezing. 10 vial 0   ? albuterol (VENTOLIN HFA) 90 mcg/actuation inhaler Inhale 2 puffs every 4 (four) hours as needed for wheezing. 18 g 3   ? ammonium lactate (AMLACTIN) 12 % cream Apply 2 application topically 2 (two) times a day as needed. Apply 1-3 grams to each foot twice daily as needed  11   ? baclofen (LIORESAL) 10 MG tablet Take 10 mg by mouth 3 (three) times a day as needed.     ? budesonide-formoterol (SYMBICORT) 80-4.5 mcg/actuation inhaler Inhale 2 puffs 2 (two) times a day. 1 Inhaler 1   ? bumetanide (BUMEX) 1 MG tablet Take 1 tablet (1 mg total) by mouth daily. 90 tablet 2   ? DULoxetine (CYMBALTA) 30 MG capsule Take 3 capsules (90 mg total) by mouth daily. 90 capsule 0   ? hydrOXYzine pamoate (VISTARIL) 25 MG capsule Take 1 capsule (25 mg total) by mouth 4 (four) times a day as  needed for anxiety. 120 capsule 1   ? naltrexone (DEPADE) 50 mg tablet Take 100 mg by mouth daily.     ? omeprazole (PRILOSEC) 20 MG capsule Take 1 capsule (20 mg total) by mouth daily before breakfast. 90 capsule 3   ? polyethylene glycol (MIRALAX) 17 gram packet Take 1 packet (17 g total) by mouth daily. 30 packet 0   ? traZODone (DESYREL) 100 MG tablet Take 100 mg by mouth at bedtime.     ? cetirizine (ZYRTEC) 10 MG tablet Take 1 tablet (10 mg total) by mouth daily as needed for allergies. 30 tablet 5   ? diclofenac sodium (VOLTAREN) 1 % Gel Apply 2 g topically 3 (three) times a day as needed.     ? potassium chloride (KLOR-CON) 10 MEQ CR tablet Take 20 mEq by mouth daily.       No current facility-administered medications for this visit.        Physical Exam:  no exam due to virtual visit    Labs:  reviewed    Imaging studies:  CTA C/A/P (January 2020):  - images directly reviewed, formal interpretation follows:  FINDINGS:   CT ANGIOGRAM CHEST, ABDOMEN, AND PELVIS: No thoracoabdominal aortic aneurysm nor dissection. 4 vessels arise from the aortic arch without significant stenosis. No central pulmonary embolus. Mild coronary artery calcifications.     The celiac, SMA and ARIELLA are widely patent. Single right and 2 left renal arteries are widely patent. Mild aortoiliac atherosclerosis with no significant stenosis.     LUNGS AND PLEURA: Bilateral lower lobe tubular bronchiectasis. There is some peripheral endobronchial mucous plugging in the right lower lobe with some peripheral subsegmental atelectasis. No focal airspace consolidation. Trace right pleural effusion +/-   some mild pleural thickening.     MEDIASTINUM/AXILLAE: No adenopathy. No pericardial effusion.     HEPATOBILIARY: Diffuse hepatic steatosis. Faint density within the gallbladder lumen could represent sludge +/- stones. No bile duct dilatation.     PANCREAS: No significant mass, duct dilatation, or inflammatory change.     SPLEEN: Normal.     ADRENAL  GLANDS: Normal.     KIDNEYS/BLADDER: No significant mass, stones, or hydronephrosis.     BOWEL: Diverticulosis in the colon. No acute inflammatory change. No obstruction.      LYMPH NODES: No lymphadenopathy.     PELVIC ORGANS: Myomatous uterus. Normal-appearing ovaries. No abnormal fluid collection.     OTHER: Tiny fat-containing paraumbilical and bilateral inguinal hernias.     MUSCULOSKELETAL: Degenerative changes lower lumbar spine. No suspicious osseous lesions.     IMPRESSION:   1.  CTA negative for thoracoabdominal aortic aneurysm/dissection and pulmonary embolus. No abnormality to account for patient's symptoms.  2.  Bilateral lower lobe bronchiectasis with some peripheral endobronchial mucous plugging and subsegmental atelectasis in the right lower lobe. Trace right pleural effusion +/- pleural thickening.  3.  Diffuse hepatic steatosis.  4.  Faint density within the gallbladder lumen could represent gallbladder sludge +/- stones. No bile duct dilatation.  5.  Colonic diverticulosis.     CXR (February 2020):  - images directly reviewed, formal interpretation follows:  IMPRESSION:   Linear scarring or atelectasis at the right base is unchanged. Lungs are otherwise clear. Heart size and pulmonary vascularity are normal. No pneumothorax or pleural effusion. No bony fracture.     TTE (February 2020):  - Normal left ventricular size and wall thickness.  - Left ventricle ejection fraction is normal. The estimated left ventricular ejection fraction is 65%.  - Normal right ventricular size and systolic function.  - No hemodynamically significant valvular heart abnormalities.  - When compared to the previous study dated 9/5/2019, no significant change.     PFT's (November 2019):  FEV1/FVC is 0.77 and is normal.  FEV1 is 1.82 L (76% predicted) and is reduced.  FVC is 2.36 L (78% predicted) and reduced.  There was no improvement in spirometry after a single inhaled dose of bronchodilator.  TLC is 4.54 L (93%  predicted) and is normal.  RV is 2.22 L (115% predicted) and is normal.  RV/TLC is 0.49 and is increased.  DLCO is 103% predicted and is normal when it is corrected for hemoglobin.

## 2021-06-21 NOTE — LETTER
"Letter by Yanique Cali MD at      Author: Yanique Cali MD Service: -- Author Type: --    Filed:  Encounter Date: 3/17/2021 Status: (Other)         Patsy Santamaria  [address]  [place] [address]             March 17, 2021         Dear Ms. Santamaria,    Below are the results from your recent visit:    Resulted Orders   Comprehensive Metabolic Panel   Result Value Ref Range    Sodium 140 136 - 145 mmol/L    Potassium 4.0 3.5 - 5.0 mmol/L    Chloride 93 (L) 98 - 107 mmol/L    CO2 35 (H) 22 - 31 mmol/L    Anion Gap, Calculation 12 5 - 18 mmol/L    Glucose 134 (H) 70 - 125 mg/dL    BUN 10 8 - 22 mg/dL    Creatinine 0.68 0.60 - 1.10 mg/dL    GFR MDRD Af Amer >60 >60 mL/min/1.73m2    GFR MDRD Non Af Amer >60 >60 mL/min/1.73m2    Bilirubin, Total 0.6 0.0 - 1.0 mg/dL    Calcium 10.1 8.5 - 10.5 mg/dL    Protein, Total 7.1 6.0 - 8.0 g/dL    Albumin 3.8 3.5 - 5.0 g/dL    Alkaline Phosphatase 116 45 - 120 U/L    AST 72 (H) 0 - 40 U/L    ALT 69 (H) 0 - 45 U/L    Narrative    Fasting Glucose reference range is 70-99 mg/dL per  American Diabetes Association (ADA) guidelines.       Labs show that your liver is \"inflamed\" - likely due to your recent alcohol use.  Continue to work on your sobriety.  Recheck in a couple months.     Please call with questions or contact us using [employer].    Sincerely,        Electronically signed by Yanique Cali MD       "

## 2021-06-22 NOTE — PROGRESS NOTES
Assessment/ Plan  1. Acute URI  Possible viral sinusitis  Bilateral maxillary pressure    Strategy will be to prescribe printed prescription for antibiotic, fill after 10-14 days of illness if not improving.  In the meantime, prescribed Afrin to use for up to 3 days.        Subjective    Chief Complaint   Patient presents with     Sinus Problem     Headache from sinus pressure        HPI  Suspected sinusitis  ___________________________  Duration/ Timing/ context-3 days  Started with chest congestion/ cough  Following sx  Nasal Drainage/ quality? Yes/yellow  No blood  Facial Pressure/severity? Maxillary /and R side of neck  Pain in Teeth? no  Cough? mild  Sore Throat? gone  Other sx: no  Hx of sinusitis? some  Comment: USING NIQUIL  COUGH  Nebs help cough  Current Outpatient Medications on File Prior to Visit   Medication Sig     albuterol (PROAIR HFA;PROVENTIL HFA;VENTOLIN HFA) 90 mcg/actuation inhaler Inhale 2 puffs 4 (four) times a day as needed for wheezing or shortness of breath.     cetirizine (ZYRTEC) 10 MG tablet Take 1 tablet (10 mg total) by mouth daily.     cholecalciferol, vitamin D3, 5,000 unit capsule Take 5,000 Units by mouth daily.     diclofenac sodium (VOLTAREN) 1 % Gel Apply to affected area three times a day prn     DULoxetine (CYMBALTA) 60 MG capsule TAKE ONE CAPSULE BY MOUTH AT BEDTIME      furosemide (LASIX) 80 MG tablet TAKE ONE TABLET BY MOUTH ONE TIME DAILY      hydrOXYzine HCl (ATARAX) 25 MG tablet Take 1-2 tablets (25-50 mg total) by mouth at bedtime.     ipratropium-albuterol (DUO-NEB) 0.5-2.5 mg/3 mL nebulizer Take 3 mL by nebulization every 6 (six) hours as needed (wheezing, short of breath).     KLOR-CON M20 20 mEq tablet TAKE ONE TABLET BY MOUTH TWICE DAILY      multivitamin (ONE A DAY) per tablet Take 1 tablet by mouth daily.     mupirocin (BACTROBAN) 2 % ointment Apply topically to affected area only, two times a day until clears.     naltrexone (DEPADE) 50 mg tablet Take 2 tablets  (100 mg total) by mouth daily.     omeprazole (PRILOSEC) 20 MG capsule TAKE ONE CAPSULE BY MOUTH AT BEDTIME      No current facility-administered medications on file prior to visit.      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)     Cervical disc disease     Claustrophobia     New onset seizure (H)     Hypoxia     Alcohol abuse     Elevated LFTs     Alcohol withdrawal seizure without complication (H)     Trochanteric bursitis of left hip     Carpal tunnel syndrome on both sides     Bilateral edema of lower extremity     Snoring     Morbid obesity (H)     Chronic alcohol dependence, continuous (H)     Low backache     Alcohol withdrawal (H)     Primary osteoarthritis of right knee     Alcohol use disorder, severe, dependence (H)     Alcoholic hepatitis     Peripheral edema     Diuretic-induced hypokalemia     Severe episode of recurrent major depressive disorder, without psychotic features (H)     Seasonal allergies     Primary osteoarthritis of left knee     Past Surgical History:   Procedure Laterality Date     APPENDECTOMY       [place] APPENDECTOMY      Description: Appendectomy;  Recorded: 2008;  Comments: childhood     [place]  DELIVERY ONLY      Description:  Section;  Recorded: 2008;     [place] EXCIS TENDN/CAPSULE LESN,FOOT      Description: Excision Of Cyst Of Tendon Sheath Of Foot;  Proc Date: 10/28/2011;  Comments: [facility]     W[place] HEMORRHOIDECTOMY INTERNAL RUBBER BAND LIGATIONS      Description: Hemorrhoidectomy;  Recorded: 2008;     [place] KNEE SCOPE,DIAGNOSTIC      Description: Arthroscopy Knee Right;  Proc Date: 10/11/2005;  Comments: for  right knee patella subluxation with lateral retinacular release; subpatellar chondroplasty     NM LATERAL RETINACULAR RELEASE OPEN      Description: Knee Lateral Retinacular Release;  Proc Date: 10/11/2005;     NM LIGATE FALLOPIAN TUBE      Description: Tubal Ligation;  Recorded: 09/01/2008;     SKIN BIOPSY       TONSILLECTOMY       Family History   Problem Relation Age of Onset     Heart disease Mother      Heart disease Father        ROS  As listed above     Objective  Physical Exam  Vitals:    11/30/18 1121   BP: 130/72   Patient Site: Right Arm   Patient Position: Sitting   Cuff Size: Adult Large   Pulse: 78   Resp: 28   Temp: 98.2  F (36.8  C)   TempSrc: Temporal   SpO2: 95%   Weight: (!) 243 lb 4 oz (110.3 kg)     Patient is alert, oriented and in no distress.    Conjunctiva, lids appear normal.  Nares are normal bilaterally.    TMs are visualized bilaterally and appear normal    There is no adenopathy in the neck.  Oral cavity is without any notable lesion, oropharynx appears normal without any erythema, exudate, petechia    Chest appears normal, auscultation reveals normal breath sounds, no wheezing, rales or rhonchi.        Please note: Voice recognition software was used in this dictation.  It may therefore contain typographical errors.

## 2021-06-22 NOTE — PROGRESS NOTES
"Mental Health Visit Note    This is a dual signature report. My supervising clinician is JULIO Kasper.    1/3/2019    Start time: 2:00pm    Stop Time: 2:50pm   Session # 1    Session Type: Patient is presenting for an Individual session.    Patsy Santamaria is a 63 y.o. female is being seen today for    Chief Complaint   Patient presents with      Follow Up     Psychotherapy follow-up visit for depression and severe alcohol use disorder   .     New symptoms or complaints: Increased depression over the holidays    Functional Impairment:   Personal: 3  Family: 2  Work: 4  Social:3    Clinical assessment of mental status:   Grooming: Well groomed  Attire: Appropriate  Age: Appears Stated  Behavior Towards Examiner: Cooperative  Motor Activity: Within normal   Eye Contact: Appropriate  Mood: Depressed  Affect: Congruent w/content of speech, Anxious and Depressed  Speech/Language: Within normal  Attention: Distractible  Concentration: Brief  Thought Process: Within normal  Thought Content: Hallucinations: none reported  Delusions: none reported  Orientation: X 3  Memory: No Evidence of Impairment  Judgement: No Evidence of Impairment  Estimated Intelligence: Average  Demonstrated Insight: Adequate  Fund of Knowledge: adequate    Suicidal/Homicidal Ideation present: None Reported This Session    Patient's impression of their current status:   Patient reported that she was unable to present for therapy due to inactive insurance. She reported that the Holidays were difficult but she managed to cope. She has been volunteering at the [employer], walking dogs. She talks with her sister for support. She is still \"seeing\" her male friend but is maintaining some boundaries to \"avoid getting hurt again.\" She continues to search for work. She is thinking about moving to a new location for individuals over 62 years of age. She has refrained from using alcohol since her last relapse in October 2019. She has been " trying to stay busy. She visited her old counselor and acknowledged the help and support.     Therapist impression of patients current state:   The patient arrived on-time for a follow-up therapy visit. Her last visit occurred on 10/10/2018. Today's session mostly focused on patient providing an updated status report. She completed the PHQ-9 questionnaire and scored 4. She completed the ALISIA-7 questionnaire and scored 7. She believes that symptoms of anxiety and depression are fairly well managed. She denied any suicidal ideation. She is most concerned about finding employment and expressed frustration about this process. Her treatment plan will be updated during her next visit.     Type of psychotherapeutic technique provided: Client centered and CBT    Progress toward short term goals:Progress as expected, as patient expressed an interest in re-engaging in more consistent therapy visits now that her insurance is active again    Review of long term goals: Not done at today's visit   Treatment plan will be updated during next visit.    Diagnosis:   1. PTSD (post-traumatic stress disorder)    2. Alcohol use disorder, severe, dependence (H)    3. Mild episode of recurrent major depressive disorder (H)        Plan and Follow up: The patient is scheduled to return on 2/1/2019.      Discharge Criteria/Planning: Client has chronic symptoms and ongoing therapy for maintenance stability recommended.     Performed and documented by JULIO Mendoza    I have read, discussed and reviewed the documentation as presented by JULIO Mendoza LICSW Ashley Trudell 1/3/2019

## 2021-06-22 NOTE — PROGRESS NOTES
One wk left ear need to pop  Plugged    Breathing good  Prn med  Chronic obstructive pulmonary disease denies productive cough or change in shortness of breath     Gastritis denies dysphagia, melena, hematochezia  ppi working    Skin lesion left arm         OBJECTIVE:   Vitals:    01/04/19 1047   BP: 126/88   Pulse: 78   Resp: 20   Temp: 97.6  F (36.4  C)   SpO2: 98%      Wt is noted.  No diaphoresis  Eyes: nl eom, anicteric   External ears, nose: nl    tms slight retracted  Neck: nl nodes, supple, thyroid normal   Lungs: clear to ausc   Heart: regular rhythm  Abd: soft nontender     No cva (renal) tenderness  Neuro: no weakness  Skin no rash    5mm seb k left forearm  Joints: uninflamed   No ketotic breath odor noted  Mental: euthymic  Ext: nontender calves   Gait: normal    Body mass index is 41.63 kg/m .    ASSESSMENT/PLAN:    1. Dysfunction of left eustachian tube  fluticasone (FLONASE) 50 mcg/actuation nasal spray   2. Other emphysema (H)     3. Chronic Reflux Esophagitis     4. Seborrheic keratosis       Chronic issues stable/ same treatment.   reassurance  Arm seb k

## 2021-06-23 NOTE — TELEPHONE ENCOUNTER
Date of Last Office Visit: 9/27/18  Date of Next Office Visit: 2/1/19  No shows since last visit: 10/8/18  Cancellations since last visit: none  ED visits since last visit:  none  Medication hydoxyzine 25mg date last ordered: 9/14/18  Qty: 60  Refills: 0  Lapse in therapy greater than 7 days: yes  Medication refill request verified as identical to current order: yes  Result of Last DAM, VPA, Li+ Level, CBC, or Carbamazepine Level (at or since last visit): N/A     [] Medication refilled per [facility] M-1.   [x] Medication unable to be refilled by RN due to criteria not met as indicated below:     []Eligibility - not seen in last year    []Supervision - no future appointment    [x]Compliance     []Verification - order discrepancy    []Controlled Medication    []Medication not included in RN Protocol    []90 - day supply request    []Other   Current Medication list:    albuterol (PROAIR HFA;PROVENTIL HFA;VENTOLIN HFA) 90 mcg/actuation inhaler Inhale 2 puffs 4 (four) times a day as needed for wheezing or shortness of breath.   cetirizine (ZYRTEC) 10 MG tablet Take 1 tablet (10 mg total) by mouth daily.   cholecalciferol, vitamin D3, 5,000 unit capsule Take 5,000 Units by mouth daily.   diclofenac sodium (VOLTAREN) 1 % Gel Apply to affected area three times a day prn   DULoxetine (CYMBALTA) 60 MG capsule TAKE ONE CAPSULE BY MOUTH AT BEDTIME    fluticasone (FLONASE) 50 mcg/actuation nasal spray 2 sprays into each nostril daily.   furosemide (LASIX) 80 MG tablet TAKE ONE TABLET BY MOUTH ONE TIME DAILY    hydrOXYzine HCl (ATARAX) 25 MG tablet Take 1-2 tablets (25-50 mg total) by mouth at bedtime.   ipratropium-albuterol (DUO-NEB) 0.5-2.5 mg/3 mL nebulizer Take 3 mL by nebulization every 6 (six) hours as needed (wheezing, short of breath).   KLOR-CON M20 20 mEq tablet TAKE ONE TABLET BY MOUTH TWICE DAILY    multivitamin (ONE A DAY) per tablet Take 1 tablet by mouth daily.   mupirocin (BACTROBAN) 2 % ointment Apply topically to  affected area only, two times a day until clears.   naltrexone (DEPADE) 50 mg tablet Take 2 tablets (100 mg total) by mouth daily.   omeprazole (PRILOSEC) 20 MG capsule TAKE ONE CAPSULE BY MOUTH AT BEDTIME    oxymetazoline (AFRIN) 0.05 % nasal spray 2-3 sprays per nostril bid prn. Do not use for mor than 3 days in a row.       Medication Plan of Care at last office visit with MD/CNP:  Diagnoses/Plan:  1. Patient to continue on naltrexone, dose increased to 100 mg po daily as patient describes ongoing cravings.   2. Continue on gabapentin 300 mg po tid to use for anxiety and post-acute withdrawal syndrome.   3. I again recommended patient consider moving into sober housing, and a return to outpatient cd treatment.   4. ETG positive and DAM was negative.   5. Patient to continue on current psychiatric medications for depression and PTSD. May need psychiatric consult as an inpatient, but that is not clear at this time.  6. Patient encouraged to attend 12 step meetings and find a sponsor.  7. Patient to be seen in 1 month and sooner prn.

## 2021-06-24 NOTE — PROGRESS NOTES
Mental Health Visit Note    This is a dual signature report. My supervising clinician is JULIO Kasper.    2/19/2019    Start time: 1:00pm    Stop Time: 1:56pm   Session # 2    Session Type: Patient is presenting for an Individual session.    Patsy Santamaria is a 64 y.o. female is being seen today for    Chief Complaint   Patient presents with      Follow Up     Psychotherapy follow-up visit for anxiety, depression and sobriety maintenance   .     New symptoms or complaints: None    Functional Impairment:   Personal: 2  Family: 2  Work: 4  Social:2    Clinical assessment of mental status:   Grooming: Well groomed  Attire: Appropriate  Age: Appears Stated  Behavior Towards Examiner: Cooperative  Motor Activity: Within normal   Eye Contact: Appropriate  Mood: Euthymic  Affect: Congruent w/content of speech  Speech/Language: Within normal  Attention: Hypervigilant  Concentration: Within normal  Thought Process: Within normal  Thought Content: Hallucinations: none reported  Delusions: none reported  Orientation: X 3  Memory: No Evidence of Impairment  Judgement: No Evidence of Impairment  Estimated Intelligence: Average  Demonstrated Insight: Adequate  Fund of Knowledge: adequate    Suicidal/Homicidal Ideation present: None Reported This Session    Patient's impression of their current status:   The patient reported that her mood has been better the past few months. She feels more capable to manage underlying depressive symptoms. She often struggles with low motivation and notices patterns of social isolation when the depression worsens. Her increased awareness of depressive symptoms helps her feel confident in applying learned techniques for symptom management. She feels better when she is more social and talks often with her sisters. She has developed a safe and happy relationship with her current SO. This is the first time she has ever felt this safe in a relationship. She feels comfortable expressing  "herself with him in a way she has never done before with other boyfriends. She still feels anxious and on edge in crowds which she understands to be associated with her PTSD and trauma history. She has been attending [employer] every Sunday and intends on participating in a Bible study group on Wednesdays. She is still looking for part-time work. She feels proud about her sobriety maintenance. She does not experience any cravings or urges to drink. She stated, \"I'm learning to deal with it better and I have more confidence in myself now.\" She is also maintaining healthy boundaries with her son.     Therapist impression of patients current state:   The patient arrived on-time for a follow-up therapy visit. She easily engaged in dialogue and presented with a euthymic mood. She completed the PHQ-9, ALISIA-7 and WHODAS assessment measurements in order to update her mental health treatment plan. Depression and anxiety scores have greatly reduced since intake in 2017. Notably, patient feels more comfortable managing underlying mental health symptoms compared to before. She has gained much self-awareness and increased her capacity to implement strategies as needed. The therapist observed the patient as being very self-assured and confident which is a drastic change from past encounters. The patient has exhibited healthy decision making patterns and efforts to maintain sobriety. She is now in a safe relationship with a trusted individual for the first time in her life (per patient report). Her efforts to improve self-esteem have helped her strengthen her relationships with members of her support network. Patient would like to continue attending monthly therapy visits for ongoing maintenance of depression and anxiety symptoms.     Type of psychotherapeutic technique provided: Insight oriented and CBT    Progress toward short term goals:Progress as expected, as patient has maintained sobriety from alcohol for several months. She has " improved social relationships and continues to look for part-time work. She was open and honest about emotions and thoughts today, demonstrating marked progress in this area since intake.    Review of long term goals: Treatment Plan updated   Date of next review: June 2019    Diagnosis:   1. PTSD (post-traumatic stress disorder)    2. Mild episode of recurrent major depressive disorder (H)    3. Severe alcohol use disorder (H)        Plan and Follow up: The patient is scheduled to return for a follow-up visit on 3/19/2019.      Discharge Criteria/Planning: Client has chronic symptoms and ongoing therapy for maintenance stability recommended.     Performed and documented by JULIO Mendoza    I have read, discussed and reviewed the documentation as presented by JULIO Mendoza LICSW Ashley Trudell 2/19/2019

## 2021-06-24 NOTE — PROGRESS NOTES
Outpatient Mental Health Treatment Plan    Name:  Patsy Santamaria  :  1955  MRN:  [id number]    Treatment Plan:  Updated Treatment Plan  Intake/initial treatment plan date:  Initial treatment plan date: 2017  Benefit and risks and alternatives have been discussed: Yes  Is this treatment appropriate with minimal intrusion/restrictions: Yes  Estimated duration of treatment:  Approximately 5 sessions.  Anticipated frequency of services:  Every 3 weeks  Necessity for frequency: This frequency is needed to establish therapeutic goals and for continuity of care in order to monitor progress.  Necessity for treatment: To address cognitive, behavioral, and/or emotional barriers in order to work toward goals and to improve quality of life.    Session Type: Patient is presenting for an Individual session.    Plan:           ?   ? Anxiety    Goal:  Decrease average anxiety level from 3 to 1.   Strategies: ? [x]Learn and practice relaxation techniques and other coping strategies (e.g., thought stopping, reframing, meditation)     ? [x] Increase involvement in meaningful activities     ? [x] Discuss sleep hygiene     ? [x] Explore thoughts and expectations about self and others     ? [x] Identify and monitor triggers for panic/anxiety symptoms     ? [x] Implement physical activity routine (with physician approval)     ? [x] Consider introduction of bibliotherapy and/or videos     ? [x] Continue compliance with medical treatment plan (or explore barriers)   ?Degree to which this is a problem: 3  Degree to which goal is met: 1  Date of Review: 2019       ? Depression    Goal:  Decrease average depression level from 2.5 to 1.   Strategies:    ?[x] Decrease social isolation     [x] Increase involvement in meaningful activities     ?[x] Discuss sleep hygiene     ?[x] Explore thoughts and expectations about self and others     ?[x] Process grief (loss of significant person, independence, role, etc.)     ?[x] Assess for  suicide risk     ?[x] Implement physical activity routine (with physician approval)     [x] Consider introduction of bibliotherapy and/or videos     [x] Continue compliance with medical treatment plan (or explore barriers) ?  Degree to which this is a problem: 4  Degree to which goal is met: 2  Date of Review: June 2019    Substance use  Goal:  Maintain sobriety from alcohol use.   Strategies: ? [x] Consider referral for chemical dependency evaluation     ? [x] Discuss barriers to participating in AA or other peer-facilitated groups         [x] Address environmental factors which may interfere with sobriety     ? [x] Explore short-term versus long-term consequences of use     ? [x] Continue compliance with medical treatment plan (or explore barriers)  Degree to which this is a problem: 2  Degree to which goal is met: 3  Date of Review: June 2019       Functional Impairment:  1=Not at all/Rarely  2=Some days  3=Most Days  4=Every Day    Personal : 3  Family : 1  Social : 3   Work/school : 4    Diagnosis:  (EXAMPLE of DSM V: Major depressive disorder, recurrent, moderate; Generalized Anxiety disorder; borderline personality per patient PHI; fibromyalgia, History of breast cancer in remission; Problem with primary relationship.)   1. PTSD (post-traumatic stress disorder)    2. Alcohol use disorder, severe, dependence (H)    3. Mild episode of recurrent major depressive disorder (H)        Clinical assessments and measures completed at tx plan update on 2/19/19:.     WHODAS 2.0 12-item version 17   Scores presented in qualifiers to represent level of disability.  MILD Problem (slight, low, ...) 5-24%  H1= 7  H2= 10  H3= 10    PHQ-9 = 4  ALISIA-7 = 7     Strengths:  The patient is resourceful and articulate. She is a good advocate for her needs. She is very independent.   Limitations:  The patient often feels uncomfortable talking about her feelings and past problems. There are some barriers to obtaining  "employment.  Cultural Considerations: The patient is a 64 year old  female with a history of complex trauma. She was born in [place]. [place] in [place] before relocating to \"the [facility] of [place] around age 10. She noted the difficult transition and shared that moving from a small town to a big city was a \"culture shock.\" Patient became pregnant at age 16 and has lived on her own ever since.    Persons responsible for this plan: Patient and Provider            Psychotherapist Signature           Patient Signature:              Guardian Signature             Provider: Performed and documented by GOOD Mendoza   Date:  2/19/2019      "

## 2021-06-25 NOTE — PROGRESS NOTES
"Individual Phone Session    Patsy Santamaria is a 66 y.o. female who is being evaluated via telephone visit.      The patient has been notified of the following:      \"We have found that certain health care needs can be provided without the need for a face to face visit.  This service lets us provide the care you need with a phone conversation. I will have full access to your [facility] medical record during this entire phone call. I will be taking notes for your medical record. Since this is like an office visit, we will bill your insurance company for this service. There are potential benefits and risks of telephone visits (e.g. limits to patient confidentiality) that differ from in-person visits.?  Confidentiality still applies for telephone services, and nobody will record the visit.  It is important to be in a quiet, private space that is free of distractions (including cell phone or other devices) during the visit.?If during the course of the call I believe a telephone visit is not appropriate, you will not be charged for this service\"    Counselor and patient spoke via phone for 25 minutes to complete drug/alcohol evaluation.    Call Notes: Counselor contacted patient for continuity of care during suspension of in person services.     Patient was actively and directly involved in the assessment interview and treatment planning. I have reviewed the note as documented above.  This accurately captures the substance of my conversation with the patient.    Provider location: remote  Patient location: home  Mode of Transmission: phone    Call Started at: 1:30 PM  Call Ended at: 1:55 PM    Patient's engagement in the telephone visit: high     Supervising MD: Dr Can Chavez, MEd, [facility]  6/2/2021, 2:06 PM   "

## 2021-06-25 NOTE — PROGRESS NOTES
Patient did not present for scheduled therapy visit on 3/19/19 at 1:00pm. Therapist called and left a discrete VM for patient to call [facility] in order to reschedule. Therapist will also request help from [facility] schedulers to contact patient in order to set up a follow-up therapy visit.

## 2021-06-25 NOTE — TELEPHONE ENCOUNTER
"Phone call from Patsy. States she is in need of CPAP supplies/\"pillow mask\".  Called and spoke with [facility]. They stated that she is eligible for all new supplies.  They will send out to her, she should receive in about 3-5 business days.    "

## 2021-06-26 NOTE — PROGRESS NOTES
Progress Notes by Araceli Hamilton LGSW at 9/17/2018  1:00 PM     Author: Araceli Hamilton LGSW Service: Behavioral Author Type: Licensed Graduate     Filed: 9/19/2018  3:42 PM Encounter Date: 9/17/2018 Status: Attested    : Araceli Hamilton LGSW (Licensed Graduate ) Cosigner: Nury Francisco LICSW at 9/26/2018 11:15 AM    Attestation signed by Nury Francisco LICSW at 9/26/2018 11:15 AM    I have read, discussed and reviewed the documentation as presented by GODO Mendoza LICSW                Mental Health Visit Note    This is a dual signature report. My supervising clinician is JULIO Kasper.    9/17/2018   Start time: 1:00pm    Stop Time: 1:45pm   Session # 26    Patsy Santamaria is a 63 y.o. female is being seen today for    Chief Complaint   Patient presents with   ? MH Follow Up     Individual psychotherapy follow-up visit for depression     New symptoms or complaints:  None     Functional Impairment:   Personal: 3  Family: 4  Work: 4  Social: 2    Clinical assessment of mental status:   Grooming: Well groomed  Attire: Appropriate  Age: Appears Stated  Behavior Towards Examiner: Cooperative  Motor Activity: Within normal   Eye Contact: Appropriate  Mood: Euthymic  Affect: Congruent w/content of speech, Flat and Anxious  Speech/Language: Within normal  Attention: Distractible  Concentration: Brief  Thought Process: Within normal  Thought Content: Hallucinations: None reported  Delusions: none reported  Orientation: X 3  Memory: Impairment and Recent  Judgement: No Evidence of Impairment  Estimated Intelligence: Average  Demonstrated Insight: Adequate  Fund of Knowledge: adequate     Suicidal/Homicidal Ideation present: None Reported This Session    Patient's impression of their current status:   The patient reported that she wrote a letter to her ex-significant other. She noticed how she had been avoiding the assignment due  to anticipated feelings of anger and sadness. She was eventually able to challenge her avoidance and noted feeling better after she finished writing her letter as it provided an important release. She discovered that the anticipated feelings of anger were secondary to feelings of sadness and fear. She described feeling hurt that her ex abandoned her. She doesn't believe that she will ever love someone in the same way again. She agreed that she drank heavily to cover up these unresolved feelings. She agreed that engaging in therapy is a tool she is using to process unresolved feelings as opposed to letting them build up.   The patient is still searching for part-time work. Her mood is up and down but fairly stable. She expressed a desire to lose weight and may consider joining the [facility].      Therapist impression of patients current state:   The patient arrived on-time for a follow-up psychotherapy visit. The patient presented with a fairly euthymic mood today. She was receptive to feedback throughout today's session and appeared alert with an organized thought pattern. She admittedly has a tendency to avoid discussing or thinking about her feelings but is receptive to therapist guidance regarding opportunities to engage in underlying emotions. For instance, she completed a homework assignment to write her ex-significant other a letter indicating unresolved feelings. She read that letter out loud today which prompted her to engage in her feelings in a more introspective way. The goal was to achieve some release through emotional healing. The patient was willing to participate in the exercise but still appeared slightly detached from underlying emotions. She exhibited some defense mechanisms that will take some time to adjust but she still made progress toward short-term goals today. The therapist will utilize CBT techniques during future sessions.         Type of psychotherapeutic technique provided: Insight oriented  and CBT    Progress toward short term goals:Progress as expected, as patient adequately participated in cognitive processing and restructuring today. She completed a previously assigned homework task requiring her to process unresolved feelings associated with grief and loss.     Review of long term goals: Long-term goals reviewed and patient would like to focus on improving her healthy by losing weight - she may consider obtaining a [employer] membership.     Diagnosis:   1. Severe episode of recurrent major depressive disorder, without psychotic features (H)    2. PTSD (post-traumatic stress disorder)    3. Severe alcohol use disorder (H)    R/O Generalized Anxiety Disorder  R/O Bipolar Disorder    Plan and Follow up: The patient is scheduled to return for a follow-up psychotherapy visit on 10/9/2018.      Discharge Criteria/Planning: Client has chronic symptoms and ongoing therapy for maintenance stability recommended.    Performed and documented by GOOD Mendoza    I have read, discussed and reviewed the documentation as presented by GOOD [facility]endoza [facility]

## 2021-06-26 NOTE — PROGRESS NOTES
ASSESSMENT/PLAN:  1. Morbid obesity (H)  Ambulatory referral to Bariatric Care: Surgical and Non-Surgical   2. 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 Chronic obstructive pulmonary disease, unspecified COPD type (H)  budesonide-formoteroL (SYMBICORT) 80-4.5 mcg/actuation inhaler    umeclidinium (INCRUSE ELLIPTA) 62.5 mcg/actuation DsDv inhaler   4. Seasonal allergies  cetirizine (ZYRTEC) 10 MG tablet       This is a 67 yo female with recent hospitalization   I have reviewed available hospital records, consults, notes, discharge summary as well as imaging and lab results.  In addition I have reviewed her medication list and made any adjustments as indicated in orders.    1.  Morbid obesity - patient feels that her weight concerns worsen her mental health.  She is agreeable to being seen by the bariatric center to evaluation  2.  COPD discussed inhalers - I would recommend chronic inhalers  3.  Seasonal allergies - has found difficulty with taking oral allergy medications - will try Cetirizine   Return in about 1 month (around 7/8/2021) for if not getting better.      Medications Discontinued During This Encounter   Medication Reason     albuterol (VENTOLIN HFA) 90 mcg/actuation inhaler Reorder     budesonide-formoterol (SYMBICORT) 80-4.5 mcg/actuation inhaler Reorder     cetirizine (ZYRTEC) 10 MG tablet Reorder     umeclidinium (INCRUSE ELLIPTA) 62.5 mcg/actuation DsDv inhaler Reorder     gabapentin (NEURONTIN) 300 MG capsule Therapy completed     DULoxetine (CYMBALTA) 30 MG capsule Discontinued by another clinician     hydrOXYzine pamoate (VISTARIL) 25 MG capsule Therapy completed     traZODone (DESYREL) 100 MG tablet Discontinued by another clinician     There are no Patient Instructions on file for this visit.    Chief Complaint:  Chief Complaint   Patient presents with     Hospital Visit Follow Up     [facility]. patient states that  "she is doing well. Patient is unsure if she has a cold or allergies. Nasal congestion, sneezing. Sinus issues. Pressure in the sinues area.        HPI:   Patsy Santamaria is a 66 y.o. female c/o  detoxed while in hospital  Waiting for outpatient treatment to call her  - already had screening done - will take place a [place] (on Zoom), no face to face yet; talking to her therapist (Araceli) - has appointment later this month (next week) -       Had shortness of breath - they told her \"it was my COPD\"  Allergy medication is   Hospitalized -21    Had work up for possible clots and heart -   Mainly anxiety/depression - on a \"new psych med\"  On Remeron 15 mg qhs - can sleep all night through  Lexapro 10 mg daily -     Obese - concerned about losing weight, exercise  Has a keto starter kit - hasn't started it yet   Supposed to be a shot coming out helping   Weight is a problem for her self-esteem -   Wants to consider     PMH:   Patient Active Problem List    Diagnosis Date Noted     GI bleed 2020     Homeless 2020     Vitamin B12 deficiency (non anemic) 10/13/2020     Abdominal pain, epigastric      History of major depression 2020     Biliary colic      COPD exacerbation (H) 2020     Airway obstruction due to foreign body, initial encounter      Pneumonia of right lower lobe due to infectious organism 2019     Severe alcohol use disorder (H) 2019     Chronic obstructive pulmonary disease, unspecified COPD type (H) 10/30/2019     Dyspnea on exertion      Anxiety 2019     Hypomagnesemia 2019     Primary osteoarthritis of left knee 07/10/2018     Seasonal allergies 2018     Mild episode of recurrent major depressive disorder (H)      Alcoholic hepatitis      Peripheral edema      Diuretic-induced hypokalemia      Alcohol use disorder, severe, dependence (H) 2018     Primary osteoarthritis of right knee 2018     Chronic alcohol dependence, " continuous (H) 2018     Low backache 2018     Morbid obesity (H) 2018     Bilateral edema of lower extremity 2017     Snoring 2017     Carpal tunnel syndrome on both sides 2017     Trochanteric bursitis of left hip 10/25/2016     Hypoxia 2016     Alcohol abuse 2016     Elevated LFTs 2016     Claustrophobia 2015     Cervical disc disease 2015     Skin lesion 10/20/2015     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) 2015     PTSD (post-traumatic stress disorder) 2015     Chronic Reflux Esophagitis      Peripheral Neuropathy      Localized Primary Osteoarthritis Of The Carpometacarpal Joint      Ganglion Of The Right Foot      Major depression, recurrent (H)      Nicotine Dependence      Vitamin D deficiency      Past Medical History:   Diagnosis Date     Alcohol withdrawal seizure without complication (H) 2016     Alcoholic cirrhosis (H)      Anxiety      Arthritis      Chronic alcohol dependence, continuous (H) 2018    inpatient 2019, sober since then     Chronic reflux esophagitis      Depression      Dermatitis      Ganglion     right foot     H. pylori infection      Melanoma (H) 2019    left upper arm     Menopause     age 52     Obesity (BMI 35.0-39.9 without comorbidity)      Seizure (H)     during alcohol withdrawal     Sleep apnea     mild, doesnt tolerate pap therapy     Past Surgical History:   Procedure Laterality Date     APPENDECTOMY       PA APPENDECTOMY      Description: Appendectomy;  Recorded: 2008;  Comments: childhood     PA  DELIVERY ONLY      Description:  Section;  Recorded: 2008;     PA EXCIS TENDN/CAPSULE LESN,FOOT      Description: Excision Of Cyst Of Tendon Sheath Of Foot;  Proc Date: 10/28/2011;  Comments: [facility]     PA HEMORRHOIDECTOMY INTERNAL RUBBER BAND LIGATIONS      Description: Hemorrhoidectomy;  Recorded: 2008;      MI KNEE SCOPE,DIAGNOSTIC      Description: Arthroscopy Knee Right;  Proc Date: 10/11/2005;  Comments: for right knee patella subluxation with lateral retinacular release; subpatellar chondroplasty     MI LATERAL RETINACULAR RELEASE OPEN      Description: Knee Lateral Retinacular Release;  Proc Date: 10/11/2005;     MI LIGATE FALLOPIAN TUBE      Description: Tubal Ligation;  Recorded: 09/01/2008;     SKIN BIOPSY Left 07/2019    left upper arm     TONSILLECTOMY       Social History     Socioeconomic History     Marital status: Single     Spouse name: Not on file     Number of children: Not on file     Years of education: Not on file     Highest education level: Not on file   Occupational History     Occupation: [employer]     Employer: [employer]     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[facility])   Social Needs     Financial resource strain: Not on file     Food insecurity     Worry: Not on file     Inability: Not on file     Transportation needs     Medical: Not on file     Non-medical: Not on file   Tobacco Use     Smoking status: Current Every Day Smoker     Packs/day: 1.00     Years: 49.00     Pack years: 49.00     Types: Cigarettes     Smokeless tobacco: Never Used     Tobacco comment: last 11/2019   Substance and Sexual Activity     Alcohol use: Yes     Comment: sober since 11/19/2019     Drug use: No     Sexual activity: Yes     Partners: Male     Birth control/protection: None   Lifestyle     Physical activity     Days per week: Not on file     Minutes per session: Not on file     Stress: Not on file   Relationships     Social connections     Talks on phone: Not on file     Gets together: Not on file     Attends Anabaptism service: Not on file     Active member of club or organization: Not on file     Attends meetings of clubs or organizations: Not on file     Relationship status: Not on file     Intimate partner violence     Fear of current or ex partner: Not on file     Emotionally abused: Not on file      Physically abused: Not on file     Forced sexual activity: Not on file   Other Topics Concern     Not on file   Social History Narrative     Not on file     Family History   Problem Relation Age of Onset     Heart disease Mother      Heart disease Father        Meds:    Current Outpatient Medications:      albuterol (ACCUNEB) 0.63 mg/3 mL nebulizer solution, Take 3 mL (0.63 mg total) by nebulization every 2 (two) hours as needed for wheezing., Disp: 10 vial, Rfl: 0     albuterol (VENTOLIN HFA) 90 mcg/actuation inhaler, Inhale 2 puffs every 4 (four) hours as needed for wheezing., Disp: 18 g, Rfl: 3     ammonium lactate (AMLACTIN) 12 % cream, Apply 2 application topically 2 (two) times a day as needed. Apply 1-3 grams to each foot twice daily as needed, Disp: , Rfl: 11     budesonide-formoteroL (SYMBICORT) 80-4.5 mcg/actuation inhaler, Inhale 2 puffs 2 (two) times a day., Disp: 1 Inhaler, Rfl: 3     bumetanide (BUMEX) 1 MG tablet, Take 1 tablet (1 mg total) by mouth daily., Disp: 90 tablet, Rfl: 3     diclofenac sodium (VOLTAREN) 1 % Gel, Apply 2 g topically 3 (three) times a day as needed., Disp: , Rfl:      escitalopram oxalate (LEXAPRO) 10 MG tablet, Take 10 mg by mouth., Disp: , Rfl:      folic acid (FOLVITE) 1 MG tablet, Take 1 mg by mouth., Disp: , Rfl:      ipratropium-albuteroL (DUO-NEB) 0.5-2.5 mg/3 mL nebulizer, Inhale 3 mL., Disp: , Rfl:      mirtazapine (REMERON) 15 MG tablet, Take 15 mg by mouth., Disp: , Rfl:      naltrexone (DEPADE) 50 mg tablet, Take 100 mg by mouth daily., Disp: , Rfl:      nicotine polacrilex (NICORETTE) 4 MG gum, Chew one piece of gum every 30 minutes as needed for nicotine craving., Disp: 170 each, Rfl: 11     omeprazole (PRILOSEC) 20 MG capsule, Take 1 capsule (20 mg total) by mouth daily before breakfast., Disp: 90 capsule, Rfl: 3     potassium chloride (KLOR-CON) 10 MEQ CR tablet, Take 20 mEq by mouth daily., Disp: , Rfl:      umeclidinium (INCRUSE ELLIPTA) 62.5 mcg/actuation  "DsDv inhaler, Inhale 1 puff daily., Disp: 30 each, Rfl: 3     cetirizine (ZYRTEC) 10 MG tablet, Take 1 tablet (10 mg total) by mouth daily as needed for allergies., Disp: 30 tablet, Rfl: 5     hydrOXYzine HCL (ATARAX) 25 MG tablet, Take 25-50 mg by mouth., Disp: , Rfl:      methocarbamoL (ROBAXIN) 750 MG tablet, Take 1 tablet (750 mg total) by mouth 3 (three) times a day as needed (muscle spasm, neck pain)., Disp: 60 tablet, Rfl: 0     prazosin (MINIPRESS) 1 MG capsule, Take 1 mg by mouth at bedtime. Presently not taking (noted 2/11/21), Disp: , Rfl:     Allergies:  Allergies   Allergen Reactions     Wellbutrin [Bupropion Hcl] Diarrhea     Codeine Nausea Only     Hydrochlorothiazide Rash     phototoxicity - med was d/lora       Percocet [Oxycodone-Acetaminophen] Nausea And Vomiting     Topamax [Topiramate] Unknown     Diclofenac Nausea Only     Tolerates the topical gel     Sulfa (Sulfonamide Antibiotics) Rash     Sulfasalazine Rash       ROS:  Pertinent positives as noted in HPI; otherwise 12 point ROS negative.      Physical Exam:  EXAM:  /64   Pulse 72   Ht 5' 3.75\" (1.619 m)   Wt (!) 254 lb (115.2 kg)   LMP 03/06/2002   SpO2 97%   BMI 43.94 kg/m     Gen:  NAD, appears well, well-hydrated  HEENT:  TMs nl, oropharynx benign, nasal mucosa nl, conjunctiva clear  Neck:  Supple, no adenopathy, no thyromegaly, no carotid bruits, no JVD  Lungs:  Clear to auscultation bilaterally  Cor:  RRR no murmur  Abd:  Soft, nontender, BS+, no masses, no guarding or rebound, no HSM  Extr:  Neg.  Neuro:  No asymmetry  Skin:  Warm/dry        Results:  Results for orders placed or performed in visit on 03/08/21   Comprehensive Metabolic Panel   Result Value Ref Range    Sodium 140 136 - 145 mmol/L    Potassium 4.0 3.5 - 5.0 mmol/L    Chloride 93 (L) 98 - 107 mmol/L    CO2 35 (H) 22 - 31 mmol/L    Anion Gap, Calculation 12 5 - 18 mmol/L    Glucose 134 (H) 70 - 125 mg/dL    BUN 10 8 - 22 mg/dL    Creatinine 0.68 0.60 - 1.10 " mg/dL    GFR MDRD Af Amer >60 >60 mL/min/1.73m2    GFR MDRD Non Af Amer >60 >60 mL/min/1.73m2    Bilirubin, Total 0.6 0.0 - 1.0 mg/dL    Calcium 10.1 8.5 - 10.5 mg/dL    Protein, Total 7.1 6.0 - 8.0 g/dL    Albumin 3.8 3.5 - 5.0 g/dL    Alkaline Phosphatase 116 45 - 120 U/L    AST 72 (H) 0 - 40 U/L    ALT 69 (H) 0 - 45 U/L
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
## 2021-06-27 NOTE — PROGRESS NOTES
Progress Notes by Pablito Mendoza MD at 8/27/2019  3:30 PM     Author: Pablito Mendoza MD Service: -- Author Type: Physician    Filed: 8/27/2019  3:31 PM Encounter Date: 8/27/2019 Status: Signed    : Pablito Mendoza MD (Physician)                [email or web address]  [phone]          Thank you Dr. Cali for asking the [facility] team to participate in the care of your patient, Patsy Santamaria.     Impression and Plan     1.  Shortness of breath/chest discomfort.  Patient shortness of breath may be multifactorial in nature, but feel it prudent to rule out possible ischemic contribution given her history of smoking, family history, and the like.  She does report some intermittent chest pressure as well though this is rather sporadic and not consistent with activity.  Plan:      Nuclear perfusion imaging study to evaluate for possible ischemia.    Echocardiogram to rule out any other structural heart disease and also assess right-sided function and perhaps pulmonary artery pressure given long-term smoking history.    2. Tobacco abuse.  I discussed this with the patient today. I did provide the patient contact information for [employer], a free program available to anyone who wants to quit tobacco and offers a wide array of tools for eligible [place]. Starter kits with two weeks of free patches, gum, lozenges, and the like are available.    Follow-up and further recommendations pending test results.         History of Present Illness    Once again I would like to thank you again for asking me to participate in the care of your patient, Patsy Santamaria.  As you know, but to reiterate for my own records, Patsy Santamaria is a 64 y.o. female with who has been experiencing subjective shortness of breath which is been gradual and progressive.  She states that typically she has to stop after approximately 2 blocks of ambulation.  She also has had some  "intermittent chest \"pressure\" though this is rather sporadic and not consistent be occurring with activity.  She reports no shortness of breath at night to suggest PND/orthopnea.  She denies any palpitations or lightheadedness.  She has had some mild lower extremity edema for which she takes furosemide.    Further review of systems is otherwise negative/noncontributory (medical record and 13 point review of systems reviewed as well and pertinent positives noted).         Cardiac Diagnostics            Physical Examination       /88 (Patient Site: Left Arm, Patient Position: Sitting, Cuff Size: Adult Large)   Pulse 68   Resp 16   Ht 5' 4\" (1.626 m)   Wt (!) 242 lb (109.8 kg)   LMP 03/06/2002   BMI 41.54 kg/m          Wt Readings from Last 3 Encounters:   08/27/19 (!) 242 lb (109.8 kg)   08/12/19 (!) 241 lb (109.3 kg)   07/24/19 (!) 238 lb 9.6 oz (108.2 kg)       The patient is alert and oriented times three. Sclerae are anicteric. Mucosal membranes are moist. Jugular venous pressure is normal. No significant adenopathy/thyromegally appreciated. Lungs somewhat diminished, but fairly clear. On cardiovascular exam, the patient has a regular S1 and S2.  Heart sounds are somewhat distant.  Abdomen is soft and non-tender. Extremities reveal no clubbing, cyanosis, with mild edema.       Imaging     CT scan of abdomen/pelvis 14 May 2019:  1. No significant findings to explain the patient's symptoms.   2. No masses or ascites.   3. No obstruction.    Chest radiograph 16 April 2019:  1. No pneumothorax.   2. Stable mild left basilar scarring.   3. No acute infiltrate or pleural effusion.   4. Normal heart size.   5. No pulmonary vascular congestion.   6. Stable mediastinal and hilar contours.   7. Degenerative change in the spine.          Family History/Social History/Risk Factors   Patient does smoke and has smoked for approximately 30 years..  Family history is notable for mother having had two-vessel bypass " surgery in her 60s.  Father had a history of some sort of cardiac disease and  in his 40s though the nature is uncertain.       Medications  Allergies   Current Outpatient Medications   Medication Sig Dispense Refill   ? albuterol (PROAIR HFA;PROVENTIL HFA;VENTOLIN HFA) 90 mcg/actuation inhaler Inhale 2 puffs 4 (four) times a day as needed for wheezing or shortness of breath. 1 Inhaler 0   ? cetirizine (ZYRTEC) 10 MG tablet Take 10 mg by mouth daily as needed for allergies.     ? diclofenac sodium (VOLTAREN) 1 % Gel Apply 1 g topically 2 (two) times a day as needed (pain). 100 g 0   ? DULoxetine (CYMBALTA) 60 MG capsule Take 1 capsule (60 mg total) by mouth daily. 30 capsule 0   ? FLUoxetine (PROZAC) 10 MG capsule Take 1 capsule (10 mg total) by mouth daily. 30 capsule 0   ? fluticasone (FLONASE) 50 mcg/actuation nasal spray Apply 1 spray into each nostril daily as needed for rhinitis.     ? furosemide (LASIX) 80 MG tablet TAKE ONE TABLET BY MOUTH ONE TIME DAILY  90 tablet 2   ? hydrOXYzine pamoate (VISTARIL) 50 MG capsule Take 1 capsule (50 mg total) by mouth at bedtime. (Patient taking differently: Take 50 mg by mouth as needed.       ) 60 capsule 0   ? ipratropium-albuterol (DUO-NEB) 0.5-2.5 mg/3 mL nebulizer Take 3 mL by nebulization every 6 (six) hours as needed (wheezing, short of breath). 90 mL 1   ? naltrexone (DEPADE) 50 mg tablet Take 2 tablets (100 mg total) by mouth daily. 60 tablet 0   ? omeprazole (PRILOSEC) 20 MG capsule Take 1 capsule (20 mg total) by mouth at bedtime. 30 capsule 0   ? potassium chloride (K-DUR,KLOR-CON) 20 MEQ tablet Take 1 tablet (20 mEq total) by mouth daily.  0   ? prazosin (MINIPRESS) 1 MG capsule Take 1 capsule (1 mg total) by mouth at bedtime. 30 capsule 0     No current facility-administered medications for this visit.       Allergies   Allergen Reactions   ? Codeine Nausea Only   ? Hydrochlorothiazide Rash     phototoxicity - med was d/lora     ? Diclofenac Nausea Only      Tolerates the topical gel   ? Sulfa (Sulfonamide Antibiotics) Rash   ? Sulfasalazine Rash          Lab Results   Lab Results   Component Value Date     2019    K 3.8 2019     2019    CO2 26 2019    BUN 5 (L) 2019    CREATININE 0.65 2019    CALCIUM 10.1 2019     Lab Results   Component Value Date    WBC 6.2 2019    WBC 4.8 2015    HGB 14.9 2019    HCT 44.3 2019    MCV 96 2019     2019     Lab Results   Component Value Date    CHOL 224 (H) 2018    TRIG 114 2018    HDL 55 2018    LDLCALC 146 (H) 2018     Lab Results   Component Value Date    INR 1.02 2016     Lab Results   Component Value Date    BNP 18 2019     Lab Results   Component Value Date    CKTOTAL 302 (H) 2016    TROPONINI <0.01 2019    TROPONINI 0.02 2013     Lab Results   Component Value Date    TSH 0.94 2019           Medical History  Surgical History   Past Medical History:   Diagnosis Date   ? Acute solar dermatitis    ? Alcohol abuse    ? Anxiety    ? Arthritis    ? Chronic alcohol dependence, continuous (H) 3/16/2018   ? Chronic reflux esophagitis    ? COPD (chronic obstructive pulmonary disease) (H)    ? Dermatitis    ? Ganglion     right foot   ? H. pylori infection    ? Low backache 3/16/2018   ? Menopause     age 52      Past Surgical History:   Procedure Laterality Date   ? APPENDECTOMY     ? VT APPENDECTOMY      Description: Appendectomy;  Recorded: 2008;  Comments: childhood   ? VT  DELIVERY ONLY      Description:  Section;  Recorded: 2008;   ? VT EXCIS TENDN/CAPSULE LESN,FOOT      Description: Excision Of Cyst Of Tendon Sheath Of Foot;  Proc Date: 10/28/2011;  Comments: [facility]   ? VT HEMORRHOIDECTOMY INTERNAL RUBBER BAND LIGATIONS      Description: Hemorrhoidectomy;  Recorded: 2008;   ? VT KNEE SCOPE,DIAGNOSTIC      Description: Arthroscopy  Knee Right;  Proc Date: 10/11/2005;  Comments: for right knee patella subluxation with lateral retinacular release; subpatellar chondroplasty   ? RI LATERAL RETINACULAR RELEASE OPEN      Description: Knee Lateral Retinacular Release;  Proc Date: 10/11/2005;   ? RI LIGATE FALLOPIAN TUBE      Description: Tubal Ligation;  Recorded: 09/01/2008;   ? SKIN BIOPSY     ? TONSILLECTOMY

## 2021-06-27 NOTE — PROGRESS NOTES
Progress Notes by Farshad Dominguez at 2019  1:41 PM     Author: Farshad Dominguez Service: -- Author Type: Licensed Alcohol and Drug Counselor    Filed: 2019  3:29 PM Encounter Date: 2019 Status: Attested    : Farshad Dominguez (Licensed Alcohol and Drug Counselor)    Related Notes: Original Note by Farshad Dominguez (Licensed Alcohol and Drug Counselor) filed at 2019  2:05 PM    Cosigner: Brunner, Emily A, MD at 2019  5:35 PM    **Sensitive Note**    Attestation signed by Brunner, Emily A, MD at 2019  5:35 PM    Agree with plan; patient also to continue on her medication as prescribed.                 Individual Treatment Plan [facility]     Patient  Name: Patsy Santamaria  MRN: [id number]   : 1955  Admit Date: 19  Date of Initial Service Plan: 19  Tentative Discharge Date: 19  Counselor: Farshad Cox  Diagnoses: Alcohol Use Disorder, Severe, (F10.20) (303.90)      Dimension 1: Acute Intoxication/Withdrawal Potential, Risk level: 0  Problem Statement from Comprehensive Assessment:   Patient reports last use of alcohol on 19. Patient denies withdrawal symptoms at this time.    Problem: Patient reports date of last use of alcohol to be 19  Goal: Maintain abstinence  Must be reached to complete treatment? Yes  Methods/Strategies (must include amount and frequency): Attend group Monday, Wednesday and Friday 8:30am-11:30pm. Share thoughts, feelings, and urges to use.   Target Date: 19  Completion Date:       Dimension 2: Biomedical Conditions/Complications, Risk level: 1  Problem Statement from Comprehensive Assessment:  Patient reports COPD, Osteoarthritis, and chronic back pain. Patient has primary care provider. Patient is able to seek cares as needed.    Problem: Patient reports COPD, Osteoarthritis, and chronic back pain.  Goal: Stable health.  Must be reached to complete treatment? yes  Methods/Strategies (must include amount and  frequency): Continue to follow recommendations from your personal care provider regarding medical concerns. Inform staff immediately of any changes in your health that may affect your active participation in group therapy or attendance.   Target Date: 08/22/19  Completion Date:     Problem: Patient reports current tobacco use.  Goal: Patient to receive information about smoking cessation.   Must be reached to complete treatment? No  Methods/Strategies (must include amount and frequency):   1. Staff to provide patient with nicotine cessation information and help on how to quit use.   2. Patient to report any progress on stopping nicotine use to staff.   Target Date: 08/22/19  Completion Date:     Problem: Patient is required to meet with provider in the clinic   Goal: Patient will meet with Dr. Brunner for Medicare appointment in the [facility].   Must be reached to completed treatment? Yes  Methods/Strategies (must include amount and frequency): Patient will meet with Dr. Brunner as scheduled  to go over treatment plan and individual needs as required. Patient will attend follow-up if MD requires.   Target Date: 08/22/19  Completion Date:       Dimension 3: Emotional/Behavioral/Cognitive, Risk level: 2  Problem Statement from Comprehensive Assessment:  Patient reports depression and PTSD. Patient has mental health care provider. Patient reports recently experiencing mental health symptoms. Patient denies suicidal or homicidal ideation. Patient reports previous suicide attempt.     Problem: Patient reports depression and PTSD  Goal: Manage mental health symptoms   Must be reached to complete treatment? No  Methods/Strategies (must include amount and frequency): Attend scheduled appointments with mental health provider.  Target Date: 08/22/19  Completion Date:     Dimension 4: Readiness to Change, Risk level 0  Problem Statement from Comprehensive Assessment:  Patient verbalizes a desire for change.    Problem: Ongoing  motivation.  Goal: Follow through with intentions to treat chemical dependency concerns while meeting OP treatment expectations in order to graduate successfully from the program.   Must be reached to complete treatment? Yes   Methods/Strategies (must include amount and frequency): Complete all requested assignments, participate in group and follow through with aftercare plans. If for any reason you will not be in group or will be tardy please contact staff at [phone]  Target Date: 08/22/19  Completion Date:     Dimension 5: Relapse/Continued Use/Continued Problem Potential, Risk level: 3  Problem Statement from Comprehensive Assessment:  Patient lacks healthy, positive coping skills. Patient lacks skills to prevent relapse. Patient reports having difficulty maintaining sobriety outside of a structured facility. Patient has had multiple treatment episodes. Patient has achieved periods of sobriety in the past.    Problem: Continued use.  Goal: Develop relapse prevention skills  Must be reached to complete treatment? Yes  Methods/Strategies (must include amount and frequency):   Patient to share in daily check-in any urges and addictive thinking to better understand her pattern of use and to prevent relapse in the future.   Target Date: 08/22/19  Completion Date:     Dimension 6: Recovery Environment, Risk level: 3  Problem Statement from Comprehensive Assessment:  Patient is unemployed. Patient has stable housing. Patient is not engaged in structured daily activities. Patient reports positive, sober support. Patient denies current legals. History of DUI.     Problem: Lack of structured daily activities.  Goal: Add activities to daily schedule.   Must be reached to complete treatment? yes  Methods/Strategies (must include amount and frequency): Explore and identify activities to apply to daily life.   Target Date: 08/22/19  Completion Date:       Resources  Resources to which the patient is being referred for  problems when problems are to be addressed concurrently by another provider:       By signing this document, I am acknowledging that I was actively and directly involved in the development of my treatment plan.           Patient  Signature_________________________________________         Date__________________        Staff Signature  Farshad Cox    Date: 5/24/2019, 1:41 PM
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
## 2021-06-27 NOTE — PROGRESS NOTES
Progress Notes by Marisel Mcdaniel LADC at 6/25/2019  8:00 AM     Author: Marisel Mcdaniel LADC Service: -- Author Type: Licensed Alcohol and Drug Counselor    Filed: 6/25/2019  8:02 AM Encounter Date: 6/25/2019 Status: Signed    : Marisel Mcdaniel LADC (Licensed Alcohol and Drug Counselor)    **Sensitive Note**       A condition of patient beginning OP ORAL services again is that she be placed on a treatment contract. This contract will begin on the date of her intake until discharge. This writer will review with patient at all 1x1 sessions.     PRINCE Dominguez 6/25/2019 8:01 AM             Treatment Contract    Date: 6/25/19         Patsy Santamaria                         The staff at [facility] wants you to succeed in your chemical dependency program. Patient is being placed on a treatment contract due to concerns have prevented you from remaining compliant with group expectations.       o Substance Use  o Missed appointments (Group, Addiction Medicine and Mental Health)  o Lack of communication with primary counselor     The following conditions are required to engage in chemical dependency treatment at [facility]     o Comply with the expectations of the group    o Remain abstinent from all mood altering chemicals (if there is a relapse report immediately to staff).     o Attend group sessions Monday, Wednesday and Friday from 8:30 AM to 11:30 AM with no absences and inform staff of any inability to attend ahead of group for reasons such as medical appointments or meetings with probation officers.    o Attend sober support groups and provide verification  o Attend all appointments as scheduled      Failure to comply will result in an unfavorable discharge from the [facility] immediately with a potential referral to a higher level of care.    If the above conditions are not met, I understand I will be discharged from treatment.        _____________________    ____   _________________________     ____  Staff Signature     Date    Client Signature   Date    ____________________    _______   Witness    Date

## 2021-06-28 NOTE — PROGRESS NOTES
"Progress Notes by Devi Vance CMA at 3/24/2020 11:45 AM     Author: Devi Vance CMA Service: -- Author Type: Certified Medical Assistant    Filed: 3/24/2020 12:24 PM Encounter Date: 3/24/2020 Status: Signed    : Elpidio French PA-C (Physician Assistant)             Patsy Santamaria is a 65 y.o. female who is being evaluated via a billable telephone visit.      The patient has been notified of following:     \"This telephone visit will be conducted via a call between you and your physician/provider. We have found that certain health care needs can be provided without the need for a physical exam.  This service lets us provide the care you need with a short phone conversation.  If a prescription is necessary we can send it directly to your pharmacy.  If lab work is needed we can place an order for that and you can then stop by our lab to have the test done at a later time.    If during the course of the call the physician/provider feels a telephone visit is not appropriate, you will not be charged for this service.\"     [place] :                       "

## 2021-06-28 NOTE — PROGRESS NOTES
Progress Notes by Farshad Dominguez at 2019 12:14 PM     Author: Farshad Dominguez Service: -- Author Type: Licensed Alcohol and Drug Counselor    Filed: 2019 12:32 PM Encounter Date: 2019 Status: Attested    : Farshad Dominguez (Licensed Alcohol and Drug Counselor) Cosigner: Brunner, Emily A, MD at 2019  1:58 PM    **Sensitive Note**    Attestation signed by Brunner, Emily A, MD at 2019  1:58 PM    Agree with patient following in outpatient group again; appreciate patient is a high risk of relapse                Individual Treatment Plan     Patient  Name: Patsy Santamaria  MRN: [id number]   : 1955  Admit Date: 2019  Date of Initial Service Plan: 2019  Tentative Discharge Date: 2020  Counselor: Farshad Cox  Diagnoses: Alcohol Use Disorder, severe (F10.20)    Dimension 1: Acute Intoxication/Withdrawal Potential, Risk level: 0  Problem Statement from Comprehensive Assessment on 2019:   Patient reports last use of alcohol as 19. Patient denies withdrawal symptoms at this time.    Problem: Alcohol Use Disorder  Goal: Maintain abstinence  Must be reached to complete treatment? Yes  Methods/Strategies (must include amount and frequency):   1. Patient to report any substance/alcohol use to counselor to determine if any changes need to be made to address withdrawal symptoms.   2. Patient to complete any requested UAs.  Target Date: 2020  Completion Date:       Dimension 2: Biomedical Conditions/Complications, Risk level: 2  Problem Statement from Comprehensive Assessment on 2019:  Patient reports medical conditions are currently stable. Patient has primary care provider. Patient is able to seek cares as needed.    Problem: Patient reports stable health.  Goal: Maintain stable physical health.  Must be reached to complete treatment? yes  Methods/Strategies (must include amount and frequency):   1. Continue to follow recommendations from your  personal care provider regarding medical concerns.   2. Inform staff immediately of any changes in your health that may affect your active participation in group therapy or attendance.   Target Date: 06/13/2020  Completion Date:     Problem: Patient is required to meet with provider in the clinic.    Goal: Patient will follow-up with Dr. Brunner for Medicare appointment in the [facility] as directed.  Must be reached to completed treatment? Yes  Methods/Strategies (must include amount and frequency):   1. Patient will meet with Dr. Brunner as scheduled to go over treatment plan and individual needs as required.   2. Patient will attend follow-up if MD requires.   Target Date: 06/13/2020  Completion Date:     Dimension 3: Emotional/Behavioral/Cognitive, Risk level: 2  Problem Statement from Comprehensive Assessment on 12/13/2019:  Patient reports depression, anxiety, and PTSD. Patient has mental health care provider. Patient reports recently experiencing mental health symptoms. Patient denies suicidal or homicidal ideation.     Problem: Patient has a diagnoses of depression, anxiety, and PTSD  Goal: Manage mental health symptoms   Must be reached to complete treatment? no  Methods/Strategies (must include amount and frequency):   1. Patient to begin using coping skills learned in therapeutic group (such as grounding, breathing, thought challenging, mindfulness, etc), and share in daily check-in any benefits or challenges that you experience using these skills.  Target Date: 06/13/2020  Completion Date:     Dimension 4: Readiness to Change, Risk level 1  Problem Statement from Comprehensive Assessment on 12/13/2019:  Patient is cooperative throughout assessment.    Problem: Ongoing motivation  Goal: Patient to increase motivation towards recovery by participating in outpatient programming.   Must be reached to complete treatment? Yes  Methods/Strategies (must include amount and frequency):   1. Patient to attend 3, 3-hour  groups per week and all scheduled 1:1s.  2. Patient will contact staff if unable to attend ([facility] [phone] OR Amita [phone])  Target Date: 06/13/2020  Completion Date:     Dimension 5: Relapse/Continued Use/Continued Problem Potential, Risk level: 3  Problem Statement from Comprehensive Assessment on 12/13/2019:  Patient lacks healthy, positive coping skills. Patient lacks skills to prevent relapse. Patient may not fully recognize impact substance cathy has on mental health. Patient has achieved periods of sobriety in the past. Patient reports multiple treatment episodes.    Problem: Continued use.  Goal: Develop relapse prevention skills  Must be reached to complete treatment? Yes  Methods/Strategies (must include amount and frequency):   1. Patient to share in daily check-in any urges and addictive thinking to better understand her pattern of use and to prevent relapse in the future.   Target Date: 06/13/2020  Completion Date:     Dimension 6: Recovery Environment, Risk level: 3  Problem Statement from Comprehensive Assessment on 12/13/2019:   Patient is retired. Patient has stable housing. Patient reports positive support system. Patient is not engaged in structured daily activities. Patient denies current legals. Patient reports prior legals.     Problem: Lack of sober support   Goal: Gain sober support  Must be reached to complete treatment? yes  Methods/Strategies (must include amount and frequency):   1. Explore and identify sober support meetings to regularly attend.   2. Obtain a sponsor/ prior to phase II of treatment.   Target Date: 06/13/2020  Completion Date:     Resources  Resources to which the patient is being referred for problems when problems are to be addressed concurrently by another provider: AC MD Dr. Brunner      By signing this document, I am acknowledging that I was actively and directly involved in the development of my treatment plan.           Patient   Signature_________________________________________         Date__________________        Staff Signature  Farshad Cox    Date: 12/13/2019, 12:14 PM

## 2021-06-28 NOTE — PROGRESS NOTES
"Progress Notes by Elpidio French PA-C at 3/24/2020 11:45 AM     Author: Elpidio French PA-C Service: -- Author Type: Physician Assistant    Filed: 3/24/2020 12:24 PM Encounter Date: 3/24/2020 Status: Signed    : Elpidio French PA-C (Physician Assistant)    **Sensitive Note**             Patsy Santamaria is a 65 y.o. female who is being evaluated via a billable telephone visit.      The patient has been notified of following:     \"This telephone visit will be conducted via a call between you and your physician/provider. We have found that certain health care needs can be provided without the need for a physical exam.  This service lets us provide the care you need with a short phone conversation.  If a prescription is necessary we can send it directly to your pharmacy.  If lab work is needed we can place an order for that and you can then stop by our lab to have the test done at a later time.    If during the course of the call the physician/provider feels a telephone visit is not appropriate, you will not be charged for this service.\"     Patsy Santamaria has a history of severe alcohol use disorder, follow up conducted over the telephone today. She reports tolerating naltrexone 100mg daily and finds this helpful for craving reduction. Does not believe she needs refills of medication at this time. Agrees to contact clinic if medication refills are need.     Reports insomnia with current pandemic as well as increased worry, and increased depressed mood. Tried taking a full trazodone 50mg tablet last night instead of her prescribed 25mg (1/2 tab), feels more groggy this morning. Agreable to try 37.5mg (3/4 tab) tonight and consider not taking hydroxyzine with trazodone depending on effect.     Patient medication dose and name: Naltrexone 50mg daily and Baclofen 10mg tid     At this time, patient would like to : stay same dose of naltrexone 100mg daily  PHQ-9 score past 3  ALISIA-7 score past 2        Last UDS " performed on 2/28/20 and was: negative  Last BUP performed on NA and was: NA  Last ETG performed on 2/28/20 and was: positive  First day of last menstrual period  NA      Have you maintained sobriety from all substances?  If not what is your problem substance and frequency?  Almost one month.       What does patient currently fills his/her day with? (working, volunteering, TV)  Walk, watching TV.      Are you having any cravings?  From 0 to 10 (zero being none) grade your craving. What is the craving for?  Yesterday - 5/10 - was able to stay busy and distract herself  Today - 0/10   fd  Are you going to groups, if so where and how often? What group?  None, finds it difficult to work her telephone to set up and attend remote meetings. Agreeable to access a friend and resource to assist with setting this up.      Side effects:  Dry mouth: none Constipation: none. Denies side effects from naltrexone.      Do you follow with a primary care doctor? When was your last visit?   Yes. Does not recall last visit. Notes she has enough inhalers at the moment.       Patient reports main support persons are:  2 Sisters, Friend Taiwo. Speaks with them daily or close to daily.      Is patient currently experiencing thoughts of self-harm?   Denies   Does report depressed mood, difficultly sleeping, increased worry and isolation given current pandemic.     Is patient having trouble with functioning because of worrying about things?  denies     Are any of the people in your life expressing concern for you?    none     Is there anything you would like to ask your doctor about?   none     I have reviewed and updated the patient's Past Medical History, Social History, Family History and Medication List.      [place] :          ALLERGIES  Codeine; Hydrochlorothiazide; Topamax [topiramate]; Diclofenac; Sulfa (sulfonamide antibiotics); and Sulfasalazine    Assessment/Plan:    1. Alcohol use disorder, severe, dependence (H)     2. Moderate  episode of recurrent major depressive disorder (H)     No refills needed at this time.  Encouraged to find remote sober supports and meetings.   Discussed options for HS medication options, 37.5mg of trazodone HS +/- hydroxyzine depending on tolerability.     I have reviewed the note as documented above.  This accurately captures the substance of my conversation with the patient.    Phone call contact time  Call Started at: 1150  Call Ended at: 1201    Elpidio French PA-C   Behavioral Health & Addiction Medicine  CD Unit # [phone]  Pager # [phone]    This note was created with help of Dragon dictation system. Grammatical / typing errors are not intentional.

## 2021-06-29 NOTE — PROGRESS NOTES
Progress Notes by Catherine Samayoa RN at 7/24/2020 10:15 AM     Author: Catherine Samayoa RN Service: Behavioral Author Type: Registered Nurse    Filed: 7/24/2020  1:08 PM Encounter Date: 7/24/2020 Status: Signed    : Catherine Samayoa RN (Registered Nurse)       This video/telephone visit will be conducted via a call between you and your physician/provider. We have found that certain health care needs can be provided without the need for an in-person physical exam. This service lets us provide the care you need with a video /telephone conversation. If a prescription is necessary we can send it directly to your pharmacy. If lab work is needed we can place an order for that and you can then stop by our lab to have the test done at a later time.   Just as we bill insurance for in-person visits, we also bill insurance for video/telephone visits. If you have questions about your insurance coverage, we recommend that you speak with your insurance company.   Patient has given verbal consent for Telephone visit? Yes  Patient would like the telephone visit to [phone]  RN: Catherine WORTHINGTON    Patient verified allergies, medications and pharmacy via phone. PHQ : 2 and ALISIA: 9 done verbally with writer. Patient states she is ready for visit.

## 2021-06-30 NOTE — PROGRESS NOTES
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 up appointment will be scheduled if necessary to determine medical necessity for treatment services.     Staff Name and Title: Tab Whittington [facility]  Date:  4/27/2021  Time:  3:15 PM

## 2021-07-03 NOTE — ADDENDUM NOTE
Addendum Note by Brunner, Emily A, MD at 6/25/2019 10:30 AM     Author: Brunner, Emily A, MD Service: -- Author Type: Physician    Filed: 6/26/2019 12:39 AM Encounter Date: 6/25/2019 Status: Signed    : Brunner, Emily A, MD (Physician)    Addended by: BRUNNER, EMILY A on: 6/26/2019 12:39 AM        Modules accepted: Level of Service

## 2021-07-04 NOTE — ADDENDUM NOTE
Addendum Note by Brandon Martinez DO at 2/9/2021  9:20 AM     Author: Brandon Martinez DO Service: -- Author Type: Physician    Filed: 2/9/2021 11:55 AM Date of Service: 2/9/2021  9:20 AM Status: Signed    : Brandon Martinez DO (Physician)    Encounter addended by: Brandon Martinez DO on: 2/9/2021 11:55 AM      Actions taken: Charge Capture section accepted
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
## 2021-07-05 ENCOUNTER — COMMUNICATION - HEALTHEAST (OUTPATIENT)
Dept: BEHAVIORAL HEALTH | Facility: CLINIC | Age: 66
End: 2021-07-05

## 2021-07-05 NOTE — TELEPHONE ENCOUNTER
Telephone Encounter by Arcaeli Hamilton LICSW at 7/5/2021  1:02 PM     Author: Araceli Hamilton LICSW Service: -- Author Type: Licensed Social Worker    Filed: 7/5/2021  1:03 PM Encounter Date: 7/5/2021 Status: Signed    : Araceli Hamilton LICSW (Licensed Social Worker)       Therapist outreach attempt - no answer, left voice message.

## 2021-07-06 PROBLEM — F33.0 MILD EPISODE OF RECURRENT MAJOR DEPRESSIVE DISORDER (H): Status: ACTIVE | Noted: 2021-07-06

## 2021-07-06 PROBLEM — E83.42 HYPOMAGNESEMIA: Status: ACTIVE | Noted: 2019-03-25

## 2021-07-06 PROBLEM — R60.0 BILATERAL EDEMA OF LOWER EXTREMITY: Status: ACTIVE | Noted: 2017-06-28

## 2021-07-06 PROBLEM — J18.9 PNEUMONIA OF RIGHT LOWER LOBE DUE TO INFECTIOUS ORGANISM: Status: ACTIVE | Noted: 2019-12-14

## 2021-07-06 PROBLEM — E55.9 VITAMIN D DEFICIENCY: Status: ACTIVE | Noted: 2021-07-06

## 2021-07-06 PROBLEM — K92.2 GI BLEED: Status: ACTIVE | Noted: 2020-12-28

## 2021-07-06 PROBLEM — G60.9 HEREDITARY AND IDIOPATHIC PERIPHERAL NEUROPATHY: Status: ACTIVE | Noted: 2021-07-06

## 2021-07-06 PROBLEM — J30.2 SEASONAL ALLERGIES: Status: ACTIVE | Noted: 2018-07-09

## 2021-07-06 PROBLEM — Z86.59 HISTORY OF MAJOR DEPRESSION: Status: ACTIVE | Noted: 2020-01-23

## 2021-07-06 PROBLEM — F33.9 MAJOR DEPRESSION, RECURRENT (H): Status: ACTIVE | Noted: 2021-07-06

## 2021-07-06 PROBLEM — M17.11 PRIMARY OSTEOARTHRITIS OF RIGHT KNEE: Status: ACTIVE | Noted: 2018-03-21

## 2021-07-06 PROBLEM — R06.83 SNORING: Status: ACTIVE | Noted: 2017-06-28

## 2021-07-06 PROBLEM — T50.2X5A DIURETIC-INDUCED HYPOKALEMIA: Status: ACTIVE | Noted: 2021-07-06

## 2021-07-06 PROBLEM — Z59.00 HOMELESS: Status: ACTIVE | Noted: 2020-12-28

## 2021-07-06 PROBLEM — F17.200 NICOTINE DEPENDENCE: Status: ACTIVE | Noted: 2021-07-06

## 2021-07-06 PROBLEM — M67.40 GANGLION OF TENDON SHEATH: Status: ACTIVE | Noted: 2021-07-06

## 2021-07-06 PROBLEM — R10.13 ABDOMINAL PAIN, EPIGASTRIC: Status: ACTIVE | Noted: 2021-07-06

## 2021-07-06 PROBLEM — K21.00 CHRONIC REFLUX ESOPHAGITIS: Status: ACTIVE | Noted: 2021-07-06

## 2021-07-06 PROBLEM — K70.10 ALCOHOLIC HEPATITIS (H): Status: ACTIVE | Noted: 2021-07-06

## 2021-07-06 PROBLEM — E66.01 MORBID OBESITY (H): Status: ACTIVE | Noted: 2018-01-05

## 2021-07-06 PROBLEM — E87.6 DIURETIC-INDUCED HYPOKALEMIA: Status: ACTIVE | Noted: 2021-07-06

## 2021-07-06 PROBLEM — R60.0 PERIPHERAL EDEMA: Status: ACTIVE | Noted: 2021-07-06

## 2021-07-06 PROBLEM — R06.09 DYSPNEA ON EXERTION: Status: ACTIVE | Noted: 2021-07-06

## 2021-07-06 PROBLEM — M19.049 PRIMARY LOCALIZED OSTEOARTHROSIS, HAND: Status: ACTIVE | Noted: 2021-07-06

## 2021-07-06 PROBLEM — G56.03 CARPAL TUNNEL SYNDROME ON BOTH SIDES: Status: ACTIVE | Noted: 2017-03-06

## 2021-07-06 PROBLEM — E53.8 VITAMIN B12 DEFICIENCY (NON ANEMIC): Status: ACTIVE | Noted: 2020-10-13

## 2021-07-06 PROBLEM — M54.50 LOW BACKACHE: Status: ACTIVE | Noted: 2018-03-16

## 2021-07-06 PROBLEM — K80.50 BILIARY COLIC: Status: ACTIVE | Noted: 2021-07-06

## 2021-08-03 PROBLEM — F10.930 ALCOHOL WITHDRAWAL SYNDROME WITHOUT COMPLICATION (H): Status: RESOLVED | Noted: 2018-03-17 | Resolved: 2021-01-24

## 2021-09-09 ENCOUNTER — TRANSFERRED RECORDS (OUTPATIENT)
Dept: HEALTH INFORMATION MANAGEMENT | Facility: CLINIC | Age: 66
End: 2021-09-09

## 2021-09-09 LAB
ALT SERPL-CCNC: 54 IU/L (ref 8–45)
AST SERPL-CCNC: 91 IU/L (ref 2–40)
CREATININE (EXTERNAL): 0.7 MG/DL (ref 0.57–1.11)
GFR ESTIMATED (EXTERNAL): >60 ML/MIN/1.73M2
GFR ESTIMATED (IF AFRICAN AMERICAN) (EXTERNAL): >60 ML/MIN/1.73M2
GLUCOSE (EXTERNAL): 127 MG/DL (ref 65–100)
POTASSIUM (EXTERNAL): 3.5 MMOL/L (ref 3.5–5)

## 2021-09-10 ENCOUNTER — TRANSFERRED RECORDS (OUTPATIENT)
Dept: HEALTH INFORMATION MANAGEMENT | Facility: CLINIC | Age: 66
End: 2021-09-10

## 2021-09-10 ENCOUNTER — TELEPHONE (OUTPATIENT)
Dept: FAMILY MEDICINE | Facility: CLINIC | Age: 66
End: 2021-09-10

## 2021-09-10 NOTE — TELEPHONE ENCOUNTER
Reason for Call:  Other     Detailed comments:   Pts. Son is calling to let know pt has been admitted to [facility] she has been on a drinking binge and is detoxing. Pt had fallen she has a small fracture on her lt thumb and also a red puffy left cheek bone . Son has many concerns about his Mom and her safety he found 2.5 bottles of Oswald Felix Whiskey 750 ML in her home and 2 empty liquor bags which he thinks she had already drank 2 bottles .He will try to convince her to do a inpatient treatment stay. He will talk to the people at [facility] to try to obtain help with a  or someone that can help him and his Mom he is thinking of trying to take legal action to become her legal guardian. He wants to inform  of this situation.He feels his Mom has been sugar coating her drinking habits to her Dr.   Phone Number Patient can be reached at: Other phone number:  [phone]    Best Time: anytime  Can we leave a detailed message on this number? yes    Call taken on 9/10/2021 at 9:21 AM by Armida Briceño    Call Center Reason:[phone]}

## 2021-09-10 NOTE — TELEPHONE ENCOUNTER
Noted - I think we are aware of her alcohol use.  She has had multiple admissions related to her drinking.  And multiple treatment experiences.

## 2021-09-27 ENCOUNTER — TRANSFERRED RECORDS (OUTPATIENT)
Dept: HEALTH INFORMATION MANAGEMENT | Facility: CLINIC | Age: 66
End: 2021-09-27

## 2021-10-04 ENCOUNTER — TELEPHONE (OUTPATIENT)
Dept: BEHAVIORAL HEALTH | Facility: CLINIC | Age: 66
End: 2021-10-04

## 2021-10-04 NOTE — TELEPHONE ENCOUNTER
Patient was scheduled for a 1pm telephone visit and did not answer call. Therapist left voice message for patient to call behavioral access in order to reschedule.

## 2021-10-13 ENCOUNTER — TELEPHONE (OUTPATIENT)
Dept: FAMILY MEDICINE | Facility: CLINIC | Age: 66
End: 2021-10-13

## 2021-10-13 ENCOUNTER — TRANSFERRED RECORDS (OUTPATIENT)
Dept: HEALTH INFORMATION MANAGEMENT | Facility: CLINIC | Age: 66
End: 2021-10-13

## 2021-10-13 NOTE — TELEPHONE ENCOUNTER
Reason for call:  Pre Op-Surgery 10/28/2021    Phone number to reach patient:  [phone]    Best Time:  anytime    Can we leave a detailed message on this number?  YES

## 2021-10-25 ENCOUNTER — OFFICE VISIT (OUTPATIENT)
Dept: FAMILY MEDICINE | Facility: CLINIC | Age: 66
End: 2021-10-25
Payer: COMMERCIAL

## 2021-10-25 VITALS
DIASTOLIC BLOOD PRESSURE: 79 MMHG | HEIGHT: 63 IN | BODY MASS INDEX: 42.77 KG/M2 | SYSTOLIC BLOOD PRESSURE: 115 MMHG | TEMPERATURE: 97.3 F | HEART RATE: 67 BPM | WEIGHT: 241.4 LBS | OXYGEN SATURATION: 96 %

## 2021-10-25 DIAGNOSIS — M18.12 PRIMARY OSTEOARTHRITIS OF FIRST CARPOMETACARPAL JOINT OF LEFT HAND: ICD-10-CM

## 2021-10-25 DIAGNOSIS — F10.10 ALCOHOL ABUSE: ICD-10-CM

## 2021-10-25 DIAGNOSIS — Z01.818 PREOP EXAMINATION: Primary | ICD-10-CM

## 2021-10-25 DIAGNOSIS — F33.1 MODERATE EPISODE OF RECURRENT MAJOR DEPRESSIVE DISORDER (H): ICD-10-CM

## 2021-10-25 DIAGNOSIS — F17.210 CIGARETTE NICOTINE DEPENDENCE WITHOUT COMPLICATION: ICD-10-CM

## 2021-10-25 DIAGNOSIS — J43.9 PULMONARY EMPHYSEMA, UNSPECIFIED EMPHYSEMA TYPE (H): ICD-10-CM

## 2021-10-25 PROBLEM — R29.6 RECURRENT FALLS: Status: ACTIVE | Noted: 2021-09-10

## 2021-10-25 PROBLEM — F10.929 ACUTE ALCOHOLIC INTOXICATION (H): Status: ACTIVE | Noted: 2021-09-10

## 2021-10-25 PROBLEM — S52.122A CLOSED FRACTURE OF HEAD OF LEFT RADIUS: Status: ACTIVE | Noted: 2021-09-10

## 2021-10-25 PROBLEM — F41.9 ANXIETY: Status: ACTIVE | Noted: 2019-03-25

## 2021-10-25 LAB
ANION GAP SERPL CALCULATED.3IONS-SCNC: 16 MMOL/L (ref 5–18)
BUN SERPL-MCNC: 10 MG/DL (ref 8–22)
CALCIUM SERPL-MCNC: 10.2 MG/DL (ref 8.5–10.5)
CHLORIDE BLD-SCNC: 102 MMOL/L (ref 98–107)
CO2 SERPL-SCNC: 22 MMOL/L (ref 22–31)
CREAT SERPL-MCNC: 0.69 MG/DL (ref 0.6–1.1)
ERYTHROCYTE [DISTWIDTH] IN BLOOD BY AUTOMATED COUNT: 14.2 % (ref 10–15)
GFR SERPL CREATININE-BSD FRML MDRD: >90 ML/MIN/1.73M2
GLUCOSE BLD-MCNC: 101 MG/DL (ref 70–125)
HCT VFR BLD AUTO: 48 % (ref 35–47)
HGB BLD-MCNC: 15.6 G/DL (ref 11.7–15.7)
MCH RBC QN AUTO: 31.2 PG (ref 26.5–33)
MCHC RBC AUTO-ENTMCNC: 32.5 G/DL (ref 31.5–36.5)
MCV RBC AUTO: 96 FL (ref 78–100)
PLATELET # BLD AUTO: 177 10E3/UL (ref 150–450)
POTASSIUM BLD-SCNC: 4.1 MMOL/L (ref 3.5–5)
RBC # BLD AUTO: 5 10E6/UL (ref 3.8–5.2)
SODIUM SERPL-SCNC: 140 MMOL/L (ref 136–145)
WBC # BLD AUTO: 6.2 10E3/UL (ref 4–11)

## 2021-10-25 PROCEDURE — 80048 BASIC METABOLIC PNL TOTAL CA: CPT | Performed by: FAMILY MEDICINE

## 2021-10-25 PROCEDURE — 99214 OFFICE O/P EST MOD 30 MIN: CPT | Mod: 25 | Performed by: FAMILY MEDICINE

## 2021-10-25 PROCEDURE — 36415 COLL VENOUS BLD VENIPUNCTURE: CPT | Performed by: FAMILY MEDICINE

## 2021-10-25 PROCEDURE — U0005 INFEC AGEN DETEC AMPLI PROBE: HCPCS | Performed by: FAMILY MEDICINE

## 2021-10-25 PROCEDURE — G0008 ADMIN INFLUENZA VIRUS VAC: HCPCS | Performed by: FAMILY MEDICINE

## 2021-10-25 PROCEDURE — 90686 IIV4 VACC NO PRSV 0.5 ML IM: CPT | Performed by: FAMILY MEDICINE

## 2021-10-25 PROCEDURE — 0004A PR COVID VAC PFIZER DIL RECON 30 MCG/0.3 ML IM: CPT | Performed by: FAMILY MEDICINE

## 2021-10-25 PROCEDURE — U0003 INFECTIOUS AGENT DETECTION BY NUCLEIC ACID (DNA OR RNA); SEVERE ACUTE RESPIRATORY SYNDROME CORONAVIRUS 2 (SARS-COV-2) (CORONAVIRUS DISEASE [COVID-19]), AMPLIFIED PROBE TECHNIQUE, MAKING USE OF HIGH THROUGHPUT TECHNOLOGIES AS DESCRIBED BY CMS-2020-01-R: HCPCS | Performed by: FAMILY MEDICINE

## 2021-10-25 PROCEDURE — 85027 COMPLETE CBC AUTOMATED: CPT | Performed by: FAMILY MEDICINE

## 2021-10-25 PROCEDURE — 91300 PR COVID VAC PFIZER DIL RECON 30 MCG/0.3 ML IM: CPT | Performed by: FAMILY MEDICINE

## 2021-10-25 RX ORDER — OXYCODONE AND ACETAMINOPHEN 5; 325 MG/1; MG/1
TABLET ORAL
COMMUNITY
Start: 2021-02-09 | End: 2021-10-25

## 2021-10-25 RX ORDER — HYDROCODONE BITARTRATE AND ACETAMINOPHEN 5; 325 MG/1; MG/1
TABLET ORAL
COMMUNITY
Start: 2021-02-11 | End: 2021-10-25

## 2021-10-25 RX ORDER — OXYCODONE HYDROCHLORIDE 5 MG/1
TABLET ORAL
COMMUNITY
Start: 2021-02-05 | End: 2021-10-25

## 2021-10-25 RX ORDER — ACAMPROSATE CALCIUM 333 MG/1
TABLET, DELAYED RELEASE ORAL
COMMUNITY
Start: 2021-09-27 | End: 2021-10-25

## 2021-10-25 RX ORDER — BUDESONIDE AND FORMOTEROL FUMARATE DIHYDRATE 80; 4.5 UG/1; UG/1
1 AEROSOL RESPIRATORY (INHALATION)
COMMUNITY
End: 2021-10-25

## 2021-10-25 RX ORDER — CETIRIZINE HYDROCHLORIDE 10 MG/1
10 TABLET ORAL
COMMUNITY
Start: 2021-06-08 | End: 2022-09-19

## 2021-10-25 RX ORDER — POTASSIUM CHLORIDE 750 MG/1
20 TABLET, EXTENDED RELEASE ORAL
COMMUNITY
End: 2022-03-17

## 2021-10-25 RX ORDER — PRAZOSIN HYDROCHLORIDE 1 MG/1
1 CAPSULE ORAL
COMMUNITY
End: 2021-10-25

## 2021-10-25 RX ORDER — DULOXETIN HYDROCHLORIDE 60 MG/1
CAPSULE, DELAYED RELEASE ORAL
COMMUNITY
Start: 2021-01-08 | End: 2021-10-25

## 2021-10-25 RX ORDER — CYCLOBENZAPRINE HCL 10 MG
TABLET ORAL
COMMUNITY
Start: 2021-02-05 | End: 2021-10-25

## 2021-10-25 ASSESSMENT — MIFFLIN-ST. JEOR: SCORE: 1609.61

## 2021-10-25 NOTE — PROGRESS NOTES
[facility] [address] [place] [address]  Phone: [phone]  Fax: [phone]  Primary Provider: Yanique Cali  Pre-op Performing Provider: YANIQUE CALI      PREOPERATIVE EVALUATION:  Today's date: 10/25/2021    Patsy Santamaria is a 66 year old female who presents for a preoperative evaluation.    Surgical Information:  Surgery/Procedure: Left CMC Arthroplasty  Surgery Location: [facility]  Surgeon: Dr. Vinay Olivarez  Surgery Date: 10/28/21  Time of Surgery: 1:30pm  Where patient plans to recover: At home with family  Fax number for surgical facility: Fax [phone]    Type of Anesthesia Anticipated: to be determined    Assessment & Plan     The proposed surgical procedure is considered LOW risk.    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)    Relevant Medications    cetirizine (ZYRTEC) 10 MG tablet       Behavioral    Major depression, recurrent (H)      Other Visit Diagnoses     Preop examination    -  Primary    Relevant Orders    Asymptomatic COVID-19 Virus (Coronavirus) by PCR Nose (Completed)    Basic metabolic panel  (Ca, Cl, CO2, Creat, Gluc, K, Na, BUN) (Completed)    CBC with platelets (Completed)    REVIEW OF HEALTH MAINTENANCE PROTOCOL ORDERS (Completed)    Primary osteoarthritis of first carpometacarpal joint of left hand            65 yo female with significant pain at base of left thumb - has swelling/tenderness - evidence of CMC joint arthritis.  Plans surgical intervention.  High risk patient on basis of alcohol dependence (h/o withdrawal seizures) - currently sober by report.  Has nicotine dependence - not ready to quit.      H/o depression - currently taking medications - not self-medicating with alcohol.  Stable.    H/o COPD - related to longstanding nicotine  "abuse - stable - ok for surgery.      Possible Sleep Apnea: patient uses CPAP \"when I need it\" - should be using this regularly     Risks and Recommendations:  The patient has the following additional risks and recommendations for perioperative complications:  Social and Substance:    - Alcohol abuse and risk of withdrawal   - Active nicotine user, advised smoking cessation    Medication Instructions:  OK to take morning medications with sip of water    RECOMMENDATION:  APPROVAL GIVEN to proceed with proposed procedure, without further diagnostic evaluation.    Review of external notes as documented above   Reviewed Ortho note.       Subjective     HPI related to upcoming procedure:  Pain at base of thumb - xrays show \"bone spurs and lots of arthritis\" - painful - not getting better -   Could get more cortisone shots or surgery - had similar surgery on right hand a few years ago and that turned out well - patient prefers surgical option.  Scheduled for this week     High risk for COVID re:  COPD, alcoholism - wants booster shot           Preop Questions 10/25/2021   1. Have you ever had a heart attack or stroke? No   2. Have you ever had surgery on your heart or blood vessels, such as a stent placement, a coronary artery bypass, or surgery on an artery in your head, neck, heart, or legs? No   3. Do you have chest pain with activity? No   4. Do you have a history of  heart failure? No   5. Do you currently have a cold, bronchitis or symptoms of other infection? No   6. Do you have a cough, shortness of breath, or wheezing? No   7. Do you or anyone in your family have previous history of blood clots? YES - dad had Leiden Factor V ( of an aneurysm)     8. Do you or does anyone in your family have a serious bleeding problem such as prolonged bleeding following surgeries or cuts? NO   9. Have you ever had problems with anemia or been told to take iron pills? No   10. Have you had any abnormal blood loss such as " black, tarry or bloody stools, or abnormal vaginal bleeding? No   11. Have you ever had a blood transfusion? No   12. Are you willing to have a blood transfusion if it is medically needed before, during, or after your surgery? Yes   13. Have you or any of your relatives ever had problems with anesthesia? No   14. Do you have sleep apnea, excessive snoring or daytime drowsiness? YES - battles with sleep - has CPAP machine - doesn't use it all the time -    14a. Do you have a CPAP machine? Yes - uses it only occasionally   15. Do you have any artifical heart valves or other implanted medical devices like a pacemaker, defibrillator, or continuous glucose monitor? No   16. Do you have artificial joints? No   17. Are you allergic to latex? No     Health Care Directive:  Patient does not have a Health Care Directive or Living Will: no written documents; oldest sister (Violet Mendez)  would be decision maker    Preoperative Review of :      Fill Date ID Written Sold Drug Qty Days Prescriber Rx # Pharmacy Refill Daily Dose * Pymt Type    02/09/2021  1   02/09/2021 02/09/2021  Oxycodone-Acetaminophen 5-325  10.00 4 Ja Hol  [id number]  [facility] ([id number])  0/0 18.75 [facility] [place]   02/05/2021  1   02/05/2021 02/05/2021  Oxycodone Hcl 5 Mg Tablet  8.00 2 Ch Uls  [id number]  [facility] ([id number])  0/0 30.00 [place]   02/02/2021  1   02/02/2021 02/02/2021  Oxycodone Hcl 5 Mg Tablet  8.00 2 Ch Uls  [id number]  [facility] ([id number])  0/0 30.00 [place]   01/13/2021  1   01/13/2021 01/13/2021  Gabapentin 300 Mg Capsule  90.00 30 Em Bru  [id number]  [facility] ([id number])  0/0  [place]are [place]         Status of Chronic Conditions:  See previous notes about chronic problems    Review of Systems  CONSTITUTIONAL: NEGATIVE for fever, chills, change in weight  INTEGUMENTARY/SKIN: NEGATIVE for worrisome rashes, moles or lesions  EYES: NEGATIVE for vision changes or irritation  ENT/MOUTH: NEGATIVE for ear, mouth and throat problems  RESP: NEGATIVE for significant  "cough or SOB  CV: NEGATIVE for chest pain, palpitations or peripheral edema  GI: NEGATIVE for nausea, abdominal pain, heartburn, or change in bowel habits  : NEGATIVE for frequency, dysuria, or hematuria  MUSCULOSKELETAL: NEGATIVE for significant arthralgias or myalgia  NEURO: NEGATIVE for weakness, dizziness or paresthesias  ENDOCRINE: NEGATIVE for temperature intolerance, skin/hair changes  HEME: NEGATIVE for bleeding problems  PSYCHIATRIC: as above - alcohol abuse, currently sober; depression - getting some counseling - taking meds;     Patient Active Problem List    Diagnosis Date Noted     Acute alcoholic intoxication (H) 09/10/2021     Priority: Medium     Closed fracture of head of left radius 09/10/2021     Priority: Medium     Recurrent falls 09/10/2021     Priority: Medium     Abdominal pain, epigastric 07/06/2021     Priority: Medium     Alcoholic hepatitis 07/06/2021     Priority: Medium     Biliary colic 07/06/2021     Priority: Medium     Chronic reflux esophagitis 07/06/2021     Priority: Medium     Formatting of this note might be different from the original.  Created by Conversion       Diuretic-induced hypokalemia 07/06/2021     Priority: Medium     Dyspnea on exertion 07/06/2021     Priority: Medium     Ganglion of tendon sheath 07/06/2021     Priority: Medium     Formatting of this note might be different from the original.  Created by Conversion  [facility] Annotation: May 13 2011  8:37CHARLA - Yanique Cali:   \"cyst/lump\" on dorsum right foot       Hereditary and idiopathic peripheral neuropathy 07/06/2021     Priority: Medium     Formatting of this note might be different from the original.  Created by Conversion    Replacement Utility updated for latest IMO load       Major depression, recurrent (H) 07/06/2021     Priority: Medium     Formatting of this note might be different from the original.  Created by Conversion    Replacement Utility updated for latest IMO load       Mild " episode of recurrent major depressive disorder (H) 07/06/2021     Priority: Medium     Nicotine dependence 07/06/2021     Priority: Medium     Formatting of this note might be different from the original.  Created by Conversion       Peripheral edema 07/06/2021     Priority: Medium     Primary localized osteoarthrosis, hand 07/06/2021     Priority: Medium     Formatting of this note might be different from the original.  Bilateral;       Vitamin D deficiency 07/06/2021     Priority: Medium     Formatting of this note might be different from the original.  Created by Conversion    Replacement Utility updated for latest IMO load       Wheezing 05/26/2021     Priority: Medium     Alcohol dependence with intoxication with complication (H) 05/26/2021     Priority: Medium     Edema, unspecified type 05/26/2021     Priority: Medium     Alcohol abuse 02/17/2021     Priority: Medium     GI bleed 12/28/2020     Priority: Medium     Homeless 12/28/2020     Priority: Medium     Alcohol dependence with uncomplicated intoxication (H) 10/13/2020     Priority: Medium     Vitamin B12 deficiency (non anemic) 10/13/2020     Priority: Medium     History of major depression 01/23/2020     Priority: Medium     Pneumonia of right lower lobe due to infectious organism 12/14/2019     Priority: Medium     Hypomagnesemia 03/25/2019     Priority: Medium     Anxiety 03/25/2019     Priority: Medium     Seasonal allergies 07/09/2018     Priority: Medium     Primary osteoarthritis of right knee 03/21/2018     Priority: Medium     Low backache 03/16/2018     Priority: Medium     Morbid obesity (H) 01/05/2018     Priority: Medium     Bilateral edema of lower extremity 06/28/2017     Priority: Medium     Snoring 06/28/2017     Priority: Medium     Carpal tunnel syndrome on both sides 03/06/2017     Priority: Medium     Trochanteric bursitis of left hip 10/25/2016     Priority: Medium     Elevated LFTs 05/13/2016     Priority: Medium     Airway  obstruction due to foreign body, initial encounter 2016     Priority: Medium     Claustrophobia 2015     Priority: Medium     Cervical disc disease 2015     Priority: Medium     Formatting of this note might be different from the original.  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) 2015     Priority: Medium     PTSD (post-traumatic stress disorder) 2015     Priority: Medium      Past Medical History:   Diagnosis Date     Alcohol abuse      Alcohol withdrawal seizure without complication (H) 2016     Alcoholic cirrhosis (H)      Anxiety      Anxiety      Arthritis      Chronic alcohol dependence, continuous (H) 2018    inpatient 2019, sober since then     Chronic Lower Extremity Edema      Chronic reflux esophagitis      COPD (chronic obstructive pulmonary disease) (H)      Depression      Depression      Dermatitis      Ganglion     right foot     GERD (gastroesophageal reflux disease)      H. pylori infection      Melanoma (H) 2019    left upper arm     Menopause     age 52     Obesity (BMI 35.0-39.9 without comorbidity)      Osteoarthritis     Bilateral Knees     Peripheral neuropathy      Seizure (H)     during alcohol withdrawal     Sleep apnea     mild, doesnt tolerate pap therapy     Withdrawal seizures (H)     x 1 in 2016     Past Surgical History:   Procedure Laterality Date     APPENDECTOMY       APPENDECTOMY       ARTHROSCOPY KNEE Right      BIOPSY SKIN (LOCATION) Left 2019    left upper arm     C APPENDECTOMY      Description: Appendectomy;  Recorded: 2008;  Comments: childhood     C  DELIVERY ONLY      Description:  Section;  Recorded: 2008;     C LIGATE FALLOPIAN TUBE      Description: Tubal Ligation;  Recorded: 2008;      SECTION       HC EXCISION LESION/TENDON-SHEATH/CAPSULE,  FOOT      Description: Excision Of Cyst Of Tendon Sheath Of Foot;  Proc Date: 10/28/2011;  Comments: [place] [facility] KNEE SCOPE, DIAGNOSTIC      Description: Arthroscopy Knee Right;  Proc Date: 10/11/2005;  Comments: for right knee patella subluxation with lateral retinacular release; subpatellar chondroplasty      LATERAL RETINACULAR RELEASE OPEN      Description: Knee Lateral Retinacular Release;  Proc Date: 10/11/2005;     HEMORRHOIDECTOMY INTERNAL LIGATION      Description: Hemorrhoidectomy;  Recorded: 09/01/2008;     THUMB SURGERY      Removal of bone spurs     TONSILLECTOMY       Current Outpatient Medications   Medication Sig Dispense Refill     bumetanide (BUMEX) 1 MG tablet Take 1 mg by mouth daily        cetirizine (ZYRTEC) 10 MG tablet Take 10 mg by mouth       escitalopram (LEXAPRO) 10 MG tablet Take 1 tablet (10 mg) by mouth daily 30 tablet 0     albuterol (PROAIR HFA/PROVENTIL HFA/VENTOLIN HFA) 108 (90 Base) MCG/ACT inhaler Inhale 2 puffs into the lungs every 4 hours as needed  (Patient not taking: Reported on 10/25/2021)       gabapentin (NEURONTIN) 300 MG capsule Take 300-600 mg by mouth At Bedtime        hydrOXYzine (ATARAX) 25 MG tablet Take 25-50 mg by mouth 3 times daily as needed for anxiety       ipratropium - albuterol 0.5 mg/2.5 mg/3 mL (DUONEB) 0.5-2.5 (3) MG/3ML neb solution Take 1 vial (3 mLs) by nebulization every 4 hours as needed for shortness of breath / dyspnea or wheezing 15 mL 1     mirtazapine (REMERON) 15 MG tablet Take 1 tablet (15 mg) by mouth At Bedtime 30 tablet 0     omeprazole (PRILOSEC) 20 MG DR capsule Take 20 mg by mouth daily        potassium chloride ER (K-TAB/KLOR-CON) 10 MEQ CR tablet Take 20 mEq by mouth       umeclidinium (INCRUSE ELLIPTA) 62.5 MCG/INH inhaler Inhale 1 puff into the lungs daily         Allergies   Allergen Reactions     Bupropion Diarrhea     Codeine Hives, Itching, Nausea and Rash     Oxycodone Nausea and Vomiting     Percocet  "[Oxycodone-Acetaminophen]      Patient reports \"vomiting,grossly ill two weeks ago\"     Topiramate Unknown     Diclofenac Nausea     Tolerates the topical gel     Furosemide Rash     Hydrochlorothiazide Rash     phototoxicity - med was d/lora         Sulfa Drugs Itching and Rash     Sulfasalazine Rash        Social History     Tobacco Use     Smoking status: Current Every Day Smoker     Packs/day: 1.00     Years: 49.00     Pack years: 49.00     Types: Cigarettes, Cigarettes, Cigarettes     Smokeless tobacco: Never Used     Tobacco comment: last 2019   Substance Use Topics     Alcohol use: Yes     Comment: Alcoholic Drinks/day: sober since 2019     Family History   Problem Relation Age of Onset     CABG Mother      Anuerysm Father          of ruptured anuerysm at 46     Heart Disease Mother      Heart Disease Father      History   Drug Use No     Comment: Denies         Objective     /79   Pulse 67   Temp 97.3  F (36.3  C)   Ht 1.593 m (5' 2.72\")   Wt 109.5 kg (241 lb 6.4 oz)   SpO2 96%   BMI 43.15 kg/m      Physical Exam    GENERAL APPEARANCE: healthy, alert and no distress     EYES: EOMI, PERRL     HENT: ear canals and TM's normal and nose and mouth without ulcers or lesions     NECK: no adenopathy, no asymmetry, masses, or scars and thyroid normal to palpation     RESP: lungs clear to auscultation - no rales, rhonchi or wheezes     CV: regular rates and rhythm, normal S1 S2, no S3 or S4 and no murmur, click or rub     ABDOMEN:  soft, nontender, no HSM or masses and bowel sounds normal     MS: base of left thumb is swollen/tender over CMC joint     SKIN: no suspicious lesions or rashes     NEURO: Normal strength and tone, sensory exam grossly normal, mentation intact and speech normal     PSYCH: mentation appears normal. and affect normal/bright     LYMPHATICS: No cervical adenopathy    Recent Labs   Lab Test 21  0228 21  1103 20  2341 20  1802 20  1209 " 12/28/20  1209   HGB 14.3 15.4   < > 14.3   < >  --    * 144*   < >  --    < >  --    INR  --   --   --  1.02  --  1.01    137   < >  --    < >  --    POTASSIUM 3.5 3.1*   < >  --    < >  --    CR 0.70 0.61   < >  --    < >  --    A1C 5.9*  --   --   --   --   --     < > = values in this interval not displayed.        Diagnostics:  Recent Results (from the past 168 hour(s))   Basic metabolic panel  (Ca, Cl, CO2, Creat, Gluc, K, Na, BUN)    Collection Time: 10/25/21  4:03 PM   Result Value Ref Range    Sodium 140 136 - 145 mmol/L    Potassium 4.1 3.5 - 5.0 mmol/L    Chloride 102 98 - 107 mmol/L    Carbon Dioxide (CO2) 22 22 - 31 mmol/L    Anion Gap 16 5 - 18 mmol/L    Urea Nitrogen 10 8 - 22 mg/dL    Creatinine 0.69 0.60 - 1.10 mg/dL    Calcium 10.2 8.5 - 10.5 mg/dL    Glucose 101 70 - 125 mg/dL    GFR Estimate >90 >60 mL/min/1.73m2   CBC with platelets    Collection Time: 10/25/21  4:03 PM   Result Value Ref Range    WBC Count 6.2 4.0 - 11.0 10e3/uL    RBC Count 5.00 3.80 - 5.20 10e6/uL    Hemoglobin 15.6 11.7 - 15.7 g/dL    Hematocrit 48.0 (H) 35.0 - 47.0 %    MCV 96 78 - 100 fL    MCH 31.2 26.5 - 33.0 pg    MCHC 32.5 31.5 - 36.5 g/dL    RDW 14.2 10.0 - 15.0 %    Platelet Count 177 150 - 450 10e3/uL   Asymptomatic COVID-19 Virus (Coronavirus) by PCR Nose    Collection Time: 10/25/21  4:12 PM    Specimen: Nose; Swab   Result Value Ref Range    SARS CoV2 PCR Negative Negative      No EKG required for low risk surgery (cataract, skin procedure, breast biopsy, etc).    Revised Cardiac Risk Index (RCRI):  The patient has the following serious cardiovascular risks for perioperative complications:   - No serious cardiac risks = 0 points     RCRI Interpretation: 1 point: Class II (low risk - 0.9% complication rate)           Signed Electronically by: EJ WELLER MD  Copy of this evaluation report is provided to requesting physician.

## 2021-10-26 LAB — SARS-COV-2 RNA RESP QL NAA+PROBE: NEGATIVE

## 2021-10-26 ASSESSMENT — PATIENT HEALTH QUESTIONNAIRE - PHQ9: SUM OF ALL RESPONSES TO PHQ QUESTIONS 1-9: 3

## 2021-10-28 ENCOUNTER — TRANSFERRED RECORDS (OUTPATIENT)
Dept: HEALTH INFORMATION MANAGEMENT | Facility: CLINIC | Age: 66
End: 2021-10-28
Payer: COMMERCIAL

## 2021-11-10 ENCOUNTER — TRANSFERRED RECORDS (OUTPATIENT)
Dept: HEALTH INFORMATION MANAGEMENT | Facility: CLINIC | Age: 66
End: 2021-11-10
Payer: COMMERCIAL

## 2021-11-12 NOTE — PROGRESS NOTES
Writer met with pt to discuss aftercare plans. Pt reports she has had a brief relapse and is planning to return back home and follow-up with her therapist and addiction psychiatrist. Pt states she lives in [place] in an apartment. Reports she was about 1 year sober then had a relapse for 2 weeks prior to hospital admission. Pt declines treatment referrals at this time. Pt is aware if needs change she can seek out writer for assistance. Pt has Medicare for insurance, pt signed Medicare rights form, copy provided to pt and second copy placed in chart. AVS initiated.   - no documented BM   - will give lactulose x 1 and dulcolax supp  - check AXR assess stool burden  - check bladder scan if retaining will straight cath family considering loza cath for comfort

## 2021-11-19 ENCOUNTER — TELEPHONE (OUTPATIENT)
Dept: BEHAVIORAL HEALTH | Facility: CLINIC | Age: 66
End: 2021-11-19
Payer: COMMERCIAL

## 2021-11-19 NOTE — TELEPHONE ENCOUNTER
Patient did not answer telephone call for scheduled 11am follow-up appointment. Therapist left message for patient to call behavioral access in order to reschedule or get in touch with therapist.

## 2021-11-23 NOTE — TELEPHONE ENCOUNTER
Reason for call:  Patient reporting a symptom    Symptom or request: chills, hot cold,coughing, shortness of breath,shaky     Duration (how long have symptoms been present): 2 days    Have you been treated for this before? Yes bronchitis    Additional comments: pt wants to know if this is Covid,bronchitis,or alcohol nona...had 1st covid vaccine a month ago     Phone Number patient can be reached at:  Home number on file [phone] (home)    Best Time:  anytime    Can we leave a detailed message on this number: Yes    Call taken on 4/21/2021 at 9:08 AM by Armida Briceño   Home

## 2021-11-24 ENCOUNTER — TRANSFERRED RECORDS (OUTPATIENT)
Dept: HEALTH INFORMATION MANAGEMENT | Facility: CLINIC | Age: 66
End: 2021-11-24
Payer: COMMERCIAL

## 2021-12-11 ENCOUNTER — NURSE TRIAGE (OUTPATIENT)
Dept: NURSING | Facility: CLINIC | Age: 66
End: 2021-12-11
Payer: COMMERCIAL

## 2021-12-11 NOTE — TELEPHONE ENCOUNTER
Triage Call: Not feeling good since Wednesday. Fatigue. Headache, moderate shortness of breath, diarrhea - 6 stools today, watery. Nausea, vomited today. Chills off and on. Unable to take temperature. COPD. Feels dizzy. Wheezing some. According to the protocol, patient should Go to the ED now. Care advice given. Patient verbalizes understanding and agrees with plan of care. She will go to [facility].     COVID 19 Nurse Triage Plan/Patient Instructions    Please be aware that novel coronavirus (COVID-19) may be circulating in the community. If you develop symptoms such as fever, cough, or SOB or if you have concerns about the presence of another infection including coronavirus (COVID-19), please contact your health care provider or visit [email or web address].     Disposition/Instructions    ED Visit recommended. Follow protocol based instructions.     Bring Your Own Device:  Please also bring your smart device(s) (smart phones, tablets, laptops) and their charging cables for your personal use and to communicate with your care team during your visit.    Thank you for taking steps to prevent the spread of this virus.  o Limit your contact with others.  o Wear a simple mask to cover your cough.  o Wash your hands well and often.    Resources    [facility]: About COVID-19: [email or web address]    CDC: What to Do If You're Sick: www.cdc.gov/coronavirus/2019-ncov/about/steps-when-sick.html    CDC: Ending Home Isolation: www.cdc.gov/coronavirus/2019-ncov/hcp/disposition-in-home-patients.html     CDC: Caring for Someone: www.cdc.gov/coronavirus/2019-ncov/if-you-are-sick/care-for-someone.html     [facility]: Interim Guidance for Hospital Discharge to Home: www.health.[email or web address]/diseases/coronavirus/hcp/hospdischarge.pdf    [facility] clinical trials (COVID-19 research studies): [email or web address].[email or web address]/umn-clinical-trials     Below are the COVID-19 hotlines at the [place] Department of Health  ([facility]). Interpreters are available.   o For health questions: Call 362-812-8852 or 1-880.355.7794 (7 a.m. to 7 p.m.)  o For questions about schools and childcare: Call 585-984-0504 or 1-204.654.2303 (7 a.m. to 7 p.m.)     Concha Ruiz RN Nursing Advisor 12/11/2021 4:40 PM       Reason for Disposition    MODERATE difficulty breathing (e.g., speaks in phrases, SOB even at rest, pulse 100-120)    Additional Information    Negative: SEVERE difficulty breathing (e.g., struggling for each breath, speaks in single words)    Negative: Difficult to awaken or acting confused (e.g., disoriented, slurred speech)    Negative: Bluish (or gray) lips or face now    Negative: Shock suspected (e.g., cold/pale/clammy skin, too weak to stand, low BP, rapid pulse)    Negative: Sounds like a life-threatening emergency to the triager    Negative: [1] COVID-19 exposure AND [2] no symptoms    Negative: COVID-19 vaccine reaction suspected (e.g., fever, headache, muscle aches) occurring 1 to 3 days after getting vaccine    Negative: COVID-19 vaccine, questions about    Negative: [1] Lives with someone known to have influenza (flu test positive) AND [2] flu-like symptoms (e.g., cough, runny nose, sore throat, SOB; with or without fever)    Negative: [1] Adult with possible COVID-19 symptoms AND [2] triager concerned about severity of symptoms or other causes    Negative: COVID-19 and breastfeeding, questions about    Negative: SEVERE or constant chest pain or pressure (Exception: mild central chest pain, present only when coughing)    Protocols used: CORONAVIRUS (COVID-19) DIAGNOSED OR [id number]-L-AQ 8.25.2021

## 2021-12-13 ENCOUNTER — NURSE TRIAGE (OUTPATIENT)
Dept: FAMILY MEDICINE | Facility: CLINIC | Age: 66
End: 2021-12-13
Payer: COMMERCIAL

## 2021-12-13 NOTE — TELEPHONE ENCOUNTER
RN attempted to contact patient, but no answer. Left message on patient's voice mail to call clinic back.      Almita Granado RN  [facility]

## 2021-12-13 NOTE — TELEPHONE ENCOUNTER
Reason for Call:  Other     Detailed comments: patient is calling asking what she can do. She has bed bugs and is covered with bug bites, its been going on for over a week and now she has a lot of diarrhea. She wants to talk to a nurse please    Phone Number Patient can be reached at: Home number on file [phone] (home)    Best Time: asap    Can we leave a detailed message on this number? YES    Call taken on 12/13/2021 at 3:15 PM by Shereen Hidalgo

## 2021-12-14 NOTE — TELEPHONE ENCOUNTER
"What are the effects of bed bug bites? Whatt to do about itching? Is hydrocortisone or another cream ok to apply to bed bugs.   Discussed taking Benadryl OTC if not driving and hydrocortisone cream TID  Patient bought a blow up mattress to sleep on.  Discussed with patient to wash all bedding and clothing and put it in a bag till apartment is fumigated on Friday, 12/17/20.    Patient wants Dr Wade to review message and comment on plan    Reason for Disposition    [1] SEVERE local itching (i.e., interferes with work, school, sleep) AND [2] not improved after 24 hours of hydrocortisone cream    Additional Information    Negative: Anaphylactic reaction suspected (e.g., sudden onset of difficulty breathing, difficulty swallowing, wheezing following bite)    Negative: Sounds like a life-threatening emergency to the triager    Negative: Widespread hives    Negative: Impetigo suspected  (i.e., painless infected superficial small sores, < 1 inch or 2.5 cm, often covered by a soft, yellow-brown scab or crust; sometimes occurring near nasal openings)    Negative: Other insect bite suspected    Negative: Doesn't match the SYMPTOMS of bed bug bites    Negative: Patient sounds very sick or weak to the triager    Negative: [1] Fever AND [2] red area    Negative: [1] Fever AND [2] area is very tender to touch    Negative: [1] Red streak or red line AND [2] length > 2 inches (5 cm)    Negative: [1] Red or very tender (to touch) area AND [2] started over 24 hours after the bite    Negative: [1] Red or very tender (to touch) area AND [2] getting larger over 48 hours after the bite    Answer Assessment - Initial Assessment Questions  1. LOCATION: \"Where are the bites located?\"         Chest, bilateral legs, feet and arms have pin sized red bed bug marks and no draining. No sign of infection  Cast on left arm and thumb as patient had surgery    What are the effects of bed bug bites? Whatt to do about itching? Is hydrocortisone or " "another cream ok to apply to bed bugs.   Discussed taking Benadryl OTC if not driving and hydrocortisone cream TID  Patient bought a blow up mattress to sleep on.  Discussed with patient to wash all bedding and clothing and put it in a bag till apartment is fumigated on Friday, 12/17/20.    Patient wants Dr Wade to review message.    2. ONSET: \"When did you get bitten?\"       Last week    3. CAUSE: Why do you suspect bed bugs?\" (e.g., recent travel, recent purchase of used clothing)      Lives in apartment in public housing and bed bugs from another apartment    4. REDNESS: \"Is the area red or pink?\" If so, ask \"What size is area of redness?\" (inches or cm). \"When did the redness start?\"      Red pin sized bed bug bite poncho    5. ITCHING: \"Does it itch?\" If so, ask: \"How bad is the itch?\"     - MILD: doesn't interfere with normal activities    - MODERATE-SEVERE: interferes with work, school, sleep, or other activities       8/10 itching    6. SWELLING: \"How big is the swelling?\" (inches, cm, or compare to coins)      Normal swelling    7. OTHER SYMPTOMS: \"Do you have any other symptoms?\"  (e.g., difficulty breathing, hives)        Anxiety due to bed bugs    Protocols used: BED BUG BITE-DIVINA Granado RN  [facility]    "

## 2021-12-14 NOTE — TELEPHONE ENCOUNTER
"RN spoke to pt regarding Dr. Wade's recommendations below.     Care Advice given again to pt on \"trying not to scratch the bite areas, and applying on hydrocortisone cream for itching.\" To call clinic back with worsening symptoms.    Pt verbalizes understanding and agree with plan, has no further questions.    Closing encounter.     RAFAL Reyes, RN   [facility]    "

## 2021-12-22 ENCOUNTER — TRANSFERRED RECORDS (OUTPATIENT)
Dept: HEALTH INFORMATION MANAGEMENT | Facility: CLINIC | Age: 66
End: 2021-12-22
Payer: COMMERCIAL

## 2021-12-31 ENCOUNTER — OFFICE VISIT (OUTPATIENT)
Dept: FAMILY MEDICINE | Facility: CLINIC | Age: 66
End: 2021-12-31
Payer: COMMERCIAL

## 2021-12-31 VITALS
OXYGEN SATURATION: 97 % | BODY MASS INDEX: 43.97 KG/M2 | RESPIRATION RATE: 19 BRPM | DIASTOLIC BLOOD PRESSURE: 76 MMHG | TEMPERATURE: 97.8 F | WEIGHT: 246 LBS | SYSTOLIC BLOOD PRESSURE: 135 MMHG | HEART RATE: 75 BPM

## 2021-12-31 DIAGNOSIS — R21 RASH: Primary | ICD-10-CM

## 2021-12-31 PROCEDURE — 99213 OFFICE O/P EST LOW 20 MIN: CPT | Performed by: FAMILY MEDICINE

## 2021-12-31 RX ORDER — TRIAMCINOLONE ACETONIDE 5 MG/G
CREAM TOPICAL 2 TIMES DAILY
Qty: 60 G | Refills: 1 | Status: SHIPPED | OUTPATIENT
Start: 2021-12-31 | End: 2022-09-19

## 2021-12-31 RX ORDER — PERMETHRIN 50 MG/G
CREAM TOPICAL
Qty: 60 G | Refills: 1 | Status: SHIPPED | OUTPATIENT
Start: 2021-12-31 | End: 2022-03-15

## 2021-12-31 NOTE — PROGRESS NOTES
Assessment/ Plan  1. Rash  Multiple areas of erythematous/ominous rash plus small papules  On her left arm, this is irritant contact dermatitis associated with recent cast  Appears to have irritant contact dermatitis on dorsum of right foot  Small papules on feet appear to be insect bites.  Could be scabies  She is working on bedbugs in her apartment.  Will use triamcinolone to treat if  But I think it would be best to treat for scabies first  Discussed all of this.  Handout given for scabies.  - permethrin (ELIMITE) 5 % external cream; Apply cream from head to toe (except the face); leave on for 8-14 hours then wash off with water; reapply in 1 week.  Dispense: 60 g; Refill: 1  - triamcinolone (ARISTOCORT HP) 0.5 % external cream; Apply topically 2 times daily  Dispense: 60 g; Refill: 1     Body mass index is 43.97 kg/m .    Subjective  CC:  chief complaint  HPI:  Patient with multiple very pruritic areas on her extremities.  Areas described above  Present for weeks  Has bedbugs in her apartment.  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)     Diuretic-induced hypokalemia     Dyspnea on exertion     Elevated LFTs     Ganglion of tendon sheath     GI bleed     Hereditary and idiopathic peripheral neuropathy     History of major depression     Homeless     Major depression, recurrent (H)     Low backache     Airway obstruction due to foreign body, initial encounter     Hypomagnesemia     Mild episode of recurrent major depressive disorder (H)      Morbid obesity (H)     Nicotine dependence     Peripheral edema     Pneumonia of right lower lobe due to infectious organism     Primary localized osteoarthrosis, hand     Primary osteoarthritis of right knee     PTSD (post-traumatic stress disorder)     Seasonal allergies     Skin lesion     Snoring     Trochanteric bursitis of left hip     Vitamin B12 deficiency (non anemic)     Vitamin D deficiency     Acute alcoholic intoxication (H)     Anxiety     Closed fracture of head of left radius     Recurrent falls     Current medications reviewed as follows:  albuterol (PROAIR HFA/PROVENTIL HFA/VENTOLIN HFA) 108 (90 Base) MCG/ACT inhaler, Inhale 2 puffs into the lungs every 4 hours as needed   bumetanide (BUMEX) 1 MG tablet, Take 1 mg by mouth daily   cetirizine (ZYRTEC) 10 MG tablet, Take 10 mg by mouth  escitalopram (LEXAPRO) 10 MG tablet, Take 1 tablet (10 mg) by mouth daily  gabapentin (NEURONTIN) 300 MG capsule, Take 300-600 mg by mouth At Bedtime   hydrOXYzine (ATARAX) 25 MG tablet, Take 25-50 mg by mouth 3 times daily as needed for anxiety  ipratropium - albuterol 0.5 mg/2.5 mg/3 mL (DUONEB) 0.5-2.5 (3) MG/3ML neb solution, Take 1 vial (3 mLs) by nebulization every 4 hours as needed for shortness of breath / dyspnea or wheezing  mirtazapine (REMERON) 15 MG tablet, Take 1 tablet (15 mg) by mouth At Bedtime  omeprazole (PRILOSEC) 20 MG DR capsule, Take 20 mg by mouth daily   potassium chloride ER (K-TAB/KLOR-CON) 10 MEQ CR tablet, Take 20 mEq by mouth  umeclidinium (INCRUSE ELLIPTA) 62.5 MCG/INH inhaler, Inhale 1 puff into the lungs daily    No current facility-administered medications on file prior to visit.     History   Smoking Status     Current Every Day Smoker     Packs/day: 1.00     Years: 49.00     Types: Cigarettes, Cigarettes, Cigarettes   Smokeless Tobacco     Never Used     Comment: last 11/2019     Social History     Social History Narrative     Not on file     Patient Care Team:  Viraj  Yanique REZA MD as PCP - General (Family Practice)  Yanique Cali MD as Assigned PCP  Jigar Doty DPM as Assigned Musculoskeletal Provider  Cecily Tripp MD as Assigned Neuroscience Provider  ROS  As      Objective  Physical Exam  Vitals:    12/31/21 1402   BP: 135/76   BP Location: Right arm   Patient Position: Sitting   Cuff Size: Adult Large   Pulse: 75   Resp: 19   Temp: 97.8  F (36.6  C)   TempSrc: Oral   SpO2: 97%   Weight: 111.6 kg (246 lb)     Scaly areas, distinct, left arm in obvious places where cast has been rubbing.  Examples include proximal phalanx, index finger.  Base of thumb.  Wrist.    2 mm erythematous papules scattered widely.  Distal forearms and hand.  Even an area on the palm.  Also on feet.  No findings on olecranon or prepatellar region  Diagnostics  None    Please note: Voice recognition software was used in this dictation.  It may therefore contain typographical errors.

## 2022-01-06 ENCOUNTER — TELEPHONE (OUTPATIENT)
Dept: FAMILY MEDICINE | Facility: CLINIC | Age: 67
End: 2022-01-06
Payer: COMMERCIAL

## 2022-01-06 DIAGNOSIS — F10.10 ALCOHOL ABUSE: Primary | ICD-10-CM

## 2022-01-06 NOTE — TELEPHONE ENCOUNTER
Reason for Call:  Other     Detailed comments: patientis calling to ask for a refferal for chemical dep/mental health refferal    Phone Number Patient can be reached at: Home number on file [phone] (home)    Best Time: asap    Can we leave a detailed message on this number? YES    Call taken on 1/6/2022 at 11:03 AM by Shereen Hidalgo

## 2022-01-06 NOTE — TELEPHONE ENCOUNTER
CMA contacted patient and notified that adult mental health referral entered. CMA discussed the appointing process and which numbers to call if she has not been contacted by a  in 2 business days. CMA advised the patient to check with insurance regarding coverage of mental health and addiction treatment services.     The patient verbalizes understanding of provider/CSS instructions for follow-up and continued care per provider message.

## 2022-01-12 VITALS — BODY MASS INDEX: 42 KG/M2 | WEIGHT: 246 LBS | HEIGHT: 64 IN

## 2022-01-18 ENCOUNTER — VIRTUAL VISIT (OUTPATIENT)
Dept: BEHAVIORAL HEALTH | Facility: CLINIC | Age: 67
End: 2022-01-18
Payer: COMMERCIAL

## 2022-01-18 VITALS
WEIGHT: 246 LBS | TEMPERATURE: 97.4 F | HEIGHT: 64 IN | DIASTOLIC BLOOD PRESSURE: 80 MMHG | SYSTOLIC BLOOD PRESSURE: 131 MMHG | BODY MASS INDEX: 42 KG/M2 | HEART RATE: 78 BPM | OXYGEN SATURATION: 96 % | RESPIRATION RATE: 16 BRPM

## 2022-01-18 VITALS
HEART RATE: 72 BPM | SYSTOLIC BLOOD PRESSURE: 110 MMHG | WEIGHT: 254 LBS | DIASTOLIC BLOOD PRESSURE: 64 MMHG | OXYGEN SATURATION: 97 % | HEIGHT: 64 IN | BODY MASS INDEX: 43.36 KG/M2

## 2022-01-18 VITALS
RESPIRATION RATE: 16 BRPM | WEIGHT: 245.9 LBS | SYSTOLIC BLOOD PRESSURE: 155 MMHG | BODY MASS INDEX: 42.21 KG/M2 | HEART RATE: 102 BPM | DIASTOLIC BLOOD PRESSURE: 89 MMHG

## 2022-01-18 VITALS
WEIGHT: 241 LBS | TEMPERATURE: 97.7 F | HEART RATE: 97 BPM | HEIGHT: 64 IN | DIASTOLIC BLOOD PRESSURE: 86 MMHG | BODY MASS INDEX: 41.15 KG/M2 | SYSTOLIC BLOOD PRESSURE: 161 MMHG

## 2022-01-18 VITALS
DIASTOLIC BLOOD PRESSURE: 79 MMHG | HEIGHT: 64 IN | SYSTOLIC BLOOD PRESSURE: 130 MMHG | OXYGEN SATURATION: 94 % | HEART RATE: 69 BPM | WEIGHT: 248.3 LBS | BODY MASS INDEX: 42.39 KG/M2

## 2022-01-18 VITALS
SYSTOLIC BLOOD PRESSURE: 136 MMHG | BODY MASS INDEX: 40.46 KG/M2 | WEIGHT: 237 LBS | HEART RATE: 90 BPM | DIASTOLIC BLOOD PRESSURE: 86 MMHG | HEIGHT: 64 IN

## 2022-01-18 VITALS
BODY MASS INDEX: 43.41 KG/M2 | SYSTOLIC BLOOD PRESSURE: 132 MMHG | HEIGHT: 63 IN | DIASTOLIC BLOOD PRESSURE: 80 MMHG | HEART RATE: 75 BPM | OXYGEN SATURATION: 92 % | WEIGHT: 245 LBS | RESPIRATION RATE: 12 BRPM

## 2022-01-18 VITALS
HEART RATE: 87 BPM | DIASTOLIC BLOOD PRESSURE: 96 MMHG | SYSTOLIC BLOOD PRESSURE: 159 MMHG | WEIGHT: 236 LBS | HEIGHT: 64 IN | BODY MASS INDEX: 40.29 KG/M2

## 2022-01-18 VITALS
HEART RATE: 80 BPM | WEIGHT: 239 LBS | HEIGHT: 64 IN | SYSTOLIC BLOOD PRESSURE: 141 MMHG | BODY MASS INDEX: 40.8 KG/M2 | DIASTOLIC BLOOD PRESSURE: 77 MMHG

## 2022-01-18 VITALS
WEIGHT: 247 LBS | SYSTOLIC BLOOD PRESSURE: 124 MMHG | BODY MASS INDEX: 42.73 KG/M2 | HEART RATE: 65 BPM | DIASTOLIC BLOOD PRESSURE: 84 MMHG | TEMPERATURE: 97.5 F

## 2022-01-18 VITALS
DIASTOLIC BLOOD PRESSURE: 80 MMHG | RESPIRATION RATE: 18 BRPM | BODY MASS INDEX: 43.05 KG/M2 | TEMPERATURE: 97.6 F | HEIGHT: 63 IN | HEART RATE: 77 BPM | SYSTOLIC BLOOD PRESSURE: 129 MMHG | WEIGHT: 243 LBS

## 2022-01-18 VITALS
DIASTOLIC BLOOD PRESSURE: 80 MMHG | TEMPERATURE: 97.4 F | HEART RATE: 76 BPM | SYSTOLIC BLOOD PRESSURE: 128 MMHG | RESPIRATION RATE: 20 BRPM

## 2022-01-18 DIAGNOSIS — F10.20 ALCOHOL USE DISORDER, SEVERE, DEPENDENCE (H): Primary | ICD-10-CM

## 2022-01-18 DIAGNOSIS — F33.1 MODERATE EPISODE OF RECURRENT MAJOR DEPRESSIVE DISORDER (H): ICD-10-CM

## 2022-01-18 PROCEDURE — 90832 PSYTX W PT 30 MINUTES: CPT | Mod: 95 | Performed by: SOCIAL WORKER

## 2022-01-18 ASSESSMENT — PATIENT HEALTH QUESTIONNAIRE - PHQ9
SUM OF ALL RESPONSES TO PHQ QUESTIONS 1-9: 11
SUM OF ALL RESPONSES TO PHQ QUESTIONS 1-9: 20
SUM OF ALL RESPONSES TO PHQ QUESTIONS 1-9: 6
SUM OF ALL RESPONSES TO PHQ QUESTIONS 1-9: 3
SUM OF ALL RESPONSES TO PHQ QUESTIONS 1-9: 6
SUM OF ALL RESPONSES TO PHQ QUESTIONS 1-9: 16
SUM OF ALL RESPONSES TO PHQ QUESTIONS 1-9: 2
SUM OF ALL RESPONSES TO PHQ QUESTIONS 1-9: 3
SUM OF ALL RESPONSES TO PHQ QUESTIONS 1-9: 7

## 2022-01-18 NOTE — PROGRESS NOTES
"                                             Progress Note    Patient Name: Patsy Santamaria  Date: 1/18/2022         Service Type: Individual      Session Start Time: 8:00am  Session End Time: 8:20am     Session Length: 20 minutes    Session #: 1    Attendees: Client attended alone    Service Modality:  Phone Visit:      Provider verified identity through the following two step process.  Patient provided:  Patient is known previously to provider    The patient has been notified of the following:      \"We have found that certain health care needs can be provided without the need for a face to face visit.  This service lets us provide the care you need with a phone conversation.       I will have full access to your [facility] medical record during this entire phone call.   I will be taking notes for your medical record.      Since this is like an office visit, we will bill your insurance company for this service.       There are potential benefits and risks of telephone visits (e.g. limits to patient confidentiality) that differ from in-person visits.?Confidentiality still applies for telephone services, and nobody will record the visit.  It is important to be in a quiet, private space that is free of distractions (including cell phone or other devices) during the visit.??      If during the course of the call I believe a telephone visit is not appropriate, you will not be charged for this service\"     Consent has been obtained for this service by care team member: Yes      Treatment Plan Last Reviewed: 1/18/2022  PHQ-9 / ALISIA-7 :   PHQ 3/16/2021 3/29/2021 10/26/2021   PHQ-9 Total Score 11 6 3   Q9: Thoughts of better off dead/self-harm past 2 weeks Not at all Not at all Not at all     ALISIA-7 SCORE 7/24/2020 3/16/2021 3/29/2021   Total Score 9 7 2       DATA  Interactive Complexity: No  Crisis: No       Progress Since Last Session (Related to Symptoms / Goals / Homework):   Symptoms: Worsening : alcohol use " problems, withdrawals    Homework: Did not complete      Episode of Care Goals: Minimal progress - ACTION (Actively working towards change); Intervened by reinforcing change plan / affirming steps taken     Current / Ongoing Stressors and Concerns:   Currently at [facility] waiting for placement into a chemical dependency program     Treatment Objective(s) Addressed in This Session:   maintain sobriety and refrain from alcohol use  participate in a chemical dependency program     Intervention:   Provided unconditional positive regard and supportive counseling. Encouraged patient to participate in a chemical dependency program.         ASSESSMENT: Current Emotional / Mental Status (status of significant symptoms):   Risk status (Self / Other harm or suicidal ideation)   Patient denies current fears or concerns for personal safety.   Patient denies current or recent suicidal ideation or behaviors.   Patient denies current or recent homicidal ideation or behaviors.   Patient denies current or recent self injurious behavior or ideation.   Patient denies other safety concerns.   Patient reports there has been no change in risk factors since their last session.     Patient reports there has been no change in protective factors since their last session.     Recommended that patient call 911 or go to the local ED should there be a change in any of these risk factors.     Appearance:   unable to assess    Eye Contact:   unable to assess    Psychomotor Behavior: unable to assess    Attitude:   Cooperative    Orientation:   All   Speech    Rate / Production: Monotone  Slow  Mumbled Normal     Volume:  Soft    Mood:    Anxious  Depressed  Normal   Affect:    Lethargic    Thought Content:  Clear    Thought Form:  Flight of Ideas    Insight:    Impaired     Medication Review:   No current psychiatric medications prescribed: not taking for past 2 months, needs new psychiatrist      Medication Compliance:   No     Changes in  Health Issues:   None reported     Chemical Use Review:   Substance Use: increase in alcohol .  Patient reports frequency of use daily.  Reviewed concerns related to health related substance abuse risk  Provided support and affirmation for steps taken towards sobriety         Tobacco Use: No current tobacco use.      Diagnosis:  1. Alcohol use disorder, severe, dependence (H)    2. Moderate episode of recurrent major depressive disorder (H)        Collateral Reports Completed:   Not Applicable    PLAN: (Patient Tasks / Therapist Tasks / Other)  Patient would like therapist to check back in with her in a few weeks  Patient is planning to participate in a chemical dependency program      Araceli Hamilton, [facility]                                                         ______________________________________________________________________    Treatment Plan    Patient's Name: Patsy Santamaria  YOB: 1955    Date: 1/18/2022    DSM5 Diagnoses: 296.32 (F33.1) Major Depressive Disorder, Recurrent Episode, Moderate _ or Substance-Related & Addictive Disorders Alcohol Use Disorder   303.90 (F10.20) Severe In a controlled environment  Psychosocial / Contextual Factors: 66 year old single female  WHODAS:   WHODAS 2.0 Total Score 6/14/2021   Total Score 33       Referral / Collaboration:  The following referral(s) will be initiated: psychiatry and chemical dependency.    Anticipated number of session or this episode of care: NA      MeasurableTreatment Goal(s) related to diagnosis / functional impairment(s)  Goal 1: Patient will take steps to follow through with participating in a chemical dependency program    I will know I've met my goal when I'm in treatment.      Objective #A (Patient Action)    Patient will participate in CD treatment (currently awaiting referral in the hospital)  Status: New - Date: 1/18/2022     Intervention(s)  Therapist will encourage patient to complete steps of referral.      Goal 2:  Patient will establish care with a psychiatrist and take medications as prescribed     I will know I've met my goal when I have a new psychiatrist.      Objective #A (Patient Action)    Status: New - Date: 1/18/2022     Patient will set up appointment with a psychiatrist     Intervention(s)  Therapist will make a referral through [facility]      Patient has reviewed and agreed to the above plan.      Araceli Hamilton, [facility]  January 18, 2022

## 2022-01-19 DIAGNOSIS — F10.20 SEVERE ALCOHOL USE DISORDER (H): Primary | ICD-10-CM

## 2022-01-27 ENCOUNTER — OFFICE VISIT (OUTPATIENT)
Dept: FAMILY MEDICINE | Facility: CLINIC | Age: 67
End: 2022-01-27
Payer: COMMERCIAL

## 2022-01-27 VITALS
BODY MASS INDEX: 43.43 KG/M2 | DIASTOLIC BLOOD PRESSURE: 83 MMHG | WEIGHT: 243 LBS | RESPIRATION RATE: 20 BRPM | HEART RATE: 68 BPM | SYSTOLIC BLOOD PRESSURE: 125 MMHG | OXYGEN SATURATION: 95 %

## 2022-01-27 DIAGNOSIS — L30.9 DERMATITIS: ICD-10-CM

## 2022-01-27 DIAGNOSIS — F17.210 CIGARETTE NICOTINE DEPENDENCE WITHOUT COMPLICATION: ICD-10-CM

## 2022-01-27 DIAGNOSIS — F10.10 ALCOHOL ABUSE: ICD-10-CM

## 2022-01-27 DIAGNOSIS — K70.10 ALCOHOLIC HEPATITIS, UNSPECIFIED WHETHER ASCITES PRESENT (H): ICD-10-CM

## 2022-01-27 DIAGNOSIS — J44.1 COPD EXACERBATION (H): ICD-10-CM

## 2022-01-27 DIAGNOSIS — D69.6 THROMBOCYTOPENIA (H): ICD-10-CM

## 2022-01-27 DIAGNOSIS — Z09 HOSPITAL DISCHARGE FOLLOW-UP: Primary | ICD-10-CM

## 2022-01-27 DIAGNOSIS — J43.9 PULMONARY EMPHYSEMA, UNSPECIFIED EMPHYSEMA TYPE (H): ICD-10-CM

## 2022-01-27 LAB
ALBUMIN SERPL-MCNC: 3.8 G/DL (ref 3.5–5)
ALP SERPL-CCNC: 99 U/L (ref 45–120)
ALT SERPL W P-5'-P-CCNC: 95 U/L (ref 0–45)
AST SERPL W P-5'-P-CCNC: 94 U/L (ref 0–40)
BILIRUB DIRECT SERPL-MCNC: 0.5 MG/DL
BILIRUB SERPL-MCNC: 1.3 MG/DL (ref 0–1)
ERYTHROCYTE [DISTWIDTH] IN BLOOD BY AUTOMATED COUNT: 14.4 % (ref 10–15)
HCT VFR BLD AUTO: 46.2 % (ref 35–47)
HGB BLD-MCNC: 15.4 G/DL (ref 11.7–15.7)
MCH RBC QN AUTO: 32.2 PG (ref 26.5–33)
MCHC RBC AUTO-ENTMCNC: 33.3 G/DL (ref 31.5–36.5)
MCV RBC AUTO: 97 FL (ref 78–100)
PLATELET # BLD AUTO: 149 10E3/UL (ref 150–450)
PROT SERPL-MCNC: 6.9 G/DL (ref 6–8)
RBC # BLD AUTO: 4.79 10E6/UL (ref 3.8–5.2)
WBC # BLD AUTO: 5.3 10E3/UL (ref 4–11)

## 2022-01-27 PROCEDURE — 85027 COMPLETE CBC AUTOMATED: CPT | Performed by: FAMILY MEDICINE

## 2022-01-27 PROCEDURE — 99214 OFFICE O/P EST MOD 30 MIN: CPT | Performed by: FAMILY MEDICINE

## 2022-01-27 PROCEDURE — 36415 COLL VENOUS BLD VENIPUNCTURE: CPT | Performed by: FAMILY MEDICINE

## 2022-01-27 PROCEDURE — 80076 HEPATIC FUNCTION PANEL: CPT | Performed by: FAMILY MEDICINE

## 2022-01-27 RX ORDER — BETAMETHASONE DIPROPIONATE 0.5 MG/G
CREAM TOPICAL 2 TIMES DAILY
Qty: 30 G | Refills: 1 | Status: SHIPPED | OUTPATIENT
Start: 2022-01-27 | End: 2022-06-28

## 2022-01-27 NOTE — PROGRESS NOTES
Assessment & Plan        ICD-10-CM    1. Hospital discharge follow-up  Z09    2. COPD exacerbation (H)  J44.1    3. Alcohol abuse  F10.10    4. Cigarette nicotine dependence without complication  F17.210    5. Pulmonary emphysema, unspecified emphysema type (H)  J43.9    6. Thrombocytopenia (H)  D69.6 CBC with platelets   7. Alcoholic hepatitis, unspecified whether ascites present  K70.10 Hepatic panel (Albumin, ALT, AST, Bili, Alk Phos, TP)   8. Dermatitis  L30.9 betamethasone dipropionate (DIPROSONE) 0.05 % external cream        Hospital discharge follow-up, was discharged 5 days ago she is presently stable.    COPD exacerbation improved nicely she got steroids in the hospital as well as oral antibiotics    She will continue on her inhalers.    Thrombocytopenia no bruising we will recheck platelets    Elevated liver tests from the alcohol recheck hepatic panel.    Dermatitis on the hands use betamethasone cream twice daily as needed.    She will contact the [facility] unit chemical dependency treatment to get scheduled.    Patient will be contacted with the lab results and discuss when to follow-up with her primary physician, Dr. Wade            Return in about 6 months (around 7/27/2022) for Follow up. for recheck.        Subjective:    This 66 year old female was seen today for hospital discharge follow-up.    Patient was hospitalized at [facility] from January 17 through January 22.    She was admitted with COPD exacerbation as well as alcohol intoxication and withdrawal.  She has a history of alcohol abuse.    She has underlying COPD and uses inhalers.  She is not on oxygen.    Her O2 sats were in the mid 80s when she was admitted    Today now at 95% on room air she feels like she is breathing well.  She continues on her Symbicort albuterol and Incruse Ellipta.    She also takes Bumex for peripheral edema she does not have any swelling here today.    She had elevated liver function tests with ALT  159 , platelets were low at 84,000.  [place] to be related to her alcohol.    She was referred to [place] focused unit for chemical dependency/alcohol treatment.  She states that she has a number has tried to call but they often call her back she is to call again today.  She has not had any alcohol since her hospital stay she states.    Additional concern regarding her hand she has some itching and has evidence of some dermatitis we use some betamethasone cream for that.    In the hospital chest x-ray was clear, Covid 19 PCR was negative.  Patient has had her Covid vaccinations and her flu shot      Review of Systems    10 point review of systems positive as outlined above otherwise negative    Current Outpatient Medications   Medication     albuterol (PROAIR HFA/PROVENTIL HFA/VENTOLIN HFA) 108 (90 Base) MCG/ACT inhaler     betamethasone dipropionate (DIPROSONE) 0.05 % external cream     bumetanide (BUMEX) 1 MG tablet     cetirizine (ZYRTEC) 10 MG tablet     escitalopram (LEXAPRO) 10 MG tablet     gabapentin (NEURONTIN) 300 MG capsule     hydrOXYzine (ATARAX) 25 MG tablet     ipratropium - albuterol 0.5 mg/2.5 mg/3 mL (DUONEB) 0.5-2.5 (3) MG/3ML neb solution     mirtazapine (REMERON) 15 MG tablet     omeprazole (PRILOSEC) 20 MG DR capsule     permethrin (ELIMITE) 5 % external cream     potassium chloride ER (K-TAB/KLOR-CON) 10 MEQ CR tablet     triamcinolone (ARISTOCORT HP) 0.5 % external cream     umeclidinium (INCRUSE ELLIPTA) 62.5 MCG/INH inhaler     No current facility-administered medications for this visit.       Objective    Vitals: /83 (BP Location: Left arm, Patient Position: Sitting, Cuff Size: Adult Large)   Pulse 68   Resp 20   Wt 110.2 kg (243 lb)   SpO2 95%   BMI 43.43 kg/m    BMI= Body mass index is 43.43 kg/m .     Physical Exam    General appearance no acute distress    Vital signs stable as outlined O2 sat look good pressure look good    She has had no fever    HEENT no nasal  drainage no sore throat    Lungs clear throughout no wheezes.    Heart regular S1-S2 rate in 60s.    Extremities presently without edema in the lower extremities.    Skin with the itching through the hand some small papular dermatitis changes.    No abdominal pain    Labs hepatic and CBC with platelets pending        Tom Cordoba MD

## 2022-02-02 ENCOUNTER — TELEPHONE (OUTPATIENT)
Dept: BEHAVIORAL HEALTH | Facility: CLINIC | Age: 67
End: 2022-02-02
Payer: COMMERCIAL

## 2022-02-15 ENCOUNTER — TELEPHONE (OUTPATIENT)
Dept: BEHAVIORAL HEALTH | Facility: CLINIC | Age: 67
End: 2022-02-15

## 2022-02-15 NOTE — TELEPHONE ENCOUNTER
Pt wanted you to know that she missed her 2/2 appt because she was in the hospital and she is now in treatment at [facility] in [place].

## 2022-03-08 ENCOUNTER — VIRTUAL VISIT (OUTPATIENT)
Dept: BEHAVIORAL HEALTH | Facility: CLINIC | Age: 67
End: 2022-03-08
Payer: COMMERCIAL

## 2022-03-08 DIAGNOSIS — F10.20 ALCOHOL USE DISORDER, SEVERE, DEPENDENCE (H): Primary | ICD-10-CM

## 2022-03-08 DIAGNOSIS — F43.10 PTSD (POST-TRAUMATIC STRESS DISORDER): ICD-10-CM

## 2022-03-08 PROCEDURE — 90834 PSYTX W PT 45 MINUTES: CPT | Mod: TEL | Performed by: SOCIAL WORKER

## 2022-03-08 ASSESSMENT — ANXIETY QUESTIONNAIRES
6. BECOMING EASILY ANNOYED OR IRRITABLE: NOT AT ALL
IF YOU CHECKED OFF ANY PROBLEMS ON THIS QUESTIONNAIRE, HOW DIFFICULT HAVE THESE PROBLEMS MADE IT FOR YOU TO DO YOUR WORK, TAKE CARE OF THINGS AT HOME, OR GET ALONG WITH OTHER PEOPLE: SOMEWHAT DIFFICULT
1. FEELING NERVOUS, ANXIOUS, OR ON EDGE: MORE THAN HALF THE DAYS
7. FEELING AFRAID AS IF SOMETHING AWFUL MIGHT HAPPEN: NOT AT ALL
5. BEING SO RESTLESS THAT IT IS HARD TO SIT STILL: NOT AT ALL
2. NOT BEING ABLE TO STOP OR CONTROL WORRYING: NOT AT ALL
GAD7 TOTAL SCORE: 4
3. WORRYING TOO MUCH ABOUT DIFFERENT THINGS: NOT AT ALL

## 2022-03-08 ASSESSMENT — PATIENT HEALTH QUESTIONNAIRE - PHQ9
SUM OF ALL RESPONSES TO PHQ QUESTIONS 1-9: 2
5. POOR APPETITE OR OVEREATING: MORE THAN HALF THE DAYS

## 2022-03-08 NOTE — PROGRESS NOTES
"      [facility]                                     Progress Note    Patient Name: Patsy Santamaria  Date: 3/8/2022         Service Type: Individual      Session Start Time: 11:00am  Session End Time: 11:38am     Session Length: 38 minutes    Session #: 2    Attendees: Client attended alone    Service Modality:  Phone Visit:      Provider verified identity through the following two step process.  Patient provided:  Patient is known previously to provider    The patient has been notified of the following:      \"We have found that certain health care needs can be provided without the need for a face to face visit.  This service lets us provide the care you need with a phone conversation.       I will have full access to your [facility] medical record during this entire phone call.   I will be taking notes for your medical record.      Since this is like an office visit, we will bill your insurance company for this service.       There are potential benefits and risks of telephone visits (e.g. limits to patient confidentiality) that differ from in-person visits.?Confidentiality still applies for telephone services, and nobody will record the visit.  It is important to be in a quiet, private space that is free of distractions (including cell phone or other devices) during the visit.??      If during the course of the call I believe a telephone visit is not appropriate, you will not be charged for this service\"     Consent has been obtained for this service by care team member: Yes     DATA  Interactive Complexity: No  Crisis: No        Progress Since Last Session (Related to Symptoms / Goals / Homework):   Symptoms: Improving : mental health has improved with sobriety    Homework: Achieved / completed to satisfaction      Episode of Care Goals: Satisfactory progress - ACTION (Actively working towards change); Intervened by reinforcing change plan / affirming steps taken     Current / Ongoing " Stressors and Concerns:   Got out of treatment one week ago, had a good experience in treatment, is staying sober, attending AA meetings, scheduled to see a new psychiatrist next week, taking anti-anxiety medication, identifies various physical health issues, reports that mental health has been good, staying in own apartment but doesn't like it there, patient would like therapist to call her to check in and monitor progress      Treatment Objective(s) Addressed in This Session:   maintain sobriety ongoing  attend AA at least 5 times in the next 1 weeks  identify the fears / thoughts that contribute to feeling anxious  Gain insight     Intervention:   Discussed cognitive restructuring and behavioral activation.  Explored the connection between thoughts, feelings, and actions by using examples relative to client's presenting concerns.  Explained major domains of symptoms (cognitive, emotional, somatic, behavioral, interpersonal) and importance of targeted and specific interventions to reduce symptoms of anxiety and depression.  Discussed role of symptom monitoring in cognitive behavioral treatment.      Assessments completed prior to visit:  The following assessments were completed by patient for this visit:  PHQ9:   PHQ-9 SCORE 4/17/2020 5/18/2020 7/24/2020 3/16/2021 3/29/2021 10/26/2021 3/8/2022   PHQ-9 Total Score 16 6 2 11 6 3 2     GAD7:   ALISIA-7 SCORE 3/3/2020 4/17/2020 5/18/2020 7/24/2020 3/16/2021 3/29/2021 3/8/2022   Total Score 2 17 8 9 7 2 4     PROMIS 10-Global Health (only subscores and total score):   PROMIS-10 Scores Only 3/8/2022   Global Mental Health Score 14   Global Physical Health Score 15   PROMIS TOTAL - SUBSCORES 29         ASSESSMENT: Current Emotional / Mental Status (status of significant symptoms):   Risk status (Self / Other harm or suicidal ideation)   Patient denies current fears or concerns for personal safety.   Patient denies current or recent suicidal ideation or behaviors.   Patient  denies current or recent homicidal ideation or behaviors.   Patient denies current or recent self injurious behavior or ideation.   Patient denies other safety concerns.   Patient reports there has been no change in risk factors since their last session.     Patient reports there has been no change in protective factors since their last session.     Recommended that patient call 911 or go to the local ED should there be a change in any of these risk factors.     Appearance:   unable to assess    Eye Contact:   unable to assess    Psychomotor Behavior: unable to assess    Attitude:   Cooperative    Orientation:   All   Speech    Rate / Production: Normal     Volume:  Normal    Mood:    Normal Euthymic   Affect:    Appropriate    Thought Content:  Clear    Thought Form:  Coherent  Logical    Insight:    Good      Medication Review:   No changes to current psychiatric medication(s)     Medication Compliance:   Yes     Changes in Health Issues:   None reported     Chemical Use Review:   Substance Use: Chemical use reviewed, no active concerns identified      Tobacco Use: No change in amount of tobacco use since last session.  Contemplation    Diagnosis:  1. Alcohol use disorder, severe, dependence (H)    2. PTSD (post-traumatic stress disorder)        Collateral Reports Completed:   Not Applicable    PLAN: (Patient Tasks / Therapist Tasks / Other)  Patient will plan to attend an in person appointment on 3/28  Patient encouraged to buy a planner         JULIO Fajardo                                                         ______________________________________________________________________    Individual Treatment Plan    Patient's Name: Patsy Santamaria  YOB: 1955    Date of Creation: 3/8/2022  Date Treatment Plan Last Reviewed/Revised: 1/18/2022    DSM5 Diagnoses: 309.81 (F43.10) Posttraumatic Stress Disorder (includes Posttraumatic Stress Disorder for Children 6 Years and Younger)  Without  dissociative symptoms or Substance-Related & Addictive Disorders Alcohol Use Disorder   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    Anticipated number of session for this episode of care: ongoing  Anticipation frequency of session: Monthly  Anticipated Duration of each session: 38-52 minutes  Treatment plan will be reviewed in 90 days or when goals have been changed.       MeasurableTreatment Goal(s) related to diagnosis / functional impairment(s)  Goal 1: Patient will maintain sobriety from alcohol    I will know I've met my goal when I maintain sobriety for an extended period of time.      Objective #A (Patient Action)    Patient will attend AA at least 3 times in the next week.  Status: New - Date: 3/8/2022     Intervention(s)  Therapist will teach accountability and time management skills    Objective #B  Patient will identify at least 5 example(s) of how drinking has resulted in an experience that interferes with person values or goals.  Status: New - Date: 3/8/2022     Intervention(s)  Therapist will help encourage patient to follow goals.    Objective #C  Patient will work on keeping a calendar to prioritize responsibilities   Status: New - Date: 3/8/2022     Intervention(s)  Therapist will encourage patient to follow through.      Goal 2: Patient will manage symptoms of anxiety    I will know I've met my goal when I feel more comfortable managing my symptoms.      Objective #A (Patient Action)    Status: New - Date: 3/8/2022     Patient will identify at least 3 triggers for anxiety.    Intervention(s)  Therapist will provide psycho-education around PTSD triggers.    Objective #B  Patient will practice deep breathing at  least once a day.    Status: New - Date: 3/8/2022     Intervention(s)  Therapist will encourage patient to practice deep breathing skills.    Objective #C  Patient will use at least 2 coping skills for anxiety management in the next 2 weeks.  Status: New - Date: 3/8/2022     Intervention(s)  Therapist will teach mindfulness skills and encourage patient to use an don on her phone.      Patient has reviewed and agreed to the above plan.      Araceli Hamilton, JULIO  March 8, 2022

## 2022-03-09 ASSESSMENT — ANXIETY QUESTIONNAIRES: GAD7 TOTAL SCORE: 4

## 2022-03-14 ENCOUNTER — OFFICE VISIT (OUTPATIENT)
Dept: FAMILY MEDICINE | Facility: CLINIC | Age: 67
End: 2022-03-14
Payer: COMMERCIAL

## 2022-03-14 VITALS
SYSTOLIC BLOOD PRESSURE: 130 MMHG | TEMPERATURE: 97.7 F | DIASTOLIC BLOOD PRESSURE: 88 MMHG | OXYGEN SATURATION: 97 % | WEIGHT: 251.75 LBS | HEART RATE: 72 BPM | BODY MASS INDEX: 45 KG/M2

## 2022-03-14 DIAGNOSIS — Z12.11 SCREEN FOR COLON CANCER: ICD-10-CM

## 2022-03-14 DIAGNOSIS — R60.0 BILATERAL LOWER EXTREMITY EDEMA: ICD-10-CM

## 2022-03-14 DIAGNOSIS — E55.9 VITAMIN D DEFICIENCY: ICD-10-CM

## 2022-03-14 DIAGNOSIS — L30.1 DYSHIDROTIC ECZEMA: ICD-10-CM

## 2022-03-14 DIAGNOSIS — K70.10 ALCOHOLIC HEPATITIS, UNSPECIFIED WHETHER ASCITES PRESENT (H): Primary | ICD-10-CM

## 2022-03-14 PROBLEM — F32.A DEPRESSIVE DISORDER: Status: ACTIVE | Noted: 2022-02-04

## 2022-03-14 PROBLEM — D72.819 LEUKOPENIA: Status: ACTIVE | Noted: 2022-01-18

## 2022-03-14 LAB
ALBUMIN SERPL-MCNC: 3.5 G/DL (ref 3.5–5)
ALP SERPL-CCNC: 87 U/L (ref 45–120)
ALT SERPL W P-5'-P-CCNC: 32 U/L (ref 0–45)
ANION GAP SERPL CALCULATED.3IONS-SCNC: 10 MMOL/L (ref 5–18)
AST SERPL W P-5'-P-CCNC: 34 U/L (ref 0–40)
BILIRUB SERPL-MCNC: 0.5 MG/DL (ref 0–1)
BUN SERPL-MCNC: 9 MG/DL (ref 8–22)
CALCIUM SERPL-MCNC: 9.9 MG/DL (ref 8.5–10.5)
CHLORIDE BLD-SCNC: 97 MMOL/L (ref 98–107)
CO2 SERPL-SCNC: 30 MMOL/L (ref 22–31)
CREAT SERPL-MCNC: 0.67 MG/DL (ref 0.6–1.1)
GFR SERPL CREATININE-BSD FRML MDRD: >90 ML/MIN/1.73M2
GLUCOSE BLD-MCNC: 124 MG/DL (ref 70–125)
POTASSIUM BLD-SCNC: 3.3 MMOL/L (ref 3.5–5)
PROT SERPL-MCNC: 7.1 G/DL (ref 6–8)
SODIUM SERPL-SCNC: 137 MMOL/L (ref 136–145)

## 2022-03-14 PROCEDURE — 80053 COMPREHEN METABOLIC PANEL: CPT | Performed by: FAMILY MEDICINE

## 2022-03-14 PROCEDURE — 36415 COLL VENOUS BLD VENIPUNCTURE: CPT | Performed by: FAMILY MEDICINE

## 2022-03-14 PROCEDURE — 99214 OFFICE O/P EST MOD 30 MIN: CPT | Performed by: FAMILY MEDICINE

## 2022-03-14 RX ORDER — DESOXIMETASONE 2.5 MG/G
CREAM TOPICAL 2 TIMES DAILY
Qty: 60 G | Refills: 1 | Status: SHIPPED | OUTPATIENT
Start: 2022-03-14 | End: 2022-06-28

## 2022-03-14 NOTE — PROGRESS NOTES
Assessment & Plan    1. Alcoholic hepatitis, unspecified whether ascites present  Patient is currently sober - was in treatment - feeling better; had elevated liver enzymes - needs recheck - ordered   - Comprehensive metabolic panel (BMP + Alb, Alk Phos, ALT, AST, Total. Bili, TP); Future  - Comprehensive metabolic panel (BMP + Alb, Alk Phos, ALT, AST, Total. Bili, TP)    2. Bilateral lower extremity edema  Patient notes increased LE edema again - unable to use her compression due to this swelling (and having trouble with thumb pain - had ?surgery due to arthritis)  - Comprehensive metabolic panel (BMP + Alb, Alk Phos, ALT, AST, Total. Bili, TP); Future  - Comprehensive metabolic panel (BMP + Alb, Alk Phos, ALT, AST, Total. Bili, TP)    3. Dyshidrotic eczema  Dry but mildly blistering rash, mostly on hands - pruritic - will use desoximetasone cream  - desoximetasone (TOPICORT) 0.25 % external cream; Apply topically 2 times daily Apply sparingly to affected areas only  Dispense: 60 g; Refill: 1    4. Vitamin D deficiency  H/o vitamin D deficiency - continue to follow     5. Screen for colon cancer  Due for colon cancer screening - ordered colonoscopy  - Adult Gastro Ref - Procedure Only; Future    Return in about 6 weeks (around 4/25/2022) for Follow up.    Chief Complaint   Patient presents with     Hospital F/U     Fall     hit back of head     Derm Problem     on hands      HPI  Was in treatment - put her on the wtaer pill twice a day  Still has swelling -     Has rash on right foot - right hand, left hand  Itches    Hard to get compression stockings on -   Just had surgery on left thumb for arthritis -      Fell and hit her head in treatment -   Was cleaning up some popcorn - fell backwards -   Had a scan - negative    Numbness in left arm when going to sleep at night  Just noticed it past couple weeks -   Sober since February 3, 2022!  Went to [place] - almost a month -   Alcohol is drug of choice    Trouble  sleeping - taking Mirtazapine - doesn't take it every night -   Got out of rehab 2 weeks ago tomorrow        Fall  Pertinent negatives include no abdominal pain, chest pain, chills, coughing, fatigue or fever.        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)     Diuretic-induced hypokalemia     Dyspnea on exertion     Elevated LFTs     Ganglion of tendon sheath     GI bleed     Hereditary and idiopathic peripheral neuropathy     History of major depression     Homeless     Major depression, recurrent (H)     Low backache     Airway obstruction due to foreign body, initial encounter     Hypomagnesemia     Mild episode of recurrent major depressive disorder (H)     Morbid obesity (H)     Nicotine dependence     Peripheral edema     Pneumonia of right lower lobe due to infectious organism     Primary localized osteoarthrosis, hand     Primary osteoarthritis of right knee     PTSD (post-traumatic stress disorder)     Seasonal allergies     Skin lesion     Snoring     Trochanteric bursitis of left hip     Vitamin B12 deficiency (non anemic)     Vitamin D deficiency     Acute alcoholic intoxication (H)     Anxiety     Closed fracture of head of left radius     Recurrent falls     Thrombocytopenia (H)     Dermatitis     Depressive disorder     Leukopenia        Past Medical History:   Diagnosis Date     Alcohol abuse      Alcohol withdrawal seizure without complication (H) 5/14/2016     Alcoholic cirrhosis (H)      Anxiety      Anxiety      Arthritis       Chronic alcohol dependence, continuous (H) 2018    inpatient 2019, sober since then     Chronic Lower Extremity Edema      Chronic reflux esophagitis      COPD (chronic obstructive pulmonary disease) (H)      Depression      Depression      Dermatitis      Ganglion     right foot     GERD (gastroesophageal reflux disease)      H. pylori infection      Melanoma (H) 2019    left upper arm     Menopause     age 52     Obesity (BMI 35.0-39.9 without comorbidity)      Osteoarthritis     Bilateral Knees     Peripheral neuropathy      Seizure (H)     during alcohol withdrawal     Sleep apnea     mild, doesnt tolerate pap therapy     Withdrawal seizures (H)     x 1 in 2016        Current Outpatient Medications   Medication     albuterol (PROAIR HFA/PROVENTIL HFA/VENTOLIN HFA) 108 (90 Base) MCG/ACT inhaler     betamethasone dipropionate (DIPROSONE) 0.05 % external cream     bumetanide (BUMEX) 1 MG tablet     cetirizine (ZYRTEC) 10 MG tablet     desoximetasone (TOPICORT) 0.25 % external cream     fluticasone-vilanterol (BREO ELLIPTA) 100-25 MCG/INH inhaler     hydrOXYzine (ATARAX) 25 MG tablet     ipratropium - albuterol 0.5 mg/2.5 mg/3 mL (DUONEB) 0.5-2.5 (3) MG/3ML neb solution     omeprazole (PRILOSEC) 20 MG DR capsule     triamcinolone (ARISTOCORT HP) 0.5 % external cream     umeclidinium (INCRUSE ELLIPTA) 62.5 MCG/INH inhaler     escitalopram (LEXAPRO) 10 MG tablet     mirtazapine (REMERON) 15 MG tablet     potassium chloride ER (K-TAB/KLOR-CON) 10 MEQ CR tablet     No current facility-administered medications for this visit.        Past Surgical History:   Procedure Laterality Date     APPENDECTOMY       APPENDECTOMY       ARTHROSCOPY KNEE Right      BIOPSY SKIN (LOCATION) Left 2019    left upper arm      SECTION        EXCISION LESION/TENDON-SHEATH/CAPSULE, FOOT      Description: Excision Of Cyst Of Tendon Sheath Of Foot;  Proc Date: 10/28/2011;  Comments: [facility]      KNEE  SCOPE, DIAGNOSTIC      Description: Arthroscopy Knee Right;  Proc Date: 10/11/2005;  Comments: for right knee patella subluxation with lateral retinacular release; subpatellar chondroplasty     HC LATERAL RETINACULAR RELEASE OPEN      Description: Knee Lateral Retinacular Release;  Proc Date: 10/11/2005;     HEMORRHOIDECTOMY INTERNAL LIGATION      Description: Hemorrhoidectomy;  Recorded: 2008;     THUMB SURGERY      Removal of bone spurs     TONSILLECTOMY       ZZC APPENDECTOMY      Description: Appendectomy;  Recorded: 2008;  Comments: childhood     ZZC  DELIVERY ONLY      Description:  Section;  Recorded: 2008;     ZZC LIGATE FALLOPIAN TUBE      Description: Tubal Ligation;  Recorded: 2008;        Social History     Socioeconomic History     Marital status: Single     Spouse name: Not on file     Number of children: Not on file     Years of education: Not on file     Highest education level: Not on file   Occupational History     Not on file   Tobacco Use     Smoking status: Current Every Day Smoker     Packs/day: 0.50     Years: 49.00     Pack years: 24.50     Types: Cigarettes     Smokeless tobacco: Never Used     Tobacco comment:  ppd   Substance and Sexual Activity     Alcohol use: Yes     Comment: Last use 22, Treatment 22     Drug use: No     Comment: Denies     Sexual activity: Yes     Partners: Male     Birth control/protection: None   Other Topics Concern     Not on file   Social History Narrative     Not on file     Social Determinants of Health     Financial Resource Strain: Not on file   Food Insecurity: Not on file   Transportation Needs: Not on file   Physical Activity: Not on file   Stress: Not on file   Social Connections: Not on file   Intimate Partner Violence: Not on file   Housing Stability: Not on file        Family History   Problem Relation Age of Onset     CABG Mother      Anuerysm Father          of ruptured anuerysm at 46     Heart  Disease Mother      Heart Disease Father         Review of Systems   Constitutional: Negative for chills, fatigue, fever and unexpected weight change.   Eyes: Negative.    Respiratory: Negative for cough and shortness of breath.    Cardiovascular: Positive for leg swelling (bilateral - chronic but worse). Negative for chest pain.   Gastrointestinal: Negative for abdominal pain.   Endocrine: Negative.    Genitourinary: Negative.    Musculoskeletal: Negative.    Skin: Negative.    Allergic/Immunologic: Negative.    Neurological: Negative.    Hematological: Does not bruise/bleed easily.   Psychiatric/Behavioral: Positive for sleep disturbance.        Depressed mood    All other systems reviewed and are negative.       /88   Pulse 72   Temp 97.7  F (36.5  C)   Wt 114.2 kg (251 lb 12 oz)   SpO2 97%   BMI 45.00 kg/m       Physical Exam  Constitutional:       General: She is not in acute distress.     Appearance: She is well-developed. She is obese.   HENT:      Right Ear: Tympanic membrane and external ear normal.      Left Ear: Tympanic membrane and external ear normal.      Nose: Nose normal.      Mouth/Throat:      Pharynx: No oropharyngeal exudate.   Eyes:      General:         Right eye: No discharge.         Left eye: No discharge.      Conjunctiva/sclera: Conjunctivae normal.      Pupils: Pupils are equal, round, and reactive to light.   Neck:      Thyroid: No thyromegaly.      Trachea: No tracheal deviation.   Cardiovascular:      Rate and Rhythm: Normal rate and regular rhythm.      Pulses: Normal pulses.      Heart sounds: Normal heart sounds, S1 normal and S2 normal. No murmur heard.    No friction rub. No S3 or S4 sounds.   Pulmonary:      Effort: Pulmonary effort is normal. No respiratory distress.      Breath sounds: Normal breath sounds. No wheezing or rales.   Abdominal:      General: Bowel sounds are normal.      Palpations: Abdomen is soft. There is no mass.      Tenderness: There is no  abdominal tenderness.   Musculoskeletal:         General: Normal range of motion.      Cervical back: Neck supple.      Right lower leg: Edema present.      Left lower leg: Edema present.   Lymphadenopathy:      Cervical: No cervical adenopathy.   Skin:     General: Skin is warm and dry.      Coloration: Skin is not jaundiced.      Findings: No rash.   Neurological:      Mental Status: She is alert and oriented to person, place, and time.      Motor: No abnormal muscle tone.      Deep Tendon Reflexes: Reflexes are normal and symmetric.   Psychiatric:         Thought Content: Thought content normal.         Judgment: Judgment normal.          Results:  Results for orders placed or performed in visit on 03/14/22   Comprehensive metabolic panel (BMP + Alb, Alk Phos, ALT, AST, Total. Bili, TP)     Status: Abnormal   Result Value Ref Range    Sodium 137 136 - 145 mmol/L    Potassium 3.3 (L) 3.5 - 5.0 mmol/L    Chloride 97 (L) 98 - 107 mmol/L    Carbon Dioxide (CO2) 30 22 - 31 mmol/L    Anion Gap 10 5 - 18 mmol/L    Urea Nitrogen 9 8 - 22 mg/dL    Creatinine 0.67 0.60 - 1.10 mg/dL    Calcium 9.9 8.5 - 10.5 mg/dL    Glucose 124 70 - 125 mg/dL    Alkaline Phosphatase 87 45 - 120 U/L    AST 34 0 - 40 U/L    ALT 32 0 - 45 U/L    Protein Total 7.1 6.0 - 8.0 g/dL    Albumin 3.5 3.5 - 5.0 g/dL    Bilirubin Total 0.5 0.0 - 1.0 mg/dL    GFR Estimate >90 >60 mL/min/1.73m2       Medications at Conclusion of Visit:  Current Outpatient Medications   Medication Sig Dispense Refill     albuterol (PROAIR HFA/PROVENTIL HFA/VENTOLIN HFA) 108 (90 Base) MCG/ACT inhaler Inhale 2 puffs into the lungs every 4 hours as needed        betamethasone dipropionate (DIPROSONE) 0.05 % external cream Apply topically 2 times daily As needed for dermatitis 30 g 1     bumetanide (BUMEX) 1 MG tablet Take 1 mg by mouth daily        cetirizine (ZYRTEC) 10 MG tablet Take 10 mg by mouth       desoximetasone (TOPICORT) 0.25 % external cream Apply topically 2  times daily Apply sparingly to affected areas only 60 g 1     fluticasone-vilanterol (BREO ELLIPTA) 100-25 MCG/INH inhaler Inhale 1 puff into the lungs       hydrOXYzine (ATARAX) 25 MG tablet Take 25-50 mg by mouth 3 times daily as needed for anxiety       ipratropium - albuterol 0.5 mg/2.5 mg/3 mL (DUONEB) 0.5-2.5 (3) MG/3ML neb solution Take 1 vial (3 mLs) by nebulization every 4 hours as needed for shortness of breath / dyspnea or wheezing 15 mL 1     omeprazole (PRILOSEC) 20 MG DR capsule Take 20 mg by mouth daily        triamcinolone (ARISTOCORT HP) 0.5 % external cream Apply topically 2 times daily 60 g 1     umeclidinium (INCRUSE ELLIPTA) 62.5 MCG/INH inhaler Inhale 1 puff into the lungs daily       escitalopram (LEXAPRO) 10 MG tablet Take 1 tablet (10 mg) by mouth daily 30 tablet 0     mirtazapine (REMERON) 15 MG tablet Take 1 tablet (15 mg) by mouth At Bedtime 30 tablet 0     potassium chloride ER (K-TAB/KLOR-CON) 10 MEQ CR tablet Take 2 tablets (20 mEq) by mouth daily 90 tablet 3         EJ WELLER MD

## 2022-03-15 ENCOUNTER — OFFICE VISIT (OUTPATIENT)
Dept: BEHAVIORAL HEALTH | Facility: CLINIC | Age: 67
End: 2022-03-15
Payer: COMMERCIAL

## 2022-03-15 VITALS
DIASTOLIC BLOOD PRESSURE: 87 MMHG | WEIGHT: 250 LBS | HEART RATE: 78 BPM | SYSTOLIC BLOOD PRESSURE: 134 MMHG | BODY MASS INDEX: 44.69 KG/M2

## 2022-03-15 DIAGNOSIS — F33.9 EPISODE OF RECURRENT MAJOR DEPRESSIVE DISORDER, UNSPECIFIED DEPRESSION EPISODE SEVERITY (H): ICD-10-CM

## 2022-03-15 DIAGNOSIS — F10.20 ALCOHOL USE DISORDER, SEVERE, DEPENDENCE (H): Primary | ICD-10-CM

## 2022-03-15 LAB
AMPHETAMINES UR QL SCN: ABNORMAL
BARBITURATES UR QL: ABNORMAL
BENZODIAZ UR QL: ABNORMAL
CANNABINOIDS UR QL SCN: ABNORMAL
COCAINE UR QL: ABNORMAL
CREAT UR-MCNC: 132 MG/DL
METHADONE UR QL SCN: ABNORMAL
OPIATES UR QL SCN: ABNORMAL
OXYCODONE UR QL: ABNORMAL
PCP UR QL SCN: ABNORMAL

## 2022-03-15 PROCEDURE — 99214 OFFICE O/P EST MOD 30 MIN: CPT | Performed by: FAMILY MEDICINE

## 2022-03-15 PROCEDURE — 80307 DRUG TEST PRSMV CHEM ANLYZR: CPT | Performed by: FAMILY MEDICINE

## 2022-03-15 PROCEDURE — G0463 HOSPITAL OUTPT CLINIC VISIT: HCPCS

## 2022-03-15 RX ORDER — MIRTAZAPINE 15 MG/1
15 TABLET, FILM COATED ORAL AT BEDTIME
Qty: 30 TABLET | Refills: 0 | Status: SHIPPED | OUTPATIENT
Start: 2022-03-15 | End: 2022-04-20

## 2022-03-15 RX ORDER — ESCITALOPRAM OXALATE 10 MG/1
10 TABLET ORAL DAILY
Qty: 30 TABLET | Refills: 0 | Status: SHIPPED | OUTPATIENT
Start: 2022-03-15 | End: 2022-04-25

## 2022-03-15 NOTE — PROGRESS NOTES
[facility] ADDICTION MEDICINE  INITIAL VISIT                                                      SUBJECTIVE                                                    CC: Patient presents with:  Medication Follow-up: Discuss medication for sleep      Primary care provider: EJ WELLER MD   Psychiatric provider or therapist:  JULIO Mendoza    HPI:    Patsy Santamaria is a 67 year old female with a history of peripheral neuropathy, COPD, alcoholic hepatitis, thombocytopenia, arthritis (hands and knees), obesity, anxiety, and depression who presents for initial visit for addiction consultation and management referred by PCP due to concerns for Alcohol Use Disorder (AUD).  Recently resumed therapy with JULIO Mendoza.      She recently returned home from residential treatment in [place] last month.  It was a small group atmosphere and she learned more than she ever did in the past.  Has new insights into her EtOH use.  Has learned she cannot drink, not even one drink.  She has been sober since Feb 2, 2022.  Since returning home she has been attending AA meetings and reading her 24 hour/day book.    Has been having trouble sleeping for the past week.  Trazodone makes her too groggy the next day.    CD History:    SUBSTANCE(S)  OF CHOICE  -  EtOH     Began drinking around age 33.  Became a problem for her in 2016 and she went to treatment for the first time and also experienced EtOH withdrawal seizures.  Drinking tends to correlate with worsening depression.          Previous MAT:  Naltrexone in past (does not think it was very helpful), acamprosate (struggled to take multiple doses/day)  Previous detox/treatment - [facility], [facility] in [place] most recently  Current recovery activities:  Going to AA meetings, easier now that it is getting warmer, going at least once a week and will go more now, has not found sponsor yet, restarting therapy   Longest period of sobriety:   18 months  Significant protective factors:  Sober housing helpful in past, desire to improve health currently  Medical complications from substance use - alcoholic hepatitis  History of overdose:  never    DSM-5 Substance Use Disorder Criteria: At least two criteria must be met within a twelve month period.    Impaired Control:    1. Substance is taken in larger amounts or over a longer period than was intended. Yes Substance: Alcohol  2. There is a persistent desire or unsuccessful efforts to cut down or control use. Yes  Substance: Alcohol  3. A great deal of time is spent in activities necessary to obtain substance, use substance, or recover from its effects. Yes  Substance: Alcohol  4. Craving, or a strong desire or urge to use the substance. No  Substance: Alcohol    Social Impairment:    5. Recurrent substance use resulting in failure to fulfill major role obligations at work, school or home. Yes  Substance: Alcohol  6. Continued substance use despite having persistent or recurrent social or interpersonal problems caused or exacerbated by the effects of the substance. Yes (mood labile caused issues) Substance: Alcohol  7.Important social, occupational, or recreational activities are given up or reduced because of the substance use.  Yes  Substance: Alcohol    Risky Use:   8. Recurrent substance use in situations in which it is physically hazardous. No  Substance: Alcohol  9. Substance use is continued despite knowledge of having a persistent or recurrent physical or psychological problem that is likely to have been caused or exacerbated by the substance. Yes  Substance: Alcohol    Pharmacological:  10. Tolerance, as defined by either of the following: Yes  Substance: Alcohol   a. A need for markedly increased amounts of the substance to achieve intoxication or desired effect.   b. A markedly diminished effect with continued use of the same amount of the substance.  11. Withdrawal, as manifested by either of  the following: Yes  Substance: Alcohol   a. The characteristic withdrawal syndrome for the substance.   b. Substance (or a closely related substance) is taken to relieve or avoid withdrawal symptoms.     Mild: Presence of 2-3 symptoms  Moderate: Presence of 4-5 symptoms  Severe: Presence of 6 or more symptoms.    Specify if :  In early remission - no criteria met (except craving) for at least 3 months and less than 12 months  In sustained remission - no criteria met (except craving) for 12 months or longer    In a controlled environment - individual is in an environment where access to the substance is restricted.    Pt met 9 of 11 criteria for an alcohol use disorder, severe, dependence      Other Substance Use:  ALCOHOL:  As above  OPIATES:  Has had prescriptions in past (no overuse/misuse)  KRATOM:  Denies   BENZODIAZEPINES:  Denies  AMPHETAMINES/METHAMPHETAMINES:  Denies  PRESCRIPTION STIMULANTS:  Denies  MARIJUANA :  Denies   COCAINE/CRACK :  Snorted for about 1 year in the 1990s  HALLUCINOGENS:  Denies  ANABOLIC STEROIDS:  Denies   OTHER  (Gambling, shopping):  Denies  NICOTINE:  Yes    Desire to quit:  yes - has NRT at home, not quite ready  Previous attempts to quit:  yes        Hx of medication for smoking cessation:      Infectious Disease Screening:  Hep C: negative (9/2020)  HIV:  Negative (5/2014)    Pregnancy Status:    LMP: post-menopausal    PAST PSYCHIATRIC HISTORY  Past diagnoses:  Anxiety, depression, PTSD  Current or past psychiatrist:  Dr Brunner  Current or past therapist:  JULIO Butterfield  Hospitalizations/commitment:  Once at [facility] in approximately 2008 (also related to EtOH)  Suicide Attempts:  none  Psychotherapy/ECT/TMS:  Therapy, no ECT/TMS    PHQ 3/29/2021 10/26/2021 3/8/2022   PHQ-9 Total Score 6 3 2   Q9: Thoughts of better off dead/self-harm past 2 weeks Not at all Not at all Not at all     ALISIA-7 SCORE 3/16/2021 3/29/2021 3/8/2022   Total Score 7 2 4         MEDICAL  "HISTORY:  Problem list, allergies, and histories reviewed & adjusted, as indicated.  Additional history: as documented     Patient Active Problem List    Diagnosis Date Noted     Depressive disorder 02/04/2022     Priority: Medium     Thrombocytopenia (H) 01/27/2022     Priority: Medium     Dermatitis 01/27/2022     Priority: Medium     Leukopenia 01/18/2022     Priority: Medium     Acute alcoholic intoxication (H) 09/10/2021     Priority: Medium     Closed fracture of head of left radius 09/10/2021     Priority: Medium     Recurrent falls 09/10/2021     Priority: Medium     Abdominal pain, epigastric 07/06/2021     Priority: Medium     Alcoholic hepatitis 07/06/2021     Priority: Medium     Biliary colic 07/06/2021     Priority: Medium     Chronic reflux esophagitis 07/06/2021     Priority: Medium     Formatting of this note might be different from the original.  Created by Conversion       Diuretic-induced hypokalemia 07/06/2021     Priority: Medium     Dyspnea on exertion 07/06/2021     Priority: Medium     Ganglion of tendon sheath 07/06/2021     Priority: Medium     Formatting of this note might be different from the original.  Created by Conversion  [employer] [facility] Annotation: May 13 2011  8:37Yanique Carranza:   \"cyst/lump\" on dorsum right foot       Hereditary and idiopathic peripheral neuropathy 07/06/2021     Priority: Medium     Formatting of this note might be different from the original.  Created by Conversion    Replacement Utility updated for latest IMO load       Major depression, recurrent (H) 07/06/2021     Priority: Medium     Formatting of this note might be different from the original.  Created by Conversion    Replacement Utility updated for latest IMO load       Mild episode of recurrent major depressive disorder (H) 07/06/2021     Priority: Medium     Nicotine dependence 07/06/2021     Priority: Medium     Formatting of this note might be different from the original.  Created by " Conversion       Peripheral edema 07/06/2021     Priority: Medium     Primary localized osteoarthrosis, hand 07/06/2021     Priority: Medium     Formatting of this note might be different from the original.  Bilateral;       Vitamin D deficiency 07/06/2021     Priority: Medium     Formatting of this note might be different from the original.  Created by Conversion    Replacement Utility updated for latest IMO load       Wheezing 05/26/2021     Priority: Medium     Alcohol dependence with intoxication with complication (H) 05/26/2021     Priority: Medium     Edema, unspecified type 05/26/2021     Priority: Medium     Alcohol abuse 02/17/2021     Priority: Medium     GI bleed 12/28/2020     Priority: Medium     Homeless 12/28/2020     Priority: Medium     Alcohol dependence with uncomplicated intoxication (H) 10/13/2020     Priority: Medium     Vitamin B12 deficiency (non anemic) 10/13/2020     Priority: Medium     History of major depression 01/23/2020     Priority: Medium     Pneumonia of right lower lobe due to infectious organism 12/14/2019     Priority: Medium     Hypomagnesemia 03/25/2019     Priority: Medium     Anxiety 03/25/2019     Priority: Medium     Seasonal allergies 07/09/2018     Priority: Medium     Primary osteoarthritis of right knee 03/21/2018     Priority: Medium     Low backache 03/16/2018     Priority: Medium     Morbid obesity (H) 01/05/2018     Priority: Medium     Bilateral edema of lower extremity 06/28/2017     Priority: Medium     Snoring 06/28/2017     Priority: Medium     Carpal tunnel syndrome on both sides 03/06/2017     Priority: Medium     Trochanteric bursitis of left hip 10/25/2016     Priority: Medium     Elevated LFTs 05/13/2016     Priority: Medium     Airway obstruction due to foreign body, initial encounter 05/13/2016     Priority: Medium     Claustrophobia 12/18/2015     Priority: Medium     Cervical disc disease 12/14/2015     Priority: Medium     Formatting of this note  might be different from the original.  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) 2015     Priority: Medium     PTSD (post-traumatic stress disorder) 2015     Priority: Medium       Past Medical History:   Diagnosis Date     Alcohol abuse      Alcohol withdrawal seizure without complication (H) 2016     Alcoholic cirrhosis (H)      Anxiety      Anxiety      Arthritis      Chronic alcohol dependence, continuous (H) 2018    inpatient 2019, sober since then     Chronic Lower Extremity Edema      Chronic reflux esophagitis      COPD (chronic obstructive pulmonary disease) (H)      Depression      Depression      Dermatitis      Ganglion     right foot     GERD (gastroesophageal reflux disease)      H. pylori infection      Melanoma (H) 2019    left upper arm     Menopause     age 52     Obesity (BMI 35.0-39.9 without comorbidity)      Osteoarthritis     Bilateral Knees     Peripheral neuropathy      Seizure (H)     during alcohol withdrawal     Sleep apnea     mild, doesnt tolerate pap therapy     Withdrawal seizures (H)     x 1 in 2016       Past Surgical History:   Procedure Laterality Date     APPENDECTOMY       APPENDECTOMY       ARTHROSCOPY KNEE Right      BIOPSY SKIN (LOCATION) Left 2019    left upper arm      SECTION        EXCISION LESION/TENDON-SHEATH/CAPSULE, FOOT      Description: Excision Of Cyst Of Tendon Sheath Of Foot;  Proc Date: 10/28/2011;  Comments: [facility]      KNEE SCOPE, DIAGNOSTIC      Description: Arthroscopy Knee Right;  Proc Date: 10/11/2005;  Comments: for right knee patella subluxation with lateral retinacular release; subpatellar chondroplasty      LATERAL RETINACULAR RELEASE OPEN      Description: Knee Lateral Retinacular Release;  Proc Date: 10/11/2005;     HEMORRHOIDECTOMY INTERNAL LIGATION       "Description: Hemorrhoidectomy;  Recorded: 2008;     THUMB SURGERY      Removal of bone spurs     TONSILLECTOMY       ZZC APPENDECTOMY      Description: Appendectomy;  Recorded: 2008;  Comments: childhood     ZZC  DELIVERY ONLY      Description:  Section;  Recorded: 2008;     ZZC LIGATE FALLOPIAN TUBE      Description: Tubal Ligation;  Recorded: 2008;       Family History   Problem Relation Age of Onset     CABG Mother      Anuerysm Father          of ruptured anuerysm at 46     Heart Disease Mother      Heart Disease Father        SOCIAL HISTORY:    Living situation:  Alone (considering moving, wants to stay in [facility])   Single///partner single   Children 4 adult children (once is local)   Support system: Oldest sister, friend ([place]), children    Employment: disability   Barriers to Care (transportation, childcare, etc): None (does not like to drive)   Upcoming Court Date(s): none   Consent to Communicate? none       ALLERGIES & CURRENT MEDICATIONS:  Allergies   Allergen Reactions     Bupropion Diarrhea     Codeine Hives, Itching, Nausea and Rash     Nickel Unknown     Oxycodone Nausea and Vomiting     Percocet [Oxycodone-Acetaminophen]      Patient reports \"vomiting,grossly ill two weeks ago\"     Topiramate Unknown     Diclofenac Nausea     Tolerates the topical gel     Furosemide Rash     Hydrochlorothiazide Rash     phototoxicity - med was d/lora         Sulfa Drugs Itching and Rash     Sulfasalazine Rash       Current Outpatient Medications   Medication Sig Dispense Refill     albuterol (PROAIR HFA/PROVENTIL HFA/VENTOLIN HFA) 108 (90 Base) MCG/ACT inhaler Inhale 2 puffs into the lungs every 4 hours as needed        betamethasone dipropionate (DIPROSONE) 0.05 % external cream Apply topically 2 times daily As needed for dermatitis 30 g 1     bumetanide (BUMEX) 1 MG tablet Take 1 mg by mouth daily        cetirizine (ZYRTEC) 10 MG tablet Take 10 mg by mouth "       desoximetasone (TOPICORT) 0.25 % external cream Apply topically 2 times daily Apply sparingly to affected areas only 60 g 1     escitalopram (LEXAPRO) 10 MG tablet Take 1 tablet (10 mg) by mouth daily 30 tablet 0     fluticasone-vilanterol (BREO ELLIPTA) 100-25 MCG/INH inhaler Inhale 1 puff into the lungs       hydrOXYzine (ATARAX) 25 MG tablet Take 25-50 mg by mouth 3 times daily as needed for anxiety       ipratropium - albuterol 0.5 mg/2.5 mg/3 mL (DUONEB) 0.5-2.5 (3) MG/3ML neb solution Take 1 vial (3 mLs) by nebulization every 4 hours as needed for shortness of breath / dyspnea or wheezing 15 mL 1     mirtazapine (REMERON) 15 MG tablet Take 1 tablet (15 mg) by mouth At Bedtime 30 tablet 0     omeprazole (PRILOSEC) 20 MG DR capsule Take 20 mg by mouth daily        triamcinolone (ARISTOCORT HP) 0.5 % external cream Apply topically 2 times daily 60 g 1     umeclidinium (INCRUSE ELLIPTA) 62.5 MCG/INH inhaler Inhale 1 puff into the lungs daily       potassium chloride ER (K-TAB/KLOR-CON) 10 MEQ CR tablet TAKE 2 TABLETS(20 MEQ) BY MOUTH DAILY 180 tablet 1       REVIEW OF SYSTEMS:  General: No acute withdrawal symptoms.  No recent infections or fever  Eyes:  No vision concerns.  No double vision.    Resp: No coughing, wheezing or shortness of breath  CV: No chest pains or palpitations  GI: No nausea, vomiting, abdominal pain, diarrhea.  No constipation  : No urinary frequency or dysuria    Musculoskeletal: No significant muscle or joint pains other than as above.  + edema (left ankle chronically swollen)  Neurologic: No numbness, tingling, weakness, problems with balance or coordination  Psychiatric: No acute concerns other than as above. See mental status exam for more information.   Skin: No rashes or areas of acute infection    OBJECTIVE                                                      LABS:  Labs reviewed.  Pertinent data include:   Urine tox results pending today.     Results for orders placed or  performed in visit on 03/15/22   Drugs of Abuse 1+ Panel, Urine ([facility] Only)     Status: Abnormal   Result Value Ref Range    Amphetamines Urine Screen Negative Screen Negative    Benzodiazepines Urine Screen Positive (A) Screen Negative    Opiates Urine Screen Negative Screen Negative    PCP Urine Screen Negative Screen Negative    Cannabinoids Urine Screen Negative Screen Negative    Barbiturates Urine Screen Negative Screen Negative    Cocaine Urine Screen Negative Screen Negative    Methadone Urine Screen Negative Screen Negative    Oxycodone Urine Screen Negative Screen Negative    Creatinine Urine mg/dL 132 mg/dL    Narrative    Drug                           Screening Threshold    Amphetamines                    1000 ng/mL  Benzodiazepine                   200 ng/mL  Opiates                          300 ng/mL  Phencyclidine                     25 ng/mL  THC Metabolite                    50 ng/mL  Barbiturates                     200 ng/mL  Cocaine Metabolite               150 ng/mL  Methadone                        300 ng/mL  Oxycodone                        100 ng/mL    Screening results are to be used only for medical purposes.  Unconfirmed screening results are not to be used for non-  medical purposes.     AST   Date Value Ref Range Status   03/14/2022 34 0 - 40 U/L Final   05/26/2021 194 (H) 0 - 45 U/L Final     ALT   Date Value Ref Range Status   03/14/2022 32 0 - 45 U/L Final   05/26/2021 91 (H) 0 - 50 U/L Final       PHYSICAL EXAM:  /87 (BP Location: Right arm, Patient Position: Sitting, Cuff Size: Adult Large)   Pulse 78   Wt 113.4 kg (250 lb)   BMI 44.69 kg/m      GENERAL APPEARANCE: alert, comfortable appearing  EYES: Eyes grossly normal to inspection and conjunctivae and sclerae normal  RESP: no respiratory distress, no coughing or whezing   MS: extremities normal- no gross deformities noted, gait normal  SKIN: no rashes, no jaundice, no pallor  NEURO: No gross deficit, no  tremor    MENTAL STATUS EXAM:  Appearance/Behavior:No appearant distress  Speech: Normal  Mood/Affect: normal affect  Insight: Adequate      [place] Board of Pharmacy Data Base Reviewed.  Consistent with patient reports and Epic records.    ASSESSMENT/PLAN                                                        ASSESSMENT/PLAN:    1. Alcohol use disorder, severe, dependence (H)  Doing well, no cravings or use.  Discussed option of naltrexone again in the future if desired.  Encouraged AA meetings.    - Drugs of Abuse 1+ Panel, Urine ([facility] Only)    2. Episode of recurrent major depressive disorder, unspecified depression episode severity (H)  Overall stable.  Refilled Lexapro and Remeron.    - escitalopram (LEXAPRO) 10 MG tablet; Take 1 tablet (10 mg) by mouth daily  Dispense: 30 tablet; Refill: 0  - mirtazapine (REMERON) 15 MG tablet; Take 1 tablet (15 mg) by mouth At Bedtime  Dispense: 30 tablet; Refill: 0      Patient counseling completed today:    Counseled the patient on the importance of having a recovery program in addition to medication assisted treatment (MAT).  Components of a recovery program include having some type of sober network, avoiding isolating, willingness to change, avoiding triggers, and managing cravings.    RTC:  2-4 weeks      Melissa Covington [facility]  [facility]  [phone]

## 2022-03-15 NOTE — PROGRESS NOTES
MH&A Post-Appointment Cart -check      Correct pharmacy verified with patient and updated in chart? [x] yes []no    Charge captured ? [x] yes  [] no [] n/a-virtual     Medications ordered this visit were e-scribed.  Verified by order class [x] yes  [] no    List Medications: Lexapro 10mg, mirtazipine 15mg  Medication changes or discontinuations were communicated to patient's pharmacy: [] yes  [x] no    UA collected [x] yes  [] no  [] n/a-virtual     Outside referrals / labs, etc support staff to follow up: [] yes  [x] no    Future appointment was made: [x] yes  [] no  [] n/a  3/29/22 Dr. Covington    Dictation completed at time of chart check: [] yes  [x] no    I have checked the documentation for today s encounters and the above information has been reviewed and completed.      Lucero Ray, MEENAKSHI on March 15, 2022 at 1:51 PM

## 2022-03-15 NOTE — PROGRESS NOTES
Patient in clinic for medication visit, would like to discuss medication for sleep.   [place]  to be reviewed by provider.   Medication refill is pended for review and approval by provider.  Lucero Ray CMA on 3/15/2022 at 10:01 AM

## 2022-03-15 NOTE — PATIENT INSTRUCTIONS
Continue working with Araceli.      You are off to a great start!    We talked about restarting naltrexone - if you would like to do this, please call me.      Let's follow-up in 2-4 week.

## 2022-03-17 DIAGNOSIS — E87.6 HYPOKALEMIA: Primary | ICD-10-CM

## 2022-03-17 DIAGNOSIS — Z76.0 ENCOUNTER FOR MEDICATION REFILL: Primary | ICD-10-CM

## 2022-03-17 DIAGNOSIS — E87.6 HYPOKALEMIA: ICD-10-CM

## 2022-03-17 NOTE — TELEPHONE ENCOUNTER
Chart reviewed. Please review findings below.     potassium chloride ER (K-TAB/KLOR-CON) 10 MEQ CR tablet     --   Sig - Route: Take 20 mEq by mouth - Oral     Medication order queued and pended. Thanks.

## 2022-03-17 NOTE — TELEPHONE ENCOUNTER
Reason for Call:  Other     Detailed comments: patient is caliong to ask for an rx for potassium, she said she received a call that her potassium was low. She uses [facility] on [address]    Phone Number Patient can be reached at: Home number on file [phone] (home)    Best Time: asap    Can we leave a detailed message on this number? Not Applicable    Call taken on 3/17/2022 at 2:18 PM by Shereen Hidalgo

## 2022-03-18 DIAGNOSIS — E87.6 HYPOKALEMIA: ICD-10-CM

## 2022-03-18 DIAGNOSIS — Z76.0 ENCOUNTER FOR MEDICATION REFILL: ICD-10-CM

## 2022-03-18 RX ORDER — POTASSIUM CHLORIDE 750 MG/1
20 TABLET, EXTENDED RELEASE ORAL DAILY
Qty: 90 TABLET | Refills: 3 | Status: SHIPPED | OUTPATIENT
Start: 2022-03-18 | End: 2022-03-20

## 2022-03-20 RX ORDER — POTASSIUM CHLORIDE 750 MG/1
TABLET, EXTENDED RELEASE ORAL
Qty: 180 TABLET | Refills: 1 | Status: SHIPPED | OUTPATIENT
Start: 2022-03-20 | End: 2022-05-03

## 2022-03-20 ASSESSMENT — ENCOUNTER SYMPTOMS
EYES NEGATIVE: 1
ENDOCRINE NEGATIVE: 1
FEVER: 0
FATIGUE: 0
ALLERGIC/IMMUNOLOGIC NEGATIVE: 1
SLEEP DISTURBANCE: 1
UNEXPECTED WEIGHT CHANGE: 0
BRUISES/BLEEDS EASILY: 0
ABDOMINAL PAIN: 0
COUGH: 0
CHILLS: 0
SHORTNESS OF BREATH: 0
NEUROLOGICAL NEGATIVE: 1
MUSCULOSKELETAL NEGATIVE: 1

## 2022-03-20 NOTE — TELEPHONE ENCOUNTER
"Routing refill request to provider for review/approval because:  Labs out of range:  Noted in refill encounter of 3/7/22.  Patient is requesting a larger supply.    Last Written Prescription Date:  3/18/2022  Last Fill Quantity: 90,  # refills: 3   Last office visit provider:  1/27/22     Requested Prescriptions   Pending Prescriptions Disp Refills     potassium chloride ER (K-TAB/KLOR-CON) 10 MEQ CR tablet [Pharmacy Med Name: POTASSIUM CL 10MEQ ER TABLETS] 180 tablet      Sig: TAKE 2 TABLETS(20 MEQ) BY MOUTH DAILY       Potassium Supplements Protocol Failed - 3/18/2022  4:39 PM        Failed - Normal serum potassium in past 12 months     Recent Labs   Lab Test 03/14/22  1134   POTASSIUM 3.3*                    Passed - Recent (12 mo) or future (30 days) visit within the authorizing provider's department     Patient has had an office visit with the authorizing provider or a provider within the authorizing providers department within the previous 12 mos or has a future within next 30 days. See \"Patient Info\" tab in inbasket, or \"Choose Columns\" in Meds & Orders section of the refill encounter.              Passed - Medication is active on med list        Passed - Patient is age 18 or older             Jojo Whiting RN 03/20/22 2:23 PM  "

## 2022-03-28 ENCOUNTER — TELEPHONE (OUTPATIENT)
Dept: BEHAVIORAL HEALTH | Facility: CLINIC | Age: 67
End: 2022-03-28
Payer: COMMERCIAL

## 2022-03-31 ENCOUNTER — NURSE TRIAGE (OUTPATIENT)
Dept: FAMILY MEDICINE | Facility: CLINIC | Age: 67
End: 2022-03-31
Payer: COMMERCIAL

## 2022-03-31 NOTE — TELEPHONE ENCOUNTER
"RN called patient regarding her symptoms of possible pink eye.      Patient noticed her eyes are red and has yellowish discharge. Noticed eyes are blurry and burning.       Per protocol, patient needs to be seen now.      Patient can't come in due to transportation.  RN assisted patient to make an appointment tomorrow   Appointments in Next   Apr 01, 2022  1:40 PM  (Arrive by 1:20 PM)  Provider Visit with Soraida Crane DO  [facility] ([facility] ) [phone]        Patient verbalized understanding and agreed with the plan.        Ewelina Rg RN  [facility]          Reason for Disposition    Blurred vision    Additional Information    Negative: Chemical got in the eye    Negative: Piece of something got in the eye    Negative: Followed an eye injury    Negative: Yellow or green pus in the eyes    Negative: Severe eye pain    Negative: Patient sounds very sick or weak to the triager    Answer Assessment - Initial Assessment Questions  1. LOCATION: Location: \"What's red, the eyeball or the outer eyelids?\" (Note: when callers say the eye is red, they usually mean the sclera is red)    Underneath eyeball is pretty pink    2. REDNESS OF SCLERA: \"Is the redness in one or both eyes?\" \"When did the redness start?\"   Both eyes    3. ONSET: \"When did the eye become red?\" (e.g., hours, days)   2 days ago    4. EYELIDS: \"Are the eyelids red or swollen?\" If so, ask: \"How much?\"   Red and puffy     5. VISION: \"Is there any difficulty seeing clearly?\"   Ok to see clearly  But sometimes blurry'    6. ITCHING: \"Does it feel itchy?\" If so ask: \"How bad is it\" (e.g., Scale 1-10; or mild, moderate, severe)  No    7. PAIN: \"Is there any pain? If so, ask: \"How bad is it?\" (e.g., Scale 1-10; or mild, moderate, severe)  Burning    8. CONTACT LENS: \"Do you wear contacts?\"  No    9. CAUSE: \"What do you think is causing the redness?\"  Not sure    10. OTHER " "SYMPTOMS: \"Do you have any other symptoms?\" (e.g., fever, runny nose, cough, vomiting)  Blurred vision on and off ; cloudy over    Protocols used: EYE - RED WITHOUT PUS-A-OH      "

## 2022-03-31 NOTE — TELEPHONE ENCOUNTER
Reason for call:  Patient reporting a symptom    Symptom or request: Poss Pink eye    Duration (how long have symptoms been present): 2 days    Have you been treated for this before? No    Additional comments: Patient called, she is suspecting that she may have pink eye. There are drainage, itching, blurry and there are crusty eye boogers in the morning. Patient is requesting a RX be sen to pharmacy. Please call patient to advise.     Phone Number patient can be reached at:  Home number on file [phone] (home)    Best Time:  any    Can we leave a detailed message on this number:  YES    Call taken on 3/31/2022 at 2:21 PM by Yany Gay

## 2022-04-01 ENCOUNTER — OFFICE VISIT (OUTPATIENT)
Dept: FAMILY MEDICINE | Facility: CLINIC | Age: 67
End: 2022-04-01
Payer: COMMERCIAL

## 2022-04-01 VITALS
TEMPERATURE: 97.6 F | SYSTOLIC BLOOD PRESSURE: 153 MMHG | DIASTOLIC BLOOD PRESSURE: 89 MMHG | BODY MASS INDEX: 45.4 KG/M2 | RESPIRATION RATE: 22 BRPM | WEIGHT: 254 LBS | OXYGEN SATURATION: 97 % | HEART RATE: 64 BPM

## 2022-04-01 DIAGNOSIS — F17.210 CIGARETTE NICOTINE DEPENDENCE WITHOUT COMPLICATION: ICD-10-CM

## 2022-04-01 DIAGNOSIS — H10.33 ACUTE CONJUNCTIVITIS OF BOTH EYES, UNSPECIFIED ACUTE CONJUNCTIVITIS TYPE: ICD-10-CM

## 2022-04-01 DIAGNOSIS — J44.1 COPD EXACERBATION (H): ICD-10-CM

## 2022-04-01 DIAGNOSIS — J43.9 PULMONARY EMPHYSEMA, UNSPECIFIED EMPHYSEMA TYPE (H): ICD-10-CM

## 2022-04-01 DIAGNOSIS — M17.0 PRIMARY OSTEOARTHRITIS OF BOTH KNEES: ICD-10-CM

## 2022-04-01 DIAGNOSIS — L30.9 CHRONIC DERMATITIS OF HANDS: Primary | ICD-10-CM

## 2022-04-01 PROCEDURE — 99214 OFFICE O/P EST MOD 30 MIN: CPT | Performed by: FAMILY MEDICINE

## 2022-04-01 RX ORDER — VARENICLINE TARTRATE 1 MG/1
1 TABLET, FILM COATED ORAL 2 TIMES DAILY
Qty: 60 TABLET | Refills: 1 | Status: SHIPPED | OUTPATIENT
Start: 2022-04-01 | End: 2022-04-04

## 2022-04-01 RX ORDER — IPRATROPIUM BROMIDE AND ALBUTEROL SULFATE 2.5; .5 MG/3ML; MG/3ML
3 SOLUTION RESPIRATORY (INHALATION) EVERY 4 HOURS PRN
Qty: 15 ML | Refills: 1 | Status: SHIPPED | OUTPATIENT
Start: 2022-04-01

## 2022-04-01 RX ORDER — NEOMYCIN/POLYMYXIN B/HYDROCORT 3.5-10K-1
1-2 SUSPENSION, DROPS(FINAL DOSAGE FORM)(ML) OPHTHALMIC (EYE) 4 TIMES DAILY
Qty: 7.5 ML | Refills: 0 | Status: SHIPPED | OUTPATIENT
Start: 2022-04-01 | End: 2022-09-19

## 2022-04-01 ASSESSMENT — ENCOUNTER SYMPTOMS: EYE PAIN: 1

## 2022-04-01 NOTE — PROGRESS NOTES
Assessment & Plan    1. Chronic dermatitis of hands  Patient has dermatitis on hands - has not responded to high potency topical steroids - patient is certain there is something more mysterious - will refer to Derm   - Adult Dermatology Referral; Future    2. Acute conjunctivitis of both eyes, unspecified acute conjunctivitis type  Patient has acute conjunctivitis -   - neomycin-polymyxin-hydrocortisone (CORTISPORIN) 3.5-17557-6 ophthalmic suspension; Place 1-2 drops into both eyes 4 times daily Until clears - no longer than 10 days.  Dispense: 7.5 mL; Refill: 0    3. Primary osteoarthritis of both knees  Patient has further complaints about knee pain - consider PT   - Physical Therapy Referral; Future    4. Pulmonary emphysema, unspecified emphysema type (H)  Patient with COPD - encourage smoking cessation - needs a new nebulizer / supplies  - Nebulizer and Supplies Order for DME - ONLY FOR DME  - varenicline (CHANTIX RICA) 0.5 MG X 11 & 1 MG X 42 tablet; Take 0.5 mg tab daily for 3 days, THEN 0.5 mg tab twice daily for 4 days, THEN 1 mg twice daily.  Dispense: 53 tablet; Refill: 0    5. COPD exacerbation (H)  Patient with COPD - needs nebs to be available for exacerbation  - ipratropium - albuterol 0.5 mg/2.5 mg/3 mL (DUONEB) 0.5-2.5 (3) MG/3ML neb solution; Take 1 vial (3 mLs) by nebulization every 4 hours as needed for shortness of breath / dyspnea or wheezing  Dispense: 15 mL; Refill: 1    6. Cigarette nicotine dependence without complication  Discussed smoking cessation - desires Chantix again - discussed that this may not be available at this time.   - varenicline (CHANTIX RICA) 0.5 MG X 11 & 1 MG X 42 tablet; Take 0.5 mg tab daily for 3 days, THEN 0.5 mg tab twice daily for 4 days, THEN 1 mg twice daily.  Dispense: 53 tablet; Refill: 0      Return in about 4 weeks (around 4/29/2022) for Follow up.    Chief Complaint   Patient presents with     Eye Problem      HPI  Bruising upper extremity - fell getting out  of bed - had diarrhea - last week    Clouded vision   Eyes are crusty and then draining especially in morning -   Tear at night - tears all day  Blinks the gunk away -   Itchy/wet   Maybe a week -     Had a cough -   Had chills - that's gone  Cough - ?smoking? - hasn't been bad    Neb machine - has one that doesn't work any more -  Needs new meds for that -     Rash on hands not any better -   On desoximetasone -   Bumpy and feels weird on fingernails -   Nothing in scalp    Thinks she's getting something on mouth - has one small spot on face -   Has been using [place] dish soap - 4x stronger -     Rash x 6-8 weeks -       Eye Problem   Associated symptoms include discharge and weakness.        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)     Diuretic-induced hypokalemia     Dyspnea on exertion     Elevated LFTs     Ganglion of tendon sheath     GI bleed     Hereditary and idiopathic peripheral neuropathy     History of major depression     Homeless     Major depression, recurrent (H)     Low backache     Airway obstruction due to foreign body, initial encounter     Hypomagnesemia     Mild episode of recurrent major depressive disorder (H)     Morbid obesity (H)     Nicotine dependence     Peripheral edema     Pneumonia of right lower lobe due to infectious organism     Primary localized osteoarthrosis, hand     Primary osteoarthritis of right knee     PTSD (post-traumatic stress disorder)     Seasonal allergies     Skin lesion     Snoring      Trochanteric bursitis of left hip     Vitamin B12 deficiency (non anemic)     Vitamin D deficiency     Acute alcoholic intoxication (H)     Anxiety     Closed fracture of head of left radius     Recurrent falls     Thrombocytopenia (H)     Dermatitis     Depressive disorder     Leukopenia        Past Medical History:   Diagnosis Date     Alcohol abuse      Alcohol withdrawal seizure without complication (H) 5/14/2016     Alcoholic cirrhosis (H)      Anxiety      Anxiety      Arthritis      Chronic alcohol dependence, continuous (H) 03/16/2018    inpatient 11/2019, sober since then     Chronic Lower Extremity Edema      Chronic reflux esophagitis      COPD (chronic obstructive pulmonary disease) (H)      Depression      Depression      Dermatitis      Ganglion     right foot     GERD (gastroesophageal reflux disease)      H. pylori infection      Melanoma (H) 07/2019    left upper arm     Menopause     age 52     Obesity (BMI 35.0-39.9 without comorbidity)      Osteoarthritis     Bilateral Knees     Peripheral neuropathy      Seizure (H) 2016    during alcohol withdrawal     Sleep apnea     mild, doesnt tolerate pap therapy     Withdrawal seizures (H)     x 1 in 2016        Current Outpatient Medications   Medication     albuterol (PROAIR HFA/PROVENTIL HFA/VENTOLIN HFA) 108 (90 Base) MCG/ACT inhaler     betamethasone dipropionate (DIPROSONE) 0.05 % external cream     bumetanide (BUMEX) 1 MG tablet     cetirizine (ZYRTEC) 10 MG tablet     desoximetasone (TOPICORT) 0.25 % external cream     escitalopram (LEXAPRO) 10 MG tablet     fluticasone-vilanterol (BREO ELLIPTA) 100-25 MCG/INH inhaler     hydrOXYzine (ATARAX) 25 MG tablet     ipratropium - albuterol 0.5 mg/2.5 mg/3 mL (DUONEB) 0.5-2.5 (3) MG/3ML neb solution     mirtazapine (REMERON) 15 MG tablet     neomycin-polymyxin-hydrocortisone (CORTISPORIN) 3.5-45604-8 ophthalmic suspension     omeprazole (PRILOSEC) 20 MG DR capsule     potassium chloride ER  (K-TAB/KLOR-CON) 10 MEQ CR tablet     triamcinolone (ARISTOCORT HP) 0.5 % external cream     umeclidinium (INCRUSE ELLIPTA) 62.5 MCG/INH inhaler     varenicline (CHANTIX RICA) 0.5 MG X 11 & 1 MG X 42 tablet     varenicline (CHANTIX) 1 MG tablet     No current facility-administered medications for this visit.        Past Surgical History:   Procedure Laterality Date     APPENDECTOMY       APPENDECTOMY       ARTHROSCOPY KNEE Right      BIOPSY SKIN (LOCATION) Left 2019    left upper arm      SECTION        EXCISION LESION/TENDON-SHEATH/CAPSULE, FOOT      Description: Excision Of Cyst Of Tendon Sheath Of Foot;  Proc Date: 10/28/2011;  Comments: [facility]      KNEE SCOPE, DIAGNOSTIC      Description: Arthroscopy Knee Right;  Proc Date: 10/11/2005;  Comments: for right knee patella subluxation with lateral retinacular release; subpatellar chondroplasty      LATERAL RETINACULAR RELEASE OPEN      Description: Knee Lateral Retinacular Release;  Proc Date: 10/11/2005;     HEMORRHOIDECTOMY INTERNAL LIGATION      Description: Hemorrhoidectomy;  Recorded: 2008;     THUMB SURGERY      Removal of bone spurs     TONSILLECTOMY       [facility] APPENDECTOMY      Description: Appendectomy;  Recorded: 2008;  Comments: childhood     [facility]  DELIVERY ONLY      Description:  Section;  Recorded: 2008;     [facility] LIGATE FALLOPIAN TUBE      Description: Tubal Ligation;  Recorded: 2008;        Social History     Socioeconomic History     Marital status: Single     Spouse name: Not on file     Number of children: Not on file     Years of education: Not on file     Highest education level: Not on file   Occupational History     Not on file   Tobacco Use     Smoking status: Current Every Day Smoker     Packs/day: 0.50     Years: 49.00     Pack years: 24.50     Types: Cigarettes     Smokeless tobacco: Never Used     Tobacco comment:  ppd   Substance and Sexual Activity     Alcohol use: Yes      Comment: Last use 22, Treatment 22     Drug use: No     Comment: Denies     Sexual activity: Yes     Partners: Male     Birth control/protection: None   Other Topics Concern     Not on file   Social History Narrative     Not on file     Social Determinants of Health     Financial Resource Strain: Not on file   Food Insecurity: Not on file   Transportation Needs: Not on file   Physical Activity: Not on file   Stress: Not on file   Social Connections: Not on file   Intimate Partner Violence: Not on file   Housing Stability: Not on file        Family History   Problem Relation Age of Onset     CABG Mother      Anuerysm Father          of ruptured anuerysm at 46     Heart Disease Mother      Heart Disease Father         Review of Systems   Constitutional: Negative for activity change, chills and fever.   HENT: Negative for ear pain and sore throat.    Eyes: Positive for pain and discharge.   Respiratory: Positive for cough and shortness of breath.    Cardiovascular: Positive for leg swelling. Negative for chest pain.   Gastrointestinal: Negative for abdominal pain.   Endocrine: Negative for polydipsia and polyuria.   Genitourinary: Negative.    Musculoskeletal: Positive for arthralgias and back pain.   Skin: Positive for rash (itchy/scaly rash on hands).   Allergic/Immunologic: Negative.    Neurological: Positive for weakness.   Psychiatric/Behavioral:        Depressed   All other systems reviewed and are negative.       BP (!) 153/89 (BP Location: Left arm, Patient Position: Sitting, Cuff Size: Adult Large)   Pulse 64   Temp 97.6  F (36.4  C) (Temporal)   Resp 22   Wt 115.2 kg (254 lb)   SpO2 97%   BMI 45.40 kg/m       Physical Exam  Constitutional:       General: She is not in acute distress.     Appearance: She is well-developed.   HENT:      Right Ear: Tympanic membrane and external ear normal.      Left Ear: Tympanic membrane and external ear normal.      Nose: Nose normal.      Mouth/Throat:       Pharynx: No oropharyngeal exudate.   Eyes:      General:         Right eye: No discharge.         Left eye: No discharge.      Conjunctiva/sclera: Conjunctivae normal.      Pupils: Pupils are equal, round, and reactive to light.   Neck:      Thyroid: No thyromegaly.      Trachea: No tracheal deviation.   Cardiovascular:      Rate and Rhythm: Normal rate and regular rhythm.      Pulses: Normal pulses.      Heart sounds: Normal heart sounds, S1 normal and S2 normal. No murmur heard.    No friction rub. No S3 or S4 sounds.   Pulmonary:      Effort: Pulmonary effort is normal. No respiratory distress.      Breath sounds: Normal breath sounds. No wheezing or rales.   Abdominal:      General: Bowel sounds are normal.      Palpations: Abdomen is soft. There is no mass.      Tenderness: There is no abdominal tenderness.   Musculoskeletal:         General: Normal range of motion.      Cervical back: Neck supple.   Lymphadenopathy:      Cervical: No cervical adenopathy.   Skin:     General: Skin is warm and dry.      Findings: No rash (thick, lichenified scaly rash on hands ).   Neurological:      Mental Status: She is alert and oriented to person, place, and time.      Motor: No abnormal muscle tone.      Deep Tendon Reflexes: Reflexes are normal and symmetric.   Psychiatric:         Thought Content: Thought content normal.         Judgment: Judgment normal.          Results:  No results found for any visits on 04/01/22.    Medications at Conclusion of Visit:  Current Outpatient Medications   Medication Sig Dispense Refill     albuterol (PROAIR HFA/PROVENTIL HFA/VENTOLIN HFA) 108 (90 Base) MCG/ACT inhaler Inhale 2 puffs into the lungs every 4 hours as needed        betamethasone dipropionate (DIPROSONE) 0.05 % external cream Apply topically 2 times daily As needed for dermatitis 30 g 1     bumetanide (BUMEX) 1 MG tablet Take 1 mg by mouth daily        cetirizine (ZYRTEC) 10 MG tablet Take 10 mg by mouth        desoximetasone (TOPICORT) 0.25 % external cream Apply topically 2 times daily Apply sparingly to affected areas only 60 g 1     escitalopram (LEXAPRO) 10 MG tablet Take 1 tablet (10 mg) by mouth daily 30 tablet 0     fluticasone-vilanterol (BREO ELLIPTA) 100-25 MCG/INH inhaler Inhale 1 puff into the lungs       hydrOXYzine (ATARAX) 25 MG tablet Take 25-50 mg by mouth 3 times daily as needed for anxiety       ipratropium - albuterol 0.5 mg/2.5 mg/3 mL (DUONEB) 0.5-2.5 (3) MG/3ML neb solution Take 1 vial (3 mLs) by nebulization every 4 hours as needed for shortness of breath / dyspnea or wheezing 15 mL 1     mirtazapine (REMERON) 15 MG tablet Take 1 tablet (15 mg) by mouth At Bedtime 30 tablet 0     neomycin-polymyxin-hydrocortisone (CORTISPORIN) 3.5-75042-9 ophthalmic suspension Place 1-2 drops into both eyes 4 times daily Until clears - no longer than 10 days. 7.5 mL 0     omeprazole (PRILOSEC) 20 MG DR capsule Take 20 mg by mouth daily        potassium chloride ER (K-TAB/KLOR-CON) 10 MEQ CR tablet TAKE 2 TABLETS(20 MEQ) BY MOUTH DAILY 180 tablet 1     triamcinolone (ARISTOCORT HP) 0.5 % external cream Apply topically 2 times daily 60 g 1     umeclidinium (INCRUSE ELLIPTA) 62.5 MCG/INH inhaler Inhale 1 puff into the lungs daily       varenicline (CHANTIX RICA) 0.5 MG X 11 & 1 MG X 42 tablet Take 0.5 mg tab daily for 3 days, THEN 0.5 mg tab twice daily for 4 days, THEN 1 mg twice daily. 53 tablet 0     varenicline (CHANTIX) 1 MG tablet TAKE 1 TABLET(1 MG) BY MOUTH TWICE DAILY 180 tablet 0         EJ WELLER MD

## 2022-04-04 RX ORDER — VARENICLINE TARTRATE 1 MG/1
TABLET, FILM COATED ORAL
Qty: 180 TABLET | Refills: 0 | Status: SHIPPED | OUTPATIENT
Start: 2022-04-04 | End: 2022-07-26

## 2022-04-04 NOTE — TELEPHONE ENCOUNTER
"Routing refill request to provider for review/approval because:  BP not in range.  Patient requesting 90 day supply.  Outside protocol window to alter this script.    Last Written Prescription Date:  4/1/22  Last Fill Quantity: 60,  # refills: 1   Last office visit provider:  4/1/22     Requested Prescriptions   Pending Prescriptions Disp Refills     varenicline (CHANTIX) 1 MG tablet [Pharmacy Med Name: VARENICLINE 1MG TABLETS] 180 tablet      Sig: TAKE 1 TABLET(1 MG) BY MOUTH TWICE DAILY       Partial Cholinergic Nicotinic Agonist Agents Failed - 4/4/2022  9:13 AM        Failed - Blood pressure under 140/90 in past 12 months     BP Readings from Last 3 Encounters:   04/01/22 (!) 153/89   03/14/22 130/88   01/27/22 125/83                 Passed - Recent (12 mo) or future (30 days) visit within the authorizing provider's specialty     Patient has had an office visit with the authorizing provider or a provider within the authorizing providers department within the previous 12 mos or has a future within next 30 days. See \"Patient Info\" tab in inbasket, or \"Choose Columns\" in Meds & Orders section of the refill encounter.              Passed - Medication is active on med list        Passed - Patient is 18 years of age or older        Passed - Patient is not pregnant        Passed - No positive pregnancy test on file in past 12 months             Samy Osman RN 04/04/22 9:13 AM  " Chief Complaint   Patient presents with   • Routine Prenatal Visit     fatigue, pelvic pressure        HPI: Pavithra is a  currently at 31w2d who today reports the following: is trying to decrease activity at home but difficulty getting comfortable. Has been having pelvic pressure the past few days. Sister had twins 11 years ago and delivered at 32 weeks.  Contractions - YES - but less than 4/hour AND no associated change in vaginal discharge; Leaking - No; Vaginal bleeding -  No; Swelling of extremities - improved as compared to the prior visit.    ROS:   GI:   Nausea - No; Constipation - No   Neuro:  Headache - No; Visual disturbances - No.    EXAM:   Vitals:  See prenatal flowsheet, /74, Wt no change   Abdomen:   See prenatal flowsheet, soft, nontender   Pelvic:  VE as above with no change, cervix soft, head ballotable   Urine:  See prenatal flowsheet    MDM:  Impression: Supervision of low risk pregnancy  Previous C/S with planned repeat C/S  Twin pregnancy - DC/DA   Tests done today: none   Topics discussed: kick counts and fetal movement   labor signs and symptoms  modified bedrest   Schedule repeat C/Section   Tests next visit: none   Next visit: 2 weeks     This note was electronically signed.  Joan Baltazar, KARLA  10/2/2017

## 2022-04-10 ASSESSMENT — ENCOUNTER SYMPTOMS
ACTIVITY CHANGE: 0
ARTHRALGIAS: 1
SHORTNESS OF BREATH: 1
COUGH: 1
SORE THROAT: 0
WEAKNESS: 1
FEVER: 0
POLYDIPSIA: 0
ALLERGIC/IMMUNOLOGIC NEGATIVE: 1
ABDOMINAL PAIN: 0
CHILLS: 0
BACK PAIN: 1
EYE DISCHARGE: 1

## 2022-04-14 ENCOUNTER — TELEPHONE (OUTPATIENT)
Dept: FAMILY MEDICINE | Facility: CLINIC | Age: 67
End: 2022-04-14
Payer: COMMERCIAL

## 2022-04-14 ENCOUNTER — TELEPHONE (OUTPATIENT)
Dept: NEUROSURGERY | Facility: CLINIC | Age: 67
End: 2022-04-14
Payer: COMMERCIAL

## 2022-04-14 DIAGNOSIS — M47.22 OSTEOARTHRITIS OF SPINE WITH RADICULOPATHY, CERVICAL REGION: ICD-10-CM

## 2022-04-14 DIAGNOSIS — M54.2 NECK PAIN: Primary | ICD-10-CM

## 2022-04-14 DIAGNOSIS — M54.2 CERVICALGIA: Primary | ICD-10-CM

## 2022-04-14 NOTE — TELEPHONE ENCOUNTER
Reason for Call:  Other     Detailed comments: patient is calling to say she is having a lot of neck pain and wondering if dr cowan will call in an rx for pain. She [facility] an [facility] but not until may 3.    Phone Number Patient can be reached at: Home number on file [phone] (home)    Best Time: asap    Can we leave a detailed message on this number? YES    Call taken on 4/14/2022 at 2:33 PM by Shereen Hidalgo

## 2022-04-14 NOTE — TELEPHONE ENCOUNTER
Patient previously saw Dr. Tripp for neck pain. At that time, she wanted to hold off on any surgery. She is now having issues again. Please order appropriate imaging for her upcoming appointment with Dr. Rubio on May 3rd.

## 2022-04-15 RX ORDER — ACETAMINOPHEN 500 MG
500-1000 TABLET ORAL EVERY 6 HOURS PRN
Qty: 60 TABLET | Refills: 1 | Status: SHIPPED | OUTPATIENT
Start: 2022-04-15 | End: 2022-09-16

## 2022-04-20 DIAGNOSIS — F33.9 EPISODE OF RECURRENT MAJOR DEPRESSIVE DISORDER, UNSPECIFIED DEPRESSION EPISODE SEVERITY (H): ICD-10-CM

## 2022-04-20 NOTE — TELEPHONE ENCOUNTER
Date of Last Office Visit: 3/15/2022  Date of Next Office Visit: none scheduled. Routed to [facility] to schedule.   No shows since last visit: 3/29/2022  Cancellations since last visit: 0    Medication requested: mirtazapine (REMERON) 15 MG tablet Date last ordered: 3/15/2022  Qty: 30 Refills: 0     Review of [place] ?: No    Lapse in medication adherence greater than 5 days?: No  If yes, call patient and gather details: N/A     Medication refill request verified as identical to current order?: Yes    Result of Last DAM, VPA, Li+ Level, CBC, or Carbamazepine Level (at or since last visit): N/A    Last visit treatment plan:   ASSESSMENT/PLAN                                                          ASSESSMENT/PLAN:     1. Alcohol use disorder, severe, dependence (H)  Doing well, no cravings or use.  Discussed option of naltrexone again in the future if desired.  Encouraged AA meetings.    - Drugs of Abuse 1+ Panel, Urine ([facility] Only)     2. Episode of recurrent major depressive disorder, unspecified depression episode severity (H)  Overall stable.  Refilled Lexapro and Remeron.    - escitalopram (LEXAPRO) 10 MG tablet; Take 1 tablet (10 mg) by mouth daily  Dispense: 30 tablet; Refill: 0  - mirtazapine (REMERON) 15 MG tablet; Take 1 tablet (15 mg) by mouth At Bedtime  Dispense: 30 tablet; Refill: 0        Patient counseling completed today:     Counseled the patient on the importance of having a recovery program in addition to medication assisted treatment (MAT).  Components of a recovery program include having some type of sober network, avoiding isolating, willingness to change, avoiding triggers, and managing cravings.     RTC:  2-4 weeks         []Medication refilled per  Medication Refill in Ambulatory Care  policy.  [x]Medication unable to be refilled by RN due to criteria not met as indicated below:    []Eligibility - not seen in the last year   []Supervision - no future appointment   []Compliance - no shows,  cancellations or lapse in therapy   []Verification - order discrepancy   []Controlled medication   []Medication not included in policy   []90-day supply request   []Other

## 2022-04-21 RX ORDER — MIRTAZAPINE 15 MG/1
15 TABLET, FILM COATED ORAL AT BEDTIME
Qty: 30 TABLET | Refills: 0 | Status: SHIPPED | OUTPATIENT
Start: 2022-04-21 | End: 2022-05-19

## 2022-04-21 NOTE — TELEPHONE ENCOUNTER
Refill provided, follow-up visit to be scheduled.    Melissa Covington, DO on 4/21/2022 at 9:07 AM     Additional Notes: Patient consent was obtained to proceed with the visit and recommended plan of care after discussion of all risks and benefits, including the risks of COVID-19 exposure. Render Risk Assessment In Note?: no Detail Level: Simple

## 2022-04-25 DIAGNOSIS — F33.9 EPISODE OF RECURRENT MAJOR DEPRESSIVE DISORDER, UNSPECIFIED DEPRESSION EPISODE SEVERITY (H): ICD-10-CM

## 2022-04-25 RX ORDER — ESCITALOPRAM OXALATE 10 MG/1
10 TABLET ORAL DAILY
Qty: 30 TABLET | Refills: 0 | Status: SHIPPED | OUTPATIENT
Start: 2022-04-25 | End: 2022-05-19

## 2022-04-25 NOTE — TELEPHONE ENCOUNTER
Date of Last Office Visit: 3/15/2022  Date of Next Office Visit: None scheduled. Will route to scheduling.   No shows since last visit: (1) 3/29/2022  Cancellations since last visit: 0    Medication requested: escitalopram (LEXAPRO) 10 MG tablet Date last ordered: 3/15/2022 Qty: 30 Refills: 0     Review of MN ?: No     Lapse in medication adherence greater than 5 days?: No  If yes, call patient and gather details: She states she has enough until 5/3/22 when she gets paid.   Medication refill request verified as identical to current order?: Yes    Result of Last DAM, VPA, Li+ Level, CBC, or Carbamazepine Level (at or since last visit): N/A    Last visit treatment plan:   ASSESSMENT/PLAN                                                          ASSESSMENT/PLAN:     1. Alcohol use disorder, severe, dependence (H)  Doing well, no cravings or use.  Discussed option of naltrexone again in the future if desired.  Encouraged AA meetings.    - Drugs of Abuse 1+ Panel, Urine ([facility] Only)     2. Episode of recurrent major depressive disorder, unspecified depression episode severity (H)  Overall stable.  Refilled Lexapro and Remeron.    - escitalopram (LEXAPRO) 10 MG tablet; Take 1 tablet (10 mg) by mouth daily  Dispense: 30 tablet; Refill: 0  - mirtazapine (REMERON) 15 MG tablet; Take 1 tablet (15 mg) by mouth At Bedtime  Dispense: 30 tablet; Refill: 0        Patient counseling completed today:     Counseled the patient on the importance of having a recovery program in addition to medication assisted treatment (MAT).  Components of a recovery program include having some type of sober network, avoiding isolating, willingness to change, avoiding triggers, and managing cravings.     RTC:  2-4 weeks        Melissa Covington DO      []Medication refilled per  Medication Refill in Ambulatory Care  policy.  [x]Medication unable to be refilled by RN due to criteria not met as indicated below:    []Eligibility - not seen in the  last year   []Supervision - no future appointment   []Compliance - no shows, cancellations or lapse in therapy   []Verification - order discrepancy   []Controlled medication   [x]Medication not included in policy   []90-day supply request   []Other

## 2022-04-25 NOTE — TELEPHONE ENCOUNTER
One month script sent, awaiting follow-up visit.    Melissa Covington, DO on 4/25/2022 at 2:23 PM

## 2022-04-28 ENCOUNTER — TELEPHONE (OUTPATIENT)
Dept: FAMILY MEDICINE | Facility: CLINIC | Age: 67
End: 2022-04-28
Payer: COMMERCIAL

## 2022-04-28 DIAGNOSIS — F41.9 ANXIETY DUE TO INVASIVE PROCEDURE: Primary | ICD-10-CM

## 2022-04-28 NOTE — TELEPHONE ENCOUNTER
Reason for Call:  Medication or medication refill:    Do you use a [facility]?  Name of the pharmacy and phone number for the current request:  [email or web address]    Name of the medication requested: sedative for her mri scheduled  May 2    Other request:     Can we leave a detailed message on this number? NO    Phone number patient can be reached at: Home number on file [phone] (home)    Best Time: asap    Call taken on 4/28/2022 at 9:33 AM by Shereen Hidalgo

## 2022-04-29 RX ORDER — DIAZEPAM 5 MG
TABLET ORAL
Qty: 2 TABLET | Refills: 0 | Status: SHIPPED | OUTPATIENT
Start: 2022-04-29 | End: 2022-05-03

## 2022-05-02 ENCOUNTER — HOSPITAL ENCOUNTER (OUTPATIENT)
Dept: MRI IMAGING | Facility: CLINIC | Age: 67
Discharge: HOME | End: 2022-05-02
Attending: SURGERY
Payer: COMMERCIAL

## 2022-05-02 ENCOUNTER — HOSPITAL ENCOUNTER (OUTPATIENT)
Dept: RADIOLOGY | Facility: CLINIC | Age: 67
Discharge: HOME | End: 2022-05-02
Attending: SURGERY
Payer: COMMERCIAL

## 2022-05-02 DIAGNOSIS — M47.22 OSTEOARTHRITIS OF SPINE WITH RADICULOPATHY, CERVICAL REGION: ICD-10-CM

## 2022-05-02 DIAGNOSIS — M54.2 NECK PAIN: ICD-10-CM

## 2022-05-02 PROCEDURE — 72050 X-RAY EXAM NECK SPINE 4/5VWS: CPT

## 2022-05-02 PROCEDURE — 72141 MRI NECK SPINE W/O DYE: CPT

## 2022-05-03 ENCOUNTER — OFFICE VISIT (OUTPATIENT)
Dept: NEUROSURGERY | Facility: CLINIC | Age: 67
End: 2022-05-03
Payer: COMMERCIAL

## 2022-05-03 VITALS
DIASTOLIC BLOOD PRESSURE: 65 MMHG | OXYGEN SATURATION: 96 % | BODY MASS INDEX: 42.41 KG/M2 | SYSTOLIC BLOOD PRESSURE: 114 MMHG | HEART RATE: 57 BPM | WEIGHT: 248.4 LBS | HEIGHT: 64 IN

## 2022-05-03 DIAGNOSIS — E66.01 MORBID OBESITY WITH BMI OF 45.0-49.9, ADULT (H): ICD-10-CM

## 2022-05-03 DIAGNOSIS — M54.12 CERVICAL RADICULOPATHY: Primary | ICD-10-CM

## 2022-05-03 PROCEDURE — 99215 OFFICE O/P EST HI 40 MIN: CPT | Performed by: SURGERY

## 2022-05-03 RX ORDER — TIZANIDINE 2 MG/1
2 TABLET ORAL 3 TIMES DAILY
Qty: 50 TABLET | Refills: 0 | Status: SHIPPED | OUTPATIENT
Start: 2022-05-03 | End: 2022-05-19

## 2022-05-03 NOTE — NURSING NOTE
Neurosurgery consultation was requested by:    Pain: neck   Radicular Pain is present: neck right shoulder and upper right arm   Lhermitte sign: no   Motor complaints: right arm and right fingers   Sensory complaints: right hand   Gait and balance issues: no   Bowel or bladder issues: no   Duration of SX is: 3 weeks ago   The symptoms are worse with: motion with right hand and wearing bra   The symptoms are better with: ice and activities   Injury: no   Severity is: severe   Patient has tried the following conservative measures: no   NDI score is :   Bob Tripathi MA

## 2022-05-03 NOTE — PATIENT INSTRUCTIONS
Right C5-6, C4/5 TFESI   physicla therapy  Zanaflex   Consult to quite smoking and Bariatric weight loss.

## 2022-05-03 NOTE — PROGRESS NOTES
"NEUROSURGERY FOLLOWUP  NOTE    Patsy Santamaria comes today in f/u. Patient is a 68 yo female who was followed by Dr Tripp for multilevel cervical spondylosis, cervical spondylolisthesis, cervical kyphosis, multilevel cervical spinal stenosis with cervical cord compression who has signs and symptoms of cervical radiculopathy. At that time they discussed C3-4, C4-5, C5-6 ACDF but decided on conservative management for her symptoms. She returns in follow-up.     Overall she was doing well since her last visit with Dr. Tripp.  She had on and off symptoms over time.  However she noted that she had worsening of her symptoms last week but this week she noted that they seem to be getting better.  She has neck pain that travels into her proximal right arm.  She denies any left arm symptoms.  She also noticed numbness and tingling in the right hand and arm.  She feels her right  is weak.  She denies any bowel or bladder dysfunction or imbalance.  She has not done any recent physical therapy or injections.  She does take Tylenol over-the-counter for her pain which does not improve her symptoms.She would like to continue to avoid surgery and is hoping that we have other conservative options for her.    Everyday current smoker.     PHYSICAL EXAM:   Constitutional: /65   Pulse 57   Ht 5' 2.72\" (1.593 m)   Wt 254 lb (115.2 kg)   SpO2 96%   BMI 45.40 kg/m       Mental Status: A & O in no acute distress.  Affect is appropriate.  Speech is fluent.  Recent and remote memory are intact.  Attention span and concentration are normal.     Motor:  Normal bulk and tone all muscle groups of upper and lower extremities.     Sensory: Sensation intact bilaterally to light touch.      Coordination:   Heel/toe/ gait intact.  intact tandem gait      Reflexes; supinator, biceps, triceps, knee/ ankle jerk intact- hypo x 4.  no melton's    IMAGING:   I personally reviewed all radiographic images        CONSULTATION ASSESSMENT AND PLAN:  "   Patient is a 67-year-old female who presents with right cervical radiculopathy .We discussed her most recent cervical images including cervical x-ray which shows slight reversal of usual cervical lordosis centered at C5.  She also has anterolisthesis of cervical 2-3 and cervical 3-4 with slight movement on flexion-extension.  MRI of her cervical spine shows moderate to severe spinal canal stenosis at cervical 5-6 and moderate spinal canal stenosis at cervical 4-5. No associated abnormal cord signal change.  She also was noted to have multilevel foraminal narrowing most severe on the right at cervical 3-4, bilaterally at cervical 4-5 as well as cervical 5-6.  We discussed a right cervical 5-6, cervical 4-5 transforaminal epidural steroid injections as well as a trial of physical therapy.  Zanaflex was also prescribed during this visit.  She will return to clinic in 6 to 8 weeks if no relief conservative management.  Lastly patient is an active every day smoker and was recounseled on smoking cessation during today's visit.    I spent more than 40 minutes in this apt, examining the pt, reviewing the scans, reviewing notes from chart, discussing treatment options with risks and benefits and coordinating care.     Ana Cristina Rubio MD      CC:     Yanique Cali  [address] [place] [id number]

## 2022-05-10 ENCOUNTER — TELEPHONE (OUTPATIENT)
Dept: PHYSICAL MEDICINE AND REHAB | Facility: CLINIC | Age: 67
End: 2022-05-10
Payer: COMMERCIAL

## 2022-05-10 NOTE — TELEPHONE ENCOUNTER
PSP:  Dr. Rubio/No PSP at Spine  Last clinic visit:  5/3/2022  Reason for call: Oral sedation  Clinical information:  Call received from pt requesting oral sedation prior to her upcoming cervical PAUL. Pt reports she has high anxiety and would like something to keep her calm.   Patients preferred pharmacy is the [facility] in [place] on [address] on her pharmacy list.   Advice given to patient: Informed pt that her request for medication will be sent to ordering provider.   She is also aware that phone consent will need to be completed as well by injection provider. Pt can best be reached at her phone # on file anytime after 9am.   Provider to address: Oral sedation; Phone consent

## 2022-05-10 NOTE — TELEPHONE ENCOUNTER
Unfortunately, I have not evaluated the patient in approximately 15 months I do not feel comfortable providing a controlled substance for her.  Options would include the patient taking a prescription that she has which can be helpful for muscle pain or anxiety such as hydroxyzine or tizanidine.  She could also speak with her primary care provider who may be comfortable providing lorazepam or diazepam for the procedure sedation or she can schedule a follow-up appointment with me to discuss sedation options for the injection.

## 2022-05-19 ENCOUNTER — OFFICE VISIT (OUTPATIENT)
Dept: BEHAVIORAL HEALTH | Facility: CLINIC | Age: 67
End: 2022-05-19
Attending: PSYCHIATRY & NEUROLOGY
Payer: COMMERCIAL

## 2022-05-19 ENCOUNTER — TELEPHONE (OUTPATIENT)
Dept: PHYSICAL MEDICINE AND REHAB | Facility: CLINIC | Age: 67
End: 2022-05-19
Payer: COMMERCIAL

## 2022-05-19 VITALS
DIASTOLIC BLOOD PRESSURE: 78 MMHG | SYSTOLIC BLOOD PRESSURE: 134 MMHG | HEART RATE: 60 BPM | BODY MASS INDEX: 41.48 KG/M2 | HEIGHT: 64 IN | WEIGHT: 243 LBS

## 2022-05-19 DIAGNOSIS — F43.10 PTSD (POST-TRAUMATIC STRESS DISORDER): ICD-10-CM

## 2022-05-19 DIAGNOSIS — F10.20 ALCOHOL USE DISORDER, SEVERE, DEPENDENCE (H): Primary | ICD-10-CM

## 2022-05-19 DIAGNOSIS — F33.9 EPISODE OF RECURRENT MAJOR DEPRESSIVE DISORDER, UNSPECIFIED DEPRESSION EPISODE SEVERITY (H): ICD-10-CM

## 2022-05-19 LAB
AMPHETAMINES UR QL SCN: NORMAL
BARBITURATES UR QL: NORMAL
BENZODIAZ UR QL: NORMAL
CANNABINOIDS UR QL SCN: NORMAL
COCAINE UR QL: NORMAL
CREAT UR-MCNC: 59 MG/DL
METHADONE UR QL SCN: NORMAL
OPIATES UR QL SCN: NORMAL
OXYCODONE UR QL: NORMAL
PCP UR QL SCN: NORMAL

## 2022-05-19 PROCEDURE — 99214 OFFICE O/P EST MOD 30 MIN: CPT | Performed by: FAMILY MEDICINE

## 2022-05-19 PROCEDURE — G0463 HOSPITAL OUTPT CLINIC VISIT: HCPCS

## 2022-05-19 PROCEDURE — 80307 DRUG TEST PRSMV CHEM ANLYZR: CPT | Performed by: FAMILY MEDICINE

## 2022-05-19 RX ORDER — ESCITALOPRAM OXALATE 10 MG/1
10 TABLET ORAL DAILY
Qty: 30 TABLET | Refills: 2 | Status: SHIPPED | OUTPATIENT
Start: 2022-05-19 | End: 2022-07-26

## 2022-05-19 RX ORDER — PRAZOSIN HYDROCHLORIDE 1 MG/1
1 CAPSULE ORAL AT BEDTIME
Qty: 30 CAPSULE | Refills: 2 | Status: SHIPPED | OUTPATIENT
Start: 2022-05-19 | End: 2022-09-19

## 2022-05-19 RX ORDER — ESCITALOPRAM OXALATE 5 MG/1
5 TABLET ORAL DAILY
Qty: 30 TABLET | Refills: 2 | Status: SHIPPED | OUTPATIENT
Start: 2022-05-19 | End: 2022-07-26

## 2022-05-19 RX ORDER — MIRTAZAPINE 15 MG/1
15 TABLET, FILM COATED ORAL AT BEDTIME
Qty: 30 TABLET | Refills: 2 | Status: SHIPPED | OUTPATIENT
Start: 2022-05-19 | End: 2022-07-26

## 2022-05-19 ASSESSMENT — ANXIETY QUESTIONNAIRES
2. NOT BEING ABLE TO STOP OR CONTROL WORRYING: SEVERAL DAYS
IF YOU CHECKED OFF ANY PROBLEMS ON THIS QUESTIONNAIRE, HOW DIFFICULT HAVE THESE PROBLEMS MADE IT FOR YOU TO DO YOUR WORK, TAKE CARE OF THINGS AT HOME, OR GET ALONG WITH OTHER PEOPLE: NOT DIFFICULT AT ALL
5. BEING SO RESTLESS THAT IT IS HARD TO SIT STILL: NOT AT ALL
GAD7 TOTAL SCORE: 5
3. WORRYING TOO MUCH ABOUT DIFFERENT THINGS: MORE THAN HALF THE DAYS
6. BECOMING EASILY ANNOYED OR IRRITABLE: SEVERAL DAYS
GAD7 TOTAL SCORE: 5
1. FEELING NERVOUS, ANXIOUS, OR ON EDGE: SEVERAL DAYS
4. TROUBLE RELAXING: NOT AT ALL
7. FEELING AFRAID AS IF SOMETHING AWFUL MIGHT HAPPEN: NOT AT ALL

## 2022-05-19 ASSESSMENT — PATIENT HEALTH QUESTIONNAIRE - PHQ9: SUM OF ALL RESPONSES TO PHQ QUESTIONS 1-9: 4

## 2022-05-19 NOTE — PATIENT INSTRUCTIONS
Increase Lexapro (escitalpram) to 15mg per day.    Continue mirtazapine 15mg at bedtime.     Add prazosin 1mg at bedtime (can help with nightmares).    Schedule assessment for dual diagnosis program.  Phone number [phone] to schedule.

## 2022-05-19 NOTE — PROGRESS NOTES
Reason for visit: Follow up in person   UA obtained: YES  New lab orders pended for provider's review and sign if approved.   Please cancel prior orders from Dr. Zelaya dated 3/29/2022.  Patient verified allergies, medications and pharmacy verbally with writer.   Medication refill is pended for review and approval by provider.    ALISIA-7 scores:    ALISIA-7 SCORE 3/29/2021 3/8/2022   Total Score 2 4     PHQ-9 scores:   PHQ-9 SCORE 10/26/2021 3/8/2022   PHQ-9 Total Score 3 2   Patient states she is ready for visit.  MN  to be reviewed by provider.   Nelly Boateng May 19, 2022 11:29 AM

## 2022-05-19 NOTE — PROGRESS NOTES
[facility]    A/P                                                    ASSESSMENT/PLAN    1. Alcohol use disorder, severe, dependence (H)  Overall she feels there is improvement but she does continue to have episodes of use.  She does not feel she craves EtOH but boredom leads to use.  Has tried naltrexone and acamprosate in past and it may be worth considering another trial.  For now we discussed resuming treatment (last treatment was residential in [place]).  Consider dual diagnosis program given worsening anxiety symptoms as well.  Referral placed.  Reflected on how much better she feels when she does not drink.  Additionally we discussed making a list of alternative activities she could do instead of going to get alcohol (she does not have in house) and this could include calling her friend from treatment, going to a park, etc.  She liked this idea and plans to put a list on her fridge as a reminder.  MI utilized.    - Ethyl Glucuronide Screen with Reflex to Confirmation, Urine; Standing  - Drugs of Abuse 1+ Panel, Urine ([facility] Only); Standing  - Adult Mental Health  Referral; Future  - Drugs of Abuse 1+ Panel, Urine ([facility] Only)  - Ethyl Glucuronide Screen with Reflex to Confirmation, Urine    2. Episode of recurrent major depressive disorder, unspecified depression episode severity (H)  Needs improvement.  Increase Lexapro from 10mg to 15mg per day.  Continue Remeron 15mg at bedtime for sleep.  She is interested in dual diagnosis program to work on coping skills as well.  She will schedule with her therapist.    - mirtazapine (REMERON) 15 MG tablet; Take 1 tablet (15 mg) by mouth At Bedtime  Dispense: 30 tablet; Refill: 2  - escitalopram (LEXAPRO) 10 MG tablet; Take 1 tablet (10 mg) by mouth daily Take with 5mg tablet for total of 15mg/day.  Dispense: 30 tablet; Refill: 2  - [facility]; Future  - escitalopram (LEXAPRO) 5 MG tablet; Take 1  tablet (5 mg) by mouth daily Take with 10mg tablet for total of 15mg per day.  Dispense: 30 tablet; Refill: 2    3. PTSD (post-traumatic stress disorder)  Worsening recently.  Discussed dual diagnosis program.  Trial of prazosin 1mg at bedtime.  Reviewed risks and benefits, encouraged her to monitor for signs of hypotension.  She will also work on scheduling with her therapist.    - Adult Mental Health  Referral; Future  - prazosin (MINIPRESS) 1 MG capsule; Take 1 capsule (1 mg) by mouth At Bedtime  Dispense: 30 capsule; Refill: 2      PDMP Review       Value Time User    State PDMP site checked  Yes 5/19/2022 11:44 AM Melissa Covington,           RTC  Return in about 4 weeks (around 6/16/2022) for Follow up, with me, in person.      Counseled the patient on the importance of having a recovery program in addition to medication to manage recovery.  Components include avoiding isolating, having willingness to change, avoiding triggers and managing cravings. Encouraged having some type of sober network and practicing honesty with trusted support person(s). Encouraged other services such as counseling, 12 step or other self-help organizations.        SUBJECTIVE                                                    Patsy Santamaria is a 67 year old female with a history of peripheral neuropathy, COPD, alcoholic hepatitis, thombocytopenia, arthritis (hands and knees), obesity, anxiety, depression, and AUD who presents to clinic today for follow up.    Brief history of substance use:  Began drinking around age 33.  Became a problem for her in 2016 and she went to treatment for the first time and also experienced EtOH withdrawal seizures.  Has been to multiple treatments (most recently [facility] in [place] in Feb 2022).  Drinking tends to correlate with worsening depression.  Has tried naltrexone and acamprosate in past.  History of of cocaine use (1 year in the 1990s).  Established care with   "[facility] in March 2022.      Plan from most recent office visit (3/15/22):    1. Alcohol use disorder, severe, dependence (H)  Doing well, no cravings or use.  Discussed option of naltrexone again in the future if desired.  Encouraged AA meetings.    - Drugs of Abuse 1+ Panel, Urine ([facility] Only)     2. Episode of recurrent major depressive disorder, unspecified depression episode severity (H)  Overall stable.  Refilled Lexapro and Remeron.    - escitalopram (LEXAPRO) 10 MG tablet; Take 1 tablet (10 mg) by mouth daily  Dispense: 30 tablet; Refill: 0  - mirtazapine (REMERON) 15 MG tablet; Take 1 tablet (15 mg) by mouth At Bedtime  Dispense: 30 tablet; Refill: 0    TODAY'S VISIT  HPI May 19, 2022  - Increased anxiety, especially when she goes out  - Not sleeping well - trouble falling asleep, Remeron helpful sometimes, trying to read Bible at night, some nights mind just doesn't shut down, she does have occasional nightmares (lately dreaming about past and it is distressing at times)  - Feeling very jumpy (history of PTSD)   - Continues to look at alternative [facility] too much chaos where she lives   - No EtOH for several weeks (last drink 5/3), doesn't want to drink - only makes her feel guilty, relates boredom as a trigger for use, does not have EtOH at home  - Has not been attending meetings, nervous about walking to meeting at night   - No other substance use    ALISIA-7 SCORE 3/29/2021 3/8/2022 5/19/2022   Total Score 2 4 5     PHQ 10/26/2021 3/8/2022 5/19/2022   PHQ-9 Total Score 3 2 4   Q9: Thoughts of better off dead/self-harm past 2 weeks Not at all Not at all Not at all       OBJECTIVE                                                    PHYSICAL EXAM:  /78 (BP Location: Right arm, Patient Position: Sitting, Cuff Size: Adult Large)   Pulse 60   Ht 1.62 m (5' 3.78\")   Wt 110.2 kg (243 lb)   BMI 42.00 kg/m      GENERAL: healthy, alert and no " distress  EYES: Eyes grossly normal to inspection, conjunctivae and sclerae normal  RESP: No respiratory distress, no coughing or wheezing  MS: no gross musculoskeletal defects noted, no edema  SKIN: no pallor or jaundice, no rashes or lesions  NEURO: No gross deficits, mentation intact and speech normal, normal gait, no tremor  MENTAL STATUS EXAM  Appearance/Behavior: No appearant distress  Speech: Normal  Mood/Affect: depressed affect and anxiety  Insight: Fair    LAB  No results found for any visits on 05/19/22.      HISTORY                                                    Problem list reviewed & adjusted, as indicated.  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)     Diuretic-induced hypokalemia     Dyspnea on exertion     Elevated LFTs     Ganglion of tendon sheath     GI bleed     Hereditary and idiopathic peripheral neuropathy     History of major depression     Homeless     Major depression, recurrent (H)     Low backache     Airway obstruction due to foreign body, initial encounter     Hypomagnesemia     Mild episode of recurrent major depressive disorder (H)     Morbid obesity (H)     Nicotine dependence     Peripheral edema     Pneumonia of right lower lobe due to infectious organism     Primary localized osteoarthrosis, hand     Primary osteoarthritis of right knee     PTSD (post-traumatic stress disorder)     Seasonal allergies     Skin lesion     Snoring     Trochanteric bursitis of left hip     Vitamin B12 deficiency (non anemic)      Vitamin D deficiency     Acute alcoholic intoxication (H)     Anxiety     Closed fracture of head of left radius     Recurrent falls     Thrombocytopenia (H)     Dermatitis     Depressive disorder     Leukopenia         MEDICATION LIST (prior to visit)  acetaminophen (TYLENOL) 500 MG tablet, Take 1-2 tablets (500-1,000 mg) by mouth every 6 hours as needed for mild pain  albuterol (PROAIR HFA/PROVENTIL HFA/VENTOLIN HFA) 108 (90 Base) MCG/ACT inhaler, Inhale 2 puffs into the lungs every 4 hours as needed   bumetanide (BUMEX) 1 MG tablet, Take 1 mg by mouth daily   fluticasone-vilanterol (BREO ELLIPTA) 100-25 MCG/INH inhaler, Inhale 1 puff into the lungs  hydrOXYzine (ATARAX) 25 MG tablet, Take 25-50 mg by mouth 3 times daily as needed for anxiety  ipratropium - albuterol 0.5 mg/2.5 mg/3 mL (DUONEB) 0.5-2.5 (3) MG/3ML neb solution, Take 1 vial (3 mLs) by nebulization every 4 hours as needed for shortness of breath / dyspnea or wheezing  neomycin-polymyxin-hydrocortisone (CORTISPORIN) 3.5-45692-9 ophthalmic suspension, Place 1-2 drops into both eyes 4 times daily Until clears - no longer than 10 days.  omeprazole (PRILOSEC) 20 MG DR capsule, Take 20 mg by mouth daily   umeclidinium (INCRUSE ELLIPTA) 62.5 MCG/INH inhaler, Inhale 1 puff into the lungs daily  varenicline (CHANTIX RICA) 0.5 MG X 11 & 1 MG X 42 tablet, Take 0.5 mg tab daily for 3 days, THEN 0.5 mg tab twice daily for 4 days, THEN 1 mg twice daily.  betamethasone dipropionate (DIPROSONE) 0.05 % external cream, Apply topically 2 times daily As needed for dermatitis (Patient not taking: Reported on 5/19/2022)  cetirizine (ZYRTEC) 10 MG tablet, Take 10 mg by mouth (Patient not taking: Reported on 5/19/2022)  desoximetasone (TOPICORT) 0.25 % external cream, Apply topically 2 times daily Apply sparingly to affected areas only (Patient not taking: Reported on 5/19/2022)  triamcinolone (ARISTOCORT HP) 0.5 % external cream, Apply topically 2 times daily (Patient not  "taking: Reported on 5/19/2022)  varenicline (CHANTIX) 1 MG tablet, TAKE 1 TABLET(1 MG) BY MOUTH TWICE DAILY (Patient not taking: Reported on 5/19/2022)    No current facility-administered medications on file prior to visit.      MEDICATION LIST (after visit)  Current Outpatient Medications   Medication     acetaminophen (TYLENOL) 500 MG tablet     albuterol (PROAIR HFA/PROVENTIL HFA/VENTOLIN HFA) 108 (90 Base) MCG/ACT inhaler     bumetanide (BUMEX) 1 MG tablet     escitalopram (LEXAPRO) 10 MG tablet     escitalopram (LEXAPRO) 5 MG tablet     fluticasone-vilanterol (BREO ELLIPTA) 100-25 MCG/INH inhaler     hydrOXYzine (ATARAX) 25 MG tablet     ipratropium - albuterol 0.5 mg/2.5 mg/3 mL (DUONEB) 0.5-2.5 (3) MG/3ML neb solution     mirtazapine (REMERON) 15 MG tablet     neomycin-polymyxin-hydrocortisone (CORTISPORIN) 3.5-48549-6 ophthalmic suspension     omeprazole (PRILOSEC) 20 MG DR capsule     prazosin (MINIPRESS) 1 MG capsule     umeclidinium (INCRUSE ELLIPTA) 62.5 MCG/INH inhaler     varenicline (CHANTIX RICA) 0.5 MG X 11 & 1 MG X 42 tablet     betamethasone dipropionate (DIPROSONE) 0.05 % external cream     cetirizine (ZYRTEC) 10 MG tablet     desoximetasone (TOPICORT) 0.25 % external cream     triamcinolone (ARISTOCORT HP) 0.5 % external cream     varenicline (CHANTIX) 1 MG tablet     No current facility-administered medications for this visit.         Allergies   Allergen Reactions     Bupropion Diarrhea     Codeine Hives, Itching, Nausea and Rash     Nickel Unknown     Oxycodone Nausea and Vomiting     Percocet [Oxycodone-Acetaminophen]      Patient reports \"vomiting,grossly ill two weeks ago\"     Topiramate Unknown     Diclofenac Nausea     Tolerates the topical gel     Furosemide Rash     Hydrochlorothiazide Rash     phototoxicity - med was d/lora         Sulfa Drugs Itching and Rash     Sulfasalazine Rash       Melissa Covington [facility]  [facility]  [phone]         Alert and oriented to person, place and time

## 2022-05-19 NOTE — TELEPHONE ENCOUNTER
The patient was called today to get her consent for her upcoming procedure.  However the patient stated that her pain is significantly better after purchasing and wearing a new bra.  She does not feel like she needs the injection at this time.  She asked me to cancel her appointment on Monday, May 23, 2022.  I did send notice to the  to cancel her appointment at her request.  I did let Patsy know that we would be happy to see her at any point in the future if her pain significantly worsens.    I did notify Dr. Rubio and her nurse as well as the procedure team that the patient wished to cancel the procedure.

## 2022-05-19 NOTE — PROGRESS NOTES
[facility] Post-Appointment Cart -check      Correct pharmacy verified with patient and updated in chart? [x] yes []no    Charge captured ? [x] yes  [] no [] n/a-virtual     Medications ordered this visit were e-scribed.  Verified by order class [x] yes  [] no    List Medications: escitalopram (LEXAPRO) 5 MG tablet;escitalopram (LEXAPRO) 10 MG tablet;  mirtazapine (REMERON) 15 MG tablet;  prazosin (MINIPRESS) 1 MG capsule    Medication changes or discontinuations were communicated to patient's pharmacy: [] yes  [x] no    UA collected [x] yes  [] no  [] n/a-virtual     Outside referrals / labs, etc support staff to follow up: [x] yes  [] no Dual Dx group     Future appointment was made: [x] yes  [] no  [] n/a   06/16/2022  Dictation completed at time of chart check: [x] yes  [] no    I have checked the documentation for today s encounters and the above information has been reviewed and completed.      Nelly Boateng on May 19, 2022 at 3:37 PM

## 2022-05-21 LAB — ETHYL GLUCURONIDE UR QL SCN: NEGATIVE NG/ML

## 2022-05-23 ENCOUNTER — TELEPHONE (OUTPATIENT)
Dept: BEHAVIORAL HEALTH | Facility: CLINIC | Age: 67
End: 2022-05-23
Payer: COMMERCIAL

## 2022-05-25 ENCOUNTER — HOSPITAL ENCOUNTER (OUTPATIENT)
Dept: BEHAVIORAL HEALTH | Facility: CLINIC | Age: 67
Discharge: HOME | End: 2022-05-25
Attending: FAMILY MEDICINE
Payer: COMMERCIAL

## 2022-05-25 DIAGNOSIS — F10.20 ALCOHOL USE DISORDER, SEVERE, DEPENDENCE (H): ICD-10-CM

## 2022-05-25 DIAGNOSIS — F33.9 EPISODE OF RECURRENT MAJOR DEPRESSIVE DISORDER, UNSPECIFIED DEPRESSION EPISODE SEVERITY (H): ICD-10-CM

## 2022-05-25 DIAGNOSIS — F43.10 PTSD (POST-TRAUMATIC STRESS DISORDER): ICD-10-CM

## 2022-05-25 PROCEDURE — 999N000216 HC STATISTIC ADULT CD FACE TO FACE-NO CHRG: Mod: TEL,95 | Performed by: COUNSELOR

## 2022-05-25 NOTE — PROGRESS NOTES
Patient was scheduled for 8am Dual assessment via Telephone. OBC checked patient in at 7:56am. At 8:06am,  called patient at [phone] (per Appt Notes, patient can't do video visits). Patient was unsure what the assessment was for. She thinks her doctor Bri Covington sent the referral. She has been working with Dr. Covington for a couple of months. Patient sees her once per month and recently changed her medications. Dr. Covington said they were starting a new program for outpatient at [facility] for alcohol and mental health. Patient stated she has PTSD and Anxiety.  explained there is a Medicare co-occurring outpatient program at [facility], but that all of [facility] groups are over video. Patient stated can't do anything over video on her phone.  apologized that wasn't clear from the beginning. Patient said she will see Dr. Covington in a few weeks and will talk with her then about other options. Patient thanked  and ended the call at 8:11am.     sent InBasket to Dr. Covington to update her on outcome of appointment and with information to provide to patient (patient doesn't have MyChart) for the [facility] in [place] at [phone]  [email or web address]  They do not take Medicare, but some patients can qualify for \[facility]\"" Funding. Patient should call [facility] Rule [facility] at [phone] [email or web address] to determine if she can qualify for [facility] funding. Otherwise patients can be on a payment plan for total cost of $5500. [facility] groups are In-person on Mondays, Wednesdays, and Thursdays. Either from 8:30am to 10:30am or from 10:30am to 12:30pm. And they pick patients up for programming if they do not have transportation.

## 2022-05-31 ENCOUNTER — TELEPHONE (OUTPATIENT)
Dept: FAMILY MEDICINE | Facility: CLINIC | Age: 67
End: 2022-05-31
Payer: COMMERCIAL

## 2022-05-31 DIAGNOSIS — L60.2 ONYCHOGRYPOSIS OF TOENAIL: Primary | ICD-10-CM

## 2022-05-31 NOTE — TELEPHONE ENCOUNTER
Reason for Call: Request for an order or referral:    Order or referral being requested: Referral for podiatry    Date needed: as soon as possible    Has the patient been seen by the PCP for this problem? YES    Additional comments: Patient called to request provider to put in a referral for podiatry.     Phone number Patient can be reached at:  Home number on file [phone] (home)    Best Time:  any    Can we leave a detailed message on this number?  YES    Call taken on 5/31/2022 at 2:43 PM by Yany Gay

## 2022-06-08 ENCOUNTER — OFFICE VISIT (OUTPATIENT)
Dept: PODIATRY | Facility: CLINIC | Age: 67
End: 2022-06-08
Attending: FAMILY MEDICINE
Payer: COMMERCIAL

## 2022-06-08 VITALS — WEIGHT: 243 LBS | BODY MASS INDEX: 41.48 KG/M2 | HEIGHT: 64 IN | HEART RATE: 85 BPM | OXYGEN SATURATION: 98 %

## 2022-06-08 DIAGNOSIS — L60.2 ONYCHOGRYPOSIS OF TOENAIL: ICD-10-CM

## 2022-06-08 DIAGNOSIS — B35.3 TINEA PEDIS OF BOTH FEET: ICD-10-CM

## 2022-06-08 DIAGNOSIS — B35.1 NAIL FUNGUS: Primary | ICD-10-CM

## 2022-06-08 DIAGNOSIS — L60.2 ONYCHAUXIS: ICD-10-CM

## 2022-06-08 PROCEDURE — 99213 OFFICE O/P EST LOW 20 MIN: CPT | Mod: 25 | Performed by: PODIATRIST

## 2022-06-08 PROCEDURE — 11721 DEBRIDE NAIL 6 OR MORE: CPT | Performed by: PODIATRIST

## 2022-06-08 RX ORDER — CLOTRIMAZOLE AND BETAMETHASONE DIPROPIONATE 10; .64 MG/G; MG/G
CREAM TOPICAL 2 TIMES DAILY
Qty: 45 G | Refills: 0 | Status: SHIPPED | OUTPATIENT
Start: 2022-06-08 | End: 2022-06-28

## 2022-06-08 ASSESSMENT — PAIN SCALES - GENERAL: PAINLEVEL: MODERATE PAIN (5)

## 2022-06-08 NOTE — LETTER
6/8/2022         RE: Patsy Santamaria  [address]  [place] [address]        Dear Colleague,    Thank you for referring your patient, Patsy Santamaria, to the [facility]. Please see a copy of my visit note below.    FOOT AND ANKLE SURGERY/PODIATRY CONSULT NOTE         ASSESSMENT:   Onychomycosis  Onychauxis  Tinea pedis of both feet      TREATMENT:  Debrided nails 1 through 5 both feet today.  The patient was given a prescription for Lotrisone cream to be applied twice daily.  She is to return to the clinic as needed.        HPI: I was asked to see Patsy Santamaria today to evaluate and treat long painful nails both feet.  The patient also complains of an itching rash involving both feet.  She has had the rash for several months.  The patient stated that her nails are long and thick.  She has a difficult time trimming her nails.  She stated that the right great toenail is painful with weightbearing, ambulation and shoe gear.  She has not had any redness, swelling, drainage or bleeding.  She describes the pain with the toenail a sharp pain.  There are no factors which relieve her pain.  She denies any trauma to her feet..  The patient was seen in consultation at the request of Yanique Lockhart MD for evaluation and treatment of bilateral feet.     Past Medical History:   Diagnosis Date     Alcohol abuse      Alcohol withdrawal seizure without complication (H) 5/14/2016     Alcoholic cirrhosis (H)      Anxiety      Anxiety      Arthritis      Chronic alcohol dependence, continuous (H) 03/16/2018    inpatient 11/2019, sober since then     Chronic Lower Extremity Edema      Chronic reflux esophagitis      COPD (chronic obstructive pulmonary disease) (H)      Depression      Depression      Dermatitis      Ganglion     right foot     GERD (gastroesophageal reflux disease)      H. pylori infection      Melanoma (H) 07/2019    left upper arm     Menopause     age 52  "    Obesity (BMI 35.0-39.9 without comorbidity)      Osteoarthritis     Bilateral Knees     Peripheral neuropathy      Seizure (H) 2016    during alcohol withdrawal     Sleep apnea     mild, doesnt tolerate pap therapy     Withdrawal seizures (H)     x 1 in 2016       Social History     Socioeconomic History     Marital status: Single     Spouse name: Not on file     Number of children: Not on file     Years of education: Not on file     Highest education level: Not on file   Occupational History     Not on file   Tobacco Use     Smoking status: Current Every Day Smoker     Packs/day: 0.50     Years: 49.00     Pack years: 24.50     Types: Cigarettes     Smokeless tobacco: Never Used     Tobacco comment: 1/2 ppd   Substance and Sexual Activity     Alcohol use: Yes     Comment: Last use 2/1/22, Treatment 2/2/22     Drug use: No     Comment: Denies     Sexual activity: Yes     Partners: Male     Birth control/protection: None   Other Topics Concern     Not on file   Social History Narrative     Not on file     Social Determinants of Health     Financial Resource Strain: Not on file   Food Insecurity: Not on file   Transportation Needs: Not on file   Physical Activity: Not on file   Stress: Not on file   Social Connections: Not on file   Intimate Partner Violence: Not on file   Housing Stability: Not on file          Allergies   Allergen Reactions     Bupropion Diarrhea     Codeine Hives, Itching, Nausea and Rash     Nickel Unknown     Oxycodone Nausea and Vomiting     Percocet [Oxycodone-Acetaminophen]      Patient reports \"vomiting,grossly ill two weeks ago\"     Topiramate Unknown     Diclofenac Nausea     Tolerates the topical gel     Furosemide Rash     Hydrochlorothiazide Rash     phototoxicity - med was d/lora         Sulfa Drugs Itching and Rash     Sulfasalazine Rash          Current Outpatient Medications:      acetaminophen (TYLENOL) 500 MG tablet, Take 1-2 tablets (500-1,000 mg) by mouth every 6 hours as " needed for mild pain, Disp: 60 tablet, Rfl: 1     albuterol (PROAIR HFA/PROVENTIL HFA/VENTOLIN HFA) 108 (90 Base) MCG/ACT inhaler, Inhale 2 puffs into the lungs every 4 hours as needed , Disp: , Rfl:      betamethasone dipropionate (DIPROSONE) 0.05 % external cream, Apply topically 2 times daily As needed for dermatitis (Patient not taking: Reported on 5/19/2022), Disp: 30 g, Rfl: 1     bumetanide (BUMEX) 1 MG tablet, Take 1 mg by mouth daily , Disp: , Rfl:      cetirizine (ZYRTEC) 10 MG tablet, Take 10 mg by mouth (Patient not taking: Reported on 5/19/2022), Disp: , Rfl:      desoximetasone (TOPICORT) 0.25 % external cream, Apply topically 2 times daily Apply sparingly to affected areas only (Patient not taking: Reported on 5/19/2022), Disp: 60 g, Rfl: 1     escitalopram (LEXAPRO) 10 MG tablet, Take 1 tablet (10 mg) by mouth daily Take with 5mg tablet for total of 15mg/day., Disp: 30 tablet, Rfl: 2     escitalopram (LEXAPRO) 5 MG tablet, Take 1 tablet (5 mg) by mouth daily Take with 10mg tablet for total of 15mg per day., Disp: 30 tablet, Rfl: 2     fluticasone-vilanterol (BREO ELLIPTA) 100-25 MCG/INH inhaler, Inhale 1 puff into the lungs, Disp: , Rfl:      hydrOXYzine (ATARAX) 25 MG tablet, Take 25-50 mg by mouth 3 times daily as needed for anxiety, Disp: , Rfl:      ipratropium - albuterol 0.5 mg/2.5 mg/3 mL (DUONEB) 0.5-2.5 (3) MG/3ML neb solution, Take 1 vial (3 mLs) by nebulization every 4 hours as needed for shortness of breath / dyspnea or wheezing, Disp: 15 mL, Rfl: 1     mirtazapine (REMERON) 15 MG tablet, Take 1 tablet (15 mg) by mouth At Bedtime, Disp: 30 tablet, Rfl: 2     neomycin-polymyxin-hydrocortisone (CORTISPORIN) 3.5-50592-7 ophthalmic suspension, Place 1-2 drops into both eyes 4 times daily Until clears - no longer than 10 days., Disp: 7.5 mL, Rfl: 0     omeprazole (PRILOSEC) 20 MG DR capsule, Take 20 mg by mouth daily , Disp: , Rfl:      prazosin (MINIPRESS) 1 MG capsule, Take 1 capsule (1 mg)  by mouth At Bedtime, Disp: 30 capsule, Rfl: 2     triamcinolone (ARISTOCORT HP) 0.5 % external cream, Apply topically 2 times daily (Patient not taking: Reported on 2022), Disp: 60 g, Rfl: 1     umeclidinium (INCRUSE ELLIPTA) 62.5 MCG/INH inhaler, Inhale 1 puff into the lungs daily, Disp: , Rfl:      varenicline (CHANTIX RICA) 0.5 MG X 11 & 1 MG X 42 tablet, Take 0.5 mg tab daily for 3 days, THEN 0.5 mg tab twice daily for 4 days, THEN 1 mg twice daily., Disp: 53 tablet, Rfl: 0     varenicline (CHANTIX) 1 MG tablet, TAKE 1 TABLET(1 MG) BY MOUTH TWICE DAILY (Patient not taking: Reported on 2022), Disp: 180 tablet, Rfl: 0     Family History   Problem Relation Age of Onset     CABG Mother      Anuerysm Father          of ruptured anuerysm at 46     Heart Disease Mother      Heart Disease Father         Social History     Socioeconomic History     Marital status: Single     Spouse name: Not on file     Number of children: Not on file     Years of education: Not on file     Highest education level: Not on file   Occupational History     Not on file   Tobacco Use     Smoking status: Current Every Day Smoker     Packs/day: 0.50     Years: 49.00     Pack years: 24.50     Types: Cigarettes     Smokeless tobacco: Never Used     Tobacco comment:  ppd   Substance and Sexual Activity     Alcohol use: Yes     Comment: Last use 22, Treatment 22     Drug use: No     Comment: Denies     Sexual activity: Yes     Partners: Male     Birth control/protection: None   Other Topics Concern     Not on file   Social History Narrative     Not on file     Social Determinants of Health     Financial Resource Strain: Not on file   Food Insecurity: Not on file   Transportation Needs: Not on file   Physical Activity: Not on file   Stress: Not on file   Social Connections: Not on file   Intimate Partner Violence: Not on file   Housing Stability: Not on file        Review of Systems - Patient denies fever, chills, rash,  wound, stiffness, limping, numbness, weakness, heart burn, blood in stool, chest pain with activity, calf pain when walking, shortness of breath with activity, chronic cough, easy bleeding/bruising, swelling of ankles, excessive thirst, fatigue, depression, anxiety.  Patient admits to painful right great toenail, itching rash both feet, long thick nails both feet.      OBJECTIVE:  Appearance: alert, well appearing, and in no distress.    There were no vitals taken for this visit.     There is no height or weight on file to calculate BMI.     General appearance: Patient is alert and fully cooperative with history & exam.  No sign of distress is noted during the visit.  Psychiatric: Affect is pleasant & appropriate.  Patient appears motivated to improve health.  Respiratory: Breathing is regular & unlabored while sitting.  HEENT: Hearing is intact to spoken word.  Speech is clear.  No gross evidence of visual impairment that would impact ambulation.    Vascular: Dorsalis pedis and posterior tibial pulses are palpable. There is no pedal hair growth bilaterally.  CFT < 3 sec from anterior tibial surface to distal digits bilaterally. There is no appreciable edema noted.  Dermatologic: Elongated, thick, dystrophic nails 1 through 5 both feet.  The lateral border the right great toenail is incurvated.  Round, circular erythematous lesions noted on the plantar aspect both feet.  Pruritus noted.  Turgor and texture are within normal limits. No coloration or temperature changes. No other primary or secondary lesions noted.  Neurologic: All epicritic and proprioceptive sensations are grossly intact bilaterally.  Musculoskeletal: All active and passive ankle, subtalar, midtarsal, and 1st MPJ range of motion are grossly intact without pain or crepitus, with the exception of none. Manual muscle strength is within normal limits bilaterally. All dorsiflexors, plantarflexors, invertors, evertors are intact bilaterally. Tenderness  present to the lateral margin of the right great toenail on palpation.  No tenderness to bilateral feet or ankles with range of motion. Calf is soft/non-tender without warmth/induration    Imaging:       No images are attached to the encounter or orders placed in the encounter.     No results found.   No results found.       Keyon Hills DPM  [facility] Foot & Ankle Surgery/Podiatry         Again, thank you for allowing me to participate in the care of your patient.        Sincerely,        Keyon Kapoor DPM

## 2022-06-08 NOTE — PROGRESS NOTES
FOOT AND ANKLE SURGERY/PODIATRY CONSULT NOTE         ASSESSMENT:   Onychomycosis  Onychauxis  Tinea pedis of both feet      TREATMENT:  Debrided nails 1 through 5 both feet today.  The patient was given a prescription for Lotrisone cream to be applied twice daily.  She is to return to the clinic as needed.        HPI: I was asked to see Patsy Santamaria today to evaluate and treat long painful nails both feet.  The patient also complains of an itching rash involving both feet.  She has had the rash for several months.  The patient stated that her nails are long and thick.  She has a difficult time trimming her nails.  She stated that the right great toenail is painful with weightbearing, ambulation and shoe gear.  She has not had any redness, swelling, drainage or bleeding.  She describes the pain with the toenail a sharp pain.  There are no factors which relieve her pain.  She denies any trauma to her feet..  The patient was seen in consultation at the request of Yanique Lockhart MD for evaluation and treatment of bilateral feet.     Past Medical History:   Diagnosis Date     Alcohol abuse      Alcohol withdrawal seizure without complication (H) 5/14/2016     Alcoholic cirrhosis (H)      Anxiety      Anxiety      Arthritis      Chronic alcohol dependence, continuous (H) 03/16/2018    inpatient 11/2019, sober since then     Chronic Lower Extremity Edema      Chronic reflux esophagitis      COPD (chronic obstructive pulmonary disease) (H)      Depression      Depression      Dermatitis      Ganglion     right foot     GERD (gastroesophageal reflux disease)      H. pylori infection      Melanoma (H) 07/2019    left upper arm     Menopause     age 52     Obesity (BMI 35.0-39.9 without comorbidity)      Osteoarthritis     Bilateral Knees     Peripheral neuropathy      Seizure (H) 2016    during alcohol withdrawal     Sleep apnea     mild, doesnt tolerate pap therapy     Withdrawal seizures (H)     x 1 in 2016  "      Social History     Socioeconomic History     Marital status: Single     Spouse name: Not on file     Number of children: Not on file     Years of education: Not on file     Highest education level: Not on file   Occupational History     Not on file   Tobacco Use     Smoking status: Current Every Day Smoker     Packs/day: 0.50     Years: 49.00     Pack years: 24.50     Types: Cigarettes     Smokeless tobacco: Never Used     Tobacco comment: 1/2 ppd   Substance and Sexual Activity     Alcohol use: Yes     Comment: Last use 2/1/22, Treatment 2/2/22     Drug use: No     Comment: Denies     Sexual activity: Yes     Partners: Male     Birth control/protection: None   Other Topics Concern     Not on file   Social History Narrative     Not on file     Social Determinants of Health     Financial Resource Strain: Not on file   Food Insecurity: Not on file   Transportation Needs: Not on file   Physical Activity: Not on file   Stress: Not on file   Social Connections: Not on file   Intimate Partner Violence: Not on file   Housing Stability: Not on file          Allergies   Allergen Reactions     Bupropion Diarrhea     Codeine Hives, Itching, Nausea and Rash     Nickel Unknown     Oxycodone Nausea and Vomiting     Percocet [Oxycodone-Acetaminophen]      Patient reports \"vomiting,grossly ill two weeks ago\"     Topiramate Unknown     Diclofenac Nausea     Tolerates the topical gel     Furosemide Rash     Hydrochlorothiazide Rash     phototoxicity - med was d/lora         Sulfa Drugs Itching and Rash     Sulfasalazine Rash          Current Outpatient Medications:      acetaminophen (TYLENOL) 500 MG tablet, Take 1-2 tablets (500-1,000 mg) by mouth every 6 hours as needed for mild pain, Disp: 60 tablet, Rfl: 1     albuterol (PROAIR HFA/PROVENTIL HFA/VENTOLIN HFA) 108 (90 Base) MCG/ACT inhaler, Inhale 2 puffs into the lungs every 4 hours as needed , Disp: , Rfl:      betamethasone dipropionate (DIPROSONE) 0.05 % external cream, " Apply topically 2 times daily As needed for dermatitis (Patient not taking: Reported on 5/19/2022), Disp: 30 g, Rfl: 1     bumetanide (BUMEX) 1 MG tablet, Take 1 mg by mouth daily , Disp: , Rfl:      cetirizine (ZYRTEC) 10 MG tablet, Take 10 mg by mouth (Patient not taking: Reported on 5/19/2022), Disp: , Rfl:      desoximetasone (TOPICORT) 0.25 % external cream, Apply topically 2 times daily Apply sparingly to affected areas only (Patient not taking: Reported on 5/19/2022), Disp: 60 g, Rfl: 1     escitalopram (LEXAPRO) 10 MG tablet, Take 1 tablet (10 mg) by mouth daily Take with 5mg tablet for total of 15mg/day., Disp: 30 tablet, Rfl: 2     escitalopram (LEXAPRO) 5 MG tablet, Take 1 tablet (5 mg) by mouth daily Take with 10mg tablet for total of 15mg per day., Disp: 30 tablet, Rfl: 2     fluticasone-vilanterol (BREO ELLIPTA) 100-25 MCG/INH inhaler, Inhale 1 puff into the lungs, Disp: , Rfl:      hydrOXYzine (ATARAX) 25 MG tablet, Take 25-50 mg by mouth 3 times daily as needed for anxiety, Disp: , Rfl:      ipratropium - albuterol 0.5 mg/2.5 mg/3 mL (DUONEB) 0.5-2.5 (3) MG/3ML neb solution, Take 1 vial (3 mLs) by nebulization every 4 hours as needed for shortness of breath / dyspnea or wheezing, Disp: 15 mL, Rfl: 1     mirtazapine (REMERON) 15 MG tablet, Take 1 tablet (15 mg) by mouth At Bedtime, Disp: 30 tablet, Rfl: 2     neomycin-polymyxin-hydrocortisone (CORTISPORIN) 3.5-32321-0 ophthalmic suspension, Place 1-2 drops into both eyes 4 times daily Until clears - no longer than 10 days., Disp: 7.5 mL, Rfl: 0     omeprazole (PRILOSEC) 20 MG DR capsule, Take 20 mg by mouth daily , Disp: , Rfl:      prazosin (MINIPRESS) 1 MG capsule, Take 1 capsule (1 mg) by mouth At Bedtime, Disp: 30 capsule, Rfl: 2     triamcinolone (ARISTOCORT HP) 0.5 % external cream, Apply topically 2 times daily (Patient not taking: Reported on 5/19/2022), Disp: 60 g, Rfl: 1     umeclidinium (INCRUSE ELLIPTA) 62.5 MCG/INH inhaler, Inhale 1 puff  into the lungs daily, Disp: , Rfl:      varenicline (CHANTIX RICA) 0.5 MG X 11 & 1 MG X 42 tablet, Take 0.5 mg tab daily for 3 days, THEN 0.5 mg tab twice daily for 4 days, THEN 1 mg twice daily., Disp: 53 tablet, Rfl: 0     varenicline (CHANTIX) 1 MG tablet, TAKE 1 TABLET(1 MG) BY MOUTH TWICE DAILY (Patient not taking: Reported on 2022), Disp: 180 tablet, Rfl: 0     Family History   Problem Relation Age of Onset     CABG Mother      Anuerysm Father          of ruptured anuerysm at 46     Heart Disease Mother      Heart Disease Father         Social History     Socioeconomic History     Marital status: Single     Spouse name: Not on file     Number of children: Not on file     Years of education: Not on file     Highest education level: Not on file   Occupational History     Not on file   Tobacco Use     Smoking status: Current Every Day Smoker     Packs/day: 0.50     Years: 49.00     Pack years: 24.50     Types: Cigarettes     Smokeless tobacco: Never Used     Tobacco comment:  ppd   Substance and Sexual Activity     Alcohol use: Yes     Comment: Last use 22, Treatment 22     Drug use: No     Comment: Denies     Sexual activity: Yes     Partners: Male     Birth control/protection: None   Other Topics Concern     Not on file   Social History Narrative     Not on file     Social Determinants of Health     Financial Resource Strain: Not on file   Food Insecurity: Not on file   Transportation Needs: Not on file   Physical Activity: Not on file   Stress: Not on file   Social Connections: Not on file   Intimate Partner Violence: Not on file   Housing Stability: Not on file        Review of Systems - Patient denies fever, chills, rash, wound, stiffness, limping, numbness, weakness, heart burn, blood in stool, chest pain with activity, calf pain when walking, shortness of breath with activity, chronic cough, easy bleeding/bruising, swelling of ankles, excessive thirst, fatigue, depression, anxiety.   Patient admits to painful right great toenail, itching rash both feet, long thick nails both feet.      OBJECTIVE:  Appearance: alert, well appearing, and in no distress.    There were no vitals taken for this visit.     There is no height or weight on file to calculate BMI.     General appearance: Patient is alert and fully cooperative with history & exam.  No sign of distress is noted during the visit.  Psychiatric: Affect is pleasant & appropriate.  Patient appears motivated to improve health.  Respiratory: Breathing is regular & unlabored while sitting.  HEENT: Hearing is intact to spoken word.  Speech is clear.  No gross evidence of visual impairment that would impact ambulation.    Vascular: Dorsalis pedis and posterior tibial pulses are palpable. There is no pedal hair growth bilaterally.  CFT < 3 sec from anterior tibial surface to distal digits bilaterally. There is no appreciable edema noted.  Dermatologic: Elongated, thick, dystrophic nails 1 through 5 both feet.  The lateral border the right great toenail is incurvated.  Round, circular erythematous lesions noted on the plantar aspect both feet.  Pruritus noted.  Turgor and texture are within normal limits. No coloration or temperature changes. No other primary or secondary lesions noted.  Neurologic: All epicritic and proprioceptive sensations are grossly intact bilaterally.  Musculoskeletal: All active and passive ankle, subtalar, midtarsal, and 1st MPJ range of motion are grossly intact without pain or crepitus, with the exception of none. Manual muscle strength is within normal limits bilaterally. All dorsiflexors, plantarflexors, invertors, evertors are intact bilaterally. Tenderness present to the lateral margin of the right great toenail on palpation.  No tenderness to bilateral feet or ankles with range of motion. Calf is soft/non-tender without warmth/induration    Imaging:       No images are attached to the encounter or orders placed in the  encounter.     No results found.   No results found.       Keyon Kapoor; MELANIE  [facility] Foot & Ankle Surgery/Podiatry

## 2022-06-28 ENCOUNTER — OFFICE VISIT (OUTPATIENT)
Dept: BEHAVIORAL HEALTH | Facility: CLINIC | Age: 67
End: 2022-06-28
Attending: PSYCHIATRY & NEUROLOGY
Payer: COMMERCIAL

## 2022-06-28 VITALS
DIASTOLIC BLOOD PRESSURE: 66 MMHG | SYSTOLIC BLOOD PRESSURE: 125 MMHG | HEIGHT: 64 IN | HEART RATE: 67 BPM | BODY MASS INDEX: 42.68 KG/M2 | WEIGHT: 250 LBS

## 2022-06-28 DIAGNOSIS — F33.9 EPISODE OF RECURRENT MAJOR DEPRESSIVE DISORDER, UNSPECIFIED DEPRESSION EPISODE SEVERITY (H): ICD-10-CM

## 2022-06-28 DIAGNOSIS — F17.200 NICOTINE DEPENDENCE, UNCOMPLICATED, UNSPECIFIED NICOTINE PRODUCT TYPE: ICD-10-CM

## 2022-06-28 DIAGNOSIS — F10.20 ALCOHOL USE DISORDER, SEVERE, DEPENDENCE (H): ICD-10-CM

## 2022-06-28 DIAGNOSIS — F43.10 PTSD (POST-TRAUMATIC STRESS DISORDER): Primary | ICD-10-CM

## 2022-06-28 LAB
AMPHETAMINES UR QL SCN: NORMAL
BARBITURATES UR QL: NORMAL
BENZODIAZ UR QL: NORMAL
CANNABINOIDS UR QL SCN: NORMAL
COCAINE UR QL: NORMAL
CREAT UR-MCNC: 27 MG/DL
METHADONE UR QL SCN: NORMAL
OPIATES UR QL SCN: NORMAL
OXYCODONE UR QL: NORMAL
PCP UR QL SCN: NORMAL

## 2022-06-28 PROCEDURE — G0463 HOSPITAL OUTPT CLINIC VISIT: HCPCS

## 2022-06-28 PROCEDURE — 99214 OFFICE O/P EST MOD 30 MIN: CPT | Performed by: FAMILY MEDICINE

## 2022-06-28 PROCEDURE — 80307 DRUG TEST PRSMV CHEM ANLYZR: CPT | Performed by: FAMILY MEDICINE

## 2022-06-28 ASSESSMENT — ANXIETY QUESTIONNAIRES
5. BEING SO RESTLESS THAT IT IS HARD TO SIT STILL: NOT AT ALL
IF YOU CHECKED OFF ANY PROBLEMS ON THIS QUESTIONNAIRE, HOW DIFFICULT HAVE THESE PROBLEMS MADE IT FOR YOU TO DO YOUR WORK, TAKE CARE OF THINGS AT HOME, OR GET ALONG WITH OTHER PEOPLE: SOMEWHAT DIFFICULT
4. TROUBLE RELAXING: SEVERAL DAYS
GAD7 TOTAL SCORE: 4
6. BECOMING EASILY ANNOYED OR IRRITABLE: NOT AT ALL
1. FEELING NERVOUS, ANXIOUS, OR ON EDGE: MORE THAN HALF THE DAYS
2. NOT BEING ABLE TO STOP OR CONTROL WORRYING: NOT AT ALL
GAD7 TOTAL SCORE: 4
7. FEELING AFRAID AS IF SOMETHING AWFUL MIGHT HAPPEN: NOT AT ALL
3. WORRYING TOO MUCH ABOUT DIFFERENT THINGS: SEVERAL DAYS

## 2022-06-28 ASSESSMENT — PATIENT HEALTH QUESTIONNAIRE - PHQ9: SUM OF ALL RESPONSES TO PHQ QUESTIONS 1-9: 5

## 2022-06-28 NOTE — PATIENT INSTRUCTIONS
Continue your current medications.      Make appointment to meet with Alejandra Swann to discuss sleep issues/therapy.  I would recommend you work on getting CPAP up and running.      Congratulations on your continued sobriety!

## 2022-06-28 NOTE — PROGRESS NOTES
[facility] Post-Appointment Chart -check      Correct pharmacy verified with patient and updated in chart? [x] yes []no    Charge captured ? [x] yes  [] no [] n/a-virtual     Medications ordered this visit were e-scribed.  Verified by order class [] yes  [x] no    List Medications:    Medication changes or discontinuations were communicated to patient's pharmacy: [] yes  [x] no    UA collected [x] yes  [] no  [] n/a-virtual     Outside referrals / labs, etc support staff to follow up: [] yes  [x] no    Future appointment was made: [x] yes  [] no  [] n/a   07/26/2022  Dictation completed at time of chart check: [] yes  [x] no    I have checked the documentation for today s encounters and the above information has been reviewed and completed.      Nelly Boateng on June 28, 2022 at 4:28 PM

## 2022-06-28 NOTE — PROGRESS NOTES
[facility] Addiction Medicine    A/P                                                    ASSESSMENT/PLAN    1. Alcohol use disorder, severe, dependence (H)  She has not had any EtOH in nearly 2 months, which is one of her longest recent periods of sobriety.  Encouraged her continued efforts.    - Drugs of Abuse 1+ Panel, Urine ([facility] Only)  - Ethyl Glucuronide Screen with Reflex to Confirmation, Urine    2. PTSD (post-traumatic stress disorder)  3. Episode of recurrent major depressive disorder, unspecified depression episode severity (H)  Needs improvement.  We discussed how her anxiety is worsening her sleep which is making her more depressed.  I have also encouraged her to begin using her CPAP which she has not been using.  We also discussed that increasing her mirtazapine would unlikely provide significant benefit and would potentially lead to increased appetite and/or weight gain/difficulty losing weight.  We discussed addressing this more from a behavioral standpoint through therapy and she is open to this - plans to meet to with Alejandra Swann individually.    4. Nicotine dependence, uncomplicated, unspecified nicotine product type  Plan to discuss further at follow-up visit.        PDMP Review       Value Time User    State PDMP site checked  Yes 5/19/2022 11:44 AM Melissa Covington, DO            RTC  Return in about 4 weeks (around 7/26/2022) for Follow up, with me, in person.      Counseled the patient on the importance of having a recovery program in addition to medication to manage recovery.  Components include avoiding isolating, having willingness to change, avoiding triggers and managing cravings. Encouraged having some type of sober network and practicing honesty with trusted support person(s). Encouraged other services such as counseling, 12 step or other self-help organizations.      SUBJECTIVE                                                    Patsy Santamaria is a 67 year old female  with a history of peripheral neuropathy, COPD, alcoholic hepatitis, thombocytopenia, arthritis (hands and knees), obesity, anxiety, depression, and AUD who presents to clinic today for follow up.    Brief history of substance use:  Began drinking around age 33.  Became a problem for her in 2016 and she went to treatment for the first time and also experienced EtOH withdrawal seizures.  Has been to multiple treatments (most recently [facility] in [place] in Feb 2022).  Drinking tends to correlate with worsening depression.  Has tried naltrexone and acamprosate in past.  History of of cocaine use (1 year in the 1990s).  Established care with [facility] in March 2022.       Plan from most recent office visit (5/19/22):    1. Alcohol use disorder, severe, dependence (H)  Overall she feels there is improvement but she does continue to have episodes of use.  She does not feel she craves EtOH but boredom leads to use.  Has tried naltrexone and acamprosate in past and it may be worth considering another trial.  For now we discussed resuming treatment (last treatment was residential in [place]).  Consider dual diagnosis program given worsening anxiety symptoms as well.  Referral placed.  Reflected on how much better she feels when she does not drink.  Additionally we discussed making a list of alternative activities she could do instead of going to get alcohol (she does not have in house) and this could include calling her friend from treatment, going to a park, etc.  She liked this idea and plans to put a list on her fridge as a reminder.  MI utilized.    - Ethyl Glucuronide Screen with Reflex to Confirmation, Urine; Standing  - Drugs of Abuse 1+ Panel, Urine ([facility] Only); Standing  - Adult Mental Health  Referral; Future  - Drugs of Abuse 1+ Panel, Urine ([facility] Only)  - Ethyl Glucuronide Screen with Reflex to Confirmation, Urine     2. Episode of recurrent  major depressive disorder, unspecified depression episode severity (H)  Needs improvement.  Increase Lexapro from 10mg to 15mg per day.  Continue Remeron 15mg at bedtime for sleep.  She is interested in dual diagnosis program to work on coping skills as well.  She will schedule with her therapist.    - mirtazapine (REMERON) 15 MG tablet; Take 1 tablet (15 mg) by mouth At Bedtime  Dispense: 30 tablet; Refill: 2  - escitalopram (LEXAPRO) 10 MG tablet; Take 1 tablet (10 mg) by mouth daily Take with 5mg tablet for total of 15mg/day.  Dispense: 30 tablet; Refill: 2  - Adult Mental [facility] Referral; Future  - escitalopram (LEXAPRO) 5 MG tablet; Take 1 tablet (5 mg) by mouth daily Take with 10mg tablet for total of 15mg per day.  Dispense: 30 tablet; Refill: 2     3. PTSD (post-traumatic stress disorder)  Worsening recently.  Discussed dual diagnosis program.  Trial of prazosin 1mg at bedtime.  Reviewed risks and benefits, encouraged her to monitor for signs of hypotension.  She will also work on scheduling with her therapist.    - [facility]; Future  - prazosin (MINIPRESS) 1 MG capsule; Take 1 capsule (1 mg) by mouth At Bedtime  Dispense: 30 capsule; Refill: 2       TODAY'S VISIT  HPI Jun 28, 2022  - Doing pretty well overall - no EtOH use (last drink was 5/3) - reading Bible, talking to herself, prayer, taking one day at a time, keeping busy organizing and cleaning which is helpful, wants to start exercising (will be starting PT and plans to get membership to the [facility])  - Still having trouble sleeping  - Takes Remeron 15mg between 9-10pm, falling asleep around 12-1am, sleeps a few hours and then is up again, some nights feels like she is tossing and turning all night  - Feeling more tired and low energy as a result of poor sleep, makes her feel more depressed  - Anxiety remains high, taking hydroxyzine 50mg TID  - Still feels like she is always looking over her shoulder  - Started  "prazosin 1mg at bedtime, this has helped with nightmares but has more dreams in general, does not take every night  - She does not use her CPAP     PHQ 3/8/2022 5/19/2022 6/28/2022   PHQ-9 Total Score 2 4 5   Q9: Thoughts of better off dead/self-harm past 2 weeks Not at all Not at all Not at all     ALISIA-7 SCORE 3/8/2022 5/19/2022 6/28/2022   Total Score 4 5 4       OBJECTIVE                                                    PHYSICAL EXAM:  /66 (BP Location: Right arm, Patient Position: Sitting, Cuff Size: Adult Large)   Pulse 67   Ht 1.62 m (5' 3.78\")   Wt 113.4 kg (250 lb)   BMI 43.21 kg/m      GENERAL: healthy, alert and no distress  EYES: Eyes grossly normal to inspection, conjunctivae and sclerae normal  RESP: No respiratory distress  MS: no gross musculoskeletal defects noted  SKIN: no pallor or jaundice  NEURO:  mentation intact and speech normal, normal gait, no tremor  MENTAL STATUS EXAM  Appearance/Behavior: No appearant distress  Speech: Normal  Mood/Affect: depressed affect and anxiety  Insight: Fair    LAB  Results for orders placed or performed in visit on 06/28/22   Drugs of Abuse 1+ Panel, Urine ([facility] Only)     Status: None   Result Value Ref Range    Amphetamines Urine Screen Negative Screen Negative    Benzodiazepines Urine Screen Negative Screen Negative    Opiates Urine Screen Negative Screen Negative    PCP Urine Screen Negative Screen Negative    Cannabinoids Urine Screen Negative Screen Negative    Barbiturates Urine Screen Negative Screen Negative    Cocaine Urine Screen Negative Screen Negative    Methadone Urine Screen Negative Screen Negative    Oxycodone Urine Screen Negative Screen Negative    Creatinine Urine mg/dL 27 mg/dL    Narrative    Drug                           Screening Threshold    Amphetamines                    1000 ng/mL  Benzodiazepine                   200 ng/mL  Opiates                          300 ng/mL  Phencyclidine                     25 ng/mL  THC " Metabolite                    50 ng/mL  Barbiturates                     200 ng/mL  Cocaine Metabolite               150 ng/mL  Methadone                        300 ng/mL  Oxycodone                        100 ng/mL    Screening results are to be used only for medical purposes.  Unconfirmed screening results are not to be used for non-  medical purposes.         HISTORY                                                    Problem list reviewed & adjusted, as indicated.  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)     Diuretic-induced hypokalemia     Dyspnea on exertion     Elevated LFTs     Ganglion of tendon sheath     GI bleed     Hereditary and idiopathic peripheral neuropathy     History of major depression     Homeless     Major depression, recurrent (H)     Low backache     Airway obstruction due to foreign body, initial encounter     Hypomagnesemia     Mild episode of recurrent major depressive disorder (H)     Morbid obesity (H)     Nicotine dependence     Peripheral edema     Pneumonia of right lower lobe due to infectious organism     Primary localized osteoarthrosis, hand     Primary osteoarthritis of right knee     PTSD (post-traumatic stress disorder)     Seasonal allergies     Skin lesion     Snoring     Trochanteric bursitis of left hip     Vitamin B12 deficiency (non anemic)     Vitamin D deficiency     Acute alcoholic intoxication (H)     Anxiety     Closed fracture of head of left radius     Recurrent falls     Thrombocytopenia (H)      Dermatitis     Depressive disorder     Leukopenia         MEDICATION LIST (prior to visit)  acetaminophen (TYLENOL) 500 MG tablet, Take 1-2 tablets (500-1,000 mg) by mouth every 6 hours as needed for mild pain  albuterol (PROAIR HFA/PROVENTIL HFA/VENTOLIN HFA) 108 (90 Base) MCG/ACT inhaler, Inhale 2 puffs into the lungs every 4 hours as needed   APO-VARENICLINE 0.5 MG tablet,   bumetanide (BUMEX) 1 MG tablet, Take 1 mg by mouth daily   cetirizine (ZYRTEC) 10 MG tablet, Take 10 mg by mouth  escitalopram (LEXAPRO) 10 MG tablet, Take 1 tablet (10 mg) by mouth daily Take with 5mg tablet for total of 15mg/day.  escitalopram (LEXAPRO) 5 MG tablet, Take 1 tablet (5 mg) by mouth daily Take with 10mg tablet for total of 15mg per day.  fluticasone-vilanterol (BREO ELLIPTA) 100-25 MCG/INH inhaler, Inhale 1 puff into the lungs  hydrOXYzine (ATARAX) 25 MG tablet, Take 25-50 mg by mouth 3 times daily as needed for anxiety  ipratropium - albuterol 0.5 mg/2.5 mg/3 mL (DUONEB) 0.5-2.5 (3) MG/3ML neb solution, Take 1 vial (3 mLs) by nebulization every 4 hours as needed for shortness of breath / dyspnea or wheezing  mirtazapine (REMERON) 15 MG tablet, Take 1 tablet (15 mg) by mouth At Bedtime  neomycin-polymyxin-hydrocortisone (CORTISPORIN) 3.5-12765-4 ophthalmic suspension, Place 1-2 drops into both eyes 4 times daily Until clears - no longer than 10 days.  omeprazole (PRILOSEC) 20 MG DR capsule, Take 20 mg by mouth daily   prazosin (MINIPRESS) 1 MG capsule, Take 1 capsule (1 mg) by mouth At Bedtime  triamcinolone (ARISTOCORT HP) 0.5 % external cream, Apply topically 2 times daily  umeclidinium (INCRUSE ELLIPTA) 62.5 MCG/INH inhaler, Inhale 1 puff into the lungs daily  varenicline (CHANTIX) 1 MG tablet, TAKE 1 TABLET(1 MG) BY MOUTH TWICE DAILY    No current facility-administered medications on file prior to visit.      MEDICATION LIST (after visit)  Current Outpatient Medications   Medication     acetaminophen (TYLENOL) 500 MG tablet  "    albuterol (PROAIR HFA/PROVENTIL HFA/VENTOLIN HFA) 108 (90 Base) MCG/ACT inhaler     APO-VARENICLINE 0.5 MG tablet     bumetanide (BUMEX) 1 MG tablet     cetirizine (ZYRTEC) 10 MG tablet     escitalopram (LEXAPRO) 10 MG tablet     escitalopram (LEXAPRO) 5 MG tablet     fluticasone-vilanterol (BREO ELLIPTA) 100-25 MCG/INH inhaler     hydrOXYzine (ATARAX) 25 MG tablet     ipratropium - albuterol 0.5 mg/2.5 mg/3 mL (DUONEB) 0.5-2.5 (3) MG/3ML neb solution     mirtazapine (REMERON) 15 MG tablet     neomycin-polymyxin-hydrocortisone (CORTISPORIN) 3.5-08871-1 ophthalmic suspension     omeprazole (PRILOSEC) 20 MG DR capsule     prazosin (MINIPRESS) 1 MG capsule     triamcinolone (ARISTOCORT HP) 0.5 % external cream     umeclidinium (INCRUSE ELLIPTA) 62.5 MCG/INH inhaler     varenicline (CHANTIX) 1 MG tablet     No current facility-administered medications for this visit.         Allergies   Allergen Reactions     Bupropion Diarrhea     Codeine Hives, Itching, Nausea and Rash     Nickel Unknown     Oxycodone Nausea and Vomiting     Percocet [Oxycodone-Acetaminophen]      Patient reports \"vomiting,grossly ill two weeks ago\"     Topiramate Unknown     Diclofenac Nausea     Tolerates the topical gel     Furosemide Rash     Hydrochlorothiazide Rash     phototoxicity - med was d/lora         Sulfa Drugs Itching and Rash     Sulfasalazine Rash       Melissa Covington [facility] Addiction Medicine  [facility]  [phone]      "

## 2022-06-28 NOTE — PROGRESS NOTES
Reason for visit: In person Follow up  UA obtained: Yes  Lab orders: Current standing orders for:  ETG, Drug screen panel   Patient verified allergies, medications and pharmacy verbally with writer.   No medication refills are needed at this time.   ALISIA-7 scores:  Somewhat difficult  ALISIA-7 SCORE 5/19/2022 6/28/2022   Total Score 5 4     PHQ-9 scores: not difficult at all  PHQ-9 SCORE 5/19/2022 6/28/2022   PHQ-9 Total Score 4 5     Patient states she is ready for visit.  MN  to be reviewed by provider.   Nelly Boateng June 28, 2022 11:15 AM

## 2022-07-01 DIAGNOSIS — F43.10 PTSD (POST-TRAUMATIC STRESS DISORDER): Primary | ICD-10-CM

## 2022-07-01 RX ORDER — HYDROXYZINE HYDROCHLORIDE 25 MG/1
25-50 TABLET, FILM COATED ORAL 3 TIMES DAILY PRN
Qty: 30 TABLET | Refills: 0 | Status: SHIPPED | OUTPATIENT
Start: 2022-07-01 | End: 2022-07-26

## 2022-07-01 NOTE — TELEPHONE ENCOUNTER
Date of Last Office Visit: 6/28/22  Date of Next Office Visit: 7/26/22  No shows since last visit: none  Cancellations since last visit: none    Medication requested: Hydroxyzine 25 mg Date last ordered: 5/26/22 Qty: unknown Refills: unknown     Review of [place] ?: NA    Lapse in medication adherence greater than 5 days?: no  If yes, call patient and gather details: NA  Medication refill request verified as identical to current order?: YES  Result of Last DAM, VPA, Li+ Level, CBC, or Carbamazepine Level (at or since last visit): N/A    Last visit treatment plan: ASSESSMENT/PLAN     1. Alcohol use disorder, severe, dependence (H)  She has not had any EtOH in nearly 2 months, which is one of her longest recent periods of sobriety.  Encouraged her continued efforts.    - Drugs of Abuse 1+ Panel, Urine ([facility] Only)  - Ethyl Glucuronide Screen with Reflex to Confirmation, Urine     2. PTSD (post-traumatic stress disorder)  3. Episode of recurrent major depressive disorder, unspecified depression episode severity (H)  Needs improvement.  We discussed how her anxiety is worsening her sleep which is making her more depressed.  I have also encouraged her to begin using her CPAP which she has not been using.  We also discussed that increasing her mirtazapine would unlikely provide significant benefit and would potentially lead to increased appetite and/or weight gain/difficulty losing weight.  We discussed addressing this more from a behavioral standpoint through therapy and she is open to this - plans to meet to with Alejandra Swann individually.     4. Nicotine dependence, uncomplicated, unspecified nicotine product type  Plan to discuss further at follow-up visit.                 PDMP Review        Value Time User     State PDMP site checked  Yes 5/19/2022 11:44 AM Melissa Covington DO          RTC  Return in about 4 weeks (around 7/26/2022) for Follow up, with me, in person.      []Medication refilled per  Medication  Refill in Ambulatory Care  policy.  [x]Medication unable to be refilled by RN due to criteria not met as indicated below:    []Eligibility - not seen in the last year   []Supervision - no future appointment   []Compliance - no shows, cancellations or lapse in therapy   []Verification - order discrepancy   []Controlled medication   []Medication not included in policy   []90-day supply request   [x]Other

## 2022-07-01 NOTE — TELEPHONE ENCOUNTER
Reason for call:  Medication   If this is a refill request, has the caller requested the refill from the pharmacy already? Yes  Will the patient be using a [facility]? No  Name of the pharmacy and phone number for the current request: [facility] [address] [place], [place]  [phone]    Name of the medication requested: hydrOXYzine (ATARAX) 25 MG tablet    Other request:     Phone number to reach patient:  Home number on file [phone] (home)    Best Time:  ASAP    Can we leave a detailed message on this number?  YES    Travel screening: Not Applicable

## 2022-07-26 ENCOUNTER — OFFICE VISIT (OUTPATIENT)
Dept: ADDICTION MEDICINE | Facility: CLINIC | Age: 67
End: 2022-07-26
Payer: COMMERCIAL

## 2022-07-26 ENCOUNTER — THERAPY VISIT (OUTPATIENT)
Dept: BEHAVIORAL HEALTH | Facility: CLINIC | Age: 67
End: 2022-07-26
Payer: COMMERCIAL

## 2022-07-26 VITALS
WEIGHT: 250 LBS | SYSTOLIC BLOOD PRESSURE: 110 MMHG | HEART RATE: 66 BPM | HEIGHT: 64 IN | BODY MASS INDEX: 42.68 KG/M2 | DIASTOLIC BLOOD PRESSURE: 68 MMHG

## 2022-07-26 DIAGNOSIS — F10.220 ALCOHOL DEPENDENCE WITH UNCOMPLICATED INTOXICATION (H): ICD-10-CM

## 2022-07-26 DIAGNOSIS — F43.10 PTSD (POST-TRAUMATIC STRESS DISORDER): Primary | ICD-10-CM

## 2022-07-26 DIAGNOSIS — G47.00 INSOMNIA, UNSPECIFIED TYPE: ICD-10-CM

## 2022-07-26 DIAGNOSIS — F10.20 ALCOHOL USE DISORDER, SEVERE, DEPENDENCE (H): Primary | ICD-10-CM

## 2022-07-26 DIAGNOSIS — F33.1 MODERATE EPISODE OF RECURRENT MAJOR DEPRESSIVE DISORDER (H): ICD-10-CM

## 2022-07-26 DIAGNOSIS — F33.9 EPISODE OF RECURRENT MAJOR DEPRESSIVE DISORDER, UNSPECIFIED DEPRESSION EPISODE SEVERITY (H): ICD-10-CM

## 2022-07-26 DIAGNOSIS — F43.10 PTSD (POST-TRAUMATIC STRESS DISORDER): ICD-10-CM

## 2022-07-26 PROCEDURE — 90832 PSYTX W PT 30 MINUTES: CPT

## 2022-07-26 PROCEDURE — 99214 OFFICE O/P EST MOD 30 MIN: CPT | Performed by: FAMILY MEDICINE

## 2022-07-26 RX ORDER — PRAZOSIN HYDROCHLORIDE 1 MG/1
1 CAPSULE ORAL AT BEDTIME
Qty: 30 CAPSULE | Refills: 2 | Status: CANCELLED | OUTPATIENT
Start: 2022-07-26

## 2022-07-26 RX ORDER — MIRTAZAPINE 15 MG/1
15 TABLET, FILM COATED ORAL AT BEDTIME
Qty: 30 TABLET | Refills: 2 | Status: SHIPPED | OUTPATIENT
Start: 2022-07-26 | End: 2022-09-29

## 2022-07-26 RX ORDER — ESCITALOPRAM OXALATE 5 MG/1
5 TABLET ORAL DAILY
Qty: 30 TABLET | Refills: 2 | Status: SHIPPED | OUTPATIENT
Start: 2022-07-26 | End: 2022-09-15

## 2022-07-26 RX ORDER — HYDROXYZINE HYDROCHLORIDE 25 MG/1
25-50 TABLET, FILM COATED ORAL 3 TIMES DAILY PRN
Qty: 90 TABLET | Refills: 2 | Status: SHIPPED | OUTPATIENT
Start: 2022-07-26 | End: 2022-09-19

## 2022-07-26 RX ORDER — GABAPENTIN 100 MG/1
100 CAPSULE ORAL 3 TIMES DAILY
Qty: 90 CAPSULE | Refills: 1 | Status: SHIPPED | OUTPATIENT
Start: 2022-07-26 | End: 2022-09-15

## 2022-07-26 RX ORDER — ESCITALOPRAM OXALATE 10 MG/1
10 TABLET ORAL DAILY
Qty: 30 TABLET | Refills: 2 | Status: SHIPPED | OUTPATIENT
Start: 2022-07-26 | End: 2022-09-15

## 2022-07-26 ASSESSMENT — PATIENT HEALTH QUESTIONNAIRE - PHQ9: SUM OF ALL RESPONSES TO PHQ QUESTIONS 1-9: 3

## 2022-07-26 ASSESSMENT — ANXIETY QUESTIONNAIRES
1. FEELING NERVOUS, ANXIOUS, OR ON EDGE: SEVERAL DAYS
4. TROUBLE RELAXING: NOT AT ALL
GAD7 TOTAL SCORE: 3
5. BEING SO RESTLESS THAT IT IS HARD TO SIT STILL: NOT AT ALL
IF YOU CHECKED OFF ANY PROBLEMS ON THIS QUESTIONNAIRE, HOW DIFFICULT HAVE THESE PROBLEMS MADE IT FOR YOU TO DO YOUR WORK, TAKE CARE OF THINGS AT HOME, OR GET ALONG WITH OTHER PEOPLE: SOMEWHAT DIFFICULT
GAD7 TOTAL SCORE: 3
3. WORRYING TOO MUCH ABOUT DIFFERENT THINGS: SEVERAL DAYS
2. NOT BEING ABLE TO STOP OR CONTROL WORRYING: NOT AT ALL
6. BECOMING EASILY ANNOYED OR IRRITABLE: SEVERAL DAYS
7. FEELING AFRAID AS IF SOMETHING AWFUL MIGHT HAPPEN: NOT AT ALL

## 2022-07-26 NOTE — PROGRESS NOTES
[facility]  July 26, 2022      Behavioral Health Clinician Progress Note    Patient Name: Patsy Santamaria           Service Type:  Individual      Service Location:   Face to Face in Clinic     Session Start Time: 01:42pm  Session End Time: 02:00pm      Session Length: 16 - 37      Attendees: Client     Service Modality:  In-person    Visit Activities (Refresh list every visit): NEW, [facility] Mostly and Warm-handoff     Diagnostic Assessment Date: In progress  Treatment Plan Review Date: In progress  See Flowsheets for today's PHQ-9 and ALISIA-7 results  Previous PHQ-9:   PHQ-9 SCORE 3/8/2022 5/19/2022 6/28/2022   PHQ-9 Total Score 2 4 5     Previous ALISIA-7:   ALISIA-7 SCORE 3/8/2022 5/19/2022 6/28/2022   Total Score 4 5 4       TREVOR LEVEL:  No flowsheet data found.    DATA  Extended Session (60+ minutes): No  Interactive Complexity: No  Crisis: No  [facility] Patient: No    Treatment Objective(s) Addressed in This Session:  Target Behavior(s): alcohol use and disease management/lifestyle changes patient is having difficulty sleeping and would like assistance discussing sleep hygeine and ways to stay sober from alcohol    Alcohol / Substance Use: continue to make healthy choices regarding substance use and engage in activities / supportive services that promote sobriety  Psychological distress related to Sleep Disturbance    Current Stressors / Issues:  [facility] was contacted by Patsy's Addiction Medicine provider, Dr. Covington, requesting that she meet with Patsy after their meeting. [facility] and Patsy met for a brief introduction of [facility]'s services and discussed her preliminary goals for wanting to meet with [facility]. Patsy reports that her sleep has been chronically poor over the past several years and medications don't seem to be helping her sleep. She reports experiencing a cycle of sleeping a couple hours during the day and then when she tries to sleep at night her thoughts are racing and  she's not able to sleep and it continues the vicious cycle again. [facility] acknowledged and validated how exhausting it can be to not sleep and discussed how this impacts mood. Patsy reports that she plans to start going to the [facility] to work out and hopefully this will help wear her out during the day more and she will be able to sleep better at night. [facility] discussed meeting with Patsy more regularly to discuss sleep hygiene skills and she agreed that this would be helpful. They are scheduled to meet next Thursday to complete the assessment and talk about treatment planning.      Progress on Treatment Objective(s) / Homework:  New Objective established this session - PRECONTEMPLATION (Not seeing need for change); Intervened by educating the patient about the effects of current behavior on health.  Evoked information about reasons to continue behavior, express concern / recommendations, and explored any change talk    Motivational Interviewing    MI Intervention: Expressed Empathy/Understanding, Open-ended questions and Reflections: simple and complex     Change Talk Expressed by the Patient: Desire to change Reasons to change    Provider Response to Change Talk: E - Evoked more info from patient about behavior change, A - Affirmed patient's thoughts, decisions, or attempts at behavior change, R - Reflected patient's change talk and S - Summarized patient's change talk statements      Care Plan review completed: No    Medication Review:  No changes to current psychiatric medication(s)    Medication Compliance:  Yes    Changes in Health Issues:   None reported    Chemical Use Review:   Substance Use: Chemical use reviewed, no active concerns identified      Tobacco Use: No change in amount of tobacco use since last session.  Patient declined discussion at this time    Assessment: Current Emotional / Mental Status (status of significant symptoms):  Risk status (Self / Other harm or suicidal ideation)  Patient denies a history  of suicidal ideation, suicide attempts, self-injurious behavior, homicidal ideation, homicidal behavior and and other safety concerns  Patient denies current fears or concerns for personal safety.  Patient denies current or recent suicidal ideation or behaviors.  Patient denies current or recent homicidal ideation or behaviors.  Patient denies current or recent self injurious behavior or ideation.  Patient denies other safety concerns.  A safety and risk management plan has not been developed at this time, however patient was encouraged to call [place] [phone] should there be a change in any of these risk factors.    Appearance:   Appropriate   Eye Contact:   Fair   Psychomotor Behavior: Normal   Attitude:   Cooperative  Pleasant Guarded   Orientation:   All  Speech   Rate / Production: Normal/ Responsive   Volume:  Normal   Mood:    Anxious  Depressed   Affect:    Blunted  Flat   Thought Content:  Clear   Thought Form:  Coherent  Logical   Insight:    Fair     Diagnoses:  1. PTSD (post-traumatic stress disorder)    2. Alcohol dependence with uncomplicated intoxication (H)    3. Moderate episode of recurrent major depressive disorder (H)        Collateral Reports Completed:  Not Applicable    Plan: (Homework, other):  Patient was given information about behavioral services and encouraged to schedule a follow up appointment with the clinic [facility] in 1 week.  She was also given information about mental health symptoms and treatment options  and sleep Hygeine recommendations, including a handout with tips and strategies.  CD Recommendations: Maintain Sobriety. JULIO Montejo, [facility]        JULIO Montejo, [facility]  July 26, 2022    Service performed and documented by Marcos Swann  Note reviewed and clinical supervison by COLLEEN Govea LICSW on 8/2/22

## 2022-07-26 NOTE — PATIENT INSTRUCTIONS
Continue previous medications.    Start gabapentin 100mg three times per day (can start by just taking it at night too and then increase to three times per day).    Check out tips for sleep hygiene that are attached.

## 2022-07-26 NOTE — PROGRESS NOTES
Reason for visit: In person Follow up  No UDS per Dr. Covington.  Patient verified allergies, medications and pharmacy verbally with writer.   Medication refill is pended for review and approval by provider.  ALISIA-7 scores:    ALISIA-7 SCORE 6/28/2022 7/26/2022   Total Score 4 3     PHQ-9 scores:   PHQ-9 SCORE 6/28/2022 7/26/2022   PHQ-9 Total Score 5 3     Patient states she is ready for visit.  MN  to be reviewed by provider.   Nelly Boateng July 26, 2022 1:12 PM

## 2022-07-26 NOTE — PROGRESS NOTES
[facility] Addiction Medicine    A/P                                                    ASSESSMENT/PLAN    1. Alcohol use disorder, severe, dependence (H)  She had recent return to use about 2 weeks ago.  Relates how poorly she felt and regrets drinking.  Reflected on events that lead to drinking and how she recognizes that she just cannot drink again.  Discussed medications for AUD and she would like to try using gabapentin (off label) as it may also improve her overall level of anxiety.  Risks and benefits discussed.  Monitor.  - gabapentin (NEURONTIN) 100 MG capsule; Take 1 capsule (100 mg) by mouth 3 times daily  Dispense: 90 capsule; Refill: 1    2. Episode of recurrent major depressive disorder, unspecified depression episode severity (H)  Needs improvement.  Difficulty sleeping is impacting mood, likely brief return to use also impacting mood.  She will continue her current medications.  Plans to meet with clinic [facility] today.    - escitalopram (LEXAPRO) 10 MG tablet; Take 1 tablet (10 mg) by mouth daily Take with 5mg tablet for total of 15mg/day.  Dispense: 30 tablet; Refill: 2  - escitalopram (LEXAPRO) 5 MG tablet; Take 1 tablet (5 mg) by mouth daily Take with 10mg tablet for total of 15mg per day.  Dispense: 30 tablet; Refill: 2  - mirtazapine (REMERON) 15 MG tablet; Take 1 tablet (15 mg) by mouth At Bedtime  Dispense: 30 tablet; Refill: 2    3. PTSD (post-traumatic stress disorder)  Still fees on edge.  OK to continue hydroxyzine.  Prazosin does not seem especially helpful.  Seems addressing the insomnia may be most pressing issue.  - hydrOXYzine (ATARAX) 25 MG tablet; Take 1-2 tablets (25-50 mg) by mouth 3 times daily as needed for anxiety  Dispense: 90 tablet; Refill: 2    4. Insomnia, unspecified type  Discussion regarding sleep hygiene.  Encouraged CPAP use.  Work with [facility] as well.          PDMP Review       Value Time User    State PDMP site checked  Yes 5/19/2022 11:44 AM Melissa Covintgon  R, DO            RTC  Return in about 4 weeks (around 8/23/2022) for Follow up, with me, in person.      Counseled the patient on the importance of having a recovery program in addition to medication to manage recovery.  Components include avoiding isolating, having willingness to change, avoiding triggers and managing cravings. Encouraged having some type of sober network and practicing honesty with trusted support person(s). Encouraged other services such as counseling, 12 step or other self-help organizations.        SUBJECTIVE                                                    Patsy Santamaria is a 67 year old female with a history of peripheral neuropathy, COPD, alcoholic hepatitis, thombocytopenia, arthritis (hands and knees), obesity, anxiety, depression, and AUD who presents to clinic today for follow up.     Brief history of substance use:  Began drinking around age 33.  Became a problem for her in 2016 and she went to treatment for the first time and also experienced EtOH withdrawal seizures.  Has been to multiple treatments (most recently [facility] in [place] in Feb 2022).  Drinking tends to correlate with worsening depression.  Has tried naltrexone and acamprosate in past.  History of of cocaine use (1 year in the 1990s).  Established care with [facility] in March 2022 and has continued to struggle with brief episodes of drinking.       Plan from most recent office visit (5/19/22):    1. Alcohol use disorder, severe, dependence (H)  She has not had any EtOH in nearly 2 months, which is one of her longest recent periods of sobriety.  Encouraged her continued efforts.    - Drugs of Abuse 1+ Panel, Urine ([facility] Only)  - Ethyl Glucuronide Screen with Reflex to Confirmation, Urine     2. PTSD (post-traumatic stress disorder)  3. Episode of recurrent major depressive disorder, unspecified depression episode severity (H)  Needs improvement.  We discussed how  "her anxiety is worsening her sleep which is making her more depressed.  I have also encouraged her to begin using her CPAP which she has not been using.  We also discussed that increasing her mirtazapine would unlikely provide significant benefit and would potentially lead to increased appetite and/or weight gain/difficulty losing weight.  We discussed addressing this more from a behavioral standpoint through therapy and she is open to this - plans to meet to with Alejandra Swann individually.     4. Nicotine dependence, uncomplicated, unspecified nicotine product type  Plan to discuss further at follow-up visit.      TODAY'S VISIT  HPI Jul 26, 2022  - Since last visit she does report brief return to alcohol use the weekend of 7/4, last drink 7/10 - went to ER on 7/10 with numbness/tingling, work-up unremarkable, she thinks in hindsight she was experiencing withdrawal as she felt quite poorly x 3 days (shakes, sweats, diarrhea)  - Reflects on decision to drink - felt triggered by holiday, \"I thought I could have one\" - expresses realization that she cannot have only 1 drink  - Continues to struggle with insomnia, trying to use CPAP (needs new filter)  - Having bad dreams, taking prazosin intermittently but does not find it particularly helpful  - Reviewed bedtime routine - she often watches TV until she falls asleep, also notes new neighbor is noisy and has woken her up in the night  - Joined [employer] last week - hoping increased activity will help energy, sleep, mood, and weight loss  - Continues to smoke varying amounts each day, has nicotrol inhaler and cough drops which help, trying not to smoke at night, has NRT products at home (declines addition prescription today)    ALISIA-7 SCORE 3/8/2022 5/19/2022 6/28/2022   Total Score 4 5 4       PHQ 3/8/2022 5/19/2022 6/28/2022   PHQ-9 Total Score 2 4 5   Q9: Thoughts of better off dead/self-harm past 2 weeks Not at all Not at all Not at all         OBJECTIVE                   " "                                 PHYSICAL EXAM:  /68 (BP Location: Right arm, Patient Position: Sitting, Cuff Size: Adult Large)   Pulse 66   Ht 1.62 m (5' 3.78\")   Wt 113.4 kg (250 lb)   BMI 43.21 kg/m      GENERAL: healthy, alert and no distress  EYES: Eyes grossly normal to inspection, conjunctivae and sclerae normal  RESP: No respiratory distress, no coughing or wheezing  MS: no gross musculoskeletal defects noted  SKIN: no pallor or jaundice, no rashes visible  NEURO: normal gait, no gross deficits, no tremor, mentation intact and speech normal  MENTAL STATUS EXAM  Appearance/Behavior: No appearant distress  Speech: Normal  Mood/Affect: depressed affect, anxiety and flat  Insight: Adequate    LAB  No results found for any visits on 07/26/22.      HISTORY                                                    Problem list reviewed & adjusted, as indicated.  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)     Diuretic-induced hypokalemia     Dyspnea on exertion     Elevated LFTs     Ganglion of tendon sheath     GI bleed     Hereditary and idiopathic peripheral neuropathy     History of major depression     Homeless     Major depression, recurrent (H)     Low backache     Airway obstruction due to foreign body, initial encounter     Hypomagnesemia     Mild episode of recurrent major depressive disorder (H)     Morbid obesity (H)     Nicotine dependence     Peripheral edema     Pneumonia of right lower lobe due to infectious organism     Primary " localized osteoarthrosis, hand     Primary osteoarthritis of right knee     PTSD (post-traumatic stress disorder)     Seasonal allergies     Skin lesion     Snoring     Trochanteric bursitis of left hip     Vitamin B12 deficiency (non anemic)     Vitamin D deficiency     Acute alcoholic intoxication (H)     Anxiety     Closed fracture of head of left radius     Recurrent falls     Thrombocytopenia (H)     Dermatitis     Depressive disorder     Leukopenia         MEDICATION LIST (prior to visit)  acetaminophen (TYLENOL) 500 MG tablet, Take 1-2 tablets (500-1,000 mg) by mouth every 6 hours as needed for mild pain  albuterol (PROAIR HFA/PROVENTIL HFA/VENTOLIN HFA) 108 (90 Base) MCG/ACT inhaler, Inhale 2 puffs into the lungs every 4 hours as needed   APO-VARENICLINE 0.5 MG tablet,   bumetanide (BUMEX) 1 MG tablet, Take 1 mg by mouth daily   cetirizine (ZYRTEC) 10 MG tablet, Take 10 mg by mouth  fluticasone-vilanterol (BREO ELLIPTA) 100-25 MCG/INH inhaler, Inhale 1 puff into the lungs  ipratropium - albuterol 0.5 mg/2.5 mg/3 mL (DUONEB) 0.5-2.5 (3) MG/3ML neb solution, Take 1 vial (3 mLs) by nebulization every 4 hours as needed for shortness of breath / dyspnea or wheezing  neomycin-polymyxin-hydrocortisone (CORTISPORIN) 3.5-56353-8 ophthalmic suspension, Place 1-2 drops into both eyes 4 times daily Until clears - no longer than 10 days.  omeprazole (PRILOSEC) 20 MG DR capsule, Take 20 mg by mouth daily   prazosin (MINIPRESS) 1 MG capsule, Take 1 capsule (1 mg) by mouth At Bedtime  triamcinolone (ARISTOCORT HP) 0.5 % external cream, Apply topically 2 times daily  umeclidinium (INCRUSE ELLIPTA) 62.5 MCG/INH inhaler, Inhale 1 puff into the lungs daily    No current facility-administered medications on file prior to visit.      MEDICATION LIST (after visit)  Current Outpatient Medications   Medication     acetaminophen (TYLENOL) 500 MG tablet     albuterol (PROAIR HFA/PROVENTIL HFA/VENTOLIN HFA) 108 (90 Base) MCG/ACT  "inhaler     APO-VARENICLINE 0.5 MG tablet     bumetanide (BUMEX) 1 MG tablet     cetirizine (ZYRTEC) 10 MG tablet     escitalopram (LEXAPRO) 10 MG tablet     escitalopram (LEXAPRO) 5 MG tablet     fluticasone-vilanterol (BREO ELLIPTA) 100-25 MCG/INH inhaler     gabapentin (NEURONTIN) 100 MG capsule     hydrOXYzine (ATARAX) 25 MG tablet     ipratropium - albuterol 0.5 mg/2.5 mg/3 mL (DUONEB) 0.5-2.5 (3) MG/3ML neb solution     mirtazapine (REMERON) 15 MG tablet     neomycin-polymyxin-hydrocortisone (CORTISPORIN) 3.5-92305-9 ophthalmic suspension     omeprazole (PRILOSEC) 20 MG DR capsule     prazosin (MINIPRESS) 1 MG capsule     triamcinolone (ARISTOCORT HP) 0.5 % external cream     umeclidinium (INCRUSE ELLIPTA) 62.5 MCG/INH inhaler     No current facility-administered medications for this visit.         Allergies   Allergen Reactions     Bupropion Diarrhea     Codeine Hives, Itching, Nausea and Rash     Nickel Unknown     Oxycodone Nausea and Vomiting     Percocet [Oxycodone-Acetaminophen]      Patient reports \"vomiting,grossly ill two weeks ago\"     Topiramate Unknown     Diclofenac Nausea     Tolerates the topical gel     Furosemide Rash     Hydrochlorothiazide Rash     phototoxicity - med was d/lora         Sulfa Drugs Itching and Rash     Sulfasalazine Rash       Melissa Covington [facility] Addiction Medicine  [facility]  [phone]      "

## 2022-07-27 NOTE — PROGRESS NOTES
[facility] Post-Appointment Chart -check      Correct pharmacy verified with patient and updated in chart? [x] yes []no    Medications ordered this visit were e-scribed.  Verified by order class [x] yes  [] no    List Medications: gabapentin (NEURONTIN) 100 MG capsule; hydrOXYzine (ATARAX) 25 MG tablet; escitalopram (LEXAPRO) 5 MG tablet; escitalopram (LEXAPRO) 10 MG tablet; mirtazapine (REMERON) 15 MG tablet     Medication changes or discontinuations were communicated to patient's pharmacy: [] yes  [x] no    UA collected [] yes  [x] no  [] n/a-virtual     Outside referrals / labs, etc support staff to follow up: [] yes  [x] no    Future appointment was made: [x] yes  [] no  [] n/a  Future Appointments 7/27/2022 - 1/23/2023              Date Visit Type Length Department Provider     8/4/2022  2:00 PM [facility] NEW 60 min [facility] BEHAVIORAL Alejandra Swann, GOOD              8/23/2022  1:00 PM ADDICTION MED RETURN 30 min [facility] ADDICTION MEDICINE Melissa Covington DO                   Dictation completed at time of chart check: [x] yes  [] no    I have checked the documentation for today s encounters and the above information has been reviewed and completed.      Nelly Boateng on July 27, 2022 at 8:53 AM

## 2022-08-23 ENCOUNTER — TRANSFERRED RECORDS (OUTPATIENT)
Dept: HEALTH INFORMATION MANAGEMENT | Facility: CLINIC | Age: 67
End: 2022-08-23

## 2022-09-06 ENCOUNTER — NURSE TRIAGE (OUTPATIENT)
Dept: NURSING | Facility: CLINIC | Age: 67
End: 2022-09-06

## 2022-09-06 NOTE — TELEPHONE ENCOUNTER
Covid screening completed    Since Saturday abdominal pain.  BM mushy and hurts.    8.5/10 abdominal pain now. Severe pain.    Per protocol needs to go to ER.    Care advice given per protocol recommendations. Caller verbalizes understanding    Kimberley Pichardo RN  [facility] Nurse Advisor      Reason for Disposition    SEVERE abdominal pain (e.g., excruciating)    Additional Information    Negative: Passed out (i.e., fainted, collapsed and was not responding)    Negative: Shock suspected (e.g., cold/pale/clammy skin, too weak to stand, low BP, rapid pulse)    Negative: Sounds like a life-threatening emergency to the triager    Protocols used: ABDOMINAL PAIN - FEMALE-A-OH

## 2022-09-08 ENCOUNTER — OFFICE VISIT (OUTPATIENT)
Dept: FAMILY MEDICINE | Facility: CLINIC | Age: 67
End: 2022-09-08
Payer: COMMERCIAL

## 2022-09-08 VITALS
DIASTOLIC BLOOD PRESSURE: 81 MMHG | WEIGHT: 257 LBS | HEART RATE: 77 BPM | TEMPERATURE: 98.1 F | SYSTOLIC BLOOD PRESSURE: 136 MMHG | RESPIRATION RATE: 24 BRPM | BODY MASS INDEX: 44.42 KG/M2

## 2022-09-08 DIAGNOSIS — R10.32 ABDOMINAL PAIN, LEFT LOWER QUADRANT: Primary | ICD-10-CM

## 2022-09-08 LAB
ALBUMIN UR-MCNC: NEGATIVE MG/DL
APPEARANCE UR: CLEAR
BILIRUB UR QL STRIP: NEGATIVE
COLOR UR AUTO: YELLOW
ERYTHROCYTE [DISTWIDTH] IN BLOOD BY AUTOMATED COUNT: 13.7 % (ref 10–15)
GLUCOSE UR STRIP-MCNC: NEGATIVE MG/DL
HCT VFR BLD AUTO: 45.3 % (ref 35–47)
HGB BLD-MCNC: 15.1 G/DL (ref 11.7–15.7)
HGB UR QL STRIP: NEGATIVE
KETONES UR STRIP-MCNC: NEGATIVE MG/DL
LEUKOCYTE ESTERASE UR QL STRIP: NEGATIVE
MCH RBC QN AUTO: 30.8 PG (ref 26.5–33)
MCHC RBC AUTO-ENTMCNC: 33.3 G/DL (ref 31.5–36.5)
MCV RBC AUTO: 92 FL (ref 78–100)
NITRATE UR QL: NEGATIVE
PH UR STRIP: 5.5 [PH] (ref 5–8)
PLATELET # BLD AUTO: 247 10E3/UL (ref 150–450)
RBC # BLD AUTO: 4.91 10E6/UL (ref 3.8–5.2)
SP GR UR STRIP: 1.01 (ref 1–1.03)
UROBILINOGEN UR STRIP-ACNC: 0.2 E.U./DL
WBC # BLD AUTO: 9.3 10E3/UL (ref 4–11)

## 2022-09-08 PROCEDURE — 81003 URINALYSIS AUTO W/O SCOPE: CPT | Performed by: FAMILY MEDICINE

## 2022-09-08 PROCEDURE — 80053 COMPREHEN METABOLIC PANEL: CPT | Performed by: FAMILY MEDICINE

## 2022-09-08 PROCEDURE — 99214 OFFICE O/P EST MOD 30 MIN: CPT | Performed by: FAMILY MEDICINE

## 2022-09-08 PROCEDURE — 85027 COMPLETE CBC AUTOMATED: CPT | Performed by: FAMILY MEDICINE

## 2022-09-08 PROCEDURE — 36415 COLL VENOUS BLD VENIPUNCTURE: CPT | Performed by: FAMILY MEDICINE

## 2022-09-08 RX ORDER — HYDROCODONE BITARTRATE AND ACETAMINOPHEN 5; 325 MG/1; MG/1
1 TABLET ORAL EVERY 6 HOURS PRN
Qty: 10 TABLET | Refills: 0 | Status: SHIPPED | OUTPATIENT
Start: 2022-09-08 | End: 2022-09-11

## 2022-09-08 NOTE — PROGRESS NOTES
Assessment/ Plan  1. Abdominal pain, left lower quadrant  I am quite concerned about diverticulitis or other pelvic inflammatory process.  Stat CT, unable to do tonight but I think tomorrow morning is reasonable  She has had a history of diverticulitis by her account, will start Augmentin empirically.  Warned that if she worsens, is unable to take anything p.o., stops passing gas etc., needs to go to the emergency room  Reluctantly agreed to a few hydrocodone, she is tolerated this in the past.  - CT Abdomen Pelvis w Contrast; Future  - Comprehensive metabolic panel; Future  - CBC with platelets; Future  - UA reflex to Microscopic and Culture; Future  - Comprehensive metabolic panel  - CBC with platelets  - UA reflex to Microscopic and Culture  - amoxicillin-clavulanate (AUGMENTIN) 875-125 MG tablet; Take 1 tablet by mouth 2 times daily  Dispense: 20 tablet; Refill: 0  - HYDROcodone-acetaminophen (NORCO) 5-325 MG tablet; Take 1 tablet by mouth every 6 hours as needed for severe pain  Dispense: 10 tablet; Refill: 0     Body mass index is 44.42 kg/m .    Subjective  CC:  chief complaint  HPI:  4-day history of left lower quadrant abdominal pain.  Pain is fairly sharp, increases when she tries to have a bowel movement.  Caliber bowel movements have gotten significantly smaller and difficult to pass.  Increasing pain when she hit bumps in her car on the way over.  Also hurts to walk  No fever noted but she had some chills last night  No nausea or vomiting but definitely decreased appetite/early satiety    No significant shortness of breath        Patient Active Problem List   Diagnosis     Alcohol dependence with uncomplicated intoxication (H)     Alcohol abuse     Wheezing     Alcohol dependence with intoxication with complication (H)     Edema, unspecified type     Abdominal pain, epigastric     Alcoholic hepatitis     Bilateral edema of lower extremity     Biliary colic     Carpal tunnel syndrome on both sides      Cervical disc disease     Chronic reflux esophagitis     Claustrophobia     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)     Diuretic-induced hypokalemia     Dyspnea on exertion     Elevated LFTs     Ganglion of tendon sheath     GI bleed     Hereditary and idiopathic peripheral neuropathy     History of major depression     Homeless     Major depression, recurrent (H)     Low backache     Airway obstruction due to foreign body, initial encounter     Hypomagnesemia     Mild episode of recurrent major depressive disorder (H)     Morbid obesity (H)     Nicotine dependence     Peripheral edema     Pneumonia of right lower lobe due to infectious organism     Primary localized osteoarthrosis, hand     Primary osteoarthritis of right knee     PTSD (post-traumatic stress disorder)     Seasonal allergies     Skin lesion     Snoring     Trochanteric bursitis of left hip     Vitamin B12 deficiency (non anemic)     Vitamin D deficiency     Acute alcoholic intoxication (H)     Anxiety     Closed fracture of head of left radius     Recurrent falls     Thrombocytopenia (H)     Dermatitis     Depressive disorder     Leukopenia     Current medications reviewed as follows:  acetaminophen (TYLENOL) 500 MG tablet, Take 1-2 tablets (500-1,000 mg) by mouth every 6 hours as needed for mild pain  albuterol (PROAIR HFA/PROVENTIL HFA/VENTOLIN HFA) 108 (90 Base) MCG/ACT inhaler, Inhale 2 puffs into the lungs every 4 hours as needed   APO-VARENICLINE 0.5 MG tablet,   bumetanide (BUMEX) 1 MG tablet, Take 1 mg by mouth daily   cetirizine (ZYRTEC) 10 MG tablet, Take 10 mg by mouth  escitalopram (LEXAPRO) 10 MG tablet, Take 1 tablet (10 mg) by mouth daily Take with 5mg tablet for total of 15mg/day.  escitalopram (LEXAPRO) 5 MG tablet, Take 1 tablet (5 mg) by mouth daily Take with 10mg tablet for total of 15mg per day.  fluticasone-vilanterol (BREO ELLIPTA) 100-25 MCG/INH inhaler, Inhale 1 puff into the lungs  gabapentin  (NEURONTIN) 100 MG capsule, Take 1 capsule (100 mg) by mouth 3 times daily  hydrOXYzine (ATARAX) 25 MG tablet, Take 1-2 tablets (25-50 mg) by mouth 3 times daily as needed for anxiety  ipratropium - albuterol 0.5 mg/2.5 mg/3 mL (DUONEB) 0.5-2.5 (3) MG/3ML neb solution, Take 1 vial (3 mLs) by nebulization every 4 hours as needed for shortness of breath / dyspnea or wheezing  mirtazapine (REMERON) 15 MG tablet, Take 1 tablet (15 mg) by mouth At Bedtime  neomycin-polymyxin-hydrocortisone (CORTISPORIN) 3.5-41453-3 ophthalmic suspension, Place 1-2 drops into both eyes 4 times daily Until clears - no longer than 10 days.  omeprazole (PRILOSEC) 20 MG DR capsule, Take 20 mg by mouth daily   prazosin (MINIPRESS) 1 MG capsule, Take 1 capsule (1 mg) by mouth At Bedtime  triamcinolone (ARISTOCORT HP) 0.5 % external cream, Apply topically 2 times daily  umeclidinium (INCRUSE ELLIPTA) 62.5 MCG/INH inhaler, Inhale 1 puff into the lungs daily    No current facility-administered medications on file prior to visit.     History   Smoking Status     Current Every Day Smoker     Packs/day: 0.50     Years: 49.00     Types: Cigarettes   Smokeless Tobacco     Never Used     Comment: 1/2 ppd     Social History     Social History Narrative     Not on file     Patient Care Team:  Yanique Cali MD as PCP - General (Family Practice)  Yanique Cali MD as Assigned PCP  Ana Cristina Rubio MD as Assigned Neuroscience Provider  Keyon Kapoor DPM as Assigned Musculoskeletal Provider  ROS  As above      Objective  Physical Exam  Vitals:    09/08/22 1523   BP: 136/81   BP Location: Right arm   Patient Position: Sitting   Cuff Size: Adult Large   Pulse: 77   Resp: 24   Temp: 98.1  F (36.7  C)   TempSrc: Temporal   Weight: 116.6 kg (257 lb)     Gen- alert, oriented/ appropriately responsive  Obese  HEENT- normal cephalic, atraumatic.   Chest- Normal inspiration and expiration.   Clear to ascultation.   No chest  wall deformity or scar.  CV- Heart regular rate and rhythm  normal tones,  No murmurs   No gallops or rubs  Abdomen appearance is obese, difficult to assess, distended  Bowel sounds are normal.  soft and tender in the epigastrium, somewhat even in the right lower quadrant, not the left.  No rebound.  No guarding  There is no organomegaly or mass noted.    Rectal exam was little bit difficult given her mass and somewhat tender on the left side.  No mass noted bimanual pelvic exam normal  Skin- warm and dry,   no visualized rash    Diagnostics  Results for orders placed or performed in visit on 09/08/22   CBC with platelets     Status: Normal   Result Value Ref Range    WBC Count 9.3 4.0 - 11.0 10e3/uL    RBC Count 4.91 3.80 - 5.20 10e6/uL    Hemoglobin 15.1 11.7 - 15.7 g/dL    Hematocrit 45.3 35.0 - 47.0 %    MCV 92 78 - 100 fL    MCH 30.8 26.5 - 33.0 pg    MCHC 33.3 31.5 - 36.5 g/dL    RDW 13.7 10.0 - 15.0 %    Platelet Count 247 150 - 450 10e3/uL   UA reflex to Microscopic and Culture     Status: Normal    Specimen: Urine, Midstream   Result Value Ref Range    Color Urine Yellow Colorless, Straw, Light Yellow, Yellow    Appearance Urine Clear Clear    Glucose Urine Negative Negative mg/dL    Bilirubin Urine Negative Negative    Ketones Urine Negative Negative mg/dL    Specific Gravity Urine 1.010 1.005 - 1.030    Blood Urine Negative Negative    pH Urine 5.5 5.0 - 8.0    Protein Albumin Urine Negative Negative mg/dL    Urobilinogen Urine 0.2 0.2, 1.0 E.U./dL    Nitrite Urine Negative Negative    Leukocyte Esterase Urine Negative Negative    Narrative    Microscopic not indicated         Please note: Voice recognition software was used in this dictation.  It may therefore contain typographical errors.          Answers for HPI/ROS submitted by the patient on 9/8/2022  How many servings of fruits and vegetables do you eat daily?: 2-3  On average, how many sweetened beverages do you drink each day (Examples: soda,  juice, sweet tea, etc.  Do NOT count diet or artificially sweetened beverages)?: 1  How many minutes a day do you exercise enough to make your heart beat faster?: 9 or less  How many days a week do you exercise enough to make your heart beat faster?: 3 or less  How many days per week do you miss taking your medication?: 1  What makes it hard for you to take your medication every day?: other  What is the reason for your visit today?: abdominal pain  When did your symptoms begin?: 3-7 days ago  What are your symptoms?: severe pain  How would you describe these symptoms?: Severe  Are your symptoms:: Worsening  Have you had these symptoms before?: No  Is there anything that makes you feel worse?: when i try to have bowel movement, can't really walk  Is there anything that makes you feel better?: none

## 2022-09-09 ENCOUNTER — HOSPITAL ENCOUNTER (OUTPATIENT)
Dept: CT IMAGING | Facility: HOSPITAL | Age: 67
Discharge: HOME | End: 2022-09-09
Attending: FAMILY MEDICINE | Admitting: FAMILY MEDICINE
Payer: COMMERCIAL

## 2022-09-09 DIAGNOSIS — R10.32 ABDOMINAL PAIN, LEFT LOWER QUADRANT: ICD-10-CM

## 2022-09-09 PROBLEM — K70.30 ALCOHOLIC CIRRHOSIS OF LIVER WITHOUT ASCITES (H): Status: ACTIVE | Noted: 2022-09-09

## 2022-09-09 PROBLEM — K57.32 DIVERTICULITIS OF COLON: Status: ACTIVE | Noted: 2022-09-09

## 2022-09-09 LAB
ALBUMIN SERPL BCG-MCNC: 4.3 G/DL (ref 3.5–5.2)
ALP SERPL-CCNC: 82 U/L (ref 35–104)
ALT SERPL W P-5'-P-CCNC: 26 U/L (ref 10–35)
ANION GAP SERPL CALCULATED.3IONS-SCNC: 17 MMOL/L (ref 7–15)
AST SERPL W P-5'-P-CCNC: 30 U/L (ref 10–35)
BILIRUB SERPL-MCNC: 0.4 MG/DL
BUN SERPL-MCNC: 8.4 MG/DL (ref 8–23)
CALCIUM SERPL-MCNC: 9.8 MG/DL (ref 8.8–10.2)
CHLORIDE SERPL-SCNC: 104 MMOL/L (ref 98–107)
CREAT SERPL-MCNC: 0.49 MG/DL (ref 0.51–0.95)
DEPRECATED HCO3 PLAS-SCNC: 22 MMOL/L (ref 22–29)
GFR SERPL CREATININE-BSD FRML MDRD: >90 ML/MIN/1.73M2
GLUCOSE SERPL-MCNC: 110 MG/DL (ref 70–99)
POTASSIUM SERPL-SCNC: 3.8 MMOL/L (ref 3.4–5.3)
PROT SERPL-MCNC: 7.3 G/DL (ref 6.4–8.3)
SODIUM SERPL-SCNC: 143 MMOL/L (ref 136–145)

## 2022-09-09 PROCEDURE — 255N000002 HC RX 255 OP 636: Performed by: FAMILY MEDICINE

## 2022-09-09 PROCEDURE — 74177 CT ABD & PELVIS W/CONTRAST: CPT

## 2022-09-09 RX ADMIN — IOHEXOL 100 ML: 350 INJECTION, SOLUTION INTRAVENOUS at 11:56

## 2022-09-12 DIAGNOSIS — R10.32 ABDOMINAL PAIN, LEFT LOWER QUADRANT: ICD-10-CM

## 2022-09-12 NOTE — TELEPHONE ENCOUNTER
Medication Question or Refill        What medication are you calling about (include dose and sig)?:   HYDROcodone-acetaminophen (NORCO) 5-325 MG tablet    Controlled Substance Agreement on file:   CSA -- Patient Level:    CSA: None found at the patient level.       Who prescribed the medication?: Dr. Bello    Do you need a refill? Yes:     Patient called to request a refill of this medication. She is still experiencing pain. Please send refill to pharmacy if appropriate.     When did you use the medication last? today    Patient offered an appointment? No    Do you have any questions or concerns?  No    Preferred Pharmacy:     [id number] - [place], [place] - [address] [facility] [place] [address] [place] [address]  Phone: [phone] Fax: [phone]      Okay to leave a detailed message?: Yes at Home number on file [phone] (home)

## 2022-09-13 RX ORDER — HYDROCODONE BITARTRATE AND ACETAMINOPHEN 5; 325 MG/1; MG/1
1 TABLET ORAL EVERY 6 HOURS PRN
Qty: 10 TABLET | Refills: 0 | OUTPATIENT
Start: 2022-09-13

## 2022-09-15 ENCOUNTER — OFFICE VISIT (OUTPATIENT)
Dept: ADDICTION MEDICINE | Facility: CLINIC | Age: 67
End: 2022-09-15
Payer: COMMERCIAL

## 2022-09-15 VITALS — OXYGEN SATURATION: 97 % | SYSTOLIC BLOOD PRESSURE: 152 MMHG | DIASTOLIC BLOOD PRESSURE: 92 MMHG | HEART RATE: 73 BPM

## 2022-09-15 DIAGNOSIS — Z59.9 FINANCIAL DIFFICULTY: ICD-10-CM

## 2022-09-15 DIAGNOSIS — F10.20 ALCOHOL USE DISORDER, SEVERE, DEPENDENCE (H): Primary | ICD-10-CM

## 2022-09-15 DIAGNOSIS — F33.9 EPISODE OF RECURRENT MAJOR DEPRESSIVE DISORDER, UNSPECIFIED DEPRESSION EPISODE SEVERITY (H): ICD-10-CM

## 2022-09-15 PROCEDURE — 99214 OFFICE O/P EST MOD 30 MIN: CPT | Performed by: FAMILY MEDICINE

## 2022-09-15 RX ORDER — ESCITALOPRAM OXALATE 5 MG/1
5 TABLET ORAL DAILY
Qty: 30 TABLET | Refills: 2 | Status: SHIPPED | OUTPATIENT
Start: 2022-09-15 | End: 2022-12-20

## 2022-09-15 RX ORDER — ESCITALOPRAM OXALATE 10 MG/1
10 TABLET ORAL DAILY
Qty: 30 TABLET | Refills: 2 | Status: SHIPPED | OUTPATIENT
Start: 2022-09-15 | End: 2022-12-20

## 2022-09-15 RX ORDER — GABAPENTIN 100 MG/1
100 CAPSULE ORAL 3 TIMES DAILY
Qty: 90 CAPSULE | Refills: 1 | Status: SHIPPED | OUTPATIENT
Start: 2022-09-15 | End: 2022-10-27 | Stop reason: ALTCHOICE

## 2022-09-15 SDOH — ECONOMIC STABILITY - INCOME SECURITY: PROBLEM RELATED TO HOUSING AND ECONOMIC CIRCUMSTANCES, UNSPECIFIED: Z59.9

## 2022-09-15 ASSESSMENT — PATIENT HEALTH QUESTIONNAIRE - PHQ9: SUM OF ALL RESPONSES TO PHQ QUESTIONS 1-9: 6

## 2022-09-15 ASSESSMENT — ANXIETY QUESTIONNAIRES
7. FEELING AFRAID AS IF SOMETHING AWFUL MIGHT HAPPEN: NOT AT ALL
6. BECOMING EASILY ANNOYED OR IRRITABLE: SEVERAL DAYS
1. FEELING NERVOUS, ANXIOUS, OR ON EDGE: SEVERAL DAYS
3. WORRYING TOO MUCH ABOUT DIFFERENT THINGS: SEVERAL DAYS
5. BEING SO RESTLESS THAT IT IS HARD TO SIT STILL: NOT AT ALL
4. TROUBLE RELAXING: SEVERAL DAYS
GAD7 TOTAL SCORE: 5
GAD7 TOTAL SCORE: 5
2. NOT BEING ABLE TO STOP OR CONTROL WORRYING: SEVERAL DAYS

## 2022-09-15 ASSESSMENT — PAIN SCALES - GENERAL: PAINLEVEL: EXTREME PAIN (8)

## 2022-09-15 NOTE — PROGRESS NOTES
[facility] - Addiction Medicine       Rooming information:    Pt was SOB but improved after sitting. Pt c/o of abdominal pain 8/10    Approximate last use ETOH substance ~ 1.5 weeks ago    Point of care urine drug screen positive for:  Urine Drug Screen Results  AMP: Negative  BAR: Negative  BUP: Negative  BZO: Negative  ELICEO: Negative  mAMP: Negative  MDMA : Negative  MTD: Negative  [id number]: Negative  OXY: Negative  PCP : Negative  THC : Negative]  *POC urine drug screen does not screen for Fentanyl    PHQ-9 Scores: 6  PHQ 6/28/2022 7/26/2022 9/15/2022   PHQ-9 Total Score 5 3 6   Q9: Thoughts of better off dead/self-harm past 2 weeks Not at all Not at all Not at all     ALISIA-7 Scores: 5  ALISIA-7 SCORE 6/28/2022 7/26/2022 9/15/2022   Total Score 4 3 5       Any other recent substance use:     Denies    NICOTINE-Yes:   If using nicotine, ready to quit? Not sure    Side effects related to medications pt would like to discuss with provider (constipation, dry mouth, HA, GI upset, sedation?) denies    Narcan currently available: N/A    Primary care provider: EJ WELLER MD     Mental health provider: not yet (follow up on MH referral if needed)    Any housing, insurance deficits?: denies    Contact information up to date? yes    3rd Party Involvement NA (please obtain LUDIN if pt would like to include)        Melissa Arleen  September 15, 2022  11:20 AM    MH&A Post-Appointment Chart -check      Medications ordered this visit were e-scribed.  Verified by order class [x] yes  [] no    List Medications:      Class: E-Prescribe    Medication changes or discontinuations were communicated to patient's pharmacy: [] yes  [x] no    UA collected [x] yes  [] no  [] n/a-virtual     Outside referrals / labs, etc support staff to follow up: [] yes  [x] no    Future appointment was made: [x] yes  [] no  [] n/a    Dictation completed at time of chart check: [] yes  [x] no    I have checked the documentation  for today s encounters and the above information has been reviewed and completed.      Melissa Bacon on September 15, 2022 at 4:00 PM

## 2022-09-15 NOTE — Clinical Note
Hi!  Just saw Patsy - she was feeling better (from diverticulitis standpoint) but now having increasing pain again.  Encouraged her to call your office and reviewed ER precautions.  Thanks! Sonia

## 2022-09-15 NOTE — PROGRESS NOTES
[facility] Addiction Medicine    A/P                                                    ASSESSMENT/PLAN    1. Alcohol use disorder, severe, dependence (H)  Reflected on diagnosis of cirrhosis per recent imaging and how this shows some progression from past imaging.  Discussed on abstinence is cornerstone of treatment.  She will continue gabapentin.  Encouraged meetings.  - gabapentin (NEURONTIN) 100 MG capsule; Take 1 capsule (100 mg) by mouth 3 times daily  Dispense: 90 capsule; Refill: 1    2. Episode of recurrent major depressive disorder, unspecified depression episode severity (H)  Needs improvement.  Continue Lexapro.   Planning to start therapy.  - escitalopram (LEXAPRO) 10 MG tablet; Take 1 tablet (10 mg) by mouth daily Take with 5mg tablet for total of 15mg/day.  Dispense: 30 tablet; Refill: 2  - escitalopram (LEXAPRO) 5 MG tablet; Take 1 tablet (5 mg) by mouth daily Take with 10mg tablet for total of 15mg per day.  Dispense: 30 tablet; Refill: 2  - Specialty Care Coordination Referral; Future    3. Financial difficulty  Recommend working with care coordination.  - Specialty Care Coordination Referral; Future    Discussed that she is currently being treated for diverticulitis.  Discussed that if symptoms are not improving or are worsening she needs to go to ER.        PDMP Review       Value Time User    State PDMP site checked  Yes 9/15/2022  1:29 PM Melissa Covington, DO            RTC  Return in about 4 weeks (around 10/13/2022) for Follow up, with me, in person (2-4 weeks).      Counseled the patient on the importance of having a recovery program in addition to medication to manage recovery.  Components include avoiding isolating, having willingness to change, avoiding triggers and managing cravings. Encouraged having some type of sober network and practicing honesty with trusted support person(s). Encouraged other services such as counseling, 12 step or other self-help organizations.           SUBJECTIVE                                                    Patsy Santamaria is a 67 year old female with a history of peripheral neuropathy, COPD, alcoholic hepatitis, thombocytopenia, arthritis (hands and knees), obesity, anxiety, depression, and AUD who presents to clinic today for follow up.     Brief history of substance use:  Began drinking around age 33.  Became a problem for her in 2016 and she went to treatment for the first time and also experienced EtOH withdrawal seizures.  Has been to multiple treatments (most recently [facility] in [place] in Feb 2022).  Drinking tends to correlate with worsening depression.  Has tried naltrexone and acamprosate in past.  History of of cocaine use (1 year in the 1990s).  Established care with [facility] in March 2022 and has continued to struggle with brief episodes of drinking.       Plan from most recent office visit (7/26/22):  1. Alcohol use disorder, severe, dependence (H)  She had recent return to use about 2 weeks ago.  Relates how poorly she felt and regrets drinking.  Reflected on events that lead to drinking and how she recognizes that she just cannot drink again.  Discussed medications for AUD and she would like to try using gabapentin (off label) as it may also improve her overall level of anxiety.  Risks and benefits discussed.  Monitor.  - gabapentin (NEURONTIN) 100 MG capsule; Take 1 capsule (100 mg) by mouth 3 times daily  Dispense: 90 capsule; Refill: 1     2. Episode of recurrent major depressive disorder, unspecified depression episode severity (H)  Needs improvement.  Difficulty sleeping is impacting mood, likely brief return to use also impacting mood.  She will continue her current medications.  Plans to meet with clinic [facility] today.    - escitalopram (LEXAPRO) 10 MG tablet; Take 1 tablet (10 mg) by mouth daily Take with 5mg tablet for total of 15mg/day.  Dispense: 30 tablet; Refill: 2  -  escitalopram (LEXAPRO) 5 MG tablet; Take 1 tablet (5 mg) by mouth daily Take with 10mg tablet for total of 15mg per day.  Dispense: 30 tablet; Refill: 2  - mirtazapine (REMERON) 15 MG tablet; Take 1 tablet (15 mg) by mouth At Bedtime  Dispense: 30 tablet; Refill: 2     3. PTSD (post-traumatic stress disorder)  Still fees on edge.  OK to continue hydroxyzine.  Prazosin does not seem especially helpful.  Seems addressing the insomnia may be most pressing issue.  - hydrOXYzine (ATARAX) 25 MG tablet; Take 1-2 tablets (25-50 mg) by mouth 3 times daily as needed for anxiety  Dispense: 90 tablet; Refill: 2     4. Insomnia, unspecified type  Discussion regarding sleep hygiene.  Encouraged CPAP use.  Work with [facility] as well.      TODAY'S VISIT  HPI Sep 15, 2022  - Very depressed - isolating, crying, low motivation - planning to start therapy (provided through [facility])  - Working on getting bottom teeth pulled (will need to be done under anesthesia in the hospital)  - Being treated for diverticulitis - improving, still some abdominal pain (about 20% improved, felt it improved initially but now worsening again) and loose stools, no blood in stools, no fevers, poor appetite but feels she is drinking well  - No alcohol for 1.5 weeks due to finding out she has liver cirrhosis on CT scan, plans to discuss referral to hepatology with PCP  -  Feeling short of breath when she arrived to clinic, walked to clinic in warm/humid air, now feels her breathing is back to normal, pulse ox 97%    PHQ 7/26/2022 9/15/2022 9/29/2022   PHQ-9 Total Score 3 6 5   Q9: Thoughts of better off dead/self-harm past 2 weeks Not at all Not at all Not at all     ALISIA-7 SCORE 7/26/2022 9/15/2022 9/29/2022   Total Score 3 5 4       OBJECTIVE                                                    PHYSICAL EXAM:  BP (!) 152/92 (BP Location: Right arm, Patient Position: Sitting, Cuff Size: Adult Large)   Pulse 73   SpO2 97%     GENERAL: healthy, alert and no  distress  EYES: Eyes grossly normal to inspection, conjunctivae and sclerae normal  RESP: No respiratory distress, no coughing or wheezing  ABDOMEN: soft, mild LLQ tenderness without rebound/rigidity, pain not worse when dropping down to heels from tip toes  MS: no gross musculoskeletal defects noted, no edema  SKIN: no suspicious lesions or rashes, no pallor or jaundice  MENTAL STATUS EXAM  Appearance/Behavior: No appearant distress  Speech: Normal  Mood/Affect: depressed affect and anxiety  Insight: Fair    LAB  No results found for any visits on 09/15/22.      HISTORY                                                    Problem list reviewed & adjusted, as indicated.  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)     Diuretic-induced hypokalemia     Dyspnea on exertion     Elevated LFTs     Ganglion of tendon sheath     GI bleed     Hereditary and idiopathic peripheral neuropathy     History of major depression     Homeless     Major depression, recurrent (H)     Low backache     Airway obstruction due to foreign body, initial encounter     Hypomagnesemia     Mild episode of recurrent major depressive disorder (H)     Morbid obesity (H)     Smoker     Peripheral edema     Pneumonia of right lower lobe due to infectious organism     Primary localized osteoarthrosis, hand     Primary osteoarthritis of right knee     PTSD (post-traumatic stress disorder)     Seasonal allergies     Skin lesion     Snoring     Trochanteric bursitis of left hip     Vitamin B12 deficiency  (non anemic)     Vitamin D deficiency     Acute alcoholic intoxication (H)     Anxiety     Closed fracture of head of left radius     Recurrent falls     Thrombocytopenia (H)     Dermatitis     Depressive disorder     Leukopenia     Alcoholic cirrhosis of liver without ascites (H)     Sigmoid Diverticulitis     Mixed simple and mucopurulent chronic bronchitis (H)         MEDICATION LIST (prior to visit)  mirtazapine (REMERON) 15 MG tablet, Take 1 tablet (15 mg) by mouth At Bedtime  albuterol (PROAIR HFA/PROVENTIL HFA/VENTOLIN HFA) 108 (90 Base) MCG/ACT inhaler, Inhale 2 puffs into the lungs every 4 hours as needed   bumetanide (BUMEX) 1 MG tablet, Take 1 mg by mouth daily   ipratropium - albuterol 0.5 mg/2.5 mg/3 mL (DUONEB) 0.5-2.5 (3) MG/3ML neb solution, Take 1 vial (3 mLs) by nebulization every 4 hours as needed for shortness of breath / dyspnea or wheezing  omeprazole (PRILOSEC) 20 MG DR capsule, Take 20 mg by mouth daily as needed  umeclidinium (INCRUSE ELLIPTA) 62.5 MCG/INH inhaler, Inhale 1 puff into the lungs daily    No current facility-administered medications on file prior to visit.      MEDICATION LIST (after visit)  Current Outpatient Medications   Medication     escitalopram (LEXAPRO) 10 MG tablet     escitalopram (LEXAPRO) 5 MG tablet     gabapentin (NEURONTIN) 100 MG capsule     mirtazapine (REMERON) 15 MG tablet     acetaminophen (TYLENOL) 500 MG tablet     albuterol (PROAIR HFA/PROVENTIL HFA/VENTOLIN HFA) 108 (90 Base) MCG/ACT inhaler     amoxicillin-clavulanate (AUGMENTIN) 875-125 MG tablet     bumetanide (BUMEX) 1 MG tablet     HYDROcodone-acetaminophen (NORCO) 5-325 MG tablet     hydrOXYzine (ATARAX) 25 MG tablet     ipratropium - albuterol 0.5 mg/2.5 mg/3 mL (DUONEB) 0.5-2.5 (3) MG/3ML neb solution     omeprazole (PRILOSEC) 20 MG DR capsule     prochlorperazine (COMPAZINE) 10 MG tablet     umeclidinium (INCRUSE ELLIPTA) 62.5 MCG/INH inhaler     No current facility-administered medications for  "this visit.         Allergies   Allergen Reactions     Bupropion Diarrhea     Codeine Hives, Itching, Nausea and Rash     Nickel Unknown     Oxycodone Nausea and Vomiting     Percocet [Oxycodone-Acetaminophen]      Patient reports \"vomiting,grossly ill two weeks ago\"     Topiramate Unknown     Diclofenac Nausea     Tolerates the topical gel     Furosemide Rash     Hydrochlorothiazide Rash     phototoxicity - med was d/lora         Sulfa Drugs Itching and Rash     Sulfasalazine Rash     Melissa Covington [facility] Addiction Medicine  [facility]  [phone]      "

## 2022-09-16 ENCOUNTER — PATIENT OUTREACH (OUTPATIENT)
Dept: CARE COORDINATION | Facility: CLINIC | Age: 67
End: 2022-09-16

## 2022-09-16 ENCOUNTER — TELEPHONE (OUTPATIENT)
Dept: ADDICTION MEDICINE | Facility: CLINIC | Age: 67
End: 2022-09-16

## 2022-09-16 DIAGNOSIS — M54.2 CERVICALGIA: ICD-10-CM

## 2022-09-16 RX ORDER — ACETAMINOPHEN 500 MG
500-1000 TABLET ORAL EVERY 6 HOURS PRN
Qty: 60 TABLET | Refills: 11 | Status: ON HOLD | OUTPATIENT
Start: 2022-09-16 | End: 2023-03-20

## 2022-09-16 NOTE — PROGRESS NOTES
Clinic Care Coordination Contact  [facility]/Voicemail       Clinical Data: Care Coordinator Outreach  Outreach attempted x 2.  Left message on patient's voicemail with call back information and requested return call.  Plan:Care Coordinator will try to reach patient again in 1-2 business days.    KATHLEEN Espana? Social Work Care Coordinator   [facility]  [email or web address]? [email or web address]    Phone: [phone]  she/her

## 2022-09-16 NOTE — TELEPHONE ENCOUNTER
Received refill request for Prazosin, pt was called to verify request, pt reports she doesn't want refill on Prazosin because it makes her have worse nightmares. She will discuss with Dr Covington at next appt.

## 2022-09-17 NOTE — TELEPHONE ENCOUNTER
"Last Written Prescription Date:  4/15/22  Last Fill Quantity: 60,  # refills: 1   Last office visit provider: 9/8/22     Requested Prescriptions   Pending Prescriptions Disp Refills     acetaminophen (TYLENOL) 500 MG tablet 60 tablet 1     Sig: Take 1-2 tablets (500-1,000 mg) by mouth every 6 hours as needed for mild pain       Analgesics (Non-Narcotic Tylenol and ASA Only) Passed - 9/16/2022 12:07 PM        Passed - Recent (12 mo) or future (30 days) visit within the authorizing provider's specialty     Patient has had an office visit with the authorizing provider or a provider within the authorizing providers department within the previous 12 mos or has a future within next 30 days. See \"Patient Info\" tab in inbasket, or \"Choose Columns\" in Meds & Orders section of the refill encounter.              Passed - Patient is 7 months old or older     If patient is a peds patient of the age 7 mos -12 years, ok to refill using weight-based dosing.     If >3g daily and/or sig is not \"prn\", check for liver enzymes. If normal in the last year, ok to refill.  If not, refer to the provider.          Passed - Medication is active on med list             Emi South RN 09/16/22 9:30 PM  "

## 2022-09-18 ENCOUNTER — HOSPITAL ENCOUNTER (INPATIENT)
Facility: HOSPITAL | Age: 67
LOS: 4 days | Discharge: HOME | DRG: 392 | End: 2022-09-22
Attending: EMERGENCY MEDICINE | Admitting: INTERNAL MEDICINE
Payer: COMMERCIAL

## 2022-09-18 ENCOUNTER — APPOINTMENT (OUTPATIENT)
Dept: CT IMAGING | Facility: HOSPITAL | Age: 67
DRG: 392 | End: 2022-09-18
Attending: EMERGENCY MEDICINE
Payer: COMMERCIAL

## 2022-09-18 DIAGNOSIS — K57.32 DIVERTICULITIS OF COLON: Primary | ICD-10-CM

## 2022-09-18 DIAGNOSIS — K57.32 DIVERTICULITIS OF COLON: ICD-10-CM

## 2022-09-18 LAB
ALBUMIN SERPL-MCNC: 3.8 G/DL (ref 3.5–5)
ALP SERPL-CCNC: 124 U/L (ref 45–120)
ALT SERPL W P-5'-P-CCNC: 40 U/L (ref 0–45)
ANION GAP SERPL CALCULATED.3IONS-SCNC: 11 MMOL/L (ref 5–18)
AST SERPL W P-5'-P-CCNC: 51 U/L (ref 0–40)
BASOPHILS # BLD AUTO: 0 10E3/UL (ref 0–0.2)
BASOPHILS NFR BLD AUTO: 0 %
BILIRUB DIRECT SERPL-MCNC: 0.6 MG/DL
BILIRUB SERPL-MCNC: 1.3 MG/DL (ref 0–1)
BUN SERPL-MCNC: 12 MG/DL (ref 8–22)
CALCIUM SERPL-MCNC: 9.5 MG/DL (ref 8.5–10.5)
CHLORIDE BLD-SCNC: 103 MMOL/L (ref 98–107)
CO2 SERPL-SCNC: 22 MMOL/L (ref 22–31)
CREAT SERPL-MCNC: 0.74 MG/DL (ref 0.6–1.1)
EOSINOPHIL # BLD AUTO: 0.1 10E3/UL (ref 0–0.7)
EOSINOPHIL NFR BLD AUTO: 1 %
ERYTHROCYTE [DISTWIDTH] IN BLOOD BY AUTOMATED COUNT: 13.9 % (ref 10–15)
GFR SERPL CREATININE-BSD FRML MDRD: 89 ML/MIN/1.73M2
GLUCOSE BLD-MCNC: 127 MG/DL (ref 70–125)
HCT VFR BLD AUTO: 46.6 % (ref 35–47)
HGB BLD-MCNC: 15.9 G/DL (ref 11.7–15.7)
HOLD SPECIMEN: NORMAL
HOLD SPECIMEN: NORMAL
IMM GRANULOCYTES # BLD: 0 10E3/UL
IMM GRANULOCYTES NFR BLD: 1 %
LIPASE SERPL-CCNC: 33 U/L (ref 0–52)
LYMPHOCYTES # BLD AUTO: 0.3 10E3/UL (ref 0.8–5.3)
LYMPHOCYTES NFR BLD AUTO: 8 %
MCH RBC QN AUTO: 31.5 PG (ref 26.5–33)
MCHC RBC AUTO-ENTMCNC: 34.1 G/DL (ref 31.5–36.5)
MCV RBC AUTO: 93 FL (ref 78–100)
MONOCYTES # BLD AUTO: 0.1 10E3/UL (ref 0–1.3)
MONOCYTES NFR BLD AUTO: 1 %
NEUTROPHILS # BLD AUTO: 3.9 10E3/UL (ref 1.6–8.3)
NEUTROPHILS NFR BLD AUTO: 89 %
NRBC # BLD AUTO: 0 10E3/UL
NRBC BLD AUTO-RTO: 0 /100
PLATELET # BLD AUTO: 176 10E3/UL (ref 150–450)
POTASSIUM BLD-SCNC: 4.3 MMOL/L (ref 3.5–5)
PROT SERPL-MCNC: 7.4 G/DL (ref 6–8)
RBC # BLD AUTO: 5.04 10E6/UL (ref 3.8–5.2)
SODIUM SERPL-SCNC: 136 MMOL/L (ref 136–145)
WBC # BLD AUTO: 4.3 10E3/UL (ref 4–11)

## 2022-09-18 PROCEDURE — 250N000011 HC RX IP 250 OP 636: Performed by: EMERGENCY MEDICINE

## 2022-09-18 PROCEDURE — 83690 ASSAY OF LIPASE: CPT | Performed by: EMERGENCY MEDICINE

## 2022-09-18 PROCEDURE — 96376 TX/PRO/DX INJ SAME DRUG ADON: CPT

## 2022-09-18 PROCEDURE — 85025 COMPLETE CBC W/AUTO DIFF WBC: CPT | Performed by: EMERGENCY MEDICINE

## 2022-09-18 PROCEDURE — 74177 CT ABD & PELVIS W/CONTRAST: CPT

## 2022-09-18 PROCEDURE — 258N000003 HC RX IP 258 OP 636: Performed by: EMERGENCY MEDICINE

## 2022-09-18 PROCEDURE — 36415 COLL VENOUS BLD VENIPUNCTURE: CPT | Performed by: EMERGENCY MEDICINE

## 2022-09-18 PROCEDURE — 120N000001 HC R&B MED SURG/OB

## 2022-09-18 PROCEDURE — 99285 EMERGENCY DEPT VISIT HI MDM: CPT | Mod: 25

## 2022-09-18 PROCEDURE — C9803 HOPD COVID-19 SPEC COLLECT: HCPCS

## 2022-09-18 PROCEDURE — 99222 1ST HOSP IP/OBS MODERATE 55: CPT | Performed by: INTERNAL MEDICINE

## 2022-09-18 PROCEDURE — 96375 TX/PRO/DX INJ NEW DRUG ADDON: CPT

## 2022-09-18 PROCEDURE — 96374 THER/PROPH/DIAG INJ IV PUSH: CPT

## 2022-09-18 PROCEDURE — 82248 BILIRUBIN DIRECT: CPT | Performed by: EMERGENCY MEDICINE

## 2022-09-18 RX ORDER — FENTANYL CITRATE 50 UG/ML
25 INJECTION, SOLUTION INTRAMUSCULAR; INTRAVENOUS ONCE
Status: DISCONTINUED | OUTPATIENT
Start: 2022-09-18 | End: 2022-09-18

## 2022-09-18 RX ORDER — PIPERACILLIN SODIUM, TAZOBACTAM SODIUM 3; .375 G/15ML; G/15ML
3.38 INJECTION, POWDER, LYOPHILIZED, FOR SOLUTION INTRAVENOUS EVERY 8 HOURS
Status: DISCONTINUED | OUTPATIENT
Start: 2022-09-19 | End: 2022-09-22 | Stop reason: HOSPADM

## 2022-09-18 RX ORDER — PIPERACILLIN SODIUM, TAZOBACTAM SODIUM 3; .375 G/15ML; G/15ML
3.38 INJECTION, POWDER, LYOPHILIZED, FOR SOLUTION INTRAVENOUS ONCE
Status: COMPLETED | OUTPATIENT
Start: 2022-09-18 | End: 2022-09-19

## 2022-09-18 RX ORDER — IOPAMIDOL 755 MG/ML
100 INJECTION, SOLUTION INTRAVASCULAR ONCE
Status: COMPLETED | OUTPATIENT
Start: 2022-09-18 | End: 2022-09-18

## 2022-09-18 RX ORDER — FENTANYL CITRATE 50 UG/ML
25 INJECTION, SOLUTION INTRAMUSCULAR; INTRAVENOUS ONCE
Status: COMPLETED | OUTPATIENT
Start: 2022-09-18 | End: 2022-09-18

## 2022-09-18 RX ORDER — ONDANSETRON 2 MG/ML
4 INJECTION INTRAMUSCULAR; INTRAVENOUS ONCE
Status: COMPLETED | OUTPATIENT
Start: 2022-09-18 | End: 2022-09-18

## 2022-09-18 RX ORDER — MORPHINE SULFATE 2 MG/ML
2 INJECTION, SOLUTION INTRAMUSCULAR; INTRAVENOUS ONCE
Status: COMPLETED | OUTPATIENT
Start: 2022-09-18 | End: 2022-09-18

## 2022-09-18 RX ORDER — SODIUM CHLORIDE 9 MG/ML
INJECTION, SOLUTION INTRAVENOUS ONCE
Status: COMPLETED | OUTPATIENT
Start: 2022-09-18 | End: 2022-09-19

## 2022-09-18 RX ADMIN — IOPAMIDOL 100 ML: 755 INJECTION, SOLUTION INTRAVENOUS at 22:06

## 2022-09-18 RX ADMIN — ONDANSETRON 4 MG: 2 INJECTION INTRAMUSCULAR; INTRAVENOUS at 21:10

## 2022-09-18 RX ADMIN — FENTANYL CITRATE 25 MCG: 50 INJECTION, SOLUTION INTRAMUSCULAR; INTRAVENOUS at 21:18

## 2022-09-18 RX ADMIN — PIPERACILLIN AND TAZOBACTAM 3.38 G: 3; .375 INJECTION, POWDER, LYOPHILIZED, FOR SOLUTION INTRAVENOUS at 23:36

## 2022-09-18 RX ADMIN — SODIUM CHLORIDE: 9 INJECTION, SOLUTION INTRAVENOUS at 23:36

## 2022-09-18 RX ADMIN — MORPHINE SULFATE 2 MG: 2 INJECTION, SOLUTION INTRAMUSCULAR; INTRAVENOUS at 22:56

## 2022-09-18 ASSESSMENT — ACTIVITIES OF DAILY LIVING (ADL)
ADLS_ACUITY_SCORE: 37
ADLS_ACUITY_SCORE: 37

## 2022-09-19 ENCOUNTER — PATIENT OUTREACH (OUTPATIENT)
Dept: CARE COORDINATION | Facility: CLINIC | Age: 67
End: 2022-09-19

## 2022-09-19 PROBLEM — R06.2 WHEEZING: Status: RESOLVED | Noted: 2021-05-26 | Resolved: 2022-09-19

## 2022-09-19 LAB
ALBUMIN UR-MCNC: NEGATIVE MG/DL
APPEARANCE UR: CLEAR
BILIRUB UR QL STRIP: NEGATIVE
COLOR UR AUTO: YELLOW
GLUCOSE UR STRIP-MCNC: NEGATIVE MG/DL
HGB UR QL STRIP: NEGATIVE
KETONES UR STRIP-MCNC: NEGATIVE MG/DL
LEUKOCYTE ESTERASE UR QL STRIP: NEGATIVE
NITRATE UR QL: NEGATIVE
PH UR STRIP: 6 [PH] (ref 5–7)
RBC URINE: <1 /HPF
SARS-COV-2 RNA RESP QL NAA+PROBE: NEGATIVE
SP GR UR STRIP: 1.02 (ref 1–1.03)
SQUAMOUS EPITHELIAL: 4 /HPF
UROBILINOGEN UR STRIP-MCNC: <2 MG/DL
WBC URINE: <1 /HPF

## 2022-09-19 PROCEDURE — 250N000011 HC RX IP 250 OP 636: Performed by: INTERNAL MEDICINE

## 2022-09-19 PROCEDURE — 250N000013 HC RX MED GY IP 250 OP 250 PS 637: Performed by: INTERNAL MEDICINE

## 2022-09-19 PROCEDURE — 99233 SBSQ HOSP IP/OBS HIGH 50: CPT | Performed by: INTERNAL MEDICINE

## 2022-09-19 PROCEDURE — 258N000003 HC RX IP 258 OP 636: Performed by: INTERNAL MEDICINE

## 2022-09-19 PROCEDURE — 120N000001 HC R&B MED SURG/OB

## 2022-09-19 PROCEDURE — 81001 URINALYSIS AUTO W/SCOPE: CPT | Performed by: EMERGENCY MEDICINE

## 2022-09-19 PROCEDURE — 258N000001 HC RX 258: Performed by: INTERNAL MEDICINE

## 2022-09-19 PROCEDURE — U0005 INFEC AGEN DETEC AMPLI PROBE: HCPCS | Performed by: EMERGENCY MEDICINE

## 2022-09-19 RX ORDER — HYDROXYZINE HYDROCHLORIDE 25 MG/1
25-50 TABLET, FILM COATED ORAL 3 TIMES DAILY PRN
COMMUNITY
End: 2022-10-27

## 2022-09-19 RX ORDER — ESCITALOPRAM OXALATE 10 MG/1
10 TABLET ORAL DAILY
Status: DISCONTINUED | OUTPATIENT
Start: 2022-09-19 | End: 2022-09-22 | Stop reason: HOSPADM

## 2022-09-19 RX ORDER — MIRTAZAPINE 15 MG/1
15 TABLET, FILM COATED ORAL AT BEDTIME
Status: DISCONTINUED | OUTPATIENT
Start: 2022-09-19 | End: 2022-09-22 | Stop reason: HOSPADM

## 2022-09-19 RX ORDER — PANTOPRAZOLE SODIUM 40 MG/1
40 TABLET, DELAYED RELEASE ORAL
Status: DISCONTINUED | OUTPATIENT
Start: 2022-09-19 | End: 2022-09-22 | Stop reason: HOSPADM

## 2022-09-19 RX ORDER — ACETAMINOPHEN 325 MG/1
650 TABLET ORAL EVERY 4 HOURS PRN
Status: DISCONTINUED | OUTPATIENT
Start: 2022-09-19 | End: 2022-09-22 | Stop reason: HOSPADM

## 2022-09-19 RX ORDER — LIDOCAINE 40 MG/G
CREAM TOPICAL
Status: DISCONTINUED | OUTPATIENT
Start: 2022-09-19 | End: 2022-09-22 | Stop reason: HOSPADM

## 2022-09-19 RX ORDER — MORPHINE SULFATE 2 MG/ML
1-2 INJECTION, SOLUTION INTRAMUSCULAR; INTRAVENOUS
Status: DISCONTINUED | OUTPATIENT
Start: 2022-09-19 | End: 2022-09-22 | Stop reason: HOSPADM

## 2022-09-19 RX ORDER — ESCITALOPRAM OXALATE 5 MG/1
5 TABLET ORAL DAILY
Status: DISCONTINUED | OUTPATIENT
Start: 2022-09-19 | End: 2022-09-22 | Stop reason: HOSPADM

## 2022-09-19 RX ORDER — HYDROXYZINE HYDROCHLORIDE 25 MG/1
25-50 TABLET, FILM COATED ORAL 3 TIMES DAILY PRN
Status: DISCONTINUED | OUTPATIENT
Start: 2022-09-19 | End: 2022-09-22 | Stop reason: HOSPADM

## 2022-09-19 RX ORDER — ONDANSETRON 2 MG/ML
4 INJECTION INTRAMUSCULAR; INTRAVENOUS EVERY 6 HOURS PRN
Status: DISCONTINUED | OUTPATIENT
Start: 2022-09-19 | End: 2022-09-21

## 2022-09-19 RX ORDER — ONDANSETRON 4 MG/1
4 TABLET, ORALLY DISINTEGRATING ORAL EVERY 6 HOURS PRN
Status: DISCONTINUED | OUTPATIENT
Start: 2022-09-19 | End: 2022-09-21

## 2022-09-19 RX ORDER — IPRATROPIUM BROMIDE AND ALBUTEROL SULFATE 2.5; .5 MG/3ML; MG/3ML
3 SOLUTION RESPIRATORY (INHALATION) EVERY 4 HOURS PRN
Status: DISCONTINUED | OUTPATIENT
Start: 2022-09-19 | End: 2022-09-22 | Stop reason: HOSPADM

## 2022-09-19 RX ORDER — GABAPENTIN 100 MG/1
100 CAPSULE ORAL 3 TIMES DAILY
Status: DISCONTINUED | OUTPATIENT
Start: 2022-09-19 | End: 2022-09-22 | Stop reason: HOSPADM

## 2022-09-19 RX ORDER — SODIUM CHLORIDE 9 MG/ML
INJECTION, SOLUTION INTRAVENOUS CONTINUOUS
Status: ACTIVE | OUTPATIENT
Start: 2022-09-19 | End: 2022-09-20

## 2022-09-19 RX ORDER — ALBUTEROL SULFATE 90 UG/1
2 AEROSOL, METERED RESPIRATORY (INHALATION) EVERY 4 HOURS PRN
Status: DISCONTINUED | OUTPATIENT
Start: 2022-09-19 | End: 2022-09-22 | Stop reason: HOSPADM

## 2022-09-19 RX ORDER — ENOXAPARIN SODIUM 100 MG/ML
40 INJECTION SUBCUTANEOUS EVERY 24 HOURS
Status: DISCONTINUED | OUTPATIENT
Start: 2022-09-19 | End: 2022-09-19

## 2022-09-19 RX ORDER — ENOXAPARIN SODIUM 100 MG/ML
40 INJECTION SUBCUTANEOUS EVERY 12 HOURS
Status: DISCONTINUED | OUTPATIENT
Start: 2022-09-19 | End: 2022-09-22 | Stop reason: HOSPADM

## 2022-09-19 RX ADMIN — OXYCODONE HYDROCHLORIDE 5 MG: 5 TABLET ORAL at 13:34

## 2022-09-19 RX ADMIN — ONDANSETRON 4 MG: 2 INJECTION INTRAMUSCULAR; INTRAVENOUS at 10:50

## 2022-09-19 RX ADMIN — MORPHINE SULFATE 2 MG: 2 INJECTION, SOLUTION INTRAMUSCULAR; INTRAVENOUS at 04:17

## 2022-09-19 RX ADMIN — MORPHINE SULFATE 2 MG: 2 INJECTION, SOLUTION INTRAMUSCULAR; INTRAVENOUS at 09:54

## 2022-09-19 RX ADMIN — DEXTROSE AND SODIUM CHLORIDE: 5; 450 INJECTION, SOLUTION INTRAVENOUS at 01:34

## 2022-09-19 RX ADMIN — UMECLIDINIUM 1 PUFF: 62.5 AEROSOL, POWDER ORAL at 15:10

## 2022-09-19 RX ADMIN — ENOXAPARIN SODIUM 40 MG: 100 INJECTION SUBCUTANEOUS at 21:18

## 2022-09-19 RX ADMIN — MIRTAZAPINE 15 MG: 15 TABLET, FILM COATED ORAL at 21:17

## 2022-09-19 RX ADMIN — SODIUM CHLORIDE: 9 INJECTION, SOLUTION INTRAVENOUS at 10:55

## 2022-09-19 RX ADMIN — ESCITALOPRAM 5 MG: 5 TABLET, FILM COATED ORAL at 15:10

## 2022-09-19 RX ADMIN — GABAPENTIN 100 MG: 100 CAPSULE ORAL at 21:17

## 2022-09-19 RX ADMIN — ONDANSETRON 4 MG: 2 INJECTION INTRAMUSCULAR; INTRAVENOUS at 22:48

## 2022-09-19 RX ADMIN — OXYCODONE HYDROCHLORIDE 5 MG: 5 TABLET ORAL at 21:17

## 2022-09-19 RX ADMIN — PIPERACILLIN AND TAZOBACTAM 3.38 G: 3; .375 INJECTION, POWDER, LYOPHILIZED, FOR SOLUTION INTRAVENOUS at 07:05

## 2022-09-19 RX ADMIN — ENOXAPARIN SODIUM 40 MG: 40 INJECTION SUBCUTANEOUS at 01:34

## 2022-09-19 RX ADMIN — SODIUM CHLORIDE: 9 INJECTION, SOLUTION INTRAVENOUS at 21:22

## 2022-09-19 RX ADMIN — ONDANSETRON 4 MG: 4 TABLET, ORALLY DISINTEGRATING ORAL at 16:37

## 2022-09-19 RX ADMIN — ACETAMINOPHEN 650 MG: 325 TABLET, FILM COATED ORAL at 21:17

## 2022-09-19 RX ADMIN — ESCITALOPRAM OXALATE 10 MG: 10 TABLET ORAL at 15:10

## 2022-09-19 RX ADMIN — OXYCODONE HYDROCHLORIDE 5 MG: 5 TABLET ORAL at 17:38

## 2022-09-19 RX ADMIN — GABAPENTIN 100 MG: 100 CAPSULE ORAL at 15:10

## 2022-09-19 RX ADMIN — SODIUM CHLORIDE 500 ML: 9 INJECTION, SOLUTION INTRAVENOUS at 06:49

## 2022-09-19 RX ADMIN — PANTOPRAZOLE SODIUM 40 MG: 40 TABLET, DELAYED RELEASE ORAL at 15:10

## 2022-09-19 RX ADMIN — ONDANSETRON 4 MG: 2 INJECTION INTRAMUSCULAR; INTRAVENOUS at 04:18

## 2022-09-19 RX ADMIN — PIPERACILLIN AND TAZOBACTAM 3.38 G: 3; .375 INJECTION, POWDER, LYOPHILIZED, FOR SOLUTION INTRAVENOUS at 21:17

## 2022-09-19 RX ADMIN — PIPERACILLIN AND TAZOBACTAM 3.38 G: 3; .375 INJECTION, POWDER, LYOPHILIZED, FOR SOLUTION INTRAVENOUS at 13:18

## 2022-09-19 ASSESSMENT — ACTIVITIES OF DAILY LIVING (ADL)
ADLS_ACUITY_SCORE: 39
ADLS_ACUITY_SCORE: 39
ADLS_ACUITY_SCORE: 37
DEPENDENT_IADLS:: INDEPENDENT;TRANSPORTATION
ADLS_ACUITY_SCORE: 39
ADLS_ACUITY_SCORE: 37
ADLS_ACUITY_SCORE: 37
ADLS_ACUITY_SCORE: 39

## 2022-09-19 NOTE — ED PROVIDER NOTES
Emergency Department Encounter     Evaluation Date & Time:   2022  8:03 PM    CHIEF COMPLAINT:  Abdominal Pain and Nausea, Vomiting, & Diarrhea      Triage Note:  Pt arrived via EMS for abdominal pain, and vomiting. Pt vomiting upon arrival to ER. Pt stated she finished antibiotics for diverticulitis that she was diagnosed with a week ago. Pain has worsened over the last couple days.      Triage Assessment     Row Name 22       Triage Assessment (Adult)    Airway WDL WDL       Respiratory WDL    Respiratory WDL WDL       Skin Circulation/Temperature WDL    Skin Circulation/Temperature WDL WDL       Cardiac WDL    Cardiac WDL WDL       Peripheral/Neurovascular WDL    Peripheral Neurovascular WDL WDL       Cognitive/Neuro/Behavioral WDL    Cognitive/Neuro/Behavioral WDL WDL                    Impression and Plan       FINAL IMPRESSION:    ICD-10-CM    1. Diverticulitis of colon  K57.32          ED COURSE & MEDICAL DECISION MAKIN:28 PM: I met with the patient to gather history and to perform my initial exam. We discussed plans for the ED course, including diagnostic testing and treatment.     67 year old female, history of alcohol abuse with alcohol hepatitis and previous withdrawal with seizures, morbid obesity, COPD and diverticulitis, who presents for evaluation of abdominal pain. Patient was diagnosed with right-sided diverticulitis last week and completed a course of Augmentin 2-3 days ago. After she started the antibiotics, she was feeling better, however starting 4 days ago, she has had progressively worsening cramping LLQ abdominal pain. She reports associated nausea, vomiting and diarrhea; no hematemesis, hematochezia or melena. No fevers.    On exam, abdomen is soft with moderate tenderness to palpation LLQ and mild to moderate tenderness to palpation diffusely otherwise; no peritoneal signs.     IV access established and blood sent for labs. Patient was given IV fentanyl and IV Zofran  with some improvement.    Labs remarkable for no leukocytosis (WBC 4.3) with mild polycythemia (Hb 15.9 - I suspect secondary to hemoconcentration from volume depletion).  She has no significant electrolyte derangements or renal impairment.  Hepatic panel remarkable for mildly elevated AST (51), alk phos (124) and bilirubin (total 1.3 and direct 0.6) with no laboratory evidence of pancreatitis.  UA negative for infection.    CT abdomen / pelvis with IV contrast was performed and demonstrated:  1.  New mild changes of acute diverticulitis involving the lower descending colon. Slight improvement of acute diverticulitis involving the sigmoid colon within the right aspect of the pelvis.  2.  Cirrhosis with fatty change in liver. Gallbladder wall thickening which can be seen with hepatic parenchymal disease.  3.  Uterine fibroids.    Patient given IV Zosyn and IV fluids were initiated.    Asymptomatic COVID test is negative.    Patient not feeling well enough for discharge to home, thus patient admitted for symptom management, IV antibiotics and bowel rest with IV hydration.  Patient hemodynamically stable throughout ED course.      At the conclusion of the encounter I discussed the results of all the tests and the disposition. The questions were answered. The patient acknowledged understanding and was agreeable with the care plan.      MEDICATIONS GIVEN IN THE EMERGENCY DEPARTMENT:  Medications   piperacillin-tazobactam (ZOSYN) 3.375 g vial to attach to  mL bag (3.375 g Intravenous Given 9/19/22 0705)   acetaminophen (TYLENOL) tablet 650 mg (has no administration in time range)   oxyCODONE IR (ROXICODONE) half-tab 2.5-5 mg (has no administration in time range)   morphine (PF) injection 1-2 mg (2 mg Intravenous Given 9/19/22 4647)   lidocaine 1 % 0.1-1 mL (has no administration in time range)   lidocaine (LMX4) cream (has no administration in time range)   sodium chloride (PF) 0.9% PF flush 3 mL (has no  administration in time range)   sodium chloride (PF) 0.9% PF flush 3 mL (has no administration in time range)   melatonin tablet 1 mg (has no administration in time range)   enoxaparin ANTICOAGULANT (LOVENOX) injection 40 mg (40 mg Subcutaneous Given 9/19/22 0134)   ondansetron (ZOFRAN ODT) ODT tab 4 mg ( Oral See Alternative 9/19/22 0418)     Or   ondansetron (ZOFRAN) injection 4 mg (4 mg Intravenous Given 9/19/22 0418)   ondansetron (ZOFRAN) injection 4 mg (4 mg Intravenous Given 9/18/22 2110)   fentaNYL (PF) (SUBLIMAZE) injection 25 mcg (25 mcg Intravenous Given 9/18/22 2118)   iopamidol (ISOVUE-370) solution 100 mL (100 mLs Intravenous Given 9/18/22 2206)   piperacillin-tazobactam (ZOSYN) 3.375 g vial to attach to  mL bag (3.375 g Intravenous Given 9/18/22 2336)   morphine (PF) injection 2 mg (2 mg Intravenous Given 9/18/22 2256)   sodium chloride 0.9% infusion ( Intravenous Stopped 9/19/22 0141)   0.9% sodium chloride BOLUS (0 mLs Intravenous Stopped 9/19/22 0705)       NEW PRESCRIPTIONS STARTED AT TODAY'S ED VISIT:  New Prescriptions    No medications on file       HPI     HPI     Patsy Santamaria is a 67 year old female, history of alcohol abuse with alcohol hepatitis and previous withdrawal with seizures, morbid obesity, COPD and diverticulitis, who presents to this ED via EMS for evaluation of abdominal pain and vomiting.     Patient was diagnosed with diverticulitis last week and completed a course of Augmentin 2-3 days ago. After she started the antibiotics, she was feeling better, however since Thursday (4 days ago), she has had progressively worsening abdominal pain, primarily to her left side. The pain feels like severe menstrual cramps and is worsened with movement. She reports associated nausea with 4 episodes of vomiting today; she denies hematemesis. She also reports intermittent diarrhea; denies hematochezia and melena. No fevers.     She has otherwise been in her usual state of health and  denies chest pain, shortness of breath, cough or other concerns.     Patient notes her last alcoholic drink was 1.5 weeks ago.     REVIEW OF SYSTEMS:  All other systems reviewed and are negative.      Medical History     Past Medical History:   Diagnosis Date     Alcohol abuse      Alcohol withdrawal seizure without complication (H) 2016     Alcoholic cirrhosis (H)      Anxiety      Anxiety      Arthritis      Chronic alcohol dependence, continuous (H) 2018     Chronic Lower Extremity Edema      Chronic reflux esophagitis      COPD (chronic obstructive pulmonary disease) (H)      Depression      Depression      Dermatitis      Ganglion      GERD (gastroesophageal reflux disease)      H. pylori infection      Melanoma (H) 2019     Menopause      Obesity (BMI 35.0-39.9 without comorbidity)      Osteoarthritis      Peripheral neuropathy      Seizure (H) 2016     Sleep apnea      Withdrawal seizures (H)        Past Surgical History:   Procedure Laterality Date     APPENDECTOMY       APPENDECTOMY       ARTHROSCOPY KNEE Right      BIOPSY SKIN (LOCATION) Left 2019    left upper arm      SECTION        EXCISION LESION/TENDON-SHEATH/CAPSULE, FOOT      Description: Excision Of Cyst Of Tendon Sheath Of Foot;  Proc Date: 10/28/2011;  Comments: [facility]      KNEE SCOPE, DIAGNOSTIC      Description: Arthroscopy Knee Right;  Proc Date: 10/11/2005;  Comments: for right knee patella subluxation with lateral retinacular release; subpatellar chondroplasty      LATERAL RETINACULAR RELEASE OPEN      Description: Knee Lateral Retinacular Release;  Proc Date: 10/11/2005;     HEMORRHOIDECTOMY INTERNAL LIGATION      Description: Hemorrhoidectomy;  Recorded: 2008;     THUMB SURGERY      Removal of bone spurs     TONSILLECTOMY       [facility] APPENDECTOMY      Description: Appendectomy;  Recorded: 2008;  Comments: childhood     [facility]  DELIVERY ONLY      Description:  Section;   Recorded: 2008;     [facility] LIGATE FALLOPIAN TUBE      Description: Tubal Ligation;  Recorded: 2008;       Family History   Problem Relation Age of Onset     CABG Mother      Anuerysm Father          of ruptured anuerysm at 46     Heart Disease Mother      Heart Disease Father        Social History     Tobacco Use     Smoking status: Current Every Day Smoker     Packs/day: 0.50     Years: 49.00     Pack years: 24.50     Types: Cigarettes     Smokeless tobacco: Never Used     Tobacco comment:  ppd   Substance Use Topics     Alcohol use: Yes     Comment: Last use 22     Drug use: No     Comment: Denies       acetaminophen (TYLENOL) 500 MG tablet  albuterol (PROAIR HFA/PROVENTIL HFA/VENTOLIN HFA) 108 (90 Base) MCG/ACT inhaler  amoxicillin-clavulanate (AUGMENTIN) 875-125 MG tablet  APO-VARENICLINE 0.5 MG tablet  bumetanide (BUMEX) 1 MG tablet  cetirizine (ZYRTEC) 10 MG tablet  escitalopram (LEXAPRO) 10 MG tablet  escitalopram (LEXAPRO) 5 MG tablet  fluticasone-vilanterol (BREO ELLIPTA) 100-25 MCG/INH inhaler  gabapentin (NEURONTIN) 100 MG capsule  hydrOXYzine (ATARAX) 25 MG tablet  ipratropium - albuterol 0.5 mg/2.5 mg/3 mL (DUONEB) 0.5-2.5 (3) MG/3ML neb solution  mirtazapine (REMERON) 15 MG tablet  neomycin-polymyxin-hydrocortisone (CORTISPORIN) 3.5-73331-1 ophthalmic suspension  omeprazole (PRILOSEC) 20 MG DR capsule  prazosin (MINIPRESS) 1 MG capsule  triamcinolone (ARISTOCORT HP) 0.5 % external cream  umeclidinium (INCRUSE ELLIPTA) 62.5 MCG/INH inhaler        Physical Exam     First Vitals:  Patient Vitals for the past 24 hrs:   BP Pulse Resp SpO2 Height Weight   22 0730 (!) 88/50 73 26 93 % -- --   22 0715 95/50 73 29 95 % -- --   22 0700 90/55 74 22 95 % -- --   22 0645 97/53 73 -- 93 % -- --   22 (!) 86/53 73 -- 95 % -- --   22 06 (!) 88/ 74 -- 95 % -- --   22 0545 (!) 51 72 -- 95 % -- --   22 0530 (!) 87/54 74 -- 96 % -- --  "  09/19/22 0515 (!) 82/53 71 -- 94 % -- --   09/19/22 0505 (!) 88/52 71 -- 94 % -- --   09/19/22 0500 91/52 72 -- 94 % -- --   09/19/22 0430 92/54 72 -- 95 % -- --   09/19/22 0400 103/57 77 -- 96 % -- --   09/19/22 0345 101/60 81 -- 94 % -- --   09/19/22 0330 102/58 78 -- 94 % -- --   09/19/22 0315 108/61 79 -- 93 % -- --   09/19/22 0300 106/59 74 -- 93 % -- --   09/19/22 0245 103/59 77 -- 93 % -- --   09/19/22 0230 108/64 85 -- 95 % -- --   09/19/22 0215 102/57 87 -- 93 % -- --   09/19/22 0200 101/57 86 -- 94 % -- --   09/19/22 0145 99/57 85 -- 93 % -- --   09/19/22 0130 99/50 88 -- 95 % -- --   09/19/22 0115 105/53 90 -- 94 % -- --   09/19/22 0100 98/55 90 -- 94 % -- --   09/19/22 0045 100/67 89 -- 96 % -- --   09/19/22 0030 (!) 89/53 91 -- (!) 87 % -- --   09/19/22 0015 100/53 92 -- 93 % -- --   09/19/22 0000 101/54 98 -- 93 % -- --   09/18/22 2345 100/54 97 -- 90 % -- --   09/18/22 2330 103/55 96 -- 93 % -- --   09/18/22 2315 101/58 100 -- 93 % -- --   09/18/22 2226 110/61 95 20 90 % -- --   09/18/22 2015 -- -- -- -- 1.6 m (5' 3\") 116.6 kg (257 lb)       PHYSICAL EXAM:   Physical Exam    GENERAL: Awake, alert.  In moderate acute distress; patient is moaning and retching into an emesis bag.   HEENT: Normocephalic, atraumatic. Pupils equal, round and reactive. Conjunctiva normal.   NECK: No stridor.  PULMONARY: Symmetrical breath sounds without distress.  Lungs clear to auscultation bilaterally without wheezes, rhonchi or rales.  CARDIO: Regular rate and rhythm.  No significant murmur, rub or gallop.  Radial pulses strong and symmetrical.  ABDOMINAL: Abdomen soft, non-distended with moderate tenderness to palpation LLQ and mild to moderate tenderness to palpation diffusely otherwise; no rebound tenderness or guarding.   EXTREMITIES: No lower extremity swelling or edema.      NEURO: Alert and oriented to person, place and time.  Cranial nerves grossly intact.  No focal motor deficit.  PSYCH: Normal mood and " affect.  SKIN: No rashes.     Results     LAB:  All pertinent labs reviewed and interpreted  Labs Ordered and Resulted from Time of ED Arrival to Time of ED Departure   BASIC METABOLIC PANEL - Abnormal       Result Value    Sodium 136      Potassium 4.3      Chloride 103      Carbon Dioxide (CO2) 22      Anion Gap 11      Urea Nitrogen 12      Creatinine 0.74      Calcium 9.5      Glucose 127 (*)     GFR Estimate 89     HEPATIC FUNCTION PANEL - Abnormal    Bilirubin Total 1.3 (*)     Bilirubin Direct 0.6 (*)     Protein Total 7.4      Albumin 3.8      Alkaline Phosphatase 124 (*)     AST 51 (*)     ALT 40     CBC WITH PLATELETS AND DIFFERENTIAL - Abnormal    WBC Count 4.3      RBC Count 5.04      Hemoglobin 15.9 (*)     Hematocrit 46.6      MCV 93      MCH 31.5      MCHC 34.1      RDW 13.9      Platelet Count 176      % Neutrophils 89      % Lymphocytes 8      % Monocytes 1      % Eosinophils 1      % Basophils 0      % Immature Granulocytes 1      NRBCs per 100 WBC 0      Absolute Neutrophils 3.9      Absolute Lymphocytes 0.3 (*)     Absolute Monocytes 0.1      Absolute Eosinophils 0.1      Absolute Basophils 0.0      Absolute Immature Granulocytes 0.0      Absolute NRBCs 0.0     LIPASE - Normal    Lipase 33     COVID-19 VIRUS (CORONAVIRUS) BY PCR - Normal    SARS CoV2 PCR Negative     ROUTINE UA WITH MICROSCOPIC REFLEX TO CULTURE       RADIOLOGY:  CT Abdomen Pelvis w Contrast   Final Result   IMPRESSION:    1.  New mild changes of acute diverticulitis involving the lower descending colon. Slight improvement of acute diverticulitis involving the sigmoid colon within the right aspect of the pelvis.      2.  Cirrhosis with fatty change in liver. Gallbladder wall thickening which can be seen with hepatic parenchymal disease.      3.  Uterine fibroids.          I, Valeria Zambrano, am serving as a scribe to document services personally performed by Kirsten Chaidez MD based on my observation and the provider's  statements to me. I, Kirsten Chaidez MD attest that Valeria Zambrano is acting in a scribe capacity, has observed my performance of the services and has documented them in accordance with my direction.    Kirsten Chaidez MD  Emergency Medicine  [facility] EMERGENCY DEPARTMENT       Kirsten Chaidez MD  09/19/22 3196

## 2022-09-19 NOTE — H&P
[facility]    History and Physical - Hospitalist Service       Date of Admission:  9/18/2022    Assessment & Plan      Patsy Santamaria is a 67 year old female with a medical history of alcohol dependence, alcohol hepatitis, COPD, morbid obesity, nicotine dependence, and diverticulitis who presents to the ED for evaluation of abdominal pain. Admitted for diverticulitis.     Acute diverticulitis: Has LLQ and RLQ abdominal pain for 2 weeks.  CT scan on 9/9/22 revealed sigmoid diverticulitis.  CT scan today showed new mild changes of acute diverticulitis involving the lower descending colon, slight improvement of acute diverticulitis involving the sigmoid colon within the right aspect of the pelvis.  - Started Zosyn in ER, will continue  - Pain control: tylenol and oxycodone prn  - Antiemetics  - Clear liquid diet  - IVF    COPD: Not in exacerbation.  Continue home inhalers when verified by pharmacy.    Bilateral peripheral edema: Resume PTA Bumex when verified by pharmacy    Mood disorder: Continue PTA Lexapro and Remeron when verified by pharmacy    GERD: on PPI       Diet: Clear Liquid Diet    DVT Prophylaxis: Enoxaparin (Lovenox) SQ  Viveros Catheter: Not present  Central Lines: None  Cardiac Monitoring: None  Code Status:  Full code      Disposition Plan      Plan to discharge home in 2-3 days     Expected Discharge Date: 09/20/2022                The patient's care was discussed with the Bedside Nurse and Patient.    Rand Phipps MD  Hospitalist Service  [facility]  Securely message with the Vocera Web Console (learn more here)  Text page via [employer] Paging/Directory         ______________________________________________________________________    Chief Complaint   Abdominal pain    History is obtained from the patient    History of Present Illness   Patsy Santamaria is a 67 year old female with a medical history of alcohol dependence, alcohol hepatitis, COPD, morbid  obesity, nicotine dependence, and diverticulitis who presents to the ED for evaluation of abdominal pain.  Patient reports that about 2 weeks ago, she developed left lower quadrant abdominal pain.  Walking, increased abdominal pressure exacerbates the pain. She later also developed RLQ abdominal pain. Ten days ago, she visited her PCP.  CT scan of the abdomen reveals sigmoid diverticulitis.  She was prescribed Augmentin for 1 week.  After she completed the antibiotic, her LLQ abdominal pain does not improve.  She also developed nausea, vomiting and watery diarrhea. No blood noticed in the stool. She has poor appetite and has decreased oral intake. She feels chills and shaky. She did not check her temperature. She then decided to come to ER for evaluation today.  In ER, CT scan of the abdomen revealed new mild changes of acute diverticulitis involving the lower descending colon, slight improvement of acute diverticulitis involving the sigmoid colon within the right aspect of the pelvis. Patient reports no cough, SOB and chest pain.    Review of Systems    The 10 point Review of Systems is negative other than noted in the HPI or here.     Past Medical History    I have reviewed this patient's medical history and updated it with pertinent information if needed.   Past Medical History:   Diagnosis Date     Alcohol abuse      Alcohol withdrawal seizure without complication (H) 5/14/2016     Alcoholic cirrhosis (H)      Anxiety      Anxiety      Arthritis      Chronic alcohol dependence, continuous (H) 03/16/2018    inpatient 11/2019, sober since then     Chronic Lower Extremity Edema      Chronic reflux esophagitis      COPD (chronic obstructive pulmonary disease) (H)      Depression      Depression      Dermatitis      Ganglion     right foot     GERD (gastroesophageal reflux disease)      H. pylori infection      Melanoma (H) 07/2019    left upper arm     Menopause     age 52     Obesity (BMI 35.0-39.9 without  comorbidity)      Osteoarthritis     Bilateral Knees     Peripheral neuropathy      Seizure (H) 2016    during alcohol withdrawal     Sleep apnea     mild, doesnt tolerate pap therapy     Withdrawal seizures (H)     x 1 in 2016       Past Surgical History   I have reviewed this patient's surgical history and updated it with pertinent information if needed.  Past Surgical History:   Procedure Laterality Date     APPENDECTOMY       APPENDECTOMY       ARTHROSCOPY KNEE Right      BIOPSY SKIN (LOCATION) Left 2019    left upper arm      SECTION        EXCISION LESION/TENDON-SHEATH/CAPSULE, FOOT      Description: Excision Of Cyst Of Tendon Sheath Of Foot;  Proc Date: 10/28/2011;  Comments: [place] surgery [facility] KNEE SCOPE, DIAGNOSTIC      Description: Arthroscopy Knee Right;  Proc Date: 10/11/2005;  Comments: for right knee patella subluxation with lateral retinacular release; subpatellar chondroplasty      LATERAL RETINACULAR RELEASE OPEN      Description: Knee Lateral Retinacular Release;  Proc Date: 10/11/2005;     HEMORRHOIDECTOMY INTERNAL LIGATION      Description: Hemorrhoidectomy;  Recorded: 2008;     THUMB SURGERY      Removal of bone spurs     TONSILLECTOMY       Z APPENDECTOMY      Description: Appendectomy;  Recorded: 2008;  Comments: childhood     [facility]  DELIVERY ONLY      Description:  Section;  Recorded: 2008;     [facility] LIGATE FALLOPIAN TUBE      Description: Tubal Ligation;  Recorded: 2008;       Social History   I have reviewed this patient's social history and updated it with pertinent information if needed.  Social History     Tobacco Use     Smoking status: Current Every Day Smoker     Packs/day: 0.50     Years: 49.00     Pack years: 24.50     Types: Cigarettes     Smokeless tobacco: Never Used     Tobacco comment:  ppd   Substance Use Topics     Alcohol use: Yes     Comment: Last use 22     Drug use: No     Comment: Denies       Family  History   I have reviewed this patient's family history and updated it with pertinent information if needed.  Family History   Problem Relation Age of Onset     CABG Mother      Anuerysm Father          of ruptured anuerysm at 46     Heart Disease Mother      Heart Disease Father        Prior to Admission Medications   Prior to Admission Medications   Prescriptions Last Dose Informant Patient Reported? Taking?   APO-VARENICLINE 0.5 MG tablet   Yes No   acetaminophen (TYLENOL) 500 MG tablet   No No   Sig: Take 1-2 tablets (500-1,000 mg) by mouth every 6 hours as needed for mild pain   albuterol (PROAIR HFA/PROVENTIL HFA/VENTOLIN HFA) 108 (90 Base) MCG/ACT inhaler  Self Yes No   Sig: Inhale 2 puffs into the lungs every 4 hours as needed    amoxicillin-clavulanate (AUGMENTIN) 875-125 MG tablet   No No   Sig: Take 1 tablet by mouth 2 times daily   bumetanide (BUMEX) 1 MG tablet  Self Yes No   Sig: Take 1 mg by mouth daily    cetirizine (ZYRTEC) 10 MG tablet   Yes No   Sig: Take 10 mg by mouth   escitalopram (LEXAPRO) 10 MG tablet   No No   Sig: Take 1 tablet (10 mg) by mouth daily Take with 5mg tablet for total of 15mg/day.   escitalopram (LEXAPRO) 5 MG tablet   No No   Sig: Take 1 tablet (5 mg) by mouth daily Take with 10mg tablet for total of 15mg per day.   fluticasone-vilanterol (BREO ELLIPTA) 100-25 MCG/INH inhaler   Yes No   Sig: Inhale 1 puff into the lungs   gabapentin (NEURONTIN) 100 MG capsule   No No   Sig: Take 1 capsule (100 mg) by mouth 3 times daily   hydrOXYzine (ATARAX) 25 MG tablet   No No   Sig: Take 1-2 tablets (25-50 mg) by mouth 3 times daily as needed for anxiety   Patient not taking: Reported on 9/15/2022   ipratropium - albuterol 0.5 mg/2.5 mg/3 mL (DUONEB) 0.5-2.5 (3) MG/3ML neb solution   No No   Sig: Take 1 vial (3 mLs) by nebulization every 4 hours as needed for shortness of breath / dyspnea or wheezing   mirtazapine (REMERON) 15 MG tablet   No No   Sig: Take 1 tablet (15 mg) by mouth At  "Bedtime   neomycin-polymyxin-hydrocortisone (CORTISPORIN) 3.5-17273-8 ophthalmic suspension   No No   Sig: Place 1-2 drops into both eyes 4 times daily Until clears - no longer than 10 days.   omeprazole (PRILOSEC) 20 MG DR capsule  Self Yes No   Sig: Take 20 mg by mouth daily    prazosin (MINIPRESS) 1 MG capsule   No No   Sig: Take 1 capsule (1 mg) by mouth At Bedtime   Patient not taking: Reported on 9/15/2022   triamcinolone (ARISTOCORT HP) 0.5 % external cream   No No   Sig: Apply topically 2 times daily   umeclidinium (INCRUSE ELLIPTA) 62.5 MCG/INH inhaler  Self Yes No   Sig: Inhale 1 puff into the lungs daily      Facility-Administered Medications: None     Allergies   Allergies   Allergen Reactions     Bupropion Diarrhea     Codeine Hives, Itching, Nausea and Rash     Nickel Unknown     Oxycodone Nausea and Vomiting     Percocet [Oxycodone-Acetaminophen]      Patient reports \"vomiting,grossly ill two weeks ago\"     Topiramate Unknown     Diclofenac Nausea     Tolerates the topical gel     Furosemide Rash     Hydrochlorothiazide Rash     phototoxicity - med was d/lora         Sulfa Drugs Itching and Rash     Sulfasalazine Rash       Physical Exam   Vital Signs:     BP: 110/61 Pulse: 95   Resp: 20 SpO2: 90 % O2 Device: None (Room air)    Weight: 257 lbs 0 oz    General appearance: not in acute distress  HEENT: PERRL, EOMI  Lungs: Clear breath sounds in bilateral lung fields  Cardiovascular: Regular rate and rhythm, normal S1-S2  Abdomen: Soft, tenderness to palpation of the LLQ and RLQ. LLQ pain is a lot worse than RLQ. No distension, normal bowel sound  Musculoskeletal: No joint swelling  Skin: No rash and no edema  Neurology: AAO ×3.  Cranial nerves II - XII normal.  Normal muscle strength in all four extremities.    Data   Data reviewed today: I reviewed all medications, new labs and imaging results over the last 24 hours.     Recent Labs   Lab 09/18/22 2040   WBC 4.3   HGB 15.9*   MCV 93       "   POTASSIUM 4.3   CHLORIDE 103   CO2 22   BUN 12   CR 0.74   ANIONGAP 11   JOSEFA 9.5   *   ALBUMIN 3.8   PROTTOTAL 7.4   BILITOTAL 1.3*   ALKPHOS 124*   ALT 40   AST 51*   LIPASE 33     CT abdomen:  IMPRESSION:   1.  New mild changes of acute diverticulitis involving the lower descending colon. Slight improvement of acute diverticulitis involving the sigmoid colon within the right aspect of the pelvis.   2.  Cirrhosis with fatty change in liver. Gallbladder wall thickening which can be seen with hepatic parenchymal disease.   3.  Uterine fibroids.

## 2022-09-19 NOTE — PROGRESS NOTES
Progress Note    Assessment/Plan  67-year-old morbidly obese lady medical history of alcohol dependence COPD diverticulitis who presented with first episode of diverticulitis.  She took Augmentin as outpatient for 10 days but have recurrence of pain.    Acute diverticulitis: Has LLQ and RLQ abdominal pain for 2 weeks.  Failed outpatient therapy with Augmentin. CT scan on 9/9/22 revealed sigmoid diverticulitis.  CT scan today showed new mild changes of acute diverticulitis involving the lower descending colon, slight improvement of acute diverticulitis involving the sigmoid colon within the right aspect of the pelvis.  -Continue Zosyn  -Urinalysis is unremarkable.  - Pain control: tylenol and oxycodone prn  - Antiemetics  -Tolerated clear liquid diet.  Advance to full liquid  - I ordered normal saline maintenance at 100 mL/h  --May need outpatient colonoscopy in 6 to 8 weeks     Hypotension likely due to combination of sepsis and dehydration  -- Hold Bumex  -- Received normal saline bolus overnight  -- Ordered normal saline infusion at 100 cc an hour for 24 hours.    COPD: Not in exacerbation.  Continue home inhalers when verified by pharmacy.     Bilateral peripheral edema: Hold Bumex for now until patient is able to tolerate p.o. solids.     Mood disorder: Continue PTA Lexapro and Remeron when verified by pharmacy     GERD: on PPI    MELVI.  Does not like to use CPAP at home.  Patient agrees to use CPAP in the hospital.  -- Ordered CPAP    History of cirrhosis likely due to combination of alcohol and nonalcoholic fatty liver disease.  -Reviewed CT abdomen pelvis not ordered on admission.  -- May need outpatient follow-up with liver clinic.    Barriers to discharge: IV abx    Anticipated discharge date:  9/21        Subjective  Patient new to me.  Chart reviewed.  Nurse notified me that patient had hypotension overnight that required normal saline bolus.  We will start IV fluids.  Failed outpatient therapy with  "Augmentin.  Continues to have lower bilateral abdominal pain.  Patient tolerated clear liquid diet and therefore will advance to full liquid to bed.  He denies any nausea or vomiting.  Has chills but no fever.  WBC is low at 4.3.  T-max of 97.8.  Patient is started on oxycodone.  Does not wear CPAP at home although it is prescribed.  UA is unremarkable.    Objective    BP (!) 142/82   Pulse 75   Temp 97.8  F (36.6  C)   Resp 26   Ht 1.6 m (5' 3\")   Wt 116.6 kg (257 lb)   SpO2 98%   BMI 45.53 kg/m    Weight:   Wt Readings from Last 5 Encounters:   09/18/22 116.6 kg (257 lb)   09/08/22 116.6 kg (257 lb)   07/26/22 113.4 kg (250 lb)   06/28/22 113.4 kg (250 lb)   06/08/22 110.2 kg (243 lb)       I/O last 3 completed shifts:  In: 652.92 [I.V.:652.92]  Out: -   I/O this shift:  In: 2000 [P.O.:1200; I.V.:300; IV Piggyback:500]  Out: -           Physical Exam  Alert, oriented*3  No pallor, icterus, clubbing, cyanosis  Body mass index is 45.53 kg/m .  Morbidly obese  No sinus tenderness  Moist membranes  Neck thick  CVS: S1 S2-N, no murmurs, gallops, rubs  Resp: B/L vesicular breath sounds, no wheezing, crackles  Abd: Bowel sounds are positive; guarding present.  Neuro: no involuntary movements such as tremors  Vasc: no leg edema     Pertinent Labs  ----------------------  Recent Labs   Lab 09/18/22 2040      POTASSIUM 4.3   CO2 22   BUN 12   CR 0.74   *   ALBUMIN 3.8   BILITOTAL 1.3*   ALKPHOS 124*   ALT 40   AST 51*     Recent Labs   Lab 09/18/22 2040   WBC 4.3   HGB 15.9*   HCT 46.6        No results for input(s): INR in the last 168 hours.  Glucose Values Latest Ref Rng & Units 9/18/2022   Bedside Glucose (mg/dl )  - --   GLUCOSE 70 - 125 mg/dL 127(H)   Some recent data might be hidden         Pertinent Radiology   Radiology Results: Personally reviewed impression/s  CT Abdomen Pelvis w Contrast    Result Date: 9/18/2022  EXAM: CT ABDOMEN PELVIS W CONTRAST LOCATION: [facility] DATE/TIME: 9/18/2022 10:05 PM INDICATION: Abdominal pain, recent diverticulitis, question abscess, perforation. COMPARISON: 09/09/2022. TECHNIQUE: CT scan of the abdomen and pelvis was performed following injection of IV contrast. Multiplanar reformats were obtained. Dose reduction techniques were used. CONTRAST: 100 ml isovue 370. FINDINGS: LOWER CHEST: Atelectasis. HEPATOBILIARY: Cirrhosis. Fatty infiltration. Stable mildly enlarged periportal and katja hepatis lymph nodes compatible with reactive nodes. PANCREAS: Normal. SPLEEN: Normal. ADRENAL GLANDS: Normal. KIDNEYS/BLADDER: Normal. BOWEL: Changes of acute diverticulitis involving the lower descending colon at the sigmoid junction which are new. No abscess. The changes of acute diverticulitis involving the sigmoid colon within the lower right abdomen again seen with slight improvement. LYMPH NODES: Prominent but subcentimeter retroperitoneal nodes with stable enlarged katja hepatis and periportal lymph nodes. VASCULATURE: Unremarkable. PELVIC ORGANS: Uterine fibroids. MUSCULOSKELETAL: Normal.     IMPRESSION: 1.  New mild changes of acute diverticulitis involving the lower descending colon. Slight improvement of acute diverticulitis involving the sigmoid colon within the right aspect of the pelvis. 2.  Cirrhosis with fatty change in liver. Gallbladder wall thickening which can be seen with hepatic parenchymal disease. 3.  Uterine fibroids.    EKG Results: not reviewed.

## 2022-09-19 NOTE — ED NOTES
Patient given morphine, BP WNL.  Around 0500 noticed low BP readings, patient asymptomatic. Repositioned in bed. Reverse trendelenburg. MD paged. Aware, told to recheck in 30 min. BP's continue to be in the upper 80's, messaged and waiting to hear back.  Patient now asleep in room.

## 2022-09-19 NOTE — PROGRESS NOTES
Clinic Care Coordination Contact    Situation: Patient chart reviewed by care coordinator.    Background: JESU CC reviewed chart as pt was admitted to ED and there was scheduled outreach due for today (9/19).    Assessment: JESU CC reviewed the situation in which pt was admitted as well as previous referral for  CC.     Plan/Recommendations: JESU HENSON will follow up with pt once they are discharged    KATHLEEN Espana? Social Work Care Coordinator   [facility]  [email or web address]? [email or web address]    Phone: [phone]  she/her

## 2022-09-19 NOTE — PLAN OF CARE
Problem: Plan of Care - These are the overarching goals to be used throughout the patient stay.    Goal: Plan of Care Review/Shift Note  Description: The Plan of Care Review/Shift note should be completed every shift.  The Outcome Evaluation is a brief statement about your assessment that the patient is improving, declining, or no change.  This information will be displayed automatically on your shift note.  Outcome: Ongoing, Progressing   Goal Outcome Evaluation:    Pt A&OX4. Up with SBA to bathroom. Pain well managed with prn morphine. Nausea mild, prn Zofran given with relief. Pt voiding well. UA sent. Pt had asymptomatic low BP. Received 500NS bolus. Maintence fluids running. Pt afebrile, continue on IV abx. Will continue to monitor.

## 2022-09-19 NOTE — ED TRIAGE NOTES
Pt arrived via EMS for abdominal pain, and vomiting. Pt vomiting upon arrival to ER. Pt stated she finished antibiotics for diverticulitis that she was diagnosed with a week ago. Pain has worsened over the last couple days.      Triage Assessment     Row Name 09/18/22 2011       Triage Assessment (Adult)    Airway WDL WDL       Respiratory WDL    Respiratory WDL WDL       Skin Circulation/Temperature WDL    Skin Circulation/Temperature WDL WDL       Cardiac WDL    Cardiac WDL WDL       Peripheral/Neurovascular WDL    Peripheral Neurovascular WDL WDL       Cognitive/Neuro/Behavioral WDL    Cognitive/Neuro/Behavioral WDL WDL

## 2022-09-19 NOTE — PLAN OF CARE
Problem: Plan of Care - These are the overarching goals to be used throughout the patient stay.    Goal: Plan of Care Review/Shift Note  Description: The Plan of Care Review/Shift note should be completed every shift.  The Outcome Evaluation is a brief statement about your assessment that the patient is improving, declining, or no change.  This information will be displayed automatically on your shift note.  9/19/2022 1831 by Polina Joiner RN  Outcome: Ongoing, Progressing  9/19/2022 1145 by Polina Joiner RN  Outcome: Ongoing, Progressing   Goal Outcome Evaluation:           Pt c/o low abd pain and back pain from ED cart. Prn oxycodone given with some relief. Pt up walking around in room and to bathroom. Tolerating full liquid diet. Nausea comes and goes. Prn zofran given with relief. IVF running. Pt afebrile, continues on IV abx. Pulmonary toilet encouarged. Pt refused CPAP use while in hospital.

## 2022-09-19 NOTE — PHARMACY-ADMISSION MEDICATION HISTORY
Pharmacy Note - Admission Medication History    Pertinent Provider Information: Pt completed a recent 10 day course of Augmentin for diverticulitis.  Pt also states that she's been out of her gabapentin and that her current dose has not been working for her.     ______________________________________________________________________    Prior To Admission (PTA) med list completed and updated in EMR.       PTA Med List   Medication Sig Last Dose     acetaminophen (TYLENOL) 500 MG tablet Take 1-2 tablets (500-1,000 mg) by mouth every 6 hours as needed for mild pain More than a month at Unknown time     albuterol (PROAIR HFA/PROVENTIL HFA/VENTOLIN HFA) 108 (90 Base) MCG/ACT inhaler Inhale 2 puffs into the lungs every 4 hours as needed  Past Week at Unknown time     bumetanide (BUMEX) 1 MG tablet Take 1 mg by mouth daily  Past Week at Unknown time     escitalopram (LEXAPRO) 10 MG tablet Take 1 tablet (10 mg) by mouth daily Take with 5mg tablet for total of 15mg/day. 9/18/2022 at Unknown time     escitalopram (LEXAPRO) 5 MG tablet Take 1 tablet (5 mg) by mouth daily Take with 10mg tablet for total of 15mg per day. 9/18/2022 at Unknown time     gabapentin (NEURONTIN) 100 MG capsule Take 1 capsule (100 mg) by mouth 3 times daily Past Week at Unknown time     hydrOXYzine (ATARAX) 25 MG tablet Take 25-50 mg by mouth 3 times daily as needed for anxiety 9/18/2022 at Unknown time     ipratropium - albuterol 0.5 mg/2.5 mg/3 mL (DUONEB) 0.5-2.5 (3) MG/3ML neb solution Take 1 vial (3 mLs) by nebulization every 4 hours as needed for shortness of breath / dyspnea or wheezing Past Week at Unknown time     mirtazapine (REMERON) 15 MG tablet Take 1 tablet (15 mg) by mouth At Bedtime Past Week at Unknown time     omeprazole (PRILOSEC) 20 MG DR capsule Take 20 mg by mouth daily as needed Past Month at Unknown time     umeclidinium (INCRUSE ELLIPTA) 62.5 MCG/INH inhaler Inhale 1 puff into the lungs daily 9/18/2022 at Unknown time        Information source(s): Patient  Method of interview communication: in-person    Summary of Changes to PTA Med List  New: n/a  Discontinued: n/a  Changed: n/a    Patient was asked about OTC/herbal products specifically.  PTA med list reflects this.    In the past week, patient estimated taking medication this percent of the time:  greater than 90%.    Allergies were reviewed, assessed, and updated with the patient.      Patient did not bring any medications to the hospital and can't retrieve from home. No multi-dose medications are available for use during hospital stay.     The information provided in this note is only as accurate as the sources available at the time of the update(s).    Thank you for the opportunity to participate in the care of this patient.    Radha Santos RPH  9/19/2022 8:36 AM

## 2022-09-20 LAB
ALBUMIN SERPL-MCNC: 3 G/DL (ref 3.5–5)
ALP SERPL-CCNC: 70 U/L (ref 45–120)
ALT SERPL W P-5'-P-CCNC: 28 U/L (ref 0–45)
ANION GAP SERPL CALCULATED.3IONS-SCNC: 7 MMOL/L (ref 5–18)
AST SERPL W P-5'-P-CCNC: 23 U/L (ref 0–40)
BILIRUB SERPL-MCNC: 0.6 MG/DL (ref 0–1)
BUN SERPL-MCNC: 7 MG/DL (ref 8–22)
CALCIUM SERPL-MCNC: 8.1 MG/DL (ref 8.5–10.5)
CHLORIDE BLD-SCNC: 103 MMOL/L (ref 98–107)
CO2 SERPL-SCNC: 25 MMOL/L (ref 22–31)
CREAT SERPL-MCNC: 0.69 MG/DL (ref 0.6–1.1)
ERYTHROCYTE [DISTWIDTH] IN BLOOD BY AUTOMATED COUNT: 13.9 % (ref 10–15)
GFR SERPL CREATININE-BSD FRML MDRD: >90 ML/MIN/1.73M2
GLUCOSE BLD-MCNC: 155 MG/DL (ref 70–125)
HCT VFR BLD AUTO: 39.7 % (ref 35–47)
HGB BLD-MCNC: 13 G/DL (ref 11.7–15.7)
MCH RBC QN AUTO: 31.5 PG (ref 26.5–33)
MCHC RBC AUTO-ENTMCNC: 32.7 G/DL (ref 31.5–36.5)
MCV RBC AUTO: 96 FL (ref 78–100)
PLATELET # BLD AUTO: 118 10E3/UL (ref 150–450)
POTASSIUM BLD-SCNC: 4.2 MMOL/L (ref 3.5–5)
PROT SERPL-MCNC: 6.2 G/DL (ref 6–8)
RBC # BLD AUTO: 4.13 10E6/UL (ref 3.8–5.2)
SODIUM SERPL-SCNC: 135 MMOL/L (ref 136–145)
WBC # BLD AUTO: 9 10E3/UL (ref 4–11)

## 2022-09-20 PROCEDURE — 85027 COMPLETE CBC AUTOMATED: CPT | Performed by: INTERNAL MEDICINE

## 2022-09-20 PROCEDURE — 80053 COMPREHEN METABOLIC PANEL: CPT | Performed by: INTERNAL MEDICINE

## 2022-09-20 PROCEDURE — 258N000003 HC RX IP 258 OP 636: Performed by: INTERNAL MEDICINE

## 2022-09-20 PROCEDURE — 99232 SBSQ HOSP IP/OBS MODERATE 35: CPT | Performed by: STUDENT IN AN ORGANIZED HEALTH CARE EDUCATION/TRAINING PROGRAM

## 2022-09-20 PROCEDURE — 250N000013 HC RX MED GY IP 250 OP 250 PS 637: Performed by: INTERNAL MEDICINE

## 2022-09-20 PROCEDURE — 250N000011 HC RX IP 250 OP 636: Performed by: INTERNAL MEDICINE

## 2022-09-20 PROCEDURE — 36415 COLL VENOUS BLD VENIPUNCTURE: CPT | Performed by: INTERNAL MEDICINE

## 2022-09-20 PROCEDURE — 120N000001 HC R&B MED SURG/OB

## 2022-09-20 RX ADMIN — OXYCODONE HYDROCHLORIDE 5 MG: 5 TABLET ORAL at 17:15

## 2022-09-20 RX ADMIN — ENOXAPARIN SODIUM 40 MG: 100 INJECTION SUBCUTANEOUS at 20:06

## 2022-09-20 RX ADMIN — ESCITALOPRAM OXALATE 10 MG: 10 TABLET ORAL at 09:31

## 2022-09-20 RX ADMIN — ONDANSETRON 4 MG: 2 INJECTION INTRAMUSCULAR; INTRAVENOUS at 04:51

## 2022-09-20 RX ADMIN — PIPERACILLIN AND TAZOBACTAM 3.38 G: 3; .375 INJECTION, POWDER, LYOPHILIZED, FOR SOLUTION INTRAVENOUS at 05:00

## 2022-09-20 RX ADMIN — GABAPENTIN 100 MG: 100 CAPSULE ORAL at 09:30

## 2022-09-20 RX ADMIN — ONDANSETRON 4 MG: 2 INJECTION INTRAMUSCULAR; INTRAVENOUS at 10:10

## 2022-09-20 RX ADMIN — GABAPENTIN 100 MG: 100 CAPSULE ORAL at 20:06

## 2022-09-20 RX ADMIN — PIPERACILLIN AND TAZOBACTAM 3.38 G: 3; .375 INJECTION, POWDER, LYOPHILIZED, FOR SOLUTION INTRAVENOUS at 21:34

## 2022-09-20 RX ADMIN — SODIUM CHLORIDE: 9 INJECTION, SOLUTION INTRAVENOUS at 07:00

## 2022-09-20 RX ADMIN — ONDANSETRON 4 MG: 2 INJECTION INTRAMUSCULAR; INTRAVENOUS at 20:05

## 2022-09-20 RX ADMIN — PIPERACILLIN AND TAZOBACTAM 3.38 G: 3; .375 INJECTION, POWDER, LYOPHILIZED, FOR SOLUTION INTRAVENOUS at 13:54

## 2022-09-20 RX ADMIN — MIRTAZAPINE 15 MG: 15 TABLET, FILM COATED ORAL at 21:33

## 2022-09-20 RX ADMIN — ENOXAPARIN SODIUM 40 MG: 100 INJECTION SUBCUTANEOUS at 09:30

## 2022-09-20 RX ADMIN — ACETAMINOPHEN 650 MG: 325 TABLET, FILM COATED ORAL at 22:46

## 2022-09-20 RX ADMIN — UMECLIDINIUM 1 PUFF: 62.5 AEROSOL, POWDER ORAL at 09:32

## 2022-09-20 RX ADMIN — OXYCODONE HYDROCHLORIDE 5 MG: 5 TABLET ORAL at 10:09

## 2022-09-20 RX ADMIN — OXYCODONE HYDROCHLORIDE 5 MG: 5 TABLET ORAL at 21:31

## 2022-09-20 RX ADMIN — ESCITALOPRAM 5 MG: 5 TABLET, FILM COATED ORAL at 09:31

## 2022-09-20 RX ADMIN — PANTOPRAZOLE SODIUM 40 MG: 40 TABLET, DELAYED RELEASE ORAL at 07:01

## 2022-09-20 RX ADMIN — GABAPENTIN 100 MG: 100 CAPSULE ORAL at 13:54

## 2022-09-20 ASSESSMENT — ACTIVITIES OF DAILY LIVING (ADL)
DIFFICULTY_EATING/SWALLOWING: NO
WEAR_GLASSES_OR_BLIND: NO
ADLS_ACUITY_SCORE: 39
WALKING_OR_CLIMBING_STAIRS: AMBULATION DIFFICULTY, REQUIRES EQUIPMENT
WALKING_OR_CLIMBING_STAIRS_DIFFICULTY: YES
TRANSFERRING: 1-->ASSISTANCE (EQUIPMENT/PERSON) NEEDED
ADLS_ACUITY_SCORE: 39
DOING_ERRANDS_INDEPENDENTLY_DIFFICULTY: NO
ADLS_ACUITY_SCORE: 39
TOILETING_ISSUES: NO
ADLS_ACUITY_SCORE: 39
CHANGE_IN_FUNCTIONAL_STATUS_SINCE_ONSET_OF_CURRENT_ILLNESS/INJURY: YES
ADLS_ACUITY_SCORE: 39
FALL_HISTORY_WITHIN_LAST_SIX_MONTHS: NO
ADLS_ACUITY_SCORE: 24
DRESSING/BATHING_DIFFICULTY: NO
ADLS_ACUITY_SCORE: 35
ADLS_ACUITY_SCORE: 39
ADLS_ACUITY_SCORE: 39
TRANSFERRING: 0-->ASSISTANCE NEEDED (DEVELOPMENTALLY APPROPRIATE)
EQUIPMENT_CURRENTLY_USED_AT_HOME: SHOWER CHAIR
CONCENTRATING,_REMEMBERING_OR_MAKING_DECISIONS_DIFFICULTY: NO
ADLS_ACUITY_SCORE: 39

## 2022-09-20 NOTE — PROGRESS NOTES
Progress Note    Assessment/Plan  67-year-old morbidly obese lady medical history of alcohol dependence COPD diverticulitis who presented with first episode of diverticulitis.  She took Augmentin as outpatient for 10 days but have recurrence of pain.    Acute diverticulitis:   Has LLQ and RLQ abdominal pain for 2 weeks.  Failed outpatient therapy with Augmentin. CT scan on 9/9/22 revealed sigmoid diverticulitis.  Repeat CT scan 09/18 showed new mild changes of acute diverticulitis involving the lower descending colon, slight improvement of acute diverticulitis involving the sigmoid colon within the right aspect of the pelvis.  - Continue Zosyn  -Urinalysis is unremarkable.  - Pain control: tylenol and oxycodone prn  - Antiemetics  - Advanced to regular diet today. Discontinued IVF today  --May need outpatient colonoscopy in 6 to 8 weeks     Hypotension likely due to combination of sepsis and dehydration, resolved  -- Discontinued IVF 09/20  -- continue to monitor    COPD: Not in exacerbation.    Continue home inhalers     Bilateral peripheral edema:   Resume PTA Bumex    Mood disorder:   Continue PTA Lexapro and Remeron      GERD: on PPI    MELVI.  Does not like to use CPAP at home.  Patient agrees to use CPAP in the hospital.  -- Ordered CPAP    History of cirrhosis likely due to combination of alcohol and nonalcoholic fatty liver disease.  --Reviewed CT abdomen pelvis ordered on admission w/ cirrhosis with fatty change in liver. Gallbladder wall thickening which can be seen with hepatic parenchymal disease  - AST 23, ALT 28, T.bobby 0.6,  -- May need outpatient follow-up with liver clinic.    Barriers to discharge: IV abx    Anticipated discharge date:  9/21    Subjective  Patient new to me.  Chart reviewed.    No acute events reported overnight.  Found patient still boarding in ED. Seen and examined. Found her on 3L of oxygen, placed on oxygen overnight for nocturnal hypoxia while sleeping. Was not placed pn CPAP as  "boarding in ED. Turned off oxygen, sats remained>92%on RA  Tells me she continues to have lower bilateral abdominal pain. Feels generalised achiness and fatigue. Denies fevers or chills. Denies SOB chest pain or palpitations.   Patient tolerated full liquid diet and therefore will advance to regula diet    Plan of care: continued antibiotics, regular diet, remains inpatient    Objective    /64   Pulse 65   Temp 98.4  F (36.9  C)   Resp 18   Ht 1.6 m (5' 3\")   Wt 116.6 kg (257 lb)   SpO2 92%   BMI 45.53 kg/m    Weight:   Wt Readings from Last 5 Encounters:   09/18/22 116.6 kg (257 lb)   09/08/22 116.6 kg (257 lb)   07/26/22 113.4 kg (250 lb)   06/28/22 113.4 kg (250 lb)   06/08/22 110.2 kg (243 lb)       I/O last 3 completed shifts:  In: 4008.33 [P.O.:2400; I.V.:1108.33; IV Piggyback:500]  Out: -   No intake/output data recorded.      Physical Exam  Body mass index is 45.53 kg/m .  Morbidly obese    General: AAOx3, NAD  HEENT: Oral mucosa moist and non-erythematous, PERRLA, EOM intact  CV: RRR, normal S1S2, no murmur, clicks, rubs  Resp: Clear to auscultation bilaterally, no wheezes, rhonchi  Abd: Soft, non-tender, BS+, no masses appreciated  Extremities: Radial and pedal pulses intact and symmetric, no pedal edema  Neuro: No lateralizing symptoms or focal neurologic deficits      Pertinent Labs  ----------------------  Recent Labs   Lab 09/20/22  0730 09/18/22 2040   * 136   POTASSIUM 4.2 4.3   CO2 25 22   BUN 7* 12   CR 0.69 0.74   * 127*   ALBUMIN 3.0* 3.8   BILITOTAL 0.6 1.3*   ALKPHOS 70 124*   ALT 28 40   AST 23 51*     Recent Labs   Lab 09/20/22 0730 09/18/22 2040   WBC 9.0 4.3   HGB 13.0 15.9*   HCT 39.7 46.6   * 176     No results for input(s): INR in the last 168 hours.  Glucose Values Latest Ref Rng & Units 9/18/2022 9/20/2022   Bedside Glucose (mg/dl )  - -- --   GLUCOSE 70 - 125 mg/dL 127(H) 155(H)   Some recent data might be hidden       Pertinent Radiology "   Radiology Results: Personally reviewed impression/s  CT Abdomen Pelvis w Contrast    Result Date: 9/18/2022  EXAM: CT ABDOMEN PELVIS W CONTRAST LOCATION: [facility] DATE/TIME: 9/18/2022 10:05 PM INDICATION: Abdominal pain, recent diverticulitis, question abscess, perforation. COMPARISON: 09/09/2022. TECHNIQUE: CT scan of the abdomen and pelvis was performed following injection of IV contrast. Multiplanar reformats were obtained. Dose reduction techniques were used. CONTRAST: 100 ml isovue 370. FINDINGS: LOWER CHEST: Atelectasis. HEPATOBILIARY: Cirrhosis. Fatty infiltration. Stable mildly enlarged periportal and katja hepatis lymph nodes compatible with reactive nodes. PANCREAS: Normal. SPLEEN: Normal. ADRENAL GLANDS: Normal. KIDNEYS/BLADDER: Normal. BOWEL: Changes of acute diverticulitis involving the lower descending colon at the sigmoid junction which are new. No abscess. The changes of acute diverticulitis involving the sigmoid colon within the lower right abdomen again seen with slight improvement. LYMPH NODES: Prominent but subcentimeter retroperitoneal nodes with stable enlarged katja hepatis and periportal lymph nodes. VASCULATURE: Unremarkable. PELVIC ORGANS: Uterine fibroids. MUSCULOSKELETAL: Normal.     IMPRESSION: 1.  New mild changes of acute diverticulitis involving the lower descending colon. Slight improvement of acute diverticulitis involving the sigmoid colon within the right aspect of the pelvis. 2.  Cirrhosis with fatty change in liver. Gallbladder wall thickening which can be seen with hepatic parenchymal disease. 3.  Uterine fibroids.    EKG Results: not reviewed.     Lindsey Vazquez MD, MPH  [facility]  Pager: [phone]

## 2022-09-20 NOTE — PLAN OF CARE
Problem: Infection (Bowel Disease, Inflammatory)  Goal: Absence of Infection Signs and Symptoms  Outcome: Ongoing, Progressing   Active bowel sounds. Abdomen soft, non distended and non tender. Nauseous but no vomit. PRN Zofran administered. Tolerating full liquid diet. Oxygen drops to 88% on 2 LPM. Now on 3 LPM. Afebrile. On zosyn.  Problem: Pain (Bowel Disease, Inflammatory)  Goal: Acceptable Pain Control  Outcome: Ongoing, Progressing  Intervention: Prevent or Manage Pain  Recent Flowsheet Documentation  Taken 9/19/2022 2117 by Daniella Senior RN  Pain Management Interventions: medication (see MAR)  Taken 9/19/2022 2015 by Daniella Senior RN  Pain Management Interventions: medication (see MAR)   Goal Outcome Evaluation:      Pain manageable with prn oxycodone.

## 2022-09-20 NOTE — CONSULTS
Care Management Initial Consult    General Information  Assessment completed with: Patient, pt  Type of CM/SW Visit: Initial Assessment    Primary Care Provider verified and updated as needed: Yes   Readmission within the last 30 days: no previous admission in last 30 days   Return Category: Progression of disease  Reason for Consult: discharge planning  Advance Care Planning: Advance Care Planning Reviewed: verified with patient, no concerns identified          Communication Assessment  Patient's communication style: spoken language (English or Bilingual)             Cognitive  Cognitive/Neuro/Behavioral: WDL                      Living Environment:   People in home: alone     Current living Arrangements: apartment      Able to return to prior arrangements: yes       Family/Social Support:  Care provided by: self  Provides care for: no one  Marital Status: Single  Sibling(s)          Description of Support System: Supportive, Involved    Support Assessment: Adequate family and caregiver support, Adequate social supports    Current Resources:   Patient receiving home care services: No     Community Resources: None  Equipment currently used at home: none  Supplies currently used at home: Other (CPap)    Employment/Financial:  Employment Status:          Financial Concerns: No concerns identified   Referral to Financial Worker: No       Lifestyle & Psychosocial Needs:  Social Determinants of Health     Tobacco Use: High Risk     Smoking Tobacco Use: Current Every Day Smoker     Smokeless Tobacco Use: Never Used   Alcohol Use: Not on file   Financial Resource Strain: Not on file   Food Insecurity: Not on file   Transportation Needs: Not on file   Physical Activity: Not on file   Stress: Not on file   Social Connections: Not on file   Intimate Partner Violence: Not on file   Depression: Not at risk     PHQ-2 Score: 2   Housing Stability: Not on file       Functional Status:  Prior to admission patient needed assistance:    Dependent ADLs:: Independent  Dependent IADLs:: Independent, Transportation  Assesssment of Functional Status: Not at baseline with mobility, Not at baseline with ADL Functioning, Not at  functional baseline    Mental Health Status:  Mental Health Status: No Current Concerns       Chemical Dependency Status:                Values/Beliefs:  Spiritual, Cultural Beliefs, Caodaism Practices, Values that affect care:                 Additional Information:  Assessed, lives alone and will need transportation svcs at discharge, independent at baseline.       David Peña RN

## 2022-09-20 NOTE — ED NOTES
Bed: JNEDP-08  Expected date: 9/19/22  Expected time: 6:39 PM  Means of arrival:   Comments:  RP 2 moving over

## 2022-09-21 ENCOUNTER — APPOINTMENT (OUTPATIENT)
Dept: PHYSICAL THERAPY | Facility: HOSPITAL | Age: 67
DRG: 392 | End: 2022-09-21
Attending: STUDENT IN AN ORGANIZED HEALTH CARE EDUCATION/TRAINING PROGRAM
Payer: COMMERCIAL

## 2022-09-21 LAB
ATRIAL RATE - MUSE: 57 BPM
DIASTOLIC BLOOD PRESSURE - MUSE: NORMAL MMHG
INTERPRETATION ECG - MUSE: NORMAL
P AXIS - MUSE: 60 DEGREES
PR INTERVAL - MUSE: 148 MS
QRS DURATION - MUSE: 94 MS
QT - MUSE: 440 MS
QTC - MUSE: 428 MS
R AXIS - MUSE: 25 DEGREES
SYSTOLIC BLOOD PRESSURE - MUSE: NORMAL MMHG
T AXIS - MUSE: 33 DEGREES
TSH SERPL DL<=0.005 MIU/L-ACNC: 0.99 UIU/ML (ref 0.3–4.2)
VENTRICULAR RATE- MUSE: 57 BPM

## 2022-09-21 PROCEDURE — 250N000011 HC RX IP 250 OP 636: Performed by: INTERNAL MEDICINE

## 2022-09-21 PROCEDURE — 250N000013 HC RX MED GY IP 250 OP 250 PS 637: Performed by: INTERNAL MEDICINE

## 2022-09-21 PROCEDURE — 93005 ELECTROCARDIOGRAM TRACING: CPT

## 2022-09-21 PROCEDURE — 93010 ELECTROCARDIOGRAM REPORT: CPT | Performed by: INTERNAL MEDICINE

## 2022-09-21 PROCEDURE — 97161 PT EVAL LOW COMPLEX 20 MIN: CPT | Mod: GP

## 2022-09-21 PROCEDURE — 120N000001 HC R&B MED SURG/OB

## 2022-09-21 PROCEDURE — 36415 COLL VENOUS BLD VENIPUNCTURE: CPT | Performed by: STUDENT IN AN ORGANIZED HEALTH CARE EDUCATION/TRAINING PROGRAM

## 2022-09-21 PROCEDURE — 84443 ASSAY THYROID STIM HORMONE: CPT | Performed by: STUDENT IN AN ORGANIZED HEALTH CARE EDUCATION/TRAINING PROGRAM

## 2022-09-21 PROCEDURE — 97116 GAIT TRAINING THERAPY: CPT | Mod: GP

## 2022-09-21 PROCEDURE — 250N000011 HC RX IP 250 OP 636: Performed by: STUDENT IN AN ORGANIZED HEALTH CARE EDUCATION/TRAINING PROGRAM

## 2022-09-21 PROCEDURE — 99232 SBSQ HOSP IP/OBS MODERATE 35: CPT | Performed by: STUDENT IN AN ORGANIZED HEALTH CARE EDUCATION/TRAINING PROGRAM

## 2022-09-21 RX ORDER — ONDANSETRON 2 MG/ML
4 INJECTION INTRAMUSCULAR; INTRAVENOUS EVERY 6 HOURS PRN
Status: DISCONTINUED | OUTPATIENT
Start: 2022-09-21 | End: 2022-09-22 | Stop reason: HOSPADM

## 2022-09-21 RX ORDER — PROCHLORPERAZINE 25 MG
12.5 SUPPOSITORY, RECTAL RECTAL EVERY 12 HOURS PRN
Status: DISCONTINUED | OUTPATIENT
Start: 2022-09-21 | End: 2022-09-22 | Stop reason: HOSPADM

## 2022-09-21 RX ORDER — PROCHLORPERAZINE MALEATE 5 MG
5 TABLET ORAL EVERY 6 HOURS PRN
Status: DISCONTINUED | OUTPATIENT
Start: 2022-09-21 | End: 2022-09-22 | Stop reason: HOSPADM

## 2022-09-21 RX ORDER — ONDANSETRON 4 MG/1
4 TABLET, ORALLY DISINTEGRATING ORAL EVERY 6 HOURS PRN
Status: DISCONTINUED | OUTPATIENT
Start: 2022-09-21 | End: 2022-09-22 | Stop reason: HOSPADM

## 2022-09-21 RX ADMIN — ONDANSETRON 4 MG: 2 INJECTION INTRAMUSCULAR; INTRAVENOUS at 16:09

## 2022-09-21 RX ADMIN — ESCITALOPRAM 5 MG: 5 TABLET, FILM COATED ORAL at 07:49

## 2022-09-21 RX ADMIN — OXYCODONE HYDROCHLORIDE 5 MG: 5 TABLET ORAL at 21:39

## 2022-09-21 RX ADMIN — ONDANSETRON 4 MG: 2 INJECTION INTRAMUSCULAR; INTRAVENOUS at 09:57

## 2022-09-21 RX ADMIN — ENOXAPARIN SODIUM 40 MG: 100 INJECTION SUBCUTANEOUS at 21:28

## 2022-09-21 RX ADMIN — OXYCODONE HYDROCHLORIDE 5 MG: 5 TABLET ORAL at 13:05

## 2022-09-21 RX ADMIN — ESCITALOPRAM OXALATE 10 MG: 10 TABLET ORAL at 07:49

## 2022-09-21 RX ADMIN — OXYCODONE HYDROCHLORIDE 5 MG: 5 TABLET ORAL at 06:12

## 2022-09-21 RX ADMIN — PIPERACILLIN AND TAZOBACTAM 3.38 G: 3; .375 INJECTION, POWDER, LYOPHILIZED, FOR SOLUTION INTRAVENOUS at 06:13

## 2022-09-21 RX ADMIN — GABAPENTIN 100 MG: 100 CAPSULE ORAL at 14:06

## 2022-09-21 RX ADMIN — PIPERACILLIN AND TAZOBACTAM 3.38 G: 3; .375 INJECTION, POWDER, LYOPHILIZED, FOR SOLUTION INTRAVENOUS at 14:06

## 2022-09-21 RX ADMIN — UMECLIDINIUM 1 PUFF: 62.5 AEROSOL, POWDER ORAL at 07:50

## 2022-09-21 RX ADMIN — PIPERACILLIN AND TAZOBACTAM 3.38 G: 3; .375 INJECTION, POWDER, LYOPHILIZED, FOR SOLUTION INTRAVENOUS at 21:28

## 2022-09-21 RX ADMIN — MIRTAZAPINE 15 MG: 15 TABLET, FILM COATED ORAL at 21:29

## 2022-09-21 RX ADMIN — ACETAMINOPHEN 650 MG: 325 TABLET, FILM COATED ORAL at 16:09

## 2022-09-21 RX ADMIN — GABAPENTIN 100 MG: 100 CAPSULE ORAL at 07:49

## 2022-09-21 RX ADMIN — PANTOPRAZOLE SODIUM 40 MG: 40 TABLET, DELAYED RELEASE ORAL at 06:12

## 2022-09-21 RX ADMIN — GABAPENTIN 100 MG: 100 CAPSULE ORAL at 21:29

## 2022-09-21 RX ADMIN — ENOXAPARIN SODIUM 40 MG: 100 INJECTION SUBCUTANEOUS at 07:49

## 2022-09-21 ASSESSMENT — ACTIVITIES OF DAILY LIVING (ADL)
ADLS_ACUITY_SCORE: 24

## 2022-09-21 NOTE — PROGRESS NOTES
09/21/22 1405   Quick Adds   Type of Visit Initial PT Evaluation   Living Environment   People in Home alone   Current Living Arrangements apartment   Home Accessibility no concerns   Transportation Anticipated family or friend will provide;health plan transportation   Self-Care   Usual Activity Tolerance good   Current Activity Tolerance moderate   Equipment Currently Used at Home none  (owns FWW)   General Information   Onset of Illness/Injury or Date of Surgery 09/18/22   Referring Physician Lindsey Vazquez MD   Patient/Family Therapy Goals Statement (PT) return home   Pertinent History of Current Problem (include personal factors and/or comorbidities that impact the POC) 67-year-old morbidly obese lady medical history of alcohol dependence COPD diverticulitis who presented with first episode of diverticulitis.   Strength (Manual Muscle Testing)   Strength (Manual Muscle Testing) Deficits observed during functional mobility   Bed Mobility   Comment, (Bed Mobility) Pt sitting eob when PT arrived.   Transfers   Transfers sit-stand transfer   Sit-Stand Transfer   Sit-Stand Franklin (Transfers) supervision;verbal cues   Assistive Device (Sit-Stand Transfers)   (none)   Gait/Stairs (Locomotion)   Franklin Level (Gait) contact guard   Assistive Device (Gait)   (pushing IV pole)   Distance in Feet (Required for LE Total Joints) 25'   Pattern (Gait) step-through   Deviations/Abnormal Patterns (Gait) gait speed decreased   Clinical Impression   Criteria for Skilled Therapeutic Intervention Yes, treatment indicated   PT Diagnosis (PT) Impaired functional mobility   Influenced by the following impairments Fatigue, weakness   Functional limitations due to impairments Impaired transfers, impaired gait   Clinical Presentation (PT Evaluation Complexity) Stable/Uncomplicated   Clinical Presentation Rationale Presents as diagnosed   Clinical Decision Making (Complexity) low complexity   Planned Therapy  Interventions (PT) bed mobility training;gait training;home exercise program;strengthening;transfer training   Anticipated Equipment Needs at Discharge (PT)   (none)   Risk & Benefits of therapy have been explained patient   PT Discharge Planning   PT Discharge Recommendation (DC Rec) home with home care physical therapy   PT Rationale for DC Rec Pt appears close to baseline mobility. Recommend home PT to improve overall strength and endurance.   Total Evaluation Time   Total Evaluation Time (Minutes) 10   Physical Therapy Goals   PT Frequency Daily   PT Predicted Duration/Target Date for Goal Attainment 09/23/22   PT Goals Bed Mobility;Transfers;Gait   PT: Bed Mobility Independent;Supine to/from sit   PT: Transfers Independent;Sit to/from stand;Bed to/from chair   PT: Gait Supervision/stand-by assist;150 feet       Shira Williamson, PT, DPT  9/21/2022

## 2022-09-21 NOTE — PLAN OF CARE
"  Problem: Nutrition Impaired (Bowel Disease, Inflammatory)  Goal: Optimal Nutrition  Outcome: Ongoing, Progressing   Pt ate 100% of a very large breakfast this morning, but requested IV Zofran afterwards.  Pt had to be awakened from sound sleep for administration of Zofran, which Pt stated she still needed.  RN overheard telephone conversation in her room where she was telling listener \"I just don't have any appetite\".      Problem: Pain (Bowel Disease, Inflammatory)  Goal: Acceptable Pain Control  Outcome: Ongoing, Progressing  Intervention: Prevent or Manage Pain  Problem: Diarrhea (Bowel Disease, Inflammatory)  Goal: Diarrhea Symptom Relief  Outcome: Ongoing, Progressing   Requested oxycodone for pain in left abdomen and flank which she said worsened while having moderate amount of formed, brown stool.                      "

## 2022-09-21 NOTE — PLAN OF CARE
Problem: Pain Acute  Goal: Acceptable Pain Control and Functional Ability  Outcome: Ongoing, Progressing     Problem: COPD (Chronic Obstructive Pulmonary Disease) Comorbidity  Goal: Maintenance of COPD Symptom Control  Outcome: Ongoing, Progressing    Pt arrived to floor around 2045. Pt reported pain 7/10. Pain managed with PRN oxycodone. Pt reported nausea that hadn't been relieved with PRN Zofran, jovana essential oil given and helpful per pt. Pt remains a SBA.  Pt alert and oriented x4. Pt is on 3L of O2 overnight instead of CPAP which pt uses at home. VS unchanged.    Jonh Harding RN

## 2022-09-21 NOTE — PROGRESS NOTES
"SPIRITUAL HEALTH SERVICES NOTE  [facility]/P2    SPIRITUAL ASSESSMENT  Main concern during this visit: being comfortable enough to discharge tomorrow  Trusts that God has a plan for her and is caring for her  Welcomes prayer    CARE PROVIDED  Empathic listening and presence   Discussed coping strategies   Prayer shared     SPIRITUAL CARE NOTE  Saw Patsy due to admission screening response. She reports that she was admitted due to diverticulitis and says \"I didn't know I could experience pain like that.\" She reports that it is better today. She anticipates being discharged tomorrow and says that she is okay with that, but is a little bit concerned about experiencing more pain at home and doesn't want to be admitted again after discharge. Patsy is retired and denies any other concerns about discharge. She finds strength and comfort in prayer and knowing that God has a plan for her and will continue caring for her needs. She welcomes my offer of prayer and a Bible.     Time Spent with Patient/Family: 15 minutes    Plan of Care: Will remain available for further support as patient/family needs/desires.    Concha Guevara M.Div.      Office: [phone] (for non-urgent requests)  Please Vocera or page through [facility] for time-sensitive requests    "

## 2022-09-22 VITALS
HEIGHT: 64 IN | HEART RATE: 64 BPM | RESPIRATION RATE: 20 BRPM | DIASTOLIC BLOOD PRESSURE: 62 MMHG | OXYGEN SATURATION: 93 % | WEIGHT: 264.3 LBS | SYSTOLIC BLOOD PRESSURE: 131 MMHG | TEMPERATURE: 98.2 F | BODY MASS INDEX: 45.12 KG/M2

## 2022-09-22 LAB
CREAT SERPL-MCNC: 0.51 MG/DL (ref 0.51–0.95)
GFR SERPL CREATININE-BSD FRML MDRD: >90 ML/MIN/1.73M2
PLATELET # BLD AUTO: 129 10E3/UL (ref 150–450)

## 2022-09-22 PROCEDURE — 250N000011 HC RX IP 250 OP 636: Performed by: INTERNAL MEDICINE

## 2022-09-22 PROCEDURE — 82565 ASSAY OF CREATININE: CPT | Performed by: INTERNAL MEDICINE

## 2022-09-22 PROCEDURE — 250N000013 HC RX MED GY IP 250 OP 250 PS 637: Performed by: INTERNAL MEDICINE

## 2022-09-22 PROCEDURE — 85049 AUTOMATED PLATELET COUNT: CPT | Performed by: INTERNAL MEDICINE

## 2022-09-22 PROCEDURE — 99239 HOSP IP/OBS DSCHRG MGMT >30: CPT | Performed by: STUDENT IN AN ORGANIZED HEALTH CARE EDUCATION/TRAINING PROGRAM

## 2022-09-22 PROCEDURE — 36415 COLL VENOUS BLD VENIPUNCTURE: CPT | Performed by: INTERNAL MEDICINE

## 2022-09-22 PROCEDURE — 250N000011 HC RX IP 250 OP 636: Performed by: STUDENT IN AN ORGANIZED HEALTH CARE EDUCATION/TRAINING PROGRAM

## 2022-09-22 RX ORDER — HYDROCODONE BITARTRATE AND ACETAMINOPHEN 5; 325 MG/1; MG/1
1 TABLET ORAL EVERY 6 HOURS PRN
Qty: 10 TABLET | Refills: 0 | Status: SHIPPED | OUTPATIENT
Start: 2022-09-22 | End: 2022-09-25

## 2022-09-22 RX ORDER — PROCHLORPERAZINE MALEATE 10 MG
5 TABLET ORAL EVERY 6 HOURS PRN
Qty: 14 TABLET | Refills: 0 | Status: SHIPPED | OUTPATIENT
Start: 2022-09-22 | End: 2022-11-02

## 2022-09-22 RX ADMIN — ESCITALOPRAM 5 MG: 5 TABLET, FILM COATED ORAL at 08:55

## 2022-09-22 RX ADMIN — UMECLIDINIUM 1 PUFF: 62.5 AEROSOL, POWDER ORAL at 08:55

## 2022-09-22 RX ADMIN — PANTOPRAZOLE SODIUM 40 MG: 40 TABLET, DELAYED RELEASE ORAL at 06:46

## 2022-09-22 RX ADMIN — ENOXAPARIN SODIUM 40 MG: 100 INJECTION SUBCUTANEOUS at 08:54

## 2022-09-22 RX ADMIN — ACETAMINOPHEN 650 MG: 325 TABLET, FILM COATED ORAL at 09:07

## 2022-09-22 RX ADMIN — PIPERACILLIN AND TAZOBACTAM 3.38 G: 3; .375 INJECTION, POWDER, LYOPHILIZED, FOR SOLUTION INTRAVENOUS at 06:46

## 2022-09-22 RX ADMIN — ONDANSETRON 4 MG: 2 INJECTION INTRAMUSCULAR; INTRAVENOUS at 09:07

## 2022-09-22 RX ADMIN — GABAPENTIN 100 MG: 100 CAPSULE ORAL at 14:21

## 2022-09-22 RX ADMIN — ACETAMINOPHEN 650 MG: 325 TABLET, FILM COATED ORAL at 04:39

## 2022-09-22 RX ADMIN — ACETAMINOPHEN 650 MG: 325 TABLET, FILM COATED ORAL at 14:21

## 2022-09-22 RX ADMIN — GABAPENTIN 100 MG: 100 CAPSULE ORAL at 08:55

## 2022-09-22 RX ADMIN — ESCITALOPRAM OXALATE 10 MG: 10 TABLET ORAL at 08:55

## 2022-09-22 RX ADMIN — ONDANSETRON 4 MG: 2 INJECTION INTRAMUSCULAR; INTRAVENOUS at 01:40

## 2022-09-22 ASSESSMENT — ACTIVITIES OF DAILY LIVING (ADL)
ADLS_ACUITY_SCORE: 24

## 2022-09-22 NOTE — PLAN OF CARE
Shift from 0700 to 1530-      Problem: Pain Acute  Goal: Acceptable Pain Control and Functional Ability  Outcome: Met  Intervention: Develop Pain Management Plan  Recent Flowsheet Documentation  Taken 9/22/2022 1200 by Alyson Sutherland RN  Pain Management Interventions:    medication (see MAR)    declines  Taken 9/22/2022 0907 by Alyson Sutherland, RN  Pain Management Interventions: medication (see MAR)  Intervention: Prevent or Manage Pain  Recent Flowsheet Documentation  Taken 9/22/2022 0812 by Alyson Sutherland RN  Medication Review/Management: medications reviewed     Goal Outcome Evaluation:     Patient having headache the last couple days; tylenol given and headache decreased.  Up walking in room and hallway.   Antibiotics changed to po.  Discharging to home soon.

## 2022-09-22 NOTE — DISCHARGE SUMMARY
[facility]  Hospitalist Discharge Summary      Date of Admission:  9/18/2022  Date of Discharge:  9/22/2022  2:30 PM  Discharging Provider: Lindsey Vazquez MD  Discharge Service: Hospitalist Service    Discharge Diagnoses   Acute sigmoid diverticulosis  COPD  MELVI on CPAP  Mood disorder  GERD  Liver cirrhosis per CT    Follow-ups Needed After Discharge   Follow-up Appointments     Follow-up and recommended labs and tests       Follow up with primary care provider, EJ WELLER,   within 7 days to evaluate medication change and for hospital follow- up.    The following labs/tests are recommended: CBC and BMP.    May need outpatient colonoscopy in 6 to 8 weeks             Discharge Disposition   Discharged to home  Condition at discharge: Stable    Hospital Course   67-year-old morbidly obese lady medical history of alcohol dependence COPD diverticulitis who presented with first episode of diverticulitis. Had LLQ and RLQ abdominal pain for 2 weeks.  Failed outpatient therapy with Augmentin. CT scan on 9/9/22 revealed sigmoid diverticulitis.  Repeat CT scan 09/18 showed new mild changes of acute diverticulitis involving the lower descending colon, slight improvement of acute diverticulitis involving the sigmoid colon within the right aspect of the pelvis. Remains hemodynamically stable, no overt signs of SIRS or sepsis. Managed with IV Zosyn, improved. tolerated regular diet but remained with mild LLQ pain. Discharge home on 10 days of metronidazole and levofloxacin.  -PCP follow up  -May need outpatient colonoscopy in 6 to 8 weeks  -Erroneously prescribed Augmentin at discharge.  Called patient, apologized for the mishap.  Asked her  not to take the Augmentin.  Sent new prescription for metronidazole and levofloxacin to [facility] pharmacy to .    Hypotension likely due to dehydration, resolved  Resolved with IV fluids     COPD: Not in exacerbation.    Continue  home inhalers     Bilateral peripheral edema:   Resume PTA Bumex     Mood disorder:   Continue PTA Lexapro and Remeron      GERD: on PPI     MELVI.  Does not like to use CPAP at home. Encouraged CPAP used.      Sinus bradycardia  EKG 9/21 with Sinus bradycardia with sinus arrhythmia, otherwise unchanged from prior. QTc 428  --checked TSH-normal     History of cirrhosis likely due to combination of alcohol and nonalcoholic fatty liver disease.  -Reviewed CT abdomen pelvis ordered on admission w/ cirrhosis with fatty change in liver. Gallbladder wall thickening which can be seen with hepatic parenchymal disease  - AST 23, ALT 28, T.bobby 0.6,  -May need outpatient follow-up with liver clinic    Consultations This Hospital Stay   CARE MANAGEMENT / SOCIAL WORK IP CONSULT  PHYSICAL THERAPY ADULT IP CONSULT    Code Status   Prior    Time Spent on this Encounter   I, Lindsey Vazquez MD, personally saw the patient today and spent greater than 30 minutes discharging this patient.       Lindsey Vazquez MD  [address] [address]  Phone: [phone]  Fax: [phone]  ______________________________________________________________________    Physical Exam   Vital Signs: Temp: 98.2  F (36.8  C) Temp src: Oral BP: 131/62 Pulse: 64   Resp: 20 SpO2: 93 % O2 Device: None (Room air) Oxygen Delivery: 3 LPM  Weight: 264 lbs 4.8 oz    General: Obese, AAOx3, NAD  HEENT: Oral mucosa moist and non-erythematous, PERRLA, EOM intact  CV: RRR, normal S1S2, no murmur, clicks, rubs  Resp: Clear to auscultation bilaterally, no wheezes, rhonchi  Abd: Soft, mildly tender LLQ , no masses appreciated  Extremities: Radial and pedal pulses intact and symmetric, no pedal edema  Neuro: No lateralizing symptoms or focal neurologic deficits         Primary Care Physician   EJ WELLER    Discharge Orders      Reason for your hospital stay    Acute diverticulitis:      Follow-up and recommended labs and tests     Follow up with primary care provider, EJ WELLER, within 7 days to evaluate medication change and for hospital follow- up.  The following labs/tests are recommended: CBC and BMP.    May need outpatient colonoscopy in 6 to 8 weeks     Activity    Your activity upon discharge: activity as tolerated     When to contact your care team    Call your primary doctor if you have any of the following: temperature greater than 100.4ncreased swelling, or abdominal increased pain.     Discharge Instructions    While taking Norco, limit amount of acetaminophen/tynelol to not more >3000 mg/24 hours     Diet    Follow this diet upon discharge: Orders Placed This Encounter      Low Fiber Diet       Significant Results and Procedures   Most Recent 3 CBC's:Recent Labs   Lab Test 09/22/22 0622 09/20/22 0730 09/18/22 2040 09/08/22  1613   WBC  --  9.0 4.3 9.3   HGB  --  13.0 15.9* 15.1   MCV  --  96 93 92   * 118* 176 247     Most Recent 3 BMP's:Recent Labs   Lab Test 09/22/22 0622 09/20/22 0730 09/18/22 2040 09/08/22  1613   NA  --  135* 136 143   POTASSIUM  --  4.2 4.3 3.8   CHLORIDE  --  103 103 104   CO2  --  25 22 22   BUN  --  7* 12 8.4   CR 0.51 0.69 0.74 0.49*   ANIONGAP  --  7 11 17*   JOSEFA  --  8.1* 9.5 9.8   GLC  --  155* 127* 110*     Most Recent 2 LFT's:Recent Labs   Lab Test 09/20/22 0730 09/18/22 2040   AST 23 51*   ALT 28 40   ALKPHOS 70 124*   BILITOTAL 0.6 1.3*       Discharge Medications   Discharge Medication List as of 9/22/2022 12:53 PM      START taking these medications    Details   Metronidazole 500 mg 3 times a day  take 1 tablet by mouth 3 times a day for 10 days     Levofloxacin 750 mg daily  Take 1 tablet by mouth  daily for 10 days, Disp-20 tablet, R-0, E-Prescribe      HYDROcodone-acetaminophen (NORCO) 5-325 MG tablet Take 1 tablet by mouth every 6 hours as needed for severe pain (7-10), Disp-10 tablet, R-0, E-Prescribe       prochlorperazine (COMPAZINE) 10 MG tablet Take 0.5 tablets (5 mg) by mouth every 6 hours as needed for nausea or vomiting, Disp-14 tablet, R-0, E-Prescribe         CONTINUE these medications which have NOT CHANGED    Details   acetaminophen (TYLENOL) 500 MG tablet Take 1-2 tablets (500-1,000 mg) by mouth every 6 hours as needed for mild pain, Disp-60 tablet, R-11, E-Prescribe      albuterol (PROAIR HFA/PROVENTIL HFA/VENTOLIN HFA) 108 (90 Base) MCG/ACT inhaler Inhale 2 puffs into the lungs every 4 hours as needed , HistoricalPharmacy may dispense brand covered by insurance (Proair, or proventil or ventolin or generic albuterol inhaler)      bumetanide (BUMEX) 1 MG tablet Take 1 mg by mouth daily , Historical      !! escitalopram (LEXAPRO) 10 MG tablet Take 1 tablet (10 mg) by mouth daily Take with 5mg tablet for total of 15mg/day., Disp-30 tablet, R-2, E-Prescribe      !! escitalopram (LEXAPRO) 5 MG tablet Take 1 tablet (5 mg) by mouth daily Take with 10mg tablet for total of 15mg per day., Disp-30 tablet, R-2, E-Prescribe      gabapentin (NEURONTIN) 100 MG capsule Take 1 capsule (100 mg) by mouth 3 times daily, Disp-90 capsule, R-1, E-Prescribe      hydrOXYzine (ATARAX) 25 MG tablet Take 25-50 mg by mouth 3 times daily as needed for anxiety, Historical      ipratropium - albuterol 0.5 mg/2.5 mg/3 mL (DUONEB) 0.5-2.5 (3) MG/3ML neb solution Take 1 vial (3 mLs) by nebulization every 4 hours as needed for shortness of breath / dyspnea or wheezing, Disp-15 mL, R-1, E-Prescribe      mirtazapine (REMERON) 15 MG tablet Take 1 tablet (15 mg) by mouth At Bedtime, Disp-30 tablet, R-2, E-Prescribe      omeprazole (PRILOSEC) 20 MG DR capsule Take 20 mg by mouth daily as needed, Historical      umeclidinium (INCRUSE ELLIPTA) 62.5 MCG/INH inhaler Inhale 1 puff into the lungs daily, Historical       !! - Potential duplicate medications found. Please discuss with provider.        Allergies   Allergies   Allergen Reactions      "Bupropion Diarrhea     Codeine Hives, Itching, Nausea and Rash     Nickel Unknown     Oxycodone Nausea and Vomiting     Percocet [Oxycodone-Acetaminophen]      Patient reports \"vomiting,grossly ill two weeks ago\"     Topiramate Unknown     Diclofenac Nausea     Tolerates the topical gel     Furosemide Rash     Hydrochlorothiazide Rash     phototoxicity - med was d/lora         Sulfa Drugs Itching and Rash     Sulfasalazine Rash     "

## 2022-09-22 NOTE — PLAN OF CARE
Problem: Pain Acute  Goal: Acceptable Pain Control and Functional Ability  Outcome: Ongoing, Progressing  Intervention: Develop Pain Management Plan  Recent Flowsheet Documentation  Taken 9/22/2022 0439 by Jonh Harding RN  Pain Management Interventions: medication (see MAR)  Intervention: Prevent or Manage Pain  Recent Flowsheet Documentation  Taken 9/22/2022 0145 by Jonh Harding RN  Medication Review/Management: medications reviewed     Problem: COPD (Chronic Obstructive Pulmonary Disease) Comorbidity  Goal: Maintenance of COPD Symptom Control  Outcome: Ongoing, Progressing  Intervention: Maintain COPD-Symptom Control  Recent Flowsheet Documentation  Taken 9/22/2022 0145 by Jonh Harding RN  Medication Review/Management: medications reviewed    Pt reported pain 10/10, a headache, overnight. PRN acetaminophen given x1. Pt reported nausea, PRN Zofran given x1. Pt resting comfortably overnight. Pt remains on 3L of O2 overnight instead of CPAP. VS unchanged.    Jonh Harding RN

## 2022-09-22 NOTE — PLAN OF CARE
Physical Therapy Discharge Summary    Reason for therapy discharge:    Discharged to home with home therapy.    Progress towards therapy goal(s). See goals on Care Plan in [facility] electronic health record for goal details.  Goals not met.  Barriers to achieving goals:   discharge from facility.    Therapy recommendation(s):    Continued therapy is recommended.  Rationale/Recommendations:  To improve mobility and strength. .

## 2022-09-22 NOTE — PLAN OF CARE
Problem: COPD (Chronic Obstructive Pulmonary Disease) Comorbidity  Goal: Maintenance of COPD Symptom Control  Outcome: Ongoing, Not Progressing  Intervention: Maintain COPD-Symptom Control  Recent Flowsheet Documentation  Taken 9/21/2022 1705 by Shelia Infante RN  Medication Review/Management: medications reviewed     Problem: Pain Acute  Goal: Acceptable Pain Control and Functional Ability  Outcome: Ongoing, Progressing  Intervention: Develop Pain Management Plan  Recent Flowsheet Documentation  Taken 9/21/2022 2139 by Shelia Infante RN  Pain Management Interventions: medication (see MAR)  Taken 9/21/2022 1609 by Shelia Infante RN  Pain Management Interventions: medication (see MAR)  Intervention: Prevent or Manage Pain  Recent Flowsheet Documentation  Taken 9/21/2022 1705 by Shelia Infante RN  Medication Review/Management: medications reviewed     Problem: Nutrition Impaired (Bowel Disease, Inflammatory)  Goal: Optimal Nutrition  Outcome: Ongoing, Progressing   Goal Outcome Evaluation:      Patient remains on 02@3L, sats has been in the upper 90's, no acute respiratory distress, patient complained of headache and nausea, had tylenol and zofran were helpful, later complained of abdominal pain, oxycodone was given prn, has bilateral LE edema, bp 140/85, other vitals stable, alert and oriented.

## 2022-09-23 ENCOUNTER — PATIENT OUTREACH (OUTPATIENT)
Dept: CARE COORDINATION | Facility: CLINIC | Age: 67
End: 2022-09-23

## 2022-09-23 NOTE — PROGRESS NOTES
Clinic Care Coordination Contact    Clinic Care Coordination Contact  OUTREACH    Referral Information:  Referral Source: Behavioral Health Clinician    JESU HENSON reached out to pt and spoke to them regarding referral. Pt reported that she has a dental procedure where multiple teeth are being pulled. Pt stated that she needs financial assistance with the remaining balance. JESU HENSON spoke to pt about low cost clinics as an option. Pt has found a dental clinic that accepts her insurance and would prefer to stay there and find assistance instead of going to a low cost clinic. Pt goes to [facility] in [facility]. JESU HENSON asked if pt has spoken to dental clinic to see if they have other financial options. Pt said yes but they did not have the assiatnce she was looking for. JESU HENSON reported she will look into options that could provide assistance.     Pt also reported that she is looking for options for Psychiatric hospital assistance or a part time job that will not affect her SSDI or rent. JESU SANTANA asked for more information on this. Pt reported she lives in public housing but would like some kind of job that is very part time and was wondering about other help she could get. JESU HENSON spoke to pt about Psychiatric hospital waiver, case management, and job options. JESU HENSON said she will also research job options available outside of waiver.     JESU HENSON plans to follow up with findings from researching early next week.     Chief Complaint   Patient presents with     Clinic Care Coordination - Initial        Universal Utilization: [facility] Ber[facility], [employer]  Clinic Utilization  Difficulty keeping appointments:: No  Compliance Concerns: No  Utilization    Hospital Admissions  1             ED Visits  1             No Show Count (past year)  12                Current as of: 9/23/2022  2:43 AM              Clinical Concerns:  Current Medical Concerns:  Dental work needed   Current Behavioral Concerns: Did not discuss    Education Provided to patient: [facility] assistance        Health Maintenance Reviewed:    Clinical Pathway: None    Medication Management:  Medication review status:Did not discuss    Functional Status:       Living Situation:  Current living arrangement:: I live alone  Type of residence:: Apartment    Lifestyle & Psychosocial Needs:    Social Determinants of Health     Tobacco Use: High Risk     Smoking Tobacco Use: Current Every Day Smoker     Smokeless Tobacco Use: Never Used   Alcohol Use: Not on file   Financial Resource Strain: Not on file   Food Insecurity: Not on file   Transportation Needs: Not on file   Physical Activity: Not on file   Stress: Not on file   Social Connections: Not on file   Intimate Partner Violence: Not on file   Depression: Not at risk     PHQ-2 Score: 2   Housing Stability: Not on file        Resources and Interventions:  Current Resources:      Community Resources: None     Equipment Currently Used at Home: none (owns FWW)  Employment Status: employment seeking     Care Plan: Pt is set to identified. SW CC will speak to pt at next encounter about enrollment       Patient/Caregiver understanding: Patient verbalized understanding, engaged in AIDET communication during patient encounter.    Outreach Frequency: 2 weeks  Future Appointments              In 4 days Yanique Cali MD [facility]    In 6 days Melissa Covington DO [facility] [place]          Plan: SW CC will continue looking into employment options and financial assistance.     SW CC will follow uin in 1-2 days.     KATHLEEN Espana? Social Work Care Coordinator   [facility]  [email or web address]? [email or web address]    Phone: [phone]  she/her

## 2022-09-23 NOTE — PROGRESS NOTES
[facility]    Background: Transitional Care Management program auto-identified and prompting a chart review by [facility] team.    Assessment: Upon chart review, [facility] Team member will move this episode of Transitional Care Management program to enrolled but not proceed with a telephone outreach due to reason below:    Patient has active communication with a nurse, provider or care team for reason of post-hospital follow up plan ( Social work care coordinator to review resources and outline follow up plan). Patient has PCP follow up scheduled.  Outreach call by [facility] team not indicated to minimize duplicative efforts.     Plan: Transitional Care Management episode moved to enrolled status to continue program for PCP appt.      RAFAL Estrada, RN   [facility]  - Ambulatory Care Management      *[facility] Care Resource Team does NOT follow patient ongoing. Referrals are identified based on internal discharge reports and the outreach is to ensure patient has an understanding of their discharge instructions.

## 2022-09-27 ENCOUNTER — OFFICE VISIT (OUTPATIENT)
Dept: FAMILY MEDICINE | Facility: CLINIC | Age: 67
End: 2022-09-27
Payer: COMMERCIAL

## 2022-09-27 ENCOUNTER — PATIENT OUTREACH (OUTPATIENT)
Dept: CARE COORDINATION | Facility: CLINIC | Age: 67
End: 2022-09-27

## 2022-09-27 VITALS
DIASTOLIC BLOOD PRESSURE: 84 MMHG | BODY MASS INDEX: 44.72 KG/M2 | WEIGHT: 256.5 LBS | HEART RATE: 59 BPM | SYSTOLIC BLOOD PRESSURE: 128 MMHG | OXYGEN SATURATION: 97 % | TEMPERATURE: 97.5 F

## 2022-09-27 DIAGNOSIS — G89.29 CHRONIC PAIN OF LEFT KNEE: ICD-10-CM

## 2022-09-27 DIAGNOSIS — M25.562 CHRONIC PAIN OF LEFT KNEE: ICD-10-CM

## 2022-09-27 DIAGNOSIS — Z78.0 ASYMPTOMATIC MENOPAUSE: ICD-10-CM

## 2022-09-27 DIAGNOSIS — Z09 HOSPITAL DISCHARGE FOLLOW-UP: ICD-10-CM

## 2022-09-27 DIAGNOSIS — Z12.31 VISIT FOR SCREENING MAMMOGRAM: ICD-10-CM

## 2022-09-27 DIAGNOSIS — Z12.11 SCREEN FOR COLON CANCER: ICD-10-CM

## 2022-09-27 DIAGNOSIS — K57.92 ACUTE DIVERTICULITIS: ICD-10-CM

## 2022-09-27 DIAGNOSIS — Z01.818 PREOP EXAMINATION: Primary | ICD-10-CM

## 2022-09-27 DIAGNOSIS — E55.9 VITAMIN D DEFICIENCY: ICD-10-CM

## 2022-09-27 DIAGNOSIS — K70.30 ALCOHOLIC CIRRHOSIS OF LIVER WITHOUT ASCITES (H): ICD-10-CM

## 2022-09-27 DIAGNOSIS — K02.9 DENTAL CARIES: ICD-10-CM

## 2022-09-27 DIAGNOSIS — M62.81 GENERALIZED MUSCLE WEAKNESS: ICD-10-CM

## 2022-09-27 PROBLEM — J41.8 MIXED SIMPLE AND MUCOPURULENT CHRONIC BRONCHITIS (H): Status: ACTIVE | Noted: 2022-06-27

## 2022-09-27 LAB
ALBUMIN SERPL BCG-MCNC: 3.9 G/DL (ref 3.5–5.2)
ALP SERPL-CCNC: 81 U/L (ref 35–104)
ALT SERPL W P-5'-P-CCNC: 20 U/L (ref 10–35)
ANION GAP SERPL CALCULATED.3IONS-SCNC: 7 MMOL/L (ref 7–15)
AST SERPL W P-5'-P-CCNC: 30 U/L (ref 10–35)
BILIRUB SERPL-MCNC: 0.3 MG/DL
BUN SERPL-MCNC: 11.5 MG/DL (ref 8–23)
CALCIUM SERPL-MCNC: 9.8 MG/DL (ref 8.8–10.2)
CHLORIDE SERPL-SCNC: 102 MMOL/L (ref 98–107)
CREAT SERPL-MCNC: 0.62 MG/DL (ref 0.51–0.95)
DEPRECATED HCO3 PLAS-SCNC: 28 MMOL/L (ref 22–29)
ERYTHROCYTE [DISTWIDTH] IN BLOOD BY AUTOMATED COUNT: 14.1 % (ref 10–15)
GFR SERPL CREATININE-BSD FRML MDRD: >90 ML/MIN/1.73M2
GLUCOSE SERPL-MCNC: 106 MG/DL (ref 70–99)
HCT VFR BLD AUTO: 46.9 % (ref 35–47)
HGB BLD-MCNC: 15.4 G/DL (ref 11.7–15.7)
INR PPP: 1.04 (ref 0.85–1.15)
MCH RBC QN AUTO: 30.2 PG (ref 26.5–33)
MCHC RBC AUTO-ENTMCNC: 32.8 G/DL (ref 31.5–36.5)
MCV RBC AUTO: 92 FL (ref 78–100)
PLATELET # BLD AUTO: 220 10E3/UL (ref 150–450)
POTASSIUM SERPL-SCNC: 4.6 MMOL/L (ref 3.4–5.3)
PROT SERPL-MCNC: 7.1 G/DL (ref 6.4–8.3)
RBC # BLD AUTO: 5.1 10E6/UL (ref 3.8–5.2)
SODIUM SERPL-SCNC: 137 MMOL/L (ref 136–145)
WBC # BLD AUTO: 6.8 10E3/UL (ref 4–11)

## 2022-09-27 PROCEDURE — 80053 COMPREHEN METABOLIC PANEL: CPT | Performed by: FAMILY MEDICINE

## 2022-09-27 PROCEDURE — 99214 OFFICE O/P EST MOD 30 MIN: CPT | Performed by: FAMILY MEDICINE

## 2022-09-27 PROCEDURE — 85027 COMPLETE CBC AUTOMATED: CPT | Performed by: FAMILY MEDICINE

## 2022-09-27 PROCEDURE — 85610 PROTHROMBIN TIME: CPT | Performed by: FAMILY MEDICINE

## 2022-09-27 PROCEDURE — 36415 COLL VENOUS BLD VENIPUNCTURE: CPT | Performed by: FAMILY MEDICINE

## 2022-09-27 RX ORDER — HYDROCODONE BITARTRATE AND ACETAMINOPHEN 5; 325 MG/1; MG/1
1 TABLET ORAL EVERY 6 HOURS PRN
Qty: 12 TABLET | Refills: 0 | Status: SHIPPED | OUTPATIENT
Start: 2022-09-27 | End: 2022-09-30

## 2022-09-27 NOTE — PROGRESS NOTES
Clinic Care Coordination Contact  [facility]/Voicemail       Clinical Data: Care Coordinator Outreach for scheduled check in and ED discharge.  Outreach attempted x 2.  Left message on patient's voicemail with call back information and requested return call.  Plan: Care Coordinator will try to reach patient again in 1-2 business days.    KATHLEEN Espana? Social Work Care Coordinator   [facility]  [email or web address]? [email or web address]    Phone: [phone]  she/her

## 2022-09-27 NOTE — LETTER
September 29, 2022      Patsy Santamaria  [address]  [place] [address]        Dear ,    We are writing to inform you of your test results.    Your labs look pretty good!!  Blood sugar (glucose) is just slightly elevated at 106.  We will recheck this next visit.      Resulted Orders   CBC with platelets   Result Value Ref Range    WBC Count 6.8 4.0 - 11.0 10e3/uL    RBC Count 5.10 3.80 - 5.20 10e6/uL    Hemoglobin 15.4 11.7 - 15.7 g/dL    Hematocrit 46.9 35.0 - 47.0 %    MCV 92 78 - 100 fL    MCH 30.2 26.5 - 33.0 pg    MCHC 32.8 31.5 - 36.5 g/dL    RDW 14.1 10.0 - 15.0 %    Platelet Count 220 150 - 450 10e3/uL   Comprehensive metabolic panel (BMP + Alb, Alk Phos, ALT, AST, Total. Bili, TP)   Result Value Ref Range    Sodium 137 136 - 145 mmol/L    Potassium 4.6 3.4 - 5.3 mmol/L    Chloride 102 98 - 107 mmol/L    Carbon Dioxide (CO2) 28 22 - 29 mmol/L    Anion Gap 7 7 - 15 mmol/L    Urea Nitrogen 11.5 8.0 - 23.0 mg/dL    Creatinine 0.62 0.51 - 0.95 mg/dL    Calcium 9.8 8.8 - 10.2 mg/dL    Glucose 106 (H) 70 - 99 mg/dL    Alkaline Phosphatase 81 35 - 104 U/L    AST 30 10 - 35 U/L    ALT 20 10 - 35 U/L    Protein Total 7.1 6.4 - 8.3 g/dL    Albumin 3.9 3.5 - 5.2 g/dL    Bilirubin Total 0.3 <=1.2 mg/dL    GFR Estimate >90 >60 mL/min/1.73m2      Comment:      Effective December 21, 2021 eGFRcr in adults is calculated using the 2021 CKD-EPI creatinine equation which includes age and gender (Antoinette et al., NEJM, DOI: 10.1056/XQRMip8247390)   INR   Result Value Ref Range    INR 1.04 0.85 - 1.15       If you have any questions or concerns, please call the clinic at the number listed above.       Sincerely,      Yanique Cali MD

## 2022-09-27 NOTE — PROGRESS NOTES
"  1. Preop examination  Patient notes she will be scheduled for dental procedure in the next couple weeks - needs \"preop\".  This will be low risk procedure - reviewed - OK for procedure.    - CBC with platelets; Future  - Comprehensive metabolic panel (BMP + Alb, Alk Phos, ALT, AST, Total. Bili, TP); Future  - INR; Future  - CBC with platelets  - Comprehensive metabolic panel (BMP + Alb, Alk Phos, ALT, AST, Total. Bili, TP)  - INR    2. Dental caries  This is reason for dental procedure - needs teeth pulled (several teeth); can't be done under local per patient     3. Hospital discharge follow-up  Patient also had recent hospitalization - for acute diverticulitis - finishing antibiotics - feels much better - tolerating po now    4. Acute diverticulitis  As above - will need colonoscopy - needs referral for follow up colonoscopy  - Adult GI  Referral - Consult Only; Future  - Adult GI  Referral - Procedure Only; Future  - CBC with platelets; Future  - HYDROcodone-acetaminophen (NORCO) 5-325 MG tablet; Take 1 tablet by mouth every 6 hours as needed for pain  Dispense: 12 tablet; Refill: 0  - CBC with platelets    5. Generalized muscle weakness  Patient has general weakness - needs Home Care (desires home care) will refer  - Home Care Referral    6. Vitamin D deficiency  H/o vitamin D deficiency     7. 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 Bili, TP); Future  - INR; Future  - CBC with platelets  - Comprehensive metabolic panel (BMP + Alb, Alk Phos, ALT, AST, Total. Bili, TP)  - INR    8. Asymptomatic menopause  H/o menopause - due for bone density scan - referred  - DEXA HIP/PELVIS/SPINE - Future; Future    9. Chronic pain of left knee  Chronic pain in knee - this is " apparently reason that she cannot move around well - needs home care   - Home Care Referral    10. Screen for colon cancer  Due for colon cancer screening - needs colonoscopy for diverticulitis follow up as well  - Adult GI  Referral - Procedure Only; Future    11. Visit for screening mammogram  Due for breast cancer screening - mammogram  - MA SCREENING DIGITAL BILAT - Future  (s+30); Future    Subjective   Patsy is a 67 year old, presenting for the following health issues:  Pre-Op Exam and Hospital F/U      Needs dental procedure - near end of October -   Doesn't know when -   Will be before the end of October - will remove 6 teeth on the bottom   Can't do Novocaine - deathly afraid of it - had it for Csection and didn't work  Gets panicky -     Just hospitalized for diverticulitis   Still on antibiotics -   Still having pain/loose stools  Needs colonoscopy - 6 weeks after antibiotics -                Surgical Information:  Surgery/Procedure: Dental Procedure  Surgery Location: [facility]  Surgeon: [facility]  Surgery Date: Unknown  Time of Surgery: Unknown  Where patient plans to recover: At home with family  Fax number for surgical facility: Fax#       Preop Questions 9/27/2022   1. Have you ever had a heart attack or stroke? No   2. Have you ever had surgery on your heart or blood vessels, such as a stent placement, a coronary artery bypass, or surgery on an artery in your head, neck, heart, or legs? No   3. Do you have chest pain with activity? No   4. Do you have a history of  heart failure? No   5. Do you currently have a cold, bronchitis or symptoms of other infection? No   6. Do you have a cough, shortness of breath, or wheezing? No   7. Do you or anyone in your family have previous history of blood clots? YES - unknown history    8. Do you or does anyone in your family have a serious bleeding problem such as prolonged bleeding following surgeries or cuts? No   9. Have you ever had problems  with anemia or been told to take iron pills? No   10. Have you had any abnormal blood loss such as black, tarry or bloody stools, or abnormal vaginal bleeding? No   11. Have you ever had a blood transfusion? No   12. Are you willing to have a blood transfusion if it is medically needed before, during, or after your surgery? Yes   13. Have you or any of your relatives ever had problems with anesthesia? YES -patient has difficulty waking up    14. Do you have sleep apnea, excessive snoring or daytime drowsiness? YES -daytime somnolence   14a. Do you have a CPAP machine? No   15. Do you have any artifical heart valves or other implanted medical devices like a pacemaker, defibrillator, or continuous glucose monitor? No   16. Do you have artificial joints? No   17. Are you allergic to latex? No           Hospital Follow-up Visit:    Hospital/Nursing Home/IP Rehab Facility: [facility]  Date of Admission: 9/18/2022  Date of Discharge: 9/22/2022   Reason(s) for Admission: diverticulitis    Was your hospitalization related to COVID-19? No   Problems taking medications regularly:  None  Medication changes since discharge: on antibiotics ;   Problems adhering to non-medication therapy:  General compliance    Summary of hospitalization:  [facility] discharge summary reviewed  Diagnostic Tests/Treatments reviewed.  Follow up needed: needs colonoscopy  Other Healthcare Providers Involved in Patient s Care:         None  Update since discharge: improved.   Post Medication Reconciliation Status: Discharge medications reconciled, continue medications without change      Plan of care communicated with patient         Review of Systems   Constitutional: Negative for chills and fever.   HENT: Positive for dental problem.    Eyes: Negative for visual disturbance.   Respiratory: Negative.  Negative for cough and shortness of breath.    Cardiovascular: Negative.  Negative for chest pain and  peripheral edema.   Gastrointestinal: Positive for abdominal pain (resolving).   Endocrine: Negative for polydipsia and polyuria.   Breasts:  negative.    Genitourinary: Negative.    Musculoskeletal: Negative.    Skin: Negative.    Allergic/Immunologic: Negative.    Neurological: Negative.    Hematological: Does not bruise/bleed easily.   Psychiatric/Behavioral: Negative.    All other systems reviewed and are negative.         Sees psychiatrist once a month; therapist once a month (virtual)  Will get a physical therapist to come out to house         Objective    /84 (BP Location: Right arm, Patient Position: Sitting, Cuff Size: Adult Large)   Pulse 59   Temp 97.5  F (36.4  C)   Wt 116.3 kg (256 lb 8 oz)   SpO2 97%   BMI 44.72 kg/m    Body mass index is 44.72 kg/m .  Physical Exam  Constitutional:       General: She is not in acute distress.     Appearance: She is well-developed.   HENT:      Right Ear: Tympanic membrane and external ear normal.      Left Ear: Tympanic membrane and external ear normal.      Nose: Nose normal.      Mouth/Throat:      Pharynx: No oropharyngeal exudate.      Comments: Dental caries , many missing teeth    Eyes:      General:         Right eye: No discharge.         Left eye: No discharge.      Conjunctiva/sclera: Conjunctivae normal.      Pupils: Pupils are equal, round, and reactive to light.   Neck:      Thyroid: No thyromegaly.      Trachea: No tracheal deviation.   Cardiovascular:      Rate and Rhythm: Normal rate and regular rhythm.      Pulses: Normal pulses.      Heart sounds: Normal heart sounds, S1 normal and S2 normal. No murmur heard.    No friction rub. No S3 or S4 sounds.   Pulmonary:      Effort: Pulmonary effort is normal. No respiratory distress.      Breath sounds: Normal breath sounds. No wheezing or rales.   Abdominal:      General: Bowel sounds are normal.      Palpations: Abdomen is soft. There is no mass.      Tenderness: There is no abdominal  tenderness.   Musculoskeletal:         General: Normal range of motion.      Cervical back: Neck supple.   Lymphadenopathy:      Cervical: No cervical adenopathy.   Skin:     General: Skin is warm and dry.      Findings: No rash.   Neurological:      Mental Status: She is alert and oriented to person, place, and time.      Motor: No abnormal muscle tone.      Deep Tendon Reflexes: Reflexes are normal and symmetric.   Psychiatric:         Thought Content: Thought content normal.         Judgment: Judgment normal.            Results for orders placed or performed in visit on 09/27/22   CBC with platelets     Status: Normal   Result Value Ref Range    WBC Count 6.8 4.0 - 11.0 10e3/uL    RBC Count 5.10 3.80 - 5.20 10e6/uL    Hemoglobin 15.4 11.7 - 15.7 g/dL    Hematocrit 46.9 35.0 - 47.0 %    MCV 92 78 - 100 fL    MCH 30.2 26.5 - 33.0 pg    MCHC 32.8 31.5 - 36.5 g/dL    RDW 14.1 10.0 - 15.0 %    Platelet Count 220 150 - 450 10e3/uL   Comprehensive metabolic panel (BMP + Alb, Alk Phos, ALT, AST, Total. Bili, TP)     Status: Abnormal   Result Value Ref Range    Sodium 137 136 - 145 mmol/L    Potassium 4.6 3.4 - 5.3 mmol/L    Chloride 102 98 - 107 mmol/L    Carbon Dioxide (CO2) 28 22 - 29 mmol/L    Anion Gap 7 7 - 15 mmol/L    Urea Nitrogen 11.5 8.0 - 23.0 mg/dL    Creatinine 0.62 0.51 - 0.95 mg/dL    Calcium 9.8 8.8 - 10.2 mg/dL    Glucose 106 (H) 70 - 99 mg/dL    Alkaline Phosphatase 81 35 - 104 U/L    AST 30 10 - 35 U/L    ALT 20 10 - 35 U/L    Protein Total 7.1 6.4 - 8.3 g/dL    Albumin 3.9 3.5 - 5.2 g/dL    Bilirubin Total 0.3 <=1.2 mg/dL    GFR Estimate >90 >60 mL/min/1.73m2   INR     Status: Normal   Result Value Ref Range    INR 1.04 0.85 - 1.15

## 2022-09-29 ENCOUNTER — OFFICE VISIT (OUTPATIENT)
Dept: ADDICTION MEDICINE | Facility: CLINIC | Age: 67
End: 2022-09-29
Payer: COMMERCIAL

## 2022-09-29 ENCOUNTER — PATIENT OUTREACH (OUTPATIENT)
Dept: CARE COORDINATION | Facility: CLINIC | Age: 67
End: 2022-09-29

## 2022-09-29 VITALS
BODY MASS INDEX: 44.29 KG/M2 | DIASTOLIC BLOOD PRESSURE: 76 MMHG | HEART RATE: 65 BPM | SYSTOLIC BLOOD PRESSURE: 140 MMHG | WEIGHT: 254 LBS

## 2022-09-29 DIAGNOSIS — F33.9 EPISODE OF RECURRENT MAJOR DEPRESSIVE DISORDER, UNSPECIFIED DEPRESSION EPISODE SEVERITY (H): ICD-10-CM

## 2022-09-29 DIAGNOSIS — F10.20 ALCOHOL USE DISORDER, SEVERE, DEPENDENCE (H): Primary | ICD-10-CM

## 2022-09-29 PROCEDURE — 99214 OFFICE O/P EST MOD 30 MIN: CPT | Performed by: FAMILY MEDICINE

## 2022-09-29 RX ORDER — MIRTAZAPINE 15 MG/1
15 TABLET, FILM COATED ORAL AT BEDTIME
Qty: 30 TABLET | Refills: 2 | Status: SHIPPED | OUTPATIENT
Start: 2022-09-29 | End: 2022-12-01

## 2022-09-29 ASSESSMENT — ANXIETY QUESTIONNAIRES
7. FEELING AFRAID AS IF SOMETHING AWFUL MIGHT HAPPEN: NOT AT ALL
5. BEING SO RESTLESS THAT IT IS HARD TO SIT STILL: NOT AT ALL
3. WORRYING TOO MUCH ABOUT DIFFERENT THINGS: SEVERAL DAYS
6. BECOMING EASILY ANNOYED OR IRRITABLE: SEVERAL DAYS
4. TROUBLE RELAXING: NOT AT ALL
GAD7 TOTAL SCORE: 4
GAD7 TOTAL SCORE: 4
1. FEELING NERVOUS, ANXIOUS, OR ON EDGE: SEVERAL DAYS
2. NOT BEING ABLE TO STOP OR CONTROL WORRYING: SEVERAL DAYS

## 2022-09-29 ASSESSMENT — PATIENT HEALTH QUESTIONNAIRE - PHQ9: SUM OF ALL RESPONSES TO PHQ QUESTIONS 1-9: 5

## 2022-09-29 ASSESSMENT — PAIN SCALES - GENERAL: PAINLEVEL: MODERATE PAIN (5)

## 2022-09-29 NOTE — PATIENT INSTRUCTIONS
Jenny () - [phone].    Schedule with psychiatry and therapy (you can ask to see Araceli Hamilton) - call [phone] to make those appointments.

## 2022-09-29 NOTE — PROGRESS NOTES
[facility] - Addiction Medicine       Rooming information:    Hospitalized 9/18/22-9/22/22. Pt c/o of still having lots of anxiety and wants to discus Vistaril    Point of care urine drug screen positive for: Not indicated per provider     *POC urine drug screen does not screen for Fentanyl    PHQ-9 Scores: 5  PHQ 6/28/2022 7/26/2022 9/15/2022   PHQ-9 Total Score 5 3 6   Q9: Thoughts of better off dead/self-harm past 2 weeks Not at all Not at all Not at all     ALISIA-7 Scores: 4  ALISIA-7 SCORE 6/28/2022 7/26/2022 9/15/2022   Total Score 4 3 5       Any other recent substance use:     Temporarily takes Norco for pain     NICOTINE-Yes:   If using nicotine, ready to quit? Not sure    Side effects related to medications pt would like to discuss with provider (constipation, dry mouth, HA, GI upset, sedation). Not reported    Narcan currently available: N/A    Primary care provider: EJ WELLER MD     Mental health provider: not yes but she would like to restart visits w/Araceli (follow up on MH referral if needed)    Any housing, insurance deficits?: denies    Contact information up to date? yes    3rd Party Involvement NA (please obtain LUDIN if pt would like to include)      Melissa Arleen  September 29, 2022  12:48 PM    MH&A Post-Appointment Chart -check      Medications ordered this visit were e-scribed.  Verified by order class [x] yes  [] no    List Medications:    mirtazapine (REMERON) 15 MG tablet  Class: E-Prescribe    Medication changes or discontinuations were communicated to patient's pharmacy: [] yes  [x] no    UA collected [] yes  [x] no  [] n/a-virtual     Outside referrals / labs, etc support staff to follow up: [] yes  [x] no    Future appointment was made: [x] yes  [] no  [] n/a  Future Appointments 9/30/2022 - 3/29/2023              Date Visit Type Length Department Provider     10/27/2022  1:00 PM ADDICTION MED RETURN 30 min [facility] ADDICTION MEDICINE Melissa Covington, DO                    Dictation completed at time of chart check: [x] yes  [] no    I have checked the documentation for today s encounters and the above information has been reviewed and completed.      Melissa Bacon on September 30, 2022 at 12:48 PM

## 2022-09-29 NOTE — PROGRESS NOTES
Clinic Care Coordination Contact  [facility]/Voicemail       Clinical Data: Care Coordinator Outreach  Outreach attempted x 2.  Left message on patient's voicemail with call back information and requested return call.  Plan: Care Coordinator will send unable to contact letter with care coordinator contact information via mail. Care Coordinator will try to reach patient again in 1-2 business days.    JESU SIXTO received phone call from Patsy and spoke to her. JESU HENSON reported that she could not find any resources for dental finance assistance. JESU HENSON recommended asking dental office for payment options in order to make it more affordable.     JESU HENSON spoke to pt about employment options. JESU SIXTO reported to pt that there were options for people on SSDI called [employer] Work but that it only is for people to 64 years old. JESU SIXTO said she will continue to look for services that pt would be eligible for since she is 67. JESU SIXTO spoke to pt about Vocational rehab and the programs they offer. Pt said she would like info on this. Pt asked for info to be sent via mail.     KATHLEEN Espana? Social Work Care Coordinator   [facility]  [email or web address]? [email or web address]    Phone: [phone]  she/her

## 2022-09-29 NOTE — PROGRESS NOTES
[facility] Addiction Medicine    A/P                                                    ASSESSMENT/PLAN    1. Alcohol use disorder, severe, dependence (H)  She is nearly 4 weeks from last drink.  Knowledge that she has developed liver cirrhosis has impacted her view on her previous alcohol use, she reflects on thinking that cirrhosis only happened to those with very several alcohol problems and that finding out she has cirrhosis has been eye opening.  We discussed that continuing to avoid alcohol will be the singular most important thing for maintaining liver function.  We also discussed that previous liver imaging had demonstrated fatty liver and that controlling risk factors for fatty liver will also be important.  She is motivated to lose weight - plans to go to library to get some books about diet.  Planning to see hepatology as well.  She plans to re-establish with her former therapist to work on her recovery as well.    - Adult Mental Health  Referral; Future  - Adult Mental Health  Referral; Future    2. Episode of recurrent major depressive disorder, unspecified depression episode severity (H)  She continues to struggle with depression and anxiety.  She would like to resume care with her previous therapist.  I have also encouraged her to establish care with psychiatry.  Avoid increasing hydroxyzine given risk of side effects (age 67).  HR limits use of propranolol for social anxiety.  Can try gabapentin prior to social situations (has script).  - mirtazapine (REMERON) 15 MG tablet; Take 1 tablet (15 mg) by mouth At Bedtime  Dispense: 30 tablet; Refill: 2  - Adult Mental Health  Referral; Future  - Adult Mental Health  Referral; Future    Plans to reach out to  JESU Menjivar).  Phone number provided.       PDMP Review       Value Time User    State PDMP site checked  Yes 9/29/2022  3:20 PM Melissa Covington, DO            RTC  Return in about 4 weeks (around  10/27/2022) for Follow up, with me, in person 2-4 weeks.      Counseled the patient on the importance of having a recovery program in addition to medication to manage recovery.  Components include avoiding isolating, having willingness to change, avoiding triggers and managing cravings. Encouraged having some type of sober network and practicing honesty with trusted support person(s). Encouraged other services such as counseling, 12 step or other self-help organizations.          SUBJECTIVE                                                    Patsy Santamaria is a 67 year old female with a history of peripheral neuropathy, COPD, alcoholic hepatitis, thombocytopenia, arthritis (hands and knees), obesity, anxiety, depression, and AUD who presents to clinic today for follow up.     Brief history of substance use:  Began drinking around age 33.  Became a problem for her in 2016 and she went to treatment for the first time and also experienced EtOH withdrawal seizures.  Has been to multiple treatments (most recently [facility] in [place] in Feb 2022).  Drinking tends to correlate with worsening depression.  Has tried naltrexone and acamprosate in past.  History of of cocaine use (1 year in the 1990s).  Established care with [facility] in March 2022 and has continued to struggle with brief episodes of drinking.       Plan from most recent office visit (9/15/22):  1. Alcohol use disorder, severe, dependence (H)  Reflected on diagnosis of cirrhosis per recent imaging and how this shows some progression from past imaging.  Discussed on abstinence is cornerstone of treatment.  She will continue gabapentin.  Encouraged meetings.  - gabapentin (NEURONTIN) 100 MG capsule; Take 1 capsule (100 mg) by mouth 3 times daily  Dispense: 90 capsule; Refill: 1     2. Episode of recurrent major depressive disorder, unspecified depression episode severity (H)  Needs improvement.  Continue  Lexapro.   Planning to start therapy.  - escitalopram (LEXAPRO) 10 MG tablet; Take 1 tablet (10 mg) by mouth daily Take with 5mg tablet for total of 15mg/day.  Dispense: 30 tablet; Refill: 2  - escitalopram (LEXAPRO) 5 MG tablet; Take 1 tablet (5 mg) by mouth daily Take with 10mg tablet for total of 15mg per day.  Dispense: 30 tablet; Refill: 2  - Specialty Care Coordination Referral; Future     3. Financial difficulty  Recommend working with care coordination.  - Specialty Care Coordination Referral; Future    TODAY'S VISIT  HPI Sep 29, 2022  - Ended up hospitalized for diverticulitis 9/18-9/22 - still on soft diet, still has some LLQ pain, drinking well but appetite is not quite back to normal, had f/u with PCP yesterday  - Planning to see hepatology for liver cirrhosis  - No alcohol (last drink 3-4 weeks ago), no cravings, occasional thoughts about drinking but then reminds herself of how it would affect her liver  - She does notice some increased sugar cravings  - Would like to go to library and do some research on diverticulitis and diet - would be interested in dietician  - Gets especially anxious in certain situations - riding in a car, out in public    PHQ 7/26/2022 9/15/2022 9/29/2022   PHQ-9 Total Score 3 6 5   Q9: Thoughts of better off dead/self-harm past 2 weeks Not at all Not at all Not at all     ALISIA-7 SCORE 7/26/2022 9/15/2022 9/29/2022   Total Score 3 5 4       OBJECTIVE                                                    PHYSICAL EXAM:  BP (!) 140/76 (BP Location: Right arm, Patient Position: Sitting, Cuff Size: Adult Large)   Pulse 65   Wt 115.2 kg (254 lb)   BMI 44.29 kg/m      GENERAL: healthy, alert and no distress  EYES: Eyes grossly normal to inspection, conjunctivae and sclerae normal  RESP: No respiratory distress  SKIN: no suspicious lesions or rashes, no pallor or jaundice  NEURO: Normal gait, mentation intact and speech normal  MENTAL STATUS EXAM  Appearance/Behavior: No appearant  distress  Speech: Normal  Mood/Affect: depressed affect and anxiety  Insight: Adequate    LAB  No results found for any visits on 09/29/22.      HISTORY                                                    Problem list reviewed & adjusted, as indicated.  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)     Diuretic-induced hypokalemia     Dyspnea on exertion     Elevated LFTs     Ganglion of tendon sheath     GI bleed     Hereditary and idiopathic peripheral neuropathy     History of major depression     Homeless     Major depression, recurrent (H)     Low backache     Airway obstruction due to foreign body, initial encounter     Hypomagnesemia     Mild episode of recurrent major depressive disorder (H)     Morbid obesity (H)     Smoker     Peripheral edema     Pneumonia of right lower lobe due to infectious organism     Primary localized osteoarthrosis, hand     Primary osteoarthritis of right knee     PTSD (post-traumatic stress disorder)     Seasonal allergies     Skin lesion     Snoring     Trochanteric bursitis of left hip     Vitamin B12 deficiency (non anemic)     Vitamin D deficiency     Acute alcoholic intoxication (H)     Anxiety     Closed fracture of head of left radius     Recurrent falls     Thrombocytopenia (H)     Dermatitis     Depressive disorder     Leukopenia     Alcoholic cirrhosis of liver without ascites (H)     Sigmoid Diverticulitis     Mixed simple and mucopurulent chronic bronchitis (H)         MEDICATION LIST (prior to visit)  escitalopram  (LEXAPRO) 10 MG tablet, Take 1 tablet (10 mg) by mouth daily Take with 5mg tablet for total of 15mg/day.  escitalopram (LEXAPRO) 5 MG tablet, Take 1 tablet (5 mg) by mouth daily Take with 10mg tablet for total of 15mg per day.  gabapentin (NEURONTIN) 100 MG capsule, Take 1 capsule (100 mg) by mouth 3 times daily  HYDROcodone-acetaminophen (NORCO) 5-325 MG tablet, Take 1 tablet by mouth every 6 hours as needed for pain  acetaminophen (TYLENOL) 500 MG tablet, Take 1-2 tablets (500-1,000 mg) by mouth every 6 hours as needed for mild pain  albuterol (PROAIR HFA/PROVENTIL HFA/VENTOLIN HFA) 108 (90 Base) MCG/ACT inhaler, Inhale 2 puffs into the lungs every 4 hours as needed   amoxicillin-clavulanate (AUGMENTIN) 875-125 MG tablet, Take 1 tablet by mouth 2 times daily for 10 days  bumetanide (BUMEX) 1 MG tablet, Take 1 mg by mouth daily   hydrOXYzine (ATARAX) 25 MG tablet, Take 25-50 mg by mouth 3 times daily as needed for anxiety  ipratropium - albuterol 0.5 mg/2.5 mg/3 mL (DUONEB) 0.5-2.5 (3) MG/3ML neb solution, Take 1 vial (3 mLs) by nebulization every 4 hours as needed for shortness of breath / dyspnea or wheezing  omeprazole (PRILOSEC) 20 MG DR capsule, Take 20 mg by mouth daily as needed  prochlorperazine (COMPAZINE) 10 MG tablet, Take 0.5 tablets (5 mg) by mouth every 6 hours as needed for nausea or vomiting  umeclidinium (INCRUSE ELLIPTA) 62.5 MCG/INH inhaler, Inhale 1 puff into the lungs daily    No current facility-administered medications on file prior to visit.      MEDICATION LIST (after visit)  Current Outpatient Medications   Medication     escitalopram (LEXAPRO) 10 MG tablet     escitalopram (LEXAPRO) 5 MG tablet     gabapentin (NEURONTIN) 100 MG capsule     HYDROcodone-acetaminophen (NORCO) 5-325 MG tablet     mirtazapine (REMERON) 15 MG tablet     acetaminophen (TYLENOL) 500 MG tablet     albuterol (PROAIR HFA/PROVENTIL HFA/VENTOLIN HFA) 108 (90 Base) MCG/ACT inhaler     amoxicillin-clavulanate  "(AUGMENTIN) 875-125 MG tablet     bumetanide (BUMEX) 1 MG tablet     hydrOXYzine (ATARAX) 25 MG tablet     ipratropium - albuterol 0.5 mg/2.5 mg/3 mL (DUONEB) 0.5-2.5 (3) MG/3ML neb solution     omeprazole (PRILOSEC) 20 MG DR capsule     prochlorperazine (COMPAZINE) 10 MG tablet     umeclidinium (INCRUSE ELLIPTA) 62.5 MCG/INH inhaler     No current facility-administered medications for this visit.         Allergies   Allergen Reactions     Bupropion Diarrhea     Codeine Hives, Itching, Nausea and Rash     Nickel Unknown     Oxycodone Nausea and Vomiting     Percocet [Oxycodone-Acetaminophen]      Patient reports \"vomiting,grossly ill two weeks ago\"     Topiramate Unknown     Diclofenac Nausea     Tolerates the topical gel     Furosemide Rash     Hydrochlorothiazide Rash     phototoxicity - med was d/lora         Sulfa Drugs Itching and Rash     Sulfasalazine Rash       Melissa Covington DO  [facility] Addiction Medicine  [facility]  [phone]        "

## 2022-10-02 ASSESSMENT — ENCOUNTER SYMPTOMS
CHILLS: 0
COUGH: 0
ABDOMINAL PAIN: 1
BRUISES/BLEEDS EASILY: 0
ALLERGIC/IMMUNOLOGIC NEGATIVE: 1
MUSCULOSKELETAL NEGATIVE: 1
CARDIOVASCULAR NEGATIVE: 1
FEVER: 0
NEUROLOGICAL NEGATIVE: 1
PSYCHIATRIC NEGATIVE: 1
SHORTNESS OF BREATH: 0
RESPIRATORY NEGATIVE: 1
POLYDIPSIA: 0

## 2022-10-06 ENCOUNTER — ANCILLARY PROCEDURE (OUTPATIENT)
Dept: MAMMOGRAPHY | Facility: CLINIC | Age: 67
End: 2022-10-06
Attending: FAMILY MEDICINE
Payer: COMMERCIAL

## 2022-10-06 ENCOUNTER — ANCILLARY PROCEDURE (OUTPATIENT)
Dept: BONE DENSITY | Facility: CLINIC | Age: 67
End: 2022-10-06
Attending: FAMILY MEDICINE
Payer: COMMERCIAL

## 2022-10-06 DIAGNOSIS — Z78.0 POST-MENOPAUSAL: ICD-10-CM

## 2022-10-06 DIAGNOSIS — Z78.0 ASYMPTOMATIC MENOPAUSE: ICD-10-CM

## 2022-10-06 DIAGNOSIS — Z12.31 VISIT FOR SCREENING MAMMOGRAM: ICD-10-CM

## 2022-10-06 PROCEDURE — 77080 DXA BONE DENSITY AXIAL: CPT | Mod: 59 | Performed by: INTERNAL MEDICINE

## 2022-10-06 PROCEDURE — 77081 DXA BONE DENSITY APPENDICULR: CPT | Performed by: INTERNAL MEDICINE

## 2022-10-06 PROCEDURE — 77067 SCR MAMMO BI INCL CAD: CPT | Mod: TC | Performed by: RADIOLOGY

## 2022-10-19 ENCOUNTER — PATIENT OUTREACH (OUTPATIENT)
Dept: CARE COORDINATION | Facility: CLINIC | Age: 67
End: 2022-10-19

## 2022-10-19 NOTE — PROGRESS NOTES
Clinic Care Coordination Contact    Follow Up Progress Note      Assessment: JESU HENSON spoke to pt about update on resources. Pt reports she has not yet received the mail from JESU HENSON but will check today. Pt also asked JESU HENSON if she knew of other programs for housing as she was looking for new options. Pt reports she currently lives in public housing where rent is based on income. JESU HENSON asks about section 8 if she had voucher. Pt states she is not in that type of housing. JESU HENSON spoke to pt about length of application waitlist but that she could always apply and see. JESU HENSON said pt should continue looking for options as sometimes they come up.     Care Gaps:    Health Maintenance Due   Topic Date Due     COPD ACTION PLAN  Never done     DEPRESSION ACTION PLAN  Never done     COLORECTAL CANCER SCREENING  Never done     Pneumococcal Vaccine: 65+ Years (2 - PCV) 01/28/2021     LUNG CANCER SCREENING  01/28/2021     MEDICARE ANNUAL WELLNESS VISIT  01/31/2022     URINE DRUG SCREEN  05/26/2022     COVID-19 Vaccine (5 - Booster for Pfizer series) 08/22/2022     INFLUENZA VACCINE (1) 09/01/2022       Care Plans      Intervention/Education provided during outreach: Housing     Outreach Frequency: 3-5 weeks      Plan:   Care Coordinator will follow up in 4 weeks    KATHELEN Espana? Social Work Care Coordinator   [facility]  [email or web address]? [email or web address]    Phone: [phone]  she/her

## 2022-10-26 NOTE — PROGRESS NOTES
Subjective:  62 y.o. female here with concerns of cough for 4 days.  She is concerned about COPD exacerbation.  She continues to smoke.  She denies any fever but might of had some chills.  Cough is nonproductive.  She has some dyspnea that is present if she walks upstairs.  She is concerned she may have bronchitis as well.  Notes some rhinorrhea.  Had a sore throat at the beginning of this syndrome.    Objective:  /70 (Patient Site: Left Arm, Patient Position: Sitting, Cuff Size: Adult Large)  Pulse 75  Temp 97.5  F (36.4  C) (Oral)   Wt (!) 238 lb (108 kg) Comment: with shoes  LMP 03/06/2002  SpO2 98% Comment: room air  Breastfeeding? No  BMI 41.17 kg/m2  GENERAL: alert, not distressed  EARS: tympanic membranes normal, external auditory canals normal  NOSE: no rhinorrhea, nasal mucosa normal  PHARYNX: no erythema or exudates  FRONTAL and MAXILLARY SINUSES: non tender  NECK: no lymphadenopathy, nodules, or rigidity.  CHEST: clear, no rales, rhonchi, or wheezes  CARDIAC: regular without murmur  Trace edema of bilateral lower extremities.  No calf tenderness.  Negative Homans.  No palpable cords.    Assessment and Plan:  1. Acute URI  Vitals and lung exam normal.  Suspect an upper respiratory source for her cough.  Recommended against antibiotics.  I do not see any role for prednisone currently.  Patient declined influenza exam.  I recommended ibuprofen for pain control and an over-the-counter cough syrup for symptomatic control.  She should follow-up early next week if she does not feel that she is improving or sooner if she is getting worse.    - ibuprofen (ADVIL,MOTRIN) 400 MG tablet; Take 1 tablet (400 mg total) by mouth every 6 (six) hours as needed for pain.  Dispense: 30 tablet; Refill: 0  - dextromethorphan-guaifenesin  mg/5 mL Liqd; Take 5 mL by mouth every 4 (four) hours as needed (cough).  Dispense: 120 mL; Refill: 1        yes No...

## 2022-10-27 ENCOUNTER — OFFICE VISIT (OUTPATIENT)
Dept: ADDICTION MEDICINE | Facility: CLINIC | Age: 67
End: 2022-10-27
Payer: COMMERCIAL

## 2022-10-27 VITALS — HEART RATE: 71 BPM | SYSTOLIC BLOOD PRESSURE: 119 MMHG | DIASTOLIC BLOOD PRESSURE: 77 MMHG

## 2022-10-27 DIAGNOSIS — F10.20 ALCOHOL USE DISORDER, SEVERE, DEPENDENCE (H): Primary | ICD-10-CM

## 2022-10-27 DIAGNOSIS — F43.10 PTSD (POST-TRAUMATIC STRESS DISORDER): ICD-10-CM

## 2022-10-27 PROCEDURE — 99214 OFFICE O/P EST MOD 30 MIN: CPT | Performed by: FAMILY MEDICINE

## 2022-10-27 RX ORDER — TOPIRAMATE 25 MG/1
25 TABLET, FILM COATED ORAL EVERY EVENING
Qty: 30 TABLET | Refills: 0 | Status: SHIPPED | OUTPATIENT
Start: 2022-10-27 | End: 2022-12-01

## 2022-10-27 RX ORDER — HYDROXYZINE HYDROCHLORIDE 50 MG/1
25-50 TABLET, FILM COATED ORAL 3 TIMES DAILY PRN
Qty: 90 TABLET | Refills: 1 | Status: SHIPPED | OUTPATIENT
Start: 2022-10-27 | End: 2022-12-20

## 2022-10-27 ASSESSMENT — ANXIETY QUESTIONNAIRES
5. BEING SO RESTLESS THAT IT IS HARD TO SIT STILL: NOT AT ALL
6. BECOMING EASILY ANNOYED OR IRRITABLE: NOT AT ALL
1. FEELING NERVOUS, ANXIOUS, OR ON EDGE: SEVERAL DAYS
3. WORRYING TOO MUCH ABOUT DIFFERENT THINGS: SEVERAL DAYS
GAD7 TOTAL SCORE: 3
7. FEELING AFRAID AS IF SOMETHING AWFUL MIGHT HAPPEN: NOT AT ALL
GAD7 TOTAL SCORE: 3
2. NOT BEING ABLE TO STOP OR CONTROL WORRYING: NOT AT ALL
4. TROUBLE RELAXING: SEVERAL DAYS

## 2022-10-27 ASSESSMENT — PAIN SCALES - GENERAL: PAINLEVEL: EXTREME PAIN (8)

## 2022-10-27 ASSESSMENT — PATIENT HEALTH QUESTIONNAIRE - PHQ9: SUM OF ALL RESPONSES TO PHQ QUESTIONS 1-9: 6

## 2022-10-27 NOTE — PROGRESS NOTES
[facility]    A/P                                                    ASSESSMENT/PLAN    1. Alcohol use disorder, severe, dependence (H)  Reports one time use of alcohol since last visit and reflects on how this made her feel poorly physically and emotionally.  Encouraged continued goal of abstinence.  Encouraged therapy and AA meetings.  Gabapentin has been mildly helpful but she worries about weight gain.  Discussed trial of topamax.  Reviewed chart - she has documented allergy however she does not recall taking this, per previous notes she developed shaking of her body when she took it in the past (started in hospital in 2020, 25mg BID) and her dose of Wellbutrin had been increased at that time as well.  She does not have any other contraindication to use and given potential benefit across multiple issues (AUD, desire for weight loss, and mood concerns) so I did feel it was worth discussion of risks and benefits.  After this discussion of risks/benefits/side effects she elected to move forward with trial of topamax 25mg in the evening.             - topiramate (TOPAMAX) 25 MG tablet; Take 1 tablet (25 mg) by mouth every evening  Dispense: 30 tablet; Refill: 0    2. PTSD (post-traumatic stress disorder)  She will reach out to schedule therapy.  Requests refill of hydroxyzine.  - hydrOXYzine (ATARAX) 50 MG tablet; Take 0.5-1 tablets (25-50 mg) by mouth 3 times daily as needed for anxiety  Dispense: 90 tablet; Refill: 1    She has upcoming appointment with her PCP on 11/2 and plans to have foot pain evaluated.  Today I do not see any concerns for infection and she is neurovascularly intact.  Given duration of >1 month I think is reasonable to wait for visit with PCP though I did encourage her to seek care in ER if any acutely worsening symptoms or neurovascular changes (skin color change, color change, numbness/tingling).      PDMP Review       Value Time User    State PDMP site checked   Yes 10/2/2022 10:04 PM Yanique Cali MD            RTC  Return in about 4 weeks (around 11/24/2022) for Follow up, in person.  She will reach out to me sooner if any issues or concerns.        Counseled the patient on the importance of having a recovery program in addition to medication to manage recovery.  Components include avoiding isolating, having willingness to change, avoiding triggers and managing cravings. Encouraged having some type of sober network and practicing honesty with trusted support person(s). Encouraged other services such as counseling, 12 step or other self-help organizations.        SUBJECTIVE                                                    Patsy Santamaria is a 67 year old female with a history of peripheral neuropathy, COPD, alcoholic hepatitis, thombocytopenia, arthritis (hands and knees), obesity, anxiety, depression, and AUD who presents to clinic today for follow up.     Brief history of substance use:  Began drinking around age 33.  Became a problem for her in 2016 and she went to treatment for the first time and also experienced EtOH withdrawal seizures.  Has been to multiple treatments (most recently [facility] in [place] in Feb 2022).  Drinking tends to correlate with worsening depression.  Has tried naltrexone and acamprosate in past.  History of of cocaine use (1 year in the 1990s).  Established care with [facility] in March 2022 and has continued to struggle with brief episodes of drinking.       Plan from most recent office visit (9/29/22):  1. Alcohol use disorder, severe, dependence (H)  She is nearly 4 weeks from last drink.  Knowledge that she has developed liver cirrhosis has impacted her view on her previous alcohol use, she reflects on thinking that cirrhosis only happened to those with very several alcohol problems and that finding out she has cirrhosis has been eye opening.  We discussed that continuing  "to avoid alcohol will be the singular most important thing for maintaining liver function.  We also discussed that previous liver imaging had demonstrated fatty liver and that controlling risk factors for fatty liver will also be important.  She is motivated to lose weight - plans to go to library to get some books about diet.  Planning to see hepatology as well.  She plans to re-establish with her former therapist to work on her recovery as well.    - Adult Mental Health  Referral; Future  - Adult Mental Health  Referral; Future     2. Episode of recurrent major depressive disorder, unspecified depression episode severity (H)  She continues to struggle with depression and anxiety.  She would like to resume care with her previous therapist.  I have also encouraged her to establish care with psychiatry.  Avoid increasing hydroxyzine given risk of side effects (age 67).  HR limits use of propranolol for social anxiety.  Can try gabapentin prior to social situations (has script).  - mirtazapine (REMERON) 15 MG tablet; Take 1 tablet (15 mg) by mouth At Bedtime  Dispense: 30 tablet; Refill: 2  - Adult Mental Health  Referral; Future  - Adult Mental Health  Referral; Future    TODAY'S VISIT  HPI Oct 27, 2022  - Granddaughter's baby shower and was around alcohol but did not drink, \"I chose not to.\"    - Drank once since last visit - got mad, \"used it as an excuse\" - \"I let myself down\"  - worried about weight gain, craving sugar since she stopped drinking  - connects regularly with someone from previous treatment, supportive  - has considered resuming meetings     - her abdominal pain has resolved completely, no fevers, normal appetite, and normal BMs  - left foot painful and swollen today (though she was able to walk here) - has been intermittently painful on lateral aspect of ankle and foot for past 1 month but no known injury, swelling is more chronic and consistent (>1 month), no " numbness or tingling    PHQ 9/15/2022 9/29/2022 10/27/2022   PHQ-9 Total Score 6 5 6   Q9: Thoughts of better off dead/self-harm past 2 weeks Not at all Not at all Not at all     ALISIA-7 SCORE 9/15/2022 9/29/2022 10/27/2022   Total Score 5 4 3       OBJECTIVE                                                    PHYSICAL EXAM:  /77 (BP Location: Right arm, Patient Position: Sitting, Cuff Size: Adult Large)   Pulse 71     GENERAL: healthy, alert and no distress  EYES: Eyes grossly normal to inspection, conjunctivae and sclerae normal  RESP: No respiratory distress  MS: Left foot swollen to ankle.  Tender laterally over lateral malleolus and anterior to lateral malleolus and down into lateral foot, no point tenderness.  Bilateral DP and PT are 1+ bilaterally and equal.  Capillary refill in toes is <2 seconds and feet are warm to touch.  No sores/lesions noted on feet.  SKIN: no pallor or jaundice  NEURO: Normal strength and tone, mentation intact and speech normal  MENTAL STATUS EXAM  Appearance/Behavior: No appearant distress  Speech: Normal  Mood/Affect: depressed affect and anxiety  Insight: Fair         LAB  No results found for any visits on 10/27/22.      HISTORY                                                    Problem list reviewed & adjusted, as indicated.  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)     Diuretic-induced hypokalemia     Dyspnea on exertion     Elevated LFTs     Ganglion of tendon sheath     GI bleed     Hereditary and idiopathic  peripheral neuropathy     History of major depression     Homeless     Major depression, recurrent (H)     Low backache     Airway obstruction due to foreign body, initial encounter     Hypomagnesemia     Mild episode of recurrent major depressive disorder (H)     Morbid obesity (H)     Smoker     Peripheral edema     Pneumonia of right lower lobe due to infectious organism     Primary localized osteoarthrosis, hand     Primary osteoarthritis of right knee     PTSD (post-traumatic stress disorder)     Seasonal allergies     Skin lesion     Snoring     Trochanteric bursitis of left hip     Vitamin B12 deficiency (non anemic)     Vitamin D deficiency     Acute alcoholic intoxication (H)     Anxiety     Closed fracture of head of left radius     Recurrent falls     Thrombocytopenia (H)     Dermatitis     Depressive disorder     Leukopenia     Alcoholic cirrhosis of liver without ascites (H)     Sigmoid Diverticulitis     Mixed simple and mucopurulent chronic bronchitis (H)         MEDICATION LIST (prior to visit)  escitalopram (LEXAPRO) 10 MG tablet, Take 1 tablet (10 mg) by mouth daily Take with 5mg tablet for total of 15mg/day.  escitalopram (LEXAPRO) 5 MG tablet, Take 1 tablet (5 mg) by mouth daily Take with 10mg tablet for total of 15mg per day.  mirtazapine (REMERON) 15 MG tablet, Take 1 tablet (15 mg) by mouth At Bedtime  acetaminophen (TYLENOL) 500 MG tablet, Take 1-2 tablets (500-1,000 mg) by mouth every 6 hours as needed for mild pain  albuterol (PROAIR HFA/PROVENTIL HFA/VENTOLIN HFA) 108 (90 Base) MCG/ACT inhaler, Inhale 2 puffs into the lungs every 4 hours as needed   bumetanide (BUMEX) 1 MG tablet, Take 1 mg by mouth daily   ipratropium - albuterol 0.5 mg/2.5 mg/3 mL (DUONEB) 0.5-2.5 (3) MG/3ML neb solution, Take 1 vial (3 mLs) by nebulization every 4 hours as needed for shortness of breath / dyspnea or wheezing  omeprazole (PRILOSEC) 20 MG DR capsule, Take 20 mg by mouth daily as needed  prochlorperazine  "(COMPAZINE) 10 MG tablet, Take 0.5 tablets (5 mg) by mouth every 6 hours as needed for nausea or vomiting (Patient not taking: Reported on 10/27/2022)  umeclidinium (INCRUSE ELLIPTA) 62.5 MCG/INH inhaler, Inhale 1 puff into the lungs daily    No current facility-administered medications on file prior to visit.      MEDICATION LIST (after visit)  Current Outpatient Medications   Medication     escitalopram (LEXAPRO) 10 MG tablet     escitalopram (LEXAPRO) 5 MG tablet     hydrOXYzine (ATARAX) 50 MG tablet     mirtazapine (REMERON) 15 MG tablet     topiramate (TOPAMAX) 25 MG tablet     acetaminophen (TYLENOL) 500 MG tablet     albuterol (PROAIR HFA/PROVENTIL HFA/VENTOLIN HFA) 108 (90 Base) MCG/ACT inhaler     bumetanide (BUMEX) 1 MG tablet     ipratropium - albuterol 0.5 mg/2.5 mg/3 mL (DUONEB) 0.5-2.5 (3) MG/3ML neb solution     omeprazole (PRILOSEC) 20 MG DR capsule     prochlorperazine (COMPAZINE) 10 MG tablet     umeclidinium (INCRUSE ELLIPTA) 62.5 MCG/INH inhaler     No current facility-administered medications for this visit.         Allergies   Allergen Reactions     Bupropion Diarrhea     Codeine Hives, Itching, Nausea and Rash     Nickel Unknown     Oxycodone Nausea and Vomiting     Percocet [Oxycodone-Acetaminophen]      Patient reports \"vomiting,grossly ill two weeks ago\"     Topiramate Unknown     Diclofenac Nausea     Tolerates the topical gel     Furosemide Rash     Hydrochlorothiazide Rash     phototoxicity - med was d/lora         Sulfa Drugs Itching and Rash     Sulfasalazine Rash     Melissa Covington, [facility]  [facility]  [phone]    "

## 2022-10-27 NOTE — PROGRESS NOTES
[facility]       Rooming information:  Scheduled her teeth pulled on the 11/9/22. C/O left foot pain, it's swollen. Pt will be seeing her PCP on 11/2. Pitting edema, pedal pulse is not pulpable  Point of care urine drug screen positive for:      *POC urine drug screen does not screen for Fentanyl    PHQ-9 Scores: 6  PHQ 7/26/2022 9/15/2022 9/29/2022   PHQ-9 Total Score 3 6 5   Q9: Thoughts of better off dead/self-harm past 2 weeks Not at all Not at all Not at all     ALISIA-7 Scores: 3  ALISIA-7 SCORE 7/26/2022 9/15/2022 9/29/2022   Total Score 3 5 4       Any other recent substance use:     Denies    NICOTINE-Yes:   If using nicotine, ready to quit? No    Side effects related to medications pt would like to discuss with provider (constipation, dry mouth, HA, GI upset, sedation?) No     Narcan currently available: N/A    Primary care provider: EJ WELLER MD     Mental health provider: none (follow up on MH referral if needed)    Any housing, insurance deficits?: denies    Contact information up to date? yes    3rd Party Involvement NA (please obtain LUDIN if pt would like to include)        Melissa Bacon  October 27, 2022  12:49 PM

## 2022-10-27 NOTE — PATIENT INSTRUCTIONS
We are going to try topiramate 25mg every evening to help with alcohol cravings.  Let me know if you have side effects to this medication.    Schedule with Araceli - call [phone] to make appointment.

## 2022-10-31 NOTE — TELEPHONE ENCOUNTER
DIAGNOSIS: Alcoholic cirrhosis of liver without ascites (H) [K70.30]  Acute diverticulitis    Appt Date: 12.01.2022   NOTES STATUS DETAILS   OFFICE NOTE from referring provider Internal 09.27.2022 Yanique Cali MD   OFFICE NOTES from other specialists     DISCHARGE SUMMARY from hospital     MEDICATION LIST Internal    LIVER BIOSPY (IF APPLICABLE)      PATHOLOGY REPORTS      IMAGING     ENDOSCOPY (IF AVAILABLE)     COLONOSCOPY (IF AVAILABLE)     ULTRASOUND LIVER Internal 01.22.2020 US ABDOMEN LIMITED   CT OF ABDOMEN Internal 09.18.2022 CT ABDOMEN PELVIS W CONTRAST   MRI OF LIVER     FIBROSCAN, US ELASTOGRAPHY, FIBROSIS SCAN, MR ELASTOGRAPHY     LABS     HEPATIC PANEL (LIVER PANEL) Internal 09.18.2022   BASIC METABOLIC PANEL Internal 09.18.2022   COMPLETE METABOLIC PANEL Internal 11.02.2022   COMPLETE BLOOD COUNT (CBC) Internal 11.02.2022   INTERNATIONAL NORMALIZED RATIO (INR) Internal 11.02.2022   HEPATITIS C ANTIBODY     HEPATITIS C VIRAL LOAD/PCR     HEPATITIS C GENOTYPE     HEPATITIS B SURFACE ANTIGEN     HEPATITIS B SURFACE ANTIBODY     HEPATITIS B DNA QUANT LEVEL     HEPATITIS B CORE ANTIBODY

## 2022-11-02 ENCOUNTER — OFFICE VISIT (OUTPATIENT)
Dept: FAMILY MEDICINE | Facility: CLINIC | Age: 67
End: 2022-11-02
Payer: COMMERCIAL

## 2022-11-02 ENCOUNTER — ANCILLARY PROCEDURE (OUTPATIENT)
Dept: GENERAL RADIOLOGY | Facility: CLINIC | Age: 67
End: 2022-11-02
Attending: FAMILY MEDICINE
Payer: COMMERCIAL

## 2022-11-02 VITALS
SYSTOLIC BLOOD PRESSURE: 123 MMHG | OXYGEN SATURATION: 97 % | TEMPERATURE: 97.2 F | DIASTOLIC BLOOD PRESSURE: 87 MMHG | HEART RATE: 69 BPM | WEIGHT: 258 LBS | BODY MASS INDEX: 44.99 KG/M2

## 2022-11-02 DIAGNOSIS — K02.9 DENTAL CAVITIES: ICD-10-CM

## 2022-11-02 DIAGNOSIS — J43.8 OTHER EMPHYSEMA (H): ICD-10-CM

## 2022-11-02 DIAGNOSIS — R06.02 SHORTNESS OF BREATH: ICD-10-CM

## 2022-11-02 DIAGNOSIS — I89.0 LYMPHEDEMA OF BOTH LOWER EXTREMITIES: ICD-10-CM

## 2022-11-02 DIAGNOSIS — K70.10 ALCOHOLIC HEPATITIS, UNSPECIFIED WHETHER ASCITES PRESENT (H): ICD-10-CM

## 2022-11-02 DIAGNOSIS — M25.572 PAIN IN JOINT INVOLVING ANKLE AND FOOT, LEFT: ICD-10-CM

## 2022-11-02 DIAGNOSIS — Z01.818 PREOP GENERAL PHYSICAL EXAM: Primary | ICD-10-CM

## 2022-11-02 LAB
ALBUMIN SERPL BCG-MCNC: 4.2 G/DL (ref 3.5–5.2)
ALP SERPL-CCNC: 74 U/L (ref 35–104)
ALT SERPL W P-5'-P-CCNC: 25 U/L (ref 10–35)
ANION GAP SERPL CALCULATED.3IONS-SCNC: 15 MMOL/L (ref 7–15)
AST SERPL W P-5'-P-CCNC: 34 U/L (ref 10–35)
BILIRUB SERPL-MCNC: 0.5 MG/DL
BUN SERPL-MCNC: 11.6 MG/DL (ref 8–23)
CALCIUM SERPL-MCNC: 9.9 MG/DL (ref 8.8–10.2)
CHLORIDE SERPL-SCNC: 101 MMOL/L (ref 98–107)
CREAT SERPL-MCNC: 0.56 MG/DL (ref 0.51–0.95)
DEPRECATED HCO3 PLAS-SCNC: 24 MMOL/L (ref 22–29)
ERYTHROCYTE [DISTWIDTH] IN BLOOD BY AUTOMATED COUNT: 13.4 % (ref 10–15)
GFR SERPL CREATININE-BSD FRML MDRD: >90 ML/MIN/1.73M2
GLUCOSE SERPL-MCNC: 113 MG/DL (ref 70–99)
HCT VFR BLD AUTO: 47.5 % (ref 35–47)
HGB BLD-MCNC: 15.7 G/DL (ref 11.7–15.7)
INR PPP: 0.96 (ref 0.85–1.15)
MCH RBC QN AUTO: 30 PG (ref 26.5–33)
MCHC RBC AUTO-ENTMCNC: 33.1 G/DL (ref 31.5–36.5)
MCV RBC AUTO: 91 FL (ref 78–100)
NT-PROBNP SERPL-MCNC: 95 PG/ML (ref 0–900)
PLATELET # BLD AUTO: 181 10E3/UL (ref 150–450)
POTASSIUM SERPL-SCNC: 4.5 MMOL/L (ref 3.4–5.3)
PROT SERPL-MCNC: 7.3 G/DL (ref 6.4–8.3)
RBC # BLD AUTO: 5.24 10E6/UL (ref 3.8–5.2)
SODIUM SERPL-SCNC: 140 MMOL/L (ref 136–145)
WBC # BLD AUTO: 5.6 10E3/UL (ref 4–11)

## 2022-11-02 PROCEDURE — 36415 COLL VENOUS BLD VENIPUNCTURE: CPT | Performed by: FAMILY MEDICINE

## 2022-11-02 PROCEDURE — 85610 PROTHROMBIN TIME: CPT | Performed by: FAMILY MEDICINE

## 2022-11-02 PROCEDURE — G0008 ADMIN INFLUENZA VIRUS VAC: HCPCS | Mod: 59 | Performed by: FAMILY MEDICINE

## 2022-11-02 PROCEDURE — 83880 ASSAY OF NATRIURETIC PEPTIDE: CPT | Performed by: FAMILY MEDICINE

## 2022-11-02 PROCEDURE — 91312 COVID-19,PF,PFIZER BOOSTER BIVALENT: CPT | Performed by: FAMILY MEDICINE

## 2022-11-02 PROCEDURE — 90662 IIV NO PRSV INCREASED AG IM: CPT | Performed by: FAMILY MEDICINE

## 2022-11-02 PROCEDURE — 0124A COVID-19,PF,PFIZER BOOSTER BIVALENT: CPT | Performed by: FAMILY MEDICINE

## 2022-11-02 PROCEDURE — 80053 COMPREHEN METABOLIC PANEL: CPT | Performed by: FAMILY MEDICINE

## 2022-11-02 PROCEDURE — 99215 OFFICE O/P EST HI 40 MIN: CPT | Mod: 25 | Performed by: FAMILY MEDICINE

## 2022-11-02 PROCEDURE — 73610 X-RAY EXAM OF ANKLE: CPT | Mod: TC | Performed by: RADIOLOGY

## 2022-11-02 PROCEDURE — 85027 COMPLETE CBC AUTOMATED: CPT | Performed by: FAMILY MEDICINE

## 2022-11-02 RX ORDER — BUMETANIDE 1 MG/1
1 TABLET ORAL DAILY
Qty: 30 TABLET | Refills: 1 | Status: SHIPPED | OUTPATIENT
Start: 2022-11-02 | End: 2023-02-13

## 2022-11-02 RX ORDER — ALBUTEROL SULFATE 90 UG/1
2 AEROSOL, METERED RESPIRATORY (INHALATION) EVERY 4 HOURS PRN
Qty: 18 G | Refills: 3 | Status: SHIPPED | OUTPATIENT
Start: 2022-11-02 | End: 2023-10-17

## 2022-11-02 NOTE — PROGRESS NOTES
[facility] [address] [place] [address]  Phone: [phone]  Fax: [phone]  Primary Provider: Yanique Cali  Pre-op Performing Provider: EDGARD JAEGER      PREOPERATIVE EVALUATION:  Today's date: 11/2/2022    Patsy Santamaria is a 67 year old female who presents for a preoperative evaluation.    Surgical Information:  Surgery/Procedure: oral surgery  Surgery Location: [facility]  Surgeon: Ez Herbert DMD, MD  Surgery Date: 11/9/2022  Time of Surgery: 730am  Where patient plans to recover: At home with family  Fax number for surgical facility: [phone]    Type of Anesthesia Anticipated: General    Assessment & Plan     The proposed surgical procedure is considered INTERMEDIATE risk.    Preop general physical exam    - **Comprehensive metabolic panel FUTURE 2mo; Future  - **CBC with platelets FUTURE 2mo; Future  - **Comprehensive metabolic panel FUTURE 2mo  - **CBC with platelets FUTURE 2mo    Dental cavities  Scheduled for multiple teeth extraction.    Pain in joint involving ankle and foot, left    - XR Ankle Left G/E 3 Views; Future  Likely sprain, x-ray done and independently reviewed did not show any acute fracture, symptomatic care discussed.  Lymphedema of both lower extremities    - Compression Sleeve/Stocking Order for DME - ONLY FOR DME  - BNP-N terminal pro; Future  - bumetanide (BUMEX) 1 MG tablet; Take 1 tablet (1 mg) by mouth daily  - BNP-N terminal pro    Alcoholic hepatitis, unspecified whether ascites present    - INR; Future  - INR    Chronic Shortness of breath    - BNP-N terminal pro; Future  - BNP-N terminal pro    Other emphysema (H)    - umeclidinium (INCRUSE ELLIPTA) 62.5 MCG/INH inhaler; Inhale 1 puff into the lungs daily  - albuterol (PROAIR HFA/PROVENTIL HFA/VENTOLIN HFA) 108 (90 Base) MCG/ACT inhaler; Inhale 2 puffs into the lungs every 4 hours as needed for shortness of breath / dyspnea or wheezing      Possible Sleep  Apnea:       Implanted Device:   - Type of device: . Patient advised to bring device information on day of surgery.    Risks and Recommendations:  The patient has the following additional risks and recommendations for perioperative complications:  Pulmonary: Continue with current COPD medication included Incruse Ellipta, as needed albuterol.    Obstructive Sleep Apnea:   Continue with CPAP use .  She quit drinking about a month ago, currently taking Topamax for craving.  Medication Instructions:  Patient is to take all scheduled medications on the day of surgery with a sip of water.    RECOMMENDATION:  APPROVAL GIVEN to proceed with proposed procedure, without further diagnostic evaluation.  Reviewed risks (not limited to bleeding,infection,pain,un-anticipated response to anesthesia and even death) and benefits (diagnostic and therapeutic) of surgery with patient, she understands the risks of the procedure and would like to proceed.  Patient's active problems diagnostically and therapeutically optimized for planned procedure with, Local or General anesthesia    Review of external notes as documented above       Diagnosis or treatment significantly limited by social determinants of health -history of alcohol abuse but has been sober for more than a month.    40 minutes spent on the date of the encounter doing chart review, review of outside records, review of test results, interpretation of tests, patient visit and documentation         Subjective     HPI related to upcoming procedure: Multiple dental cavities, seen by dentist and deemed to be candidate for dental extraction and placement of denture.  Chronic lymphedema, with recent exacerbation, would like refill of her Bumex.  COPD, no recent exacerbation.  Left ankle pain no trauma, mildly swollen, painful limiting ambulation.      Preop Questions 9/27/2022   1. Have you ever had a heart attack or stroke? No   2. Have you ever had surgery on your heart or blood  vessels, such as a stent placement, a coronary artery bypass, or surgery on an artery in your head, neck, heart, or legs? No   3. Do you have chest pain with activity? No   4. Do you have a history of  heart failure? No   5. Do you currently have a cold, bronchitis or symptoms of other infection? No   6. Do you have a cough, shortness of breath, or wheezing? No   7. Do you or anyone in your family have previous history of blood clots? YES -    8. Do you or does anyone in your family have a serious bleeding problem such as prolonged bleeding following surgeries or cuts? No   9. Have you ever had problems with anemia or been told to take iron pills? No   10. Have you had any abnormal blood loss such as black, tarry or bloody stools, or abnormal vaginal bleeding? No   11. Have you ever had a blood transfusion? No   12. Are you willing to have a blood transfusion if it is medically needed before, during, or after your surgery? Yes   13. Have you or any of your relatives ever had problems with anesthesia? YES -    14. Do you have sleep apnea, excessive snoring or daytime drowsiness? YES -    14a. Do you have a CPAP machine? No   15. Do you have any artifical heart valves or other implanted medical devices like a pacemaker, defibrillator, or continuous glucose monitor? No   16. Do you have artificial joints? No   17. Are you allergic to latex? No     Health Care Directive:  Patient does not have a Health Care Directive or Living Will: Discussed advance care planning with patient; information given to patient to review.    Preoperative Review of :   reviewed - controlled substances reflected in medication list.      Status of Chronic Conditions:  COPD - Patient has a longstanding history of moderate-severe COPD . Patient has been doing well overall noting NO SYMPTOMS and continues on medication regimen consisting of Incruse Ellipta, albuterol as needed. without adverse reactions or side effects.    SLEEP PROBLEM -  "Patient has a longstanding history of snoring.. Patient has tried OTC medications with limited success.     Lymphedema chronic lower extremity swelling.  New left ankle pain no trauma.    Review of Systems  Constitutional, neuro, ENT, endocrine, pulmonary, cardiac, gastrointestinal, genitourinary, musculoskeletal, integument and psychiatric systems are negative, except as otherwise noted.    Patient Active Problem List    Diagnosis Date Noted     Alcoholic cirrhosis of liver without ascites (H) 09/09/2022     Priority: Medium     CT scan 9/5/2022       Sigmoid Diverticulitis 09/09/2022     Priority: Medium     9/4/2022       Mixed simple and mucopurulent chronic bronchitis (H) 06/27/2022     Priority: Medium     Depressive disorder 02/04/2022     Priority: Medium     Thrombocytopenia (H) 01/27/2022     Priority: Medium     Dermatitis 01/27/2022     Priority: Medium     Leukopenia 01/18/2022     Priority: Medium     Acute alcoholic intoxication (H) 09/10/2021     Priority: Medium     Closed fracture of head of left radius 09/10/2021     Priority: Medium     Recurrent falls 09/10/2021     Priority: Medium     Abdominal pain, epigastric 07/06/2021     Priority: Medium     Alcoholic hepatitis 07/06/2021     Priority: Medium     Biliary colic 07/06/2021     Priority: Medium     Chronic reflux esophagitis 07/06/2021     Priority: Medium     Formatting of this note might be different from the original.  Created by Conversion       Diuretic-induced hypokalemia 07/06/2021     Priority: Medium     Dyspnea on exertion 07/06/2021     Priority: Medium     Ganglion of tendon sheath 07/06/2021     Priority: Medium     Formatting of this note might be different from the original.  Created by Conversion  [facility] Annotation: May 13 2011  8:37Yanique Carranza:   \"cyst/lump\" on dorsum right foot       Hereditary and idiopathic peripheral neuropathy 07/06/2021     Priority: Medium     Formatting of this note might be " different from the original.  Created by Conversion    Replacement Utility updated for latest IMO load       Major depression, recurrent (H) 07/06/2021     Priority: Medium     Formatting of this note might be different from the original.  Created by Conversion    Replacement Utility updated for latest IMO load       Mild episode of recurrent major depressive disorder (H) 07/06/2021     Priority: Medium     Smoker 07/06/2021     Priority: Medium     Formatting of this note might be different from the original.  Created by Conversion       Peripheral edema 07/06/2021     Priority: Medium     Primary localized osteoarthrosis, hand 07/06/2021     Priority: Medium     Formatting of this note might be different from the original.  Bilateral;       Vitamin D deficiency 07/06/2021     Priority: Medium     Formatting of this note might be different from the original.  Created by Conversion    Replacement Utility updated for latest IMO load       Alcohol dependence with intoxication with complication (H) 05/26/2021     Priority: Medium     Edema, unspecified type 05/26/2021     Priority: Medium     Alcohol abuse 02/17/2021     Priority: Medium     GI bleed 12/28/2020     Priority: Medium     Homeless 12/28/2020     Priority: Medium     Alcohol dependence with uncomplicated intoxication (H) 10/13/2020     Priority: Medium     Vitamin B12 deficiency (non anemic) 10/13/2020     Priority: Medium     History of major depression 01/23/2020     Priority: Medium     Pneumonia of right lower lobe due to infectious organism 12/14/2019     Priority: Medium     Hypomagnesemia 03/25/2019     Priority: Medium     Anxiety 03/25/2019     Priority: Medium     Seasonal allergies 07/09/2018     Priority: Medium     Primary osteoarthritis of right knee 03/21/2018     Priority: Medium     Low backache 03/16/2018     Priority: Medium     Morbid obesity (H) 01/05/2018     Priority: Medium     Bilateral edema of lower extremity 06/28/2017      Priority: Medium     Snoring 2017     Priority: Medium     Carpal tunnel syndrome on both sides 2017     Priority: Medium     Trochanteric bursitis of left hip 10/25/2016     Priority: Medium     Elevated LFTs 2016     Priority: Medium     Airway obstruction due to foreign body, initial encounter 2016     Priority: Medium     Claustrophobia 2015     Priority: Medium     Cervical disc disease 2015     Priority: Medium     Formatting of this note might be different from the original.  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) 2015     Priority: Medium     PTSD (post-traumatic stress disorder) 2015     Priority: Medium      Past Medical History:   Diagnosis Date     Alcohol abuse      Alcohol withdrawal seizure without complication (H) 2016     Alcoholic cirrhosis (H)      Anxiety      Anxiety      Arthritis      Chronic alcohol dependence, continuous (H) 2018    inpatient 2019, sober since then     Chronic Lower Extremity Edema      Chronic reflux esophagitis      COPD (chronic obstructive pulmonary disease) (H)      Depression      Depression      Dermatitis      Ganglion     right foot     GERD (gastroesophageal reflux disease)      H. pylori infection      Melanoma (H) 2019    left upper arm     Menopause     age 52     Obesity (BMI 35.0-39.9 without comorbidity)      Osteoarthritis     Bilateral Knees     Peripheral neuropathy      Seizure (H)     during alcohol withdrawal     Sleep apnea     mild, doesnt tolerate pap therapy     Withdrawal seizures (H)     x 1 in 2016     Past Surgical History:   Procedure Laterality Date     APPENDECTOMY       APPENDECTOMY       ARTHROSCOPY KNEE Right      BIOPSY SKIN (LOCATION) Left 2019    left upper arm      SECTION       HC EXCISION LESION/TENDON-SHEATH/CAPSULE, FOOT       Description: Excision Of Cyst Of Tendon Sheath Of Foot;  Proc Date: 10/28/2011;  Comments: [facility] surgery [facility] KNEE SCOPE, DIAGNOSTIC      Description: Arthroscopy Knee Right;  Proc Date: 10/11/2005;  Comments: for right knee patella subluxation with lateral retinacular release; subpatellar chondroplasty      LATERAL RETINACULAR RELEASE OPEN      Description: Knee Lateral Retinacular Release;  Proc Date: 10/11/2005;     HEMORRHOIDECTOMY INTERNAL LIGATION      Description: Hemorrhoidectomy;  Recorded: 2008;     THUMB SURGERY      Removal of bone spurs     TONSILLECTOMY       [facility] APPENDECTOMY      Description: Appendectomy;  Recorded: 2008;  Comments: childhood     [facility]  DELIVERY ONLY      Description:  Section;  Recorded: 2008;     [facility] LIGATE FALLOPIAN TUBE      Description: Tubal Ligation;  Recorded: 2008;     Current Outpatient Medications   Medication Sig Dispense Refill     acetaminophen (TYLENOL) 500 MG tablet Take 1-2 tablets (500-1,000 mg) by mouth every 6 hours as needed for mild pain 60 tablet 11     albuterol (PROAIR HFA/PROVENTIL HFA/VENTOLIN HFA) 108 (90 Base) MCG/ACT inhaler Inhale 2 puffs into the lungs every 4 hours as needed for shortness of breath / dyspnea or wheezing 18 g 3     bumetanide (BUMEX) 1 MG tablet Take 1 tablet (1 mg) by mouth daily 30 tablet 1     escitalopram (LEXAPRO) 10 MG tablet Take 1 tablet (10 mg) by mouth daily Take with 5mg tablet for total of 15mg/day. 30 tablet 2     escitalopram (LEXAPRO) 5 MG tablet Take 1 tablet (5 mg) by mouth daily Take with 10mg tablet for total of 15mg per day. 30 tablet 2     hydrOXYzine (ATARAX) 50 MG tablet Take 0.5-1 tablets (25-50 mg) by mouth 3 times daily as needed for anxiety 90 tablet 1     ipratropium - albuterol 0.5 mg/2.5 mg/3 mL (DUONEB) 0.5-2.5 (3) MG/3ML neb solution Take 1 vial (3 mLs) by nebulization every 4 hours as needed for shortness of breath / dyspnea or wheezing 15 mL 1      "mirtazapine (REMERON) 15 MG tablet Take 1 tablet (15 mg) by mouth At Bedtime 30 tablet 2     omeprazole (PRILOSEC) 20 MG DR capsule Take 20 mg by mouth daily as needed       topiramate (TOPAMAX) 25 MG tablet Take 1 tablet (25 mg) by mouth every evening 30 tablet 0     umeclidinium (INCRUSE ELLIPTA) 62.5 MCG/INH inhaler Inhale 1 puff into the lungs daily 30 each 1       Allergies   Allergen Reactions     Bupropion Diarrhea     Codeine Hives, Itching, Nausea and Rash     Nickel Unknown     Oxycodone Nausea and Vomiting     Percocet [Oxycodone-Acetaminophen]      Patient reports \"vomiting,grossly ill two weeks ago\"     Topiramate Unknown     Diclofenac Nausea     Tolerates the topical gel     Furosemide Rash     Hydrochlorothiazide Rash     phototoxicity - med was d/lora         Sulfa Drugs Itching and Rash     Sulfasalazine Rash        Social History     Tobacco Use     Smoking status: Every Day     Packs/day: 0.50     Years: 49.00     Pack years: 24.50     Types: Cigarettes     Smokeless tobacco: Never     Tobacco comments:      ppd   Substance Use Topics     Alcohol use: Not Currently     Comment: Last use 24     Family History   Problem Relation Age of Onset     CABG Mother      Anuerysm Father          of ruptured anuerysm at 46     Heart Disease Mother      Heart Disease Father      History   Drug Use No     Comment: Denies         Objective     /87 (BP Location: Left arm, Patient Position: Sitting, Cuff Size: Adult Large)   Pulse 69   Temp 97.2  F (36.2  C) (Temporal)   Wt 117 kg (258 lb)   SpO2 97%   BMI 44.99 kg/m      Physical Exam    GENERAL APPEARANCE: healthy, alert and no distress     EYES: EOMI, PERRL     HENT: ear canals and TM's normal, nose and mouth without ulcers or lesions, oropharynx clear and dental cavity and  missing teeth noted.     NECK: no adenopathy, no asymmetry, masses, or scars and thyroid normal to palpation     RESP: lungs clear to auscultation - no rales, rhonchi " or wheezes     CV: regular rates and rhythm, normal S1 S2, no S3 or S4 and no murmur, click or rub     ABDOMEN:  soft, nontender, no HSM or masses and bowel sounds normal     MS: Mild left proximal anterior and lateral ankle tenderness, otherwise extremities normal- no gross deformities noted,      SKIN: no suspicious lesions or rashes     NEURO: Normal strength and tone, sensory exam grossly normal, mentation intact and speech normal     PSYCH: mentation appears normal. and affect normal/bright     LYMPHATICS: No cervical adenopathy, chronic lower extremity lymphedema noted.    Recent Labs   Lab Test 09/27/22  1135 09/22/22  0622 09/20/22  0730 10/25/21  1603 05/27/21  0228 12/28/20  2341 12/28/20  1802   HGB 15.4  --  13.0   < > 14.3   < > 14.3    129* 118*   < > 131*   < >  --    INR 1.04  --   --   --   --   --  1.02     --  135*   < > 137   < >  --    POTASSIUM 4.6  --  4.2   < > 3.5   < >  --    CR 0.62 0.51 0.69   < > 0.70   < >  --    A1C  --   --   --   --  5.9*  --   --     < > = values in this interval not displayed.        Diagnostics:  No results found for this or any previous visit (from the past 24 hour(s)).   No EKG this visit, completed in the last 90 days.    Revised Cardiac Risk Index (RCRI):  The patient has the following serious cardiovascular risks for perioperative complications:   - No serious cardiac risks = 0 points     RCRI Interpretation: 0 points: Class I (very low risk - 0.4% complication rate)           Signed Electronically by: Sina Villanueva MD  Copy of this evaluation report is provided to requesting physician.

## 2022-11-04 ENCOUNTER — TELEPHONE (OUTPATIENT)
Dept: FAMILY MEDICINE | Facility: CLINIC | Age: 67
End: 2022-11-04

## 2022-11-04 NOTE — TELEPHONE ENCOUNTER
Forms/Letter Request    Type of form/letter: Pre op    Have you been seen for this request: Yes 11/2/22    Do we have the form/letter: Yes: Pre op    When is form/letter needed by: Asap    How would you like the form/letter returned: Fax [phone] attn     Patient Notified form requests are processed in 3-5 business days:Yes    Okay to leave a detailed message?: Yes at Home number on file [phone] (home)

## 2022-11-04 NOTE — TELEPHONE ENCOUNTER
----- Message from Sina Villanueva MD sent at 11/4/2022 12:29 PM CDT -----  Blood sugar was just mildly elevated, but hemoglobin, liver marker of fluid retention test were within normal range.  Left ankle x-ray was negative for fracture, probably sprain, continue with rest, ice elevation Ace wrap, follow-up with PCP if not improving about 2 to 3 weeks.

## 2022-11-10 ENCOUNTER — NURSE TRIAGE (OUTPATIENT)
Dept: NURSING | Facility: CLINIC | Age: 67
End: 2022-11-10

## 2022-11-10 NOTE — TELEPHONE ENCOUNTER
Nurse Triage SBAR    Is this a 2nd Level Triage? No    Situation: Patient states she is returning a call/message that was left for her on her voicemail; patient states she could not understand the message.     Background:             Recommendation: Per disposition, information provided. Patient had no additional questions. Advised patient to call back with any new or additional concerns. Patient verbalized understanding and agrees with plan.    Jojo Whiting RN on 11/10/2022 at 4:29 PM  [facility]    Reason for Disposition    [1] Follow-up call to recent contact AND [2] information only call, no triage required    Protocols used: INFORMATION ONLY CALL - NO TRIAGE-A-

## 2022-11-10 NOTE — TELEPHONE ENCOUNTER
Writer attempt to call regarding Dr. Villanueva's message below. No answer, left non-detailed VM with call back number.    If pt calls back , please relay Dr. Villanueva's message to patient. Thanks.    MIYA ReyesN, RN   [facility]

## 2022-11-11 NOTE — TELEPHONE ENCOUNTER
Writer called and spoke to pt regarding message below.  Provider message relayed to pt.    Pt verbalizes understanding, agrees with plan and has no further questions.    Closing encounter.    MIYA ReyesN, RN   [facility]

## 2022-11-17 ENCOUNTER — PATIENT OUTREACH (OUTPATIENT)
Dept: CARE COORDINATION | Facility: CLINIC | Age: 67
End: 2022-11-17

## 2022-11-17 NOTE — PROGRESS NOTES
Clinic Care Coordination Contact  [facility]/Voicemail       Clinical Data: Care Coordinator Outreach  Outreach attempted x 2.  Left message on patient's voicemail with call back information and requested return call.  Plan: Care Coordinator will try to reach patient again in 3 weeks    KATHLEEN Espana? Social Work Care Coordinator   [facility]  [email or web address]? [email or web address]    Phone: [phone]  she/her

## 2022-11-21 DIAGNOSIS — K70.30 ALCOHOLIC CIRRHOSIS OF LIVER WITHOUT ASCITES (H): ICD-10-CM

## 2022-11-21 DIAGNOSIS — K70.10 ALCOHOLIC HEPATITIS WITHOUT ASCITES (H): Primary | ICD-10-CM

## 2022-11-21 DIAGNOSIS — Z11.59 ENCOUNTER FOR SCREENING FOR OTHER VIRAL DISEASES: ICD-10-CM

## 2022-11-23 ENCOUNTER — TELEPHONE (OUTPATIENT)
Dept: PULMONOLOGY | Facility: OTHER | Age: 67
End: 2022-11-23

## 2022-11-23 ENCOUNTER — OFFICE VISIT (OUTPATIENT)
Dept: PULMONOLOGY | Facility: OTHER | Age: 67
End: 2022-11-23
Payer: COMMERCIAL

## 2022-11-23 VITALS
OXYGEN SATURATION: 96 % | SYSTOLIC BLOOD PRESSURE: 128 MMHG | WEIGHT: 255 LBS | BODY MASS INDEX: 44.46 KG/M2 | HEART RATE: 75 BPM | DIASTOLIC BLOOD PRESSURE: 68 MMHG

## 2022-11-23 DIAGNOSIS — G47.33 OSA (OBSTRUCTIVE SLEEP APNEA): ICD-10-CM

## 2022-11-23 DIAGNOSIS — F17.200 TOBACCO DEPENDENCE SYNDROME: ICD-10-CM

## 2022-11-23 DIAGNOSIS — J44.9 CHRONIC OBSTRUCTIVE PULMONARY DISEASE, UNSPECIFIED COPD TYPE (H): Primary | ICD-10-CM

## 2022-11-23 PROCEDURE — 99214 OFFICE O/P EST MOD 30 MIN: CPT | Performed by: INTERNAL MEDICINE

## 2022-11-23 RX ORDER — VARENICLINE TARTRATE 1 MG/1
1 TABLET, FILM COATED ORAL 2 TIMES DAILY
Qty: 30 TABLET | Refills: 11 | Status: SHIPPED | OUTPATIENT
Start: 2022-12-23 | End: 2023-02-14

## 2022-11-23 RX ORDER — BUDESONIDE AND FORMOTEROL FUMARATE DIHYDRATE 160; 4.5 UG/1; UG/1
2 AEROSOL RESPIRATORY (INHALATION) 2 TIMES DAILY
Qty: 10.2 G | Refills: 11 | Status: SHIPPED | OUTPATIENT
Start: 2022-11-23

## 2022-11-23 NOTE — PROGRESS NOTES
Pulmonary Clinic Follow-up Visit    Assessment and Plan:   67 year old female with a history of active tobacco dependence, alcohol dependence, GI bleed, PTSD, MELVI, h/o alcohol withdrawal seizure, H pylori gastritis, obesity, depression, bronchiectasis, peripheral neuropathy, GERD, osteoarthritis, vitamin D deficiency, presenting for follow-up.     Tobacco dependence, bronchiectasis, obstructive sleep apnea, obesity, possible asthma: Patsy is struggling with ongoing tobacco dependence, but really wants to quit. She is using nicotine patch, gum, and inhaler but still smoking up to 1 ppd; increases with increased life stressors. She is interested in trying varenicline. Breathing is stable. Has dyspnea with walking, stops to rest. She is bringing her albuterol inhaler with her, which helps. She feels benefit from umeclidinium. She is no longer using budesonide-formoterol, but would like to start using it as needed, which is reasonable. Offered pulmonary rehabilitation, but transportation limitations will preclude this. Her CPAP tubing broke so she has been unable to use it. Recall from prior visits that she has been hospitalized repeatedly for respiratory failure in the past, though in at least one case she had been drinking alcohol and aspirated food and mucus, requiring bronchoscopy for airway clearance. Baseline FEV1/FVC ratio is nonobstructive, normal TLC and DLCO, no significant bronchodilator response. Could have asthma, obesity-related small airways disease, vs primarily deconditioning/obesity related dyspnea.     Plan:  - start varenicline starter pack and continuation dose  - use nicotine patch, gum, and inhaler for cravings (most recent guidelines recommend concurrent use of varenicline and nicotine replacement)  - quit smoking; offered encouragement  - continueumeclidinium one inhalation daily  - budesonide-formoterol 160-4.5 mcg two inhalations two times a day as needed via holding chamber; rinse/gargle/spit  water after use  - nebulized ipratropium-albuterol or albuterol HFA as needed  - continue auto-CPAP 6-10 cm H2O via nasal pillows; DME is [facility]; I placed an order for replacement tubing and nasal pillows  - annual influenza vaccination; received for this season  - received Pneumovax-23 in January 2020; plan for Prevnar-20 in 2025  - COVID-19 vaccination: Pfizer-BioNTech  - follow up in 3 months  - encouraged her to contact us with questions or concerning symptoms    Vinay Paige MD  Pulmonary and Critical Care Medicine  [facility]  Office [phone]  Pager [phone]  he/him/his    CCx: tobacco dependence, bronchiectasis, obstructive sleep apnea, obesity, possible asthma    HPI: 67 year old female with a history of longstanding and active tobacco dependence, alcohol dependence, GI bleed, PTSD, MELVI, h/o alcohol withdrawal seizure, H pylori gastritis, obesity, depression, bronchiectasis, peripheral neuropathy, GERD, osteoarthritis, vitamin D deficiency, presenting for follow-up. Patsy is struggling with ongoing tobacco dependence, but really wants to quit. She is using nicotine patch, gum, and inhaler but still smoking up to 1 ppd; increases with increased life stressors. She is interested in trying varenicline. Breathing is stable. Has dyspnea with walking, stops to rest. She is bringing her albuterol inhaler with her, which helps. She feels benefit from umeclidinium. She is no longer using budesonide-formoterol, but would like to start using it as needed, which is reasonable. Offered pulmonary rehabilitation, but transportation limitations will preclude this. Her CPAP tubing broke so she has been unable to use it. Recall from prior visits that she has been hospitalized repeatedly for respiratory failure in the past, though in at least one case she had been drinking alcohol and aspirated food and mucus, requiring bronchoscopy for airway clearance. Baseline FEV1/FVC ratio is  nonobstructive, normal TLC and DLCO, no significant bronchodilator response. Could have asthma, obesity-related small airways disease, vs primarily deconditioning/obesity related dyspnea.    ROS:  A 12-system review was obtained and was negative with the exception of the symptoms endorsed in the history of present illness.    PMH:  active tobacco dependence  alcohol dependence  GI bleed  PTSD  Mild MELVI  h/o alcohol withdrawal seizure  H pylori gastritis  Obesity with BMI 44.5  Depression  Bronchiectasis  peripheral neuropathy  GERD  Osteoarthritis  vitamin D deficiency    PSH:  Past Surgical History:   Procedure Laterality Date     APPENDECTOMY       APPENDECTOMY       ARTHROSCOPY KNEE Right      BIOPSY SKIN (LOCATION) Left 2019    left upper arm      SECTION        EXCISION LESION/TENDON-SHEATH/CAPSULE, FOOT      Description: Excision Of Cyst Of Tendon Sheath Of Foot;  Proc Date: 10/28/2011;  Comments: [facility] KNEE SCOPE, DIAGNOSTIC      Description: Arthroscopy Knee Right;  Proc Date: 10/11/2005;  Comments: for right knee patella subluxation with lateral retinacular release; subpatellar chondroplasty      LATERAL RETINACULAR RELEASE OPEN      Description: Knee Lateral Retinacular Release;  Proc Date: 10/11/2005;     HEMORRHOIDECTOMY INTERNAL LIGATION      Description: Hemorrhoidectomy;  Recorded: 2008;     THUMB SURGERY      Removal of bone spurs     TONSILLECTOMY       [facility] APPENDECTOMY      Description: Appendectomy;  Recorded: 2008;  Comments: childhood     [facility]  DELIVERY ONLY      Description:  Section;  Recorded: 2008;     [facility] LIGATE FALLOPIAN TUBE      Description: Tubal Ligation;  Recorded: 2008;       Allergies:  Allergies   Allergen Reactions     Bupropion Diarrhea     Codeine Hives, Itching, Nausea and Rash     Nickel Unknown     Oxycodone Nausea and Vomiting     Percocet [Oxycodone-Acetaminophen]      Patient reports  "\"vomiting,grossly ill two weeks ago\"     Topiramate Unknown     Diclofenac Nausea     Tolerates the topical gel     Furosemide Rash     Hydrochlorothiazide Rash     phototoxicity - med was d/lora         Sulfa Drugs Itching and Rash     Sulfasalazine Rash       Family HX:  Family History   Problem Relation Age of Onset     CABG Mother      Anuerysm Father          of ruptured anuerysm at 46     Heart Disease Mother      Heart Disease Father        Social Hx:  Social History     Socioeconomic History     Marital status: Single     Spouse name: Not on file     Number of children: Not on file     Years of education: Not on file     Highest education level: Not on file   Occupational History     Not on file   Tobacco Use     Smoking status: Every Day     Packs/day: 0.50     Years: 49.00     Pack years: 24.50     Types: Cigarettes     Smokeless tobacco: Never     Tobacco comments:      ppd   Vaping Use     Vaping Use: Never used   Substance and Sexual Activity     Alcohol use: Not Currently     Comment: Last use 24     Drug use: No     Comment: Denies     Sexual activity: Yes     Partners: Male     Birth control/protection: None   Other Topics Concern     Not on file   Social History Narrative     Not on file     Social Determinants of Health     Financial Resource Strain: Not on file   Food Insecurity: Not on file   Transportation Needs: Not on file   Physical Activity: Not on file   Stress: Not on file   Social Connections: Not on file   Intimate Partner Violence: Not on file   Housing Stability: Not on file       Current Meds:  Current Outpatient Medications   Medication Sig Dispense Refill     albuterol (PROAIR HFA/PROVENTIL HFA/VENTOLIN HFA) 108 (90 Base) MCG/ACT inhaler Inhale 2 puffs into the lungs every 4 hours as needed for shortness of breath / dyspnea or wheezing 18 g 3     budesonide-formoterol (SYMBICORT) 160-4.5 MCG/ACT Inhaler Inhale 2 puffs into the lungs 2 times daily 10.2 g 11     bumetanide " (BUMEX) 1 MG tablet Take 1 tablet (1 mg) by mouth daily 30 tablet 1     escitalopram (LEXAPRO) 10 MG tablet Take 1 tablet (10 mg) by mouth daily Take with 5mg tablet for total of 15mg/day. 30 tablet 2     escitalopram (LEXAPRO) 5 MG tablet Take 1 tablet (5 mg) by mouth daily Take with 10mg tablet for total of 15mg per day. 30 tablet 2     hydrOXYzine (ATARAX) 50 MG tablet Take 0.5-1 tablets (25-50 mg) by mouth 3 times daily as needed for anxiety 90 tablet 1     ipratropium - albuterol 0.5 mg/2.5 mg/3 mL (DUONEB) 0.5-2.5 (3) MG/3ML neb solution Take 1 vial (3 mLs) by nebulization every 4 hours as needed for shortness of breath / dyspnea or wheezing 15 mL 1     mirtazapine (REMERON) 15 MG tablet Take 1 tablet (15 mg) by mouth At Bedtime 30 tablet 2     omeprazole (PRILOSEC) 20 MG DR capsule Take 20 mg by mouth daily as needed       topiramate (TOPAMAX) 25 MG tablet Take 1 tablet (25 mg) by mouth every evening 30 tablet 0     umeclidinium (INCRUSE ELLIPTA) 62.5 MCG/INH inhaler Inhale 1 puff into the lungs daily 30 each 1     varenicline (CHANTIX RICA) 0.5 MG X 11 & 1 MG X 42 tablet Take 0.5 mg tab daily for 3 days, THEN 0.5 mg tab twice daily for 4 days, THEN 1 mg twice daily. 53 tablet 0     [START ON 12/23/2022] varenicline (CHANTIX) 1 MG tablet Take 1 tablet (1 mg) by mouth 2 times daily 30 tablet 11     acetaminophen (TYLENOL) 500 MG tablet Take 1-2 tablets (500-1,000 mg) by mouth every 6 hours as needed for mild pain 60 tablet 11       Physical Exam:  /68 (BP Location: Left arm, Patient Position: Chair, Cuff Size: Adult Large)   Pulse 75   Wt 115.7 kg (255 lb)   SpO2 96%   BMI 44.46 kg/m    Gen: alert, oriented, no distress  HEENT: no cervical or supraclavicular lymphadenopathy  CV: RRR, no M/G/R  Resp: CTAB, no focal crackles or wheezes  Skin: no apparent rashes  Ext: no cyanosis, clubbing or edema  Neuro: alert, nonfocal    Labs:  reviewed    Imaging studies:  CTA C/A/P (July 2022):  - images directly  reviewed, formal interpretation follows:    FINDINGS:   CT ANGIOGRAM CHEST, ABDOMEN, AND PELVIS:   Pulmonary Arteries: The pulmonary arteries are well-opacified with contrast. No filling defects are seen to suggest thromboembolism.     Thoracic Aorta: Patent and normal in caliber. No evidence of dissection, intramural hematoma, or penetrating ulcer.     Abdominal Aorta: Patent and normal in caliber. Mild atherosclerotic disease. No evidence of dissection or penetrating ulcer.     Celiac Artery: Patent.   Superior Mesenteric Artery: Patent.   Right Renal Artery: Patent.   Left Renal Artery: Patent.   Inferior Mesenteric Artery: Patent.     Right Common Iliac Artery: Patent.   Right External Iliac Artery: Patent.   Right Internal Iliac Artery: Patent.   Right Common Femoral Artery: Patent.   Proximal Right Superficial Femoral Artery: Patent.   Proximal Right Deep Femoral Artery: Patent.     Left Common Iliac Artery: Patent.   Left External Iliac Artery: Patent.   Left Internal Iliac Artery: Patent.   Left Common Femoral Artery: Patent.   Proximal Left Superficial Femoral Artery: Patent.   Proximal Left Deep Femoral Artery: Patent.     LUNGS AND PLEURA: Mild atelectasis in the right lower lobe and lingula. No focal airspace disease. No pleural effusion or pneumothorax.     MEDIASTINUM/AXILLAE: No lymphadenopathy. No pericardial effusion.     CORONARY ARTERY CALCIFICATION: None.     HEPATOBILIARY: Nodular hepatic surface contour. No discrete hepatic lesions identified. Gallbladder appears normal. No biliary dilatation.     PANCREAS: Normal.     SPLEEN: Normal.     ADRENAL GLANDS: Normal.     KIDNEYS/BLADDER: Nonobstructing right renal upper pole calculus, incompletely imaged on noncontrast examination, measuring at least 1.3 cm. This is not well evaluated on postcontrast imaging due to excreted contrast in the renal collecting systems. Kidneys appear normal. No hydronephrosis. Excreted contrast in the urinary bladder.  No bladder wall thickening. No filling defects.     BOWEL: Moderate colonic diverticulosis. No obstruction or inflammatory change. Appendix not visualized, although no secondary signs of acute appendicitis.     LYMPH NODES: No lymphadenopathy.     PELVIC ORGANS: Unremarkable.     MUSCULOSKELETAL: Fat-containing bilateral inguinal hernias. Multilevel degenerative changes of the spine.    1.  No aortic dissection or acute aortic pathology.     2.  No acute findings in the chest, abdomen, or pelvis.     3.  Morphologic changes of cirrhosis.     TTE (February 2020):  - Normal left ventricular size and wall thickness.  - Left ventricle ejection fraction is normal. The estimated left ventricular ejection fraction is 65%.  - Normal right ventricular size and systolic function.  - No hemodynamically significant valvular heart abnormalities.  - When compared to the previous study dated 9/5/2019, no significant change.     Pulmonary Function Testing  November 2019:  FEV1/FVC is 0.77 and is normal.  FEV1 is 1.82 L (76% predicted) and is reduced.  FVC is 2.36 L (78% predicted) and reduced.  There was no improvement in spirometry after a single inhaled dose of bronchodilator.  TLC is 4.54 L (93% predicted) and is normal.  RV is 2.22 L (115% predicted) and is normal.  RV/TLC is 0.49 and is increased.  DLCO is 103% predicted and is normal when it is corrected for hemoglobin.

## 2022-11-23 NOTE — LETTER
11/23/2022         RE: Patsy Santamaria  [address]  [place] [address]        Dear Colleague,    Thank you for referring your patient, Patsy Santamaria, to the [facility]. Please see a copy of my visit note below.    Pulmonary Clinic Follow-up Visit    Assessment and Plan:   67 year old female with a history of active tobacco dependence, alcohol dependence, GI bleed, PTSD, MELVI, h/o alcohol withdrawal seizure, H pylori gastritis, obesity, depression, bronchiectasis, peripheral neuropathy, GERD, osteoarthritis, vitamin D deficiency, presenting for follow-up.     Tobacco dependence, bronchiectasis, obstructive sleep apnea, obesity, possible asthma: Patsy is struggling with ongoing tobacco dependence, but really wants to quit. She is using nicotine patch, gum, and inhaler but still smoking up to 1 ppd; increases with increased life stressors. She is interested in trying varenicline. Breathing is stable. Has dyspnea with walking, stops to rest. She is bringing her albuterol inhaler with her, which helps. She feels benefit from umeclidinium. She is no longer using budesonide-formoterol, but would like to start using it as needed, which is reasonable. Offered pulmonary rehabilitation, but transportation limitations will preclude this. Her CPAP tubing broke so she has been unable to use it. Recall from prior visits that she has been hospitalized repeatedly for respiratory failure in the past, though in at least one case she had been drinking alcohol and aspirated food and mucus, requiring bronchoscopy for airway clearance. Baseline FEV1/FVC ratio is nonobstructive, normal TLC and DLCO, no significant bronchodilator response. Could have asthma, obesity-related small airways disease, vs primarily deconditioning/obesity related dyspnea.     Plan:  - start varenicline starter pack and continuation dose  - use nicotine patch, gum, and inhaler for cravings (most recent guidelines  recommend concurrent use of varenicline and nicotine replacement)  - quit smoking; offered encouragement  - continueumeclidinium one inhalation daily  - budesonide-formoterol 160-4.5 mcg two inhalations two times a day as needed via holding chamber; rinse/gargle/spit water after use  - nebulized ipratropium-albuterol or albuterol HFA as needed  - continue auto-CPAP 6-10 cm H2O via nasal pillows; [facility]; I placed an order for replacement tubing and nasal pillows  - annual influenza vaccination; received for this season  - received Pneumovax-23 in January 2020; plan for Prevnar-20 in 2025  - COVID-19 vaccination: Pfizer-BioNTCellTran  - follow up in 3 months  - encouraged her to contact us with questions or concerning symptoms    Vinay Paige MD  Pulmonary and Critical Care Medicine  [facility]  Office [phone]  Pager [phone]  he/him/his    CCx: tobacco dependence, bronchiectasis, obstructive sleep apnea, obesity, possible asthma    HPI: 67 year old female with a history of longstanding and active tobacco dependence, alcohol dependence, GI bleed, PTSD, MELVI, h/o alcohol withdrawal seizure, H pylori gastritis, obesity, depression, bronchiectasis, peripheral neuropathy, GERD, osteoarthritis, vitamin D deficiency, presenting for follow-up. Patsy is struggling with ongoing tobacco dependence, but really wants to quit. She is using nicotine patch, gum, and inhaler but still smoking up to 1 ppd; increases with increased life stressors. She is interested in trying varenicline. Breathing is stable. Has dyspnea with walking, stops to rest. She is bringing her albuterol inhaler with her, which helps. She feels benefit from umeclidinium. She is no longer using budesonide-formoterol, but would like to start using it as needed, which is reasonable. Offered pulmonary rehabilitation, but transportation limitations will preclude this. Her CPAP tubing broke so she has been unable to use it.  Recall from prior visits that she has been hospitalized repeatedly for respiratory failure in the past, though in at least one case she had been drinking alcohol and aspirated food and mucus, requiring bronchoscopy for airway clearance. Baseline FEV1/FVC ratio is nonobstructive, normal TLC and DLCO, no significant bronchodilator response. Could have asthma, obesity-related small airways disease, vs primarily deconditioning/obesity related dyspnea.    ROS:  A 12-system review was obtained and was negative with the exception of the symptoms endorsed in the history of present illness.    PMH:  active tobacco dependence  alcohol dependence  GI bleed  PTSD  Mild MELVI  h/o alcohol withdrawal seizure  H pylori gastritis  Obesity with BMI 44.5  Depression  Bronchiectasis  peripheral neuropathy  GERD  Osteoarthritis  vitamin D deficiency    PSH:  Past Surgical History:   Procedure Laterality Date     APPENDECTOMY       APPENDECTOMY       ARTHROSCOPY KNEE Right      BIOPSY SKIN (LOCATION) Left 2019    left upper arm      SECTION        EXCISION LESION/TENDON-SHEATH/CAPSULE, FOOT      Description: Excision Of Cyst Of Tendon Sheath Of Foot;  Proc Date: 10/28/2011;  Comments: [facility]      KNEE SCOPE, DIAGNOSTIC      Description: Arthroscopy Knee Right;  Proc Date: 10/11/2005;  Comments: for right knee patella subluxation with lateral retinacular release; subpatellar chondroplasty      LATERAL RETINACULAR RELEASE OPEN      Description: Knee Lateral Retinacular Release;  Proc Date: 10/11/2005;     HEMORRHOIDECTOMY INTERNAL LIGATION      Description: Hemorrhoidectomy;  Recorded: 2008;     THUMB SURGERY      Removal of bone spurs     TONSILLECTOMY       [facility] APPENDECTOMY      Description: Appendectomy;  Recorded: 2008;  Comments: childhood     [facility]  DELIVERY ONLY      Description:  Section;  Recorded: 2008;     [facility] LIGATE FALLOPIAN TUBE      Description: Tubal Ligation;   "Recorded: 2008;       Allergies:  Allergies   Allergen Reactions     Bupropion Diarrhea     Codeine Hives, Itching, Nausea and Rash     Nickel Unknown     Oxycodone Nausea and Vomiting     Percocet [Oxycodone-Acetaminophen]      Patient reports \"vomiting,grossly ill two weeks ago\"     Topiramate Unknown     Diclofenac Nausea     Tolerates the topical gel     Furosemide Rash     Hydrochlorothiazide Rash     phototoxicity - med was d/lora         Sulfa Drugs Itching and Rash     Sulfasalazine Rash       Family HX:  Family History   Problem Relation Age of Onset     CABG Mother      Anuerysm Father          of ruptured anuerysm at 46     Heart Disease Mother      Heart Disease Father        Social Hx:  Social History     Socioeconomic History     Marital status: Single     Spouse name: Not on file     Number of children: Not on file     Years of education: Not on file     Highest education level: Not on file   Occupational History     Not on file   Tobacco Use     Smoking status: Every Day     Packs/day: 0.50     Years: 49.00     Pack years: 24.50     Types: Cigarettes     Smokeless tobacco: Never     Tobacco comments:      ppd   Vaping Use     Vaping Use: Never used   Substance and Sexual Activity     Alcohol use: Not Currently     Comment: Last use 24     Drug use: No     Comment: Denies     Sexual activity: Yes     Partners: Male     Birth control/protection: None   Other Topics Concern     Not on file   Social History Narrative     Not on file     Social Determinants of Health     Financial Resource Strain: Not on file   Food Insecurity: Not on file   Transportation Needs: Not on file   Physical Activity: Not on file   Stress: Not on file   Social Connections: Not on file   Intimate Partner Violence: Not on file   Housing Stability: Not on file       Current Meds:  Current Outpatient Medications   Medication Sig Dispense Refill     albuterol (PROAIR HFA/PROVENTIL HFA/VENTOLIN HFA) 108 (90 Base) " MCG/ACT inhaler Inhale 2 puffs into the lungs every 4 hours as needed for shortness of breath / dyspnea or wheezing 18 g 3     budesonide-formoterol (SYMBICORT) 160-4.5 MCG/ACT Inhaler Inhale 2 puffs into the lungs 2 times daily 10.2 g 11     bumetanide (BUMEX) 1 MG tablet Take 1 tablet (1 mg) by mouth daily 30 tablet 1     escitalopram (LEXAPRO) 10 MG tablet Take 1 tablet (10 mg) by mouth daily Take with 5mg tablet for total of 15mg/day. 30 tablet 2     escitalopram (LEXAPRO) 5 MG tablet Take 1 tablet (5 mg) by mouth daily Take with 10mg tablet for total of 15mg per day. 30 tablet 2     hydrOXYzine (ATARAX) 50 MG tablet Take 0.5-1 tablets (25-50 mg) by mouth 3 times daily as needed for anxiety 90 tablet 1     ipratropium - albuterol 0.5 mg/2.5 mg/3 mL (DUONEB) 0.5-2.5 (3) MG/3ML neb solution Take 1 vial (3 mLs) by nebulization every 4 hours as needed for shortness of breath / dyspnea or wheezing 15 mL 1     mirtazapine (REMERON) 15 MG tablet Take 1 tablet (15 mg) by mouth At Bedtime 30 tablet 2     omeprazole (PRILOSEC) 20 MG DR capsule Take 20 mg by mouth daily as needed       topiramate (TOPAMAX) 25 MG tablet Take 1 tablet (25 mg) by mouth every evening 30 tablet 0     umeclidinium (INCRUSE ELLIPTA) 62.5 MCG/INH inhaler Inhale 1 puff into the lungs daily 30 each 1     varenicline (CHANTIX RICA) 0.5 MG X 11 & 1 MG X 42 tablet Take 0.5 mg tab daily for 3 days, THEN 0.5 mg tab twice daily for 4 days, THEN 1 mg twice daily. 53 tablet 0     [START ON 12/23/2022] varenicline (CHANTIX) 1 MG tablet Take 1 tablet (1 mg) by mouth 2 times daily 30 tablet 11     acetaminophen (TYLENOL) 500 MG tablet Take 1-2 tablets (500-1,000 mg) by mouth every 6 hours as needed for mild pain 60 tablet 11       Physical Exam:  /68 (BP Location: Left arm, Patient Position: Chair, Cuff Size: Adult Large)   Pulse 75   Wt 115.7 kg (255 lb)   SpO2 96%   BMI 44.46 kg/m    Gen: alert, oriented, no distress  HEENT: no cervical or  supraclavicular lymphadenopathy  CV: RRR, no M/G/R  Resp: CTAB, no focal crackles or wheezes  Skin: no apparent rashes  Ext: no cyanosis, clubbing or edema  Neuro: alert, nonfocal    Labs:  reviewed    Imaging studies:  CTA C/A/P (July 2022):  - images directly reviewed, formal interpretation follows:    FINDINGS:   CT ANGIOGRAM CHEST, ABDOMEN, AND PELVIS:   Pulmonary Arteries: The pulmonary arteries are well-opacified with contrast. No filling defects are seen to suggest thromboembolism.     Thoracic Aorta: Patent and normal in caliber. No evidence of dissection, intramural hematoma, or penetrating ulcer.     Abdominal Aorta: Patent and normal in caliber. Mild atherosclerotic disease. No evidence of dissection or penetrating ulcer.     Celiac Artery: Patent.   Superior Mesenteric Artery: Patent.   Right Renal Artery: Patent.   Left Renal Artery: Patent.   Inferior Mesenteric Artery: Patent.     Right Common Iliac Artery: Patent.   Right External Iliac Artery: Patent.   Right Internal Iliac Artery: Patent.   Right Common Femoral Artery: Patent.   Proximal Right Superficial Femoral Artery: Patent.   Proximal Right Deep Femoral Artery: Patent.     Left Common Iliac Artery: Patent.   Left External Iliac Artery: Patent.   Left Internal Iliac Artery: Patent.   Left Common Femoral Artery: Patent.   Proximal Left Superficial Femoral Artery: Patent.   Proximal Left Deep Femoral Artery: Patent.     LUNGS AND PLEURA: Mild atelectasis in the right lower lobe and lingula. No focal airspace disease. No pleural effusion or pneumothorax.     MEDIASTINUM/AXILLAE: No lymphadenopathy. No pericardial effusion.     CORONARY ARTERY CALCIFICATION: None.     HEPATOBILIARY: Nodular hepatic surface contour. No discrete hepatic lesions identified. Gallbladder appears normal. No biliary dilatation.     PANCREAS: Normal.     SPLEEN: Normal.     ADRENAL GLANDS: Normal.     KIDNEYS/BLADDER: Nonobstructing right renal upper pole calculus,  incompletely imaged on noncontrast examination, measuring at least 1.3 cm. This is not well evaluated on postcontrast imaging due to excreted contrast in the renal collecting systems. Kidneys appear normal. No hydronephrosis. Excreted contrast in the urinary bladder. No bladder wall thickening. No filling defects.     BOWEL: Moderate colonic diverticulosis. No obstruction or inflammatory change. Appendix not visualized, although no secondary signs of acute appendicitis.     LYMPH NODES: No lymphadenopathy.     PELVIC ORGANS: Unremarkable.     MUSCULOSKELETAL: Fat-containing bilateral inguinal hernias. Multilevel degenerative changes of the spine.    1.  No aortic dissection or acute aortic pathology.     2.  No acute findings in the chest, abdomen, or pelvis.     3.  Morphologic changes of cirrhosis.     TTE (February 2020):  - Normal left ventricular size and wall thickness.  - Left ventricle ejection fraction is normal. The estimated left ventricular ejection fraction is 65%.  - Normal right ventricular size and systolic function.  - No hemodynamically significant valvular heart abnormalities.  - When compared to the previous study dated 9/5/2019, no significant change.     Pulmonary Function Testing  November 2019:  FEV1/FVC is 0.77 and is normal.  FEV1 is 1.82 L (76% predicted) and is reduced.  FVC is 2.36 L (78% predicted) and reduced.  There was no improvement in spirometry after a single inhaled dose of bronchodilator.  TLC is 4.54 L (93% predicted) and is normal.  RV is 2.22 L (115% predicted) and is normal.  RV/TLC is 0.49 and is increased.  DLCO is 103% predicted and is normal when it is corrected for hemoglobin.      Again, thank you for allowing me to participate in the care of your patient.        Sincerely,        Vinay Paige MD

## 2022-11-23 NOTE — PATIENT INSTRUCTIONS
It was good to see you in clinic today. This is what we discussed:    Start Chantix (varenicline) according to the starter pack instructions.  You can continue to use nicotine replacement (patch, inhaler, gum) for cravings while you are taking Chantix.  Quit smoking. I know you can do it!  Continue Incruse one inhalation daily.  Use Symbicort two inhalations twice daily through the holding chamber (spacer) as needed for shortness of breath or cough. Rinse, gargle, and spit water after use.  Use the albuterol as needed.  Stay active with exercise as much as possible.  I will send a prescription for new CPAP supplies to [facility].  I will see you in about 3 months.  Contact me with questions or concerns.    Vinay Paige MD  Pulmonary and Critical Care Medicine  [facility]  Office [phone]

## 2022-11-23 NOTE — TELEPHONE ENCOUNTER
DATE: 11/23/2022  DME PROVIDER: [employer]   SUPPLY ORDERED: c pap supply   PROVIDER: [facility]    Called and spoke with customer service    Meka Boyer LPN

## 2022-11-30 NOTE — PROGRESS NOTES
[facility]    New Patient Visit    Referring provider:  Yanique Shen*  Chief complaint:  Alcohol related liver disease    Assessment  67 year old female with PMHx of alcohol related liver disease, alcohol use disorder and history of alcohol withdrawal seizures, COPD, obesity and obstructive sleep apnea.    #. Alcohol use disorder w/history of alcohol withdrawal seizures  #. Alcohol related (+/- NAFLD) liver disease   #. Obesity, class III  Patient with history of alcohol use disorder and alcohol related liver disease. She has a history of alcohol related hepatitis, but her updated labs demonstrate normal AST and ALT - less suggestive of active inflammation. Normal platelets and no splenomegaly - less suggestive of portal HTN. Normal INR - suggestive of intact synthetic function. I suspect that she has some degree of liver fibrosis, but no evidence at this time of cirrhosis. In the setting of her class III obesity and MELVI, she likely has a component of fatty liver disease. No s/sx of decompensated disease.    Overall, she would benefit from 5-7% weight reduction through lifestyle changes and dietary changes. She would benefit from complete alcohol abstinence; plan for ORAL referral.     #. Anxiety: Currently engaged with monthly psychiatry visits. Would benefit from one on one therapy as this drives her alcohol use.   #. Tobacco use: Interested in quitting and cutting down    Plan  -- Plan for hepatitis A IgG and hepatitis B studies  -- Plan for EGD for variceal screening + colonoscopy for colon cancer screening & follow up of diverticulitis; ordered  -- Plan for fibroscan in 6 months when she has been abstinent for 6 months  -- Plan for HCC screening: AFP pending today; repeat due 3/2023  -- Provided information about weight loss, including printed information  -- Continue to follow with psychiatry monthly; plan to establish care with one on one therapy for anxiety management -  information provided  -- Chemical dependency referral placed for ORAL assessment; she need to engage in sober support  -- MTM consult for tobacco cessation; plan to start Chantix in the coming days    RTC: 6 months    Discussed with attending hepatologist, Dr. Daniel Romeo MD  Transplant Hepatology Fellow  [phone]    HPI:  Alcohol related Liver Disease  - no hx HE  - no hx ascites  - no hx variceal bleed  - last EGD: none prior   - HCC screening - CT 9/2022 without hepatic lesions    - Patient was first told that she has alcohol related liver disease several years ago  - Denies jaundice, abdominal distension, lethargy or confusion. Has had issues in the past with lower extremity edema in the past, has been well managed with bumex  - Has intermittent issues with constipation, not recently. Denies melena, hematemesis or hematochezia.  - Denies fevers, sweats, chills or weight loss  - Has gained 30 lbs in the last year in the setting of overeating  - Alcohol use: Last use 9 days ago (late 11/2022). Drinks 1 pint per day of fátima with water per day. Last relapse 9 days ago was related to the death of her friend from pancreatic cancer. Her alcohol use is driven by her anxiety and mental health. She sees a psychiatrist monthly, but does not engage in one on one therapy. Has panic attacks at least weekly. She denies cravings for alcohol - on topamax.   - COPD is managed with inhalers, including incruse, albuterol PRN and Symbicort. Denies chest pain. Ongoing tobacc use, interested in quitting.   - s/p 5 doses of COVID-19 vaccination. Has received her influenza vaccine this fall.     Medical hx Surgical hx   Past Medical History:   Diagnosis Date     Alcohol use disorder      Alcohol withdrawal seizure without complication (H) 05/14/2016     Alcoholic cirrhosis (H)      Anxiety      Chronic alcohol dependence, continuous (H) 03/16/2018    inpatient 11/2019, sober since then     Chronic Lower Extremity Edema       Chronic reflux esophagitis      COPD (chronic obstructive pulmonary disease) (H)      Depression      Dermatitis      Diverticulitis of colon 2022     Fibroid uterus      Ganglion     right foot     GERD (gastroesophageal reflux disease)      H. pylori infection      Melanoma (H) 2019    left upper arm     Menopause     age 52     Obesity (BMI 35.0-39.9 without comorbidity)      Osteoarthritis     Bilateral Knees     Peripheral neuropathy      Seizure (H) 2016    during alcohol withdrawal     Sleep apnea     mild, doesnt tolerate pap therapy      Past Surgical History:   Procedure Laterality Date     APPENDECTOMY      Childhood     ARTHROSCOPY KNEE Right      BIOPSY SKIN (LOCATION) Left 2019    left upper arm      SECTION        EXCISION LESION/TENDON-SHEATH/CAPSULE, FOOT      Description: Excision Of Cyst Of Tendon Sheath Of Foot;  Proc Date: 10/28/2011;  Comments: [facility]      KNEE SCOPE, DIAGNOSTIC      Description: Arthroscopy Knee Right;  Proc Date: 10/11/2005;  Comments: for right knee patella subluxation with lateral retinacular release; subpatellar chondroplasty      LATERAL RETINACULAR RELEASE OPEN      Description: Knee Lateral Retinacular Release;  Proc Date: 10/11/2005;     HEMORRHOIDECTOMY INTERNAL LIGATION      Description: Hemorrhoidectomy;  Recorded: 2008;     THUMB SURGERY      Removal of bone spurs     TONSILLECTOMY       Z  DELIVERY ONLY      Description:  Section;  Recorded: 2008;     Z LIGATE FALLOPIAN TUBE      Description: Tubal Ligation;  Recorded: 2008;   Abdominal surgeries: , appendectomy        Medications  Current Outpatient Medications   Medication Sig Dispense Refill     albuterol (PROAIR HFA/PROVENTIL HFA/VENTOLIN HFA) 108 (90 Base) MCG/ACT inhaler Inhale 2 puffs into the lungs every 4 hours as needed for shortness of breath / dyspnea or wheezing 18 g 3     budesonide-formoterol  "(SYMBICORT) 160-4.5 MCG/ACT Inhaler Inhale 2 puffs into the lungs 2 times daily 10.2 g 11     bumetanide (BUMEX) 1 MG tablet Take 1 tablet (1 mg) by mouth daily 30 tablet 1     escitalopram (LEXAPRO) 10 MG tablet Take 1 tablet (10 mg) by mouth daily Take with 5mg tablet for total of 15mg/day. 30 tablet 2     escitalopram (LEXAPRO) 5 MG tablet Take 1 tablet (5 mg) by mouth daily Take with 10mg tablet for total of 15mg per day. 30 tablet 2     hydrOXYzine (ATARAX) 50 MG tablet Take 0.5-1 tablets (25-50 mg) by mouth 3 times daily as needed for anxiety 90 tablet 1     ipratropium - albuterol 0.5 mg/2.5 mg/3 mL (DUONEB) 0.5-2.5 (3) MG/3ML neb solution Take 1 vial (3 mLs) by nebulization every 4 hours as needed for shortness of breath / dyspnea or wheezing 15 mL 1     mirtazapine (REMERON) 15 MG tablet Take 1 tablet (15 mg) by mouth At Bedtime 30 tablet 2     omeprazole (PRILOSEC) 20 MG DR capsule Take 20 mg by mouth daily as needed       topiramate (TOPAMAX) 25 MG tablet Take 1 tablet (25 mg) by mouth every evening 30 tablet 0     umeclidinium (INCRUSE ELLIPTA) 62.5 MCG/INH inhaler Inhale 1 puff into the lungs daily 30 each 1     acetaminophen (TYLENOL) 500 MG tablet Take 1-2 tablets (500-1,000 mg) by mouth every 6 hours as needed for mild pain (Patient not taking: Reported on 12/1/2022) 60 tablet 11     varenicline (CHANTIX RICA) 0.5 MG X 11 & 1 MG X 42 tablet Take 0.5 mg tab daily for 3 days, THEN 0.5 mg tab twice daily for 4 days, THEN 1 mg twice daily. (Patient not taking: Reported on 12/1/2022) 53 tablet 0     [START ON 12/23/2022] varenicline (CHANTIX) 1 MG tablet Take 1 tablet (1 mg) by mouth 2 times daily (Patient not taking: Reported on 12/1/2022) 30 tablet 11       Allergies  Allergies   Allergen Reactions     Bupropion Diarrhea     Codeine Hives, Itching, Nausea and Rash     Nickel Unknown     Oxycodone Nausea and Vomiting     Percocet [Oxycodone-Acetaminophen]      Patient reports \"vomiting,grossly ill two " "weeks ago\"     Topiramate Unknown     Diclofenac Nausea     Tolerates the topical gel     Furosemide Rash     Hydrochlorothiazide Rash     phototoxicity - med was d/lora         Sulfa Drugs Itching and Rash     Sulfasalazine Rash       Family hx Social hx   Family History   Problem Relation Age of Onset     CABG Mother      Anuerysm Father          of ruptured anuerysm at 46     Heart Disease Mother      Heart Disease Father    - Mother: alcohol use:  - No family history of liver disease   - Employment: Retired, used to work as a cook  - Lives alone  - 4 children: 40s to 50s  - Alcohol: Drinks 1 pint of fátima per day; last use 2022. Treatment programming: [facility] (), [facility], [facility] [place] [facility]   - Tobacco: 1 pack per day     Review of systems  A 10-point review of systems was negative.    Examination  /66   Pulse 59   Temp 98.2  F (36.8  C)   Wt 116.3 kg (256 lb 6.4 oz)   SpO2 97%   BMI 44.71 kg/m    Body mass index is 44.71 kg/m .    Gen-NAD  Eye- EOMI. No conjunctival icterus.   ENT- MMM, normal oropharynx. Dentures on top, no teeth on bottom.   CVS- No Mireya edema  RS- Breathing comfortably on ambient  Abd- Obese, soft, non-tender, non-distended.   Extr- 2+ radial pulses bilaterally, no lower extremity edema bilaterally  MS- hands without clubbing  Neuro- A+Ox3, no asterixis  Skin- no rash or jaundice. No palmar erythema or spider angiomatas  Psych- normal mood    Laboratory  BMP  Recent Labs   Lab Test 22  0834 22  1042 22  1135 22  0622 22  0730    140 137  --  135*   POTASSIUM 4.4 4.5 4.6  --  4.2   CHLORIDE 107 101 102  --  103   JOSEFA 9.9 9.9 9.8  --  8.1*   CO2 26 24 28  --  25   BUN 12.2 11.6 11.5  --  7*   CR 0.58 0.56 0.62 0.51 0.69   * 113* 106*  --  155*     CBC  Recent Labs   Lab Test 22  0834 22  1042 22  1135 22  0622 22  0730   WBC 7.5 5.6 6.8  --  9.0   RBC 5.14 5.24* 5.10  " --  4.13   HGB 15.4 15.7 15.4  --  13.0   HCT 46.7 47.5* 46.9  --  39.7   MCV 91 91 92  --  96   MCH 30.0 30.0 30.2  --  31.5   MCHC 33.0 33.1 32.8  --  32.7   RDW 13.7 13.4 14.1  --  13.9    181 220 129* 118*     Liver Enzymes   Recent Labs   Lab Test 11/02/22  1042   PROTTOTAL 7.3   ALBUMIN 4.2   BILITOTAL 0.5   ALKPHOS 74   AST 34   ALT 25      INR   INR   Date Value Ref Range Status   12/01/2022 1.01 0.85 - 1.15 Final      Hep C Ab: negative    Radiology  CT Abdomen Pelvis 9/2022  1.  New mild changes of acute diverticulitis involving the lower descending colon. Slight improvement of acute diverticulitis involving the sigmoid colon within the right aspect of the pelvis.  2.  Cirrhosis with fatty change in liver. Gallbladder wall thickening which can be seen with hepatic parenchymal disease.  3.  Uterine fibroids.  * No splenomegaly

## 2022-12-01 ENCOUNTER — PRE VISIT (OUTPATIENT)
Dept: GASTROENTEROLOGY | Facility: CLINIC | Age: 67
End: 2022-12-01

## 2022-12-01 ENCOUNTER — OFFICE VISIT (OUTPATIENT)
Dept: ADDICTION MEDICINE | Facility: CLINIC | Age: 67
End: 2022-12-01
Payer: COMMERCIAL

## 2022-12-01 ENCOUNTER — OFFICE VISIT (OUTPATIENT)
Dept: GASTROENTEROLOGY | Facility: CLINIC | Age: 67
End: 2022-12-01
Attending: FAMILY MEDICINE
Payer: COMMERCIAL

## 2022-12-01 ENCOUNTER — LAB (OUTPATIENT)
Dept: LAB | Facility: CLINIC | Age: 67
End: 2022-12-01
Payer: COMMERCIAL

## 2022-12-01 VITALS
BODY MASS INDEX: 44.46 KG/M2 | HEART RATE: 70 BPM | DIASTOLIC BLOOD PRESSURE: 64 MMHG | SYSTOLIC BLOOD PRESSURE: 141 MMHG | WEIGHT: 255 LBS

## 2022-12-01 VITALS
HEART RATE: 59 BPM | SYSTOLIC BLOOD PRESSURE: 105 MMHG | WEIGHT: 256.4 LBS | TEMPERATURE: 98.2 F | OXYGEN SATURATION: 97 % | DIASTOLIC BLOOD PRESSURE: 66 MMHG | BODY MASS INDEX: 44.71 KG/M2

## 2022-12-01 DIAGNOSIS — Z71.6 ENCOUNTER FOR TOBACCO USE CESSATION COUNSELING: Primary | ICD-10-CM

## 2022-12-01 DIAGNOSIS — E74.89 OTHER SPECIFIED DISORDERS OF CARBOHYDRATE METABOLISM (H): ICD-10-CM

## 2022-12-01 DIAGNOSIS — E66.01 CLASS 3 SEVERE OBESITY DUE TO EXCESS CALORIES WITH BODY MASS INDEX (BMI) OF 40.0 TO 44.9 IN ADULT, UNSPECIFIED WHETHER SERIOUS COMORBIDITY PRESENT (H): ICD-10-CM

## 2022-12-01 DIAGNOSIS — E66.813 CLASS 3 SEVERE OBESITY DUE TO EXCESS CALORIES WITH BODY MASS INDEX (BMI) OF 40.0 TO 44.9 IN ADULT, UNSPECIFIED WHETHER SERIOUS COMORBIDITY PRESENT (H): ICD-10-CM

## 2022-12-01 DIAGNOSIS — F10.229 ALCOHOL DEPENDENCE WITH INTOXICATION WITH COMPLICATION (H): ICD-10-CM

## 2022-12-01 DIAGNOSIS — K70.30 ALCOHOLIC CIRRHOSIS OF LIVER WITHOUT ASCITES (H): ICD-10-CM

## 2022-12-01 DIAGNOSIS — F41.9 ANXIETY: ICD-10-CM

## 2022-12-01 DIAGNOSIS — K70.10 ALCOHOLIC HEPATITIS WITHOUT ASCITES (H): ICD-10-CM

## 2022-12-01 DIAGNOSIS — K57.92 ACUTE DIVERTICULITIS: ICD-10-CM

## 2022-12-01 DIAGNOSIS — Z11.59 ENCOUNTER FOR SCREENING FOR OTHER VIRAL DISEASES: ICD-10-CM

## 2022-12-01 DIAGNOSIS — F33.9 EPISODE OF RECURRENT MAJOR DEPRESSIVE DISORDER, UNSPECIFIED DEPRESSION EPISODE SEVERITY (H): ICD-10-CM

## 2022-12-01 DIAGNOSIS — F10.20 ALCOHOL USE DISORDER, SEVERE, DEPENDENCE (H): Primary | ICD-10-CM

## 2022-12-01 DIAGNOSIS — F17.200 TOBACCO USE DISORDER, MODERATE, DEPENDENCE: ICD-10-CM

## 2022-12-01 LAB
AFP SERPL-MCNC: 4.1 NG/ML
ALBUMIN SERPL BCG-MCNC: 4.1 G/DL (ref 3.5–5.2)
ALP SERPL-CCNC: 76 U/L (ref 35–104)
ALT SERPL W P-5'-P-CCNC: 24 U/L (ref 10–35)
ANION GAP SERPL CALCULATED.3IONS-SCNC: 9 MMOL/L (ref 7–15)
AST SERPL W P-5'-P-CCNC: 23 U/L (ref 10–35)
BILIRUB DIRECT SERPL-MCNC: <0.2 MG/DL (ref 0–0.3)
BILIRUB SERPL-MCNC: 0.3 MG/DL
BUN SERPL-MCNC: 12.2 MG/DL (ref 8–23)
CALCIUM SERPL-MCNC: 9.9 MG/DL (ref 8.8–10.2)
CHLORIDE SERPL-SCNC: 107 MMOL/L (ref 98–107)
CREAT SERPL-MCNC: 0.58 MG/DL (ref 0.51–0.95)
DEPRECATED HCO3 PLAS-SCNC: 26 MMOL/L (ref 22–29)
ERYTHROCYTE [DISTWIDTH] IN BLOOD BY AUTOMATED COUNT: 13.7 % (ref 10–15)
GFR SERPL CREATININE-BSD FRML MDRD: >90 ML/MIN/1.73M2
GLUCOSE SERPL-MCNC: 101 MG/DL (ref 70–99)
HAV IGG SER QL IA: NONREACTIVE
HBA1C MFR BLD: 6 %
HBV CORE AB SERPL QL IA: NONREACTIVE
HBV SURFACE AB SERPL IA-ACNC: 0.88 M[IU]/ML
HBV SURFACE AB SERPL IA-ACNC: NONREACTIVE M[IU]/ML
HBV SURFACE AG SERPL QL IA: NONREACTIVE
HCT VFR BLD AUTO: 46.7 % (ref 35–47)
HGB BLD-MCNC: 15.4 G/DL (ref 11.7–15.7)
INR PPP: 1.01 (ref 0.85–1.15)
MCH RBC QN AUTO: 30 PG (ref 26.5–33)
MCHC RBC AUTO-ENTMCNC: 33 G/DL (ref 31.5–36.5)
MCV RBC AUTO: 91 FL (ref 78–100)
PLATELET # BLD AUTO: 199 10E3/UL (ref 150–450)
POTASSIUM SERPL-SCNC: 4.4 MMOL/L (ref 3.4–5.3)
PROT SERPL-MCNC: 7.1 G/DL (ref 6.4–8.3)
RBC # BLD AUTO: 5.14 10E6/UL (ref 3.8–5.2)
SODIUM SERPL-SCNC: 142 MMOL/L (ref 136–145)
WBC # BLD AUTO: 7.5 10E3/UL (ref 4–11)

## 2022-12-01 PROCEDURE — 86706 HEP B SURFACE ANTIBODY: CPT | Performed by: INTERNAL MEDICINE

## 2022-12-01 PROCEDURE — 85610 PROTHROMBIN TIME: CPT | Performed by: PATHOLOGY

## 2022-12-01 PROCEDURE — 86708 HEPATITIS A ANTIBODY: CPT | Performed by: INTERNAL MEDICINE

## 2022-12-01 PROCEDURE — G0463 HOSPITAL OUTPT CLINIC VISIT: HCPCS

## 2022-12-01 PROCEDURE — 99214 OFFICE O/P EST MOD 30 MIN: CPT | Performed by: FAMILY MEDICINE

## 2022-12-01 PROCEDURE — 87340 HEPATITIS B SURFACE AG IA: CPT | Performed by: INTERNAL MEDICINE

## 2022-12-01 PROCEDURE — 82105 ALPHA-FETOPROTEIN SERUM: CPT | Performed by: INTERNAL MEDICINE

## 2022-12-01 PROCEDURE — 99205 OFFICE O/P NEW HI 60 MIN: CPT | Mod: GC | Performed by: INTERNAL MEDICINE

## 2022-12-01 PROCEDURE — 85027 COMPLETE CBC AUTOMATED: CPT | Performed by: PATHOLOGY

## 2022-12-01 PROCEDURE — 83036 HEMOGLOBIN GLYCOSYLATED A1C: CPT | Performed by: INTERNAL MEDICINE

## 2022-12-01 PROCEDURE — 82248 BILIRUBIN DIRECT: CPT | Performed by: PATHOLOGY

## 2022-12-01 PROCEDURE — 86704 HEP B CORE ANTIBODY TOTAL: CPT | Performed by: INTERNAL MEDICINE

## 2022-12-01 PROCEDURE — 80053 COMPREHEN METABOLIC PANEL: CPT | Performed by: PATHOLOGY

## 2022-12-01 PROCEDURE — 36415 COLL VENOUS BLD VENIPUNCTURE: CPT | Performed by: PATHOLOGY

## 2022-12-01 RX ORDER — TOPIRAMATE 25 MG/1
25 TABLET, FILM COATED ORAL EVERY EVENING
Qty: 30 TABLET | Refills: 2 | Status: SHIPPED | OUTPATIENT
Start: 2022-12-01 | End: 2023-03-14

## 2022-12-01 RX ORDER — MIRTAZAPINE 15 MG/1
15 TABLET, FILM COATED ORAL AT BEDTIME
Qty: 30 TABLET | Refills: 2 | Status: SHIPPED | OUTPATIENT
Start: 2022-12-01 | End: 2023-02-10

## 2022-12-01 RX ORDER — ESCITALOPRAM OXALATE 10 MG/1
10 TABLET ORAL DAILY
Qty: 30 TABLET | Refills: 2 | Status: CANCELLED | OUTPATIENT
Start: 2022-12-01

## 2022-12-01 RX ORDER — ESCITALOPRAM OXALATE 5 MG/1
5 TABLET ORAL DAILY
Qty: 30 TABLET | Refills: 2 | Status: CANCELLED | OUTPATIENT
Start: 2022-12-01

## 2022-12-01 ASSESSMENT — ANXIETY QUESTIONNAIRES
1. FEELING NERVOUS, ANXIOUS, OR ON EDGE: SEVERAL DAYS
4. TROUBLE RELAXING: SEVERAL DAYS
6. BECOMING EASILY ANNOYED OR IRRITABLE: SEVERAL DAYS
GAD7 TOTAL SCORE: 6
3. WORRYING TOO MUCH ABOUT DIFFERENT THINGS: SEVERAL DAYS
7. FEELING AFRAID AS IF SOMETHING AWFUL MIGHT HAPPEN: NOT AT ALL
5. BEING SO RESTLESS THAT IT IS HARD TO SIT STILL: SEVERAL DAYS
2. NOT BEING ABLE TO STOP OR CONTROL WORRYING: SEVERAL DAYS
GAD7 TOTAL SCORE: 6
IF YOU CHECKED OFF ANY PROBLEMS ON THIS QUESTIONNAIRE, HOW DIFFICULT HAVE THESE PROBLEMS MADE IT FOR YOU TO DO YOUR WORK, TAKE CARE OF THINGS AT HOME, OR GET ALONG WITH OTHER PEOPLE: SOMEWHAT DIFFICULT

## 2022-12-01 ASSESSMENT — PATIENT HEALTH QUESTIONNAIRE - PHQ9: SUM OF ALL RESPONSES TO PHQ QUESTIONS 1-9: 5

## 2022-12-01 ASSESSMENT — PAIN SCALES - GENERAL: PAINLEVEL: NO PAIN (0)

## 2022-12-01 NOTE — PROGRESS NOTES
[facility] - Addiction Medicine       Rooming information:    Point of care urine drug screen positive for: All negative     *POC urine drug screen does not screen for Fentanyl    PHQ-9 Scores:   PHQ 9/29/2022 10/27/2022 12/1/2022   PHQ-9 Total Score 5 6 5   Q9: Thoughts of better off dead/self-harm past 2 weeks Not at all Not at all Not at all     ALISIA-7 Scores:  ALISIA-7 SCORE 9/29/2022 10/27/2022 12/1/2022   Total Score 4 3 6       Any other recent substance use:     Denies-last drink 11/11/22   NICOTINE-Yes:   If using nicotine, ready to quit? Yes: starting chantix 1mg tomorrow    Side effects related to medications pt would like to discuss with provider (constipation, dry mouth, HA, GI upset, sedation?) N/A     Narcan currently available: N/A    Primary care provider: EJ WELLER MD     Mental health provider: none (follow up on MH referral if needed)    Any housing, insurance deficits?: no    Contact information up to date? yes    3rd Party Involvement none (please obtain LUDIN if pt would like to include)        Lucero Ray, MEENAKSHI  December 1, 2022  12:08 PM

## 2022-12-01 NOTE — PROGRESS NOTES
[facility]    A/P                                                    ASSESSMENT/PLAN    1. Alcohol use disorder, severe, dependence (H)  Continues to struggle with intermittent use of EtOH.  Has found topiramate helpful for cravings (first med that has seemed helpful!) and denies side effects so we will continue.  Discussed benefit from adding psychosocial treatment and/or therapy to achieve her goal of sobriety.  She had scheduled assessment previously but is really only interested in in-person options and at that time there were not options available here.  Encouraged her to schedule another assessment and therapy.  Plan for RESET prescription at next visit (don based program for ORAL).  - topiramate (TOPAMAX) 25 MG tablet; Take 1 tablet (25 mg) by mouth every evening  Dispense: 30 tablet; Refill: 2    2. Episode of recurrent major depressive disorder, unspecified depression episode severity (H)  Needs improvement.  She is hopeful today and future oriented.  Discussed benefits of exercising for mood.  She will schedule with therapist as well.    - mirtazapine (REMERON) 15 MG tablet; Take 1 tablet (15 mg) by mouth At Bedtime  Dispense: 30 tablet; Refill: 2    3. Tobacco use disorder, moderate, dependence  She is planning to start Chantix tomorrow.  Not ready to set quit date but was open to setting goal of decreasing to 1/2 ppd in 1 week.      PDMP Review       Value Time User    State PDMP site checked  Yes 10/27/2022  3:28 PM Melissa Covington, DO            RTC  Return in about 4 weeks (around 12/29/2022) for Follow up, with me, in person.      Counseled the patient on the importance of having a recovery program in addition to medication to manage recovery.  Components include avoiding isolating, having willingness to change, avoiding triggers and managing cravings. Encouraged having some type of sober network and practicing honesty with trusted support person(s). Encouraged other  services such as counseling, 12 step or other self-help organizations.        SUBJECTIVE                                                    Patsy Santamaria is a 67 year old female with a history of peripheral neuropathy, COPD, alcoholic hepatitis, thombocytopenia, arthritis (hands and knees), obesity, anxiety, depression, and AUD who presents to clinic today for follow up.     Brief history of substance use:  Began drinking around age 33.  Became a problem for her in 2016 and she went to treatment for the first time and also experienced EtOH withdrawal seizures.  Has been to multiple treatments (most recently [facility] in [place] in Feb 2022).  Drinking tends to correlate with worsening depression.  Has tried naltrexone and acamprosate in past.  History of of cocaine use (1 year in the 1990s).  Established care with [facility] in March 2022 and has continued to struggle with brief episodes of drinking.       Plan from most recent office visit (10/27/22):  1. Alcohol use disorder, severe, dependence (H)  Reports one time use of alcohol since last visit and reflects on how this made her feel poorly physically and emotionally.  Encouraged continued goal of abstinence.  Encouraged therapy and AA meetings.  Gabapentin has been mildly helpful but she worries about weight gain.  Discussed trial of topamax.  Reviewed chart - she has documented allergy however she does not recall taking this, per previous notes she developed shaking of her body when she took it in the past (started in hospital in 2020, 25mg BID) and her dose of Wellbutrin had been increased at that time as well.  She does not have any other contraindication to use and given potential benefit across multiple issues (AUD, desire for weight loss, and mood concerns) so I did feel it was worth discussion of risks and benefits.  After this discussion of risks/benefits/side effects she elected to move forward with  trial of topamax 25mg in the evening.             - topiramate (TOPAMAX) 25 MG tablet; Take 1 tablet (25 mg) by mouth every evening  Dispense: 30 tablet; Refill: 0     2. PTSD (post-traumatic stress disorder)  She will reach out to schedule therapy.  Requests refill of hydroxyzine.  - hydrOXYzine (ATARAX) 50 MG tablet; Take 0.5-1 tablets (25-50 mg) by mouth 3 times daily as needed for anxiety  Dispense: 90 tablet; Refill: 1    TODAY'S VISIT  HPI Dec 1, 2022  - relapse after friend  from pancreatic cancer - unexpected; drank for 3 days after over 1 month sober  - last drink 22  - thinks Topamax 25mg has been helpful for cravings and no side effects!  - tried scheduling appointment with therapist but she is not accepting new patients  - medical rides will take her to [email or web address] - goal 3 times per week, wants to do water exercising/aerobics  - starting Chantix tomorrow, not ready to set a quit date but willing to set goal of being at 1/2 ppd in 1 week  - struggles with sleep intermittently, notices improved sleep when being active    PHQ 2022 10/27/2022 2022   PHQ-9 Total Score 5 6 5   Q9: Thoughts of better off dead/self-harm past 2 weeks Not at all Not at all Not at all     ALISIA-7 SCORE 2022 10/27/2022 2022   Total Score 4 3 6           OBJECTIVE                                                    PHYSICAL EXAM:  BP (!) 141/64 (BP Location: Right arm)   Pulse 70   Wt 115.7 kg (255 lb)   BMI 44.46 kg/m      GENERAL: healthy, alert and no distress  EYES: Eyes grossly normal to inspection, conjunctivae and sclerae normal  RESP: No respiratory distress  SKIN: no pallor or jaundice  NEURO: normal gait, mentation intact and speech normal  MENTAL STATUS EXAM  Appearance/Behavior: No appearant distress  Speech: Normal  Mood/Affect: depressed affect and anxiety  Insight: Fair    LAB  Results for orders placed or performed in visit on 22   CBC with platelets     Status: Normal   Result Value Ref  Range    WBC Count 7.5 4.0 - 11.0 10e3/uL    RBC Count 5.14 3.80 - 5.20 10e6/uL    Hemoglobin 15.4 11.7 - 15.7 g/dL    Hematocrit 46.7 35.0 - 47.0 %    MCV 91 78 - 100 fL    MCH 30.0 26.5 - 33.0 pg    MCHC 33.0 31.5 - 36.5 g/dL    RDW 13.7 10.0 - 15.0 %    Platelet Count 199 150 - 450 10e3/uL   Basic metabolic panel     Status: Abnormal   Result Value Ref Range    Sodium 142 136 - 145 mmol/L    Potassium 4.4 3.4 - 5.3 mmol/L    Chloride 107 98 - 107 mmol/L    Carbon Dioxide (CO2) 26 22 - 29 mmol/L    Anion Gap 9 7 - 15 mmol/L    Urea Nitrogen 12.2 8.0 - 23.0 mg/dL    Creatinine 0.58 0.51 - 0.95 mg/dL    Calcium 9.9 8.8 - 10.2 mg/dL    Glucose 101 (H) 70 - 99 mg/dL    GFR Estimate >90 >60 mL/min/1.73m2   Hepatic function panel     Status: Normal   Result Value Ref Range    Protein Total 7.1 6.4 - 8.3 g/dL    Albumin 4.1 3.5 - 5.2 g/dL    Bilirubin Total 0.3 <=1.2 mg/dL    Alkaline Phosphatase 76 35 - 104 U/L    AST 23 10 - 35 U/L    ALT 24 10 - 35 U/L    Bilirubin Direct <0.20 0.00 - 0.30 mg/dL   INR     Status: Normal   Result Value Ref Range    INR 1.01 0.85 - 1.15   AFP tumor marker     Status: Normal   Result Value Ref Range    AFP tumor marker 4.1 <=8.3 ng/mL    Narrative    This result is obtained using the Roche Elecsys AFP method on  the eduardo e801 immunoassay analyzer. Results obtained with different assay methods or kits cannot be used interchangeably.  Reference ranges apply to non-pregnant females only.   Hepatitis B surface antigen     Status: Normal   Result Value Ref Range    Hepatitis B Surface Antigen Nonreactive Nonreactive   Hepatitis B Surface Antibody     Status: None   Result Value Ref Range    Hepatitis B Surface Antibody Instrument Value 0.88 <8.00 m[IU]/mL    Hepatitis B Surface Antibody Nonreactive    Hepatitis B core antibody     Status: Normal   Result Value Ref Range    Hepatitis B Core Antibody Total Nonreactive Nonreactive   Hemoglobin A1c     Status: Abnormal   Result Value Ref Range     Hemoglobin A1C 6.0 (H) <5.7 %   Hepatitis A Antibody IgG     Status: Normal   Result Value Ref Range    Hepatitis A Antibody IgG Nonreactive Nonreactive    Narrative    This assay cannot be used for the diagnosis of acute HAV infection.     UDS: negative  *POC urine drug screen does not screen for Fentanyl        HISTORY                                                    Problem list reviewed & adjusted, as indicated.  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)     Diuretic-induced hypokalemia     Dyspnea on exertion     Elevated LFTs     Ganglion of tendon sheath     GI bleed     Hereditary and idiopathic peripheral neuropathy     History of major depression     Homeless     Major depression, recurrent (H)     Low backache     Airway obstruction due to foreign body, initial encounter     Hypomagnesemia     Mild episode of recurrent major depressive disorder (H)     Morbid obesity (H)     Smoker     Peripheral edema     Pneumonia of right lower lobe due to infectious organism     Primary localized osteoarthrosis, hand     Primary osteoarthritis of right knee     PTSD (post-traumatic stress disorder)     Seasonal allergies     Skin lesion     Snoring     Trochanteric bursitis of left hip     Vitamin B12 deficiency (non anemic)     Vitamin D deficiency     Acute alcoholic intoxication (H)     Anxiety     Closed fracture of head of left radius     Recurrent falls     Thrombocytopenia (H)     Dermatitis     Depressive disorder     Leukopenia     Alcoholic  cirrhosis of liver without ascites (H)     Sigmoid Diverticulitis     Mixed simple and mucopurulent chronic bronchitis (H)         MEDICATION LIST (prior to visit)  acetaminophen (TYLENOL) 500 MG tablet, Take 1-2 tablets (500-1,000 mg) by mouth every 6 hours as needed for mild pain  albuterol (PROAIR HFA/PROVENTIL HFA/VENTOLIN HFA) 108 (90 Base) MCG/ACT inhaler, Inhale 2 puffs into the lungs every 4 hours as needed for shortness of breath / dyspnea or wheezing  budesonide-formoterol (SYMBICORT) 160-4.5 MCG/ACT Inhaler, Inhale 2 puffs into the lungs 2 times daily  bumetanide (BUMEX) 1 MG tablet, Take 1 tablet (1 mg) by mouth daily  escitalopram (LEXAPRO) 10 MG tablet, Take 1 tablet (10 mg) by mouth daily Take with 5mg tablet for total of 15mg/day.  escitalopram (LEXAPRO) 5 MG tablet, Take 1 tablet (5 mg) by mouth daily Take with 10mg tablet for total of 15mg per day.  hydrOXYzine (ATARAX) 50 MG tablet, Take 0.5-1 tablets (25-50 mg) by mouth 3 times daily as needed for anxiety  ipratropium - albuterol 0.5 mg/2.5 mg/3 mL (DUONEB) 0.5-2.5 (3) MG/3ML neb solution, Take 1 vial (3 mLs) by nebulization every 4 hours as needed for shortness of breath / dyspnea or wheezing  omeprazole (PRILOSEC) 20 MG DR capsule, Take 20 mg by mouth daily as needed  umeclidinium (INCRUSE ELLIPTA) 62.5 MCG/INH inhaler, Inhale 1 puff into the lungs daily  varenicline (CHANTIX RICA) 0.5 MG X 11 & 1 MG X 42 tablet, Take 0.5 mg tab daily for 3 days, THEN 0.5 mg tab twice daily for 4 days, THEN 1 mg twice daily.  [START ON 12/23/2022] varenicline (CHANTIX) 1 MG tablet, Take 1 tablet (1 mg) by mouth 2 times daily    No current facility-administered medications on file prior to visit.      MEDICATION LIST (after visit)  Current Outpatient Medications   Medication     acetaminophen (TYLENOL) 500 MG tablet     albuterol (PROAIR HFA/PROVENTIL HFA/VENTOLIN HFA) 108 (90 Base) MCG/ACT inhaler     budesonide-formoterol (SYMBICORT) 160-4.5 MCG/ACT Inhaler      "bumetanide (BUMEX) 1 MG tablet     escitalopram (LEXAPRO) 10 MG tablet     escitalopram (LEXAPRO) 5 MG tablet     hydrOXYzine (ATARAX) 50 MG tablet     ipratropium - albuterol 0.5 mg/2.5 mg/3 mL (DUONEB) 0.5-2.5 (3) MG/3ML neb solution     mirtazapine (REMERON) 15 MG tablet     omeprazole (PRILOSEC) 20 MG DR capsule     topiramate (TOPAMAX) 25 MG tablet     umeclidinium (INCRUSE ELLIPTA) 62.5 MCG/INH inhaler     varenicline (CHANTIX RICA) 0.5 MG X 11 & 1 MG X 42 tablet     [START ON 12/23/2022] varenicline (CHANTIX) 1 MG tablet     No current facility-administered medications for this visit.         Allergies   Allergen Reactions     Bupropion Diarrhea     Codeine Hives, Itching, Nausea and Rash     Nickel Unknown     Oxycodone Nausea and Vomiting     Percocet [Oxycodone-Acetaminophen]      Patient reports \"vomiting,grossly ill two weeks ago\"     Topiramate Unknown     Diclofenac Nausea     Tolerates the topical gel     Furosemide Rash     Hydrochlorothiazide Rash     phototoxicity - med was d/lora         Sulfa Drugs Itching and Rash     Sulfasalazine Rash       Melissa Covington, [facility] Addiction Medicine  [facility]  [phone]      "

## 2022-12-01 NOTE — PATIENT INSTRUCTIONS
Call to set up therapy and substance use evaluation:   [phone]     Continue current medications.      Follow-up in 1 month, sooner if needed.

## 2022-12-01 NOTE — PATIENT INSTRUCTIONS
It was a pleasure taking care of you today.  I've included a brief summary of our discussion and care plan from today's visit below.  Please review this information with your primary care provider.  _______________________________________________________________________    My recommendations are summarized as follows:    - Plan for EGD and colonoscopy  - Plan for chemical dependency referral  - Follow up with your psychiatrist; here is the info for psychotherapy: [email or web address]    Return to Liver/Hepatology Clinic in 6  months.     For other tips go to the [facility]rhosis [facility] website: [email or web address].ca/  - Click on the tab at the top 'patients & families' and then use the tabs at the top to navigate through resources from healthy living tips to symptom management     _______________________________________________________________________    Scheduling Procedures or Tests  - To schedule endoscopic procedures call: [phone]  - To schedule radiology tests call: [phone] (for [facility]) or [phone] (for [facility])     _______________________________________________________________________    Who do I call with any questions after my visit?  Please be in touch if there are any further questions that arise following today's visit.  There are multiple ways to contact your gastroenterology care team.      To schedule or reschedule an appointment, please call [phone] and choose option 2 (for hepatology) and then option 1 (for scheduling).    During business hours, you may reach a nurse by calling the appointment line ([phone]) and asking to speak with a nurse    You can send a secure message through [employer] for non-urgent questions. [employer] messages are answered by your nurse or doctor typically within 24 to 48 hours. Please allow extra time on weekends and holidays.      For urgent/emergent questions after business hours, you may reach the on-call GI Fellow by contacting the  [facility]  at [phone].     How will I get the results of any tests ordered?    You will receive all of your results.  If you have signed up for [employer], any tests ordered at your visit will be available to you after your physician reviews them.  Typically this takes 1-2 weeks.  If there are urgent results that require a change in your care plan, your physician or nurse will call you to discuss the next steps.      What is [employer]?  [employer] is a secure way for you to access all of your healthcare records from the [facility].  It is a web based computer program, so you can sign on to it from any location.  It also allows you to send secure messages to your care team.  I recommend signing up for [employer] access if you have not already done so and are comfortable with using a computer.      How to I schedule a follow-up visit?  If you did not schedule a follow-up visit today, please call [phone] to schedule a follow-up office visit.     Sincerely,    Ana Cristina Romeo (Lizzie)

## 2022-12-01 NOTE — LETTER
12/1/2022         RE: Patsy Santamaria  [address]  [place] [address]        Dear Colleague,    Thank you for referring your patient, Patsy Santamaria, to the [facility] [place]. Please see a copy of my visit note below.    [facility] Hepatology    New Patient Visit    Referring provider:  Yanique Shen*  Chief complaint:  Alcohol related liver disease    Assessment  67 year old female with PMHx of alcohol related liver disease, alcohol use disorder and history of alcohol withdrawal seizures, COPD, obesity and obstructive sleep apnea.    #. Alcohol use disorder w/history of alcohol withdrawal seizures  #. Alcohol related (+/- NAFLD) liver disease   #. Obesity, class III  Patient with history of alcohol use disorder and alcohol related liver disease. She has a history of alcohol related hepatitis, but her updated labs demonstrate normal AST and ALT - less suggestive of active inflammation. Normal platelets and no splenomegaly - less suggestive of portal HTN. Normal INR - suggestive of intact synthetic function. I suspect that she has some degree of liver fibrosis, but no evidence at this time of cirrhosis. In the setting of her class III obesity and MELVI, she likely has a component of fatty liver disease. No s/sx of decompensated disease.    Overall, she would benefit from 5-7% weight reduction through lifestyle changes and dietary changes. She would benefit from complete alcohol abstinence; plan for ORAL referral.     #. Anxiety: Currently engaged with monthly psychiatry visits. Would benefit from one on one therapy as this drives her alcohol use.   #. Tobacco use: Interested in quitting and cutting down    Plan  -- Plan for hepatitis A IgG and hepatitis B studies  -- Plan for EGD for variceal screening + colonoscopy for colon cancer screening & follow up of diverticulitis; ordered  -- Plan for fibroscan in 6 months when she has been abstinent for 6  months  -- Plan for HCC screening: AFP pending today; repeat due 3/2023  -- Provided information about weight loss, including printed information  -- Continue to follow with psychiatry monthly; plan to establish care with one on one therapy for anxiety management - information provided  -- Chemical dependency referral placed for ORAL assessment; she need to engage in sober support  -- MTM consult for tobacco cessation; plan to start Chantix in the coming days    RTC: 6 months    Discussed with attending hepatologist, Dr. Daniel Romeo MD  Transplant Hepatology Fellow  [phone]    HPI:  Alcohol related Liver Disease  - no hx HE  - no hx ascites  - no hx variceal bleed  - last EGD: none prior   - HCC screening - CT 9/2022 without hepatic lesions    - Patient was first told that she has alcohol related liver disease several years ago  - Denies jaundice, abdominal distension, lethargy or confusion. Has had issues in the past with lower extremity edema in the past, has been well managed with bumex  - Has intermittent issues with constipation, not recently. Denies melena, hematemesis or hematochezia.  - Denies fevers, sweats, chills or weight loss  - Has gained 30 lbs in the last year in the setting of overeating  - Alcohol use: Last use 9 days ago (late 11/2022). Drinks 1 pint per day of fátima with water per day. Last relapse 9 days ago was related to the death of her friend from pancreatic cancer. Her alcohol use is driven by her anxiety and mental health. She sees a psychiatrist monthly, but does not engage in one on one therapy. Has panic attacks at least weekly. She denies cravings for alcohol - on topamax.   - COPD is managed with inhalers, including incruse, albuterol PRN and Symbicort. Denies chest pain. Ongoing tobacc use, interested in quitting.   - s/p 5 doses of COVID-19 vaccination. Has received her influenza vaccine this fall.     Medical hx Surgical hx   Past Medical History:   Diagnosis Date      Alcohol use disorder      Alcohol withdrawal seizure without complication (H) 2016     Alcoholic cirrhosis (H)      Anxiety      Chronic alcohol dependence, continuous (H) 2018    inpatient 2019, sober since then     Chronic Lower Extremity Edema      Chronic reflux esophagitis      COPD (chronic obstructive pulmonary disease) (H)      Depression      Dermatitis      Diverticulitis of colon 2022     Fibroid uterus      Ganglion     right foot     GERD (gastroesophageal reflux disease)      H. pylori infection      Melanoma (H) 2019    left upper arm     Menopause     age 52     Obesity (BMI 35.0-39.9 without comorbidity)      Osteoarthritis     Bilateral Knees     Peripheral neuropathy      Seizure (H) 2016    during alcohol withdrawal     Sleep apnea     mild, doesnt tolerate pap therapy      Past Surgical History:   Procedure Laterality Date     APPENDECTOMY      Childhood     ARTHROSCOPY KNEE Right      BIOPSY SKIN (LOCATION) Left 2019    left upper arm      SECTION        EXCISION LESION/TENDON-SHEATH/CAPSULE, FOOT      Description: Excision Of Cyst Of Tendon Sheath Of Foot;  Proc Date: 10/28/2011;  Comments: [facility]      KNEE SCOPE, DIAGNOSTIC      Description: Arthroscopy Knee Right;  Proc Date: 10/11/2005;  Comments: for right knee patella subluxation with lateral retinacular release; subpatellar chondroplasty      LATERAL RETINACULAR RELEASE OPEN      Description: Knee Lateral Retinacular Release;  Proc Date: 10/11/2005;     HEMORRHOIDECTOMY INTERNAL LIGATION      Description: Hemorrhoidectomy;  Recorded: 2008;     THUMB SURGERY      Removal of bone spurs     TONSILLECTOMY       [facility]  DELIVERY ONLY      Description:  Section;  Recorded: 2008;     [facility] LIGATE FALLOPIAN TUBE      Description: Tubal Ligation;  Recorded: 2008;   Abdominal surgeries: , appendectomy        Medications  Current Outpatient  Medications   Medication Sig Dispense Refill     albuterol (PROAIR HFA/PROVENTIL HFA/VENTOLIN HFA) 108 (90 Base) MCG/ACT inhaler Inhale 2 puffs into the lungs every 4 hours as needed for shortness of breath / dyspnea or wheezing 18 g 3     budesonide-formoterol (SYMBICORT) 160-4.5 MCG/ACT Inhaler Inhale 2 puffs into the lungs 2 times daily 10.2 g 11     bumetanide (BUMEX) 1 MG tablet Take 1 tablet (1 mg) by mouth daily 30 tablet 1     escitalopram (LEXAPRO) 10 MG tablet Take 1 tablet (10 mg) by mouth daily Take with 5mg tablet for total of 15mg/day. 30 tablet 2     escitalopram (LEXAPRO) 5 MG tablet Take 1 tablet (5 mg) by mouth daily Take with 10mg tablet for total of 15mg per day. 30 tablet 2     hydrOXYzine (ATARAX) 50 MG tablet Take 0.5-1 tablets (25-50 mg) by mouth 3 times daily as needed for anxiety 90 tablet 1     ipratropium - albuterol 0.5 mg/2.5 mg/3 mL (DUONEB) 0.5-2.5 (3) MG/3ML neb solution Take 1 vial (3 mLs) by nebulization every 4 hours as needed for shortness of breath / dyspnea or wheezing 15 mL 1     mirtazapine (REMERON) 15 MG tablet Take 1 tablet (15 mg) by mouth At Bedtime 30 tablet 2     omeprazole (PRILOSEC) 20 MG DR capsule Take 20 mg by mouth daily as needed       topiramate (TOPAMAX) 25 MG tablet Take 1 tablet (25 mg) by mouth every evening 30 tablet 0     umeclidinium (INCRUSE ELLIPTA) 62.5 MCG/INH inhaler Inhale 1 puff into the lungs daily 30 each 1     acetaminophen (TYLENOL) 500 MG tablet Take 1-2 tablets (500-1,000 mg) by mouth every 6 hours as needed for mild pain (Patient not taking: Reported on 12/1/2022) 60 tablet 11     varenicline (CHANTIX RICA) 0.5 MG X 11 & 1 MG X 42 tablet Take 0.5 mg tab daily for 3 days, THEN 0.5 mg tab twice daily for 4 days, THEN 1 mg twice daily. (Patient not taking: Reported on 12/1/2022) 53 tablet 0     [START ON 12/23/2022] varenicline (CHANTIX) 1 MG tablet Take 1 tablet (1 mg) by mouth 2 times daily (Patient not taking: Reported on 12/1/2022) 30  "tablet 11       Allergies  Allergies   Allergen Reactions     Bupropion Diarrhea     Codeine Hives, Itching, Nausea and Rash     Nickel Unknown     Oxycodone Nausea and Vomiting     Percocet [Oxycodone-Acetaminophen]      Patient reports \"vomiting,grossly ill two weeks ago\"     Topiramate Unknown     Diclofenac Nausea     Tolerates the topical gel     Furosemide Rash     Hydrochlorothiazide Rash     phototoxicity - med was d/lora         Sulfa Drugs Itching and Rash     Sulfasalazine Rash       Family hx Social hx   Family History   Problem Relation Age of Onset     CABG Mother      Anuerysm Father          of ruptured anuerysm at 46     Heart Disease Mother      Heart Disease Father    - Mother: alcohol use:  - No family history of liver disease   - Employment: Retired, used to work as a cook  - Lives alone  - 4 children: 40s to 50s  - Alcohol: Drinks 1 pint of fátima per day; last use 2022. Treatment programming: [facility], [facility] ().   - Tobacco: 1 pack per day     Review of systems  A 10-point review of systems was negative.    Examination  /66   Pulse 59   Temp 98.2  F (36.8  C)   Wt 116.3 kg (256 lb 6.4 oz)   SpO2 97%   BMI 44.71 kg/m    Body mass index is 44.71 kg/m .    Gen-NAD  Eye- EOMI. No conjunctival icterus.   ENT- MMM, normal oropharynx. Dentures on top, no teeth on bottom.   CVS- No Mireya edema  RS- Breathing comfortably on ambient  Abd- Obese, soft, non-tender, non-distended.   Extr- 2+ radial pulses bilaterally, no lower extremity edema bilaterally  MS- hands without clubbing  Neuro- A+Ox3, no asterixis  Skin- no rash or jaundice. No palmar erythema or spider angiomatas  Psych- normal mood    Laboratory  BMP  Recent Labs   Lab Test 22  0834 22  1042 22  1135 22  0622 22  0730    140 137  --  135*   POTASSIUM 4.4 4.5 4.6  --  4.2   CHLORIDE 107 101 102  --  103   JOSEFA 9.9 9.9 9.8  --  8.1*   CO2 " 26 24 28  --  25   BUN 12.2 11.6 11.5  --  7*   CR 0.58 0.56 0.62 0.51 0.69   * 113* 106*  --  155*     CBC  Recent Labs   Lab Test 12/01/22  0834 11/02/22  1042 09/27/22  1135 09/22/22  0622 09/20/22  0730   WBC 7.5 5.6 6.8  --  9.0   RBC 5.14 5.24* 5.10  --  4.13   HGB 15.4 15.7 15.4  --  13.0   HCT 46.7 47.5* 46.9  --  39.7   MCV 91 91 92  --  96   MCH 30.0 30.0 30.2  --  31.5   MCHC 33.0 33.1 32.8  --  32.7   RDW 13.7 13.4 14.1  --  13.9    181 220 129* 118*     Liver Enzymes   Recent Labs   Lab Test 11/02/22  1042   PROTTOTAL 7.3   ALBUMIN 4.2   BILITOTAL 0.5   ALKPHOS 74   AST 34   ALT 25      INR   INR   Date Value Ref Range Status   12/01/2022 1.01 0.85 - 1.15 Final      Hep C Ab: negative    Radiology  CT Abdomen Pelvis 9/2022  1.  New mild changes of acute diverticulitis involving the lower descending colon. Slight improvement of acute diverticulitis involving the sigmoid colon within the right aspect of the pelvis.  2.  Cirrhosis with fatty change in liver. Gallbladder wall thickening which can be seen with hepatic parenchymal disease.  3.  Uterine fibroids.  * No splenomegaly       Attestation signed by Jose Sanchez MD at 12/1/2022  6:10 PM:  The patient was seen and examined.  The above assessment and plan was developed jointly with the fellow.      Thank you very much for allowing me to participate in the care of this patient.  If you have any questions regarding my recommendations, please do not hesitate to contact me.         Jose Sanchez MD      Professor of Medicine  [employer]      Executive Medical Director, Solid Organ Transplant Program  [facility]

## 2022-12-01 NOTE — NURSING NOTE
Chief Complaint   Patient presents with     Consult     New pt consult.     Blood pressure 105/66, pulse 59, temperature 98.2  F (36.8  C), weight 116.3 kg (256 lb 6.4 oz), SpO2 97 %, not currently breastfeeding.    SINGH JOHNSON

## 2022-12-05 ENCOUNTER — TELEPHONE (OUTPATIENT)
Dept: GASTROENTEROLOGY | Facility: CLINIC | Age: 67
End: 2022-12-05

## 2022-12-05 NOTE — TELEPHONE ENCOUNTER
MTM referral from: [facility] visit (referral by provider)    MTM referral outreach attempt #2 on December 5, 2022 at 10:08 AM      Outcome: Patient not reachable after several attempts, will route to [facility] Pharmacist/Provider as an FYI.  [facility] scheduling number is [phone].  Thank you for the referral.    Dasha Donohue [facility]

## 2022-12-08 ENCOUNTER — PATIENT OUTREACH (OUTPATIENT)
Dept: CARE COORDINATION | Facility: CLINIC | Age: 67
End: 2022-12-08

## 2022-12-08 NOTE — PROGRESS NOTES
Clinic Care Coordination Contact    Follow Up Progress Note      Assessment: JESU CC spoke to pt about follow up on housing resources. Pt reports that she has put that on hold right now but will look at resources when she has time.     Pt reports she is looking for low cost dental options. JESU CC asked if sliding scale and low cost would be available as there weren't resources she was aware of that would help with the dentist bill. Pt said she would like low cost/sliding scale dentist options mailed to her.     Care Gaps:    Health Maintenance Due   Topic Date Due     COPD ACTION PLAN  Never done     DEPRESSION ACTION PLAN  Never done     COLORECTAL CANCER SCREENING  Never done     Pneumococcal Vaccine: 65+ Years (2 - PCV) 01/28/2021     LUNG CANCER SCREENING  01/28/2021     MEDICARE ANNUAL WELLNESS VISIT  01/31/2022     URINE DRUG SCREEN  05/26/2022     Care Plans      Intervention/Education provided during outreach: Low cost/sliding scale dentist    [facility]  [email or web address]    [facility]   [email or web address]    [facility]   [email or web address]     Outreach Frequency: monthly    Plan:   Care Coordinator will follow up in 3 weeks and mail resources     KATHLEEN Espana? Social Work Care Coordinator   [facility]  [email or web address]? [email or web address]    Phone: [phone]  she/her

## 2022-12-09 ENCOUNTER — HOSPITAL ENCOUNTER (OUTPATIENT)
Facility: CLINIC | Age: 67
End: 2022-12-09
Attending: INTERNAL MEDICINE | Admitting: INTERNAL MEDICINE
Payer: COMMERCIAL

## 2022-12-09 ENCOUNTER — TELEPHONE (OUTPATIENT)
Dept: GASTROENTEROLOGY | Facility: CLINIC | Age: 67
End: 2022-12-09

## 2022-12-09 NOTE — TELEPHONE ENCOUNTER
Screening Questions  BLUE  KIND OF PREP RED  LOCATION [review exclusion criteria] GREEN  SEDATION TYPE        Y Are you active on mychart?       WILLIE VILLALBA Ordering/Referring Provider?        [facility] What type of coverage do you have?      N Have you had a positive covid test in the last 14 days?     42.9 1. BMI  [BMI 40+ - review exclusion criteria]    Y  2. Are you able to give consent for your medical care? [IF NO,RN REVIEW]        N  3. Are you taking any prescription pain medications on a routine schedule?        3a. EXTENDED PREP What kind of prescription?     N 4. Do you have any chemical dependencies such as alcohol, street drugs, or methadone?    Y 5. Do you have any history of post-traumatic stress syndrome, severe anxiety or history of psychosis?      **If yes 3- 5 , please schedule with MAC sedation.**          IF YES TO ANY 6 - 10 - HOSPITAL SETTING ONLY.     N 6.   Do you need assistance transferring?     N 7.   Have you had a heart or lung transplant?    N 8.   Are you currently on dialysis?   N 9.   Do you use daily home oxygen?   N 10. Do you take nitroglycerin?   10a.  If yes, how often?     11. [FEMALES]  N Are you currently pregnant?    11a.  If yes, how many weeks? [ Greater than 12 weeks, OR NEEDED]    N 12. Do you have Pulmonary Hypertension? *NEED PAC APPT AT [facility]*     N 13. [review exclusion criteria]  Do you have any implantable devices in your body (pacemaker, defib, LVAD)?    N 14. In the past 6 months, have you had any heart related issues including cardiomyopathy or heart attack?     14a.  If yes, did it require cardiac stenting if so when?     N 15. Have you had a stroke or Transient ischemic attack (TIA - aka  mini stroke ) within 6 months?      Y 16. Do you have mod to severe Obstructive Sleep Apnea?  [Hospital only - Ok at [place]]    N 17. Do you have SEVERE AND UNCONTROLLED asthma? *NEED PAC APPT AT [facility]*     N 18. Are you currently taking any blood  "thinners?     18a. If yes, inform patient to \"follow up w/ ordering provider for bridging instructions.\"    N 19. Do you take the medication Phentermine?    19a. If yes, \"Hold for 7 days before procedure.  Please consult your prescribing provider if you have questions about holding this medication.\"     N  20. Do you have chronic kidney disease?      N  21. Do you have a diagnosis of diabetes?     Y  22. On a regular basis do you go 3-5 days between bowel movements?      23. Preferred LOCAL Pharmacy for Pre Prescription    [ LIST ONLY ONE PHARMACY]     [id number] - [place], [place] - [address]        - CLOSING REMINDERS -    Informed patient they will need an adult    Cannot take any type of public or medical transportation alone    Conscious Sedation- Needs  for 6 hours after the procedure       MAC/General-Needs  for 24 hours after procedure    Pre-Procedure Covid test to be completed [ESSC PCR Testing Required]    Confirmed Nurse will call to complete assessment       - SCHEDULING DETAILS -  Y Hospital Setting Required? If yes, what is the exclusion?: MELVI BREAUX  Surgeon    2/2/2023  Date of Procedure  Upper and Lower Endoscopy [EGD and Colonoscopy]  Type of Procedure Scheduled  [facility] Location   [facility] EXTENDED - If you answer yes to questions #1, #3, #22 (Dimitrios Bauer and CF pts)Which Colonoscopy Prep was Sent?     MAC Sedation Type     NO PAC / Pre-op Required                 "

## 2022-12-14 ENCOUNTER — NURSE TRIAGE (OUTPATIENT)
Dept: NURSING | Facility: CLINIC | Age: 67
End: 2022-12-14

## 2022-12-14 ENCOUNTER — TRANSFERRED RECORDS (OUTPATIENT)
Dept: HEALTH INFORMATION MANAGEMENT | Facility: CLINIC | Age: 67
End: 2022-12-14

## 2022-12-14 NOTE — TELEPHONE ENCOUNTER
Patient calling.    Reports that she hasn't been feeling well for over a week now. She states that she started out with cold symptoms - took a recent covid test and was negative.    A few days ago, she developed numbness on her left side - patient reports jaw, cheek, arm and leg. She reports that she did call an ambulance the other day - EMS took vitals and told her she needs to follow up with PCP. No PCP appointment scheduled in chart.    She feels that the numbness is worsening and has started to drool. Advised to call 911. Offered to call for her, but said she would do it herself. Told patient that I would call back in a few minutes to check on her. Called back twice, no answer.    Writer called 911 for patient.    Rosalie Fowler RN on 12/14/2022 at 11:09 AM      Reason for Disposition    New neurologic deficit that is present NOW, sudden onset of ANY of the following: * Weakness of the face, arm, or leg on one side of the body* Numbness of the face, arm, or leg on one side of the body* Loss of speech or garbled speech    Additional Information    Negative: Difficult to awaken or acting confused (e.g., disoriented, slurred speech)    Protocols used: NEUROLOGIC DEFICIT-A-OH

## 2022-12-20 DIAGNOSIS — F33.9 EPISODE OF RECURRENT MAJOR DEPRESSIVE DISORDER, UNSPECIFIED DEPRESSION EPISODE SEVERITY (H): ICD-10-CM

## 2022-12-20 DIAGNOSIS — F43.10 PTSD (POST-TRAUMATIC STRESS DISORDER): ICD-10-CM

## 2022-12-20 RX ORDER — ESCITALOPRAM OXALATE 5 MG/1
5 TABLET ORAL DAILY
Qty: 30 TABLET | Refills: 2 | Status: SHIPPED | OUTPATIENT
Start: 2022-12-20 | End: 2022-12-21

## 2022-12-20 RX ORDER — HYDROXYZINE HYDROCHLORIDE 50 MG/1
25-50 TABLET, FILM COATED ORAL 3 TIMES DAILY PRN
Qty: 90 TABLET | Refills: 1 | Status: SHIPPED | OUTPATIENT
Start: 2022-12-20 | End: 2023-02-10

## 2022-12-20 RX ORDER — ESCITALOPRAM OXALATE 10 MG/1
10 TABLET ORAL DAILY
Qty: 30 TABLET | Refills: 2 | Status: SHIPPED | OUTPATIENT
Start: 2022-12-20 | End: 2022-12-21

## 2022-12-20 NOTE — TELEPHONE ENCOUNTER
Reason for Call:  Medication refill:    Name of the pharmacy and phone number for the current request:  TBD    Name of the medication requested: All    Other request: Received call from patient stated she is out of her medication, and that her current Pharmacy is temporary shout down, and the medication refills needs to be sent to another Pharmacy.    Can we leave a detailed message on this number? Yes    Phone number patient can be reached at: [phone]    Best Time: Anytime    Call taken on 12/20/2022 at 7:55 AM by Jose Hines

## 2022-12-20 NOTE — TELEPHONE ENCOUNTER
Date of Last Office Visit: 12/1/22  Date of Next Office Visit: 12/29/22  No shows since last visit: 0  Cancellations since last visit: 0    Medication requested: Escitalopram 10 mg Date last ordered: 9/15/22 Qty: 30 Refills: 2  Medication requested: Escitalopram  5 mg Date last ordered: 9/15/22 Qty: 30 Refills: 2  Medication requested: Hydroxyzine 50 mg Date last ordered: 10/27/22 Qty: 90 Refills:1     Review of [place] ?: NA    Lapse in medication adherence greater than 5 days?: pt reports she has run out of Lexpro and also needs Hydroxyzine refill. Anxious about her pharmacy  temporarily closing and getting med filled. Pt picked new pharmacy to get meds filled, profile updated.  If yes, call patient and gather details: see above  Medication refill request verified as identical to current order?: yes  Result of Last DAM, VPA, Li+ Level, CBC, or Carbamazepine Level (at or since last visit): N/A    Last visit treatment plan:   ASSESSMENT/PLAN     1. Alcohol use disorder, severe, dependence (H)  Continues to struggle with intermittent use of EtOH.  Has found topiramate helpful for cravings (first med that has seemed helpful!) and denies side effects so we will continue.  Discussed benefit from adding psychosocial treatment and/or therapy to achieve her goal of sobriety.  She had scheduled assessment previously but is really only interested in in-person options and at that time there were not options available here.  Encouraged her to schedule another assessment and therapy.  Plan for RESET prescription at next visit (don based program for ORAL).  - topiramate (TOPAMAX) 25 MG tablet; Take 1 tablet (25 mg) by mouth every evening  Dispense: 30 tablet; Refill: 2     2. Episode of recurrent major depressive disorder, unspecified depression episode severity (H)  Needs improvement.  She is hopeful today and future oriented.  Discussed benefits of exercising for mood.  She will schedule with therapist as well.    - mirtazapine  (REMERON) 15 MG tablet; Take 1 tablet (15 mg) by mouth At Bedtime  Dispense: 30 tablet; Refill: 2     3. Tobacco use disorder, moderate, dependence  She is planning to start Chantix tomorrow.  Not ready to set quit date but was open to setting goal of decreasing to 1/2 ppd in 1 week.         PDMP Review        Value Time User     State PDMP site checked  Yes 10/27/2022  3:28 PM Melissa Covington,       RTC  Return in about 4 weeks (around 12/29/2022) for Follow up, with me, in person.      []Medication refilled per  Medication Refill in Ambulatory Care  policy.  [x]Medication unable to be refilled by RN due to criteria not met as indicated below:    []Eligibility - not seen in the last year   []Supervision - no future appointment   []Compliance - no shows, cancellations or lapse in therapy   []Verification - order discrepancy   []Controlled medication   []Medication not included in policy   [x]90-day supply request (multiple month) []Other

## 2022-12-21 ENCOUNTER — TELEPHONE (OUTPATIENT)
Dept: FAMILY MEDICINE | Facility: CLINIC | Age: 67
End: 2022-12-21

## 2022-12-21 ENCOUNTER — OFFICE VISIT (OUTPATIENT)
Dept: FAMILY MEDICINE | Facility: CLINIC | Age: 67
End: 2022-12-21
Payer: COMMERCIAL

## 2022-12-21 VITALS
WEIGHT: 255 LBS | OXYGEN SATURATION: 96 % | RESPIRATION RATE: 22 BRPM | DIASTOLIC BLOOD PRESSURE: 68 MMHG | BODY MASS INDEX: 44.46 KG/M2 | TEMPERATURE: 98.3 F | SYSTOLIC BLOOD PRESSURE: 128 MMHG | HEART RATE: 72 BPM

## 2022-12-21 DIAGNOSIS — F33.9 EPISODE OF RECURRENT MAJOR DEPRESSIVE DISORDER, UNSPECIFIED DEPRESSION EPISODE SEVERITY (H): ICD-10-CM

## 2022-12-21 DIAGNOSIS — J01.90 ACUTE NON-RECURRENT SINUSITIS, UNSPECIFIED LOCATION: ICD-10-CM

## 2022-12-21 DIAGNOSIS — J32.9 SINUSITIS, UNSPECIFIED CHRONICITY, UNSPECIFIED LOCATION: Primary | ICD-10-CM

## 2022-12-21 DIAGNOSIS — F10.220 ALCOHOL DEPENDENCE WITH UNCOMPLICATED INTOXICATION (H): ICD-10-CM

## 2022-12-21 LAB — SARS-COV-2 RNA RESP QL NAA+PROBE: NEGATIVE

## 2022-12-21 PROCEDURE — U0005 INFEC AGEN DETEC AMPLI PROBE: HCPCS | Performed by: PHYSICIAN ASSISTANT

## 2022-12-21 PROCEDURE — 99214 OFFICE O/P EST MOD 30 MIN: CPT | Performed by: PHYSICIAN ASSISTANT

## 2022-12-21 PROCEDURE — U0003 INFECTIOUS AGENT DETECTION BY NUCLEIC ACID (DNA OR RNA); SEVERE ACUTE RESPIRATORY SYNDROME CORONAVIRUS 2 (SARS-COV-2) (CORONAVIRUS DISEASE [COVID-19]), AMPLIFIED PROBE TECHNIQUE, MAKING USE OF HIGH THROUGHPUT TECHNOLOGIES AS DESCRIBED BY CMS-2020-01-R: HCPCS | Performed by: PHYSICIAN ASSISTANT

## 2022-12-21 RX ORDER — ESCITALOPRAM OXALATE 5 MG/1
5 TABLET ORAL DAILY
Qty: 90 TABLET | Refills: 0 | Status: SHIPPED | OUTPATIENT
Start: 2022-12-21 | End: 2023-03-14

## 2022-12-21 RX ORDER — ESCITALOPRAM OXALATE 10 MG/1
10 TABLET ORAL DAILY
Qty: 90 TABLET | Refills: 0 | Status: SHIPPED | OUTPATIENT
Start: 2022-12-21 | End: 2023-03-14

## 2022-12-21 NOTE — TELEPHONE ENCOUNTER
I saw patient today for an office visit.  She was interested in getting prescription medicine to help with alcohol withdrawal, so she can do this at home rather than the hospital.  I just spoke with her PCP, Dr. Wade.  We both think that patient is too high a risk for complications for this to be managed at home.  She could have a seizure like she did before, or other serious problems.  She needs to go to the ER for detox.    [I think you guys have information on where specifically she could go?  [facility]?]

## 2022-12-21 NOTE — TELEPHONE ENCOUNTER
Contacted patient with message below from Irish SHEA.    No further action needed.    Almita Granado RN  [facility]

## 2022-12-21 NOTE — TELEPHONE ENCOUNTER
Refills of escitalopram 10mg and 5mg filled yesterday 12/20/22. Patient is requesting a 90 day supply.     escitalopram (LEXAPRO) 10 MG tablet 30 tablet 2 12/20/2022     escitalopram (LEXAPRO) 5 MG tablet 30 tablet 2 12/20/2022

## 2022-12-21 NOTE — PROGRESS NOTES
Subjective:    Patsy Santamaria is a 67 year old female who presents with chief complaint of 2 concerns:    1.  Sinus infection  She has had symptoms for a few days.  Not really improving.  Head feels plugged up.  She says that this congestion seems to be worse in the morning, better when she gets up and starts walking around.  She is having pain around her eyes.  No significant cough, no fever.  She does have a humidifier at home.  She was smoking a pack a day, has cut down to half a pack a day now on Chantix.    2.  Alcohol withdrawal  History of alcohol dependence.  She had abstained from alcohol for a long time, relapsed recently due to the death of a close friend.  She has tried to stop drinking again but is unable to because of shaking and other withdrawal side effects.  Right now she is drinking 1 pint of fátima a day to stop her shaking.  She is hoping to get a prescription to detox safely at home.  She does not want to have to go to the hospital.  She reports she did have alcohol withdrawal seizures in 2016 at the hospital.    She was seen in the ER on 12/14/2022, I reviewed that note today.  Reason was depression and alcohol use.  She was evaluated by providers there.  Because she was not currently endorsing suicidal ideation, and had a safety plan, they felt she was safe to discharge.  She had a mental health and addiction consult scheduled for her before she left.    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)     Diuretic-induced hypokalemia      Dyspnea on exertion     Elevated LFTs     Ganglion of tendon sheath     GI bleed     Hereditary and idiopathic peripheral neuropathy     History of major depression     Homeless     Major depression, recurrent (H)     Low backache     Airway obstruction due to foreign body, initial encounter     Hypomagnesemia     Mild episode of recurrent major depressive disorder (H)     Morbid obesity (H)     Smoker     Peripheral edema     Pneumonia of right lower lobe due to infectious organism     Primary localized osteoarthrosis, hand     Primary osteoarthritis of right knee     PTSD (post-traumatic stress disorder)     Seasonal allergies     Skin lesion     Snoring     Trochanteric bursitis of left hip     Vitamin B12 deficiency (non anemic)     Vitamin D deficiency     Acute alcoholic intoxication (H)     Anxiety     Closed fracture of head of left radius     Recurrent falls     Thrombocytopenia (H)     Dermatitis     Depressive disorder     Leukopenia     Alcoholic cirrhosis of liver without ascites (H)     Sigmoid Diverticulitis     Mixed simple and mucopurulent chronic bronchitis (H)       Current Outpatient Medications:      acetaminophen (TYLENOL) 500 MG tablet, Take 1-2 tablets (500-1,000 mg) by mouth every 6 hours as needed for mild pain, Disp: 60 tablet, Rfl: 11     albuterol (PROAIR HFA/PROVENTIL HFA/VENTOLIN HFA) 108 (90 Base) MCG/ACT inhaler, Inhale 2 puffs into the lungs every 4 hours as needed for shortness of breath / dyspnea or wheezing, Disp: 18 g, Rfl: 3     budesonide-formoterol (SYMBICORT) 160-4.5 MCG/ACT Inhaler, Inhale 2 puffs into the lungs 2 times daily, Disp: 10.2 g, Rfl: 11     bumetanide (BUMEX) 1 MG tablet, Take 1 tablet (1 mg) by mouth daily, Disp: 30 tablet, Rfl: 1     escitalopram (LEXAPRO) 10 MG tablet, Take 1 tablet (10 mg) by mouth daily Take with 5mg tablet for total of 15mg/day., Disp: 30 tablet, Rfl: 2     escitalopram (LEXAPRO) 5 MG tablet, Take 1 tablet (5 mg) by mouth daily Take with  10mg tablet for total of 15mg per day., Disp: 30 tablet, Rfl: 2     hydrOXYzine (ATARAX) 50 MG tablet, Take 0.5-1 tablets (25-50 mg) by mouth 3 times daily as needed for anxiety, Disp: 90 tablet, Rfl: 1     ipratropium - albuterol 0.5 mg/2.5 mg/3 mL (DUONEB) 0.5-2.5 (3) MG/3ML neb solution, Take 1 vial (3 mLs) by nebulization every 4 hours as needed for shortness of breath / dyspnea or wheezing, Disp: 15 mL, Rfl: 1     mirtazapine (REMERON) 15 MG tablet, Take 1 tablet (15 mg) by mouth At Bedtime, Disp: 30 tablet, Rfl: 2     omeprazole (PRILOSEC) 20 MG DR capsule, Take 20 mg by mouth daily as needed, Disp: , Rfl:      topiramate (TOPAMAX) 25 MG tablet, Take 1 tablet (25 mg) by mouth every evening, Disp: 30 tablet, Rfl: 2     umeclidinium (INCRUSE ELLIPTA) 62.5 MCG/INH inhaler, Inhale 1 puff into the lungs daily, Disp: 30 each, Rfl: 1     varenicline (CHANTIX RICA) 0.5 MG X 11 & 1 MG X 42 tablet, Take 0.5 mg tab daily for 3 days, THEN 0.5 mg tab twice daily for 4 days, THEN 1 mg twice daily., Disp: 53 tablet, Rfl: 0     [START ON 12/23/2022] varenicline (CHANTIX) 1 MG tablet, Take 1 tablet (1 mg) by mouth 2 times daily, Disp: 30 tablet, Rfl: 11      Objective:   Allergies:  Bupropion, Codeine, Nickel, Oxycodone, Percocet [oxycodone-acetaminophen], Topiramate, Diclofenac, Furosemide, Hydrochlorothiazide, Sulfa drugs, and Sulfasalazine    Vitals:  Vitals:    12/21/22 0912   BP: 128/68   BP Location: Left arm   Patient Position: Sitting   Cuff Size: Adult Large   Pulse: 72   Resp: 22   Temp: 98.3  F (36.8  C)   TempSrc: Temporal   SpO2: 96%   Weight: 115.7 kg (255 lb)       Body mass index is 44.46 kg/m .    Vital signs reviewed.  General: Patient is alert and oriented x 3, in no apparent distress  Ears: TMs are non-erythematous with good light reflex bilaterally  Sinuses: No significant pain with percussion of frontal or maxillary sinuses  Throat: no erythema, edema or exudate noted  Lymphatic: no anterior cervical lymph  node enlargement  Cardiac: regular rate and rhythm, no murmurs  Pulmonary: lungs clear to auscultation bilaterally, no crackles, rales, rhonchi, or wheezing noted        Assessment and Plan:   1. Sinusitis, unspecified chronicity, unspecified location  Differential includes COVID infection, other viral infection, versus bacterial.  I will follow-up with COVID results when they are available.  If COVID is negative, can discuss risks and benefits of treating with an antibiotic.  Because she is a smoker, and has other health risks, it may be a good idea to treat, or at least give her a prescription to use if needed, given the upcoming holiday weekend.  She is continuing to cut down on smoking.  We discussed other symptomatic treatment as well.  - Symptomatic COVID-19 Virus (Coronavirus) by PCR     2. Alcohol dependence with uncomplicated intoxication (H)  Currently drinking 1 pint of fátima a day.  She had abstained from alcohol, but relapsed recently.  She wants to stop.  She has to drink 1 pint of fátima a day to stop her shaking and withdrawal symptoms.  She has detoxed at the hospital before.  Did have seizures in the past, in the hospital when she was in detox.  She is hoping she does not have to go to the hospital again.  She is interested in taking prescription medicine to help her detox at home.  I reviewed her chart and reviewed this with her PCP.  Given her significant current risk factors, and history of seizures with withdrawal in the past, will notify patient that we are unable to provide her outpatient medication.  I will recommend that she go to the emergency room/detox for immediate treatment.      This dictation uses voice recognition software, which may contain typographical errors.

## 2022-12-22 ENCOUNTER — TELEPHONE (OUTPATIENT)
Dept: FAMILY MEDICINE | Facility: CLINIC | Age: 67
End: 2022-12-22

## 2022-12-22 NOTE — TELEPHONE ENCOUNTER
----- Message from Irish Pichardo PA-C sent at 12/22/2022 10:48 AM CST -----  Her test for COVID is negative, which is good.  I think her sinus symptoms should get better on their own, within the next several days.  I sent an antibiotic to her pharmacy to take, just in case things do not get better, with the holiday weekend coming up.  I would like her to try and wait 2 or 3 days before starting the medicine if possible.

## 2022-12-29 ENCOUNTER — OFFICE VISIT (OUTPATIENT)
Dept: ADDICTION MEDICINE | Facility: CLINIC | Age: 67
End: 2022-12-29
Payer: COMMERCIAL

## 2022-12-29 ENCOUNTER — TELEPHONE (OUTPATIENT)
Dept: ADDICTION MEDICINE | Facility: CLINIC | Age: 67
End: 2022-12-29

## 2022-12-29 VITALS
HEART RATE: 74 BPM | SYSTOLIC BLOOD PRESSURE: 120 MMHG | WEIGHT: 255 LBS | BODY MASS INDEX: 44.46 KG/M2 | DIASTOLIC BLOOD PRESSURE: 70 MMHG

## 2022-12-29 DIAGNOSIS — F43.10 PTSD (POST-TRAUMATIC STRESS DISORDER): ICD-10-CM

## 2022-12-29 DIAGNOSIS — F17.200 TOBACCO USE DISORDER, MODERATE, DEPENDENCE: ICD-10-CM

## 2022-12-29 DIAGNOSIS — F10.20 ALCOHOL USE DISORDER, SEVERE, DEPENDENCE (H): Primary | ICD-10-CM

## 2022-12-29 PROCEDURE — 99214 OFFICE O/P EST MOD 30 MIN: CPT | Performed by: FAMILY MEDICINE

## 2022-12-29 RX ORDER — DIGITAL THERAPEUTICS, SUD
MISCELLANEOUS MISCELLANEOUS
Qty: 1 EACH | Refills: 3 | Status: ON HOLD | OUTPATIENT
Start: 2022-12-29 | End: 2023-03-20

## 2022-12-29 ASSESSMENT — ANXIETY QUESTIONNAIRES
5. BEING SO RESTLESS THAT IT IS HARD TO SIT STILL: NOT AT ALL
4. TROUBLE RELAXING: NOT AT ALL
6. BECOMING EASILY ANNOYED OR IRRITABLE: SEVERAL DAYS
1. FEELING NERVOUS, ANXIOUS, OR ON EDGE: SEVERAL DAYS
2. NOT BEING ABLE TO STOP OR CONTROL WORRYING: NOT AT ALL
7. FEELING AFRAID AS IF SOMETHING AWFUL MIGHT HAPPEN: NOT AT ALL
3. WORRYING TOO MUCH ABOUT DIFFERENT THINGS: SEVERAL DAYS
GAD7 TOTAL SCORE: 3
GAD7 TOTAL SCORE: 3

## 2022-12-29 ASSESSMENT — PATIENT HEALTH QUESTIONNAIRE - PHQ9: SUM OF ALL RESPONSES TO PHQ QUESTIONS 1-9: 5

## 2022-12-29 ASSESSMENT — PAIN SCALES - GENERAL: PAINLEVEL: NO PAIN (0)

## 2022-12-29 NOTE — PATIENT INSTRUCTIONS
Reach out to [facility] (trauma informed care) to see about starting therapy:  [phone]    Continue Topamax 25mg.    I will have Bernie (peer ) reach out to you to set up RESET don on your phone.    See me back in 4 weeks.

## 2022-12-29 NOTE — PROGRESS NOTES
[facility]    A/P                                                    ASSESSMENT/PLAN    1. Alcohol use disorder, severe, dependence (H)  Needs improvement.  She will continue Topamax 25mg as she finds this helpful, we can consider increasing at follow-up.  Plan for RESET talha.    - DTx Talha - Subst Use Disorder (RESET); Use as directed for 90 days.  Dispense: 1 each; Refill: 3    2. PTSD (post-traumatic stress disorder)  Encouraged her to set up therapy, consider [facility] given history of trauma.      3. Tobacco use disorder, moderate, dependence  Has decreased use with Chantix.  Encouraged continued efforts.      PDMP Review       Value Time User    State PDMP site checked  Yes 12/29/2022  9:13 AM Melissa Covington,             RTC  Return in about 4 weeks (around 1/26/2023) for Follow up, with me, in person.      Counseled the patient on the importance of having a recovery program in addition to medication to manage recovery.  Components include avoiding isolating, having willingness to change, avoiding triggers and managing cravings. Encouraged having some type of sober network and practicing honesty with trusted support person(s). Encouraged other services such as counseling, 12 step or other self-help organizations.        SUBJECTIVE                                                    Patsy Santamaria is a 67 year old female with a history of peripheral neuropathy, COPD, alcoholic hepatitis, thombocytopenia, arthritis (hands and knees), obesity, anxiety, depression, and AUD who presents to clinic today for follow up.     Brief history of substance use:  Began drinking around age 33.  Became a problem for her in 2016 and she went to treatment for the first time and also experienced EtOH withdrawal seizures.  Has been to multiple treatments (most recently [facility] in [place] in Feb 2022).  Drinking tends to correlate with worsening depression.  Has tried  "naltrexone and acamprosate in past.  History of of cocaine use (1 year in the 1990s).  Established care with [facility] in March 2022 and has continued to struggle with brief episodes of drinking.       Plan from most recent office visit (12/1/22):  1. Alcohol use disorder, severe, dependence (H)  Continues to struggle with intermittent use of EtOH.  Has found topiramate helpful for cravings (first med that has seemed helpful!) and denies side effects so we will continue.  Discussed benefit from adding psychosocial treatment and/or therapy to achieve her goal of sobriety.  She had scheduled assessment previously but is really only interested in in-person options and at that time there were not options available here.  Encouraged her to schedule another assessment and therapy.  Plan for RESET prescription at next visit (don based program for ORAL).  - topiramate (TOPAMAX) 25 MG tablet; Take 1 tablet (25 mg) by mouth every evening  Dispense: 30 tablet; Refill: 2     2. Episode of recurrent major depressive disorder, unspecified depression episode severity (H)  Needs improvement.  She is hopeful today and future oriented.  Discussed benefits of exercising for mood.  She will schedule with therapist as well.    - mirtazapine (REMERON) 15 MG tablet; Take 1 tablet (15 mg) by mouth At Bedtime  Dispense: 30 tablet; Refill: 2     3. Tobacco use disorder, moderate, dependence  She is planning to start Chantix tomorrow.  Not ready to set quit date but was open to setting goal of decreasing to 1/2 ppd in 1 week.    TODAY'S VISIT  HPI Dec 29, 2022    Chantix - weird dreams but no other issues, currently smoking </= half pack, notices improvement in breathing already  [employer] - not yet but her transportation company will give her rides to [employer]  Open to therapy, has not started    No EtOH since 12/14  \"Binge drinking\" - was drinking off and on (not daily) for 1 week prior (750mL-1L), had some moderate " withdrawal, feeling well now    Colonoscopy in February      PHQ 10/27/2022 12/1/2022 12/29/2022   PHQ-9 Total Score 6 5 5   Q9: Thoughts of better off dead/self-harm past 2 weeks Not at all Not at all Not at all         OBJECTIVE                                                    PHYSICAL EXAM:  /70 (BP Location: Right arm, Patient Position: Sitting, Cuff Size: Adult Large)   Pulse 74   Wt 115.7 kg (255 lb)   BMI 44.46 kg/m      GENERAL: healthy, alert and no distress  EYES: Eyes grossly normal to inspection, conjunctivae and sclerae normal  RESP: No respiratory distress, no coughing or wheezing  MS: no gross musculoskeletal defects noted  SKIN: no suspicious lesions or rashes, no pallor or jaundice   NEURO: Normal gait, no tremor, mentation intact and speech normal  MENTAL STATUS EXAM  Appearance/Behavior: No appearant distress  Speech: Normal  Mood/Affect: depressed affect and anxiety  Insight: Fair    LAB  No results found for any visits on 12/29/22.      HISTORY                                                    Problem list reviewed & adjusted, as indicated.  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)     Diuretic-induced hypokalemia     Dyspnea on exertion     Elevated LFTs     Ganglion of tendon sheath     GI bleed     Hereditary and idiopathic peripheral neuropathy     History of major depression     Homeless     Major depression, recurrent (H)     Low backache     Airway obstruction due to foreign body, initial encounter     Hypomagnesemia      Mild episode of recurrent major depressive disorder (H)     Morbid obesity (H)     Smoker     Peripheral edema     Pneumonia of right lower lobe due to infectious organism     Primary localized osteoarthrosis, hand     Primary osteoarthritis of right knee     PTSD (post-traumatic stress disorder)     Seasonal allergies     Skin lesion     Snoring     Trochanteric bursitis of left hip     Vitamin B12 deficiency (non anemic)     Vitamin D deficiency     Acute alcoholic intoxication (H)     Anxiety     Closed fracture of head of left radius     Recurrent falls     Thrombocytopenia (H)     Dermatitis     Depressive disorder     Leukopenia     Alcoholic cirrhosis of liver without ascites (H)     Sigmoid Diverticulitis     Mixed simple and mucopurulent chronic bronchitis (H)         MEDICATION LIST (prior to visit)  bumetanide (BUMEX) 1 MG tablet, Take 1 tablet (1 mg) by mouth daily  escitalopram (LEXAPRO) 10 MG tablet, Take 1 tablet (10 mg) by mouth daily Take with 5mg tablet for total of 15mg/day.  escitalopram (LEXAPRO) 5 MG tablet, Take 1 tablet (5 mg) by mouth daily Take with 10mg tablet for total of 15mg per day.  hydrOXYzine (ATARAX) 50 MG tablet, Take 0.5-1 tablets (25-50 mg) by mouth 3 times daily as needed for anxiety  mirtazapine (REMERON) 15 MG tablet, Take 1 tablet (15 mg) by mouth At Bedtime  omeprazole (PRILOSEC) 20 MG DR capsule, Take 20 mg by mouth daily as needed  topiramate (TOPAMAX) 25 MG tablet, Take 1 tablet (25 mg) by mouth every evening  varenicline (CHANTIX) 1 MG tablet, Take 1 tablet (1 mg) by mouth 2 times daily  acetaminophen (TYLENOL) 500 MG tablet, Take 1-2 tablets (500-1,000 mg) by mouth every 6 hours as needed for mild pain  albuterol (PROAIR HFA/PROVENTIL HFA/VENTOLIN HFA) 108 (90 Base) MCG/ACT inhaler, Inhale 2 puffs into the lungs every 4 hours as needed for shortness of breath / dyspnea or wheezing  budesonide-formoterol (SYMBICORT) 160-4.5 MCG/ACT Inhaler, Inhale 2 puffs into the lungs  "2 times daily  ipratropium - albuterol 0.5 mg/2.5 mg/3 mL (DUONEB) 0.5-2.5 (3) MG/3ML neb solution, Take 1 vial (3 mLs) by nebulization every 4 hours as needed for shortness of breath / dyspnea or wheezing  umeclidinium (INCRUSE ELLIPTA) 62.5 MCG/INH inhaler, Inhale 1 puff into the lungs daily  varenicline (CHANTIX RICA) 0.5 MG X 11 & 1 MG X 42 tablet, Take 0.5 mg tab daily for 3 days, THEN 0.5 mg tab twice daily for 4 days, THEN 1 mg twice daily.    No current facility-administered medications on file prior to visit.      MEDICATION LIST (after visit)  Current Outpatient Medications   Medication     bumetanide (BUMEX) 1 MG tablet     DTx Talha - Subst Use Disorder (RESET)     escitalopram (LEXAPRO) 10 MG tablet     escitalopram (LEXAPRO) 5 MG tablet     hydrOXYzine (ATARAX) 50 MG tablet     mirtazapine (REMERON) 15 MG tablet     omeprazole (PRILOSEC) 20 MG DR capsule     topiramate (TOPAMAX) 25 MG tablet     varenicline (CHANTIX) 1 MG tablet     acetaminophen (TYLENOL) 500 MG tablet     albuterol (PROAIR HFA/PROVENTIL HFA/VENTOLIN HFA) 108 (90 Base) MCG/ACT inhaler     budesonide-formoterol (SYMBICORT) 160-4.5 MCG/ACT Inhaler     ipratropium - albuterol 0.5 mg/2.5 mg/3 mL (DUONEB) 0.5-2.5 (3) MG/3ML neb solution     umeclidinium (INCRUSE ELLIPTA) 62.5 MCG/INH inhaler     varenicline (CHANTIX RICA) 0.5 MG X 11 & 1 MG X 42 tablet     No current facility-administered medications for this visit.         Allergies   Allergen Reactions     Bupropion Diarrhea     Codeine Hives, Itching, Nausea and Rash     Nickel Unknown     Oxycodone Nausea and Vomiting     Percocet [Oxycodone-Acetaminophen]      Patient reports \"vomiting,grossly ill two weeks ago\"     Topiramate Unknown     Diclofenac Nausea     Tolerates the topical gel     Furosemide Rash     Hydrochlorothiazide Rash     phototoxicity - med was d/lora         Sulfa Drugs Itching and Rash     Sulfasalazine Rash       Melissa Covington, [facility]  [phone]

## 2022-12-29 NOTE — TELEPHONE ENCOUNTER
Please reach out to Patsy about setting up RESET don.  She would benefit from coming in person for setting it up and lives quite close to clinic.    Thanks!    Melissa Covintgon, DO on 12/29/2022 at 9:32 AM

## 2022-12-29 NOTE — PROGRESS NOTES
[facility]       Rooming information:    Provider to review ED visit notes/labs from 12/14/22.     Point of care urine drug screen positive for: NA     *POC urine drug screen does not screen for Fentanyl    PHQ-9 Scores: 5  PHQ 9/29/2022 10/27/2022 12/1/2022   PHQ-9 Total Score 5 6 5   Q9: Thoughts of better off dead/self-harm past 2 weeks Not at all Not at all Not at all     ALISIA-7 Scores: 3  ALISIA-7 SCORE 9/29/2022 10/27/2022 12/1/2022   Total Score 4 3 6       Any other recent substance use:     Alcohol  About 2 weeks ago   NICOTINE-Yes: down to 0.5 PPD  If using nicotine, ready to quit? Yes: taking Chantix, last prescribed on 12/23/22    Side effects related to medications pt would like to discuss with provider (constipation, dry mouth, HA, GI upset, sedation?) Yes -weird dreams from Chantix    Narcan currently available: N/A    Primary care provider: EJ WELLER MD     Mental health provider: none (follow up on MH referral if needed)    Any housing, insurance deficits?: stable    Contact information up to date? active    3rd Party Involvement NA (please obtain LUDIN if pt would like to include)      Melissa Bacon  December 29, 2022  8:35 AM    MH&A Post-Appointment Chart -check      Medications ordered this visit were e-scribed.  Verified by order class [] yes  [x] no    List Medications:    Medication changes or discontinuations were communicated to patient's pharmacy: [] yes  [x] no    UA collected [] yes  [x] no  [] n/a-virtual     Outside referrals / labs, etc support staff to follow up: [] yes  [x] no    Future appointment was made: [x] yes  [] no  [] n/a      Dictation completed at time of chart check: [] yes  [x] no    I have checked the documentation for today s encounters and the above information has been reviewed and completed.      Melissa Bacon on December 29, 2022 at 2:23 PM

## 2023-01-06 ENCOUNTER — PATIENT OUTREACH (OUTPATIENT)
Dept: CARE COORDINATION | Facility: CLINIC | Age: 68
End: 2023-01-06

## 2023-01-06 NOTE — PROGRESS NOTES
Clinic Care Coordination Contact    Follow Up Progress Note      Assessment: JESU HENSON reached out to speak o pt about follow up on housing and dental resources. Pt reports that she has her dental work completed but now she is looking to get dentures and would like low cost options.     Pt also reports she is looking for a  dog. Pt does not know if she would be able to afford full price of initial dog fee and would like to look into rescuing a dog. She is open to an older dog but not a service dog.     JESU HENSON also asked pt how looking for housing is going. Pt reports her search is on hold right now because wait lists that she wants to be on are closed right now. Pt wants to wait until there are more options and availability.     JESU CC mailed resources for dentures and  dog option     Care Gaps:    Health Maintenance Due   Topic Date Due     COPD ACTION PLAN  Never done     DEPRESSION ACTION PLAN  Never done     COLORECTAL CANCER SCREENING  Never done     Pneumococcal Vaccine: 65+ Years (2 - PCV) 01/28/2021     LUNG CANCER SCREENING  01/28/2021     MEDICARE ANNUAL WELLNESS VISIT  01/31/2022     URINE DRUG SCREEN  05/26/2022     NICOTINE/TOBACCO CESSATION COUNSELING Q 1 YR  01/27/2023         Care Plans      Intervention/Education provided during outreach:     [facility]     Website: [email or web address]      Phone Number: [phone]     Address: [address] [place] [address]       [facility]      Website: [email or web address] the [place]      Phone Number: [phone]     Address: [address] [place] [address]     Plan:   Care Coordinator will follow up in one month     KATHLEEN Espana? Social Work Care Coordinator   [facility]  [email or web address]? [email or web address]    Phone: [phone]  she/her

## 2023-01-23 ENCOUNTER — TELEPHONE (OUTPATIENT)
Dept: GASTROENTEROLOGY | Facility: CLINIC | Age: 68
End: 2023-01-23

## 2023-01-23 DIAGNOSIS — Z12.11 ENCOUNTER FOR SCREENING COLONOSCOPY: Primary | ICD-10-CM

## 2023-01-23 DIAGNOSIS — K57.32 DIVERTICULITIS OF COLON: ICD-10-CM

## 2023-01-23 RX ORDER — BISACODYL 5 MG
TABLET, DELAYED RELEASE (ENTERIC COATED) ORAL
Qty: 4 TABLET | Refills: 0 | Status: ON HOLD | OUTPATIENT
Start: 2023-01-23 | End: 2023-03-20

## 2023-01-23 NOTE — TELEPHONE ENCOUNTER
Pt returned call - pt stated she does not want [place] location and wanted to reschedule at a different facility.    Pt was transferred to scheduling.    Valeria Sahni RN on 1/23/2023 at 10:43 AM

## 2023-01-23 NOTE — TELEPHONE ENCOUNTER
Attempted to contact patient regarding upcoming Colonoscopy/upper endoscopy (EGD) procedure on 2/2/23 for pre assessment questions. No answer.     Left message to return call to [phone] #4    Discuss Covid policy and designated  policy.    Pre op exam scheduled: No, Pt needs to schedule PAC eval d/t BMI 46.08. Left number [phone] in  to call to schedule ASAP.    Arrival time: 0730    Facility location: [facility]; [address] [place] [address]    Sedation type: MAC    Anticoagulants: No    Electronic implanted devices? No    Diabetic? No    Indication for procedure: Variceal screening, alcoholic cirrhosis of liver without ascites, screening colonoscopy, f/u diverticulitis    Bowel prep recommendation: Extended prep Golytely  d/t BMI 46.08 and constipation    Prep instructions previously sent via letter.     Bowel prep script sent to    [id number] - [place], [place] - [address] [facility] [address]        Araceli Treviño RN  Endoscopy Procedure Pre Assessment RN

## 2023-01-23 NOTE — TELEPHONE ENCOUNTER
FUTURE VISIT INFORMATION      SURGERY INFORMATION:    Date: 23    [facility]: [facility]    Surgeon:  Lizandro Schmitz MD    Anesthesia Type:  mac    Procedure: COLONOSCOPY ESOPHAGOGASTRODUODENOSCOPY (EGD)    RECORDS REQUESTED FROM:       Primary Care Provider: Jenny Ireland LSW- [facility]    Pertinent Medical History: copd    Most recent EKG+ Tracin22- [facility]    Most recent ECHO: 21    Most recent Cardiac Stress Test: 21    Most recent PFT's: 19

## 2023-01-25 ENCOUNTER — PATIENT OUTREACH (OUTPATIENT)
Dept: CARE COORDINATION | Facility: CLINIC | Age: 68
End: 2023-01-25
Payer: COMMERCIAL

## 2023-01-25 ENCOUNTER — TELEPHONE (OUTPATIENT)
Dept: GASTROENTEROLOGY | Facility: CLINIC | Age: 68
End: 2023-01-25
Payer: COMMERCIAL

## 2023-01-25 NOTE — PROGRESS NOTES
Clinic Care Coordination Contact    Follow Up Progress Note      Assessment: JESU HENSON was notified that pt left message on [facility] department line requesting to talk to JESU CC regarding transportation. JESU CC called and spoke to pt about her needs. Pt reports she would like info on medical rides to appointments. JESU CC reports that she is not sure her insurance fully covers rides but she could mail some options. Patsy said this would be good.     JESU HENSON gathered resources to be mailed to pt      Care Gaps:    Health Maintenance Due   Topic Date Due     COPD ACTION PLAN  Never done     DEPRESSION ACTION PLAN  Never done     COLORECTAL CANCER SCREENING  Never done     Pneumococcal Vaccine: 65+ Years (2 - PCV) 01/28/2021     LUNG CANCER SCREENING  01/28/2021     MEDICARE ANNUAL WELLNESS VISIT  01/31/2022     URINE DRUG SCREEN  05/26/2022     NICOTINE/TOBACCO CESSATION COUNSELING Q 1 YR  01/27/2023         Care Plans    Intervention/Education provided during outreach:     Transportation:      [employer]:     -Says on the [employer] website that some rides are covered. In order to check, you either have to login to your account or call this number: [phone],           [employer]:     -[phone]     -Non emergency medical transport - [place] recipients mainly and possibly [place] care, waivers, and MFIP     Help At Your Door:     -$10 per one way or sliding scale based on age and income     -Schedule service by going on their website or calling [phone]     [employer]:     -Can apply online to get rides covered. The application is detailed of reasons why public transportation does not work for you. Also has a section for your doctor to fill out. If you would like an application, please let me know at our next phone call.      Outreach Frequency: monthly    Plan:   Care Coordinator will follow up in 3 weeks      KATHLEEN Espana? Social Work Care Coordinator   [facility]  [email or web address]? [email or web address]    Phone: [phone]  she/her

## 2023-01-25 NOTE — TELEPHONE ENCOUNTER
Reviewing chart for PAC eval.   PAC eval scheduled on 1/26/23, however previous note states pt wanted to reschedule procedure to another location.    Sent message to endoscopy scheduling to contact pt to reschedule.    Araceli Treviño RN  Endoscopy Procedure Pre-Assessment RN

## 2023-01-25 NOTE — TELEPHONE ENCOUNTER
"Per staff message patient requesting for a different location. Reached out to patient to reschedule. Patient stated she wanted to reschedule due to not having transportation. Informed patient that some insurance benefits include medical transportation depending on eligibility. Provided patient with  number to call and discuss options.    For now keeping procedure on 2/2 at [facility]. Will connect with patient on 1/27 if patient was able to arrange medical transportation or if procedure needs to be canceled/rescheduled.    Araceli Treviño RN  P Endoscopy Scheduling Pool  Hello,     Upon chart review, it looks like pt wants to reschedule to another location. Currently scheduled at [facility] on 2/2/23.       \"Pt returned call - pt stated she does not want [place] location and wanted to reschedule at a different facility.       Pt was transferred to scheduling.       Valeria Sahni RN on 1/23/2023 at 10:43 AM\"         She must have gotten disconnected with the transfer. Can someone please contact her to reschedule? Looks like she also has PAC eval scheduled for tomorrow 1/26/23.     Thank you,   Araceli Treviño, RN   Endoscopy Procedure Pre-Assessment RN     "

## 2023-01-26 ENCOUNTER — ANESTHESIA EVENT (OUTPATIENT)
Dept: CALL CENTER | Age: 68
End: 2023-01-26

## 2023-01-26 ENCOUNTER — PRE VISIT (OUTPATIENT)
Dept: SURGERY | Facility: CLINIC | Age: 68
End: 2023-01-26

## 2023-01-26 ENCOUNTER — VIRTUAL VISIT (OUTPATIENT)
Dept: SURGERY | Facility: CLINIC | Age: 68
End: 2023-01-26
Payer: COMMERCIAL

## 2023-01-26 DIAGNOSIS — K70.31 ALCOHOLIC CIRRHOSIS OF LIVER WITH ASCITES (H): ICD-10-CM

## 2023-01-26 DIAGNOSIS — Z01.818 PREOP EXAMINATION: Primary | ICD-10-CM

## 2023-01-26 DIAGNOSIS — Z12.11 COLON CANCER SCREENING: ICD-10-CM

## 2023-01-26 PROCEDURE — 99205 OFFICE O/P NEW HI 60 MIN: CPT | Mod: TEL | Performed by: CLINICAL NURSE SPECIALIST

## 2023-01-26 ASSESSMENT — ENCOUNTER SYMPTOMS
SEIZURES: 0
DYSRHYTHMIAS: 0

## 2023-01-26 ASSESSMENT — COPD QUESTIONNAIRES: COPD: 1

## 2023-01-26 ASSESSMENT — LIFESTYLE VARIABLES: TOBACCO_USE: 1

## 2023-01-26 NOTE — PROGRESS NOTES
Patsy is a 67 year old who is being evaluated via a billable video visit.      How would you like to obtain your AVS? Email [email or web address]          Subjective   Patsy is a 67 year old; presenting for the following health issues:  Pre-Op Exam (/)      HPI       Review of Systems                  Physical Exam     DIANA Oconnell LPN

## 2023-01-26 NOTE — PATIENT INSTRUCTIONS
Name:  Patsy Santamaria   MRN:  [id number]   :  1955   Today's Date:  2023     GI Lab procedures:    A representative from the GI Lab will contact you regarding arrival date and time.      You were seen today in the PAC Clinic.   (Pre-operative Anesthesia Assessment Center)  [facility] [place] [address]   phone [phone]    You had a pre-operative assessment done.    Anesthesia recommendations for medications:    Hold Aspirin for 2 days before procedure.  Hold Multivitamins for 7 days before procedure.   Hold Herbal medications and Supplements for 7 days before procedure.  Hold Ibuprofen for 2 days before procedure.   Hold Naproxen for 2 days before procedure.         Please hold the following medications the day of procedure:    Vitamin C            Please take these medications the day of procedure:    Acetaminophen(Tylenol) as needed  Albuterol Inhaler as needed and bring the day of surgery  Symbicort Inhaler  Escitalopram(Lexapro)  Hydroxyzine(Atarax) as needed  Duoneb  Omeprazole(Prilosec) as needed  Incruse Ellipta   Bumetanide(Bumex)    For questions or appointments, call:  [facility]: [phone], option 2.  Monday through Friday, 8 a.m. to 4:30 p.m.  (If it is after hours, please reach out to the clinic or provider that scheduled your appointment)

## 2023-01-26 NOTE — H&P
Pre-Operative H & P     CC:  Preoperative exam to assess for increased cardiopulmonary risk while undergoing surgery and anesthesia.    Date of Encounter: 1/26/2023  Primary Care Physician:  Yanique Cali     Reason for visit:   Encounter Diagnoses   Name Primary?     Preop examination Yes     Alcoholic cirrhosis of liver with ascites (H)      Colon cancer screening        HPI  Patsy Santamaria is a 67 year old female who presents for pre-operative H & P in preparation for  Procedure Information     Case: [id number] Date/Time: 02/02/23 0900    Procedures:       COLONOSCOPY (Anus)      ESOPHAGOGASTRODUODENOSCOPY (EGD) (Esophagus)    Anesthesia type: MAC    Diagnosis: Alcoholic cirrhosis of liver with ascites (H) [K70.31]    Pre-op diagnosis: Alcoholic cirrhosis of liver with ascites (H) [K70.31]    Location:  [facility] /  GI    Providers: Lizandro Schmitz MD        History is obtained from the patient and chart review    Patient with history of alcohol related liver disease, alcohol use disorder and history of alcohol withdrawal seizures who has been recently evaluated by Dr. Sanchez and team. Per their notes she has history of alcohol related hepatitis, but with normal AST and ALT - less suggestive of active inflammation. Normal platelets and no splenomegaly - less suggestive of portal HTN. Normal INR - suggestive of intact synthetic function. She has no signs or symptoms of decompensated disease. She was referred to addiction medicine, and her alcoholism is currently in remission. She was also referred for above EGD for variceal screening + colonoscopy for colon cancer screening & follow up of diverticulitis.     She is followed for primary care by Yanique Cali.    Her history is otherwise significant for class 3 obesity, chronic lower extremity edema, continued smoking, COPD, MELVI, GERD, anxiety, depression, PTSD, peripheral neuropathy, and OA.      Hx of abnormal bleeding or anti-platelet  use: Denies    Menstrual history: No LMP recorded. Patient is postmenopausal.     Past Medical History  Past Medical History:   Diagnosis Date     Alcohol use disorder      Alcohol withdrawal seizure without complication (H) 2016     Alcoholic cirrhosis (H)      Anxiety      Chronic alcohol dependence, continuous (H) 2018    inpatient 2019, sober since then     Chronic Lower Extremity Edema      Chronic reflux esophagitis      COPD (chronic obstructive pulmonary disease) (H)      Depression      Dermatitis      Diverticulitis of colon 2022     Fibroid uterus      Ganglion     right foot     GERD (gastroesophageal reflux disease)      H. pylori infection      Melanoma (H) 2019    left upper arm     Menopause     age 52     Obesity (BMI 35.0-39.9 without comorbidity)      Osteoarthritis     Bilateral Knees     Peripheral neuropathy      Seizure (H) 2016    during alcohol withdrawal     Sleep apnea     mild, doesn't tolerate pap therapy       Past Surgical History  Past Surgical History:   Procedure Laterality Date     APPENDECTOMY      Childhood     ARTHROSCOPY KNEE Right      BIOPSY SKIN (LOCATION) Left 2019    left upper arm      SECTION        EXCISION LESION/TENDON-SHEATH/CAPSULE, FOOT      Description: Excision Of Cyst Of Tendon Sheath Of Foot;  Proc Date: 10/28/2011;  Comments: [facility]      KNEE SCOPE, DIAGNOSTIC      Description: Arthroscopy Knee Right;  Proc Date: 10/11/2005;  Comments: for right knee patella subluxation with lateral retinacular release; subpatellar chondroplasty      LATERAL RETINACULAR RELEASE OPEN      Description: Knee Lateral Retinacular Release;  Proc Date: 10/11/2005;     HEMORRHOIDECTOMY INTERNAL LIGATION      Description: Hemorrhoidectomy;  Recorded: 2008;     THUMB SURGERY      Removal of bone spurs     TONSILLECTOMY       [facility]  DELIVERY ONLY      Description:  Section;  Recorded: 2008;     [facility]  LIGATE FALLOPIAN TUBE      Description: Tubal Ligation;  Recorded: 09/01/2008;       Prior to Admission Medications  Current Outpatient Medications   Medication Sig Dispense Refill     acetaminophen (TYLENOL) 500 MG tablet Take 1-2 tablets (500-1,000 mg) by mouth every 6 hours as needed for mild pain 60 tablet 11     albuterol (PROAIR HFA/PROVENTIL HFA/VENTOLIN HFA) 108 (90 Base) MCG/ACT inhaler Inhale 2 puffs into the lungs every 4 hours as needed for shortness of breath / dyspnea or wheezing 18 g 3     Bioflavonoid Products (VITAMIN C) CHEW Take by mouth every morning       budesonide-formoterol (SYMBICORT) 160-4.5 MCG/ACT Inhaler Inhale 2 puffs into the lungs 2 times daily 10.2 g 11     bumetanide (BUMEX) 1 MG tablet Take 1 tablet (1 mg) by mouth daily (Patient taking differently: Take 1 mg by mouth every morning) 30 tablet 1     escitalopram (LEXAPRO) 10 MG tablet Take 1 tablet (10 mg) by mouth daily Take with 5mg tablet for total of 15mg/day. (Patient taking differently: Take 10 mg by mouth every morning Take with 5mg tablet for total of 15mg/day.) 90 tablet 0     hydrOXYzine (ATARAX) 50 MG tablet Take 0.5-1 tablets (25-50 mg) by mouth 3 times daily as needed for anxiety 90 tablet 1     ipratropium - albuterol 0.5 mg/2.5 mg/3 mL (DUONEB) 0.5-2.5 (3) MG/3ML neb solution Take 1 vial (3 mLs) by nebulization every 4 hours as needed for shortness of breath / dyspnea or wheezing 15 mL 1     mirtazapine (REMERON) 15 MG tablet Take 1 tablet (15 mg) by mouth At Bedtime (Patient taking differently: Take 15 mg by mouth nightly as needed) 30 tablet 2     Multiple Vitamins-Minerals (HAIR SKIN AND NAILS FORMULA PO) Take by mouth every morning       omeprazole (PRILOSEC) 20 MG DR capsule Take 20 mg by mouth daily as needed       topiramate (TOPAMAX) 25 MG tablet Take 1 tablet (25 mg) by mouth every evening 30 tablet 2     umeclidinium (INCRUSE ELLIPTA) 62.5 MCG/INH inhaler Inhale 1 puff into the lungs daily (Patient taking  "differently: Inhale 1 puff into the lungs every morning) 30 each 1     bisacodyl (DULCOLAX) 5 MG EC tablet 2 days prior to procedure, take 2 tablets at 4 pm. 1 day prior to procedure, take 2 tablets at 4 pm. For additional instructions refer to your colonoscopy prep instructions. 4 tablet 0     DTx Talha - Subst Use Disorder (RESET) Use as directed for 90 days. (Patient not taking: Reported on 1/26/2023) 1 each 3     escitalopram (LEXAPRO) 5 MG tablet Take 1 tablet (5 mg) by mouth daily Take with 10mg tablet for total of 15mg per day. (Patient taking differently: Take 5 mg by mouth daily Take with 10mg tablet for total of 15mg per day.) 90 tablet 0     polyethylene glycol (GOLYTELY) 236 g suspension 2 days prior at 5pm, mix and drink half of a jug of Golytely. Drink an 8 oz. glass of Golytely every 15 minutes until half of the jug is gone. Place remainder of Golytely in the refrigerator. 1 day prior at 5 pm, drink the 2nd half of a jug of Golytely bowel prep. 6 hours before your check-in time, drink an 8 oz. glass of Golytely every 15 minutes until half of the 2nd jug of Golytely is gone. Discard remainder of second jug. 8000 mL 0     varenicline (CHANTIX RICA) 0.5 MG X 11 & 1 MG X 42 tablet Take 0.5 mg tab daily for 3 days, THEN 0.5 mg tab twice daily for 4 days, THEN 1 mg twice daily. (Patient not taking: Reported on 1/26/2023) 53 tablet 0     varenicline (CHANTIX) 1 MG tablet Take 1 tablet (1 mg) by mouth 2 times daily (Patient not taking: Reported on 1/26/2023) 30 tablet 11       Allergies  Allergies   Allergen Reactions     Bupropion Diarrhea     Codeine Hives, Itching, Nausea and Rash     Nickel Unknown     Oxycodone Nausea and Vomiting     Percocet [Oxycodone-Acetaminophen]      Patient reports \"vomiting,grossly ill two weeks ago\"     Topiramate Unknown     Diclofenac Nausea     Tolerates the topical gel     Furosemide Rash     Hydrochlorothiazide Rash     phototoxicity - med was d/lora         Sulfa Drugs " Itching and Rash     Sulfasalazine Rash       Social History  Social History     Socioeconomic History     Marital status: Single     Spouse name: Not on file     Number of children: Not on file     Years of education: Not on file     Highest education level: Not on file   Occupational History     Not on file   Tobacco Use     Smoking status: Every Day     Packs/day: 1.00     Years: 49.00     Pack years: 49.00     Types: Cigarettes     Smokeless tobacco: Never     Tobacco comments:     Reports on 23 smoking 1/2 PPD   Vaping Use     Vaping Use: Never used   Substance and Sexual Activity     Alcohol use: Not Currently     Comment: Last use 22     Drug use: No     Comment: Denies     Sexual activity: Yes     Partners: Male     Birth control/protection: None   Other Topics Concern     Not on file   Social History Narrative     Not on file     Social Determinants of Health     Financial Resource Strain: Not on file   Food Insecurity: Not on file   Transportation Needs: Not on file   Physical Activity: Not on file   Stress: Not on file   Social Connections: Not on file   Intimate Partner Violence: Not on file   Housing Stability: Not on file       Family History  Family History   Problem Relation Age of Onset     CABG Mother      Heart Disease Mother      Aneurysm Father          of ruptured aneurysm at 46     Heart Disease Father      Factor V Leiden deficiency Father      Factor V Leiden deficiency Sister      Anesthesia Reaction No family hx of        Review of Systems  The complete review of systems is negative other than noted in the HPI or here.   Anesthesia Evaluation   Pt has had prior anesthetic. Type: General and MAC.    No history of anesthetic complications       ROS/MED HX  ENT/Pulmonary: Comment: Intermittent use of CPAP, doesn't tolerate  Smoking 1/2 PPD  Pulmonary symptoms stable. Denies shortness of breath unless she is walking far distance or climbing stairs.   Using inhaled steroids with  rare use of albuterol or Duoneb    (+) sleep apnea, tobacco use, Current use, asthma Treatment: Inhaler prn, Nebulizer prn and Inhaled steroids,  COPD,  (-) recent URI   Neurologic:    (-) no seizures and no CVA   Cardiovascular: Comment: Bumex taken for fluid retention   BP range 120-105/60s    (+) -----Previous cardiac testing   Echo: Date: 2021 Results:    Stress Test: Date: Results:    ECG Reviewed: Date: 9/21/22 Results:  SB with SA  Cath: Date: Results:   (-) taking anticoagulants/antiplatelets and arrhythmias   METS/Exercise Tolerance: 3 - Able to walk 1-2 blocks without stopping    Hematologic: Comments: Family history of Factor V Leiden in father and sister. Patient has been tested and is neg   (-) history of blood clots and history of blood transfusion   Musculoskeletal:  - neg musculoskeletal ROS     GI/Hepatic:     (+) GERD, Asymptomatic on medication, bowel prep, hepatitis type Alcoholic,     Renal/Genitourinary:  - neg Renal ROS     Endo:     (+) Obesity,     Psychiatric/Substance Use: Comment: Currently in remission     (+) psychiatric history depression, anxiety and other (comment) (Topamax for nightmares and cravings for ETOH) alcohol abuse     Infectious Disease:  - neg infectious disease ROS     Malignancy:   (+) Malignancy, History of Skin.Skin CA Remission status post Surgery.        Other:  - neg other ROS          Virtual visit -initiated as a virtual visit but had to finish as phone call due to patient was unable to hear clearly. I was face to face with her for approximately 5 minutes.    No vitals were obtained    Physical Exam  Constitutional: Awake, alert, no apparent distress, and appears stated age.  Eyes: Pupils equal  HENT: Normocephalic  Respiratory: non labored breathing   Neurologic: Awake, alert, oriented to name, place and time.   Neuropsychiatric: Calm, cooperative. Normal affect.      Prior Labs/Diagnostic Studies   All labs and imaging personally reviewed   Lab Results    Component Value Date    WBC 7.5 12/01/2022    WBC 5.0 05/27/2021     Lab Results   Component Value Date    RBC 5.14 12/01/2022    RBC 4.44 05/27/2021     Lab Results   Component Value Date    HGB 15.4 12/01/2022    HGB 14.3 05/27/2021     Lab Results   Component Value Date    HCT 46.7 12/01/2022    HCT 42.4 05/27/2021     Lab Results   Component Value Date    MCV 91 12/01/2022    MCV 96 05/27/2021     Lab Results   Component Value Date    MCH 30.0 12/01/2022    MCH 32.2 05/27/2021     Lab Results   Component Value Date    MCHC 33.0 12/01/2022    MCHC 33.7 05/27/2021     Lab Results   Component Value Date    RDW 13.7 12/01/2022    RDW 14.8 05/27/2021     Lab Results   Component Value Date     12/01/2022     05/27/2021     Last Comprehensive Metabolic Panel:  Sodium   Date Value Ref Range Status   12/01/2022 142 136 - 145 mmol/L Final   05/27/2021 137 133 - 144 mmol/L Final     Potassium   Date Value Ref Range Status   12/01/2022 4.4 3.4 - 5.3 mmol/L Final   09/20/2022 4.2 3.5 - 5.0 mmol/L Final   05/27/2021 3.5 3.4 - 5.3 mmol/L Final     Chloride   Date Value Ref Range Status   12/01/2022 107 98 - 107 mmol/L Final   09/20/2022 103 98 - 107 mmol/L Final   05/27/2021 97 94 - 109 mmol/L Final     Carbon Dioxide   Date Value Ref Range Status   05/27/2021 34 (H) 20 - 32 mmol/L Final     Carbon Dioxide (CO2)   Date Value Ref Range Status   12/01/2022 26 22 - 29 mmol/L Final   09/20/2022 25 22 - 31 mmol/L Final     Anion Gap   Date Value Ref Range Status   12/01/2022 9 7 - 15 mmol/L Final   09/20/2022 7 5 - 18 mmol/L Final   05/27/2021 6 3 - 14 mmol/L Final     Glucose   Date Value Ref Range Status   12/01/2022 101 (H) 70 - 99 mg/dL Final   09/20/2022 155 (H) 70 - 125 mg/dL Final   05/27/2021 118 (H) 70 - 99 mg/dL Final     Urea Nitrogen   Date Value Ref Range Status   12/01/2022 12.2 8.0 - 23.0 mg/dL Final   09/20/2022 7 (L) 8 - 22 mg/dL Final   05/27/2021 14 7 - 30 mg/dL Final     Creatinine   Date Value  Ref Range Status   2022 0.58 0.51 - 0.95 mg/dL Final   2021 0.70 0.52 - 1.04 mg/dL Final     GFR Estimate   Date Value Ref Range Status   2022 >90 >60 mL/min/1.73m2 Final     Comment:     Effective 2021 eGFRcr in adults is calculated using the  CKD-EPI creatinine equation which includes age and gender (Antoinette et al., [place], DOI: 10.[facility]6/BFVFgs0452126)   2021 >90 >60 mL/min/[1.73_m2] Final     Comment:     Non  GFR Calc  Starting 2018, serum creatinine based estimated GFR (eGFR) will be   calculated using the Chronic Kidney Disease Epidemiology Collaboration   (CKD-EPI) equation.       Calcium   Date Value Ref Range Status   2022 9.9 8.8 - 10.2 mg/dL Final   2021 9.0 8.5 - 10.1 mg/dL Final     Bilirubin Total   Date Value Ref Range Status   2022 0.3 <=1.2 mg/dL Final   2021 2.3 (H) 0.2 - 1.3 mg/dL Final     Alkaline Phosphatase   Date Value Ref Range Status   2022 76 35 - 104 U/L Final   2021 130 40 - 150 U/L Final     ALT   Date Value Ref Range Status   2022 24 10 - 35 U/L Final   2021 91 (H) 0 - 50 U/L Final     AST   Date Value Ref Range Status   2022 23 10 - 35 U/L Final   2021 194 (H) 0 - 45 U/L Final     A1c 6.0    22 INR 1.1    22 N terminal pro BNP  95    EK22 Sinus bradycardia with sinus arrhythmia    Echocardiogram   Interpretation Summary  Technically difficult study.Extremely difficult acoustic windows despite the  use of contrast for endcardial border definition. Minimal information  available.  Global and regional left ventricular function is normal with an EF of 60-65%.  Based on limited views the global right ventricular function is probably  normal.     There is no prior study for direct comparison.    22 CT abd/pelvis                                           IMPRESSION:   1.  New mild changes of acute diverticulitis involving the lower descending  colon. Slight improvement of acute diverticulitis involving the sigmoid colon within the right aspect of the pelvis.     2.  Cirrhosis with fatty change in liver. Gallbladder wall thickening which can be seen with hepatic parenchymal disease.     3.  Uterine fibroids.    The patient's records and results personally reviewed by this provider.     Outside records reviewed from: Care Everywhere      Assessment      Patsy Santamaria is a 67 year old female seen as a PAC referral for risk assessment and optimization for anesthesia.    Plan/Recommendations  Pt will be optimized for the proposed procedure.  See below for details on the assessment, risk, and preoperative recommendations    NEUROLOGY  - No history of CVA/TIA  - History of withdrawal seizure in past  - Peripheral neuropathy  -Post Op delirium risk factors:  No risk identified    ENT  - Physical exam is concerning for complicated airway. Thick neck. Final evaluation and decisions by Anesthesia provider on day of procedure.   Mallampati: Unable to assess  TM: Unable to assess   Upper partial    CARDIAC  Chronic lower extremity edema. Will take Bumex on day of procedure. BP range 105-120/60s. Denies chest pain or irregular HR. Able to walk some distance ~2 blocks but will have DYSPNEA ON EXERTION if walking long distance or climbing stairs. Echocardiogram from 2021, technically difficulty, EF 60-65%  - METS (Metabolic Equivalents)<4    RCRI: 0.4% risk of serious cardiac events    PULMONARY  Continued smoking 1/2 PPD.   COPD. No 02. Reports at baseline. Will use Incruse Ellipta and Symbicort on day of procedure. Will bring albuterol inhaler along. Rare use. Duoneb prn, rare use.     MELVI with CPAP but uses intermittently as she has difficulty tolerating    - Tobacco History      History   Smoking Status     Every Day     Packs/day: 1.00     Years: 49.00     Types: Cigarettes   Smokeless Tobacco     Never       GI: GERD with prn omeprazole. May take on day of  "procedure.  Diverticulitis  PONV Low Risk  Total Score: 1           1 AN PONV: Pt is Female        /RENAL  - Baseline Creatinine 0.58    ENDOCRINE    - BMI: Estimated body mass index is 44.46 kg/m  as calculated from the following:    Height as of 9/20/22: 1.613 m (5' 3.5\").    Weight as of 12/29/22: 115.7 kg (255 lb).  Class 3 Obesity (BMI > 40)  DIABETES MELLITUS monitoring last A1c 6.0. No meds    HEME: VTE 3%  Denies personal history of blood clots  Blood clots and Factor V Leiden in father and sister. Patient tested neg  Denies history of blood transfusion     MSK: OA    PSYCH  - Anxiety, depression, PTSD  - Alcoholism in remission.     Will take Lexapro on day of procedure. Hydroxyzine prn for anxiety. Remeron at HS. Topamax at HS for nightmares and ETOH cravings.    Final plan will be decided by the assigned anesthesia provider on the date of service.    Patient was reviewed by  Dr. Hernandez    The patient is optimized for their procedure. No further diagnostic testing indicated.  Confirmed with patient that she will receive additional instructions from the GI team regarding date, arrival time, eating and drinking, bowel prep, and need for .   I instructed her to use her daily inhalers before coming in, bring albuterol inhaler along, and take her am meds with sips of water on day of procedure. She agreed.     AVS with information on meds will be mailed to her home as she reports being unable to get on MyChart.     Please refer to the physical examination documented by the anesthesiologist in the anesthesia record on the day of surgery.    Video-Visit Details    Type of service:  Video Visit-had to convert to phone visit after she could not hear me well. I was able to talk with her face to face for ~5 min.    Provider received verbal consent for a Video Visit from the patient? Yes   Video Start Time: 3:13pm   End Time: 3:27pm    Originating Location (pt. Location): Home    Distant Location (provider " location):  Off-site  Mode of Communication:  Video Conference via [employer] when patient unable to connect to [email or web address]  On the day of service:     Prep time: 25 minutes  Visit time: 19 minutes   Telephone time: 14 minutes  Documentation time: 25 minutes  ------------------------------------------  Total time: 69 minutes      MASSIEL Byrne CNS  Preoperative Assessment Center  [facility]  [facility]  Phone: [phone]  Fax: [phone]

## 2023-01-30 NOTE — TELEPHONE ENCOUNTER
Reached out to patient to connect if patient was able to schedule a medical ride or if needed to reschedule procedure. Lvm for patient to call back.

## 2023-01-31 ENCOUNTER — TELEPHONE (OUTPATIENT)
Dept: GASTROENTEROLOGY | Facility: CLINIC | Age: 68
End: 2023-01-31

## 2023-01-31 NOTE — TELEPHONE ENCOUNTER
Attempted to contact patient regarding upcoming Colonoscopy/Upper endoscopy (EGD) procedure on 2/2/23 for pre assessment questions. No answer.     Left message to return call to [phone] #4    Patient had requested to reschedule to a different site. Patient advised scheduling she needed to reschedule due to a ride issue. Schedulers did advise patient about a medical ride, was given  number to discuss ride options. Appt. Was not rescheduled. Schedulers did reach out to see if patient found a ride. Did not get a response.     Discuss Covid policy and designated  policy.    Pre op exam scheduled: Yes 1/26 - [facility]    Arrival time: 0730. Procedure time: 0900    Facility location: [facility]; [address] [place] [address], [address] Floor, [place] [address]    Sedation type: MAC    Anticoagulants: No    Electronic implanted devices? No    Diabetic? No    Indication for procedure: Variceal screening; alcoholic cirrhosis of liver without ascites; screening colonoscopy follow up diverticulitis    Bowel prep recommendation: Extended prep Golytely      Prep instructions sent via [email or web address]. Bowel prep script sent to    "[facility]" #[id number] - [place], [place] - [address]      Ashanti Russo RN  Endoscopy Procedure Pre Assessment RN

## 2023-01-31 NOTE — TELEPHONE ENCOUNTER
Caller: Patsy Santamaria    Reason for Reschedule/Cancellation (please be detailed, any staff messages or encounters to note?): Made final attempt to patient to confirm transportation. Could not get a hold of patient, left  for call back. Cancelling procedure reschedule letter sent.      Prior to reschedule please review:    Ordering Provider:Ana Cristina Romeo MD    Sedation per order:MAC    Does patient have any ASC Exclusions, please identify?: YES, MELVI      Notes on Cancelled Procedure:    Procedure:Upper and Lower Endoscopy [EGD and Colonoscopy]     Date: 2/2    Location:[facility]; [address], [address], [place] [address]    Surgeon: NAMITA OROZCO        Rescheduled: No

## 2023-02-07 ENCOUNTER — TELEPHONE (OUTPATIENT)
Dept: ADDICTION MEDICINE | Facility: CLINIC | Age: 68
End: 2023-02-07
Payer: COMMERCIAL

## 2023-02-07 DIAGNOSIS — F10.20 ALCOHOL USE DISORDER, SEVERE, DEPENDENCE (H): Primary | ICD-10-CM

## 2023-02-07 NOTE — TELEPHONE ENCOUNTER
Per provider referral, Peer   placed a telephone recovery support call to pt   Regarding  ReSet-O   PRS left a voicemail message welcoming a callback at direct desk phone - [phone].  PRS will make follow up call later this week.

## 2023-02-08 ENCOUNTER — TELEPHONE (OUTPATIENT)
Dept: ADDICTION MEDICINE | Facility: CLINIC | Age: 68
End: 2023-02-08
Payer: COMMERCIAL

## 2023-02-08 DIAGNOSIS — F43.10 PTSD (POST-TRAUMATIC STRESS DISORDER): ICD-10-CM

## 2023-02-08 DIAGNOSIS — F33.9 EPISODE OF RECURRENT MAJOR DEPRESSIVE DISORDER, UNSPECIFIED DEPRESSION EPISODE SEVERITY (H): ICD-10-CM

## 2023-02-08 NOTE — TELEPHONE ENCOUNTER
Reason for call:  Other   Patient called regarding (reason for call): call back and prescription  Additional comments: Pt requesting a callback to go over meds. She scheduled appt for 02/14/23 with Adria. Please connect with pt regarding her request.     Phone number to reach patient:  Home number on file [phone] (home)    Best Time:  asap    Can we leave a detailed message on this number?  YES    Travel screening: Not Applicable

## 2023-02-10 RX ORDER — HYDROXYZINE HYDROCHLORIDE 50 MG/1
25-50 TABLET, FILM COATED ORAL 3 TIMES DAILY PRN
Qty: 90 TABLET | Refills: 2 | Status: SHIPPED | OUTPATIENT
Start: 2023-02-10 | End: 2023-06-01

## 2023-02-10 RX ORDER — MIRTAZAPINE 15 MG/1
15 TABLET, FILM COATED ORAL AT BEDTIME
Qty: 30 TABLET | Refills: 2 | Status: SHIPPED | OUTPATIENT
Start: 2023-02-10 | End: 2023-08-02

## 2023-02-10 NOTE — TELEPHONE ENCOUNTER
Date of Last Office Visit: 12/29/22  Date of Next Office Visit: 2/24/23  No shows since last visit: no  Cancellations since last visit: no    Medication requested: mirtazapine (REMERON) 15 MG tablet Date last ordered: 12/1/22 Qty: 30 Refills: 2    Medication requested: hydrOXYzine (ATARAX) 50 MG tablet Date last ordered: 12/20/22 Qty: 90 Refills: 1       Lapse in medication adherence greater than 5 days?: no  If yes, call patient and gather details: RN spoke to the patient.  There is no lapse in medication adherence.    Medication refill request verified as identical to current order?: yes  Result of Last DAM, VPA, Li+ Level, CBC, or Carbamazepine Level (at or since last visit): N/A    Last visit treatment plan:     Instructions       Return in about 4 weeks (around 1/26/2023) for Follow up, with me, in person.       mirtazapine (REMERON) 15 MG tablet; Take 1 tablet (15 mg) by mouth At Bedtime  Dispense: 30 tablet; Refill: 2    hydrOXYzine (ATARAX) 50 MG tablet    []Medication refilled per  Medication Refill in Ambulatory Care  policy.    [x]Medication unable to be refilled by RN due to criteria not met as indicated below:    []Eligibility - not seen in the last year   []Supervision - no future appointment   []Compliance - no shows, cancellations or lapse in therapy   []Verification - order discrepancy   []Controlled medication   [x]Medication not included in policy   []90-day supply request   []Other

## 2023-02-10 NOTE — TELEPHONE ENCOUNTER
RN returned a call to this patient as requested.  The patient indicated she needed a refill of  Remeron and Hydroxyzine.  RN submitted a refill request for bot hof these medications to Dr. Covington for her review.

## 2023-02-13 DIAGNOSIS — I89.0 LYMPHEDEMA OF BOTH LOWER EXTREMITIES: ICD-10-CM

## 2023-02-13 NOTE — TELEPHONE ENCOUNTER
Medication Question or Refill        What medication are you calling about (include dose and sig)?:    bumetanide (BUMEX) 1 MG tablet        Preferred Pharmacy:   [id number] - [place], [place] - [address] AT [facility] & [address] [place] [address]  Phone: [phone] Fax: [phone]      Controlled Substance Agreement on file:   CSA -- Patient Level:    CSA: None found at the patient level.       Who prescribed the medication?: PCP    Do you need a refill? Yes    When did you use the medication last?     Patient offered an appointment? No    Do you have any questions or concerns?  No      Okay to leave a detailed message?: Yes at Home number on file [phone] (home)

## 2023-02-14 ENCOUNTER — OFFICE VISIT (OUTPATIENT)
Dept: ADDICTION MEDICINE | Facility: CLINIC | Age: 68
End: 2023-02-14
Payer: COMMERCIAL

## 2023-02-14 VITALS
HEART RATE: 67 BPM | DIASTOLIC BLOOD PRESSURE: 69 MMHG | SYSTOLIC BLOOD PRESSURE: 132 MMHG | WEIGHT: 257 LBS | BODY MASS INDEX: 44.81 KG/M2

## 2023-02-14 DIAGNOSIS — F10.20 ALCOHOL USE DISORDER, SEVERE, DEPENDENCE (H): Primary | ICD-10-CM

## 2023-02-14 DIAGNOSIS — G47.00 INSOMNIA, UNSPECIFIED TYPE: ICD-10-CM

## 2023-02-14 DIAGNOSIS — F10.220 ALCOHOL DEPENDENCE WITH UNCOMPLICATED INTOXICATION (H): Primary | ICD-10-CM

## 2023-02-14 PROCEDURE — H0038 SELF-HELP/PEER SVC PER 15MIN: HCPCS

## 2023-02-14 PROCEDURE — 99214 OFFICE O/P EST MOD 30 MIN: CPT | Performed by: FAMILY MEDICINE

## 2023-02-14 RX ORDER — TOPIRAMATE 25 MG/1
25 TABLET, FILM COATED ORAL EVERY EVENING
Qty: 30 TABLET | Refills: 2 | Status: CANCELLED | OUTPATIENT
Start: 2023-02-14

## 2023-02-14 ASSESSMENT — ANXIETY QUESTIONNAIRES
5. BEING SO RESTLESS THAT IT IS HARD TO SIT STILL: NOT AT ALL
2. NOT BEING ABLE TO STOP OR CONTROL WORRYING: SEVERAL DAYS
GAD7 TOTAL SCORE: 4
1. FEELING NERVOUS, ANXIOUS, OR ON EDGE: SEVERAL DAYS
7. FEELING AFRAID AS IF SOMETHING AWFUL MIGHT HAPPEN: NOT AT ALL
4. TROUBLE RELAXING: SEVERAL DAYS
3. WORRYING TOO MUCH ABOUT DIFFERENT THINGS: SEVERAL DAYS
GAD7 TOTAL SCORE: 4
6. BECOMING EASILY ANNOYED OR IRRITABLE: NOT AT ALL

## 2023-02-14 ASSESSMENT — PATIENT HEALTH QUESTIONNAIRE - PHQ9: SUM OF ALL RESPONSES TO PHQ QUESTIONS 1-9: 2

## 2023-02-14 ASSESSMENT — PAIN SCALES - GENERAL: PAINLEVEL: EXTREME PAIN (8)

## 2023-02-14 NOTE — PATIENT INSTRUCTIONS
Talk to your pulmonologist about your challenges with CPAP.      Let's check in again in 2 months!

## 2023-02-14 NOTE — PROGRESS NOTES
[facility]    A/P                                                    ASSESSMENT/PLAN    1. Alcohol use disorder, severe, dependence (H)  She has gone nearly 2 months without alcohol, her longest period of sobriety in some time.  Encouraged her to continue to abstain from alcohol.  Reflected on her new insights and motivations.  She will continue Topamax 25mg q hs.  Met with peer specialist today, planning for follow-up with peer specialist next week when she has a new phone to set up REST don.      2. Insomnia, unspecified type  Needs improvement.  We had a lengthy discussion regarding sleep hygiene.  Encouraged her to discuss challenges with CPAP with her pulmonologist.  She is open to CBT-I, referral placed.    - Adult Mental Health  Referral; Future        PDMP Review       Value Time User    State PDMP site checked  Yes 12/29/2022  9:13 AM Melissa Covington,             RTC  Return in about 4 weeks (around 3/14/2023) for Follow up, in person.      Counseled the patient on the importance of having a recovery program in addition to medication to manage recovery.  Components include avoiding isolating, having willingness to change, avoiding triggers and managing cravings. Encouraged having some type of sober network and practicing honesty with trusted support person(s). Encouraged other services such as counseling, 12 step or other self-help organizations.        SUBJECTIVE                                                    Patsy Santamaria is a 67 year old female with a history of peripheral neuropathy, COPD, alcoholic hepatitis, thombocytopenia, arthritis (hands and knees), obesity, anxiety, depression, and AUD who presents to clinic today for follow up.     Brief history of substance use:  Began drinking around age 33.  Became a problem for her in 2016 and she went to treatment for the first time and also experienced EtOH withdrawal seizures.  Has been to multiple  treatments (most recently [facility] in [place] in Feb 2022).  Drinking tends to correlate with worsening depression.  Has tried naltrexone and acamprosate in past.  History of of cocaine use (1 year in the 1990s).  Established care with [facility] in March 2022 and has continued to struggle with brief episodes of drinking.       Plan from most recent office visit (12/29/22):  1. Alcohol use disorder, severe, dependence (H)  Needs improvement.  She will continue Topamax 25mg as she finds this helpful, we can consider increasing at follow-up.  Plan for RESET talha.    - DTx Talha - Subst Use Disorder (RESET); Use as directed for 90 days.  Dispense: 1 each; Refill: 3     2. PTSD (post-traumatic stress disorder)  Encouraged her to set up therapy, consider [facility] given history of trauma.       3. Tobacco use disorder, moderate, dependence  Has decreased use with Chantix.  Encouraged continued efforts.    TODAY'S VISIT  HPI Feb 14, 2023  -  No alcohol use since prior to last visit!  - Reflects on how poorly she felt last time she drank  - Spending money on things that benefit her - new couch instead of alcohol, new  for her son  - Mood is more stable, less irritable  - Continues to struggle with falling and staying asleep  - Had stopped Remeron, tried taking again last night without help  - Hasn't been to [facility]  - Going to bed between 11-12, t  - Goes to bathroom at night, tries to stop drinking water after 8pm  - Naps make sleep at night worse    PHQ 12/1/2022 12/29/2022 2/14/2023   PHQ-9 Total Score 5 5 2   Q9: Thoughts of better off dead/self-harm past 2 weeks Not at all Not at all Not at all     ALISIA-7 SCORE 12/1/2022 12/29/2022 2/14/2023   Total Score 6 3 4       OBJECTIVE                                                    PHYSICAL EXAM:  /69 (BP Location: Right arm, Patient Position: Sitting, Cuff Size: Adult Large)   Pulse 67   Wt 116.6 kg (257 lb)    BMI 44.81 kg/m       GENERAL: healthy, alert and no distress  EYES: Eyes grossly normal to inspection, conjunctivae and sclerae normal  RESP: No respiratory distress  SKIN: no pallor or jaundice  NEURO: Normal gait, mentation intact and speech normal  MENTAL STATUS EXAM  Appearance/Behavior: No appearant distress  Speech: Normal  Mood/Affect: normal affect  Insight: Adequate    LAB  No results found for any visits on 02/14/23.      HISTORY                                                    Problem list reviewed & adjusted, as indicated.  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)     Diuretic-induced hypokalemia     Dyspnea on exertion     Elevated LFTs     Ganglion of tendon sheath     GI bleed     Hereditary and idiopathic peripheral neuropathy     History of major depression     Homeless     Major depression, recurrent (H)     Low backache     Airway obstruction due to foreign body, initial encounter     Hypomagnesemia     Mild episode of recurrent major depressive disorder (H)     Morbid obesity (H)     Smoker     Peripheral edema     Pneumonia of right lower lobe due to infectious organism     Primary localized osteoarthrosis, hand     Primary osteoarthritis of right knee     PTSD (post-traumatic stress disorder)     Seasonal allergies     Skin lesion     Snoring     Trochanteric bursitis of left hip     Vitamin B12 deficiency (non anemic)     Vitamin D deficiency     Acute alcoholic intoxication (H)     Anxiety     Closed fracture of head of left radius      Recurrent falls     Thrombocytopenia (H)     Dermatitis     Depressive disorder     Leukopenia     Alcoholic cirrhosis of liver without ascites (H)     Sigmoid Diverticulitis     Mixed simple and mucopurulent chronic bronchitis (H)         MEDICATION LIST (prior to visit)  albuterol (PROAIR HFA/PROVENTIL HFA/VENTOLIN HFA) 108 (90 Base) MCG/ACT inhaler, Inhale 2 puffs into the lungs every 4 hours as needed for shortness of breath / dyspnea or wheezing  budesonide-formoterol (SYMBICORT) 160-4.5 MCG/ACT Inhaler, Inhale 2 puffs into the lungs 2 times daily  escitalopram (LEXAPRO) 10 MG tablet, Take 1 tablet (10 mg) by mouth daily Take with 5mg tablet for total of 15mg/day. (Patient taking differently: Take 10 mg by mouth every morning Take with 5mg tablet for total of 15mg/day.)  escitalopram (LEXAPRO) 5 MG tablet, Take 1 tablet (5 mg) by mouth daily Take with 10mg tablet for total of 15mg per day. (Patient taking differently: Take 5 mg by mouth daily Take with 10mg tablet for total of 15mg per day.)  hydrOXYzine (ATARAX) 50 MG tablet, Take 0.5-1 tablets (25-50 mg) by mouth 3 times daily as needed for anxiety  mirtazapine (REMERON) 15 MG tablet, Take 1 tablet (15 mg) by mouth At Bedtime  topiramate (TOPAMAX) 25 MG tablet, Take 1 tablet (25 mg) by mouth every evening  acetaminophen (TYLENOL) 500 MG tablet, Take 1-2 tablets (500-1,000 mg) by mouth every 6 hours as needed for mild pain  Bioflavonoid Products (VITAMIN C) CHEW, Take by mouth every morning  bisacodyl (DULCOLAX) 5 MG EC tablet, 2 days prior to procedure, take 2 tablets at 4 pm. 1 day prior to procedure, take 2 tablets at 4 pm. For additional instructions refer to your colonoscopy prep instructions. (Patient not taking: Reported on 2/14/2023)  bumetanide (BUMEX) 1 MG tablet, Take 1 tablet (1 mg) by mouth daily (Patient taking differently: Take 1 mg by mouth every morning)  DTx Talha - Subst Use Disorder (RESET), Use as directed for 90 days. (Patient not  taking: Reported on 1/26/2023)  ipratropium - albuterol 0.5 mg/2.5 mg/3 mL (DUONEB) 0.5-2.5 (3) MG/3ML neb solution, Take 1 vial (3 mLs) by nebulization every 4 hours as needed for shortness of breath / dyspnea or wheezing  Multiple Vitamins-Minerals (HAIR SKIN AND NAILS FORMULA PO), Take by mouth every morning  omeprazole (PRILOSEC) 20 MG DR capsule, Take 20 mg by mouth daily as needed  polyethylene glycol (GOLYTELY) 236 g suspension, 2 days prior at 5pm, mix and drink half of a jug of Golytely. Drink an 8 oz. glass of Golytely every 15 minutes until half of the jug is gone. Place remainder of Golytely in the refrigerator. 1 day prior at 5 pm, drink the 2nd half of a jug of Golytely bowel prep. 6 hours before your check-in time, drink an 8 oz. glass of Golytely every 15 minutes until half of the 2nd jug of Golytely is gone. Discard remainder of second jug.  umeclidinium (INCRUSE ELLIPTA) 62.5 MCG/INH inhaler, Inhale 1 puff into the lungs daily (Patient taking differently: Inhale 1 puff into the lungs every morning)    No current facility-administered medications on file prior to visit.      MEDICATION LIST (after visit)  Current Outpatient Medications   Medication     albuterol (PROAIR HFA/PROVENTIL HFA/VENTOLIN HFA) 108 (90 Base) MCG/ACT inhaler     budesonide-formoterol (SYMBICORT) 160-4.5 MCG/ACT Inhaler     escitalopram (LEXAPRO) 10 MG tablet     escitalopram (LEXAPRO) 5 MG tablet     hydrOXYzine (ATARAX) 50 MG tablet     mirtazapine (REMERON) 15 MG tablet     topiramate (TOPAMAX) 25 MG tablet     acetaminophen (TYLENOL) 500 MG tablet     Bioflavonoid Products (VITAMIN C) CHEW     bisacodyl (DULCOLAX) 5 MG EC tablet     bumetanide (BUMEX) 1 MG tablet     DTx Talha - Subst Use Disorder (RESET)     ipratropium - albuterol 0.5 mg/2.5 mg/3 mL (DUONEB) 0.5-2.5 (3) MG/3ML neb solution     Multiple Vitamins-Minerals (HAIR SKIN AND NAILS FORMULA PO)     omeprazole (PRILOSEC) 20 MG DR capsule     polyethylene glycol  "(GOLYTELY) 236 g suspension     umeclidinium (INCRUSE ELLIPTA) 62.5 MCG/INH inhaler     No current facility-administered medications for this visit.         Allergies   Allergen Reactions     Bupropion Diarrhea     Codeine Hives, Itching, Nausea and Rash     Nickel Unknown     Oxycodone Nausea and Vomiting     Percocet [Oxycodone-Acetaminophen]      Patient reports \"vomiting,grossly ill two weeks ago\"     Topiramate Unknown     Diclofenac Nausea     Tolerates the topical gel     Furosemide Rash     Hydrochlorothiazide Rash     phototoxicity - med was d/lora         Sulfa Drugs Itching and Rash     Sulfasalazine Rash       Melissa Covington [facility]  [facility]  [phone]        "

## 2023-02-14 NOTE — PROGRESS NOTES
[facility]    Peer  met with Patsy Santamaria in the Recovery Clinic to introduce himself, detail services provided and discuss current status of recovery. Pt appeared alert, oriented and open to feedback during our discussion.     Pt  requested to set up appointment up regarding ReSet-. She shared will be getting a new phone and would like me to help with teaching virtual meetings and adding ReSet-O don. PRS and pt discussed the benefits of sobriety and how it is upon us to focus upon it on a daily basis.  PRC encouraged establishing firm and healthy boundaries with ppl, places and things as an important element to the recovery process.     PRC provided business card to pt welcoming contact for recovery based support and resources. PRC and pt agree to speak again during an upcoming RC visit.           Service Type:     Individual     Session Start Time:  9:00 am                        Session End Time:   9:15 am    Session Length:     15 Min         Patient Goal:   To utilize suboxone assisted treatment for sobriety and long term recovery.     Goal Progress:   Ongoing.    Key Risk Factors to Recovery:   PRC encourages being aware of risk factors that can lead to re-use which include avoiding isolation, avoiding triggers and managing cravings in a healthy manner. being open and willing to acceptance and change on a daily basis.  PRC encourages pt to establish a sober network calling tree to reach out to when needed.  Continue to practice honesty with ourselves and trusted support person(s).   PRC encourages regular attendance at recovery based meetings as well as finding a sponsor for mentoring and accountability.   PRC encourages consideration of other services such as counseling for mental health issues which can correlate with our substance use.      Support Needs:   Ongoing care, support and resources for opioid substance use disorder.     Follow up:   BRENTON has provided pt  with his contact information for support and resource needs.    PRC and pt agree to meet during an upcoming  visit.       [facility]  [address] [place] [address], [address]   [place], [place], [address]  Clinic Phone: [phone]  Clinic Fax: [phone]  Peer  phone: [phone]    Open Monday - Friday  9:00am-4:00pm  Walk in hours: 9am-3pm      Bernie Mccollum  February 14, 2023  4:07 PM    Heber DUEÑAS provides clinical oversite and supervision of care.

## 2023-02-14 NOTE — PROGRESS NOTES
[facility]       Rooming information:    Point of care urine drug screen positive for: Unable to urinate, said she just went before the appt.   Pt c/o of difficulties sleeping   She is getting a new phone today and will download the Reset aplication    *POC urine drug screen does not screen for Fentanyl    PHQ-9 Scores: 2  PHQ 12/1/2022 12/29/2022 2/14/2023   PHQ-9 Total Score 5 5 2   Q9: Thoughts of better off dead/self-harm past 2 weeks Not at all Not at all Not at all     ALISIA-7 Scores: 4  ALISIA-7 SCORE 12/1/2022 12/29/2022 2/14/2023   Total Score 6 3 4       Any other recent substance use:     Denies    NICOTINE-Yes:   If using nicotine, ready to quit? No. Stopped Chantix -doesn't thin it was effective    Side effects related to medications pt would like to discuss with provider (constipation, dry mouth, HA, GI upset, sedation?) No     Narcan currently available: No    Primary care provider: EJ WELLER MD     Mental health provider: Not currently (follow up on MH referral if needed)    Any housing, insurance deficits?: denies    Contact information up to date? yes    3rd Party Involvement NA (please obtain LUDIN if pt would like to include)      Melissa Bacon  February 14, 2023  8:12 AM

## 2023-02-15 RX ORDER — BUMETANIDE 1 MG/1
1 TABLET ORAL DAILY
Qty: 30 TABLET | Refills: 1 | Status: SHIPPED | OUTPATIENT
Start: 2023-02-15 | End: 2023-05-17

## 2023-02-15 NOTE — TELEPHONE ENCOUNTER
"Routing refill request to provider for review/approval because:  Drug interaction warning    Last Written Prescription Date:  11/2/22  Last Fill Quantity: 30,  # refills: 1   Last office visit provider:  12/21/22     Requested Prescriptions   Pending Prescriptions Disp Refills     bumetanide (BUMEX) 1 MG tablet 30 tablet 1     Sig: Take 1 tablet (1 mg) by mouth daily       Diuretics (Including Combos) Protocol Passed - 2/13/2023  3:02 PM        Passed - Blood pressure under 140/90 in past 12 months     BP Readings from Last 3 Encounters:   12/21/22 128/68   12/01/22 105/66   11/23/22 128/68                 Passed - Recent (12 mo) or future (30 days) visit within the authorizing provider's specialty     Patient has had an office visit with the authorizing provider or a provider within the authorizing providers department within the previous 12 mos or has a future within next 30 days. See \"Patient Info\" tab in inbasket, or \"Choose Columns\" in Meds & Orders section of the refill encounter.              Passed - Medication is active on med list        Passed - Patient is age 18 or older        Passed - No active pregancy on record        Passed - Normal serum creatinine on file in past 12 months     Recent Labs   Lab Test 12/01/22  0834   CR 0.58              Passed - Normal serum potassium on file in past 12 months     Recent Labs   Lab Test 12/01/22  0834   POTASSIUM 4.4                    Passed - Normal serum sodium on file in past 12 months     Recent Labs   Lab Test 12/01/22  0834                 Passed - No positive pregnancy test in past 12 months             Saima Hanks, RN 02/15/23 11:14 AM  "

## 2023-02-16 ENCOUNTER — PATIENT OUTREACH (OUTPATIENT)
Dept: CARE COORDINATION | Facility: CLINIC | Age: 68
End: 2023-02-16
Payer: COMMERCIAL

## 2023-02-16 DIAGNOSIS — I89.0 LYMPHEDEMA OF BOTH LOWER EXTREMITIES: ICD-10-CM

## 2023-02-16 NOTE — PROGRESS NOTES
Clinic Care Coordination Contact  [facility]/Voicemail       Clinical Data: Care Coordinator Outreach  Outreach attempted x 1.  Left message on patient's voicemail with call back information and requested return call.  Plan: Care Coordinator will try to reach patient again in 10 business days.    KATHLEEN Espana? Social Work Care Coordinator   [facility]  [email or web address]? [email or web address]    Phone: [phone]  she/her

## 2023-02-17 RX ORDER — BUMETANIDE 1 MG/1
TABLET ORAL
Qty: 90 TABLET | OUTPATIENT
Start: 2023-02-17

## 2023-02-27 ENCOUNTER — NURSE TRIAGE (OUTPATIENT)
Dept: NURSING | Facility: CLINIC | Age: 68
End: 2023-02-27
Payer: COMMERCIAL
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
## 2023-02-28 ENCOUNTER — OFFICE VISIT (OUTPATIENT)
Dept: FAMILY MEDICINE | Facility: CLINIC | Age: 68
End: 2023-02-28
Payer: COMMERCIAL

## 2023-02-28 VITALS
DIASTOLIC BLOOD PRESSURE: 82 MMHG | SYSTOLIC BLOOD PRESSURE: 127 MMHG | OXYGEN SATURATION: 97 % | HEART RATE: 69 BPM | WEIGHT: 259 LBS | BODY MASS INDEX: 45.16 KG/M2 | TEMPERATURE: 97.6 F | RESPIRATION RATE: 18 BRPM

## 2023-02-28 DIAGNOSIS — T24.201A PARTIAL THICKNESS BURN OF RIGHT LOWER EXTREMITY, INITIAL ENCOUNTER: Primary | ICD-10-CM

## 2023-02-28 PROCEDURE — 99213 OFFICE O/P EST LOW 20 MIN: CPT | Performed by: PHYSICIAN ASSISTANT

## 2023-02-28 RX ORDER — BACITRACIN ZINC 500 [USP'U]/G
OINTMENT TOPICAL 2 TIMES DAILY
Qty: 60 G | Refills: 1 | Status: ON HOLD | OUTPATIENT
Start: 2023-02-28 | End: 2023-03-20

## 2023-02-28 RX ORDER — HYDROCODONE BITARTRATE AND ACETAMINOPHEN 5; 325 MG/1; MG/1
1 TABLET ORAL EVERY 6 HOURS PRN
Qty: 12 TABLET | Refills: 0 | Status: SHIPPED | OUTPATIENT
Start: 2023-02-28 | End: 2023-03-03
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
## 2023-03-01 ENCOUNTER — PATIENT OUTREACH (OUTPATIENT)
Dept: CARE COORDINATION | Facility: CLINIC | Age: 68
End: 2023-03-01
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
## 2023-03-02 ENCOUNTER — TELEPHONE (OUTPATIENT)
Dept: ADDICTION MEDICINE | Facility: CLINIC | Age: 68
End: 2023-03-02
Payer: COMMERCIAL

## 2023-03-02 DIAGNOSIS — F10.220 ALCOHOL DEPENDENCE WITH UNCOMPLICATED INTOXICATION (H): Primary | ICD-10-CM

## 2023-03-02 PROCEDURE — 99207 PR NO BILLABLE SERVICE THIS VISIT: CPT
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
## 2023-03-14 ENCOUNTER — TELEPHONE (OUTPATIENT)
Dept: ADDICTION MEDICINE | Facility: CLINIC | Age: 68
End: 2023-03-14
Payer: COMMERCIAL

## 2023-03-14 DIAGNOSIS — F10.220 ALCOHOL DEPENDENCE WITH UNCOMPLICATED INTOXICATION (H): Primary | ICD-10-CM

## 2023-03-14 DIAGNOSIS — F33.9 EPISODE OF RECURRENT MAJOR DEPRESSIVE DISORDER, UNSPECIFIED DEPRESSION EPISODE SEVERITY (H): ICD-10-CM

## 2023-03-14 DIAGNOSIS — F10.20 ALCOHOL USE DISORDER, SEVERE, DEPENDENCE (H): ICD-10-CM

## 2023-03-14 PROCEDURE — 99207 PR NO BILLABLE SERVICE THIS VISIT: CPT

## 2023-03-14 RX ORDER — TOPIRAMATE 25 MG/1
25 TABLET, FILM COATED ORAL EVERY EVENING
Qty: 30 TABLET | Refills: 2 | Status: ON HOLD | OUTPATIENT
Start: 2023-03-14 | End: 2023-03-20

## 2023-03-14 RX ORDER — ESCITALOPRAM OXALATE 5 MG/1
5 TABLET ORAL DAILY
Qty: 90 TABLET | Refills: 0 | Status: ON HOLD | OUTPATIENT
Start: 2023-03-14 | End: 2023-03-22

## 2023-03-14 RX ORDER — ESCITALOPRAM OXALATE 10 MG/1
10 TABLET ORAL DAILY
Qty: 90 TABLET | Refills: 0 | Status: ON HOLD | OUTPATIENT
Start: 2023-03-14 | End: 2023-03-22

## 2023-03-14 NOTE — TELEPHONE ENCOUNTER
Date of Last Office Visit: 2/14/2023  Date of Next Office Visit: Pt will call back to schedule she stated.  No shows since last visit: 1 3/13/2023 overslept   Cancellations since last visit: None    Medication requested: escitalopram (LEXAPRO) 5 MG tablet Date last ordered: 12/21/2022 Qty: 90 Refills: 0  Medication requested: escitalopram (LEXAPRO) 10 MG tablet Date last ordered: 12/21/2022 Qty: 90 Refills: 0  Medication requested: topiramate (TOPAMAX) 25 MG tablet Date last ordered: 12/01/2022 Qty: 30 Refills: 2      Review of [place] ?: NA      Lapse in medication adherence greater than 5 days?: No  If yes, call patient and gather details: NA  Medication refill request verified as identical to current order?: Yes  Result of Last DAM, VPA, Li+ Level, CBC, or Carbamazepine Level (at or since last visit): N/A    Last visit treatment plan: 2/14/2023  Talk to your pulmonologist about your challenges with CPAP.       Let's check in again in 2 months!            []Medication refilled per  Medication Refill in Ambulatory Care  policy.  [x]Medication unable to be refilled by RN due to criteria not met as indicated below:    []Eligibility - not seen in the last year   [x]Supervision - no future appointment- Pt stated she would call back to schedule    [x]Compliance - no shows, cancellations or lapse in therapy   []Verification - order discrepancy   []Controlled medication   []Medication not included in policy   [x]90-day supply request   [x]Other  CMA pending medication refill request

## 2023-03-14 NOTE — TELEPHONE ENCOUNTER
BRENTON placed a telephone recovery support call to pt given a recent no show for scheduled appointment with [facility].

## 2023-03-15 ENCOUNTER — TELEPHONE (OUTPATIENT)
Dept: ADDICTION MEDICINE | Facility: CLINIC | Age: 68
End: 2023-03-15
Payer: COMMERCIAL

## 2023-03-15 DIAGNOSIS — F10.20 ALCOHOL USE DISORDER, SEVERE, DEPENDENCE (H): Primary | ICD-10-CM

## 2023-03-15 PROCEDURE — 99207 PR NO BILLABLE SERVICE THIS VISIT: CPT

## 2023-03-15 NOTE — TELEPHONE ENCOUNTER
Pt returned phone call from CPRS.  Pt  requested to set up appointment up regarding ReSet-. She shared will be getting a new phone and would like me to help with teaching virtual meetings and adding ReSet-O don.  Pt setup meeting with PRS March 21st, 2023  11:30am

## 2023-03-16 ENCOUNTER — TELEPHONE (OUTPATIENT)
Dept: ADDICTION MEDICINE | Facility: CLINIC | Age: 68
End: 2023-03-16
Payer: COMMERCIAL

## 2023-03-16 DIAGNOSIS — F10.20 ALCOHOL USE DISORDER, SEVERE, DEPENDENCE (H): Primary | ICD-10-CM

## 2023-03-16 PROCEDURE — 99207 PR NO BILLABLE SERVICE THIS VISIT: CPT

## 2023-03-16 NOTE — TELEPHONE ENCOUNTER
Patsy called Peer  work cell phone requesting a referral for CD Eval. Writer explained   will submit a referral today. Writer praised her for making big step of enrolling in a   In- patient treatment center.

## 2023-03-20 ENCOUNTER — MEDICAL CORRESPONDENCE (OUTPATIENT)
Dept: HEALTH INFORMATION MANAGEMENT | Facility: CLINIC | Age: 68
End: 2023-03-20

## 2023-03-20 ENCOUNTER — HOSPITAL ENCOUNTER (INPATIENT)
Facility: CLINIC | Age: 68
LOS: 2 days | Discharge: HOME | DRG: 896 | End: 2023-03-22
Attending: EMERGENCY MEDICINE | Admitting: INTERNAL MEDICINE
Payer: COMMERCIAL

## 2023-03-20 ENCOUNTER — APPOINTMENT (OUTPATIENT)
Dept: GENERAL RADIOLOGY | Facility: CLINIC | Age: 68
DRG: 896 | End: 2023-03-20
Attending: EMERGENCY MEDICINE
Payer: COMMERCIAL

## 2023-03-20 DIAGNOSIS — R45.851 SUICIDAL IDEATION: ICD-10-CM

## 2023-03-20 DIAGNOSIS — R06.02 SHORTNESS OF BREATH: ICD-10-CM

## 2023-03-20 DIAGNOSIS — F32.A DEPRESSIVE DISORDER: Primary | ICD-10-CM

## 2023-03-20 DIAGNOSIS — Z00.00 HEALTHCARE MAINTENANCE: ICD-10-CM

## 2023-03-20 DIAGNOSIS — F10.10 ALCOHOL ABUSE: ICD-10-CM

## 2023-03-20 DIAGNOSIS — U07.1 LAB TEST POSITIVE FOR DETECTION OF COVID-19 VIRUS: ICD-10-CM

## 2023-03-20 DIAGNOSIS — U07.1 INFECTION DUE TO 2019 NOVEL CORONAVIRUS: ICD-10-CM

## 2023-03-20 LAB
ALBUMIN SERPL BCG-MCNC: 4.1 G/DL (ref 3.5–5.2)
ALCOHOL BREATH TEST: 0.16 (ref 0–0.01)
ALP SERPL-CCNC: 87 U/L (ref 35–104)
ALT SERPL W P-5'-P-CCNC: 42 U/L (ref 10–35)
AMPHETAMINES UR QL SCN: NORMAL
ANION GAP SERPL CALCULATED.3IONS-SCNC: 15 MMOL/L (ref 7–15)
AST SERPL W P-5'-P-CCNC: 64 U/L (ref 10–35)
ATRIAL RATE - MUSE: 70 BPM
BARBITURATES UR QL SCN: NORMAL
BASOPHILS # BLD AUTO: 0 10E3/UL (ref 0–0.2)
BASOPHILS NFR BLD AUTO: 0 %
BENZODIAZ UR QL SCN: NORMAL
BILIRUB SERPL-MCNC: 0.3 MG/DL
BUN SERPL-MCNC: 8.3 MG/DL (ref 8–23)
BZE UR QL SCN: NORMAL
CALCIUM SERPL-MCNC: 8.8 MG/DL (ref 8.8–10.2)
CANNABINOIDS UR QL SCN: NORMAL
CHLORIDE SERPL-SCNC: 103 MMOL/L (ref 98–107)
CREAT SERPL-MCNC: 0.57 MG/DL (ref 0.51–0.95)
CRP SERPL-MCNC: <3 MG/L
D DIMER PPP FEU-MCNC: 0.4 UG/ML FEU (ref 0–0.5)
DEPRECATED HCO3 PLAS-SCNC: 24 MMOL/L (ref 22–29)
DIASTOLIC BLOOD PRESSURE - MUSE: NORMAL MMHG
EOSINOPHIL # BLD AUTO: 0.1 10E3/UL (ref 0–0.7)
EOSINOPHIL NFR BLD AUTO: 1 %
ERYTHROCYTE [DISTWIDTH] IN BLOOD BY AUTOMATED COUNT: 14.5 % (ref 10–15)
GFR SERPL CREATININE-BSD FRML MDRD: >90 ML/MIN/1.73M2
GLUCOSE SERPL-MCNC: 111 MG/DL (ref 70–99)
HCT VFR BLD AUTO: 41.5 % (ref 35–47)
HGB BLD-MCNC: 14.1 G/DL (ref 11.7–15.7)
IMM GRANULOCYTES # BLD: 0 10E3/UL
IMM GRANULOCYTES NFR BLD: 0 %
INR PPP: 1.03 (ref 0.85–1.15)
INTERPRETATION ECG - MUSE: NORMAL
LYMPHOCYTES # BLD AUTO: 2 10E3/UL (ref 0.8–5.3)
LYMPHOCYTES NFR BLD AUTO: 35 %
MAGNESIUM SERPL-MCNC: 1.8 MG/DL (ref 1.7–2.3)
MCH RBC QN AUTO: 29.5 PG (ref 26.5–33)
MCHC RBC AUTO-ENTMCNC: 34 G/DL (ref 31.5–36.5)
MCV RBC AUTO: 87 FL (ref 78–100)
MONOCYTES # BLD AUTO: 0.9 10E3/UL (ref 0–1.3)
MONOCYTES NFR BLD AUTO: 15 %
NEUTROPHILS # BLD AUTO: 2.7 10E3/UL (ref 1.6–8.3)
NEUTROPHILS NFR BLD AUTO: 49 %
NRBC # BLD AUTO: 0 10E3/UL
NRBC BLD AUTO-RTO: 0 /100
OPIATES UR QL SCN: NORMAL
P AXIS - MUSE: 43 DEGREES
PHOSPHATE SERPL-MCNC: 3 MG/DL (ref 2.5–4.5)
PLATELET # BLD AUTO: 132 10E3/UL (ref 150–450)
POTASSIUM SERPL-SCNC: 3.5 MMOL/L (ref 3.4–5.3)
PR INTERVAL - MUSE: 164 MS
PROT SERPL-MCNC: 6.6 G/DL (ref 6.4–8.3)
QRS DURATION - MUSE: 98 MS
QT - MUSE: 432 MS
QTC - MUSE: 466 MS
R AXIS - MUSE: -10 DEGREES
RBC # BLD AUTO: 4.78 10E6/UL (ref 3.8–5.2)
SARS-COV-2 RNA RESP QL NAA+PROBE: POSITIVE
SODIUM SERPL-SCNC: 142 MMOL/L (ref 136–145)
SYSTOLIC BLOOD PRESSURE - MUSE: NORMAL MMHG
T AXIS - MUSE: 37 DEGREES
TROPONIN T SERPL HS-MCNC: <6 NG/L
VENTRICULAR RATE- MUSE: 70 BPM
WBC # BLD AUTO: 5.7 10E3/UL (ref 4–11)

## 2023-03-20 PROCEDURE — 36415 COLL VENOUS BLD VENIPUNCTURE: CPT | Performed by: EMERGENCY MEDICINE

## 2023-03-20 PROCEDURE — 85379 FIBRIN DEGRADATION QUANT: CPT | Performed by: INTERNAL MEDICINE

## 2023-03-20 PROCEDURE — 120N000002 HC R&B MED SURG/OB UMMC

## 2023-03-20 PROCEDURE — 71046 X-RAY EXAM CHEST 2 VIEWS: CPT

## 2023-03-20 PROCEDURE — 90791 PSYCH DIAGNOSTIC EVALUATION: CPT

## 2023-03-20 PROCEDURE — 36415 COLL VENOUS BLD VENIPUNCTURE: CPT | Performed by: INTERNAL MEDICINE

## 2023-03-20 PROCEDURE — 250N000011 HC RX IP 250 OP 636: Performed by: INTERNAL MEDICINE

## 2023-03-20 PROCEDURE — 250N000013 HC RX MED GY IP 250 OP 250 PS 637: Performed by: PHYSICIAN ASSISTANT

## 2023-03-20 PROCEDURE — 86140 C-REACTIVE PROTEIN: CPT | Performed by: INTERNAL MEDICINE

## 2023-03-20 PROCEDURE — 83735 ASSAY OF MAGNESIUM: CPT | Performed by: INTERNAL MEDICINE

## 2023-03-20 PROCEDURE — U0003 INFECTIOUS AGENT DETECTION BY NUCLEIC ACID (DNA OR RNA); SEVERE ACUTE RESPIRATORY SYNDROME CORONAVIRUS 2 (SARS-COV-2) (CORONAVIRUS DISEASE [COVID-19]), AMPLIFIED PROBE TECHNIQUE, MAKING USE OF HIGH THROUGHPUT TECHNOLOGIES AS DESCRIBED BY CMS-2020-01-R: HCPCS | Performed by: EMERGENCY MEDICINE

## 2023-03-20 PROCEDURE — 99285 EMERGENCY DEPT VISIT HI MDM: CPT | Mod: 25 | Performed by: EMERGENCY MEDICINE

## 2023-03-20 PROCEDURE — 87506 IADNA-DNA/RNA PROBE TQ 6-11: CPT | Performed by: PHYSICIAN ASSISTANT

## 2023-03-20 PROCEDURE — 94640 AIRWAY INHALATION TREATMENT: CPT | Performed by: EMERGENCY MEDICINE

## 2023-03-20 PROCEDURE — 80307 DRUG TEST PRSMV CHEM ANLYZR: CPT | Performed by: EMERGENCY MEDICINE

## 2023-03-20 PROCEDURE — 85025 COMPLETE CBC W/AUTO DIFF WBC: CPT | Performed by: EMERGENCY MEDICINE

## 2023-03-20 PROCEDURE — 250N000013 HC RX MED GY IP 250 OP 250 PS 637: Performed by: INTERNAL MEDICINE

## 2023-03-20 PROCEDURE — 250N000013 HC RX MED GY IP 250 OP 250 PS 637: Performed by: EMERGENCY MEDICINE

## 2023-03-20 PROCEDURE — 99222 1ST HOSP IP/OBS MODERATE 55: CPT | Mod: AI | Performed by: INTERNAL MEDICINE

## 2023-03-20 PROCEDURE — 82075 ASSAY OF BREATH ETHANOL: CPT | Performed by: EMERGENCY MEDICINE

## 2023-03-20 PROCEDURE — 84484 ASSAY OF TROPONIN QUANT: CPT | Performed by: EMERGENCY MEDICINE

## 2023-03-20 PROCEDURE — 93005 ELECTROCARDIOGRAM TRACING: CPT | Performed by: EMERGENCY MEDICINE

## 2023-03-20 PROCEDURE — C9803 HOPD COVID-19 SPEC COLLECT: HCPCS | Performed by: EMERGENCY MEDICINE

## 2023-03-20 PROCEDURE — 84100 ASSAY OF PHOSPHORUS: CPT | Performed by: INTERNAL MEDICINE

## 2023-03-20 PROCEDURE — 85610 PROTHROMBIN TIME: CPT | Performed by: INTERNAL MEDICINE

## 2023-03-20 PROCEDURE — 80053 COMPREHEN METABOLIC PANEL: CPT | Performed by: EMERGENCY MEDICINE

## 2023-03-20 PROCEDURE — 93010 ELECTROCARDIOGRAM REPORT: CPT | Performed by: EMERGENCY MEDICINE

## 2023-03-20 PROCEDURE — HZ2ZZZZ DETOXIFICATION SERVICES FOR SUBSTANCE ABUSE TREATMENT: ICD-10-PCS | Performed by: INTERNAL MEDICINE

## 2023-03-20 RX ORDER — LORAZEPAM 1 MG/1
1-4 TABLET ORAL EVERY 30 MIN PRN
Status: DISCONTINUED | OUTPATIENT
Start: 2023-03-20 | End: 2023-03-20

## 2023-03-20 RX ORDER — HALOPERIDOL 5 MG/ML
2.5-5 INJECTION INTRAMUSCULAR EVERY 6 HOURS PRN
Status: DISCONTINUED | OUTPATIENT
Start: 2023-03-20 | End: 2023-03-22 | Stop reason: HOSPADM

## 2023-03-20 RX ORDER — ESCITALOPRAM OXALATE 10 MG/1
10 TABLET ORAL DAILY
Status: DISCONTINUED | OUTPATIENT
Start: 2023-03-21 | End: 2023-03-21

## 2023-03-20 RX ORDER — MULTIPLE VITAMINS W/ MINERALS TAB 9MG-400MCG
1 TAB ORAL DAILY
Status: DISCONTINUED | OUTPATIENT
Start: 2023-03-21 | End: 2023-03-22 | Stop reason: HOSPADM

## 2023-03-20 RX ORDER — BACITRACIN ZINC 500 [USP'U]/G
OINTMENT TOPICAL 2 TIMES DAILY
Status: DISCONTINUED | OUTPATIENT
Start: 2023-03-20 | End: 2023-03-22 | Stop reason: HOSPADM

## 2023-03-20 RX ORDER — GABAPENTIN 600 MG/1
1200 TABLET ORAL ONCE
Status: COMPLETED | OUTPATIENT
Start: 2023-03-20 | End: 2023-03-20

## 2023-03-20 RX ORDER — HYDROXYZINE HYDROCHLORIDE 25 MG/1
25-50 TABLET, FILM COATED ORAL 3 TIMES DAILY PRN
Status: DISCONTINUED | OUTPATIENT
Start: 2023-03-20 | End: 2023-03-22 | Stop reason: HOSPADM

## 2023-03-20 RX ORDER — FOLIC ACID 1 MG/1
1 TABLET ORAL DAILY
Status: DISCONTINUED | OUTPATIENT
Start: 2023-03-21 | End: 2023-03-22 | Stop reason: HOSPADM

## 2023-03-20 RX ORDER — MIRTAZAPINE 15 MG/1
15 TABLET, FILM COATED ORAL AT BEDTIME
Status: DISCONTINUED | OUTPATIENT
Start: 2023-03-20 | End: 2023-03-22 | Stop reason: HOSPADM

## 2023-03-20 RX ORDER — CLONIDINE HYDROCHLORIDE 0.1 MG/1
0.1 TABLET ORAL EVERY 8 HOURS
Status: DISCONTINUED | OUTPATIENT
Start: 2023-03-20 | End: 2023-03-22 | Stop reason: HOSPADM

## 2023-03-20 RX ORDER — LACTOBACILLUS RHAMNOSUS GG 10B CELL
1 CAPSULE ORAL 2 TIMES DAILY
Status: DISCONTINUED | OUTPATIENT
Start: 2023-03-20 | End: 2023-03-22 | Stop reason: HOSPADM

## 2023-03-20 RX ORDER — LIDOCAINE 40 MG/G
CREAM TOPICAL
Status: DISCONTINUED | OUTPATIENT
Start: 2023-03-20 | End: 2023-03-22 | Stop reason: HOSPADM

## 2023-03-20 RX ORDER — MAGNESIUM OXIDE 400 MG/1
800 TABLET ORAL ONCE
Status: COMPLETED | OUTPATIENT
Start: 2023-03-20 | End: 2023-03-20

## 2023-03-20 RX ORDER — ESCITALOPRAM OXALATE 5 MG/1
5 TABLET ORAL DAILY
Status: DISCONTINUED | OUTPATIENT
Start: 2023-03-21 | End: 2023-03-21

## 2023-03-20 RX ORDER — DIAZEPAM 10 MG/2ML
5-10 INJECTION, SOLUTION INTRAMUSCULAR; INTRAVENOUS EVERY 30 MIN PRN
Status: DISCONTINUED | OUTPATIENT
Start: 2023-03-20 | End: 2023-03-22 | Stop reason: HOSPADM

## 2023-03-20 RX ORDER — ALBUTEROL SULFATE 90 UG/1
2 AEROSOL, METERED RESPIRATORY (INHALATION) ONCE
Status: COMPLETED | OUTPATIENT
Start: 2023-03-20 | End: 2023-03-20

## 2023-03-20 RX ORDER — OLANZAPINE 5 MG/1
5-10 TABLET, ORALLY DISINTEGRATING ORAL EVERY 6 HOURS PRN
Status: DISCONTINUED | OUTPATIENT
Start: 2023-03-20 | End: 2023-03-22 | Stop reason: HOSPADM

## 2023-03-20 RX ORDER — GABAPENTIN 300 MG/1
600 CAPSULE ORAL EVERY 8 HOURS
Status: DISCONTINUED | OUTPATIENT
Start: 2023-03-24 | End: 2023-03-22 | Stop reason: HOSPADM

## 2023-03-20 RX ORDER — GABAPENTIN 300 MG/1
900 CAPSULE ORAL EVERY 8 HOURS
Status: DISCONTINUED | OUTPATIENT
Start: 2023-03-21 | End: 2023-03-22 | Stop reason: HOSPADM

## 2023-03-20 RX ORDER — FLUTICASONE FUROATE AND VILANTEROL 200; 25 UG/1; UG/1
1 POWDER RESPIRATORY (INHALATION) DAILY
Status: DISCONTINUED | OUTPATIENT
Start: 2023-03-20 | End: 2023-03-22 | Stop reason: HOSPADM

## 2023-03-20 RX ORDER — FOLIC ACID 1 MG/1
1 TABLET ORAL ONCE
Status: COMPLETED | OUTPATIENT
Start: 2023-03-20 | End: 2023-03-20

## 2023-03-20 RX ORDER — MULTIVITAMIN,THERAPEUTIC
1 TABLET ORAL ONCE
Status: COMPLETED | OUTPATIENT
Start: 2023-03-20 | End: 2023-03-20

## 2023-03-20 RX ORDER — ENOXAPARIN SODIUM 100 MG/ML
40 INJECTION SUBCUTANEOUS EVERY 24 HOURS
Status: DISCONTINUED | OUTPATIENT
Start: 2023-03-20 | End: 2023-03-22 | Stop reason: HOSPADM

## 2023-03-20 RX ORDER — FLUMAZENIL 0.1 MG/ML
0.2 INJECTION, SOLUTION INTRAVENOUS
Status: DISCONTINUED | OUTPATIENT
Start: 2023-03-20 | End: 2023-03-22 | Stop reason: HOSPADM

## 2023-03-20 RX ORDER — TOPIRAMATE 25 MG/1
25 TABLET, FILM COATED ORAL EVERY EVENING
Status: DISCONTINUED | OUTPATIENT
Start: 2023-03-20 | End: 2023-03-20

## 2023-03-20 RX ORDER — ALBUTEROL SULFATE 90 UG/1
2 AEROSOL, METERED RESPIRATORY (INHALATION) EVERY 4 HOURS PRN
Status: DISCONTINUED | OUTPATIENT
Start: 2023-03-20 | End: 2023-03-22 | Stop reason: HOSPADM

## 2023-03-20 RX ORDER — GABAPENTIN 100 MG/1
100 CAPSULE ORAL EVERY 8 HOURS
Status: DISCONTINUED | OUTPATIENT
Start: 2023-03-28 | End: 2023-03-22 | Stop reason: HOSPADM

## 2023-03-20 RX ORDER — ONDANSETRON 2 MG/ML
4 INJECTION INTRAMUSCULAR; INTRAVENOUS EVERY 6 HOURS PRN
Status: DISCONTINUED | OUTPATIENT
Start: 2023-03-20 | End: 2023-03-22 | Stop reason: HOSPADM

## 2023-03-20 RX ORDER — DIAZEPAM 5 MG
10 TABLET ORAL EVERY 30 MIN PRN
Status: DISCONTINUED | OUTPATIENT
Start: 2023-03-20 | End: 2023-03-22 | Stop reason: HOSPADM

## 2023-03-20 RX ORDER — FLUTICASONE FUROATE AND VILANTEROL 200; 25 UG/1; UG/1
1 POWDER RESPIRATORY (INHALATION) DAILY
Status: DISCONTINUED | OUTPATIENT
Start: 2023-03-21 | End: 2023-03-20

## 2023-03-20 RX ORDER — ACETAMINOPHEN 325 MG/1
650 TABLET ORAL EVERY 4 HOURS PRN
Status: DISCONTINUED | OUTPATIENT
Start: 2023-03-20 | End: 2023-03-22 | Stop reason: HOSPADM

## 2023-03-20 RX ORDER — BUMETANIDE 0.5 MG/1
1 TABLET ORAL DAILY
Status: DISCONTINUED | OUTPATIENT
Start: 2023-03-21 | End: 2023-03-22 | Stop reason: HOSPADM

## 2023-03-20 RX ORDER — DIAZEPAM 5 MG
5-20 TABLET ORAL EVERY 30 MIN PRN
Status: DISCONTINUED | OUTPATIENT
Start: 2023-03-20 | End: 2023-03-20

## 2023-03-20 RX ORDER — GABAPENTIN 300 MG/1
300 CAPSULE ORAL EVERY 8 HOURS
Status: DISCONTINUED | OUTPATIENT
Start: 2023-03-26 | End: 2023-03-22 | Stop reason: HOSPADM

## 2023-03-20 RX ORDER — ONDANSETRON 4 MG/1
4 TABLET, ORALLY DISINTEGRATING ORAL EVERY 6 HOURS PRN
Status: DISCONTINUED | OUTPATIENT
Start: 2023-03-20 | End: 2023-03-22 | Stop reason: HOSPADM

## 2023-03-20 RX ADMIN — FLUTICASONE FUROATE AND VILANTEROL TRIFENATATE 1 PUFF: 200; 25 POWDER RESPIRATORY (INHALATION) at 03:16

## 2023-03-20 RX ADMIN — ALCOHOL 1 TABLET: 70.47 GEL TOPICAL at 12:05

## 2023-03-20 RX ADMIN — THIAMINE HCL TAB 100 MG 100 MG: 100 TAB at 12:05

## 2023-03-20 RX ADMIN — FOLIC ACID 1 MG: 1 TABLET ORAL at 12:04

## 2023-03-20 RX ADMIN — DIAZEPAM 10 MG: 5 TABLET ORAL at 09:49

## 2023-03-20 RX ADMIN — LORAZEPAM 2 MG: 1 TABLET ORAL at 12:05

## 2023-03-20 RX ADMIN — LORAZEPAM 2 MG: 1 TABLET ORAL at 18:00

## 2023-03-20 RX ADMIN — ALBUTEROL SULFATE 2 PUFF: 90 AEROSOL, METERED RESPIRATORY (INHALATION) at 03:08

## 2023-03-20 RX ADMIN — Medication 1 CAPSULE: at 22:02

## 2023-03-20 RX ADMIN — ENOXAPARIN SODIUM 40 MG: 40 INJECTION SUBCUTANEOUS at 22:02

## 2023-03-20 RX ADMIN — GABAPENTIN 1200 MG: 600 TABLET, FILM COATED ORAL at 22:02

## 2023-03-20 RX ADMIN — CLONIDINE HYDROCHLORIDE 0.1 MG: 0.1 TABLET ORAL at 22:02

## 2023-03-20 RX ADMIN — LORAZEPAM 2 MG: 1 TABLET ORAL at 15:03

## 2023-03-20 RX ADMIN — MIRTAZAPINE 15 MG: 15 TABLET, FILM COATED ORAL at 22:02

## 2023-03-20 RX ADMIN — FLUTICASONE FUROATE AND VILANTEROL TRIFENATATE 1 PUFF: 200; 25 POWDER RESPIRATORY (INHALATION) at 08:23

## 2023-03-20 RX ADMIN — DIAZEPAM 10 MG: 5 TABLET ORAL at 05:46

## 2023-03-20 RX ADMIN — MAGNESIUM OXIDE TAB 400 MG (241.3 MG ELEMENTAL MG) 800 MG: 400 (241.3 MG) TAB at 12:05

## 2023-03-20 ASSESSMENT — COLUMBIA-SUICIDE SEVERITY RATING SCALE - C-SSRS
2. HAVE YOU ACTUALLY HAD ANY THOUGHTS OF KILLING YOURSELF?: YES
TOTAL  NUMBER OF INTERRUPTED ATTEMPTS LIFETIME: NO
5. HAVE YOU STARTED TO WORK OUT OR WORKED OUT THE DETAILS OF HOW TO KILL YOURSELF? DO YOU INTEND TO CARRY OUT THIS PLAN?: YES
3. HAVE YOU BEEN THINKING ABOUT HOW YOU MIGHT KILL YOURSELF?: YES
4. HAVE YOU HAD THESE THOUGHTS AND HAD SOME INTENTION OF ACTING ON THEM?: YES
2. HAVE YOU ACTUALLY HAD ANY THOUGHTS OF KILLING YOURSELF?: YES
ATTEMPT LIFETIME: NO
5. HAVE YOU STARTED TO WORK OUT OR WORKED OUT THE DETAILS OF HOW TO KILL YOURSELF? DO YOU INTEND TO CARRY OUT THIS PLAN?: YES
REASONS FOR IDEATION PAST MONTH: COMPLETELY TO END OR STOP THE PAIN (YOU COULDN'T GO ON LIVING WITH THE PAIN OR HOW YOU WERE FEELING)
6. HAVE YOU EVER DONE ANYTHING, STARTED TO DO ANYTHING, OR PREPARED TO DO ANYTHING TO END YOUR LIFE?: NO
REASONS FOR IDEATION LIFETIME: COMPLETELY TO END OR STOP THE PAIN (YOU COULDN'T GO ON LIVING WITH THE PAIN OR HOW YOU WERE FEELING)
1. HAVE YOU WISHED YOU WERE DEAD OR WISHED YOU COULD GO TO SLEEP AND NOT WAKE UP?: YES
4. HAVE YOU HAD THESE THOUGHTS AND HAD SOME INTENTION OF ACTING ON THEM?: YES
1. IN THE PAST MONTH, HAVE YOU WISHED YOU WERE DEAD OR WISHED YOU COULD GO TO SLEEP AND NOT WAKE UP?: YES
TOTAL  NUMBER OF ABORTED OR SELF INTERRUPTED ATTEMPTS LIFETIME: NO

## 2023-03-20 ASSESSMENT — ACTIVITIES OF DAILY LIVING (ADL)
ADLS_ACUITY_SCORE: 37
CONCENTRATING,_REMEMBERING_OR_MAKING_DECISIONS_DIFFICULTY: NO
DRESSING/BATHING_DIFFICULTY: NO
ADLS_ACUITY_SCORE: 37
DOING_ERRANDS_INDEPENDENTLY_DIFFICULTY: NO
WEAR_GLASSES_OR_BLIND: NO
WALKING_OR_CLIMBING_STAIRS: AMBULATION DIFFICULTY, REQUIRES EQUIPMENT
ADLS_ACUITY_SCORE: 37
ADLS_ACUITY_SCORE: 37
DIFFICULTY_EATING/SWALLOWING: NO
ADLS_ACUITY_SCORE: 22
ADLS_ACUITY_SCORE: 37
FALL_HISTORY_WITHIN_LAST_SIX_MONTHS: NO
ADLS_ACUITY_SCORE: 37
TOILETING_ISSUES: NO
WALKING_OR_CLIMBING_STAIRS_DIFFICULTY: YES
ADLS_ACUITY_SCORE: 37
ADLS_ACUITY_SCORE: 24
CHANGE_IN_FUNCTIONAL_STATUS_SINCE_ONSET_OF_CURRENT_ILLNESS/INJURY: NO

## 2023-03-20 NOTE — CARE PLAN
Patsy Santamaria  March 20, 2023  Plan of Care Hand-off Note     Patient Care Path: Inpatient Medical    Plan for Care:     Pt reports SI with plan to jump off high place, such as her apartment building. She has not attempted in the past. She is not able to engage in risk mitigation currently however endorses being able to be safe here. She reports drinking 750 ml of fátima each of the last 3 days and is concerned re: withdrawals. She is also covid positive. She is being recommended for inpatient medical due to withdrawal risk.     Of note - an order for a ORAL eval was placed via pt outpatient addiction medicine provider, Melissa Covington DO, on 3/16, but pt has yet to schedule ORAL assessment.    Critical Safety Issues: SI , alcohol withdrawal concerns.    Overview:  This patient is a child/adolescent: No    This patient has additional special visitor precautions: No    Legal Status: Voluntary    Contacts:   Melissa Covington DO  [facility]  [facility]  [phone]    KATHLEEN Espana? Social Work Care Coordinator   [facility]  [email or web address]? [email or web address]    Phone: [phone]  she/her    Son - Chandu Nam [phone]    Psychiatry Consult:  Psychiatry Consult not requested because appears closely followed outpatient    Updated Attending Provider regarding plan of care.    ROBSON HyltonSW

## 2023-03-20 NOTE — ED NOTES
Pt showing withdrawal symptoms, MSSA done and last Ativan dose given at 1800. Pt up independently, uses commode at bedside. Pt tolerating fluids but only ate a few bites of food. Pt waiting to transfer to [facility] once 1:1 is available.

## 2023-03-20 NOTE — DISCHARGE INSTRUCTIONS
Chemical Dependency Treatment     [facility] (IOP)  Coordinator is Ivana Tran (PH: [phone])  - IOP was recommended and a referral was sent to [facility].  - Coordinator, Ivana Tran, should be reaching out to you to set-up a start date.

## 2023-03-20 NOTE — H&P
[facility]    History and Physical - Hospitalist Service, GOLD TEAM 16       Date of Admission:  3/20/2023    Assessment & Plan       A: Patient is a 69 y/o woman who has COPD, alcohol dependence with past withdrawal seizures, alcoholic liver cirrhosis, obstructive sleep apnea, depression and anxiety. Patient presented for alcohol detoxification. Patient did express suicidal ideation earlier to mental health staff. Patient also was found to be positive for Covid-19. Patient appears to be mildly symptomatic with onset of symptoms yesterday (19-Mar-2023).    P:  1.) Alcohol dependence, patient at risk of alcohol withdrawal; patient has had alcohol withdrawal seizures in the past: Patient to be monitored on [facility] protocol. Chemical dependency evaluation.  2.) Suicidal ideation: Patient to be on suicide precautions for now. Psychiatry to see.  3.) Covd-19 infection, no evidence of superimposed bacterial infection:  - Onset of symptoms appear to have been on 19-Mar-2023. Patient on day 1 of isolation precautions.  - Patient has been started on paxlovid which will be continued.  4.) COPD, compensated: Patient to continue maintenance inhaler. Albuterol inhaler as needed.  5.) Alcoholic liver cirrhosis, compensated: Monitoring for changes.  6.) Obstructive sleep apnea: CPAP on hold while patient is in isolation for Covid-19.  7.) Depression, anxiety: Patient to continue lexapro and remeron.  8.) Obesity to f/u as outpatient.    Diet: Regular Diet Adult    DVT Prophylaxis: Lovenox 40 mg subcutaneous daily  Viveros Catheter: Not present  Lines: None     Cardiac Monitoring: None      Clinically Significant Risk Factors Present on Admission                               Disposition Plan      Expected Discharge Date: 03/22/2023                  Ricci Chairez MD  Hospitalist Service, GOLD TEAM 16  [facility]  Securely message with 1-[employer] [place]  "info)  Text page via [facility] Paging/Directory   See signed in provider for up to date coverage information    ______________________________________________________________________    Chief Complaint     Alcohol, feeling tired    History of Present Illness     Patient is a 67 y/o woman who has COPD, alcohol dependence with past withdrawal seizures, alcoholic liver cirrhosis, obstructive sleep apnea, depression and anxiety. Patient presented today and stated that she was \"tired of fighting\". Patient reported some suicidal ideation to mental health staff earlier today. Patient was also found to be positive for Covid-19.    Patient stated that she would drink from 473 to 750 mL of fátima a day and had been drinking for the past 2 weeks. Patient noted that last drink was yesterday. Patient noted no tremor and no hallucinations. Patient reported no suicidal or homicidal ideation at the time of interview.    Patient also noted onset of cough, wheezing, body aches, nausea and diarrhea yesterday. Patient noted that cough was minimally productive. Patient noted fevers a few days ago but not yesterday or today. Patient noted no nausea. Patient noted no sick contacts.       Past Medical History    Past Medical History:   Diagnosis Date     Alcohol use disorder      Alcohol withdrawal seizure without complication (H) 05/14/2016     Alcoholic cirrhosis (H)      Anxiety      Chronic alcohol dependence, continuous (H) 03/16/2018    inpatient 11/2019, sober since then     Chronic Lower Extremity Edema      Chronic reflux esophagitis      COPD (chronic obstructive pulmonary disease) (H)      Depression      Dermatitis      Diverticulitis of colon 09/2022     Fibroid uterus      Ganglion     right foot     GERD (gastroesophageal reflux disease)      H. pylori infection      Melanoma (H) 07/01/2019    left upper arm     Menopause     age 52     Obesity (BMI 35.0-39.9 without comorbidity)      Osteoarthritis     Bilateral Knees     " Peripheral neuropathy      Seizure (H) 2016    during alcohol withdrawal     Sleep apnea     mild, doesn't tolerate pap therapy   - History of diverticulitis    Past Surgical History   Past Surgical History:   Procedure Laterality Date     APPENDECTOMY      Childhood     ARTHROSCOPY KNEE Right      BIOPSY SKIN (LOCATION) Left 2019    left upper arm      SECTION       HC EXCISION LESION/TENDON-SHEATH/CAPSULE, FOOT      Description: Excision Of Cyst Of Tendon Sheath Of Foot;  Proc Date: 10/28/2011;  Comments: [facility]      KNEE SCOPE, DIAGNOSTIC      Description: Arthroscopy Knee Right;  Proc Date: 10/11/2005;  Comments: for right knee patella subluxation with lateral retinacular release; subpatellar chondroplasty      LATERAL RETINACULAR RELEASE OPEN      Description: Knee Lateral Retinacular Release;  Proc Date: 10/11/2005;     HEMORRHOIDECTOMY INTERNAL LIGATION      Description: Hemorrhoidectomy;  Recorded: 2008;     THUMB SURGERY      Removal of bone spurs     TONSILLECTOMY       ZZC  DELIVERY ONLY      Description:  Section;  Recorded: 2008;     ZZC LIGATE FALLOPIAN TUBE      Description: Tubal Ligation;  Recorded: 2008;       Prior to Admission Medications   Prior to Admission Medications   Prescriptions Last Dose Informant Patient Reported? Taking?   Bioflavonoid Products (VITAMIN C) CHEW   Yes No   Sig: Take by mouth every morning   DTx Talha - Subst Use Disorder (RESET)   No No   Sig: Use as directed for 90 days.   Multiple Vitamins-Minerals (HAIR SKIN AND NAILS FORMULA PO)   Yes No   Sig: Take by mouth every morning   acetaminophen (TYLENOL) 500 MG tablet   No No   Sig: Take 1-2 tablets (500-1,000 mg) by mouth every 6 hours as needed for mild pain   albuterol (PROAIR HFA/PROVENTIL HFA/VENTOLIN HFA) 108 (90 Base) MCG/ACT inhaler More than a month  No Yes   Sig: Inhale 2 puffs into the lungs every 4 hours as needed for shortness of breath /  dyspnea or wheezing   bacitracin 500 UNIT/GM external ointment   No No   Sig: Apply topically 2 times daily   bisacodyl (DULCOLAX) 5 MG EC tablet More than a month  No Yes   Si days prior to procedure, take 2 tablets at 4 pm. 1 day prior to procedure, take 2 tablets at 4 pm. For additional instructions refer to your colonoscopy prep instructions.   budesonide-formoterol (SYMBICORT) 160-4.5 MCG/ACT Inhaler Past Week  No Yes   Sig: Inhale 2 puffs into the lungs 2 times daily   bumetanide (BUMEX) 1 MG tablet 3/19/2023  No Yes   Sig: Take 1 tablet (1 mg) by mouth daily   escitalopram (LEXAPRO) 10 MG tablet Past Week  No Yes   Sig: Take 1 tablet (10 mg) by mouth daily Take with 5mg tablet for total of 15mg/day.   escitalopram (LEXAPRO) 5 MG tablet Past Week  No Yes   Sig: Take 1 tablet (5 mg) by mouth daily Take with 10mg tablet for total of 15mg per day.   hydrOXYzine (ATARAX) 50 MG tablet   No No   Sig: Take 0.5-1 tablets (25-50 mg) by mouth 3 times daily as needed for anxiety   ipratropium - albuterol 0.5 mg/2.5 mg/3 mL (DUONEB) 0.5-2.5 (3) MG/3ML neb solution Past Week  No Yes   Sig: Take 1 vial (3 mLs) by nebulization every 4 hours as needed for shortness of breath / dyspnea or wheezing   mirtazapine (REMERON) 15 MG tablet Past Week  No Yes   Sig: Take 1 tablet (15 mg) by mouth At Bedtime   omeprazole (PRILOSEC) 20 MG DR capsule  Self Yes No   Sig: Take 20 mg by mouth daily as needed   polyethylene glycol (GOLYTELY) 236 g suspension   No No   Si days prior at 5pm, mix and drink half of a jug of Golytely. Drink an 8 oz. glass of Golytely every 15 minutes until half of the jug is gone. Place remainder of Golytely in the refrigerator. 1 day prior at 5 pm, drink the 2nd half of a jug of Golytely bowel prep. 6 hours before your check-in time, drink an 8 oz. glass of Golytely every 15 minutes until half of the 2nd jug of Golytely is gone. Discard remainder of second jug.   topiramate (TOPAMAX) 25 MG tablet Not  Taking  No No   Sig: Take 1 tablet (25 mg) by mouth every evening   Patient not taking: Reported on 3/20/2023   umeclidinium (INCRUSE ELLIPTA) 62.5 MCG/INH inhaler Past Month  No Yes   Sig: Inhale 1 puff into the lungs daily   Patient taking differently: Inhale 1 puff into the lungs every morning      Facility-Administered Medications: None        Allergies:  1.) Bupropion  2.) Codeine  3.) Nickel  4.) Oxycodone  5.) Percocet  6.) Topiramate   7.) Diclofenac (tolerates topical gel)  8.) Furosemide  9.) Hydrochlorothiazide  10.) Sulfa drugs  11.) Sulfasalazine    Social History:  - Patient reports smoking 1 pack of cigarettes daily.  - Patient reports no other recreational drug use.      Physical Exam   Vital Signs: Temp: 98.9  F (37.2  C) Temp src: Oral BP: 120/72 Pulse: 81   Resp: 16 SpO2: 92 % O2 Device: None (Room air)      General: Patient comfortable, NAD. No visible tremor.  Heart: RRR, S1 S2 w/o murmurs.  Lungs: Breath sounds present. No crackles present. End-expiratory wheezing present.  Gastrointestinal: Abdomen soft. Some discomfort with palpation of left side of abdomen but no guarding or rebound tenderness.   Skin: Warm, dry.  Musculoskeletal: No visible joint swelling or erythema.  Neuro: Cranial nerves II-XII grossly intact.  Psych: Affect blunted with some psychomotor slowing present.    Labs noted.  Sodium 142; Potassium 3.5; Creatinine 0.57  AST 64; ALT 42; Alkaline Phosphatase 87  WBC 5.7; Hb 14.1; Platelets 132    Medical Decision Making

## 2023-03-20 NOTE — ED NOTES
Patient was evaluated by behavioral medicine and thought to require treatment for potential alcohol withdrawal and meanwhile is COVID-positive.  Reviewing the chart the patient has received 10 mg of Valium orally by MSSA protocol so far and the patient will be started on her Paxlovid and admitted to the medicine service.    Results for orders placed or performed during the hospital encounter of 03/20/23 (from the past 24 hour(s))   Asymptomatic COVID-19 Virus (Coronavirus) by PCR Nasopharyngeal    Specimen: Nasopharyngeal; Swab   Result Value Ref Range    SARS CoV2 PCR Positive (A) Negative    Narrative    Testing was performed using the Xpert Xpress SARS-CoV-2 Assay on the Cepheid Gene-Xpert Instrument Systems. Additional information about this Emergency Use Authorization (EUA) assay can be found via the Lab Guide. This test should be ordered for the detection of SARS-CoV-2 in individuals who meet SARS-CoV-2 clinical and/or epidemiological criteria as well as from individuals without symptoms or other reasons to suspect COVID-19. Test performance for asymptomatic patients has only been established in anterior nasal swab specimens. This test is for in vitro diagnostic use under the FDA EUA for laboratories certified under CLIA to perform high complexity testing. This test has not been FDA cleared or approved. A negative result does not rule out the presence of PCR inhibitors in the specimen or target RNA concentration below the limit of detection for the assay. The possibility of a false negative should be considered if the patient's recent exposure or clinical presentation suggests COVID-19. This test was validated by the [facility]. This laboratory is certified under the Clinical Laboratory Improvement Amendments (CLIA) as qualified to perform high complexity laboratory testing.     Alcohol breath test POCT   Result Value Ref Range    Alcohol Breath Test 0.16 (A) 0.00 - 0.01    EKG 12-lead, tracing only   Result Value Ref Range    Systolic Blood Pressure  mmHg    Diastolic Blood Pressure  mmHg    Ventricular Rate 70 BPM    Atrial Rate 70 BPM    HI Interval 164 ms    QRS Duration 98 ms     ms    QTc 466 ms    P Axis 43 degrees    R AXIS -10 degrees    T Axis 37 degrees    Interpretation ECG Sinus rhythm  Low voltage QRS  Borderline ECG      CBC with platelets differential    Narrative    The following orders were created for panel order CBC with platelets differential.  Procedure                               Abnormality         Status                     ---------                               -----------         ------                     CBC with platelets and d...[[id number]]  Abnormal            Final result                 Please view results for these tests on the individual orders.   Comprehensive metabolic panel   Result Value Ref Range    Sodium 142 136 - 145 mmol/L    Potassium 3.5 3.4 - 5.3 mmol/L    Chloride 103 98 - 107 mmol/L    Carbon Dioxide (CO2) 24 22 - 29 mmol/L    Anion Gap 15 7 - 15 mmol/L    Urea Nitrogen 8.3 8.0 - 23.0 mg/dL    Creatinine 0.57 0.51 - 0.95 mg/dL    Calcium 8.8 8.8 - 10.2 mg/dL    Glucose 111 (H) 70 - 99 mg/dL    Alkaline Phosphatase 87 35 - 104 U/L    AST 64 (H) 10 - 35 U/L    ALT 42 (H) 10 - 35 U/L    Protein Total 6.6 6.4 - 8.3 g/dL    Albumin 4.1 3.5 - 5.2 g/dL    Bilirubin Total 0.3 <=1.2 mg/dL    GFR Estimate >90 >60 mL/min/1.73m2   Troponin T, High Sensitivity   Result Value Ref Range    Troponin T, High Sensitivity <6 <=14 ng/L   CBC with platelets and differential   Result Value Ref Range    WBC Count 5.7 4.0 - 11.0 10e3/uL    RBC Count 4.78 3.80 - 5.20 10e6/uL    Hemoglobin 14.1 11.7 - 15.7 g/dL    Hematocrit 41.5 35.0 - 47.0 %    MCV 87 78 - 100 fL    MCH 29.5 26.5 - 33.0 pg    MCHC 34.0 31.5 - 36.5 g/dL    RDW 14.5 10.0 - 15.0 %    Platelet Count 132 (L) 150 - 450 10e3/uL    % Neutrophils 49 %    % Lymphocytes 35 %    % Monocytes 15  %    % Eosinophils 1 %    % Basophils 0 %    % Immature Granulocytes 0 %    NRBCs per 100 WBC 0 <1 /100    Absolute Neutrophils 2.7 1.6 - 8.3 10e3/uL    Absolute Lymphocytes 2.0 0.8 - 5.3 10e3/uL    Absolute Monocytes 0.9 0.0 - 1.3 10e3/uL    Absolute Eosinophils 0.1 0.0 - 0.7 10e3/uL    Absolute Basophils 0.0 0.0 - 0.2 10e3/uL    Absolute Immature Granulocytes 0.0 <=0.4 10e3/uL    Absolute NRBCs 0.0 10e3/uL   Urine Drugs of Abuse Screen    Narrative    The following orders were created for panel order Urine Drugs of Abuse Screen.  Procedure                               Abnormality         Status                     ---------                               -----------         ------                     Drug abuse screen 1 urin...[[id number]]                      In process                   Please view results for these tests on the individual orders.   XR Chest 2 Views    Narrative    CHEST TWO VIEWS  3/20/2023 8:44 AM     HISTORY:  Shortness of breath.    COMPARISON: 5/26/2021.      Impression    IMPRESSION: Negative chest. Lungs clear.    WENDY WARD MD         SYSTEM ID:  [id number]       Medications   fluticasone-vilanterol (BREO ELLIPTA) 200-25 MCG/ACT inhaler 1 puff (1 puff Inhalation $Given 3/20/23 0854)   diazepam (VALIUM) tablet 5-20 mg (10 mg Oral $Given 3/20/23 0573)   multivitamin, therapeutic (THERA-VIT) tablet 1 tablet (has no administration in time range)   folic acid (FOLVITE) tablet 1 mg (has no administration in time range)   thiamine (B-1) tablet 100 mg (has no administration in time range)   magnesium oxide (MAG-OX) tablet 800 mg (has no administration in time range)   nirmatrelvir and ritonavir (PAXLOVID) 300 mg/100 mg therapy pack 3 tablet (has no administration in time range)   albuterol (PROVENTIL HFA/VENTOLIN HFA) inhaler (2 puffs Inhalation $Given 3/20/23 0833)       Final diagnoses:   Alcohol abuse - with withdrawal potential   Suicidal ideation   Infection due to 2019 novel coronavirus        Adm Med      Sabas Barr MD, MD Barr, Sabas Estrada MD  03/20/23 0945

## 2023-03-20 NOTE — CONSULTS
Diagnostic Evaluation Consultation  Crisis Assessment    Patient was assessed: I-Pad  Patient location: ED17 [facility]  Was a release of information signed: No. Reason: [facility]      Referral Data and Chief Complaint  Patsy is a 68 year old, who uses she/her pronouns, and presents to the ED via EMS. Patient is referred to the ED by self. Patient is presenting to the ED for the following concerns: alcohol use, shortness of breath, suicidal ideation.     Informed Consent and Assessment Methods     Patient is her own guardian. Writer met with patient and explained the crisis assessment process, including applicable information disclosures and limits to confidentiality, assessed understanding of the process, and obtained consent to proceed with the assessment. Patient was observed to be able to participate in the assessment as evidenced by responding to questions, oriented, did indicate a fair amount of physical and psychological discomfort throughout. Assessment methods included conducting a formal interview with patient, review of medical records, collaboration with medical staff, and obtaining relevant collateral information from family and community providers when available..     Over the course of this crisis assessment provided reassurance, offered validation, engaged patient in problem solving and disposition planning and assisted in processing patient's thoughts and feeling relating to symptoms and SI. Patient's response to interventions was rates it as helpful.     Summary of Patient Situation  Writer met with pt via ipad from [facility] 805am-820am. Pt participated as noted below. Then, nursing came in to do cares and take pt to xray. Writer ended interview at that time, as pt felt writer had the important information about her, she was quite uncomfortable/unwell feeling, and writer was able to obtain necessary information to determine disposition recommendation. More information at a later time may be  "helpful to further treatment plan.    Pt was laying down, mask on, reports muscle aches, nausea. Worst she has felt in a long time. Wonders if it is alcohol withdrawal plus COVID. \"I'm so tired. I'm tired of fighting\".  Endorses drinking 750 of fátima each day last several days. Reports she can't eat. Says this is quite a bit more than her baseline last noted of pint of fátima daily.  She reports increase in alcohol use since best friend's death late 2022 after a few mos of sobriety.  Reports increase in SI over last several days, so called EMS herself. Endorses plan and some intent to jump from a high place, such as her [address] floor apartment building. Said she was recently in hallway and had to turn around as thoughts were getting so strong. Later says re: wanting to complete suicide \"I do but I don't\".     Cites Protective factors: grandchildren, fear of physical pain if she were to jump from a high place.     Stressors - medical conditions, one of her daughters stresses her out, alcohol use, isolation.    Denies being particularly med compliant, felt it did not help as much.        Brief Psychosocial History  Lives alone. Has kids and grandkids. Death of best friend in late 2022 a stressor    Significant Clinical History  Hx dx of :   Episode of recurrent major depressive disorder, unspecified depression episode severity (H)     PTSD (post-traumatic stress disorder)    Alcohol use disorder, severe, dependence    Insomnia - Unspecified type. Also uses CPAP.       Medical hx notes  chronic lower extremity edema, COPD, depression, gastroesophageal reflux disease, osteoarthritis, peripheral neuropathy and a history of withdrawal seizures    A mental health hospitalization d/t making SI statements when intoxicated.    Multiple detox stays and IP tx.     Collateral Information  Son - Chandu Nam [phone]. Did not call today as disposition was able to be determined for today. It may be useful to contact him or " other collateral later.     Risk Assessment  [place] Suicide Severity Rating Scale Full Clinical Version:3/20/23  Suicidal Ideation  1. Wish to be Dead (Lifetime): Yes  1. Wish to be Dead (Past 1 Month): Yes  2. Non-Specific Active Suicidal Thoughts (Lifetime): Yes  2. Non-Specific Active Suicidal Thoughts (Past 1 Month): Yes  3. Active Suicidal Ideation with any Methods (Not Plan) Without Intent to Act (Lifetime): Yes  3. Active Suicidal Ideation with any Methods (Not Plan) Without Intent to Act (Past 1 Month): Yes  4. Active Suicidal Ideation with Some Intent to Act, Without Specific Plan (Lifetime): Yes  4. Active Suicidal Ideation with Some Intent to Act, Without Specific Plan (Past 1 Month): Yes  5. Active Suicidal Ideation with Specific Plan and Intent (Lifetime): Yes  5. Active Suicidal Ideation with Specific Plan and Intent (Past 1 Month): Yes  Intensity of Ideation  Most Severe Ideation Rating (Lifetime): 5  Most Severe Ideation Rating (Past 1 Month): 5  Frequency (Lifetime): 2-5 times in week  Frequency (Past 1 Month): Many times each day  Duration (Lifetime): 1-4 hours/a lot of time  Duration (Past 1 Month): More than 8 hours/persistent or continuous  Controllability (Lifetime): Can control thoughts with a lot of difficulty  Controllability (Past 1 Month): Can control thoughts with a lot of difficulty  Deterrents (Lifetime): Deterrents definitely stopped you from attempting suicide  Deterrents (Past 1 Month): Deterrents definitely stopped you from attempting suicide  Reasons for Ideation (Lifetime): Completely to end or stop the pain (You couldn't go on living with the pain or how you were feeling)  Reasons for Ideation (Past 1 Month): Completely to end or stop the pain (You couldn't go on living with the pain or how you were feeling)  Suicidal Behavior  Actual Attempt (Lifetime): No  Has subject engaged in non-suicidal self-injurious behavior? (Lifetime): No  Interrupted Attempts (Lifetime): No  Aborted  or Self-Interrupted Attempt (Lifetime): No  Preparatory Acts or Behavior (Lifetime): No  C-SSRS Risk (Lifetime/Recent)  Calculated C-SSRS Risk Score (Lifetime/Recent): High Risk        Validity of evaluation is impacted by presenting factors during interview alcohol use.   Comments regarding subjective versus objective responses to Posey tool: report appears consistent with responses  Environmental or Psychosocial Events: loss of a loved one, challenging interpersonal relationships, helplessness/hopelessness, worsening chronic illness and ongoing abuse of substances  Chronic Risk Factors: history of psychiatric hospitalization, chronic and ongoing sleep difficulties, history of abuse or neglect and chronic health problems   Warning Signs: seeking access to means to hurt or kill self, talking or writing about death, dying, or suicide and increasing substance use or abuse  Protective Factors: lives in a responsibly safe and stable environment, supportive ongoing medical and mental health care relationships and other identified factors which may mitigate risk for suicide: some family is a stressor but some family is a support per pt  Interpretation of Risk Scoring, Risk Mitigation Interventions and Safety Plan:  Pt at high risk. Unable to mitigate in our discussion today. Most immediate risk factor appears to be re: withdrawals so addressing that in consultation with medical staff. CD tx recommendations have been made by her OP team and also appear useful once medically clear. Additional risk reduction/safety planning re: urges to jump from apartment recommended once patient able to discuss.       Does the patient have thoughts of harming others? No     Is the patient engaging in sexually inappropriate behavior?  no        Current Substance Abuse     Is there recent substance abuse? liter of fátima daily     Was a urine drug screen or blood alcohol level obtained: Yes .16 at 3am       Mental Status Exam     Affect:  Blunted   Appearance: Appropriate and Disheveled    Attention Span/Concentration: Attentive  Eye Contact: Variable   Fund of Knowledge: Appropriate    Language /Speech Content: Fluent   Language /Speech Volume: Normal    Language /Speech Rate/Productions: Normal    Recent Memory: Intact   Remote Memory: Intact   Mood: Sad    Orientation to Person: Yes    Orientation to Place: Yes   Orientation to Time of Day: Yes    Orientation to Date: Yes    Situation (Do they understand why they are here?): Yes    Psychomotor Behavior: Normal    Thought Content: Suicidal   Thought Form: Intact      History of commitment: No    Did not complete mini cog - may still be intoxicated.       Medication    Psychotropic medications: Yes. Pt is currently taking as noted below. Medication compliant: No: says she does not take all mental health meds. Recent medication changes: No  Medication changes made in the last two weeks: No    Current Outpatient Medications on File Prior to Encounter   Medication Sig Dispense Refill     albuterol (PROAIR HFA/PROVENTIL HFA/VENTOLIN HFA) 108 (90 Base) MCG/ACT inhaler Inhale 2 puffs into the lungs every 4 hours as needed for shortness of breath / dyspnea or wheezing 18 g 3     bisacodyl (DULCOLAX) 5 MG EC tablet 2 days prior to procedure, take 2 tablets at 4 pm. 1 day prior to procedure, take 2 tablets at 4 pm. For additional instructions refer to your colonoscopy prep instructions. 4 tablet 0     budesonide-formoterol (SYMBICORT) 160-4.5 MCG/ACT Inhaler Inhale 2 puffs into the lungs 2 times daily 10.2 g 11     bumetanide (BUMEX) 1 MG tablet Take 1 tablet (1 mg) by mouth daily 30 tablet 1     escitalopram (LEXAPRO) 10 MG tablet Take 1 tablet (10 mg) by mouth daily Take with 5mg tablet for total of 15mg/day. 90 tablet 0     escitalopram (LEXAPRO) 5 MG tablet Take 1 tablet (5 mg) by mouth daily Take with 10mg tablet for total of 15mg per day. 90 tablet 0     ipratropium - albuterol 0.5 mg/2.5 mg/3 mL  (DUONEB) 0.5-2.5 (3) MG/3ML neb solution Take 1 vial (3 mLs) by nebulization every 4 hours as needed for shortness of breath / dyspnea or wheezing 15 mL 1     mirtazapine (REMERON) 15 MG tablet Take 1 tablet (15 mg) by mouth At Bedtime 30 tablet 2     umeclidinium (INCRUSE ELLIPTA) 62.5 MCG/INH inhaler Inhale 1 puff into the lungs daily (Patient taking differently: Inhale 1 puff into the lungs every morning) 30 each 1     acetaminophen (TYLENOL) 500 MG tablet Take 1-2 tablets (500-1,000 mg) by mouth every 6 hours as needed for mild pain 60 tablet 11     bacitracin 500 UNIT/GM external ointment Apply topically 2 times daily 60 g 1     Bioflavonoid Products (VITAMIN C) CHEW Take by mouth every morning       DTx Talha - Subst Use Disorder (RESET) Use as directed for 90 days. 1 each 3     hydrOXYzine (ATARAX) 50 MG tablet Take 0.5-1 tablets (25-50 mg) by mouth 3 times daily as needed for anxiety 90 tablet 2     Multiple Vitamins-Minerals (HAIR SKIN AND NAILS FORMULA PO) Take by mouth every morning       omeprazole (PRILOSEC) 20 MG DR capsule Take 20 mg by mouth daily as needed       polyethylene glycol (GOLYTELY) 236 g suspension 2 days prior at 5pm, mix and drink half of a jug of Golytely. Drink an 8 oz. glass of Golytely every 15 minutes until half of the jug is gone. Place remainder of Golytely in the refrigerator. 1 day prior at 5 pm, drink the 2nd half of a jug of Golytely bowel prep. 6 hours before your check-in time, drink an 8 oz. glass of Golytely every 15 minutes until half of the 2nd jug of Golytely is gone. Discard remainder of second jug. 8000 mL 0     topiramate (TOPAMAX) 25 MG tablet Take 1 tablet (25 mg) by mouth every evening (Patient not taking: Reported on 3/20/2023) 30 tablet 2        Current Care Team    Melissa Covington,   [facility] Addiction Medicine  [facility]  [phone]     KATHLEEN Espana? Social Work Care Coordinator   [facility]  [email or web address]? [email or web address]    Phone: [phone]  she/her         Diagnosis    296.30 (F33.9) Major Depressive Disorder, Recurrent Episode, Unspecified _   Substance-Related & Addictive Disorders Alcohol Use Disorder   303.90 (F10.20) Severe In a controlled environment - by history       Clinical Summary and Substantiation of Recommendations    Pt at high risk re: suicide. Unable to mitigate in our discussion today. Most immediate risk factor appears to be re: withdrawals so addressing that in consultation with medical staff. CD tx recommendations have been made by her OP team and also appear useful once medically clear. Additional risk reduction/safety planning re: urges to jump from apartment recommended once patient able to discuss.    Disposition    Recommended disposition: Medical Admission: consulted with ED provider Momo d/stacy pt report re: concern re: withdrawals       Reviewed case and recommendations with attending provider. Attending Name: Momo       Attending concurs with disposition: Yes       Patient and/or validated legal guardian concurs with disposition: Yes       Final disposition: Medical admission: pending per [facility] .       Outpatient Details (if applicable):   Aftercare plan and appointments placed in the AVS and provided to patient: No. Rationale: will need this revisited once medically clear. Note that an order has been placed by pt outpatient addiction medicine provider on 3/16 re: ORAL eval.     Was lethal means counseling provided as a part of aftercare planning? No;       Assessment Details    Patient interview started at: 805a and completed at: 820a.     Total duration spent on the patient case in minutes: .75 hrs      CPT code(s) utilized: 40365 - Psychotherapy for Crisis - 60 (30-74*) min       Radha Mckeon, JULIO, MSW, JULIO, Psychotherapist  [facility] - Triage & Transition Services  Callback: [phone]

## 2023-03-20 NOTE — ED PROVIDER NOTES
ED Provider Note  [facility]      History     Chief Complaint   Patient presents with     Suicidal     Shortness of Breath     Patient BIBA for SOB, alcohol intoxication and SI without a plan     HPI  Patsy Santamaria is a 68 year old female who has medical history of COPD, anxiety, depression presenting with alcohol intoxication and suicidal ideation.  She has been drinking.  She feels like she cannot go on any longer and wants to end her life.  She has a plan of jumping from a tall structure.  Denies take any pills.  No other drug use.  She has some paranoia, no hallucinations or voices.  Denies history of alcohol withdrawal seizures or delirium tremens.  She has been off her antianxiety medications for a few days.    She also says she has some shortness of breath.  She has COPD and thinks it is similar.  No leg pain or swelling.  No history of DVT or blood clots.  Denies chest pain, pressure, diaphoresis, nausea or dizziness.  This been going on for several days.  She has not been taking her inhalers recently.    Past Medical History  Past Medical History:   Diagnosis Date     Alcohol use disorder      Alcohol withdrawal seizure without complication (H) 05/14/2016     Alcoholic cirrhosis (H)      Anxiety      Chronic alcohol dependence, continuous (H) 03/16/2018    inpatient 11/2019, sober since then     Chronic Lower Extremity Edema      Chronic reflux esophagitis      COPD (chronic obstructive pulmonary disease) (H)      Depression      Dermatitis      Diverticulitis of colon 09/2022     Fibroid uterus      Ganglion     right foot     GERD (gastroesophageal reflux disease)      H. pylori infection      Melanoma (H) 07/01/2019    left upper arm     Menopause     age 52     Obesity (BMI 35.0-39.9 without comorbidity)      Osteoarthritis     Bilateral Knees     Peripheral neuropathy      Seizure (H) 01/01/2016    during alcohol withdrawal     Sleep apnea     mild, doesn't tolerate pap  therapy     Past Surgical History:   Procedure Laterality Date     APPENDECTOMY      Childhood     ARTHROSCOPY KNEE Right      BIOPSY SKIN (LOCATION) Left 2019    left upper arm      SECTION        EXCISION LESION/TENDON-SHEATH/CAPSULE, FOOT      Description: Excision Of Cyst Of Tendon Sheath Of Foot;  Proc Date: 10/28/2011;  Comments: [facility]      KNEE SCOPE, DIAGNOSTIC      Description: Arthroscopy Knee Right;  Proc Date: 10/11/2005;  Comments: for right knee patella subluxation with lateral retinacular release; subpatellar chondroplasty      LATERAL RETINACULAR RELEASE OPEN      Description: Knee Lateral Retinacular Release;  Proc Date: 10/11/2005;     HEMORRHOIDECTOMY INTERNAL LIGATION      Description: Hemorrhoidectomy;  Recorded: 2008;     THUMB SURGERY      Removal of bone spurs     TONSILLECTOMY       [facility]  DELIVERY ONLY      Description:  Section;  Recorded: 2008;     Z LIGATE FALLOPIAN TUBE      Description: Tubal Ligation;  Recorded: 2008;     albuterol (PROAIR HFA/PROVENTIL HFA/VENTOLIN HFA) 108 (90 Base) MCG/ACT inhaler  bisacodyl (DULCOLAX) 5 MG EC tablet  budesonide-formoterol (SYMBICORT) 160-4.5 MCG/ACT Inhaler  bumetanide (BUMEX) 1 MG tablet  escitalopram (LEXAPRO) 10 MG tablet  escitalopram (LEXAPRO) 5 MG tablet  ipratropium - albuterol 0.5 mg/2.5 mg/3 mL (DUONEB) 0.5-2.5 (3) MG/3ML neb solution  mirtazapine (REMERON) 15 MG tablet  umeclidinium (INCRUSE ELLIPTA) 62.5 MCG/INH inhaler  acetaminophen (TYLENOL) 500 MG tablet  bacitracin 500 UNIT/GM external ointment  Bioflavonoid Products (VITAMIN C) CHEW  DTx Talha - Subst Use Disorder (RESET)  hydrOXYzine (ATARAX) 50 MG tablet  Multiple Vitamins-Minerals (HAIR SKIN AND NAILS FORMULA PO)  omeprazole (PRILOSEC) 20 MG DR capsule  polyethylene glycol (GOLYTELY) 236 g suspension  topiramate (TOPAMAX) 25 MG tablet      Allergies   Allergen Reactions     Bupropion Diarrhea     Codeine Hives,  "Itching, Nausea and Rash     Nickel Unknown     Oxycodone Nausea and Vomiting     Percocet [Oxycodone-Acetaminophen]      Patient reports \"vomiting,grossly ill two weeks ago\"     Topiramate Unknown     Diclofenac Nausea     Tolerates the topical gel     Furosemide Rash     Hydrochlorothiazide Rash     phototoxicity - med was d/lora         Sulfa Drugs Itching and Rash     Sulfasalazine Rash     Family History  Family History   Problem Relation Age of Onset     CABG Mother      Heart Disease Mother      Aneurysm Father          of ruptured aneurysm at 46     Heart Disease Father      Factor V Leiden deficiency Father      Factor V Leiden deficiency Sister      Anesthesia Reaction No family hx of      Social History   Social History     Tobacco Use     Smoking status: Every Day     Packs/day: 0.50     Years: 49.00     Pack years: 24.50     Types: Cigarettes     Smokeless tobacco: Never     Tobacco comments:     Reports on 23 smoking 1/2 PPD   Vaping Use     Vaping Use: Never used   Substance Use Topics     Alcohol use: Not Currently     Comment: Last use 22     Drug use: No     Comment: Denies      Past medical history, past surgical history, medications, allergies, family history, and social history were reviewed with the patient. No additional pertinent items.      A medically appropriate review of systems was performed with pertinent positives and negatives noted in the HPI, and all other systems negative.    Physical Exam   BP: 102/70  Pulse: 73  Temp: 98.9  F (37.2  C)  Resp: 23  SpO2: 96 %  Physical Exam  Physical Exam   Constitutional: oriented to person, place, and time. appears well-developed and well-nourished.   HENT:   Head: Normocephalic and atraumatic.   Neck: Normal range of motion.   Pulmonary/Chest: Effort normal. No respiratory distress. Clear lungs bilaterally.  Cardiac: No murmurs, rubs, gallops. RRR.  Abdominal: Abdomen soft, nontender, nondistended. No rebound tenderness.  MSK: Long " bones without deformity or evidence of trauma.  Symmetric lower extremities, no significant edema.  No tenderness over the calves.  Neurological: alert and oriented to person, place, and time.   Skin: Skin is warm and dry.   Psychiatric:  normal mood and affect.  behavior is normal. Thought content normal.       ED Course, Procedures, & Data      Procedures       ED Course Selections:        EKG Interpretation:      Interpreted by Fahad Tineo MD  Time reviewed: 0256  Symptoms at time of EKG: sob   Rhythm: normal sinus   Rate: normal  Axis: normal  Ectopy: none  Conduction: low voltage  ST Segments/ T Waves: No ST-T wave changes  Q Waves: none  Comparison to prior: Unchanged    Clinical Impression: no changes suggestive of ischemia/infarction, identical to prior ecg         Mental Health Risk Assessment      PSS-3    Date and Time Over the past 2 weeks have you felt down, depressed, or hopeless? Over the past 2 weeks have you had thoughts of killing yourself? Have you ever attempted to kill yourself? When did this last happen? User   03/20/23 0242 yes yes no -- KMV      C-SSRS ([place])    Date and Time Q1 Wished to be Dead (Past Month) Q2 Suicidal Thoughts (Past Month) Q3 Suicidal Thought Method Q4 Suicidal Intent without Specific Plan Q5 Suicide Intent with Specific Plan Q6 Suicide Behavior (Lifetime) Within the Past 3 Months? RETIRED: Level of Risk per Screen Screening Not Complete User   03/20/23 0242 yes yes yes yes yes no -- -- -- KMV              Suicide assessment completed by mental health (D.E.C., LCSW, etc.)       Results for orders placed or performed during the hospital encounter of 03/20/23   Alcohol breath test POCT     Status: Abnormal   Result Value Ref Range    Alcohol Breath Test 0.16 (A) 0.00 - 0.01     Medications - No data to display  Labs Ordered and Resulted from Time of ED Arrival to Time of ED Departure   ALCOHOL BREATH TEST POCT - Abnormal       Result Value    Alcohol Breath Test  0.16 (*)    COVID-19 VIRUS (CORONAVIRUS) BY PCR     No orders to display          Critical care was not performed.     Medical Decision Making  The patient's presentation was of high complexity (an acute health issue posing potential threat to life or bodily function).    The patient's evaluation involved:  ordering and/or review of 3+ test(s) in this encounter (see separate area of note for details)    The patient's management necessitated high risk (a decision regarding hospitalization).      Assessment & Plan    MDM   patient presenting with suicidal ideation and alcohol abuse.  She is found to be COVID-positive.  She otherwise appears quite well, nontoxic and vitals are stable.  She will likely need Paxlovid if she is admitted, I also wrote a prescription for discharge should she clear.  Once sober, will need a behavioral assessment.  Patient was signed out to the oncoming physician pending sobriety and behavioral assessment.    I have reviewed the nursing notes. I have reviewed the findings, diagnosis, plan and need for follow up with the patient.    New Prescriptions    No medications on file       Final diagnoses:   Alcohol abuse   Suicidal ideation   Infection due to 2019 novel coronavirus       Fahad Tineo  [facility] EMERGENCY DEPARTMENT  3/20/2023     Fahad Tineo MD  03/20/23 0603

## 2023-03-21 ENCOUNTER — TELEPHONE (OUTPATIENT)
Dept: BEHAVIORAL HEALTH | Facility: CLINIC | Age: 68
End: 2023-03-21

## 2023-03-21 ENCOUNTER — TELEPHONE (OUTPATIENT)
Dept: ADDICTION MEDICINE | Facility: CLINIC | Age: 68
End: 2023-03-21
Payer: COMMERCIAL

## 2023-03-21 DIAGNOSIS — F10.20 ALCOHOL USE DISORDER, SEVERE, DEPENDENCE (H): Primary | ICD-10-CM

## 2023-03-21 LAB
ANION GAP SERPL CALCULATED.3IONS-SCNC: 11 MMOL/L (ref 7–15)
BUN SERPL-MCNC: 7 MG/DL (ref 8–23)
C COLI+JEJUNI+LARI FUSA STL QL NAA+PROBE: NOT DETECTED
CALCIUM SERPL-MCNC: 8.6 MG/DL (ref 8.8–10.2)
CHLORIDE SERPL-SCNC: 100 MMOL/L (ref 98–107)
CREAT SERPL-MCNC: 0.58 MG/DL (ref 0.51–0.95)
CRP SERPL-MCNC: 7.71 MG/L
D DIMER PPP FEU-MCNC: 0.32 UG/ML FEU (ref 0–0.5)
DEPRECATED HCO3 PLAS-SCNC: 27 MMOL/L (ref 22–29)
EC STX1 GENE STL QL NAA+PROBE: NOT DETECTED
EC STX2 GENE STL QL NAA+PROBE: NOT DETECTED
ERYTHROCYTE [DISTWIDTH] IN BLOOD BY AUTOMATED COUNT: 14.6 % (ref 10–15)
GFR SERPL CREATININE-BSD FRML MDRD: >90 ML/MIN/1.73M2
GLUCOSE SERPL-MCNC: 87 MG/DL (ref 70–99)
HCT VFR BLD AUTO: 41.5 % (ref 35–47)
HGB BLD-MCNC: 13.9 G/DL (ref 11.7–15.7)
MCH RBC QN AUTO: 30 PG (ref 26.5–33)
MCHC RBC AUTO-ENTMCNC: 33.5 G/DL (ref 31.5–36.5)
MCV RBC AUTO: 89 FL (ref 78–100)
NOROV GI+II ORF1-ORF2 JNC STL QL NAA+PR: NOT DETECTED
PLATELET # BLD AUTO: 75 10E3/UL (ref 150–450)
POTASSIUM SERPL-SCNC: 3.5 MMOL/L (ref 3.4–5.3)
RBC # BLD AUTO: 4.64 10E6/UL (ref 3.8–5.2)
RVA NSP5 STL QL NAA+PROBE: NOT DETECTED
SALMONELLA SP RPOD STL QL NAA+PROBE: NOT DETECTED
SHIGELLA SP+EIEC IPAH STL QL NAA+PROBE: NOT DETECTED
SODIUM SERPL-SCNC: 138 MMOL/L (ref 136–145)
V CHOL+PARA RFBL+TRKH+TNAA STL QL NAA+PR: NOT DETECTED
WBC # BLD AUTO: 2.2 10E3/UL (ref 4–11)
Y ENTERO RECN STL QL NAA+PROBE: NOT DETECTED

## 2023-03-21 PROCEDURE — 80048 BASIC METABOLIC PNL TOTAL CA: CPT | Performed by: INTERNAL MEDICINE

## 2023-03-21 PROCEDURE — 250N000013 HC RX MED GY IP 250 OP 250 PS 637: Performed by: PHYSICIAN ASSISTANT

## 2023-03-21 PROCEDURE — 36415 COLL VENOUS BLD VENIPUNCTURE: CPT | Performed by: INTERNAL MEDICINE

## 2023-03-21 PROCEDURE — H0001 ALCOHOL AND/OR DRUG ASSESS: HCPCS | Performed by: COUNSELOR

## 2023-03-21 PROCEDURE — 86140 C-REACTIVE PROTEIN: CPT | Performed by: INTERNAL MEDICINE

## 2023-03-21 PROCEDURE — 250N000013 HC RX MED GY IP 250 OP 250 PS 637: Performed by: INTERNAL MEDICINE

## 2023-03-21 PROCEDURE — 250N000013 HC RX MED GY IP 250 OP 250 PS 637: Performed by: EMERGENCY MEDICINE

## 2023-03-21 PROCEDURE — 99232 SBSQ HOSP IP/OBS MODERATE 35: CPT | Performed by: INTERNAL MEDICINE

## 2023-03-21 PROCEDURE — 99232 SBSQ HOSP IP/OBS MODERATE 35: CPT

## 2023-03-21 PROCEDURE — 120N000002 HC R&B MED SURG/OB UMMC

## 2023-03-21 PROCEDURE — 85379 FIBRIN DEGRADATION QUANT: CPT | Performed by: INTERNAL MEDICINE

## 2023-03-21 PROCEDURE — 250N000011 HC RX IP 250 OP 636: Performed by: INTERNAL MEDICINE

## 2023-03-21 PROCEDURE — 99207 PR NO BILLABLE SERVICE THIS VISIT: CPT

## 2023-03-21 PROCEDURE — 85027 COMPLETE CBC AUTOMATED: CPT | Performed by: INTERNAL MEDICINE

## 2023-03-21 PROCEDURE — 250N000009 HC RX 250: Performed by: INTERNAL MEDICINE

## 2023-03-21 RX ORDER — DULOXETIN HYDROCHLORIDE 20 MG/1
20 CAPSULE, DELAYED RELEASE ORAL DAILY
Status: DISCONTINUED | OUTPATIENT
Start: 2023-03-22 | End: 2023-03-22 | Stop reason: HOSPADM

## 2023-03-21 RX ORDER — LOSARTAN POTASSIUM 25 MG/1
25 TABLET ORAL DAILY
Status: DISCONTINUED | OUTPATIENT
Start: 2023-03-21 | End: 2023-03-22 | Stop reason: HOSPADM

## 2023-03-21 RX ADMIN — BACITRACIN ZINC: 500 OINTMENT TOPICAL at 21:21

## 2023-03-21 RX ADMIN — GABAPENTIN 900 MG: 300 CAPSULE ORAL at 13:03

## 2023-03-21 RX ADMIN — ONDANSETRON 4 MG: 4 TABLET, ORALLY DISINTEGRATING ORAL at 21:16

## 2023-03-21 RX ADMIN — ESCITALOPRAM OXALATE 10 MG: 10 TABLET ORAL at 08:19

## 2023-03-21 RX ADMIN — BUMETANIDE 1 MG: 0.5 TABLET ORAL at 08:19

## 2023-03-21 RX ADMIN — ACETAMINOPHEN 650 MG: 325 TABLET ORAL at 16:15

## 2023-03-21 RX ADMIN — ESCITALOPRAM OXALATE 5 MG: 5 TABLET, FILM COATED ORAL at 08:27

## 2023-03-21 RX ADMIN — BACITRACIN ZINC: 500 OINTMENT TOPICAL at 08:36

## 2023-03-21 RX ADMIN — MULTIPLE VITAMINS W/ MINERALS TAB 1 TABLET: TAB at 08:18

## 2023-03-21 RX ADMIN — GABAPENTIN 900 MG: 300 CAPSULE ORAL at 21:15

## 2023-03-21 RX ADMIN — FLUTICASONE FUROATE AND VILANTEROL TRIFENATATE 1 PUFF: 200; 25 POWDER RESPIRATORY (INHALATION) at 10:51

## 2023-03-21 RX ADMIN — THIAMINE HCL TAB 100 MG 100 MG: 100 TAB at 08:19

## 2023-03-21 RX ADMIN — Medication 1 CAPSULE: at 08:19

## 2023-03-21 RX ADMIN — DIAZEPAM 10 MG: 5 TABLET ORAL at 11:42

## 2023-03-21 RX ADMIN — ENOXAPARIN SODIUM 40 MG: 40 INJECTION SUBCUTANEOUS at 21:18

## 2023-03-21 RX ADMIN — DIAZEPAM 10 MG: 5 TABLET ORAL at 20:31

## 2023-03-21 RX ADMIN — ACETAMINOPHEN 650 MG: 325 TABLET ORAL at 08:31

## 2023-03-21 RX ADMIN — LOSARTAN POTASSIUM 25 MG: 25 TABLET, FILM COATED ORAL at 10:50

## 2023-03-21 RX ADMIN — CLONIDINE HYDROCHLORIDE 0.1 MG: 0.1 TABLET ORAL at 20:32

## 2023-03-21 RX ADMIN — FOLIC ACID 1 MG: 1 TABLET ORAL at 08:19

## 2023-03-21 RX ADMIN — MIRTAZAPINE 15 MG: 15 TABLET, FILM COATED ORAL at 21:16

## 2023-03-21 RX ADMIN — CLONIDINE HYDROCHLORIDE 0.1 MG: 0.1 TABLET ORAL at 13:04

## 2023-03-21 RX ADMIN — Medication 1 CAPSULE: at 20:31

## 2023-03-21 ASSESSMENT — ACTIVITIES OF DAILY LIVING (ADL)
ADLS_ACUITY_SCORE: 24
DEPENDENT_IADLS:: INDEPENDENT
ADLS_ACUITY_SCORE: 24
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
## 2023-03-21 NOTE — TELEPHONE ENCOUNTER
3/21/2023  Pt is being referred to IOP at . Pt was told that Ivana Tran (IOP Coordinator) will call her to set up a start date. Ivana can be reached at [phone].    Ana Cristina Rowe MA [facility]  [phone]

## 2023-03-21 NOTE — ED NOTES
ED to Behavioral Floor Handoff    SITUATION  Patsy Santamaria is a 68 year old female who speaks English and lives in a home alone The patient arrived in the ED by ambulance from home with a complaint of Suicidal and Shortness of Breath (Patient BIBA for SOB, alcohol intoxication and SI without a plan)  .The patient's current symptoms started/worsened 2 day(s) ago and during this time the symptoms have remained the same.   In the ED, pt was diagnosed with   Final diagnoses:   Alcohol abuse - with withdrawal potential   Suicidal ideation   Infection due to 2019 novel coronavirus        Initial vitals were: BP: 102/70  Pulse: 73  Temp: 98.9  F (37.2  C)  Resp: 23  SpO2: 96 %   --------  Is the patient diabetic? No   If yes, last blood glucose? --     If yes, was this treated in the ED? --  --------  Is the patient inebriated (ETOH) No or Impaired on other substances? No  MSSA done? Yes  Last MSSA score: 5  Were withdrawal symptoms treated? N/A  Does the patient have a seizure history? Yes. If yes, date of most recent seizure--  --------  Is the patient patient experiencing suicidal ideation? reports suicidal ideation with out intention or a suicidal plan    Homicidal ideation? denies current or recent homicidal ideation or behaviors.    Self-injurious behavior/urges? denies current or recent self injurious behavior or ideation.  ------  Was pt aggressive in the ED No  Was a code called No  Is the pt now cooperative? Yes  -------  Meds given in ED:   Medications   fluticasone-vilanterol (BREO ELLIPTA) 200-25 MCG/ACT inhaler 1 puff (1 puff Inhalation $Given 3/20/23 1227)   nirmatrelvir and ritonavir (PAXLOVID) 300 mg/100 mg therapy pack 3 tablet (3 tablets Oral $Given 3/20/23 1209)   LORazepam (ATIVAN) tablet 1-4 mg (2 mg Oral $Given 3/20/23 1800)   albuterol (PROVENTIL HFA/VENTOLIN HFA) inhaler (2 puffs Inhalation $Given 3/20/23 9102)   multivitamin, therapeutic (THERA-VIT) tablet 1 tablet (1 tablet Oral $Given 3/20/23  "1205)   folic acid (FOLVITE) tablet 1 mg (1 mg Oral $Given 3/20/23 1204)   thiamine (B-1) tablet 100 mg (100 mg Oral $Given 3/20/23 1205)   magnesium oxide (MAG-OX) tablet 800 mg (800 mg Oral $Given 3/20/23 1205)      Family present during ED course? No  Family currently present? No    BACKGROUND  Does the patient have a cognitive impairment or developmental disability? No  Allergies:   Allergies   Allergen Reactions     Bupropion Diarrhea     Codeine Hives, Itching, Nausea and Rash     Nickel Unknown     Oxycodone Nausea and Vomiting     Percocet [Oxycodone-Acetaminophen]      Patient reports \"vomiting,grossly ill two weeks ago\"     Topiramate Unknown     Diclofenac Nausea     Tolerates the topical gel     Furosemide Rash     Hydrochlorothiazide Rash     phototoxicity - med was d/lora         Sulfa Drugs Itching and Rash     Sulfasalazine Rash   .   Social demographics are   Social History     Socioeconomic History     Marital status: Single   Tobacco Use     Smoking status: Every Day     Packs/day: 0.50     Years: 49.00     Pack years: 24.50     Types: Cigarettes     Smokeless tobacco: Never     Tobacco comments:     Reports on 1/26/23 smoking 1/2 PPD   Vaping Use     Vaping Use: Never used   Substance and Sexual Activity     Alcohol use: Not Currently     Comment: Last use 11/11/22     Drug use: No     Comment: Denies     Sexual activity: Yes     Partners: Male     Birth control/protection: None        ASSESSMENT  Labs results   Labs Ordered and Resulted from Time of ED Arrival to Time of ED Departure   COVID-19 VIRUS (CORONAVIRUS) BY PCR - Abnormal       Result Value    SARS CoV2 PCR Positive (*)    COMPREHENSIVE METABOLIC PANEL - Abnormal    Sodium 142      Potassium 3.5      Chloride 103      Carbon Dioxide (CO2) 24      Anion Gap 15      Urea Nitrogen 8.3      Creatinine 0.57      Calcium 8.8      Glucose 111 (*)     Alkaline Phosphatase 87      AST 64 (*)     ALT 42 (*)     Protein Total 6.6      Albumin 4.1  "     Bilirubin Total 0.3      GFR Estimate >90     CBC WITH PLATELETS AND DIFFERENTIAL - Abnormal    WBC Count 5.7      RBC Count 4.78      Hemoglobin 14.1      Hematocrit 41.5      MCV 87      MCH 29.5      MCHC 34.0      RDW 14.5      Platelet Count 132 (*)     % Neutrophils 49      % Lymphocytes 35      % Monocytes 15      % Eosinophils 1      % Basophils 0      % Immature Granulocytes 0      NRBCs per 100 WBC 0      Absolute Neutrophils 2.7      Absolute Lymphocytes 2.0      Absolute Monocytes 0.9      Absolute Eosinophils 0.1      Absolute Basophils 0.0      Absolute Immature Granulocytes 0.0      Absolute NRBCs 0.0     ALCOHOL BREATH TEST POCT - Abnormal    Alcohol Breath Test 0.16 (*)    TROPONIN T, HIGH SENSITIVITY - Normal    Troponin T, High Sensitivity <6     DRUG ABUSE SCREEN 1 URINE (ED) - Normal    Amphetamines Urine Screen Negative      Barbituates Urine Screen Negative      Benzodiazepine Urine Screen Negative      Cannabinoids Urine Screen Negative      Cocaine Urine Screen Negative      Opiates Urine Screen Negative        Imaging Studies:   Recent Results (from the past 24 hour(s))   XR Chest 2 Views    Narrative    CHEST TWO VIEWS  3/20/2023 8:44 AM     HISTORY:  Shortness of breath.    COMPARISON: 5/26/2021.      Impression    IMPRESSION: Negative chest. Lungs clear.    WENDY WARD MD         SYSTEM ID:  [id number]      Most recent vital signs BP (!) 149/84   Pulse 95   Temp 99.1  F (37.3  C) (Oral)   Resp 20   SpO2 94%    Abnormal labs/tests/findings requiring intervention:---   Pain control: good  Nausea control: good    RECOMMENDATION  Are any infection precautions needed (MRSA, VRE, etc.)? Yes If yes, what infection? --  ---  Does the patient have mobility issues? independently. If yes, what device does the pt use? ---  ---  Is patient on 72 hour hold or commitment? No If on 72 hour hold, have hold and rights been given to patient? N/A  Are admitting orders written if after 10 p.m.  ?N/A  Tasks needing to be completed:---     Shira Garcia RN   [phone]

## 2023-03-21 NOTE — PHARMACY-ADMISSION MEDICATION HISTORY
Admission Medication History Completed by Pharmacy    See [facility] Admission Navigator for allergy information, preferred outpatient pharmacy, prior to admission medications and immunization status.     Medication History Sources:     Fill history of outside medications and dispense report.    Patient interview.      Changes made to PTA medication list (reason):    Added: None    Deleted: Tylenol; bacitracin; vitamin C; dulcolax; Topamax    Changed: None    Additional Information:    Patient reports not taking topiramate PTA    Prior to Admission medications    Medication Sig Last Dose Taking? Auth Provider Long Term End Date   albuterol (PROAIR HFA/PROVENTIL HFA/VENTOLIN HFA) 108 (90 Base) MCG/ACT inhaler Inhale 2 puffs into the lungs every 4 hours as needed for shortness of breath / dyspnea or wheezing More than a month Yes Sina Villanueva MD Yes    budesonide-formoterol (SYMBICORT) 160-4.5 MCG/ACT Inhaler Inhale 2 puffs into the lungs 2 times daily Past Week Yes Vinay Paige MD Yes    bumetanide (BUMEX) 1 MG tablet Take 1 tablet (1 mg) by mouth daily 3/19/2023 Yes Yanique Cali MD Yes    escitalopram (LEXAPRO) 10 MG tablet Take 1 tablet (10 mg) by mouth daily Take with 5mg tablet for total of 15mg/day. Past Week Yes Melissa Covington DO Yes    escitalopram (LEXAPRO) 5 MG tablet Take 1 tablet (5 mg) by mouth daily Take with 10mg tablet for total of 15mg per day. Past Week Yes Melissa Covington DO Yes    hydrOXYzine (ATARAX) 50 MG tablet Take 0.5-1 tablets (25-50 mg) by mouth 3 times daily as needed for anxiety 3/19/2023 Yes Melissa Covington DO     ipratropium - albuterol 0.5 mg/2.5 mg/3 mL (DUONEB) 0.5-2.5 (3) MG/3ML neb solution Take 1 vial (3 mLs) by nebulization every 4 hours as needed for shortness of breath / dyspnea or wheezing Past Week Yes Yanique Cali MD Yes    mirtazapine (REMERON) 15 MG tablet Take 1 tablet (15 mg) by mouth At Bedtime 3/19/2023 Yes  Melissa Covington R, DO Yes    Multiple Vitamins-Minerals (HAIR SKIN AND NAILS FORMULA PO) Take by mouth every morning Past Week Yes Reported, Patient     nirmatrelvir and ritonavir (PAXLOVID) 300 mg/100 mg therapy pack Take 3 tablets by mouth 2 times daily for 5 days  Yes Fahad Tineo MD  3/25/23   omeprazole (PRILOSEC) 20 MG DR capsule Take 20 mg by mouth daily as needed Past Week Yes Reported, Patient     umeclidinium (INCRUSE ELLIPTA) 62.5 MCG/INH inhaler Inhale 1 puff into the lungs daily  Patient taking differently: Inhale 1 puff into the lungs every morning Past Month Yes Sina Villanueva MD         Date completed: 03/20/23    Medication history completed by: Maddy Flaherty [facility]
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
## 2023-03-21 NOTE — PROGRESS NOTES
SPIRITUAL HEALTH SERVICES  SPIRITUAL ASSESSMENT Progress Note  [facility] ([facility] M 546 ortho 03/21/23     REFERRAL SOURCE: Admission Request    Met with Pt via cell phone. Assessed Pt's spiritual needs. Pt stated that she desired prayer for healing. Prayed for Pt's healing and to be restored. Prayed for her to have peace and stephanie as well.    PLAN: Will follow up with Pt as needed while on unit.    To Boateng MA, MPA  Associate   Pager: [phone]

## 2023-03-21 NOTE — PLAN OF CARE
/75 (BP Location: Right arm)   Pulse 64   Temp 96.9  F (36.1  C) (Oral)   Resp 18   SpO2 94%   Pt vitals stable. On room air, sats stable. Has dyspnea on exertion. IS encouraged. Denies dizziness, chest pain,fever. C/o headache, PRN tylenol given with relief. CIWA score 5 and 8, valium given once.Tolerating regular diet well. Reports intermittent nausea, denies vomiting. Voids without difficulties. LBM 3/21, loose stool per pt. Sample collected 3/20 results pending. Ambulates independently in room. Pt is alert and oriented x4, able to make needs known. Denies suicidal ideation. 1:1 sitter discontinued. Special precautions maintained. Continue to monitor.

## 2023-03-21 NOTE — CONSULTS
3/21/2023  Pt completed her ORAL CA today. She is interested in IP ORAL treatment, however, due to Medicare and pt wanting one close by, which only leaves [facility]. Pt is agreeable towards in-person IOP. A referral will be placed for IOP at [facility] to the IOP Coordinator, Ivana Tran, [phone].    [id number]    Recommendations:   1)  Complete an Intensive Outpatient CD Treatment Program.  2)  Abstain from all mood-altering chemicals unless prescribed by a licensed provider.   3)  Attend, at minimum, 2 weekly support group meetings, such as 12 step based (AA/NA), SMART Recovery, Health Realizations, and/or Refuge Recovery meetings.     4)  Actively work with a female mentor/sponsor on a weekly basis.   5)  Follow all the recommendations of your treatment/medical providers.     Clinical Substantiation:    Pt has a long history of alcohol use. She has tried to stop using on her own, but something comes up and she begins drinking again. Pt would benefit from daily structure. She has medicare which limits which programs that are covered by medicare.     Referrals/ Alternatives:  [facility] IOP: [phone]     ORAL consult completed by: Ana Cristina Gomez [facility].  Phone Number: [phone]  E-mail Address: [email or web address] Mental Health and Addiction Services Evaluation Department  [address] [place] [address]     *Due to regulation of Title 42 of the Code of Federal Regulations (CFR) Part 2: Confidentiality laws apply to this note and the information wherein.  Thus, this note cannot be copy and pasted into any other health care staff's note nor can it be included in general medical records sent to ANY outside agency without the patient's written consent.

## 2023-03-21 NOTE — PROGRESS NOTES
[facility]    Medicine Progress Note - Hospitalist Service, GOLD TEAM 16    Date of Admission:  3/20/2023    Assessment & Plan   Patient is a 67 y/o woman who has COPD, alcohol dependence with past withdrawal seizures, alcoholic liver cirrhosis, obstructive sleep apnea, depression and anxiety. Patient presented for alcohol detoxification. Patient did express suicidal ideation earlier to mental health staff. Patient also was found to be positive for Covid-19. Patient appears to be mildly symptomatic with onset of symptoms yesterday (19-Mar-2023).    #Alcohol dependence, patient at risk of alcohol withdrawal  - Patient has had alcohol withdrawal seizures in the past  - Patient to be monitored on CIWA protocol  - Chemical dependency evaluation.    #Suicidal ideation  - Patient to be on suicide precautions for now  - Pending psychiatry evaluation    #COVID-19 infection  - No evidence of superimposed bacterial infection  - Onset of symptoms appear to have been on 19-Mar-2023  - Patient on day 2 of isolation precautions  - Patient has been started on Paxlovid which will be continued    #COPD, compensated  - Patient to continue maintenance inhaler  - Albuterol inhaler as needed    #Alcoholic liver cirrhosis, compensated  - Monitoring for changes    #Obstructive sleep apnea  - CPAP on hold while patient is in isolation for COVID-19    #Depression, anxiety  - Patient to continue Lexapro and Remeron.    #Obesity  - Encourage weight loss  - Encourage healthy diet and exercise  - Recommend outpatient follow-up with primary care provider    #Hypertension  - Blood pressure is significantly elevated  - Add losartan 25 mg p.o. daily  - Continue to closely monitor blood pressure  - Make further medication adjustments as necessary.       Diet: Combination Diet Regular Diet Adult    DVT Prophylaxis: Enoxaparin (Lovenox) SQ  Viveros Catheter: Not present  Lines: None     Cardiac Monitoring:  None  Code Status: Full Code      Clinically Significant Risk Factors Present on Admission                # Thrombocytopenia: Lowest platelets = 75 in last 2 days, will monitor for bleeding                Disposition Plan     Expected Discharge Date: 03/22/2023                  Thang Leslie DO, MHS  Hospitalist Service, GOLD TEAM [address] [place] [address]  Securely message with [employer] (more info)  Text page via [employer] Paging/Directory   See signed in provider for up to date coverage information  ______________________________________________________________________    Interval History   Patient sleeping comfortable upon entering room.  Patient reports minimal shortness of breath.  Patient is not requiring supplemental oxygen.  Per nursing staff, patient has not required Valium administration since last night.  Patient is pending psychiatric evaluation for suicidal ideation.    Physical Exam   Vital Signs: Temp: 97.2  F (36.2  C) Temp src: Oral BP: (!) 151/79 Pulse: 68   Resp: 20 SpO2: 93 % O2 Device: None (Room air)    Weight: 0 lbs 0 oz    GENERAL: Alert and oriented x 3; no acute distress; well-nourished.  HEENT: Normocephalic; atraumatic; PERRLA; MMM.  CV: RRR; normal S1, S2; no rubs, murmurs, or gallops.  RESP: Lung fields clear to aucultation B/L; no wheezing or crepitations.  GI: Abdomen is soft, nontender, nondistended; no organomegaly; normal bowel sounds.  : Deferred genital examination.   MSK: No clubbing, cyanosis, or edema.  DERM: Skin is intact; no rash, lesions, or skin breakdown.  NEURO: No focal deficits appreciated; strength & sensorium are grossly intact.  PSYCH: No active hallucinations; affect, insight appear within normal limits.    Medical Decision Making       45 MINUTES SPENT BY ME on the date of service doing chart review, history, exam, documentation & further activities per the note.      Data     I have personally reviewed the following data over the  past 24 hrs:    2.2 (L)  \   13.9   / 75 (L)     138 100 7.0 (L) /  87   3.5 27 0.58 \       Procal: N/A CRP: 7.71 (H) Lactic Acid: N/A       INR:  1.03 PTT:  N/A   D-dimer:  0.32 Fibrinogen:  N/A       Imaging results reviewed over the past 24 hrs:   No results found for this or any previous visit (from the past 24 hour(s)).
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
## 2023-03-21 NOTE — PLAN OF CARE
Patient A/Ox4. VSS. Denies CP, SOB, dizziness/LH. LSCTA. +fl/BS. Voiding well in bathroom. Pt c/o diarrhea, awaiting stool results, has probiotic in the mean time. CMS intact. Tolerating regular diet without NV. Activity level is good, pt independent to SBA in room. Pain rated as tolerable throughout shift, managed with scheduled meds thus far. Possible discharge to psych when COVID quarantine is over (03/30/23?). 1:1 sitter in place for SI. Patient has demonstrated ability to call appropriately. Patient is resting with call light within reach. Will continue to monitor.

## 2023-03-21 NOTE — UTILIZATION REVIEW
Admission Status; Secondary Review Determination         Under the authority of the Utilization Management Committee, the utilization review process indicated a secondary review on the above patient.  The review outcome is based on review of the medical records, discussions with staff, and applying clinical experience noted on the date of the review.        (x)      Inpatient Status Appropriate - This patient's medical care is consistent with medical management for inpatient care and reasonable inpatient medical practice.      RATIONALE FOR DETERMINATION   The patient is a 68-year-old female who was admitted on 3/20/2023.  Patient has a history of alcohol dependence with a history of withdrawal seizures in the past.  She has alcoholic liver cirrhosis.  She presented for detox.  She was seen by this ED staff with likely recommendations for inpatient versus outpatient treatment programs.  She also tested positive for COVID-19.  Current O2 sats are stable on room air although the patient does have a 100.1 fever currently.  Patient did have a breath alcohol test at 0.16 on admission.  She has developed increasing symptoms and on CIWA she is between 8 and 12 based on 10 mg dosing of diazepam midday today.  Because of the history of withdrawal seizure and rising CIWA, she is appropriate for inpatient status.  Likely she will not be discharged today based on current findings.  She has been started on Paxlovid..      The severity of illness, intensity of service provided, expected LOS and risk for adverse outcome make the care complex, high risk and appropriate for hospital admission.        The information on this document is developed by the utilization review team in order for the business office to ensure compliance.  This only denotes the appropriateness of proper admission status and does not reflect the quality of care rendered.         The definitions of Inpatient Status and Observation Status used in making the  determination above are those provided in the CMS Coverage Manual, Chapter 1 and Chapter 6, section 70.4.      Sincerely,     Anthony Mcclain MD  Physician Advisor  Utilization Review/ Case Management  [facility].

## 2023-03-21 NOTE — TELEPHONE ENCOUNTER
BRENTON placed a telephone recovery support call to pt given a recent no show for scheduled appointment with provider in [facility]. Patsy shared she has  Covid and in the process of going to in  patient treatment. Writer stated, I'm proud of you and get some rest .

## 2023-03-21 NOTE — PROGRESS NOTES
Brief SW Note:    SW updated by PRINCE Jarvis, that pt will be able to attend a hybrid program in [place] through  IOP.  SW provided the below information in pt's Discharge Instructions:      Chemical Dependency Treatment     [facility] ([facility])  Coordinator is Ivana Tran (PH: [phone])  - IOP was recommended and a referral was sent to [facility].  - Coordinator, Ivana Tran, should be reaching out to you to set-up a start date.          REBECCA Gillis, LSW  [facility]   PHONE: [phone]  Pager: [phone]

## 2023-03-21 NOTE — CONSULTS
Care Management Initial Consult    General Information  Assessment completed with: Patient,  (Patsy)  Type of CM/SW Visit: Initial Assessment    Primary Care Provider verified and updated as needed: Yes   Readmission within the last 30 days: no previous admission in last 30 days      Reason for Consult: discharge planning, mental health concerns, substance use concerns  Advance Care Planning:            Communication Assessment  Patient's communication style: spoken language (English or Bilingual)    Hearing Difficulty or Deaf: no   Wear Glasses or Blind: no    Cognitive  Cognitive/Neuro/Behavioral: WDL                      Living Environment:   People in home: alone     Current living Arrangements: apartment      Able to return to prior arrangements: yes       Family/Social Support:  Care provided by: self  Provides care for: no one  Marital Status: Single  Children, Sibling(s)          Description of Support System: Involved    Support Assessment: Adequate social supports    Current Resources:   Patient receiving home care services:       Community Resources: Chemical Dependency Services, OP Mental Health  Equipment currently used at home: none  Supplies currently used at home:      Employment/Financial:  Employment Status: retired        Financial Concerns: other (see comments) (She has a limited imcome and reports that it is difficult to pay for transportation)   Referral to Financial Worker: No       Lifestyle & Psychosocial Needs:  Social Determinants of Health     Tobacco Use: High Risk     Smoking Tobacco Use: Every Day     Smokeless Tobacco Use: Never     Passive Exposure: Not on file   Alcohol Use: Not on file   Financial Resource Strain: Not on file   Food Insecurity: Not on file   Transportation Needs: Not on file   Physical Activity: Not on file   Stress: Not on file   Social Connections: Not on file   Intimate Partner Violence: Not on file   Depression: Not at risk     PHQ-2 Score: 0   Housing  Stability: Not on file       Functional Status:  Prior to admission patient needed assistance:   Dependent ADLs:: Independent  Dependent IADLs:: Independent       Mental Health Status:  Mental Health Status: Current Concern  Mental Health Management: Medication    Chemical Dependency Status:  Chemical Dependency Status: Current Concern  Chemical Dependency Management: Previous treatment          Values/Beliefs:  Spiritual, Cultural Beliefs, Mu-ism Practices, Values that affect care: no               Additional Information:  H&P: Patsy Santamaria is a 68 year old female who has medical history of COPD, anxiety, depression presenting with alcohol intoxication and suicidal ideation.  She has been drinking.  She feels like she cannot go on any longer and wants to end her life.  She has a plan of jumping from a tall structure.  Denies take any pills.  No other drug use.  She has some paranoia, no hallucinations or voices.  Denies history of alcohol withdrawal seizures or delirium tremens.  She has been off her antianxiety medications for a few days.    I spoke with Patsy via phone and introduced self. She reported that she was admitted for mental health and substance use concerns. She lives in an apartment alone. She has sisters who are supportive and adult children in the area. She is retired and utilizes public transportation. She has attended treatment in the past and is established care with Dr. Bri Covington (Addiction medicine). She would like to establish care with a mental health provider/therapist as well. We discussed recommendations from ORAL assessment today with PRINCE Jarvis for [facility]. She is open to completing and understands that Ivana Tran will call her to initiate services. Her last drink was 3/20/2023 prior to admission. She has been drinking a pint to 750mL of liquor daily. She denies any suicidal thoughts or self injurious behavior.     RNCC/SW to follow for  discharge planning.   Recommendation to engage in [facility]. Ivana Tran will call patient to initiate services.     Terri Goldman, ROBSONSW

## 2023-03-22 ENCOUNTER — TELEPHONE (OUTPATIENT)
Dept: ADDICTION MEDICINE | Facility: CLINIC | Age: 68
End: 2023-03-22
Payer: COMMERCIAL

## 2023-03-22 ENCOUNTER — TELEPHONE (OUTPATIENT)
Dept: BEHAVIORAL HEALTH | Facility: CLINIC | Age: 68
End: 2023-03-22

## 2023-03-22 VITALS
RESPIRATION RATE: 16 BRPM | SYSTOLIC BLOOD PRESSURE: 123 MMHG | TEMPERATURE: 96.4 F | HEART RATE: 65 BPM | DIASTOLIC BLOOD PRESSURE: 63 MMHG | OXYGEN SATURATION: 98 %

## 2023-03-22 DIAGNOSIS — F10.20 ALCOHOL USE DISORDER, SEVERE, DEPENDENCE (H): Primary | ICD-10-CM

## 2023-03-22 LAB
ANION GAP SERPL CALCULATED.3IONS-SCNC: 10 MMOL/L (ref 7–15)
BUN SERPL-MCNC: 10.6 MG/DL (ref 8–23)
CALCIUM SERPL-MCNC: 9.1 MG/DL (ref 8.8–10.2)
CHLORIDE SERPL-SCNC: 98 MMOL/L (ref 98–107)
CREAT SERPL-MCNC: 0.68 MG/DL (ref 0.51–0.95)
CRP SERPL-MCNC: 8.08 MG/L
D DIMER PPP FEU-MCNC: <0.27 UG/ML FEU (ref 0–0.5)
DEPRECATED HCO3 PLAS-SCNC: 30 MMOL/L (ref 22–29)
ERYTHROCYTE [DISTWIDTH] IN BLOOD BY AUTOMATED COUNT: 14.4 % (ref 10–15)
GFR SERPL CREATININE-BSD FRML MDRD: >90 ML/MIN/1.73M2
GLUCOSE SERPL-MCNC: 121 MG/DL (ref 70–99)
HCT VFR BLD AUTO: 41.4 % (ref 35–47)
HGB BLD-MCNC: 13.4 G/DL (ref 11.7–15.7)
MCH RBC QN AUTO: 29.6 PG (ref 26.5–33)
MCHC RBC AUTO-ENTMCNC: 32.4 G/DL (ref 31.5–36.5)
MCV RBC AUTO: 91 FL (ref 78–100)
PLATELET # BLD AUTO: 74 10E3/UL (ref 150–450)
POTASSIUM SERPL-SCNC: 4.7 MMOL/L (ref 3.4–5.3)
RBC # BLD AUTO: 4.53 10E6/UL (ref 3.8–5.2)
SODIUM SERPL-SCNC: 138 MMOL/L (ref 136–145)
WBC # BLD AUTO: 2 10E3/UL (ref 4–11)

## 2023-03-22 PROCEDURE — 80048 BASIC METABOLIC PNL TOTAL CA: CPT | Performed by: INTERNAL MEDICINE

## 2023-03-22 PROCEDURE — 85014 HEMATOCRIT: CPT | Performed by: INTERNAL MEDICINE

## 2023-03-22 PROCEDURE — 250N000011 HC RX IP 250 OP 636: Performed by: INTERNAL MEDICINE

## 2023-03-22 PROCEDURE — 250N000013 HC RX MED GY IP 250 OP 250 PS 637: Performed by: PHYSICIAN ASSISTANT

## 2023-03-22 PROCEDURE — 36415 COLL VENOUS BLD VENIPUNCTURE: CPT | Performed by: INTERNAL MEDICINE

## 2023-03-22 PROCEDURE — 99207 PR NO BILLABLE SERVICE THIS VISIT: CPT

## 2023-03-22 PROCEDURE — 250N000009 HC RX 250: Performed by: INTERNAL MEDICINE

## 2023-03-22 PROCEDURE — 99239 HOSP IP/OBS DSCHRG MGMT >30: CPT | Performed by: INTERNAL MEDICINE

## 2023-03-22 PROCEDURE — 85379 FIBRIN DEGRADATION QUANT: CPT | Performed by: INTERNAL MEDICINE

## 2023-03-22 PROCEDURE — 250N000013 HC RX MED GY IP 250 OP 250 PS 637: Performed by: INTERNAL MEDICINE

## 2023-03-22 PROCEDURE — 250N000013 HC RX MED GY IP 250 OP 250 PS 637

## 2023-03-22 PROCEDURE — 86140 C-REACTIVE PROTEIN: CPT | Performed by: INTERNAL MEDICINE

## 2023-03-22 RX ORDER — DULOXETIN HYDROCHLORIDE 20 MG/1
20 CAPSULE, DELAYED RELEASE ORAL DAILY
Qty: 30 CAPSULE | Refills: 0 | Status: SHIPPED | OUTPATIENT
Start: 2023-03-23 | End: 2023-04-11

## 2023-03-22 RX ORDER — LANOLIN ALCOHOL/MO/W.PET/CERES
100 CREAM (GRAM) TOPICAL DAILY
Qty: 30 TABLET | Refills: 0 | Status: SHIPPED | OUTPATIENT
Start: 2023-03-23 | End: 2023-04-11

## 2023-03-22 RX ORDER — LACTOBACILLUS RHAMNOSUS GG 10B CELL
1 CAPSULE ORAL 2 TIMES DAILY
COMMUNITY
Start: 2023-03-22 | End: 2023-05-01

## 2023-03-22 RX ORDER — FOLIC ACID 1 MG/1
1 TABLET ORAL DAILY
Qty: 30 TABLET | Refills: 0 | Status: SHIPPED | OUTPATIENT
Start: 2023-03-23 | End: 2023-04-11

## 2023-03-22 RX ORDER — ACETAMINOPHEN 500 MG
1000 TABLET ORAL EVERY 8 HOURS PRN
COMMUNITY
Start: 2023-03-22 | End: 2023-05-01

## 2023-03-22 RX ADMIN — LOSARTAN POTASSIUM 25 MG: 25 TABLET, FILM COATED ORAL at 08:01

## 2023-03-22 RX ADMIN — BACITRACIN ZINC: 500 OINTMENT TOPICAL at 08:05

## 2023-03-22 RX ADMIN — CLONIDINE HYDROCHLORIDE 0.1 MG: 0.1 TABLET ORAL at 04:31

## 2023-03-22 RX ADMIN — Medication 1 CAPSULE: at 08:00

## 2023-03-22 RX ADMIN — BUMETANIDE 1 MG: 0.5 TABLET ORAL at 08:00

## 2023-03-22 RX ADMIN — THIAMINE HCL TAB 100 MG 100 MG: 100 TAB at 08:00

## 2023-03-22 RX ADMIN — HYDROXYZINE HYDROCHLORIDE 25 MG: 25 TABLET, FILM COATED ORAL at 04:31

## 2023-03-22 RX ADMIN — GABAPENTIN 900 MG: 300 CAPSULE ORAL at 04:31

## 2023-03-22 RX ADMIN — FLUTICASONE FUROATE AND VILANTEROL TRIFENATATE 1 PUFF: 200; 25 POWDER RESPIRATORY (INHALATION) at 08:06

## 2023-03-22 RX ADMIN — FOLIC ACID 1 MG: 1 TABLET ORAL at 08:01

## 2023-03-22 RX ADMIN — DULOXETINE HYDROCHLORIDE 20 MG: 20 CAPSULE, DELAYED RELEASE ORAL at 08:00

## 2023-03-22 RX ADMIN — MULTIPLE VITAMINS W/ MINERALS TAB 1 TABLET: TAB at 08:00

## 2023-03-22 RX ADMIN — ONDANSETRON 4 MG: 4 TABLET, ORALLY DISINTEGRATING ORAL at 04:30

## 2023-03-22 ASSESSMENT — ACTIVITIES OF DAILY LIVING (ADL)
ADLS_ACUITY_SCORE: 24

## 2023-03-22 NOTE — DISCHARGE SUMMARY
[facility]  Hospitalist Discharge Summary      Date of Admission:  3/20/2023  Date of Discharge:  3/22/2023  Discharging Provider: Thang Leslie DO  Discharge Service: Hospitalist Service, GOLD TEAM 16    Discharge Diagnoses   Alcohol dependence, patient at risk of alcohol withdrawal  Suicidal ideation  COVID-19 infection  COPD, compensated  Alcoholic liver cirrhosis, compensated  Obstructive sleep apnea  Depression, anxiety  Obesity  Hypertension    Follow-ups Needed After Discharge   Follow-up Appointments     Adult [facility] Follow-up and recommended labs and tests      Please follow-up with primary care provider at patient's earliest   convenience.  Please follow-up with intensive outpatient chemical dependency treatment   option(s).  Please continue Paxil bid as prescribed.  Please follow CDC COVID-19 home isolation guidelines as discussed with   provider.    Appointments on [place] and/or [facility] (with [facility] or [facility]   provider or service). Call [phone] if you haven't heard regarding   these appointments within 7 days of discharge.             Unresulted Labs Ordered in the Past 30 Days of this Admission     No orders found from 2/18/2023 to 3/21/2023.        Discharge Disposition   Discharged to home  Condition at discharge: Stable    Hospital Course   Patient is a 67 y/o woman who has COPD, alcohol dependence with past withdrawal seizures, alcoholic liver cirrhosis, obstructive sleep apnea, depression and anxiety. Patient presented for alcohol detoxification. Patient did express suicidal ideation earlier to mental health staff. Patient also was found to be positive for Covid-19. Patient appears to be mildly symptomatic with onset of symptoms yesterday (19-Mar-2023).    #Alcohol dependence, patient at risk of alcohol withdrawal  - Patient has had alcohol withdrawal seizures in the past  - Patient to be monitored on CIWA protocol  - Chemical dependency  evaluation.    #Suicidal ideation  - Patient to be on suicide precautions for now  - Pending psychiatry evaluation    #COVID-19 infection  - No evidence of superimposed bacterial infection  - Onset of symptoms appear to have been on 19-Mar-2023  - Patient on day 2 of isolation precautions  - Patient has been started on Paxlovid which will be continued    #COPD, compensated  - Patient to continue maintenance inhaler  - Albuterol inhaler as needed    #Alcoholic liver cirrhosis, compensated  - Monitoring for changes    #Obstructive sleep apnea  - CPAP on hold while patient is in isolation for COVID-19    #Depression, anxiety  - Patient to continue Lexapro and Remeron.    #Obesity  - Encourage weight loss  - Encourage healthy diet and exercise  - Recommend outpatient follow-up with primary care provider    #Hypertension  - Blood pressure is significantly elevated  - Add losartan 25 mg p.o. daily  - Continue to closely monitor blood pressure  - Make further medication adjustments as necessary.    Consultations This Hospital Stay   DIAGNOSTIC EVALUATION CENTER (DEC) ASSESSMENT ORDER  PSYCHIATRY IP CONSULT  CHEMICAL DEPENDENCY IP CONSULT  CARE MANAGEMENT / SOCIAL WORK IP CONSULT    Code Status   Full Code    Time Spent on this Encounter   I, Thang Leslie DO, personally saw the patient today and spent greater than 30 minutes discharging this patient.       Thang Leslie DO  [facility]  [address] [place] [id number]  Phone: [phone]  Fax: [phone]  ______________________________________________________________________       Primary Care Physician   EJ WELLER    Discharge Orders      Reason for your hospital stay    Patient was admitted to the hospital for medical management of acute alcohol withdrawal.  She was incidentally found to be COVID-19 positive.  Patient was treated with Paxlovid.  Chemical dependency consult recommended intensive outpatient  chemical dependency treatment.  Patient will be discharged home.     Activity    Your activity upon discharge: activity as tolerated     Adult [facility] Follow-up and recommended labs and tests    Please follow-up with primary care provider at patient's earliest convenience.  Please follow-up with intensive outpatient chemical dependency treatment option(s).  Please continue Paxil bid as prescribed.  Please follow CDC COVID-19 home isolation guidelines as discussed with provider.    Appointments on [place] and/or [facility] (with [facility] or [facility] provider or service). Call [phone] if you haven't heard regarding these appointments within 7 days of discharge.     Diet    Follow this diet upon discharge: Orders Placed This Encounter      Combination Diet Regular Diet Adult       Significant Results and Procedures      Results for orders placed or performed during the hospital encounter of 03/20/23   XR Chest 2 Views    Narrative    CHEST TWO VIEWS  3/20/2023 8:44 AM     HISTORY:  Shortness of breath.    COMPARISON: 5/26/2021.      Impression    IMPRESSION: Negative chest. Lungs clear.    WENDY WARD MD         SYSTEM ID:  [id number]       Discharge Medications   Current Discharge Medication List      START taking these medications    Details   acetaminophen (TYLENOL) 500 MG tablet Take 2 tablets (1,000 mg) by mouth every 8 hours as needed for fever, headaches or pain    Associated Diagnoses: Infection due to 2019 novel coronavirus      DULoxetine (CYMBALTA) 20 MG capsule Take 1 capsule (20 mg) by mouth daily for 30 days  Qty: 30 capsule, Refills: 0    Associated Diagnoses: Depressive disorder      folic acid (FOLVITE) 1 MG tablet Take 1 tablet (1 mg) by mouth daily for 30 days  Qty: 30 tablet, Refills: 0    Associated Diagnoses: Alcohol abuse      lactobacillus rhamnosus, GG, (CULTURELL) capsule Take 1 capsule by mouth 2 times daily    Associated Diagnoses: Healthcare maintenance      !! nirmatrelvir and  ritonavir (PAXLOVID) 300 mg/100 mg therapy pack Take 3 tablets by mouth 2 times daily for 2 days . Finish remainder of Paxlovid therapy pack that was started in the hospital.  Follow instructions on that pack.  Qty: 12 tablet, Refills: 0    Comments: This order will not be sent to a retail pharmacy. This order is intended for the AVS summary and for continuation of the remainder of the Paxlovid pack dispensed by the inpatient pharmacy at home. Confirm that the patient has received the inpatient pharmacy supplied Paxlovid to take home.  Associated Diagnoses: Infection due to 2019 novel coronavirus      !! nirmatrelvir and ritonavir (PAXLOVID) 300 mg/100 mg therapy pack Take 3 tablets by mouth 2 times daily for 5 days  Qty: 30 tablet, Refills: 0    Comments: Date of symptom onset: 3/19; Risk criteria met: Yes; Positive Covid-19 test: Yes; Weight >40 kg Yes; Renal fxn: normal;  Drug-Drug interactions reviewed & addressed: Yes      thiamine (B-1) 100 MG tablet Take 1 tablet (100 mg) by mouth daily for 30 days  Qty: 30 tablet, Refills: 0    Associated Diagnoses: Alcohol abuse       !! - Potential duplicate medications found. Please discuss with provider.      CONTINUE these medications which have NOT CHANGED    Details   albuterol (PROAIR HFA/PROVENTIL HFA/VENTOLIN HFA) 108 (90 Base) MCG/ACT inhaler Inhale 2 puffs into the lungs every 4 hours as needed for shortness of breath / dyspnea or wheezing  Qty: 18 g, Refills: 3    Comments: Pharmacy may dispense brand covered by insurance (Proair, or proventil or ventolin or generic albuterol inhaler)  Associated Diagnoses: Other emphysema (H)      budesonide-formoterol (SYMBICORT) 160-4.5 MCG/ACT Inhaler Inhale 2 puffs into the lungs 2 times daily  Qty: 10.2 g, Refills: 11    Associated Diagnoses: Chronic obstructive pulmonary disease, unspecified COPD type (H)      bumetanide (BUMEX) 1 MG tablet Take 1 tablet (1 mg) by mouth daily  Qty: 30 tablet, Refills: 1    Associated  "Diagnoses: Lymphedema of both lower extremities      hydrOXYzine (ATARAX) 50 MG tablet Take 0.5-1 tablets (25-50 mg) by mouth 3 times daily as needed for anxiety  Qty: 90 tablet, Refills: 2    Associated Diagnoses: PTSD (post-traumatic stress disorder)      ipratropium - albuterol 0.5 mg/2.5 mg/3 mL (DUONEB) 0.5-2.5 (3) MG/3ML neb solution Take 1 vial (3 mLs) by nebulization every 4 hours as needed for shortness of breath / dyspnea or wheezing  Qty: 15 mL, Refills: 1    Associated Diagnoses: COPD exacerbation (H)      mirtazapine (REMERON) 15 MG tablet Take 1 tablet (15 mg) by mouth At Bedtime  Qty: 30 tablet, Refills: 2    Associated Diagnoses: Episode of recurrent major depressive disorder, unspecified depression episode severity (H)      Multiple Vitamins-Minerals (HAIR SKIN AND NAILS FORMULA PO) Take by mouth every morning      omeprazole (PRILOSEC) 20 MG DR capsule Take 20 mg by mouth daily as needed      umeclidinium (INCRUSE ELLIPTA) 62.5 MCG/INH inhaler Inhale 1 puff into the lungs daily  Qty: 30 each, Refills: 1    Associated Diagnoses: Other emphysema (H)         STOP taking these medications       bacitracin 500 UNIT/GM external ointment Comments:   Reason for Stopping:         escitalopram (LEXAPRO) 10 MG tablet Comments:   Reason for Stopping:         escitalopram (LEXAPRO) 5 MG tablet Comments:   Reason for Stopping:             Allergies   Allergies   Allergen Reactions     Bupropion Diarrhea     Codeine Hives, Itching, Nausea and Rash     Nickel Unknown     Oxycodone Nausea and Vomiting     Percocet [Oxycodone-Acetaminophen]      Patient reports \"vomiting,grossly ill two weeks ago\"     Topiramate Unknown     Diclofenac Nausea     Tolerates the topical gel     Furosemide Rash     Hydrochlorothiazide Rash     phototoxicity - med was d/lora         Sulfa Drugs Itching and Rash     Sulfasalazine Rash     "

## 2023-03-22 NOTE — PROGRESS NOTES
Care Management Discharge Note    Discharge Date: 03/22/2023       Discharge Disposition: Home    Discharge Services: Chemical dependency resources/IOP; continued care with Psychiatrist    [facility] (IOP)  PH: [phone]    [facility] [place] [address]   [address]   [place] [address]   PH: [phone]    Discharge DME:  none    Discharge Transportation:  [employer]; Reference #: [id number]    Private pay costs discussed: Not applicable    PAS Confirmation Code:  N/A  Patient/family educated on Medicare website which has current facility and service quality ratings:  N/A    Education Provided on the Discharge Plan:  Yes  Persons Notified of Discharge Plans: Dr. Leslie; bedside nurse; charge nurse; patient.  Patient/Family in Agreement with the Plan:  Yes    Handoff Referral Completed: Yes    Additional Information:  SW provided pt with [employer] application, along with education for completing application and typical processing time, once submitted via mail.        REBECCA Gillis, LSW  [facility]   PHONE: [phone]  Pager: [phone]

## 2023-03-22 NOTE — TELEPHONE ENCOUNTER
"Per provider referral, Peer Recovery  Specialist  placed a telephone recovery support call to pt for discussion regarding current status and to provide encouragement, support and resources as needed.   checked back in with Patsy. Patsy reports feels alittle better and was sent home today. She shared a friend helped wash her clothing .  said, \" Keep that friend !\"  and Patsy laughed together..  Patsy schedule  appointment with   April 6th 2023 11:30am  "

## 2023-03-22 NOTE — PLAN OF CARE
VS: /62 (BP Location: Right arm)   Pulse 71   Temp (!) 95.9  F (35.5  C) (Oral)   Resp 18   SpO2 100%     O2: O2 SATS >90% denies chest pain,  has dyspnea on excertion   Output: Voids adequately in the bathroom   Last BM: 3/21/23 BS present all x4 quadrants    Activity: Up ad tre, independent  in the room   Up for meals? yes   Skin: Intact edema to bilateral extremities    Pain: Managed with prn tylenol    CMS: AOX4 Denies numbness and tingling   Dressing: none   Diet: Regular diet    LDA: No IV  access    Equipment: Personal belongings    Plan: Continue to monitor  and update,    Additional Info: Ciwa score 5 and 8 valium administered x1 per protocol.denies SI , bedside sitter discontinued   this shift.

## 2023-03-22 NOTE — PLAN OF CARE
Goal Outcome Evaluation:      Plan of Care Reviewed With: patient        VS: VSS   O2: SpO2 > 90% and stable on RA. LS clear and equal bilaterally. Denies chest pain and SOB.    Output: Voids spontaneously without difficulty to bathroom    Last BM: 3/21/23, denies abdominal discomfort. BS active    Activity: ind in room   Skin: WDL except, right thigh redness-spilled hot honey on self    Pain: Pain managed with prn tylenol. Denies this shift   CMS: Intact, AOx4. Denies numbness and tingling.   Dressing: None   Diet: Regular diet. Nausea x 1, Zofran with relieve, no emesis    LDA:  No PIV access.    Equipment: IV pole, personal belongings,    Plan: Special precautions maintained / Continue with plan of care. Call light within reach, pt able to make needs known.    Additional Info: CIWA score 3 and 2. Denies SI

## 2023-03-23 ENCOUNTER — TELEPHONE (OUTPATIENT)
Dept: ADDICTION MEDICINE | Facility: CLINIC | Age: 68
End: 2023-03-23
Payer: COMMERCIAL

## 2023-03-23 ENCOUNTER — PATIENT OUTREACH (OUTPATIENT)
Dept: CARE COORDINATION | Facility: CLINIC | Age: 68
End: 2023-03-23
Payer: COMMERCIAL

## 2023-03-23 ENCOUNTER — TELEPHONE (OUTPATIENT)
Dept: ADDICTION MEDICINE | Facility: CLINIC | Age: 68
End: 2023-03-23

## 2023-03-23 DIAGNOSIS — F10.20 ALCOHOL USE DISORDER, SEVERE, DEPENDENCE (H): Primary | ICD-10-CM

## 2023-03-23 PROCEDURE — 99207 PR NO BILLABLE SERVICE THIS VISIT: CPT

## 2023-03-23 NOTE — PROGRESS NOTES
Clinic Care Coordination Contact  Clinic Care Coordination Chart Review     Situation: Patient chart reviewed by care coordinator.    Background: Patient identified by health plan as potential for CPN outreach due to recent hospitalization at [facility].    Assessment: Pt discharged from [facility] on 3/22/2023. Primary Care- Care Coordination referral was placed at discharge.  No program created. RN created Primary Care- Care Coordination program.     Plan/Recommendations: No further outreaches from CPN team to avoid duplication of services.    Marisel Durant RN 03/23/23 12:55 PM   RN Clinical Product Navigator  [facility] - Ambulatory Care Management

## 2023-03-23 NOTE — TELEPHONE ENCOUNTER
Reason for call: Medication request    Patient called regarding (reason for call): Received call from patient requesting medication for pain, due to having Covid.     Phone number to reach patient:  [phone]    Additional Comments: patient stated she was discharged from the hospital yesterday    Best Time: ASAP    Can we leave a detailed message on this number?  Yes

## 2023-03-24 ENCOUNTER — PATIENT OUTREACH (OUTPATIENT)
Dept: CARE COORDINATION | Facility: CLINIC | Age: 68
End: 2023-03-24
Payer: COMMERCIAL

## 2023-03-24 NOTE — PROGRESS NOTES
"Clinic Care Coordination Contact  Community Health Worker Initial Outreach    Today's date: 3/24/2023    Reason: CCC CHW Initial Outreach Called- Referral to Clinic Care Coordination (CCC) Service  Referral provider/name: Thang Leslie DO  Note attached per referral reviewed;   \"Note    Pt is discharging to home today.  CD IOP and continued care with Psychiatrist, Dr. Covington, rec.     [facility] (IOP)  PH: [phone]     [facility] [place] [address]   [address]   [place] [address]   PH: [phone]\"        CHW Initial Information Gathering:  Referral Source: Other, specify  Preferred Hospital: [facility]  [phone]  Preferred Urgent Care: [facility] [phone]  Current living arrangement:: I live alone  Type of residence:: Apartment (Apartment with elevator and stairway per patient shared. Patient live on the 15th floor per pt shared on 3/24/2023.)  Equipment Currently Used at Home: none (Use none- walker or cane per pt.)  Informal Support system:: Family, Other, Children (Patient shared: have 2 sisters, son and daughter and a good friend.)  Transportation means:: Medical transport (Patient confirmed that she know how to connect with medical transportation to medical appt. No concerns.)    Care Mgmt (GEN) screening:   -Fallen 2 or more times in the past year? Per patient; I can't remember.  -Any fall with injury in the past year? Per patient; I can't remember.    Patient accepts CC: Yes. Patient scheduled for assessment with CCC LILI Viramontes on 3/30/2023 at 11:00AM via phone visit. Patient noted desire to discuss weigh and nutrition program per patient request. Note/comment: Pt is aware that CCC RN will call her via phone at appt time on the appt date. Pt confirmed.     Potential Patient Outreach Discussion:   Attempted #1: Patient was identified as a potential candidate and referred to " "Clinic Care Coordination Service. CHW Ernestina call and spoke with patient today to discuss possible clinic care coordination enrollment. Have introduced myself to patient. Clinic care coordination service was described to the patient and immediate needs were discussed. Patient is aware [facility] team includes CHW, SW, RN, and FRW. The patient agreed enrollment into CCC service.     Patient declined CCC assist patient to connect with the psychiatrist at this time per CHW verified.    CHW explained initial assessment, CCC monthly outreach follow up and CCC outreach standard work. CHW completed the CHW screening and Care Mgmt (GEN) above with patient. CHW assisted with scheduling patient initial assessment appt with CCC RN based on patient request. CHW suggested pt to connect PCP office with any scheduling appt or health questions and 911 emergency for crisis situation or if COVID-19 symptom is getting worse. Pt confirmed and thank for today's call.     Patient shared:   -I currently follow up with the [facility] clinic for treatment. I'm good with this. Don't need any help.   -I need help with working on my weigh and nutrition. \"I gain weigh.\"   -Live alone on the 15th floor apartment with elevator and stairways.   -Currently have \"COVID\" and quarantine.   -No other questions or concerns.     Plan:   Patient attend initial assessment appt on 3/30/2023 at 11:00AM with CCC RN via phone visit.   "

## 2023-03-27 NOTE — TELEPHONE ENCOUNTER
"    1) RN reviewed Telephone message re: request for pain medication.     2) RN spoke with pt at  [phone]. Pt denies needing pain medication now; \"It's better.\" States she's just been taking ibuprofen for headache, etc, related to having COVID.     3) Pt requested information on upcoming appointments. RN reviewed them with pt. Pt wanted to know where she should go for her Intensive Outpatient appointment. RN could not find any information about this.     4) RN advised pt call [phone] and ask [facility] for location information for her appointments and any other future needs. Pt thanked RN and denies further needs at this time.     Nadeen Garner RN on 3/27/2023 at 2:34 PM    "

## 2023-03-30 ENCOUNTER — PATIENT OUTREACH (OUTPATIENT)
Dept: CARE COORDINATION | Facility: CLINIC | Age: 68
End: 2023-03-30

## 2023-03-30 ENCOUNTER — PATIENT OUTREACH (OUTPATIENT)
Dept: NURSING | Facility: CLINIC | Age: 68
End: 2023-03-30
Payer: COMMERCIAL

## 2023-03-30 NOTE — PROGRESS NOTES
uss inhaler as needed and daily     CPAP - uses off and on   Sleep medicine  Has meds and anxiety        Smoking - does not like th patches - give night mare     No issues while in hospital  1/2 pack - 1  pack       Med bills  Addiction medica    [facility]   Clinic Care Coordination Contact    Clinic Care Coordination Contact  OUTREACH    Referral Information:  Referral Source: Other, specify         Chief Complaint   Patient presents with     Clinic Care Coordination - Initial        Universal Utilization:       Utilization    Hospital Admissions  2             ED Visits  2             No Show Count (past year)  13                Current as of: 4/3/2023  7:38 AM              Clinical Concerns:  Current Medical Concerns:    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)     Diuretic-induced hypokalemia     Dyspnea on exertion     Elevated LFTs     Ganglion of tendon sheath     GI bleed     Hereditary and idiopathic peripheral neuropathy     History of major depression     Homeless     Major depression, recurrent (H)     Low backache     Airway obstruction due to foreign body, initial encounter     Hypomagnesemia     Mild episode of recurrent major depressive disorder (H)     Morbid obesity (H)     Smoker     Peripheral edema     Pneumonia of right lower lobe due to infectious organism     Primary localized osteoarthrosis, hand     Primary osteoarthritis of right knee     PTSD (post-traumatic stress disorder)     Seasonal allergies     Skin lesion     Snoring      Trochanteric bursitis of left hip     Vitamin B12 deficiency (non anemic)     Vitamin D deficiency     Acute alcoholic intoxication (H)     Anxiety     Closed fracture of head of left radius     Recurrent falls     Thrombocytopenia (H)     Dermatitis     Depressive disorder     Leukopenia     Alcoholic cirrhosis of liver without ascites (H)     Sigmoid Diverticulitis     Mixed simple and mucopurulent chronic bronchitis (H)     Suicidal ideation     Infection due to 2019 novel coronavirus   '  Current Behavioral Concerns: Pt is enrolled in outpatient program for addiction medicine       Medication Management:  Medication review status: Medications reviewed and no changes reported per patient.        Current Outpatient Medications   Medication     acetaminophen (TYLENOL) 500 MG tablet     albuterol (PROAIR HFA/PROVENTIL HFA/VENTOLIN HFA) 108 (90 Base) MCG/ACT inhaler     budesonide-formoterol (SYMBICORT) 160-4.5 MCG/ACT Inhaler     bumetanide (BUMEX) 1 MG tablet     DULoxetine (CYMBALTA) 20 MG capsule     folic acid (FOLVITE) 1 MG tablet     hydrOXYzine (ATARAX) 50 MG tablet     ipratropium - albuterol 0.5 mg/2.5 mg/3 mL (DUONEB) 0.5-2.5 (3) MG/3ML neb solution     lactobacillus rhamnosus, GG, (CULTURELL) capsule     mirtazapine (REMERON) 15 MG tablet     Multiple Vitamins-Minerals (HAIR SKIN AND NAILS FORMULA PO)     omeprazole (PRILOSEC) 20 MG DR capsule     thiamine (B-1) 100 MG tablet     umeclidinium (INCRUSE ELLIPTA) 62.5 MCG/INH inhaler     No current facility-administered medications for this visit.          Functional Status:       Living Situation:  Current living arrangement:: I live alone  Type of residence:: Apartment (Apartment with elevator and stairway per patient shared. Patient live on the 15th floor per pt shared on 3/24/2023.)    Lifestyle & Psychosocial Needs:    Social Determinants of Health     Tobacco Use: High Risk (2/28/2023)    Patient History      Smoking Tobacco Use: Every Day       Smokeless Tobacco Use: Never      Passive Exposure: Not on file   Alcohol Use: Not on file   Financial Resource Strain: Not on file   Food Insecurity: Not on file   Transportation Needs: Not on file   Physical Activity: Not on file   Stress: Not on file   Social Connections: Not on file   Intimate Partner Violence: Not on file   Depression: Not at risk (2/14/2023)    PHQ-2      PHQ-2 Score: 0   Housing Stability: Not on file              Informal Support system:: Family, Other, Children (Patient shared: have 2 sisters, son and daughter and a good friend.)          Resources and Interventions:  Current Resources:     Equipment Currently Used at Home: none (Use none- walker or cane per pt.)          Care Plan:  Care Plan: Medical     Problem: HP GENERAL PROBLEM     Goal: I would like to develop a plan of care for sleep within 1-3 months     Start Date: 3/30/2023    Note:     Barriers: access to visit and update equipment, acute medical concerns  Strengths: engagement   Patient expressed understanding of goal: yes  Action steps to achieve this goal:  1. I will work with provider to consider visit to review CPAP device and other therapy options  2. I will update Care coordination team during next outreach  3. I will report to my Community Health Worker if any additional resources or support needed.                Goal: Smoking Cessation           Goal: I will develop a plan with the CD program for additional support     Start Date: 3/30/2023    Note:        Patient expressed understanding of goal: yes  Action steps to achieve this goal:  1. I will attend scheduled intake and therapy and follow recommendations for support  2. I will update Care coordination team during next outreach  3. I will report to my Community Health Worker if any additional resources or support needed.                      Care Plan: Social     Problem: HP GENERAL PROBLEM     Goal: Financial Wellbeing     Note:        Patient expressed  understanding of goal: yes  Action steps to achieve this goal:  1. I will work with financial office to ensure medical bills are submitted to insurance and if any additional resources   2. I will update Care coordination team during next outreach  3. I will report to my Community Health Worker if any additional resources or support needed.                            Patient/Caregiver understanding:  Pt reports understanding and denies any additional questions or concerns at this times. CC engaged in AIDET communication during encounter.         Future Appointments              In 2 days [facility] [address] [place] [address] [facility] [place]    In 1 week Melissa Covington DO [facility] [place]    In 1 week SPRS CCC RN [facility] SPRS    In 1 month UC LAB [facility] Lab [facility]    In 1 month Ana Cristina Romeo MD [facility] Hepatology Clinic [facility]          Plan:    Patient will schedule and attend recommended follow up visits with speciality providers and primary care provider.    RN CC will outreach in 4-6 weeks to support ongoing recommendations and plan of care will be available sooner if needed.

## 2023-03-30 NOTE — PROGRESS NOTES
Clinic Care Coordination Contact    Follow Up Progress Note      Assessment: SW CC followed up with pt for monthly outreach and recent ED visit.     Pt rpeorts she is doing okay and planning to talk to CCC RN about nutrition program. Pt reports not needing any further resources at this time and would like follow up in a month to see if further resources are needed.     Care Gaps:    Health Maintenance Due   Topic Date Due     COPD ACTION PLAN  Never done     DEPRESSION ACTION PLAN  Never done     COLORECTAL CANCER SCREENING  Never done     Pneumococcal Vaccine: 65+ Years (2 - PCV) 01/28/2021     LUNG CANCER SCREENING  01/28/2021     MEDICARE ANNUAL WELLNESS VISIT  01/31/2022     NICOTINE/TOBACCO CESSATION COUNSELING Q 1 YR  01/27/2023     FALL RISK ASSESSMENT  03/14/2023       Intervention/Education provided during outreach: NA     Outreach Frequency: monthly    Plan:   Care Coordinator will follow up in one month    KATHLEEN Espana? Social Work Care Coordinator   [facility]  [email or web address]? [email or web address]    Phone: [phone]  she/her       nothing

## 2023-04-06 ENCOUNTER — TELEPHONE (OUTPATIENT)
Dept: ADDICTION MEDICINE | Facility: CLINIC | Age: 68
End: 2023-04-06
Payer: COMMERCIAL

## 2023-04-06 DIAGNOSIS — F10.20 ALCOHOL USE DISORDER, SEVERE, DEPENDENCE (H): Primary | ICD-10-CM

## 2023-04-06 NOTE — TELEPHONE ENCOUNTER
"Patsy reached out to staff to have writer call her and reschedule appointment. Writer contacted patient. Pt reports wants reschedule  Due to Covid and Since Infection. Writer reports has been drinking.  Writer shared that getting rid of all my alcohol helped.  Writer advised  Sparkling water with chopped fruits and herbs. Also, mixing juice or lemonade with sparking water. Pt stated, \" Will try and loves to cook.\"  Writer    explained to priortize wellness and rediscover hobbies.  Writer will follow up with her next week.  Pt shared has pending out patient treatment centers she  May attend.   "

## 2023-04-07 ENCOUNTER — NURSE TRIAGE (OUTPATIENT)
Dept: FAMILY MEDICINE | Facility: CLINIC | Age: 68
End: 2023-04-07

## 2023-04-07 DIAGNOSIS — J01.00 ACUTE NON-RECURRENT MAXILLARY SINUSITIS: Primary | ICD-10-CM

## 2023-04-07 RX ORDER — CEFDINIR 300 MG/1
300 CAPSULE ORAL 2 TIMES DAILY
Qty: 20 CAPSULE | Refills: 0 | Status: SHIPPED | OUTPATIENT
Start: 2023-04-07 | End: 2023-04-17

## 2023-04-07 NOTE — TELEPHONE ENCOUNTER
Provider Response to 2nd Level Triage Request    I have reviewed the RN documentation and and reviewed chart. My recommendation is:  I have sent in a prescription for an antibiotic for a sinus infection.  If symptoms do not improve, will need to be seen.

## 2023-04-07 NOTE — TELEPHONE ENCOUNTER
Called patient and relayed message. Patient verbalized understanding. No further action needed from triage RN.

## 2023-04-07 NOTE — TELEPHONE ENCOUNTER
Nurse Triage SBAR    Is this a 2nd Level Triage? NO    Situation: sinus pain/congestion x 1 week    Background: diagnosed with COVID-19 on 3/20/23 during inpatient hospital stay and treated with paxlovid. Reports symptoms seem to be progressing into sinus infection - has NOT been evaluated or diagnosed with sinusitis by provider.    Assessment: sinus congestion and pain rated 9/10 despite OTC medication use (flonase & tylenol). Afebrile. Sneezing frequently. Occasionally has clear nasal drainage present with sneezing. Sinus symptoms present x 1 week with no improvement. Requesting antibiotics.    Protocol Recommended Disposition:   Go To Office Now    Recommendation: no appointments available in clinic today - patient declining UC and would like PCP to rx antibiotics. Will route to PCP for review/plan.    Routed to provider    Does the patient meet one of the following criteria for ADS visit consideration? 16+ years old, with an [facility] PCP     TIP  Providers, please consider if this condition is appropriate for management at one of our Acute and Diagnostic Services sites.     If patient is a good candidate, please use dotphrase <dot>triageresponse and select Refer to ADS to document.    Reason for Disposition    SEVERE sinus pain    Additional Information    Negative: SEVERE headache and has fever    Negative: Patient sounds very sick or weak to the triager    Negative: Difficulty breathing, and not from stuffy nose (e.g., not relieved by cleaning out the nose)    Negative: Sounds like a life-threatening emergency to the triager    Protocols used: SINUS PAIN OR CONGESTION-A-OH    Kalpana London RN BSN  [facility]

## 2023-04-07 NOTE — TELEPHONE ENCOUNTER
General Call      Reason for Call:  Med request    What are your questions or concerns:  Pt called and stated that she was diagnose with sinus infection and would like some prescription to help her. Please send RX to [facility] pharmacy on [address] if appropriate. Pt also said PCP had prescribe some for her in the past too. Thanks!    Date of last appointment with provider: 12/21/2022    Okay to leave a detailed message?: Yes at Home number on file [phone] (home)

## 2023-04-11 ENCOUNTER — OFFICE VISIT (OUTPATIENT)
Dept: ADDICTION MEDICINE | Facility: CLINIC | Age: 68
End: 2023-04-11
Payer: COMMERCIAL

## 2023-04-11 VITALS
OXYGEN SATURATION: 97 % | HEART RATE: 68 BPM | WEIGHT: 259.8 LBS | HEIGHT: 63 IN | BODY MASS INDEX: 46.03 KG/M2 | SYSTOLIC BLOOD PRESSURE: 165 MMHG | DIASTOLIC BLOOD PRESSURE: 83 MMHG

## 2023-04-11 DIAGNOSIS — F10.20 ALCOHOL USE DISORDER, SEVERE, DEPENDENCE (H): Primary | ICD-10-CM

## 2023-04-11 DIAGNOSIS — F10.10 ALCOHOL ABUSE: ICD-10-CM

## 2023-04-11 DIAGNOSIS — F32.A DEPRESSIVE DISORDER: ICD-10-CM

## 2023-04-11 LAB
AMPHETAMINE QUAL URINE POCT: NEGATIVE
BARBITURATE QUAL URINE POCT: NEGATIVE
BENZODIAZEPINE QUAL URINE POCT: ABNORMAL
BUPRENORPHINE QUAL URINE POCT: NEGATIVE
COCAINE QUAL URINE POCT: NEGATIVE
CREATININE QUAL URINE POCT: ABNORMAL
INTERNAL QC QUAL URINE POCT: ABNORMAL
MDMA QUAL URINE POCT: NEGATIVE
METHADONE QUAL URINE POCT: NEGATIVE
METHAMPHETAMINE QUAL URINE POCT: NEGATIVE
OPIATE QUAL URINE POCT: NEGATIVE
OXYCODONE QUAL URINE POCT: NEGATIVE
PH QUAL URINE POCT: ABNORMAL
PHENCYCLIDINE QUAL URINE POCT: NEGATIVE
POCT KIT EXPIRATION DATE: ABNORMAL
POCT KIT LOT NUMBER: ABNORMAL
SPECIFIC GRAVITY POCT: 1.02
TEMPERATURE URINE POCT: ABNORMAL
THC QUAL URINE POCT: NEGATIVE

## 2023-04-11 PROCEDURE — H0038 SELF-HELP/PEER SVC PER 15MIN: HCPCS

## 2023-04-11 PROCEDURE — 80305 DRUG TEST PRSMV DIR OPT OBS: CPT | Performed by: FAMILY MEDICINE

## 2023-04-11 PROCEDURE — 99214 OFFICE O/P EST MOD 30 MIN: CPT | Performed by: FAMILY MEDICINE

## 2023-04-11 RX ORDER — DULOXETIN HYDROCHLORIDE 20 MG/1
20 CAPSULE, DELAYED RELEASE ORAL DAILY
Qty: 30 CAPSULE | Refills: 1 | Status: SHIPPED | OUTPATIENT
Start: 2023-04-11 | End: 2023-04-27

## 2023-04-11 ASSESSMENT — ANXIETY QUESTIONNAIRES
1. FEELING NERVOUS, ANXIOUS, OR ON EDGE: NOT AT ALL
2. NOT BEING ABLE TO STOP OR CONTROL WORRYING: NOT AT ALL
6. BECOMING EASILY ANNOYED OR IRRITABLE: SEVERAL DAYS
3. WORRYING TOO MUCH ABOUT DIFFERENT THINGS: SEVERAL DAYS
4. TROUBLE RELAXING: SEVERAL DAYS
7. FEELING AFRAID AS IF SOMETHING AWFUL MIGHT HAPPEN: NOT AT ALL
GAD7 TOTAL SCORE: 4
5. BEING SO RESTLESS THAT IT IS HARD TO SIT STILL: SEVERAL DAYS
GAD7 TOTAL SCORE: 4

## 2023-04-11 ASSESSMENT — PATIENT HEALTH QUESTIONNAIRE - PHQ9: SUM OF ALL RESPONSES TO PHQ QUESTIONS 1-9: 5

## 2023-04-11 NOTE — PATIENT INSTRUCTIONS
[facility] IOP: [phone] - we left message with  today.    Strategies for managing cravings:  - Keep hands busy - cooking, coloring, word find/crossword puzzle, gardening  - Go for walk (don't bring money, avoid routes by bars/liquor stores)  - Play tape forward (one drink turns to more drinks etc)  - Call a sober friend   - Use AA warmline [phone] (9am to 9pm)  - [email or web address]

## 2023-04-11 NOTE — PROGRESS NOTES
"[facility]    Peer   met with Patsy Santamaria in the Recovery Clinic to introduce herself, detail services provided and discuss current status of recovery. Pt appeared alert, oriented and open to feedback during our discussion.     Pt arrives for visit with provider. Writer connected  with Patsy.  Pt and writer downloaded ReSET  Talha  to phone and Zoom.  Writer demonstrated how to login  Group. Writer shared enrolling in Out patient treatment. Writer praised her and stated, \"I believe In  You.\" Writer will follow up with her regarding ReSET due to prescription .     PRC provided business card to pt welcoming contact for recovery based support and resources. PRC and pt agree to speak again during an upcoming  visit.           Service Type:     Individual     Session Start Time:      9:45 am                Session End 10:00am  Time:        Session Length:    15         Patient Goal:   To utilize suboxone assisted treatment for sobriety and long term recovery.     Goal Progress:   Ongoing.    Key Risk Factors to Recovery:   PRC encourages being aware of risk factors that can lead to re-use which include avoiding isolation, avoiding triggers and managing cravings in a healthy manner. being open and willing to acceptance and change on a daily basis.  PRC encourages pt to establish a sober network calling tree to reach out to when needed.  Continue to practice honesty with ourselves and trusted support person(s).   PRC encourages regular attendance at recovery based meetings as well as finding a sponsor for mentoring and accountability.   PRC encourages consideration of other services such as counseling for mental health issues which can correlate with our substance use.      Support Needs:   Ongoing care, support and resources for  substance use disorder.     Follow up:   BRENTON has provided pt with his contact information for support and resource needs.    BRENTON and " pt agree to meet during an upcoming  visit.       [facility]  [address], [address]   [place], [place], [address]  Clinic Phone: [phone]  Clinic Fax: [phone]  Peer  phone: [phone]    Open Monday - Friday  9:00am-4:00pm  Walk in hours: 9am-3pm      Bernie Mccollum  April 11, 2023  4:22 PM    Heber DUEÑAS provides clinical oversite and supervision of care.
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
## 2023-04-11 NOTE — PROGRESS NOTES
[facility] - Addiction [facility]       Rooming information:    Point of care urine drug screen positive for:  Lab Results   Component Value Date    BUP Negative 04/11/2023    BZO Screen Positive (A) 04/11/2023    BAR Negative 04/11/2023    ELICEO Negative 04/11/2023    MAMP Negative 04/11/2023    AMP Negative 04/11/2023    MDMA Negative 04/11/2023    MTD Negative 04/11/2023    ARG224 Negative 04/11/2023    OXY Negative 04/11/2023    PCP Negative 04/11/2023    THC Negative 04/11/2023    TEMP 90 F 04/11/2023    SGPOCT 1.025 04/11/2023       *POC urine drug screen does not screen for Fentanyl    PHQ-9 Scores:       12/29/2022     9:00 AM 2/14/2023     8:00 AM 4/11/2023     8:00 AM   PHQ   PHQ-9 Total Score 5 2 5   Q9: Thoughts of better off dead/self-harm past 2 weeks Not at all Not at all Not at all     ALISIA-7 Scores:      12/29/2022     9:00 AM 2/14/2023     8:00 AM 4/11/2023     8:00 AM   ALISIA-7 SCORE   Total Score 3 4 4       Any other recent substance use:     Denies    NICOTINE-Yes: Back up to about 1 pack per day  If using nicotine, ready to quit? Yes: Sometimes she feels ready but really struggles.     Side effects related to medications pt would like to discuss with provider (constipation, dry mouth, HA, GI upset, sedation?) No     Narcan currently available: N/A    Primary care provider: EJ WELLER MD     Mental health provider: Not currently (follow up on MH referral if needed)    Any housing, insurance deficits?: Stable and Active    Contact information up to date? Yes    3rd Party Involvement: None today (please obtain LUDIN if pt would like to include)      Danita Balderas RN  April 11, 2023  8:17 AM

## 2023-04-14 ENCOUNTER — TELEPHONE (OUTPATIENT)
Dept: ADDICTION MEDICINE | Facility: CLINIC | Age: 68
End: 2023-04-14
Payer: COMMERCIAL

## 2023-04-14 DIAGNOSIS — F10.20 ALCOHOL USE DISORDER, SEVERE, DEPENDENCE (H): Primary | ICD-10-CM

## 2023-04-14 NOTE — TELEPHONE ENCOUNTER
Per provider referral, Peer  placed a telephone recovery support call to pt for discussion regarding current status and to provide encouragement, support and resources as needed. Pt wasn't available.

## 2023-04-17 ENCOUNTER — PATIENT OUTREACH (OUTPATIENT)
Dept: NURSING | Facility: CLINIC | Age: 68
End: 2023-04-17
Payer: COMMERCIAL

## 2023-04-17 NOTE — PROGRESS NOTES
Clinic Care Coordination Contact    Follow Up Progress Note    RN CC spoke with patient   Patient would like to meet with PCP to review systems   RN CC will follow up after PCP following hospitalization and to ge back to some lifestyle changes including activity       Of note pt has worked with SW CC at [facility] for some goal progression   Pt will discuss  financial concern with SW CC at next outreach and update RN CC at next outreach to determine if SW CC able to address   Assessment:     Care Gaps:    Health Maintenance Due   Topic Date Due     COPD ACTION PLAN  Never done     DEPRESSION ACTION PLAN  Never done     COLORECTAL CANCER SCREENING  Never done     Pneumococcal Vaccine: 65+ Years (2 - PCV) 01/28/2021     LUNG CANCER SCREENING  01/28/2021     MEDICARE ANNUAL WELLNESS VISIT  01/31/2022     NICOTINE/TOBACCO CESSATION COUNSELING Q 1 YR  01/27/2023     FALL RISK ASSESSMENT  03/14/2023       Scheduled visit with PCP in May       Care Plans  Care Plan: Medical     Problem: HP GENERAL PROBLEM     Goal: I would like to develop a plan of care for sleep within 1-3 months     Start Date: 3/30/2023    Note:     Barriers: access to visit and update equipment, acute medical concerns  Strengths: engagement   Patient expressed understanding of goal: yes  Action steps to achieve this goal:  1. I will work with provider to consider visit to review CPAP device and other therapy options  2. I will update Care coordination team during next outreach  3. I will report to my Community Health Worker if any additional resources or support needed.                Goal: Smoking Cessation           Goal: I will develop a plan with the CD program for additional support     Start Date: 3/30/2023    Note:        Patient expressed understanding of goal: yes  Action steps to achieve this goal:  1. I will attend scheduled intake and therapy and follow recommendations for support  2. I will update Care coordination team during next  outreach  3. I will report to my Community Health Worker if any additional resources or support needed.                      Care Plan: Social     Problem: HP GENERAL PROBLEM     Goal: Financial Wellbeing     Start Date: 4/3/2023    This Visit's Progress: 10%    Note:        Patient expressed understanding of goal: yes  Action steps to achieve this goal:  1. I will work with financial office to ensure medical bills are submitted to insurance and if any additional resources   2. I will update Care coordination team during next outreach  3. I will report to my Community Health Worker if any additional resources or support needed.                            Intervention/Education provided during outreach: schedule visit with PCP              Plan:   Patient will schedule and attend recommended follow up visits with speciality providers and primary care provider.  .  RN CC will outreach in 4-6 weeks to support ongoing recommendations and plan of care will be available sooner if needed.    lenard

## 2023-04-20 ENCOUNTER — TELEPHONE (OUTPATIENT)
Dept: ADDICTION MEDICINE | Facility: CLINIC | Age: 68
End: 2023-04-20
Payer: COMMERCIAL

## 2023-04-20 DIAGNOSIS — F10.20 ALCOHOL USE DISORDER, SEVERE, DEPENDENCE (H): Primary | ICD-10-CM

## 2023-04-20 PROCEDURE — 99207 PR NO BILLABLE SERVICE THIS VISIT: CPT

## 2023-04-20 NOTE — TELEPHONE ENCOUNTER
"Per provider referral, Peer  placed a telephone recovery support call to pt for discussion regarding current status and to provide encouragement, support and resources as needed.  Writer spoke with Patsy. Patsy stated, \"I'm so glad y1ou called back I had lost you number.\"  Patsy shared she is interested in one on one Peer Recovery Specialistt. Writer gave  her number to call. Patsy requested to help her with her ReSET   On Tuesday April 24th.  "

## 2023-04-25 ENCOUNTER — TELEPHONE (OUTPATIENT)
Dept: ADDICTION MEDICINE | Facility: CLINIC | Age: 68
End: 2023-04-25
Payer: COMMERCIAL

## 2023-04-26 ENCOUNTER — TELEPHONE (OUTPATIENT)
Dept: ADDICTION MEDICINE | Facility: CLINIC | Age: 68
End: 2023-04-26
Payer: COMMERCIAL

## 2023-04-26 DIAGNOSIS — F10.20 ALCOHOL USE DISORDER, SEVERE, DEPENDENCE (H): Primary | ICD-10-CM

## 2023-04-26 PROCEDURE — 99207 PR NO BILLABLE SERVICE THIS VISIT: CPT

## 2023-04-26 NOTE — TELEPHONE ENCOUNTER
"Per provider referral, Peer  placed a telephone recovery support call to pt for discussion regarding current status and to provide encouragement, support and resources as needed.  Patsy reports reading her AA  Book. She shared missed  last appointment due to oversleeping.  Writer asked her when start    IOP treatment?  She advised [facility] never followed up with her and  Will talk with Dr. Covington regarding  IOP enrollment. She shared her sn is a trigger and learning how to deal with it. Writer asked her if she has a therapist.. She stated \"No\" Writer explained the therapist can only be located in [facility] due to transportation. Dr. Covington will be able to be make recommendations and referral.   "

## 2023-04-27 ENCOUNTER — APPOINTMENT (OUTPATIENT)
Dept: ADDICTION MEDICINE | Facility: CLINIC | Age: 68
End: 2023-04-27
Payer: COMMERCIAL

## 2023-04-27 ENCOUNTER — OFFICE VISIT (OUTPATIENT)
Dept: ADDICTION MEDICINE | Facility: CLINIC | Age: 68
End: 2023-04-27
Payer: COMMERCIAL

## 2023-04-27 VITALS
HEART RATE: 64 BPM | WEIGHT: 256 LBS | BODY MASS INDEX: 45.35 KG/M2 | SYSTOLIC BLOOD PRESSURE: 132 MMHG | DIASTOLIC BLOOD PRESSURE: 81 MMHG

## 2023-04-27 DIAGNOSIS — F10.20 ALCOHOL USE DISORDER, SEVERE, DEPENDENCE (H): Primary | ICD-10-CM

## 2023-04-27 DIAGNOSIS — F43.10 PTSD (POST-TRAUMATIC STRESS DISORDER): ICD-10-CM

## 2023-04-27 DIAGNOSIS — F32.A DEPRESSIVE DISORDER: ICD-10-CM

## 2023-04-27 LAB
AMPHETAMINE QUAL URINE POCT: NEGATIVE
BARBITURATE QUAL URINE POCT: NEGATIVE
BENZODIAZEPINE QUAL URINE POCT: ABNORMAL
BUPRENORPHINE QUAL URINE POCT: NEGATIVE
COCAINE QUAL URINE POCT: NEGATIVE
CREATININE QUAL URINE POCT: ABNORMAL
INTERNAL QC QUAL URINE POCT: ABNORMAL
MDMA QUAL URINE POCT: NEGATIVE
METHADONE QUAL URINE POCT: NEGATIVE
METHAMPHETAMINE QUAL URINE POCT: NEGATIVE
OPIATE QUAL URINE POCT: NEGATIVE
OXYCODONE QUAL URINE POCT: NEGATIVE
PH QUAL URINE POCT: ABNORMAL
PHENCYCLIDINE QUAL URINE POCT: NEGATIVE
POCT KIT EXPIRATION DATE: ABNORMAL
POCT KIT LOT NUMBER: ABNORMAL
SPECIFIC GRAVITY POCT: 1.01
TEMPERATURE URINE POCT: ABNORMAL
THC QUAL URINE POCT: NEGATIVE

## 2023-04-27 PROCEDURE — 99214 OFFICE O/P EST MOD 30 MIN: CPT | Performed by: FAMILY MEDICINE

## 2023-04-27 PROCEDURE — 80305 DRUG TEST PRSMV DIR OPT OBS: CPT | Performed by: FAMILY MEDICINE

## 2023-04-27 RX ORDER — DULOXETIN HYDROCHLORIDE 20 MG/1
40 CAPSULE, DELAYED RELEASE ORAL DAILY
Qty: 30 CAPSULE | Refills: 1 | Status: SHIPPED | OUTPATIENT
Start: 2023-04-27 | End: 2023-07-11

## 2023-04-27 ASSESSMENT — ANXIETY QUESTIONNAIRES
7. FEELING AFRAID AS IF SOMETHING AWFUL MIGHT HAPPEN: NOT AT ALL
6. BECOMING EASILY ANNOYED OR IRRITABLE: NOT AT ALL
3. WORRYING TOO MUCH ABOUT DIFFERENT THINGS: SEVERAL DAYS
5. BEING SO RESTLESS THAT IT IS HARD TO SIT STILL: NOT AT ALL
GAD7 TOTAL SCORE: 3
4. TROUBLE RELAXING: NOT AT ALL
GAD7 TOTAL SCORE: 3
2. NOT BEING ABLE TO STOP OR CONTROL WORRYING: SEVERAL DAYS
1. FEELING NERVOUS, ANXIOUS, OR ON EDGE: SEVERAL DAYS

## 2023-04-27 ASSESSMENT — PAIN SCALES - GENERAL: PAINLEVEL: MODERATE PAIN (5)

## 2023-04-27 ASSESSMENT — PATIENT HEALTH QUESTIONNAIRE - PHQ9: SUM OF ALL RESPONSES TO PHQ QUESTIONS 1-9: 5

## 2023-04-27 NOTE — Clinical Note
Chaz!  Patsy is seeing you Monday.  I talked to her a bit about therapy [facility], sounds like the message was that there are not therapists here who take Medicare.  I was able to confirm that is NOT correct and have asked for some recommendations in how to help navigate this so she can see someone.  I'll let her know what I find out.  She is also planning to talk to you about seeing someone at your clinic for therapy - do you have therapist at your clinic?  Thanks so much! - Sonia

## 2023-04-27 NOTE — Clinical Note
Hi!  This patient is still interested in IOP at [facility], had assessment on 3/21 while admitted.  Thanks!

## 2023-04-27 NOTE — PROGRESS NOTES
Care team has reviewed attendance agreement with patient. Patient advised that two failed appointments within 6 months may lead to termination of current episode of care.        [facility] - Addiction Medicine       Rooming information:  Wants to talk about Vistaril, doesn't work well. Will need Rf next week , she is getting paid    Point of care urine drug screen positive for:  Lab Results   Component Value Date    BUP Negative 04/27/2023    BZO Screen Positive (A) 04/27/2023    BAR Negative 04/27/2023    ELICEO Negative 04/27/2023    MAMP Negative 04/27/2023    AMP Negative 04/27/2023    MDMA Negative 04/27/2023    MTD Negative 04/27/2023    [id number] Negative 04/27/2023    OXY Negative 04/27/2023    PCP Negative 04/27/2023    THC Negative 04/27/2023    TEMP Invalid (A) 04/27/2023    SGPOCT 1.015 04/27/2023       *POC urine drug screen does not screen for Fentanyl    PHQ-9 Scores:       2/14/2023     8:00 AM 4/11/2023     8:00 AM 4/27/2023     1:00 PM   PHQ   PHQ-9 Total Score 2 5 5   Q9: Thoughts of better off dead/self-harm past 2 weeks Not at all Not at all Not at all     ALISIA-7 Scores:      2/14/2023     8:00 AM 4/11/2023     8:00 AM 4/27/2023     1:00 PM   ALISIA-7 SCORE   Total Score 4 4 3       Any other recent substance use:    Took 1 Oxycodone for pain 1 week ago    NICOTINE-Yes:   If using nicotine, ready to quit? No    Side effects related to medications pt would like to discuss with provider (constipation, dry mouth, HA, GI upset, sedation?) N/A     Narcan currently available: No and doesn't want Rx    Primary care provider: EJ WELLER MD     Mental health provider: going to see her PCP on Monday, needs something in [place] (follow up on MH referral if needed)    Any housing, insurance deficits?: denies    Contact information up to date? yes    3rd Party Involvement NA (please obtain LUDIN if pt would like to include)      Melissa Bacon LPN  April 27, 2023  9:30 AM

## 2023-04-27 NOTE — PROGRESS NOTES
[facility]    A/P                                                    ASSESSMENT/PLAN    1. Alcohol use disorder, severe, dependence (H)  She has maintained abstinence from alcohol since 3/19.  She met with [facility], working on getting set-up for IOP.  - Drugs of Abuse Screen Urine (POC CUPS) POCT  - Adult Mental Health  Referral; Future    2. Depressive disorder  3. PTSD (post-traumatic stress disorder)  Depression improving, anxiety remains high.  Will increase duloxetine to 40mg daily to target anxiety. She will work on establishing with therapist.  - Adult Mental Health  Referral; Future  - DULoxetine (CYMBALTA) 20 MG capsule; Take 2 capsules (40 mg) by mouth daily  Dispense: 30 capsule; Refill: 1      PDMP Review       Value Time User    State PDMP site checked  Yes 4/27/2023  1:20 PM Melissa Covington DO            RTC  Return in about 2 weeks (around 5/11/2023) for Follow up, in person.      Counseled the patient on the importance of having a recovery program in addition to medication to manage recovery.  Components include avoiding isolating, having willingness to change, avoiding triggers and managing cravings. Encouraged having some type of sober network and practicing honesty with trusted support person(s). Encouraged other services such as counseling, 12 step or other self-help organizations.        SUBJECTIVE                                                    Patsy Santamaria is a 68 year old female with a history of peripheral neuropathy, COPD, alcoholic hepatitis, thombocytopenia, arthritis (hands and knees), obesity, anxiety, depression, and AUD who presents to clinic today for follow up.     Brief history of substance use:  Began drinking around age 33.  Became a problem for her in 2016 and she went to treatment for the first time and also experienced EtOH withdrawal seizures.  Has been to multiple treatments (most recently [facility] in  [place] in Feb 2022).  Drinking tends to correlate with worsening depression.  Has tried naltrexone and acamprosate in past.  History of of cocaine use (1 year in the 1990s).  Established care with [facility] in March 2022 and has continued to struggle with brief episodes of drinking.  Most recent hospitalization related to alcohol use/withdrawal was March 2023.     Plan from most recent office visit (2/14/23):  1. Alcohol use disorder, severe, dependence (H)  Reflected on recent hospitalization and the triggers that lead her to drink.  She would like to start IOP (assessment completed during recent admission).  We called  and left message.  We brainstormed ways to handle cravings (see AVS).  She also met with Peer .    - Drugs of Abuse Screen Urine (POC CUPS) POCT; Standing  - Drugs of Abuse Screen Urine (POC CUPS) POCT     2. Depressive disorder  Improving since hospitalization.  She will continue duloxetine.    - DULoxetine (CYMBALTA) 20 MG capsule; Take 1 capsule (20 mg) by mouth daily  Dispense: 30 capsule; Refill: 1    TODAY'S VISIT  HPI Apr 27, 2023  - No alcohol use, no alcohol cravings  - Notes increased sugar cravings    - Gets paid next week - has plan to give her cash to a friend so she doesn't have it on her, friend will help her question any requests for cash  - Reflecting on triggers more and trying to plan ahead for how to handle them  - Reading AA book again  - Still waiting to hear back regarding IOP  - Having intermittent abdominal pain, took a Vicodin (previously prescribed) last week, pain has since subsided  - Planning to talk with PCP about finding a new therapist - we discussed option of seeing someone at [facility] since she can walk here  - Depression has seemed a bit better but feeling more anxious lately, taking more Vistaril lately        2/14/2023     8:00 AM 4/11/2023     8:00 AM 4/27/2023     1:00 PM    PHQ   PHQ-9 Total Score 2 5 5   Q9: Thoughts of better off dead/self-harm past 2 weeks Not at all Not at all Not at all         2/14/2023     8:00 AM 4/11/2023     8:00 AM 4/27/2023     1:00 PM   ALISIA-7 SCORE   Total Score 4 4 3       OBJECTIVE                                                    PHYSICAL EXAM:  /81 (BP Location: Right arm, Patient Position: Sitting, Cuff Size: Adult Large)   Pulse 64   Wt 116.1 kg (256 lb)   BMI 45.35 kg/m      GENERAL: healthy, alert and no distress  EYES: Eyes grossly normal to inspection, sclerae normal  RESP: No respiratory distress, no coughing or wheezing  MS: no gross musculoskeletal defects noted  SKIN: no suspicious lesions or rashes visible, no pallor or jaundice  NEURO: Normal gait, no tremor, mentation intact and speech normal  MENTAL STATUS EXAM  Appearance/Behavior: No appearant distress  Speech: Normal  Mood/Affect: depressed affect and anxiety  Insight: Adequate    LAB  Results for orders placed or performed in visit on 04/27/23   Drugs of Abuse Screen Urine (POC CUPS) POCT     Status: Abnormal   Result Value Ref Range    POCT Kit Lot Number [id number]     POCT Kit Expiration Date 2024-06-08     Temperature Urine POCT Invalid (A) 90 F, 92 F, 94 F, 96 F, 98 F, 100 F    Specific [place] POCT 1.015 1.005, 1.015, 1.025    pH Qual Urine POCT 7 pH 4 pH, 5 pH, 7 pH, 9 pH    Creatinine Qual Urine POCT 50 mg/dL 20 mg/dL, 50 mg/dL, 100 mg/dL, 200 mg/dL    Internal QC Qual Urine POCT Valid Valid    Amphetamine Qual Urine POCT Negative Negative    Barbiturate Qual Urine POCT Negative Negative    Buprenorphine Qual Urine POCT Negative Negative    Benzodiazepine Qual Urine POCT Screen Positive (A) Negative    Cocaine Qual Urine POCT Negative Negative    Methamphetamine Qual Urine POCT Negative Negative    MDMA Qual Urine POCT Negative Negative    Methadone Qual Urine POCT Negative Negative    Opiate Qual Urine POCT Negative Negative    Oxycodone Qual Urine POCT Negative  Negative    Phencyclidine Qual Urine POCT Negative Negative    THC Qual Urine POCT Negative Negative         HISTORY                                                    Problem list reviewed & adjusted, as indicated.  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)     Diuretic-induced hypokalemia     Dyspnea on exertion     Elevated LFTs     Ganglion of tendon sheath     GI bleed     Hereditary and idiopathic peripheral neuropathy     History of major depression     Homeless     Major depression, recurrent (H)     Low backache     Airway obstruction due to foreign body, initial encounter     Hypomagnesemia     Mild episode of recurrent major depressive disorder (H)     Morbid obesity (H)     Smoker     Peripheral edema     Pneumonia of right lower lobe due to infectious organism     Primary localized osteoarthrosis, hand     Primary osteoarthritis of right knee     PTSD (post-traumatic stress disorder)     Seasonal allergies     Skin lesion     Snoring     Trochanteric bursitis of left hip     Vitamin B12 deficiency (non anemic)     Vitamin D deficiency     Acute alcoholic intoxication (H)     Anxiety     Closed fracture of head of left radius     Recurrent falls     Thrombocytopenia (H)     Dermatitis     Depressive disorder     Leukopenia     Alcoholic cirrhosis of liver without ascites (H)     Sigmoid Diverticulitis     Mixed simple and mucopurulent chronic bronchitis (H)     Suicidal ideation     Infection due to 2019 novel coronavirus         MEDICATION LIST  (prior to visit)  hydrOXYzine (ATARAX) 50 MG tablet, Take 0.5-1 tablets (25-50 mg) by mouth 3 times daily as needed for anxiety  mirtazapine (REMERON) 15 MG tablet, Take 1 tablet (15 mg) by mouth At Bedtime  acetaminophen (TYLENOL) 500 MG tablet, Take 2 tablets (1,000 mg) by mouth every 8 hours as needed for fever, headaches or pain  albuterol (PROAIR HFA/PROVENTIL HFA/VENTOLIN HFA) 108 (90 Base) MCG/ACT inhaler, Inhale 2 puffs into the lungs every 4 hours as needed for shortness of breath / dyspnea or wheezing  budesonide-formoterol (SYMBICORT) 160-4.5 MCG/ACT Inhaler, Inhale 2 puffs into the lungs 2 times daily  bumetanide (BUMEX) 1 MG tablet, Take 1 tablet (1 mg) by mouth daily  ipratropium - albuterol 0.5 mg/2.5 mg/3 mL (DUONEB) 0.5-2.5 (3) MG/3ML neb solution, Take 1 vial (3 mLs) by nebulization every 4 hours as needed for shortness of breath / dyspnea or wheezing (Patient not taking: Reported on 4/11/2023)  lactobacillus rhamnosus, GG, (CULTURELL) capsule, Take 1 capsule by mouth 2 times daily (Patient not taking: Reported on 4/11/2023)  omeprazole (PRILOSEC) 20 MG DR capsule, Take 20 mg by mouth daily as needed  umeclidinium (INCRUSE ELLIPTA) 62.5 MCG/INH inhaler, Inhale 1 puff into the lungs daily (Patient taking differently: Inhale 1 puff into the lungs every morning)    No current facility-administered medications on file prior to visit.      MEDICATION LIST (after visit)  Current Outpatient Medications   Medication     DULoxetine (CYMBALTA) 20 MG capsule     hydrOXYzine (ATARAX) 50 MG tablet     mirtazapine (REMERON) 15 MG tablet     acetaminophen (TYLENOL) 500 MG tablet     albuterol (PROAIR HFA/PROVENTIL HFA/VENTOLIN HFA) 108 (90 Base) MCG/ACT inhaler     budesonide-formoterol (SYMBICORT) 160-4.5 MCG/ACT Inhaler     bumetanide (BUMEX) 1 MG tablet     ipratropium - albuterol 0.5 mg/2.5 mg/3 mL (DUONEB) 0.5-2.5 (3) MG/3ML neb solution     lactobacillus rhamnosus, GG, (CULTURELL) capsule     omeprazole  "(PRILOSEC) 20 MG DR capsule     umeclidinium (INCRUSE ELLIPTA) 62.5 MCG/INH inhaler     No current facility-administered medications for this visit.         Allergies   Allergen Reactions     Bupropion Diarrhea     Codeine Hives, Itching, Nausea and Rash     Nickel Unknown     Oxycodone Nausea and Vomiting     Percocet [Oxycodone-Acetaminophen]      Patient reports \"vomiting,grossly ill two weeks ago\"     Topiramate Unknown     Diclofenac Nausea     Tolerates the topical gel     Furosemide Rash     Hydrochlorothiazide Rash     phototoxicity - med was d/lora         Sulfa Antibiotics Itching and Rash     Sulfasalazine Rash       Melissa Covington [facility]  [facility]  [phone]        "

## 2023-05-01 ENCOUNTER — OFFICE VISIT (OUTPATIENT)
Dept: FAMILY MEDICINE | Facility: CLINIC | Age: 68
End: 2023-05-01
Payer: COMMERCIAL

## 2023-05-01 ENCOUNTER — PATIENT OUTREACH (OUTPATIENT)
Dept: CARE COORDINATION | Facility: CLINIC | Age: 68
End: 2023-05-01

## 2023-05-01 ENCOUNTER — TELEPHONE (OUTPATIENT)
Dept: ADDICTION MEDICINE | Facility: CLINIC | Age: 68
End: 2023-05-01

## 2023-05-01 VITALS
WEIGHT: 258 LBS | BODY MASS INDEX: 45.71 KG/M2 | SYSTOLIC BLOOD PRESSURE: 110 MMHG | HEART RATE: 68 BPM | HEIGHT: 63 IN | RESPIRATION RATE: 20 BRPM | OXYGEN SATURATION: 98 % | TEMPERATURE: 97.1 F | DIASTOLIC BLOOD PRESSURE: 77 MMHG

## 2023-05-01 DIAGNOSIS — Z09 HOSPITAL DISCHARGE FOLLOW-UP: ICD-10-CM

## 2023-05-01 DIAGNOSIS — F33.9 EPISODE OF RECURRENT MAJOR DEPRESSIVE DISORDER, UNSPECIFIED DEPRESSION EPISODE SEVERITY (H): ICD-10-CM

## 2023-05-01 DIAGNOSIS — E74.89 OTHER SPECIFIED DISORDERS OF CARBOHYDRATE METABOLISM (H): ICD-10-CM

## 2023-05-01 DIAGNOSIS — E66.01 CLASS 3 SEVERE OBESITY DUE TO EXCESS CALORIES WITH BODY MASS INDEX (BMI) OF 40.0 TO 44.9 IN ADULT, UNSPECIFIED WHETHER SERIOUS COMORBIDITY PRESENT (H): ICD-10-CM

## 2023-05-01 DIAGNOSIS — Z12.11 SCREEN FOR COLON CANCER: ICD-10-CM

## 2023-05-01 DIAGNOSIS — E66.813 CLASS 3 SEVERE OBESITY DUE TO EXCESS CALORIES WITH BODY MASS INDEX (BMI) OF 40.0 TO 44.9 IN ADULT, UNSPECIFIED WHETHER SERIOUS COMORBIDITY PRESENT (H): ICD-10-CM

## 2023-05-01 DIAGNOSIS — L98.9 SKIN LESION: ICD-10-CM

## 2023-05-01 DIAGNOSIS — G40.89 OTHER SEIZURES (H): ICD-10-CM

## 2023-05-01 DIAGNOSIS — Z86.16 HISTORY OF 2019 NOVEL CORONAVIRUS DISEASE (COVID-19): ICD-10-CM

## 2023-05-01 DIAGNOSIS — F10.10 ALCOHOL ABUSE: Primary | ICD-10-CM

## 2023-05-01 DIAGNOSIS — M62.830 SPASM OF THORACIC BACK MUSCLE: ICD-10-CM

## 2023-05-01 DIAGNOSIS — F10.20 ALCOHOL USE DISORDER, SEVERE, DEPENDENCE (H): Primary | ICD-10-CM

## 2023-05-01 DIAGNOSIS — D69.6 THROMBOCYTOPENIA (H): ICD-10-CM

## 2023-05-01 PROCEDURE — 99214 OFFICE O/P EST MOD 30 MIN: CPT | Performed by: FAMILY MEDICINE

## 2023-05-01 NOTE — PROGRESS NOTES
Clinic Care Coordination Contact  [facility]/Voicemail       Clinical Data: Care Coordinator Outreach  Outreach attempted x 1.  Left message on patient's voicemail with call back information and requested return call.  Plan: Care Coordinator will try to reach patient again in 10 business days.    KATHLEEN Espana? Social Work Care Coordinator   [facility]  [email or web address]? [email or web address]    Phone: [phone]  she/her

## 2023-05-01 NOTE — TELEPHONE ENCOUNTER
Per provider referral, Peer  placed a telephone recovery support call to pt for discussion regarding current status and to provide encouragement, support and resources as needed. Writer explained  to Patsy  She is on [facility] treatment  waiting list. Writer encouraged to contact  Nikky Williamson Peer  with [facility]. Patsy stated will give her an call.

## 2023-05-01 NOTE — PROGRESS NOTES
1. Alcohol abuse  Patient recently hospitalized for alcohol abuse, again.  Struggling with this - couldn't go to inpatient treatment.  Will refer to mental health for counseling - for depression/alcohol abuse  - Adult Mental Health  Referral; Future    2. History of 2019 novel coronavirus disease (COVID-19)  Recent hospitalization for alcohol abuse and COVID-19 - now symptoms improved.      3. Hospital discharge follow-up  I have reviewed available hospital records, consults, notes, discharge summary as well as imaging and lab results.  In addition I have reviewed her medication list and made any adjustments as indicated in orders.        4. Episode of recurrent major depressive disorder, unspecified depression episode severity (H)  As above - patient has h/o alcohol abuse; also with depression - will refer to mental health for a therapist   - Adult Mental Health  Referral; Future    5. Other seizures (H)  H/o alcohol withdrawal seizures - none during recent hospitalization     6. Skin lesion  Patient has skin lesions - reasonable to have dermatology evaluation   - Adult Dermatology Referral; Future    7. Other specified disorders of carbohydrate metabolism (H)  no formal diagnosis of diabetes - will follow     8. Class 3 severe obesity due to excess calories with body mass index (BMI) of 40.0 to 44.9 in adult, unspecified whether serious comorbidity present (H)  Obesity - discussed diet/exercise    9. Thrombocytopenia (H)  H/o low platelets - continue to follow     10. Screen for colon cancer  Due for colon cancer screening - discussed options - agrees to colonoscopy   - Colonoscopy Screening  Referral; Future    11. Spasm of thoracic back muscle  Patient with back muscle spasm/pain - will continue Tizanidine as needed.    - tiZANidine (ZANAFLEX) 4 MG tablet; Take 1 tablet (4 mg) by mouth nightly as needed for muscle spasms  Dispense: 30 tablet; Refill: 0      [place]   Patsy is a 68  "year old, presenting for the following health issues:  Hospital F/U        5/1/2023    11:12 AM   Additional Questions   Roomed by Bernadine     Was in hospital for alcohol withdrawal, COVID   Went to [place] - couldn't go there because they don't take Medicare -   Could only go to [place] or [place] -     Was really sick -   Didn't realize it wasn't just alcohol     Sees Medicine[facility]Addiction Clinic at [facility]   Has a peer specialist -   Sees addiction appointment q 2 weeks    Physically, back is hurting  Right upper quadrant pain - [place] when going to bathroom   Spine is sore  Has a lump on left leg -       Living independently -   Has a good friend who helps    Smoking - 1/2 ppd   (previous 1-2 ppd)  Did have a switch in depression med  Duloxetine 20 mg BID -   Takes Remeron for sleep; Vistaril for anxiety     Back - middle of spine - \"knots/lumps\"           Hospital Follow-up Visit:    Hospital/Nursing Home/IP Rehab Facility: [facility]  Date of Admission: 3/20/23  Date of Discharge: 3/23/23  Reason(s) for Admission: Covid, Alcohol Withdrawal    Was your hospitalization related to COVID-19? YES   How are you feeling today? Much better  In the past 24 hours have you had shortness of breath when speaking, walking, or climbing stairs? My breathing issues have stayed the same  Do you have a cough? I don't have a cough  When is the last time you had a fever greater than 100? Unknown  Are you having any other symptoms? Body aches   Do you have any other stressors you would like to discuss with your provider? No          4/27/2023     1:00 PM   PHQ Assesment Total Score(s)   PHQ-9 Score 5           4/27/2023     1:00 PM   ALISIA-7 Results   ALISIA-7 Total Score 3          View : No data to display.                Was the patient in the ICU or did the patient experience delirium during hospitalization?  No    Problems taking medications regularly:  None  Medication changes since " "discharge: Changed from Lexapro to Duloxetine  Problems adhering to non-medication therapy:  None    Summary of hospitalization:  [facility] discharge summary reviewed  Diagnostic Tests/Treatments reviewed.  Follow up needed: none  Other Healthcare Providers Involved in Patient s Care:         Specialist appointment - needs mental health follow up  Update since discharge: stable.   Plan of care communicated with patient         Review of Systems   Constitutional: Positive for fever (with COVID - resolved). Negative for activity change, appetite change and chills.   HENT: Negative.    Eyes: Negative for visual disturbance.   Respiratory: Positive for cough (with COVID infection - resolving) and shortness of breath (with recent COVID).    Cardiovascular: Negative for chest pain and peripheral edema.   Gastrointestinal: Positive for abdominal pain.   Endocrine: Negative for polydipsia and polyuria.   Genitourinary: Negative.    Musculoskeletal: Positive for back pain.   Skin: Negative.    Allergic/Immunologic: Negative.    Neurological: Negative.    Psychiatric/Behavioral: Positive for mood changes and sleep disturbance. The patient is nervous/anxious.         H/o alcohol abuse     All other systems reviewed and are negative.           Objective    /77 (BP Location: Left arm, Patient Position: Sitting, Cuff Size: Adult Large)   Pulse 68   Temp 97.1  F (36.2  C) (Temporal)   Resp 20   Ht 1.6 m (5' 3\")   Wt 117 kg (258 lb)   SpO2 98%   BMI 45.70 kg/m    Body mass index is 45.7 kg/m .  Physical Exam  Vitals reviewed.   Constitutional:       General: She is not in acute distress.     Appearance: Normal appearance.   HENT:      Head: Normocephalic.      Right Ear: Tympanic membrane, ear canal and external ear normal.      Left Ear: Tympanic membrane, ear canal and external ear normal.      Nose: Nose normal.      Mouth/Throat:      Mouth: Mucous membranes are moist.      Pharynx: No posterior " oropharyngeal erythema.   Eyes:      Extraocular Movements: Extraocular movements intact.      Conjunctiva/sclera: Conjunctivae normal.      Pupils: Pupils are equal, round, and reactive to light.   Cardiovascular:      Rate and Rhythm: Normal rate and regular rhythm.      Pulses: Normal pulses.      Heart sounds: Normal heart sounds. No murmur heard.  Pulmonary:      Effort: Pulmonary effort is normal.      Breath sounds: Normal breath sounds.   Abdominal:      Palpations: Abdomen is soft. There is no mass.      Tenderness: There is no abdominal tenderness. There is no guarding or rebound.   Musculoskeletal:         General: No deformity. Normal range of motion.      Cervical back: Normal range of motion and neck supple.   Lymphadenopathy:      Cervical: No cervical adenopathy.   Skin:     General: Skin is warm and dry.   Neurological:      General: No focal deficit present.      Mental Status: She is alert.   Psychiatric:         Mood and Affect: Mood normal.         Behavior: Behavior normal.            No results found for any visits on 05/01/23.            Prior to immunization administration, verified patients identity using patient s name and date of birth. Please see Immunization Activity for additional information.     Screening Questionnaire for Adult Immunization    Are you sick today?   No   Do you have allergies to medications, food, a vaccine component or latex?   Yes   Have you ever had a serious reaction after receiving a vaccination?   No   Do you have a long-term health problem with heart, lung, kidney, or metabolic disease (e.g., diabetes), asthma, a blood disorder, no spleen, complement component deficiency, a cochlear implant, or a spinal fluid leak?  Are you on long-term aspirin therapy?   Yes   Do you have cancer, leukemia, HIV/AIDS, or any other immune system problem?   No   Do you have a parent, brother, or sister with an immune system problem?   No   In the past 3 months, have you taken  medications that affect  your immune system, such as prednisone, other steroids, or anticancer drugs; drugs for the treatment of rheumatoid arthritis, Crohn s disease, or psoriasis; or have you had radiation treatments?   No   Have you had a seizure, or a brain or other nervous system problem?   Yes   During the past year, have you received a transfusion of blood or blood    products, or been given immune (gamma) globulin or antiviral drug?   No   For women: Are you pregnant or is there a chance you could become       pregnant during the next month?   No   Have you received any vaccinations in the past 4 weeks?   No     Immunization questionnaire was positive for at least one answer.  Notified Dr. Wade.        Screening performed by Bernadine Garcia CMA on 5/1/2023 at 11:18 AM.

## 2023-05-03 ENCOUNTER — TELEPHONE (OUTPATIENT)
Dept: ADDICTION MEDICINE | Facility: CLINIC | Age: 68
End: 2023-05-03
Payer: COMMERCIAL

## 2023-05-03 DIAGNOSIS — F10.20 ALCOHOL USE DISORDER, SEVERE, DEPENDENCE (H): Primary | ICD-10-CM

## 2023-05-03 PROCEDURE — 99207 PR NO BILLABLE SERVICE THIS VISIT: CPT

## 2023-05-03 NOTE — TELEPHONE ENCOUNTER
Peer   placed a telephone recovery support call to pt  regarding if she received call from [facility].  Patsy wasn't available.

## 2023-05-07 ASSESSMENT — ENCOUNTER SYMPTOMS
POLYDIPSIA: 0
NERVOUS/ANXIOUS: 1
SHORTNESS OF BREATH: 1
SLEEP DISTURBANCE: 1
ACTIVITY CHANGE: 0
BACK PAIN: 1
NEUROLOGICAL NEGATIVE: 1
APPETITE CHANGE: 0
ABDOMINAL PAIN: 1
FEVER: 1
ALLERGIC/IMMUNOLOGIC NEGATIVE: 1
COUGH: 1
CHILLS: 0

## 2023-05-08 ENCOUNTER — PATIENT OUTREACH (OUTPATIENT)
Dept: NURSING | Facility: CLINIC | Age: 68
End: 2023-05-08
Payer: COMMERCIAL

## 2023-05-08 NOTE — PROGRESS NOTES
Clinic Care Coordination Contact    Follow Up Progress Note      Assessment:  RN CC outreach and spoke with patient   Pt reports that she is due to go in for Dermatology, pt able to set up on her own    Colonoscopy is schedule  Aug 18 -[facility]  Reviewed up coming visit in  system  Pt notes that she would like to start a therapist - prefer at PCP location, due to transportation barrier  Pt will meet with addiction team on 5/11 and discuss and follow up with CCC if support need.         Future Appointments   Date Time Provider Department Center   5/11/2023  1:30 PM Melissa Covington,  SPADMD [facility]   6/1/2023 10:00 AM  LAB [facility]   6/1/2023 11:00 AM Ana Cristina Romeo MD [facility]   6/26/2023  8:00 AM Dianelys Ervin, [facility]       Review and update goals - pt would like to have CC support with statu s of  medical bills, RN CC will  Set up a call with  CC for next week to address.    Care Gaps:    Health Maintenance Due   Topic Date Due     COPD ACTION PLAN  Never done     DEPRESSION ACTION PLAN  Never done     COLORECTAL CANCER SCREENING  Never done     Pneumococcal Vaccine: 65+ Years (2 - PCV) 01/28/2021     LUNG CANCER SCREENING  01/28/2021     MEDICARE ANNUAL WELLNESS VISIT  01/31/2022     NICOTINE/TOBACCO CESSATION COUNSELING Q 1 YR  01/27/2023       Postponed to address acute concern, pt reports colonoscopy scheduled      Care Plans  Care Plan: Medical     Problem: HP GENERAL PROBLEM     Goal: I will develop a plan with the CD program for additional support     Start Date: 3/30/2023    Note:        Patient expressed understanding of goal: yes  Action steps to achieve this goal:  1. I will attend scheduled intake and therapy and follow recommendations for support  2. I will update Care coordination team during next outreach  3. I will report to my Community Health Worker if any additional resources or support needed.  05/08/23  Pt has visit with  Addiction medicine schedule for later this week                         Care Plan: Social     Problem: HP GENERAL PROBLEM     Goal: Financial Wellbeing     Start Date: 4/3/2023    This Visit's Progress: 10%    Note:        Patient expressed understanding of goal: yes  Action steps to achieve this goal:  1. I will work with financial office to ensure medical bills are submitted to insurance and if any additional resources   2. I will update Care coordination team during next outreach  3. I will report to my Community Health Worker if any additional resources or support needed.                             Plan:   Patient will schedule and attend recommended follow up visits with speciality providers and primary care provider.    SW CC will outreach in 2 weeks to support ongoing recommendations and plan of care will be available sooner if needed.    RN CC will review in 4-6 weeks to support ongoing recommendations and plan of care will be available sooner if needed.

## 2023-05-10 ENCOUNTER — TELEPHONE (OUTPATIENT)
Dept: ADDICTION MEDICINE | Facility: CLINIC | Age: 68
End: 2023-05-10
Payer: COMMERCIAL

## 2023-05-10 DIAGNOSIS — F10.20 ALCOHOL USE DISORDER, SEVERE, DEPENDENCE (H): Primary | ICD-10-CM

## 2023-05-10 PROCEDURE — 99207 PR NO BILLABLE SERVICE THIS VISIT: CPT

## 2023-05-11 ENCOUNTER — OFFICE VISIT (OUTPATIENT)
Dept: ADDICTION MEDICINE | Facility: CLINIC | Age: 68
End: 2023-05-11
Payer: COMMERCIAL

## 2023-05-11 VITALS
BODY MASS INDEX: 46.25 KG/M2 | DIASTOLIC BLOOD PRESSURE: 71 MMHG | SYSTOLIC BLOOD PRESSURE: 122 MMHG | HEART RATE: 64 BPM | WEIGHT: 261 LBS | HEIGHT: 63 IN

## 2023-05-11 DIAGNOSIS — F10.20 ALCOHOL USE DISORDER, SEVERE, DEPENDENCE (H): Primary | ICD-10-CM

## 2023-05-11 DIAGNOSIS — F17.200 TOBACCO USE DISORDER, MODERATE, DEPENDENCE: ICD-10-CM

## 2023-05-11 DIAGNOSIS — F32.A DEPRESSIVE DISORDER: ICD-10-CM

## 2023-05-11 DIAGNOSIS — F43.10 PTSD (POST-TRAUMATIC STRESS DISORDER): ICD-10-CM

## 2023-05-11 ASSESSMENT — ANXIETY QUESTIONNAIRES
5. BEING SO RESTLESS THAT IT IS HARD TO SIT STILL: NOT AT ALL
4. TROUBLE RELAXING: NOT AT ALL
2. NOT BEING ABLE TO STOP OR CONTROL WORRYING: NOT AT ALL
GAD7 TOTAL SCORE: 1
3. WORRYING TOO MUCH ABOUT DIFFERENT THINGS: NOT AT ALL
GAD7 TOTAL SCORE: 1
1. FEELING NERVOUS, ANXIOUS, OR ON EDGE: SEVERAL DAYS
6. BECOMING EASILY ANNOYED OR IRRITABLE: NOT AT ALL
IF YOU CHECKED OFF ANY PROBLEMS ON THIS QUESTIONNAIRE, HOW DIFFICULT HAVE THESE PROBLEMS MADE IT FOR YOU TO DO YOUR WORK, TAKE CARE OF THINGS AT HOME, OR GET ALONG WITH OTHER PEOPLE: NOT DIFFICULT AT ALL
7. FEELING AFRAID AS IF SOMETHING AWFUL MIGHT HAPPEN: NOT AT ALL

## 2023-05-11 ASSESSMENT — PATIENT HEALTH QUESTIONNAIRE - PHQ9: SUM OF ALL RESPONSES TO PHQ QUESTIONS 1-9: 2

## 2023-05-11 NOTE — PROGRESS NOTES
[facility] - Addiction Medicine       Rooming information:    Point of care urine drug screen positive for:  Lab Results   Component Value Date    BUP Negative 04/27/2023    BZO Screen Positive (A) 04/27/2023    BAR Negative 04/27/2023    ELICEO Negative 04/27/2023    MAMP Negative 04/27/2023    AMP Negative 04/27/2023    MDMA Negative 04/27/2023    MTD Negative 04/27/2023    FIA998 Negative 04/27/2023    OXY Negative 04/27/2023    PCP Negative 04/27/2023    THC Negative 04/27/2023    TEMP Invalid (A) 04/27/2023    SGPOCT 1.015 04/27/2023       *POC urine drug screen does not screen for Fentanyl    PHQ-9 Scores:       2/14/2023     8:00 AM 4/11/2023     8:00 AM 4/27/2023     1:00 PM   PHQ   PHQ-9 Total Score 2 5 5   Q9: Thoughts of better off dead/self-harm past 2 weeks Not at all Not at all Not at all     ALISIA-7 Scores:      2/14/2023     8:00 AM 4/11/2023     8:00 AM 4/27/2023     1:00 PM   ALISIA-7 SCORE   Total Score 4 4 3       Any other recent substance use:     Denies    NICOTINE-Yes:   If using nicotine, ready to quit? No    Side effects related to medications pt would like to discuss with provider (constipation, dry mouth, HA, GI upset, sedation?) No     Narcan currently available: No    Primary care provider: EJ WELLER MD     Mental health provider: none (follow up on MH referral if needed)    Any housing, insurance deficits?: no    Contact information up to date? yes    3rd Party Involvement none (please obtain LUDIN if pt would like to include)        Dianelys Mccarthy CMA  May 11, 2023  12:54 PM

## 2023-05-11 NOTE — Clinical Note
Meeting with Patsy alatorre - she has some concerns about some medical bills and was hoping to connect with you.  Could you reach out next week?  Thanks!

## 2023-05-11 NOTE — PROGRESS NOTES
[facility] Addiction Medicine    A/P                                                    ASSESSMENT/PLAN    1. Alcohol use disorder, severe, dependence (H)  No alcohol use since prior to last visit.  Reflects on continued goal of abstinence and desire for psychosocial treatment and deeper understanding of AUD.  Continues to work with peer .  At this time prefers to hold off on pharmacotherapy but we can reconsider in future.    2. Tobacco use disorder, moderate, dependence  Would like to quit smoking.    - nicotine (NICORETTE) 4 MG lozenge; Place 1 lozenge (4 mg) inside cheek every hour as needed for smoking cessation  Dispense: 108 lozenge; Refill: 1    3. Depressive disorder  4. PTSD (post-traumatic stress disorder)  Overall reporting improved mood.  Planning to start individual therapy.  Encouraged her to discuss emotional support animal with therapist.  I have requested care coordination reach out to her regarding medical bills as this is contributing to stress/anxiety.  Continue Cymbalta.          PDMP Review       Value Time User    State PDMP site checked  Yes 5/7/2023  5:09 PM Yanique Cali MD            RTC  Return in about 3 weeks (around 6/1/2023) for in person, Follow up.      Counseled the patient on the importance of having a recovery program in addition to medication to manage recovery.  Components include avoiding isolating, having willingness to change, avoiding triggers and managing cravings. Encouraged having some type of sober network and practicing honesty with trusted support person(s). Encouraged other services such as counseling, 12 step or other self-help organizations.      Opioid warning reviewed.  Risk of overdose following a period of abstinence due to decrease tolerance was discussed including risk of death.  Strongly recommended abstain from alcohol, benzodiazepines, THC, opioids and other drugs of abuse.  Increased risk of return to opioid  use after use of these substances discussed.  Increased risk of overdose/death with use of other substances particularly benzodiazepines/alcohol reviewed.        SUBJECTIVE                                                    Patsy Santamaria is a 68 year old female with a history of peripheral neuropathy, COPD, alcoholic hepatitis, thombocytopenia, arthritis (hands and knees), obesity, anxiety, depression, and AUD who presents to clinic today for follow up.     Brief history of substance use:  Began drinking around age 33.  Became a problem for her in 2016 and she went to treatment for the first time and also experienced EtOH withdrawal seizures.  Has been to multiple treatments (most recently [facility] in [place] in Feb 2022).  Drinking tends to correlate with worsening depression.  Has tried naltrexone and acamprosate in past.  History of of cocaine use (1 year in the 1990s).  Established care with [facility] in March 2022 and has continued to struggle with brief episodes of drinking.  Most recent hospitalization related to alcohol use/withdrawal was March 2023.     Plan from most recent office visit (4/27/23):  1. Alcohol use disorder, severe, dependence (H)  She has maintained abstinence from alcohol since 3/19.  She met with [facility], working on getting set-up for IOP.  - Drugs of Abuse Screen Urine (POC CUPS) POCT  - Adult Mental Health  Referral; Future     2. Depressive disorder  3. PTSD (post-traumatic stress disorder)  Depression improving, anxiety remains high.  Will increase duloxetine to 40mg daily to target anxiety. She will work on establishing with therapist.  - Adult Mental Health  Referral; Future  - DULoxetine (CYMBALTA) 20 MG capsule; Take 2 capsules (40 mg) by mouth daily  Dispense: 30 capsule; Refill: 1    TODAY'S VISIT  HPI May 11, 2023  - Still having occasional alcohol cravings, trying to keep busy, planted garden the other day,  "helped sister moved  - Still waiting on IOP (on waitlist), continues to talk to her friend in recovery weekly  - Still has some sugar cravings  - Goal to start going to gym 2x/weeks  - Taking Cymbalta 40mg, no side effects, mood seems to be improving, less depression, still higher anxiety  - Wanting to get letter for  animal  - Stress regarding medical bills - wants to talk with SW      OBJECTIVE                                                    PHYSICAL EXAM:  /71 (BP Location: Right arm, Patient Position: Sitting, Cuff Size: Adult Large)   Pulse 64   Ht 1.6 m (5' 3\")   Wt 118.4 kg (261 lb)   BMI 46.23 kg/m      GENERAL: healthy, alert and no distress  EYES: Eyes grossly normal to inspection, sclerae normal  RESP: No respiratory distress  SKIN: no suspicious lesions or rashes, no pallor or jaundice  NEURO: Normal gait, no tremor, mentation intact and speech normal  MENTAL STATUS EXAM  Appearance/Behavior: No appearant distress  Speech: Normal  Mood/Affect: normal affect  Insight: Adequate    LAB  No results found for any visits on 05/11/23.      HISTORY                                                    Problem list reviewed & adjusted, as indicated.  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)     Diuretic-induced hypokalemia     Dyspnea on exertion     Elevated LFTs     Ganglion of tendon sheath     GI bleed     Hereditary and idiopathic peripheral neuropathy     History of major depression     Homeless     Major depression, recurrent (H) "     Low backache     Airway obstruction due to foreign body, initial encounter     Hypomagnesemia     Mild episode of recurrent major depressive disorder (H)     Morbid obesity (H)     Smoker     Peripheral edema     Pneumonia of right lower lobe due to infectious organism     Primary localized osteoarthrosis, hand     Primary osteoarthritis of right knee     PTSD (post-traumatic stress disorder)     Seasonal allergies     Skin lesion     Snoring     Trochanteric bursitis of left hip     Vitamin B12 deficiency (non anemic)     Vitamin D deficiency     Other seizures (H)     Acute alcoholic intoxication (H)     Anxiety     Closed fracture of head of left radius     Recurrent falls     Thrombocytopenia (H)     Dermatitis     Depressive disorder     Leukopenia     Alcoholic cirrhosis of liver without ascites (H)     Sigmoid Diverticulitis     Mixed simple and mucopurulent chronic bronchitis (H)     Suicidal ideation     Infection due to 2019 novel coronavirus     Other specified disorders of carbohydrate metabolism (H)         MEDICATION LIST (prior to visit)  albuterol (PROAIR HFA/PROVENTIL HFA/VENTOLIN HFA) 108 (90 Base) MCG/ACT inhaler, Inhale 2 puffs into the lungs every 4 hours as needed for shortness of breath / dyspnea or wheezing  budesonide-formoterol (SYMBICORT) 160-4.5 MCG/ACT Inhaler, Inhale 2 puffs into the lungs 2 times daily  DULoxetine (CYMBALTA) 20 MG capsule, Take 2 capsules (40 mg) by mouth daily  ipratropium - albuterol 0.5 mg/2.5 mg/3 mL (DUONEB) 0.5-2.5 (3) MG/3ML neb solution, Take 1 vial (3 mLs) by nebulization every 4 hours as needed for shortness of breath / dyspnea or wheezing  mirtazapine (REMERON) 15 MG tablet, Take 1 tablet (15 mg) by mouth At Bedtime  omeprazole (PRILOSEC) 20 MG DR capsule, Take 20 mg by mouth daily as needed  umeclidinium (INCRUSE ELLIPTA) 62.5 MCG/INH inhaler, Inhale 1 puff into the lungs daily (Patient taking differently: Inhale 1 puff into the lungs every  "morning)    No current facility-administered medications on file prior to visit.      MEDICATION LIST (after visit)  Current Outpatient Medications   Medication     albuterol (PROAIR HFA/PROVENTIL HFA/VENTOLIN HFA) 108 (90 Base) MCG/ACT inhaler     budesonide-formoterol (SYMBICORT) 160-4.5 MCG/ACT Inhaler     DULoxetine (CYMBALTA) 20 MG capsule     ipratropium - albuterol 0.5 mg/2.5 mg/3 mL (DUONEB) 0.5-2.5 (3) MG/3ML neb solution     mirtazapine (REMERON) 15 MG tablet     nicotine (NICORETTE) 4 MG lozenge     omeprazole (PRILOSEC) 20 MG DR capsule     umeclidinium (INCRUSE ELLIPTA) 62.5 MCG/INH inhaler     bumetanide (BUMEX) 1 MG tablet     cyclobenzaprine (FLEXERIL) 10 MG tablet     diclofenac (VOLTAREN) 1 % topical gel     hydrOXYzine (ATARAX) 50 MG tablet     No current facility-administered medications for this visit.         Allergies   Allergen Reactions     Bupropion Diarrhea     Codeine Hives, Itching, Nausea and Rash     Nickel Unknown     Oxycodone Nausea and Vomiting     Percocet [Oxycodone-Acetaminophen]      Patient reports \"vomiting,grossly ill two weeks ago\"     Topiramate Unknown     Diclofenac Nausea     Tolerates the topical gel     Furosemide Rash     Hydrochlorothiazide Rash     phototoxicity - med was d/lora         Sulfa Antibiotics Itching and Rash     Sulfasalazine Rash       Melissa Covington DO [facility]  [phone]      "

## 2023-05-15 ENCOUNTER — TELEPHONE (OUTPATIENT)
Dept: BEHAVIORAL HEALTH | Facility: CLINIC | Age: 68
End: 2023-05-15
Payer: COMMERCIAL

## 2023-05-16 DIAGNOSIS — I89.0 LYMPHEDEMA OF BOTH LOWER EXTREMITIES: ICD-10-CM

## 2023-05-16 DIAGNOSIS — K70.30 ALCOHOLIC CIRRHOSIS OF LIVER WITHOUT ASCITES (H): Primary | ICD-10-CM

## 2023-05-17 DIAGNOSIS — I89.0 LYMPHEDEMA OF BOTH LOWER EXTREMITIES: ICD-10-CM

## 2023-05-17 RX ORDER — BUMETANIDE 1 MG/1
TABLET ORAL
Qty: 30 TABLET | Refills: 0 | Status: SHIPPED | OUTPATIENT
Start: 2023-05-17 | End: 2023-05-18

## 2023-05-17 NOTE — PROGRESS NOTES
Clinic Care Coordination Contact  [facility]/Voicemail       Clinical Data: Care Coordinator Outreach  Outreach attempted x 2.  Left message on patient's voicemail with call back information and requested return call.  Plan: Care Coordinator will send unable to contact letter with care coordinator contact information via [employer]. Care Coordinator will try to reach patient again in 10 business days.    KATHLEEN Espana? Social Work Care Coordinator   [facility]  [email or web address]? [email or web address]    Phone: [phone]  she/her

## 2023-05-17 NOTE — TELEPHONE ENCOUNTER
"Last Written Prescription Date:  02/15/2023  Last Fill Quantity: 30,  # refills: 1   Last office visit provider:  05/01/2023     Requested Prescriptions   Pending Prescriptions Disp Refills     bumetanide (BUMEX) 1 MG tablet [Pharmacy Med Name: BUMETANIDE 1MG TABLETS] 30 tablet 1     Sig: TAKE 1 TABLET(1 MG) BY MOUTH DAILY       Diuretics (Including Combos) Protocol Passed - 5/17/2023  3:15 PM        Passed - Blood pressure under 140/90 in past 12 months     BP Readings from Last 3 Encounters:   05/01/23 110/77   03/22/23 123/63   02/28/23 127/82                 Passed - Recent (12 mo) or future (30 days) visit within the authorizing provider's specialty     Patient has had an office visit with the authorizing provider or a provider within the authorizing providers department within the previous 12 mos or has a future within next 30 days. See \"Patient Info\" tab in inbasket, or \"Choose Columns\" in Meds & Orders section of the refill encounter.              Passed - Medication is active on med list        Passed - Patient is age 18 or older        Passed - No active pregancy on record        Passed - Normal serum creatinine on file in past 12 months     Recent Labs   Lab Test 03/22/23  0616   CR 0.68              Passed - Normal serum potassium on file in past 12 months     Recent Labs   Lab Test 03/22/23  0616   POTASSIUM 4.7                    Passed - Normal serum sodium on file in past 12 months     Recent Labs   Lab Test 03/22/23  0616                 Passed - No positive pregnancy test in past 12 months             Sara Mitchell RN 05/17/23 3:15 PM  "

## 2023-05-18 ENCOUNTER — PATIENT OUTREACH (OUTPATIENT)
Dept: NURSING | Facility: CLINIC | Age: 68
End: 2023-05-18
Payer: COMMERCIAL

## 2023-05-18 RX ORDER — BUMETANIDE 1 MG/1
TABLET ORAL
Qty: 90 TABLET | Refills: 0 | Status: SHIPPED | OUTPATIENT
Start: 2023-05-18 | End: 2023-09-13

## 2023-05-18 NOTE — TELEPHONE ENCOUNTER
"Routing refill request to provider for review/approval because:  Pharmacy is requesting a 90 day supply    Last Written Prescription Date:  5/17/23  Last Fill Quantity: 30,  # refills: 0   Last office visit provider:   5/1/23    Requested Prescriptions   Pending Prescriptions Disp Refills     bumetanide (BUMEX) 1 MG tablet [Pharmacy Med Name: BUMETANIDE 1MG TABLETS] 90 tablet      Sig: TAKE 1 TABLET(1 MG) BY MOUTH DAILY       Diuretics (Including Combos) Protocol Passed - 5/18/2023  2:30 PM        Passed - Blood pressure under 140/90 in past 12 months     BP Readings from Last 3 Encounters:   05/01/23 110/77   03/22/23 123/63   02/28/23 127/82                 Passed - Recent (12 mo) or future (30 days) visit within the authorizing provider's specialty     Patient has had an office visit with the authorizing provider or a provider within the authorizing providers department within the previous 12 mos or has a future within next 30 days. See \"Patient Info\" tab in inbasket, or \"Choose Columns\" in Meds & Orders section of the refill encounter.              Passed - Medication is active on med list        Passed - Patient is age 18 or older        Passed - No active pregancy on record        Passed - Normal serum creatinine on file in past 12 months     Recent Labs   Lab Test 03/22/23  0616   CR 0.68              Passed - Normal serum potassium on file in past 12 months     Recent Labs   Lab Test 03/22/23  0616   POTASSIUM 4.7                    Passed - Normal serum sodium on file in past 12 months     Recent Labs   Lab Test 03/22/23  0616                 Passed - No positive pregnancy test in past 12 months             ESTRELLA VICK RN 05/18/23 2:30 PM  "

## 2023-05-18 NOTE — PROGRESS NOTES
Clinic Care Coordination Contact    Follow Up Progress Note      Assessment: CC SW call to connect with pt this afternoon to discuss medical bills and [facility] therapy services    Care Gaps:    Health Maintenance Due   Topic Date Due     COPD ACTION PLAN  Never done     DEPRESSION ACTION PLAN  Never done     COLORECTAL CANCER SCREENING  Never done     Pneumococcal Vaccine: 65+ Years (2 - PCV) 01/28/2021     LUNG CANCER SCREENING  01/28/2021     MEDICARE ANNUAL WELLNESS VISIT  01/31/2022     NICOTINE/TOBACCO CESSATION COUNSELING Q 1 YR  01/27/2023     COVID-19 Vaccine (6 - Pfizer series) 03/02/2023       Postponed to a later date. Pt unable to talk due to time constraints     Care Plans  Care Plan: Medical     Problem: HP GENERAL PROBLEM     Goal: I will develop a plan with the CD program for additional support     Start Date: 3/30/2023    Note:        Patient expressed understanding of goal: yes  Action steps to achieve this goal:  1. I will attend scheduled intake and therapy and follow recommendations for support  2. I will update Care coordination team during next outreach  3. I will report to my Community Health Worker if any additional resources or support needed.  05/08/23  Pt has visit with Addiction medicine schedule for later this week                         Care Plan: Social     Problem: HP GENERAL PROBLEM     Goal: Financial Wellbeing     Start Date: 4/3/2023    This Visit's Progress: 10%    Note:        Patient expressed understanding of goal: yes  Action steps to achieve this goal:  1. I will work with financial office to ensure medical bills are submitted to insurance and if any additional resources   2. I will update Care coordination team during next outreach  3. I will report to my Community Health Worker if any additional resources or support needed.                          CC JESU called and connected with pt this morning. SW introduced herself to pt and checked on how her day was going. Pt stated her  day was going well, but she couldn't talk for long as she was waiting to get a call from a therapist/. Pt updated SW that she does have a therapist through [facility]. SW and pt arranged to continue this discussion tomorrow @ 10am    Intervention/Education provided during outreach: Therapist     Outreach Frequency: monthly    Plan: SW to call and connect with pt tomorrow as pt could not stay on the call for long due to awaiting another call from a provider.    Care Coordinator will follow up on 5/19 @ 10am to continue the discussion    KATHLEEN Brownlee   Social Work Care Coordinator   [facility]    [phone]

## 2023-05-19 ENCOUNTER — PATIENT OUTREACH (OUTPATIENT)
Dept: NURSING | Facility: CLINIC | Age: 68
End: 2023-05-19
Payer: COMMERCIAL

## 2023-05-19 NOTE — TELEPHONE ENCOUNTER
----- Message from Valeria Tran sent at 5/19/2023 11:57 AM CDT -----  Regarding: SERVICE INITIATION  SERVICE INITIATION SCHEDULED ON 5/22

## 2023-05-19 NOTE — PROGRESS NOTES
Clinic Care Coordination Contact  [facility]/Voicemail       Clinical Data: Care Coordinator Outreach  Outreach attempted x 1.  Left message on patient's voicemail with call back information and requested return call.  Plan: Care Coordinator will try to reach patient again in 10 business days.      KATHLEEN Brownlee   Social Work Care Coordinator   [facility]    [phone]

## 2023-05-22 ENCOUNTER — HOSPITAL ENCOUNTER (OUTPATIENT)
Dept: BEHAVIORAL HEALTH | Facility: CLINIC | Age: 68
Discharge: HOME | End: 2023-05-22
Attending: FAMILY MEDICINE
Payer: COMMERCIAL

## 2023-05-22 PROCEDURE — H2035 A/D TX PROGRAM, PER HOUR: HCPCS

## 2023-05-22 ASSESSMENT — COLUMBIA-SUICIDE SEVERITY RATING SCALE - C-SSRS
5. HAVE YOU STARTED TO WORK OUT OR WORKED OUT THE DETAILS OF HOW TO KILL YOURSELF? DO YOU INTEND TO CARRY OUT THIS PLAN?: NO
4. HAVE YOU HAD THESE THOUGHTS AND HAD SOME INTENTION OF ACTING ON THEM?: NO
3. HAVE YOU BEEN THINKING ABOUT HOW YOU MIGHT KILL YOURSELF?: NO
2. HAVE YOU ACTUALLY HAD ANY THOUGHTS OF KILLING YOURSELF IN THE PAST MONTH?: NO
2. HAVE YOU ACTUALLY HAD ANY THOUGHTS OF KILLING YOURSELF LIFETIME?: NO
6. HAVE YOU EVER DONE ANYTHING, STARTED TO DO ANYTHING, OR PREPARED TO DO ANYTHING TO END YOUR LIFE?: NO
1. IN THE PAST MONTH, HAVE YOU WISHED YOU WERE DEAD OR WISHED YOU COULD GO TO SLEEP AND NOT WAKE UP?: YES
1. IN THE PAST MONTH, HAVE YOU WISHED YOU WERE DEAD OR WISHED YOU COULD GO TO SLEEP AND NOT WAKE UP?: NO
REASONS FOR IDEATION LIFETIME: COMPLETELY TO END OR STOP THE PAIN (YOU COULDN'T GO ON LIVING WITH THE PAIN OR HOW YOU WERE FEELING)
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
## 2023-05-23 ENCOUNTER — DOCUMENTATION ONLY (OUTPATIENT)
Dept: BEHAVIORAL HEALTH | Facility: CLINIC | Age: 68
End: 2023-05-23
Payer: COMMERCIAL

## 2023-05-23 NOTE — PROGRESS NOTES
"Comprehensive Assessment Summary Update     Based on client interview, review of previous assessments and   comprehensive assessment interview the following diagnosis and recommendations are:     Client meets criteria for:    Alcohol Use Disorder Severe    Dimension One: Acute Intoxication/Withdrawal Potential     Ratin  Patient reports last date of use as one time in April, but she can't remember the date. Patient reports no withdrawal symptoms. Patient was medically detoxed prior to that one day return to use. No withdrawal symptoms observed at intake.    Dimension Two: Biomedical Condition and Complications    Ratin  Patient reports the following medical conditions: COPD, overweight, uses CPAP, and general fatigue . Patient has primary care with Yanique Cali MD. Her next appointment is 23.     Dimension Three: Emotional/Behavioral/Cognitive Conditions & Complications  Ratin  Patient reports a mental health diagnosis of PTSD, Anxiety, and Depression. Patient endorses current symptoms of hypervigilance and anxiety when out. She also notes unexplained fatigue. Patient would like to meet with mental health therapist to process her trauma. Patient's denies current SI or thoughts of self-harm.    Dimension Four: Treatment Acceptance/Resistance     Ratin  Patient appears internally motivated for treatment at this time and reports being \"sick and tired of being sick and tired\" and also wanting to take care of her health as her main reasons for coming to treatment.  Patient appears to be in the contemplation stage of change at this time.    Dimension Five: Continued Use/Relaspe Prevention     Rating:   3    Patient reports that he has been to multiple treatments and has had limited periods of sobriety. Patient verbalizes that their primary triggers are related to her son bringing up the past. Patient is not engaged with the recovery community. She said two of her three sisters are " supportive of her recovery. Patient has minimal insight into the relapse process or coping skills for emotional regulation. Patient's mental health symptoms increase the risk of relapse at this time.    Dimension Six: Recovery Environment     Rating:  3     Patient has stable housing at this time and lives alone. Patient reports she is unemployed and on disability. She does note some concerns about her financial stability. Patient is not involved in the criminal justice system. She lacks a sober support network. Patient does not have a regular structure in place for her day to day activities, though she does enjoy working out. Patient has 4 adult children and her son has been an ongoing trigger.       I have reviewed the information on the assessment, psychosocial and medical history and checklist.
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
## 2023-05-24 NOTE — PROGRESS NOTES
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 abstinence with support.  Learn about PTSD and treatment options.     4. Resources/Service Plan:    services are not indicated.   Modifications to assist communication are not indicated.   Additional disability accommodations are not indicated.      5. Collaboration:   Collaboration / coordination of treatment will be initiated with the following  support professionals: TBD.      6.  Referrals:   The following referral(s) will be initiated: TBD.      A Release of Information has been obtained for the following: NA.    7. ORAL:    ORAL:  Discussed the general effects of drugs and alcohol on health and well-being. Patient has enrolled in co-occurring intensive outpatient programming.  .     8. Records:   These were reviewed at time of assessment.   Information in this assessment was obtained from the medical record and provided by patient who is a good historian.    Patient will have open access to their mental health medical record.    Provider Name/ Credentials:  Cony Guzman, [facility]  May 23, 2023

## 2023-05-25 ENCOUNTER — HOSPITAL ENCOUNTER (OUTPATIENT)
Dept: BEHAVIORAL HEALTH | Facility: CLINIC | Age: 68
Discharge: HOME | End: 2023-05-25
Admitting: FAMILY MEDICINE
Payer: COMMERCIAL

## 2023-05-25 DIAGNOSIS — F43.10 PTSD (POST-TRAUMATIC STRESS DISORDER): Primary | ICD-10-CM

## 2023-05-25 PROCEDURE — 90791 PSYCH DIAGNOSTIC EVALUATION: CPT

## 2023-05-25 ASSESSMENT — ANXIETY QUESTIONNAIRES
4. TROUBLE RELAXING: SEVERAL DAYS
7. FEELING AFRAID AS IF SOMETHING AWFUL MIGHT HAPPEN: MORE THAN HALF THE DAYS
3. WORRYING TOO MUCH ABOUT DIFFERENT THINGS: SEVERAL DAYS
5. BEING SO RESTLESS THAT IT IS HARD TO SIT STILL: SEVERAL DAYS
6. BECOMING EASILY ANNOYED OR IRRITABLE: SEVERAL DAYS
GAD7 TOTAL SCORE: 8
2. NOT BEING ABLE TO STOP OR CONTROL WORRYING: SEVERAL DAYS
1. FEELING NERVOUS, ANXIOUS, OR ON EDGE: SEVERAL DAYS
GAD7 TOTAL SCORE: 8

## 2023-05-25 ASSESSMENT — COLUMBIA-SUICIDE SEVERITY RATING SCALE - C-SSRS
1. SINCE LAST CONTACT, HAVE YOU WISHED YOU WERE DEAD OR WISHED YOU COULD GO TO SLEEP AND NOT WAKE UP?: NO
6. HAVE YOU EVER DONE ANYTHING, STARTED TO DO ANYTHING, OR PREPARED TO DO ANYTHING TO END YOUR LIFE?: NO
2. HAVE YOU ACTUALLY HAD ANY THOUGHTS OF KILLING YOURSELF?: NO

## 2023-05-25 ASSESSMENT — PATIENT HEALTH QUESTIONNAIRE - PHQ9: SUM OF ALL RESPONSES TO PHQ QUESTIONS 1-9: 7

## 2023-05-28 ENCOUNTER — NURSE TRIAGE (OUTPATIENT)
Dept: NURSING | Facility: CLINIC | Age: 68
End: 2023-05-28
Payer: COMMERCIAL

## 2023-05-28 NOTE — TELEPHONE ENCOUNTER
Patient calling.    Reports feet swelling that became noticeable 2 days ago, and states she can barely walk. She reports that her right foot is red and tender to touch. Denies recent feet injury. She takes bumex daily, has not missed any doses.    Disposition: Go to ED Now. Patient plans on having someone bring her to [place] ED.    Rosalie Fowler RN on 5/28/2023 at 3:36 PM    Reason for Disposition    Swelling of both ankles (i.e., pedal edema)    [1] Can't walk or can barely walk AND [2] new-onset    Additional Information    Negative: SEVERE difficulty breathing (e.g., struggling for each breath, speaks in single words)    Negative: Looks like a broken bone or dislocated joint (e.g., crooked or deformed)    Negative: Sounds like a life-threatening emergency to the triager    Negative: Difficulty breathing at rest    Negative: Entire foot is cool or blue in comparison to other side    Protocols used: ANKLE SWELLING-A-AH, LEG SWELLING AND EDEMA-A-AH

## 2023-05-29 ENCOUNTER — OFFICE VISIT (OUTPATIENT)
Dept: FAMILY MEDICINE | Facility: CLINIC | Age: 68
End: 2023-05-29
Payer: COMMERCIAL

## 2023-05-29 ENCOUNTER — HOSPITAL ENCOUNTER (OUTPATIENT)
Dept: GENERAL RADIOLOGY | Facility: HOSPITAL | Age: 68
Discharge: HOME | End: 2023-05-29
Attending: PHYSICIAN ASSISTANT | Admitting: PHYSICIAN ASSISTANT
Payer: COMMERCIAL

## 2023-05-29 VITALS
SYSTOLIC BLOOD PRESSURE: 150 MMHG | DIASTOLIC BLOOD PRESSURE: 89 MMHG | BODY MASS INDEX: 46.3 KG/M2 | OXYGEN SATURATION: 98 % | RESPIRATION RATE: 16 BRPM | TEMPERATURE: 97.6 F | WEIGHT: 261.4 LBS | HEART RATE: 74 BPM

## 2023-05-29 DIAGNOSIS — M79.671 RIGHT FOOT PAIN: ICD-10-CM

## 2023-05-29 DIAGNOSIS — M54.16 LUMBAR RADICULOPATHY: Primary | ICD-10-CM

## 2023-05-29 PROCEDURE — 99214 OFFICE O/P EST MOD 30 MIN: CPT | Performed by: PHYSICIAN ASSISTANT

## 2023-05-29 PROCEDURE — 73630 X-RAY EXAM OF FOOT: CPT | Mod: RT

## 2023-05-29 RX ORDER — CYCLOBENZAPRINE HCL 10 MG
10 TABLET ORAL AT BEDTIME
Qty: 10 TABLET | Refills: 0 | Status: SHIPPED | OUTPATIENT
Start: 2023-05-29 | End: 2023-06-26

## 2023-05-29 NOTE — PATIENT INSTRUCTIONS
Your back pain from left sciatica.     I have prescribed you a short course of antiinflammatory to help decrease the inflammation in the area of the spasm. This should help to decrease associated numbness/tingling (nerve pain). Please take as directed.    Use Voltaren gel topically.    take Tylenol up to 1,000 mg, 4 times daily.    You may use the Flexeril as needed for muscle spasm. Use caution while taking this medication, as it can make you drowsy. Do not take while driving, operating heavy machinery, or doing any activities requiring intense concentration.    Try using a heating pad and/or warm baths.    Make sure to keep moving to avoid getting stiff. See below for stretching exercises.    If you develop fever, severe pain that prevents you from walking at all, weakness of your arms or legs, loss of bowel or bladder continence, or any other new concerning symptoms, go to the ER immediately.    Otherwise, follow up with primary care doctor as needed or if no improvement in pain in symptoms in 1 week.

## 2023-05-29 NOTE — PROGRESS NOTES
Patient presents with:  Musculoskeletal Problem: Right foot/ankle swollen, pain in thigh down      Clinical Decision Making:  Treatment for lumbar radiculopathy. Treatment with voltaren gel and cyclobenzaprine. Right foot xray did not show fracture. Expected course of resolution and indication for return was gone over and questions were answered to patient/parent's satisfaction before discharge.        ICD-10-CM    1. Lumbar radiculopathy  M54.16 diclofenac (VOLTAREN) 1 % topical gel     Spine  Referral     DISCONTINUED: cyclobenzaprine (FLEXERIL) 10 MG tablet      2. Right foot pain  M79.671 XR Foot Right G/E 3 Views          Patient Instructions   Your back pain from left sciatica.     I have prescribed you a short course of antiinflammatory to help decrease the inflammation in the area of the spasm. This should help to decrease associated numbness/tingling (nerve pain). Please take as directed.    Use Voltaren gel topically.    take Tylenol up to 1,000 mg, 4 times daily.    You may use the Flexeril as needed for muscle spasm. Use caution while taking this medication, as it can make you drowsy. Do not take while driving, operating heavy machinery, or doing any activities requiring intense concentration.    Try using a heating pad and/or warm baths.    Make sure to keep moving to avoid getting stiff. See below for stretching exercises.    If you develop fever, severe pain that prevents you from walking at all, weakness of your arms or legs, loss of bowel or bladder continence, or any other new concerning symptoms, go to the ER immediately.    Otherwise, follow up with primary care doctor as needed or if no improvement in pain in symptoms in 1 week.              HPI:  Patsy Santamaria is a 68 year old female who presents today for right lower extremity pain that radiates down the leg in an L5-S1 distribution to the right ankle and foot. No fecal or urinary incontinence. Pain is made worse with activity.  Sitting is the worst position.     History obtained from chart review and the patient.    Problem List:  2023-06: Right foot pain  2023-06: Hallux valgus, acquired, right  2023-05: Other specified disorders of carbohydrate metabolism (H)  2023-03: Suicidal ideation  2023-03: Infection due to 2019 novel coronavirus  2022-09: Alcoholic cirrhosis of liver without ascites (H)  2022-09: Sigmoid Diverticulitis  2022-06: Mixed simple and mucopurulent chronic bronchitis (H)  2022-02: Depressive disorder  2022-01: Thrombocytopenia (H)  2022-01: Dermatitis  2022-01: Leukopenia  2021-09: Acute alcoholic intoxication (H)  2021-09: Closed fracture of head of left radius  2021-09: Recurrent falls  2021-07: Abdominal pain, epigastric  2021-07: Alcoholic hepatitis  2021-07: Biliary colic  2021-07: Chronic reflux esophagitis  2021-07: Diuretic-induced hypokalemia  2021-07: Dyspnea on exertion  2021-07: Ganglion of tendon sheath  2021-07: Hereditary and idiopathic peripheral neuropathy  2021-07: Major depression, recurrent (H)  2021-07: Mild episode of recurrent major depressive disorder (H)  2021-07: Smoker  2021-07: Peripheral edema  2021-07: Primary localized osteoarthrosis, hand  2021-07: Vitamin D deficiency  2021-05: Wheezing  2021-05: Alcohol dependence with intoxication with complication (H)  2021-05: Edema, unspecified type  2021-02: Alcohol abuse  2020-12: GI bleed  2020-12: Homeless  2020-10: Alcohol dependence with uncomplicated intoxication (H)  2020-10: Vitamin B12 deficiency (non anemic)  2020-01: History of major depression  2019-12: Pneumonia of right lower lobe due to infectious organism  2019-03: Hypomagnesemia  2019-03: Anxiety  2018-07: Seasonal allergies  2018-03: Primary osteoarthritis of right knee  2018-03: Alcohol withdrawal syndrome without complication (H)  2018-03: Low backache  2018-01: Morbid obesity (H)  2017-06: Bilateral edema of lower extremity  2017-06: Snoring  2017-03: Carpal tunnel syndrome on  both sides  2016-10: Trochanteric bursitis of left hip  2016-05: Alcohol withdrawal seizure without complication (H)  2016-05: Elevated LFTs  2016-05: Airway obstruction due to foreign body, initial encounter  2015-12: Claustrophobia  2015-12: Cervical disc disease  2015-10: Skin lesion  2015-09: COPD (chronic obstructive pulmonary disease) with emphysema   (H)  2015-02: PTSD (post-traumatic stress disorder)  2014-12: COPD with exacerbation (H)  2010-11: Plantar fasciitis  Other seizures (H)      Past Medical History:   Diagnosis Date     Acute alcoholic intoxication (H) 9/10/2021     Alcohol use disorder      Alcohol withdrawal seizure without complication (H) 05/14/2016     Alcoholic cirrhosis (H)      Anxiety      Chronic alcohol dependence, continuous (H) 03/16/2018    inpatient 11/2019, sober since then     Chronic Lower Extremity Edema      Chronic reflux esophagitis      COPD (chronic obstructive pulmonary disease) (H)      Depression      Dermatitis      Diverticulitis of colon 09/2022     Fibroid uterus      Ganglion     right foot     GERD (gastroesophageal reflux disease)      H. pylori infection      Melanoma (H) 07/01/2019    left upper arm     Menopause     age 52     Obesity (BMI 35.0-39.9 without comorbidity)      Osteoarthritis     Bilateral Knees     Peripheral neuropathy      Seizure (H) 01/01/2016    during alcohol withdrawal     Sleep apnea     mild, doesn't tolerate pap therapy       Social History     Tobacco Use     Smoking status: Every Day     Packs/day: 0.50     Years: 49.00     Pack years: 24.50     Types: Cigarettes     Smokeless tobacco: Never     Tobacco comments:     Reports on 1/26/23 smoking 1/2 PPD   Substance Use Topics     Alcohol use: Not Currently     Comment: Last use 11/11/22       Review of Systems  As above in HPI otherwise negative.    Vitals:    05/29/23 1359   BP: (!) 150/89   Pulse: 74   Resp: 16   Temp: 97.6  F (36.4  C)   TempSrc: Oral   SpO2: 98%   Weight: 118.6 kg  (261 lb 6.4 oz)       General: Patient is resting comfortably no acute distress is afebrile  HEENT: Head is normocephalic atraumatic   eyes are PERRL EOMI sclera anicteric   TMs are clear bilaterally  Throat is with mild pharyngeal wall erythema and no exudate  No cervical lymphadenopathy present  LUNGS: Clear to auscultation bilaterally  HEART: Regular rate and rhythm  Skin: Without rash non-diaphoretic  General: Patient is slightly uncomfortable in the office encounter secondary to pain.  Patient ambulates into the office encounter is able to walk into the office encounter sit in the chair stand up and walk to the examination table.    Musculoskeletal:  No noted deformities and the alignment is midline.    Palpation: No cervical thoracic lumbar sacral spinous process tenderness to palpation  Paraspinal muscles are palpated.    There is slight paraspinal muscle tenderness on the right side.  SI joint on the right side is tender to palpation and does reproduce symptoms radiates into the buttock into the lateral aspect of the thigh to the mid thigh region and down to the foot on the lateral aspect in an S1  Pain and TTP over the proximal fifth metatarsal.    Range of motion: She has extension to 20 degrees and flexion to 90 degrees although touching toes with flexion is painful for the patient and does reproduce symptoms    Reflexes: DTRs are 2 out of 4 and equal bilaterally at the Achilles and patella.    Musculoskeletal strength: 5 out of 5 and equal bilaterally.  Straight leg raises reproduce pain on the left side  Patient is able to arise on the heels as well as balls of the feet.        Labs:  Results for orders placed or performed in visit on 05/29/23   XR Foot Right G/E 3 Views     Status: None    Narrative    EXAM: XR FOOT RIGHT G/E 3 VIEWS  LOCATION: [facility]  DATE/TIME: 5/29/2023 3:09 PM CDT    INDICATION: Proximal right fifth metatarsal pain.  COMPARISON: None.      Impression     IMPRESSION:   1. Hallux valgus, lateral subluxation of the sesamoids, bunion and moderate first MTP joint osteoarthrosis.  2. Tailor's bunion.  3. Small calcaneal enthesophytes.  4. Mild osteoarthrosis of the navicular cuneiform joints.  5. Otherwise negative. The fifth metatarsal appears normal.       Radiology:  I have personally ordered and preliminarily reviewed the following xray, I have noted no acute fractures.    At the end of the encounter, I discussed results, diagnosis, medications. Discussed red flags for immediate return to clinic/ER, as well as indications for follow up if no improvement. Patient understood and agreed to plan. Patient was stable for discharge.

## 2023-05-31 ENCOUNTER — DOCUMENTATION ONLY (OUTPATIENT)
Dept: BEHAVIORAL HEALTH | Facility: CLINIC | Age: 68
End: 2023-05-31
Payer: COMMERCIAL

## 2023-05-31 ENCOUNTER — BEH TREATMENT PLAN (OUTPATIENT)
Dept: BEHAVIORAL HEALTH | Facility: CLINIC | Age: 68
End: 2023-05-31
Payer: COMMERCIAL

## 2023-05-31 DIAGNOSIS — F43.10 PTSD (POST-TRAUMATIC STRESS DISORDER): ICD-10-CM

## 2023-05-31 NOTE — PROGRESS NOTES
ABSENT NOTE:    Patsy Santamaria was absent from group 5/31/2023 . This absence was excused. I contacted the patient via telephone and she informed me that she went to urgent care over the weekend and was told her next appointment was 6/1/23 according to Andreina so she thought there was no group today. Patient is expected to be in group on 6/1/23,    [facility]  5/31/2023 , 9:42 AM

## 2023-05-31 NOTE — PROGRESS NOTES
[facility]  Adult Substance Use Disorder Program  Treatment Plan     These services are provided by the facility for each patient/client according to the individual's treatment plan:    Individual and group counseling    Education    Transition services    Services to address any co-occurring mental illness    Service coordination    Criteria for discharge:  Patients/clients are discharged from the program following completion of the entire program including all phases of treatment or acceptance of other post-treatment referrals such as sober supportive living, or aftercare at other facilities.  Patients/clients may also be discharged for inappropriate behavior or substance use.      Favorable Discharge - Patients/clients have completed agreed upon treatment goals, understand their diagnosis and appear motivated for continued recovery.    Guarded Discharge - Patients/clients have demonstrated some understanding of their diagnosis and recovery process, and have completed some of their treatment goals.  This prognosis also includes patients/clients who have completed some treatment goals but have not made commitment to community support or follow through with referrals.    Unfavorable Discharge - Patients/clients have not completed agreed upon treatment goals due to their own choice, have limited understanding of their diagnosis, and have shown minimal or inconsistent behavior conducive to recovery.  Those patients/clients discharged due to behavioral problems will also be unfavorable discharges.    Adult ORAL Treatment Plan                                Patient Name: Patsy Santamaria  MRN: [id number]  : 1955  68 year old   Identified Gender: female  Admit Date: 23  Current Treatment Phase: 1  Last Updated: Initial    SUBSTANCE USE DISORDER(S): 303.90 (F10.20) Alcohol Use Disorder Severe      Dimension 1: Acute Intoxication/Withdrawal Potential, Risk Level: 0    Needs identified  from Comprehensive Assessment Summary: Patient reports last date of use as one time in April, but she can't remember the date. Patient reports no withdrawal symptoms. Patient was medically detoxed prior to that one day return to use. No withdrawal symptoms observed at intake.  Update: Initial    Date of last use: Unsure, April  Patient currently receiving medication assisted therapy (MAT): No  Current or recent withdrawal symptoms: No    Focus area: Abstinence   Date Assigned: 5/31/2023  Clinical Goal: Abstain from alcohol and other non prescribed mood altering drugs.   Patient's Goal:   Goal needs to be completed prior to discharge? [x]  Treatment Strategies:   Schedule appointment with medical provider to explore referral for medication-assisted therapy.   Target Date: 7/21/23  Date Completed:      Dimension 2: Biomedical Conditions/Complications, Risk Level: 1    Needs identified from Comprehensive Assessment Summary: Patient re  ports the following medical conditions: COPD, overweight, uses CPAP, and general fatigue . Patient has primary care with Yanique Cali MD. Her next appointment is 6/1/23.     Update: Initial    Focus area: Overall Physical Health  Clinical Goal: Improve physical health  Patient's Goal:    Goal needs to be completed prior to discharge? []  Methods/Strategies (must include amount and frequency):   1. Patsy will report any changes to physical health to counselor.   2. Patsy will attend all scheduled doctor's appointments.   3. Patsy will take medications as prescribed.   Target Date: 7/21/23  Completion Date:     Focus area/need: Patient reports current tobacco use.  Clinical Goal: Patient to receive information about smoking cessation.   Patient's Goal:    Goal needs to be completed prior to discharge? []  Methods/Strategies (must include amount and frequency):   1. Staff to provide patient with nicotine cessation information and help on how to quit use.   Target Date:  "7/21/23  Completion Date:       Dimension 3: Emotional/Behavioral/Cognitive, Risk Level: 2    Needs identified from Comprehensive Assessment Summary: Patient reports a mental health diagnosis of PTSD, Anxiety, and Depression. Patient endorses current symptoms of hypervigilance and anxiety when out. She also notes unexplained fatigue. Patient would like to meet with mental health therapist to process her trauma. Patient's denies current SI or thoughts of self-harm.  Update: Initial    Focus area: Diagnoses:  300.01 (F41.0) Panic Disorder  309.81 (F43.10) Posttraumatic Stress Disorder (includes Posttraumatic With dissociative symptoms  Additional diagnoses: F10.20 Alcohol Use Disorder, Severe  Provisional: 296.89 (F31.81) Bipolar II     Clinical Goal: Decrease affective distress associated with symptoms of PTSD and mood changes.  Patient's Goal:  \"Deal with anxiety, depression and PTSD so I can stop drinking.\"  Goal needs to be completed prior to discharge? [x]  Methods/Strategies (must include amount and frequency):   1. Patsy will meet with program therapist weekly to learn about symptoms and develop skills and practices to decrease affective distress related to those symptoms.  2. Patsy will establish a routine for good medication adherence.  3. Patsy will maintain good medication adherence. If experiencing ambivalence about medications, Patsy will talk to program staff and contact her prescriber (and tell IOP counselors) to discuss her experience/questions/concerns. (e.g., side effects, efficacy, concerns).  4. Patsy will begin to develop and practice coping skills and practices learned in IOP to decrease distress related to MH symptoms. She will process effectiveness of these at least 3x/week in group, and weekly in individual sessions.  Target Date: 7/21/23  Completion Date:    Dimension 4: Readiness to Change, Risk Level: 1    Needs identified from Comprehensive Assessment Summary: Patient appears internally " "motivated for treatment at this time and reports being \"sick and tired of being sick and tired\" and also wanting to take care of her health as her main reasons for coming to treatment.  Patient appears to be in the contemplation stage of change at this time.  Update: Initial    Focus area: Motivation for Treatment  Clinical Goal: Increase internal motivation for a life of recovery.   Patient's Goal:    Goal needs to be completed prior to discharge? []  Methods/Strategies (must include amount and frequency):   1. Patsy will attend 3, 3-hour groups per week. Days/Times: M, W, Th  2. Patsy will contact staff if unable to attend at [email or web address] or [phone] and [email or web address] or   3. Complete a cost/benefit Matrix.  Target Date: 7/21/23  Completion Date:       Dimension 5: Relapse/Continued Use/Continued Problem Potential, Risk Level: 3    Needs identified from Comprehensive Assessment Summary: Patient reports that he has been to multiple treatments and has had limited periods of sobriety. Patient verbalizes that their primary triggers are related to her son bringing up the past. Patient is not engaged with the recovery community. She said two of her three sisters are supportive of her recovery. Patient has minimal insight into the relapse process or coping skills for emotional regulation. Patient's mental health symptoms increase the risk of relapse at this time.  Update: Initial    Focus area: Relapse Prevention  Clinical Goal: Gain further insight into triggers and relapse warning signs as well as coping skills for both urges and mental health symptoms.   Patient's Goal:    Goal needs to be completed prior to discharge? [x]  Methods/Strategies (must include amount and frequency):   1. Patsy will share during each session's check-in any urges and addictive thinking to better understand their pattern of use and to prevent return to use.  2. Patsy will start a relapse prevention plan.  Target " Date: 7/21/23  Completion Date:       Dimension 6: Recovery Environment, Risk Level: 3    Needs identified from Comprehensive Assessment Summary:    Patient has stable housing at this time and lives alone. Patient reports she is unemployed and on disability. She does note some concerns about her financial stability. Patient is not involved in the criminal justice system. She lacks a sober support network. Patient does not have a regular structure in place for her day to day activities, though she does enjoy working out. Patient has 4 adult children and her son has been an ongoing trigger.   Update: Initial    Desire for family involvement: No     Focus area/need: Recovery Lifestyle  Clinical Goal: Create a way of life that is contusive to long term recovery.   Patient's Goal:    Goal needs to be completed prior to discharge? [x]  Methods/Strategies (must include amount and frequency):   1. Learn about the different types of recovery support meetings available in your area and attend at least 3 either in person or virtually and report your experience back to the group.  2. Identify three sober past times that you enjoy doing and share with your group and/or counselor.  3. Complete goal exploration assignment with your counselor.   4. Continue setting boundaries with family.    Target Date: C7/21/2  Completion Date:     Focus area/need: Transition Planning to home community or clinically indicated setting  Clinical Goal: Patient, in collaboration with treatment team, will develop a plan for continued support of recovery.  Patient's Goal:    Goal needs to be completed prior to discharge? [x]  Methods/Strategies (must include amount and frequency):   1. TBD  Target Date: Check in 7/21/23  Completion Date:     Resources  Resources to which the patient is being referred for problems when they are to be addressed concurrently by another provider: No Referrals Needed    [x] Contact insurance to ensure referrals are in  network    Vulnerable Adult Review   [x] Review of the facility Abuse Prevention plan was reviewed with the patient   [x] No individual abuse plan is necessary   [] In addition to the facility Abuse Prevention plan, an Individual Abuse Plan will be put in place     Patsy attests their date of last use as one time in April . Patsy attests they have participated in the creation of this treatment plan. Patsy has been provided a copy of this treatment plan and is in agreement with how this plan is written and will be stored in the electronic record.       Patient Signature: _________________________________ Date: _________    Counselor Signature: ______________________________ Date: _________

## 2023-05-31 NOTE — TELEPHONE ENCOUNTER
Date of Last Office Visit: 5/11/2023  Date of Next Office Visit: 06/08/2023  No shows since last visit: None  Cancellations since last visit: None    Medication requested: hydrOXYzine (ATARAX) 50 MG tablet Date last ordered: 5/03/2023 Qty: 90 Refills: 2     Review of [place] ?: N/A  Lapse in medication adherence greater than 5 days?: no  Medication refill request verified as identical to current order?: Yes  Result of Last DAM, VPA, Li+ Level, CBC, or Carbamazepine Level (at or since last visit): N/A    Last visit treatment plan:    Return in about 3 weeks (around 6/1/2023) for in person, Follow up.  hydrOXYzine (ATARAX) 50 MG tablet         []Medication refilled per  Medication Refill in Ambulatory Care  policy.  []Medication unable to be refilled by RN due to criteria not met as indicated below:    []Eligibility - not seen in the last year   []Supervision - no future appointment   []Compliance - no shows, cancellations or lapse in therapy   []Verification - order discrepancy   []Controlled medication   [x]Medication not included in policy   []90-day supply request   [x]Other  Not in scope of MEENAKSHI Mccarthy CMA May 31, 2023 12:42 PM

## 2023-06-01 ENCOUNTER — PATIENT OUTREACH (OUTPATIENT)
Dept: CARE COORDINATION | Facility: CLINIC | Age: 68
End: 2023-06-01

## 2023-06-01 ENCOUNTER — HOSPITAL ENCOUNTER (OUTPATIENT)
Dept: BEHAVIORAL HEALTH | Facility: CLINIC | Age: 68
Discharge: HOME | End: 2023-06-01
Attending: FAMILY MEDICINE
Payer: COMMERCIAL

## 2023-06-01 DIAGNOSIS — F10.20 ALCOHOL USE DISORDER, SEVERE, DEPENDENCE (H): Primary | ICD-10-CM

## 2023-06-01 PROCEDURE — H2035 A/D TX PROGRAM, PER HOUR: HCPCS | Mod: HQ

## 2023-06-01 RX ORDER — HYDROXYZINE HYDROCHLORIDE 50 MG/1
25-50 TABLET, FILM COATED ORAL 3 TIMES DAILY PRN
Qty: 90 TABLET | Refills: 2 | Status: SHIPPED | OUTPATIENT
Start: 2023-06-01 | End: 2024-03-19

## 2023-06-01 NOTE — PROGRESS NOTES
Clinic Care Coordination Contact    Follow Up Progress Note      Assessment: JESU CC spoke to pt for monthly follow up.     Pt reports that she is looking for options for paying medical bills. [facility] CC spoke about [facility] financial assistance options. Pt reports she would like to clal them to get more info. JESU CC gave pt number for [facility] financial ([phone]) and spoke to pt about paramjit care and possible [facility] referral if needed. pT said she will call [facility] financial first and if paramjit care is needed, JESU HENSON can put in referral at next phone call.     Care Gaps:    Health Maintenance Due   Topic Date Due     COPD ACTION PLAN  Never done     DEPRESSION ACTION PLAN  Never done     COLORECTAL CANCER SCREENING  Never done     Pneumococcal Vaccine: 65+ Years (2 - PCV) 01/28/2021     LUNG CANCER SCREENING  01/28/2021     MEDICARE ANNUAL WELLNESS VISIT  01/31/2022     NICOTINE/TOBACCO CESSATION COUNSELING Q 1 YR  01/27/2023     COVID-19 Vaccine (6 - Pfizer series) 03/02/2023         Care Plans    Intervention/Education provided during outreach:    [email or web address]     Outreach Frequency: monthly    Plan:   Care Coordinator will follow up in 1-2 weeks    KATHLEEN Espana? Social Work Care Coordinator   [facility]  [email or web address]? [email or web address]    Phone: [phone]  she/her

## 2023-06-01 NOTE — GROUP NOTE
"Group Therapy Documentation    PATIENT'S NAME: Patsy Santamaria  MRN:   [id number]  :   1955  ACCT. NUMBER: [id number]  DATE OF SERVICE: 23  START TIME:  9:00 AM  END TIME: 12:00 PM  FACILITATOR(S): Dixie Finnegan LADC  TOPIC: BEH Group Therapy  Number of patients attending the group:  4    Group Length:  3 Hours    Group Therapy Type: Addiction and Life skill(s)    Summary of Group / Topics Discussed:    Co-occurring Illness, Meditation/Breathing Exercises, Relationships, and Self-Esteem/Self Redwood    Objective(s):     Explore the importance of self esteem and self worth in recovery and how to build it.    Explore the importance of meaningful connections in recovery.    Explore the importance of having purpose to support recovery efforts.     Structure (modalities, homework, worksheets, etc)      Meditation on Self Redwood    Jhony Talk \"Everything you think you know about addiction is wrong\" by Barrington Ramos    Interactive group discussion     Patient will:     Understand importance of self esteem and self worth in recovery and how to build it.    Understand the importance of meaningful connections in recovery.    Understand the importance of having purpose to support recovery efforts.     Therapeutic outcome(s) measured by:    Discussion and Individual Reflection    Attestation: Jayson Boles MD - Medical Director - Provides oversight and supervision of care.        Group Attendance:  Attended group session    Patient's response to the group topic/interactions:  cooperative with task and discussed personal experience with topic    Patient appeared to be Actively participating, Attentive and Engaged.        Client specific details:  Patsy reported feeling \"pretty good\". She said she has a sore back and some major swelling in her feet which has been a bit of a challenge, but she has some medical appointments scheduled in hopes for some relief. She reported that she has been sober since our last meeting " "and has not had any urges. She said she will probably have an urge tomorrow because she gets paid and money is a trigger. She is going to make plans so she isn't alone or tempted to use. She does not anticipate any other high risk situation as her other primary trigger is her son and she has begun setting boundaries with him to prevent those situations. She reported being \"really excited\" to be in group and feeling very motivated for recovery.       "

## 2023-06-04 ENCOUNTER — DOCUMENTATION ONLY (OUTPATIENT)
Dept: BEHAVIORAL HEALTH | Facility: CLINIC | Age: 68
End: 2023-06-04
Payer: COMMERCIAL

## 2023-06-04 NOTE — PROGRESS NOTES
[facility] Weekly Treatment Plan Review      ATTENDANCE for the following date span:  23 to 23    Date     Group Therapy 0 hours NA hours E  3 hours No group    Individual Therapy        Family Therapy        Other (Specify) Phase 1 &2  Phase 3  Phase 1  Phase 1 & 2        Patient did have one absence during this time period. Her Tishhart said to come 23 so this absence was excused.     Total hours: . Patient is in phase 1    Weekly Treatment Plan Review     Treatment Plan initiated on: .    Dimension1: Acute Intoxication/Withdrawal Potential -   Previous Dimension Ratin  Current Dimension Ratin  Date of Last Use: Unknown in April  Any reports of withdrawal symptoms: No  Narrative: Patient reports last date of use as one time in April, but she can't remember the date. Patient reports no withdrawal symptoms. Patient was medically detoxed prior to that one day return to use. No withdrawal symptoms observed at intake.    23: No reported use this week. No withdrawal symptoms reported or observed.    Dimension 2: Biomedical Conditions & Complications -   Previous Dimension Ratin  Current Dimension Ratin  Medical Concerns:  Significant pain and swelling in foot and leg.  Current Medications & Medication Changes:  Current Outpatient Medications   Medication     albuterol (PROAIR HFA/PROVENTIL HFA/VENTOLIN HFA) 108 (90 Base) MCG/ACT inhaler     budesonide-formoterol (SYMBICORT) 160-4.5 MCG/ACT Inhaler     bumetanide (BUMEX) 1 MG tablet     cyclobenzaprine (FLEXERIL) 10 MG tablet     diclofenac (VOLTAREN) 1 % topical gel     DULoxetine (CYMBALTA) 20 MG capsule     hydrOXYzine (ATARAX) 50 MG tablet     ipratropium - albuterol 0.5 mg/2.5 mg/3 mL (DUONEB) 0.5-2.5 (3) MG/3ML neb solution     mirtazapine (REMERON) 15 MG tablet     nicotine (NICORETTE) 4 MG lozenge     omeprazole (PRILOSEC) 20 MG DR capsule     umeclidinium (INCRUSE  "ELLIPTA) 62.5 MCG/INH inhaler     No current facility-administered medications for this visit.     Medication Prescriber:  Yanique Cali MD.   Taking meds as prescribed? Yes  Medication side effects or concerns:  None reported.  Outside medical appointments this week (list provider and reason for visit):  Saw doctor about her foot. See EHR.  Narrative: Patient reports the following medical conditions: COPD, overweight, uses CPAP, and general fatigue . Patient has primary care with Yanique Cali MD.     23: Patient has pain and swelling in her foot. She has seen a doctor and it is not improving yet.     Dimension 3: Emotional/Behavioral Conditions & Complications -   Previous Dimension Ratin  Current Dimension Ratin  PHQ9         2023     1:00 PM 2023     1:00 PM 2023    10:00 AM   PHQ-9 SCORE   PHQ-9 Total Score 5 2 7     GAD7         2023     1:00 PM 2023     1:00 PM 2023    10:00 AM   ALISIA-7 SCORE   Total Score 3 1 8     Mental health diagnosis PTSD  Date of last SIB/SI/HI: N/A  Current MH Assignments:  None  Narrative:  Patient reports a mental health diagnosis of PTSD, Anxiety, and Depression. Patient endorses current symptoms of hypervigilance and anxiety when out. She also notes unexplained fatigue. Patient would like to meet with mental health therapist to process her trauma. Patient's denies current SI or thoughts of self-harm.    Dimension 4: Treatment Acceptance / Resistance -   Previous Dimension Ratin  Current Dimension Ratin  ORAL Diagnosis:  Alcohol Use Disorder   303.90 (F10.20) Severe In early remission,   Stage: 1  Commitment to tx process/Stage of change: Contemplation  ORAL assignments:  Attend groups and remain abstinent.   Narrative: Patient appears internally motivated for treatment at this time and reports being \"sick and tired of being sick and tired\" and also wanting to take care of her health as her main " reasons for coming to treatment.  Patient appears to be in the contemplation stage of change at this time.       Dimension 5: Relapse / Continued Problem Potential -   Previous Dimension Rating:  3  Current Dimension Rating:  3  Relapses this week: None  Urges to use:  None  UA results: No results found for this or any previous visit (from the past 168 hour(s)).  Narrative: Patient reports that he has been to multiple treatments and has had limited periods of sobriety. Patient verbalizes that their primary triggers are related to her son bringing up the past. Patient is not engaged with the recovery community. She said two of her three sisters are supportive of her recovery. Patient has minimal insight into the relapse process or coping skills for emotional regulation. Patient's mental health symptoms increase the risk of relapse at this time.    Dimension 6: Recovery Environment -   Previous Dimension Rating:  3  Current Dimension Rating:  3  Family Involvement : None   Summarize attendance at family groups and family sessions: N/A  Family supportive of treatment?  Yes  Community support group attendance: None  Recreational activities: Exercise   Narrative: Patient has stable housing at this time and lives alone. Patient reports she is unemployed and on disability. She does note some concerns about her financial stability. Patient is not involved in the criminal justice system. She lacks a sober support network. Patient does not have a regular structure in place for her day to day activities, though she does enjoy working out. Patient has 4 adult children and her son has been an ongoing trigger.     TOPIC  DATES   8 Dimensions of Wellness    Medical Aspects     Emotional Wellbeing/Mental Health     Acceptance     Relationships     Relapse Prevention     Sober Structure     Grief and Loss       Justification for Continued Treatment at this Level of Care:  Patient has had minimal periods of sobriety. She lacks a  sober support network. She is medication compliant, but would benefit from additional mental health treatment. She has been able to identify triggers, but lacks coping skills. Her daily life lacks structure and meaningful activity.     Discharge Planning:  Target Discharge Date/Timeframe:  TBD   Med Mgmt Provider/Appt:  TBD   Ind therapy Provider/Appt:  TBD     Other referrals:  None    Has vulnerable adult status change? No    Service Coordination:  None    Supervision:  Patient is staffed with treatment providers, this includes: PRINCE Rivers and Cony Guzman MA [facility]    Attestation:   Can Boles MD - Medical Director - Provides oversight and supervision of care.      Are Treatment Plan goals/objectives effective? Yes  *If no, list changes to treatment plan:    Are the current goals meeting client's needs? Yes  *If no, list the changes to treatment plan.    Client Input / Response: Agreeable       *Client agrees with any changes to the treatment plan: N/A  *Client received copy of changes: N/A  *Client is aware of right to access a treatment plan review: Yes

## 2023-06-05 ENCOUNTER — HOSPITAL ENCOUNTER (OUTPATIENT)
Dept: BEHAVIORAL HEALTH | Facility: CLINIC | Age: 68
Discharge: HOME | End: 2023-06-05
Attending: FAMILY MEDICINE
Payer: COMMERCIAL

## 2023-06-05 DIAGNOSIS — F10.20 ALCOHOL USE DISORDER, SEVERE, DEPENDENCE (H): Primary | ICD-10-CM

## 2023-06-05 DIAGNOSIS — F43.10 PTSD (POST-TRAUMATIC STRESS DISORDER): ICD-10-CM

## 2023-06-05 PROCEDURE — H2035 A/D TX PROGRAM, PER HOUR: HCPCS | Mod: HQ

## 2023-06-05 NOTE — GROUP NOTE
"Group Therapy Documentation    PATIENT'S NAME: Patsy Santamaria  MRN:   [id number]  :   1955  ACCT. NUMBER: [id number]  DATE OF SERVICE: 23  START TIME:  9:00 AM  END TIME: 11:40 AM  FACILITATOR(S): Cony Guzman, MOHINIC, TAMERA  TOPIC: BEH Group Therapy  Number of patients attending the group:  3  Group Length:  3 Hours    Group Therapy Type: Addiction, Psychotherapeutic, and Skills/Education    Summary of Group / Topics Discussed:    Co-occurring Illness, Coping Skills/Lifestyle Managemet, Choices in Recovery, Distress Tolerance, Meditation/Breathing Exercises, Relationships, Resentments, Sleep Hygiene, Symptom Management, Thinking Errors/Negative Self-Talk, and Trauma Informed Care, Peer recovery supports    Group Attendance:  Attended group session    Patient's response to the group topic/interactions:  cooperative with task, discussed personal experience with topic, expressed readiness to alter behaviors, expressed understanding of topic, listened actively and requested more information about topic    Patient appeared to be Actively participating, Attentive and Engaged.        Client specific details:  Patsy reported drinking \"one pint of [place]\" on Saturday, which she identified as being irritated with her friend. She was willing to consider that she may have been feeling hurt and agreed to talk to him today to share her feelings. He is reportedly sober and supported her stopping (not ordering more). She noted that attending [employer] on her phone on  helped her to find compassion for herself and to move on and keep on with her recovery. She, with group input, identified three things she can do before \"picking up\":   1. Call friend in [place] (from inpatient tx).  2. Search for and attend speaker meeting ([facility]).  Additional recovery-related activities:   3. Meditation on Anger (mindful peace website).  4. See PCP about pain in right foot.    She reported feeling better about telling the truth and " optimistic about recovery. She also reported on trying deep breathing and meditation on Sunday, noting that she believes it helped.    Attestation:   Can Boles MD - Medical Director - Provides oversight and supervision of care.

## 2023-06-06 ENCOUNTER — OFFICE VISIT (OUTPATIENT)
Dept: FAMILY MEDICINE | Facility: CLINIC | Age: 68
End: 2023-06-06
Payer: COMMERCIAL

## 2023-06-06 VITALS
DIASTOLIC BLOOD PRESSURE: 81 MMHG | TEMPERATURE: 97.3 F | OXYGEN SATURATION: 96 % | WEIGHT: 265 LBS | RESPIRATION RATE: 18 BRPM | BODY MASS INDEX: 46.95 KG/M2 | SYSTOLIC BLOOD PRESSURE: 118 MMHG | HEIGHT: 63 IN | HEART RATE: 73 BPM

## 2023-06-06 DIAGNOSIS — D69.6 THROMBOCYTOPENIA (H): ICD-10-CM

## 2023-06-06 DIAGNOSIS — M20.11 HALLUX VALGUS, ACQUIRED, RIGHT: ICD-10-CM

## 2023-06-06 DIAGNOSIS — R60.0 PERIPHERAL EDEMA: ICD-10-CM

## 2023-06-06 DIAGNOSIS — M79.671 RIGHT FOOT PAIN: Primary | ICD-10-CM

## 2023-06-06 PROBLEM — F10.929 ACUTE ALCOHOLIC INTOXICATION (H): Status: RESOLVED | Noted: 2021-09-10 | Resolved: 2023-06-06

## 2023-06-06 PROBLEM — F10.10 ALCOHOL ABUSE: Status: RESOLVED | Noted: 2021-02-17 | Resolved: 2023-06-06

## 2023-06-06 LAB
ERYTHROCYTE [DISTWIDTH] IN BLOOD BY AUTOMATED COUNT: 14.1 % (ref 10–15)
HCT VFR BLD AUTO: 45.5 % (ref 35–47)
HGB BLD-MCNC: 15 G/DL (ref 11.7–15.7)
MCH RBC QN AUTO: 29.6 PG (ref 26.5–33)
MCHC RBC AUTO-ENTMCNC: 33 G/DL (ref 31.5–36.5)
MCV RBC AUTO: 90 FL (ref 78–100)
PLATELET # BLD AUTO: 208 10E3/UL (ref 150–450)
RBC # BLD AUTO: 5.06 10E6/UL (ref 3.8–5.2)
WBC # BLD AUTO: 7.8 10E3/UL (ref 4–11)

## 2023-06-06 PROCEDURE — 85027 COMPLETE CBC AUTOMATED: CPT | Performed by: PHYSICIAN ASSISTANT

## 2023-06-06 PROCEDURE — 80048 BASIC METABOLIC PNL TOTAL CA: CPT | Performed by: PHYSICIAN ASSISTANT

## 2023-06-06 PROCEDURE — 99214 OFFICE O/P EST MOD 30 MIN: CPT | Performed by: PHYSICIAN ASSISTANT

## 2023-06-06 PROCEDURE — 36415 COLL VENOUS BLD VENIPUNCTURE: CPT | Performed by: PHYSICIAN ASSISTANT

## 2023-06-06 NOTE — PROGRESS NOTES
Subjective:    Patsy Santamaria is a 68 year old female who presents with chief complaint of lower extremity edema and foot pain.  She has a history of lower extremity edema off and on.  Started to get worse 2 weeks ago.  She thinks she stepped in a funny way and possibly injured her right lateral foot.  She has pain primarily in the right lateral foot, nowhere else.  She notes that when she is nonweightbearing pain and swelling are manageable.  However when she is walking, can be quite painful.  Pain is primarily on the right lateral side of the foot.  She does feel it may be getting worse.  Swelling does go down overnight.  She thinks she might of stepped funny and that could have started to the right foot pain.  She notes she has to get out and go to meetings.  When she is moving around and walking to meetings that pain is especially bad.    History of alcohol abuse with recent hospitalization for alcohol abuse 3/20/2023.    She was seen on 5/29/2023 for extremity edema and right foot pain.  Complete note not available to me today.  I reviewed the actual images of the x-ray with patient today.  I also reviewed the report, which did not show any acute findings, but hallux valgus.    She does have compression stockings, but has not been wearing them very much.    I reviewed hospital discharge summary from 3/20/2023.  I reviewed office visit from 5/1/2023.  I reviewed office visit from 5/29/2023.  Note not fully complete.  I reviewed CBC and BMP from 3/23/2023.    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)     Diuretic-induced hypokalemia     Dyspnea on exertion     Elevated LFTs     Ganglion of tendon sheath     GI bleed     Hereditary and idiopathic peripheral neuropathy     History of major depression     Homeless     Major depression, recurrent (H)     Low backache     Airway obstruction due to foreign body, initial encounter     Hypomagnesemia     Mild episode of recurrent major depressive disorder (H)     Morbid obesity (H)     Smoker     Peripheral edema     Pneumonia of right lower lobe due to infectious organism     Primary localized osteoarthrosis, hand     Primary osteoarthritis of right knee     PTSD (post-traumatic stress disorder)     Seasonal allergies     Skin lesion     Snoring     Trochanteric bursitis of left hip     Vitamin B12 deficiency (non anemic)     Vitamin D deficiency     Other seizures (H)     Anxiety     Closed fracture of head of left radius     Recurrent falls     Thrombocytopenia (H)     Dermatitis     Depressive disorder     Leukopenia     Alcoholic cirrhosis of liver without ascites (H)     Sigmoid Diverticulitis     Mixed simple and mucopurulent chronic bronchitis (H)     Suicidal ideation     Infection due to 2019 novel coronavirus     Other specified disorders of carbohydrate metabolism (H)       Current Outpatient Medications:      albuterol (PROAIR HFA/PROVENTIL HFA/VENTOLIN HFA) 108 (90 Base) MCG/ACT inhaler, Inhale 2 puffs into the lungs every 4 hours as needed for shortness of breath / dyspnea or wheezing, Disp: 18 g, Rfl: 3     budesonide-formoterol (SYMBICORT) 160-4.5 MCG/ACT Inhaler, Inhale 2 puffs into the lungs 2 times daily, Disp: 10.2 g, Rfl: 11     bumetanide (BUMEX) 1 MG tablet, TAKE 1 TABLET(1 MG) BY MOUTH DAILY, Disp: 90 tablet, Rfl: 0     cyclobenzaprine (FLEXERIL) 10 MG tablet, Take 1 tablet (10 mg) by mouth At Bedtime, Disp: 10 tablet, Rfl: 0     diclofenac (VOLTAREN) 1 % topical gel, Apply 2 grams to the left lower lumbar area up to  "4 times daily not to exceed 8 grams per day., Disp: 100 g, Rfl: 1     DULoxetine (CYMBALTA) 20 MG capsule, Take 2 capsules (40 mg) by mouth daily, Disp: 30 capsule, Rfl: 1     hydrOXYzine (ATARAX) 50 MG tablet, Take 0.5-1 tablets (25-50 mg) by mouth 3 times daily as needed for anxiety, Disp: 90 tablet, Rfl: 2     ipratropium - albuterol 0.5 mg/2.5 mg/3 mL (DUONEB) 0.5-2.5 (3) MG/3ML neb solution, Take 1 vial (3 mLs) by nebulization every 4 hours as needed for shortness of breath / dyspnea or wheezing, Disp: 15 mL, Rfl: 1     mirtazapine (REMERON) 15 MG tablet, Take 1 tablet (15 mg) by mouth At Bedtime, Disp: 30 tablet, Rfl: 2     umeclidinium (INCRUSE ELLIPTA) 62.5 MCG/INH inhaler, Inhale 1 puff into the lungs daily (Patient taking differently: Inhale 1 puff into the lungs every morning), Disp: 30 each, Rfl: 1     nicotine (NICORETTE) 4 MG lozenge, Place 1 lozenge (4 mg) inside cheek every hour as needed for smoking cessation (Patient not taking: Reported on 6/6/2023), Disp: 108 lozenge, Rfl: 1     omeprazole (PRILOSEC) 20 MG DR capsule, Take 20 mg by mouth daily as needed (Patient not taking: Reported on 6/6/2023), Disp: , Rfl:       Objective:   Allergies:  Bupropion, Codeine, Nickel, Oxycodone, Percocet [oxycodone-acetaminophen], Topiramate, Diclofenac, Furosemide, Hydrochlorothiazide, Sulfa antibiotics, and Sulfasalazine    Vitals:  Vitals:    06/06/23 1419   BP: 118/81   BP Location: Left arm   Patient Position: Sitting   Cuff Size: Adult Large   Pulse: 73   Resp: 18   Temp: 97.3  F (36.3  C)   TempSrc: Temporal   SpO2: 96%   Weight: 120.2 kg (265 lb)   Height: 1.6 m (5' 3\")       Body mass index is 46.94 kg/m .    Vital signs reviewed.  General: Patient is alert and oriented x 3, in no apparent distress  Cardiac: regular rate and rhythm, no murmurs  Pulmonary: lungs clear to auscultation bilaterally, no crackles, rales, rhonchi, or wheezing noted  Skin: Patient points out a few small bruises, none of these " larger than 3 cm in diameter, mostly on arms and legs, 1 on the posterior lateral torso  Musculoskeletal: Patient has 1+ mildly pitting edema present in bilateral lower extremities and feet.  Right side slightly worse than left.  Patient continues to have pain on the right lateral edge of the foot.  Pain to palpation there.  A little bit hard for her to move her toes.    Results for orders placed or performed in visit on 06/06/23   CBC with platelets     Status: Normal   Result Value Ref Range    WBC Count 7.8 4.0 - 11.0 10e3/uL    RBC Count 5.06 3.80 - 5.20 10e6/uL    Hemoglobin 15.0 11.7 - 15.7 g/dL    Hematocrit 45.5 35.0 - 47.0 %    MCV 90 78 - 100 fL    MCH 29.6 26.5 - 33.0 pg    MCHC 33.0 31.5 - 36.5 g/dL    RDW 14.1 10.0 - 15.0 %    Platelet Count 208 150 - 450 10e3/uL     Other labs pending.    I reviewed foot x-ray report and actual foot film from 5/29/2023.      Assessment and Plan:   1. Right foot pain  Differential includes pain due to edema, nonspecific foot pain, hallux valgus, versus other.  Pain present for about 2 weeks.  X-ray and evaluation at urgent care were grossly normal.  She does have compression stockings at home.  She will start wearing these.  Reviewed elevating her feet.  She says pain is not really present unless she is weightbearing or someone touches it.  No concerns for fracture or cellulitis or other abnormal unusual findings today.  - CBC with platelets  - Basic metabolic panel  - Orthopedic  Referral; Future    2. Thrombocytopenia (H)  History of thrombocytopenia.  She was hospitalized in March due to alcohol dependence.  Thrombocytopenia certainly could be related to alcohol dependence and alcoholic liver cirrhosis.  Could also be related to acute illness.  I will recheck that today and follow-up with results.  - CBC with platelets  - Basic metabolic panel    3. Peripheral edema  Chronic, current flareup.  Patient has been taking Bumex 1 mg daily as recommended.  Plan on  increasing Bumex to 2 pills daily for the next 3 days only.  BMP ordered today and I will follow-up with results.  She will start wearing compression stockings at home.  Also discussed trying to elevate her feet when she is at home.  Follow-up in 1 week if no better, sooner if worsening or other concerns.  - CBC with platelets  - Basic metabolic panel    4. Hallux valgus, acquired, right  Chronic, stable.  I reviewed with patient is possible this could be causing or contributing to her foot pain, even though it is on the opposite side of the foot.  I think regardless of today's concerns, seeing podiatry to address her hallux valgus would be a good idea.  Referral placed today.  - Orthopedic  Referral; Future     30 minutes spent on the date of the encounter doing chart review, history and exam, and documentation.      This dictation uses voice recognition software, which may contain typographical errors.

## 2023-06-06 NOTE — PATIENT INSTRUCTIONS
INCREASE THE DOSE OF YOUR BUMEX PILL.  TAKE 2 PILLS IN THE MORNING FOR THE NEXT 3 DAYS ([id number]).  THEN GO BACK TO 1 PILL A DAY.    ALSO TRY TO WEAR YOUR COMPRESSION STOCKINGS FOR SEVERAL HOURS A DAY, AND ELEVATE YOUR LEGS WHEN YOU ARE RESTING.

## 2023-06-07 ENCOUNTER — HOSPITAL ENCOUNTER (OUTPATIENT)
Dept: BEHAVIORAL HEALTH | Facility: CLINIC | Age: 68
Discharge: HOME | End: 2023-06-07
Attending: FAMILY MEDICINE
Payer: COMMERCIAL

## 2023-06-07 LAB
ANION GAP SERPL CALCULATED.3IONS-SCNC: 14 MMOL/L (ref 7–15)
BUN SERPL-MCNC: 13.3 MG/DL (ref 8–23)
CALCIUM SERPL-MCNC: 9.6 MG/DL (ref 8.8–10.2)
CHLORIDE SERPL-SCNC: 100 MMOL/L (ref 98–107)
CREAT SERPL-MCNC: 0.66 MG/DL (ref 0.51–0.95)
DEPRECATED HCO3 PLAS-SCNC: 27 MMOL/L (ref 22–29)
GFR SERPL CREATININE-BSD FRML MDRD: >90 ML/MIN/1.73M2
GLUCOSE SERPL-MCNC: 88 MG/DL (ref 70–99)
POTASSIUM SERPL-SCNC: 4.1 MMOL/L (ref 3.4–5.3)
SODIUM SERPL-SCNC: 141 MMOL/L (ref 136–145)

## 2023-06-07 PROCEDURE — H2035 A/D TX PROGRAM, PER HOUR: HCPCS

## 2023-06-07 NOTE — PROGRESS NOTES
Attestation: Melissa Covington, DO - Provides oversight and supervision of care.    Individual Session Summary (MICD)   DATE:  06/07/2023  START TIME: 11:00 AM   END TIME: 12:00 PM   Duration: 1 Hour    Patsy Santamaria is a 68 year old female who is being evaluated via a billable video visit.      ORAL Diagnosis: F10.20 AUD, severe    Mental Health Diagnosis: F43.10 PTSD       Data:  This writer met with Patsy for an individual session addressing both mental health and substance use.  The session focused on client's thoughts and feelings regarding the treatment program, group, maintaining sobriety, and mental health (including emotional, psychological, and social well-being).      This writer and the client also reviewed their treatment plan goal for DIM(s) 1-6 of her individual treatment plan.   Client talked about feeling irritated and experiencing pain at 8-10/10 due to her foot pain. We reviewed the after visit summary of her appointment yesterday and outlined follow up care steps.  Client states overall things are hard for her.     Client denies any suicidal thoughts, plans, or intent today. Client also denies any thoughts or behaviors of self-harm today.    Intervention: Individual session with Patsy. Writer utilized motivational interviewing to assess for stage of change as well as health and wellness. Writer provided verbal interventions such as including validation, support, and psycho-education re: PAWS. Provider used various therapeutic techniques such as CBT techniques, Solutions Focused Brief Therapy (e.g., what has helped in the past, what gives you enjoyment-music), Motivational Interviewing, DBT techniques (distress tolerance skills, downloaded Insight Timer and Big Book on phone), person-centered strengths-based approach (wrote 3 affirmations for post it notes in house), and trauma informed care (TIC).  Writer provided notebook for taking notes.     Assessment: Patsy engaged in session and was receptive  to the interventions and materials provided. She expressed her feelings and reported she felt better after talking. She seemed to understand that early recovery and withdrawal PAWS makes emotional regulation and seeing the positive as challenging. In addition, she reported pain in her foot, with follow up planned. She reported plans to be busy without overtaxing her foot; although, she noted plans to shop for groceries with her sister today or tomorrow. Patsy seems open and willing to learn new skills that will help her achieve abstinence/recovery.         4/27/2023     1:00 PM 5/11/2023     1:00 PM 5/25/2023    10:00 AM   PHQ-9 SCORE   PHQ-9 Total Score 5 2 7         4/27/2023     1:00 PM 5/11/2023     1:00 PM 5/25/2023    10:00 AM   ALISIA-7 SCORE   Total Score 3 1 8       Plan. Patsy will post notes with affirmations in her apartment. She will continue to attend Phase 1 IOP and share in group and with Dixie her experiences with coping skills and practices (meditation-Insight Timer, Big Book Talha).    Cony Guzman, LPCC, LADC

## 2023-06-08 ENCOUNTER — DOCUMENTATION ONLY (OUTPATIENT)
Dept: BEHAVIORAL HEALTH | Facility: CLINIC | Age: 68
End: 2023-06-08
Payer: COMMERCIAL

## 2023-06-09 ENCOUNTER — TELEPHONE (OUTPATIENT)
Dept: FAMILY MEDICINE | Facility: CLINIC | Age: 68
End: 2023-06-09
Payer: COMMERCIAL

## 2023-06-09 ENCOUNTER — DOCUMENTATION ONLY (OUTPATIENT)
Dept: BEHAVIORAL HEALTH | Facility: CLINIC | Age: 68
End: 2023-06-09
Payer: COMMERCIAL

## 2023-06-09 NOTE — PROGRESS NOTES
ABSENT NOTE:    Patsy Santamaria was absent from group 6/8/2023 . This absence was not excused. This writer attempted to contact patient via telephone and left a message. No response as of the time of this note. Patient is expected to be in group on 6/12/23.    PRINCE Rivers  6/9/2023 , 8:28 AM

## 2023-06-09 NOTE — PROGRESS NOTES
[facility] Weekly Treatment Plan Review      ATTENDANCE for the following date span:  6/3/23 to 23    Date     Group Therapy 3 hours NA hours 1.0 indiv  Absent No group    Individual Therapy        Family Therapy        Other (Specify) Phase 1 &2  Phase 3  Phase 1  Phase 1 & 2        Patient had one unexcused absence during this time period.   Total hours: . Patient is in phase 1.    Weekly Treatment Plan Review     Treatment Plan initiated on 2023.    Dimension1: Acute Intoxication/Withdrawal Potential -   Previous Dimension Ratin  Current Dimension Ratin  Date of Last Use: Unknown in   Any reports of withdrawal symptoms: No  Narrative: Patient reports last date of use as one time in April, but she can't remember the date. Patient reports no withdrawal symptoms. Patient was medically detoxed prior to that one day return to use. No withdrawal symptoms observed at intake.    23: No reported use this week. No withdrawal symptoms reported or observed.    Dimension 2: Biomedical Conditions & Complications -   Previous Dimension Ratin  Current Dimension Ratin  Medical Concerns:  Significant pain and swelling in foot and leg.  Current Medications & Medication Changes:  Current Outpatient Medications   Medication     albuterol (PROAIR HFA/PROVENTIL HFA/VENTOLIN HFA) 108 (90 Base) MCG/ACT inhaler     budesonide-formoterol (SYMBICORT) 160-4.5 MCG/ACT Inhaler     bumetanide (BUMEX) 1 MG tablet     cyclobenzaprine (FLEXERIL) 10 MG tablet     diclofenac (VOLTAREN) 1 % topical gel     DULoxetine (CYMBALTA) 20 MG capsule     hydrOXYzine (ATARAX) 50 MG tablet     ipratropium - albuterol 0.5 mg/2.5 mg/3 mL (DUONEB) 0.5-2.5 (3) MG/3ML neb solution     mirtazapine (REMERON) 15 MG tablet     nicotine (NICORETTE) 4 MG lozenge     omeprazole (PRILOSEC) 20 MG DR capsule     umeclidinium (INCRUSE ELLIPTA) 62.5 MCG/INH inhaler     No  current facility-administered medications for this visit.     Medication Prescriber:  Yanique Cali MD.   Taking meds as prescribed? Yes  Medication side effects or concerns:  None reported.  Outside medical appointments this week (list provider and reason for visit):  Saw doctor about her foot. See EHR.  Narrative: Patient reports the following medical conditions: COPD, overweight, uses CPAP, and general fatigue . Patient has primary care with Yanique Cali MD.     23: Patient continues to have pain and swelling in her right foot. She followed up with her PCP this week and notes an 8-10 out of 10 pain level.     Dimension 3: Emotional/Behavioral Conditions & Complications  Previous Dimension Ratin  Current Dimension Ratin    PHQ9       2023     1:00 PM 2023     1:00 PM 2023    10:00 AM   PHQ-9 SCORE   PHQ-9 Total Score 5 2 7     GAD7       2023     1:00 PM 2023     1:00 PM 2023    10:00 AM   ALISIA-7 SCORE   Total Score 3 1 8     Mental health diagnosis PTSD  Date of last SIB/SI/HI: N/A  Current MH Assignments:  None  Narrative:  Patient reports a mental health diagnosis of PTSD, Anxiety, and Depression. Patient endorses current symptoms of hypervigilance and anxiety when out. She also notes unexplained fatigue. Patient would like to meet with mental health therapist to process her trauma. Patient's denies current SI or thoughts of self-harm.     2023: Established care with program therapist, who facilitates one group weekly and will continue to see Patsy individually during Phase 1.    Dimension 4: Treatment Acceptance / Resistance  Previous Dimension Ratin  Current Dimension Ratin  ORAL Diagnosis:  Alcohol Use Disorder   303.90 (F10.20) Severe In early remission,   Phase: 1  Commitment to tx process/Stage of change: Contemplation  ORAL assignments:  Attend groups and remain abstinent.   Narrative: Patient appears internally  "motivated for treatment at this time and reports being \"sick and tired of being sick and tired\" and also wanting to take care of her health as her main reasons for coming to treatment.  Patient appears to be in the contemplation stage of change at this time.    6/9/2023: Patsy attended 2/3 groups this week. She did not attend group without contacting staff on 6/8/2023. Primary counselor attempted outreach to support continuing treatment adherence.    Dimension 5: Relapse / Continued Problem Potential    Previous Dimension Rating:  3  Current Dimension Rating:  3  Relapses this week: None  Urges to use:  None  UA results:   Recent Results (from the past 168 hour(s))   CBC with platelets    Collection Time: 06/06/23  3:39 PM   Result Value Ref Range    WBC Count 7.8 4.0 - 11.0 10e3/uL    RBC Count 5.06 3.80 - 5.20 10e6/uL    Hemoglobin 15.0 11.7 - 15.7 g/dL    Hematocrit 45.5 35.0 - 47.0 %    MCV 90 78 - 100 fL    MCH 29.6 26.5 - 33.0 pg    MCHC 33.0 31.5 - 36.5 g/dL    RDW 14.1 10.0 - 15.0 %    Platelet Count 208 150 - 450 10e3/uL   Basic metabolic panel    Collection Time: 06/06/23  3:39 PM   Result Value Ref Range    Sodium 141 136 - 145 mmol/L    Potassium 4.1 3.4 - 5.3 mmol/L    Chloride 100 98 - 107 mmol/L    Carbon Dioxide (CO2) 27 22 - 29 mmol/L    Anion Gap 14 7 - 15 mmol/L    Urea Nitrogen 13.3 8.0 - 23.0 mg/dL    Creatinine 0.66 0.51 - 0.95 mg/dL    Calcium 9.6 8.8 - 10.2 mg/dL    Glucose 88 70 - 99 mg/dL    GFR Estimate >90 >60 mL/min/1.73m2     Narrative: Patient reports that he has been to multiple treatments and has had limited periods of sobriety. Patient verbalizes that their primary triggers are related to her son bringing up the past. Patient is not engaged with the recovery community. She said two of her three sisters are supportive of her recovery. Patient has minimal insight into the relapse process or coping skills for emotional regulation. Patient's mental health symptoms increase the risk of " "relapse at this time.    6/9/2023: Risk for return to use remains high. Patsy did learn to utilize enjoyable distractions (music on YouTube, and downloaded a meditation talha - Insight Timer, and the Big Book Talha on her phone during an in-person session this week.     Dimension 6: Recovery Environment   Previous Dimension Rating:  3  Current Dimension Rating:  3  Family Involvement : None   Summarize attendance at family groups and family sessions: N/A  Family supportive of treatment?  Yes  Community support group attendance: None  Recreational activities: Exercise   Narrative: Patient has stable housing at this time and lives alone. Patient reports she is \"retired and on disability\". She does note some concerns about her financial stability. Patient is not involved in the criminal justice system. She lacks a sober support network. Patient does not have a regular structure in place for her day to day activities, though she does enjoy working out. Patient has 4 adult children, noting that her son has been an ongoing trigger.     6/9/2023: Patsy's has a close friend who lives in her building and is sober. She reaches out to a friend from a previous treatment to support abstinence. She looked up an AA meeting she likes to attend (Sundays at 7pm [facility].    Justification for Continued Treatment at this Level of Care:  Patient is in Phase 1 and has had minimal periods of sobriety. She lacks a sober support network. She is medication compliant and verbalizes a wllingness to engage in mental health treatment. She has been able to identify triggers, but lacks coping skills. Her daily life schedule/routine has not been supportive of recovery. She expresses willingness to change the lack of structure and meaningful activity.     Discharge Planning:  Target Discharge Date/Timeframe:  8/25/2023 to complete Phase 1/2; 11/17/2023 to complete Phase 3)  Med Mgmt Provider/Appt:  ELISA (Dr. Covington in addiction " medicine)  Ind therapy Provider/Appt:  Weekly (currently Wednesdays at 11:00 am)    Other referrals:  None  Has vulnerable adult status change? No  Service Coordination:  None    Supervision:  Patient is staffed with treatment providers, this includes: [facility] and [facility]    Attestation:   Can Boles MD - Medical Director - Provides oversight and supervision of care.    Are Treatment Plan goals/objectives effective? Yes  *If no, list changes to treatment plan:    Are the current goals meeting client's needs? Yes  *If no, list the changes to treatment plan.    Client Input / Response: Agreeable       *Client agrees with any changes to the treatment plan: N/A  *Client received copy of changes: N/A  *Client is aware of right to access a treatment plan review: Yes (MyChart or request copy)

## 2023-06-09 NOTE — TELEPHONE ENCOUNTER
----- Message from Irish Pichardo PA-C sent at 6/9/2023 12:05 AM CDT -----  Her kidney tests are normal.  Our plan was for her to increase her Bumex to 2 pills daily for 3 days, then go back to 1 pill a day on Saturday.  Can you contact her and let her know test results, review above plan, and see how her lower extremity edema is doing?

## 2023-06-09 NOTE — TELEPHONE ENCOUNTER
Patient returns call. Writer relay RN/provider's message below. Caller verbalizes understanding and has no further questions. Pt stated that lower extremity edema is still swollen.

## 2023-06-12 ENCOUNTER — PATIENT OUTREACH (OUTPATIENT)
Dept: CARE COORDINATION | Facility: CLINIC | Age: 68
End: 2023-06-12
Payer: COMMERCIAL

## 2023-06-12 ENCOUNTER — TELEPHONE (OUTPATIENT)
Dept: BEHAVIORAL HEALTH | Facility: CLINIC | Age: 68
End: 2023-06-12
Payer: COMMERCIAL

## 2023-06-12 ENCOUNTER — HOSPITAL ENCOUNTER (OUTPATIENT)
Dept: BEHAVIORAL HEALTH | Facility: CLINIC | Age: 68
Discharge: HOME | End: 2023-06-12
Attending: FAMILY MEDICINE
Payer: COMMERCIAL

## 2023-06-12 PROBLEM — M20.11 HALLUX VALGUS, ACQUIRED, RIGHT: Status: ACTIVE | Noted: 2023-06-12

## 2023-06-12 PROBLEM — M79.671 RIGHT FOOT PAIN: Status: ACTIVE | Noted: 2023-06-12

## 2023-06-12 PROCEDURE — H2035 A/D TX PROGRAM, PER HOUR: HCPCS

## 2023-06-12 NOTE — PROGRESS NOTES
Clinic Care Coordination Contact    Follow Up Progress Note      Assessment: SW CC spoke to pt for follow up on financial assisatance resources. Pt reports she has not called yet as she has other things going on right now and has not had time. Pt said she understood and asked if there were any further resources or questions needed. Pt reports she did not have any further needs at this time.     SW CC reviewed chart and saw duplication of CC services. SW CC closed Behavioral health CC as primary had scheduled outreaches.     Care Gaps:    Health Maintenance Due   Topic Date Due     COPD ACTION PLAN  Never done     DEPRESSION ACTION PLAN  Never done     COLORECTAL CANCER SCREENING  Never done     Pneumococcal Vaccine: 65+ Years (2 - PCV) 01/28/2021     LUNG CANCER SCREENING  01/28/2021     MEDICARE ANNUAL WELLNESS VISIT  01/31/2022     NICOTINE/TOBACCO CESSATION COUNSELING Q 1 YR  01/27/2023     COVID-19 Vaccine (6 - Pfizer series) 03/02/2023         Care Plans    Intervention/Education provided during outreach:    Plan:   Care Coordinator will do no further follow up at this time as primary CC has scheduled outreaches.     KATHLEEN Espana? Social Work Care Coordinator   [facility]  [email or web address]? [email or web address]    Phone: [phone]  she/her

## 2023-06-12 NOTE — PROGRESS NOTES
Attestation: Melissa Covington, DO Deleon Provides oversight and supervision of care.    Individual Session Summary (MICD)   DATE:  06/12/2023  START TIME: 9:00 AM   END TIME: 10:00 AM   Duration: 1 Hour    Patsy Santamaria is a 68 year old female who is being evaluated via a billable video visit.      ORAL Diagnosis: F10.20 AUD, severe    Mental Health Diagnosis: F43.10 PTSD    Data:  This writer met with Patsy for an individual session addressing both mental health and substance use.  The session focused on client's thoughts and feelings regarding the treatment program, group, achieving/maintaining sobriety, stabilizing mental health symptoms, and improving quality of life (including physical, emotional, psychological, and social well-being).      This writer and the client also reviewed their treatment plan goal for DIM(s) 1-6 of her individual treatment plan.   Client talked about feeling irritated and experiencing pain at 8-10/10 due to her foot pain. We reviewed the after visit summary of her appointment yesterday and outlined follow up care steps.  Client states overall things are getting a little better for her.     Client denies any suicidal thoughts, plans, or intent today. Client also denies any thoughts or behaviors of self-harm today.    Intervention: Individual session with Patsy. Writer utilized motivational interviewing to assess for stage of change as well as health and wellness. Writer provided verbal interventions including validation, support, and psycho-education re: Coping skills, including Distress Tolerance. Provider used various therapeutic techniques such as CBT techniques, Solutions Focused Brief Therapy (e.g., what helped since Sunday), Motivational Interviewing, DBT techniques (distress tolerance skills, review of utilization of downloaded Big Book on phone), person-centered strengths-based approach (written words of encouragement and affirmation in writing tablet/composition notebook), and trauma  "informed care (TIC).  Introduced Payoff Matrix and assigned for review on Wednesday with primary counselor, PRINCE Rivers.     Assessment: Patsy engaged in session and was receptive to the interventions and materials provided. She shared that she drank on Wednesday after feeling frustrated about \"not getting any help from them\" after attending an appointment to address foot pain.  Patsy reported a follow up appointment is scheduled with an orthopedic specialist. She noted the swelling has decreased but her right foot still hurts.  She reported utilizing resources, skills and practices to stop drinking/arrest use after Wednesday. She utilized sober supports (2 sober friends), the Big Book talha by reading stories, including last night and reading the prayers), gardening, cooking and baking, being more aware of what she wants (\"sobriety\"), and the consequences of use (\"feel shitty, beat myself up and feel even worse\"). She reported plans to see her newest (4th) grandbaby today and is looking forward to that sober family time.  Patsy verbalized change talk, stating, \"I don't want to get into a pattern of off and on drinking. This group helps me to be honest and accountable\".     Plan. Patsy will complete a payoff matrix and share it with with her primary counselor, PRINCE Rivers. Patsy will continue to attend Phase 1 IOP and share, in group, her experiences with coping skills and practices (meditation-Insight Timer, Big Book Talha, coping skills), and how she is feeling in early recovery.    Cony Guzman, [facility], PRINCE           "

## 2023-06-12 NOTE — TELEPHONE ENCOUNTER
----- Message from PRINCE Rivers sent at 6/12/2023  9:33 AM CDT -----  Regarding: Please Cancel Phase 1 [facility] MICD GROUP THERAPY for 6/14  Please cancel [facility] MICD GROUP THERAPY PHASE 2 for Wednesday, 6/14/23 due to low census.

## 2023-06-14 ENCOUNTER — OFFICE VISIT (OUTPATIENT)
Dept: PODIATRY | Facility: CLINIC | Age: 68
End: 2023-06-14
Attending: PHYSICIAN ASSISTANT
Payer: COMMERCIAL

## 2023-06-14 ENCOUNTER — HOSPITAL ENCOUNTER (OUTPATIENT)
Dept: BEHAVIORAL HEALTH | Facility: CLINIC | Age: 68
Discharge: HOME | End: 2023-06-14
Admitting: FAMILY MEDICINE
Payer: COMMERCIAL

## 2023-06-14 VITALS
OXYGEN SATURATION: 97 % | BODY MASS INDEX: 46.25 KG/M2 | HEIGHT: 63 IN | HEART RATE: 73 BPM | SYSTOLIC BLOOD PRESSURE: 124 MMHG | DIASTOLIC BLOOD PRESSURE: 85 MMHG | WEIGHT: 261 LBS

## 2023-06-14 DIAGNOSIS — M10.9 ACUTE GOUT OF RIGHT FOOT, UNSPECIFIED CAUSE: Primary | ICD-10-CM

## 2023-06-14 PROCEDURE — H2035 A/D TX PROGRAM, PER HOUR: HCPCS

## 2023-06-14 PROCEDURE — 99213 OFFICE O/P EST LOW 20 MIN: CPT | Performed by: PODIATRIST

## 2023-06-14 RX ORDER — COLCHICINE 0.6 MG/1
0.6 TABLET ORAL 2 TIMES DAILY
Qty: 20 TABLET | Refills: 0 | Status: SHIPPED | OUTPATIENT
Start: 2023-06-14 | End: 2023-09-13

## 2023-06-14 NOTE — PATIENT INSTRUCTIONS
What is a post surgical shoe?    A post surgical shoe is a medical shoe used to protect the foot and toes after an injury or surgery. It is also called a postop shoe, rigid sole shoe, or hard sole shoe. It looks like on oversized shoe with a flat, hard sole, fabric or mesh sides, and adjustable straps. The shoe is open in the front, where your toes go. The shoe helps change how your foot carries weight. This can help decrease pain and increase movement after an injury or surgery so you can heal.                How do I put on the post surgical shoe?  Sit down and place your foot comfortably in the shoe.  Close the fabric or mesh sides over the top of your foot.  Tighten the straps of the shoe so they are snug but not too tight. The shoe should limit movement but not cut off your blood flow.  Stand up and take a few steps to practice walking.    What else do I need to know?  Check your foot and toes often. Check your foot and toes for redness and swelling. If your toes are red, swollen, numb, or tingly, loosen your straps. Over time, the swelling from the injury or surgery will decrease. When this happens, you may need to tighten the straps.  Be careful when you walk on wet surfaces. The shoe may be slippery.  Ask about removing the shoe to bathe. Your provider may want you to leave the shoe on when you bathe. Cover it with a plastic bag and tape the bag closed around your leg.  When should I call my doctor?  You have pain or discomfort that does not go away   Driving a Car  You cannot drive while you are wearing a cast or walker boot on the foot that you use to drive. Your surgeon or physiotherapist will tell you when the cast or boot is no longer needed.     Make sure to wear the shoe for the prescribed time or until the doctor tells you to discontinue it s use.  GOUT  Gout is a disorder that results from the build-up of uric acid in the tissues or a joint. It most often affects the joint of the big  toe.  CAUSES  Gout attacks are caused by deposits of crystallized uric acid in the joint. Uric acid is present in the blood and eliminated in the urine, but in people who have gout, uric acid accumulates and crystallizes in the joints. Uric acid is the result of the breakdown of purines, chemicals that are found naturally in our bodies and in food. Some people develop gout because their kidneys have difficulty eliminating normal amounts of uric acid, while others produce too much uric acid.  Gout occurs most commonly in the big toe because uric acid is sensitive to temperature changes. At cooler temperatures, uric acid turns into crystals. Since the toe is the part of the body that is farthest from the heart, it s also the coolest part of the body - and, thus, the most likely target of gout. However, gout can affect any joint in the body.  The tendency to accumulate uric acid is often inherited. Other factors that put a person at risk for developing gout include: high blood pressure, diabetes, obesity, surgery, chemotherapy, stress, and certain medications and vitamins. For example, the body s ability to remove uric acid can be negatively affected by taking aspirin, some diuretic medications ( water pills ), and the vitamin niacin (also called nicotinic acid). While gout is more common in men aged 40 to 60 years, it can occur in younger men as well as in women.  Consuming foods and beverages that contain high levels of purines can trigger an attack of gout. Some foods contain more purines than others and have been associated with an increase of uric acid, which leads to gout. You may be able to reduce your chances of getting a gout attack by limiting or avoiding shellfish, organ meats (kidney, liver, etc.), red wine, beer, and red meat.  Other Triggers include but are not limited to:  Congestive heart failure, deep vein thrombosis, pneumonia, fasting, dehydration, trauma/surgery, psoriasis.  SYMTOMS  An attack of  gout can be miserable, marked by the following symptoms:  Intense pain that comes on suddenly - often in the middle of the night or upon arising   Signs of inflammation such as redness, swelling, and warmth over the joint.   DIAGNOSIS  To diagnose gout, the foot and ankle surgeon will ask questions about your personal and family medical history, followed by an examination of the affected joint. Laboratory tests and x-rays are sometimes ordered to determine if the inflammation is caused by something other than gout.  TREATMENT  Initial treatment of an attack of gout typically includes the following:  Medications. Prescription medications or injections are used to treat the pain, swelling, and inflammation.   Dietary restrictions. Foods and beverages that are high in purines should be avoided, since purines are converted in the body to uric acid.   Fluids. Drink plenty of water and other fluids each day, while also avoiding alcoholic beverages, which cause dehydration. Cherry Juice works well to help decrease pain.  Immobilize and elevate the foot. Avoid standing and walking to give your foot a rest. Also, elevate your foot (level with or slightly above the heart) to help reduce swelling.   The symptoms of gout and the inflammatory process usually resolve in three to ten days with treatment. If gout symptoms continue despite the initial treatment, or if repeated attacks occur, see your primary care physician for maintenance treatment that may involve daily medication. In cases of repeated episodes, the underlying problem must be addressed, as the build-up of uric acid over time can cause arthritic damage to the joint.  DIET CHANGE  A gout diet reduces your intake of foods that are high in purines, which helps control your body's production of uric acid. If you're overweight or obese, lose weight. However, avoid fasting and rapid weight loss because these can promote a gout attack. Drink plenty of fluids to help flush  uric acid from your body. Also avoid high-protein diets, which can cause you to produce too much uric acid (hyperuricemia).   To follow the diet:   Limit meat, poultry and fish. Animal proteins are high in purine. Avoid or severely limit high-purine foods, such as organ meats, herring, anchovies and mackerel. Red meat (beef, pork and lamb), fatty fish and seafood (tuna, shrimp, lobster and scallops) are associated with increased risk of gout. Because all meat, poultry and fish contain purines, limit your intake to 4 to 6 ounces (113 to 170 grams) daily.   Eat more plant-based proteins. You can increase your protein by including more plant-based sources, such as beans and legumes. This switch will also help you cut down on saturated fats, which may indirectly contribute to obesity and gout.   Limit or avoid alcohol. Alcohol interferes with the elimination of uric acid from your body. Drinking beer, in particular, has been linked to gout attacks. If you're having an attack, avoid alcohol. However, when you're not having an attack, drinking one or two 5-ounce (148 milliliter) servings a day of wine is not likely to increase your risk.   Drink plenty of fluids, particularly water. Fluids can help remove uric acid from your body. Aim for eight to 16 8-ounce (237 milliliter) glasses a day.   Choose low-fat or fat-free dairy products. Some studies have shown that drinking skim or low-fat milk and eating foods made with them, such as yogurt, help reduce the risk of gout. Aim for adequate dairy intake of 16 to 24 fluid ounces (473 to 710 milliliters) daily.   Choose complex carbohydrates. Eat more whole grains and fruits and vegetables and fewer refined carbohydrates, such as white bread, cakes and candy.   Limit or avoid sugar. Too many sweets can leave you with no room for plant-based proteins and low-fat or fat-free dairy products -- the foods you need to avoid gout. Sugary foods also tend to be high in calories, so they  make it easier to eat more than you're likely to burn off. Although there's debate about whether sugar has a direct effect on uric acid levels, sweets are definitely linked to overweight and obesity.   There's also some evidence that drinking four to six cups of coffee a day lowers gout risk in men.   RESULTS  Following a gout diet can help you limit your body's uric acid production and increase its elimination. It's not likely to lower the uric acid concentration in your blood enough to treat your gout without medication, but it may help decrease the number of attacks and limit their severity. Following the gout diet and limiting your calories -- particularly if you also add in moderate daily exercise, such as brisk walking -- also can improve your overall health by helping you achieve and maintain a healthy weight.

## 2023-06-14 NOTE — PROGRESS NOTES
FOOT AND ANKLE SURGERY/PODIATRY Progress Note        ASSESSMENT:   Acute gout right foot    HPI: Patsy Santamaria was seen again today complaining of severe pain along the lateral aspect of the right foot.  The patient stated she has had this pain for past 1 week.  She denies any trauma to her foot.  She did have significant redness, swelling and pain.  She had a very sudden onset.  She was seen by her primary care physician.  X-rays of the foot were taken.  X-rays were negative for any fractures or dislocations.  The patient stated that her pain interferes with her gait.  She has a difficult time wearing shoes.  She had pain even while resting in bed.  She had to prop her foot up on a pillow to protect her foot.  She did take some ibuprofen which gave her very little relief.      Past Medical History:   Diagnosis Date     Acute alcoholic intoxication (H) 9/10/2021     Alcohol use disorder      Alcohol withdrawal seizure without complication (H) 2016     Alcoholic cirrhosis (H)      Anxiety      Chronic alcohol dependence, continuous (H) 2018    inpatient 2019, sober since then     Chronic Lower Extremity Edema      Chronic reflux esophagitis      COPD (chronic obstructive pulmonary disease) (H)      Depression      Dermatitis      Diverticulitis of colon 2022     Fibroid uterus      Ganglion     right foot     GERD (gastroesophageal reflux disease)      H. pylori infection      Melanoma (H) 2019    left upper arm     Menopause     age 52     Obesity (BMI 35.0-39.9 without comorbidity)      Osteoarthritis     Bilateral Knees     Peripheral neuropathy      Seizure (H) 2016    during alcohol withdrawal     Sleep apnea     mild, doesn't tolerate pap therapy        Past Surgical History:   Procedure Laterality Date     APPENDECTOMY      Childhood     ARTHROSCOPY KNEE Right      BIOPSY SKIN (LOCATION) Left 2019    left upper arm      SECTION       HC EXCISION  "LESION/TENDON-SHEATH/CAPSULE, FOOT      Description: Excision Of Cyst Of Tendon Sheath Of Foot;  Proc Date: 10/28/2011;  Comments: [facility]      KNEE SCOPE, DIAGNOSTIC      Description: Arthroscopy Knee Right;  Proc Date: 10/11/2005;  Comments: for right knee patella subluxation with lateral retinacular release; subpatellar chondroplasty      LATERAL RETINACULAR RELEASE OPEN      Description: Knee Lateral Retinacular Release;  Proc Date: 10/11/2005;     HEMORRHOIDECTOMY INTERNAL LIGATION      Description: Hemorrhoidectomy;  Recorded: 2008;     THUMB SURGERY      Removal of bone spurs     TONSILLECTOMY       [facility]  DELIVERY ONLY      Description:  Section;  Recorded: 2008;     [facility] LIGATE FALLOPIAN TUBE      Description: Tubal Ligation;  Recorded: 2008;       Allergies   Allergen Reactions     Bupropion Diarrhea     Codeine Hives, Itching, Nausea and Rash     Nickel Unknown     Oxycodone Nausea and Vomiting     Percocet [Oxycodone-Acetaminophen]      Patient reports \"vomiting,grossly ill two weeks ago\"     Topiramate Unknown     Diclofenac Nausea     Tolerates the topical gel     Furosemide Rash     Hydrochlorothiazide Rash     phototoxicity - med was d/lora         Sulfa Antibiotics Itching and Rash     Sulfasalazine Rash         Current Outpatient Medications:      albuterol (PROAIR HFA/PROVENTIL HFA/VENTOLIN HFA) 108 (90 Base) MCG/ACT inhaler, Inhale 2 puffs into the lungs every 4 hours as needed for shortness of breath / dyspnea or wheezing, Disp: 18 g, Rfl: 3     budesonide-formoterol (SYMBICORT) 160-4.5 MCG/ACT Inhaler, Inhale 2 puffs into the lungs 2 times daily, Disp: 10.2 g, Rfl: 11     bumetanide (BUMEX) 1 MG tablet, TAKE 1 TABLET(1 MG) BY MOUTH DAILY, Disp: 90 tablet, Rfl: 0     diclofenac (VOLTAREN) 1 % topical gel, Apply 2 grams to the left lower lumbar area up to 4 times daily not to exceed 8 grams per day., Disp: 100 g, Rfl: 1     DULoxetine (CYMBALTA) 20 " MG capsule, Take 2 capsules (40 mg) by mouth daily, Disp: 30 capsule, Rfl: 1     hydrOXYzine (ATARAX) 50 MG tablet, Take 0.5-1 tablets (25-50 mg) by mouth 3 times daily as needed for anxiety, Disp: 90 tablet, Rfl: 2     ipratropium - albuterol 0.5 mg/2.5 mg/3 mL (DUONEB) 0.5-2.5 (3) MG/3ML neb solution, Take 1 vial (3 mLs) by nebulization every 4 hours as needed for shortness of breath / dyspnea or wheezing, Disp: 15 mL, Rfl: 1     mirtazapine (REMERON) 15 MG tablet, Take 1 tablet (15 mg) by mouth At Bedtime, Disp: 30 tablet, Rfl: 2     omeprazole (PRILOSEC) 20 MG DR capsule, Take 20 mg by mouth daily as needed, Disp: , Rfl:      umeclidinium (INCRUSE ELLIPTA) 62.5 MCG/INH inhaler, Inhale 1 puff into the lungs daily (Patient taking differently: Inhale 1 puff into the lungs every morning), Disp: 30 each, Rfl: 1     cyclobenzaprine (FLEXERIL) 10 MG tablet, Take 1 tablet (10 mg) by mouth At Bedtime (Patient not taking: Reported on 2023), Disp: 10 tablet, Rfl: 0     nicotine (NICORETTE) 4 MG lozenge, Place 1 lozenge (4 mg) inside cheek every hour as needed for smoking cessation (Patient not taking: Reported on 2023), Disp: 108 lozenge, Rfl: 1    Family History   Problem Relation Age of Onset     CABG Mother      Heart Disease Mother      Aneurysm Father          of ruptured aneurysm at 46     Heart Disease Father      Factor V Leiden deficiency Father      Factor V Leiden deficiency Sister      Anesthesia Reaction No family hx of        Social History     Socioeconomic History     Marital status: Single     Spouse name: Not on file     Number of children: Not on file     Years of education: Not on file     Highest education level: Not on file   Occupational History     Not on file   Tobacco Use     Smoking status: Every Day     Packs/day: 0.50     Years: 49.00     Pack years: 24.50     Types: Cigarettes     Smokeless tobacco: Never     Tobacco comments:     Reports on 23 smoking 1/2 PPD   Vaping Use  "    Vaping status: Never Used   Substance and Sexual Activity     Alcohol use: Not Currently     Comment: Last use 11/11/22     Drug use: No     Comment: Denies     Sexual activity: Yes     Partners: Male     Birth control/protection: None   Other Topics Concern     Not on file   Social History Narrative     Not on file     Social Determinants of Health     Financial Resource Strain: Not on file   Food Insecurity: Not on file   Transportation Needs: Not on file   Physical Activity: Not on file   Stress: Not on file   Social Connections: Not on file   Intimate Partner Violence: Not on file   Housing Stability: Not on file       10 point Review of Systems is negative      /85   Pulse 73   Ht 1.6 m (5' 3\")   Wt 118.4 kg (261 lb)   SpO2 97%   BMI 46.23 kg/m      BMI= Body mass index is 46.23 kg/m .    OBJECTIVE:  General appearance: Patient is alert and fully cooperative with history & exam.  No sign of distress is noted during the visit.  Vascular: Dorsalis pedis and posterior tibial pulses are palpable. There is no pedal hair growth bilaterally.  CFT < 3 sec from anterior tibial surface to distal digits bilaterally. There is moderate edema and erythema noted on the dorsal lateral aspect the right foot.  Dermatologic: Turgor and texture are within normal limits.No primary or secondary lesions noted.  Neurologic: All epicritic and proprioceptive sensations are grossly intact bilaterally.  Musculoskeletal: All active and passive ankle, subtalar, midtarsal, and 1st MPJ range of motion are grossly intact without pain or crepitus, with the exception of none. Manual muscle strength is within normal limits bilaterally. All dorsiflexors, plantarflexors, invertors, evertors are intact bilaterally. Tenderness present to the dorsal lateral aspect of the right foot on palpation. Tenderness to the dorsal lateral aspect of the right foot with range of motion. Calf is soft/non-tender without warmth/induration    Imaging: "         XR Foot Right G/E 3 Views    Result Date: 5/29/2023  EXAM: XR FOOT RIGHT G/E 3 VIEWS LOCATION: [facility] DATE/TIME: 5/29/2023 3:09 PM CDT INDICATION: Proximal right fifth metatarsal pain. COMPARISON: None.     IMPRESSION: 1. Hallux valgus, lateral subluxation of the sesamoids, bunion and moderate first MTP joint osteoarthrosis. 2. Tailor's bunion. 3. Small calcaneal enthesophytes. 4. Mild osteoarthrosis of the navicular cuneiform joints. 5. Otherwise negative. The fifth metatarsal appears normal.           TREATMENT:  The patient was started on colchicine 0.6 mg 1 tab twice daily.  The patient is to return to the clinic if her pain persist.  Patient was placed in a postop shoe to assist with her gait.        Keyon Kapoor; MELANIE  [facility] Foot & Ankle Surgery/Podiatry

## 2023-06-14 NOTE — Clinical Note
2023         RE: Patsy Santamaria  [address]  [place] [address]        Dear Colleague,    Thank you for referring your patient, Patsy Santamaria, to the [facility]. Please see a copy of my visit note below.    FOOT AND ANKLE SURGERY/PODIATRY Progress Note        ASSESSMENT:   ***    HPI: Patsy Santamaria was seen again today  ***.      Past Medical History:   Diagnosis Date     Acute alcoholic intoxication (H) 9/10/2021     Alcohol use disorder      Alcohol withdrawal seizure without complication (H) 2016     Alcoholic cirrhosis (H)      Anxiety      Chronic alcohol dependence, continuous (H) 2018    inpatient 2019, sober since then     Chronic Lower Extremity Edema      Chronic reflux esophagitis      COPD (chronic obstructive pulmonary disease) (H)      Depression      Dermatitis      Diverticulitis of colon 2022     Fibroid uterus      Ganglion     right foot     GERD (gastroesophageal reflux disease)      H. pylori infection      Melanoma (H) 2019    left upper arm     Menopause     age 52     Obesity (BMI 35.0-39.9 without comorbidity)      Osteoarthritis     Bilateral Knees     Peripheral neuropathy      Seizure (H) 2016    during alcohol withdrawal     Sleep apnea     mild, doesn't tolerate pap therapy        Past Surgical History:   Procedure Laterality Date     APPENDECTOMY      Childhood     ARTHROSCOPY KNEE Right      BIOPSY SKIN (LOCATION) Left 2019    left upper arm      SECTION        EXCISION LESION/TENDON-SHEATH/CAPSULE, FOOT      Description: Excision Of Cyst Of Tendon Sheath Of Foot;  Proc Date: 10/28/2011;  Comments: [facility]      KNEE SCOPE, DIAGNOSTIC      Description: Arthroscopy Knee Right;  Proc Date: 10/11/2005;  Comments: for right knee patella subluxation with lateral retinacular release; subpatellar chondroplasty      LATERAL RETINACULAR RELEASE OPEN      Description:  "Knee Lateral Retinacular Release;  Proc Date: 10/11/2005;     HEMORRHOIDECTOMY INTERNAL LIGATION      Description: Hemorrhoidectomy;  Recorded: 2008;     THUMB SURGERY      Removal of bone spurs     TONSILLECTOMY       ZZC  DELIVERY ONLY      Description:  Section;  Recorded: 2008;     ZZC LIGATE FALLOPIAN TUBE      Description: Tubal Ligation;  Recorded: 2008;       Allergies   Allergen Reactions     Bupropion Diarrhea     Codeine Hives, Itching, Nausea and Rash     Nickel Unknown     Oxycodone Nausea and Vomiting     Percocet [Oxycodone-Acetaminophen]      Patient reports \"vomiting,grossly ill two weeks ago\"     Topiramate Unknown     Diclofenac Nausea     Tolerates the topical gel     Furosemide Rash     Hydrochlorothiazide Rash     phototoxicity - med was d/lora         Sulfa Antibiotics Itching and Rash     Sulfasalazine Rash         Current Outpatient Medications:      albuterol (PROAIR HFA/PROVENTIL HFA/VENTOLIN HFA) 108 (90 Base) MCG/ACT inhaler, Inhale 2 puffs into the lungs every 4 hours as needed for shortness of breath / dyspnea or wheezing, Disp: 18 g, Rfl: 3     budesonide-formoterol (SYMBICORT) 160-4.5 MCG/ACT Inhaler, Inhale 2 puffs into the lungs 2 times daily, Disp: 10.2 g, Rfl: 11     bumetanide (BUMEX) 1 MG tablet, TAKE 1 TABLET(1 MG) BY MOUTH DAILY, Disp: 90 tablet, Rfl: 0     diclofenac (VOLTAREN) 1 % topical gel, Apply 2 grams to the left lower lumbar area up to 4 times daily not to exceed 8 grams per day., Disp: 100 g, Rfl: 1     DULoxetine (CYMBALTA) 20 MG capsule, Take 2 capsules (40 mg) by mouth daily, Disp: 30 capsule, Rfl: 1     hydrOXYzine (ATARAX) 50 MG tablet, Take 0.5-1 tablets (25-50 mg) by mouth 3 times daily as needed for anxiety, Disp: 90 tablet, Rfl: 2     ipratropium - albuterol 0.5 mg/2.5 mg/3 mL (DUONEB) 0.5-2.5 (3) MG/3ML neb solution, Take 1 vial (3 mLs) by nebulization every 4 hours as needed for shortness of breath / dyspnea or wheezing, " Disp: 15 mL, Rfl: 1     mirtazapine (REMERON) 15 MG tablet, Take 1 tablet (15 mg) by mouth At Bedtime, Disp: 30 tablet, Rfl: 2     omeprazole (PRILOSEC) 20 MG DR capsule, Take 20 mg by mouth daily as needed, Disp: , Rfl:      umeclidinium (INCRUSE ELLIPTA) 62.5 MCG/INH inhaler, Inhale 1 puff into the lungs daily (Patient taking differently: Inhale 1 puff into the lungs every morning), Disp: 30 each, Rfl: 1     cyclobenzaprine (FLEXERIL) 10 MG tablet, Take 1 tablet (10 mg) by mouth At Bedtime (Patient not taking: Reported on 2023), Disp: 10 tablet, Rfl: 0     nicotine (NICORETTE) 4 MG lozenge, Place 1 lozenge (4 mg) inside cheek every hour as needed for smoking cessation (Patient not taking: Reported on 2023), Disp: 108 lozenge, Rfl: 1    Family History   Problem Relation Age of Onset     CABG Mother      Heart Disease Mother      Aneurysm Father          of ruptured aneurysm at 46     Heart Disease Father      Factor V Leiden deficiency Father      Factor V Leiden deficiency Sister      Anesthesia Reaction No family hx of        Social History     Socioeconomic History     Marital status: Single     Spouse name: Not on file     Number of children: Not on file     Years of education: Not on file     Highest education level: Not on file   Occupational History     Not on file   Tobacco Use     Smoking status: Every Day     Packs/day: 0.50     Years: 49.00     Pack years: 24.50     Types: Cigarettes     Smokeless tobacco: Never     Tobacco comments:     Reports on 23 smoking 1/2 PPD   Vaping Use     Vaping status: Never Used   Substance and Sexual Activity     Alcohol use: Not Currently     Comment: Last use 22     Drug use: No     Comment: Denies     Sexual activity: Yes     Partners: Male     Birth control/protection: None   Other Topics Concern     Not on file   Social History Narrative     Not on file     Social Determinants of Health     Financial Resource Strain: Not on file   Food  "Insecurity: Not on file   Transportation Needs: Not on file   Physical Activity: Not on file   Stress: Not on file   Social Connections: Not on file   Intimate Partner Violence: Not on file   Housing Stability: Not on file       10 point Review of Systems is negative except for ***.     /85   Pulse 73   Ht 1.6 m (5' 3\")   Wt 118.4 kg (261 lb)   SpO2 97%   BMI 46.23 kg/m      BMI= Body mass index is 46.23 kg/m .    OBJECTIVE:  General appearance: Patient is alert and fully cooperative with history & exam.  No sign of distress is noted during the visit.  Vascular: Dorsalis pedis and posterior tibial pulses are palpable. There is pedal hair growth ***.  CFT < 3 sec from anterior tibial surface to distal digits ***. There is no appreciable edema noted.  Dermatologic: Turgor and texture are within normal limits. No coloration or temperature changes. No primary or secondary lesions noted.  Neurologic: All epicritic and proprioceptive sensations are grossly intact ***.  Musculoskeletal: All active and passive ankle, subtalar, midtarsal, and 1st MPJ range of motion are grossly intact without pain or crepitus, with the exception of ***. Manual muscle strength is ***. All dorsiflexors, plantarflexors, invertors, evertors are intact ***. Tenderness present to *** on palpation. Tenderness to *** with range of motion. Calf is soft/non-tender with/without warmth/induration    Imaging:     {Imaging order/result:87767}    XR Foot Right G/E 3 Views    Result Date: 5/29/2023  EXAM: XR FOOT RIGHT G/E 3 VIEWS LOCATION: [facility] DATE/TIME: 5/29/2023 3:09 PM CDT INDICATION: Proximal right fifth metatarsal pain. COMPARISON: None.     IMPRESSION: 1. Hallux valgus, lateral subluxation of the sesamoids, bunion and moderate first MTP joint osteoarthrosis. 2. Tailor's bunion. 3. Small calcaneal enthesophytes. 4. Mild osteoarthrosis of the navicular cuneiform joints. 5. Otherwise negative. The fifth " metatarsal appears normal.           TREATMENT:  ***        Keyon Kapoor; MELANIE  [facility] Foot & Ankle Surgery/Podiatry         Again, thank you for allowing me to participate in the care of your patient.        Sincerely,        Keyon Kapoor DPM

## 2023-06-15 ENCOUNTER — HOSPITAL ENCOUNTER (OUTPATIENT)
Dept: BEHAVIORAL HEALTH | Facility: CLINIC | Age: 68
Discharge: HOME | End: 2023-06-15
Attending: FAMILY MEDICINE
Payer: COMMERCIAL

## 2023-06-15 DIAGNOSIS — F10.20 ALCOHOL USE DISORDER, SEVERE, DEPENDENCE (H): Primary | ICD-10-CM

## 2023-06-15 PROCEDURE — H2035 A/D TX PROGRAM, PER HOUR: HCPCS | Mod: HQ

## 2023-06-15 NOTE — GROUP NOTE
"Group Therapy Documentation    PATIENT'S NAME: Patsy Santamaria  MRN:   [id number]  :   1955  ACCT. NUMBER: [id number]  DATE OF SERVICE: 6/15/23  START TIME:  9:00 AM  END TIME: 10:00 AM  FACILITATOR(S): Dixie Finnegan LADC  TOPIC: BEH Group Therapy  Number of patients attending the group:  3  Group Length:  1 Hours    Group Therapy Type: Dual Diagnosis    Summary of Group / Topics Discussed:    Co-occurring Illness    Patients participated in check in and a presentation by staff on putting together an \"Mental Health Emergency First Aid Kit\".    This group was shortened due to technological issues and low census.     Attestation: Jayson Boles MD - Medical Director - Provides oversight and supervision of care.      Group Attendance:  Attended group session    Patient's response to the group topic/interactions:  cooperative with task    Patient appeared to be Actively participating, Attentive and Engaged.        Client specific details:  Patsy reported feeling tired, crabby, and irritated today. She is irritated because she couldn't get her walking boot on this morning and her back is also really bothering her. She is also irritated that she saw multiple doctors before someone was able to tell her she had gout and needed and antibiotic. She will start that today and hopes that provides some relief for her foot pain and swelling. She has been sober since last group and has not had any urges. She has been keeping busy and using her coping skills. She went out with her sister, read the big book, and did meditation yesterday. She does not anticipate any high risk situations over the weekend. She isn't sure what she will do over the weekend because she doesn't want to go out if the air quality continues to be bad. Patsy's affirmation was \"I keep trying and I keep working, I will not let that demon knock me off my square\". She went on to say that she is more at peace when she is sober and this is what she really " wants. She has wanted it for a long time and knows she can to it. She wants to be happy and enjoy life.

## 2023-06-16 ENCOUNTER — DOCUMENTATION ONLY (OUTPATIENT)
Dept: BEHAVIORAL HEALTH | Facility: CLINIC | Age: 68
End: 2023-06-16
Payer: COMMERCIAL

## 2023-06-16 NOTE — PROGRESS NOTES
[facility] Weekly Treatment Plan Review      ATTENDANCE for the following date span:  23 to 23    Date     Group Therapy 1 hours NA hours 1.0 indiv  1 hour No group    Individual Therapy        Family Therapy        Other (Specify) Phase 1 &2  Phase 3  Phase 1  Phase 1 & 2        Patient attended all scheduled sessions during this time period.   Total hours: 12108. Patient is in phase 1.    Weekly Treatment Plan Review     Treatment Plan initiated on 2023.    Dimension1: Acute Intoxication/Withdrawal Potential -   Previous Dimension Ratin  Current Dimension Ratin  Date of Last Use: 23  Any reports of withdrawal symptoms: No  Narrative: Patient reports last date of use as one time in April, but she can't remember the date. Patient reports no withdrawal symptoms. Patient was medically detoxed prior to that one day return to use. No withdrawal symptoms observed at intake.    23: Patsy did not use this week. No observed withdrawal symptoms, some PAWS possible. Patient reports agitation and fatigue.     Dimension 2: Biomedical Conditions & Complications -   Previous Dimension Ratin  Current Dimension Ratin  Medical Concerns:  Significant pain and swelling in foot and leg.  Current Medications & Medication Changes:  Current Outpatient Medications   Medication     albuterol (PROAIR HFA/PROVENTIL HFA/VENTOLIN HFA) 108 (90 Base) MCG/ACT inhaler     budesonide-formoterol (SYMBICORT) 160-4.5 MCG/ACT Inhaler     bumetanide (BUMEX) 1 MG tablet     colchicine (COLCRYS) 0.6 MG tablet     cyclobenzaprine (FLEXERIL) 10 MG tablet     diclofenac (VOLTAREN) 1 % topical gel     DULoxetine (CYMBALTA) 20 MG capsule     hydrOXYzine (ATARAX) 50 MG tablet     ipratropium - albuterol 0.5 mg/2.5 mg/3 mL (DUONEB) 0.5-2.5 (3) MG/3ML neb solution     mirtazapine (REMERON) 15 MG tablet     nicotine (NICORETTE) 4 MG lozenge     omeprazole (PRILOSEC) 20 MG  DR capsule     umeclidinium (INCRUSE ELLIPTA) 62.5 MCG/INH inhaler     No current facility-administered medications for this visit.     Medication Prescriber:  Yanique Cali MD.   Taking meds as prescribed? Yes  Medication side effects or concerns:  None reported.  Outside medical appointments this week (list provider and reason for visit):  Saw doctor about her foot. See EHR.  Narrative: Patient reports the following medical conditions: COPD, overweight, uses CPAP, and general fatigue . Patient has primary care with Yanique Cali MD.     23: Patient continues to have pain and swelling in her right foot. She saw another doctor this week and found out she had gout. She was put on an antibiotic and is hoping for some relief. Her back is also bothering her more than normal as well as her knee. We talked about making a goal list for her health and then prioritizing it and setting appointments for the future.    Dimension 3: Emotional/Behavioral Conditions & Complications  Previous Dimension Ratin  Current Dimension Ratin    PHQ9       2023     1:00 PM 2023     1:00 PM 2023    10:00 AM   PHQ-9 SCORE   PHQ-9 Total Score 5 2 7     GAD7       2023     1:00 PM 2023     1:00 PM 2023    10:00 AM   ALISIA-7 SCORE   Total Score 3 1 8     Mental health diagnosis PTSD  Date of last SIB/SI/HI: N/A  Current MH Assignments:  None  Narrative:  Patient reports a mental health diagnosis of PTSD, Anxiety, and Depression. Patient endorses current symptoms of hypervigilance and anxiety when out. She also notes unexplained fatigue. Patient would like to meet with mental health therapist to process her trauma. Patient's denies current SI or thoughts of self-harm.     2023: Established care with program therapist, who facilitates one group weekly and will continue to see Patsy individually during Phase 1.    23: Patsy reported feeling up and down this week. Her  "physical health issues are having a major impact on her mental health. She has enjoyed some time with her boyfriend. She reports being compliant with all her medications.     Dimension 4: Treatment Acceptance / Resistance  Previous Dimension Ratin  Current Dimension Ratin  ORAL Diagnosis:  Alcohol Use Disorder   303.90 (F10.20) Severe In early remission,   Phase: 1  Commitment to tx process/Stage of change: Contemplation  ORAL assignments:  Attend groups and remain abstinent.   Narrative: Patient appears internally motivated for treatment at this time and reports being \"sick and tired of being sick and tired\" and also wanting to take care of her health as her main reasons for coming to treatment.  Patient appears to be in the contemplation stage of change at this time.    2023: Patsy attended 3/3 groups this week. She was fully present and engaged in all sessions.    Dimension 5: Relapse / Continued Problem Potential    Previous Dimension Rating:  3  Current Dimension Rating:  3  Relapses this week: None  Urges to use:  None  UA results:   No results found for this or any previous visit (from the past 168 hour(s)).  Narrative: Patient reports that he has been to multiple treatments and has had limited periods of sobriety. Patient verbalizes that their primary triggers are related to her son bringing up the past. Patient is not engaged with the recovery community. She said two of her three sisters are supportive of her recovery. Patient has minimal insight into the relapse process or coping skills for emotional regulation. Patient's mental health symptoms increase the risk of relapse at this time.    23: Risk for return to use remains high. Patsy did uttilize enjoyable distractions (music on YouTube, and downloaded a meditation talha - Q-go Timer, and the Big Book Talha on her phone this week. She was also provided with education and handouts on distress tolerance exercises during her 1:1 session on " "Wednesday.     Dimension 6: Recovery Environment   Previous Dimension Rating:  3  Current Dimension Rating:  3  Family Involvement : None   Summarize attendance at family groups and family sessions: N/A  Family supportive of treatment?  Yes  Community support group attendance: None  Recreational activities: Exercise   Narrative: Patient has stable housing at this time and lives alone. Patient reports she is \"retired and on disability\". She does note some concerns about her financial stability. Patient is not involved in the criminal justice system. She lacks a sober support network. Patient does not have a regular structure in place for her day to day activities, though she does enjoy working out. Patient has 4 adult children, noting that her son has been an ongoing trigger.     6/16/23: Remington has a close friend who lives in her building and is sober. She reaches out to a friend from a previous treatment to support abstinence. She committed to attending AA on Sunday and will report back on her experience next week.     Justification for Continued Treatment at this Level of Care:  Patient is in Phase 1 and has had minimal periods of sobriety. She lacks a sober support network. She is medication compliant and verbalizes a wllingness to engage in mental health treatment. She has been able to identify triggers, but lacks coping skills. Her daily life schedule/routine has not been supportive of recovery. She expresses willingness to change the lack of structure and meaningful activity.     Discharge Planning:  Target Discharge Date/Timeframe:  8/25/2023 to complete Phase 1/2; 11/17/2023 to complete Phase 3)  Med Mgmt Provider/Appt:  ELISA (Dr. Covington in addiction medicine)  Ind therapy Provider/Appt:  Weekly (currently Wednesdays at 11:00 am)    Other referrals:  None  Has vulnerable adult status change? No  Service Coordination:  None    Supervision:  Patient is staffed with treatment providers, this includes: Dixie " Kain [facility] and Cony Guzman [facility]; Staffed with [facility] on 6/14/23.    Attestation:   Can Boles MD - Medical Director - Provides oversight and supervision of care.    Are Treatment Plan goals/objectives effective? Yes  *If no, list changes to treatment plan:    Are the current goals meeting client's needs? Yes  *If no, list the changes to treatment plan.    Client Input / Response: Agreeable       *Client agrees with any changes to the treatment plan: N/A  *Client received copy of changes: N/A  *Client is aware of right to access a treatment plan review: Yes (MyChart or request copy)

## 2023-06-19 ENCOUNTER — HOSPITAL ENCOUNTER (OUTPATIENT)
Dept: BEHAVIORAL HEALTH | Facility: CLINIC | Age: 68
Discharge: HOME | End: 2023-06-19
Attending: FAMILY MEDICINE
Payer: COMMERCIAL

## 2023-06-19 DIAGNOSIS — F10.20 ALCOHOL USE DISORDER, SEVERE, DEPENDENCE (H): Primary | ICD-10-CM

## 2023-06-19 PROCEDURE — H2035 A/D TX PROGRAM, PER HOUR: HCPCS | Mod: HQ

## 2023-06-19 NOTE — GROUP NOTE
Group Therapy Documentation    PATIENT'S NAME: Patsy Santamaria  MRN:   [id number]  :   1955  ACCT. NUMBER: [id number]  DATE OF SERVICE: 23  START TIME:  9:00 AM  END TIME: 12:00 PM  FACILITATOR(S): Dixie Finnegan LADC  TOPIC: BEH Group Therapy  Number of patients attending the group:  4    Group Length:  3 Hours    Group Therapy Type: Addiction and Psychoeducation    Summary of Group / Topics Discussed:    Patients participated in meditation, group check in, and a discussion on triggers, urges, and coping skills. Each patient shared his/her recent triggers and urges and how he/she coped with them. Patients were given information on the Anger Iceberg, Urge Surfing, Playing the Tape Forward, and the Stages of Relapse.     RELAPSE PREVENTION    This topic will give a general overview of triggers, urges, coping skills, and relapse warning signs.    Objective(s):     Patient will acknowledge that there are 3 stages of relapse.   Patient will identify their primary triggers.    Patient will identify how physical and emotional health impact risk of relapse.  Patient will identify heathy strategies to cope with urges.    Structure:     Peer lead discussion on recent triggers, urges, and coping skills.  Psychoeducation on frequently suggested coping skills.  Use teach-back techniques to ensure patients  understanding.   Provide patients with handouts to enhance learning.     Expected therapeutic outcomes:      Understand relapse as a non-linear process.   Be aware of the stages of relapse so they can help prevent relapse before it occurs.    Learn the importance of self-care and other healthy strategies to help reduce the risk of relapse.      Therapeutic outcome(s) measured by:     Patient s demonstration of learning by identification of personal triggers.  Identify coping strategies to reduce risk of relapse.     Patient will identify all three stages of relapse    Patient will identify at least one example of  "a relapse warning sign.  Patient will identify heathier strategies to cope with urges.      Attestation: Jayson Boles MD - Medical Director - Provides oversight and supervision of care.      Group Attendance:  Attended group session    Patient's response to the group topic/interactions:  cooperative with task, discussed personal experience with topic and expressed reluctance to alter behavior    Patient appeared to be Actively participating, Attentive and Engaged.        Client specific details:  Patsy reported feeling \"pretty good, not 100%\" today. She said she had a good weekend overall. The best part was staying sober, getting her laundry done, and getting out for a walk. The worst part was that she got mad about something and had a urge. She spoke to herself in the mirror and was able to get past it without using. She said her mental health is an 8/10 and she has been enjoying reading the Book Book, prayers, and meditations on her phone. She said it makes her feel more \"centered\". Her foot is also much better. She is looking forward to helping her sister with her garage sale this week. She did not make it to AA because she was cooking for father's day, but will go this week.     Patsy also shared her personal triggers, recent urges, and coping skills during the topic group discussion and offered feedback to her peers.       "

## 2023-06-21 ENCOUNTER — PATIENT OUTREACH (OUTPATIENT)
Dept: CARE COORDINATION | Facility: CLINIC | Age: 68
End: 2023-06-21
Payer: COMMERCIAL

## 2023-06-21 ENCOUNTER — HOSPITAL ENCOUNTER (OUTPATIENT)
Dept: BEHAVIORAL HEALTH | Facility: CLINIC | Age: 68
Discharge: HOME | End: 2023-06-21
Attending: FAMILY MEDICINE
Payer: COMMERCIAL

## 2023-06-21 PROCEDURE — H2035 A/D TX PROGRAM, PER HOUR: HCPCS

## 2023-06-21 NOTE — PROGRESS NOTES
"Individual Session:      Start Time: 10:00 AM   End Time: 10:45 AM      Note:   Patsy Santamaria met with counselor PRINCE Rivers for an individual session. We did a check in based on the 6 dimensions.     Patsy started out by talking about her inconsistent sleep. She shared that she was following the recommendations of her doctor regarding a healthy sleep routine, but was still having trouble. She said she feels like the medication may be loosing it's effect and I suggested she bring this to the attention of her psychiatrist. She has an appointment in July and will bring it up. I told her I would bring this topic up in a future group to see what other ideas her peers and the counselors might have to offer regarding getting better sleep.    She brought up her PTSD symptoms of hypervigilance and we talked a little bit about why traumatic things that happened in the past might be still popping up in her head. I shared that there are treatment options available and that I would ask Cony to discuss them with her. I did a very brief overview of the benefit of EMDR and told her Cony would be a better person to talk to about specific treatments. She appeared comforted by the normalization of these occurrences and expressed interested in learning more about treatment options.     She noted ongoing urges and said yesterday her urge was related to the weather. She shared \"it was nice out and a steve sounded good\". She knew she couldn't have one and decided to make some iced tea instead. She also quashed the salt craving with potato chips. She is continuing to enjoy reading the stories in the Big Book and talked about how well she related to some of them.     She shared her increased internal motivation for staying sober and said \"I want this half of my life to be better than my last half\".     She plans to get a pedicure for self care next time she gets her check. She is also working on spirituality.     She plans " to start working out at the [facility] as soon as her foot is better. She also sees a spine specialist on 6/26/23 and will continue to work on her physical health throughout treatment.     Attestation:   Can Boles MD - Medical Director - Provides oversight and supervision of care.      Dixie Finnegan [facility] 6/21/2023 4:03 PM

## 2023-06-21 NOTE — PROGRESS NOTES
Care Coordination Clinician Chart Review    Situation: Patient chart reviewed by Care Coordinator.       Background: Care Coordination Program started: 3/23/2023. Initial assessment completed and patient-centered care plan(s) were developed with participation from patient. Lead CC handed patient off to CHW for continued outreaches.       Assessment: Patient is actively working to accomplish goal(s). Patient's goal(s) appropriate and relevant at this time. Patient is not due for updated Plan of Care.  Assessments will be completed annually or as needed/with change of patient status.      Care Plan: Medical     Problem: HP GENERAL PROBLEM     Goal: I will develop a plan with the CD program for additional support     Start Date: 3/30/2023    Note:        Patient expressed understanding of goal: yes  Action steps to achieve this goal:  1. I will attend scheduled intake and therapy and follow recommendations for support  2. I will update Care coordination team during next outreach  3. I will report to my Community Health Worker if any additional resources or support needed.  05/08/23  Pt has visit with Addiction medicine schedule for later this week                         Care Plan: Social     Problem: HP GENERAL PROBLEM     Goal: Financial Wellbeing     Start Date: 4/3/2023    This Visit's Progress: 10%    Note:        Patient expressed understanding of goal: yes  Action steps to achieve this goal:  1. I will work with financial office to ensure medical bills are submitted to insurance and if any additional resources   2. I will update Care coordination team during next outreach  3. I will report to my Community Health Worker if any additional resources or support needed.                               Plan/Recommendations: The patient will continue working with Care Coordination to achieve goal(s) as above. CHW will continue outreaches at minimum every 30 days and will involve Lead CC as needed or if patient is ready  to move to Maintenance. Lead CC will continue to monitor CHW outreaches and patient's progress to goal(s) every 6 weeks.     Plan of Care updated and sent to patient: KATHLEEN Guerra   Social Work Care Coordinator   [facility]    [phone]

## 2023-06-22 ENCOUNTER — HOSPITAL ENCOUNTER (OUTPATIENT)
Dept: BEHAVIORAL HEALTH | Facility: CLINIC | Age: 68
Discharge: HOME | End: 2023-06-22
Attending: FAMILY MEDICINE
Payer: COMMERCIAL

## 2023-06-22 DIAGNOSIS — F43.10 PTSD (POST-TRAUMATIC STRESS DISORDER): ICD-10-CM

## 2023-06-22 DIAGNOSIS — F10.20 ALCOHOL USE DISORDER, SEVERE, DEPENDENCE (H): Primary | ICD-10-CM

## 2023-06-22 PROCEDURE — H2035 A/D TX PROGRAM, PER HOUR: HCPCS | Mod: HQ

## 2023-06-22 NOTE — GROUP NOTE
Patient:   MAYITO GRIFFIN SR            MRN: [id number]            FIN: [id number]               Age:   77 years     Sex:  MALE     :  [birth year]   Associated Diagnoses:   None   Author:   PITO, [facility] course:     History of presenting illness:  Lung mass  -CT-guided biopsy demonstrating atypical cells with increased B-cell expression concerning for lymphoma.  -Status post robotic assisted right and left upper lobe lobectomy.  -Postop care as per thoracic surgery.  -Currently has a chest tube in. plan to DC CT.     Postop pain  -Currently onoral meds. Off of Dilaudid PCA pump.    Status post CABG  -Stable currently on statin and aspirin.    Atrial fibrillation  -Rate controlled. Coumadin on hold for surgery.Cleared by thoracic to resume AC> Staretd Coumadin.     History of severe aortic stenosis- Stable    History of CVA  -Stable- on statin.     Type 2 diabetes  - Hold short-acting insulin and oral hypoglycemic agents.  -Patient on Levemir at home. Started on lantus and cover with insulin S/S   Dyslipidemia-stable.    Hypertension-stable resume home meds.  Dec urinary Outpt-  rec IVF and Albumin on post op day. UOP Improving. Mccormick  removed        Vitals:   Vitals between:   2018 13:14:00   TO   2018 13:14:00                   LAST RESULT MINIMUM MAXIMUM  Temperature 36.6 36.4 37.0  Heart Rate 105 78 107  Respiratory Rate 22 18 34  NISBP           121 101 127  NIDBP           73 67 82  NIMBP           87 80 94  SpO2                    95 90 98      Physical Exam:  General: alert, no acute distress  HEENT: Negative, supple, No JVP, trachea midline, no tenderness,  Eye: PERRL, EOMI,  Cardiovascular: regular rate and rhythm,, no edema  Respiratory: lungs CTAB, respirations non-labored, breath sounds equal,  Chest: No anatomical abnormalities noted, No localizing tenderness noted  Abdomen: soft, nontender, non distended, normal bowel sounds,  Neurological: AAOx3, No focal  "Group Therapy Documentation    PATIENT'S NAME: Patsy Santamaria  MRN:   [id number]  :   1955  ACCT. NUMBER: [id number]  DATE OF SERVICE: 23  START TIME:  9:00 AM  END TIME: 12:00 PM  FACILITATOR(S): Marisel Mcdaniel, [facility]; Cony Guzman, [facility], [facility]  TOPIC: BEH Group Therapy  .Number of patients attending the group:  5  Group Length:  3 Hours    Group Therapy Type: Addiction, Psychotherapeutic, and Skills/Education    Summary of Group / Topics Discussed:  Boredom, Co-occurring Illness, Coping Skills/Lifestyle Managemet, Choices in Recovery, Meditation/Breathing Exercises, Mindfulness, Thinking Errors/Negative Self-Talk, and Trauma Informed Care    Attestation:   Can Boles MD - Medical Director - Provides oversight and supervision of care.    Group Attendance:  Attended group session    Patient's response to the group topic/interactions:  cooperative with task, discussed personal experience with topic, expressed readiness to alter behaviors, expressed understanding of topic, listened actively and requested more information about topic    Patient appeared to be Actively participating, Attentive and Engaged.        Client specific details:  Patsy reported continuing abstinence with thoughts but no significantly motivating urges or cravings. She reported a decrease in pain since a proper diagnosis and treatment of gout. To support her recovery, Patsy reported use skills and practices, including AA's Big Book stories and prayers, gratitude, and hope for the future (e.g., goals to visit small towns, [employer] [place], and perhaps move to the country some day after restoring driving privildeges. She reported a desire to break the habit of reaching for alcohol when feeling \"angry\". She reported plans to attend an AA speaker meeting this weekend,  at 7pm in [facility].  She continues to learn about meditation and mindfulness and plans to try this in the mornings (writer sent follow up e-mail with " meditation resources).       weaknees, no sensory loss   Back: Normal exam, No Localizing tenderness  Musculoskleton: nontender, normal ROM, normal strength  Psych: cooperative, nOn suicidal,  Skin: Dry and warm    Labs:   Labs between:  03-NOV-2018 13:14 to 04-NOV-2018 13:14    Other Chem:             Mg  Phos  Triglycerides  GGTP  DirectBili                           1.9  3.1           POC GLU:                 Latest Result  Latest Date  Minimum  Min Date  Maximum  Max Date                             (H) 185  04-NOV-2018 (H) 185  04-NOV-2018 (H) 213  03-NOV-2018    COAG:                 INR  PT  PTT  Ddimer  Fibrinogen    04-NOV-2018 1.0  11.2         03-NOV-2018 1.0  10.6                        Radiology results:    Result title:  XR CHEST 1V  Result status:  Final  Verified by:  ALIDA SIMMONS on 11/04/2018 2:34  IMPRESSION: Overall stable postoperative chest compared to the prior study.    Echocardiogram Results    STUDY CONCLUSIONSSUMMARY:1. Left ventricle: The cavity size is normal. Wall thickness is mildly   increased. Focal septal hypertrophy often seen as scenesent finding.   There is concentric hypertrophy. Systolic function is normal. The   estimated ejection fraction is 67%, by biplane method of disks. Wall   motion is intact. Doppler parameters are consistent with abnormal left   ventricular relaxation (grade 1 diastolic dysfunction).2. Aortic valve: A bioprosthesis is present and functioning normally.   Annular calcification.3. Aortic root: The aortic root is mildly dilated.4. Ascending aorta: The ascending aorta is mildly dilated.5. Left atrium: The atrium is mildly dilated.6. Right ventricle: Systolic function is normal. Systolic pressure is   within the normal range. The estimated peak pressure is 29mm Hg.    Pending results: _     DISCHARGE MEDICATION LIST   Allergies: No known allergies     MEDICATION  DOSE  ROUTE  FREQUENCY  SPECIAL INSTRUCTIONS   amitriptyline (amitriptyline oral 25 mg tablet)  25 mg=1 tab  Oral   Nightly at bedtime     atorvastatin (atorvastatin oral 40 mg tablet)  40 mg=1 tab  Oral  Daily     insulin detemir (Levemir FlexTouch 100 units/mL subcutaneous solution)  58 unit  Subcutaneous  Every evening     lisinopril (lisinopril oral 2.5 mg tablet)  2.5 mg=1 tab  Oral  Every evening     metFORMIN (metFORMIN oral 1,000 mg tablet)  1,000 mg=1 tab  Oral  Twice daily     repaglinide  See Instructions         SITagliptin (Januvia oral 100 mg tablet)  100 mg=1 tab  Oral  Daily     warfarin (Coumadin (warfarin))  6 mg  Oral  Every Tuesday and Thursday     warfarin (Coumadin (warfarin))  9 mg  Oral  Every Monday, Wednesday, and Friday     warfarin (Coumadin (warfarin))  9 mg  Oral  Every Saturday and Sunday         Primary Care Physician      Physician Name:  DAMI SANTIAGO  Specialty :  SURGERY    Consulting Physicians     Physician Name:  MAXIMILIAN THOMAS Speciality:  CARDIOLOGY Consult Reason:  chest pain       Follow-up instructions:  FU  with  Ryan Garcia as Outpt for final PAthology  results  FU with PCP in 2 weeks.     PCP-Maxx Fermin informed about discharge and brief signout given via .       [employer]:-Arranged    I spent  38  minutes completing this patient's discharge.             Electronically Signed On 11/05/2018 19:31  __________________________________________________   JAMES SHELDON

## 2023-06-23 ENCOUNTER — DOCUMENTATION ONLY (OUTPATIENT)
Dept: BEHAVIORAL HEALTH | Facility: CLINIC | Age: 68
End: 2023-06-23
Payer: COMMERCIAL

## 2023-06-23 NOTE — PROGRESS NOTES
[facility] Weekly Treatment Plan Review      ATTENDANCE for the following date span:  23 to 23    Date     Group Therapy 3 hours NA hours 1.0 indiv  3 hours No group    Individual Therapy        Family Therapy        Other (Specify) Phase 1 &2  Phase 3  Phase 1  Phase 1 & 2        Patient attended all scheduled sessions during this time period.   Total hours: 108. Patient is in phase 1.    Weekly Treatment Plan Review     Treatment Plan initiated on 2023.    Dimension1: Acute Intoxication/Withdrawal Potential -   Previous Dimension Ratin  Current Dimension Ratin  Date of Last Use: 23  Any reports of withdrawal symptoms: No  Narrative: Patient reports last date of use as one time in April, but she can't remember the date. Patient reports no withdrawal symptoms. Patient was medically detoxed prior to that one day return to use. No withdrawal symptoms observed at intake.    23: Patsy did not use this week. No observed withdrawal symptoms, some PAWS possible. Patient reports agitation and fatigue.     23: Patsy did not use this week. No observed withdrawal symptoms, some PAWS possible. Patient reports agitation and fatigue.     Dimension 2: Biomedical Conditions & Complications -   Previous Dimension Ratin  Current Dimension Ratin  Medical Concerns:  Significant pain and swelling in foot and leg.  Current Medications & Medication Changes:  Current Outpatient Medications   Medication     albuterol (PROAIR HFA/PROVENTIL HFA/VENTOLIN HFA) 108 (90 Base) MCG/ACT inhaler     budesonide-formoterol (SYMBICORT) 160-4.5 MCG/ACT Inhaler     bumetanide (BUMEX) 1 MG tablet     colchicine (COLCRYS) 0.6 MG tablet     cyclobenzaprine (FLEXERIL) 10 MG tablet     diclofenac (VOLTAREN) 1 % topical gel     DULoxetine (CYMBALTA) 20 MG capsule     hydrOXYzine (ATARAX) 50 MG tablet     ipratropium - albuterol 0.5 mg/2.5 mg/3 mL (DUONEB)  "0.5-2.5 (3) MG/3ML neb solution     mirtazapine (REMERON) 15 MG tablet     nicotine (NICORETTE) 4 MG lozenge     omeprazole (PRILOSEC) 20 MG DR capsule     umeclidinium (INCRUSE ELLIPTA) 62.5 MCG/INH inhaler     No current facility-administered medications for this visit.     Medication Prescriber:  Yanique Cali MD.   Taking meds as prescribed? Yes  Medication side effects or concerns:  None reported.  Outside medical appointments this week (list provider and reason for visit):  Saw doctor about her foot. See EHR.  Narrative: Patient reports the following medical conditions: COPD, overweight, uses CPAP, and general fatigue . Patient has primary care with Yanique Cali MD.     23: Patient continues to have pain and swelling in her right foot. She saw another doctor this week and found out she had gout. She was put on an antibiotic and is hoping for some relief. Her back is also bothering her more than normal as well as her knee. We talked about making a goal list for her health and then prioritizing it and setting appointments for the future.    23: Patsy's pain and swelling have gone down since being on the antibiotic. She is almost finished with it. She is going to a spine specialist next week. After that she wants to have her knee looked at. She reported feeling \"pretty good, but not 100%\" overall.    Dimension 3: Emotional/Behavioral Conditions & Complications  Previous Dimension Ratin  Current Dimension Ratin    PHQ9       2023     1:00 PM 2023     1:00 PM 2023    10:00 AM   PHQ-9 SCORE   PHQ-9 Total Score 5 2 7     GAD7       2023     1:00 PM 2023     1:00 PM 2023    10:00 AM   ALISIA-7 SCORE   Total Score 3 1 8     Mental health diagnosis PTSD  Date of last SIB/SI/HI: N/A  Current MH Assignments:  None  Narrative:  Patient reports a mental health diagnosis of PTSD, Anxiety, and Depression. Patient endorses current symptoms of " "hypervigilance and anxiety when out. She also notes unexplained fatigue. Patient would like to meet with mental health therapist to process her trauma. Patient's denies current SI or thoughts of self-harm.     2023: Established care with program therapist, who facilitates one group weekly and will continue to see Patsy individually during Phase 1.    23: Patsy reported feeling up and down this week. Her physical health issues are having a major impact on her mental health. She has enjoyed some time with her boyfriend. She reports being compliant with all her medications.     23: Patsy reported that her mental health was \"pretty good\" and rated as a 8/10. She has really been denying the stories she's reading in the Big Book as well as the prayers and meditations on the don. She said they help her feel \"centered\". She is looking forward to helping her sister with her garage sale. She is also starting to set goals for her future. Patsy reiterated her desire to start some individual therapy for her trauma. We discussed PTSD in general terms and I told her a little about EMDR. She will start seeing Cony 1:1 and can discuss therapy options with her.     Dimension 4: Treatment Acceptance / Resistance  Previous Dimension Ratin  Current Dimension Ratin  ORAL Diagnosis:  Alcohol Use Disorder   303.90 (F10.20) Severe In early remission,   Phase: 1  Commitment to tx process/Stage of change: Contemplation  ORAL assignments:  Attend groups and remain abstinent.   Narrative: Patient appears internally motivated for treatment at this time and reports being \"sick and tired of being sick and tired\" and also wanting to take care of her health as her main reasons for coming to treatment.  Patient appears to be in the contemplation stage of change at this time.    23: Patsy attended 3/3 groups this week. She was fully present and engaged in all sessions.    23: aPtsy attended 3/3 groups this week. She was " "fully present and engaged in all sessions.    Dimension 5: Relapse / Continued Problem Potential    Previous Dimension Rating:  3  Current Dimension Rating:  3  Relapses this week: None  Urges to use:  None  UA results:   No results found for this or any previous visit (from the past 168 hour(s)).  Narrative: Patient reports that he has been to multiple treatments and has had limited periods of sobriety. Patient verbalizes that their primary triggers are related to her son bringing up the past. Patient is not engaged with the recovery community. She said two of her three sisters are supportive of her recovery. Patient has minimal insight into the relapse process or coping skills for emotional regulation. Patient's mental health symptoms increase the risk of relapse at this time.    6/16/23: Risk for return to use remains high. Patsy did uttilize enjoyable distractions (music on YouTube, and downloaded a meditation talha - [employer] Timer, and the Big Book Talha on her phone this week. She was also provided with education and handouts on distress tolerance exercises during her 1:1 session on Wednesday.     6/23/23: Patsy reported some urges this week. She had a very strong urge and then talked to herself in the mirror and was able to get through it. She reports that she does not want to drink anymore. She is also utilizing her Big Book Talha, talking to her sober friend, and drinking Ice Tea instead as a coping skill. We talked about taking \"time outs\" if she needs them and also about \"urge surfing\".     Dimension 6: Recovery Environment   Previous Dimension Rating:  3  Current Dimension Rating:  3  Family Involvement : None   Summarize attendance at family groups and family sessions: N/A  Family supportive of treatment?  Yes  Community support group attendance: None  Recreational activities: Exercise   Narrative: Patient has stable housing at this time and lives alone. Patient reports she is \"retired and on disability\". She " does note some concerns about her financial stability. Patient is not involved in the criminal justice system. She lacks a sober support network. Patient does not have a regular structure in place for her day to day activities, though she does enjoy working out. Patient has 4 adult children, noting that her son has been an ongoing trigger.     6/16/23: Patsy's has a close friend who lives in her building and is sober. She reaches out to a friend from a previous treatment to support abstinence. She committed to attending AA on Sunday and will report back on her experience next week.     6/23/23: Patsy did not attend AA because she forgot she had to cook for fathers day, but she is planning to attend with a peer this Sunday evening. She is also helping with her sisters garage sale this weekend. She is also ready to start setting some future goals for herself to keep her motivated for recovery.     Justification for Continued Treatment at this Level of Care:  Patient is in Phase 1 and has had minimal periods of sobriety. She lacks a sober support network. She is medication compliant and verbalizes a wllingness to engage in mental health treatment. She has been able to identify triggers, but lacks coping skills. Her daily life schedule/routine has not been supportive of recovery. She expresses willingness to change the lack of structure and meaningful activity.     Discharge Planning:  Target Discharge Date/Timeframe:  8/25/2023 to complete Phase 1/2; 11/17/2023 to complete Phase 3)  Med Mgmt Provider/Appt:  ELISA (Dr. Covington in addiction medicine)  Ind therapy Provider/Appt:  Weekly (currently Wednesdays at 11:00 am)    Other referrals:  None  Has vulnerable adult status change? No  Service Coordination:  None    Supervision:  Patient is staffed with treatment providers, this includes[facility] and [facility]; Staffed with [facility] Team on 6/14/23.    Attestation:   Can Boles MD - Medical  Director - Provides oversight and supervision of care.    Are Treatment Plan goals/objectives effective? Yes  *If no, list changes to treatment plan:    Are the current goals meeting client's needs? Yes  *If no, list the changes to treatment plan.    Client Input / Response: Agreeable       *Client agrees with any changes to the treatment plan: N/A  *Client received copy of changes: N/A  *Client is aware of right to access a treatment plan review: Yes (MyChart or request copy)

## 2023-06-23 NOTE — PROGRESS NOTES
Assessment:   Patsy Santamaria is a 68 year old y.o. female with past medical history significant for bilateral carpal tunnel syndrome, hereditary and idiopathic peripheral neuropathy, trochanteric bursitis of left hip, seizures, COPD, alcoholic hepatitis, chronic reflux esophagitis, history of GI bleed, morbid obesity, vitamin B12 deficiency, vitamin D deficiency, alcoholic cirrhosis of the liver, thrombocytopenia, depression, PTSD, smoker, alcohol dependence (currently in day treatment and no longer drinking alcohol), anxiety who presents today for follow-up regarding 2 areas of pain.  1.  A greater than 1 month history of severe bilateral mid thoracic pain.  Patient denies any trauma.  Pain is waking her from sleep at night.  She does not have any signs or symptoms of thoracic myelopathy.  2.  Greater than 1 month history of pain involving the right groin with radiation down the anterior thigh to the knee.  Suspect that she may have right hip osteoarthritis.  She does not have any low back pain.    PSP: Dr. Martinez  Plan:     A shared decision making plan was used.  The patient's values and choices were respected.  The following represents what was discussed and decided upon by the physician assistant and the patient.      1.  DIAGNOSTIC TESTS: I reviewed the MRI lumbar spine from 2021.  - I ordered an MRI thoracic spine.  Patient is having severe thoracic spine pain that wakes her at night.  - Also ordered an x-ray right hip.    2.  PHYSICAL THERAPY: I entered a referral to physical therapy.    3.  MEDICATIONS:  - Valium 5 mg, #2 with no refills was prescribed for claustrophobia for her MRI.  - I prescribe methocarbamol 500 milligrams 3 times daily as needed.  - Also prescribed Celebrex 100 mg daily.  -Cyclobenzaprine was somewhat helpful but she has some hepatic impairment so I recommended methocarbamol instead.  -Patient also takes duloxetine 40 mg daily for depression.    4.  INTERVENTIONS: No interventions  were ordered today.  Patient could potentially benefit from interventional pain management if she fails to improve with conservative treatment, depending on the results of her imaging studies.    5.  PATIENT EDUCATION: Patient is in agreement the above plan.  All questions were answered.    6.  FOLLOW-UP: A nurse will call the patient with the results of her MRI and x-ray.  At that time I will likely recommend that the patient trial physical therapy and follow-up with me after 6 weeks versus have the patient return to the clinic to review the results.  If she has questions or concerns in the meantime, she should not hesitate to call.    Subjective:     Patsy Santamaria is a 68 year old female who presents today for follow-up regarding 2 areas of pain.    Patient complains first of thoracic spine pain.  This pain began more than 1 month ago.  She denies any injury or event to cause the pain.  Pain is located in the midline in the mid/upper thoracic region.  Pain radiates up to the shoulder blades bilaterally, worse on the left.  This pain is severe.  It is worse at night.  It wakes her from sleep.  When she wakes up she has to get up out of bed and reposition/stretch to try to alleviate the pain.  She states that it feels like someone is sticking something into her back.  She tried muscle relaxers which were somewhat helpful for this pain but she ran out.  Ice and heat provide temporary relief as well.    Patient complains next of right groin pain.  This is also been present for more than 1 month.  She denies trauma.  Pain begins in the right medial groin and radiates down the right anterior thigh to the knee.  Pain is worse when she transitions from seated to standing.  She states that upon transitioning to standing it takes some time for her hip to feel like it is in the right place to start walking.  She has to be cautious of the first couple steps she takes upon standing because of this.  She previously had pain  going all the way down to the right lateral foot but she was diagnosed with gout by a podiatrist and her foot pain has improved after colchicine.    Overall, patient rates her pain today as a 5 out of 10.  At its worst it is a 9 out of 10.  At its best it is a 2 out of 10.    Treatment to date:  - No physical therapy  - No hip or thoracic spine surgeries  - No hip or thoracic spine injections  - Cyclobenzaprine was somewhat helpful  - Diclofenac gel for foot pain is somewhat helpful  - Ibuprofen is not helpful and caused stomach irritation.  - Duloxetine for depression    Review of Systems:  Positive for numbness/tingling, weakness, headache, pain much worse at night, difficulty with hand skills.  Negative for loss of bowel/bladder control, inability to urinate, trip/double/falls, difficulty swallowing, fevers, unintentional weight loss.     Objective:   CONSTITUTIONAL:  Vital signs as above.  No acute distress.  The patient is well nourished and well groomed.  Patient is obese.  PSYCHIATRIC:  The patient is awake, alert, oriented to person, place and time.  The patient is answering questions appropriately with clear speech.  Normal affect.  HEENT: Normocephalic, atraumatic.  Sclera clear.    SKIN:  Skin over the face, posterior torso, bilateral upper and lower extremities is clean, dry, intact without rashes.  VASCULAR: Bilateral lower extremity edema (patient did not take her Bumex today)  MUSCULOSKELETAL:  Gait is mildly antalgic, favoring the right.  Tender to palpation midline and right greater than left mid thoracic region.  Tender to palpation right groin.  The patient has 5/5 strength for the bilateral shoulder abductors, elbow flexors/extensors, wrist extensors, finger flexors/abductors, hip flexors, knee flexors/extensors, ankle dorsiflexors/plantar flexors.  Negative straight leg raise bilaterally.  Range of motion of the right hip is mildly restricted with internal and external rotation with reproduction  of right groin pain.  NEUROLOGICAL: 1-2+ patellar, achilles reflexes which are symmetric bilaterally.  No ankle clonus bilaterally.  Sensation to light touch is intact in the bilateral upper extremities throughout and in the bilateral L4, L5, and S1 dermatomes.       RESULTS: I reviewed an MRI lumbar spine from [facility] dated February 11, 2021.  This shows epidural lipomatosis at L4-5 and L5-S1 with constriction of the thecal sac.  At L4-5 there is a mild disc bulge and annular tear and mild facet arthropathy with mild left foraminal stenosis.  At L5-S1 there is mild to moderate facet arthropathy with mild to moderate bilateral foraminal stenosis.

## 2023-06-26 ENCOUNTER — OFFICE VISIT (OUTPATIENT)
Dept: PHYSICAL MEDICINE AND REHAB | Facility: CLINIC | Age: 68
End: 2023-06-26
Attending: PHYSICIAN ASSISTANT
Payer: COMMERCIAL

## 2023-06-26 ENCOUNTER — HOSPITAL ENCOUNTER (OUTPATIENT)
Dept: BEHAVIORAL HEALTH | Facility: CLINIC | Age: 68
Discharge: HOME | End: 2023-06-26
Attending: FAMILY MEDICINE
Payer: COMMERCIAL

## 2023-06-26 ENCOUNTER — DOCUMENTATION ONLY (OUTPATIENT)
Dept: PSYCHOLOGY | Facility: CLINIC | Age: 68
End: 2023-06-26

## 2023-06-26 VITALS
HEART RATE: 70 BPM | WEIGHT: 265.9 LBS | BODY MASS INDEX: 47.11 KG/M2 | DIASTOLIC BLOOD PRESSURE: 87 MMHG | SYSTOLIC BLOOD PRESSURE: 138 MMHG | HEIGHT: 63 IN

## 2023-06-26 DIAGNOSIS — F10.20 ALCOHOL USE DISORDER, SEVERE, DEPENDENCE (H): Primary | ICD-10-CM

## 2023-06-26 DIAGNOSIS — F43.10 PTSD (POST-TRAUMATIC STRESS DISORDER): ICD-10-CM

## 2023-06-26 DIAGNOSIS — M25.551 HIP PAIN, RIGHT: Primary | ICD-10-CM

## 2023-06-26 DIAGNOSIS — F40.240 CLAUSTROPHOBIA: ICD-10-CM

## 2023-06-26 DIAGNOSIS — M54.6 PAIN IN THORACIC SPINE: ICD-10-CM

## 2023-06-26 PROCEDURE — 99204 OFFICE O/P NEW MOD 45 MIN: CPT | Performed by: PHYSICIAN ASSISTANT

## 2023-06-26 PROCEDURE — H2035 A/D TX PROGRAM, PER HOUR: HCPCS | Mod: HQ

## 2023-06-26 RX ORDER — DIAZEPAM 5 MG
TABLET ORAL
Qty: 2 TABLET | Refills: 0 | Status: SHIPPED | OUTPATIENT
Start: 2023-06-26 | End: 2023-07-11

## 2023-06-26 RX ORDER — METHOCARBAMOL 500 MG/1
500 TABLET, FILM COATED ORAL 3 TIMES DAILY PRN
Qty: 30 TABLET | Refills: 1 | Status: SHIPPED | OUTPATIENT
Start: 2023-06-26 | End: 2023-09-13

## 2023-06-26 RX ORDER — CELECOXIB 100 MG/1
100 CAPSULE ORAL DAILY
Qty: 30 CAPSULE | Refills: 1 | Status: SHIPPED | OUTPATIENT
Start: 2023-06-26 | End: 2023-07-11

## 2023-06-26 ASSESSMENT — PAIN SCALES - GENERAL: PAINLEVEL: MODERATE PAIN (5)

## 2023-06-26 NOTE — PATIENT INSTRUCTIONS
An order for physicaltherapy has been provided today.  Someone will call you to schedule physical therapy or you can call [phone] to schedule physical therapy.  It will be very important for you to do your physical therapy exercises on aregular basis to decrease your pain and prevent future flares of pain.      [facility] Scheduling    Please call [phone] to schedule your image(s) (select option#1). There are 2 different locations, see below. You can do walk-in visits for xray only images if you want.     [facility]  [address] [address]    [place] [id number] [facility] [address]

## 2023-06-26 NOTE — PROGRESS NOTES
Patient had an appointment scheduled at 8 AM today in person.  This writer reached out to patient at 8:11 AM and she stated she had forgotten about this appointment as she had a few other scheduled for today.  This writer provided patient with the number for behavioral intake to reschedule.

## 2023-06-26 NOTE — GROUP NOTE
Group Therapy Documentation    PATIENT'S NAME: Patsy Santamaria  MRN:   [id number]  :   1955  ACCT. NUMBER: [id number]  DATE OF SERVICE: 23  START TIME:  9:00 AM  END TIME: 12:00 PM  FACILITATOR(S): Marisel Mcdaniel, [facility]; Cony Guzman, [facility], [facility]  TOPIC: BEH Group Therapy  Number of patients attending the group:  4  Group Length:  3 Hours    Group Therapy Type: Addiction, Psychotherapeutic, and Skills/Education    Summary of Group / Topics Discussed:  Anger/Conflict Management , Boredom, Co-occurring Illness, Coping Skills/Lifestyle Managemet, Emotional Regulation, Meditation/Breathing Exercises, Mindfulness, Thinking Errors/Negative Self-Talk, and Trauma Informed Care    Group Attendance:  Attended group session 2 hours due to medical appointment    Patient's response to the group topic/interactions:  cooperative with task, discussed personal experience with topic, expressed readiness to alter behaviors, expressed understanding of topic and listened actively    Patient appeared to be Actively participating and Attentive.        Client specific details:  Patsy reported continuing abstinence and processed with the group to dentify triggers as well as coping (e.g., slow down) and relapse prevention skills (e.g., play tape through, change environment and body temp, rest) that she utilized effectively over the weekend. She continues to address medical concerns with medical professionals and continues good medication adherence. She reported a desire to be more aware of irritation and anger to allow her to try new skills (open hands) to diffuse anger in the moment. She agreed to process with group if this is effective for her. She engaged with others and the material presented, stating she is open to feedback.   Affirmation/Feelings: I am hopeful.

## 2023-06-26 NOTE — LETTER
6/26/2023         RE: Patsy Santamaria  [address]  [place] [address]        Dear Colleague,    Thank you for referring your patient, Patsy Santamaria, to the [facility]. Please see a copy of my visit note below.    Assessment:   Patsy Santamaria is a 68 year old y.o. female with past medical history significant for bilateral carpal tunnel syndrome, hereditary and idiopathic peripheral neuropathy, trochanteric bursitis of left hip, seizures, COPD, alcoholic hepatitis, chronic reflux esophagitis, history of GI bleed, morbid obesity, vitamin B12 deficiency, vitamin D deficiency, alcoholic cirrhosis of the liver, thrombocytopenia, depression, PTSD, smoker, alcohol dependence (currently in day treatment and no longer drinking alcohol), anxiety who presents today for follow-up regarding 2 areas of pain.  1.  A greater than 1 month history of severe bilateral mid thoracic pain.  Patient denies any trauma.  Pain is waking her from sleep at night.  She does not have any signs or symptoms of thoracic myelopathy.  2.  Greater than 1 month history of pain involving the right groin with radiation down the anterior thigh to the knee.  Suspect that she may have right hip osteoarthritis.  She does not have any low back pain.    PSP: Dr. Martinez  Plan:     A shared decision making plan was used.  The patient's values and choices were respected.  The following represents what was discussed and decided upon by the physician assistant and the patient.      1.  DIAGNOSTIC TESTS: I reviewed the MRI lumbar spine from 2021.  - I ordered an MRI thoracic spine.  Patient is having severe thoracic spine pain that wakes her at night.  - Also ordered an x-ray right hip.    2.  PHYSICAL THERAPY: I entered a referral to physical therapy.    3.  MEDICATIONS:  - Valium 5 mg, #2 with no refills was prescribed for claustrophobia for her MRI.  - I prescribe methocarbamol 500 milligrams 3 times daily  as needed.  - Also prescribed Celebrex 100 mg daily.  -Cyclobenzaprine was somewhat helpful but she has some hepatic impairment so I recommended methocarbamol instead.  -Patient also takes duloxetine 40 mg daily for depression.    4.  INTERVENTIONS: No interventions were ordered today.  Patient could potentially benefit from interventional pain management if she fails to improve with conservative treatment, depending on the results of her imaging studies.    5.  PATIENT EDUCATION: Patient is in agreement the above plan.  All questions were answered.    6.  FOLLOW-UP: A nurse will call the patient with the results of her MRI and x-ray.  At that time I will likely recommend that the patient trial physical therapy and follow-up with me after 6 weeks versus have the patient return to the clinic to review the results.  If she has questions or concerns in the meantime, she should not hesitate to call.    Subjective:     Patsy Santamaria is a 68 year old female who presents today for follow-up regarding 2 areas of pain.    Patient complains first of thoracic spine pain.  This pain began more than 1 month ago.  She denies any injury or event to cause the pain.  Pain is located in the midline in the mid/upper thoracic region.  Pain radiates up to the shoulder blades bilaterally, worse on the left.  This pain is severe.  It is worse at night.  It wakes her from sleep.  When she wakes up she has to get up out of bed and reposition/stretch to try to alleviate the pain.  She states that it feels like someone is sticking something into her back.  She tried muscle relaxers which were somewhat helpful for this pain but she ran out.  Ice and heat provide temporary relief as well.    Patient complains next of right groin pain.  This is also been present for more than 1 month.  She denies trauma.  Pain begins in the right medial groin and radiates down the right anterior thigh to the knee.  Pain is worse when she transitions from  seated to standing.  She states that upon transitioning to standing it takes some time for her hip to feel like it is in the right place to start walking.  She has to be cautious of the first couple steps she takes upon standing because of this.  She previously had pain going all the way down to the right lateral foot but she was diagnosed with gout by a podiatrist and her foot pain has improved after colchicine.    Overall, patient rates her pain today as a 5 out of 10.  At its worst it is a 9 out of 10.  At its best it is a 2 out of 10.    Treatment to date:  - No physical therapy  - No hip or thoracic spine surgeries  - No hip or thoracic spine injections  - Cyclobenzaprine was somewhat helpful  - Diclofenac gel for foot pain is somewhat helpful  - Ibuprofen is not helpful and caused stomach irritation.  - Duloxetine for depression    Review of Systems:  Positive for numbness/tingling, weakness, headache, pain much worse at night, difficulty with hand skills.  Negative for loss of bowel/bladder control, inability to urinate, trip/double/falls, difficulty swallowing, fevers, unintentional weight loss.     Objective:   CONSTITUTIONAL:  Vital signs as above.  No acute distress.  The patient is well nourished and well groomed.  Patient is obese.  PSYCHIATRIC:  The patient is awake, alert, oriented to person, place and time.  The patient is answering questions appropriately with clear speech.  Normal affect.  HEENT: Normocephalic, atraumatic.  Sclera clear.    SKIN:  Skin over the face, posterior torso, bilateral upper and lower extremities is clean, dry, intact without rashes.  VASCULAR: Bilateral lower extremity edema (patient did not take her Bumex today)  MUSCULOSKELETAL:  Gait is mildly antalgic, favoring the right.  Tender to palpation midline and right greater than left mid thoracic region.  Tender to palpation right groin.  The patient has 5/5 strength for the bilateral shoulder abductors, elbow  flexors/extensors, wrist extensors, finger flexors/abductors, hip flexors, knee flexors/extensors, ankle dorsiflexors/plantar flexors.  Negative straight leg raise bilaterally.  Range of motion of the right hip is mildly restricted with internal and external rotation with reproduction of right groin pain.  NEUROLOGICAL: 1-2+ patellar, achilles reflexes which are symmetric bilaterally.  No ankle clonus bilaterally.  Sensation to light touch is intact in the bilateral upper extremities throughout and in the bilateral L4, L5, and S1 dermatomes.       RESULTS: I reviewed an MRI lumbar spine from [facility] dated February 11, 2021.  This shows epidural lipomatosis at L4-5 and L5-S1 with constriction of the thecal sac.  At L4-5 there is a mild disc bulge and annular tear and mild facet arthropathy with mild left foraminal stenosis.  At L5-S1 there is mild to moderate facet arthropathy with mild to moderate bilateral foraminal stenosis.          Again, thank you for allowing me to participate in the care of your patient.        Sincerely,        Kaia Moqsueda PA-C

## 2023-06-27 ENCOUNTER — PATIENT OUTREACH (OUTPATIENT)
Dept: CARE COORDINATION | Facility: CLINIC | Age: 68
End: 2023-06-27
Payer: COMMERCIAL

## 2023-06-27 NOTE — PROGRESS NOTES
Clinic Care Coordination Contact    Situation: Patient chart reviewed by manager of care coordination team.    Background: Patient completed initial assessment with RN CC on 3/30/23. From review of chart, patient also was engaged with Primary Care Behavioral Health Care Coordination from 9/15/22 to 6/12/23.     Assessment: Financial resources were discussed and shared by  [facility] CC over outreaches.  Patient has been connected with Addiction medicine which was the second care plan established during 3/30/23.  Primary Care SW CC made outreach attempt last on May (5/18/23) which was unreachable. Writer made second outreach attempt, leaving voicemail encouraging patient to outreach to clinic care team with any general questions or if future care coordination needs arise.    Plan/Recommendations: Given that care plan goals have been achieved through additional support and avenues within our [facility] system, and two outreach attempts were made for follow up, patient will be closed to Care Coordination. If future needs arise, new care coordination referral may be placed.    Vicky Corea, MIYAN, RN   Manager of Ambulatory Care Management  [facility]

## 2023-06-27 NOTE — LETTER
[facility] CARE COORDINATION    June 27, 2023    Patsy Santamaria  [address]  [place] [address]      Dear Patsy,    I have been unsuccessful in reaching you since our last contact. At this time the Care Coordination team will make no further attempts to reach you, however this does not change your ability to continue receiving care from your providers at your primary care clinic. If you need additional support from a care coordinator in the future please contact Ana M MEJIA RN at [phone].    All of us at [facility] are invested in your health and are here to assist you in meeting your goals.     Sincerely,    Your Care Team

## 2023-06-28 ENCOUNTER — DOCUMENTATION ONLY (OUTPATIENT)
Dept: BEHAVIORAL HEALTH | Facility: CLINIC | Age: 68
End: 2023-06-28
Payer: COMMERCIAL

## 2023-06-28 ENCOUNTER — HOSPITAL ENCOUNTER (OUTPATIENT)
Dept: BEHAVIORAL HEALTH | Facility: CLINIC | Age: 68
Discharge: HOME | End: 2023-06-28
Attending: FAMILY MEDICINE
Payer: COMMERCIAL

## 2023-06-28 PROCEDURE — H2035 A/D TX PROGRAM, PER HOUR: HCPCS

## 2023-06-28 NOTE — PROGRESS NOTES
Update for Funding Review    NAME: Patsy Santamaria  : 1955  Admit Date: 23    Diagnoses: 303.90 (F10.20) Alcohol Use Disorder Severe        Dimension 1: 0 - Patsy reports her date of last use was on 23. She has not expressed any withdrawal concerns.      Dimension 2: 2 -Patsy recently got a diagnoses of gout, she is taking antibiotics for this and finding benefit. Patsy is working on setting goals around her health and prioritizing appointments and wellness.      Dimension 3: 2 -Patsy has diagnoses of PTSD, anxiety and depression. Patsy is meeting with program therapist 1x weekly individually. She is sharing her mental health symptoms in the group setting and discussing the coping skills she is using. Patsy is gaining insight and education on her mental health diagnoses so she can make decisions on the various types of coping skills and treatments she can engage in. Over the last week she has endorsed stable mental health.      Dimension 4: 1 - Patsy is in the contemplation stage of change. She is attending groups as scheduled as well as individual sessions     Dimension 5: 3 -Patsy has had multiple past treatment attempts. She has had 1 return to use since enrollment. Patsy does not have a relapse prevention plan at this time. She has gained some insight however has minimal coping skills.      Dimension 6: 3 -Patsy has little sober support. She is not attending sober support meetings although has verbalized intentions. Patsy has stable housing however there is use in her complex. Patsy notes that some of her family members are triggering for her. She does not have current legal involvement.      Stage of Change: Contemplation     Justification for Treatment: Patsy had a recent return to use on 23. She is working on gaining insight and coping skills. Patsy does not have a relapse prevention plan at this time. She is working on managing her mental health and gaining insight into her symptoms. She has had  minimal periods of sobriety and lacks a support network. Patsy would benefit from continued group therapy and processing as well as individual sessions with our staff therapist.     Number of Sessions Requested: 24 group and 12 individual.     Marisel Mcdaniel [facility] 6/28/2023 11:03 AM

## 2023-06-28 NOTE — PROGRESS NOTES
Attestation: DO Adrienne Etienne Provides oversight and supervision of care.    Individual Session Summary (MICD)   DATE:  06/28/2023  START TIME: 12:00 PM   END TIME: 12:55 PM   Duration: 55 minutes    ORAL Diagnosis: (F10.20) AUD, severe  Mental Health Diagnosis: 309.81 (F43.10) PTSD    Data:  Writer met with Patsy for an individual session addressing co-occurring mental health and substance use disorders, specifically dimensions 3 and 5.  The session focused on client's thoughts and feelings regarding the treatment program, group, maintaining sobriety, and mental health (including identification of symptoms, emotional, psychological, and social well-being).      Client denies any suicidal thoughts, plans, or intent today. Client also denies any thoughts or behaviors of self-harm today.    Intervention: Writer utilized motivational interviewing to assess for stage of change as well as health and safety. To assess for depression, anxiety and severity of PTSD symtoms utiilzed PHQ-9, ALISIA-7, and PCL-5 with LEC-5 and Criterion A. Provider utilized validation, support and various therapeutic techniques including CBT techniques and Solutions Focused Brief Therapy, as well as trauma informed care (TIC).  Reviewed medical appointments and addressed scheduling conflicts.     Assessment: Patsy reported a stable mood and presented with congruent affect. She reported on progress made with relative to medical care and took notes to follow up to resolve scheduling conflicts with diagnostic tests (MRI, X-Ray) and PT for back and hip discomfort. She reported utilization of skills and practices to increase awareness of how she is feeling and what she is thinking, including recognition of triggers and urges. Client continues to express and understanding and desire to address co-occurring health, stability, and relapse prevention.         4/27/2023     1:00 PM 5/11/2023     1:00 PM 5/25/2023    10:00 AM   PHQ-9 SCORE   PHQ-9 Total  Score 5 2 7   6/28/2023-score of 2      4/27/2023     1:00 PM 5/11/2023     1:00 PM 5/25/2023    10:00 AM   ALISIA-7 SCORE   Total Score 3 1 8    6/28/2023-score of 4    PCL-5 with LEC-5 and Criterion A completed from    [email or web address]://www.ptsd.va.gov/professional/assessment/documents/PCL5_LEC_criterionA.PDF  6/28/2023-Score 56 (deemed eligible for PTSD treatment when AUD is in early remission and when clinically indicated).    Plan. Patsy will continue to attend Phase 1 groups and individual sessions. She will report on thoughts, feelings, triggers, urges, cravings and tools, skills and practices tried and process the effectiveness of said trials.     I have reviewed the note as documented above.  This accurately captures the substance of my visit with the client.    Cony Guzman, LPCC, LADC

## 2023-06-29 ENCOUNTER — HOSPITAL ENCOUNTER (OUTPATIENT)
Dept: BEHAVIORAL HEALTH | Facility: CLINIC | Age: 68
Discharge: HOME | End: 2023-06-29
Attending: FAMILY MEDICINE
Payer: COMMERCIAL

## 2023-06-29 DIAGNOSIS — F10.20 ALCOHOL USE DISORDER, SEVERE, DEPENDENCE (H): Primary | ICD-10-CM

## 2023-06-29 PROCEDURE — H2035 A/D TX PROGRAM, PER HOUR: HCPCS | Mod: HQ

## 2023-06-29 NOTE — GROUP NOTE
Group Therapy Documentation    PATIENT'S NAME: Patsy Santamaria  MRN:   [id number]  :   1955  ACCT. NUMBER: [id number]  DATE OF SERVICE: 23  START TIME:  9:00 AM  END TIME: 12:00 PM  FACILITATOR(S): Marisel Mcdaniel LADC; Dixie Finnegan LADC  TOPIC: BEH Group Therapy  Number of patients attending the group:  4  Group Length:  3 Hours    Group Therapy Type: Addiction and Psychoeducation    Summary of Group / Topics Discussed:    Meditation/Breathing Exercises and Psychoeducation/Skills Relapse Prevention (ORAL)  This topic will give a general overview of basic relapse prevention, including definitions of warning signs, triggers and cravings and the importance of addressing healthy coping skills for ongoing relapse prevention.    Objective(s):    Patients will identify 4 key warning signs and triggers    Patients will discuss their triggers and how they have responded/reacted to them        Structure (modalities, homework, worksheets, etc.):    Provide psychoeducation on relapse prevention and healthy coping methods.    Facilitate group discussion around each patient s current awareness of warning signs,  triggers and cravings.     Expected therapeutic outcome(s):    Patient will:    Be able to manage triggers and cravings without returning to substance use.      Therapeutic outcomes measured by:    Patients will name 4 of their warning signs and triggers    Attestation:   Can Boles MD - Medical Director - Provides oversight and supervision of care.        Group Attendance:  Attended group session    Patient's response to the group topic/interactions:  cooperative with task, discussed personal experience with topic and expressed understanding of topic    Patient appeared to be Actively participating, Attentive and Engaged.        Client specific details:  Patsy shared that today she is feeling very rested and well. She expressed having a trigger related to almost getting into a car accident, she was  able to slow herself down and respond to her emotional needs and did not place an order for alcohol. Patsy has been recognizing her triggers as emotions and because of this she has been working on emotional regulation skills. Today in group she was active in identifying her personal triggers and relapse warning signs, she shared that impulsivity is something she is becoming more aware of not only related to her alcohol use but also some of her shopping habits.

## 2023-06-30 ENCOUNTER — DOCUMENTATION ONLY (OUTPATIENT)
Dept: BEHAVIORAL HEALTH | Facility: CLINIC | Age: 68
End: 2023-06-30

## 2023-06-30 ENCOUNTER — THERAPY VISIT (OUTPATIENT)
Dept: PHYSICAL THERAPY | Facility: REHABILITATION | Age: 68
End: 2023-06-30
Attending: PHYSICIAN ASSISTANT
Payer: COMMERCIAL

## 2023-06-30 DIAGNOSIS — M25.551 HIP PAIN, RIGHT: ICD-10-CM

## 2023-06-30 DIAGNOSIS — M54.6 PAIN IN THORACIC SPINE: ICD-10-CM

## 2023-06-30 PROCEDURE — 97161 PT EVAL LOW COMPLEX 20 MIN: CPT | Mod: GP | Performed by: PHYSICAL THERAPIST

## 2023-06-30 PROCEDURE — 97110 THERAPEUTIC EXERCISES: CPT | Mod: GP | Performed by: PHYSICAL THERAPIST

## 2023-06-30 NOTE — PROGRESS NOTES
PHYSICAL THERAPY EVALUATION  Type of Visit: Evaluation    See electronic medical record for Abuse and Falls Screening details.    Subjective      Presenting condition or subjective complaint: right hip, thoracic back between shoulder blades  Date of onset: 06/26/23    Relevant medical history: COPD; Depression; Osteoarthritis; Overweight; Pain at night or rest; Sleep disorder like apnea; Smoking   Dates & types of surgery:  c section 1974, foot 2009, R knee arthroplasty 2007    Prior diagnostic imaging/testing results: -- (MRI mid back and x ray for hip in future)     Prior therapy history for the same diagnosis, illness or injury: No      Prior Level of Function   Transfers: Independent  Ambulation: Independent  ADL: Independent  IADL: Finances, Housekeeping, Laundry, Meal preparation, Medication management    Living Environment  Social support: Alone   Type of home: Apartment/condo   Stairs to enter the home: No       Ramp: No   Stairs inside the home: No   Is there a railing: No   Help at home: None  Equipment owned:       Employment: Not Applicable    Hobbies/Interests: cooking, reading, TV    Patient goals for therapy: walk more than a few blocks, catching inside of groin when trying to stand, weight shoots up from knee, standing more than 10 minutes, normally needs to rest after 10 min of standing and walking    Pain assessment: See objective evaluation for additional pain details     Objective   HIP EVALUATION  PAIN: Pain Level at Rest: 0/10  Pain Level with Use: 8/10  Pain Location: hip  Pain Quality: Sharp, Stabbing and sore  Pain Frequency: intermittent  Pain is Worst: dependent activity level   Pain is Exacerbated By: weight bearing, standing is worst then walking, transition from sitting to standing  Pain is Relieved By: none  Pain Progression: Worsened  INTEGUMENTARY (edema, incisions): WNL  GAIT:   Gait Deviations: Antalgic  Stride length decreased  Renetta decreased  BALANCE/PROPRIOCEPTION: Single Leg  Stance Eyes Open (seconds): 5 seconds each LE    ROM:   (Degrees) Left AROM Left PROM  Right AROM Right PROM   Hip Flexion Hip ROM next visit      Hip Extension       Hip Abduction       Hip Adduction       Hip Internal Rotation       Hip External Rotation       Knee Flexion       Knee Extension       Lumbar Side glide     Lumbar Flexion Severe restriction, tightness through low back   Lumbar Extension WFL, pain R SI joint   Pain:   End feel:   SB moderate limitation to mid thigh B tightness and pulling opposite side of movement, Rotation tight on Right with left rotation and moderate limitation,     PELVIC/SI SCREEN: complete at future visit as indicated  STRENGTH: complete at future visit  LE FLEXIBILITY: decreased B hamstring, gastroc length, limited flexibility through trunk    SHOULDER EVALUATION  PAIN: Pain Level at Rest: 2/10 gets up to 9/10 with reaching up or out with arm focused on mid back area  INTEGUMENTARY (edema, incisions): none noted  POSTURE: Sitting Posture: Rounded shoulders, Forward head, Thoracic kyphosis increased  ROM:   (Degrees) Left AROM Left PROM Right AROM  Right PROM   Shoulder Flexion 82 (pain thoracic)  155    Shoulder Extension 38 (worst at thoracic)  35    Shoulder Abduction 60 (pain thoracic) T6-8  163    Shoulder Adduction       Shoulder Internal Rotation Inferior angle of shoulder blade (tight sensation)  15 (90/90)  Tight  Functional mobility inferior angle of shoulder blade    Shoulder External Rotation Top of head functional mobility  70 (90/90)  Tight  T1 functional mobility    Shoulder Horizontal Abduction       Shoulder Horizontal Adduction       Shoulder Flexion ER       Shoulder Flexion IR       Elbow Extension       Elbow Flexion       Pain:   End feel:     STRENGTH: complete at next visit  PALPATION: tender to palpation along thoracic spine between scapula along ribs    CERVICAL SCREEN: next visit    Assessment & Plan   CLINICAL IMPRESSIONS   Medical Diagnosis: M25.551  (ICD-10-CM) - Hip pain, right  M54.6 (ICD-10-CM) - Pain in thoracic spine    Treatment Diagnosis: R SI Joint pain, R hip pain, mid back pain   Impression/Assessment: Patient is a 68 year old female with recent history in last 3-4 months with mid back pain/shoulder pain along with R hip/buttock pain over last one year causing increasing limitations in functional mobility. The following significant findings have been identified: Pain, Decreased ROM/flexibility, Decreased joint mobility, Decreased strength, Impaired balance, Decreased proprioception, Impaired gait, Impaired muscle performance, Decreased activity tolerance and Impaired posture. These impairments interfere with their ability to perform self care tasks, recreational activities, household chores, household mobility, community mobility and leisure activities as compared to previous level of function.     Clinical Decision Making (Complexity):   Clinical Presentation: Stable/Uncomplicated  Clinical Presentation Rationale: based on medical and personal factors listed in PT evaluation  Clinical Decision Making (Complexity): Low complexity    PLAN OF CARE  Treatment Interventions:  Interventions: Gait Training, Manual Therapy, Neuromuscular Re-education, Therapeutic Activity, Therapeutic Exercise, Self-Care/Home Management    Long Term Goals     PT Goal 1  Goal Identifier: sleeping  Goal Description: Patient will be able to sleep without waking due to pain and able to get comfortable to sleep without being effected by pain.  Rationale: to maximize safety and independence within the home;to maximize safety and independence within the community  Goal Progress: initiated  Target Date: 09/28/23  PT Goal 2  Goal Identifier: Standing  Goal Description: Patient will be 30 minutes for meal/prep and cooking with 3/10 or less pain.  Rationale: to maximize safety and independence with performance of ADLs and functional tasks;to maximize safety and independence within the  home;to maximize safety and independence within the community  Goal Progress: initiated  Target Date: 09/28/23  PT Goal 3  Goal Identifier: ADLs, self cares, IADLs  Goal Description: Patient will be able to completed ADLs, dressing, and IADLs without sx 3/10 or less at shoulder or hip.  Rationale: to maximize safety and independence with performance of ADLs and functional tasks;to maximize safety and independence within the home;to maximize safety and independence within the community;to maximize safety and independence with self cares  Goal Progress: initiated  Target Date: 09/28/23  PT Goal 4  Goal Identifier: walking  Goal Description: Patient will be able to walk 20-25 minutes to [place] or other community locations around home without sx greater than 3/10.  Rationale: to maximize safety and independence with performance of ADLs and functional tasks;to maximize safety and independence within the home;to maximize safety and independence within the community;to maximize safety and independence with self cares  Goal Progress: initiated  Target Date: 09/28/23  PT Goal 5  Goal Identifier: reaching  Goal Description: Patient will be able to reach into kitchen cabinets without sx greater than 3/10.  Rationale: to maximize safety and independence with performance of ADLs and functional tasks;to maximize safety and independence within the home  Goal Progress: initiated  Target Date: 09/28/23  PT Goal 6  Goal Identifier: HEP  Goal Description: Patient will be able to demonstrate 6 exercises without assistance for progression to independent mangement,  Rationale: to maximize safety and independence with transportation;to maximize safety and independence within the community;to maximize safety and independence within the home;to maximize safety and independence with performance of ADLs and functional tasks;to maximize safety and independence with self cares  Goal Progress: initiated  Target Date: 09/28/23      Frequency of  Treatment: 1 x weekly  Duration of Treatment: 12 weeks    Recommended Referrals to Other Professionals: none at this time  Education Assessment:   Learner/Method: Patient;Demonstration;Pictures/Video    Risks and benefits of evaluation/treatment have been explained.   Patient/Family/caregiver agrees with Plan of Care.     Evaluation Time:     PT Eval, Low Complexity Minutes (96518): 30      Signing Clinician: Martha Hopkins PT      [facility]                                                                                   OUTPATIENT PHYSICAL THERAPY      PLAN OF TREATMENT FOR OUTPATIENT REHABILITATION   Patient's Last Name, First Name, Patsy Childress YOB: 1955   Provider's Name   [facility]   Medical Record No.  [id number]     Onset Date: 06/26/23  Start of Care Date: 06/30/23     Medical Diagnosis:  M25.551 (ICD-10-CM) - Hip pain, right  M54.6 (ICD-10-CM) - Pain in thoracic spine      PT Treatment Diagnosis:  R SI Joint pain, R hip pain, mid back pain Plan of Treatment  Frequency/Duration: 1 x weekly/ 12 weeks    Certification date from 06/30/23 to 09/28/23         See note for plan of treatment details and functional goals     Martha Hopkins, PT                         I CERTIFY THE NEED FOR THESE SERVICES FURNISHED UNDER        THIS PLAN OF TREATMENT AND WHILE UNDER MY CARE     (Physician attestation of this document indicates review and certification of the therapy plan).                  Referring Provider:  Kaia Mosqueda      Initial Assessment  See Epic Evaluation- Start of Care Date: 06/30/23

## 2023-06-30 NOTE — PROGRESS NOTES
[facility] Weekly Treatment Plan Review      ATTENDANCE for the following date span:  23 to 23    Date     Group Therapy 3 hours NA hours 1.0 indiv  3 hours No group    Individual Therapy        Family Therapy        Other (Specify) Phase 1 &2  Phase 3  Phase 1  Phase 1 & 2        Patient attended all scheduled sessions during this time period.   Total hours: 19/108. Patient is in phase 1.    Weekly Treatment Plan Review     Treatment Plan initiated on 2023.    Dimension1: Acute Intoxication/Withdrawal Potential -   Previous Dimension Ratin  Current Dimension Ratin  Date of Last Use: 23  Any reports of withdrawal symptoms: No  Narrative: Patient reports last date of use as one time in April, but she can't remember the date. Patient reports no withdrawal symptoms. Patient was medically detoxed prior to that one day return to use. No withdrawal symptoms observed at intake.    23: Patsy did not use this week. No observed withdrawal symptoms, some PAWS possible. Patient reports agitation and fatigue.     23: Patsy did not use this week. No observed withdrawal symptoms, some PAWS possible. Patient reports agitation and fatigue.     23- Patsy reports sustained abstinence    Dimension 2: Biomedical Conditions & Complications -   Previous Dimension Ratin  Current Dimension Ratin  Medical Concerns:  Significant pain and swelling in foot and leg.  Current Medications & Medication Changes:  Current Outpatient Medications   Medication     albuterol (PROAIR HFA/PROVENTIL HFA/VENTOLIN HFA) 108 (90 Base) MCG/ACT inhaler     budesonide-formoterol (SYMBICORT) 160-4.5 MCG/ACT Inhaler     bumetanide (BUMEX) 1 MG tablet     celecoxib (CELEBREX) 100 MG capsule     colchicine (COLCRYS) 0.6 MG tablet     diazepam (VALIUM) 5 MG tablet     diclofenac (VOLTAREN) 1 % topical gel     DULoxetine (CYMBALTA) 20 MG capsule     hydrOXYzine (ATARAX)  "50 MG tablet     ipratropium - albuterol 0.5 mg/2.5 mg/3 mL (DUONEB) 0.5-2.5 (3) MG/3ML neb solution     methocarbamol (ROBAXIN) 500 MG tablet     mirtazapine (REMERON) 15 MG tablet     nicotine (NICORETTE) 4 MG lozenge     omeprazole (PRILOSEC) 20 MG DR capsule     umeclidinium (INCRUSE ELLIPTA) 62.5 MCG/INH inhaler     No current facility-administered medications for this visit.     Medication Prescriber:  Yanique Cali MD.   Taking meds as prescribed? Yes  Medication side effects or concerns:  None reported.  Outside medical appointments this week (list provider and reason for visit):  Saw doctor about her foot. See EHR.  Narrative: Patient reports the following medical conditions: COPD, overweight, uses CPAP, and general fatigue . Patient has primary care with Yanique Cali MD.     23: Patient continues to have pain and swelling in her right foot. She saw another doctor this week and found out she had gout. She was put on an antibiotic and is hoping for some relief. Her back is also bothering her more than normal as well as her knee. We talked about making a goal list for her health and then prioritizing it and setting appointments for the future.    23: Patsy's pain and swelling have gone down since being on the antibiotic. She is almost finished with it. She is going to a spine specialist next week. After that she wants to have her knee looked at. She reported feeling \"pretty good, but not 100%\" overall.    23- Patsy reports her Spine clinic appointment went well, she feels she got more insight into her concerns in that appointment than she has had in years. Patsy will begin physical therapy for her neck, back and hip.     Dimension 3: Emotional/Behavioral Conditions & Complications  Previous Dimension Ratin  Current Dimension Ratin    PHQ9       2023     1:00 PM 2023     1:00 PM 2023    10:00 AM   PHQ-9 SCORE   PHQ-9 Total Score 5 2 7 " "    GAD7       2023     1:00 PM 2023     1:00 PM 2023    10:00 AM   ALISIA-7 SCORE   Total Score 3 1 8     Mental health diagnosis PTSD  Date of last SIB/SI/HI: N/A  Current MH Assignments:  None  Narrative:  Patient reports a mental health diagnosis of PTSD, Anxiety, and Depression. Patient endorses current symptoms of hypervigilance and anxiety when out. She also notes unexplained fatigue. Patient would like to meet with mental health therapist to process her trauma. Patient's denies current SI or thoughts of self-harm.     2023: Established care with program therapist, who facilitates one group weekly and will continue to see Patsy individually during Phase 1.    23: Patsy reported feeling up and down this week. Her physical health issues are having a major impact on her mental health. She has enjoyed some time with her boyfriend. She reports being compliant with all her medications.     23: Patsy reported that her mental health was \"pretty good\" and rated as a 8/10. She has really been denying the stories she's reading in the Big Book as well as the prayers and meditations on the don. She said they help her feel \"centered\". She is looking forward to helping her sister with her garage sale. She is also starting to set goals for her future. Patsy reiterated her desire to start some individual therapy for her trauma. We discussed PTSD in general terms and I told her a little about EMDR. She will start seeing Cony 1:1 and can discuss therapy options with her.     23- Patsy reports her mental health is improving and attributes it to getting answers on her physical health. Patsy got good sleep on Thursday and states that is not something that happens often. She was feeling positive about that.     Dimension 4: Treatment Acceptance / Resistance  Previous Dimension Ratin  Current Dimension Ratin  ORAL Diagnosis:  Alcohol Use Disorder   303.90 (F10.20) Severe In early remission, " "  Phase: 1  Commitment to tx process/Stage of change: Contemplation  ORAL assignments:  Attend groups and remain abstinent.   Narrative: Patient appears internally motivated for treatment at this time and reports being \"sick and tired of being sick and tired\" and also wanting to take care of her health as her main reasons for coming to treatment.  Patient appears to be in the contemplation stage of change at this time.    6/16/23: Patsy attended 3/3 groups this week. She was fully present and engaged in all sessions.    6/23/23: Patsy attended 3/3 groups this week. She was fully present and engaged in all sessions.    6/30/23- Patsy attended group as scheduled. She was an active and engaged group member.     Dimension 5: Relapse / Continued Problem Potential    Previous Dimension Rating:  3  Current Dimension Rating:  3  Relapses this week: None  Urges to use:  None  UA results:   No results found for this or any previous visit (from the past 168 hour(s)).  Narrative: Patient reports that he has been to multiple treatments and has had limited periods of sobriety. Patient verbalizes that their primary triggers are related to her son bringing up the past. Patient is not engaged with the recovery community. She said two of her three sisters are supportive of her recovery. Patient has minimal insight into the relapse process or coping skills for emotional regulation. Patient's mental health symptoms increase the risk of relapse at this time.    6/16/23: Risk for return to use remains high. Patsy did uttilize enjoyable distractions (music on YouTube, and downloaded a meditation talha - [employer] Timer, and the Big Book Talha on her phone this week. She was also provided with education and handouts on distress tolerance exercises during her 1:1 session on Wednesday.     6/23/23: Patsy reported some urges this week. She had a very strong urge and then talked to herself in the mirror and was able to get through it. She reports that she " "does not want to drink anymore. She is also utilizing her Big Book Talha, talking to her sober friend, and drinking Ice Tea instead as a coping skill. We talked about taking \"time outs\" if she needs them and also about \"urge surfing\".     6/30/23- During group discussion we talked about weather the definition of urges and triggers. She feels she gained some insight into the impulsivity of addiction. Patsy shared that she had a significant trigger when she was almost in a car accident, she went home and thought about putting an order in for some alcohol. I asked Patsy how she would have put the order in and she said she would call it in. I then asked her to consider deleting the number in her phone to which she stated that was a good idea.     Dimension 6: Recovery Environment   Previous Dimension Rating:  3  Current Dimension Rating:  3  Family Involvement : None   Summarize attendance at family groups and family sessions: N/A  Family supportive of treatment?  Yes  Community support group attendance: None  Recreational activities: Exercise   Narrative: Patient has stable housing at this time and lives alone. Patient reports she is \"retired and on disability\". She does note some concerns about her financial stability. Patient is not involved in the criminal justice system. She lacks a sober support network. Patient does not have a regular structure in place for her day to day activities, though she does enjoy working out. Patient has 4 adult children, noting that her son has been an ongoing trigger.     6/16/23: Patsy's has a close friend who lives in her building and is sober. She reaches out to a friend from a previous treatment to support abstinence. She committed to attending AA on Sunday and will report back on her experience next week.     6/23/23: Patsy did not attend AA because she forgot she had to cook for fathers day, but she is planning to attend with a peer this Sunday evening. She is also helping with her " sisters garage sale this weekend. She is also ready to start setting some future goals for herself to keep her motivated for recovery.     6/30/23: Patsy does not have plan for the holiday weekend and she does not express any high risk situations. Patsy has been thinking about attending sober support meetings, she was encouraged to attend by peers. She has been enjoying time with her grandson.     Justification for Continued Treatment at this Level of Care:  Patient is in Phase 1 and has had minimal periods of sobriety. She lacks a sober support network. She is medication compliant and verbalizes a wllingness to engage in mental health treatment. She has been able to identify triggers, but lacks coping skills. Her daily life schedule/routine has not been supportive of recovery. She expresses willingness to change the lack of structure and meaningful activity.     Discharge Planning:  Target Discharge Date/Timeframe:  8/25/2023 to complete Phase 1/2; 11/17/2023 to complete Phase 3)  Med Mgmt Provider/Appt:  [facility] (Dr. Covington in addiction medicine)  Ind therapy Provider/Appt:  Weekly (currently Wednesdays at 11:00 am)    Other referrals:  None  Has vulnerable adult status change? No  Service Coordination:  None    Supervision:  Patient is staffed with treatment providers, this includes: Dixie Finnegan [facility] and Cony Guzman [facility]; Staffed with [facility] Team on 6/14/23.    Attestation:   Can Boles MD - Medical Director - Provides oversight and supervision of care.    Are Treatment Plan goals/objectives effective? Yes  *If no, list changes to treatment plan:    Are the current goals meeting client's needs? Yes  *If no, list the changes to treatment plan.    Client Input / Response: Agreeable       *Client agrees with any changes to the treatment plan: N/A  *Client received copy of changes: N/A  *Client is aware of right to access a treatment plan review: Yes (MyChart or request copy)

## 2023-07-03 ENCOUNTER — HOSPITAL ENCOUNTER (OUTPATIENT)
Dept: BEHAVIORAL HEALTH | Facility: CLINIC | Age: 68
Discharge: HOME | End: 2023-07-03
Attending: FAMILY MEDICINE
Payer: COMMERCIAL

## 2023-07-03 DIAGNOSIS — F10.20 ALCOHOL USE DISORDER, SEVERE, DEPENDENCE (H): ICD-10-CM

## 2023-07-03 DIAGNOSIS — F43.10 PTSD (POST-TRAUMATIC STRESS DISORDER): Primary | ICD-10-CM

## 2023-07-03 PROCEDURE — H2035 A/D TX PROGRAM, PER HOUR: HCPCS | Mod: HQ

## 2023-07-03 NOTE — GROUP NOTE
"Group Therapy Documentation    PATIENT'S NAME: Patsy Santamaria  MRN:   [id number]  :   1955  ACCT. NUMBER: [id number]  DATE OF SERVICE: 23  START TIME:  9:00 AM  END TIME: 12:00 PM  FACILITATOR(S): Cony Guzman, MOHINIC, [facility]  TOPIC: BEH Group Therapy  Number of patients attending the group: 3  Group Length:  3 Hours    Group Therapy Type: Addiction, Psychotherapeutic, and Skills/Education    Summary of Group / Topics Discussed:    Cognitive Therapy Techniques, Co-occurring Illness, Choices in Recovery, Meditation/Breathing Exercises, Proper Nutrition & Exercise, Self-Care Activities, Stress Management, Symptom Management, Thinking Errors/Negative Self-Talk, and Trauma Informed Care, relapse prevention planning/holiday contingencies      Group Attendance:  Attended group session    Patient's response to the group topic/interactions:  cooperative with task, discussed personal experience with topic, expressed readiness to alter behaviors, expressed understanding of topic, gave appropriate feedback to peers and listened actively    Patient appeared to be Actively participating, Attentive and Engaged.      Client specific details:  Patsy reported continuing abstinence and intent to continue recovery. She noted she is \"getting better mentally and having to deal with physical ramifications of long term use\". She expressed gratitude for being alive, friends and family. She reported on \"a little scary\" thoughts of use and how she is dealing with them, as opposed to denying them and \"pushing them down/away\". She shared that she responded by reminding herself \"This is just a thought. This is not what I want\", and found this to be effective.   She noted that she went to the [employer] [place] [employer] got a new library card, ordered \"Quit Like a Woman\" and checked out the computer lab for potential job search in the future. She identified self care today includes getting a haircut.   Patsy reported she is using " "meditation with more success and that her sleep is improving overall, She did note a disturbing nightmare last night (prazosin was reportedly ineffective in past). She reportedly called her friend who stayed with her so she could return to sleep.   Patsy explored ambivalnce about attending AA on Sundays and noted her reluctance may have to do with \"not wanting to start over\". She was receptive to cognitive restructuring about not anticipating relapse due to starting over. She agreed to consider asking her sober friend to attend the Sunday meeting with her to help her hold herself accountable.   d to attend meeting on Sunday.     Attestation:   Can Boles MD - Medical Director - Provides oversight and supervision of care.      "

## 2023-07-05 ENCOUNTER — DOCUMENTATION ONLY (OUTPATIENT)
Dept: BEHAVIORAL HEALTH | Facility: CLINIC | Age: 68
End: 2023-07-05
Payer: COMMERCIAL

## 2023-07-05 NOTE — PROGRESS NOTES
ABSENT NOTE:    Patsy Santamaria was absent from group/indivudal session on 7/5/2023 . This absence was excused due to scheduling conflict. Patsy contacted writer in advance of appointment to RS or cancel and confirmed planned attendance on 7/6/2023 at the next group session.  Cony Guzman, [facility], [facility]  7/5/2023 , 9:09 AM

## 2023-07-06 ENCOUNTER — HOSPITAL ENCOUNTER (OUTPATIENT)
Dept: MRI IMAGING | Facility: CLINIC | Age: 68
Discharge: HOME | End: 2023-07-06
Attending: PHYSICIAN ASSISTANT
Payer: COMMERCIAL

## 2023-07-06 ENCOUNTER — HOSPITAL ENCOUNTER (OUTPATIENT)
Dept: RADIOLOGY | Facility: CLINIC | Age: 68
Discharge: HOME | End: 2023-07-06
Attending: PHYSICIAN ASSISTANT
Payer: COMMERCIAL

## 2023-07-06 ENCOUNTER — HOSPITAL ENCOUNTER (OUTPATIENT)
Dept: BEHAVIORAL HEALTH | Facility: CLINIC | Age: 68
Discharge: HOME | End: 2023-07-06
Attending: FAMILY MEDICINE
Payer: COMMERCIAL

## 2023-07-06 DIAGNOSIS — M25.551 HIP PAIN, RIGHT: ICD-10-CM

## 2023-07-06 DIAGNOSIS — F10.20 ALCOHOL USE DISORDER, SEVERE, DEPENDENCE (H): Primary | ICD-10-CM

## 2023-07-06 DIAGNOSIS — M54.6 PAIN IN THORACIC SPINE: ICD-10-CM

## 2023-07-06 PROCEDURE — 72146 MRI CHEST SPINE W/O DYE: CPT

## 2023-07-06 PROCEDURE — H2035 A/D TX PROGRAM, PER HOUR: HCPCS | Mod: HQ

## 2023-07-06 PROCEDURE — 73502 X-RAY EXAM HIP UNI 2-3 VIEWS: CPT

## 2023-07-06 NOTE — GROUP NOTE
Group Therapy Documentation    PATIENT'S NAME: Patsy Santamaria  MRN:   [id number]  :   1955  ACCT. NUMBER: [id number]  DATE OF SERVICE: 23  START TIME:  9:00 AM  END TIME: 12:00 PM  FACILITATOR(S): Dixie Finnegan LADC; Marisel Mcdaniel LADC  TOPIC: BEH Group Therapy  Number of patients attending the group:  4    Group Length:  3 Hours    Group Therapy Type: Addiction    Summary of Group / Topics Discussed:    Coping Skills     Objective(s):       Learn new coping skills.    Review previously information on relapse prevention.     Structure (modalities, homework, worksheets, etc)      Interactive group discussion     Coping Skills Handouts     Patient will:     Have a better understanding of coping skills.    Identify coping skills that they are willing to try.     Therapeutic outcome(s) measured by:     Discussion and individual reflection.      Attestation: Jayson Boles MD - Medical Director - Provides oversight and supervision of care.      Group Attendance:  Attended group session    Patient's response to the group topic/interactions:  cooperative with task    Patient appeared to be Actively participating, Attentive and Engaged.        Client specific details:  Patsy reported feeling tired today. She said she didn't sleep good. She is having an MRI today. She will take valium to help with her fear of that. She said she had a urge from a new medication she tried called Celebrex. She said she got shaky and it made her think about drinking. She said  was a little bit of a trigger, but she stayed home which she thinks was for the best. She watched her shows and saw fireworks out her window. Her boyfriend came over after he was done spending time with his mother. Her high point of the week was getting her hair cut and hanging out at home. Her self care was getting her hair cut, going to her MRI today, and working on her self little by little. She is still having a hip and back problem  "and hopes the doctors can help. She has started physical therapy and is going one time a week. She hopes to start going to the gym two times per week. She shared that exercising in water is better for her. She has no plans this weekend and does not anticipate any high risk situations.     I asked her how getting paid was this month as it had been a trigger in the past. She said it was still a trigger, but she thought about the consequences and chose not to drink. She still hasn't deleted the number for the liquor delivery service.     Marisel asked her about her sleep trouble and she shared that she was having some nightmares and said things from her past were \"nagging at her\". She is going to talk to her psychiatrist about upping her sleep medication.       "

## 2023-07-07 ENCOUNTER — DOCUMENTATION ONLY (OUTPATIENT)
Dept: BEHAVIORAL HEALTH | Facility: CLINIC | Age: 68
End: 2023-07-07
Payer: COMMERCIAL

## 2023-07-07 ENCOUNTER — TELEPHONE (OUTPATIENT)
Dept: PHYSICAL MEDICINE AND REHAB | Facility: CLINIC | Age: 68
End: 2023-07-07
Payer: COMMERCIAL

## 2023-07-07 NOTE — TELEPHONE ENCOUNTER
Also per PSP Kaia Mosqueda PA-C: I reviewed her x-ray right hip.  This shows moderate degenerative changes (arthritis) of the right hip.  This is the source of her right groin and leg pain.  There is also mild degenerative change left hip.  I recommend that the patient trial physical therapy and follow-up with me after 6 weeks.  If hip pain does not improve she would be a candidate for a right hip joint injection.     Phone call to patient to review results and provider's recommendations. Left message to return call.
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
## 2023-07-07 NOTE — TELEPHONE ENCOUNTER
----- Message from Kaia Mosqueda PA-C sent at 7/7/2023 12:23 PM CDT -----  Please call this patient and let her know that I reviewed her MRI thoracic spine.  This shows mild wear-and-tear to the cartilage disks and a mild curvature in the thoracic spine.  There is no arthritis.  There is no significant narrowing around any nerves.  Recommend that she continue with physical therapy and follow-up with me after 6 weeks.

## 2023-07-10 ENCOUNTER — HOSPITAL ENCOUNTER (OUTPATIENT)
Dept: BEHAVIORAL HEALTH | Facility: CLINIC | Age: 68
Discharge: HOME | End: 2023-07-10
Attending: FAMILY MEDICINE
Payer: COMMERCIAL

## 2023-07-10 DIAGNOSIS — F43.10 PTSD (POST-TRAUMATIC STRESS DISORDER): ICD-10-CM

## 2023-07-10 DIAGNOSIS — F10.20 ALCOHOL USE DISORDER, SEVERE, DEPENDENCE (H): Primary | ICD-10-CM

## 2023-07-10 PROCEDURE — H2035 A/D TX PROGRAM, PER HOUR: HCPCS | Mod: HQ

## 2023-07-10 NOTE — GROUP NOTE
"Group Therapy Documentation    PATIENT'S NAME: Patsy Santamaria  MRN:   [id number]  :   1955  ACCT. NUMBER: [id number]  DATE OF SERVICE: 7/10/23  START TIME:  9:00 AM  END TIME: 12:00 PM  FACILITATOR(S): Cony Guzman, [facility], [facility]; Marisel Mcdaniel [facility]  TOPIC: BEH Group Therapy  Number of patients attending the group:  3  Group Length:  3 Hours    Group Therapy Type: Addiction, Psychotherapeutic, and Skills/Education    Summary of Group / Topics Discussed:    Cognitive Therapy Techniques, Co-occurring Illness, Meditation/Breathing Exercises, and Mindfulness    Attestation:   Can Boles MD - Medical Director - Provides oversight and supervision of care.      Group Attendance:  Attended group session    Patient's response to the group topic/interactions:  cooperative with task, discussed personal experience with topic, expressed readiness to alter behaviors, expressed understanding of topic, gave appropriate feedback to peers, listened actively and requested more information about topic    Patient appeared to be Actively participating, Attentive and Engaged.        Awareness.   Client specific details:  Patsy reported continuing abstinence with non-motivating thoughts and no urges or cravings. She reported reading \"Quit Like a Woman\" is very helpful. She reported that she got a haircut for self care, as planned and is engaged in IADLs (grocery shopping, shopping for a birthday gift). She indicated she may need a prompt to attend AA on Sundays, because she continues to forget as the weekend goes along. She agreed to set her alarm on her phone as a reminder.  She continues to follow up on medical professional recommendations and is waiting for the results of an MRI. She will start PT on Wednesday. She is also planning to swim at the . She shared her sleep routine and how to make some tweaks to fall back to sleep when waking in the night. She expressed a willingness to try another meditation and not " "turn on the TV. She reported feeling good about not having urges when shopping at [employer] and noted she had deleted the [employer] store phone number she has ordered from for years. She shared about her sister and discussed how to manage conversations about recovery (\"invite her in to your recovery on your terms/set good boundarlies) and to begin to change long-standing destructive patterns of communication that lead to unhelpful dynamics.   Affirmation: I feel hopeful. No shame or guilt.   Identified feelings: \"Happy, right now in a good place\".      "

## 2023-07-10 NOTE — TELEPHONE ENCOUNTER
Patient returned call. Results given and explained. Discussed PSP wanting patient to trial PT for both her back and hip pain. She should then follow up with PSP in 6 weeks. Stated understanding.     Transferred to scheduling to make follow up appointment.

## 2023-07-10 NOTE — TELEPHONE ENCOUNTER
Patient had called at 225 PM today and left message on nurse line that she was returning call for her results.    Phone call back to patient to review the results. Left message to return call.

## 2023-07-11 ENCOUNTER — OFFICE VISIT (OUTPATIENT)
Dept: ADDICTION MEDICINE | Facility: CLINIC | Age: 68
End: 2023-07-11
Payer: COMMERCIAL

## 2023-07-11 VITALS
HEIGHT: 63 IN | HEART RATE: 76 BPM | DIASTOLIC BLOOD PRESSURE: 72 MMHG | WEIGHT: 271.04 LBS | SYSTOLIC BLOOD PRESSURE: 142 MMHG | BODY MASS INDEX: 48.02 KG/M2

## 2023-07-11 DIAGNOSIS — F43.10 PTSD (POST-TRAUMATIC STRESS DISORDER): ICD-10-CM

## 2023-07-11 DIAGNOSIS — F32.A DEPRESSIVE DISORDER: ICD-10-CM

## 2023-07-11 DIAGNOSIS — F10.20 ALCOHOL USE DISORDER, SEVERE, DEPENDENCE (H): Primary | ICD-10-CM

## 2023-07-11 DIAGNOSIS — R63.5 WEIGHT GAIN: ICD-10-CM

## 2023-07-11 PROCEDURE — 99214 OFFICE O/P EST MOD 30 MIN: CPT | Performed by: FAMILY MEDICINE

## 2023-07-11 RX ORDER — DULOXETIN HYDROCHLORIDE 20 MG/1
40 CAPSULE, DELAYED RELEASE ORAL DAILY
Qty: 30 CAPSULE | Refills: 1 | Status: CANCELLED | OUTPATIENT
Start: 2023-07-11

## 2023-07-11 RX ORDER — DULOXETIN HYDROCHLORIDE 60 MG/1
60 CAPSULE, DELAYED RELEASE ORAL DAILY
Qty: 90 CAPSULE | Refills: 1 | Status: SHIPPED | OUTPATIENT
Start: 2023-07-11 | End: 2024-01-23

## 2023-07-11 ASSESSMENT — PATIENT HEALTH QUESTIONNAIRE - PHQ9
8. MOVING OR SPEAKING SO SLOWLY THAT OTHER PEOPLE COULD HAVE NOTICED. OR THE OPPOSITE, BEING SO FIGETY OR RESTLESS THAT YOU HAVE BEEN MOVING AROUND A LOT MORE THAN USUAL: NOT AT ALL
2. FEELING DOWN, DEPRESSED OR HOPELESS: NOT AT ALL
7. TROUBLE CONCENTRATING ON THINGS, SUCH AS READING THE NEWSPAPER OR WATCHING TELEVISION: NOT AT ALL
3. TROUBLE FALLING OR STAYING ASLEEP OR SLEEPING TOO MUCH: SEVERAL DAYS
SUM OF ALL RESPONSES TO PHQ QUESTIONS 1-9: 3
4. FEELING TIRED OR HAVING LITTLE ENERGY: SEVERAL DAYS
6. FEELING BAD ABOUT YOURSELF - OR THAT YOU ARE A FAILURE OR HAVE LET YOURSELF OR YOUR FAMILY DOWN: NOT AT ALL
10. IF YOU CHECKED OFF ANY PROBLEMS, HOW DIFFICULT HAVE THESE PROBLEMS MADE IT FOR YOU TO DO YOUR WORK, TAKE CARE OF THINGS AT HOME, OR GET ALONG WITH OTHER PEOPLE: NOT DIFFICULT AT ALL
SUM OF ALL RESPONSES TO PHQ9 QUESTIONS 1 & 2: 0
5. POOR APPETITE OR OVEREATING: SEVERAL DAYS
1. LITTLE INTEREST OR PLEASURE IN DOING THINGS: NOT AT ALL
9. THOUGHTS THAT YOU WOULD BE BETTER OFF DEAD, OR OF HURTING YOURSELF: NOT AT ALL

## 2023-07-11 ASSESSMENT — ANXIETY QUESTIONNAIRES
GAD7 TOTAL SCORE: 2
3. WORRYING TOO MUCH ABOUT DIFFERENT THINGS: NOT AT ALL
7. FEELING AFRAID AS IF SOMETHING AWFUL MIGHT HAPPEN: NOT AT ALL
5. BEING SO RESTLESS THAT IT IS HARD TO SIT STILL: NOT AT ALL
GAD7 TOTAL SCORE: 2
1. FEELING NERVOUS, ANXIOUS, OR ON EDGE: SEVERAL DAYS
IF YOU CHECKED OFF ANY PROBLEMS ON THIS QUESTIONNAIRE, HOW DIFFICULT HAVE THESE PROBLEMS MADE IT FOR YOU TO DO YOUR WORK, TAKE CARE OF THINGS AT HOME, OR GET ALONG WITH OTHER PEOPLE: NOT DIFFICULT AT ALL
2. NOT BEING ABLE TO STOP OR CONTROL WORRYING: NOT AT ALL
4. TROUBLE RELAXING: NOT AT ALL
6. BECOMING EASILY ANNOYED OR IRRITABLE: SEVERAL DAYS

## 2023-07-11 NOTE — ADDENDUM NOTE
Encounter addended by: Cony Guzman, MOHINIC, LADC on: 7/11/2023 1:15 PM   Actions taken: Clinical Note Signed

## 2023-07-11 NOTE — PROGRESS NOTES
[facility]    A/P                                                    ASSESSMENT/PLAN    1. Alcohol use disorder, severe, dependence (H)  Improving.  No use, cravings are manageable.  Encouraged continued IOP.  - Drugs of Abuse Screen Urine (POC CUPS) POCT    2. Depressive disorder  3. PTSD (post-traumatic stress disorder)  Improving.  Increase Cymbalta from 40mg to 60mg to decrease pill burden, maximize effectiveness.    - DULoxetine (CYMBALTA) 60 MG capsule; Take 1 capsule (60 mg) by mouth daily  Dispense: 90 capsule; Refill: 1    4. Weight gain  She is unhappy with weight gain.  Would like to work with weight loss clinic.  Referral placed.  - Adult Comprehensive Weight [facility]  Referral; Future      PDMP Review         Value Time User    State PDMP site checked  Yes 5/7/2023  5:09 PM Yanique Cali MD              RTC  Return in about 2 months (around 9/11/2023) for Follow up, in person.  Encouraged her to reach out sooner if questions or concerns.      Counseled the patient on the importance of having a recovery program in addition to medication to manage recovery.  Components include avoiding isolating, having willingness to change, avoiding triggers and managing cravings. Encouraged having some type of sober network and practicing honesty with trusted support person(s). Encouraged other services such as counseling, 12 step or other self-help organizations.        SUBJECTIVE                                                    Patsy Santamaria is a 68 year old female with a history of peripheral neuropathy, COPD, alcoholic hepatitis, thombocytopenia, arthritis (hands and knees), obesity, anxiety, depression, and AUD who presents to clinic today for follow up.     Brief history of substance use:  Began drinking around age 33.  Became a problem for her in 2016 and she went to treatment for the first time and also experienced EtOH withdrawal seizures.  Has been  to multiple treatments (most recently [facility] in [place] in Feb 2022).  Drinking tends to correlate with worsening depression.  Has tried naltrexone and acamprosate in past.  History of of cocaine use (1 year in the 1990s).  Established care with [facility] in March 2022 and has continued to struggle with brief episodes of drinking.  Most recent hospitalization related to alcohol use/withdrawal was March 2023.    Plan from most recent office visit (5/11/23):  1. Alcohol use disorder, severe, dependence (H)  No alcohol use since prior to last visit.  Reflects on continued goal of abstinence and desire for psychosocial treatment and deeper understanding of AUD.  Continues to work with peer .  At this time prefers to hold off on pharmacotherapy but we can reconsider in future.     2. Tobacco use disorder, moderate, dependence  Would like to quit smoking.    - nicotine (NICORETTE) 4 MG lozenge; Place 1 lozenge (4 mg) inside cheek every hour as needed for smoking cessation  Dispense: 108 lozenge; Refill: 1     3. Depressive disorder  4. PTSD (post-traumatic stress disorder)  Overall reporting improved mood.  Planning to start individual therapy.  Encouraged her to discuss emotional support animal with therapist.  I have requested care coordination reach out to her regarding medical bills as this is contributing to stress/anxiety.  Continue Cymbalta.         TODAY'S VISIT  HPI Jul 11, 2023  - Began IOP, no alcohol use!  - Continues to have trouble staying asleep  - Meditation helpful for falling asleep  - Starting PT for back and hip soon, will go once a week  - Gout in foot has finally resolved  - Weight gain concerns - feels she is not eating more than normal, cooking food at home (less fast food), does note more sugar intake  - Not quite ready to quit smoking but getting closer        5/11/2023     1:00 PM 5/25/2023    10:00 AM 7/19/2023    12:00 PM  "  PHQ   PHQ-9 Total Score 2 7 3   Q9: Thoughts of better off dead/self-harm past 2 weeks Not at all Not at all Not at all           5/25/2023    10:00 AM 7/11/2023     2:00 PM 7/19/2023    12:00 PM   ALISIA-7 SCORE   Total Score 8 2 3       OBJECTIVE                                                    PHYSICAL EXAM:  BP (!) 142/72 (BP Location: Right arm, Patient Position: Sitting, Cuff Size: Adult Large)   Pulse 76   Ht 1.6 m (5' 3\")   Wt 122.9 kg (271 lb 0.6 oz)   BMI 48.01 kg/m      GENERAL: healthy, alert and no distress  EYES: Eyes grossly normal to inspection, PERRL and conjunctivae and sclerae normal  RESP: No respiratory distress  MS: no gross musculoskeletal defects noted, no edema  SKIN: no suspicious lesions or rashes  NEURO: Normal strength and tone, mentation intact and speech normal  MENTAL STATUS EXAM  Appearance/Behavior: No appearant distress  Speech: Normal  Mood/Affect: normal affect  Insight: Adequate    LAB  No results found for any visits on 07/11/23.      HISTORY                                                    Problem list reviewed & adjusted, as indicated.  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)    Diuretic-induced hypokalemia    Dyspnea on exertion    Elevated LFTs    Ganglion of tendon sheath    GI bleed    Hereditary and idiopathic peripheral neuropathy    History of major depression    Homeless    Major depression, recurrent (H)    Low backache    Airway obstruction due to foreign body, initial encounter    Hypomagnesemia    Mild episode of recurrent major " depressive disorder (H)    Morbid obesity (H)    Smoker    Peripheral edema    Pneumonia of right lower lobe due to infectious organism    Primary localized osteoarthrosis, hand    Primary osteoarthritis of right knee    PTSD (post-traumatic stress disorder)    Seasonal allergies    Skin lesion    Snoring    Trochanteric bursitis of left hip    Vitamin B12 deficiency (non anemic)    Vitamin D deficiency    Other seizures (H)    Anxiety    Closed fracture of head of left radius    Recurrent falls    Thrombocytopenia (H)    Dermatitis    Depressive disorder    Leukopenia    Alcoholic cirrhosis of liver without ascites (H)    Sigmoid Diverticulitis    Mixed simple and mucopurulent chronic bronchitis (H)    Suicidal ideation    Infection due to 2019 novel coronavirus    Other specified disorders of carbohydrate metabolism (H)    Right foot pain    Hallux valgus, acquired, right         MEDICATION LIST (prior to visit)  albuterol (PROAIR HFA/PROVENTIL HFA/VENTOLIN HFA) 108 (90 Base) MCG/ACT inhaler, Inhale 2 puffs into the lungs every 4 hours as needed for shortness of breath / dyspnea or wheezing  budesonide-formoterol (SYMBICORT) 160-4.5 MCG/ACT Inhaler, Inhale 2 puffs into the lungs 2 times daily  bumetanide (BUMEX) 1 MG tablet, TAKE 1 TABLET(1 MG) BY MOUTH DAILY  colchicine (COLCRYS) 0.6 MG tablet, Take 1 tablet (0.6 mg) by mouth 2 times daily  diclofenac (VOLTAREN) 1 % topical gel, Apply 2 grams to the left lower lumbar area up to 4 times daily not to exceed 8 grams per day.  hydrOXYzine (ATARAX) 50 MG tablet, Take 0.5-1 tablets (25-50 mg) by mouth 3 times daily as needed for anxiety  ipratropium - albuterol 0.5 mg/2.5 mg/3 mL (DUONEB) 0.5-2.5 (3) MG/3ML neb solution, Take 1 vial (3 mLs) by nebulization every 4 hours as needed for shortness of breath / dyspnea or wheezing  methocarbamol (ROBAXIN) 500 MG tablet, Take 1 tablet (500 mg) by mouth 3 times daily as needed for muscle spasms  mirtazapine (REMERON) 15 MG  "tablet, Take 1 tablet (15 mg) by mouth At Bedtime  omeprazole (PRILOSEC) 20 MG DR capsule, Take 20 mg by mouth daily as needed  umeclidinium (INCRUSE ELLIPTA) 62.5 MCG/INH inhaler, Inhale 1 puff into the lungs daily (Patient taking differently: Inhale 1 puff into the lungs every morning)  nicotine (NICORETTE) 4 MG lozenge, Place 1 lozenge (4 mg) inside cheek every hour as needed for smoking cessation (Patient not taking: Reported on 6/6/2023)    No current facility-administered medications on file prior to visit.      MEDICATION LIST (after visit)  Current Outpatient Medications   Medication    albuterol (PROAIR HFA/PROVENTIL HFA/VENTOLIN HFA) 108 (90 Base) MCG/ACT inhaler    budesonide-formoterol (SYMBICORT) 160-4.5 MCG/ACT Inhaler    bumetanide (BUMEX) 1 MG tablet    colchicine (COLCRYS) 0.6 MG tablet    diclofenac (VOLTAREN) 1 % topical gel    DULoxetine (CYMBALTA) 60 MG capsule    hydrOXYzine (ATARAX) 50 MG tablet    ipratropium - albuterol 0.5 mg/2.5 mg/3 mL (DUONEB) 0.5-2.5 (3) MG/3ML neb solution    methocarbamol (ROBAXIN) 500 MG tablet    mirtazapine (REMERON) 15 MG tablet    omeprazole (PRILOSEC) 20 MG DR capsule    umeclidinium (INCRUSE ELLIPTA) 62.5 MCG/INH inhaler    nicotine (NICORETTE) 4 MG lozenge     No current facility-administered medications for this visit.         Allergies   Allergen Reactions    Bupropion Diarrhea    Codeine Hives, Itching, Nausea and Rash    Nickel Unknown    Oxycodone Nausea and Vomiting    Percocet [Oxycodone-Acetaminophen]      Patient reports \"vomiting,grossly ill two weeks ago\"    Topiramate Unknown    Diclofenac Nausea     Tolerates the topical gel    Furosemide Rash    Hydrochlorothiazide Rash     phototoxicity - med was d/lora        Sulfa Antibiotics Itching and Rash    Sulfasalazine Rash       Melissa Covington, [facility] Medicine  [place] [facility]  [phone]        "

## 2023-07-11 NOTE — PATIENT INSTRUCTIONS
Patient Education   Addiction Medicine  What to Expect  Here's what to expect from our Addiction Medicine program.  About Addiction Medicine  Addiction Medicine clinics help you with substance use problems. You set your own goals. We try to help you reach your goals. Your care plan can include:  Medicine  Creating a recovery plan  Helping you find local resources  Helping with treatment options  Clinic phone number and addresses  Clinic Phone: [phone]  Mental Health and Addiction Clinic  [facility]  [address], [address]  [place] [address]  [place] [facility]  [address], [address]  [place] [address]  Walk-in services  We offer walk-in care for patients at the Recovery Clinic. This is only for patients with Opioid Use Disorder (OUD). Anyone with OUD is welcome. Our providers will refer you to the Recovery Clinic if you're struggling to keep up with your medicines or appointments.  Recovery Clinic ([facility])  [address], [address]  [place] [address]  Phone: [phone]  The Recovery Clinic is open for walk-ins Monday to Friday 9 a.m. to 11:30 a.m. and 12:30 p.m. to 3 p.m.  How it works  Come to your visits every time. The treatment works better when you do.   You can have as many visits as you need. When you're better, we'll refer you back to being cared for by your family doctor.   If you need it, we'll send you to doctors, psychiatrists, therapists, and other providers. We focus on treating addiction. We don't treat other problems, like managing other medicines or non-addiction issues.  About visits  Urine drug testing  We'll often test your pee (urine) for drugs. This is the only way we can know for sure whether or not you're using drugs. It helps us treat you without judgement.   Suboxone (buprenorphine)  If you're taking buprenorphine, you'll have a lot of visits at first. If your problem is getting worse, or you're  "using substances, we may schedule you for extra visits.   Cancelling visits  If you can't come to your visit, please call us right away at [phone]. If you don't cancel at least 24 hours (1 full day) before your visit, that's \"late cancellation.\"  Being late to visits  If you come late, you may not be seen. This will count as a \"no-show.\"  Please call the clinic if you're running late. This will help us plan, but it doesn't mean you'll be seen.   Being late is:  More than 15 minutes late for a return visit.  More than 30 minutes late for your first visit.  If you cancel late or don't show up 2 times within 6 months, we may transfer you to another clinic.   Getting help between visits  If you need help between visits, you can call us Monday to Friday from 8 a.m. to 4:30 p.m. at [phone]. You can also send us a message on [employer].  Medicine refills  If you miss or cancel a visit, you can still ask for a refill. But we can only refill your medicines if you've made a new appointment.  Please call your pharmacy for medicine refills. If you have a question about your refill, call us at [phone].  It takes up to 2 business days to refill your drugs. Let us know 2 to 3 days before you run out. Don't call more than 1 week before you run out. That's too early.   Please make sure we have your right phone number.  If we have a problem with your refill, we'll call you. If we call you, please call us back right away. If you don't, you may not get your medicines quickly.   Call your pharmacy to find out if your medicines are ready.   Keep your medicines in a safe place. Keep them away from pets and children. If your medicines are lost or stolen, we usually don't replace them. We recommend you file a police report if your medicines are stolen. Your insurance may not pay for early refills, even if you have a prescription.  Forms  Please give us at least 3 business days to fill out any forms. Bring the forms to your " visits if you can. We may refer you to other members of your care team to complete the forms.   Emergency care   Call 911 or go to the nearest emergency room if your life or someone else's life is in danger.  Call 988 anytime for the Suicide and Crisis Lifeline.  If you need care when we're closed, call your family doctor to see if they can help. You can also go to urgent care or an emergency room. [facility] emergency rooms may be able to give you buprenorphine or other medicine refills.  Thank you for choosing us for your care.  For informational purposes only. Not to replace the advice of your health care provider. Copyright   2023 [facility]. All rights reserved. [email or web address] [id number] - REV 05/23.

## 2023-07-11 NOTE — PROGRESS NOTES
[facility] - Addiction Medicine       Rooming information:    Point of care urine drug screen positive for:  Lab Results   Component Value Date    BUP Negative 04/27/2023    BZO Screen Positive (A) 04/27/2023    BAR Negative 04/27/2023    ELICEO Negative 04/27/2023    MAMP Negative 04/27/2023    AMP Negative 04/27/2023    MDMA Negative 04/27/2023    MTD Negative 04/27/2023    KUG228 Negative 04/27/2023    OXY Negative 04/27/2023    PCP Negative 04/27/2023    THC Negative 04/27/2023    TEMP Invalid (A) 04/27/2023    SGPOCT 1.015 04/27/2023       *POC urine drug screen does not screen for Fentanyl    PHQ-9 Scores:       4/27/2023     1:00 PM 5/11/2023     1:00 PM 5/25/2023    10:00 AM   PHQ   PHQ-9 Total Score 5 2 7   Q9: Thoughts of better off dead/self-harm past 2 weeks Not at all Not at all Not at all     ALISIA-7 Scores:      5/11/2023     1:00 PM 5/25/2023    10:00 AM 7/11/2023     2:00 PM   ALISIA-7 SCORE   Total Score 1 8 2       Any other recent substance use:     Denies    NICOTINE-Yes:   If using nicotine, ready to quit? No    Side effects related to medications pt would like to discuss with provider (constipation, dry mouth, HA, GI upset, sedation?) No     Narcan currently available: No    Primary care provider: EJ WELLER MD     Mental health provider: no (follow up on MH referral if needed)    Any housing, insurance deficits?: no    Contact information up to date? yes    3rd Party Involvement NO (please obtain LUDIN if pt would like to include)        Dianelys Mccarthy MA  July 11, 2023  2:26 PM

## 2023-07-12 ENCOUNTER — HOSPITAL ENCOUNTER (OUTPATIENT)
Dept: BEHAVIORAL HEALTH | Facility: CLINIC | Age: 68
Discharge: HOME | End: 2023-07-12
Attending: FAMILY MEDICINE
Payer: COMMERCIAL

## 2023-07-12 PROCEDURE — H2035 A/D TX PROGRAM, PER HOUR: HCPCS

## 2023-07-12 NOTE — PROGRESS NOTES
"Attestation: Melissa Covington,  - Provides oversight and supervision of care.    Individual Session Summary (MICD)   DATE:  07/12/2023  START TIME: 12:00 PM   END TIME: 12:55 PM   Duration: 55 minutes    ORAL Diagnosis: (F10.20) AUD, severe  Mental Health Diagnosis: 309.81 (F43.10) PTSD    Data:  Writer met with Patsy for an individual session addressing co-occurring mental health and substance use disorders, as noted in  Dimensions 3 and 5 in her treatment plan.  The session focused on client's thoughts and feelings regarding the treatment program, group, maintaining sobriety, and mental health (including identification of symptoms, emotional, psychological, and social well-being).      Client denies any suicidal thoughts, plans, or intent today. Client also denies any thoughts or behaviors of self-harm today.    Intervention: Writer utilized motivational interviewing to assess for stage of change, as well as health and safety. To assess for depression and anxiety, as well as level of distress, writer administered. the PHQ-9, ALSIIA-7. Introduced and administered Satisfaction with Areas of Life. Collaboratively identified areas Patsy would like to improve and outlined current efforts to work on these areas (finances, Living situation, Driving, Physical Health - pain and weight).   Collaboratively, developed a budget and plan. Writer provided labeled envelopes for (\"gifts to others-$100/month\", \"Tumi\" (clothing), and a \"Savings\" envelope) for Patsy to track and manage her spending in these areas.   Provider utilized validation, support and various therapeutic techniques including CBT techniques and Solutions Focused Brief Therapy, as well as trauma informed care (TIC).       Assessment: Patsy reported \"anxiety\" due to poor sleep the previous night. She reported stable mood and presented with congruent affect. She reported on progress made by following up on medical care, attending PT and results of diagnostic tests (MRI). " She reported on her  utilization of skills and practices to increase awareness of how she is feeling and what she is thinking, including recognition of triggers and urges, which have decreased overall. Client continues to express and understanding and desire to address co-occurring health, stability, and relapse prevention.         4/27/2023     1:00 PM 5/11/2023     1:00 PM 5/25/2023    10:00 AM   PHQ-9 SCORE   PHQ-9 Total Score 5 2 7   6/28/2023 - Score of 2  7/12/2023 - Score of 3 (somewhat difficult)      5/11/2023     1:00 PM 5/25/2023    10:00 AM 7/11/2023     2:00 PM   ALISIA-7 SCORE   Total Score 1 8 2   6/28/2023 - Score of 4  7/12/2023 - Score of 2 (somewhat difficult)      Plan. Patsy will continue to attend Phase 1 groups and individual sessions. She will report on thoughts, feelings, triggers, urges, cravings and tools, skills and practices tried and process the effectiveness of said trials.     I have reviewed the note as documented above.  This accurately captures the substance of my visit with the client.    Cony Guzman, LPCC, LADC

## 2023-07-13 ENCOUNTER — DOCUMENTATION ONLY (OUTPATIENT)
Dept: BEHAVIORAL HEALTH | Facility: CLINIC | Age: 68
End: 2023-07-13
Payer: COMMERCIAL

## 2023-07-14 ENCOUNTER — DOCUMENTATION ONLY (OUTPATIENT)
Dept: BEHAVIORAL HEALTH | Facility: CLINIC | Age: 68
End: 2023-07-14
Payer: COMMERCIAL

## 2023-07-14 NOTE — PROGRESS NOTES
ABSENT NOTE:    Patsy Santamaria was absent from group 7/13/2023 . This absence was excused. The patient contacted counselors about her absence and states she would not attend due to physical health concerns. Patient is expected to be in group on 7/17/23.     Marisel Mcdaniel, [facility]  7/14/2023 , 10:20 AM    --LATE ENTRY--
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
## 2023-07-17 ENCOUNTER — HOSPITAL ENCOUNTER (OUTPATIENT)
Dept: BEHAVIORAL HEALTH | Facility: CLINIC | Age: 68
Discharge: HOME | End: 2023-07-17
Attending: FAMILY MEDICINE
Payer: COMMERCIAL

## 2023-07-17 DIAGNOSIS — F10.20 ALCOHOL USE DISORDER, SEVERE, DEPENDENCE (H): Primary | ICD-10-CM

## 2023-07-17 PROCEDURE — H2035 A/D TX PROGRAM, PER HOUR: HCPCS | Mod: HQ

## 2023-07-17 NOTE — GROUP NOTE
"Group Therapy Documentation    PATIENT'S NAME: Patsy Santamaria  MRN:   [id number]  :   1955  ACCT. NUMBER: [id number]  DATE OF SERVICE: 23  START TIME:  9:00 AM  END TIME: 12:00 PM  FACILITATOR(S): Cony Guzman, [facility], [facility]; Marisel Mcdaniel [facility]  TOPIC: BEH Group Therapy  Number of patients attending the group:  4  Group Length:  3 Hours    Group Therapy Type: Addiction, Psychotherapeutic, and Skills/Education    Summary of Group / Topics Discussed:    Co-occurring Illness, Meditation/Breathing Exercises, Mindfulness, Symptom Management, and Trauma Informed Care    Attestation:   Can Boles MD - Medical Director - Provides oversight and supervision of care.       Group Attendance:  Attended group session    Patient's response to the group topic/interactions:  cooperative with task and discussed personal experience with topic    Patient appeared to be Actively participating, Attentive and Engaged.        Client specific details:  Patsy shared that she has been sober and has been recognizing when she is having thoughts of use, she particularly noted that she has thoughts of use when going by patios where people \"seem happy\". She states she feels stephanie, hope and that \"I love the fact that I am not using, each day is getting better\". Patsy has increased her Cymbalta, some changes she is noticing from this medication change include better sleep and improved mood. Patsy has been focusing on various areas of wellness, today she made a commitment to call her provider at the spine clinic to get a follow up appointment since she had to stop taking Celebrex due to side effects.      "

## 2023-07-17 NOTE — PROGRESS NOTES
"Late entry    Attestation: Melissa Covington, DO Deleon Provides oversight and supervision of care.    Individual Session Summary (MICD)   DATE:  07/19/2023  START TIME: 12:00 PM   END TIME: 12:55 PM   Duration:  55 minutes    ORAL Diagnosis: (F10.20) AUD, severe  Mental Health Diagnosis: 309.81 (F43.10) PTSD    Data:  Writer met with Patsy for an individual session addressing co-occurring mental health, substance use disorders and physical health and recovery environment (Dimensions 2, 3, 5 and 6).     Client denies suicidal thoughts, plans, or intent today. Client denies thoughts or behaviors of self-harm today.    Intervention: Writer utilized motivational interviewing to assess for stage of change (alcohol, eating at night) , as well as health and safety. To assess for depression and anxiety, as well as level of distress, writer administered. the PHQ-9, ALISIA-7.  Reinforced and supported use of identified skills and practices to use the tools outlined at last individual session. Introduced mindfulness exercise (raisin) and encouraged trying this at home and using paper tablet to record experience.  Provider utilized validation, support and various therapeutic techniques including CBT techniques and Solutions Focused Brief Therapy, as well as trauma informed care (TIC).  Medication adherence check-in.      Assessment: Patsy reported a \"good mood\" today and presented with congruent affect. She reported on initial PT visit and is looking forward to experiencing more relief from this. She shared she started an increase in dosage of Cymbalta, and has noticed improved sleep. She agreed to increase awareness of eating pattern at night and it was identified that it is possible it is the same pattern as her drinking was when she was working. She reported on her utilization of skills and practices to increase awareness of how she is feeling and what she is thinking, including recognition of triggers and urges, which have decreased, " overall. Patsy continues to express an understanding and desire to address co-occurring health, stability, and relapse prevention. She continues to pursue medical care and advocate for herself (e.g. weight loss without surgery).          5/11/2023     1:00 PM 5/25/2023    10:00 AM 7/19/2023    12:00 PM   PHQ-9 SCORE   PHQ-9 Total Score 2 7 3            5/25/2023    10:00 AM 7/11/2023     2:00 PM 7/19/2023    12:00 PM   ALISIA-7 SCORE   Total Score 8 2 3     Plan. Patsy will continue to attend Phase 1 groups and individual sessions. She will report on thoughts, feelings, triggers, urges, cravings and tools, skills and practices tried and process the effectiveness of said trials.     I have reviewed the note as documented above.  This accurately captures the substance of my visit with the client.    Cony Guzman, LPCC, LADC

## 2023-07-19 ENCOUNTER — HOSPITAL ENCOUNTER (OUTPATIENT)
Dept: BEHAVIORAL HEALTH | Facility: CLINIC | Age: 68
Discharge: HOME | End: 2023-07-19
Attending: FAMILY MEDICINE
Payer: COMMERCIAL

## 2023-07-19 ENCOUNTER — DOCUMENTATION ONLY (OUTPATIENT)
Dept: BEHAVIORAL HEALTH | Facility: CLINIC | Age: 68
End: 2023-07-19
Payer: COMMERCIAL

## 2023-07-19 PROCEDURE — H2035 A/D TX PROGRAM, PER HOUR: HCPCS

## 2023-07-19 ASSESSMENT — ANXIETY QUESTIONNAIRES
2. NOT BEING ABLE TO STOP OR CONTROL WORRYING: NOT AT ALL
GAD7 TOTAL SCORE: 3
6. BECOMING EASILY ANNOYED OR IRRITABLE: SEVERAL DAYS
5. BEING SO RESTLESS THAT IT IS HARD TO SIT STILL: NOT AT ALL
GAD7 TOTAL SCORE: 3
3. WORRYING TOO MUCH ABOUT DIFFERENT THINGS: SEVERAL DAYS
7. FEELING AFRAID AS IF SOMETHING AWFUL MIGHT HAPPEN: NOT AT ALL
1. FEELING NERVOUS, ANXIOUS, OR ON EDGE: SEVERAL DAYS
IF YOU CHECKED OFF ANY PROBLEMS ON THIS QUESTIONNAIRE, HOW DIFFICULT HAVE THESE PROBLEMS MADE IT FOR YOU TO DO YOUR WORK, TAKE CARE OF THINGS AT HOME, OR GET ALONG WITH OTHER PEOPLE: NOT DIFFICULT AT ALL
4. TROUBLE RELAXING: NOT AT ALL

## 2023-07-19 ASSESSMENT — PATIENT HEALTH QUESTIONNAIRE - PHQ9: SUM OF ALL RESPONSES TO PHQ QUESTIONS 1-9: 3

## 2023-07-19 NOTE — PROGRESS NOTES
Addiction Outpatient Weekly Clinical Staffing     Patsy Santamaria was staffed on 7/19/2023 . Patsy Santamaria was staffed on recovery strengths, barriers and treatment progress.     Staff present: TAMERA Mooney, Latoya Wyatt [facility], Naila Trevizo [facility], Cony Guzman [facility], [facility], Dixie Finnegan [facility] , Shelly Morejon [facility], and Donald Welander, [facility]    Date: 7/19/2023 Time: 4:08 PM    Staff Signature: PRINCE Pretty

## 2023-07-20 ENCOUNTER — HOSPITAL ENCOUNTER (OUTPATIENT)
Dept: BEHAVIORAL HEALTH | Facility: CLINIC | Age: 68
Discharge: HOME | End: 2023-07-20
Attending: FAMILY MEDICINE
Payer: COMMERCIAL

## 2023-07-20 DIAGNOSIS — F10.20 ALCOHOL USE DISORDER, SEVERE, DEPENDENCE (H): Primary | ICD-10-CM

## 2023-07-20 PROCEDURE — H2035 A/D TX PROGRAM, PER HOUR: HCPCS | Mod: HQ

## 2023-07-20 NOTE — ADDENDUM NOTE
Encounter addended by: Cony Guzman, MOHINIC, LADC on: 7/20/2023 1:45 PM   Actions taken: Clinical Note Signed

## 2023-07-20 NOTE — GROUP NOTE
"Group Therapy Documentation    PATIENT'S NAME: Patsy Santamaria  MRN:   [id number]  :   1955  ACCT. NUMBER: [id number]  DATE OF SERVICE: 23  START TIME:  9:00 AM  END TIME: 12:00 PM  FACILITATOR(S): Dixie Finnegan LADC; Marisel Mcdaniel LADC  TOPIC: BEH Group Therapy  Number of patients attending the group:  4    Group Length:  3 Hours    Group Therapy Type: Addiction    Summary of Group / Topics Discussed:    Self-Care Activities    Psychoeducation/Skills:     Psychoeducation and discussion on self care     Objective(s):     Provide an in depth understanding of what self care actually means.    Provide new ideas to implement in to personal self care routines.      Structure (modalities, homework, worksheets, etc)      Interactive group discussion     Self Care Handout    Mindfulness Module on Goals, Intentions, and Purpose     Patient will:     Identify personal self care goals.    Therapeutic outcome(s) measured by:     Discussion and Individual Reflection    Attestation: Jayson Boles MD - Medical Director - Provides oversight and supervision of care.      Group Attendance:  Attended group session    Patient's response to the group topic/interactions:  cooperative with task and discussed personal experience with topic    Patient appeared to be Actively participating, Attentive and Engaged.        Client specific details:  Patsy reported feeling \"pretty good\" today. She shared that she has a lot going on today that is making her excited and nervous. She is going to look at a new apartment. She talked about being comfortable where she is and being nervous about the unknown. She will share more next week after she sees it. She has been sober since last group and has not had any major urges. She continues to be triggered by seeing bars, but she doesn't act on it. She gunner with any urges or triggers that come up by talking about them with her sober friend or in group. She shared that she has a new " "dose of her depression med and it is working well. She is finally sleeping better. For recovery she will read more of her book. She hasn't been to any meetings yet, but still plans to go. She talked about her personal barriers to going. Counselors offered suggestions to lessen the barriers. For self care this weekend she will go for a walk or go out with her sister. Her goal for the next week is to get her paperwork done for the apartment. Her affirmation is \"I feel good. I am determined.\".       "

## 2023-07-20 NOTE — ADDENDUM NOTE
Encounter addended by: Cony Guzman, MOHINIC, LADC on: 7/20/2023 1:46 PM   Actions taken: Charge Capture section accepted

## 2023-07-21 ENCOUNTER — DOCUMENTATION ONLY (OUTPATIENT)
Dept: BEHAVIORAL HEALTH | Facility: CLINIC | Age: 68
End: 2023-07-21
Payer: COMMERCIAL

## 2023-07-24 ENCOUNTER — HOSPITAL ENCOUNTER (OUTPATIENT)
Dept: BEHAVIORAL HEALTH | Facility: CLINIC | Age: 68
Discharge: HOME | End: 2023-07-24
Attending: FAMILY MEDICINE
Payer: COMMERCIAL

## 2023-07-24 ENCOUNTER — TELEPHONE (OUTPATIENT)
Dept: BEHAVIORAL HEALTH | Facility: CLINIC | Age: 68
End: 2023-07-24
Payer: COMMERCIAL

## 2023-07-24 DIAGNOSIS — F10.20 ALCOHOL USE DISORDER, SEVERE, DEPENDENCE (H): Primary | ICD-10-CM

## 2023-07-24 PROCEDURE — H2035 A/D TX PROGRAM, PER HOUR: HCPCS | Mod: HQ

## 2023-07-24 NOTE — GROUP NOTE
"Group Therapy Documentation    PATIENT'S NAME: Patsy Santamaria  MRN:   [id number]  :   1955  ACCT. NUMBER: [id number]  DATE OF SERVICE: 23  START TIME:  9:00 AM  END TIME: 12:00 PM  FACILITATOR(S): Marisel Mcdaniel, [facility]; Cony Guzman, [facility], [facility]  TOPIC: BEH Group Therapy  Number of patients attending the group:  3  Group Length:  3 Hours  Group Therapy Type: Addiction, Psychotherapeutic, and Skills/Education  Summary of Group / Topics Discussed:  Cognitive Therapy Techniques, Co-occurring Illness, Coping Skills/Lifestyle Managemet, Choices in Recovery, Meditation/Breathing Exercises, Proper Nutrition & Exercise, Symptom Management, Thinking Errors/Negative Self-Talk, and Trauma Informed Care    Attestation:   Can Boles MD - Medical Director - Provides oversight and supervision of care.    Group Attendance:  Attended group session    Patient's response to the group topic/interactions:  cooperative with task, discussed personal experience with topic, expressed readiness to alter behaviors, expressed understanding of topic, gave appropriate feedback to peers, listened actively and offered helpful suggestions to peers    Patient appeared to be Actively participating, Attentive and Engaged.        Client specific details:  Patsy reported continuing abstinence with some thoughts and no cravings for use. She discussed a distressing \"using dream\". She noted she will be absent for group on Wednesday due to a need to sign paperwork to move to the new apartment she is excited and scared to move into. She is pleased the she could have a therapeutic pet and is considering a dog. She noted recovery supporting environment with water therapy, exercise room and older adults. She noted she is attending [employer] which is a recovery support meeting she is attending weekly.   Affirmation: I am embracing recovery.   Identified feelings:  hopeful, optimistic, powerful.      "

## 2023-07-24 NOTE — TELEPHONE ENCOUNTER
----- Message [facility] sent at 7/24/2023  1:31 PM CDT -----  Please cancel Patsy's appointment for Wednesday. She has another appointment to attend.     Marisel

## 2023-07-25 NOTE — PROGRESS NOTES
[facility] Weekly Treatment Plan Review      ATTENDANCE for the following date span:  23 to 23  TOPICS: Mindfulness/Meditation, Relationships, Navigating Recovery with a Co-occuring Disorder, and the DBT House Activity    Date     Group Therapy 3 hours NA  hours 0  hours  Excused 3 hours No group    Individual Therapy        Family Therapy        Other (Specify) Phase 1 &2  Phase 3  Phase 1  Phase 1 & 2        Patient attended all scheduled sessions during this time period.   Total hours: 41/108. Patient is in phase 1.    Weekly Treatment Plan Review     Treatment Plan initiated on 2023.    Dimension1: Acute Intoxication/Withdrawal Potential -   Previous Dimension Ratin  Current Dimension Ratin  Date of Last Use: 23  Any reports of withdrawal symptoms: No  Narrative: Patient reports last date of use as one time in April, but she can't remember the date. Patient reports no withdrawal symptoms. Patient was medically detoxed prior to that one day return to use. No withdrawal symptoms observed at intake.    23 - Patsy reports sustained abstinence. No withdrawal symptoms reported or observed. PAWS possible.    Dimension 2: Biomedical Conditions & Complications -   Previous Dimension Ratin  Current Dimension Ratin  Medical Concerns:  Significant pain and swelling in foot and leg.  Current Medications & Medication Changes:  Current Outpatient Medications   Medication    albuterol (PROAIR HFA/PROVENTIL HFA/VENTOLIN HFA) 108 (90 Base) MCG/ACT inhaler    budesonide-formoterol (SYMBICORT) 160-4.5 MCG/ACT Inhaler    bumetanide (BUMEX) 1 MG tablet    colchicine (COLCRYS) 0.6 MG tablet    diclofenac (VOLTAREN) 1 % topical gel    DULoxetine (CYMBALTA) 60 MG capsule    hydrOXYzine (ATARAX) 50 MG tablet    ipratropium - albuterol 0.5 mg/2.5 mg/3 mL (DUONEB) 0.5-2.5 (3) MG/3ML neb solution    methocarbamol (ROBAXIN) 500 MG tablet     "mirtazapine (REMERON) 15 MG tablet    nicotine (NICORETTE) 4 MG lozenge    omeprazole (PRILOSEC) 20 MG DR capsule    umeclidinium (INCRUSE ELLIPTA) 62.5 MCG/INH inhaler     No current facility-administered medications for this visit.     Medication Prescriber:  Yanique Cali MD.   Taking meds as prescribed? Yes  Medication side effects or concerns:  None reported.  Outside medical appointments this week (list provider and reason for visit):  Saw doctor about her foot. See EHR.  Narrative: Patient reports the following medical conditions: COPD, overweight, uses CPAP, and general fatigue . Patient has primary care with Yanique Cali MD.     23 - Patsy continues to attend PT. She also wants to keep going to the gym. Her sleep continues to improve No physical health concerns reported this week. She continues to make progress on her wellness goals.     Dimension 3: Emotional/Behavioral Conditions & Complications  Previous Dimension Ratin  Current Dimension Ratin    PHQ9       2023     1:00 PM 2023    10:00 AM 2023    12:00 PM   PHQ-9 SCORE   PHQ-9 Total Score 2 7 3     GAD7       2023    10:00 AM 2023     2:00 PM 2023    12:00 PM   ALISIA-7 SCORE   Total Score 8 2 3     Mental health diagnosis PTSD  Date of last SIB/SI/HI: N/A  Current MH Assignments:  None  Narrative:  Patient reports a mental health diagnosis of PTSD, Anxiety, and Depression. Patient endorses current symptoms of hypervigilance and anxiety when out. She also notes unexplained fatigue. Patient would like to meet with mental health therapist to process her trauma. Patient's denies current SI or thoughts of self-harm.     23 - Patsy had feelings of nervousness and excitement regarding a new housing. She had been on the waiting list for two years and has decided to take the apartment after seeing it last week. She said her mental health was overall \"good\". She reported some " "stress related to paper work that she needs to get done. This counselor offered to assist her if she wanted. She continues to do meditation regularly for self care. Her affirmation on Thursday was \"I'm worth it\". She also shared this week that she is not stuffing her feelings anymore. Patsy met for an individual session with Cony MORRISON, [facility].     Dimension 4: Treatment Acceptance / Resistance  Previous Dimension Ratin  Current Dimension Ratin  ORAL Diagnosis:  Alcohol Use Disorder   303.90 (F10.20) Severe In early remission,   Phase: 1  Commitment to tx process/Stage of change: Contemplation  ORAL assignments:  Attend groups and remain abstinent.   Narrative: Patient appears internally motivated for treatment at this time and reports being \"sick and tired of being sick and tired\" and also wanting to take care of her health as her main reasons for coming to treatment.  Patient appears to be in the contemplation stage of change at this time.    23 - Patsy had one excused absence this week. She made great contributions to group and was very engaged during other sessions. Her goal for the upcoming week is to get all her paper work completed.    Dimension 5: Relapse / Continued Problem Potential    Previous Dimension Rating:  3  Current Dimension Rating:  3  Relapses this week: None  Urges to use:  None  UA results:   No results found for this or any previous visit (from the past 168 hour(s)).  Narrative: Patient reports that he has been to multiple treatments and has had limited periods of sobriety. Patient verbalizes that their primary triggers are related to her son bringing up the past. Patient is not engaged with the recovery community. She said two of her three sisters are supportive of her recovery. Patient has minimal insight into the relapse process or coping skills for emotional regulation. Patient's mental health symptoms increase the risk of relapse at this time.    23 - Patsy " "reported that she has been sober and has had some thoughts about drinking when triggered by seeing liquor stores. 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 She shared that she gunner with her thoughts by talking to her sober friend about them or sharing them in group and/or with counselors. She continues to read the big book, read her bible don, and go for walks to aid her in her recovery.     Dimension 6: Recovery Environment   Previous Dimension Rating:  3  Current Dimension Rating:  3  Family Involvement : None   Summarize attendance at family groups and family sessions: N/A  Family supportive of treatment?  Yes  Community support group attendance: None  Recreational activities: Exercise   Narrative: Patient has stable housing at this time and lives alone. Patient reports she is \"retired and on disability\". She does note some concerns about her financial stability. Patient is not involved in the criminal justice system. She lacks a sober support network. Patient does not have a regular structure in place for her day to day activities, though she does enjoy working out. Patient has 4 adult children, noting that her son has been an ongoing trigger.     7/28/23 - Patsy plans to spend time decluttering in preparation for her move this weekend. She is excited about her new apartment which offers several amenities she does not have now including a gym, pool, and water therapy. She is moving September 1st. She continues to talk to her sober friend in [place] for support in addition to attending group and therapy. Patsy also shared that she is continuing to gain the trust of her children and grandchildren and has been spending more time with them.    Justification for Continued Treatment at this Level of Care:  Patient is in Phase 1 and has had minimal periods of sobriety. She lacks a sober support network. She is medication compliant and has started individual therapy as part of Phase 1. She has been able to " identify triggers, and is starting to learn and implement some coping skills, but they are applied inconsistently.Her daily life schedule/routine has not been supportive of recovery. She is working on getting more structure in her daily activities.     Discharge Planning:  Target Discharge Date/Timeframe:  8/25/2023 to complete Phase 1/2; 11/17/2023 to complete Phase 3)  Med Mgmt Provider/Appt:  ELISA (Dr. Covington in addiction medicine)  Ind therapy Provider/Appt:  Weekly (currently Wednesdays at 11:00 am)    Other referrals:  None  Has vulnerable adult status change? No  Service Coordination:  None    Supervision:  Patient is staffed with treatment providers, this includes: Dixie Finnegan [facility] and Cony Guzman [facility] & Marisel Mcdaniel [facility]; Staffed with [facility] Team on 7/19/23.    Attestation:   Can Boles MD - Medical Director - Provides oversight and supervision of care.    Are Treatment Plan goals/objectives effective? Yes  *If no, list changes to treatment plan:    Are the current goals meeting client's needs? Yes  *If no, list the changes to treatment plan.    Client Input / Response: Agreeable       *Client agrees with any changes to the treatment plan: N/A  *Client received copy of changes: N/A  *Client is aware of right to access a treatment plan review: Yes (MyChart or request copy)

## 2023-07-26 ENCOUNTER — DOCUMENTATION ONLY (OUTPATIENT)
Dept: BEHAVIORAL HEALTH | Facility: CLINIC | Age: 68
End: 2023-07-26

## 2023-07-26 DIAGNOSIS — F10.20 ALCOHOL USE DISORDER, SEVERE, DEPENDENCE (H): Primary | ICD-10-CM

## 2023-07-27 ENCOUNTER — HOSPITAL ENCOUNTER (OUTPATIENT)
Dept: BEHAVIORAL HEALTH | Facility: CLINIC | Age: 68
Discharge: HOME | End: 2023-07-27
Attending: FAMILY MEDICINE
Payer: COMMERCIAL

## 2023-07-27 DIAGNOSIS — F10.20 ALCOHOL USE DISORDER, SEVERE, DEPENDENCE (H): Primary | ICD-10-CM

## 2023-07-27 PROCEDURE — H2035 A/D TX PROGRAM, PER HOUR: HCPCS | Mod: HQ

## 2023-07-27 NOTE — GROUP NOTE
"Group Therapy Documentation    PATIENT'S NAME: Patsy Santamaria  MRN:   [id number]  :   1955  ACCT. NUMBER: [id number]  DATE OF SERVICE: 23  START TIME:  9:00 AM  END TIME: 12:00 PM  FACILITATOR(S): iDxie Finnegan LADC; Marisel Mcdaniel LADC  TOPIC: BEH Group Therapy  Number of patients attending the group:  5  Group Length:  3 Hours    Group Therapy Type: Addiction    Summary of Group / Topics Discussed:    Patients participated in a meditation on being present, check-in, and a DBT activity.    DBT House Activity : Patients did a self exploration exercise called the DBT house. Patient shared their values, behaviors they want to stop, emotions they want to experience more often, things that bring them stephanie, ways they blow of steam, and things they are proud of. They were also asked to think about who supports them and who/what protects them.       Structure (modalities, homework, worksheets, etc)    Meditation on being present  Thursday check in questions  DBT House Activity  Interactive group discussion     Patient will:   Identify what foundation they want to build upon as they journey into recovery.  Identify self care activities.  Identify behaviors they would like to change.  Reflect on what makes them proud.     Therapeutic outcome(s) measured by:   Discussion and individual reflection.          Attestation: Jayson Boles MD - Medical Director - Provides oversight and supervision of care.      Group Attendance:  Attended group session    Patient's response to the group topic/interactions:  cooperative with task, discussed personal experience with topic, and expressed readiness to alter behaviors    Patient appeared to be Actively participating, Attentive, and Engaged.        Client specific details:  Patsy reported feeling happy, anxious \"all of it\". These feelings are primarily related to moving to her new place next month. She has yahir sober since last group and continues to have thoughts " "about drinking when she sees a liquor store. 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 She does not anticipate any high risk situations over the weekend. She is going to start decluttering for her move.  He reported her mental health as \"good\". No medication changes. Her goal for the next week is to get all of her paperwork done. She will let me know if she needs assistance. She continues to use the Big Book Talha, meditation, Jainism, talking to her sober friend in [place], and talking to her kids and grand kids to help with her recovery efforts.     "

## 2023-07-28 ENCOUNTER — DOCUMENTATION ONLY (OUTPATIENT)
Dept: BEHAVIORAL HEALTH | Facility: CLINIC | Age: 68
End: 2023-07-28
Payer: COMMERCIAL

## 2023-07-31 ENCOUNTER — HOSPITAL ENCOUNTER (OUTPATIENT)
Dept: BEHAVIORAL HEALTH | Facility: CLINIC | Age: 68
Discharge: HOME | End: 2023-07-31
Attending: FAMILY MEDICINE
Payer: COMMERCIAL

## 2023-07-31 DIAGNOSIS — F10.20 ALCOHOL USE DISORDER, SEVERE, DEPENDENCE (H): Primary | ICD-10-CM

## 2023-07-31 DIAGNOSIS — F43.10 PTSD (POST-TRAUMATIC STRESS DISORDER): ICD-10-CM

## 2023-07-31 PROCEDURE — H2035 A/D TX PROGRAM, PER HOUR: HCPCS | Mod: HQ

## 2023-07-31 NOTE — GROUP NOTE
"Group Therapy Documentation    PATIENT'S NAME: Patsy Santamaria  MRN:   [id number]  :   1955  ACCT. NUMBER: [id number]  DATE OF SERVICE: 23  START TIME:  9:00 AM  END TIME: 12:00 PM  FACILITATOR(S): Cony Guzman, [facility], [facility]  TOPIC: BEH Group Therapy  Number of patients attending the group:  4  Group Length:  3 Hours    Group Therapy Type: Addiction, Psychotherapeutic, and Skills/Education    Summary of Group / Topics Discussed:    Cognitive Therapy Techniques, Co-occurring Illness, Coping Skills/Lifestyle Managemet, Choices in Recovery, Emotional Regulation, Meditation/Breathing Exercises, Relationships, Stress Management, Symptom Management, Thinking Errors/Negative Self-Talk, and Trauma Informed Care    Attestation:   Can Boles MD - Medical Director - Provides oversight and supervision of care.    Group Attendance:  Attended group session    Patient's response to the group topic/interactions:  cooperative with task, discussed personal experience with topic, expressed readiness to alter behaviors, expressed understanding of topic, listened actively, and offered helpful suggestions to peers    Patient appeared to be Actively participating, Attentive, and Engaged.        Client specific details:  Patsy reported continuing abstinence and processed anxiety about moving on , noting her move will result in her being further away from her closest friend\, who lives in her building. She noted she is a feeling more confident now that she is not in acute withdrawal.  She continues to attend [employer], which serves as a peer recovery support and is a source of comfort and relaxation. She identified feeling a lot better both physically and emotionally. She stated she is a lot \"seeing the physical side effects, seeing more and enjoying more\", noting that her awareness is increasing and that she likes it.  She reported that she depression and sleep continues to improve since increase in medication " and noted medication adherence has improved substantially.

## 2023-08-02 ENCOUNTER — HOSPITAL ENCOUNTER (OUTPATIENT)
Dept: BEHAVIORAL HEALTH | Facility: CLINIC | Age: 68
Discharge: HOME | End: 2023-08-02
Attending: FAMILY MEDICINE
Payer: COMMERCIAL

## 2023-08-02 ENCOUNTER — DOCUMENTATION ONLY (OUTPATIENT)
Dept: BEHAVIORAL HEALTH | Facility: CLINIC | Age: 68
End: 2023-08-02

## 2023-08-02 ENCOUNTER — THERAPY VISIT (OUTPATIENT)
Dept: PHYSICAL THERAPY | Facility: REHABILITATION | Age: 68
End: 2023-08-02
Payer: COMMERCIAL

## 2023-08-02 ENCOUNTER — TELEPHONE (OUTPATIENT)
Dept: BEHAVIORAL HEALTH | Facility: CLINIC | Age: 68
End: 2023-08-02

## 2023-08-02 DIAGNOSIS — F33.9 EPISODE OF RECURRENT MAJOR DEPRESSIVE DISORDER, UNSPECIFIED DEPRESSION EPISODE SEVERITY (H): ICD-10-CM

## 2023-08-02 DIAGNOSIS — M25.551 HIP PAIN, RIGHT: Primary | ICD-10-CM

## 2023-08-02 DIAGNOSIS — M54.6 PAIN IN THORACIC SPINE: ICD-10-CM

## 2023-08-02 PROCEDURE — 97110 THERAPEUTIC EXERCISES: CPT | Mod: GP | Performed by: PHYSICAL THERAPIST

## 2023-08-02 PROCEDURE — 97140 MANUAL THERAPY 1/> REGIONS: CPT | Mod: GP | Performed by: PHYSICAL THERAPIST

## 2023-08-02 PROCEDURE — H2035 A/D TX PROGRAM, PER HOUR: HCPCS

## 2023-08-02 RX ORDER — MIRTAZAPINE 15 MG/1
15 TABLET, FILM COATED ORAL AT BEDTIME
Qty: 30 TABLET | Refills: 2 | Status: SHIPPED | OUTPATIENT
Start: 2023-08-02 | End: 2023-09-12

## 2023-08-02 NOTE — PROGRESS NOTES
ABSENT NOTE:    Patsy Santamaria was absent from group 8/2/2023 . This absence was excused. The patient had another appointment this morning. Patient is expected to be in group on 8/3/23.     PRINCE Pretty  8/2/2023 , 5:03 PM

## 2023-08-02 NOTE — TELEPHONE ENCOUNTER
----- Message from Cony Guzman, [facility], [facility] sent at 8/2/2023 12:14 PM CDT -----  Regarding: Individual Session/s Request  Scheduling Request    Patient Name: MINNA WOOD [[id number]]  Location of programming: [facility] [[id number]]   Individual Sessions on  Wednesday (every other)   Date Range: FROM August 16, 30 and September 6th at Noon.    (Group: [facility] GROUP  Phase #2)  Attending Provider (MD): MARION Boles  Number of visits to be scheduled: 3  Duration of Appointment in minutes: 60  Visit Type: in person    Additional notes:

## 2023-08-02 NOTE — PROGRESS NOTES
"Attestation: DO Adrienne Etienne Provides oversight and supervision of care.    Individual Session Summary (MICD)   DATE:  08/02/2023  START TIME: 12:00 PM   END TIME: 12:55 PM   Duration:  55 minutes    ORAL Diagnosis: (F10.20) AUD, severe  Mental Health Diagnosis: 309.81 (F43.10) PTSD    Data:  Writer met with Patsy for an individual session addressing co-occurring mental health, substance use disorders.    Client denies suicidal thoughts, plans, or intent today. Client denies thoughts or behaviors of self-harm today.    Intervention: Writer utilized motivational interviewing to assess for stage of change, as well as health and safety.  Reinforced and supported use of identified skills and practices to use the tools outlined at last individual session.  Provider utilized validation, verbal positive reinforcement and various therapeutic techniques including CBT techniques, Solutions Focused Brief Therapy, as well as trauma informed care (TIC).  Medication adherence check-in. Explored need for an Emotional Support Animal (SARA). Introduced Progressive Muscle Relaxation. Processed emotions over upcoming move and hopes for the future (PT job, garden).    Assessment: Patsy reported a \"good mood\" today and presented with congruent affect. She reported on continuing PT which included massage and she noted to be helpful. Determined need for SARA. She reported on utilization of skills and practices to increase awareness of how she is feeling and what she is thinking, including recognition of triggers and urges, which have decreased, overall. Patsy continues to express an understanding and desire to address co-occurring health, stability, and relapse prevention. She continues to pursue medical care and advocate for herself. Patsy reported plans to buy a calendar/planner tomorrow to help support sober schedule. Patsy reported on feeing nervious about move and fear \"I will fail\". She continues to practice cognitively re-framing to " shift thinking to be more accurate and helpful. She reported awareness and leaning on God through prayer is helpful in this area.  automatic  lean on god, pray. She is using problem solving to enlist help for her move in August. She noted that she and [place] went near the new apartment and enjoyed the quiet area and saw the beautiful moon over the river.         5/11/2023     1:00 PM 5/25/2023    10:00 AM 7/19/2023    12:00 PM   PHQ-9 SCORE   PHQ-9 Total Score 2 7 3            5/25/2023    10:00 AM 7/11/2023     2:00 PM 7/19/2023    12:00 PM   ALISIA-7 SCORE   Total Score 8 2 3     Plan. Patsy will move to Phase 2 and attend individual sessions every other week, unless indicated otherwise. She will begin using a planner for scheduling that supports recovery efforts. She will continue to report on thoughts, feelings, triggers, urges, cravings and tools, skills and practices tried and process the effectiveness of those trials and practices.     Cony Guzman, LPCC, LADC

## 2023-08-02 NOTE — TELEPHONE ENCOUNTER
Date of Last Office Visit: 7/11/2023  Date of Next Office Visit: 09/12/2023  No shows since last visit: 0  Cancellations since last visit: 0    Medication requested: mirtazapine (REMERON) 15 MG tablet  Date last ordered: 02/10/2023 Qty: 30 Refills: 2     Review of MN ?: N/A  Lapse in medication adherence greater than 5 days?: YES  If yes, call patient and gather details: Unknown left vm for patient to call clinic back  Medication refill request verified as identical to current order?: YES  Result of Last DAM, VPA, Li+ Level, CBC, or Carbamazepine Level (at or since last visit): N/A    Last visit treatment plan:   Return in about 2 months (around 9/11/2023) for Follow up, in person.    albuterol (PROAIR HFA/PROVENTIL HFA/VENTOLIN HFA) 108 (90 Base) MCG/ACT inhaler    budesonide-formoterol (SYMBICORT) 160-4.5 MCG/ACT Inhaler    bumetanide (BUMEX) 1 MG tablet    colchicine (COLCRYS) 0.6 MG tablet    diclofenac (VOLTAREN) 1 % topical gel    DULoxetine (CYMBALTA) 60 MG capsule    hydrOXYzine (ATARAX) 50 MG tablet    ipratropium - albuterol 0.5 mg/2.5 mg/3 mL (DUONEB) 0.5-2.5 (3) MG/3ML neb solution    methocarbamol (ROBAXIN) 500 MG tablet    mirtazapine (REMERON) 15 MG tablet    omeprazole (PRILOSEC) 20 MG DR capsule    umeclidinium (INCRUSE ELLIPTA) 62.5 MCG/INH inhaler    nicotine (NICORETTE) 4 MG lozenge       []Medication refilled per  Medication Refill in Ambulatory Care  policy.  []Medication unable to be refilled by RN due to criteria not met as indicated below:    []Eligibility - not seen in the last year   []Supervision - no future appointment   []Compliance - no shows, cancellations or lapse in therapy   []Verification - order discrepancy   []Controlled medication   [x]Medication not included in policy   []90-day supply request   [x]Other

## 2023-08-02 NOTE — TELEPHONE ENCOUNTER
----- Message from Cony Guzman, [facility] sent at 8/2/2023  8:57 AM CDT -----  Regarding: Individual Session request for today  Scheduling Request    Patient Name: MINNA WOOD [[id number]]  Location of programming: [facility] [[id number]]   Individual Session on Wednesday, 8/2/2023 at Noon.   (Group: [facility] GROUP  Phase #1)  Attending Provider (MD): MARION Boles  Number of visits to be scheduled: 1  Duration of Appointment in minutes: 60 minutes  Visit Type: in person    Additional notes:

## 2023-08-03 ENCOUNTER — DOCUMENTATION ONLY (OUTPATIENT)
Dept: BEHAVIORAL HEALTH | Facility: CLINIC | Age: 68
End: 2023-08-03
Payer: COMMERCIAL

## 2023-08-03 ENCOUNTER — TELEPHONE (OUTPATIENT)
Dept: BEHAVIORAL HEALTH | Facility: CLINIC | Age: 68
End: 2023-08-03
Payer: COMMERCIAL

## 2023-08-03 ENCOUNTER — HOSPITAL ENCOUNTER (OUTPATIENT)
Dept: BEHAVIORAL HEALTH | Facility: CLINIC | Age: 68
Discharge: HOME | End: 2023-08-03
Attending: FAMILY MEDICINE
Payer: COMMERCIAL

## 2023-08-03 DIAGNOSIS — F10.20 ALCOHOL USE DISORDER, SEVERE, DEPENDENCE (H): Primary | ICD-10-CM

## 2023-08-03 PROCEDURE — H2035 A/D TX PROGRAM, PER HOUR: HCPCS | Mod: HQ

## 2023-08-03 NOTE — TELEPHONE ENCOUNTER
----- Message from PRINCE Pretty sent at 8/2/2023  3:05 PM CDT -----  Please cancel the group appointment for today at 9:00 AM. Patsy was unable to attend due to another appointment.     PRINCE Pretty

## 2023-08-03 NOTE — GROUP NOTE
Group Therapy Documentation    PATIENT'S NAME: Patsy Santamaria  MRN:   [id number]  :   1955  ACCT. NUMBER: [id number]  DATE OF SERVICE: 23  START TIME:  9:00 AM  END TIME: 12:00 PM  FACILITATOR(S): Dixie Finnegan LADC  TOPIC: BEH Group Therapy  Number of patients attending the group:  5    Group Length:  3 Hours    Group Therapy Type: Addiction    Summary of Group / Topics Discussed:    Patient's participated in a meditation on Self Love and Self Jack and had a discussion on the topic. They identified their current level of self love and worth and how to improve it. We also discussed the practicality of meditation in every day life and patient's identified how they could use it outside of group.    Patient's participated in a check in focusing on the ASAM 6 Dimensions.    Patient's participated in continued psychoeducation on this weeks topic of the 8 Dimensions of Wellness.    8 Dimensions of Wellness (ORAL) DAY 2  This topic will give a general overview of the 8 dimensions of wellness and the importance of healthy lifestyle balance. The 8 dimensions of wellness include: Emotional, physical, intellectual, occupational, spiritual, environmental, financial, and social wellness,      Objective(s):  Patients will continue to identify dimensions of wellness they would like to improve.  Patients will identify goals for all 8 dimensions and the first step that they will take toward at least one of these goals.        Structure (modalities, homework, worksheets, etc.):  Review each dimension and define wellness for each dimension   Facilitate group discussion around goals and areas of improvement surrounding each dimension      Expected therapeutic outcome(s):    Patient will:  Be able to identify and develop wellness goals.        Therapeutic outcomes measured by:  Patients will have identifiable goals to aid in balance and overall wellness.      Attestation:   Can Boles MD - Medical Director - Provides  "oversight and supervision of care.        Group Attendance:  Attended group session    Patient's response to the group topic/interactions:  cooperative with task and discussed personal experience with topic    Patient appeared to be Actively participating, Attentive, and Engaged.        Client specific details:  Patsy reported that her emotions were \"all over the place\". She has a lot going on with securing her new place and preparing to move. She reports her urges are continuing to decline, but she knows she can't let her guard down because they can still come at any time. She reports good mental health. She is continuing PT and got some new exercises for her neck this week. No medication changes this week. She talked about practicing self love by giving her self affirmations in additions to the ones we do in group. She will do self care this weekend and get her hair done. She is going to as [place] to go to a meeting with her.     "

## 2023-08-03 NOTE — TELEPHONE ENCOUNTER
----- Message from PRINCE Pretty sent at 8/3/2023  9:19 AM CDT -----  Scheduling Request    Patient Name: Patsy Santamaria  Location of programming: [place]   Start Date: August / 07 / 2023  Group: [facility] GROUP PH 2 on Monday and Thursday at 9:00 AM to 12:00 PM  Attending Provider (MD): Alberto   Number of visits to be scheduled: 10  Duration of Appointment in minutes: 180  Visit Type: In-person or Treatment - 870    Additional notes: NA

## 2023-08-03 NOTE — TELEPHONE ENCOUNTER
----- Message from PRINCE Pretty sent at 8/3/2023 11:23 AM CDT -----  EDIT:    Scheduling Request    Patient Name: Patsy Santamaria  Location of programming: [place]   Start Date: August / 07 / 2023  Group: [facility] GROUP PH 1 on Monday, Wednesday and Thursday at 9:00 AM to 12:00 PM  Attending Provider (MD): Alberto   Number of visits to be scheduled: 3  Duration of Appointment in minutes: 180  Visit Type: In-person or Treatment - 870    Additional notes: NA    ----- Message -----  From: Marisel Mcdaniel LADC  Sent: 8/3/2023   9:20 AM CDT  To: PRINCE Rivers; #    Scheduling Request    Patient Name: Patsy Santamaria  Location of programming: [place]   Start Date: August / 07 / 2023  Group: [facility] GROUP PH 2 on Monday and Thursday at 9:00 AM to 12:00 PM  Attending Provider (MD): Alberto   Number of visits to be scheduled: 10  Duration of Appointment in minutes: 180  Visit Type: In-person or Treatment - 870    Additional notes: NA

## 2023-08-03 NOTE — PROGRESS NOTES
Update for Funding Review    NAME: Patsy Santamaria  : 1955  Admit Date: 23    Diagnoses: 303.90 (F10.20) Alcohol Use Disorder Severe        Dimension 1: 0 -Patsy's last alcohol use was 23. We have discussed PAWS symptoms     Dimension 2: 1 -[facility]. She is now engaging in physical therapy. She is also beginning to make plans to attend the gym. She is making progress towards her physical wellness goals, she has identified physical health as a trigger in the past for return to use. At this time we are working on coping skills for pain management and overall wellbeing as part of relapse prevention. Patsy has a PCP and is able to access care as needed.      Dimension 3: 2 -Patsy has a diagnoses of PTSD, Anxiety and Depression. She is meeting weekly with staff therapist Cony MORRISON, [facility]. Patsy shared she had an increase in dosage of Cymbalta, and has noticed improved sleep. Patsy has had an increase in anxiety and attributes that to an upcoming move. The treatment team is in support of the possibility of having a therapeutic pet once she moves and would provide a letter for this. She is open to the potential of PTSD treatment with program therapist.      Dimension 4: 1 -Patsy is in the preparation stage of change. She has begun taking action in her recovery and physical wellness. Patsy is an active and engaged group member when attending. She will transition to phase 2 (2x per week on 23)    Dimension 5: 3 -Patsy has reported sustained abstinence. She is working on developing a relapse prevention plan. Patsy has had triggers when going to [facility] for grocery shopping as there is a liquor store attached to that. She has been refocusing her attention when shopping and also going with family when she can. Patsy has had some distressing using dreams, she has been processing this in the group setting. Patsy is beginning to implement coping skills more regularly and  is sharing benefit in the group and individual setting.      Dimension 6: 3 -Patsy is currently preparing to move to a new apartment, she is looking forward to that however does have some anxiety related to the move. Patsy's current living situation is stable however does place her at higher risk for return to use due to use in the complex. Patsy is not attending sober support meetings, we have discussed them at length and she has set alarms to attend however has yet to go.  Patsy is attending [employer] Patsy shares that some of her family members are triggering for her and some are supportive of her. She does not have current or pending legal involvement.       Stage of Change: Preparation     Justification for Treatment: Patsy is benefiting from gaining insight into her use patterns and developing coping skills. She has been learning about and practicing her new coping skills and is identifying the benefit from these in both individual and group sessions. Patsy is identifying how these new skills will fit into her relapse prevention plan. She does not have a full relapse prevention plan however is working on the development of one. We have discussed seasonal relapse prevention planning as the various weather changes can bring different triggers. Patsy has been abstinent from alcohol since 6/7/23 however remains at moderate to high risk for return to use due to lack of support and high use living environment. Patsy has been encouraged to attend sober support meetings, she has had barriers for this however did begin to attend [employer] regularly. Patsy mental health is improving and she meets regularly with staff therapist Cony Guzman.     Number of Sessions Requested: 12 group and 8 individual.     PRINCE Pretty 8/3/2023 9:28 AM

## 2023-08-05 ENCOUNTER — DOCUMENTATION ONLY (OUTPATIENT)
Dept: BEHAVIORAL HEALTH | Facility: CLINIC | Age: 68
End: 2023-08-05
Payer: COMMERCIAL

## 2023-08-05 NOTE — PROGRESS NOTES
[facility] Weekly Treatment Plan Review      ATTENDANCE for the following date span: 23-23  TOPICS: Mindfulness/Meditation, Relationships, Navigating Recovery with a Co-occuring Disorder, Relapse Prevention, and The 8 Dimensions of Wellness.    Date     Group Therapy 3 hours NA  hours 0  hours  Excused 3 hours No group    Individual Therapy   1 Hour     Family Therapy        Other (Specify) Phase 1 &2  Phase 3  Phase 1  Phase 1 & 2        Patient attended all scheduled sessions during this time period.   Total hours: 48/108. Patient is in phase 1.    Weekly Treatment Plan Review     Treatment Plan initiated on 2023.    Dimension1: Acute Intoxication/Withdrawal Potential -   Previous Dimension Ratin  Current Dimension Ratin  Date of Last Use: 23  Any reports of withdrawal symptoms: No  Narrative: Patient reports last date of use as one time in April, but she can't remember the date. Patient reports no withdrawal symptoms. Patient was medically detoxed prior to that one day return to use. No withdrawal symptoms observed at intake.    23 - Patsy reports sustained abstinence. No withdrawal symptoms reported or observed. PAWS possible.    Dimension 2: Biomedical Conditions & Complications -   Previous Dimension Ratin  Current Dimension Ratin  Medical Concerns:  Significant pain and swelling in foot and leg.  Current Medications & Medication Changes:  Current Outpatient Medications   Medication    albuterol (PROAIR HFA/PROVENTIL HFA/VENTOLIN HFA) 108 (90 Base) MCG/ACT inhaler    budesonide-formoterol (SYMBICORT) 160-4.5 MCG/ACT Inhaler    bumetanide (BUMEX) 1 MG tablet    colchicine (COLCRYS) 0.6 MG tablet    diclofenac (VOLTAREN) 1 % topical gel    DULoxetine (CYMBALTA) 60 MG capsule    hydrOXYzine (ATARAX) 50 MG tablet    ipratropium - albuterol 0.5 mg/2.5 mg/3 mL (DUONEB) 0.5-2.5 (3) MG/3ML neb solution    methocarbamol  "(ROBAXIN) 500 MG tablet    mirtazapine (REMERON) 15 MG tablet    nicotine (NICORETTE) 4 MG lozenge    omeprazole (PRILOSEC) 20 MG DR capsule    umeclidinium (INCRUSE ELLIPTA) 62.5 MCG/INH inhaler     No current facility-administered medications for this visit.     Medication Prescriber:  Yanique Cali MD.   Taking meds as prescribed? Yes  Medication side effects or concerns:  None reported.  Outside medical appointments this week (list provider and reason for visit):  Saw doctor about her foot. See EHR.  Narrative: Patient reports the following medical conditions: COPD, overweight, uses CPAP, and general fatigue . Patient has primary care with Yanique Cali MD.     23 - Patsy continues to attend PT. She also wants to keep going to the gym. Her sleep continues to improve. She had a deep neck massage this week which felt good when they did it, but then it felt worse the next day. She is not sure if she will try that again. She continues to make progress on her wellness goals.     Dimension 3: Emotional/Behavioral Conditions & Complications  Previous Dimension Ratin  Current Dimension Ratin    PHQ9       2023     1:00 PM 2023    10:00 AM 2023    12:00 PM   PHQ-9 SCORE   PHQ-9 Total Score 2 7 3     GAD7       2023    10:00 AM 2023     2:00 PM 2023    12:00 PM   ALISIA-7 SCORE   Total Score 8 2 3     Mental health diagnosis PTSD  Date of last SIB/SI/HI: N/A  Current MH Assignments:  None  Narrative:  Patient reports a mental health diagnosis of PTSD, Anxiety, and Depression. Patient endorses current symptoms of hypervigilance and anxiety when out. She also notes unexplained fatigue. Patient would like to meet with mental health therapist to process her trauma. Patient's denies current SI or thoughts of self-harm.     23 - Patsy reported her feelings were \"all over the place\" and it was once again related to her new housing. She said her mental " "health was overall \"good\".  She completed her paperwork and now feels a little stressed because she put in her notice and doesn't have a lease on the new place yet because the leasing office hasn't returned her call. She will check in with Dixie on Monday regarding this. She continues to do meditation regularly for self care. Patsy met for an individual session with Cony MORRISON, [facility].     Dimension 4: Treatment Acceptance / Resistance  Previous Dimension Ratin  Current Dimension Ratin  ORAL Diagnosis:  Alcohol Use Disorder   303.90 (F10.20) Severe In early remission,   Phase: 1  Commitment to tx process/Stage of change: Contemplation  ORAL assignments:  Attend groups and remain abstinent.   Narrative: Patient appears internally motivated for treatment at this time and reports being \"sick and tired of being sick and tired\" and also wanting to take care of her health as her main reasons for coming to treatment.  Patient appears to be in the contemplation stage of change at this time.    23 - Patsy had one excused absence this week. She made great contributions to group and was very engaged during other sessions. Her goal for the upcoming week is to get a hold of the new apartment leasing office. We also discussed applying for EA once she gets her lease. Dixie will help with this.      Dimension 5: Relapse / Continued Problem Potential    Previous Dimension Rating:  3  Current Dimension Rating:  3  Relapses this week: None  Urges to use:  None  UA results:   No results found for this or any previous visit (from the past 168 hour(s)).  Narrative: Patient reports that he has been to multiple treatments and has had limited periods of sobriety. Patient verbalizes that their primary triggers are related to her son bringing up the past. Patient is not engaged with the recovery community. She said two of her three sisters are supportive of her recovery. Patient has minimal insight into the relapse " "process or coping skills for emotional regulation. Patient's mental health symptoms increase the risk of relapse at this time.    8/4/23 - Patsy reported that she has been sober and has had some thoughts about drinking, but they are \"slowly declining\". She shared that she gunner with her thoughts by talking to her sober friend about them or sharing them in group and/or with counselors. She continues to read the big book, read her bible don, and go for walks to aid her in her recovery.     Dimension 6: Recovery Environment   Previous Dimension Rating:  3  Current Dimension Rating:  3  Family Involvement : None   Summarize attendance at family groups and family sessions: N/A  Family supportive of treatment?  Yes  Community support group attendance: None  Recreational activities: Exercise   Narrative: Patient has stable housing at this time and lives alone. Patient reports she is \"retired and on disability\". She does note some concerns about her financial stability. Patient is not involved in the criminal justice system. She lacks a sober support network. Patient does not have a regular structure in place for her day to day activities, though she does enjoy working out. Patient has 4 adult children, noting that her son has been an ongoing trigger.     7/28/23 - Patsy plans to continue decluttering in preparation for her move this weekend. She is also working on getting some boxes. She is excited about her new apartment which offers several amenities she does not have now including a gym, pool, and water therapy. She is moving September 1st. She continues to talk to her sober friend in [place] for support in addition to attending group and therapy.     Justification for Continued Treatment at this Level of Care:  Patient is in Phase 1 and has had minimal periods of sobriety. She lacks a sober support network. She is medication compliant and has started individual therapy as part of Phase 1. She has been able to identify " triggers, and is starting to learn and implement some coping skills, but they are applied inconsistently.Her daily life schedule/routine has not been supportive of recovery. She is working on getting more structure in her daily activities.     Discharge Planning:  Target Discharge Date/Timeframe:  8/25/2023 to complete Phase 1/2; 11/17/2023 to complete Phase 3)  Med Mgmt Provider/Appt:  ELISA (Dr. Covington in addiction medicine)  Ind therapy Provider/Appt:  Weekly (currently Wednesdays at 11:00 am)    Other referrals:  None  Has vulnerable adult status change? No  Service Coordination:  None    Supervision:  Patient is staffed with treatment providers, this includes: Dixie Finnegan [facility] and Cony Guzman [facility], Marisel Mcdaniel, [facility], and Yee Curry, [facility]; Staffed with [facility] Team on 7/19/23.]    Attestation:   Can Boles MD - Medical Director - Provides oversight and supervision of care.    Are Treatment Plan goals/objectives effective? Yes  *If no, list changes to treatment plan:    Are the current goals meeting client's needs? Yes  *If no, list the changes to treatment plan.    Client Input / Response: Agreeable       *Client agrees with any changes to the treatment plan: N/A  *Client received copy of changes: N/A  *Client is aware of right to access a treatment plan review: Yes (MyChart or request copy)

## 2023-08-07 ENCOUNTER — HOSPITAL ENCOUNTER (OUTPATIENT)
Dept: BEHAVIORAL HEALTH | Facility: CLINIC | Age: 68
Discharge: HOME | End: 2023-08-07
Attending: FAMILY MEDICINE
Payer: COMMERCIAL

## 2023-08-07 DIAGNOSIS — F43.10 PTSD (POST-TRAUMATIC STRESS DISORDER): ICD-10-CM

## 2023-08-07 DIAGNOSIS — F10.20 ALCOHOL USE DISORDER, SEVERE, DEPENDENCE (H): Primary | ICD-10-CM

## 2023-08-07 PROCEDURE — H2035 A/D TX PROGRAM, PER HOUR: HCPCS | Mod: HQ

## 2023-08-07 NOTE — GROUP NOTE
"Group Therapy Documentation    PATIENT'S NAME: Patsy Santamaria  MRN:   [id number]  :   1955  ACCT. NUMBER: [id number]  DATE OF SERVICE: 23  START TIME:  9:00 AM  END TIME: 12:00 PM  FACILITATOR(S): Cony Guzman, MOHINI, [facility]  TOPIC: BEH Group Therapy  Number of patients attending the group:  5  Group Length:  3 Hours    Group Therapy Type: Addiction, Psychotherapeutic, and Skills/Education    Summary of Group / Topics Discussed:    Balanced Lifestyle , Cognitive Therapy Techniques, Co-occurring Illness, Coping Skills/Lifestyle Managemet, Choices in Recovery, Meditation/Breathing Exercises, Sleep Hygiene, Thinking Errors/Negative Self-Talk, and Time Management    Group Attendance:  Attended group session    Patient's response to the group topic/interactions:  cooperative with task, discussed personal experience with topic, expressed readiness to alter behaviors, expressed understanding of topic, gave appropriate feedback to peers, listened actively, and offered helpful suggestions to peers    Patient appeared to be Actively participating, Attentive, and Engaged.        Client specific details:  Patient reported continuing abstinence with non-motivating thoughts, that she acknowledges and has made progress allowing them to \"come and go without acting on them\".  No reported urges or cravings. No new medical or dental concerns noted. Patsy reported continuing PT although she has not noticed any benefit to day. Mood reported as positive and presented with congruent affect. Patsy was engaged with others and the material presented as evidenced by asking meaningful questions and relating to others. She expressed gratitude for life, reported reaching out to others in recovery. She stated \"I don't think I ever admitted I had a problem with alcohol, until now.\".   Identified feelings: Hopeful and a nervous about move coming up 2023.      "

## 2023-08-09 ENCOUNTER — THERAPY VISIT (OUTPATIENT)
Dept: PHYSICAL THERAPY | Facility: REHABILITATION | Age: 68
End: 2023-08-09
Payer: COMMERCIAL

## 2023-08-09 DIAGNOSIS — M25.551 HIP PAIN, RIGHT: Primary | ICD-10-CM

## 2023-08-09 DIAGNOSIS — M54.6 PAIN IN THORACIC SPINE: ICD-10-CM

## 2023-08-09 PROCEDURE — 97140 MANUAL THERAPY 1/> REGIONS: CPT | Mod: GP | Performed by: PHYSICAL THERAPIST

## 2023-08-09 PROCEDURE — 97110 THERAPEUTIC EXERCISES: CPT | Mod: GP | Performed by: PHYSICAL THERAPIST

## 2023-08-09 NOTE — TELEPHONE ENCOUNTER
----- Message from PRINCE Pretty sent at 8/9/2023 10:54 AM CDT -----  Please cancel Patsy's appointment for this morning.     Marisel

## 2023-08-10 ENCOUNTER — HOSPITAL ENCOUNTER (OUTPATIENT)
Dept: BEHAVIORAL HEALTH | Facility: CLINIC | Age: 68
Discharge: HOME | End: 2023-08-10
Attending: FAMILY MEDICINE
Payer: COMMERCIAL

## 2023-08-10 PROCEDURE — H2035 A/D TX PROGRAM, PER HOUR: HCPCS | Mod: HQ

## 2023-08-10 NOTE — GROUP NOTE
Group Therapy Documentation    PATIENT'S NAME: Patsy Santamaria  MRN:   [id number]  :   1955  ACCT. NUMBER: [id number]  DATE OF SERVICE: 8/10/23  START TIME:  9:00 AM  END TIME: 12:00 PM  FACILITATOR(S): Joyce Bal LADC; Marisel Mcdaniel LADC  TOPIC: BEH Group Therapy  Number of patients attending the group:  8    Group Length:  3 Hours    Group Therapy Type: Addiction, Psychoeducation, and Psychotherapeutic    Attestation:   Dr. Eastman - Medical Director - Provides oversight and supervision of care.     Biomedical Impacts of Substance Use (ORAL)  This topic will give a general overview of basic impact(s) of long- and short-term substance use  has on the body including.  Objective(s):    Patients will identify 2 ways they can improve their physical health    Structure (modalities, homework, worksheets, etc.):    Provide psychoeducation on biomedical impact(s) of long- and short-term substance  use.    Facilitate group discussion around each patient s current awareness of the impact(s)  substance use has on the body  Expected therapeutic outcome(s):   Patient will:    Be able to identify concerns they may have to address with PCP.    Be able to identify long and short-term consequences of substance use.      Therapeutic outcomes measured by:    Patients will be able to identify the impact substance use has on overall health.       Group Attendance:  Attended group session    Patient's response to the group topic/interactions:  expressed understanding of topic and listened actively    Patient appeared to be Engaged.        Client specific details:  Smita shared that she is thankful for her family and that she is able to be present with her family. Smita shared that when going out to the grocery store she does not ignore the liquor store, she acknowledge that it is there and does not give the liquor store the power. Smita shared that she will be packing over the weekend and going on a walk.   shared that she does not go to meeting but she has bible apps on her phone and reads them in the morning and afternoon. Smita shared that if she is having a bad day she will take the time and read a passage out of the bible to help with her feelings. Smita shared that she is also reading a book about sobriety and shares that it has been helping her think about AA meeting. Smita short term goal for the week is packing her house up for the move at the end of the month.

## 2023-08-10 NOTE — ADDENDUM NOTE
Encounter addended by: Joyce Bal LADC on: 8/10/2023 3:52 PM   Actions taken: Charge Capture section accepted

## 2023-08-10 NOTE — TELEPHONE ENCOUNTER
----- Message from Cony Guzman, [facility], [facility] [place] sent at 8/10/2023 11:39 AM CDT -----  Regarding: Individual Session Reschedule request  Rescheduling Request    Patient Name: MINNA WOOD [[id number]]  Location of programming: [facility] [[id number]]   Please RS appointments from Wednesdays at Noon to 1:30pm (8/16/2023)(Group: [facility] GROUP  Phase #2)  Attending Provider (MD): MARION Boles  Number of visits to be scheduled: 1  Duration of Appointment in minutes: 60  Visit Type: in person    Additional notes:

## 2023-08-11 ENCOUNTER — DOCUMENTATION ONLY (OUTPATIENT)
Dept: BEHAVIORAL HEALTH | Facility: CLINIC | Age: 68
End: 2023-08-11
Payer: COMMERCIAL

## 2023-08-11 NOTE — PROGRESS NOTES
[facility] Weekly Treatment Plan Review      ATTENDANCE for the following date span: 23-23  TOPICS: Mindfulness/Meditation, Relationships, Navigating Recovery with a Co-occuring Disorder, Relapse Prevention, and The 8 Dimensions of Wellness.    Date     Group Therapy 3 hours NA  hours NA  hours   3 hours No group    Individual Therapy   1 Hour     Family Therapy        Other (Specify) Phase 1 &2  Phase 3  Phase 1  Phase 1 & 2        Patient attended all scheduled sessions during this time period.   Total hours: 54/108. Patient is in phase 2.    Weekly Treatment Plan Review     Treatment Plan initiated on 2023.    Dimension1: Acute Intoxication/Withdrawal Potential -   Previous Dimension Ratin  Current Dimension Ratin  Date of Last Use: 23  Any reports of withdrawal symptoms: No  Narrative: Patient reports last date of use as one time in April, but she can't remember the date. Patient reports no withdrawal symptoms. Patient was medically detoxed prior to that one day return to use. No withdrawal symptoms observed at intake.        Dimension 2: Biomedical Conditions & Complications -   Previous Dimension Ratin  Current Dimension Ratin  Medical Concerns:  Significant pain and swelling in foot and leg.  Current Medications & Medication Changes:  Current Outpatient Medications   Medication    albuterol (PROAIR HFA/PROVENTIL HFA/VENTOLIN HFA) 108 (90 Base) MCG/ACT inhaler    budesonide-formoterol (SYMBICORT) 160-4.5 MCG/ACT Inhaler    bumetanide (BUMEX) 1 MG tablet    colchicine (COLCRYS) 0.6 MG tablet    diclofenac (VOLTAREN) 1 % topical gel    DULoxetine (CYMBALTA) 60 MG capsule    hydrOXYzine (ATARAX) 50 MG tablet    ipratropium - albuterol 0.5 mg/2.5 mg/3 mL (DUONEB) 0.5-2.5 (3) MG/3ML neb solution    methocarbamol (ROBAXIN) 500 MG tablet    mirtazapine (REMERON) 15 MG tablet    nicotine (NICORETTE) 4 MG lozenge    omeprazole  "(PRILOSEC) 20 MG DR capsule    umeclidinium (INCRUSE ELLIPTA) 62.5 MCG/INH inhaler     No current facility-administered medications for this visit.     Medication Prescriber:  Yanique Cali MD.   Taking meds as prescribed? Yes  Medication side effects or concerns:  None reported.  Outside medical appointments this week (list provider and reason for visit):  Saw doctor about her foot. See EHR.  Narrative: Patient reports the following medical conditions: COPD, overweight, uses CPAP, and general fatigue . Patient has primary care with Yanique Cali MD.         Dimension 3: Emotional/Behavioral Conditions & Complications  Previous Dimension Ratin  Current Dimension Ratin    PHQ9       2023     1:00 PM 2023    10:00 AM 2023    12:00 PM   PHQ-9 SCORE   PHQ-9 Total Score 2 7 3     GAD7       2023    10:00 AM 2023     2:00 PM 2023    12:00 PM   ALISIA-7 SCORE   Total Score 8 2 3     Mental health diagnosis PTSD  Date of last SIB/SI/HI: N/A  Current MH Assignments:  None  Narrative:  Patient reports a mental health diagnosis of PTSD, Anxiety, and Depression. Patient endorses current symptoms of hypervigilance and anxiety when out. She also notes unexplained fatigue. Patient continues to meet with her mental health therapist. Patient's denies current SI or thoughts of self-harm. Patsy reports that her mental health is \"good\".         Dimension 4: Treatment Acceptance / Resistance  Previous Dimension Ratin  Current Dimension Ratin  ORAL Diagnosis:  Alcohol Use Disorder   303.90 (F10.20) Severe In early remission,   Phase: 1  Commitment to tx process/Stage of change: Contemplation  ORAL assignments:  Attend groups and remain abstinent.   Narrative: Patient appears internally motivated for treatment     23 - Patsy had one excused absence this week. She made great contributions to group and was very engaged during other sessions. Her goal for the " "upcoming week is to get a hold of the new apartment leasing office. We also discussed applying for EA once she gets her lease. Dixie will help with this.      Dimension 5: Relapse / Continued Problem Potential    Previous Dimension Rating:  3  Current Dimension Rating:  3  Relapses this week: None  Urges to use:  None  UA results:   No results found for this or any previous visit (from the past 168 hour(s)).  Narrative: Patient reports when going out to the grocery store she does not ignore the liquor store, she acknowledge that it is there and does not give the liquor store the power. Patient reports that she does daily readings in the morning and evening and when she is having a hard day she reads a passage from the \"big book\". Patient's mental health symptoms increase the risk of relapse at this time.        Dimension 6: Recovery Environment   Previous Dimension Rating:  3  Current Dimension Rating:  3  Family Involvement : None   Summarize attendance at family groups and family sessions: N/A  Family supportive of treatment?  Yes  Community support group attendance: None  Recreational activities: Exercise   Narrative: Patient has stable housing at this time and lives alone. Patient reports she is \"retired and on disability\". She does note some concerns about her financial stability. Patient is not involved in the criminal justice system. She lacks a sober support network. Patient does not have a regular structure in place for her day to day activities, though she does enjoy working out. Patsy plans to continue decluttering in preparation for her move this weekend. She is also working on getting some boxes. She is excited about her new apartment and is planing on  moving September 1st. She continues to talk to her sober friend in [place] for support in addition to attending group and therapy.      Justification for Continued Treatment at this Level of Care:  Patient is in Phase 1 and has had minimal periods of " sobriety. She lacks a sober support network. She is medication compliant and has started individual therapy as part of Phase 1. She has been able to identify triggers, and is starting to learn and implement some coping skills, but they are applied inconsistently.Her daily life schedule/routine has not been supportive of recovery. She is working on getting more structure in her daily activities.     Discharge Planning:  Target Discharge Date/Timeframe:  8/25/2023 to complete Phase 1/2; 11/17/2023 to complete Phase 3)  Med Mgmt Provider/Appt:  ELISA (Dr. Covington in addiction medicine)  Ind therapy Provider/Appt:  Weekly (currently Wednesdays at 11:00 am)    Other referrals:  None  Has vulnerable adult status change? No  Service Coordination:  None    Supervision:  Patient is staffed with treatment providers, this includes: Dixie Finnegan [facility] and Cony Guzman, [facility], Marisel Mcdaniel [facility], and EYAD AndradeT; Staffed with [facility] Team on 7/19/23.]      Attestation:   Dr. Eastman - Medical Director - Provides oversight and supervision of care.     Are Treatment Plan goals/objectives effective? Yes  *If no, list changes to treatment plan:    Are the current goals meeting client's needs? Yes  *If no, list the changes to treatment plan.    Client Input / Response: Agreeable       *Client agrees with any changes to the treatment plan: N/A  *Client received copy of changes: N/A  *Client is aware of right to access a treatment plan review: Yes (MyChart or request copy)    UMESH Brady-T

## 2023-08-14 ENCOUNTER — HOSPITAL ENCOUNTER (OUTPATIENT)
Dept: BEHAVIORAL HEALTH | Facility: CLINIC | Age: 68
Discharge: HOME | End: 2023-08-14
Attending: FAMILY MEDICINE
Payer: COMMERCIAL

## 2023-08-14 DIAGNOSIS — F43.10 PTSD (POST-TRAUMATIC STRESS DISORDER): ICD-10-CM

## 2023-08-14 DIAGNOSIS — F10.20 ALCOHOL USE DISORDER, SEVERE, DEPENDENCE (H): Primary | ICD-10-CM

## 2023-08-14 PROCEDURE — H2035 A/D TX PROGRAM, PER HOUR: HCPCS | Mod: HQ

## 2023-08-14 NOTE — GROUP NOTE
"Group Therapy Documentation    PATIENT'S NAME: Patsy Santamaria  MRN:   [id number]  :   1955  ACCT. NUMBER: [id number]  DATE OF SERVICE: 23  START TIME:  9:00 AM  END TIME: 12:00 PM  FACILITATOR(S): Cony Guzman, [facility], [facility]; Joyce Bal [facility]  TOPIC: BEH Group Therapy  Number of patients attending the group:  4  Group Length:  3 Hours    Group Therapy Type: Addiction, Psychotherapeutic, and Skills/Education    Summary of Group / Topics Discussed:    Cognitive Therapy Techniques, Co-occurring Illness, Coping Skills/Lifestyle Managemet, Choices in Recovery, Distress Tolerance, Meditation/Breathing Exercises, Relaxation Techniques, and Trauma Informed Care    Attestation:   Dr. Eastman - Medical Director - Provides oversight and supervision of care.    Group Attendance:  Attended group session    Patient's response to the group topic/interactions:  cooperative with task, discussed personal experience with topic, expressed readiness to alter behaviors, expressed understanding of topic, gave appropriate feedback to peers, and listened actively    Patient appeared to be Actively participating, Attentive, and Engaged.         Client specific details:  Patsy reported continuing abstinence and expressed gratitude for \"waking up everyday\". She  engaged fully in group discussion topics around acceptance. In a discussion about shame, Patsy stated \"there is so much more in my future than has been in my past and I want to go for that\" She reported having a using dream that is scary to her. She is willing to try to normalize this as she would a thought. She has also agreed to engage in treatment for PTSD, with this writer, when she is closer to Phase 3. She still expresses some fear about moving but notes she mostly hopeful and happy about it. She continues to read the Big Book have it on my phone, talk to her friend in [place] who is also in recovery and is attending [employer] regularly.  She is looking forward " to going out with her sister today.   Affirmation: I am pretty emotionally stable and physically getting better, too.  Feeling: Hopeful, happy, fearful.

## 2023-08-16 ENCOUNTER — DOCUMENTATION ONLY (OUTPATIENT)
Dept: BEHAVIORAL HEALTH | Facility: CLINIC | Age: 68
End: 2023-08-16

## 2023-08-17 ENCOUNTER — HOSPITAL ENCOUNTER (OUTPATIENT)
Dept: BEHAVIORAL HEALTH | Facility: CLINIC | Age: 68
Discharge: HOME | End: 2023-08-17
Attending: FAMILY MEDICINE
Payer: COMMERCIAL

## 2023-08-17 DIAGNOSIS — F10.90 ALCOHOL USE DISORDER: Primary | ICD-10-CM

## 2023-08-17 PROCEDURE — H2035 A/D TX PROGRAM, PER HOUR: HCPCS | Mod: HQ

## 2023-08-17 NOTE — GROUP NOTE
"Group Therapy Documentation    PATIENT'S NAME: Patsy Santamaria  MRN:   [id number]  :   1955  ACCT. NUMBER: [id number]  DATE OF SERVICE: 23  START TIME:  9:00 AM  END TIME: 12:00 PM  FACILITATOR(S): Joyce Bal LADC; Marisel Mcdaniel LADC  TOPIC: BEH Group Therapy  Number of patients attending the group:  7  Group Length:  3 Hours    Group Therapy Type: Addiction and Skills/Education    Summary of Group / Topics Discussed:    Physical impacts of addiction discussion.     Attestation:   Dr. Eastman - Medical Director - Provides oversight and supervision of care.   Group Attendance:  Attended group session    Patient's response to the group topic/interactions:  discussed personal experience with topic and gave appropriate feedback to peers    Patient appeared to be Engaged.        Client specific details:  Patient shared that she is feeling good about her sobriety. Patient reported only having thought of alcohol when going to the grocery store due to the liquor store being connected however she manages it by acknowledging her thought and letting it pass. Patient shared over the weekend she will be spending time with her older sister and packing. Patient reported that she is having trouble with her left foot hurting however she is putting heat on the area and said \"If I need to go to the doctors I am able to\" Patient reported that she is going to one more physical therapy appointment and than she will no longer use their services due to it not helping. Patient shared that she has been struggling with her addiction since 2016 and found a missing peace to her sobriety which is acceptance. Patient shared her short term goal is \"finding out the date I move into my new place. Patsy affirmation is \"I have come a long way\". Patient reported that her AA book and the serenity prayer has been helping her through her sobriety.     "

## 2023-08-20 ENCOUNTER — DOCUMENTATION ONLY (OUTPATIENT)
Dept: BEHAVIORAL HEALTH | Facility: CLINIC | Age: 68
End: 2023-08-20
Payer: COMMERCIAL

## 2023-08-20 NOTE — PROGRESS NOTES
[facility] Weekly Treatment Plan Review      ATTENDANCE for the following date span: 23-23    Date     Group Therapy 3 hours NA  hours NA  hours   3 hours No group    Individual Therapy   1 Hour     Family Therapy        Other (Specify) Phase 1 &2  Phase 3  Phase 1  Phase 1 & 2        Patient attended all scheduled sessions during this time period.   Total hours: 60/108. Patient is in phase 2.    Weekly Treatment Plan Review     Treatment Plan initiated on 2023.    Dimension1: Acute Intoxication/Withdrawal Potential -   Previous Dimension Ratin  Current Dimension Ratin  Date of Last Use: 23  Any reports of withdrawal symptoms: No  Narrative: Patient reports last date of use as one time in April, but she can't remember the date. No use reported, no change.     Dimension 2: Biomedical Conditions & Complications -   Previous Dimension Ratin  Current Dimension Ratin  Medical Concerns:  Significant pain and swelling in foot and leg.  Current Medications & Medication Changes:  Current Outpatient Medications   Medication    albuterol (PROAIR HFA/PROVENTIL HFA/VENTOLIN HFA) 108 (90 Base) MCG/ACT inhaler    budesonide-formoterol (SYMBICORT) 160-4.5 MCG/ACT Inhaler    bumetanide (BUMEX) 1 MG tablet    colchicine (COLCRYS) 0.6 MG tablet    diclofenac (VOLTAREN) 1 % topical gel    DULoxetine (CYMBALTA) 60 MG capsule    hydrOXYzine (ATARAX) 50 MG tablet    ipratropium - albuterol 0.5 mg/2.5 mg/3 mL (DUONEB) 0.5-2.5 (3) MG/3ML neb solution    methocarbamol (ROBAXIN) 500 MG tablet    mirtazapine (REMERON) 15 MG tablet    nicotine (NICORETTE) 4 MG lozenge    omeprazole (PRILOSEC) 20 MG DR capsule    umeclidinium (INCRUSE ELLIPTA) 62.5 MCG/INH inhaler     No current facility-administered medications for this visit.     Medication Prescriber:  Yanique Cali MD.   Taking meds as prescribed? Yes  Medication side effects or  concerns:  None reported.  Outside medical appointments this week (list provider and reason for visit):  Saw doctor about her foot. See EHR.  Narrative: Patient reports the following medical conditions: COPD, overweight, uses CPAP, and general fatigue . Patient has primary care with Yanique Cali MD. She reports continuing to attend physical therapy however does not think it is actually helping. Patsy states she is starting to have foot pain and thinks she may have got in her other foot now, she has plans to be seen at urgent care or message her PCP if it did not improve over the weekend.     Dimension 3: Emotional/Behavioral Conditions & Complications  Previous Dimension Ratin  Current Dimension Ratin    PHQ9       2023     1:00 PM 2023    10:00 AM 2023    12:00 PM   PHQ-9 SCORE   PHQ-9 Total Score 2 7 3     GAD7       2023    10:00 AM 2023     2:00 PM 2023    12:00 PM   ALISIA-7 SCORE   Total Score 8 2 3     Mental health diagnosis PTSD  Date of last SIB/SI/HI: N/A  Current MH Assignments:  None  Narrative:  Patient reports a mental health diagnosis of PTSD, Anxiety, and Depression. Patient endorses current symptoms of hypervigilance and anxiety when out. Patsy shares continued improvements in her mental health. She is reporting that she has some anxiety related to her move but feels like this is going to be a major improvement in her life. Patsy is looking forward to the changes that are upcoming. Patsy did not report any emergent mental health concerns.     Dimension 4: Treatment Acceptance / Resistance  Previous Dimension Ratin  Current Dimension Ratin  ORAL Diagnosis:  Alcohol Use Disorder   303.90 (F10.20) Severe In early remission,   Phase: 2  Commitment to tx process/Stage of change: Action   ORAL assignments:  Attend groups and remain abstinent.   Narrative: Patient appears internally motivated for treatment. She is an active and engaged group  "member when present. Patsy verbalizes continued motivation for recovery.     Dimension 5: Relapse / Continued Problem Potential    Previous Dimension Rating:  3  Current Dimension Rating:  3  Relapses this week: None  Urges to use:  None  UA results:   No results found for this or any previous visit (from the past 168 hour(s)).  Narrative: Patient reports when going out to the grocery store she does not ignore the liquor store, she acknowledge that it is there and does not give the liquor store the power. Patient reports that she does daily readings in the morning and evening and when she is having a hard day she reads a passage from the \"big book\". Patient's mental health symptoms increase the risk of relapse at this time. Patsy is working on the development of a relapse prevention plan. She is beginning to name her triggers prior to events and situations and because of this has a plan for her recovery skills to use.     Dimension 6: Recovery Environment   Previous Dimension Rating:  3  Current Dimension Rating:  3  Family Involvement : None   Summarize attendance at family groups and family sessions: N/A  Family supportive of treatment?  Yes  Community support group attendance: None  Recreational activities: Exercise   Narrative: Patient has stable housing at this time and lives alone. Patient reports she is \"retired and on disability\". She does note some concerns about her financial stability. Patient is not involved in the criminal justice system. She lacks a sober support network. Patient does not have a regular structure in place for her day to day activities, though she does enjoy working out. Patsy plans to continue decluttering in preparation for her move this weekend. She is also working on getting some boxes. She is excited about her new apartment and is planing on  moving September 1st. She continues to talk to her sober friend in [place] for support in addition to attending group and therapy.  Patsy " attends [employer] regularly and finds benefit in that.     Justification for Continued Treatment at this Level of Care:  Patient is in Phase 1 and has had minimal periods of sobriety. She lacks a sober support network. She is medication compliant and has started individual therapy. She has been able to identify triggers, and is starting to learn and implement some coping skills. Her daily life schedule/routine has not been supportive of recovery previously so she is working on improving that.. She is working on getting more structure in her daily activities.     Discharge Planning:  Target Discharge Date/Timeframe:  8/25/2023 to complete Phase 1/2; 11/17/2023 to complete Phase 3)  Med Mgmt Provider/Appt:  TBOLENA (Dr. Covington in addiction medicine)  Ind therapy Provider/Appt:  Weekly (currently Wednesdays at 11:00 am)    Other referrals:  None  Has vulnerable adult status change? No  Service Coordination:  None    Supervision:  Patient is staffed with treatment providers, this includes: Cony Guzman [facility], Marisel Mcdaniel [facility], and EYAD AndradeT; Staffed with [facility] Team on 7/19/23.]    Attestation:   Dr. Eastman - Medical Director - Provides oversight and supervision of care.      Are Treatment Plan goals/objectives effective? Yes  *If no, list changes to treatment plan:    Are the current goals meeting client's needs? Yes  *If no, list the changes to treatment plan.    Client Input / Response: Agreeable       *Client agrees with any changes to the treatment plan: N/A  *Client received copy of changes: N/A  *Client is aware of right to access a treatment plan review: Yes (MyChart or request copy)    UMESH Brady-T

## 2023-08-21 ENCOUNTER — HOSPITAL ENCOUNTER (OUTPATIENT)
Dept: BEHAVIORAL HEALTH | Facility: CLINIC | Age: 68
Discharge: HOME | End: 2023-08-21
Attending: FAMILY MEDICINE
Payer: COMMERCIAL

## 2023-08-21 PROCEDURE — H2035 A/D TX PROGRAM, PER HOUR: HCPCS | Mod: HQ

## 2023-08-21 NOTE — GROUP NOTE
Group Therapy Documentation    PATIENT'S NAME: Patsy Santamaria  MRN:   [id number]  :   1955  ACCT. NUMBER: [id number]  DATE OF SERVICE: 23  START TIME:  9:00 AM  END TIME: 12:00 PM  FACILITATOR(S): Cony Guzman, [facility], [facility]  TOPIC: BEH Group Therapy  Number of patients attending the group:  3  Group Length:  3 Hours    Group Therapy Type: Addiction, Psychotherapeutic, and Skills/Education    Summary of Group / Topics Discussed:    Cognitive Therapy Techniques, Co-occurring Illness, Coping Skills/Lifestyle Managemet, Choices in Recovery, Meditation/Breathing Exercises, Mindfulness, Self-Esteem/Self [place], Stress Management, Symptom Management, Thinking Errors/Negative Self-Talk, and Trauma Informed Care    Attestation:   Dr. Eastman - Medical Director - Provides oversight and supervision of care.     Group Attendance:  Attended group session 2 hours (excused for medical appointment)    Patient's response to the group topic/interactions:  cooperative with task, expressed readiness to alter behaviors, expressed understanding of topic, gave appropriate feedback to peers, listened actively, and offered helpful suggestions to peers    Patient appeared to be Actively participating, Attentive, and Engaged.        Client specific details:  Patient reported continuing abstinence with thoughts that are manageable at this time. Effective practices utilized included awareness of thoughts and feelings, as CBT-informed strategies to reduce risk of return to use. Patsy reported continuing to follow through with medical appointment, including one today. Patsy reported a good mood and presented with congruent affect. Patient was engaged with others and the material presented as evidenced by meaningfully relating to others.

## 2023-08-21 NOTE — PROGRESS NOTES
Addiction Outpatient Weekly Clinical Staffing     Patsy Santamaria was staffed on 8/16/2023 . Patsy Santamaria was staffed on recovery strengths, barriers and treatment progress.     Staff present: [facility], [facility] , and [facility]ly [facility]    Date: 8/21/2023 Time: 3:32 PM    Staff Signature: Cony Guzman, [facility], [facility]

## 2023-08-23 ENCOUNTER — TELEPHONE (OUTPATIENT)
Dept: BEHAVIORAL HEALTH | Facility: CLINIC | Age: 68
End: 2023-08-23

## 2023-08-23 ENCOUNTER — THERAPY VISIT (OUTPATIENT)
Dept: PHYSICAL THERAPY | Facility: REHABILITATION | Age: 68
End: 2023-08-23
Payer: COMMERCIAL

## 2023-08-23 DIAGNOSIS — M25.551 HIP PAIN, RIGHT: Primary | ICD-10-CM

## 2023-08-23 PROCEDURE — 97750 PHYSICAL PERFORMANCE TEST: CPT | Mod: GP | Performed by: PHYSICAL THERAPIST

## 2023-08-23 PROCEDURE — 97110 THERAPEUTIC EXERCISES: CPT | Mod: GP | Performed by: PHYSICAL THERAPIST

## 2023-08-23 NOTE — TELEPHONE ENCOUNTER
----- Message -----   From: Cony Guzman [facility], [facility]   Sent: 8/23/2023  10:11 AM CDT   To: Marisel Mcdaniel [facility]; *   Subject: Individual Session Request                       Scheduling Request     Patient Name: MINNA WOOD [[id number]]   Location of programming: [facility] [[id number]]   Individual Session on Thursday, 8/24/2023 at 1pm   (Group: [facility] GROUP  Phase #2)   Attending Provider (MD): MARION Boles   Number of visits to be scheduled: 1   Duration of Appointment in minutes: 60   Visit Type: in person     Additional notes:

## 2023-08-24 ENCOUNTER — HOSPITAL ENCOUNTER (OUTPATIENT)
Dept: BEHAVIORAL HEALTH | Facility: CLINIC | Age: 68
Discharge: HOME | End: 2023-08-24
Attending: FAMILY MEDICINE
Payer: COMMERCIAL

## 2023-08-24 DIAGNOSIS — F10.90 ALCOHOL USE DISORDER: Primary | ICD-10-CM

## 2023-08-24 PROCEDURE — H2035 A/D TX PROGRAM, PER HOUR: HCPCS | Mod: HQ

## 2023-08-24 NOTE — GROUP NOTE
"Group Therapy Documentation    PATIENT'S NAME: Patsy Santamaria  MRN:   [id number]  :   1955  ACCT. NUMBER: [id number]  DATE OF SERVICE: 23  START TIME:  9:00 AM  END TIME: 12:00 PM  FACILITATOR(S): Joyce Bal LADC; Marisel Mcdaniel LADC  TOPIC: BEH Group Therapy  Number of patients attending the group:  7  Group Length:  3 Hours    Group Therapy Type: Addiction    Summary of Group / Topics Discussed:    Choices in Recovery, Journaling, Meditation/Breathing Exercises, and Mindfulness    Attestation:   Dr. Eastman - Medical Director - Provides oversight and supervision of care.       Group Attendance:  Attended group session    Patient's response to the group topic/interactions:  expressed understanding of topic, gave appropriate feedback to peers, and listened actively    Patient appeared to be Engaged.        Client specific details:  Patient shared her emotion of stress due to moving and not having the paper saying her move in date. Patient shared that she is going to reach out and express that she needs the papers to help her financial assistance. Patient shared that she does not like change and is having mixed emotions, she stated \"it will all be better once I move in\". Patient reported she has stayed sober and her thoughts of use comes and goes. Patient shared she might go to the state fair, hangout with her sister, and finalize packing. Patient shared she does not have any high risk situations over the weekend. Patient shared her mental health is good but having feelings of stress cathy to the move altho she has acknowledge her emotions and stated \"sometimes that is good\". Patient shared when she is experiencing high emotions that praying helps with being more peaceful. Patient affirmation \"I feel good\".    "

## 2023-08-25 ENCOUNTER — DOCUMENTATION ONLY (OUTPATIENT)
Dept: BEHAVIORAL HEALTH | Facility: CLINIC | Age: 68
End: 2023-08-25
Payer: COMMERCIAL

## 2023-08-25 NOTE — PROGRESS NOTES
[facility] Weekly Treatment Plan Review      ATTENDANCE for the following date span: 23-23    Date     Group Therapy 3 hours NA  hours NA  hours   3 hours No group    Individual Therapy        Family Therapy        Other (Specify) Phase 1 &2  Phase 3  Phase 1  Phase 1 & 2        Patient attended all scheduled sessions during this time period.   Total hours: 65/108. Patient is in phase 2.    Weekly Treatment Plan Review     Treatment Plan initiated on 2023.    Dimension1: Acute Intoxication/Withdrawal Potential -   Previous Dimension Ratin  Current Dimension Ratin  Date of Last Use: 23  Any reports of withdrawal symptoms: No  Narrative: Patient reports last date of use as one time in April, but she can't remember the date. No use reported, no change.     Dimension 2: Biomedical Conditions & Complications -   Previous Dimension Ratin  Current Dimension Ratin  Medical Concerns:  Significant pain and swelling in foot and leg.  Current Medications & Medication Changes:  Current Outpatient Medications   Medication    albuterol (PROAIR HFA/PROVENTIL HFA/VENTOLIN HFA) 108 (90 Base) MCG/ACT inhaler    budesonide-formoterol (SYMBICORT) 160-4.5 MCG/ACT Inhaler    bumetanide (BUMEX) 1 MG tablet    colchicine (COLCRYS) 0.6 MG tablet    diclofenac (VOLTAREN) 1 % topical gel    DULoxetine (CYMBALTA) 60 MG capsule    hydrOXYzine (ATARAX) 50 MG tablet    ipratropium - albuterol 0.5 mg/2.5 mg/3 mL (DUONEB) 0.5-2.5 (3) MG/3ML neb solution    methocarbamol (ROBAXIN) 500 MG tablet    mirtazapine (REMERON) 15 MG tablet    nicotine (NICORETTE) 4 MG lozenge    omeprazole (PRILOSEC) 20 MG DR capsule    umeclidinium (INCRUSE ELLIPTA) 62.5 MCG/INH inhaler     No current facility-administered medications for this visit.     Medication Prescriber:  Yanique Cali MD.   Taking meds as prescribed? Yes  Medication side effects or concerns:   None reported.  Outside medical appointments this week (list provider and reason for visit):  Saw doctor about her foot. See EHR.  Narrative: Patient reports the following medical conditions: COPD, overweight, uses CPAP, and general fatigue . Patient has primary care with Yanique Cali MD. She reports continuing to attend physical therapy however does not think it is actually helping. Patsy states she is starting to have foot pain and thinks she may have got in her other foot now, she did not get medical attention for this. No emergent concerns endorsed this week.     Dimension 3: Emotional/Behavioral Conditions & Complications  Previous Dimension Ratin  Current Dimension Ratin    PHQ9       2023     1:00 PM 2023    10:00 AM 2023    12:00 PM   PHQ-9 SCORE   PHQ-9 Total Score 2 7 3     GAD7       2023    10:00 AM 2023     2:00 PM 2023    12:00 PM   ALISIA-7 SCORE   Total Score 8 2 3     Mental health diagnosis PTSD  Date of last SIB/SI/HI: N/A  Current MH Assignments:  None  Narrative:  Patient reports a mental health diagnosis of PTSD, Anxiety, and Depression. Patient endorses current symptoms of hypervigilance and anxiety when out. Patsy shares continued improvements in her mental health. She is reporting that she has some anxiety related to her move but feels like this is going to be a major improvement in her life. Patsy is looking forward to the changes that are upcoming. This week she stated she is taking some time to slow down and process her thoughts rather than suppressing them. Patsy is also working on mindfulness and meditations.     Dimension 4: Treatment Acceptance / Resistance  Previous Dimension Ratin  Current Dimension Ratin  ORAL Diagnosis:  Alcohol Use Disorder   303.90 (F10.20) Severe In early remission,   Phase: 2  Commitment to tx process/Stage of change: Action   ORAL assignments:  Attend groups and remain abstinent.   Narrative: Patient  "appears internally motivated for treatment. She is an active and engaged group member when present. Patsy verbalizes continued motivation for recovery.     Dimension 5: Relapse / Continued Problem Potential    Previous Dimension Rating:  3  Current Dimension Rating:  3  Relapses this week: None  Urges to use:  None  UA results:   No results found for this or any previous visit (from the past 168 hour(s)).  Narrative: Patient reports when going out to the grocery store she does not ignore the liquor store, she acknowledge that it is there and does not give the liquor store the power. Patient reports that she does daily readings in the morning and evening and when she is having a hard day she reads a passage from the \"big book\". Patient's mental health symptoms increase the risk of relapse at this time. Patsy is working on the development of a relapse prevention plan. She is beginning to name her triggers prior to events and situations and because of this has a plan for her recovery skills to use. Patsy states that going by patios and into stores attached to a liquor store can be triggering for her but she does identify having a plan that involves not suppressing the thoughts and feelings but rather than letting them come and moving on shortly after.     Dimension 6: Recovery Environment   Previous Dimension Rating:  3  Current Dimension Rating:  3  Family Involvement : None   Summarize attendance at family groups and family sessions: N/A  Family supportive of treatment?  Yes  Community support group attendance: None  Recreational activities: Exercise   Narrative: Patient has stable housing at this time and lives alone. Patient reports she is \"retired and on disability\". She does note some concerns about her financial stability. Patient is not involved in the criminal justice system. She lacks a sober support network. Patient does not have a regular structure in place for her day to day activities, though she does enjoy " working out. Patsy plans to continue decluttering in preparation for her move. She is excited about her new apartment and is planing on  moving September 1st. She continues to talk to her sober friend in [place] for support in addition to attending group and therapy.  Patsy attends [employer] regularly and finds benefit in that. Patsy is looking forward to less stress once she moves.     Justification for Continued Treatment at this Level of Care:  Patient is in Phase 2 and has increased her periods of sobriety. She lacks a sober support network. She is medication compliant and has started individual therapy. She has been able to identify triggers, and is starting to learn and implement some coping skills. Her daily life schedule/routine has not been supportive of recovery previously so she is working on improving that.. She is working on getting more structure in her daily activities.     Discharge Planning:  Target Discharge Date/Timeframe:  8/25/2023 to complete Phase 1/2; 11/17/2023 to complete Phase 3)  Med Mgmt Provider/Appt:  TBD (Dr. Covington in addiction medicine)  Ind therapy Provider/Appt:  Weekly (currently Wednesdays at 11:00 am)    Other referrals:  None  Has vulnerable adult status change? No  Service Coordination:  None    Supervision:  Patient is staffed with treatment providers, this includes: Cony Guzman [facility], Marisel Mcdaniel, [facility], and Yee Curry, [facility]; Staffed with [facility] Team    Attestation:   Dr. Eastman - Medical Director - Provides oversight and supervision of care.    Are Treatment Plan goals/objectives effective? Yes  *If no, list changes to treatment plan:    Are the current goals meeting client's needs? Yes  *If no, list the changes to treatment plan.    Client Input / Response: Agreeable       *Client agrees with any changes to the treatment plan: N/A  *Client received copy of changes: N/A  *Client is aware of right to access a treatment plan review: Yes (MyChart or request  copy)

## 2023-08-28 ENCOUNTER — HOSPITAL ENCOUNTER (OUTPATIENT)
Dept: BEHAVIORAL HEALTH | Facility: CLINIC | Age: 68
Discharge: HOME | End: 2023-08-28
Attending: FAMILY MEDICINE
Payer: COMMERCIAL

## 2023-08-28 DIAGNOSIS — F43.10 PTSD (POST-TRAUMATIC STRESS DISORDER): ICD-10-CM

## 2023-08-28 DIAGNOSIS — F10.90 ALCOHOL USE DISORDER: Primary | ICD-10-CM

## 2023-08-28 PROCEDURE — H2035 A/D TX PROGRAM, PER HOUR: HCPCS | Mod: HQ

## 2023-08-28 NOTE — GROUP NOTE
"Group Therapy Documentation    PATIENT'S NAME: Patsy Santamaria  MRN:   [id number]  :   1955  ACCT. NUMBER: [id number]  DATE OF SERVICE: 23  START TIME:  9:00 AM  END TIME: 12:00 PM  FACILITATOR(S): Cony Guzman, [facility], [facility]  TOPIC: BEH Group Therapy  Number of patients attending the group:  5  Group Length:  3 Hours    Group Therapy Type: Addiction, Psychotherapeutic, and Skills/Education    Summary of Group / Topics Discussed:    Co-occurring Illness, Meditation/Breathing Exercises, and Mindfulness    Attestation:   Dr. Eastman - Medical Director - Provides oversight and supervision of care.     Group Attendance:  Attended group session    Patient's response to the group topic/interactions:  cooperative with task, discussed personal experience with topic, expressed understanding of topic, gave appropriate feedback to peers, listened actively, and offered helpful suggestions to peers    Patient appeared to be Actively participating, Attentive, and Engaged.        Client specific details:  Patsy reported continuing abstinence with a desire to remain sober. She shared tools and practices she uses effectively to deal with thoughts (e.g., [place]), including \"Gratitude for Sobriety\" and CBT (3Cs). She stated, \"I can go to the fair without drinking\". She said she reads (Big Book, Quit Like a Woman). She continues attending [employer]. Plans for today include bringing apartment paperwork directly to the new apartment since they have not received it, yet. She indicated she had to RS a missed spine Dr. Appointment and will likely get a \"shot in my hip\". Mood reported as good and Patsy presented with congruent affect. Patient was engaged with others and the material presented as evidenced by her request for help to use assertiveness communication skills to decline an invitation to a picnic. Overall medication adherence is reported to be good, with a note that she is taking less Vistril due to less anxiety. She " "also noted she can not take Celebrex due to side effects. Affirmation: I am happy with myself right now.   Identified feelings.  \"Nervous, hope, fear or moving and being on my own again. I think I'll be fine.\"    "

## 2023-08-29 ENCOUNTER — TELEPHONE (OUTPATIENT)
Dept: PHYSICAL MEDICINE AND REHAB | Facility: CLINIC | Age: 68
End: 2023-08-29
Payer: COMMERCIAL

## 2023-08-29 NOTE — TELEPHONE ENCOUNTER
With her other medical problems I am really not sure what else I have to offer from a medication standpoint.  She could try over the counter topical pain relieving medications such as salonpas or biofreeze.    Please also offer her a right intra-articular hip joint injection under US guidance with Dr. Coronado or Dr. Martinez.

## 2023-08-29 NOTE — TELEPHONE ENCOUNTER
PSP:  Kaia SHEA  Last clinic visit:  6/26/23  Reason for call: Medication management  Clinical information:  Pt calling to inquire if there is anything else she can try for her right low back and right hip pain (same symptoms as when she was last evaluated). Pt has scheduled follow-up appt with Kaia on 9/7 but wonders if there is anything in the meantime.   Pt tried to Celebrex but stopped due to it making her feel sick. She also tried Methocarbamol 500 mg 3 times daily and felt this had no effect on her symptoms. She takes Duloxetine 40 mg daily for depression.   She is currently now only utilizing ibuprofen but this is upsetting her acid reflux.   Advice given to patient: Informed pt that Kaia would be updated and she will be called with a response.   Provider to address: Please advise

## 2023-08-29 NOTE — TELEPHONE ENCOUNTER
Call placed to pt with provider's response. Phone kept ringing and ringing for several minutes. Unable to leave message.

## 2023-08-31 ENCOUNTER — HOSPITAL ENCOUNTER (OUTPATIENT)
Dept: BEHAVIORAL HEALTH | Facility: CLINIC | Age: 68
Discharge: HOME | End: 2023-08-31
Attending: FAMILY MEDICINE
Payer: COMMERCIAL

## 2023-08-31 DIAGNOSIS — F10.90 ALCOHOL USE DISORDER: Primary | ICD-10-CM

## 2023-08-31 PROCEDURE — H2035 A/D TX PROGRAM, PER HOUR: HCPCS | Mod: HQ

## 2023-08-31 NOTE — GROUP NOTE
"Group Therapy Documentation    PATIENT'S NAME: Patsy Santamaria  MRN:   [id number]  :   1955  ACCT. NUMBER: [id number]  DATE OF SERVICE: 23  START TIME:  9:00 AM  END TIME: 12:00 PM  FACILITATOR(S): Joyce Bal LADC; Marisel Mcdaniel LADC  TOPIC: BEH Group Therapy  Number of patients attending the group:  9  Group Length:  3 Hours    Group Therapy Type: Addiction    Summary of Group / Topics Discussed:    Meditation/Breathing Exercises, Mindfulness, and Relationships  Attestation:   Dr. Eastman - Medical Director - Provides oversight and supervision of care.       Group Attendance:  Attended group session    Patient's response to the group topic/interactions:  gave appropriate feedback to peers and listened actively    Patient appeared to be Engaged.        Client specific details: Patient shared feeling tired and in pain due to her left hip and left leg. Patient has a appointment for physical therapy in a couple of weeks and a spine appointment next month. Patient shared getting the papers and keys for her new place an is excited to move all the boxes to her new place. Patient shared not liking change but knows that it is a better environment for her. Patient shared no urges or triggers however having thoughts but \"not acting on them\" she said she hung out with a sober friend and they both thought going to the state fair and having a beer would be nice \"I [place] not alone for having thoughts but I know that I am not going to act on it\". Patient shared her short goal this week is to move to her new place. Patient shared her affirmation is \"I am capable of being a big girl\"     "

## 2023-08-31 NOTE — TELEPHONE ENCOUNTER
Pt returned call. Provider's response given. Pt states she will wait for her appt with Kaia to discuss.

## 2023-09-01 ENCOUNTER — DOCUMENTATION ONLY (OUTPATIENT)
Dept: BEHAVIORAL HEALTH | Facility: CLINIC | Age: 68
End: 2023-09-01
Payer: COMMERCIAL

## 2023-09-01 NOTE — PROGRESS NOTES
[facility] Weekly Treatment Plan Review      ATTENDANCE for the following date span: 23-9/3/23    Date     Group Therapy 3 hours NA  hours NA  hours   3 hours No group    Individual Therapy        Family Therapy        Other (Specify) Phase 1 &2  Phase 3  Phase 1  Phase 1 & 2        Patient attended all scheduled sessions during this time period.   Total hours: 71 hours. Patient is in phase 2.    Weekly Treatment Plan Review     Treatment Plan initiated on 2023.    Dimension1: Acute Intoxication/Withdrawal Potential -   Previous Dimension Ratin  Current Dimension Ratin  Date of Last Use: 23  Any reports of withdrawal symptoms: No  Narrative: Patsy reports sustained abstinence. No changes or concerns.     Dimension 2: Biomedical Conditions & Complications -   Previous Dimension Ratin  Current Dimension Ratin  Medical Concerns:  Significant pain and swelling in foot and leg.  Current Medications & Medication Changes:  Current Outpatient Medications   Medication    albuterol (PROAIR HFA/PROVENTIL HFA/VENTOLIN HFA) 108 (90 Base) MCG/ACT inhaler    budesonide-formoterol (SYMBICORT) 160-4.5 MCG/ACT Inhaler    bumetanide (BUMEX) 1 MG tablet    colchicine (COLCRYS) 0.6 MG tablet    diclofenac (VOLTAREN) 1 % topical gel    DULoxetine (CYMBALTA) 60 MG capsule    hydrOXYzine (ATARAX) 50 MG tablet    ipratropium - albuterol 0.5 mg/2.5 mg/3 mL (DUONEB) 0.5-2.5 (3) MG/3ML neb solution    methocarbamol (ROBAXIN) 500 MG tablet    mirtazapine (REMERON) 15 MG tablet    nicotine (NICORETTE) 4 MG lozenge    omeprazole (PRILOSEC) 20 MG DR capsule    umeclidinium (INCRUSE ELLIPTA) 62.5 MCG/INH inhaler     No current facility-administered medications for this visit.     Medication Prescriber:  Yanique Cali MD.   Taking meds as prescribed? Yes  Medication side effects or concerns:  None reported.  Outside medical appointments this week  (list provider and reason for visit):  Saw doctor about her foot. See EHR.  Narrative: Patient reports the following medical conditions: COPD, overweight, uses CPAP, and general fatigue . Patient has primary care with Yanique Cali MD. She reports continuing to attend physical therapy however does not think it is actually helping with that she was not able to get an appointment until . Patsy states she is having some concerns with her hips, she tried a new medication for pain management however feels it is not working well. Patsy will attend her spine clinic appointment on .     Dimension 3: Emotional/Behavioral Conditions & Complications  Previous Dimension Ratin  Current Dimension Ratin    PHQ9       2023     1:00 PM 2023    10:00 AM 2023    12:00 PM   PHQ-9 SCORE   PHQ-9 Total Score 2 7 3     GAD7       2023    10:00 AM 2023     2:00 PM 2023    12:00 PM   ALISIA-7 SCORE   Total Score 8 2 3     Mental health diagnosis PTSD  Date of last SIB/SI/HI: N/A  Current MH Assignments:  None  Narrative:  Patient reports a mental health diagnosis of PTSD, Anxiety, and Depression. Patsy has endorses current symptoms of hypervigilance and anxiety when out. Patsy shares continued improvements in her mental health. She is reporting that she has some anxiety related to her move but feels like this is going to be a major improvement in her life. Patsy is looking forward to the changes that are upcoming specifically related to her move. Patsy has been managing her stressors and anxieties of moving by talking with friends, family and taking things slowly.     Dimension 4: Treatment Acceptance / Resistance  Previous Dimension Ratin  Current Dimension Ratin  ORAL Diagnosis:  Alcohol Use Disorder   303.90 (F10.20) Severe In early remission,   Phase: 2  Commitment to tx process/Stage of change: Action   ORAL assignments: Relapse Prevention Plan  "  Narrative: Patient appears internally motivated for treatment. She is an active and engaged group member when present. Patsy verbalizes continued motivation for recovery.     Dimension 5: Relapse / Continued Problem Potential    Previous Dimension Rating:  3  Current Dimension Rating:  3  Relapses this week: None  Urges to use:  None  UA results:   No results found for this or any previous visit (from the past 168 hour(s)).  Narrative: Patsy reports when going out to the grocery store she does not ignore the liquor store, she acknowledge that it is there and does not give the liquor store the power. She does daily readings in the morning and evening and when she is having a hard day she reads a passage from the \"big book\".  Patsy is working on the development of a relapse prevention plan. She is beginning to name her triggers prior to events and situations and because of this has a plan for her recovery skills to use. Patsy states that she is not having cravings or triggers but having some thoughts. Patsy states that she has learned that she does not need to give her thoughts any power. Patsy is actively working on managing her pain- by doing so she is reducing the risk of return to use.     Dimension 6: Recovery Environment   Previous Dimension Rating:  3  Current Dimension Ratin  Family Involvement : None   Summarize attendance at family groups and family sessions: N/A  Family supportive of treatment?  Yes  Community support group attendance: None  Recreational activities: Exercise   Narrative: Patsy has stable housing at this time and lives alone. She is \"retired and on disability\", she does note some concerns about her financial stability. Patsy is not involved in the criminal justice system. Patsy plans to continue decluttering in preparation for her move. She is excited about her new apartment and is planing on moving this weekend. She continues to talk to her sober friend in [place] for support in addition to " attending group and therapy.  Patsy attends [employer] regularly and finds benefit in that. Patsy is looking forward to less stress once she moves, until then she is taking things one day at a time. Patsy has family and friends that are supportive of of her.     Justification for Continued Treatment at this Level of Care:  Patsy is in Phase 2 and has increased her periods of sobriety. She lacks a sober support network however has begun attending [employer] and has family support. She is medication compliant and has started individual therapy. She has been able to identify triggers, and is starting to learn and implement some coping skills. Her daily life schedule/routine has not been supportive of recovery previously so she is working on improving that.. She is working on getting more structure in her daily activities.     Discharge Planning:  Target Discharge Date/Timeframe:  12/20/23  Med Mgmt Provider/Appt:  ELISA (Dr. Covington in addiction medicine)  Ind therapy Provider/Appt:  Weekly   Other referrals:  None  Has vulnerable adult status change? No  Service Coordination:  None    Supervision:  Patient is staffed with treatment providers, this includes: Cony Guzman, [facility], Marisel Mcdaniel, [facility][note 3], and Yee Curry, [facility]; Staffed with [facility] Team    Attestation:   Dr. Eastman - Medical Director - Provides oversight and supervision of care.    Are Treatment Plan goals/objectives effective? Yes  *If no, list changes to treatment plan:    Are the current goals meeting client's needs? Yes  *If no, list the changes to treatment plan.    Client Input / Response: Agreeable       *Client agrees with any changes to the treatment plan: N/A  *Client received copy of changes: N/A  *Client is aware of right to access a treatment plan review: Yes (MyChart or request copy)

## 2023-09-05 NOTE — PROGRESS NOTES
Assessment:   Patsy Santamaria is a 68 year old y.o. female with past medical history significant for bilateral carpal tunnel syndrome, hereditary and idiopathic peripheral neuropathy, trochanteric bursitis of left hip, seizures, COPD, alcoholic hepatitis, chronic reflux esophagitis, history of GI bleed, morbid obesity, vitamin B12 deficiency, vitamin D deficiency, alcoholic cirrhosis of the liver, thrombocytopenia, depression, PTSD, smoker, alcohol dependence, anxiety who presents today for follow-up regarding 3 areas of pain.  1.  Bilateral mid thoracic pain.  My review of an MRI thoracic spine shows mild to moderate disc degeneration in mid thoracic region in the setting of mild dextro curvature.  Patient reports improvement in this area of pain.  2.  Bilateral hip pain, currently worse on the left than the right.  X-ray of the hip showed moderate right and mild left hip joint degenerative change.  She also appears to have a component of myofascial pain in this region.  3.  Chronic bilateral foot pain.    PSP: Dr. Martinez  Plan:     A shared decision making plan was used.  The patient's values and choices were respected.  The following represents what was discussed and decided upon by the physician assistant and the patient.      1.  DIAGNOSTIC TESTS:  - Reviewed the MRI lumbar spine from 2021.  - Reviewed the MRI thoracic spine.  - Reviewed x-ray right hip.  - No additional diagnostic test were ordered.    2.  PHYSICAL THERAPY: Patient is currently in physical therapy.  She has had 4 sessions so far.  Encouraged her to continue with physical therapy and the home exercises.    3.  MEDICATIONS:  - I prescribed pregabalin 25 mg titrating up to 25 mg 3 times daily.  We could titrate her dose higher, depending on her response.  - Patient reports weight gain with gabapentin.  - methocarbamol 500 milligrams was not helpful so she is going to stop this medication.  - Celebrex 100 mg daily caused nausea.  - I recommended  patient avoid ibuprofen due to history of GI bleed and chronic GERD  -Cyclobenzaprine was somewhat helpful but she has some hepatic impairment so I recommended methocarbamol instead.  -Patient also takes duloxetine 40 mg daily for depression.    4.  INTERVENTIONS: No interventions were ordered today.  If hip pain worsens we could consider hip joint injections.  Patient would like to avoid interventional pain management for now and continue with noninvasive treatment.      5.  REFERRALS: Patient reports chronic bilateral foot pain.  She feels the pain in her arches.  She would like to see a podiatrist.  I entered a referral.  6.  FOLLOW-UP: Patient will follow-up as needed.  If she has questions or concerns, she should not hesitate to call.    Subjective:     Patsy Santamaria is a 68 year old female who presents today for follow-up regarding thoracic spine pain, hip pain, and foot pain.  I saw the patient June 26, 2023.  I ordered diagnostic test which will be described below.  I also refer the patient to physical therapy.  She has had 4 sessions of far.  I prescribed methocarbamol and Celebrex.  Patient reports significant improvement in her thoracic spine pain.  She does not have significant thoracic spine pain today.  She reports improvement in her right hip pain but over the past couple of weeks she has had increased pain in the left hip.  She feels this more on the lateral aspect of the hip and not in the groin.  She thinks this is due to compensating for her right-sided pain.*    Patient complains of left hip pain.  Pain is located left upper buttock and left lateral hip.  Pain radiates down the left anterolateral thigh.  Pain does not radiate past the knee.  She has mild residual right groin pain and right anterior thigh pain but this is improved.      Patient complains of bilateral foot pain.  Pain is located in the arches of both feet.  She states this is a chronic issue.  She wants to see a  podiatrist.    Treatment to date:  - Current physical therapy, 3 sessions so far  - No hip or thoracic spine surgeries  - No hip or thoracic spine injections  - Cyclobenzaprine was somewhat helpful  - Diclofenac gel for foot pain is somewhat helpful  - Ibuprofen 800 mg twice daily is not helping and causing stomach irritation  - Duloxetine for depression  - Methocarbamol was not helpful  - Celebrex caused nausea  - Tylenol does not help  - Gabapentin caused weight gain    Review of Systems:  Positive for numbness/tingling, weakness, headache, pain much worse at night.  Negative for loss of bowel/bladder control, inability to urinate, trip/double/falls, difficulty swallowing, fevers, unintentional weight loss, difficulty with hand skills.     Objective:   CONSTITUTIONAL:  Vital signs as above.  No acute distress.  The patient is well nourished and well groomed.  Patient is obese.  PSYCHIATRIC:  The patient is awake, alert, oriented to person, place and time.  The patient is answering questions appropriately with clear speech.  Normal affect.  HEENT: Normocephalic, atraumatic.  Sclera clear.    SKIN: Exposed skin is clean, dry, intact without rashes.  MUSCULOSKELETAL:  Gait is mildly antalgic, favoring the right.  Able to rise onto toes and heels bilaterally with support.  No tenderness palpation bilateral lumbar paraspinous muscles.  No tenderness palpation bilateral sacroiliac joints.  Mild tenderness palpation left greater trochanter.  5/5 strength for the bilateral hip flexors, knee flexors/extensors, ankle dorsi/plantar flexors.  NEUROLOGICAL: No ankle clonus bilaterally.  Sensation to light touch is intact in the bilateral lower extremities throughout.     RESULTS: I reviewed an MRI lumbar spine from [facility] dated February 11, 2021.  This shows epidural lipomatosis at L4-5 and L5-S1 with constriction of the thecal sac.  At L4-5 there is a mild disc bulge and annular tear and mild facet arthropathy  with mild left foraminal stenosis.  At L5-S1 there is mild to moderate facet arthropathy with mild to moderate bilateral foraminal stenosis.    I reviewed the MRI thoracic spine from [facility] dated July 6, 2023.  This shows mild to moderate multilevel disc degeneration in the setting of mild thoracic dextrocurvature.  There is no significant spinal canal or neuroforaminal stenosis.  No cord signal abnormality.    I reviewed the x-ray right hip from [facility] dated July 6, 2023.  This shows moderate degenerative changes of the right hip and mild degenerative changes of the left hip.

## 2023-09-07 ENCOUNTER — HOSPITAL ENCOUNTER (OUTPATIENT)
Dept: BEHAVIORAL HEALTH | Facility: CLINIC | Age: 68
Discharge: HOME | End: 2023-09-07
Attending: FAMILY MEDICINE
Payer: COMMERCIAL

## 2023-09-07 ENCOUNTER — OFFICE VISIT (OUTPATIENT)
Dept: PHYSICAL MEDICINE AND REHAB | Facility: CLINIC | Age: 68
End: 2023-09-07
Payer: COMMERCIAL

## 2023-09-07 VITALS
HEIGHT: 63 IN | OXYGEN SATURATION: 95 % | WEIGHT: 271 LBS | HEART RATE: 74 BPM | BODY MASS INDEX: 48.02 KG/M2 | SYSTOLIC BLOOD PRESSURE: 128 MMHG | DIASTOLIC BLOOD PRESSURE: 76 MMHG

## 2023-09-07 DIAGNOSIS — M79.671 PAIN IN BOTH FEET: ICD-10-CM

## 2023-09-07 DIAGNOSIS — M16.0 PRIMARY OSTEOARTHRITIS OF BOTH HIPS: ICD-10-CM

## 2023-09-07 DIAGNOSIS — M79.672 PAIN IN BOTH FEET: ICD-10-CM

## 2023-09-07 DIAGNOSIS — F10.90 ALCOHOL USE DISORDER: Primary | ICD-10-CM

## 2023-09-07 DIAGNOSIS — M54.6 PAIN IN THORACIC SPINE: Primary | ICD-10-CM

## 2023-09-07 DIAGNOSIS — F43.10 PTSD (POST-TRAUMATIC STRESS DISORDER): ICD-10-CM

## 2023-09-07 PROCEDURE — H2035 A/D TX PROGRAM, PER HOUR: HCPCS | Mod: HQ

## 2023-09-07 PROCEDURE — 99214 OFFICE O/P EST MOD 30 MIN: CPT | Performed by: PHYSICIAN ASSISTANT

## 2023-09-07 RX ORDER — PREGABALIN 25 MG/1
CAPSULE ORAL
Qty: 90 CAPSULE | Refills: 1 | Status: SHIPPED | OUTPATIENT
Start: 2023-09-07 | End: 2023-12-27

## 2023-09-07 ASSESSMENT — PAIN SCALES - GENERAL: PAINLEVEL: SEVERE PAIN (6)

## 2023-09-07 NOTE — LETTER
9/7/2023         RE: Patsy Santamaria  [address]  [place] [address]        Dear Colleague,    Thank you for referring your patient, Patsy Santamaria, to the [facility]. Please see a copy of my visit note below.    Assessment:   Patsy Santamaria is a 68 year old y.o. female with past medical history significant for bilateral carpal tunnel syndrome, hereditary and idiopathic peripheral neuropathy, trochanteric bursitis of left hip, seizures, COPD, alcoholic hepatitis, chronic reflux esophagitis, history of GI bleed, morbid obesity, vitamin B12 deficiency, vitamin D deficiency, alcoholic cirrhosis of the liver, thrombocytopenia, depression, PTSD, smoker, alcohol dependence, anxiety who presents today for follow-up regarding 3 areas of pain.  1.  Bilateral mid thoracic pain.  My review of an MRI thoracic spine shows mild to moderate disc degeneration in mid thoracic region in the setting of mild dextro curvature.  Patient reports improvement in this area of pain.  2.  Bilateral hip pain, currently worse on the left than the right.  X-ray of the hip showed moderate right and mild left hip joint degenerative change.  She also appears to have a component of myofascial pain in this region.  3.  Chronic bilateral foot pain.    PSP: Dr. Martinez  Plan:     A shared decision making plan was used.  The patient's values and choices were respected.  The following represents what was discussed and decided upon by the physician assistant and the patient.      1.  DIAGNOSTIC TESTS:  - Reviewed the MRI lumbar spine from 2021.  - Reviewed the MRI thoracic spine.  - Reviewed x-ray right hip.  - No additional diagnostic test were ordered.    2.  PHYSICAL THERAPY: Patient is currently in physical therapy.  She has had 4 sessions so far.  Encouraged her to continue with physical therapy and the home exercises.    3.  MEDICATIONS:  - I prescribed pregabalin 25 mg titrating up to 25 mg 3 times  daily.  We could titrate her dose higher, depending on her response.  - Patient reports weight gain with gabapentin.  - methocarbamol 500 milligrams was not helpful so she is going to stop this medication.  - Celebrex 100 mg daily caused nausea.  - I recommended patient avoid ibuprofen due to history of GI bleed and chronic GERD  -Cyclobenzaprine was somewhat helpful but she has some hepatic impairment so I recommended methocarbamol instead.  -Patient also takes duloxetine 40 mg daily for depression.    4.  INTERVENTIONS: No interventions were ordered today.  If hip pain worsens we could consider hip joint injections.  Patient would like to avoid interventional pain management for now and continue with noninvasive treatment.      5.  REFERRALS: Patient reports chronic bilateral foot pain.  She feels the pain in her arches.  She would like to see a podiatrist.  I entered a referral.  6.  FOLLOW-UP: Patient will follow-up as needed.  If she has questions or concerns, she should not hesitate to call.    Subjective:     Patsy Santamaria is a 68 year old female who presents today for follow-up regarding thoracic spine pain, hip pain, and foot pain.  I saw the patient June 26, 2023.  I ordered diagnostic test which will be described below.  I also refer the patient to physical therapy.  She has had 4 sessions of far.  I prescribed methocarbamol and Celebrex.  Patient reports significant improvement in her thoracic spine pain.  She does not have significant thoracic spine pain today.  She reports improvement in her right hip pain but over the past couple of weeks she has had increased pain in the left hip.  She feels this more on the lateral aspect of the hip and not in the groin.  She thinks this is due to compensating for her right-sided pain.*    Patient complains of left hip pain.  Pain is located left upper buttock and left lateral hip.  Pain radiates down the left anterolateral thigh.  Pain does not radiate past the  knee.  She has mild residual right groin pain and right anterior thigh pain but this is improved.      Patient complains of bilateral foot pain.  Pain is located in the arches of both feet.  She states this is a chronic issue.  She wants to see a podiatrist.    Treatment to date:  - Current physical therapy, 3 sessions so far  - No hip or thoracic spine surgeries  - No hip or thoracic spine injections  - Cyclobenzaprine was somewhat helpful  - Diclofenac gel for foot pain is somewhat helpful  - Ibuprofen 800 mg twice daily is not helping and causing stomach irritation  - Duloxetine for depression  - Methocarbamol was not helpful  - Celebrex caused nausea  - Tylenol does not help  - Gabapentin caused weight gain    Review of Systems:  Positive for numbness/tingling, weakness, headache, pain much worse at night.  Negative for loss of bowel/bladder control, inability to urinate, trip/double/falls, difficulty swallowing, fevers, unintentional weight loss, difficulty with hand skills.     Objective:   CONSTITUTIONAL:  Vital signs as above.  No acute distress.  The patient is well nourished and well groomed.  Patient is obese.  PSYCHIATRIC:  The patient is awake, alert, oriented to person, place and time.  The patient is answering questions appropriately with clear speech.  Normal affect.  HEENT: Normocephalic, atraumatic.  Sclera clear.    SKIN: Exposed skin is clean, dry, intact without rashes.  MUSCULOSKELETAL:  Gait is mildly antalgic, favoring the right.  Able to rise onto toes and heels bilaterally with support.  No tenderness palpation bilateral lumbar paraspinous muscles.  No tenderness palpation bilateral sacroiliac joints.  Mild tenderness palpation left greater trochanter.  5/5 strength for the bilateral hip flexors, knee flexors/extensors, ankle dorsi/plantar flexors.  NEUROLOGICAL: No ankle clonus bilaterally.  Sensation to light touch is intact in the bilateral lower extremities throughout.     RESULTS: I  reviewed an MRI lumbar spine from [facility] dated February 11, 2021.  This shows epidural lipomatosis at L4-5 and L5-S1 with constriction of the thecal sac.  At L4-5 there is a mild disc bulge and annular tear and mild facet arthropathy with mild left foraminal stenosis.  At L5-S1 there is mild to moderate facet arthropathy with mild to moderate bilateral foraminal stenosis.    I reviewed the MRI thoracic spine from [facility] dated July 6, 2023.  This shows mild to moderate multilevel disc degeneration in the setting of mild thoracic dextrocurvature.  There is no significant spinal canal or neuroforaminal stenosis.  No cord signal abnormality.    I reviewed the x-ray right hip from [facility] dated July 6, 2023.  This shows moderate degenerative changes of the right hip and mild degenerative changes of the left hip.      Again, thank you for allowing me to participate in the care of your patient.        Sincerely,        Kaia Mosqueda PA-C

## 2023-09-07 NOTE — GROUP NOTE
Group Therapy Documentation    PATIENT'S NAME: Patsy Santamaria  MRN:   [id number]  :   1955  ACCT. NUMBER: [id number]  DATE OF SERVICE: 23  START TIME:  9:00 AM  END TIME: 12:00 PM  FACILITATOR(S): Cony Guzman, [facility], [facility]; Joyce Bal [facility]; Marisel Mcdaniel [facility]  TOPIC: BEH Group Therapy  Number of patients attending the group:  7  Group Length:  3 Hours    Group Therapy Type: Addiction, Psychotherapeutic, and Skills/Education    Summary of Group / Topics Discussed:    Co-occurring Illness, Coping Skills/Lifestyle Managemet, Choices in Recovery, Meditation/Breathing Exercises, Sleep Hygiene, Stress Management, Symptom Management, Thinking Errors/Negative Self-Talk, and Trauma Informed Care    Attestation:   Dr. Eastman - Medical Director - Provides oversight and supervision of care.    Group Attendance:  Attended group session    Patient's response to the group topic/interactions:  cooperative with task, discussed personal experience with topic, expressed readiness to alter behaviors, expressed understanding of topic, gave appropriate feedback to peers, listened actively, and offered helpful suggestions to peers    Patient appeared to be Actively participating, Attentive, and Engaged.        Client specific details:   Patsy reported continuing abstinence with no significant sobriety challenges since last group. She reported no new physical health concerns and is in contemplation about getting a shot in her left hip to address pain. She continues with PT. She reported a good mood and presented with congruent affect. She has finished moving but will be unpacking for some time. She was engaged with others and the material presented as evidenced by relating to others and sharing her experience with recovery. She will  be transitioning to Phase 3, starting next week and said good-bye to other group members.

## 2023-09-08 ENCOUNTER — DOCUMENTATION ONLY (OUTPATIENT)
Dept: BEHAVIORAL HEALTH | Facility: CLINIC | Age: 68
End: 2023-09-08
Payer: COMMERCIAL

## 2023-09-08 NOTE — PROGRESS NOTES
[facility] Weekly Treatment Plan Review      ATTENDANCE for the following date span: 23-9/3/23    Date     Group Therapy 0 hours NA  hours NA  hours   3 hours No group    Individual Therapy        Family Therapy        Other (Specify) Phase 1 &2  Phase 3  Phase 1  Phase 1 & 2        Patient attended all scheduled sessions during this time period.   Total hours: 74 hours. Patient is in phase 2.    Weekly Treatment Plan Review     Treatment Plan initiated on 2023.    Dimension1: Acute Intoxication/Withdrawal Potential -   Previous Dimension Ratin  Current Dimension Ratin  Date of Last Use: 23  Any reports of withdrawal symptoms: No  Narrative: Patsy reports sustained abstinence. No changes or concerns.     Dimension 2: Biomedical Conditions & Complications -   Previous Dimension Ratin  Current Dimension Ratin  Medical Concerns:  Significant pain and swelling in foot and leg.  Current Medications & Medication Changes:  Current Outpatient Medications   Medication    albuterol (PROAIR HFA/PROVENTIL HFA/VENTOLIN HFA) 108 (90 Base) MCG/ACT inhaler    budesonide-formoterol (SYMBICORT) 160-4.5 MCG/ACT Inhaler    bumetanide (BUMEX) 1 MG tablet    colchicine (COLCRYS) 0.6 MG tablet    diclofenac (VOLTAREN) 1 % topical gel    DULoxetine (CYMBALTA) 60 MG capsule    hydrOXYzine (ATARAX) 50 MG tablet    ipratropium - albuterol 0.5 mg/2.5 mg/3 mL (DUONEB) 0.5-2.5 (3) MG/3ML neb solution    methocarbamol (ROBAXIN) 500 MG tablet    mirtazapine (REMERON) 15 MG tablet    nicotine (NICORETTE) 4 MG lozenge    omeprazole (PRILOSEC) 20 MG DR capsule    pregabalin (LYRICA) 25 MG capsule    umeclidinium (INCRUSE ELLIPTA) 62.5 MCG/INH inhaler     No current facility-administered medications for this visit.     Medication Prescriber:  Yanique Cali MD.   Taking meds as prescribed? Yes  Medication side effects or concerns:  None  reported.  Outside medical appointments this week (list provider and reason for visit):  Saw doctor about her foot. See EHR.  Narrative: Patient reports the following medical conditions: COPD, overweight, uses CPAP, and general fatigue . Patient has primary care with Yanique Cali MD. She reports continuing to attend physical therapy however does not think it is actually helping with that she was not able to get an appointment until . Patsy states she is having some concerns with her hips, she tried a new medication for pain management however feels it is not working well. Patsy is in contemplation about getting a shot in her left hip to address pain.    Dimension 3: Emotional/Behavioral Conditions & Complications  Previous Dimension Ratin  Current Dimension Ratin    PHQ9       2023     1:00 PM 2023    10:00 AM 2023    12:00 PM   PHQ-9 SCORE   PHQ-9 Total Score 2 7 3     GAD7       2023    10:00 AM 2023     2:00 PM 2023    12:00 PM   ALISIA-7 SCORE   Total Score 8 2 3     Mental health diagnosis PTSD  Date of last SIB/SI/HI: N/A  Current MH Assignments:  None  Narrative:  Patient reports a mental health diagnosis of PTSD, Anxiety, and Depression. Patsy has endorses current symptoms of hypervigilance and anxiety when out. Patsy shares continued improvements in her mental health. She is reporting that she has some anxiety related to her move but feels like this is going to be a major improvement in her life. Patsy reported no concern with her mental health.    Dimension 4: Treatment Acceptance / Resistance  Previous Dimension Ratin  Current Dimension Ratin  ORAL Diagnosis:  Alcohol Use Disorder   303.90 (F10.20) Severe In early remission,   Phase: 2  Commitment to tx process/Stage of change: Action   ORAL assignments: Relapse Prevention Plan   Narrative: Patient appears internally motivated for treatment. She is an active and engaged group member  "when present. Patsy verbalizes continued motivation for recovery.   No change     Dimension 5: Relapse / Continued Problem Potential    Previous Dimension Rating:  3  Current Dimension Rating:  3  Relapses this week: None  Urges to use:  None  UA results:   No results found for this or any previous visit (from the past 168 hour(s)).  Narrative: Patsy reports when going out to the grocery store she does not ignore the liquor store, she acknowledge that it is there and does not give the liquor store the power. She does daily readings in the morning and evening and when she is having a hard day she reads a passage from the \"big book\".  Patsy is working on the development of a relapse prevention plan. She is beginning to name her triggers prior to events and situations and because of this has a plan for her recovery skills to use. Patsy states that she is not having cravings or triggers but having some thoughts. Patsy states that she has learned that she does not need to give her thoughts any power. Patsy is actively working on managing her pain- by doing so she is reducing the risk of return to use.   No change     Dimension 6: Recovery Environment   Previous Dimension Rating:  3  Current Dimension Ratin  Family Involvement : None   Summarize attendance at family groups and family sessions: N/A  Family supportive of treatment?  Yes  Community support group attendance: None  Recreational activities: Exercise   Narrative: Patsy has stable housing at this time and lives alone. She is \"retired and on disability\", she does note some concerns about her financial stability. Patsy is not involved in the criminal justice system. Patsy plans to continue decluttering in preparation for her move. She is excited about her new apartment and is planing on moving this weekend. She continues to talk to her sober friend in [place] for support in addition to attending group and therapy.  Patsy attends [employer] regularly and finds benefit in " that. Patsy moved into her new home and plans for this weekend to settle and get organized in her new home. Patsy has family and friends that are supportive of of her.     Justification for Continued Treatment at this Level of Care:  Patsy is in Phase 2 and has increased her periods of sobriety. She lacks a sober support network however has begun attending Religious and has family support. She is medication compliant and has started individual therapy. She has been able to identify triggers, and is starting to learn and implement some coping skills. Her daily life schedule/routine has not been supportive of recovery previously so she is working on improving that.. She is working on getting more structure in her daily activities.     Discharge Planning:  Target Discharge Date/Timeframe:  12/20/23  Med Mgmt Provider/Appt:  ELISA (Dr. Covington in addiction medicine)  Ind therapy Provider/Appt:  Weekly   Other referrals:  None  Has vulnerable adult status change? No  Service Coordination:  None    Supervision:  Patient is staffed with treatment providers, this includes: Cony Guzman [facility], Marisel Mcdaniel, [facility], and Yee Curry, [facility]; Staffed with [facility] Team    Attestation:   Dr. Eastman - Medical Director - Provides oversight and supervision of care.    Are Treatment Plan goals/objectives effective? Yes  *If no, list changes to treatment plan:    Are the current goals meeting client's needs? Yes  *If no, list the changes to treatment plan.    Client Input / Response: Agreeable       *Client agrees with any changes to the treatment plan: N/A  *Client received copy of changes: N/A  *Client is aware of right to access a treatment plan review: Yes (MyChart or request copy)

## 2023-09-11 NOTE — TELEPHONE ENCOUNTER
----- Message from SRIDHAR Johnson, LADC sent at 9/11/2023 10:10 AM CDT -----  Regarding: Scheduling Phase 3  Scheduling Request    Location of programming: [facility] [[id number]]   Start Date: September / 12 / 2023  [facility]  Phase 3 on Tuesdays at 9:00 AM to 11:00 \AM  Attending Provider (MD): Tayler  Number of visits to be scheduled: 12  Duration of Appointment in minutes: 120  Visit Type: in-person    Additional notes: Phase 3

## 2023-09-12 ENCOUNTER — OFFICE VISIT (OUTPATIENT)
Dept: ADDICTION MEDICINE | Facility: CLINIC | Age: 68
End: 2023-09-12
Payer: COMMERCIAL

## 2023-09-12 ENCOUNTER — DOCUMENTATION ONLY (OUTPATIENT)
Dept: BEHAVIORAL HEALTH | Facility: CLINIC | Age: 68
End: 2023-09-12
Payer: COMMERCIAL

## 2023-09-12 ENCOUNTER — HOSPITAL ENCOUNTER (OUTPATIENT)
Dept: BEHAVIORAL HEALTH | Facility: CLINIC | Age: 68
Discharge: HOME | End: 2023-09-12
Attending: FAMILY MEDICINE
Payer: COMMERCIAL

## 2023-09-12 VITALS
HEART RATE: 69 BPM | BODY MASS INDEX: 46.23 KG/M2 | DIASTOLIC BLOOD PRESSURE: 78 MMHG | WEIGHT: 261 LBS | SYSTOLIC BLOOD PRESSURE: 145 MMHG

## 2023-09-12 DIAGNOSIS — F32.A DEPRESSIVE DISORDER: ICD-10-CM

## 2023-09-12 DIAGNOSIS — F10.90 ALCOHOL USE DISORDER: Primary | ICD-10-CM

## 2023-09-12 DIAGNOSIS — F17.200 TOBACCO USE DISORDER, MODERATE, DEPENDENCE: ICD-10-CM

## 2023-09-12 DIAGNOSIS — F10.20 ALCOHOL USE DISORDER, SEVERE, DEPENDENCE (H): Primary | ICD-10-CM

## 2023-09-12 PROCEDURE — 99214 OFFICE O/P EST MOD 30 MIN: CPT | Performed by: FAMILY MEDICINE

## 2023-09-12 PROCEDURE — H2035 A/D TX PROGRAM, PER HOUR: HCPCS | Mod: HQ

## 2023-09-12 ASSESSMENT — ANXIETY QUESTIONNAIRES
5. BEING SO RESTLESS THAT IT IS HARD TO SIT STILL: NOT AT ALL
6. BECOMING EASILY ANNOYED OR IRRITABLE: SEVERAL DAYS
1. FEELING NERVOUS, ANXIOUS, OR ON EDGE: SEVERAL DAYS
4. TROUBLE RELAXING: NOT AT ALL
2. NOT BEING ABLE TO STOP OR CONTROL WORRYING: SEVERAL DAYS
GAD7 TOTAL SCORE: 4
3. WORRYING TOO MUCH ABOUT DIFFERENT THINGS: SEVERAL DAYS
7. FEELING AFRAID AS IF SOMETHING AWFUL MIGHT HAPPEN: NOT AT ALL
GAD7 TOTAL SCORE: 4

## 2023-09-12 ASSESSMENT — PAIN SCALES - GENERAL: PAINLEVEL: EXTREME PAIN (8)

## 2023-09-12 ASSESSMENT — PATIENT HEALTH QUESTIONNAIRE - PHQ9: SUM OF ALL RESPONSES TO PHQ QUESTIONS 1-9: 3

## 2023-09-12 NOTE — GROUP NOTE
"Group Therapy Documentation    PATIENT'S NAME: Patsy Santamarai  MRN:   [id number]  :   1955  ACCT. NUMBER: [id number]  DATE OF SERVICE: 23  START TIME:  9:00 AM  END TIME: 11:00 AM  FACILITATOR(S): Joyce Bal LADC; Marisel Mcdaniel LADC  TOPIC: BEH Group Therapy  Number of patients attending the group:  4  Group Length:  2 Hours    Group Therapy Type: Addiction    Summary of Group / Topics Discussed:    Coping Skills/Lifestyle Managemet, Meditation/Breathing Exercises, and Mindfulness  Attestation:   Dr. Eastman - Medical Director - Provides oversight and supervision of care.       Group Attendance:  Attended group session    Patient's response to the group topic/interactions:  gave appropriate feedback to peers and listened actively    Patient appeared to be Engaged.        Client specific details:  Patient shared feeling \"scattered brain\" but overall feeling good. Patient shared having urges and thoughts of use. Patient reported [facility] to [facility] her [facility] and was feeling hot and overwhelmed and tired. Patient shared walking towards the alcohol and than thought to herself \" I know if I have one it wont be more than one\" Patient shared she was talking to herself and reminding herself where she was when drinking. Patient shared that she was upset at herself for going near it but also thankful that she did not go through with her thought. Patient shared she is going to see her psychiatrist tomorrow and the Pediatrist on Thursday for her feet. Patient shared going to Baptism, praying, and surrounding herself around sober people help her. Patient SMART goal is to talk with her podiatrist to see what is causing her pain in her feet. Patient affirmation is \"settling in my new apartment\"      "

## 2023-09-12 NOTE — PROGRESS NOTES
[facility]    A/P                                                    ASSESSMENT/PLAN    1. Alcohol use disorder, severe, dependence (H)  Doing well, early remission.  Encouraged continuing outpatient group.      2. Depressive disorder  Stable.  Continue outpatient treatment.  Continue duloxetine 60mg daily.      3. Tobacco use disorder, moderate, dependence  Not ready to quit - follow-up at next visit.          PDMP Review         Value Time User    State PDMP site checked  Yes 9/12/2023  1:31 PM Melissa Covington,               RTC  Return in about 2 months (around 11/12/2023) for Follow up, in person.      Counseled the patient on the importance of having a recovery program in addition to medication to manage recovery.  Components include avoiding isolating, having willingness to change, avoiding triggers and managing cravings. Encouraged having some type of sober network and practicing honesty with trusted support person(s). Encouraged other services such as counseling, 12 step or other self-help organizations.      SUBJECTIVE                                                    Patsy Santamaria is a 68 year old female with a history of peripheral neuropathy, COPD, alcoholic hepatitis, thombocytopenia, arthritis (hands and knees), obesity, anxiety, depression, and AUD who presents to clinic today for follow up.     Brief history of substance use:  Began drinking around age 33.  Became a problem for her in 2016 and she went to treatment for the first time and also experienced EtOH withdrawal seizures.  Has been to multiple treatments (most recently [facility] in [place] in Feb 2022).  Drinking tends to correlate with worsening depression.  Has tried naltrexone and acamprosate in past.  History of of cocaine use (1 year in the 1990s).  Established care with [facility] in March 2022 and has continued to struggle with brief episodes of  drinking.  Most recent hospitalization related to alcohol use/withdrawal was March 2023.    Plan from most recent office visit (7/11/23):  1. Alcohol use disorder, severe, dependence (H)  Improving.  No use, cravings are manageable.  Encouraged continued IOP.  - Drugs of Abuse Screen Urine (POC CUPS) POCT     2. Depressive disorder  3. PTSD (post-traumatic stress disorder)  Improving.  Increase Cymbalta from 40mg to 60mg to decrease pill burden, maximize effectiveness.    - DULoxetine (CYMBALTA) 60 MG capsule; Take 1 capsule (60 mg) by mouth daily  Dispense: 90 capsule; Refill: 1     4. Weight gain  She is unhappy with weight gain.  Would like to work with weight loss clinic.  Referral placed.  - Adult [facility]  Referral; Future    TODAY'S VISIT  HPI Sep 12, 2023  - She is now on phase 3 of treatment, now going once a week, reflects on how hearing other group members stories has been helpful  - Reading Quit Like a Woman - really helpful   - No alcohol use, has some thoughts about drinking - shared recent situation when she almost went into the bar  - Quit taking Remeron due to concerns about weight gain (30lbs) - little more trouble falling asleep at times but not too noticeable  - Hip pain - started PT and taking Lyrica which seems helpful  - Moved, still in [facility] but different neighborhood, quieter - getting settled  - Wants to get a small dog, working with therapist   - Mood has been pretty stable        5/25/2023    10:00 AM 7/19/2023    12:00 PM 9/12/2023     1:00 PM   PHQ   PHQ-9 Total Score 7 3 3   Q9: Thoughts of better off dead/self-harm past 2 weeks Not at all Not at all Not at all           7/11/2023     2:00 PM 7/19/2023    12:00 PM 9/12/2023     1:00 PM   ALISIA-7 SCORE   Total Score 2 3 4       OBJECTIVE                                                    PHYSICAL EXAM:  BP (!) 145/78 (BP Location: Right arm, Patient Position: Sitting, Cuff Size: Adult Large)   Pulse 69    Wt 118.4 kg (261 lb)   BMI 46.23 kg/m      GENERAL: healthy, alert and no distress  EYES: Eyes grossly normal to inspection, sclerae normal  RESP: No respiratory distress, no coughing or hweezing  SKIN: no pallor or jaundice  MENTAL STATUS EXAM  Appearance/Behavior: No appearant distress  Speech: Normal  Mood/Affect: normal affect  Insight: Adequate    LAB  No results found for any visits on 09/12/23.      HISTORY                                                    Problem list reviewed & adjusted, as indicated.  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)    Diuretic-induced hypokalemia    Dyspnea on exertion    Elevated LFTs    Ganglion of tendon sheath    GI bleed    Hereditary and idiopathic peripheral neuropathy    History of major depression    Homeless    Major depression, recurrent (H)    Low backache    Airway obstruction due to foreign body, initial encounter    Hypomagnesemia    Mild episode of recurrent major depressive disorder (H)    Morbid obesity (H)    Smoker    Peripheral edema    Pneumonia of right lower lobe due to infectious organism    Primary localized osteoarthrosis, hand    Primary osteoarthritis of right knee    PTSD (post-traumatic stress disorder)    Seasonal allergies    Skin lesion    Snoring    Trochanteric bursitis of left hip    Vitamin B12 deficiency (non anemic)    Vitamin D deficiency    Other seizures (H)    Anxiety    Closed fracture of head of left radius    Recurrent falls    Thrombocytopenia (H)    Dermatitis    Depressive disorder    Leukopenia     Alcoholic cirrhosis of liver without ascites (H)    Sigmoid Diverticulitis    Mixed simple and mucopurulent chronic bronchitis (H)    Suicidal ideation    Infection due to 2019 novel coronavirus    Other specified disorders of carbohydrate metabolism (H)    Right foot pain    Hallux valgus, acquired, right         MEDICATION LIST (prior to visit)  albuterol (PROAIR HFA/PROVENTIL HFA/VENTOLIN HFA) 108 (90 Base) MCG/ACT inhaler, Inhale 2 puffs into the lungs every 4 hours as needed for shortness of breath / dyspnea or wheezing  budesonide-formoterol (SYMBICORT) 160-4.5 MCG/ACT Inhaler, Inhale 2 puffs into the lungs 2 times daily  bumetanide (BUMEX) 1 MG tablet, TAKE 1 TABLET(1 MG) BY MOUTH DAILY  DULoxetine (CYMBALTA) 60 MG capsule, Take 1 capsule (60 mg) by mouth daily  hydrOXYzine (ATARAX) 50 MG tablet, Take 0.5-1 tablets (25-50 mg) by mouth 3 times daily as needed for anxiety  ipratropium - albuterol 0.5 mg/2.5 mg/3 mL (DUONEB) 0.5-2.5 (3) MG/3ML neb solution, Take 1 vial (3 mLs) by nebulization every 4 hours as needed for shortness of breath / dyspnea or wheezing  omeprazole (PRILOSEC) 20 MG DR capsule, Take 20 mg by mouth daily as needed  pregabalin (LYRICA) 25 MG capsule, Take 1 cap at bedtime x 3 days, take 1 cap bid x 3 days, then take 1 cap tid  umeclidinium (INCRUSE ELLIPTA) 62.5 MCG/INH inhaler, Inhale 1 puff into the lungs daily (Patient taking differently: Inhale 1 puff into the lungs every morning)  colchicine (COLCRYS) 0.6 MG tablet, Take 1 tablet (0.6 mg) by mouth 2 times daily (Patient not taking: Reported on 9/12/2023)  diclofenac (VOLTAREN) 1 % topical gel, Apply 2 grams to the left lower lumbar area up to 4 times daily not to exceed 8 grams per day. (Patient not taking: Reported on 9/7/2023)  methocarbamol (ROBAXIN) 500 MG tablet, Take 1 tablet (500 mg) by mouth 3 times daily as needed for muscle spasms (Patient not taking: Reported on 9/7/2023)  nicotine (NICORETTE) 4 MG lozenge, Place 1 lozenge  "(4 mg) inside cheek every hour as needed for smoking cessation (Patient not taking: Reported on 6/6/2023)    No current facility-administered medications on file prior to visit.      MEDICATION LIST (after visit)  Current Outpatient Medications   Medication    albuterol (PROAIR HFA/PROVENTIL HFA/VENTOLIN HFA) 108 (90 Base) MCG/ACT inhaler    budesonide-formoterol (SYMBICORT) 160-4.5 MCG/ACT Inhaler    bumetanide (BUMEX) 1 MG tablet    DULoxetine (CYMBALTA) 60 MG capsule    hydrOXYzine (ATARAX) 50 MG tablet    ipratropium - albuterol 0.5 mg/2.5 mg/3 mL (DUONEB) 0.5-2.5 (3) MG/3ML neb solution    omeprazole (PRILOSEC) 20 MG DR capsule    pregabalin (LYRICA) 25 MG capsule    umeclidinium (INCRUSE ELLIPTA) 62.5 MCG/INH inhaler    colchicine (COLCRYS) 0.6 MG tablet    diclofenac (VOLTAREN) 1 % topical gel    methocarbamol (ROBAXIN) 500 MG tablet    nicotine (NICORETTE) 4 MG lozenge     No current facility-administered medications for this visit.         Allergies   Allergen Reactions    Bupropion Diarrhea    Codeine Hives, Itching, Nausea and Rash    Nickel Unknown    Oxycodone Nausea and Vomiting    Percocet [Oxycodone-Acetaminophen]      Patient reports \"vomiting,grossly ill two weeks ago\"    Topiramate Unknown    Diclofenac Nausea     Tolerates the topical gel    Furosemide Rash    Hydrochlorothiazide Rash     phototoxicity - med was d/lora        Sulfa Antibiotics Itching and Rash    Sulfasalazine Rash       Melissa Covington [facility]  [facility]  [phone]        "

## 2023-09-12 NOTE — PROGRESS NOTES
[facility] - Addiction Medicine       Rooming information: questions about remeron with weight gain, pt wants to discontinue medication.    Point of care urine drug screen positive for:  Lab Results   Component Value Date    BUP Negative 04/27/2023    BZO Screen Positive (A) 04/27/2023    BAR Negative 04/27/2023    ELICEO Negative 04/27/2023    MAMP Negative 04/27/2023    AMP Negative 04/27/2023    MDMA Negative 04/27/2023    MTD Negative 04/27/2023    BWZ368 Negative 04/27/2023    OXY Negative 04/27/2023    PCP Negative 04/27/2023    THC Negative 04/27/2023    TEMP Invalid (A) 04/27/2023    SGPOCT 1.015 04/27/2023       *POC urine drug screen does not screen for Fentanyl    PHQ-9 Scores:       5/25/2023    10:00 AM 7/19/2023    12:00 PM 9/12/2023     1:00 PM   PHQ   PHQ-9 Total Score 7 3 3   Q9: Thoughts of better off dead/self-harm past 2 weeks Not at all Not at all Not at all     ALISIA-7 Scores:      7/11/2023     2:00 PM 7/19/2023    12:00 PM 9/12/2023     1:00 PM   ALISIA-7 SCORE   Total Score 2 3 4       Any other recent substance use:     Denies    NICOTINE-Yes: cigarettes   If using nicotine, ready to quit? No    Side effects related to medications pt would like to discuss with provider (constipation, dry mouth, HA, GI upset, sedation?) yes, increase appetite with remeron.     Narcan currently available: No    Primary care provider: EJ WELLER MD     Mental health provider: therapist, group therapy  (follow up on MH referral if needed)    Any housing, insurance deficits?: denies    Contact information up to date? yes    3rd Party Involvement not at this time. (please obtain LUDIN if pt would like to include)        Bri Lord LPN  September 12, 2023  1:05 PM

## 2023-09-12 NOTE — PATIENT INSTRUCTIONS
Check out [employer] - they offer discount for seniors.    Quit Like a Woman   Cony Peter     Finding Your Best Self   Yamel Barrientos, PhD      Ze: Remembering the Things I drank to Forget  Jenny Cory    Sober On A Drunk Planet (series of 3)   Scar Tyler    Spirit Brandon Valverde    A Bitbar Voice   Verito Naty     Addictive Thinking, Understanding Self Deception   JoseRollCall (roll.to)        Films:   Clean Slate   The Year of the Dog   The Recovery Boys   Four Good Days   Drugstore Cowboy   When a Man Loves a Woman   Flight   The Anonymous People   Noel Tillman - Addiction to Recovery   Kurt and the Monster    Clean and Sober         Podcasts:    The Bubble Hour    Busy Living Sober    A Sober Girls Guide    Recovery Happy Hour    The Addicted Mind    ODAAT Chat Podcast    Breaking Free: Your Recovery, Your Way

## 2023-09-12 NOTE — PROGRESS NOTES
[facility] Weekly Treatment Plan Review      ATTENDANCE for the following date span: 23-23    Date     Group Therapy 0 hours 2  hours NA  hours   No group No group    Individual Therapy        Family Therapy        Other (Specify) Phase 1 &2  Phase 3  Phase 1  Phase 1 & 2        Patient attended all scheduled sessions during this time period.   Total hours: 76 hours. Patient is in phase 3    Weekly Treatment Plan Review     Treatment Plan initiated on 2023.    Dimension1: Acute Intoxication/Withdrawal Potential -   Previous Dimension Ratin  Current Dimension Ratin  Date of Last Use: 23  Any reports of withdrawal symptoms: No  Narrative: Patsy reports sustained abstinence. No changes or concerns.     Dimension 2: Biomedical Conditions & Complications -   Previous Dimension Ratin  Current Dimension Ratin  Medical Concerns:  Significant pain and swelling in foot and leg.  Current Medications & Medication Changes:  Current Outpatient Medications   Medication    albuterol (PROAIR HFA/PROVENTIL HFA/VENTOLIN HFA) 108 (90 Base) MCG/ACT inhaler    budesonide-formoterol (SYMBICORT) 160-4.5 MCG/ACT Inhaler    bumetanide (BUMEX) 1 MG tablet    colchicine (COLCRYS) 0.6 MG tablet    diclofenac (VOLTAREN) 1 % topical gel    DULoxetine (CYMBALTA) 60 MG capsule    hydrOXYzine (ATARAX) 50 MG tablet    ipratropium - albuterol 0.5 mg/2.5 mg/3 mL (DUONEB) 0.5-2.5 (3) MG/3ML neb solution    methocarbamol (ROBAXIN) 500 MG tablet    nicotine (NICORETTE) 4 MG lozenge    omeprazole (PRILOSEC) 20 MG DR capsule    pregabalin (LYRICA) 25 MG capsule    umeclidinium (INCRUSE ELLIPTA) 62.5 MCG/INH inhaler     No current facility-administered medications for this visit.     Medication Prescriber:  Yanique Cali MD.   Taking meds as prescribed? Yes  Medication side effects or concerns:  None reported.  Outside medical appointments this week  (list provider and reason for visit):  Saw doctor about her foot. See EHR.  Narrative: Patient reports the following medical conditions: COPD, overweight, uses CPAP, and general fatigue . Patient has primary care with Yanique Cali MD. She reports continuing to attend physical therapy however does not think it is actually helping with that she was not able to get an appointment until . Patsy states she is having some concerns with her hips, she was seen and they wanted to do a cortisone injection. She asked them to wait until she can see the podiatrist this week. Patsy believes the root cause of her hips hurting is related to her feet. Patsy started a new medication and she is starting to see some benefit, she is hoping this increases.     Dimension 3: Emotional/Behavioral Conditions & Complications  Previous Dimension Ratin  Current Dimension Ratin    PHQ9       2023    10:00 AM 2023    12:00 PM 2023     1:00 PM   PHQ-9 SCORE   PHQ-9 Total Score 7 3 3     GAD7       2023     2:00 PM 2023    12:00 PM 2023     1:00 PM   ALISIA-7 SCORE   Total Score 2 3 4     Mental health diagnosis PTSD  Date of last SIB/SI/HI: N/A  Current MH Assignments:  None  Narrative:  Patient reports a mental health diagnosis of PTSD, Anxiety, and Depression. Patsy has endorses current symptoms of hypervigilance and anxiety when out. Patsy shares continued improvements in her mental health. She is reporting that she has felt relief with her move as she has noticed that it is better for her health and mental health. She states her mental health is good, she has now calmed herself down and is enjoying her new space. Patsy is meeting with her psychiatrist today.     Dimension 4: Treatment Acceptance / Resistance  Previous Dimension Ratin  Current Dimension Ratin  ORAL Diagnosis:  Alcohol Use Disorder   303.90 (F10.20) Severe In early remission,   Phase: 3  Commitment to tx  "process/Stage of change: Action   ORAL assignments: Relapse Prevention Plan   Narrative: Patient appears internally motivated for treatment. She is an active and engaged group member when present. Patsy verbalizes continued motivation for recovery. Patsy began phase 3 this week.     Dimension 5: Relapse / Continued Problem Potential    Previous Dimension Rating:  3  Current Dimension Rating:  3  Relapses this week: None  Urges to use:  See below   UA results:   No results found for this or any previous visit (from the past 168 hour(s)).  Narrative: Patsy reports when going out to the grocery store she does not ignore the liquor store, she acknowledge that it is there and does not give the liquor store the power. She does daily readings in the morning and evening and when she is having a hard day she reads a passage from the \"big book\".  Patsy is working on the development of a relapse prevention plan. She is beginning to name her triggers prior to events and situations and because of this has a plan for her recovery skills to use. Patsy states that she is not having cravings or triggers but having some thoughts. Patsy states that she has learned that she does not need to give her thoughts any power. Patsy is actively working on managing her pain- by doing so she is reducing the risk of return to use. She states this week she had thoughts to go to the bar and started walking there. She stopped herself and talked through it, she realized she was tired and hot from moving and was able to go home and divert her attention. Patsy felt proud of herself for stopping the thought and craving but also felt frustrated that she even had the thought. We discussed a seasonal relapse prevention plan as part of the maintenance  stage of  phase 3.     Dimension 6: Recovery Environment   Previous Dimension Rating:  3  Current Dimension Ratin  Family Involvement : None   Summarize attendance at family groups and family sessions: " "N/A  Family supportive of treatment?  Yes  Community support group attendance: None  Recreational activities: Exercise   Narrative: Patsy has stable housing at this time and lives alone. She is \"retired and on disability\", she does note some concerns about her financial stability. Patsy is not involved in the criminal justice system. Patsy has officially moved and is unpacked. She is learning more places around her new home and is enjoying some independence in her exploring. Patsy is attending Anabaptism and finds enjoyment in that. She continues to talk to her sober friend in [place] for support in addition to attending group and therapy.  Patsy attends Anabaptism regularly and finds benefit in that. Patsy is reading \"Quit Like a Woman\" and doing AA readings and finding benefit in that.     Justification for Continued Treatment at this Level of Care:  Patsy is in Phase 3 and has increased her periods of sobriety. She lacks a sober support network however has begun attending Anabaptism and has family support. She is medication compliant and has started individual therapy. She has been able to identify triggers, and is starting to learn and implement some coping skills. Her daily life schedule/routine has not been supportive of recovery previously so she is working on improving that.. She is working on getting more structure in her daily activities.     Discharge Planning:  Target Discharge Date/Timeframe:  11/28/23  Med Mgmt Provider/Appt:  Dr. Covington in addiction medicine  Ind therapy Provider/Appt:  Weekly   Other referrals:  None  Has vulnerable adult status change? No  Service Coordination:  None    Supervision:  Patient is staffed with treatment providers, this includes: Cony Guzman [facility], Marisel Mcdaniel [facility], and Yee Curry, [facility]; Staffed with [facility] Team    Attestation:   Dr. Eastman - Medical Director - Provides oversight and supervision of care.    Are Treatment Plan goals/objectives effective? Yes  *If " no, list changes to treatment plan:    Are the current goals meeting client's needs? Yes  *If no, list the changes to treatment plan.    Client Input / Response: Agreeable       *Client agrees with any changes to the treatment plan: N/A  *Client received copy of changes: N/A  *Client is aware of right to access a treatment plan review: Yes (MyChart or request copy)

## 2023-09-13 ENCOUNTER — OFFICE VISIT (OUTPATIENT)
Dept: FAMILY MEDICINE | Facility: CLINIC | Age: 68
End: 2023-09-13
Payer: COMMERCIAL

## 2023-09-13 VITALS
HEIGHT: 63 IN | RESPIRATION RATE: 20 BRPM | SYSTOLIC BLOOD PRESSURE: 112 MMHG | DIASTOLIC BLOOD PRESSURE: 76 MMHG | HEART RATE: 61 BPM | TEMPERATURE: 97.7 F | WEIGHT: 261 LBS | BODY MASS INDEX: 46.25 KG/M2

## 2023-09-13 DIAGNOSIS — F17.200 NICOTINE DEPENDENCE, UNCOMPLICATED, UNSPECIFIED NICOTINE PRODUCT TYPE: ICD-10-CM

## 2023-09-13 DIAGNOSIS — M79.671 BILATERAL FOOT PAIN: ICD-10-CM

## 2023-09-13 DIAGNOSIS — M16.11 PRIMARY OSTEOARTHRITIS OF RIGHT HIP: ICD-10-CM

## 2023-09-13 DIAGNOSIS — Z00.00 ENCOUNTER FOR MEDICARE ANNUAL WELLNESS EXAM: ICD-10-CM

## 2023-09-13 DIAGNOSIS — K70.30 ALCOHOLIC CIRRHOSIS OF LIVER WITHOUT ASCITES (H): ICD-10-CM

## 2023-09-13 DIAGNOSIS — J43.8 OTHER EMPHYSEMA (H): ICD-10-CM

## 2023-09-13 DIAGNOSIS — E83.42 HYPOMAGNESEMIA: ICD-10-CM

## 2023-09-13 DIAGNOSIS — Z00.00 ADULT GENERAL MEDICAL EXAM: ICD-10-CM

## 2023-09-13 DIAGNOSIS — M10.9 ACUTE GOUT, UNSPECIFIED CAUSE, UNSPECIFIED SITE: ICD-10-CM

## 2023-09-13 DIAGNOSIS — E53.8 VITAMIN B12 DEFICIENCY (NON ANEMIC): ICD-10-CM

## 2023-09-13 DIAGNOSIS — Z13.220 LIPID SCREENING: ICD-10-CM

## 2023-09-13 DIAGNOSIS — N89.8 VAGINAL ODOR: ICD-10-CM

## 2023-09-13 DIAGNOSIS — Z23 NEED FOR IMMUNIZATION AGAINST INFLUENZA: ICD-10-CM

## 2023-09-13 DIAGNOSIS — I89.0 LYMPHEDEMA OF BOTH LOWER EXTREMITIES: ICD-10-CM

## 2023-09-13 DIAGNOSIS — M79.672 BILATERAL FOOT PAIN: ICD-10-CM

## 2023-09-13 DIAGNOSIS — Z12.11 SCREEN FOR COLON CANCER: Primary | ICD-10-CM

## 2023-09-13 DIAGNOSIS — F10.11 ALCOHOL ABUSE, IN REMISSION: ICD-10-CM

## 2023-09-13 DIAGNOSIS — R82.90 ABNORMAL URINE ODOR: ICD-10-CM

## 2023-09-13 DIAGNOSIS — E55.9 VITAMIN D DEFICIENCY: ICD-10-CM

## 2023-09-13 DIAGNOSIS — E66.813 CLASS 3 SEVERE OBESITY DUE TO EXCESS CALORIES WITH BODY MASS INDEX (BMI) OF 40.0 TO 44.9 IN ADULT, UNSPECIFIED WHETHER SERIOUS COMORBIDITY PRESENT (H): ICD-10-CM

## 2023-09-13 DIAGNOSIS — F17.210 CIGARETTE NICOTINE DEPENDENCE WITHOUT COMPLICATION: ICD-10-CM

## 2023-09-13 DIAGNOSIS — E66.01 CLASS 3 SEVERE OBESITY DUE TO EXCESS CALORIES WITH BODY MASS INDEX (BMI) OF 40.0 TO 44.9 IN ADULT, UNSPECIFIED WHETHER SERIOUS COMORBIDITY PRESENT (H): ICD-10-CM

## 2023-09-13 DIAGNOSIS — Z12.2 SCREENING FOR LUNG CANCER: ICD-10-CM

## 2023-09-13 DIAGNOSIS — R60.0 PERIPHERAL EDEMA: ICD-10-CM

## 2023-09-13 DIAGNOSIS — E66.01 MORBID OBESITY (H): ICD-10-CM

## 2023-09-13 DIAGNOSIS — F17.200 SMOKER: ICD-10-CM

## 2023-09-13 DIAGNOSIS — Z23 NEED FOR VACCINATION FOR PNEUMOCOCCUS: ICD-10-CM

## 2023-09-13 LAB
ALBUMIN SERPL BCG-MCNC: 4.3 G/DL (ref 3.5–5.2)
ALBUMIN UR-MCNC: NEGATIVE MG/DL
ALP SERPL-CCNC: 72 U/L (ref 35–104)
ALT SERPL W P-5'-P-CCNC: 13 U/L (ref 0–50)
ANION GAP SERPL CALCULATED.3IONS-SCNC: 14 MMOL/L (ref 7–15)
APPEARANCE UR: CLEAR
AST SERPL W P-5'-P-CCNC: 19 U/L (ref 0–45)
BACTERIA #/AREA URNS HPF: ABNORMAL /HPF
BASOPHILS # BLD AUTO: 0 10E3/UL (ref 0–0.2)
BASOPHILS NFR BLD AUTO: 0 %
BILIRUB SERPL-MCNC: 0.5 MG/DL
BILIRUB UR QL STRIP: NEGATIVE
BUN SERPL-MCNC: 7.8 MG/DL (ref 8–23)
CALCIUM SERPL-MCNC: 9.8 MG/DL (ref 8.8–10.2)
CHLORIDE SERPL-SCNC: 101 MMOL/L (ref 98–107)
CHOLEST SERPL-MCNC: 190 MG/DL
CLUE CELLS: ABNORMAL
COLOR UR AUTO: YELLOW
CREAT SERPL-MCNC: 0.6 MG/DL (ref 0.51–0.95)
CREAT UR-MCNC: 95.3 MG/DL
DEPRECATED CALCIDIOL+CALCIFEROL SERPL-MC: 21 UG/L (ref 20–75)
DEPRECATED HCO3 PLAS-SCNC: 26 MMOL/L (ref 22–29)
EGFRCR SERPLBLD CKD-EPI 2021: >90 ML/MIN/1.73M2
EOSINOPHIL # BLD AUTO: 0.2 10E3/UL (ref 0–0.7)
EOSINOPHIL NFR BLD AUTO: 3 %
ERYTHROCYTE [DISTWIDTH] IN BLOOD BY AUTOMATED COUNT: 13.6 % (ref 10–15)
GLUCOSE SERPL-MCNC: 99 MG/DL (ref 70–99)
GLUCOSE UR STRIP-MCNC: NEGATIVE MG/DL
HBA1C MFR BLD: 6 % (ref 0–5.6)
HCT VFR BLD AUTO: 44.8 % (ref 35–47)
HDLC SERPL-MCNC: 50 MG/DL
HGB BLD-MCNC: 14.6 G/DL (ref 11.7–15.7)
HGB UR QL STRIP: NEGATIVE
IMM GRANULOCYTES # BLD: 0 10E3/UL
IMM GRANULOCYTES NFR BLD: 0 %
KETONES UR STRIP-MCNC: NEGATIVE MG/DL
LDLC SERPL CALC-MCNC: 117 MG/DL
LEUKOCYTE ESTERASE UR QL STRIP: NEGATIVE
LYMPHOCYTES # BLD AUTO: 2.7 10E3/UL (ref 0.8–5.3)
LYMPHOCYTES NFR BLD AUTO: 39 %
MCH RBC QN AUTO: 29.3 PG (ref 26.5–33)
MCHC RBC AUTO-ENTMCNC: 32.6 G/DL (ref 31.5–36.5)
MCV RBC AUTO: 90 FL (ref 78–100)
MICROALBUMIN UR-MCNC: <12 MG/L
MICROALBUMIN/CREAT UR: NORMAL MG/G{CREAT}
MONOCYTES # BLD AUTO: 0.6 10E3/UL (ref 0–1.3)
MONOCYTES NFR BLD AUTO: 8 %
NEUTROPHILS # BLD AUTO: 3.5 10E3/UL (ref 1.6–8.3)
NEUTROPHILS NFR BLD AUTO: 50 %
NITRATE UR QL: NEGATIVE
NONHDLC SERPL-MCNC: 140 MG/DL
PH UR STRIP: 6 [PH] (ref 5–8)
PLATELET # BLD AUTO: 198 10E3/UL (ref 150–450)
POTASSIUM SERPL-SCNC: 4.3 MMOL/L (ref 3.4–5.3)
PROT SERPL-MCNC: 7.2 G/DL (ref 6.4–8.3)
RBC # BLD AUTO: 4.99 10E6/UL (ref 3.8–5.2)
RBC #/AREA URNS AUTO: ABNORMAL /HPF
SODIUM SERPL-SCNC: 141 MMOL/L (ref 136–145)
SP GR UR STRIP: 1.02 (ref 1–1.03)
SQUAMOUS #/AREA URNS AUTO: ABNORMAL /LPF
TRICHOMONAS, WET PREP: ABNORMAL
TRIGL SERPL-MCNC: 117 MG/DL
URATE SERPL-MCNC: 5.5 MG/DL (ref 2.4–5.7)
UROBILINOGEN UR STRIP-ACNC: 1 E.U./DL
VIT B12 SERPL-MCNC: 359 PG/ML (ref 232–1245)
WBC # BLD AUTO: 7 10E3/UL (ref 4–11)
WBC #/AREA URNS AUTO: ABNORMAL /HPF
WBC'S/HIGH POWER FIELD, WET PREP: ABNORMAL
YEAST, WET PREP: ABNORMAL

## 2023-09-13 PROCEDURE — 90677 PCV20 VACCINE IM: CPT | Performed by: FAMILY MEDICINE

## 2023-09-13 PROCEDURE — 87491 CHLMYD TRACH DNA AMP PROBE: CPT | Performed by: FAMILY MEDICINE

## 2023-09-13 PROCEDURE — 90662 IIV NO PRSV INCREASED AG IM: CPT | Performed by: FAMILY MEDICINE

## 2023-09-13 PROCEDURE — G0008 ADMIN INFLUENZA VIRUS VAC: HCPCS | Performed by: FAMILY MEDICINE

## 2023-09-13 PROCEDURE — 99214 OFFICE O/P EST MOD 30 MIN: CPT | Mod: 25 | Performed by: FAMILY MEDICINE

## 2023-09-13 PROCEDURE — 82607 VITAMIN B-12: CPT | Performed by: FAMILY MEDICINE

## 2023-09-13 PROCEDURE — 36415 COLL VENOUS BLD VENIPUNCTURE: CPT | Performed by: FAMILY MEDICINE

## 2023-09-13 PROCEDURE — 85025 COMPLETE CBC W/AUTO DIFF WBC: CPT | Performed by: FAMILY MEDICINE

## 2023-09-13 PROCEDURE — 80053 COMPREHEN METABOLIC PANEL: CPT | Performed by: FAMILY MEDICINE

## 2023-09-13 PROCEDURE — G0009 ADMIN PNEUMOCOCCAL VACCINE: HCPCS | Performed by: FAMILY MEDICINE

## 2023-09-13 PROCEDURE — 87591 N.GONORRHOEAE DNA AMP PROB: CPT | Performed by: FAMILY MEDICINE

## 2023-09-13 PROCEDURE — 84550 ASSAY OF BLOOD/URIC ACID: CPT | Performed by: FAMILY MEDICINE

## 2023-09-13 PROCEDURE — 82043 UR ALBUMIN QUANTITATIVE: CPT | Performed by: FAMILY MEDICINE

## 2023-09-13 PROCEDURE — 82570 ASSAY OF URINE CREATININE: CPT | Performed by: FAMILY MEDICINE

## 2023-09-13 PROCEDURE — 81001 URINALYSIS AUTO W/SCOPE: CPT | Performed by: FAMILY MEDICINE

## 2023-09-13 PROCEDURE — 87086 URINE CULTURE/COLONY COUNT: CPT | Performed by: FAMILY MEDICINE

## 2023-09-13 PROCEDURE — 83036 HEMOGLOBIN GLYCOSYLATED A1C: CPT | Performed by: FAMILY MEDICINE

## 2023-09-13 PROCEDURE — 80061 LIPID PANEL: CPT | Performed by: FAMILY MEDICINE

## 2023-09-13 PROCEDURE — 87210 SMEAR WET MOUNT SALINE/INK: CPT | Performed by: FAMILY MEDICINE

## 2023-09-13 PROCEDURE — 82306 VITAMIN D 25 HYDROXY: CPT | Performed by: FAMILY MEDICINE

## 2023-09-13 PROCEDURE — G0439 PPPS, SUBSEQ VISIT: HCPCS | Performed by: FAMILY MEDICINE

## 2023-09-13 RX ORDER — BUMETANIDE 1 MG/1
1 TABLET ORAL DAILY
Qty: 90 TABLET | Refills: 0 | Status: SHIPPED | OUTPATIENT
Start: 2023-09-13 | End: 2024-04-29

## 2023-09-13 ASSESSMENT — PATIENT HEALTH QUESTIONNAIRE - PHQ9
SUM OF ALL RESPONSES TO PHQ QUESTIONS 1-9: 3
SUM OF ALL RESPONSES TO PHQ QUESTIONS 1-9: 3
10. IF YOU CHECKED OFF ANY PROBLEMS, HOW DIFFICULT HAVE THESE PROBLEMS MADE IT FOR YOU TO DO YOUR WORK, TAKE CARE OF THINGS AT HOME, OR GET ALONG WITH OTHER PEOPLE: SOMEWHAT DIFFICULT

## 2023-09-13 NOTE — PROGRESS NOTES
"  1. Adult general medical exam  25.  Encounter for Annual Wellness Visit  This is a 67 yo female here for physical exam/ AWV     2. Lymphedema of both lower extremities  H/o lymphedema - bilateral lower extremities - discussed needs to use her diuretic therapy -   - bumetanide (BUMEX) 1 MG tablet; Take 1 tablet (1 mg) by mouth daily  Dispense: 90 tablet; Refill: 0    3. Screen for colon cancer  Due for colon cancer screening - discussed - ordered   - Colonoscopy Screening  Referral; Future    4. Morbid obesity (H)  Discussed diet/exercise - check A1c   - HEMOGLOBIN A1C; Future  - HEMOGLOBIN A1C    5. Smoker  Discussed CT - lung cancer screening   Discussed smoking cessation     6. Need for immunization against influenza  Due for seasonal flu vaccine -ordered   - INFLUENZA VACCINE 65+ (FLUZONE HD)    7. Class 3 severe obesity due to excess calories with body mass index (BMI) of 40.0 to 44.9 in adult, unspecified whether serious comorbidity present (H)  As above -    8. Other emphysema (H)  Emphysema - encouraged smoking cessation     9. Peripheral edema  As above -   - Albumin Random Urine Quantitative with Creat Ratio; Future  - Comprehensive metabolic panel (BMP + Alb, Alk Phos, ALT, AST, Total. Bili, TP); Future  - Comprehensive metabolic panel (BMP + Alb, Alk Phos, ALT, AST, Total. Bili, TP)  - Albumin Random Urine Quantitative with Creat Ratio    10. Alcohol abuse, in remission  Patient reports sobriety - \"I feel so good\"    11. Vitamin D deficiency  Low Vitamin D - check labs   - Vitamin D Deficiency; Future  - Vitamin D Deficiency    12. Vitamin B12 deficiency (non anemic)  Low Vitamin B12 levels in past - check B12 levels -   - Vitamin B12; Future  - Vitamin B12    13. Alcoholic cirrhosis of liver without ascites (H)  Patient with alcoholic cirrhosis - liver - check labs to monitor -   - Comprehensive metabolic panel (BMP + Alb, Alk Phos, ALT, AST, Total. Bili, TP); Future  - CBC with Platelets & " Differential; Future  - Comprehensive metabolic panel (BMP + Alb, Alk Phos, ALT, AST, Total. Bili, TP)  - CBC with Platelets & Differential    14. Hypomagnesemia  H/o low magnesium -     15. Lipid screening  Due for lipid screening -   - Lipid panel reflex to direct LDL Non-fasting; Future  - Lipid panel reflex to direct LDL Non-fasting    16. Need for vaccination for pneumococcus  Due for pneumonia vaccine - discussed - ordered   - Pneumococcal 20 Valent Conjugate (PCV20)    17. Cigarette nicotine dependence without complication  18. Screening for lung cancer  Discussed lung cancer screening - patient would like to do screening -   - CT Chest Lung Cancer Screen Low Dose Without; Future    19. Vaginal odor  Complains of vaginal odor - check wet prep, GC/Chlamydia  - Wet prep - Clinic Collect  - Chlamydia trachomatis/Neisseria gonorrhoeae by PCR - Clinic Collect    20. Nicotine dependence, uncomplicated, unspecified nicotine product type  As above -     21. Acute gout, unspecified cause, unspecified site  H/o acute gout - check uric acid level - no new symptoms now -   - Uric acid; Future  - Uric acid    22. Abnormal urine odor  Patient complains of odor of urine (?vaginal ) - check labs -   - UA with Microscopic - lab collect; Future  - Urine Culture Aerobic Bacterial - lab collect; Future  - UA with Microscopic - lab collect  - Urine Culture Aerobic Bacterial - lab collect  - Urine Microscopic Exam    23. Primary osteoarthritis of right hip  Right hip pain related to osteoarthritis     24. Bilateral foot pain  Bilateral foot pain - discussed foot wear/weight -       Subjective   Patsy is a 68 year old, presenting for the following health issues:  Wellness Visit        9/13/2023    11:32 AM   Additional Questions   Roomed by Bernadine       Hip pain  Foot pain  Getting cortisone in hip  Will see podiatrist tomorrow    Skin is dry - no matter what lotion    Wants blood work - liver, creatinine  Quit drinking - 3-4  months   Going to group treatment -   Trying to get everything taken care of -   Gained a lot of weight - was taking a sleep medication - Mirtazapine - side effect increased appetite -   Gained 30 pounds in a year -   Has a psychiatrist - therapist and a group  Moved - living off [facility] - connected to [facility] - apartments -     Smoking - down to 5 cigarettes/day   Smoked 1 to 2 ppd - age 15 until recently -   Now weaning down    Lung Cancer Screening Shared Decision Making Visit     Patsy Santamaria is eligible for lung cancer screening on the basis of:   has not experienced symptoms suggestive of lung cancer.   born on 1955, 68 year old years old.   smoked 1-2  packs of cigarettes for 40+ years for a total of 40 pack-years   has not quit smoking /is currently smoking.      I have discussed with patient the risks and benefits of screening for lung cancer with low-dose CT.     The risks include:   radiation exposure    false positives     over-diagnosis    The benefit of early detection of lung cancer is contingent upon adherence to annual screening or more frequent follow up if indicated.     Furthermore, reaping the benefits of screening requires Patsy Santamaria to be willing and able to undergo diagnostic procedures, if indicated.     We did discuss that the only way to prevent lung cancer is to not smoke. Smoking cessation assistance was offered.        Had [facility] eye exam last summer   - -has new prescription    Discussed colon cancer screening - had to cancel due to transportation -       History of Present Illness       Reason for visit:  Vagina Issues  Symptom onset:  3-4 weeks ago  Symptoms include:  Vaginal Odor that comes and goes  Symptom intensity:  Moderate  Symptom progression:  Staying the same  Had these symptoms before:  No        Annual Wellness Visit    Patient has been advised of split billing requirements and indicates understanding: Yes     Are you in the first 12 months of your  "Medicare Part B coverage?  No    Physical Health:  In general, how would you rate your overall physical health? poor  Outside of work, how many days during the week do you exercise?2-3 days/week  Outside of work, approximately how many minutes a day do you exercise?less than 15 minutes  If you drink alcohol do you typically have >3 drinks per day or >7 drinks per week? No  Do you usually eat at least 4 servings of fruit and vegetables a day, include whole grains & fiber and avoid regularly eating high fat or \"junk\" foods? No  Do you have any problems taking medications regularly? No  Do you have any side effects from medications? none  Needs assistance for the following daily activities: no assistance needed  Which of the following safety concerns are present in your home?  none identified   Hearing impairment: No  In the past 6 months, have you been bothered by leaking of urine? yes    Mental Health:  In general, how would you rate your overall mental or emotional health? good  PHQ-2 Score:      Do you feel safe in your environment? Yes    Have you ever done Advance Care Planning? (For example, a Health Directive, POLST, or a discussion with a medical provider or your loved ones about your wishes)? No current written documents     Fall risk:  Fallen 2 or more times in the past year?: No  Any fall with injury in the past year?: No  click delete button to remove this line now  Cognitive Screenin) Repeat 3 items (Banana, Sunrise, Chair)  2) Clock draw: NORMAL  3) 3 item recall: Recalls 3 objects  Results: 3 items recalled: COGNITIVE IMPAIRMENT LESS LIKELY    Mini-CogTM Copyright JAMISON Martines. Licensed by the author for use in [facility]; reprinted with permission ([email or web address].[email or web address]). All rights reserved.      Do you have sleep apnea, excessive snoring or daytime drowsiness? : yes    Social History     Tobacco Use    Smoking status: Every Day     Packs/day: 0.50     Years: 49.00     Pack years: 24.50     " Types: Cigarettes    Smokeless tobacco: Never    Tobacco comments:     Reports on 1/26/23 smoking 1/2 PPD   Substance Use Topics    Alcohol use: Not Currently     Comment: Last use 11/11/22              No data to display              Do you have a current opioid prescription? No  Do you use any other controlled substances or medications that are not prescribed by a provider? None    Current providers sharing in care for this patient include:   Patient Care Team:  Yanique Cali MD as PCP - General (Family Practice)  Ana Cristina Rubio MD as Assigned Neuroscience Provider  Ana Cristina Romeo MD as Fellow (Gastroenterology)  Melissa Covington DO as Assigned PCP  Vinay Paige MD as Assigned Pulmonology Provider  Keyon Kapoor DPM as Assigned Surgical Provider  Melissa Covington DO as Assigned Pain Medication Provider    The following health maintenance items are reviewed in Epic and correct as of today:  Health Maintenance   Topic Date Due    DIABETIC FOOT EXAM  Never done    COPD ACTION PLAN  Never done    DEPRESSION ACTION PLAN  Never done    COLORECTAL CANCER SCREENING  Never done    HEPATITIS A IMMUNIZATION (1 of 2 - Risk 2-dose series) Never done    LUNG CANCER SCREENING  01/28/2021    MEDICARE ANNUAL WELLNESS VISIT  01/31/2022    EYE EXAM  06/07/2022    NICOTINE/TOBACCO CESSATION COUNSELING Q 1 YR  01/27/2023    COVID-19 Vaccine (6 - Pfizer series) 03/02/2023    ANNUAL REVIEW OF HM ORDERS  11/02/2023    A1C  12/13/2023    PHQ-9  03/13/2024    URINE DRUG SCREEN  03/20/2024    BMP  09/13/2024    LIPID  09/13/2024    MICROALBUMIN  09/13/2024    FALL RISK ASSESSMENT  09/13/2024    MAMMO SCREENING  10/06/2024    ADVANCE CARE PLANNING  09/29/2025    DTAP/TDAP/TD IMMUNIZATION (3 - Td or Tdap) 12/03/2030    DEXA  10/06/2037    SPIROMETRY  Completed    HEPATITIS C SCREENING  Completed    INFLUENZA VACCINE  Completed    Pneumococcal Vaccine: 65+ Years  Completed    ZOSTER  "IMMUNIZATION  Completed    HEPATITIS B IMMUNIZATION  Completed    IPV IMMUNIZATION  Aged Out    HPV IMMUNIZATION  Aged Out    MENINGITIS IMMUNIZATION  Aged Out    PAP  Discontinued       Patient has been advised of split billing requirements and indicates understanding: Yes    Appropriate preventive services were discussed with this patient, including applicable screening as appropriate for fall prevention, nutrition, physical activity, Tobacco-use cessation, weight loss and cognition.  Checklist reviewing preventive services available has been given to the patient.        Review of Systems   Constitutional:  Positive for fatigue. Negative for chills, fever and unexpected weight change.   HENT: Negative.     Eyes:  Negative for visual disturbance.   Respiratory:  Negative for cough and shortness of breath.    Cardiovascular:  Negative for chest pain and peripheral edema.   Gastrointestinal:  Negative for abdominal pain, constipation and diarrhea.   Endocrine: Negative for polydipsia and polyuria.   Genitourinary: Negative.    Musculoskeletal:  Positive for arthralgias.        Bilateral foot pain  Hip pain      Skin: Negative.    Allergic/Immunologic: Negative.    Neurological: Negative.    Hematological:  Does not bruise/bleed easily.   Psychiatric/Behavioral:  Positive for mood changes. The patient is nervous/anxious.    All other systems reviewed and are negative.           Objective    /76 (BP Location: Right arm, Patient Position: Sitting, Cuff Size: Adult Large)   Pulse 61   Temp 97.7  F (36.5  C) (Temporal)   Resp 20   Ht 1.6 m (5' 2.99\")   Wt 118.4 kg (261 lb)   LMP  (LMP Unknown)   BMI 46.25 kg/m    Body mass index is 46.25 kg/m .  Physical Exam  Vitals reviewed.   Constitutional:       General: She is not in acute distress.     Appearance: Normal appearance.   HENT:      Head: Normocephalic.      Right Ear: Tympanic membrane, ear canal and external ear normal.      Left Ear: Tympanic membrane, " ear canal and external ear normal.      Nose: Nose normal.      Mouth/Throat:      Mouth: Mucous membranes are moist.      Pharynx: No posterior oropharyngeal erythema.   Eyes:      Extraocular Movements: Extraocular movements intact.      Conjunctiva/sclera: Conjunctivae normal.      Pupils: Pupils are equal, round, and reactive to light.   Cardiovascular:      Rate and Rhythm: Normal rate and regular rhythm.      Pulses: Normal pulses.      Heart sounds: Normal heart sounds. No murmur heard.  Pulmonary:      Effort: Pulmonary effort is normal.      Breath sounds: Normal breath sounds.   Abdominal:      Palpations: Abdomen is soft. There is no mass.      Tenderness: There is no abdominal tenderness. There is no guarding or rebound.   Musculoskeletal:         General: No deformity. Normal range of motion.      Cervical back: Normal range of motion and neck supple.   Lymphadenopathy:      Cervical: No cervical adenopathy.   Skin:     General: Skin is warm and dry.   Neurological:      General: No focal deficit present.      Mental Status: She is alert.   Psychiatric:         Mood and Affect: Mood normal.         Behavior: Behavior normal.            Results for orders placed or performed in visit on 09/13/23   Lipid panel reflex to direct LDL Non-fasting     Status: Abnormal   Result Value Ref Range    Cholesterol 190 <200 mg/dL    Triglycerides 117 <150 mg/dL    Direct Measure HDL 50 >=50 mg/dL    LDL Cholesterol Calculated 117 (H) <=100 mg/dL    Non HDL Cholesterol 140 (H) <130 mg/dL    Narrative    Cholesterol  Desirable:  <200 mg/dL    Triglycerides  Normal:  Less than 150 mg/dL  Borderline High:  150-199 mg/dL  High:  200-499 mg/dL  Very High:  Greater than or equal to 500 mg/dL    Direct Measure HDL  Female:  Greater than or equal to 50 mg/dL   Male:  Greater than or equal to 40 mg/dL    LDL Cholesterol  Desirable:  <100mg/dL  Above Desirable:  100-129 mg/dL   Borderline High:  130-159 mg/dL   High:  160-189  mg/dL   Very High:  >= 190 mg/dL    Non HDL Cholesterol  Desirable:  130 mg/dL  Above Desirable:  130-159 mg/dL  Borderline High:  160-189 mg/dL  High:  190-219 mg/dL  Very High:  Greater than or equal to 220 mg/dL   HEMOGLOBIN A1C     Status: Abnormal   Result Value Ref Range    Hemoglobin A1C 6.0 (H) 0.0 - 5.6 %   Vitamin D Deficiency     Status: Normal   Result Value Ref Range    Vitamin D, Total (25-Hydroxy) 21 20 - 75 ug/L    Narrative    Season, race, dietary intake, and treatment affect the concentration of 25-hydroxy-Vitamin D. Values may decrease during winter months and increase during summer months. Values 20-29 ug/L may indicate Vitamin D insufficiency and values <20 ug/L may indicate Vitamin D deficiency.    Vitamin D determination is routinely performed by an immunoassay specific for 25 hydroxyvitamin D3.  If an individual is on vitamin D2(ergocalciferol) supplementation, please specify 25 OH vitamin D2 and D3 level determination by LCMSMS test VITD23.     Vitamin B12     Status: Normal   Result Value Ref Range    Vitamin B12 359 232 - 1,245 pg/mL   Comprehensive metabolic panel (BMP + Alb, Alk Phos, ALT, AST, Total. Bili, TP)     Status: Abnormal   Result Value Ref Range    Sodium 141 136 - 145 mmol/L    Potassium 4.3 3.4 - 5.3 mmol/L    Chloride 101 98 - 107 mmol/L    Carbon Dioxide (CO2) 26 22 - 29 mmol/L    Anion Gap 14 7 - 15 mmol/L    Urea Nitrogen 7.8 (L) 8.0 - 23.0 mg/dL    Creatinine 0.60 0.51 - 0.95 mg/dL    Calcium 9.8 8.8 - 10.2 mg/dL    Glucose 99 70 - 99 mg/dL    Alkaline Phosphatase 72 35 - 104 U/L    AST 19 0 - 45 U/L    ALT 13 0 - 50 U/L    Protein Total 7.2 6.4 - 8.3 g/dL    Albumin 4.3 3.5 - 5.2 g/dL    Bilirubin Total 0.5 <=1.2 mg/dL    GFR Estimate >90 >60 mL/min/1.73m2   Uric acid     Status: Normal   Result Value Ref Range    Uric Acid 5.5 2.4 - 5.7 mg/dL   CBC with platelets and differential     Status: None   Result Value Ref Range    WBC Count 7.0 4.0 - 11.0 10e3/uL    RBC  Count 4.99 3.80 - 5.20 10e6/uL    Hemoglobin 14.6 11.7 - 15.7 g/dL    Hematocrit 44.8 35.0 - 47.0 %    MCV 90 78 - 100 fL    MCH 29.3 26.5 - 33.0 pg    MCHC 32.6 31.5 - 36.5 g/dL    RDW 13.6 10.0 - 15.0 %    Platelet Count 198 150 - 450 10e3/uL    % Neutrophils 50 %    % Lymphocytes 39 %    % Monocytes 8 %    % Eosinophils 3 %    % Basophils 0 %    % Immature Granulocytes 0 %    Absolute Neutrophils 3.5 1.6 - 8.3 10e3/uL    Absolute Lymphocytes 2.7 0.8 - 5.3 10e3/uL    Absolute Monocytes 0.6 0.0 - 1.3 10e3/uL    Absolute Eosinophils 0.2 0.0 - 0.7 10e3/uL    Absolute Basophils 0.0 0.0 - 0.2 10e3/uL    Absolute Immature Granulocytes 0.0 <=0.4 10e3/uL   Albumin Random Urine Quantitative with Creat Ratio     Status: None   Result Value Ref Range    Creatinine Urine mg/dL 95.3 mg/dL    Albumin Urine mg/L <12.0 mg/L    Albumin Urine mg/g Cr     UA with Microscopic - lab collect     Status: Normal   Result Value Ref Range    Color Urine Yellow Colorless, Straw, Light Yellow, Yellow    Appearance Urine Clear Clear    Glucose Urine Negative Negative mg/dL    Bilirubin Urine Negative Negative    Ketones Urine Negative Negative mg/dL    Specific Gravity Urine 1.020 1.005 - 1.030    Blood Urine Negative Negative    pH Urine 6.0 5.0 - 8.0    Protein Albumin Urine Negative Negative mg/dL    Urobilinogen Urine 1.0 0.2, 1.0 E.U./dL    Nitrite Urine Negative Negative    Leukocyte Esterase Urine Negative Negative   Urine Microscopic Exam     Status: Abnormal   Result Value Ref Range    Bacteria Urine Few (A) None Seen /HPF    RBC Urine 0-2 0-2 /HPF /HPF    WBC Urine 0-5 0-5 /HPF /HPF    Squamous Epithelials Urine Few (A) None Seen /LPF   Wet prep - Clinic Collect     Status: Abnormal    Specimen: Vagina; Swab   Result Value Ref Range    Trichomonas Absent Absent    Yeast Absent Absent    Clue Cells Absent Absent    WBCs/high power field 1+ (A) None   Chlamydia trachomatis/Neisseria gonorrhoeae by PCR - Clinic Collect     Status:  Normal    Specimen: Endocervical/cervical; Swab   Result Value Ref Range    Chlamydia Trachomatis Negative Negative    Neisseria gonorrhoeae Negative Negative   Urine Culture Aerobic Bacterial - lab collect     Status: None    Specimen: Urine, Midstream   Result Value Ref Range    Culture 10,000-50,000 CFU/mL Mixture of urogenital devante    CBC with Platelets & Differential     Status: None    Narrative    The following orders were created for panel order CBC with Platelets & Differential.  Procedure                               Abnormality         Status                     ---------                               -----------         ------                     CBC with platelets and d...[[id number]]                      Final result                 Please view results for these tests on the individual orders.               Prior to immunization administration, verified patients identity using patient s name and date of birth. Please see Immunization Activity for additional information.     Screening Questionnaire for Adult Immunization    Are you sick today?   No   Do you have allergies to medications, food, a vaccine component or latex?   Yes   Have you ever had a serious reaction after receiving a vaccination?   No   Do you have a long-term health problem with heart, lung, kidney, or metabolic disease (e.g., diabetes), asthma, a blood disorder, no spleen, complement component deficiency, a cochlear implant, or a spinal fluid leak?  Are you on long-term aspirin therapy?   No   Do you have cancer, leukemia, HIV/AIDS, or any other immune system problem?   No   Do you have a parent, brother, or sister with an immune system problem?   No   In the past 3 months, have you taken medications that affect  your immune system, such as prednisone, other steroids, or anticancer drugs; drugs for the treatment of rheumatoid arthritis, Crohn s disease, or psoriasis; or have you had radiation treatments?   No   Have you had a  seizure, or a brain or other nervous system problem?   No   During the past year, have you received a transfusion of blood or blood    products, or been given immune (gamma) globulin or antiviral drug?   No   For women: Are you pregnant or is there a chance you could become       pregnant during the next month?   No   Have you received any vaccinations in the past 4 weeks?   No     Immunization questionnaire was positive for at least one answer.  Notified Dr. Wade.      Patient instructed to remain in clinic for 15 minutes afterwards, and to report any adverse reactions.     Screening performed by Bernadine Garcia CMA on 9/13/2023 at 11:43 AM.

## 2023-09-13 NOTE — LETTER
September 14, 2023      Patsy Santamaria  [address]   [place] [address]        Dear ,    We are writing to inform you of your test results.    Labs are reassuring .      Resulted Orders   Wet prep - Clinic Collect   Result Value Ref Range    Trichomonas Absent Absent    Yeast Absent Absent    Clue Cells Absent Absent    WBCs/high power field 1+ (A) None   Lipid panel reflex to direct LDL Non-fasting   Result Value Ref Range    Cholesterol 190 <200 mg/dL    Triglycerides 117 <150 mg/dL    Direct Measure HDL 50 >=50 mg/dL    LDL Cholesterol Calculated 117 (H) <=100 mg/dL    Non HDL Cholesterol 140 (H) <130 mg/dL    Narrative    Cholesterol  Desirable:  <200 mg/dL    Triglycerides  Normal:  Less than 150 mg/dL  Borderline High:  150-199 mg/dL  High:  200-499 mg/dL  Very High:  Greater than or equal to 500 mg/dL    Direct Measure HDL  Female:  Greater than or equal to 50 mg/dL   Male:  Greater than or equal to 40 mg/dL    LDL Cholesterol  Desirable:  <100mg/dL  Above Desirable:  100-129 mg/dL   Borderline High:  130-159 mg/dL   High:  160-189 mg/dL   Very High:  >= 190 mg/dL    Non HDL Cholesterol  Desirable:  130 mg/dL  Above Desirable:  130-159 mg/dL  Borderline High:  160-189 mg/dL  High:  190-219 mg/dL  Very High:  Greater than or equal to 220 mg/dL   HEMOGLOBIN A1C   Result Value Ref Range    Hemoglobin A1C 6.0 (H) 0.0 - 5.6 %      Comment:      Normal <5.7%   Prediabetes 5.7-6.4%    Diabetes 6.5% or higher     Note: Adopted from ADA consensus guidelines.   Vitamin D Deficiency   Result Value Ref Range    Vitamin D, Total (25-Hydroxy) 21 20 - 75 ug/L    Narrative    Season, race, dietary intake, and treatment affect the concentration of 25-hydroxy-Vitamin D. Values may decrease during winter months and increase during summer months. Values 20-29 ug/L may indicate Vitamin D insufficiency and values <20 ug/L may indicate Vitamin D deficiency.    Vitamin D determination is routinely performed  by an immunoassay specific for 25 hydroxyvitamin D3.  If an individual is on vitamin D2(ergocalciferol) supplementation, please specify 25 OH vitamin D2 and D3 level determination by LCMSMS test VITD23.     Vitamin B12   Result Value Ref Range    Vitamin B12 359 232 - 1,245 pg/mL   Comprehensive metabolic panel (BMP + Alb, Alk Phos, ALT, AST, Total. Bili, TP)   Result Value Ref Range    Sodium 141 136 - 145 mmol/L    Potassium 4.3 3.4 - 5.3 mmol/L    Chloride 101 98 - 107 mmol/L    Carbon Dioxide (CO2) 26 22 - 29 mmol/L    Anion Gap 14 7 - 15 mmol/L    Urea Nitrogen 7.8 (L) 8.0 - 23.0 mg/dL    Creatinine 0.60 0.51 - 0.95 mg/dL    Calcium 9.8 8.8 - 10.2 mg/dL    Glucose 99 70 - 99 mg/dL    Alkaline Phosphatase 72 35 - 104 U/L    AST 19 0 - 45 U/L      Comment:      Reference intervals for this test were updated on 6/12/2023 to more accurately reflect our healthy population. There may be differences in the flagging of prior results with similar values performed with this method. Interpretation of those prior results can be made in the context of the updated reference intervals.    ALT 13 0 - 50 U/L      Comment:      Reference intervals for this test were updated on 6/12/2023 to more accurately reflect our healthy population. There may be differences in the flagging of prior results with similar values performed with this method. Interpretation of those prior results can be made in the context of the updated reference intervals.      Protein Total 7.2 6.4 - 8.3 g/dL    Albumin 4.3 3.5 - 5.2 g/dL    Bilirubin Total 0.5 <=1.2 mg/dL    GFR Estimate >90 >60 mL/min/1.73m2   Uric acid   Result Value Ref Range    Uric Acid 5.5 2.4 - 5.7 mg/dL   CBC with platelets and differential   Result Value Ref Range    WBC Count 7.0 4.0 - 11.0 10e3/uL    RBC Count 4.99 3.80 - 5.20 10e6/uL    Hemoglobin 14.6 11.7 - 15.7 g/dL    Hematocrit 44.8 35.0 - 47.0 %    MCV 90 78 - 100 fL    MCH 29.3 26.5 - 33.0 pg    MCHC 32.6 31.5 - 36.5 g/dL     RDW 13.6 10.0 - 15.0 %    Platelet Count 198 150 - 450 10e3/uL    % Neutrophils 50 %    % Lymphocytes 39 %    % Monocytes 8 %    % Eosinophils 3 %    % Basophils 0 %    % Immature Granulocytes 0 %    Absolute Neutrophils 3.5 1.6 - 8.3 10e3/uL    Absolute Lymphocytes 2.7 0.8 - 5.3 10e3/uL    Absolute Monocytes 0.6 0.0 - 1.3 10e3/uL    Absolute Eosinophils 0.2 0.0 - 0.7 10e3/uL    Absolute Basophils 0.0 0.0 - 0.2 10e3/uL    Absolute Immature Granulocytes 0.0 <=0.4 10e3/uL   Albumin Random Urine Quantitative with Creat Ratio   Result Value Ref Range    Creatinine Urine mg/dL 95.3 mg/dL      Comment:      The reference ranges have not been established in urine creatinine. The results should be integrated into the clinical context for interpretation.    Albumin Urine mg/L <12.0 mg/L      Comment:      The reference ranges have not been established in urine albumin. The results should be integrated into the clinical context for interpretation.    Albumin Urine mg/g Cr        Comment:      Unable to calculate, urine albumin and/or urine creatinine is outside detectable limits.  Microalbuminuria is defined as an albumin:creatinine ratio of 17 to 299 for males and 25 to 299 for females. A ratio of albumin:creatinine of 300 or higher is indicative of overt proteinuria.  Due to biologic variability, positive results should be confirmed by a second, first-morning random or 24-hour timed urine specimen. If there is discrepancy, a third specimen is recommended. When 2 out of 3 results are in the microalbuminuria range, this is evidence for incipient nephropathy and warrants increased efforts at glucose control, blood pressure control, and institution of therapy with an angiotensin-converting-enzyme (ACE) inhibitor (if the patient can tolerate it).     UA with Microscopic - lab collect   Result Value Ref Range    Color Urine Yellow Colorless, Straw, Light Yellow, Yellow    Appearance Urine Clear Clear    Glucose Urine Negative  Negative mg/dL    Bilirubin Urine Negative Negative    Ketones Urine Negative Negative mg/dL    Specific Gravity Urine 1.020 1.005 - 1.030    Blood Urine Negative Negative    pH Urine 6.0 5.0 - 8.0    Protein Albumin Urine Negative Negative mg/dL    Urobilinogen Urine 1.0 0.2, 1.0 E.U./dL    Nitrite Urine Negative Negative    Leukocyte Esterase Urine Negative Negative   Urine Microscopic Exam   Result Value Ref Range    Bacteria Urine Few (A) None Seen /HPF    RBC Urine 0-2 0-2 /HPF /HPF    WBC Urine 0-5 0-5 /HPF /HPF    Squamous Epithelials Urine Few (A) None Seen /LPF       If you have any questions or concerns, please call the clinic at the number listed above.       Sincerely,      Yanique Cali MD

## 2023-09-14 ENCOUNTER — OFFICE VISIT (OUTPATIENT)
Dept: PODIATRY | Facility: CLINIC | Age: 68
End: 2023-09-14
Attending: PHYSICIAN ASSISTANT
Payer: COMMERCIAL

## 2023-09-14 VITALS
DIASTOLIC BLOOD PRESSURE: 84 MMHG | HEART RATE: 66 BPM | BODY MASS INDEX: 46.25 KG/M2 | HEIGHT: 63 IN | WEIGHT: 261 LBS | OXYGEN SATURATION: 97 % | SYSTOLIC BLOOD PRESSURE: 123 MMHG

## 2023-09-14 DIAGNOSIS — M21.6X1 PRONATION DEFORMITY OF BOTH FEET: Primary | ICD-10-CM

## 2023-09-14 DIAGNOSIS — M21.6X2 PRONATION DEFORMITY OF BOTH FEET: Primary | ICD-10-CM

## 2023-09-14 LAB
C TRACH DNA SPEC QL PROBE+SIG AMP: NEGATIVE
N GONORRHOEA DNA SPEC QL NAA+PROBE: NEGATIVE

## 2023-09-14 PROCEDURE — 99213 OFFICE O/P EST LOW 20 MIN: CPT | Performed by: PODIATRIST

## 2023-09-14 ASSESSMENT — PAIN SCALES - GENERAL: PAINLEVEL: SEVERE PAIN (6)

## 2023-09-14 NOTE — PROGRESS NOTES
FOOT AND ANKLE SURGERY/PODIATRY Progress Note        ASSESSMENT:   Pronation deformity bilateral feet    HPI: Patsy Santamaria was seen again today complaining of bilateral foot pain.  The patient stated that the pain is located on the bottom of both feet and also along the lateral aspect of the left foot.  She stated that she has had this pain for 2 to 3 weeks.  The pain is aggravated with weightbearing and ambulation.  She has no pain while resting.  She denies any trauma to her feet.  The pain is moderate to severe.  She does have some mild pain with nonweightbearing if she has been on her feet for several hours.  She describes it as an aching type pain.    Past Medical History:   Diagnosis Date    Acute alcoholic intoxication (H) 9/10/2021    Alcohol use disorder     Alcohol withdrawal seizure without complication (H) 2016    Alcoholic cirrhosis (H)     Anxiety     Chronic alcohol dependence, continuous (H) 2018    inpatient 2019, sober since then    Chronic Lower Extremity Edema     Chronic reflux esophagitis     COPD (chronic obstructive pulmonary disease) (H)     Depression     Dermatitis     Diverticulitis of colon 2022    Fibroid uterus     Ganglion     right foot    GERD (gastroesophageal reflux disease)     H. pylori infection     Melanoma (H) 2019    left upper arm    Menopause     age 52    Obesity (BMI 35.0-39.9 without comorbidity)     Osteoarthritis     Bilateral Knees    Peripheral neuropathy     Seizure (H) 2016    during alcohol withdrawal    Sleep apnea     mild, doesn't tolerate pap therapy        Past Surgical History:   Procedure Laterality Date    APPENDECTOMY      Childhood    ARTHROSCOPY KNEE Right     BIOPSY SKIN (LOCATION) Left 2019    left upper arm     SECTION       EXCISION LESION/TENDON-SHEATH/CAPSULE, FOOT      Description: Excision Of Cyst Of Tendon Sheath Of Foot;  Proc Date: 10/28/2011;  Comments: [facility]     KNEE SCOPE,  "DIAGNOSTIC      Description: Arthroscopy Knee Right;  Proc Date: 10/11/2005;  Comments: for right knee patella subluxation with lateral retinacular release; subpatellar chondroplasty    HC LATERAL RETINACULAR RELEASE OPEN      Description: Knee Lateral Retinacular Release;  Proc Date: 10/11/2005;    HEMORRHOIDECTOMY INTERNAL LIGATION      Description: Hemorrhoidectomy;  Recorded: 2008;    THUMB SURGERY      Removal of bone spurs    TONSILLECTOMY      ZZC  DELIVERY ONLY      Description:  Section;  Recorded: 2008;    ZZC LIGATE FALLOPIAN TUBE      Description: Tubal Ligation;  Recorded: 2008;       Allergies   Allergen Reactions    Bupropion Diarrhea    Codeine Hives, Itching, Nausea and Rash    Nickel Unknown    Oxycodone Nausea and Vomiting    Percocet [Oxycodone-Acetaminophen]      Patient reports \"vomiting,grossly ill two weeks ago\"    Topiramate Unknown    Diclofenac Nausea     Tolerates the topical gel    Furosemide Rash    Hydrochlorothiazide Rash     phototoxicity - med was d/lora        Sulfa Antibiotics Itching and Rash    Sulfasalazine Rash         Current Outpatient Medications:     albuterol (PROAIR HFA/PROVENTIL HFA/VENTOLIN HFA) 108 (90 Base) MCG/ACT inhaler, Inhale 2 puffs into the lungs every 4 hours as needed for shortness of breath / dyspnea or wheezing, Disp: 18 g, Rfl: 3    budesonide-formoterol (SYMBICORT) 160-4.5 MCG/ACT Inhaler, Inhale 2 puffs into the lungs 2 times daily, Disp: 10.2 g, Rfl: 11    bumetanide (BUMEX) 1 MG tablet, Take 1 tablet (1 mg) by mouth daily, Disp: 90 tablet, Rfl: 0    DULoxetine (CYMBALTA) 60 MG capsule, Take 1 capsule (60 mg) by mouth daily, Disp: 90 capsule, Rfl: 1    hydrOXYzine (ATARAX) 50 MG tablet, Take 0.5-1 tablets (25-50 mg) by mouth 3 times daily as needed for anxiety, Disp: 90 tablet, Rfl: 2    ipratropium - albuterol 0.5 mg/2.5 mg/3 mL (DUONEB) 0.5-2.5 (3) MG/3ML neb solution, Take 1 vial (3 mLs) by nebulization every 4 hours " "as needed for shortness of breath / dyspnea or wheezing, Disp: 15 mL, Rfl: 1    omeprazole (PRILOSEC) 20 MG DR capsule, Take 20 mg by mouth daily as needed, Disp: , Rfl:     pregabalin (LYRICA) 25 MG capsule, Take 1 cap at bedtime x 3 days, take 1 cap bid x 3 days, then take 1 cap tid, Disp: 90 capsule, Rfl: 1    Family History   Problem Relation Age of Onset    CABG Mother     Heart Disease Mother     Aneurysm Father          of ruptured aneurysm at 46    Heart Disease Father     Factor V Leiden deficiency Father     Factor V Leiden deficiency Sister     Anesthesia Reaction No family hx of        Social History     Socioeconomic History    Marital status: Single     Spouse name: Not on file    Number of children: Not on file    Years of education: Not on file    Highest education level: Not on file   Occupational History    Not on file   Tobacco Use    Smoking status: Every Day     Packs/day: 0.50     Years: 49.00     Pack years: 24.50     Types: Cigarettes    Smokeless tobacco: Never    Tobacco comments:     Reports on 23 smoking 1/2 PPD   Vaping Use    Vaping Use: Never used   Substance and Sexual Activity    Alcohol use: Not Currently     Comment: Last use 22    Drug use: No     Comment: Denies    Sexual activity: Yes     Partners: Male     Birth control/protection: None   Other Topics Concern    Not on file   Social History Narrative    Not on file     Social Determinants of Health     Financial Resource Strain: Not on file   Food Insecurity: Not on file   Transportation Needs: Not on file   Physical Activity: Not on file   Stress: Not on file   Social Connections: Not on file   Intimate Partner Violence: Not on file   Housing Stability: Not on file       10 point Review of Systems is negative      Ht 1.6 m (5' 2.99\")   Wt 118.4 kg (261 lb)   LMP  (LMP Unknown)   BMI 46.25 kg/m      BMI= Body mass index is 46.25 kg/m .    OBJECTIVE:  General appearance: Patient is alert and fully cooperative " with history & exam.  No sign of distress is noted during the visit.  Vascular: Dorsalis pedis and posterior tibial pulses are palpable. There is no pedal hair growth bilaterally.  CFT < 3 sec from anterior tibial surface to distal digits bilaterally. There is no appreciable edema noted.  Dermatologic: Turgor and texture are within normal limits. No coloration or temperature changes. No primary or secondary lesions noted.  Neurologic: All epicritic and proprioceptive sensations are grossly intact bilaterally.  Musculoskeletal: All active and passive ankle, subtalar, midtarsal, and 1st MPJ range of motion are grossly intact without pain or crepitus, with the exception of none. Manual muscle strength is within normal limits bilaterally. All dorsiflexors, plantarflexors, invertors, evertors are intact bilaterally. Tenderness present to the plantar aspect of both feet on palpation.  No tenderness to bilateral feet or ankles with range of motion.  Severe flattening of the medial longitudinal arch noted bilaterally.  Calf is soft/non-tender without warmth/induration    Imaging:         No results found.         TREATMENT:  I have recommended a new pair of orthotics.  The patient is to return to the clinic as needed.        Keyon Kapoor; MELANIE  [facility] Foot & Ankle Surgery/Podiatry

## 2023-09-14 NOTE — PATIENT INSTRUCTIONS
What are Prescription Custom Orthotics?  Custom orthotics are specially-made devices designed to support and comfort your feet. Prescription orthotics are crafted for you and no one else. They match the contours of your feet precisely and are designed for the way you move. Orthotics are only manufactured after a podiatrist has conducted a complete evaluation of your feet, ankles, and legs, so the orthotic can accommodate your unique foot structure and pathology.  Prescription orthotics are divided into two categories:  Functional orthotics are designed to control abnormal motion. They may be used to treat foot pain caused by abnormal motion; they can also be used to treat injuries such as shin splints or tendinitis. Functional orthotics are usually crafted of a semi-rigid material such as plastic or graphite.  Accommodative orthotics are softer and meant to provide additional cushioning and support. They can be used to treat diabetic foot ulcers, painful calluses on the bottom of the foot, and other uncomfortable conditions.  Podiatrists use orthotics to treat foot problems such as plantar fasciitis, bursitis, tendinitis, diabetic foot ulcers, and foot, ankle, and heel pain. Clinical research studies have shown that podiatrist-prescribed foot orthotics decrease foot pain and improve function.  Orthotics typically cost more than shoe inserts purchased in a retail store, but the additional cost is usually well worth it. Unlike shoe inserts, orthotics are molded to fit each individual foot, so you can be sure that your orthotics fit and do what they're supposed to do. Prescription orthotics are also made of top-notch materials and last many years when cared for properly. Insurance often helps pay for prescription orthotics.  What are Shoe Inserts?   You've seen them at the grocery store and at the mall. You've probably even seen them on TV and online. Shoe inserts are any kind of non-prescription foot support designed  to be worn inside a shoe. Pre-packaged, mass produced, arch supports are shoe inserts. So are the  custom-made  insoles and foot supports that you can order online or at retail stores. Unless the device has been prescribed by a doctor and crafted for your specific foot, it's a shoe insert, not a custom orthotic device--despite what the ads might say.  Shoe inserts can be very helpful for a variety of foot ailments, including flat arches and foot and leg pain. They can cushion your feet, provide comfort, and support your arches, but they can't correct biomechanical foot problems or cure long-standing foot issues.  The most common types of shoe inserts are:  Arch supports: Some people have high arches. Others have low arches or flat feet. Arch supports generally have a  bumped-up  appearance and are designed to support the foot's natural arch.   Insoles: Insoles slip into your shoe to provide extra cushioning and support. Insoles are often made of gel, foam, or plastic.   Heel liners: Heel liners, sometimes called heel pads or heel cups, provide extra cushioning in the heel region. They may be especially useful for patients who have foot pain caused by age-related thinning of the heels' natural fat pads.   Foot cushions: Do your shoes rub against your heel or your toes? Foot cushions come in many different shapes and sizes and can be used as a barrier between you and your shoe.  Choosing an Over-the-Counter Shoe Insert  Selecting a shoe insert from the wide variety of devices on the market can be overwhelming. Here are some podiatrist-tested tips to help you find the insert that best meets your needs:  Consider your health. Do you have diabetes? Problems with circulation? An over-the-counter insert may not be your best bet. Diabetes and poor circulation increase your risk of foot ulcers and infections, so schedule an appointment with a podiatrist. He or she can help you select a solution that won't cause additional  health problems.   Think about the purpose. Are you planning to run a marathon, or do you just need a little arch support in your work shoes? Look for a product that fits your planned level of activity.   Bring your shoes. For the insert to be effective, it has to fit into your shoes. So bring your sneakers, dress shoes, or work boots--whatever you plan to wear with your insert. Look for an insert that will fit the contours of your shoe.   Try them on. If all possible, slip the insert into your shoe and try it out. Walk around a little. How does it feel? Don't assume that feelings of pressure will go away with continued wear. (If you can't try the inserts at the store, ask about the store's return policy and hold on to your receipt.)    Please call one of the [facility] locations below to schedule an appointment. If you received a prescription please bring it with you to your appointment. Some locations are limited to what they carry.    Office Locations    [facility]  [address] [place] [address]  Home Medical Equipment, [address]   Phone: [phone]   Orthotics and Prosthetics, [address]   Phone: [phone]    [facility]   Home Medical Equipment   [address], [place] [address]  Phone :[phone]  Orthotics and Prosthetics   [address], [address], [facility] [address]  Phone:[phone]          [facility]  [address]   [place] [address]   Phone: [phone]    [facility]  [address]  [place] [address]  Phone: [phone]    [facility]   [address]  [place] [address]  Phone: [phone]    [facility]  [address]  [place] [address]  Phone:  [phone]    [facility]  [address]  [place] [address]  Phone: [phone]    [place]   [address] [place] [address]   Phone: [phone]    WEARING YOUR CUSTOM FOOT ORTHOTICS   Most insurance plans cover one pair of orthotics per year. You must check with your   insurance plan to see what your payment responsibility will be. Please call your   insurance company by calling the number on the back of your insurance card.   Orthotic's are non-refundable and non-returnable.   Orthotics are made of various designs. Some orthotics are covered with material that extends beyond your toes. If your orthotic is of this design, you will likely need to trim the toe end to get a proper fit. The insole from your shoe can be used as a template. Simply overlay the shoe insert on top of the custom orthotic. Align the heel end while tracing the length of the insert onto the custom orthotic. Use a large scissor to trim the toe end until you get a proper fit in the shoe.   The orthotic needs to be pushed as far back in the shoe as possible. The heel portion should not ride forward so as not to irritate your heel.   Orthotics are designed to work with socks. Excessive perspiration will shorten the life span of the orthotics. Remove the orthotic from the shoe frequently for proper drying.   The break-in period lasts for weeks. People new to orthotics will likely experience new aches and pains. The orthotic is forcing your foot into a new position. Arch, foot and leg muscle aches and fatigue are common during these weeks. Minor discomfort can be considered normal break in phenomenon. Start wearing your orthotic around your home your first day. Limited activity for one to two hours is recommended. You can increase one or two additional hours each day provided the aches and pains are subsiding. The degree of discomfort, fatigue and problems will dictate the speed of break  in. You may require multiple weeks to work up to full time use.   Do not continue wearing your orthotics if they are creating problems such as blisters or sores. Do not hesitate to call the clinic to speak with a nurse regarding orthotic   break in, fit, trimming, etc. You may also need to see the doctor if the orthotics are   simply not working out. Adjustments are sometimes made to improve orthotic   function.     Orthotics will only work in certain styles and types of shoes. Orthotics rarely work in dress shoes. Slip-ons, clogs, sandals and heels are particularly troublesome. Specially designed orthotics may be necessary for these types of shoes. Your custom orthotic was designed for activities that require appropriate walking or running shoes. Lace up athletic shoes, walking shoes or work boots should work appropriately. You may need a wider or longer shoe. Shoes with a removable  or insert work best. In general, you want to remove an insert from the shoe before placing the orthotic into the shoe. Shoes without a removable liner may not work as well.     When purchasing new shoes, bring your orthotics along to get a proper fit. Shop at stores that are familiar with orthotics.   Frequent washing of the orthotic may shorten the life span of the top cover. The top cover can be replaced but will generally last one to five years depending on use and foot perspiration.

## 2023-09-14 NOTE — Clinical Note
2023         RE: Patsy Santamaria  [address]  [place] [address]        Dear Colleague,    Thank you for referring your patient, Patsy Santamaria, to the [facility]. Please see a copy of my visit note below.    FOOT AND ANKLE SURGERY/PODIATRY Progress Note        ASSESSMENT:   ***    HPI: Patsy Santamaria was seen again today  ***.      Past Medical History:   Diagnosis Date     Acute alcoholic intoxication (H) 9/10/2021     Alcohol use disorder      Alcohol withdrawal seizure without complication (H) 2016     Alcoholic cirrhosis (H)      Anxiety      Chronic alcohol dependence, continuous (H) 2018    inpatient 2019, sober since then     Chronic Lower Extremity Edema      Chronic reflux esophagitis      COPD (chronic obstructive pulmonary disease) (H)      Depression      Dermatitis      Diverticulitis of colon 2022     Fibroid uterus      Ganglion     right foot     GERD (gastroesophageal reflux disease)      H. pylori infection      Melanoma (H) 2019    left upper arm     Menopause     age 52     Obesity (BMI 35.0-39.9 without comorbidity)      Osteoarthritis     Bilateral Knees     Peripheral neuropathy      Seizure (H) 2016    during alcohol withdrawal     Sleep apnea     mild, doesn't tolerate pap therapy        Past Surgical History:   Procedure Laterality Date     APPENDECTOMY      Childhood     ARTHROSCOPY KNEE Right      BIOPSY SKIN (LOCATION) Left 2019    left upper arm      SECTION        EXCISION LESION/TENDON-SHEATH/CAPSULE, FOOT      Description: Excision Of Cyst Of Tendon Sheath Of Foot;  Proc Date: 10/28/2011;  Comments: [facility]      KNEE SCOPE, DIAGNOSTIC      Description: Arthroscopy Knee Right;  Proc Date: 10/11/2005;  Comments: for right knee patella subluxation with lateral retinacular release; subpatellar chondroplasty      LATERAL RETINACULAR RELEASE OPEN      Description: Knee Lateral  "Retinacular Release;  Proc Date: 10/11/2005;     HEMORRHOIDECTOMY INTERNAL LIGATION      Description: Hemorrhoidectomy;  Recorded: 2008;     THUMB SURGERY      Removal of bone spurs     TONSILLECTOMY       ZZC  DELIVERY ONLY      Description:  Section;  Recorded: 2008;     ZZC LIGATE FALLOPIAN TUBE      Description: Tubal Ligation;  Recorded: 2008;       Allergies   Allergen Reactions     Bupropion Diarrhea     Codeine Hives, Itching, Nausea and Rash     Nickel Unknown     Oxycodone Nausea and Vomiting     Percocet [Oxycodone-Acetaminophen]      Patient reports \"vomiting,grossly ill two weeks ago\"     Topiramate Unknown     Diclofenac Nausea     Tolerates the topical gel     Furosemide Rash     Hydrochlorothiazide Rash     phototoxicity - med was d/lora         Sulfa Antibiotics Itching and Rash     Sulfasalazine Rash         Current Outpatient Medications:      albuterol (PROAIR HFA/PROVENTIL HFA/VENTOLIN HFA) 108 (90 Base) MCG/ACT inhaler, Inhale 2 puffs into the lungs every 4 hours as needed for shortness of breath / dyspnea or wheezing, Disp: 18 g, Rfl: 3     budesonide-formoterol (SYMBICORT) 160-4.5 MCG/ACT Inhaler, Inhale 2 puffs into the lungs 2 times daily, Disp: 10.2 g, Rfl: 11     bumetanide (BUMEX) 1 MG tablet, Take 1 tablet (1 mg) by mouth daily, Disp: 90 tablet, Rfl: 0     DULoxetine (CYMBALTA) 60 MG capsule, Take 1 capsule (60 mg) by mouth daily, Disp: 90 capsule, Rfl: 1     hydrOXYzine (ATARAX) 50 MG tablet, Take 0.5-1 tablets (25-50 mg) by mouth 3 times daily as needed for anxiety, Disp: 90 tablet, Rfl: 2     ipratropium - albuterol 0.5 mg/2.5 mg/3 mL (DUONEB) 0.5-2.5 (3) MG/3ML neb solution, Take 1 vial (3 mLs) by nebulization every 4 hours as needed for shortness of breath / dyspnea or wheezing, Disp: 15 mL, Rfl: 1     omeprazole (PRILOSEC) 20 MG DR capsule, Take 20 mg by mouth daily as needed, Disp: , Rfl:      pregabalin (LYRICA) 25 MG capsule, Take 1 cap at " "bedtime x 3 days, take 1 cap bid x 3 days, then take 1 cap tid, Disp: 90 capsule, Rfl: 1    Family History   Problem Relation Age of Onset     CABG Mother      Heart Disease Mother      Aneurysm Father          of ruptured aneurysm at 46     Heart Disease Father      Factor V Leiden deficiency Father      Factor V Leiden deficiency Sister      Anesthesia Reaction No family hx of        Social History     Socioeconomic History     Marital status: Single     Spouse name: Not on file     Number of children: Not on file     Years of education: Not on file     Highest education level: Not on file   Occupational History     Not on file   Tobacco Use     Smoking status: Every Day     Packs/day: 0.50     Years: 49.00     Pack years: 24.50     Types: Cigarettes     Smokeless tobacco: Never     Tobacco comments:     Reports on 23 smoking 1/2 PPD   Vaping Use     Vaping Use: Never used   Substance and Sexual Activity     Alcohol use: Not Currently     Comment: Last use 22     Drug use: No     Comment: Denies     Sexual activity: Yes     Partners: Male     Birth control/protection: None   Other Topics Concern     Not on file   Social History Narrative     Not on file     Social Determinants of Health     Financial Resource Strain: Not on file   Food Insecurity: Not on file   Transportation Needs: Not on file   Physical Activity: Not on file   Stress: Not on file   Social Connections: Not on file   Intimate Partner Violence: Not on file   Housing Stability: Not on file       10 point Review of Systems is negative except for ***.     Ht 1.6 m (5' 2.99\")   Wt 118.4 kg (261 lb)   LMP  (LMP Unknown)   BMI 46.25 kg/m      BMI= Body mass index is 46.25 kg/m .    OBJECTIVE:  General appearance: Patient is alert and fully cooperative with history & exam.  No sign of distress is noted during the visit.  Vascular: Dorsalis pedis and posterior tibial pulses are palpable. There is pedal hair growth ***.  CFT < 3 sec from " anterior tibial surface to distal digits ***. There is no appreciable edema noted.  Dermatologic: Turgor and texture are within normal limits. No coloration or temperature changes. No primary or secondary lesions noted.  Neurologic: All epicritic and proprioceptive sensations are grossly intact ***.  Musculoskeletal: All active and passive ankle, subtalar, midtarsal, and 1st MPJ range of motion are grossly intact without pain or crepitus, with the exception of ***. Manual muscle strength is ***. All dorsiflexors, plantarflexors, invertors, evertors are intact ***. Tenderness present to *** on palpation. Tenderness to *** with range of motion. Calf is soft/non-tender with/without warmth/induration    Imaging:     {Imaging order/result:53622}    No results found.         TREATMENT:  ***        Keyon Hills DPM  [facility] Foot & Ankle Surgery/Podiatry         Again, thank you for allowing me to participate in the care of your patient.        Sincerely,        Keyon Kapoor DPM

## 2023-09-15 LAB — BACTERIA UR CULT: NORMAL

## 2023-09-18 ENCOUNTER — DOCUMENTATION ONLY (OUTPATIENT)
Dept: BEHAVIORAL HEALTH | Facility: CLINIC | Age: 68
End: 2023-09-18
Payer: COMMERCIAL

## 2023-09-18 NOTE — PROGRESS NOTES
[facility]  Adult Substance Use Disorder Program  Treatment Plan     These services are provided by the facility for each patient/client according to the individual's treatment plan:  Individual and group counseling  Education  Transition services  Services to address any co-occurring mental illness  Service coordination    Criteria for discharge:  Patients/clients are discharged from the program following completion of the entire program including all phases of treatment or acceptance of other post-treatment referrals such as sober supportive living, or aftercare at other facilities.  Patients/clients may also be discharged for inappropriate behavior or substance use.    Favorable Discharge - Patients/clients have completed agreed upon treatment goals, understand their diagnosis and appear motivated for continued recovery.  Guarded Discharge - Patients/clients have demonstrated some understanding of their diagnosis and recovery process, and have completed some of their treatment goals.  This prognosis also includes patients/clients who have completed some treatment goals but have not made commitment to community support or follow through with referrals.  Unfavorable Discharge - Patients/clients have not completed agreed upon treatment goals due to their own choice, have limited understanding of their diagnosis, and have shown minimal or inconsistent behavior conducive to recovery.  Those patients/clients discharged due to behavioral problems will also be unfavorable discharges.    Adult ORAL Treatment Plan                                Patient Name: Patsy Santamaria  MRN: [id number]  : 1955  68 year old   Identified Gender: female  Admit Date: 23  Current Treatment Phase: 3  Last Updated: 23    SUBSTANCE USE DISORDER(S): 303.90 (F10.20) Alcohol Use Disorder Severe      Dimension 1: Acute Intoxication/Withdrawal Potential, Risk Level: 0    Needs identified from  "Comprehensive Assessment Summary: Patient reports last date of use as one time in April, but she can't remember the date. Patient reports no withdrawal symptoms. Patient was medically detoxed prior to that one day return to use. No withdrawal symptoms observed at intake.    Update: 9/18/23 - Patsy last date of use is 6/7/23. She is currently not experiencing any withdrawal symptoms and has no concern at this time.     Date of last use: 6/7/23  Patient currently receiving medication assisted therapy (MAT): No  Current or recent withdrawal symptoms: No    Focus area: Abstinence   Date Assigned: 11/21/2023  Clinical Goal: Abstain from alcohol and other non prescribed mood altering drugs.   Patient's Goal: \"Stop drinking\"  Goal needs to be completed prior to discharge? [x]  Treatment Strategies:   Schedule appointment with medical provider to explore referral for medication-assisted therapy.   Target Date: 12/19/23  Date Completed:      Dimension 2: Biomedical Conditions/Complications, Risk Level: 1    Needs identified from Comprehensive Assessment Summary: Patient re  ports the following medical conditions: COPD, overweight, uses CPAP, and general fatigue . Patient has primary care with Yanique Cali MD.      Update: 9/18/23 - Patsy has made significant strides in improving her physical health since starting this program. She continues to have challenges and continues to face them all sober. She has been making and attending all necessary medical appointments needed to address her concerns. She is currently going on walks to improve her health. Patsy is going back to physical therapy for her foot and hip. Arthrities in ankle     Focus area: Overall Physical Health  Clinical Goal: Improve physical health  Patient's Goal:  \"Improve physical health by going on walks\"  Goal needs to be completed prior to discharge? []  Methods/Strategies (must include amount and frequency):   1. Patsy will report any changes " "to physical health to counselor.   2. Patsy will attend all scheduled doctor's appointments.   Target Date: 12/19/23  Completion Date:     Focus area/need: Patient reports current tobacco use.  Clinical Goal: Patient to receive information about smoking cessation.   Patient's Goal:  \"to stop smoking by winter time\"   Goal needs to be completed prior to discharge? []  Methods/Strategies (must include amount and frequency):   1. Staff to provide patient with nicotine cessation information and help on how to quit use.   Target Date: 12/19/23  Completion Date:       Dimension 3: Emotional/Behavioral/Cognitive, Risk Level: 2    Needs identified from Comprehensive Assessment Summary: Patient reports a mental health diagnosis of PTSD, Anxiety, and Depression. Patient endorses current symptoms of hypervigilance and anxiety when out. She also notes unexplained fatigue. Patient would like to meet with mental health therapist to process her trauma. Patient's denies current SI or thoughts of self-harm.    Update: 9/18/23 - Patsy is attending one on one therapy with TAMERA Manley, [facility] to address her PTSD. She reports improved mental health symptoms and continues to work on her mental health.     Focus area: Diagnoses:  300.01 (F41.0) Panic Disorder  309.81 (F43.10) Posttraumatic Stress Disorder (includes Posttraumatic With dissociative symptoms  Additional diagnoses: F10.20 Alcohol Use Disorder, Severe  Provisional: 296.89 (F31.81) Bipolar II     Clinical Goal: Decrease affective distress associated with symptoms of PTSD and mood changes.  Patient's Goal:  \"To start therapy\"   Goal needs to be completed prior to discharge? [x]  Methods/Strategies (must include amount and frequency):   1. Patsy will meet with program therapist weekly to learn about symptoms and develop skills and practices to decrease affective distress related to those symptoms.  2. Patsy will establish a routine for good medication adherence.  3. Patsy will " "maintain good medication adherence. If experiencing ambivalence about medications, Patsy will talk to program staff and contact her prescriber (and tell IOP counselors) to discuss her experience/questions/concerns. (e.g., side effects, efficacy, concerns).  4. Patsy will begin to develop and practice coping skills and practices learned in [facility] to decrease distress related to MH symptoms. She will process effectiveness of these at least 3x/week in group, and weekly in individual sessions.  Target Date: 12/19/23  Completion Date:    Dimension 4: Readiness to Change, Risk Level: 1    Needs identified from Comprehensive Assessment Summary: Patient appears internally motivated for treatment at this time and reports being \"sick and tired of being sick and tired\" and also wanting to take care of her health as her main reasons for coming to treatment.  Patient appears to be in the contemplation stage of change at this time.    Update: 9/18/23 - Patsy's recent attendance has been very good. She is very engaged in group and is gaining insight into her disease. Her motivation for recovery continues.     Focus area: Motivation for Treatment  Clinical Goal: Increase internal motivation for a life of recovery.   Patient's Goal:  \"Countine on keeping positive and keeping sobriety intact\"  Goal needs to be completed prior to discharge? []  Methods/Strategies (must include amount and frequency):   1. Patsy will attend 1, 2-hour group per week. Days/Times: Tuesday at 9AM-11AM  2. Patsy will contact staff if unable to attend at [email or web address], [email or web address], and [email or web address]  3. Complete a cost/benefit Matrix. - COMPLETED  Target Date: 12/19/23  Completion Date:       Dimension 5: Relapse/Continued Use/Continued Problem Potential, Risk Level: 3    Needs identified from Comprehensive Assessment Summary: Patient reports that he has been to multiple treatments and has had limited periods of sobriety. " "Patient verbalizes that their primary triggers are related to her son bringing up the past. Patient is not engaged with the recovery community. She said two of her three sisters are supportive of her recovery. Patient has minimal insight into the relapse process or coping skills for emotional regulation. Patient's mental health symptoms increase the risk of relapse at this time.    Update: 9/18/23 - Patsy reported having thoughts through her week about drinking but knows that they are thought. Patsy reported when having thoughts she reminds herself that it \"wont just be one drink\". Patsy has been applying copings skills like urge surfing and taking a step back and processing her emotions. Patsy has implemented reading the Big Book, Bible Talha, and doing meditations as a regular part of her recovery. Patsy is working on being mindful about her triggers, PTSD, and taking one day at a time. Patsy is at moderate/low risk for relapse.       Focus area: Relapse Prevention  Clinical Goal: Gain further insight into triggers and relapse warning signs as well as coping skills for both urges and mental health symptoms.   Patient's Goal:  \"Learning fall and winter triggers\"  Goal needs to be completed prior to discharge? [x]  Methods/Strategies (must include amount and frequency):   1. Patsy will share during each session's check-in any urges and addictive thinking to better understand their pattern of use and to prevent return to use.  2. Patsy will start a relapse prevention plan.  Target Date: 12/19/23  Completion Date:       Dimension 6: Recovery Environment, Risk Level: 3    Needs identified from Comprehensive Assessment Summary:    Patient has stable housing at this time and lives alone. Patient reports she is unemployed and on disability. She does note some concerns about her financial stability. Patient is not involved in the criminal justice system. She lacks a sober support network. Patient does not have a regular structure in " "place for her day to day activities, though she does enjoy working out. Patient has 4 adult children and her son has been an ongoing trigger.     Update: 9/18/23 - Patsy has been setting boundaries with friends and family to maintain a healthier environment for herself. She shared that her boyfriend is sober and she is able to use him as a support person, but she still has not gone to meetings. She wants to, but reports several barriers to doing so. She is working on combating those barriers.  In addition to group, she has started physical therapy and regular exercise adding some structure to her week, but would benefit from additional activities or commitments. She recently signed on a new apartment which...       Desire for family involvement: No     Focus area/need: Recovery Lifestyle  Clinical Goal: Create a way of life that is contusive to long term recovery.   Patient's Goal:  \"creating structure\"   Goal needs to be completed prior to discharge? [x]  Methods/Strategies (must include amount and frequency):   1. Learn about the different types of recovery support meetings available in your area and attend at least 3 either in person or virtually and report your experience back to the group.  2. Identify three sober past times that you enjoy doing and share with your group and/or counselor.  3. Complete goal exploration assignment with your counselor.   4. Continue setting boundaries with family.  Target Date: 12/19/23  Completion Date:     Focus area/need: Transition Planning to home community or clinically indicated setting  Clinical Goal: Patient, in collaboration with treatment team, will develop a plan for continued support of recovery.  Patient's Goal:  \"getting a therapist and going to the [facility]\"  Goal needs to be completed prior to discharge? [x]  Methods/Strategies (must include amount and frequency):   1.  TBD  Target Date: 12/19/23  Completion Date:     Resources  Resources to which the patient is being " referred for problems when they are to be addressed concurrently by another provider: No Referrals Needed    [x] Contact insurance to ensure referrals are in network    Vulnerable Adult Review   [x] Review of the facility Abuse Prevention plan was reviewed with the patient   [x] No individual abuse plan is necessary   [] In addition to the facility Abuse Prevention plan, an Individual Abuse Plan will be put in place     Patsy attests their date of last use as one time in April . Patsy attests they have participated in the creation of this treatment plan. Patsy has been provided a copy of this treatment plan and is in agreement with how this plan is written and will be stored in the electronic record.       Patient Signature: _________________________________ Date: _________    Counselor Signature: ______________________________ Date: _________    Counselor Signature: ______________________________ Date: _________    Counselor Signature: ______________________________ Date: _________

## 2023-09-19 ENCOUNTER — DOCUMENTATION ONLY (OUTPATIENT)
Dept: BEHAVIORAL HEALTH | Facility: CLINIC | Age: 68
End: 2023-09-19
Payer: COMMERCIAL

## 2023-09-19 ENCOUNTER — HOSPITAL ENCOUNTER (OUTPATIENT)
Dept: BEHAVIORAL HEALTH | Facility: CLINIC | Age: 68
Discharge: HOME | End: 2023-09-19
Attending: FAMILY MEDICINE
Payer: COMMERCIAL

## 2023-09-19 DIAGNOSIS — F10.90 ALCOHOL USE DISORDER: Primary | ICD-10-CM

## 2023-09-19 PROCEDURE — H2035 A/D TX PROGRAM, PER HOUR: HCPCS | Mod: HQ

## 2023-09-19 NOTE — PROGRESS NOTES
[facility] Weekly Treatment Plan Review      ATTENDANCE for the following date span: 23-23    Date     Group Therapy 0 hours 2  hours NA  hours   No group No group    Individual Therapy        Family Therapy        Other (Specify) Phase 1 &2  Phase 3  Phase 1  Phase 1 & 2        Patient attended all scheduled sessions during this time period.   Total hours: 78 hours. Patient is in phase 3    Weekly Treatment Plan Review     Treatment Plan initiated on 2023.    Dimension1: Acute Intoxication/Withdrawal Potential -   Previous Dimension Ratin  Current Dimension Ratin  Date of Last Use: 23  Any reports of withdrawal symptoms: No  Narrative: Patsy reports sustained abstinence. No changes or concerns.     Dimension 2: Biomedical Conditions & Complications -   Previous Dimension Ratin  Current Dimension Ratin  Medical Concerns:  Significant pain and swelling in foot and leg.  Current Medications & Medication Changes:  Current Outpatient Medications   Medication    albuterol (PROAIR HFA/PROVENTIL HFA/VENTOLIN HFA) 108 (90 Base) MCG/ACT inhaler    budesonide-formoterol (SYMBICORT) 160-4.5 MCG/ACT Inhaler    bumetanide (BUMEX) 1 MG tablet    DULoxetine (CYMBALTA) 60 MG capsule    hydrOXYzine (ATARAX) 50 MG tablet    ipratropium - albuterol 0.5 mg/2.5 mg/3 mL (DUONEB) 0.5-2.5 (3) MG/3ML neb solution    omeprazole (PRILOSEC) 20 MG DR capsule    pregabalin (LYRICA) 25 MG capsule     No current facility-administered medications for this visit.     Medication Prescriber:  Yanique Cali MD.   Taking meds as prescribed? Yes  Medication side effects or concerns:  None reported.  Outside medical appointments this week (list provider and reason for visit):  Saw doctor about her foot. See EHR.  Narrative: Patient reports the following medical conditions: COPD, overweight, uses CPAP, and general fatigue . Patient has primary care  with Yanique Cali MD. She reports continuing to attend physical therapy however does not think it is actually helping with that she was not able to get an appointment until . Patsy states she is having some concerns with her hips, she was seen and they wanted to do a cortisone injection. She asked them to wait until she can see the podiatrist this week. Patsy believes the root cause of her hips hurting is related to her feet. Patsy started a new medication and she is starting to see some benefit, she is hoping this increases. Patient is getting a arthrotic for her feet and will see if that helps the pain.    Dimension 3: Emotional/Behavioral Conditions & Complications  Previous Dimension Ratin  Current Dimension Ratin    PHQ9       2023    12:00 PM 2023     1:00 PM 2023    11:48 AM   PHQ-9 SCORE   PHQ-9 Total Score MyChart   3 (Minimal depression)   PHQ-9 Total Score 3 3 3     GAD7       2023     2:00 PM 2023    12:00 PM 2023     1:00 PM   ALISIA-7 SCORE   Total Score 2 3 4     Mental health diagnosis PTSD  Date of last SIB/SI/HI: N/A  Current MH Assignments:  None  Narrative:  Patient reports a mental health diagnosis of PTSD, Anxiety, and Depression. Patsy has endorses current symptoms of hypervigilance and anxiety when out. Patsy shares continued improvements in her mental health. She is reporting that she has felt relief with her move as she has noticed that it is better for her health and mental health. She states her mental health is good, she has now calmed herself down and is enjoying her new space. Patsy is meeting with her psychiatrist today. Patsy is wanting to look into meeting with a therapist.    Dimension 4: Treatment Acceptance / Resistance  Previous Dimension Ratin  Current Dimension Ratin  ORAL Diagnosis:  Alcohol Use Disorder   303.90 (F10.20) Severe In early remission,   Phase: 3  Commitment to tx process/Stage of change:  Action   ORAL assignments: Relapse Prevention Plan   Narrative: Patient appears internally motivated for treatment. She is an active and engaged group member when present. Patsy verbalizes continued motivation for recovery. Patsy began phase 3 this week.     Dimension 5: Relapse / Continued Problem Potential    Previous Dimension Rating:  3  Current Dimension Rating:  3  Relapses this week: None  Urges to use:  See below   UA results:   Recent Results (from the past 168 hour(s))   Lipid panel reflex to direct LDL Non-fasting    Collection Time: 09/13/23 12:38 PM   Result Value Ref Range    Cholesterol 190 <200 mg/dL    Triglycerides 117 <150 mg/dL    Direct Measure HDL 50 >=50 mg/dL    LDL Cholesterol Calculated 117 (H) <=100 mg/dL    Non HDL Cholesterol 140 (H) <130 mg/dL   HEMOGLOBIN A1C    Collection Time: 09/13/23 12:38 PM   Result Value Ref Range    Hemoglobin A1C 6.0 (H) 0.0 - 5.6 %   Vitamin D Deficiency    Collection Time: 09/13/23 12:38 PM   Result Value Ref Range    Vitamin D, Total (25-Hydroxy) 21 20 - 75 ug/L   Vitamin B12    Collection Time: 09/13/23 12:38 PM   Result Value Ref Range    Vitamin B12 359 232 - 1,245 pg/mL   Comprehensive metabolic panel (BMP + Alb, Alk Phos, ALT, AST, Total. Bili, TP)    Collection Time: 09/13/23 12:38 PM   Result Value Ref Range    Sodium 141 136 - 145 mmol/L    Potassium 4.3 3.4 - 5.3 mmol/L    Chloride 101 98 - 107 mmol/L    Carbon Dioxide (CO2) 26 22 - 29 mmol/L    Anion Gap 14 7 - 15 mmol/L    Urea Nitrogen 7.8 (L) 8.0 - 23.0 mg/dL    Creatinine 0.60 0.51 - 0.95 mg/dL    Calcium 9.8 8.8 - 10.2 mg/dL    Glucose 99 70 - 99 mg/dL    Alkaline Phosphatase 72 35 - 104 U/L    AST 19 0 - 45 U/L    ALT 13 0 - 50 U/L    Protein Total 7.2 6.4 - 8.3 g/dL    Albumin 4.3 3.5 - 5.2 g/dL    Bilirubin Total 0.5 <=1.2 mg/dL    GFR Estimate >90 >60 mL/min/1.73m2   Uric acid    Collection Time: 09/13/23 12:38 PM   Result Value Ref Range    Uric Acid 5.5 2.4 - 5.7 mg/dL   CBC with platelets  and differential    Collection Time: 09/13/23 12:38 PM   Result Value Ref Range    WBC Count 7.0 4.0 - 11.0 10e3/uL    RBC Count 4.99 3.80 - 5.20 10e6/uL    Hemoglobin 14.6 11.7 - 15.7 g/dL    Hematocrit 44.8 35.0 - 47.0 %    MCV 90 78 - 100 fL    MCH 29.3 26.5 - 33.0 pg    MCHC 32.6 31.5 - 36.5 g/dL    RDW 13.6 10.0 - 15.0 %    Platelet Count 198 150 - 450 10e3/uL    % Neutrophils 50 %    % Lymphocytes 39 %    % Monocytes 8 %    % Eosinophils 3 %    % Basophils 0 %    % Immature Granulocytes 0 %    Absolute Neutrophils 3.5 1.6 - 8.3 10e3/uL    Absolute Lymphocytes 2.7 0.8 - 5.3 10e3/uL    Absolute Monocytes 0.6 0.0 - 1.3 10e3/uL    Absolute Eosinophils 0.2 0.0 - 0.7 10e3/uL    Absolute Basophils 0.0 0.0 - 0.2 10e3/uL    Absolute Immature Granulocytes 0.0 <=0.4 10e3/uL   Chlamydia trachomatis/Neisseria gonorrhoeae by PCR - Clinic Collect    Collection Time: 09/13/23 12:39 PM    Specimen: Endocervical/cervical; Swab   Result Value Ref Range    Chlamydia Trachomatis Negative Negative    Neisseria gonorrhoeae Negative Negative   Wet prep - Clinic Collect    Collection Time: 09/13/23 12:40 PM    Specimen: Vagina; Swab   Result Value Ref Range    Trichomonas Absent Absent    Yeast Absent Absent    Clue Cells Absent Absent    WBCs/high power field 1+ (A) None   Albumin Random Urine Quantitative with Creat Ratio    Collection Time: 09/13/23 12:40 PM   Result Value Ref Range    Creatinine Urine mg/dL 95.3 mg/dL    Albumin Urine mg/L <12.0 mg/L    Albumin Urine mg/g Cr     UA with Microscopic - lab collect    Collection Time: 09/13/23 12:40 PM   Result Value Ref Range    Color Urine Yellow Colorless, Straw, Light Yellow, Yellow    Appearance Urine Clear Clear    Glucose Urine Negative Negative mg/dL    Bilirubin Urine Negative Negative    Ketones Urine Negative Negative mg/dL    Specific Gravity Urine 1.020 1.005 - 1.030    Blood Urine Negative Negative    pH Urine 6.0 5.0 - 8.0    Protein Albumin Urine Negative Negative  "mg/dL    Urobilinogen Urine 1.0 0.2, 1.0 E.U./dL    Nitrite Urine Negative Negative    Leukocyte Esterase Urine Negative Negative   Urine Culture Aerobic Bacterial - lab collect    Collection Time: 23 12:40 PM    Specimen: Urine, Midstream   Result Value Ref Range    Culture 10,000-50,000 CFU/mL Mixture of urogenital devante    Urine Microscopic Exam    Collection Time: 23 12:40 PM   Result Value Ref Range    Bacteria Urine Few (A) None Seen /HPF    RBC Urine 0-2 0-2 /HPF /HPF    WBC Urine 0-5 0-5 /HPF /HPF    Squamous Epithelials Urine Few (A) None Seen /LPF     Narrative: Patsy reports when going out to the grocery store she does not ignore the liquor store, she acknowledge that it is there and does not give the liquor store the power. She does daily readings in the morning and evening and when she is having a hard day she reads a passage from the \"big book\".  Patsy is working on the development of a relapse prevention plan. She is beginning to name her triggers prior to events and situations and because of this has a plan for her recovery skills to use. Patsy states that she is not having cravings or triggers but having some thoughts. Patsy states that she has learned that she does not need to give her thoughts any power. Patsy is actively working on managing her pain- by doing so she is reducing the risk of return to use. She states this week she had thoughts to go to the bar by her new place and get food but thought that it would be a high risk environment for her recovery. Patsy is making a pro and cons list to help these urges. We discussed a seasonal relapse prevention plan as part of the maintenance  stage of  phase 3.     Dimension 6: Recovery Environment   Previous Dimension Rating:  3  Current Dimension Ratin  Family Involvement : None   Summarize attendance at family groups and family sessions: N/A  Family supportive of treatment?  Yes  Community support group attendance: None  Recreational " "activities: Exercise   Narrative: Patsy has stable housing at this time and lives alone. She is \"retired and on disability\", she does note some concerns about her financial stability. Patsy is not involved in the criminal justice system. Patsy has officially moved and is unpacked. She is learning more places around her new home and is enjoying some independence in her exploring. Patsy is attending Voodoo and finds enjoyment in that. She continues to talk to her sober friend in [place] for support in addition to attending group and therapy.  Patsy attends Voodoo regularly and finds benefit in that. Patsy is reading \"Quit Like a Woman\" and doing AA readings and finding benefit in that.   No change    Justification for Continued Treatment at this Level of Care:  Patsy is in Phase 3 and has increased her periods of sobriety. She lacks a sober support network however has begun attending Voodoo and has family support. She is medication compliant and has started individual therapy. She has been able to identify triggers, and is starting to learn and implement some coping skills. Her daily life schedule/routine has not been supportive of recovery previously so she is working on improving that.. She is working on getting more structure in her daily activities.     Discharge Planning:  Target Discharge Date/Timeframe:  11/28/23  Med Mgmt Provider/Appt:  Dr. Covington in addiction medicine  Ind therapy Provider/Appt:  Weekly   Other referrals:  None  Has vulnerable adult status change? No  Service Coordination:  None    Supervision:  Patient is staffed with treatment providers, this includes: Cony Guzman, [facility], Marisel Mcdaniel, [facility], and Yee Curry, [facility]; Staffed with [facility] Team    Attestation:   Dr. Eastman - Medical Director - Provides oversight and supervision of care.    Are Treatment Plan goals/objectives effective? Yes  *If no, list changes to treatment plan:    Are the current goals meeting client's needs? " Yes  *If no, list the changes to treatment plan.    Client Input / Response: Agreeable       *Client agrees with any changes to the treatment plan: N/A  *Client received copy of changes: N/A  *Client is aware of right to access a treatment plan review: Yes (MyChart or request copy)

## 2023-09-19 NOTE — GROUP NOTE
"Group Therapy Documentation    PATIENT'S NAME: Patsy Santamaria  MRN:   [id number]  :   1955  ACCT. NUMBER: [id number]  DATE OF SERVICE: 23  START TIME:  9:00 AM  END TIME: 11:00 AM  FACILITATOR(S): Joyce Bal LADC; Marisel Mcdaniel LADC  TOPIC: BEH Group Therapy  Number of patients attending the group:  3  Group Length:  2 Hours    Group Therapy Type: Addiction    Summary of Group / Topics Discussed:    Boundaries, Journaling, Meditation/Breathing Exercises, and Mindfulness  Attestation:   Dr. Eastman - Medical Director - Provides oversight and supervision of care.       Group Attendance:  Attended group session    Patient's response to the group topic/interactions:  discussed personal experience with topic, gave appropriate feedback to peers, and listened actively    Patient appeared to be Engaged.        Client specific details:  Patient shared feeling good from the week. \"I feel so good when I do not wake up and my feet are hurting\". Patient shared staying sober but wanting to go to the bar by her new place because the food smells good however \"I have not acted on it because it could be a trigger\" Patient was asked to make a pro and cons list so she is able to remind herself why it could be a high trigger for her. Patient shared spending time with friends and her sister over the week and plans on going to the [place] with her sisters. Patient short term goal is to call and make doctor appointments to get her flu shot and other medical appointments that she needs. Patient self care is to make an appointment to schedule and get her hair cut and colored. Patient affirmation \"I am feeling positive\" Patient is acquiring about getting an appointment for a therapist, counselor is going to get back to her to get that set up.    "

## 2023-09-20 ENCOUNTER — TELEPHONE (OUTPATIENT)
Dept: BEHAVIORAL HEALTH | Facility: CLINIC | Age: 68
End: 2023-09-20

## 2023-09-24 ASSESSMENT — ENCOUNTER SYMPTOMS
ARTHRALGIAS: 1
ALLERGIC/IMMUNOLOGIC NEGATIVE: 1
UNEXPECTED WEIGHT CHANGE: 0
SHORTNESS OF BREATH: 0
DIARRHEA: 0
NERVOUS/ANXIOUS: 1
CHILLS: 0
BRUISES/BLEEDS EASILY: 0
ABDOMINAL PAIN: 0
POLYDIPSIA: 0
NEUROLOGICAL NEGATIVE: 1
FATIGUE: 1
FEVER: 0
COUGH: 0
CONSTIPATION: 0

## 2023-09-25 ENCOUNTER — TELEPHONE (OUTPATIENT)
Dept: BEHAVIORAL HEALTH | Facility: CLINIC | Age: 68
End: 2023-09-25
Payer: COMMERCIAL

## 2023-09-25 ENCOUNTER — HOSPITAL ENCOUNTER (OUTPATIENT)
Dept: BEHAVIORAL HEALTH | Facility: CLINIC | Age: 68
Discharge: HOME | End: 2023-09-25
Attending: FAMILY MEDICINE
Payer: COMMERCIAL

## 2023-09-25 PROCEDURE — H2035 A/D TX PROGRAM, PER HOUR: HCPCS

## 2023-09-25 ASSESSMENT — ANXIETY QUESTIONNAIRES
7. FEELING AFRAID AS IF SOMETHING AWFUL MIGHT HAPPEN: NOT AT ALL
1. FEELING NERVOUS, ANXIOUS, OR ON EDGE: SEVERAL DAYS
5. BEING SO RESTLESS THAT IT IS HARD TO SIT STILL: NOT AT ALL
GAD7 TOTAL SCORE: 4
GAD7 TOTAL SCORE: 4
4. TROUBLE RELAXING: NOT AT ALL
IF YOU CHECKED OFF ANY PROBLEMS ON THIS QUESTIONNAIRE, HOW DIFFICULT HAVE THESE PROBLEMS MADE IT FOR YOU TO DO YOUR WORK, TAKE CARE OF THINGS AT HOME, OR GET ALONG WITH OTHER PEOPLE: NOT DIFFICULT AT ALL
2. NOT BEING ABLE TO STOP OR CONTROL WORRYING: SEVERAL DAYS
3. WORRYING TOO MUCH ABOUT DIFFERENT THINGS: SEVERAL DAYS
6. BECOMING EASILY ANNOYED OR IRRITABLE: SEVERAL DAYS

## 2023-09-25 ASSESSMENT — PATIENT HEALTH QUESTIONNAIRE - PHQ9: SUM OF ALL RESPONSES TO PHQ QUESTIONS 1-9: 4

## 2023-09-25 NOTE — TELEPHONE ENCOUNTER
----- Message from Con[facility] Guzman, [facility] sent at 9/25/2023  2:16 PM CDT -----  Regarding: Individual Sessions request for Prolonged Exposure Therapy (PET) for PTSD  Patient Name: MINNA WOOD [[id number]]  Location of programming: [facility]  Start Date: 10/03/2023  Day/s of week: Tuesdays  Time: 12:30pm  Individual Sessions For OP Group: (MI/LX-Yv-[facility] Disorders IOP Phase 3)  Provider: Yamel Eastman MD  Number of visits to be scheduled: 16  Length/Duration of Appointment in minutes: 90  Visit Type: In person  Additional notes: PET for PTSD treatment

## 2023-09-25 NOTE — PROGRESS NOTES
"Attestation: DO Adrienne Etienne Provides oversight and supervision of care.    Individual Session Summary (MICD)   DATE:  09/25/2023  START TIME: 1:00 PM   END TIME: 2:00 PM   Duration: 1 Hour    ORAL Diagnosis: (F10.20) AUD, severe  Mental Health Diagnosis: 309.81 (F43.10) PTSD    Data:  Writer met with Patsy for an individual session addressing co-occurring mental health, substance use disorders.     Client denies suicidal thoughts, plans, or intent today. Client denies thoughts or behaviors of self-harm today.    Intervention: Writer utilized motivational interviewing to assess for stage of change, as well as health and safety.  Reinforced and supported use of identified skills and practices to reduce risk for relapse.  Assessed for appropriateness for Prolonged Exposure Therapy (PET) for PTSD. Scheduled sessions for PET and set up PE  on smartphone. Assigned homework to read about and practice breathing technique on PE  Talha.   Provider utilized validation, verbal positive reinforcement and therapeutic techniques including CBT and trauma informed care (TIC).      Assessment: Patsy reported a \"good mood\" today and presented with congruent affect. She reported on utilization of skills and practices to increase awareness of how she is feeling and what she is thinking, including recognition of triggers and urges, which have decreased, overall. Patsy continues to express an understanding and desire to address co-occurring health, stability, and relapse prevention. She continues to pursue medical care and advocate for herself. Patsy reported she is moved into her new place and that her anxiety has decreased since the move. She completed assessments to assist in determining need and readiness for PET to treat PTSD. She set up PE  on phone with support and agreed to initial homework on talha (breathing retraining). She continues to look for a dog (SARA).         9/12/2023     1:00 PM 9/13/2023    11:48 AM " 9/25/2023    10:00 PM   PHQ-9 SCORE   PHQ-9 Total Score MyChart  3 (Minimal depression)    PHQ-9 Total Score 3 3 4           7/19/2023    12:00 PM 9/12/2023     1:00 PM 9/25/2023    10:00 PM   ALISIA-7 SCORE   Total Score 3 4 4     PCL-5 Score - 51 (9/25/2023)  PSS-SR5 Score - 46 (9/25/2023)  PTCI Total Score - 170 (Negative Cognitions about self 4.90; Negative Cognitions about World 5.71; Self-Blame 5.40)    PlanSean Garner will continue Phase 3 of IOP and attend 10-16 individual sessions every week, to complete PET for PTSD with this writer.  She will continue to report on thoughts, feelings, triggers, urges, cravings and tools, skills and practices tried and process the effectiveness of those trials and practices in Phase 3 group.    Cony Guzman, LPCC, LADC

## 2023-09-26 ENCOUNTER — HOSPITAL ENCOUNTER (OUTPATIENT)
Dept: BEHAVIORAL HEALTH | Facility: CLINIC | Age: 68
Discharge: HOME | End: 2023-09-26
Attending: FAMILY MEDICINE
Payer: COMMERCIAL

## 2023-09-26 ENCOUNTER — DOCUMENTATION ONLY (OUTPATIENT)
Dept: BEHAVIORAL HEALTH | Facility: CLINIC | Age: 68
End: 2023-09-26
Payer: COMMERCIAL

## 2023-09-26 DIAGNOSIS — F10.90 ALCOHOL USE DISORDER: Primary | ICD-10-CM

## 2023-09-26 PROCEDURE — H2035 A/D TX PROGRAM, PER HOUR: HCPCS | Mod: HQ

## 2023-09-26 NOTE — GROUP NOTE
Group Therapy Documentation    PATIENT'S NAME: Patsy Santamaria  MRN:   [id number]  :   1955  ACCT. NUMBER: [id number]  DATE OF SERVICE: 23  START TIME:  9:00 AM  END TIME: 11:00 AM  FACILITATOR(S): Marisel Mcdaniel LADC  TOPIC: BEH Group Therapy  Number of patients attending the group:  3  Group Length:  2 Hours    Group Therapy Type: Addiction and Psychotherapeutic    Summary of Group / Topics Discussed:    Meditation/Breathing Exercises, Mindfulness, and gifts of recovery reflection and discussion.     Attestation:   Dr. Eastman - Medical Director - Provides oversight and supervision of care.      Group Attendance:  Attended group session    Patient's response to the group topic/interactions:  cooperative with task and discussed personal experience with topic    Patient appeared to be Actively participating, Attentive, and Engaged.        Client specific details:  Patsy reflected and shared the changes she has gone through in her recovery. She identified that she has made significant changes in her everyday life and with her recent move she is feeling comfortable in her routines. Patsy shared her goal for the next week is to get her new drivers license and also going to change her address for social security. Patsy states she has been having pain in her feet so is working with her insurance to get orthotics.

## 2023-09-26 NOTE — PROGRESS NOTES
[facility] Weekly Treatment Plan Review      ATTENDANCE for the following date span: 23-10/1/23    Date     Group Therapy  2  hours        Individual Therapy 1       Family Therapy        Other (Specify) Phase 1 &2  Phase 3  Phase 1  Phase 1 & 2        Patient attended all scheduled sessions during this time period.   Total hours: 81 hours. Patient is in phase 3    Weekly Treatment Plan Review     Treatment Plan initiated on 2023.    Dimension1: Acute Intoxication/Withdrawal Potential -   Previous Dimension Ratin  Current Dimension Ratin  Date of Last Use: 23  Any reports of withdrawal symptoms: No  Narrative: Patsy reports sustained abstinence. No changes or concerns.     Dimension 2: Biomedical Conditions & Complications -   Previous Dimension Ratin  Current Dimension Ratin  Medical Concerns:  Nerve and foot pain  Current Medications & Medication Changes:  Current Outpatient Medications   Medication    albuterol (PROAIR HFA/PROVENTIL HFA/VENTOLIN HFA) 108 (90 Base) MCG/ACT inhaler    budesonide-formoterol (SYMBICORT) 160-4.5 MCG/ACT Inhaler    bumetanide (BUMEX) 1 MG tablet    DULoxetine (CYMBALTA) 60 MG capsule    hydrOXYzine (ATARAX) 50 MG tablet    ipratropium - albuterol 0.5 mg/2.5 mg/3 mL (DUONEB) 0.5-2.5 (3) MG/3ML neb solution    omeprazole (PRILOSEC) 20 MG DR capsule    pregabalin (LYRICA) 25 MG capsule     No current facility-administered medications for this visit.     Medication Prescriber:  Yanique Cali MD.   Taking meds as prescribed? Yes  Medication side effects or concerns:  None reported.  Outside medical appointments this week (list provider and reason for visit):  See chart   Narrative: Patient reports the following medical conditions: COPD, overweight, uses CPAP, and general fatigue . Patient has primary care with Yanique Cali MD. Patsy has ended physical therapy however is  looking into the gym and physical therapy options in her apartment complex. Patsy states she is having some concerns with her hips, she was seen and they wanted to do a cortisone injection and she declined. Patsy met with a podiatrist and it has been recommended she get orthotics, she is working with her insurance on this. Patsy is feeling benefit in her new nerve pain medication. Patsy states she is getting more walking in at her new complex and is feeling good about that. She has decreased her cigarettes to about 5 per day rather than the pack she was smoking previously.     Dimension 3: Emotional/Behavioral Conditions & Complications  Previous Dimension Ratin  Current Dimension Ratin    PHQ9       2023     1:00 PM 2023    11:48 AM 2023    10:00 PM   PHQ-9 SCORE   PHQ-9 Total Score MyChart  3 (Minimal depression)    PHQ-9 Total Score 3 3 4     GAD7       2023    12:00 PM 2023     1:00 PM 2023    10:00 PM   ALISIA-7 SCORE   Total Score 3 4 4     Mental health diagnosis PTSD  Date of last SIB/SI/HI: N/A  Current MH Assignments:  None  Narrative:  Patient reports a mental health diagnosis of PTSD, Anxiety, and Depression. Patsy has endorses current symptoms of hypervigilance and anxiety when out. Patsy shares continued improvements in her mental health. She is reporting that she has felt relief with her move as she has noticed that it is better for her health and mental health. She states her mental health is good, she has now calmed herself down and is enjoying her new space. Patsy met with her psychiatrist and is meeting with other providers as scheduled. She is going to begin prolonged exposure with [facility] next week.     Dimension 4: Treatment Acceptance / Resistance  Previous Dimension Ratin  Current Dimension Ratin  ORAL Diagnosis:  Alcohol Use Disorder   303.90 (F10.20) Severe In early remission,   Phase: 3  Commitment to tx process/Stage of change:  "Action   ORAL assignments: Relapse Prevention Plan   Narrative: Patient appears internally motivated for treatment. She is an active and engaged group member when present. Patsy verbalizes continued motivation for recovery, she reflected on the lifestyle changes in recovery during group this week.     Dimension 5: Relapse / Continued Problem Potential    Previous Dimension Rating:  3  Current Dimension Rating:  3  Relapses this week: None  Urges to use:  See below   UA results:   No results found for this or any previous visit (from the past 168 hour(s)).    Narrative: Patsy reports when going out to the grocery store she does not ignore the liquor store, she acknowledge that it is there and does not give the liquor store the power. She does daily readings in the morning and evening and when she is having a hard day she reads a passage from the \"big book\".  Patsy is working on the development of a relapse prevention plan. She is beginning to name her triggers prior to events and situations and because of this has a plan for her recovery skills to use. Patsy states that she is not having cravings or triggers but having some thoughts. Patsy states that she has learned that she does not need to give her thoughts any power. Patsy is actively working on managing her pain- by doing so she is reducing the risk of return to use. Patsy reports no thoughts of going to the bar this week as she was busy and focused on her own tasks. She also acknowledged her new apartment as a sober space and reflected on that feeling positive.     Dimension 6: Recovery Environment   Previous Dimension Rating:  3  Current Dimension Ratin  Family Involvement : None   Summarize attendance at family groups and family sessions: N/A  Family supportive of treatment?  Yes  Community support group attendance: None  Recreational activities: Exercise   Narrative: Patsy has stable housing at this time and lives alone. She is \"retired and on disability\", she " "does note some concerns about her financial stability. Patsy is not involved in the criminal justice system. Patsy has officially moved and is unpacked. She is learning more places around her new home and is enjoying some independence in her exploring. Patsy is attending [employer] and finds enjoyment in that. She continues to talk to her sober friend in [place] for support in addition to attending group and therapy.  Patsy attends [employer] regularly and finds benefit in that. Patsy is reading \"Quit Like a Woman\" and doing AA readings and finding benefit in that. No changes over the last week.  does have a goal to get down to the fitness center in her apartment.     Justification for Continued Treatment at this Level of Care:  Patsy is in Phase 3 and has increased her periods of sobriety. She lacks a sober support network however has begun attending [employer] and has family support. She is medication compliant and has started individual therapy. She has been able to identify triggers, and is starting to learn and implement some coping skills. Her daily life schedule/routine has not been supportive of recovery previously so she is working on improving that.. She is working on getting more structure in her daily activities.     Discharge Planning:  Target Discharge Date/Timeframe:  11/28/23  Med Mgmt Provider/Appt:  Dr. Covington in addiction medicine  Ind therapy Provider/Appt:  Weekly   Other referrals:  None  Has vulnerable adult status change? No  Service Coordination:  None    Supervision:  Patient is staffed with treatment providers, this includes: Cony Guzman [facility], Marisel Mcdaniel [facility], and Yee Curry, ADC-T; Staffed with [facility] Team    Attestation:   Dr. Eastman - Medical Director - Provides oversight and supervision of care.    Are Treatment Plan goals/objectives effective? Yes  *If no, list changes to treatment plan:    Are the current goals meeting client's needs? Yes  *If no, list the changes to " treatment plan.    Client Input / Response: Agreeable       *Client agrees with any changes to the treatment plan: N/A  *Client received copy of changes: N/A  *Client is aware of right to access a treatment plan review: Yes ([place] or request copy)

## 2023-09-27 NOTE — PROGRESS NOTES
08/23/23 0500   Appointment Info   Signing clinician's name / credentials Marielle Hopkins DPT, PT   Total/Authorized Visits 12   Visits Used 4   Medical Diagnosis M25.551 (ICD-10-CM) - Hip pain, right  M54.6 (ICD-10-CM) - Pain in thoracic spine   PT Tx Diagnosis R SI Joint pain, R hip pain, mid back pain   Progress Note/Certification   Start of Care Date 06/30/23   Onset of illness/injury or Date of Surgery 06/26/23   Therapy Frequency 1 x weekly   Predicted Duration 12 weeks   Certification date from 06/30/23   Certification date to 09/28/23   Progress Note Due Date 09/28/23   PT Goal 1   Goal Identifier sleeping   Goal Description Patient will be able to sleep without waking due to pain and able to get comfortable to sleep without being effected by pain.   Rationale to maximize safety and independence within the home;to maximize safety and independence within the community   Goal Progress in progress L UE still falls asleep   Target Date 09/28/23   PT Goal 2   Goal Identifier Standing   Goal Description Patient will be 30 minutes for meal/prep and cooking with 3/10 or less pain.   Rationale to maximize safety and independence with performance of ADLs and functional tasks;to maximize safety and independence within the home;to maximize safety and independence within the community   Goal Progress in progress   Target Date 09/28/23   PT Goal 3   Goal Identifier ADLs, self cares, IADLs   Goal Description Patient will be able to completed ADLs, dressing, and IADLs without sx 3/10 or less at shoulder or hip.   Rationale to maximize safety and independence with performance of ADLs and functional tasks;to maximize safety and independence within the home;to maximize safety and independence within the community;to maximize safety and independence with self cares   Goal Progress in progress   Target Date 09/28/23   PT Goal 4   Goal Identifier walking   Goal Description Patient will be able to walk 20-25 minutes to [place] or  other community locations around home without sx greater than 3/10.   Rationale to maximize safety and independence with performance of ADLs and functional tasks;to maximize safety and independence within the home;to maximize safety and independence within the community;to maximize safety and independence with self cares   Goal Progress in progress   Target Date 09/28/23   PT Goal 5   Goal Identifier reaching   Goal Description Patient will be able to reach into kitchen cabinets without sx greater than 3/10.   Rationale to maximize safety and independence with performance of ADLs and functional tasks;to maximize safety and independence within the home   Goal Progress in progress   Target Date 09/28/23   PT Goal 6   Goal Identifier HEP   Goal Description Patient will be able to demonstrate 6 exercises without assistance for progression to independent mangement,   Rationale to maximize safety and independence with transportation;to maximize safety and independence within the community;to maximize safety and independence within the home;to maximize safety and independence with performance of ADLs and functional tasks;to maximize safety and independence with self cares   Goal Progress in progress   Target Date 09/28/23   Subjective Report   Subjective Report Still wearing shoes even at home. Has been packing to move the L hip feels the same maybe worse than R side. Pain started in outside of left foot last week. Stiff, tingling, sharp shooting pain.   Objective Measure 1   Objective Measure B hip ROM   Details L hip flex, IR/ER, add, ext 50% limited, sx at low back and hip into thigh, R hip ROM WFL flex, abd, add, 25% limitation in IR/ER   Objective Measure 2   Objective Measure LE strength   Details future visit   Objective Measure 3   Objective Measure UE strength   Details future visit   Objective Measure 4   Objective Measure cervical ROM   Details future visit   Objective Measure 5   Objective Measure lumbar ROM    Details flex 50% inferior angle of patella, ext stiff moderate decreased mobility, sideglide Pull on L when goes to L, with R feels like stretching on L, rotation B limited minimally, with R rot sx into L low back stretch/pull   Therapeutic Procedure/Exercise   Therapeutic Procedures: strength, endurance, ROM, flexibillity minutes (51954) 30   Ther Proc 1 NU Step   Ther Proc 1 - Details level 5 x 6 min for increased strength and endurance   Ther Proc 2 pool program   Ther Proc 2 - Details standing with support at counter hip flex (straight leg), marching, abduction, extension for hip, hamstring curl, x 10 each R LE, hip flex stretch/gastroc stretch 3 x 30 seconds, hamstring stretch 3 x 30 seconds   PTRx Ther Proc 1 Scapular Retraction/Depression   PTRx Ther Proc 1 - Details not completed today   PTRx Ther Proc 2 Shoulder Rolls   PTRx Ther Proc 2 - Details not completed today   PTRx Ther Proc 3 Supine Lumbar Hip Roll   PTRx Ther Proc 3 - Details not completed today   PTRx Ther Proc 4 Pubic Shot Gun MET   PTRx Ther Proc 4 - Details not completed today   PTRx Ther Proc 5 Upper Trapezius Stretch   PTRx Ther Proc 5 - Details not completed today   PTRx Ther Proc 6 Levator Scapulae Stretch   PTRx Ther Proc 6 - Details not completed today   PTRx Ther Proc 7 Standing Passive Shoulder Flexion   PTRx Ther Proc 7 - Details not completed today   PTRx Ther Proc 8 Pec Stretch Doorway   PTRx Ther Proc 8 - Details not completed today   Skilled Intervention decreased sx level, improved mobility   Patient Response/Progress verbalized and demonstrated understanding   Therapeutic Activity   PTRx Ther Act 1 Education Sheet General   PTRx Ther Act 1 - Details sleep positioningshoulder out from under bodyhug pillow in front to support armssupport head so spine is lined upuse towel roll under neck for support if it helps   Manual Therapy   Manual Therapy 1 STW   Manual Therapy 1 - Details ball massage R hip, R glut area and underlying  muscles   Skilled Intervention decreased sx   Patient Response/Progress decreased pain   Physical Performance Test/measures   Physical Performance Test/Measurement, Minutes (20270) 15   Physical Performance Test/Measurement Details lumbar ROM & hip joint ROM   Skilled Intervention see above   Patient Response/Progress symptoms seem to be reproduced more by hip motion than lumbar ROM assessment, mixed potential nerve irritability of femoral and sciatic   Progress revised HEP   Education   Learner/Method Patient;Demonstration;Pictures/Video   Plan   Home program PTRx   Plan for next session complete tennis ball release for neck, STW with tennis ball at neck, review hip program, further assessment, progression of postural strength, reach and roll, progression of strength and mobility   Total Session Time   Timed Code Treatment Minutes 45   Total Treatment Time (sum of timed and untimed services) 45         DISCHARGE  Reason for Discharge: Patient did not show for 2 appointments. Was called and reported moved and forgot to cancel.        Discharge Plan: Patient to continue home program.    Referring Provider:  Kaia Mosqueda

## 2023-10-02 ENCOUNTER — TELEPHONE (OUTPATIENT)
Dept: BEHAVIORAL HEALTH | Facility: CLINIC | Age: 68
End: 2023-10-02

## 2023-10-03 ENCOUNTER — DOCUMENTATION ONLY (OUTPATIENT)
Dept: BEHAVIORAL HEALTH | Facility: CLINIC | Age: 68
End: 2023-10-03
Payer: COMMERCIAL

## 2023-10-03 ENCOUNTER — HOSPITAL ENCOUNTER (OUTPATIENT)
Dept: BEHAVIORAL HEALTH | Facility: CLINIC | Age: 68
Discharge: HOME | End: 2023-10-03
Attending: FAMILY MEDICINE
Payer: COMMERCIAL

## 2023-10-03 DIAGNOSIS — F10.90 ALCOHOL USE DISORDER: Primary | ICD-10-CM

## 2023-10-03 PROCEDURE — H2035 A/D TX PROGRAM, PER HOUR: HCPCS | Mod: HQ

## 2023-10-03 NOTE — GROUP NOTE
"Group Therapy Documentation    PATIENT'S NAME: Patsy Santamaria  MRN:   [id number]  :   1955  ACCT. NUMBER: [id number]  DATE OF SERVICE: 10/03/23  START TIME:  9:00 AM  END TIME: 11:00 AM  FACILITATOR(S): Joyce Bal LADC; Marisel Mcdaniel LADC  TOPIC: BEH Group Therapy  Number of patients attending the group:  3  Group Length:  2 Hours    Group Therapy Type: Addiction    Summary of Group / Topics Discussed:    Coping Skills/Lifestyle Managemet and Mindfulness  Attestation:   Dr. Eastman - Medical Director - Provides oversight and supervision of care.       Group Attendance:  Attended group session    Patient's response to the group topic/interactions:  gave appropriate feedback to peers and listened actively    Patient appeared to be Engaged.        Client specific details:  Pt. Shared self talk can be hard for her and she is working on it. Pt. Shared that she still has thoughts but they are fewer and shorter \" I remind myself the thought dose not have the power\". Pt. Shared also having the thought \"I also do not want to go backwards\". Pt. Shared her mental health is good however her physical health is a work in progress. Pt. Shared at her new complex they have pool exercising and it is 25 dollars a months. Pt. Shared doing the pool exercising will be helpful during winter. Pt. Short term goal is getting the money at [place] to bring back with her to her apartment. Pt. Shared that her complex has flowers that people can put in their own and outside and enjoys there being flowers in her home. Pt. Affirmation \"one say at a time, keep doing what I am doing\" .    "

## 2023-10-03 NOTE — PROGRESS NOTES
[facility] Weekly Treatment Plan Review      ATTENDANCE for the following date span: 10/2/23-10/8/23    Date     Group Therapy  2  hours        Individual Therapy        Family Therapy        Other (Specify) Phase 1 &2  Phase 3  Phase 1  Phase 1 & 2        Patient attended all scheduled sessions during this time period.   Total hours: 83 hours. Patient is in phase 3    Weekly Treatment Plan Review     Treatment Plan initiated on 2023.    Dimension1: Acute Intoxication/Withdrawal Potential -   Previous Dimension Ratin  Current Dimension Ratin  Date of Last Use: 23  Any reports of withdrawal symptoms: No  Narrative: Patsy reports sustained abstinence. No changes or concerns.     Dimension 2: Biomedical Conditions & Complications -   Previous Dimension Ratin  Current Dimension Ratin  Medical Concerns:  Nerve and foot pain  Current Medications & Medication Changes:  Current Outpatient Medications   Medication    albuterol (PROAIR HFA/PROVENTIL HFA/VENTOLIN HFA) 108 (90 Base) MCG/ACT inhaler    budesonide-formoterol (SYMBICORT) 160-4.5 MCG/ACT Inhaler    bumetanide (BUMEX) 1 MG tablet    DULoxetine (CYMBALTA) 60 MG capsule    hydrOXYzine (ATARAX) 50 MG tablet    ipratropium - albuterol 0.5 mg/2.5 mg/3 mL (DUONEB) 0.5-2.5 (3) MG/3ML neb solution    omeprazole (PRILOSEC) 20 MG DR capsule    pregabalin (LYRICA) 25 MG capsule     No current facility-administered medications for this visit.     Medication Prescriber:  Yanique Cali MD.   Taking meds as prescribed? Yes  Medication side effects or concerns:  None reported.  Outside medical appointments this week (list provider and reason for visit):  See chart   Narrative: Patient reports the following medical conditions: COPD, overweight, uses CPAP, and general fatigue . Patient has primary care with Yanique Cali MD. Patsy has ended physical therapy however is  looking into the gym and physical therapy options in her apartment complex. Patsy states she is having some concerns with her hips, she was seen and they wanted to do a cortisone injection and she declined. Patsy is still waiting to hear from her insurance about getting orthotics. Patsy is going to get her membership for the aquatic center in her apartment unit. Patsy reported losing about 4 lbs over the last couple of weeks, she states she is walking more and is feeling an increase in energy. No new or emergent biomedical concerns.     Dimension 3: Emotional/Behavioral Conditions & Complications  Previous Dimension Ratin  Current Dimension Ratin    PHQ9       2023     1:00 PM 2023    11:48 AM 2023    10:00 PM   PHQ-9 SCORE   PHQ-9 Total Score MyChart  3 (Minimal depression)    PHQ-9 Total Score 3 3 4     GAD7       2023    12:00 PM 2023     1:00 PM 2023    10:00 PM   ALISIA-7 SCORE   Total Score 3 4 4     Mental health diagnosis PTSD  Date of last SIB/SI/HI: N/A  Current MH Assignments:  None  Narrative:  Patient reports a mental health diagnosis of PTSD, Anxiety, and Depression. Patsy has endorses current symptoms of hypervigilance and anxiety when out. Patsy shares continued improvements in her mental health. She is reporting that she has felt relief with her move as she has noticed that it is better for her health and mental health. She states her mental health is good, she has now calmed herself down and is enjoying her new space. Patsy met with her psychiatrist and is meeting with other providers as scheduled. She was set to begin prolonged exposure with SRIDHAR Bennett this week however she missed the appointment due to being at social security office. Patsy reports no new or emergent mental health concerns, overall she has stable mental health.     Dimension 4: Treatment Acceptance / Resistance  Previous Dimension Ratin  Current Dimension Ratin  ORAL  "Diagnosis:  Alcohol Use Disorder   303.90 (F10.20) Severe In early remission,   Phase: 3  Commitment to tx process/Stage of change: Action   ORAL assignments: Relapse Prevention Plan   Narrative: Patient appears internally motivated for treatment. She is an active and engaged group member when present. Patsy verbalizes continued motivation for recovery, she continues to reflect on her progress overall and is feeling positive about this place in her life. Patsy stated this week \"I do not want to go backwards in my progress\".     Dimension 5: Relapse / Continued Problem Potential    Previous Dimension Rating:  3  Current Dimension Rating:  3  Relapses this week: None  Urges to use:  See below   UA results:   No results found for this or any previous visit (from the past 168 hour(s)).    Narrative: Patsy reports when going out to the grocery store she does not ignore the liquor store, she acknowledge that it is there and does not give the liquor store the power. She does daily readings in the morning and evening and when she is having a hard day she reads a passage from the \"big book\".  Patsy is working on the development of a relapse prevention plan. She is beginning to name her triggers prior to events and situations and because of this has a plan for her recovery skills to use. Patsy states that she is not having cravings or triggers but having some thoughts. Patsy states that she has learned that she does not need to give her thoughts any power. She acknowledges the bar and restaurant near her home is a trigger, she smells the food and knows that they are a bar. Counselors have challenged her to order delivery if she wants to try the food and help avoid triggers. Patsy is actively working on managing her pain- by doing so she is reducing the risk of return to use. No high risk situations looking into this week.     Dimension 6: Recovery Environment   Previous Dimension Rating:  3  Current Dimension Ratin  Family " "Involvement : None   Summarize attendance at family groups and family sessions: N/A  Family supportive of treatment?  Yes  Community support group attendance: None  Recreational activities: Exercise   Narrative: Patsy has stable housing at this time and lives alone. She is \"retired and on disability\", she does note some concerns about her financial stability. Patsy is not involved in the criminal justice system. Patsy has officially moved and is unpacked. She is learning more places around her new home and is enjoying some independence in her exploring. Patsy is attending Jain and finds enjoyment in that. She continues to talk to her sober friend in [place] for support in addition to attending group and therapy.  Patsy attends Jain regularly and finds benefit in that. Patsy is reading \"Quit Like a Woman\" and doing AA readings and finding benefit in that. No changes over the last week. Patsy did get info on the [facility] center and will go get a membership as it is $25 a month and that is affordable for her.     Justification for Continued Treatment at this Level of Care:  Patsy is in Phase 3 and has increased her periods of sobriety. She lacks a sober support network however has begun attending Jain and has family support. She is medication compliant and has started individual therapy. She has been able to identify triggers, and is starting to learn and implement some coping skills. Her daily life schedule/routine has not been supportive of recovery previously so she is working on improving that.. She is working on getting more structure in her daily activities.     Discharge Planning:  Target Discharge Date/Timeframe:  11/28/23  Med Mgmt Provider/Appt:  Dr. Covington in addiction medicine  Ind therapy Provider/Appt:  Weekly   Other referrals:  None  Has vulnerable adult status change? No  Service Coordination:  None    Supervision:  Patient is staffed with treatment providers, this includes: [facility], " Marisel Mcdaniel, [facility], and Yee Curry, [facility]; Staffed with [facility] Team    Attestation:   Dr. Eastman - Medical Director - Provides oversight and supervision of care.    Are Treatment Plan goals/objectives effective? Yes  *If no, list changes to treatment plan:    Are the current goals meeting client's needs? Yes  *If no, list the changes to treatment plan.    Client Input / Response: Agreeable       *Client agrees with any changes to the treatment plan: N/A  *Client received copy of changes: N/A  *Client is aware of right to access a treatment plan review: Yes (MyChart or request copy)

## 2023-10-10 ENCOUNTER — DOCUMENTATION ONLY (OUTPATIENT)
Dept: BEHAVIORAL HEALTH | Facility: CLINIC | Age: 68
End: 2023-10-10
Payer: COMMERCIAL

## 2023-10-10 ENCOUNTER — HOSPITAL ENCOUNTER (OUTPATIENT)
Dept: BEHAVIORAL HEALTH | Facility: CLINIC | Age: 68
Discharge: HOME | End: 2023-10-10
Attending: FAMILY MEDICINE
Payer: COMMERCIAL

## 2023-10-10 PROCEDURE — H2035 A/D TX PROGRAM, PER HOUR: HCPCS

## 2023-10-10 NOTE — PROGRESS NOTES
Individual Session:      Start Time:  09:00 AM   End Time:  10:00 PM      Note:   Patsy Santamaria met with counselors PRINCE Pretty and Joyce CASTANOT for an individual session due to low patient census. Patsy stated she has been feeling positive over the last week. She identified going on vacation to [place] with her sister as an act of self care and something that has helped boost her mood overall. Patsy states she has been sober, she did go to a restaurant that served alcohol while in [place] and had no urges, triggers or thoughts. She states that she had a plan while they were there and knew what she was going to do to prevent return to use. Patsy has been using her coping skills, medication adherence and talk therapy as part of relapse prevention. She is going to begin working with Cony MORRISON [facility] on prolonged exposure for her PTSD, they are working to find a time that suits them both. Patsy is planning on getting an aquatic membership at her home pool, she found the exercise helpful and fun over the last week. Patsy is developing structure and a lifestyle that supports her recovery goals. Overall she is making progress towards the maintenance stage of change.     Attestation:   Dr. Eastman - Medical Director - Provides oversight and supervision of care.    PRINCE Pretty 10/10/2023 2:23 PM

## 2023-10-10 NOTE — PROGRESS NOTES
[facility] Weekly Treatment Plan Review      ATTENDANCE for the following date span: 10/9/23-10/15/23    Date     Group Therapy  1  hours        Individual Therapy        Family Therapy        Other (Specify) Phase 1 &2  Phase 3  Phase 1  Phase 1 & 2        Patient attended all scheduled sessions during this time period.   Total hours: 84 hours. Patient is in phase 3    Weekly Treatment Plan Review     Treatment Plan initiated on 2023.    Dimension1: Acute Intoxication/Withdrawal Potential -   Previous Dimension Ratin  Current Dimension Ratin  Date of Last Use: 23  Any reports of withdrawal symptoms: No  Narrative: Patsy reports sustained abstinence. No changes or concerns.     Dimension 2: Biomedical Conditions & Complications -   Previous Dimension Ratin  Current Dimension Ratin  Medical Concerns:  Nerve and foot pain  Current Medications & Medication Changes:  Current Outpatient Medications   Medication    albuterol (PROAIR HFA/PROVENTIL HFA/VENTOLIN HFA) 108 (90 Base) MCG/ACT inhaler    budesonide-formoterol (SYMBICORT) 160-4.5 MCG/ACT Inhaler    bumetanide (BUMEX) 1 MG tablet    DULoxetine (CYMBALTA) 60 MG capsule    hydrOXYzine (ATARAX) 50 MG tablet    ipratropium - albuterol 0.5 mg/2.5 mg/3 mL (DUONEB) 0.5-2.5 (3) MG/3ML neb solution    omeprazole (PRILOSEC) 20 MG DR capsule    pregabalin (LYRICA) 25 MG capsule     No current facility-administered medications for this visit.     Medication Prescriber:  Yanique Cali MD.   Taking meds as prescribed? Yes  Medication side effects or concerns:  None reported.  Outside medical appointments this week (list provider and reason for visit):  See chart   Narrative: Patient reports the following medical conditions: COPD, overweight, uses CPAP, and general fatigue . Patient has primary care with Yanique Cali MD. Patsy has ended physical therapy however is  looking into the gym and physical therapy options in her apartment complex. Patsy states she is having some concerns with her hips, she was seen and they wanted to do a cortisone injection and she declined. Patsy is still waiting to hear from her insurance about getting orthotics. Patsy went swimming while at a hotel over the weekend so is now even more motivated to ensure her membership at the [facility] [employer] is secured soon. She continues to go on walks and is finding benefit to her stamina. Patsy continues to decrease her smoking.     Dimension 3: Emotional/Behavioral Conditions & Complications  Previous Dimension Ratin  Current Dimension Ratin    PHQ9       2023     1:00 PM 2023    11:48 AM 2023    10:00 PM   PHQ-9 SCORE   PHQ-9 Total Score MyChart  3 (Minimal depression)    PHQ-9 Total Score 3 3 4     GAD7       2023    12:00 PM 2023     1:00 PM 2023    10:00 PM   ALISIA-7 SCORE   Total Score 3 4 4     Mental health diagnosis PTSD  Date of last SIB/SI/HI: N/A  Current MH Assignments:  None  Narrative:  Patient reports a mental health diagnosis of PTSD, Anxiety, and Depression. Patsy has endorses current symptoms of hypervigilance and anxiety when out. Patsy shares continued improvements in her mental health. She is reporting that she has felt relief with her move as she has noticed that it is better for her health and mental health. She states her mental health is good, she has now calmed herself down and is enjoying her new space. Patsy met with her psychiatrist and is meeting with other providers as scheduled. She was set to begin prolonged exposure with Cony MORRISON, [facility] they are working together to find a time that suits both of them. She reports she had a good weekend with her sister, she did take her anxiety medication and attributed it to being in the car as that can cause anxiety. Patsy reports overall stable mental health.     Dimension 4: Treatment Acceptance /  "Resistance  Previous Dimension Ratin  Current Dimension Ratin  ORAL Diagnosis:  Alcohol Use Disorder   303.90 (F10.20) Severe In early remission,   Phase: 3  Commitment to tx process/Stage of change: Action   ORAL assignments: Relapse Prevention Plan   Narrative: Patient appears internally motivated for treatment. She is an active and engaged group member when present. Patsy verbalizes continued motivation for recovery, she continues to reflect on her progress overall and is feeling positive about this place in her life. Patsy was active and engaged this week.     Dimension 5: Relapse / Continued Problem Potential    Previous Dimension Rating:  3  Current Dimension Rating:  3  Relapses this week: None  Urges to use:  See below   UA results:   No results found for this or any previous visit (from the past 168 hour(s)).    Narrative: aPtsy reports when going out to the grocery store she does not ignore the liquor store, she acknowledge that it is there and does not give the liquor store the power. She does daily readings in the morning and evening and when she is having a hard day she reads a passage from the \"big book\".  Patsy is working on the development of a relapse prevention plan. She is beginning to name her triggers prior to events and situations and because of this has a plan for her recovery skills to use. Patsy states that she is not having cravings or triggers but having some thoughts. Patsy states that she has learned that she does not need to give her thoughts any power. She acknowledges the bar and restaurant near her home is a trigger, she smells the food and knows that they are a bar. Counselors have challenged her to order delivery if she wants to try the food and help avoid triggers. Patsy is actively working on managing her pain- by doing so she is reducing the risk of return to use. Patsy identified feeling comfort in her sobriety over the last week stating she feels a reduction in urges and " "triggers overall. No new or emergent concerns or high risk situations.     Dimension 6: Recovery Environment   Previous Dimension Rating:  3  Current Dimension Ratin  Family Involvement : None   Summarize attendance at family groups and family sessions: N/A  Family supportive of treatment?  Yes  Community support group attendance: None  Recreational activities: Exercise   Narrative: Patsy has stable housing at this time and lives alone. She is \"retired and on disability\", she does note some concerns about her financial stability. Patsy is not involved in the criminal justice system. Patsy has officially moved and is unpacked. She is learning more places around her new home and is enjoying some independence in her exploring. Patsy is attending Hoahaoism and finds enjoyment in that. She continues to talk to her sober friend in [place] for support in addition to attending group and therapy.  Patsy attends Hoahaoism regularly and finds benefit in that. Patsy is reading \"Quit Like a Woman\" and doing AA readings and finding benefit in that. Patsy is looking for an emotional support dog, we discussed a program like a foster to adopt to ensure she is getting a dog that fits within her lifestyle needs.     Justification for Continued Treatment at this Level of Care:  Patsy is in Phase 3 and has increased her periods of sobriety. She lacks a sober support network however has begun attending Hoahaoism and has family support. She is medication compliant and has started individual therapy. She has been able to identify triggers, and is starting to learn and implement some coping skills. Her daily life schedule/routine has not been supportive of recovery previously so she is working on improving that.. She is working on getting more structure in her daily activities.     Discharge Planning:  Target Discharge Date/Timeframe:  23  Med Mgmt Provider/Appt:  Dr. Covington in addiction medicine  Ind therapy Provider/Appt:  Weekly   Other " referrals:  None  Has vulnerable adult status change? No  Service Coordination:  None    Supervision:  Patient is staffed with treatment providers, this includes: Cony Guzman [facility], Marisel Mcdaniel, [facility], and Yee Curry, [facility]; Staffed with [facility] Team    Attestation:   Dr. Eastman - Medical Director - Provides oversight and supervision of care.    Are Treatment Plan goals/objectives effective? Yes  *If no, list changes to treatment plan:    Are the current goals meeting client's needs? Yes  *If no, list the changes to treatment plan.    Client Input / Response: Agreeable       *Client agrees with any changes to the treatment plan: N/A  *Client received copy of changes: N/A  *Client is aware of right to access a treatment plan review: Yes (MyChart or request copy)

## 2023-10-11 ENCOUNTER — TELEPHONE (OUTPATIENT)
Dept: BEHAVIORAL HEALTH | Facility: CLINIC | Age: 68
End: 2023-10-11
Payer: COMMERCIAL

## 2023-10-11 ENCOUNTER — DOCUMENTATION ONLY (OUTPATIENT)
Dept: BEHAVIORAL HEALTH | Facility: CLINIC | Age: 68
End: 2023-10-11
Payer: COMMERCIAL

## 2023-10-11 DIAGNOSIS — F32.A DEPRESSIVE DISORDER: ICD-10-CM

## 2023-10-11 DIAGNOSIS — F43.10 PTSD (POST-TRAUMATIC STRESS DISORDER): ICD-10-CM

## 2023-10-11 RX ORDER — DULOXETIN HYDROCHLORIDE 60 MG/1
60 CAPSULE, DELAYED RELEASE ORAL DAILY
Qty: 90 CAPSULE | Refills: 1 | OUTPATIENT
Start: 2023-10-11

## 2023-10-11 NOTE — PROGRESS NOTES
Addiction Outpatient Weekly Clinical Staffing     Patsy Santamaria was staffed on 10/11/2023 . Patsy Santamaria was staffed on recovery strengths, barriers and treatment progress.     Staff present: Marisel Mcdaniel [facility], Latoya Wyatt [facility], Naila Trevizo [facility], Cony Guzman [facility], [facility], Dixie Finnegan [facility] , Shelly Morejon [facility], Donald Welander, [facility], and Joyce Bal, ADC-T    Date: 10/11/2023 Time: 4:04 PM    Staff Signature: PRINCE Pretty

## 2023-10-11 NOTE — TELEPHONE ENCOUNTER
Received refill request for duloxetine 60 mg. Ordered on 7/11/23 for #90 caps and 1 refill.   Refusing refill, should have refill on file.     DULoxetine (CYMBALTA) 60 MG capsule 90 capsule 1 7/11/2023  --   Sig - Route: Take 1 capsule (60 mg) by mouth daily - Oral       Sangita Gant RN on 10/11/2023 at 12:50 PM

## 2023-10-11 NOTE — TELEPHONE ENCOUNTER
----- Message from Cony Guzman [facility], [facility] sent at 10/11/2023  1:30 PM CDT -----  Regarding: RE: Individual sessions PET for PTSD (MI/CD group)  Start date will actually be 10/25/23 and extend for up to 15 weeks. Minna will stay in Phase 3 to have group support while completing treatment for co-occurring PTSD with me.  Cony    ----- Message -----  From: Liam Stoddard [facility]  Sent: 10/11/2023  12:27 PM CDT  To: TAMERA Pretty; #  Subject: RE: Individual sessions PET for PTSD (MI/CD #    Chaz Donnelly:    I noticed that you are only requesting 1:1 therapy sessions. Will the pt be attending MICD phase III concurrently? I ask because the pt is only authorized 20 days, and if she will be attending both group therapy and 1:1 sessions, I will only be able to schedule a few more.    Please let me know if the pt will still be attending phase III and send the scheduling dot phrase for that request.    Thank you,  Liam  ----- Message -----  From: Cony Guzman, [facility], [facility]  Sent: 10/11/2023   9:37 AM CDT  To: PRINCE Pretty; #  Subject: Individual sessions PET for PTSD (MI/CD grou#    Patient Name:  MINNA WOOD [[id number]]  Location of programming: [facility])  Start Date: 11/11/2023  Day/s of week: Wednesdays  Time: 1:30pm  Individual Sessions For OP Group: (MI/XB-Bc-Djrvauxtr Disorders IOP)  Provider: Yamel Eastman MD  Number of visits to be scheduled: 15 (skip days off)  Length/Duration of Appointment in minutes: 60  Visit Type: In person  Additional notes: PET for PTSD treatment

## 2023-10-11 NOTE — TELEPHONE ENCOUNTER
Writer called Patsy to clarify schedule moving forward. We agreed to begin Prolonged Exposure Therapy for treatment of co-occurring PTSD to reduce risk of return to use (AUD, severe in early remission). We agreed to begin session 1 (9-15 session protocol) on 10/25/2023 (Wednesdays weekly 1:30pm-3:00pm). She was encouraged to continue practicing breathing retraining taught at last in-person session with this writer. She will continue to attend Phase 3 group sessions, weekly (Tuesdays 9-11am).  Con[facility] Guz[facility], [facility], LADC

## 2023-10-16 ENCOUNTER — NURSE TRIAGE (OUTPATIENT)
Dept: FAMILY MEDICINE | Facility: CLINIC | Age: 68
End: 2023-10-16
Payer: COMMERCIAL

## 2023-10-16 NOTE — TELEPHONE ENCOUNTER
Nurse Triage SBAR    Is this a 2nd Level Triage? YES, LICENSED PRACTITIONER REVIEW IS REQUIRED    Situation: Patient called reporting was having clear coughing starting since Friday, 10/13 but now the coughing are dry.  Having rib/chest pain, back and neck/shoulder pain as result from the constant coughing from the weekend.      Background: Patient reported diagnosed with acute bronchitis a few years back and COPD.  Patient on Proair and Symbicort.  Patient reported not using the nebulizer due to machine missing parts.     Assessment: Patient denied denied fever. However, does have the chills (hot/cold sweats).     Protocol Recommended Disposition:   See in Office Within 3 Days    Recommendation: Patient would like treatment if provider can send treatment.  Due to the   Symptoms patient is having, it is recommended patient be seen in person to have symptoms evaluate for proper treatment/referral.  Patient verbally agreed.  A future appointment scheduled with date, time, and location provided.  RN advised to seek urgent care/ER if symptoms worsening prior to being seen by pcp in the morning.  Patient verbalized understood.    Routed to provider Dr. Viraj Chanel MD    Does the patient meet one of the following criteria for ADS visit consideration? No    RAFAL Contreras, RN  [facility]      Reason for Disposition   History of asthma or has mild wheezing    Additional Information   Negative: SEVERE difficulty breathing (e.g., struggling for each breath, speaks in single words)   Negative: Lips or face are bluish now and persists when not coughing   Negative: Sounds like a life-threatening emergency to the triager   Negative: Chest pain is main symptom   Negative: Cough and < 3 weeks duration   Negative: Previous asthma attacks and this feels like an asthma attack   Negative: MODERATE difficulty breathing (e.g., speaks in phrases, SOB even at rest, pulse 100-120) and still present when  "not coughing   Negative: Chest pain  (Exception: MILD central chest pain, present only when coughing.)   Negative: Increasing difficulty breathing and always has some difficulty breathing   Negative: Patient sounds very sick or weak to the triager   Negative: MILD difficulty breathing (e.g., minimal/no SOB at rest, SOB with walking, pulse < 100) and still present when not coughing  (Exception: No change from usual, chronic shortness of breath.)   Negative: Coughed up blood and > 1 tablespoon (15 ml)  (Exception: Blood-tinged sputum.)   Negative: Fever > 103 F (39.4 C)   Negative: Fever > 101 F (38.3 C) and age > 60 years   Negative: Fever > 100.0 F (37.8 C) and bedridden (e.g., CVA, chronic illness, recovering from surgery)   Negative: Fever > 100.0 F (37.8 C) and diabetes mellitus or weak immune system (e.g., HIV positive, cancer chemo, splenectomy, organ transplant, chronic steroids)   Negative: SEVERE coughing spells (e.g., whooping sound after coughing, vomiting after coughing)   Negative: Continuous (nonstop) coughing interferes with work or school and no improvement using cough treatment per Care Advice   Negative: Fever present > 3 days (72 hours)   Negative: Coughing up tj-colored sputum   Negative: Change in color of sputum (e.g., from white to yellow-green sputum)   Negative: Increase in amount of sputum   Negative: Taking an ACE Inhibitor medicine (e.g., benazepril/LOTENSIN, captopril/CAPOTEN, enalapril/VASOTEC, lisinopril/ZESTRIL)   Negative: Chest or rib pain and only occurs while coughing   Negative: Sinus pain or pressure (around cheekbone or eye)   Negative: Nasal discharge and present > 10 days   Negative: Blood-tinged sputum has been coughed up and more than once    Answer Assessment - Initial Assessment Questions  1. ONSET: \"When did the cough begin?\"       Coughing begin Friday, 10/13/23  2. SEVERITY: \"How bad is the cough today?\"       Cannot cough up phlegm due to the rib and back pain from " "coughing too much earlier. Reported coughing is worse today than when first started.  3. SPUTUM: \"Describe the color of your sputum\" (none, dry cough; clear, white, yellow, green)      Clear  4. HEMOPTYSIS: \"Are you coughing up any blood?\" If so ask: \"How much?\" (flecks, streaks, tablespoons, etc.)      No  5. DIFFICULTY BREATHING: \"Are you having difficulty breathing?\" If Yes, ask: \"How bad is it?\" (e.g., mild, moderate, severe)     - MILD: No SOB at rest, mild SOB with walking, speaks normally in sentences, can lie down, no retractions, pulse < 100.     - MODERATE: SOB at rest, SOB with minimal exertion and prefers to sit, cannot lie down flat, speaks in phrases, mild retractions, audible wheezing, pulse 100-120.     - SEVERE: Very SOB at rest, speaks in single words, struggling to breathe, sitting hunched forward, retractions, pulse > 120       Moderate.  Client reported sitting upright in a chair to talk RN.  6. FEVER: \"Do you have a fever?\" If Yes, ask: \"What is your temperature, how was it measured, and when did it start?\"      Does not think so.  However, endorsed the chills.  Patient reported has no COVID test at home.   7. CARDIAC HISTORY: \"Do you have any history of heart disease?\" (e.g., heart attack, congestive heart failure)       Mother has hx of heart disease.   8. LUNG HISTORY: \"Do you have any history of lung disease?\"  (e.g., pulmonary embolus, asthma, emphysema)      Patient diagnosed with COPD and acute bronchitis  9. PE RISK FACTORS: \"Do you have a history of blood clots?\" (or: recent major surgery, recent prolonged travel, bedridden)      None of the above.   10. OTHER SYMPTOMS: \"Do you have any other symptoms?\" (e.g., runny nose, wheezing, chest pain)        Wheezing, SOB, rib pain, shoulder and neck are from coughing too much.   11. PREGNANCY: \"Is there any chance you are pregnant?\" \"When was your last menstrual period?\"        N/a  12. TRAVEL: \"Have you traveled out of the country in the " "last month?\" (e.g., travel history, exposures)        no    Protocols used: Cough - Chronic-A-OH    "

## 2023-10-17 ENCOUNTER — DOCUMENTATION ONLY (OUTPATIENT)
Dept: BEHAVIORAL HEALTH | Facility: CLINIC | Age: 68
End: 2023-10-17

## 2023-10-17 ENCOUNTER — OFFICE VISIT (OUTPATIENT)
Dept: FAMILY MEDICINE | Facility: CLINIC | Age: 68
End: 2023-10-17
Payer: COMMERCIAL

## 2023-10-17 VITALS
TEMPERATURE: 97.6 F | WEIGHT: 258 LBS | DIASTOLIC BLOOD PRESSURE: 78 MMHG | BODY MASS INDEX: 45.72 KG/M2 | HEART RATE: 66 BPM | OXYGEN SATURATION: 96 % | RESPIRATION RATE: 30 BRPM | SYSTOLIC BLOOD PRESSURE: 113 MMHG

## 2023-10-17 DIAGNOSIS — Z23 NEED FOR PROPHYLACTIC VACCINATION AGAINST HEPATITIS A: ICD-10-CM

## 2023-10-17 DIAGNOSIS — Z29.11 NEED FOR VACCINATION AGAINST RESPIRATORY SYNCYTIAL VIRUS: ICD-10-CM

## 2023-10-17 DIAGNOSIS — J43.8 OTHER EMPHYSEMA (H): ICD-10-CM

## 2023-10-17 DIAGNOSIS — F33.1 MODERATE EPISODE OF RECURRENT MAJOR DEPRESSIVE DISORDER (H): ICD-10-CM

## 2023-10-17 DIAGNOSIS — R05.1 ACUTE COUGH: ICD-10-CM

## 2023-10-17 DIAGNOSIS — J20.9 ACUTE BRONCHITIS, UNSPECIFIED ORGANISM: ICD-10-CM

## 2023-10-17 DIAGNOSIS — Z12.11 SCREEN FOR COLON CANCER: Primary | ICD-10-CM

## 2023-10-17 PROCEDURE — 99214 OFFICE O/P EST MOD 30 MIN: CPT | Performed by: FAMILY MEDICINE

## 2023-10-17 PROCEDURE — 87635 SARS-COV-2 COVID-19 AMP PRB: CPT | Performed by: FAMILY MEDICINE

## 2023-10-17 RX ORDER — BENZONATATE 200 MG/1
200 CAPSULE ORAL 3 TIMES DAILY PRN
Qty: 30 CAPSULE | Refills: 0 | Status: SHIPPED | OUTPATIENT
Start: 2023-10-17 | End: 2024-01-23

## 2023-10-17 RX ORDER — AZITHROMYCIN 250 MG/1
TABLET, FILM COATED ORAL
Qty: 6 TABLET | Refills: 0 | Status: SHIPPED | OUTPATIENT
Start: 2023-10-17 | End: 2023-10-22

## 2023-10-17 RX ORDER — RESPIRATORY SYNCYTIAL VIRUS VACCINE 120MCG/0.5
0.5 KIT INTRAMUSCULAR ONCE
Qty: 1 EACH | Refills: 0 | Status: CANCELLED | OUTPATIENT
Start: 2023-10-17 | End: 2023-10-17

## 2023-10-17 RX ORDER — ALBUTEROL SULFATE 90 UG/1
2 AEROSOL, METERED RESPIRATORY (INHALATION) EVERY 4 HOURS PRN
Qty: 18 G | Refills: 3 | Status: SHIPPED | OUTPATIENT
Start: 2023-10-17

## 2023-10-17 RX ORDER — PREDNISONE 20 MG/1
40 TABLET ORAL DAILY
Qty: 10 TABLET | Refills: 0 | Status: SHIPPED | OUTPATIENT
Start: 2023-10-17 | End: 2023-10-22

## 2023-10-17 NOTE — PROGRESS NOTES
ABSENT NOTE:    Patsy Santamaria was absent from group 10/17/2023 . This absence was excused. The patient let providers know she was attending a medical appointment. Patient is expected to be in group on 10/24/23.     PRINCE Pretty  10/17/2023 , 10:44 AM

## 2023-10-17 NOTE — COMMUNITY RESOURCES LIST (ENGLISH)
10/17/2023   [facility]  N/A  For questions about this resource list or additional care needs, please contact your primary care clinic or care manager.  Phone: [phone]   Email: N/A   Address: [address] [place] [address]   Hours: N/A        Food and Nutrition       Food pantry  1  [employer] [place] [facility] [employer] [place] [employer] - Food pantry Distance: 1.53 miles      Delivery, [facility]   [address] [place] [address]  Language: English  Hours: Mon 1:00 PM - 6:00 PM , Tue 9:30 AM - 2:30 PM , Wed - Thu 9:30 AM - 2:00 PM  Fees: Free   Phone: (676) 322-5395 Email: info@[facility] Philadelphiaaction.org Website: http://[facility] PhiladelphiaDragonfruit Studios.org/     2  [facility] Food Market Distance: 1.85 miles      [facility]   [address] [place] [address]  Language: Serbian, English, Mauritian, Yi, Swahili  Hours: Mon - Wed 9:00 AM - 11:30 AM , Tue 1:00 PM - 3:30 PM , Wed 1:00 PM - 4:00 PM  Fees: Free   Phone: (414) 993-8847 Email: info@[employer].org Website: https://neighborhoodhouse[place].org/programs/food-support/food-markets/     SNAP application assistance  3  [employer] ([facility] Distance: 2.6 miles      Phone/Virtual   [address] [place] [address]  Language: English  Hours: Mon - Fri 8:00 AM - 4:30 PM  Fees: Free   Phone: (402) 648-5244 Email: info@caprw.org Website: http://www.caprw.org/     4  [facility] - Health and Wellness Distance: 3.17 miles      In-Person, Phone/Virtual   [address] [place] [address]  Language: English  Hours: Mon - Fri 8:00 AM - 4:30 PM  Fees: Free   Phone: (405) 589-6884 Email: mariluz@[facility] Oklahoma CityPansieve. Website: https://www.[facility].us/your-government/departments/health-and-wellness     Soup kitchen or free meals  5  [facility] [place] [facility] Distance: 0.81 miles      Pickup   [address]  [place] [address]  Language: English  Hours: Tue 2:00 PM - 4:00 PM , Thu 2:00 PM - 4:00 PM  Fees: Free   Phone: (953) 701-3104 Email: conchita@[facility]. Website: https://www.[facility]/facilities/nrgpdk-guissjtqj-dlcuxp     6  [facility] [place] [facility] - Free Summer Meals Distance: 2.17 miles      In-Person   [address] [place] [address]  Language: English, Hmong, Hungarian  Hours: Mon - Fri 12:00 PM - 1:00 PM , Mon - Fri 3:00 PM - 4:00 PM  Fees: Free   Phone: (149) 414-9324 Email: Ching@[facility]. Website: https://www.[facility]/departments/alves-recreation/[place]          Transportation       Free or low-cost transportation  7  [facility] Bus Distance: 3.46 miles      In-Person   [address] [place] [address]  Language: English  Hours: Tue 9:00 AM - 2:00 PM  Fees: Self Pay   Phone: (364) 402-7564 Email: info@[employer] Website: http://www.[employer].org/     8  [facility] Distance: 4.22 miles      In-Person   [address] [place] [address]  Language: English, Hungarian  Hours: Mon 9:00 AM - 5:00 PM , Tue 9:30 AM - 7:00 PM , Wed 9:00 AM - 5:00 PM , Thu 9:30 AM - 7:00 PM , Fri 9:00 AM - 5:00 PM  Fees: Free   Phone: (491) 970-3129 Email: carlotta@[employer] Website: http://www.[employer]     Transportation to medical appointments  9  [facility] - Non-Emergency Medical Transportation Distance: 2.22 miles      In-Person   [address] [place] [address]  Language: English  Hours: Mon - Fri 8:00 AM - 4:00 PM Appt. Only  Fees: Self Pay   Phone: (219) 240-7286 Website: http://www.[facility].org/Medical-Services/Emergency-medical-services/Non-emergency-transportation/     10  [facility] Distance: 2.73 miles      In-Person   [address] [place] [address]  Language: English  Hours: Mon - Fri 9:00 AM - 4:00 PM  Fees:  Free   Phone: (686) 201-9121 Email: lingnetkenavladimirjarred@[employer].com.[employer] Website: http://www.[facility] Networkforseniors.org/          Important Numbers & Websites       Emergency Services   911  [facility] Services   311  Poison Control   (111) 761-7055  Suicide Prevention Lifeline   (153) 802-3973 (TALK)  Child Abuse Hotline   (695) 202-9452 (4-A-Child)  Sexual Assault Hotline   (469) 254-5665 (HOPE)  National Runaway Safeline   (811) 353-9923 (RUNAWAY)  All-Options Talkline   (340) 255-6722  Substance Abuse Referral   (137) 853-4733 (HELP)

## 2023-10-17 NOTE — PROGRESS NOTES
Weekly Progress Note 10/16/23-10/22/23  Patsy Santamaria  1955  [id number]      D) Pt attended 0 groups  this week with 1 absences. Patient is currently in phase 3. The patient was absent due to medical appointment. A) Staff facilitated groups and reviewed tx progress. Assessed for [facility]. R) Unable to assess along six dimensions or for [facility] due to lack of attendance.   T) Patient has completed 84 total hours. Patient anticipated DC date 11/21/23    Attestation:   Dr. Eastman - Medical Director - Provides oversight and supervision of care.    PRINCE Pretty 10/17/2023 10:59 AM

## 2023-10-17 NOTE — PROGRESS NOTES
1. Acute bronchitis, unspecified organism  2. Acute cough  69 yo female smoker - reports acute cough - symptoms consistent with acute bronchitis - will treat with Benzonatate for cough; prednisone for airway inflammation, Azithromycin for inflammation/bronchitis.    - predniSONE (DELTASONE) 20 MG tablet; Take 2 tablets (40 mg) by mouth daily for 5 days  Dispense: 10 tablet; Refill: 0  - azithromycin (ZITHROMAX) 250 MG tablet; Take 2 tablets (500 mg) by mouth daily for 1 day, THEN 1 tablet (250 mg) daily for 4 days.  Dispense: 6 tablet; Refill: 0  - Symptomatic COVID-19 Virus (Coronavirus) by PCR; Future  - benzonatate (TESSALON) 200 MG capsule; Take 1 capsule (200 mg) by mouth 3 times daily as needed for cough  Dispense: 30 capsule; Refill: 0  - Symptomatic COVID-19 Virus (Coronavirus) by PCR Nose    3. Other emphysema (H)  Patient has underlying COPD - encourage appropriate use of nebs/inhalers - refilled.    - albuterol (PROAIR HFA/PROVENTIL HFA/VENTOLIN HFA) 108 (90 Base) MCG/ACT inhaler; Inhale 2 puffs into the lungs every 4 hours as needed for shortness of breath or wheezing  Dispense: 18 g; Refill: 3  - Nebulizer and Supplies Order for DME - ONLY FOR DME    4. Moderate episode of recurrent major depressive disorder (H)  Patient with depression disorder - continues to follow with mental health providers - feels like she is doing well -     5. Screen for colon cancer  Due for colon cancer screening - declines    6. Need for prophylactic vaccination against hepatitis A  Due for hepatitis A vaccine - will defer to later     7. Need for vaccination against respiratory syncytial virus  Due for RSV vaccine -       Subjective   Patsy is a 68 year old, presenting for the following health issues:  URI (Shortness of breath, wheezing, cough, and muscle aches x1 week. No known exposure to sick person. )        10/17/2023     8:32 AM   Additional Questions   Roomed by Dorothy       Smoking 5 cigs/day  Can't cough anything up  -   Ribs are sore/back is sore  Bms hurt because everything is sore  Using Halls, honey/lemon/jovana - home remedies -   Sick 4-5 days -   No one else sick around her   No fever, but chills with hot/cold  Sweats - then wakes up freezing -   Wants to sleep - tired because doesn't feel good  Wheezing is worse   Using inhaler -   Needs refill on Albuterol -   Can't neb because neb machine is broken - more than  7 years old (may be 10 years or older)      History of Present Illness       Reason for visit:  Cold symptoms  Symptom onset:  3-7 days ago  Symptoms include:  Cough, SOB, muscle aches        Review of Systems   Constitutional:  Positive for fatigue. Negative for chills and fever.   HENT:  Negative for congestion, ear pain and sore throat.    Eyes:  Negative for pain and visual disturbance.   Respiratory:  Positive for cough and shortness of breath.    Cardiovascular:  Negative for chest pain, palpitations and peripheral edema.   Gastrointestinal:  Negative for abdominal pain, constipation and diarrhea.   Endocrine: Negative for polyuria.   Genitourinary:  Negative for dysuria, frequency and vaginal discharge.   Musculoskeletal:  Negative for arthralgias and myalgias.   Skin:  Negative for rash.   Allergic/Immunologic: Negative.    Neurological:  Negative for dizziness, weakness, headaches and paresthesias.   Hematological:  Does not bruise/bleed easily.   Psychiatric/Behavioral:  Positive for mood changes. The patient is nervous/anxious.    All other systems reviewed and are negative.           Objective    /78 (BP Location: Left arm, Patient Position: Sitting, Cuff Size: Adult Regular)   Pulse 66   Temp 97.6  F (36.4  C) (Temporal)   Resp 30   Wt 117 kg (258 lb)   LMP  (LMP Unknown)   SpO2 96%   BMI 45.72 kg/m    Body mass index is 45.72 kg/m .  Physical Exam  Vitals reviewed.   Constitutional:       General: She is not in acute distress.     Appearance: Normal appearance. She is ill-appearing.    HENT:      Head: Normocephalic.      Right Ear: Tympanic membrane, ear canal and external ear normal.      Left Ear: Tympanic membrane, ear canal and external ear normal.      Nose: Nose normal.      Mouth/Throat:      Mouth: Mucous membranes are moist.      Pharynx: No posterior oropharyngeal erythema.   Eyes:      Extraocular Movements: Extraocular movements intact.      Conjunctiva/sclera: Conjunctivae normal.      Pupils: Pupils are equal, round, and reactive to light.   Cardiovascular:      Rate and Rhythm: Normal rate and regular rhythm.      Pulses: Normal pulses.      Heart sounds: Normal heart sounds. No murmur heard.  Pulmonary:      Effort: Pulmonary effort is normal.      Breath sounds: Wheezing present.      Comments: + cough    Abdominal:      Palpations: Abdomen is soft. There is no mass.      Tenderness: There is no abdominal tenderness. There is no guarding or rebound.   Musculoskeletal:         General: No deformity. Normal range of motion.      Cervical back: Normal range of motion and neck supple.   Lymphadenopathy:      Cervical: No cervical adenopathy.   Skin:     General: Skin is warm and dry.   Neurological:      General: No focal deficit present.      Mental Status: She is alert.   Psychiatric:         Mood and Affect: Mood normal.         Behavior: Behavior normal.            Results for orders placed or performed in visit on 10/17/23   Symptomatic COVID-19 Virus (Coronavirus) by PCR Nose     Status: Normal    Specimen: Nose; Swab   Result Value Ref Range    SARS CoV2 PCR Negative Negative    Narrative    Testing was performed using the eduardo SARS-CoV-2 assay on the eduardo  [employer] System. This test should be ordered for the detection of  SARS-CoV-2 in individuals who meet SARS-CoV-2 clinical and/or  epidemiological criteria. Test performance is unknown in asymptomatic  patients. This test is for in vitro diagnostic use under the FDA EUA  for laboratories certified under CLIA to perform high  and/or moderate  complexity testing. This test has not been FDA cleared or approved. A  negative result does not rule out the presence of PCR inhibitors in  the specimen or target RNA in concentration below the limit of  detection for the assay. The possibility of a false negative should  be considered if the patient's recent exposure or clinical  presentation suggests COVID-19. This test was validated by the [facility]. This  laboratory is certified under the Clinical Laboratory Improvement  Amendments of 1988 (CLIA-88) as qualified to perform high and/or  moderate complexity laboratory testing.               Prior to immunization administration, verified patients identity using patient s name and date of birth. Please see Immunization Activity for additional information.     Screening Questionnaire for Adult Immunization    Are you sick today?   Yes   Do you have allergies to medications, food, a vaccine component or latex?   No   Have you ever had a serious reaction after receiving a vaccination?   No   Do you have a long-term health problem with heart, lung, kidney, or metabolic disease (e.g., diabetes), asthma, a blood disorder, no spleen, complement component deficiency, a cochlear implant, or a spinal fluid leak?  Are you on long-term aspirin therapy?   No   Do you have cancer, leukemia, HIV/AIDS, or any other immune system problem?   No   Do you have a parent, brother, or sister with an immune system problem?   No   In the past 3 months, have you taken medications that affect  your immune system, such as prednisone, other steroids, or anticancer drugs; drugs for the treatment of rheumatoid arthritis, Crohn s disease, or psoriasis; or have you had radiation treatments?   No   Have you had a seizure, or a brain or other nervous system problem?   No   During the past year, have you received a transfusion of blood or blood    products, or been given immune  (gamma) globulin or antiviral drug?   No   For women: Are you pregnant or is there a chance you could become       pregnant during the next month?   No   Have you received any vaccinations in the past 4 weeks?   No     Immunization questionnaire was positive for at least one answer.        Patient instructed to remain in clinic for 15 minutes afterwards, and to report any adverse reactions.     Screening performed by Dorothy Pedraza RN on 10/17/2023 at 8:36 AM.

## 2023-10-17 NOTE — COMMUNITY RESOURCES LIST (ENGLISH)
10/17/2023   [facility]  N/A  For questions about this resource list or additional care needs, please contact your primary care clinic or care manager.  Phone: [phone]   Email: N/A   Address: [address] [place] [address]   Hours: N/A        Food and Nutrition       Food pantry  1  [employer] [facility] [place] [facility] [employer] [place] [employer] - Food pantry Distance: 1.53 miles      Delivery, [facility]   [address] [place] [address]  Language: English  Hours: Mon 1:00 PM - 6:00 PM , Tue 9:30 AM - 2:30 PM , Wed - Thu 9:30 AM - 2:00 PM  Fees: Free   Phone: (368) 729-5760 Email: info@[facility].org Website: http://[facility] HospitalViscose Closures.org/     2  [facility] Food Market Distance: 1.85 miles      [facility]   [address], [address] [place] [address]  Language: Romansh, English, Venezuelan, Armenian, Swahili  Hours: Mon - Wed 9:00 AM - 11:30 AM , Tue 1:00 PM - 3:30 PM , Wed 1:00 PM - 4:00 PM  Fees: Free   Phone: (546) 261-6726 Email: info@[employer].org Website: https://[place]house[place].org/programs/food-support/food-markets/     SNAP application assistance  3  [employer] ([facility]) [facility] Distance: 2.6 miles      Phone/Virtual   [address] [place] [address]  Language: English  Hours: Mon - Fri 8:00 AM - 4:30 PM  Fees: Free   Phone: (568) 549-4255 Email: info@cap[employer].org Website: http://www.caprw.org/     4  [facility] - Health and Wellness Distance: 3.17 miles      In-Person, Phone/Virtual   [address] [place] [address]  Language: English  Hours: Mon - Fri 8:00 AM - 4:30 PM  Fees: Free   Phone: (953) 730-8282 Email: mariluz@[facility] [employer]. Website: https://www.[facility].us/your-government/departments/health-and-wellness     Soup kitchen or free meals  5  [facility] [place] [facility] Distance: 0.81 miles      Pickup   [address]  [place] [address]  Language: English  Hours: Tue 2:00 PM - 4:00 PM , Thu 2:00 PM - 4:00 PM  Fees: Free   Phone: (606) 560-4989 Email: conchita@[facility]. Website: https://www.[facility]/facilities/wuqjlb-bntlidojn-yudmru     6  [facility] [place] [facility] - Free Summer Meals Distance: 2.17 miles      In-Person   [address] [place] [address]  Language: English, Hmong, Romansh  Hours: Mon - Fri 12:00 PM - 1:00 PM , Mon - Fri 3:00 PM - 4:00 PM  Fees: Free   Phone: (572) 742-2474 Email: Ching@[facility]. Website: https://www.[facility]/departments/alves-recreation/[place] [facility]          Transportation       Free or low-cost transportation  7  "[employer] Circulator Bus Distance: 3.46 miles      In-Person   [address] [place] [address]  Language: English  Hours: Tue 9:00 AM - 2:00 PM  Fees: Self Pay   Phone: (539) 625-4507 Email: info@[employer] Website: http://www.[employer].org/     8  [facility] Distance: 4.22 miles      In-Person   [address] [place] [address]  Language: English, Romansh  Hours: Mon 9:00 AM - 5:00 PM , Tue 9:30 AM - 7:00 PM , Wed 9:00 AM - 5:00 PM , Thu 9:30 AM - 7:00 PM , Fri 9:00 AM - 5:00 PM  Fees: Free   Phone: (631) 389-6565 Email: carlotta@[employer] Website: http://www.[employer]     Transportation to medical appointments  9  [facility] - Non-Emergency Medical Transportation Distance: 2.22 miles      In-Person   [address] [place] [address]  Language: English  Hours: Mon - Fri 8:00 AM - 4:00 PM Appt. Only  Fees: Self Pay   Phone: (224) 979-4596 Website: http://www.[facility].org/Medical-Services/Emergency-medical-services/Non-emergency-transportation/     10  [facility]iors Distance: 2.73 miles      In-Person   [address] [place] [address]  Language: English  Hours: Mon - Fri 9:00 AM - 4:00 PM  Fees:  Free   Phone: (331) 136-8183 Email: lingnetkenavladimirjarred@[employer] Website: http://www.[facility] Hospital[facility]eniors.org/          Important Numbers & Websites       Emergency Services   911  [facility] Services   311  Poison Control   (104) 682-1355  Suicide Prevention Lifeline   (641) 392-3399 (TALK)  Child Abuse Hotline   (169) 596-4303 (4-A-Child)  Sexual Assault Hotline   (636) 436-9594 (HOPE)  National Runaway Safeline   (909) 782-4492 (RUNAWAY)  All-Options Talkline   (720) 742-3324  Substance Abuse Referral   (475) 214-4004 (HELP)

## 2023-10-18 LAB — SARS-COV-2 RNA RESP QL NAA+PROBE: NEGATIVE

## 2023-10-19 ENCOUNTER — TELEPHONE (OUTPATIENT)
Dept: FAMILY MEDICINE | Facility: CLINIC | Age: 68
End: 2023-10-19
Payer: COMMERCIAL

## 2023-10-19 NOTE — TELEPHONE ENCOUNTER
Patient called back. Message was relayed to patient and she verbalized understanding. No further action is needed.

## 2023-10-19 NOTE — TELEPHONE ENCOUNTER
Called pt in an attempt to relay message. LVM to call clinic back. Please relay message when pt calls back.        Cedric Plunkett Cem Say, BSN RN  [facility]      ----- Message from Yanique Cali MD sent at 10/19/2023 10:43 AM CDT -----  Let patient know she doesn't have COVID.

## 2023-10-22 ASSESSMENT — ENCOUNTER SYMPTOMS
HEADACHES: 0
DIZZINESS: 0
WEAKNESS: 0
PALPITATIONS: 0
CONSTIPATION: 0
BRUISES/BLEEDS EASILY: 0
COUGH: 1
DIARRHEA: 0
FREQUENCY: 0
FATIGUE: 1
SHORTNESS OF BREATH: 1
ABDOMINAL PAIN: 0
EYE PAIN: 0
ALLERGIC/IMMUNOLOGIC NEGATIVE: 1
FEVER: 0
DYSURIA: 0
NERVOUS/ANXIOUS: 1
SORE THROAT: 0
PARESTHESIAS: 0
MYALGIAS: 0
CHILLS: 0
ARTHRALGIAS: 0

## 2023-10-23 ENCOUNTER — TELEPHONE (OUTPATIENT)
Dept: BEHAVIORAL HEALTH | Facility: CLINIC | Age: 68
End: 2023-10-23
Payer: COMMERCIAL

## 2023-10-23 DIAGNOSIS — F43.10 PTSD (POST-TRAUMATIC STRESS DISORDER): ICD-10-CM

## 2023-10-23 DIAGNOSIS — F33.9 EPISODE OF RECURRENT MAJOR DEPRESSIVE DISORDER, UNSPECIFIED DEPRESSION EPISODE SEVERITY (H): ICD-10-CM

## 2023-10-23 NOTE — TELEPHONE ENCOUNTER
----- Message from PRINCE Pretty sent at 10/23/2023 12:53 PM CDT -----  Scheduling Request    Patient Name: Patsy Santamaria  Location of programming: [place]   Start Date: October / 24 / 2023  Group:  [facility] PH 3 on Tuesday at 9:00 AM to 11:00 AM  Attending Provider (MD): Alberto   Number of visits to be scheduled: 10  Duration of Appointment in minutes: 120  Visit Type: In-person or Treatment - 870    Additional notes: NA

## 2023-10-24 ENCOUNTER — DOCUMENTATION ONLY (OUTPATIENT)
Dept: BEHAVIORAL HEALTH | Facility: CLINIC | Age: 68
End: 2023-10-24
Payer: COMMERCIAL

## 2023-10-24 ENCOUNTER — HOSPITAL ENCOUNTER (OUTPATIENT)
Dept: BEHAVIORAL HEALTH | Facility: CLINIC | Age: 68
Discharge: HOME | End: 2023-10-24
Attending: FAMILY MEDICINE
Payer: COMMERCIAL

## 2023-10-24 PROCEDURE — H2035 A/D TX PROGRAM, PER HOUR: HCPCS

## 2023-10-24 NOTE — PROGRESS NOTES
Individual Session:      Start Time:  9:10 AM   End Time:  10:30 AM      Note:   Patsy Santamaria met with counselors PRINCE Pretty and Joyce CASTANOT for an individual session due to a low census. Pasty shared that she is feeling better, she was diagnosed with bronchitis and was able to get medications for this. She shared she got a gym membership and that begins on 11/1/23, she is hopeful that by having the gym she will release some weight. She was scheduled for a weight loss consult but her insurance will not cover it, we discussed meeting with a nutritionist at [facility] or other location. Patsy shared her mental health has been stable, she is feeling safe and secure. Patsy shared that her son dropped by and that was a good visit. She wants to write a letter to her children about why she chose adoption for them so they understand the full perspective of what happened. Patsy shared some thoughts of use, she went to a liquor store where bottles were $30+ dollars she asked the  how to get into the coffee shop and walked over there, got a donut then went home. We reflected on this and she shared she allowed herself to think about what just happened and felt proud of herself for not drinking. Patsy continues to work on her relapse prevention plan. She has a goal to et a dog in the coming week(s). No new or emergent concerns endorsed over the last week.     Attestation:   Dr. Eastman - Medical Director - Provides oversight and supervision of care.    PRINCE Pretty 10/24/2023 2:03 PM

## 2023-10-24 NOTE — PROGRESS NOTES
[facility] Weekly Treatment Plan Review      ATTENDANCE for the following date span: 10/23/23-10/29/23    Date     Group Therapy  1  hours        Individual Therapy        Family Therapy        Other (Specify) Phase 1 &2  Phase 3  Phase 1  Phase 1 & 2        Patient attended all scheduled sessions during this time period.   Total hours: 85 hours. Patient is in phase 3    Weekly Treatment Plan Review     Treatment Plan initiated on 2023.    Dimension1: Acute Intoxication/Withdrawal Potential -   Previous Dimension Ratin  Current Dimension Ratin  Date of Last Use: 23  Any reports of withdrawal symptoms: No  Narrative: Patsy reports sustained abstinence. No changes or concerns.     Dimension 2: Biomedical Conditions & Complications -   Previous Dimension Ratin  Current Dimension Ratin  Medical Concerns:  Nerve and foot pain  Current Medications & Medication Changes:  Current Outpatient Medications   Medication    albuterol (PROAIR HFA/PROVENTIL HFA/VENTOLIN HFA) 108 (90 Base) MCG/ACT inhaler    benzonatate (TESSALON) 200 MG capsule    budesonide-formoterol (SYMBICORT) 160-4.5 MCG/ACT Inhaler    bumetanide (BUMEX) 1 MG tablet    DULoxetine (CYMBALTA) 60 MG capsule    hydrOXYzine (ATARAX) 50 MG tablet    ipratropium - albuterol 0.5 mg/2.5 mg/3 mL (DUONEB) 0.5-2.5 (3) MG/3ML neb solution    omeprazole (PRILOSEC) 20 MG DR capsule    pregabalin (LYRICA) 25 MG capsule     No current facility-administered medications for this visit.     Medication Prescriber:  Yanique Cali MD.   Taking meds as prescribed? Yes  Medication side effects or concerns:  None reported.  Outside medical appointments this week (list provider and reason for visit):  See chart   Narrative: Patient reports the following medical conditions: COPD, overweight, uses CPAP, and general fatigue . Patient has primary care with Yanique Cali MD. Patsy  shared she got a gym membership and that begins on 23, she is hopeful that by having the gym she will release some weight.  Patsy shared that she is feeling better, she was diagnosed with bronchitis and was able to get medications for this. Patsy continues to decrease her smoking.     Dimension 3: Emotional/Behavioral Conditions & Complications  Previous Dimension Ratin  Current Dimension Ratin    PHQ9       2023     1:00 PM 2023    11:48 AM 2023    10:00 PM   PHQ-9 SCORE   PHQ-9 Total Score MyChart  3 (Minimal depression)    PHQ-9 Total Score 3 3 4     GAD7       2023    12:00 PM 2023     1:00 PM 2023    10:00 PM   ALISIA-7 SCORE   Total Score 3 4 4     Mental health diagnosis PTSD  Date of last SIB/SI/HI: N/A  Current MH Assignments:  None  Narrative:  Patient reports a mental health diagnosis of PTSD, Anxiety, and Depression. Patsy has endorses current symptoms of hypervigilance and anxiety when out. Patsy shares continued improvements in her mental health. She is reporting that she has felt relief with her move as she has noticed that it is better for her health and mental health. She states her mental health is good, she has now calmed herself down and is enjoying her new space. Patsy met with her psychiatrist and is meeting with other providers as scheduled. She was set to begin prolonged exposure with Cony Guzman [facility] they are working together to find a time that suits both of them. Patsy shared her mental health has been stable, she is feeling safe and secure. She wants to write a letter to her children about why she chose adoption for them so they understand the full perspective of what happened.     Dimension 4: Treatment Acceptance / Resistance  Previous Dimension Ratin  Current Dimension Ratin  ORAL Diagnosis:  Alcohol Use Disorder   303.90 (F10.20) Severe In early remission,   Phase: 3  Commitment to tx process/Stage of change: Action   ORAL  "assignments: Relapse Prevention Plan   Narrative: Patient appears internally motivated for treatment. She is an active and engaged group member when present. Patsy verbalizes continued motivation for recovery, she continues to reflect on her progress overall and is feeling positive about this place in her life. Patsy was active and engaged this week.     Dimension 5: Relapse / Continued Problem Potential    Previous Dimension Rating:  3  Current Dimension Rating:  3  Relapses this week: None  Urges to use:  See below   UA results:   No results found for this or any previous visit (from the past 168 hour(s)).    Narrative: Patsy reports when going out to the grocery store she does not ignore the liquor store, she acknowledge that it is there and does not give the liquor store the power. She does daily readings in the morning and evening and when she is having a hard day she reads a passage from the \"big book\".  Patsy is working on the development of a relapse prevention plan. She is beginning to name her triggers prior to events and situations and because of this has a plan for her recovery skills to use. Patsy states that she is not having cravings or triggers but having some thoughts. Patsy states that she has learned that she does not need to give her thoughts any power. Patsy shared some thoughts of use, she went to a liquor store where bottles were $30+ dollars she asked the  how to get into the coffee shop and walked over there, got a donut then went home.     Dimension 6: Recovery Environment   Previous Dimension Rating:  3  Current Dimension Ratin  Family Involvement : None   Summarize attendance at family groups and family sessions: N/A  Family supportive of treatment?  Yes  Community support group attendance: None  Recreational activities: Exercise   Narrative: Patys has stable housing at this time and lives alone. She is \"retired and on disability\", she does note some concerns about her financial " "stability. Patsy is not involved in the criminal justice system. Patsy has officially moved and is unpacked. She is learning more places around her new home and is enjoying some independence in her exploring. Patsy is attending [employer] and finds enjoyment in that. She continues to talk to her sober friend in [place] for support in addition to attending group and therapy.  Patsy attends [employer] regularly and finds benefit in that. Patsy is reading \"Quit Like a Woman\" and doing AA readings and finding benefit in that. Patsy is looking for an emotional support dog and will get one soon that will best fit her budget.    Justification for Continued Treatment at this Level of Care:  Patsy is in Phase 3 and has increased her periods of sobriety. She lacks a sober support network however has begun attending [employer] and has family support. She is medication compliant and has started individual therapy. She has been able to identify triggers, and is starting to learn and implement some coping skills. Her daily life schedule/routine has not been supportive of recovery previously so she is working on improving that.. She is working on getting more structure in her daily activities.     Discharge Planning:  Target Discharge Date/Timeframe:  11/28/23  Med Mgmt Provider/Appt:  Dr. Covington in addiction medicine  Ind therapy Provider/Appt:  Weekly   Other referrals:  None  Has vulnerable adult status change? No  Service Coordination:  None    Supervision:  Patient is staffed with treatment providers, this includes: Cony Guzman, [facility], Marisel Mcdaniel, [facility], and Yee Curry, ADC-T; Staffed with [facility] Team    Attestation:   Dr. Eastman - Medical Director - Provides oversight and supervision of care.    Are Treatment Plan goals/objectives effective? Yes  *If no, list changes to treatment plan:    Are the current goals meeting client's needs? Yes  *If no, list the changes to treatment plan.    Client Input / Response: Agreeable "       *Client agrees with any changes to the treatment plan: N/A  *Client received copy of changes: N/A  *Client is aware of right to access a treatment plan review: Yes (MyChart or request copy)

## 2023-10-24 NOTE — TELEPHONE ENCOUNTER
Patient of Dr. Covington at [facility] [place]. Routing to appropriate nursing pool.       Marisel Salas RN on 10/24/2023 at 12:59 PM

## 2023-10-25 ENCOUNTER — HOSPITAL ENCOUNTER (OUTPATIENT)
Dept: BEHAVIORAL HEALTH | Facility: CLINIC | Age: 68
Discharge: HOME | End: 2023-10-25
Attending: FAMILY MEDICINE
Payer: COMMERCIAL

## 2023-10-25 RX ORDER — ESCITALOPRAM OXALATE 10 MG/1
TABLET ORAL
Qty: 30 TABLET | Refills: 2 | OUTPATIENT
Start: 2023-10-25

## 2023-10-25 RX ORDER — HYDROXYZINE HYDROCHLORIDE 50 MG/1
TABLET, FILM COATED ORAL
Qty: 270 TABLET | OUTPATIENT
Start: 2023-10-25

## 2023-10-25 NOTE — PROGRESS NOTES
"Attestation:  Yamel Eastman MD. - Provides oversight and supervision of care.    Individual Session Summary (MICD)   DATE:  10/25/2023  START TIME: 1:30 PM   END TIME: 3:00 PM   Duration: 1.5 Hours    ORAL Diagnosis: AUD, Severe, in early remission (F10.29)    Mental Health Diagnosis: PTSD (F43.12)    Data:   Individual session 1 (Prolonged Exposure Therapy (PET) for PTSD) to treat mental health symptoms of PTSD and co-occurring AUD and interference with life domains/functioning. The session focused on PTSD symptoms and urges to drink alcohol.     Client denies any suicidal thoughts, plans, or intent today. Client also denies any thoughts or behaviors of self-harm today.    Intervention: A cognitive behavioral/Exposure modality was used, namely PET. Provided an overview of PE, discussed the treatment rationale, in particular the role of avoidance and unhelpful thoughts and beliefs, in maintaining PTSD symptoms. Educated on en vivo and imaginal exposure. Coached in breathing retraining. Completed Trauma Interview to identify \"worst trauma\"/ target for treatment. Client assigned home practice (Breathing retraining, listen to session tape and read rationale for PET once through). Utilized motivational interviewing to assess for stage of change and explore risk factors for return to use, including medication adherence.      Assessment: Client was on time for scheduled session.  Dress, grooming, hygiene WNL. Client was oriented X4.  Client was pleasant and cooperative.  Mood described as \"good\" with congruent affect.  Good eye contact.  Recent and remote memories were intact.  Thought process was coherent.  Content clear; Speech (tone and rate) were WNL.  Insight and judgment were good.      Assessment tools administered: PCL-5 =  51 indicating a positive screen for PTSD.  PSS-SR 5= 46 indicating a positive screen for PTSD.  PTCI: raw score 170. (5.71 negative cognitions about the world; 5.40 negative cognitions of " others; 4.90 negative cognitions of self- all identified as targets for treatment). PHQ-9 = 17 indicating moderate symptoms of depression.       Client was able to participate and benefit from treatment as evidenced by verbal expression and understanding of ideas discussed.  A cognitive behavioral and exposure modality was used.  This session was necessary because she is experiencing PTSD symptoms.  Client continues to be motivated for treatment.          9/12/2023     1:00 PM 9/13/2023    11:48 AM 9/25/2023    10:00 PM   PHQ-9 SCORE   PHQ-9 Total Score MyChart  3 (Minimal depression)    PHQ-9 Total Score 3 3 4         7/19/2023    12:00 PM 9/12/2023     1:00 PM 9/25/2023    10:00 PM   ALISIA-7 SCORE   Total Score 3 4 4     Plan: Client was assigned homework to read the rationale for treatment handout, listen to session tape one time and practice breathing re-training three times per day until next session.      Follow-up: Client scheduled for 1:1, Session 2A of PE on 11/01/2023 at 1:30 pm.       Discharge Criteria Planning:  Client reports PTSD symptoms at subclinical levels.      Cony Guzman, LPCC, LADC

## 2023-10-25 NOTE — TELEPHONE ENCOUNTER
Patient was contacted to verify what meds she needed. She said she isn't taking the Escitalopram (discontinued 3/22/23) and she already got the Hydroxyzine filled.    Reminded patient of upcoming 11/14/23 appt with Dr Covington.

## 2023-10-27 NOTE — PROGRESS NOTES
"Attestation:  Yamel Eastman MD. - Provides oversight and supervision of care.    Individual Session Summary (MICD)   DATE:  11/01/2023  START TIME: 1:30 PM   END TIME: 2:35 PM   Duration: 1 Hour 5 minutes    ORAL Diagnosis: AUD, Severe, in early remission (F10.29)    Mental Health Diagnosis: PTSD (F43.12)    Data:   Individual session 2A (Prolonged Exposure Therapy (PET) for PTSD) to treat mental health symptoms of PTSD, co-occurring AUD and interference with life domains/functioning. The session focused on PTSD symptoms and urges to drink alcohol.     Client denies suicidal, self-harm, violent or homicidal thoughts, plans, or intent today.     Intervention: A cognitive behavioral/Exposure modality was used, namely PET.  Client was seen today for a 60  minute individual psychotherapy session. Primary tasks for the session included reviewing homework, discussing common reactions to trauma and assigning homework.     Progress toward short-term goals: Client completed homework from session 1.     Assessment: Patsy was on time for scheduled session.  Dress, grooming, hygiene WNL. Client was oriented X4.  Client was pleasant and cooperative.  Mood described as \"good\" with congruent affect.  Good eye contact.  Recent and remote memories were intact.  Thought process was coherent.  Content clear/no loosening of associations; Speech (tone and rate) were WNL.  Insight and judgment were good.      No Assessment tools administered today:   From previous session: PCL-5 =  51 indicating a positive screen for PTSD.  PSS-SR 5= 46 indicating a positive screen for PTSD.  PTCI: raw score 170. (5.71 negative cognitions about the world; 5.40 negative cognitions of others; 4.90 negative cognitions of self- all identified as targets for treatment). PHQ-9 = 17 indicating moderate symptoms of depression.       Client was able to participate and benefit from treatment as evidenced by verbal expression and understanding of ideas discussed as " evidenced by her verbal expression and understanding of ideas discussed.  No increase in PTSD symptoms noted. Patsy agreed to restart naltrexone with Dr. Covington if motivating thoughts or pre-occupation increases throughout this treatment period. She will also discuss medication dosage for sleep, as she would like to decrease Remeron due to feeling groggy and having disturbing dreams on 15mg. If needed, she will discuss medication for nightmares for improved sleep with Dr. Covington. She noted increased nightmares on prazosin in the past; however, she was drinking alcohol at that time.    This session was necessary because PTSD symptoms are still present. Client continues to be motivated for treatment.          9/12/2023     1:00 PM 9/13/2023    11:48 AM 9/25/2023    10:00 PM   PHQ-9 SCORE   PHQ-9 Total Score MyChart  3 (Minimal depression)    PHQ-9 Total Score 3 3 4         7/19/2023    12:00 PM 9/12/2023     1:00 PM 9/25/2023    10:00 PM   ALISIA-7 SCORE   Total Score 3 4 4     Plan: Client was assigned homework to read the common reactions to trauma nad share with another/others, listen to session tape one time and practice breathing re-training daily/nightly until next session.      Follow-up: Client scheduled for 1:1, Session 2B of PE on 11/15/2023 at 1:30 pm.       Discharge Criteria Planning:  Client reports PTSD symptoms at subclinical levels.      Cony Guzman, MOHINIC, LADC

## 2023-10-31 ENCOUNTER — DOCUMENTATION ONLY (OUTPATIENT)
Dept: BEHAVIORAL HEALTH | Facility: CLINIC | Age: 68
End: 2023-10-31
Payer: COMMERCIAL

## 2023-10-31 ENCOUNTER — HOSPITAL ENCOUNTER (OUTPATIENT)
Dept: BEHAVIORAL HEALTH | Facility: CLINIC | Age: 68
Discharge: HOME | End: 2023-10-31
Attending: FAMILY MEDICINE
Payer: COMMERCIAL

## 2023-10-31 PROCEDURE — H2035 A/D TX PROGRAM, PER HOUR: HCPCS

## 2023-10-31 NOTE — PROGRESS NOTES
[facility] Weekly Treatment Plan Review      ATTENDANCE for the following date span: 10/23/23-10/29/23    Date     Group Therapy  1  hours        Individual Therapy   1     Family Therapy        Other (Specify) Phase 1 &2  Phase 3  Phase 1  Phase 1 & 2        Patient attended all scheduled sessions during this time period.   Total hours: 87 hours. Patient is in phase 3    Weekly Treatment Plan Review     Treatment Plan initiated on 2023.    Dimension1: Acute Intoxication/Withdrawal Potential -   Previous Dimension Ratin  Current Dimension Ratin  Date of Last Use: 23  Any reports of withdrawal symptoms: No  Narrative: Patsy reports sustained abstinence. No changes or concerns.     Dimension 2: Biomedical Conditions & Complications -   Previous Dimension Ratin  Current Dimension Ratin  Medical Concerns:  Nerve and foot pain  Current Medications & Medication Changes:  Current Outpatient Medications   Medication    albuterol (PROAIR HFA/PROVENTIL HFA/VENTOLIN HFA) 108 (90 Base) MCG/ACT inhaler    benzonatate (TESSALON) 200 MG capsule    budesonide-formoterol (SYMBICORT) 160-4.5 MCG/ACT Inhaler    bumetanide (BUMEX) 1 MG tablet    DULoxetine (CYMBALTA) 60 MG capsule    hydrOXYzine (ATARAX) 50 MG tablet    ipratropium - albuterol 0.5 mg/2.5 mg/3 mL (DUONEB) 0.5-2.5 (3) MG/3ML neb solution    omeprazole (PRILOSEC) 20 MG DR capsule    pregabalin (LYRICA) 25 MG capsule     No current facility-administered medications for this visit.     Medication Prescriber:  Yanique Cali MD.   Taking meds as prescribed? Yes  Medication side effects or concerns:  None reported.  Outside medical appointments this week (list provider and reason for visit):  See chart   Narrative: Patient reports the following medical conditions: COPD, overweight, uses CPAP, and general fatigue . Patient has primary care with Yanique Cali MD. Patsy  shared she got a gym membership and that begins on 23, she is hopeful that by having the gym she will release some weight. Patsy plans to go to the pool 3 times a week.  Patsy continues to decrease her smoking.     Dimension 3: Emotional/Behavioral Conditions & Complications  Previous Dimension Ratin  Current Dimension Ratin    PHQ9       2023     1:00 PM 2023    11:48 AM 2023    10:00 PM   PHQ-9 SCORE   PHQ-9 Total Score MyChart  3 (Minimal depression)    PHQ-9 Total Score 3 3 4     GAD7       2023    12:00 PM 2023     1:00 PM 2023    10:00 PM   ALISIA-7 SCORE   Total Score 3 4 4     Mental health diagnosis PTSD  Date of last SIB/SI/HI: N/A  Current MH Assignments:  None  Narrative:  Patient reports a mental health diagnosis of PTSD, Anxiety, and Depression. Patsy has endorses current symptoms of hypervigilance and anxiety when out. Patsy shares continued improvements in her mental health. She is reporting that she has felt relief with her move as she has noticed that it is better for her health and mental health. She states her mental health is good, she has now calmed herself down and is enjoying her new space. Patsy met with her psychiatrist and is meeting with other providers as scheduled. She was set to begin prolonged exposure with Cony MORRISON, [facility], Patsy shared that her session went good and she was able to talk about some experiences in her life that she has not spoken about. Patsy shared she was nervous about starting pro long exposure  due to potential of having dreams from bringing up the past but Patsy said she has not had a dream and thinks that doing these sessions with Cony Guzman will be helpful every week.  Dimension 4: Treatment Acceptance / Resistance  Previous Dimension Ratin  Current Dimension Ratin  ORAL Diagnosis:  Alcohol Use Disorder   303.90 (F10.20) Severe In early remission,   Phase: 3  Commitment to tx process/Stage of change:  "Action   ORAL assignments: Relapse Prevention Plan   Narrative: Patient appears internally motivated for treatment. She is an active and engaged group member when present. Patsy verbalizes continued motivation for recovery, she continues to reflect on her progress overall and is feeling positive about this place in her life. Patsy was active and engaged this week.     Dimension 5: Relapse / Continued Problem Potential    Previous Dimension Rating:  3  Current Dimension Rating:  3  Relapses this week: None  Urges to use:  See below   UA results:   No results found for this or any previous visit (from the past 168 hour(s)).    Narrative: Patsy reports when going out to the grocery store she does not ignore the liquor store, she acknowledge that it is there and does not give the liquor store the power. She does daily readings in the morning and evening and when she is having a hard day she reads a passage from the \"big book\".  Patsy is working on the development of a relapse prevention plan. She is beginning to name her triggers prior to events and situations and because of this has a plan for her recovery skills to use. Patsy states that she is not having cravings or triggers but having some thoughts. Patsy states that she has learned that she does not need to give her thoughts any power. Patsy shared some thoughts of use due to her daughter not coming to her house the other day. Patsy wrote out a pros and cons list about drinking and sobriety. Patsy shared \" I know it wont solve anything, I would have to go through withdrawals again, my mental and physical health would go down and I would affect the people that care about me\". Patsy plans on writing down her experience from the past from drinking and how it affected her life and other relationship in her live as well. Patsy shared when she is having thoughts she talks with [place] and her friend from [place]. Patsy is thinking about her relapse prevention plan for the holidays and " "will share with counselor what is working or if she is having any concerns.     Dimension 6: Recovery Environment   Previous Dimension Rating:  3  Current Dimension Ratin  Family Involvement : None   Summarize attendance at family groups and family sessions: N/A  Family supportive of treatment?  Yes  Community support group attendance: None  Recreational activities: Exercise   Narrative: Patsy has stable housing at this time and lives alone. She is \"retired and on disability\", she does note some concerns about her financial stability. Patsy is not involved in the criminal justice system. Patsy has officially moved and is unpacked. She is learning more places around her new home and is enjoying some independence in her exploring. Patsy is attending Islam and finds enjoyment in that. She continues to talk to her sober friend in [place] for support in addition to attending group and therapy.  Patsy attends Islam regularly and finds benefit in that. Patsy is reading \"Quit Like a Woman\" and doing AA readings and finding benefit in that. Patsy is looking for an emotional support dog and will get one soon that will best fit her budget. Patsy is helping her sister clean out her house and has been in contact with her children throughout the week.     Justification for Continued Treatment at this Level of Care:  Patsy is in Phase 3 and has increased her periods of sobriety. She lacks a sober support network however has begun attending Islam and has family support. She is medication compliant and has started individual therapy. She has been able to identify triggers, and is starting to learn and implement some coping skills. Her daily life schedule/routine has not been supportive of recovery previously so she is working on improving that.. She is working on getting more structure in her daily activities.     Discharge Planning:  Target Discharge Date/Timeframe:  23  Med Mgmt Provider/Appt:  Dr. Covington in addiction " medicine  Ind therapy Provider/Appt:  Weekly   Other referrals:  None  Has vulnerable adult status change? No  Service Coordination:  None    Supervision:  Patient is staffed with treatment providers, this includes: Cony Guzman, [facility], Marisel Mcdaniel [facility], and Yee Curry, [facility]; Staffed with [facility] Team    Attestation:   Dr. Eastman - Medical Director - Provides oversight and supervision of care.    Are Treatment Plan goals/objectives effective? Yes  *If no, list changes to treatment plan:    Are the current goals meeting client's needs? Yes  *If no, list the changes to treatment plan.    Client Input / Response: Agreeable       *Client agrees with any changes to the treatment plan: N/A  *Client received copy of changes: N/A  *Client is aware of right to access a treatment plan review: Yes (MyChart or request copy)

## 2023-10-31 NOTE — PROGRESS NOTES
Individual Session:      Start Time:  9:05 AM   End Time:  10:20 AM      Note:   Patsy Santamaria met with counselors PRINCE Pretty and Joyce CALVOT for an individual session due to low census. We discussed relapse prevention planning with Patsy as she will be preparing for discharge the last Tuesday of November. Patsy shared some of her urges and triggers recently, it is of note that when plans fall through or when things do not go as planned this is a trigger for her. Joyce did a pros and cons list for sobriety and use and we were able to identify reasons why she continues to choose sobriety. Patsy identified that the thought of drinking would be for the purpose of feeling good, because of this we identified various things that would help her feel good that she regularly does. Patsy discussed writing a letter to herself to remember what her use was like for her as part of a relapse prevention plan. She is beginning to think about relapse prevention planning and event planning as part of her discharge plan. Patsy shared some of her mental health challenges she is working through and shared how PE with Cony MORRISON, [facility] is going for her. We discussed dietitian services through [employer] and her gym pass as her physical health is a goal for her. Patsy reports no emergent concerns. She is making progress as expected.     Attestation:   Dr. Eastman - Medical Director - Provides oversight and supervision of care.    PRINCE Pretty 10/31/2023 3:58 PM

## 2023-11-01 ENCOUNTER — BEH TREATMENT PLAN (OUTPATIENT)
Dept: BEHAVIORAL HEALTH | Facility: CLINIC | Age: 68
End: 2023-11-01
Payer: COMMERCIAL

## 2023-11-01 ENCOUNTER — HOSPITAL ENCOUNTER (OUTPATIENT)
Dept: BEHAVIORAL HEALTH | Facility: CLINIC | Age: 68
Discharge: HOME | End: 2023-11-01
Attending: FAMILY MEDICINE
Payer: COMMERCIAL

## 2023-11-01 DIAGNOSIS — F10.90 ALCOHOL USE DISORDER: Primary | ICD-10-CM

## 2023-11-01 PROCEDURE — H2035 A/D TX PROGRAM, PER HOUR: HCPCS

## 2023-11-06 ENCOUNTER — TELEPHONE (OUTPATIENT)
Dept: BEHAVIORAL HEALTH | Facility: CLINIC | Age: 68
End: 2023-11-06
Payer: COMMERCIAL

## 2023-11-07 ENCOUNTER — HOSPITAL ENCOUNTER (OUTPATIENT)
Dept: BEHAVIORAL HEALTH | Facility: CLINIC | Age: 68
Discharge: HOME | End: 2023-11-07
Attending: FAMILY MEDICINE
Payer: COMMERCIAL

## 2023-11-07 ENCOUNTER — DOCUMENTATION ONLY (OUTPATIENT)
Dept: BEHAVIORAL HEALTH | Facility: CLINIC | Age: 68
End: 2023-11-07
Payer: COMMERCIAL

## 2023-11-07 PROCEDURE — H2035 A/D TX PROGRAM, PER HOUR: HCPCS

## 2023-11-07 NOTE — PROGRESS NOTES
"Individual Session:      Start Time:  9:00 AM   End Time:  9:50 AM      Note:   Patsy Santamaria met with counselors DEBRA Brady for an individual session. Patsy shared going to [place] with her sister at a [employer]. Patsy had some thought of use due alcohol was being served. Patsy stated to her sister \"as a joke I I told my sister to get me a beer and she said not with my money\" Patsy shared that her sisters response was helpful. Patsy stated \" I had less thoughts of use once's I told my sister it felt like a weight was lifted\" Patient shared that she is going to have a discussion with her sister and say \"I still have thoughts and being honest and saying them out load is helpful for me\". Patsy shared she started going to the pool and enjoys it. Patsy shared she has a psychiatrist appointment this week and will ask if her depression medication affecting her weight gain. Patsy shared she noticed she is taking less of her anxiety medication than normal and is feeling good. Patsy shared she has 4 packs of cigarettes left and after those packs she is no longer buying anymore. Patsy shared she signed up for second hand fosters and should here back from them in a couple of months about a small dog. Patsy is counties to go to [employer] and surrounds herself with positive people.  Patsy affirmation is \"I am feeling optimistic\"     Attestation:   Dr. Eastman - Medical Director - Provides oversight and supervision of care.    UMESH Brady 11/7/2023 1:00 PM   "

## 2023-11-07 NOTE — PROGRESS NOTES
[facility] Weekly Treatment Plan Review      ATTENDANCE for the following date span: 10/23/23-10/29/23    Date     Group Therapy  1  hours        Individual Therapy   1     Family Therapy        Other (Specify) Phase 1 &2  Phase 3  Phase 1  Phase 1 & 2        Patient attended all scheduled sessions during this time period.   Total hours: 88 hours. Patient is in phase 3    Weekly Treatment Plan Review     Treatment Plan initiated on 2023.    Dimension1: Acute Intoxication/Withdrawal Potential -   Previous Dimension Ratin  Current Dimension Ratin  Date of Last Use: 23  Any reports of withdrawal symptoms: No  Narrative: Patsy reports sustained abstinence. No changes or concerns.     Dimension 2: Biomedical Conditions & Complications -   Previous Dimension Ratin  Current Dimension Ratin  Medical Concerns:  Nerve and foot pain  Current Medications & Medication Changes:  Current Outpatient Medications   Medication    albuterol (PROAIR HFA/PROVENTIL HFA/VENTOLIN HFA) 108 (90 Base) MCG/ACT inhaler    benzonatate (TESSALON) 200 MG capsule    budesonide-formoterol (SYMBICORT) 160-4.5 MCG/ACT Inhaler    bumetanide (BUMEX) 1 MG tablet    DULoxetine (CYMBALTA) 60 MG capsule    hydrOXYzine (ATARAX) 50 MG tablet    ipratropium - albuterol 0.5 mg/2.5 mg/3 mL (DUONEB) 0.5-2.5 (3) MG/3ML neb solution    omeprazole (PRILOSEC) 20 MG DR capsule    pregabalin (LYRICA) 25 MG capsule     No current facility-administered medications for this visit.     Medication Prescriber:  Yanique Cali MD.   Taking meds as prescribed? Yes  Medication side effects or concerns:  None reported.  Outside medical appointments this week (list provider and reason for visit):  See chart   Narrative: Patient reports the following medical conditions: COPD, overweight, uses CPAP, and general fatigue . Patient has primary care with Yanique Cali MD. Patsy  "shared she got a gym membership and that begins on 23, she is hopeful that by having the gym she will release some weight. Patsy plans to go to the pool 3 times a week.  Patsy continues to decrease her smoking.     Dimension 3: Emotional/Behavioral Conditions & Complications  Previous Dimension Ratin  Current Dimension Ratin    PHQ9       2023     1:00 PM 2023    11:48 AM 2023    10:00 PM   PHQ-9 SCORE   PHQ-9 Total Score MyChart  3 (Minimal depression)    PHQ-9 Total Score 3 3 4     GAD7       2023    12:00 PM 2023     1:00 PM 2023    10:00 PM   ALISIA-7 SCORE   Total Score 3 4 4     Mental health diagnosis PTSD  Date of last SIB/SI/HI: N/A  Current MH Assignments:  None  Narrative:  Patient reports a mental health diagnosis of PTSD, Anxiety, and Depression. Patsy has endorses current symptoms of hypervigilance and anxiety when out. Patsy shares continued improvements in her mental health. She is reporting that she has felt relief with her move as she has noticed that it is better for her health and mental health. She states her mental health is good, she has now calmed herself down and is enjoying her new space. Patsy met with her psychiatrist and is meeting with other providers as scheduled. She was set to begin prolonged exposure with Cony [facility]. Patsy shared she has a psychiatrist appointment this week and will ask if her depression medication affecting her weight gain. Patsy shared she noticed she is taking less of her anxiety medication than normal and is feeling good. Patsy affirmation is \"I am feeling optimistic\"   Dimension 4: Treatment Acceptance / Resistance  Previous Dimension Ratin  Current Dimension Ratin  ORAL Diagnosis:  Alcohol Use Disorder   303.90 (F10.20) Severe In early remission,   Phase: 3  Commitment to tx process/Stage of change: Action   ORAL assignments: Relapse Prevention Plan   Narrative: Patient appears internally motivated for " "treatment. She is an active and engaged group member when present. Patsy verbalizes continued motivation for recovery, she continues to reflect on her progress overall and is feeling positive about this place in her life. Patsy was active and engaged this week.     Dimension 5: Relapse / Continued Problem Potential    Previous Dimension Rating:  3  Current Dimension Rating:  3  Relapses this week: None  Urges to use:  See below   UA results:   No results found for this or any previous visit (from the past 168 hour(s)).    Narrative: Patsy reports when going out to the grocery store she does not ignore the liquor store, she acknowledge that it is there and does not give the liquor store the power. She does daily readings in the morning and evening and when she is having a hard day she reads a passage from the \"big book\".  Patsy is working on the development of a relapse prevention plan. She is beginning to name her triggers prior to events and situations and because of this has a plan for her recovery skills to use. Patsy states that she is not having cravings or triggers but having some thoughts. Patsy shared going to [place] with her sister at a [employer]. Patsy had some thought of use due alcohol was being served. Patsy stated to her sister \"as a joke I I told my sister to get me a beer and she said not with my money\" Patsy shared that her sisters response was helpful. Patsy stated \" I had less thoughts of use once's I told my sister it felt like a weight was lifted\" Patient shared that she is going to have a discussion with her sister and say \"I still have thoughts and being honest and saying them out load is helpful for me\".     Dimension 6: Recovery Environment   Previous Dimension Rating:  3  Current Dimension Ratin  Family Involvement : None   Summarize attendance at family groups and family sessions: N/A  Family supportive of treatment?  Yes  Community support group attendance: None  Recreational activities: " "Exercise   Narrative: Patsy has stable housing at this time and lives alone. She is \"retired and on disability\", she does note some concerns about her financial stability. Patsy is not involved in the criminal justice system. Patsy has officially moved and is unpacked. She is learning more places around her new home and is enjoying some independence in her exploring. Patsy is attending [employer] and finds enjoyment in that. She continues to talk to her sober friend in [place] for support in addition to attending group and therapy.  Patsy attends [employer] regularly and finds benefit in that. Patsy is reading \"Quit Like a Woman\" and doing AA readings and finding benefit in that. Patsy is looking for an emotional support dog and will get one soon that will best fit her budget. Patsy is helping her sister clean out her house and has been in contact with her children throughout the week.     Justification for Continued Treatment at this Level of Care:  Patsy is in Phase 3 and has increased her periods of sobriety. She lacks a sober support network however has begun attending [employer] and has family support. She is medication compliant and has started individual therapy. She has been able to identify triggers, and is starting to learn and implement some coping skills. Her daily life schedule/routine has not been supportive of recovery previously so she is working on improving that. Patsy shared she signed up for second hand fosters and should here back from them in a couple of months about a small dog. Patsy is counties to go to [employer] and surrounds herself with positive people.  She is working on getting more structure in her daily activities.     Discharge Planning:  Target Discharge Date/Timeframe:  11/28/23  Med Mgmt Provider/Appt:  Dr. Covington in addiction medicine  Ind therapy Provider/Appt:  Weekly   Other referrals:  None  Has vulnerable adult status change? No  Service Coordination:  None    Supervision:  Patient is staffed with " treatment providers, this includes: Cony Guzman, [facility], Marisel Mcdaniel [facility], and Yee Curry, [facility]; Staffed with [facility] Team    Attestation:   Dr. Eastman - Medical Director - Provides oversight and supervision of care.    Are Treatment Plan goals/objectives effective? Yes  *If no, list changes to treatment plan:    Are the current goals meeting client's needs? Yes  *If no, list the changes to treatment plan.    Client Input / Response: Agreeable       *Client agrees with any changes to the treatment plan: N/A  *Client received copy of changes: N/A  *Client is aware of right to access a treatment plan review: Yes (MyChart or request copy)

## 2023-11-08 ENCOUNTER — DOCUMENTATION ONLY (OUTPATIENT)
Dept: BEHAVIORAL HEALTH | Facility: CLINIC | Age: 68
End: 2023-11-08
Payer: COMMERCIAL

## 2023-11-08 NOTE — PROGRESS NOTES
Addiction Outpatient Weekly Clinical Staffing     Patsy Santamaria was staffed on 11/8/2023 . Ptasy Santamaria was staffed on recovery strengths, barriers and treatment progress.     Staff present: DEBRA Brady    Date: 11/8/2023 Time: 5:27 PM    Staff Signature: DEBRA Brady

## 2023-11-13 NOTE — PROGRESS NOTES
"Attestation:  Yamel Eastman MD. - Provides oversight and supervision of care.    Individual Session Summary (MICD)   DATE:  11/15/2023  START TIME: 1:30 PM   END TIME: 3:00 PM   Duration: 1 Hour 30 minutes    ORAL Diagnosis: AUD, Severe, in early remission (F10.29)  Mental Health Diagnosis: PTSD (F43.12)    Data:   Individual session 2B of Prolonged Exposure Therapy (PET) for PTSD to treat PTSD and co-occurring AUD and interference with life domains/functioning. The session focused on PTSD symptoms and urges to drink alcohol.     Client denies suicidal, self-harm, violent or homicidal thoughts, plans, or intent today.      Intervention: A cognitive behavioral/exposure modality was used, namely PET.  Client was seen today for a 90-minute individual psychotherapy session. Primary tasks for the session included reviewing homework, psycho-education on en vivo exposure process and rationale. Collaborativly developed a personalized Hierarchy of Avoided Situations and Subjective Units of Distress Scale (SUDS). Assigned homework.      Progress toward short-term goals: Client completed homework from session 2A.      Assessment: Patsy was on time for scheduled session.  Dress, grooming, hygiene WNL. Client was oriented X4.  Client was engaged.  Mood described as \"good\" with congruent affect.  Good eye contact.   Recent and remote memories were intact.  Thought process was linear.  Thought content: clear/no loosening of associations; Speech (tone and rate) were WNL. Insight and judgment were good.       No new assessment tools were administered today:         9/12/2023     1:00 PM 9/13/2023    11:48 AM 9/25/2023    10:00 PM   PHQ-9 SCORE   PHQ-9 Total Score MyChart  3 (Minimal depression)    PHQ-9 Total Score 3 3 4         7/19/2023    12:00 PM 9/12/2023     1:00 PM 9/25/2023    10:00 PM   ALISIA-7 SCORE   Total Score 3 4 4     From previous session: PCL-5 =  51 indicating a positive screen for PTSD.  PSS-SR 5= 46 indicating a " positive screen for PTSD.  PTCI: raw score 170. (5.71 negative cognitions about the world; 5.40 negative cognitions of others; 4.90 negative cognitions of self- all identified as targets for treatment). PHQ-9 = 17 indicating moderate symptoms of depression.     Stage of change: Action     Patsy was able to participate and benefit from treatment as evidenced by verbal expression and understanding of ideas discussed, including identifying avoided situations and assigning SUDS ratings to each (developing a hierarch). No increase in PTSD symptoms noted.    This session was necessary because PTSD symptoms are still present. Patsy continues to be motivated for treatment.       Plan: Patsy was assigned homework to read the common reactions to trauma daily and share with another/others, listen to session tape one time, practice breathing re-training daily/nightly, type hierarchy/SUDS scale into PE  [place] and begin en vivos three times this week (sit in middle seat on bus-SUDS 50; Walk in neighborhood at night-SUDS 40)  Patsy will record SUDS levels pre-peak-post for all en vivo homework assignments to review at next session.       Follow-up: Client scheduled for 1:1, Session 3 of PE on 11/22/2023 at 1:30 pm. She will continue Phase 3 of IOP for Co-Occurring Disorders weekly.     Discharge Criteria Planning:  Client reports PTSD symptoms at subclinical levels.       SRIDHAR Manley, LADC

## 2023-11-14 ENCOUNTER — DOCUMENTATION ONLY (OUTPATIENT)
Dept: BEHAVIORAL HEALTH | Facility: CLINIC | Age: 68
End: 2023-11-14
Payer: COMMERCIAL

## 2023-11-14 ENCOUNTER — HOSPITAL ENCOUNTER (OUTPATIENT)
Dept: BEHAVIORAL HEALTH | Facility: CLINIC | Age: 68
Discharge: HOME | End: 2023-11-14
Attending: FAMILY MEDICINE
Payer: COMMERCIAL

## 2023-11-14 PROCEDURE — H2035 A/D TX PROGRAM, PER HOUR: HCPCS

## 2023-11-14 NOTE — PROGRESS NOTES
[facility] Weekly Treatment Plan Review      ATTENDANCE for the following date span: 23-23    Date     Group Therapy  1  hours        Individual Therapy   1     Family Therapy        Other (Specify) Phase 1 &2  Phase 3  Phase 1  Phase 1 & 2        Patient attended all scheduled sessions during this time period.   Total hours: 90 hours. Patient is in phase 3    Weekly Treatment Plan Review     Treatment Plan initiated on 2023.    Dimension1: Acute Intoxication/Withdrawal Potential -   Previous Dimension Ratin  Current Dimension Ratin  Date of Last Use: 23  Any reports of withdrawal symptoms: No  Narrative: Patsy reports sustained abstinence. No changes or concerns.     Dimension 2: Biomedical Conditions & Complications -   Previous Dimension Ratin  Current Dimension Ratin  Medical Concerns:  Nerve and foot pain  Current Medications & Medication Changes:  Current Outpatient Medications   Medication    albuterol (PROAIR HFA/PROVENTIL HFA/VENTOLIN HFA) 108 (90 Base) MCG/ACT inhaler    benzonatate (TESSALON) 200 MG capsule    budesonide-formoterol (SYMBICORT) 160-4.5 MCG/ACT Inhaler    bumetanide (BUMEX) 1 MG tablet    DULoxetine (CYMBALTA) 60 MG capsule    hydrOXYzine (ATARAX) 50 MG tablet    ipratropium - albuterol 0.5 mg/2.5 mg/3 mL (DUONEB) 0.5-2.5 (3) MG/3ML neb solution    omeprazole (PRILOSEC) 20 MG DR capsule    pregabalin (LYRICA) 25 MG capsule     No current facility-administered medications for this visit.     Medication Prescriber:  Yanique Cali MD.   Taking meds as prescribed? Yes  Medication side effects or concerns:  None reported.  Outside medical appointments this week (list provider and reason for visit):  See chart   Narrative: Patient reports the following medical conditions: COPD, overweight, uses CPAP, and general fatigue . Patient has primary care with Yanique Cali MD. Patsy  "now has a gym membership, she is doing water aerobics and gym exercises about 2-3x per week. Patsy is feeling sore but finding enjoyment in her new gym routine. Patsy is continuing to make strides to decrease her smoking. She reported stopping her Naltrexone to PRINCE Mata however did not elaborate. Counselors will discuss this at next session.     Dimension 3: Emotional/Behavioral Conditions & Complications  Previous Dimension Ratin  Current Dimension Ratin    PHQ9       2023     1:00 PM 2023    11:48 AM 2023    10:00 PM   PHQ-9 SCORE   PHQ-9 Total Score MyChart  3 (Minimal depression)    PHQ-9 Total Score 3 3 4     GAD7       2023    12:00 PM 2023     1:00 PM 2023    10:00 PM   ALISIA-7 SCORE   Total Score 3 4 4     Mental health diagnosis PTSD  Date of last SIB/SI/HI: N/A  Current MH Assignments:  None  Narrative:  Patient reports a mental health diagnosis of PTSD, Anxiety, and Depression. Patsy has endorses current symptoms of hypervigilance and anxiety when out. Patsy shares continued improvements in her mental health. She is reporting that she has felt relief with her move as she has noticed that it is better for her health and mental health. Patsy is now doing prolonged exposure with SRIDHAR Bennett. Patsy was supposed to have an addiction medicine appointment today however was unable to attend due to transportation issues. Patsy continues to report decrease in anxiety medications and overall emotional regulation. Patsy's affirmation this week is \"I am worth it and will stay sober\".     Dimension 4: Treatment Acceptance / Resistance  Previous Dimension Ratin  Current Dimension Ratin  ORAL Diagnosis:  Alcohol Use Disorder   303.90 (F10.20) Severe In early remission,   Phase: 3  Commitment to tx process/Stage of change: Action   ORAL assignments: Relapse Prevention Plan   Narrative: Patient appears internally motivated for treatment. She is an " "active and engaged group member when present. Patsy verbalizes continued motivation for recovery, she continues to reflect on her progress overall and is feeling positive about this place in her life. Patsy was active and engaged this week. No major changes.     Dimension 5: Relapse / Continued Problem Potential    Previous Dimension Rating:  3  Current Dimension Rating:  3  Relapses this week: None  Urges to use:  See below   UA results:   No results found for this or any previous visit (from the past 168 hour(s)).  Narrative: Patsy reports when going out to the grocery store she does not ignore the liquor store, she acknowledge that it is there and does not give the liquor store the power. She does daily readings in the morning and evening and when she is having a hard day she reads a passage from the \"big book\".  Patsy continues to work on improving her relapse prevention plan. She is beginning to name her triggers prior to events and situations and because of this has a plan for her recovery skills to use. Patsy acknowledged that talking about her recovery with her sisters and friends is helpful so she is not processing it alone. Patsy identified triggers such as \"disappointment, irritability and feeling pissed off\". She is using the gym and time with her sisters as a coping skill.     Dimension 6: Recovery Environment   Previous Dimension Rating:  3  Current Dimension Ratin  Family Involvement : None   Summarize attendance at family groups and family sessions: N/A  Family supportive of treatment?  Yes  Community support group attendance: None  Recreational activities: Exercise   Narrative: Patsy has stable housing at this time and lives alone. She is \"retired and on disability\", she does note some concerns about her financial stability. Patsy is not involved in the criminal justice system. Patsy has officially moved and is unpacked. She is learning more places around her new home and is enjoying some independence " "in her exploring. Patsy is attending [employer] and finds enjoyment in that. She continues to talk to her sober friend in [place] for support in addition to attending group and therapy.  Patsy attends [employer] regularly and finds benefit in that. Patsy is reading \"Quit Like a Woman\" and doing AA readings and finding benefit in that. Patsy is looking for an emotional support dog and will get one soon that will best fit her budget. Patsy is helping her sister clean out her house and has been in contact with her children throughout the week. Patsy has added a gym routine to her structure.     Justification for Continued Treatment at this Level of Care:  Patsy is in Phase 3 and has increased her periods of sobriety. She lacks a sober support network however has begun attending [employer] and has family support. She is medication compliant and has started individual therapy. She has been able to identify triggers, and is starting to learn and implement some coping skills. Her daily life schedule/routine has not been supportive of recovery previously so she is working on improving that. Patsy shared she signed up for second hand fosters and should here back from them in a couple of months about a small dog. Patsy is counties to go to [employer] and surrounds herself with positive people.  She is working on getting more structure in her daily activities.     Discharge Planning:  Target Discharge Date/Timeframe:  11/28/23  Med Mgmt Provider/Appt:  Dr. Covington in addiction medicine  Ind therapy Provider/Appt:  Weekly   Other referrals:  None  Has vulnerable adult status change? No  Service Coordination:  None    Supervision:  Patient is staffed with treatment providers, this includes: Cony Guzman [facility] [place], Marisel Mcdaniel [facility], Silver Iverson, [facility] and Joyce Curry, [facility]; Staffed with [facility] Team    Attestation:   Dr. Eastman - Medical Director - Provides oversight and supervision of care.    Are Treatment Plan goals/objectives " effective? Yes  *If no, list changes to treatment plan:    Are the current goals meeting client's needs? Yes  *If no, list the changes to treatment plan.    Client Input / Response: Agreeable       *Client agrees with any changes to the treatment plan: N/A  *Client received copy of changes: N/A  *Client is aware of right to access a treatment plan review: Yes (MyChart or request copy)

## 2023-11-14 NOTE — PROGRESS NOTES
Individual Session:      Start Time:  9:00 AM   End Time:  10:00 AM      Note:   Patsy Santamaria met with counselors PRINCE Pretty, Joyce CALVO-T and [facility] due to low [facility]. Patsy shared that she has not been sleeping very well and identified a need to engage in meditation again in the evening before bed. Patsy reports feeling sore today and attributes it to working out and going to water aerobics, she is finding enjoyment in that and is engaging in exercise when her emotions are high. Patsy shared some ups and down over the last week. She reports that feeling disappointed is a trigger for her and acknowledged that when her children do not show up for scheduled visits it is especially a trigger. She processed her thoughts of use and stated she was pissed off and irritable, both of these things have been triggers for use and an excuse for drinking. Patsy was provided information for the pet voucher and was excited to continue looking for dogs. Patsy is going out with her sisters tonight, she is setting boundaries with them about needing to pick a time and place for them to go rather than discussing it in the car when they pick her up. Patsy reflected on changes since she has stopped drinking and notes that she values her health and overall self wellbeing. Patsy shared that her gonzalez has increased and she feels very connected with her higher power, this is new for her as she has always had a strong gonzalez base but it now is increased. Pats[facility] and is finding benefit in that. We are discussing discharge date and plans and she was presented with a Thanksgiving action and event action plan as part of holiday relapse prevention.     Attestation:   Dr. Eastman - Medical Director - Provides oversight and supervision of care.    PRINCE Pretty 11/14/2023 3:58 PM

## 2023-11-15 ENCOUNTER — HOSPITAL ENCOUNTER (OUTPATIENT)
Dept: BEHAVIORAL HEALTH | Facility: CLINIC | Age: 68
Discharge: HOME | End: 2023-11-15
Attending: FAMILY MEDICINE
Payer: COMMERCIAL

## 2023-11-15 PROCEDURE — H2035 A/D TX PROGRAM, PER HOUR: HCPCS

## 2023-11-21 ENCOUNTER — DOCUMENTATION ONLY (OUTPATIENT)
Dept: BEHAVIORAL HEALTH | Facility: CLINIC | Age: 68
End: 2023-11-21
Payer: COMMERCIAL

## 2023-11-21 ENCOUNTER — TELEPHONE (OUTPATIENT)
Dept: BEHAVIORAL HEALTH | Facility: CLINIC | Age: 68
End: 2023-11-21
Payer: COMMERCIAL

## 2023-11-21 NOTE — PROGRESS NOTES
Weekly Progress Note 11/20/2023-11/26/2023  Patsy Santamaria  1955  [id number]      D) Pt attended ZERO groups  this week with 1 absences. A) Staff facilitated groups and reviewed tx progress. Assessed for VA. R) Unable to assess along six dimensions or for VA due to lack of attendance.   T) Pt will attend 11/ 28/2023  Mally Cook ADC-T 10:28 am  Attestation:   Dr. Eastman - Medical Director - Provides oversight and supervision of care.

## 2023-11-21 NOTE — PROGRESS NOTES
ABSENT NOTE:    Patsy Santamaria was absent from group 11/21/2023 . This absence WAS excused. Patsy left a message to let counselors  know that they don t have a  for today (hopefully they will for PE tomorrow). She can t find her bus card. Patient is expected to be in group on 11/28/2023.     Joyce Bal, ADC-T  11/21/2023 , 10:17 AM  Attestation:   Dr. Eastman - Medical Director - Provides oversight and supervision of care.
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
## 2023-11-28 ENCOUNTER — HOSPITAL ENCOUNTER (OUTPATIENT)
Dept: BEHAVIORAL HEALTH | Facility: CLINIC | Age: 68
Discharge: HOME | End: 2023-11-28
Attending: FAMILY MEDICINE
Payer: COMMERCIAL

## 2023-11-28 ENCOUNTER — DOCUMENTATION ONLY (OUTPATIENT)
Dept: BEHAVIORAL HEALTH | Facility: CLINIC | Age: 68
End: 2023-11-28
Payer: COMMERCIAL

## 2023-11-28 PROCEDURE — H2035 A/D TX PROGRAM, PER HOUR: HCPCS

## 2023-11-28 NOTE — PROGRESS NOTES
"Individual Session:      Start Time:  10:00 AM   End Time:  11:00 AM      Note:   Patsy Santamaria met with counselors PRINCE Brady and Marisel Mcdaniel and Silver Iverson for an individual session.     Smita shared she is feeling good and sore on her left arm. Smita shared her daughter, son, and grandchildren visited her over the weekend \"highlight of my week\". Smita shared having thoughts of \"First holiday without drinking, this feels good\" Smita shared self talk helps her get through fleeing thoughts \"it is a quick thought and than goes away\". Smita shared continue the gym, pool, and meeting new people in her building. Smita shared she is going to make an appointment to get her COVID shot and she plans on taking her drivers written test on Friday. Smita affirmation \" I am proud of myself\". Smita shared she is zach the malignance stage of change \"I am still working on my recovery and I am better to handle it with my coping skills\". Smita shared that she has been off Naltrexone for months and will contact counselor if she changes her mind.     Attestation:   Dr. Eastman - Medical Director - Provides oversight and supervision of care.    PRINCE Brady 11/28/2023 1:27 PM   "

## 2023-11-28 NOTE — PROGRESS NOTES
[facility] Weekly Treatment Plan Review      ATTENDANCE for the following date span: 23-12/3/23    Date     Group Therapy  1  hours        Individual Therapy   1     Family Therapy        Other (Specify) Phase 1 &2  Phase 3  Phase 1  Phase 1 & 2        Patient attended all scheduled sessions during this time period.   Total hours: 92 hours. Patient is in phase 3    Weekly Treatment Plan Review     Treatment Plan initiated on 2023.    Dimension1: Acute Intoxication/Withdrawal Potential -   Previous Dimension Ratin  Current Dimension Ratin  Date of Last Use: 23  Any reports of withdrawal symptoms: No  Narrative: Patsy reports sustained abstinence. No changes or concerns.     Dimension 2: Biomedical Conditions & Complications -   Previous Dimension Ratin  Current Dimension Ratin  Medical Concerns:  Nerve and foot pain  Current Medications & Medication Changes:  Current Outpatient Medications   Medication    albuterol (PROAIR HFA/PROVENTIL HFA/VENTOLIN HFA) 108 (90 Base) MCG/ACT inhaler    benzonatate (TESSALON) 200 MG capsule    budesonide-formoterol (SYMBICORT) 160-4.5 MCG/ACT Inhaler    bumetanide (BUMEX) 1 MG tablet    DULoxetine (CYMBALTA) 60 MG capsule    hydrOXYzine (ATARAX) 50 MG tablet    ipratropium - albuterol 0.5 mg/2.5 mg/3 mL (DUONEB) 0.5-2.5 (3) MG/3ML neb solution    omeprazole (PRILOSEC) 20 MG DR capsule    pregabalin (LYRICA) 25 MG capsule     No current facility-administered medications for this visit.     Medication Prescriber:  Yanique Cali MD.   Taking meds as prescribed? Yes  Medication side effects or concerns:  None reported.  Outside medical appointments this week (list provider and reason for visit):  See chart   Narrative: Patient reports the following medical conditions: COPD, overweight, uses CPAP, and general fatigue . Patient has primary care with Yanique Cali MD. Patsy  "now has a gym membership, she is doing water aerobics and gym exercises about 2-3x per week. Patsy is feeling sore but finding enjoyment in her new gym routine. Patsy is continuing to make strides to decrease her smoking. She reported stopping her Naltrexone months ago and will reach out to a provider if needed.     Dimension 3: Emotional/Behavioral Conditions & Complications  Previous Dimension Ratin  Current Dimension Ratin    PHQ9       2023     1:00 PM 2023    11:48 AM 2023    10:00 PM   PHQ-9 SCORE   PHQ-9 Total Score MyChart  3 (Minimal depression)    PHQ-9 Total Score 3 3 4     GAD7       2023    12:00 PM 2023     1:00 PM 2023    10:00 PM   ALISIA-7 SCORE   Total Score 3 4 4     Mental health diagnosis PTSD  Date of last SIB/SI/HI: N/A  Current MH Assignments:  None  Narrative:  Patient reports a mental health diagnosis of PTSD, Anxiety, and Depression. Patsy has endorses current symptoms of hypervigilance and anxiety when out. Patsy shares continued improvements in her mental health. She is reporting that she has felt relief with her move as she has noticed that it is better for her health and mental health. Patsy is now doing prolonged exposure with Cony MORRISON [facility]. Patsy was supposed to have an addiction medicine appointment today however was unable to attend due to transportation issues. Patsy continues to report decrease in anxiety medications and overall emotional regulation. Patsy's affirmation this week is \"I am worth it and will stay sober\".     Dimension 4: Treatment Acceptance / Resistance  Previous Dimension Ratin  Current Dimension Ratin  ORAL Diagnosis:  Alcohol Use Disorder   303.90 (F10.20) Severe In early remission,   Phase: 3  Commitment to tx process/Stage of change: Action   ORAL assignments: Relapse Prevention Plan   Narrative: Patient appears internally motivated for treatment. She is an active and engaged group member when present. Patsy " "verbalizes continued motivation for recovery, she continues to reflect on her progress overall and is feeling positive about this place in her life. Patsy was active and engaged this week. No major changes.     Dimension 5: Relapse / Continued Problem Potential    Previous Dimension Rating:  3  Current Dimension Rating:  3  Relapses this week: None  Urges to use:  See below   UA results:   No results found for this or any previous visit (from the past 168 hour(s)).  Narrative: Patsy reports when going out to the grocery store she does not ignore the liquor store, she acknowledge that it is there and does not give the liquor store the power. She does daily readings in the morning and evening and when she is having a hard day she reads a passage from the \"big book\".  Patsy continues to work on improving her relapse prevention plan. She is beginning to name her triggers prior to events and situations and because of this has a plan for her recovery skills to use. Patsy acknowledged that talking about her recovery with her sisters and friends is helpful so she is not processing it alone. Patsy identified triggers such as \"disappointment, irritability and feeling pissed off\". She is using the gym and time with her sisters as a coping skill. Smita shared having thoughts of \"First holiday without drinking, this feels good\" Smita shared self talk helps her get through fleeing thoughts \"it is a quick thought and than goes away\".  Smita shared she is in the maintenance stage of change \"I am still working on my recovery and I am better to handle it with my coping skills\"     Dimension 6: Recovery Environment   Previous Dimension Rating:  3  Current Dimension Ratin  Family Involvement : None   Summarize attendance at family groups and family sessions: N/A  Family supportive of treatment?  Yes  Community support group attendance: None  Recreational activities: Exercise   Narrative: Patsy has stable housing at this time and lives " "alone. She is \"retired and on disability\", she does note some concerns about her financial stability. Patsy is not involved in the criminal justice system. Patsy has officially moved and is unpacked. She is learning more places around her new home and is enjoying some independence in her exploring. Patsy is attending [employer] and finds enjoyment in that. She continues to talk to her sober friend in [place] for support in addition to attending group and therapy.  Patsy attends [employer] regularly and finds benefit in that. Patsy is reading \"Quit Like a Woman\" and doing AA readings and finding benefit in that. Patsy is looking for an emotional support dog and will get one soon that will best fit her budget. Patsy is helping her sister clean out her house and has been in contact with her children throughout the week. Patsy has added a gym routine to her structure. Smita shared her daughter, son, and grandchildren visited her over the weekend \"highlight of my week\".     Justification for Continued Treatment at this Level of Care:  Patsy is in Phase 3 and has increased her periods of sobriety. She lacks a sober support network however has begun attending [employer] and has family support. She is medication compliant and has started individual therapy. She has been able to identify triggers, and is starting to learn and implement some coping skills. Her daily life schedule/routine has not been supportive of recovery previously so she is working on improving that. Patsy shared she signed up for second hand fosters and should here back from them in a couple of months about a small dog. Patsy is counties to go to [employer] and surrounds herself with positive people.  She is working on getting more structure in her daily activities.     Discharge Planning:  Target Discharge Date/Timeframe:  11/28/23  Med Mgmt Provider/Appt:  Dr. Covington in addiction medicine  Ind therapy Provider/Appt:  Weekly   Other referrals:  None  Has vulnerable adult status " change? No  Service Coordination:  None    Supervision:  Patient is staffed with treatment providers, this includes: Cony Guzman, [facility], Marisel Mcdaniel [facility], Silver Iverson, [facility] and Joyce Curry, [facility]; Staffed with [facility] Team    Attestation:   Dr. Eastman - Medical Director - Provides oversight and supervision of care.    Are Treatment Plan goals/objectives effective? Yes  *If no, list changes to treatment plan:    Are the current goals meeting client's needs? Yes  *If no, list the changes to treatment plan.    Client Input / Response: Agreeable       *Client agrees with any changes to the treatment plan: N/A  *Client received copy of changes: N/A  *Client is aware of right to access a treatment plan review: Yes (MyChart or request copy)

## 2023-11-29 ENCOUNTER — HOSPITAL ENCOUNTER (OUTPATIENT)
Dept: BEHAVIORAL HEALTH | Facility: CLINIC | Age: 68
Discharge: HOME | End: 2023-11-29
Attending: FAMILY MEDICINE
Payer: COMMERCIAL

## 2023-11-29 PROCEDURE — H2035 A/D TX PROGRAM, PER HOUR: HCPCS

## 2023-11-30 ENCOUNTER — TELEPHONE (OUTPATIENT)
Dept: BEHAVIORAL HEALTH | Facility: CLINIC | Age: 68
End: 2023-11-30
Payer: COMMERCIAL

## 2023-11-30 NOTE — TELEPHONE ENCOUNTER
----- Message from Cony Guzman [facility], [facility] sent at 11/30/2023 10:35 AM CST -----  Regarding: Scheduling Phase 3 sessions  Scheduling Request    Patient Name:MINNA WOOD [[id number]]  Location of programming: [facility] [[id number]]   Start Date: December / 05 / 2023  Group: [facility] GROUP  Phase 3 on Tuesdays at 9:00 AM to 11:00 PM  Attending Provider (MD): FAUSTINO Eastman  Number of visits to be scheduled: 4  Duration of Appointment in minutes: 120  Visit Type: in [place]    Additional notes: Additional phase 3 sessions

## 2023-12-05 ENCOUNTER — DOCUMENTATION ONLY (OUTPATIENT)
Dept: BEHAVIORAL HEALTH | Facility: CLINIC | Age: 68
End: 2023-12-05
Payer: COMMERCIAL

## 2023-12-05 ENCOUNTER — HOSPITAL ENCOUNTER (OUTPATIENT)
Dept: BEHAVIORAL HEALTH | Facility: CLINIC | Age: 68
Discharge: HOME | End: 2023-12-05
Attending: FAMILY MEDICINE
Payer: COMMERCIAL

## 2023-12-05 DIAGNOSIS — F10.90 ALCOHOL USE DISORDER: Primary | ICD-10-CM

## 2023-12-05 PROCEDURE — H2035 A/D TX PROGRAM, PER HOUR: HCPCS

## 2023-12-05 NOTE — PROGRESS NOTES
"Individual Session:      Start Time:  9:00 AM   End Time:  10:00 PM      Note:   Patsy Santamaria met with counselors DEBRA Brady and Marisel Mcdaniel, and Silver Iverson for an individual session.     Patsy shared feeling tired, she is lacking sleep. Patsy shared the past two weeks of feeling \"up and down and feeling irritable\". Patsy shared have strong urges and feeling frustrated with having that strong of an urge. Patsy shared \"I did not let the urge have the power, my gnozalez has helped me a lot\". Patsy shared that she counties to go to the pool and the gym. Patsy shares spending time with her sisters and Joaquim. Patsy shared that she can open up with jo[place] and [place] if she is experiencing high urges and thoughts. Patsy shared a trigger for her is feeling frustrated and does not feel safe with the  that takes her to her individual appointments with Cony Guzman. Patsy shared that she is open with talking with addiction medicine and getting back on medication with her urges feeling strong. Smita affirmations \"I am proud of myself for coming this far\"         Attestation:   Dr. Eastman - Medical Director - Provides oversight and supervision of care.    DEBRA Brady 12/5/2023 11:29 AM   "

## 2023-12-05 NOTE — PROGRESS NOTES
[facility] Weekly Treatment Plan Review      ATTENDANCE for the following date span: 23-12/10/23    Date     Group Therapy  1  hours        Individual Therapy   1     Family Therapy        Other (Specify) Phase 1 &2  Phase 3  Phase 1  Phase 1 & 2        Patient attended all scheduled sessions during this time period.   Total hours: 93 hours. Patient is in phase 3    Weekly Treatment Plan Review     Treatment Plan initiated on 2023.    Dimension1: Acute Intoxication/Withdrawal Potential -   Previous Dimension Ratin  Current Dimension Ratin  Date of Last Use: 23  Any reports of withdrawal symptoms: No  Narrative: Patsy reports sustained abstinence. No changes or concerns.     Dimension 2: Biomedical Conditions & Complications -   Previous Dimension Ratin  Current Dimension Ratin  Medical Concerns:  Nerve and foot pain  Current Medications & Medication Changes:  Current Outpatient Medications   Medication    albuterol (PROAIR HFA/PROVENTIL HFA/VENTOLIN HFA) 108 (90 Base) MCG/ACT inhaler    benzonatate (TESSALON) 200 MG capsule    budesonide-formoterol (SYMBICORT) 160-4.5 MCG/ACT Inhaler    bumetanide (BUMEX) 1 MG tablet    DULoxetine (CYMBALTA) 60 MG capsule    hydrOXYzine (ATARAX) 50 MG tablet    ipratropium - albuterol 0.5 mg/2.5 mg/3 mL (DUONEB) 0.5-2.5 (3) MG/3ML neb solution    omeprazole (PRILOSEC) 20 MG DR capsule    pregabalin (LYRICA) 25 MG capsule     No current facility-administered medications for this visit.     Medication Prescriber:  Yanique Cali MD.   Taking meds as prescribed? Yes  Medication side effects or concerns:  None reported.  Outside medical appointments this week (list provider and reason for visit):  See chart   Narrative: Patient reports the following medical conditions: COPD, overweight, uses CPAP, and general fatigue . Patient has primary care with Yanique Cali MD. Patsy  "now has a gym membership, she is doing water aerobics and gym exercises about 2-3x per week. Patsy is feeling sore but finding enjoyment in her new gym routine. Patsy is continuing to make strides to decrease her smoking. Patsy shared that she is open with talking with addiction medicine and getting back on medication with her urges feeling strong.     Dimension 3: Emotional/Behavioral Conditions & Complications  Previous Dimension Ratin  Current Dimension Ratin    PHQ9       2023     1:00 PM 2023    11:48 AM 2023    10:00 PM   PHQ-9 SCORE   PHQ-9 Total Score MyChart  3 (Minimal depression)    PHQ-9 Total Score 3 3 4     GAD7       2023    12:00 PM 2023     1:00 PM 2023    10:00 PM   ALISIA-7 SCORE   Total Score 3 4 4     Mental health diagnosis PTSD  Date of last SIB/SI/HI: N/A  Current MH Assignments:  None  Narrative:  Patient reports a mental health diagnosis of PTSD, Anxiety, and Depression. Patsy has endorses current symptoms of hypervigilance and anxiety when out. Patsy shares continued improvements in her mental health. She is reporting that she has felt relief with her move as she has noticed that it is better for her health and mental health. Patsy is now doing prolonged exposure with Cony [facility]. Patsy was supposed to have an addiction medicine appointment today however was unable to attend due to transportation issues. Patsy shared the past two weeks of feeling \"up and down and feeling irritable\".  affirmations \"I am proud of myself for coming this far\"      Dimension 4: Treatment Acceptance / Resistance  Previous Dimension Ratin  Current Dimension Ratin  ORAL Diagnosis:  Alcohol Use Disorder   303.90 (F10.20) Severe In early remission,   Phase: 3  Commitment to tx process/Stage of change: Action   ORAL assignments: Relapse Prevention Plan   Narrative: Patient appears internally motivated for treatment. She is an active and engaged group member " "when present. Patsy verbalizes continued motivation for recovery, she continues to reflect on her progress overall and is feeling positive about this place in her life. Patsy was active and engaged this week. No major changes.     Dimension 5: Relapse / Continued Problem Potential    Previous Dimension Rating:  3  Current Dimension Rating:  3  Relapses this week: None  Urges to use:  See below   UA results:   No results found for this or any previous visit (from the past 168 hour(s)).  Narrative: Patsy reports when going out to the grocery store she does not ignore the liquor store, she acknowledge that it is there and does not give the liquor store the power. She does daily readings in the morning and evening and when she is having a hard day she reads a passage from the \"big book\".  Patsy continues to work on improving her relapse prevention plan. She is beginning to name her triggers prior to events and situations and because of this has a plan for her recovery skills to use. Patsy acknowledged that talking about her recovery with her sisters and friends is helpful so she is not processing it alone. Patsy identified triggers such as \"disappointment, irritability and feeling pissed off\". She is using the gym and time with her sisters as a coping skill. Patsy shared have strong urges and feeling frustrated with having that strong of an urge. Patsy shared \"I did not let the urge have the power, my gonzalez has helped me a lot\".  Patsy shared that she can open up with joaquim and [place] form [place] if she is experiencing high urges and thoughts. Patsy shared a trigger for her is feeling frustrated and does not feel safe with the  that takes her to her individual appointments with Cony Guzman.     Dimension 6: Recovery Environment   Previous Dimension Rating:  3  Current Dimension Ratin  Family Involvement : None   Summarize attendance at family groups and family sessions: N/A  Family supportive of treatment?  " "Yes  Community support group attendance: None  Recreational activities: Exercise   Narrative: Patsy has stable housing at this time and lives alone. She is \"retired and on disability\", she does note some concerns about her financial stability. Patsy is not involved in the criminal justice system. Patsy has officially moved and is unpacked. She is learning more places around her new home and is enjoying some independence in her exploring. Patsy is attending [employer] and finds enjoyment in that. She continues to talk to her sober friend in [place] for support in addition to attending group and therapy.  Patsy attends [employer] regularly and finds benefit in that. Patsy is reading \"Quit Like a Woman\" and doing AA readings and finding benefit in that. Patsy is looking for an emotional support dog and will get one soon that will best fit her budget. Patsy is helping her sister clean out her house and has been in contact with her children throughout the week. Patsy has added a gym routine to her structure. Smita shared her daughter, son, and grandchildren visited her over the weekend \"highlight of my week\".     Justification for Continued Treatment at this Level of Care:  Patsy is in Phase 3 and has increased her periods of sobriety. She lacks a sober support network however has begun attending [employer] and has family support. She is medication compliant and has started individual therapy. She has been able to identify triggers, and is starting to learn and implement some coping skills. Her daily life schedule/routine has not been supportive of recovery previously so she is working on improving that. Patsy shared she signed up for second hand fosters and should here back from them in a couple of months about a small dog. Patsy is counties to go to [employer] and surrounds herself with positive people.  She is working on getting more structure in her daily activities.     Discharge Planning:  Target Discharge Date/Timeframe:  11/28/23  Med Mgmt " Provider/Appt:  Dr. Covington in addiction medicine  Ind therapy Provider/Appt:  Weekly   Other referrals:  None  Has vulnerable adult status change? No  Service Coordination:  None    Supervision:  Patient is staffed with treatment providers, this includes: Cony Guzman, [facility], Marisel Mcdaniel, [facility], Silver Iverson, [facility] and Joyce Curry, [facility]; Staffed with [facility] Team    Attestation:   Dr. Eastman - Medical Director - Provides oversight and supervision of care.    Are Treatment Plan goals/objectives effective? Yes  *If no, list changes to treatment plan:    Are the current goals meeting client's needs? Yes  *If no, list the changes to treatment plan.    Client Input / Response: Agreeable       *Client agrees with any changes to the treatment plan: N/A  *Client received copy of changes: N/A  *Client is aware of right to access a treatment plan review: Yes (MyChart or request copy)
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
## 2023-12-06 ENCOUNTER — DOCUMENTATION ONLY (OUTPATIENT)
Dept: BEHAVIORAL HEALTH | Facility: CLINIC | Age: 68
End: 2023-12-06
Payer: COMMERCIAL

## 2023-12-06 NOTE — PROGRESS NOTES
Addiction Outpatient Weekly Clinical Staffing     Patsy Santamaria was staffed on 12/6/2023 . Patsy Santamaria was staffed on recovery strengths, barriers and treatment progress.     Staff present: Latoya Wyatt [facility], Naila Trevizo [facility], Dixie Finnegan [facility] , Shelly Morejon [facility], Donald Welander, [facility], Joyce Bal, [facility], and Silver Iverson [facility]     Date: 12/6/2023 Time: 4:14 PM    Staff Signature: Cony Guzman, [facility], [facility]

## 2023-12-07 NOTE — PROGRESS NOTES
ABSENT NOTE:    Patsy Santamaria did not attend an individual session scheduled for 12/6/2023 . This absence was excused. Patsy informed writer that her transportation cancelled. Patient is schedule for next week.     Cony Guzman, MOHINIC, LADC

## 2023-12-08 ENCOUNTER — DOCUMENTATION ONLY (OUTPATIENT)
Dept: ADDICTION MEDICINE | Facility: HOSPITAL | Age: 68
End: 2023-12-08
Payer: COMMERCIAL

## 2023-12-12 ENCOUNTER — DOCUMENTATION ONLY (OUTPATIENT)
Dept: BEHAVIORAL HEALTH | Facility: CLINIC | Age: 68
End: 2023-12-12
Payer: COMMERCIAL

## 2023-12-12 ENCOUNTER — HOSPITAL ENCOUNTER (OUTPATIENT)
Dept: BEHAVIORAL HEALTH | Facility: CLINIC | Age: 68
Discharge: HOME | End: 2023-12-12
Attending: FAMILY MEDICINE
Payer: COMMERCIAL

## 2023-12-12 PROCEDURE — H2035 A/D TX PROGRAM, PER HOUR: HCPCS

## 2023-12-12 NOTE — PROGRESS NOTES
"  Individual Session Summary    START TIME: 9:00 AM   END TIME: 10:00 AM   Duration: 1 Hour    Patsy Santamaria is a 68 year old female who is being evaluated via in person visit.  Group appointment changed to individual session due to low census.    ORAL Diagnosis:   AUD, Severe, in early remission (F10.29)     Mental Health Diagnosis:   PTSD (F43.12)      Data:  This writer met with the client on this date for an individual ORAL-focused session. The session focused on client's thoughts and feelings regarding the treatment program, group, and maintaining sobriety.  This writer and the client also reviewed their treatment plan goal for DIM(s) 3 and 5 of her individual treatment plan.   Client talked about feeling \"scatterbrained\" and anxious this morning due to over sleeping. She reports that she is having a hard time slowing her mind down today. States that she experienced some thoughts of use over the last week but was able to utilize urge surfing to get through the thoughts. States that the holidays are generally a time when she has more cravings. She reports that she has been going to the gym daily which makes her feel good about herself.  Client states overall things are going well for her. She reports that she has been taking all of her medications as prescribed, and that she has plans with family to do some baking. Client denies any suicidal thoughts, plans, or intent today. Client also denies any thoughts or behaviors of self-harm today.    Intervention: Individual session with client. Provided client with verbal interventions such as including validation, support, and psychoeducation. Provider used various therapeutic techniques such as CBT techniques, Solutions Focused Brief Therapy, Motivational Interviewing, DBT techniques, Strengths Based Approach, and trauma informed care (TIC).       Assessment: Client presents engaged and willing. Client reports taking proactive steps to be mindful about how they are " feeling, including recognition of triggers and urges, and using healthy coping skills on a regular basis. Client continues to appear very open and willing to learn new skills that will help her maintain lifelong recovery.         9/12/2023     1:00 PM 9/13/2023    11:48 AM 9/25/2023    10:00 PM   PHQ-9 SCORE   PHQ-9 Total Score MyChart  3 (Minimal depression)    PHQ-9 Total Score 3 3 4         7/19/2023    12:00 PM 9/12/2023     1:00 PM 9/25/2023    10:00 PM   ALISIA-7 SCORE   Total Score 3 4 4       Plan. Client will continue taking proactive steps to be mindful about how they are feeling, including recognition of triggers and urges, and using healthy coping skills on a regular basis.    I have reviewed the note as documented above.  This accurately captures the substance of my visit with the client.    Attestation:   Dr. Eastman - Medical Director - Provides oversight and supervision of care.      PRINCE Ryder

## 2023-12-12 NOTE — PROGRESS NOTES
Attestation:  Yamel Eastman MD. - Provides oversight and supervision of care.    Individual Session Summary (MICD)   DATE:  12/12/2023  START TIME: 10:30 AM   END TIME: 11:15 AM   Duration: 45 Minutes    ORAL Diagnosis: AUD, Severe, in early remission (F10.29)  Mental Health Diagnosis: PTSD (F43.12)    Data:   Individual session to close PET for PTSD. The session focused on PTSD symptoms and urges to drink alcohol.   Client denied suicidal, self-harm, violent or homicidal thoughts, plans, or intent today.      Intervention: Motivational interviewing to assess for an support co-occurring health and wellness, review progress, and close.  Primary tasks for the session included identifying and validating progress made, encouraged request to re-start naltrexone for the holidays as motivating thoughts have increased. Planned to continue Phase 3 and close individual at this time. Helped to download and tutored on use of PTSD  don by [employer] for continuing work on symptom management and encouraged continuing work individually, when ready. Informed that Dr. Covington or counselors in [facility] can make a referral if interested. Supported developing a sober support network. Demonstrated [employer] website access and reminded of AA, SMART, etc. as an in additional support to [facility], [place] and family.      Assessment: Patsy reported a desire to end PET early. She deleted the PE  don from her phone. She had not begun imaginal exposure and had only completed sessions 1 and 2 primarily psycho-education and skill development. She identified increased cravings that could be related to the holidays and noted that God has been helpful in keeping her sober (not liquor stores at [employer] she shopped at when thinking she would buy a pint). She also reported nightmares are still impacting sleep. Patsy has been reluctant to restart Naltrexone but was willing to discuss this with Dr. Covington, in addition to a new sleep medication and perhaps  "prazosin for nightmares.  Patsy downloaded the PTSD  don for ongoing skills development and to be able to access support if needed. She also agreed to ask for referral if indicated/interested in the future.    Patsy reported continuing to feel better with exercise in pool, following through with medical appointments and walking more. She reported experiencing less pain overall. She is seeing a dermatologist today after waiting 8 months. She is aware of continuing challenge of budgeting. Mood described as \"pretty good\" with congruent affect.    Patsy acknowledged that she has done good work and maintains a desire to remain in recovery.    Plan: Patsy closed with this writer for PET. She will continue Phase 3 OP.  She will use PTSD  if interested for skills development. She will discuss MAT, sleep and nightmares with Dr. Covington in addiction medicine.       Cony Guzman, LPCC, LADC  "

## 2023-12-19 ENCOUNTER — DOCUMENTATION ONLY (OUTPATIENT)
Dept: BEHAVIORAL HEALTH | Facility: CLINIC | Age: 68
End: 2023-12-19
Payer: COMMERCIAL

## 2023-12-19 ENCOUNTER — HOSPITAL ENCOUNTER (OUTPATIENT)
Dept: BEHAVIORAL HEALTH | Facility: CLINIC | Age: 68
Discharge: HOME | End: 2023-12-19
Attending: FAMILY MEDICINE
Payer: COMMERCIAL

## 2023-12-19 PROCEDURE — H2035 A/D TX PROGRAM, PER HOUR: HCPCS
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
## 2023-12-19 NOTE — PROGRESS NOTES
"  Individual Session Summary    START TIME: 9:00 AM   END TIME: 10:00 AM   Duration: 1 Hour    Patsy Santamaria is a 68 year old female who is being evaluated via in person visit.  Group appointment changed to individual session due to low census.    ORAL Diagnosis:   AUD, Severe, in early remission (F10.29)     Mental Health Diagnosis:   PTSD (F43.12)      Data:  This writer met with the client on this date for an individual ORAL-focused session. The session focused on client's thoughts and feelings regarding the treatment program, group, and maintaining sobriety.  This writer and the client also reviewed their treatment plan goal for DIM(s) 3 and 5 of her individual treatment plan.   Client talked about feeling \"tired\", reports having some nightmares.Seeing psychiatrist this week and wants to discuss increasing sleep medication. Spent most of the last couple of weeks baking in preparation for the holidays. Missed the gym on Saturday and felt down afterwards. Having cravings but reports better self-efficacy in managing cravings and applying skills, such as distraction. [place] is seeing the results of going to the gym/pool everyday. Going to [employer] online and talking to sober supportive friends for support. Client states she is going to finish opal shopping and baking for the upcoming week.  Client states overall things are going well for her. She reports that she has been taking all of her medications as prescribed.. Client denies any suicidal thoughts, plans, or intent today. Client also denies any thoughts or behaviors of self-harm today.    Intervention: Individual session with client. Provided client with verbal interventions such as including validation, support, and psychoeducation. Provider used various therapeutic techniques such as CBT techniques, Solutions Focused Brief Therapy, Motivational Interviewing, DBT techniques, Strengths Based Approach, and trauma informed care (TIC).       Assessment: " Client presents engaged and willing. Client reports taking proactive steps to be mindful about how they are feeling, including recognition of triggers and urges, and using healthy coping skills on a regular basis. Client continues to appear very open and willing to learn new skills that will help her maintain lifelong recovery.         9/12/2023     1:00 PM 9/13/2023    11:48 AM 9/25/2023    10:00 PM   PHQ-9 SCORE   PHQ-9 Total Score MyChart  3 (Minimal depression)    PHQ-9 Total Score 3 3 4         7/19/2023    12:00 PM 9/12/2023     1:00 PM 9/25/2023    10:00 PM   ALISIA-7 SCORE   Total Score 3 4 4       Plan. Client will continue taking proactive steps to be mindful about how they are feeling, including recognition of triggers and urges, and using healthy coping skills on a regular basis. Client will complete a trigger log assignment for group next week.     I have reviewed the note as documented above.  This accurately captures the substance of my visit with the client.    Attestation:   Dr. Eastman - Medical Director - Provides oversight and supervision of care.      Silver Iverson, Formerly named [facility]

## 2023-12-21 DIAGNOSIS — M54.6 PAIN IN THORACIC SPINE: ICD-10-CM

## 2023-12-22 NOTE — TELEPHONE ENCOUNTER
Last appointment: 09/07/2023  Next appointment: None    Notes/Comments: Called,na. Lmtcb. Need to verify need for refill and current dose.       Rx request(s) has been reviewed.

## 2023-12-26 ENCOUNTER — HOSPITAL ENCOUNTER (OUTPATIENT)
Dept: BEHAVIORAL HEALTH | Facility: CLINIC | Age: 68
Discharge: HOME | End: 2023-12-26
Attending: FAMILY MEDICINE
Payer: COMMERCIAL

## 2023-12-26 ENCOUNTER — DOCUMENTATION ONLY (OUTPATIENT)
Dept: BEHAVIORAL HEALTH | Facility: CLINIC | Age: 68
End: 2023-12-26
Payer: COMMERCIAL

## 2023-12-26 PROCEDURE — H2035 A/D TX PROGRAM, PER HOUR: HCPCS

## 2023-12-26 NOTE — PROGRESS NOTES
Individual Session Summary    START TIME: 10:00 AM   END TIME: 11:00 AM   Duration: 1 Hour    Patsy Santamaria is a 68 year old female who is being evaluated via in person visit.  Group appointment changed to individual session due to low census.    ORAL Diagnosis:   AUD, Severe, in early remission (F10.29)     Mental Health Diagnosis:   PTSD (F43.12)      Data:  This writer met with the client on this date for an individual ORAL-focused session. The session focused on client's thoughts and feelings regarding the treatment program, group, and maintaining sobriety.  This writer and the client also reviewed their treatment plan goal for DIM(s) 3 and 5 of her individual treatment plan.  Reported feeling tired today. She states she spent much of her time over the weekend baking for family. She reports she did take time to practice mindfulness skills when she became stressed. She reports that her family did not come over for the holiday weekend, but is planning to see them sometime next week. Patsy reports she feels ready to discharge from treatment and that she is ready to take her recovery into her own hands. Client states overall things are going well for her. She reports that she has been taking all of her medications as prescribed.. Client denies any suicidal thoughts, plans, or intent today. Client also denies any thoughts or behaviors of self-harm today.    Intervention: Individual session with client. Provided client with verbal interventions such as including validation, support, and psychoeducation. Provider used various therapeutic techniques such as CBT techniques, Solutions Focused Brief Therapy, Motivational Interviewing, DBT techniques, Strengths Based Approach, and trauma informed care (TIC).       Assessment: Client presents engaged and willing. Client reports taking proactive steps to be mindful about how they are feeling, including recognition of triggers and urges, and using healthy coping skills on a  regular basis. Client continues to appear very open and willing to learn new skills that will help her maintain lifelong recovery.         9/12/2023     1:00 PM 9/13/2023    11:48 AM 9/25/2023    10:00 PM   PHQ-9 SCORE   PHQ-9 Total Score MyChart  3 (Minimal depression)    PHQ-9 Total Score 3 3 4         7/19/2023    12:00 PM 9/12/2023     1:00 PM 9/25/2023    10:00 PM   ALISIA-7 SCORE   Total Score 3 4 4       Plan. Patsy will favorable discharge from the program.    I have reviewed the note as documented above.  This accurately captures the substance of my visit with the client.    Attestation:   Dr. Eastman - Medical Director - Provides oversight and supervision of care.      PRINCE Ryder

## 2023-12-26 NOTE — PROGRESS NOTES
IOP DISCHARGE SUMMARY                                                                                                            Name:  Patsy Santamaria   :  PRINCE Ryder   Admit Date: 2023   Discharge Date: 2023   :  1955   Hours Completed: 97   Initial Diagnosis:  303.90 (F10.20) Alcohol Use Disorder Severe   Final Diagnosis:  303.90 (F10.20) Alcohol Use Disorder Severe, In Early Remission   Discharge Address:    [address]  [place] [address]   Funding Source:    Medicare/[facility]     Program:  [facility] IOP     Discharge Type:  WSA- With Staff Approval     Patient was receiving residential services at the time of discharge:   NO      Reasons for and circumstances of service termination:  Completion of treatment requirements       If program discharge status was At Staff Request, the license [place] must identify the following:    Other interested parties conferred with: not applicable    Referrals provided: Referral for Individual Therapy provided. Patient declined    Alternatives considered and attempted before deciding to discharge:  not applicable      Dimension/Course of Treatment/Individualized Care:   1.  Withdrawal Potential - Intake Risk level -  0 Discharge Risk level -0  Narrative supporting risk description:  Patsy reports last using on 2023. She denies any symptoms of withdrawal at this time.   Treatment plan goals and progress towards those goals:  Patsy has achieved her goal of exploring medication-assisted therapy. She was taking naltrexone for a time but stopped taking it after some time. She reports she did not feel it was helpful. She has continued to be abstinent from alcohol and other substances.      2.  Biomedical Conditions and Complications - Intake Risk level - 1 Discharge Risk level - 1  Narrative supporting risk description:   Patient reports the following medical conditions: COPD, overweight, uses CPAP, and general fatigue . Patient  has primary care with Yanique Cali MD. Her next appointment is 1/23/2024. Patsy reports smoking less than when she began programming  Treatment plan goals and progress towards those goals:  Patsy reports she is taking care of her physical health by maintaining her medical appointments through her primary care. She states in addition to taking her medications, she has begun exercising on a regular bases which has been improving her pain. Patsy has also reduced her smoking while in treatment. Patsy continues to address biomedical concerns as needed.      3.  Emotional/Behavioral/Cognitive Conditions and Complications - Intake Risk level -  2 Discharge Risk level - 1  Narrative supporting risk description:  Patient reports a mental health diagnosis of PTSD, Anxiety, and Depression. Patient endorses current symptoms of hypervigilance and anxiety when out. She also notes unexplained fatigue. Patient reports hat while she is still having symptoms of PTSD, anxiety and depression, she know possess skills to cope with symptoms. She reports she is not isolated like she was when she was using. Patient's denies current SI or thoughts of self-harm.   Treatment plan goals and progress towards those goals:  Patsy reports she is medication adherent. She states that she began taking an antidepressant while in treatment and this has been helpful in treating symptoms of depression. She reports numerous coping skills to manage mental health symptoms, such as; meditation, journaling, medications, social events, boundary setting, routine setting. Patsy did see a mental health therapist to begin prolonged exposure therapy, but ultimately decided that she did not want to engage in trauma focused therapy at this time. She reports her mental health is stable at this time.     4.  Readiness for Change - Intake Risk level -  1  Discharge Risk level - 1  Narrative supporting risk description:  Patsy has completed her goals for  treatment. She has a high level of internal motivation to maintain sobriety and is engaging in supportive networks to maintain sobriety. Patsy is in the action stage of change. She has made progress or completed her goals for treatment  Treatment plan goals and progress towards those goals:  Patsy had no attendance concerns throughout the course of treatment. She was an active, engaged, participant in groups and individual sessions. She completed all assignments assigned to her. She reported at discharge that she feels she is ready to discharge.     5.  Relapse/Continued Use/Continued Problem Potential - Intake Risk level -  3 Discharge Risk level - 2  Narrative supporting risk description:   Patient reports that he has been to multiple treatments and has had limited periods of sobriety following each treatment. Patient has gained insight into their relapse process, and triggers to use. She has been engaged with her Anglican on a weekly basis and has several supports outside of her family. She reports she talks with friends in recovery on at least a weekly basis. Patient is not engaged with the recovery community. She said two of her three sisters are supportive of her recovery. Patient has insight into the relapse process or coping skills for emotional regulation. .   Treatment plan goals and progress towards those goals:  Patsy has completed her goals for relapse prevention. She completed her relapse prevention plan and presented it to group members and received feedback. She has gained insight into her triggers and her relapse process. She has implemented daily practices to prevent a return to use, including regular exercise, socialization, engaging in spirituality, engaging in sober relationships. She has not attended recovery support groups, but does speak with sober friends at least weekly. Patsy does have some interest in [employer] and [facility].      6.  Recovery Environment - Intake Risk level -  3  Discharge Risk level - 2  Narrative supporting risk description:  Patsy reports she has a supportive living environment. She is currently living alone in an apartment. She is unemployed and on disability, but reports she is looking for volunteer or part-time employment opportunities. She reports she speaks with two close sober supports at least weekly, and that her sisters are supportive of her recovery and are respectful about not using around her. Patsy has begun to develop a routine for her day to day activities, including working out most days and going to the pool most days. She reports her relationships with her 4 adult children is improving which is enhancing her motivation for recovery. She states she talks to one of her daughters nearly every day.   Treatment plan goals and progress towards those goals:  Patsy has made progress or completed her goals in this area. She has not attended peer recovery support groups, however is attending Shinto regularly which she reports is very beneficial for her and is not associated with use. She reports she has been engaging in baking which she deeply enjoys, working out, and connecting with her family as things she enjoys doing sober. She continues to practice setting boundaries with her family. Patsy is also looking to adopt an emotional support animal to aid in mental health and substance use recovery.     Strengths: Spirituality, Familial support, social supports, internal motivation  Needs: Trauma Focused Therapy   Services Provided: Intake, assessment, treatment planning, education, group discussion, film, lectures, 1x1 therapy, and recommendations at discharge.      Program Involvement: excellent  Attendance: excellent  Ability to relate in group/   Other program activities: excellent  Assignment Completion: excellent  Overall Behavior: excellent  Reported Family/Significant   Other Involvement: good    Prognosis: Good    Focus of Treatment / Discharge  Recommendations    Personal Safety/ Management of Symptoms    * Follow your safety plan.  Report increased symptoms to your care team and /or go to the nearest Emergency Department.    * Call crisis lines as needed    [facility] [phone]                [facility]  [phone]  [facility] [phone]     [facility] [phone]                [facility] [phone]             [facility] [phone]  [facility] [phone]           National Suicide Prevention  [address]  [facility] [phone]                  [facility] [phone]  Suicide Prevention [phone]  Throughout  [place]: call **CRISIS (**382903)  Crisis Text Line: is available 24/7 by texting [place] to 017709      Abstinence/Relapse Prevention  * Take all medicines as directed.  Carry a current list of medicines with you.  * Use coping skills: meditation, journaling, utilizing social supports  * Do not use illicit (street) drugs, controlled substances (narcotics) or alcohol.    Develop/Improve Independent Living/Socialization Skills    Community Resources/Supports and Discharge Planning:   Follow up with psychiatrist / main caregiver: Yanique Cali MD  Next visit: 1/23/24    Follow up with your therapist: Referral declined, order in Epic    Go to group therapy and / or support groups at: Community Recovery Support groups as needed.    See your medical doctor about: Ongoing medical concerns    Other:      Counselor Name and Title:  Silver Iverson [facility]       Date:  12/26/2023  Time:  1:14 PM

## 2023-12-27 RX ORDER — PREGABALIN 25 MG/1
25 CAPSULE ORAL 3 TIMES DAILY
Qty: 270 CAPSULE | Refills: 0 | Status: SHIPPED | OUTPATIENT
Start: 2023-12-27 | End: 2024-09-18

## 2023-12-27 NOTE — TELEPHONE ENCOUNTER
I called and spoke with pt. Pt confirmed that she is taking Lyrica 25 mg 1 cap TID. She has been out of Lyrica x 1 week and is noticing pain. Informed pt that Kaia is out of the office today but she will be back in clinic tomorrow. I will update the direction on her bottle and route this refill request to Kaia. Pt verbally understood.     Pharmacy is requesting for a 90 day supply. Please advise refill request.     Pharmacy sent refill request for Lyrica  --Med last Rx 9/7/23 #90, 1 refill   --Last OV 9/7/23   --Future appt: none  --Please advise

## 2024-01-17 ENCOUNTER — OFFICE VISIT (OUTPATIENT)
Dept: FAMILY MEDICINE | Facility: CLINIC | Age: 69
End: 2024-01-17
Payer: COMMERCIAL

## 2024-01-17 VITALS
TEMPERATURE: 97.2 F | WEIGHT: 269 LBS | SYSTOLIC BLOOD PRESSURE: 112 MMHG | BODY MASS INDEX: 47.66 KG/M2 | RESPIRATION RATE: 20 BRPM | DIASTOLIC BLOOD PRESSURE: 56 MMHG | OXYGEN SATURATION: 95 % | HEIGHT: 63 IN | HEART RATE: 68 BPM

## 2024-01-17 DIAGNOSIS — R10.2 PELVIC PAIN IN FEMALE: Primary | ICD-10-CM

## 2024-01-17 DIAGNOSIS — Z12.11 SCREEN FOR COLON CANCER: ICD-10-CM

## 2024-01-17 DIAGNOSIS — R73.03 PREDIABETES: ICD-10-CM

## 2024-01-17 DIAGNOSIS — E66.01 MORBID OBESITY (H): ICD-10-CM

## 2024-01-17 LAB
ALBUMIN UR-MCNC: NEGATIVE MG/DL
APPEARANCE UR: CLEAR
BACTERIA #/AREA URNS HPF: ABNORMAL /HPF
BASOPHILS # BLD AUTO: 0 10E3/UL (ref 0–0.2)
BASOPHILS NFR BLD AUTO: 1 %
BILIRUB UR QL STRIP: NEGATIVE
COLOR UR AUTO: YELLOW
EOSINOPHIL # BLD AUTO: 0.3 10E3/UL (ref 0–0.7)
EOSINOPHIL NFR BLD AUTO: 4 %
ERYTHROCYTE [DISTWIDTH] IN BLOOD BY AUTOMATED COUNT: 13.5 % (ref 10–15)
GLUCOSE UR STRIP-MCNC: NEGATIVE MG/DL
HBA1C MFR BLD: 6.2 % (ref 0–5.6)
HCT VFR BLD AUTO: 45.8 % (ref 35–47)
HGB BLD-MCNC: 14.9 G/DL (ref 11.7–15.7)
HGB UR QL STRIP: NEGATIVE
IMM GRANULOCYTES # BLD: 0 10E3/UL
IMM GRANULOCYTES NFR BLD: 0 %
KETONES UR STRIP-MCNC: NEGATIVE MG/DL
LEUKOCYTE ESTERASE UR QL STRIP: NEGATIVE
LYMPHOCYTES # BLD AUTO: 2.5 10E3/UL (ref 0.8–5.3)
LYMPHOCYTES NFR BLD AUTO: 35 %
MCH RBC QN AUTO: 29.2 PG (ref 26.5–33)
MCHC RBC AUTO-ENTMCNC: 32.5 G/DL (ref 31.5–36.5)
MCV RBC AUTO: 90 FL (ref 78–100)
MONOCYTES # BLD AUTO: 0.6 10E3/UL (ref 0–1.3)
MONOCYTES NFR BLD AUTO: 9 %
MUCOUS THREADS #/AREA URNS LPF: PRESENT /LPF
NEUTROPHILS # BLD AUTO: 3.5 10E3/UL (ref 1.6–8.3)
NEUTROPHILS NFR BLD AUTO: 51 %
NITRATE UR QL: NEGATIVE
PH UR STRIP: 6 [PH] (ref 5–8)
PLATELET # BLD AUTO: 199 10E3/UL (ref 150–450)
RBC # BLD AUTO: 5.1 10E6/UL (ref 3.8–5.2)
RBC #/AREA URNS AUTO: ABNORMAL /HPF
SP GR UR STRIP: 1.01 (ref 1–1.03)
SQUAMOUS #/AREA URNS AUTO: ABNORMAL /LPF
UROBILINOGEN UR STRIP-ACNC: 1 E.U./DL
WBC # BLD AUTO: 6.9 10E3/UL (ref 4–11)
WBC #/AREA URNS AUTO: ABNORMAL /HPF

## 2024-01-17 PROCEDURE — 36415 COLL VENOUS BLD VENIPUNCTURE: CPT | Performed by: FAMILY MEDICINE

## 2024-01-17 PROCEDURE — 85025 COMPLETE CBC W/AUTO DIFF WBC: CPT | Performed by: FAMILY MEDICINE

## 2024-01-17 PROCEDURE — 80053 COMPREHEN METABOLIC PANEL: CPT | Performed by: FAMILY MEDICINE

## 2024-01-17 PROCEDURE — 83036 HEMOGLOBIN GLYCOSYLATED A1C: CPT | Performed by: FAMILY MEDICINE

## 2024-01-17 PROCEDURE — 87086 URINE CULTURE/COLONY COUNT: CPT | Performed by: FAMILY MEDICINE

## 2024-01-17 PROCEDURE — 99214 OFFICE O/P EST MOD 30 MIN: CPT | Performed by: FAMILY MEDICINE

## 2024-01-17 PROCEDURE — 81001 URINALYSIS AUTO W/SCOPE: CPT | Performed by: FAMILY MEDICINE

## 2024-01-17 NOTE — COMMUNITY RESOURCES LIST (ENGLISH)
01/17/2024   [facility] - Outpatient Clinics  N/A  For additional resource needs, please contact your health insurance member services or your primary care team.  Phone: [phone]   Email: N/A   Address: [facility] [place] [address]   Hours: N/A        Food and Nutrition       Food pantry  1  [employer] [place] [facility] [employer] - Food pantry Distance: 1.53 miles      Delivery, [facility]   [address] [place] [address]  Language: English  Hours: Mon 1:00 PM - 6:00 PM , Tue 9:30 AM - 2:30 PM , Wed - Thu 9:30 AM - 2:00 PM  Fees: Free   Phone: (320) 478-4543 Email: info@[facility]action.org Website: http://[facility]rad[facility]ogether[facility] CenterYumZing.org/     2  [facility] Food Market Distance: 1.85 miles      [address] [place] [address]  Language: Malay, English, Taiwanese, Slovak, Swahili  Hours: Mon - Wed 9:00 AM - 11:30 AM , Tue 1:00 PM - 3:30 PM , Wed 1:00 PM - 4:00 PM  Fees: Free   Phone: (859) 854-9974 Email: info@[employer].org Website: https://neighborhoodhouse[place].org/programs/food-support/food-markets/     SNAP application assistance  3  [employer] [place] Distance: 3.24 miles      Phone/Virtual   [address] [place] [address]  Language: English, Hmong, St Helenian, Taiwanese, Slovak  Hours: Mon - Fri 8:30 AM - 4:30 PM  Fees: Free   Phone: (691) 202-6845 Email: helpline@[employer].org Website: https://www.[employer]ers[employer].org/programs/[place]-food-helpline/     4  [employer] [facility] Distance: 2.6 miles      Phone/Virtual   [address] [place] [address]  Language: English  Hours: Mon - Fri 8:00 AM - 4:30 PM  Fees: Free   Phone: (618) 930-2303 Email: info@caprw.org Website: http://www.caprw.org/     Soup kitchen or free meals  5  [facility] [place] [facility] Distance: 0.81 miles      [address] [place] [address]  Language: English  Hours: Tue 2:00 PM - 4:00 PM , Thu 2:00 PM - 4:00 PM  Fees: Free   Phone: (876) 427-4855 Email: iain@[facility]. Website: https://www.[facility]/facilities/ocweju-junptydix-ljewxo     6  [facility] [employer] [place] [facility] - Free Summer Meals Distance: 2.17 miles      In-Person   [address] [place] [address]  Language: English  Hours: Mon - Fri 12:00 PM - 1:00 PM , Mon - Fri 3:00 PM - 4:00 PM  Fees: Free   Phone: (445) 147-2872 Email: longadelsoanuradhalety@[facility]. Website: https://www.[facility]/departments/alves-recreation/[facility]          Important Numbers & Websites       [address].org  Poison Control   (549) 377-9772 [place]poison.[place]  Suicide and Crisis Lifeline   [address] [place], [place] [address]line.org  Childhelp [place] Child Abuse Hotline   654.292.5612 Childhelphotline.org  National Sexual Assault Hotline   (610) 879-4111 (HOPE) Rainn.org  National Runaway Safeline   (452) 764-1221 (RUNAWAY) [facility][note 3]runaway.org  Pregnancy & Postpartum Support [place]   Call/text 141-959-7478 Ppsupport[place].org  Substance Abuse National Helpline ([facility]   113-227-HELP (2954) Findtreatment.gov  Emergency Services   911

## 2024-01-18 LAB
ALBUMIN SERPL BCG-MCNC: 4 G/DL (ref 3.5–5.2)
ALP SERPL-CCNC: 83 U/L (ref 40–150)
ALT SERPL W P-5'-P-CCNC: 16 U/L (ref 0–50)
ANION GAP SERPL CALCULATED.3IONS-SCNC: 10 MMOL/L (ref 7–15)
AST SERPL W P-5'-P-CCNC: 24 U/L (ref 0–45)
BACTERIA UR CULT: NORMAL
BILIRUB SERPL-MCNC: 0.3 MG/DL
BUN SERPL-MCNC: 5.9 MG/DL (ref 8–23)
CALCIUM SERPL-MCNC: 9.6 MG/DL (ref 8.8–10.2)
CHLORIDE SERPL-SCNC: 102 MMOL/L (ref 98–107)
CREAT SERPL-MCNC: 0.59 MG/DL (ref 0.51–0.95)
DEPRECATED HCO3 PLAS-SCNC: 28 MMOL/L (ref 22–29)
EGFRCR SERPLBLD CKD-EPI 2021: >90 ML/MIN/1.73M2
GLUCOSE SERPL-MCNC: 113 MG/DL (ref 70–99)
POTASSIUM SERPL-SCNC: 4.9 MMOL/L (ref 3.4–5.3)
PROT SERPL-MCNC: 6.9 G/DL (ref 6.4–8.3)
SODIUM SERPL-SCNC: 140 MMOL/L (ref 135–145)

## 2024-01-19 ENCOUNTER — HOSPITAL ENCOUNTER (OUTPATIENT)
Dept: ULTRASOUND IMAGING | Facility: HOSPITAL | Age: 69
Discharge: HOME | End: 2024-01-19
Attending: FAMILY MEDICINE | Admitting: FAMILY MEDICINE
Payer: COMMERCIAL

## 2024-01-19 DIAGNOSIS — R10.2 PELVIC PAIN IN FEMALE: ICD-10-CM

## 2024-01-19 PROCEDURE — 76830 TRANSVAGINAL US NON-OB: CPT

## 2024-01-19 PROCEDURE — 76856 US EXAM PELVIC COMPLETE: CPT

## 2024-01-23 ENCOUNTER — OFFICE VISIT (OUTPATIENT)
Dept: ADDICTION MEDICINE | Facility: CLINIC | Age: 69
End: 2024-01-23
Payer: COMMERCIAL

## 2024-01-23 VITALS
OXYGEN SATURATION: 95 % | BODY MASS INDEX: 47.63 KG/M2 | SYSTOLIC BLOOD PRESSURE: 131 MMHG | WEIGHT: 268.8 LBS | HEART RATE: 68 BPM | DIASTOLIC BLOOD PRESSURE: 87 MMHG

## 2024-01-23 DIAGNOSIS — F43.10 PTSD (POST-TRAUMATIC STRESS DISORDER): ICD-10-CM

## 2024-01-23 DIAGNOSIS — G47.00 INSOMNIA, UNSPECIFIED TYPE: ICD-10-CM

## 2024-01-23 DIAGNOSIS — F17.200 TOBACCO USE DISORDER, MODERATE, DEPENDENCE: ICD-10-CM

## 2024-01-23 DIAGNOSIS — Z87.19 HISTORY OF LIVER DISEASE: ICD-10-CM

## 2024-01-23 DIAGNOSIS — F10.21 SEVERE ALCOHOL USE DISORDER, IN EARLY REMISSION (H): Primary | ICD-10-CM

## 2024-01-23 DIAGNOSIS — F32.A DEPRESSIVE DISORDER: ICD-10-CM

## 2024-01-23 PROCEDURE — 99214 OFFICE O/P EST MOD 30 MIN: CPT | Performed by: FAMILY MEDICINE

## 2024-01-23 RX ORDER — MIRTAZAPINE 15 MG/1
7.5-15 TABLET, FILM COATED ORAL
Qty: 30 TABLET | Refills: 1 | Status: SHIPPED | OUTPATIENT
Start: 2024-01-23 | End: 2024-09-03

## 2024-01-23 RX ORDER — MIRTAZAPINE 30 MG/1
15-30 TABLET, FILM COATED ORAL AT BEDTIME
COMMUNITY
End: 2024-01-23

## 2024-01-23 RX ORDER — DULOXETIN HYDROCHLORIDE 60 MG/1
60 CAPSULE, DELAYED RELEASE ORAL DAILY
Qty: 90 CAPSULE | Refills: 1 | Status: SHIPPED | OUTPATIENT
Start: 2024-01-23 | End: 2024-06-04

## 2024-01-23 RX ORDER — MIRTAZAPINE 15 MG/1
7.5-15 TABLET, FILM COATED ORAL
COMMUNITY
End: 2024-01-23

## 2024-01-23 ASSESSMENT — ANXIETY QUESTIONNAIRES
GAD7 TOTAL SCORE: 3
1. FEELING NERVOUS, ANXIOUS, OR ON EDGE: SEVERAL DAYS
7. FEELING AFRAID AS IF SOMETHING AWFUL MIGHT HAPPEN: NOT AT ALL
5. BEING SO RESTLESS THAT IT IS HARD TO SIT STILL: NOT AT ALL
4. TROUBLE RELAXING: NOT AT ALL
6. BECOMING EASILY ANNOYED OR IRRITABLE: SEVERAL DAYS
2. NOT BEING ABLE TO STOP OR CONTROL WORRYING: NOT AT ALL
GAD7 TOTAL SCORE: 3
3. WORRYING TOO MUCH ABOUT DIFFERENT THINGS: SEVERAL DAYS

## 2024-01-23 ASSESSMENT — PATIENT HEALTH QUESTIONNAIRE - PHQ9: SUM OF ALL RESPONSES TO PHQ QUESTIONS 1-9: 5

## 2024-01-23 NOTE — PROGRESS NOTES
"[facility] - Addiction Medicine       Rooming information:    POC UDS not indicated for today per provider.     Has been taking Mirtazapine off and on. Still having sleeping issues. Wakes 2-3 times per night and wakes and cannot go back to sleep.     Point of care urine drug screen positive for:  Lab Results   Component Value Date    BUP Negative 04/27/2023    BZO Screen Positive (A) 04/27/2023    BAR Negative 04/27/2023    ELICEO Negative 04/27/2023    MAMP Negative 04/27/2023    AMP Negative 04/27/2023    MDMA Negative 04/27/2023    MTD Negative 04/27/2023    JEG782 Negative 04/27/2023    OXY Negative 04/27/2023    PCP Negative 04/27/2023    THC Negative 04/27/2023    TEMP Invalid (A) 04/27/2023    SGPOCT 1.015 04/27/2023       *POC urine drug screen does not screen for Fentanyl    PHQ-9 Scores:       9/13/2023    11:48 AM 9/25/2023    10:00 PM 1/23/2024    12:00 PM   PHQ   PHQ-9 Total Score 3 4 5   Q9: Thoughts of better off dead/self-harm past 2 weeks Not at all Not at all Not at all     ALISIA-7 Scores:      9/12/2023     1:00 PM 9/25/2023    10:00 PM 1/23/2024    12:00 PM   ALISIA-7 SCORE   Total Score 4 4 3       Any other recent substance use:     Denies    NICOTINE-Yes: Down to 1/2 pack per day   If using nicotine, ready to quit? Yes: \"Getting less and less\"    Side effects related to medications pt would like to discuss with provider (constipation, dry mouth, HA, GI upset, sedation?) No     Primary care provider: EJ WELLER MD     Mental health provider: None (follow up on MH referral if needed)    Any housing, insurance deficits?: Stable. New place in September    Contact information up to date? Yes    3rd Party Involvement: None today (please obtain LUDIN if pt would like to include)      Danita Edouard RN  January 23, 2024  11:37 AM    "

## 2024-01-23 NOTE — PATIENT INSTRUCTIONS
[phone] to schedule with liver specialist    Continue Duloxetine, no change for now.    OK to take mirtazapine as needed at bedtime - can try half dose.      Follow-up in 2 months.

## 2024-01-23 NOTE — PROGRESS NOTES
[facility]    A/P                                                    ASSESSMENT/PLAN    1. Severe alcohol use disorder, in early remission (H)  Early remission (last drink March 2023).  Encouraged activities that support her recovery - talking to sober friends, being active.      2. Depressive disorder  3. PTSD (post-traumatic stress disorder)  4. History of liver disease  Overall reporting mood is stable.  She found individual therapy for PTSD too painful and decided not to pursue.  She will continue duloxetine 60mg.  Discussed that there is some risk of liver injury in patients with underlying liver disease and alcohol use.  Given her hepatic panel is normal/stable and she is not drinking alcohol, I think it is reasonable to continue her current dose, especially since her mental health has improved.  Encouraged her to see hepatology for follow-up - reviewed last visit note from 12/1/22 - likely fibrosis (but not cirrhosis) and recommend fibroscan when sober for 6 months.    - DULoxetine (CYMBALTA) 60 MG capsule; Take 1 capsule (60 mg) by mouth daily  Dispense: 90 capsule; Refill: 1    5. Insomnia, unspecified type  Needs improvement.  Likely multifactorial.  Recommend maximizing non-pharmacologic interventions such as consistently wearing CPAP, avoid napping, having set sleep schedule, etc.  Can continue mirtazapine 7.5-15mg q hs as needed however discussed risk of increased appetite and weight gain as well.    - mirtazapine (REMERON) 15 MG tablet; Take 0.5-1 tablets (7.5-15 mg) by mouth nightly as needed (sleep)  Dispense: 30 tablet; Refill: 1    6. Tobacco use disorder, moderate, dependence  Reporting decreasing use.  Need to discuss further at follow-up visit.        PDMP Review         Value Time User    State PDMP site checked  Yes 9/12/2023  1:31 PM Melissa Covington,               RTC  No follow-ups on file.      Counseled the patient on the importance of having a recovery  "program in addition to medication to manage recovery.  Components include avoiding isolating, having willingness to change, avoiding triggers and managing cravings. Encouraged having some type of sober network and practicing honesty with trusted support person(s). Encouraged other services such as counseling, 12 step or other self-help         SUBJECTIVE                                                    Patsy Santamaria is a 68 year old female with a history of peripheral neuropathy, COPD, alcoholic hepatitis, thombocytopenia, arthritis (hands and knees), obesity, anxiety, depression, and AUD who presents to clinic today for follow up.     Brief history of substance use:  Began drinking around age 33.  Became a problem for her in 2016 and she went to treatment for the first time and also experienced EtOH withdrawal seizures.  Has been to multiple treatments (most recently [facility] in [place] in Feb 2022).  Drinking tends to correlate with worsening depression.  Has tried naltrexone and acamprosate in past.  History of of cocaine use (1 year in the 1990s).  Established care with [facility] in March 2022 and has continued to struggle with brief episodes of drinking.  Most recent hospitalization related to alcohol use/withdrawal was March 2023.    Plan from most recent office visit (9/12/23):  1. Alcohol use disorder, severe, dependence (H)  Doing well, early remission.  Encouraged continuing outpatient group.       2. Depressive disorder  Stable.  Continue outpatient treatment.  Continue duloxetine 60mg daily.       3. Tobacco use disorder, moderate, dependence  Not ready to quit - follow-up at next visit.         TODAY'S VISIT  HPI Jan 23, 2024  - Completed IOP in December, remains sober since March 2023!  - Occasional thoughts of use, first holiday without drinking, can walk by [employer] store  - Has not been going to AA (\"not into it\")  - Continues to talk to her " friends in recovery  - She continues to struggle with sleep - had stopped taking Remeron 15mg for a while, resumed but still having trouble staying asleep during the night and then has trouble falling back to sleep  - Has been exercising daily (pool)  - Trouble sleeping at night and then tired during the day  - Trying to do meditation which helps sometimes  - Going to be around 10-10:30pm, getting up around 7-8am (some days will go back to sleep until 10-11am)  - Has history of MELVI, has CPAP, not wearing it every night - does not always have distilled water to use, does find she has to adjust it a lot  - Some pelvic pain recently (saw PCP last week) - this does bother her at night sometimes, though the sleep difficulties have preceded the pain        9/13/2023    11:48 AM 9/25/2023    10:00 PM 1/23/2024    12:00 PM   PHQ   PHQ-9 Total Score 3 4 5   Q9: Thoughts of better off dead/self-harm past 2 weeks Not at all Not at all Not at all           9/12/2023     1:00 PM 9/25/2023    10:00 PM 1/23/2024    12:00 PM   ALISIA-7 SCORE   Total Score 4 4 3       OBJECTIVE                                                    PHYSICAL EXAM:  /87   Pulse 68   LMP  (LMP Unknown)   SpO2 95%     GENERAL: healthy, alert and no distress  EYES: Eyes grossly normal to inspection, sclerae normal  RESP: No respiratory distress  SKIN: no pallor or jaundice  NEURO: Normal gait, mentation intact and speech normal  MENTAL STATUS EXAM  Appearance/Behavior: No appearant distress  Speech: Normal  Mood/Affect: normal affect  Insight: Adequate    LAB  No results found for any visits on 01/23/24.      HISTORY                                                    Problem list reviewed & adjusted, as indicated.  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)    Diuretic-induced hypokalemia    Dyspnea on exertion    Elevated LFTs    Ganglion of tendon sheath    GI bleed    Hereditary and idiopathic peripheral neuropathy    History of major depression    Homeless    Major depression, recurrent (H24)    Low backache    Airway obstruction due to foreign body, initial encounter    Hypomagnesemia    Mild episode of recurrent major depressive disorder (H24)    Morbid obesity (H)    Smoker    Peripheral edema    Pneumonia of right lower lobe due to infectious organism    Primary localized osteoarthrosis, hand    Primary osteoarthritis of right knee    PTSD (post-traumatic stress disorder)    Seasonal allergies    Skin lesion    Snoring    Trochanteric bursitis of left hip    Vitamin B12 deficiency (non anemic)    Vitamin D deficiency    Other seizures (H)    Anxiety    Closed fracture of head of left radius    Recurrent falls    Thrombocytopenia (H24)    Dermatitis    Depressive disorder    Leukopenia    Alcoholic cirrhosis of liver without ascites (H)    Sigmoid Diverticulitis    Mixed simple and mucopurulent chronic bronchitis (H)    Suicidal ideation    Infection due to 2019 novel coronavirus    Other specified disorders of carbohydrate metabolism (H24)    Right foot pain    Hallux valgus, acquired, right    Moderate episode of recurrent major depressive disorder (H)         MEDICATION LIST (prior to visit)  albuterol (PROAIR HFA/PROVENTIL HFA/VENTOLIN HFA) 108 (90 Base) MCG/ACT inhaler, Inhale 2 puffs into the lungs every 4 hours as needed for shortness of breath or wheezing  budesonide-formoterol (SYMBICORT) 160-4.5 MCG/ACT Inhaler, Inhale 2 puffs into the lungs 2 times daily  bumetanide (BUMEX) 1 MG tablet, Take 1 tablet (1 mg) by  "mouth daily  DULoxetine (CYMBALTA) 60 MG capsule, Take 1 capsule (60 mg) by mouth daily  hydrOXYzine (ATARAX) 50 MG tablet, Take 0.5-1 tablets (25-50 mg) by mouth 3 times daily as needed for anxiety  ipratropium - albuterol 0.5 mg/2.5 mg/3 mL (DUONEB) 0.5-2.5 (3) MG/3ML neb solution, Take 1 vial (3 mLs) by nebulization every 4 hours as needed for shortness of breath / dyspnea or wheezing  omeprazole (PRILOSEC) 20 MG DR capsule, Take 20 mg by mouth daily as needed  pregabalin (LYRICA) 25 MG capsule, Take 1 capsule (25 mg) by mouth 3 times daily TAKE 1 CAPSULE BY MOUTH AT BEDTIME X3 DAYS, 1 CAPSULE TWICE DAILY X3 DAYS THEN 1 CAPSULE THREE TIMES DAILY THEREAFTER  Semaglutide-Weight Management (WEGOVY) 0.25 MG/0.5ML pen, Inject 0.25 mg Subcutaneous once a week (Patient not taking: Reported on 1/23/2024)    No current facility-administered medications on file prior to visit.      MEDICATION LIST (after visit)  Current Outpatient Medications   Medication    albuterol (PROAIR HFA/PROVENTIL HFA/VENTOLIN HFA) 108 (90 Base) MCG/ACT inhaler    budesonide-formoterol (SYMBICORT) 160-4.5 MCG/ACT Inhaler    bumetanide (BUMEX) 1 MG tablet    DULoxetine (CYMBALTA) 60 MG capsule    hydrOXYzine (ATARAX) 50 MG tablet    ipratropium - albuterol 0.5 mg/2.5 mg/3 mL (DUONEB) 0.5-2.5 (3) MG/3ML neb solution    omeprazole (PRILOSEC) 20 MG DR capsule    pregabalin (LYRICA) 25 MG capsule    Semaglutide-Weight Management (WEGOVY) 0.25 MG/0.5ML pen     No current facility-administered medications for this visit.         Allergies   Allergen Reactions    Bupropion Diarrhea    Codeine Hives, Itching, Nausea and Rash    Nickel Unknown    Oxycodone Nausea and Vomiting    Percocet [Oxycodone-Acetaminophen]      Patient reports \"vomiting,grossly ill two weeks ago\"    Topiramate Unknown    Diclofenac Nausea     Tolerates the topical gel    Furosemide Rash    Hydrochlorothiazide Rash     phototoxicity - med was d/lora        Sulfa Antibiotics Itching and " Rash    Sulfasalazine Radha Covington, [facility]  [facility]  [phone]

## 2024-01-26 ENCOUNTER — TELEPHONE (OUTPATIENT)
Dept: FAMILY MEDICINE | Facility: CLINIC | Age: 69
End: 2024-01-26
Payer: COMMERCIAL

## 2024-01-26 NOTE — TELEPHONE ENCOUNTER
Test Results        Who ordered the test:  PCP    Type of test: Lab    Date of test:  1/17/2024    Where was the test performed:  [facility]    What are your questions/concerns?:  Pt would like to go overher lab result as she did not get a call about it.    Okay to leave a detailed message?: Yes at Home number on file [phone] (home)

## 2024-01-28 ASSESSMENT — ENCOUNTER SYMPTOMS
DYSURIA: 1
ABDOMINAL PAIN: 0
VOMITING: 0
FEVER: 0
NERVOUS/ANXIOUS: 1
SHORTNESS OF BREATH: 0
CHILLS: 0
BACK PAIN: 0
ARTHRALGIAS: 0
COLOR CHANGE: 0
PALPITATIONS: 0
SEIZURES: 0
HEMATURIA: 0
BRUISES/BLEEDS EASILY: 0
FATIGUE: 1
COUGH: 0
EYE PAIN: 0
SORE THROAT: 0
ALLERGIC/IMMUNOLOGIC NEGATIVE: 1
POLYDIPSIA: 0

## 2024-02-02 ENCOUNTER — TELEPHONE (OUTPATIENT)
Dept: FAMILY MEDICINE | Facility: CLINIC | Age: 69
End: 2024-02-02
Payer: COMMERCIAL

## 2024-02-12 NOTE — PATIENT INSTRUCTIONS
Patient refused to remove earrings. Aware of risk burn/injury from RFA.    When washing with warm soapy water use a mild soap such as dove for sensitive skin, ivory, purpose, or Vanicream soap.      You have been given a small supply of Norco for pain with dressing changes.  You will not be given refills in urgent care.  You should be able to switch over to ibuprofen or tylenol after the first few days as your wound improves.      Please follow up with primary care for would recheck in 1 week.  Return sooner for signs of infection as per patient education handout.

## 2024-02-15 NOTE — TELEPHONE ENCOUNTER
Central Prior Authorization Team   Phone: [phone]    PA Initiation    Medication: Wegovy 0.25mg/0.5ml   Insurance Company: [employer] Part D - Phone [phone] Fax [phone]  Pharmacy Filling the Rx: [id number] - [place], [place] - [address] [place] [address] AT [facility] [address]  Filling Pharmacy Phone: [phone]  Filling Pharmacy Fax:    Start Date: 2/15/2024

## 2024-02-16 NOTE — TELEPHONE ENCOUNTER
PRIOR AUTHORIZATION DENIED    Medication: Wegovy 0.25mg/0.5ml    Denial Date: 2/16/2024    Denial Rational:   Decision Notes:  Drugs, when used for anorexia, weight loss or weight gain, are excluded from coverage under Medicare  rules. Please refer to your Evidence of Coverage (EOC) document that details your Medicare  prescription drug coverage.

## 2024-02-19 NOTE — TELEPHONE ENCOUNTER
May Quiles MA contacted Patsy on 02/19/24 and left a detailed message. If patient calls back please relay message .     No

## 2024-02-21 ENCOUNTER — TELEPHONE (OUTPATIENT)
Dept: FAMILY MEDICINE | Facility: CLINIC | Age: 69
End: 2024-02-21

## 2024-02-21 DIAGNOSIS — E66.01 MORBID OBESITY (H): ICD-10-CM

## 2024-02-21 DIAGNOSIS — R73.03 PREDIABETES: Primary | ICD-10-CM

## 2024-02-21 NOTE — TELEPHONE ENCOUNTER
Medication Question or Refill        What medication are you calling about (include dose and sig)?:   Ozempic  menjaro    Preferred Pharmacy:     [id number] - [place], [place] - [address] [facility] AT [facility] [address]   [address] [place] [address]  Phone: [phone] Fax: [phone]      Controlled Substance Agreement on file:   CSA -- Patient Level:    CSA: None found at the patient level.         Do you have any questions or concerns?  Yes: Pt call to inform PCP that her insurance notify her that wecovy is not covered but ozempic or menjaro is and pt would like for PCP to prescribe either one of them.

## 2024-02-22 ENCOUNTER — TRANSFERRED RECORDS (OUTPATIENT)
Dept: HEALTH INFORMATION MANAGEMENT | Facility: CLINIC | Age: 69
End: 2024-02-22
Payer: COMMERCIAL

## 2024-03-19 ENCOUNTER — OFFICE VISIT (OUTPATIENT)
Dept: ADDICTION MEDICINE | Facility: CLINIC | Age: 69
End: 2024-03-19
Payer: COMMERCIAL

## 2024-03-19 VITALS
HEART RATE: 63 BPM | WEIGHT: 265 LBS | SYSTOLIC BLOOD PRESSURE: 135 MMHG | BODY MASS INDEX: 46.95 KG/M2 | DIASTOLIC BLOOD PRESSURE: 76 MMHG | HEIGHT: 63 IN | OXYGEN SATURATION: 96 %

## 2024-03-19 DIAGNOSIS — F43.10 PTSD (POST-TRAUMATIC STRESS DISORDER): ICD-10-CM

## 2024-03-19 DIAGNOSIS — F10.21 SEVERE ALCOHOL USE DISORDER, IN EARLY REMISSION (H): Primary | ICD-10-CM

## 2024-03-19 DIAGNOSIS — F17.200 TOBACCO USE DISORDER, MODERATE, DEPENDENCE: ICD-10-CM

## 2024-03-19 PROCEDURE — 99214 OFFICE O/P EST MOD 30 MIN: CPT | Performed by: FAMILY MEDICINE

## 2024-03-19 PROCEDURE — G2211 COMPLEX E/M VISIT ADD ON: HCPCS | Performed by: FAMILY MEDICINE

## 2024-03-19 RX ORDER — HYDROXYZINE HYDROCHLORIDE 50 MG/1
25-50 TABLET, FILM COATED ORAL 3 TIMES DAILY PRN
Qty: 90 TABLET | Refills: 1 | Status: SHIPPED | OUTPATIENT
Start: 2024-03-19

## 2024-03-19 ASSESSMENT — ANXIETY QUESTIONNAIRES
5. BEING SO RESTLESS THAT IT IS HARD TO SIT STILL: NOT AT ALL
6. BECOMING EASILY ANNOYED OR IRRITABLE: SEVERAL DAYS
7. FEELING AFRAID AS IF SOMETHING AWFUL MIGHT HAPPEN: NOT AT ALL
GAD7 TOTAL SCORE: 2
3. WORRYING TOO MUCH ABOUT DIFFERENT THINGS: NOT AT ALL
2. NOT BEING ABLE TO STOP OR CONTROL WORRYING: NOT AT ALL
4. TROUBLE RELAXING: NOT AT ALL
1. FEELING NERVOUS, ANXIOUS, OR ON EDGE: SEVERAL DAYS
GAD7 TOTAL SCORE: 2

## 2024-03-19 ASSESSMENT — PATIENT HEALTH QUESTIONNAIRE - PHQ9: SUM OF ALL RESPONSES TO PHQ QUESTIONS 1-9: 1

## 2024-03-19 NOTE — PROGRESS NOTES
[facility] Addiction Medicine    A/P                                                    ASSESSMENT/PLAN    1. Severe alcohol use disorder, in early remission (H)  Stable.  Encouraged continued abstinence.  Reviewed importance of continuing recovery activities.    2. PTSD (post-traumatic stress disorder)  Overall mood is stable but could use improvement.  Continue current medications.  She is not interested in additional individual therapy at this time.  - hydrOXYzine HCl (ATARAX) 50 MG tablet; Take 0.5-1 tablets (25-50 mg) by mouth 3 times daily as needed for anxiety  Dispense: 90 tablet; Refill: 1    3. Tobacco use disorder, moderate, dependence  Encouraged continue cessation efforts.        PDMP Review         Value Time User    State PDMP site checked  Yes 9/12/2023  1:31 PM Melissa Covington, DO              RTC  Return in about 3 months (around 6/19/2024).    The longitudinal plan of care for the diagnosis(es)/condition(s) as documented were addressed during this visit. Due to the added complexity in care, I will continue to support Patsy in the subsequent management and with ongoing continuity of care.      Counseled the patient on the importance of having a recovery program in addition to medication to manage recovery.  Components include avoiding isolating, having willingness to change, avoiding triggers and managing cravings. Encouraged having some type of sober network and practicing honesty with trusted support person(s). Encouraged other services such as counseling, 12 step or other self-help organizations.          SUBJECTIVE                                                        Patsy Santamaria is a 69 year old female with a history of peripheral neuropathy, COPD, alcoholic hepatitis, thombocytopenia, arthritis (hands and knees), obesity, anxiety, depression, and AUD who presents to clinic today for follow up.     Brief history of substance use:  Began drinking around age 33.  Became a  problem for her in 2016 and she went to treatment for the first time and also experienced EtOH withdrawal seizures.  Has been to multiple treatments (most recently [facility] in [place] in Feb 2022).  Drinking tends to correlate with worsening depression.  Has tried naltrexone and acamprosate in past.  History of of cocaine use (1 year in the 1990s).  Established care with [facility] in March 2022 and has continued to struggle with brief episodes of drinking.  Most recent hospitalization related to alcohol use/withdrawal was March 2023      Plan from most recent office visit (1/23/24):  1. Severe alcohol use disorder, in early remission (H)  Early remission (last drink March 2023).  Encouraged activities that support her recovery - talking to sober friends, being active.       2. Depressive disorder  3. PTSD (post-traumatic stress disorder)  4. History of liver disease  Overall reporting mood is stable.  She found individual therapy for PTSD too painful and decided not to pursue.  She will continue duloxetine 60mg.  Discussed that there is some risk of liver injury in patients with underlying liver disease and alcohol use.  Given her hepatic panel is normal/stable and she is not drinking alcohol, I think it is reasonable to continue her current dose, especially since her mental health has improved.  Encouraged her to see hepatology for follow-up - reviewed last visit note from 12/1/22 - likely fibrosis (but not cirrhosis) and recommend fibroscan when sober for 6 months.    - DULoxetine (CYMBALTA) 60 MG capsule; Take 1 capsule (60 mg) by mouth daily  Dispense: 90 capsule; Refill: 1     5. Insomnia, unspecified type  Needs improvement.  Likely multifactorial.  Recommend maximizing non-pharmacologic interventions such as consistently wearing CPAP, avoid napping, having set sleep schedule, etc.  Can continue mirtazapine 7.5-15mg q hs as needed however discussed risk of  "increased appetite and weight gain as well.    - mirtazapine (REMERON) 15 MG tablet; Take 0.5-1 tablets (7.5-15 mg) by mouth nightly as needed (sleep)  Dispense: 30 tablet; Refill: 1     6. Tobacco use disorder, moderate, dependence  Reporting decreasing use.  Need to discuss further at follow-up visit.         TODAY'S VISIT  HPI Mar 19, 2024  - Friend got her a dog (sánchez 6 weeks old female - TT) - picked her last week  - Urges to drink here and there, fleeting  - Can be in restaurant or bar and not feeling tempted  - Continues to talk with her friend in recovery  - Depression is better, still has anxiety when out of the house  - Sleep has not been \"too bad\" - not taking mirtazapine as much, sleep can be variable but has gotten better overall (less interrupted)  - Started Ozempic - going well so far, had 2 doses so far  - Working on decreasing smoking, notices COPD is worse when smoking more          9/25/2023    10:00 PM 1/23/2024    12:00 PM 3/19/2024     1:00 PM   PHQ   PHQ-9 Total Score 4 5 1   Q9: Thoughts of better off dead/self-harm past 2 weeks Not at all Not at all Not at all           9/25/2023    10:00 PM 1/23/2024    12:00 PM 3/19/2024     1:00 PM   ALISIA-7 SCORE   Total Score 4 3 2       OBJECTIVE                                                    PHYSICAL EXAM:  /76 (BP Location: Right arm, Patient Position: Sitting, Cuff Size: Adult Large)   Pulse 63   Ht 1.6 m (5' 3\")   Wt 120.2 kg (265 lb)   LMP  (LMP Unknown)   SpO2 96%   BMI 46.94 kg/m      GENERAL: healthy, alert and no distress  EYES: Eyes grossly normal to inspection, sclerae normal  RESP: No respiratory distress, no coughing or wheezing  SKIN: no pallor or jaundice  NEURO: Normal gait, mentation intact and speech normal  MENTAL STATUS EXAM  Appearance/Behavior: No appearant distress  Speech: Normal  Mood/Affect: normal affect  Insight: Adequate    LAB  No results found for any visits on 03/19/24.      HISTORY                     "                                Problem list reviewed & adjusted, as indicated.  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)    Diuretic-induced hypokalemia    Dyspnea on exertion    Elevated LFTs    Ganglion of tendon sheath    GI bleed    Hereditary and idiopathic peripheral neuropathy    History of major depression    Homeless    Major depression, recurrent (H24)    Low backache    Airway obstruction due to foreign body, initial encounter    Hypomagnesemia    Mild episode of recurrent major depressive disorder (H24)    Morbid obesity (H)    Smoker    Peripheral edema    Pneumonia of right lower lobe due to infectious organism    Primary localized osteoarthrosis, hand    Primary osteoarthritis of right knee    PTSD (post-traumatic stress disorder)    Seasonal allergies    Skin lesion    Snoring    Trochanteric bursitis of left hip    Vitamin B12 deficiency (non anemic)    Vitamin D deficiency    Other seizures (H)    Anxiety    Closed fracture of head of left radius    Recurrent falls    Thrombocytopenia (H24)    Dermatitis    Depressive disorder    Leukopenia    Alcoholic cirrhosis of liver without ascites (H)    Sigmoid Diverticulitis    Mixed simple and mucopurulent chronic bronchitis (H)    Suicidal ideation    Infection due to 2019 novel coronavirus    Other specified disorders of carbohydrate metabolism (H24)    Right foot pain    Hallux valgus, acquired, right    Moderate episode of recurrent major depressive disorder (H)         MEDICATION LIST (prior to visit)  Current  Outpatient Medications   Medication Sig Dispense Refill    albuterol (PROAIR HFA/PROVENTIL HFA/VENTOLIN HFA) 108 (90 Base) MCG/ACT inhaler Inhale 2 puffs into the lungs every 4 hours as needed for shortness of breath or wheezing 18 g 3    budesonide-formoterol (SYMBICORT) 160-4.5 MCG/ACT Inhaler Inhale 2 puffs into the lungs 2 times daily 10.2 g 11    bumetanide (BUMEX) 1 MG tablet Take 1 tablet (1 mg) by mouth daily 90 tablet 0    DULoxetine (CYMBALTA) 60 MG capsule Take 1 capsule (60 mg) by mouth daily 90 capsule 1    hydrOXYzine HCl (ATARAX) 50 MG tablet Take 0.5-1 tablets (25-50 mg) by mouth 3 times daily as needed for anxiety 90 tablet 1    ipratropium - albuterol 0.5 mg/2.5 mg/3 mL (DUONEB) 0.5-2.5 (3) MG/3ML neb solution Take 1 vial (3 mLs) by nebulization every 4 hours as needed for shortness of breath / dyspnea or wheezing 15 mL 1    mirtazapine (REMERON) 15 MG tablet Take 0.5-1 tablets (7.5-15 mg) by mouth nightly as needed (sleep) 30 tablet 1    omeprazole (PRILOSEC) 20 MG DR capsule Take 20 mg by mouth daily as needed      pregabalin (LYRICA) 25 MG capsule Take 1 capsule (25 mg) by mouth 3 times daily TAKE 1 CAPSULE BY MOUTH AT BEDTIME X3 DAYS, 1 CAPSULE TWICE DAILY X3 DAYS THEN 1 CAPSULE THREE TIMES DAILY THEREAFTER 270 capsule 0    semaglutide (OZEMPIC, 0.25 OR 0.5 MG/DOSE,) 2 MG/3ML pen Inject 0.5 mg Subcutaneous every 7 days 3 mL 0     No current facility-administered medications for this visit.       MEDICATION LIST (after visit)  Current Outpatient Medications   Medication Sig Dispense Refill    albuterol (PROAIR HFA/PROVENTIL HFA/VENTOLIN HFA) 108 (90 Base) MCG/ACT inhaler Inhale 2 puffs into the lungs every 4 hours as needed for shortness of breath or wheezing 18 g 3    budesonide-formoterol (SYMBICORT) 160-4.5 MCG/ACT Inhaler Inhale 2 puffs into the lungs 2 times daily 10.2 g 11    bumetanide (BUMEX) 1 MG tablet Take 1 tablet (1 mg) by mouth daily 90 tablet 0    DULoxetine (CYMBALTA) 60 MG  "capsule Take 1 capsule (60 mg) by mouth daily 90 capsule 1    hydrOXYzine HCl (ATARAX) 50 MG tablet Take 0.5-1 tablets (25-50 mg) by mouth 3 times daily as needed for anxiety 90 tablet 1    ipratropium - albuterol 0.5 mg/2.5 mg/3 mL (DUONEB) 0.5-2.5 (3) MG/3ML neb solution Take 1 vial (3 mLs) by nebulization every 4 hours as needed for shortness of breath / dyspnea or wheezing 15 mL 1    mirtazapine (REMERON) 15 MG tablet Take 0.5-1 tablets (7.5-15 mg) by mouth nightly as needed (sleep) 30 tablet 1    omeprazole (PRILOSEC) 20 MG DR capsule Take 20 mg by mouth daily as needed      pregabalin (LYRICA) 25 MG capsule Take 1 capsule (25 mg) by mouth 3 times daily TAKE 1 CAPSULE BY MOUTH AT BEDTIME X3 DAYS, 1 CAPSULE TWICE DAILY X3 DAYS THEN 1 CAPSULE THREE TIMES DAILY THEREAFTER 270 capsule 0    semaglutide (OZEMPIC, 0.25 OR 0.5 MG/DOSE,) 2 MG/3ML pen Inject 0.5 mg Subcutaneous every 7 days 3 mL 0     No current facility-administered medications for this visit.         Allergies   Allergen Reactions    Bupropion Diarrhea    Codeine Hives, Itching, Nausea and Rash    Nickel Unknown    Oxycodone Nausea and Vomiting    Percocet [Oxycodone-Acetaminophen]      Patient reports \"vomiting,grossly ill two weeks ago\"    Topiramate Unknown    Diclofenac Nausea     Tolerates the topical gel    Furosemide Rash    Hydrochlorothiazide Rash     phototoxicity - med was d/lora        Sulfa Antibiotics Itching and Rash    Sulfasalazine Rash       Melissa Covington, DO  [facility] Addiction Medicine  [facility]  [phone]        "

## 2024-03-19 NOTE — PROGRESS NOTES
[facility] - Addiction Medicine       Rooming information:    Point of care urine drug screen positive for:  Lab Results   Component Value Date    BUP Negative 04/27/2023    BZO Screen Positive (A) 04/27/2023    BAR Negative 04/27/2023    ELICEO Negative 04/27/2023    MAMP Negative 04/27/2023    AMP Negative 04/27/2023    MDMA Negative 04/27/2023    MTD Negative 04/27/2023    YTX990 Negative 04/27/2023    OXY Negative 04/27/2023    PCP Negative 04/27/2023    THC Negative 04/27/2023    TEMP Invalid (A) 04/27/2023    SGPOCT 1.015 04/27/2023       *POC urine drug screen does not screen for Fentanyl    PHQ-9 Scores:       9/13/2023    11:48 AM 9/25/2023    10:00 PM 1/23/2024    12:00 PM   PHQ   PHQ-9 Total Score 3 4 5   Q9: Thoughts of better off dead/self-harm past 2 weeks Not at all Not at all Not at all     ALISIA-7 Scores:      9/12/2023     1:00 PM 9/25/2023    10:00 PM 1/23/2024    12:00 PM   ALISIA-7 SCORE   Total Score 4 4 3       Any other recent substance use:     Denies    NICOTINE-Yes: tabacoo  If using nicotine, ready to quit? No    Side effects related to medications pt would like to discuss with provider (constipation, dry mouth, HA, GI upset, sedation?) No     Narcan currently available: No    Primary care provider: EJ WELLER MD     Mental health provider: no (follow up on MH referral if needed)    Any housing, insurance deficits?: insurance needs copy of bills owed    Contact information up to date? yes    3rd Party Involvement no (please obtain LUDIN if pt would like to include)        Dianelys Mccarthy CMA  March 19, 2024  1:49 PM

## 2024-04-01 DIAGNOSIS — E66.01 MORBID OBESITY (H): ICD-10-CM

## 2024-04-01 DIAGNOSIS — R73.03 PREDIABETES: ICD-10-CM

## 2024-04-01 RX ORDER — SEMAGLUTIDE 0.68 MG/ML
0.5 INJECTION, SOLUTION SUBCUTANEOUS
Qty: 3 ML | Refills: 0 | Status: SHIPPED | OUTPATIENT
Start: 2024-04-01 | End: 2024-04-29

## 2024-04-11 ENCOUNTER — TELEPHONE (OUTPATIENT)
Dept: FAMILY MEDICINE | Facility: CLINIC | Age: 69
End: 2024-04-11
Payer: COMMERCIAL

## 2024-04-11 NOTE — TELEPHONE ENCOUNTER
-- Please review and advise.     [facility] Pharmacist calling.   She was completing a medication adherence assessment with patient and noted that patient is not currently on a statin  Due to diabetes Pharmacist recommends a statin   Patient declines having ever discussing this with PCP  Please advise if you would like a visit    Leanne Joyner RN  [facility]

## 2024-04-18 NOTE — TELEPHONE ENCOUNTER
Future Appointments 4/18/2024 - 10/15/2024        Date Visit Type Length Department Provider     4/23/2024  9:00 AM OFFICE VISIT 20 min [facility] Yanique Cali MD    Location Instructions:     [facility] is located at [address] [place] [address] in [place], at the intersection of [facility]. This is one block south of the [facility]. Free parking is available in the lot directly north of the clinic across [facility]. The clinic is near stops along bus [address].              6/4/2024  1:00 PM ADDICTION MED RETURN 30 min [facility] ADDICTION MEDICINE Melissa Covington, DO

## 2024-04-29 ENCOUNTER — OFFICE VISIT (OUTPATIENT)
Dept: FAMILY MEDICINE | Facility: CLINIC | Age: 69
End: 2024-04-29
Payer: COMMERCIAL

## 2024-04-29 VITALS
BODY MASS INDEX: 43.54 KG/M2 | RESPIRATION RATE: 18 BRPM | OXYGEN SATURATION: 98 % | TEMPERATURE: 97.3 F | WEIGHT: 255 LBS | HEIGHT: 64 IN | SYSTOLIC BLOOD PRESSURE: 109 MMHG | DIASTOLIC BLOOD PRESSURE: 75 MMHG | HEART RATE: 58 BPM

## 2024-04-29 DIAGNOSIS — K21.00 GASTROESOPHAGEAL REFLUX DISEASE WITH ESOPHAGITIS WITHOUT HEMORRHAGE: ICD-10-CM

## 2024-04-29 DIAGNOSIS — R79.89 ELEVATED LFTS: ICD-10-CM

## 2024-04-29 DIAGNOSIS — Z29.11 NEED FOR VACCINATION AGAINST RESPIRATORY SYNCYTIAL VIRUS: ICD-10-CM

## 2024-04-29 DIAGNOSIS — E78.2 MIXED HYPERLIPIDEMIA: ICD-10-CM

## 2024-04-29 DIAGNOSIS — R93.89 ENDOMETRIAL THICKENING ON ULTRASOUND: ICD-10-CM

## 2024-04-29 DIAGNOSIS — I89.0 LYMPHEDEMA OF BOTH LOWER EXTREMITIES: ICD-10-CM

## 2024-04-29 DIAGNOSIS — L85.3 DRY SKIN: Primary | ICD-10-CM

## 2024-04-29 DIAGNOSIS — E66.01 MORBID OBESITY (H): ICD-10-CM

## 2024-04-29 DIAGNOSIS — Z23 NEED FOR PROPHYLACTIC VACCINATION AGAINST HEPATITIS A: ICD-10-CM

## 2024-04-29 DIAGNOSIS — F10.21 SEVERE ALCOHOL USE DISORDER, IN EARLY REMISSION (H): ICD-10-CM

## 2024-04-29 DIAGNOSIS — R73.03 PREDIABETES: ICD-10-CM

## 2024-04-29 DIAGNOSIS — Z12.11 COLON CANCER SCREENING: ICD-10-CM

## 2024-04-29 DIAGNOSIS — R10.2 PELVIC PAIN IN FEMALE: ICD-10-CM

## 2024-04-29 LAB
ALBUMIN SERPL BCG-MCNC: 4.2 G/DL (ref 3.5–5.2)
ALP SERPL-CCNC: 67 U/L (ref 40–150)
ALT SERPL W P-5'-P-CCNC: 17 U/L (ref 0–50)
ANION GAP SERPL CALCULATED.3IONS-SCNC: 11 MMOL/L (ref 7–15)
AST SERPL W P-5'-P-CCNC: 25 U/L (ref 0–45)
BILIRUB DIRECT SERPL-MCNC: <0.2 MG/DL (ref 0–0.3)
BILIRUB SERPL-MCNC: 0.5 MG/DL
BUN SERPL-MCNC: 8.7 MG/DL (ref 8–23)
CALCIUM SERPL-MCNC: 9.4 MG/DL (ref 8.8–10.2)
CHLORIDE SERPL-SCNC: 102 MMOL/L (ref 98–107)
CREAT SERPL-MCNC: 0.6 MG/DL (ref 0.51–0.95)
DEPRECATED HCO3 PLAS-SCNC: 28 MMOL/L (ref 22–29)
EGFRCR SERPLBLD CKD-EPI 2021: >90 ML/MIN/1.73M2
GLUCOSE SERPL-MCNC: 89 MG/DL (ref 70–99)
HBA1C MFR BLD: 5.7 % (ref 0–5.6)
LDLC SERPL DIRECT ASSAY-MCNC: 102 MG/DL
POTASSIUM SERPL-SCNC: 4 MMOL/L (ref 3.4–5.3)
PROT SERPL-MCNC: 7.2 G/DL (ref 6.4–8.3)
SODIUM SERPL-SCNC: 141 MMOL/L (ref 135–145)

## 2024-04-29 PROCEDURE — 36415 COLL VENOUS BLD VENIPUNCTURE: CPT | Performed by: FAMILY MEDICINE

## 2024-04-29 PROCEDURE — 80053 COMPREHEN METABOLIC PANEL: CPT | Performed by: FAMILY MEDICINE

## 2024-04-29 PROCEDURE — 83036 HEMOGLOBIN GLYCOSYLATED A1C: CPT | Performed by: FAMILY MEDICINE

## 2024-04-29 PROCEDURE — 83721 ASSAY OF BLOOD LIPOPROTEIN: CPT | Performed by: FAMILY MEDICINE

## 2024-04-29 PROCEDURE — 82248 BILIRUBIN DIRECT: CPT | Performed by: FAMILY MEDICINE

## 2024-04-29 PROCEDURE — 99214 OFFICE O/P EST MOD 30 MIN: CPT | Performed by: FAMILY MEDICINE

## 2024-04-29 RX ORDER — BUMETANIDE 1 MG/1
1 TABLET ORAL DAILY
Qty: 90 TABLET | Refills: 3 | Status: SHIPPED | OUTPATIENT
Start: 2024-04-29

## 2024-04-29 RX ORDER — UREA 40 %
CREAM (GRAM) TOPICAL 4 TIMES DAILY PRN
Qty: 227 G | Refills: 5 | Status: SHIPPED | OUTPATIENT
Start: 2024-04-29

## 2024-04-29 RX ORDER — SEMAGLUTIDE 0.68 MG/ML
0.5 INJECTION, SOLUTION SUBCUTANEOUS
Qty: 3 ML | Refills: 0 | Status: SHIPPED | OUTPATIENT
Start: 2024-04-29 | End: 2024-06-05

## 2024-04-29 RX ORDER — FLUOCINONIDE 0.5 MG/G
OINTMENT TOPICAL 2 TIMES DAILY
COMMUNITY
Start: 2023-12-12

## 2024-04-29 RX ORDER — LANOLIN ALCOHOL/MO/W.PET/CERES
CREAM (GRAM) TOPICAL 4 TIMES DAILY PRN
Qty: 480 ML | Refills: 2 | Status: SHIPPED | OUTPATIENT
Start: 2024-04-29

## 2024-04-29 RX ORDER — RESPIRATORY SYNCYTIAL VIRUS VACCINE 120MCG/0.5
0.5 KIT INTRAMUSCULAR ONCE
Qty: 1 EACH | Refills: 0 | Status: CANCELLED | OUTPATIENT
Start: 2024-04-29 | End: 2024-04-29

## 2024-04-29 NOTE — PROGRESS NOTES
"1. Dry skin  Patient complains of dry skin - believes that urea cream is the \"best\" - discsused that it may be that insurance won't cover any of her prescriptions.   - Urea 40 % CREA; Externally apply topically 4 times daily as needed for dry skin  Dispense: 227 g; Refill: 5  - Eucerin external lotion; Apply topically 4 times daily as needed (dry skin)  Dispense: 480 mL; Refill: 2    2. Severe alcohol use disorder, in early remission (H)  H/o severe alcohol use disorder - early remission - doing well so far     3. Prediabetes  H/o prediabetes - using Ozempic -keeps her A1c down - check labs   - semaglutide (OZEMPIC, 0.25 OR 0.5 MG/DOSE,) 2 MG/3ML pen; Inject 0.5 mg Subcutaneous every 7 days  Dispense: 3 mL; Refill: 0  - Hemoglobin A1c; Future  - Basic metabolic panel  (Ca, Cl, CO2, Creat, Gluc, K, Na, BUN); Future  - Hemoglobin A1c  - Basic metabolic panel  (Ca, Cl, CO2, Creat, Gluc, K, Na, BUN)    4. Morbid obesity (H)  Body mass index is 44.07 kg/m .  Doing well with Ozempic   - semaglutide (OZEMPIC, 0.25 OR 0.5 MG/DOSE,) 2 MG/3ML pen; Inject 0.5 mg Subcutaneous every 7 days  Dispense: 3 mL; Refill: 0    5. Lymphedema of both lower extremities  Patient has lymphedema of both extremities - add Bumex   - bumetanide (BUMEX) 1 MG tablet; Take 1 tablet (1 mg) by mouth daily  Dispense: 90 tablet; Refill: 3    6. Gastroesophageal reflux disease with esophagitis without hemorrhage  GE reflux disease - desires refill on Omeprazole which is helpful for patient   - omeprazole (PRILOSEC) 20 MG DR capsule; Take 1 capsule (20 mg) by mouth daily as needed (heartburn, indigestion)  Dispense: 90 capsule; Refill: 1    7. Pelvic pain in female  8. Endometrial thickening on ultrasound  Patient complains of pelvic pain - had recent ultrasound showing endometrial thickeneing - will refer to Gyn  - Ob/Gyn  Referral; Future    9. Colon cancer screening  Due for colon cancer screening - discussed - referred to colonoscopy   - " Colonoscopy Screening  Referral; Future    10. Elevated LFTs  H/o elevated LFTs (most likely from alcohol us  - Hepatic panel (Albumin, ALT, AST, Bili, Alk Phos, TP); Future  - Hepatic panel (Albumin, ALT, AST, Bili, Alk Phos, TP)    11. Mixed hyperlipidemia  H/o elevated lipids - not on lipid lowering therpay   - LDL cholesterol direct; Future  - LDL cholesterol direct    12. Need for vaccination against respiratory syncytial virus  13. Need for prophylactic vaccination against hepatitis A  Due for vaccinations - will need to do these at pharmacy - reviewed       Subjective   Patsy is a 69 year old, presenting for the following health issues:  Derm Problem (Pt stated she been having dry skin all over and tried many lotions that didn't help) and Recheck Medication        4/29/2024     9:42 AM   Additional Questions   Roomed by Ingris     Dry skin - is in the pool for therapy every day - still dry  Vaseline doesn't work   Dog licks it off every day   Legs worse than arms - back is dry -   Using Vaseline with lotion -     Goes to pool 4-5 x/week -     Started Ozempic and has lost about 12 pounds   Has a puppy - more active than pevious   Living in a good place  Depression is much better  Doesn't drink any more - more than 1 year of sobriety (March 2024)    Taking Lyrica - helps her pain  Uses shoe inserts and that helps, too          History of Present Illness       Reason for visit:  Medication check    She eats 2-3 servings of fruits and vegetables daily.She consumes 1 sweetened beverage(s) daily.She exercises with enough effort to increase her heart rate 30 to 60 minutes per day.  She exercises with enough effort to increase her heart rate 4 days per week.   She is taking medications regularly.       Review of Systems   Constitutional:  Negative for chills and fever.   HENT:  Negative for ear pain and sore throat.    Eyes:  Negative for pain and visual disturbance.   Respiratory:  Negative for cough and  "shortness of breath.    Cardiovascular:  Negative for chest pain and palpitations.   Gastrointestinal:  Negative for abdominal pain and vomiting.   Genitourinary:  Negative for dysuria and hematuria.   Musculoskeletal:  Negative for arthralgias and back pain.   Skin:  Negative for color change and rash.        Dry skin      Neurological:  Negative for seizures and syncope.   All other systems reviewed and are negative.          Objective    /75 (BP Location: Left arm, Patient Position: Sitting, Cuff Size: Adult Large)   Pulse 58   Temp 97.3  F (36.3  C) (Temporal)   Resp 18   Ht 1.62 m (5' 3.78\")   Wt 115.7 kg (255 lb)   LMP  (LMP Unknown)   SpO2 98%   BMI 44.07 kg/m    Body mass index is 44.07 kg/m .  Physical Exam  Vitals reviewed.   Constitutional:       General: She is not in acute distress.     Appearance: Normal appearance.   HENT:      Head: Normocephalic.      Right Ear: Tympanic membrane, ear canal and external ear normal.      Left Ear: Tympanic membrane, ear canal and external ear normal.      Nose: Nose normal.      Mouth/Throat:      Mouth: Mucous membranes are moist.      Pharynx: No posterior oropharyngeal erythema.   Eyes:      Extraocular Movements: Extraocular movements intact.      Conjunctiva/sclera: Conjunctivae normal.      Pupils: Pupils are equal, round, and reactive to light.   Cardiovascular:      Rate and Rhythm: Normal rate and regular rhythm.      Pulses: Normal pulses.      Heart sounds: Normal heart sounds. No murmur heard.  Pulmonary:      Effort: Pulmonary effort is normal.      Breath sounds: Normal breath sounds.   Abdominal:      Palpations: Abdomen is soft. There is no mass.      Tenderness: There is no abdominal tenderness. There is no guarding or rebound.   Musculoskeletal:         General: No deformity. Normal range of motion.      Cervical back: Normal range of motion and neck supple.   Lymphadenopathy:      Cervical: No cervical adenopathy.   Skin:     General: " Skin is warm and dry.   Neurological:      General: No focal deficit present.      Mental Status: She is alert.   Psychiatric:         Mood and Affect: Mood normal.         Behavior: Behavior normal.            Results for orders placed or performed in visit on 04/29/24   Hemoglobin A1c     Status: Abnormal   Result Value Ref Range    Hemoglobin A1C 5.7 (H) 0.0 - 5.6 %   Basic metabolic panel  (Ca, Cl, CO2, Creat, Gluc, K, Na, BUN)     Status: Normal   Result Value Ref Range    Sodium 141 135 - 145 mmol/L    Potassium 4.0 3.4 - 5.3 mmol/L    Chloride 102 98 - 107 mmol/L    Carbon Dioxide (CO2) 28 22 - 29 mmol/L    Anion Gap 11 7 - 15 mmol/L    Urea Nitrogen 8.7 8.0 - 23.0 mg/dL    Creatinine 0.60 0.51 - 0.95 mg/dL    GFR Estimate >90 >60 mL/min/1.73m2    Calcium 9.4 8.8 - 10.2 mg/dL    Glucose 89 70 - 99 mg/dL   LDL cholesterol direct     Status: Abnormal   Result Value Ref Range    LDL Cholesterol Direct 102 (H) <100 mg/dL   Hepatic panel (Albumin, ALT, AST, Bili, Alk Phos, TP)     Status: Normal   Result Value Ref Range    Protein Total 7.2 6.4 - 8.3 g/dL    Albumin 4.2 3.5 - 5.2 g/dL    Bilirubin Total 0.5 <=1.2 mg/dL    Alkaline Phosphatase 67 40 - 150 U/L    AST 25 0 - 45 U/L    ALT 17 0 - 50 U/L    Bilirubin Direct <0.20 0.00 - 0.30 mg/dL         Prior to immunization administration, verified patients identity using patient s name and date of birth. Please see Immunization Activity for additional information.     Screening Questionnaire for Adult Immunization    Are you sick today?   No   Do you have allergies to medications, food, a vaccine component or latex?   No   Have you ever had a serious reaction after receiving a vaccination?   No   Do you have a long-term health problem with heart, lung, kidney, or metabolic disease (e.g., diabetes), asthma, a blood disorder, no spleen, complement component deficiency, a cochlear implant, or a spinal fluid leak?  Are you on long-term aspirin therapy?   No   Do you  have cancer, leukemia, HIV/AIDS, or any other immune system problem?   No   Do you have a parent, brother, or sister with an immune system problem?   No   In the past 3 months, have you taken medications that affect  your immune system, such as prednisone, other steroids, or anticancer drugs; drugs for the treatment of rheumatoid arthritis, Crohn s disease, or psoriasis; or have you had radiation treatments?   No   Have you had a seizure, or a brain or other nervous system problem?   No   During the past year, have you received a transfusion of blood or blood    products, or been given immune (gamma) globulin or antiviral drug?   No   For women: Are you pregnant or is there a chance you could become       pregnant during the next month?   No   Have you received any vaccinations in the past 4 weeks?   No     Immunization questionnaire answers were all negative.      Patient instructed to remain in clinic for 15 minutes afterwards, and to report any adverse reactions.     Screening performed by Ingris Lester on 4/29/2024 at 9:46 AM.   Signed Electronically by: EJ WELLER MD

## 2024-04-29 NOTE — LETTER
May 1, 2024      Patsy Santamaria  [address]   [place] [address]        Dear ,    We are writing to inform you of your test results.    Labs look great!  A1c is 5.7 - this shows good control of blood sugars.   Other labs - kidney/liver - look good.      Resulted Orders   Hemoglobin A1c   Result Value Ref Range    Hemoglobin A1C 5.7 (H) 0.0 - 5.6 %      Comment:      Normal <5.7%   Prediabetes 5.7-6.4%    Diabetes 6.5% or higher     Note: Adopted from ADA consensus guidelines.   Basic metabolic panel  (Ca, Cl, CO2, Creat, Gluc, K, Na, BUN)   Result Value Ref Range    Sodium 141 135 - 145 mmol/L      Comment:      Reference intervals for this test were updated on 09/26/2023 to more accurately reflect our healthy population. There may be differences in the flagging of prior results with similar values performed with this method. Interpretation of those prior results can be made in the context of the updated reference intervals.     Potassium 4.0 3.4 - 5.3 mmol/L    Chloride 102 98 - 107 mmol/L    Carbon Dioxide (CO2) 28 22 - 29 mmol/L    Anion Gap 11 7 - 15 mmol/L    Urea Nitrogen 8.7 8.0 - 23.0 mg/dL    Creatinine 0.60 0.51 - 0.95 mg/dL    GFR Estimate >90 >60 mL/min/1.73m2    Calcium 9.4 8.8 - 10.2 mg/dL    Glucose 89 70 - 99 mg/dL   LDL cholesterol direct   Result Value Ref Range    LDL Cholesterol Direct 102 (H) <100 mg/dL      Comment:      Age 2-19 years:  Desirable: 0-110 mg/dL   Borderline high: 110-129 mg/dL   High: >= 130 mg/dL    Age 20 years and older:  Desirable: <100mg/dL  Above desirable: 100-129 mg/dL   Borderline high: 130-159 mg/dL   High: 160-189 mg/dL   Very high: >= 190 mg/dL   Hepatic panel (Albumin, ALT, AST, Bili, Alk Phos, TP)   Result Value Ref Range    Protein Total 7.2 6.4 - 8.3 g/dL    Albumin 4.2 3.5 - 5.2 g/dL    Bilirubin Total 0.5 <=1.2 mg/dL    Alkaline Phosphatase 67 40 - 150 U/L      Comment:      Reference intervals for this test were updated on 11/14/2023 to  more accurately reflect our healthy population. There may be differences in the flagging of prior results with similar values performed with this method. Interpretation of those prior results can be made in the context of the updated reference intervals.    AST 25 0 - 45 U/L      Comment:      Reference intervals for this test were updated on 6/12/2023 to more accurately reflect our healthy population. There may be differences in the flagging of prior results with similar values performed with this method. Interpretation of those prior results can be made in the context of the updated reference intervals.    ALT 17 0 - 50 U/L      Comment:      Reference intervals for this test were updated on 6/12/2023 to more accurately reflect our healthy population. There may be differences in the flagging of prior results with similar values performed with this method. Interpretation of those prior results can be made in the context of the updated reference intervals.      Bilirubin Direct <0.20 0.00 - 0.30 mg/dL       If you have any questions or concerns, please call the clinic at the number listed above.       Sincerely,      Yanique Cali MD

## 2024-05-05 ASSESSMENT — ENCOUNTER SYMPTOMS
ABDOMINAL PAIN: 0
ARTHRALGIAS: 0
SORE THROAT: 0
DYSURIA: 0
HEMATURIA: 0
BACK PAIN: 0
VOMITING: 0
COLOR CHANGE: 0
EYE PAIN: 0
SEIZURES: 0
FEVER: 0
COUGH: 0
CHILLS: 0
SHORTNESS OF BREATH: 0
PALPITATIONS: 0
ROS SKIN COMMENTS: DRY SKIN

## 2024-06-04 ENCOUNTER — OFFICE VISIT (OUTPATIENT)
Dept: ADDICTION MEDICINE | Facility: CLINIC | Age: 69
End: 2024-06-04
Payer: COMMERCIAL

## 2024-06-04 VITALS
HEART RATE: 65 BPM | OXYGEN SATURATION: 96 % | WEIGHT: 248 LBS | DIASTOLIC BLOOD PRESSURE: 71 MMHG | BODY MASS INDEX: 42.34 KG/M2 | SYSTOLIC BLOOD PRESSURE: 120 MMHG | HEIGHT: 64 IN

## 2024-06-04 DIAGNOSIS — F32.A DEPRESSIVE DISORDER: ICD-10-CM

## 2024-06-04 DIAGNOSIS — F43.10 PTSD (POST-TRAUMATIC STRESS DISORDER): ICD-10-CM

## 2024-06-04 DIAGNOSIS — F10.21 SEVERE ALCOHOL USE DISORDER, IN SUSTAINED REMISSION (H): Primary | ICD-10-CM

## 2024-06-04 PROCEDURE — 99214 OFFICE O/P EST MOD 30 MIN: CPT | Performed by: FAMILY MEDICINE

## 2024-06-04 PROCEDURE — G2211 COMPLEX E/M VISIT ADD ON: HCPCS | Performed by: FAMILY MEDICINE

## 2024-06-04 RX ORDER — DULOXETIN HYDROCHLORIDE 60 MG/1
60 CAPSULE, DELAYED RELEASE ORAL DAILY
Qty: 90 CAPSULE | Refills: 0 | Status: SHIPPED | OUTPATIENT
Start: 2024-06-04 | End: 2024-09-03

## 2024-06-04 ASSESSMENT — ANXIETY QUESTIONNAIRES
4. TROUBLE RELAXING: NOT AT ALL
7. FEELING AFRAID AS IF SOMETHING AWFUL MIGHT HAPPEN: NOT AT ALL
2. NOT BEING ABLE TO STOP OR CONTROL WORRYING: NOT AT ALL
3. WORRYING TOO MUCH ABOUT DIFFERENT THINGS: NOT AT ALL
GAD7 TOTAL SCORE: 2
6. BECOMING EASILY ANNOYED OR IRRITABLE: SEVERAL DAYS
1. FEELING NERVOUS, ANXIOUS, OR ON EDGE: SEVERAL DAYS
GAD7 TOTAL SCORE: 2
5. BEING SO RESTLESS THAT IT IS HARD TO SIT STILL: NOT AT ALL

## 2024-06-04 ASSESSMENT — PAIN SCALES - GENERAL: PAINLEVEL: NO PAIN (0)

## 2024-06-04 ASSESSMENT — PATIENT HEALTH QUESTIONNAIRE - PHQ9: SUM OF ALL RESPONSES TO PHQ QUESTIONS 1-9: 1

## 2024-06-04 NOTE — PROGRESS NOTES
[facility] - Addiction Medicine       Rooming information: Recheck    Point of care urine drug screen positive for:  Lab Results   Component Value Date    BUP Negative 04/27/2023    BZO Screen Positive (A) 04/27/2023    BAR Negative 04/27/2023    ELICEO Negative 04/27/2023    MAMP Negative 04/27/2023    AMP Negative 04/27/2023    MDMA Negative 04/27/2023    MTD Negative 04/27/2023    STQ108 Negative 04/27/2023    OXY Negative 04/27/2023    PCP Negative 04/27/2023    THC Negative 04/27/2023    TEMP Invalid (A) 04/27/2023    SGPOCT 1.015 04/27/2023       *POC urine drug screen does not screen for Fentanyl    PHQ-9 Scores:       9/25/2023    10:00 PM 1/23/2024    12:00 PM 3/19/2024     1:00 PM   PHQ   PHQ-9 Total Score 4 5 1   Q9: Thoughts of better off dead/self-harm past 2 weeks Not at all Not at all Not at all     ALISIA-7 Scores:      9/25/2023    10:00 PM 1/23/2024    12:00 PM 3/19/2024     1:00 PM   ALISIA-7 SCORE   Total Score 4 3 2       Any other recent substance use:     Denies drinking. Pt reports some mild cravings and thoughts about having a beer on a hot date   NICOTINE-Yes: 0.5 PPD  If using nicotine, ready to quit? Yes: still trying     Side effects related to medications pt would like to discuss with provider (constipation, dry mouth, HA, GI upset, sedation?) No   Narcan currently available: N/A    Primary care provider: EJ WELLER MD     Mental health provider: none (follow up on MH referral if needed)    Any housing, insurance deficits?: denies    Contact information up to date? yes    3rd Party Involvement NA (please obtain LUDIN if pt would like to include)      Melissa Bacon LPN  June 4, 2024  12:57 PM

## 2024-06-04 NOTE — PROGRESS NOTES
[facility] Addiction Medicine    A/P                                                    ASSESSMENT/PLAN    1. Severe alcohol use disorder, in sustained remission (H)  Stable.  She is over 1 year from her last use of alcohol, sustained remission.  She reflects on all she has gained in her life with sobriety.    2. Depressive disorder  3. PTSD (post-traumatic stress disorder)  Improving.  Continue duloxetine 60mg daily.  OK to continue hydroxyzine as needed.  - DULoxetine (CYMBALTA) 60 MG capsule; Take 1 capsule (60 mg) by mouth daily  Dispense: 90 capsule; Refill: 0    PDMP Review         Value Time User    State PDMP site checked  Yes 6/4/2024  1:20 PM Melissa Covington DO              RTC  Return in 3 months (on 9/4/2024) for Follow up, in person.    The longitudinal plan of care for the diagnosis(es)/condition(s) as documented were addressed during this visit. Due to the added complexity in care, I will continue to support Patsy in the subsequent management and with ongoing continuity of care.      Counseled the patient on the importance of having a recovery program in addition to medication to manage recovery.  Components include avoiding isolating, having willingness to change, avoiding triggers and managing cravings. Encouraged having some type of sober network and practicing honesty with trusted support person(s). Encouraged other services such as counseling, 12 step or other self-help organizations.        SUBJECTIVE                                                        Patsy Santamaria is a 69 year old female with a history of peripheral neuropathy, COPD, alcoholic hepatitis, thombocytopenia, arthritis (hands and knees), obesity, anxiety, depression, and AUD who presents to clinic today for follow up.     Brief history of substance use:  Began drinking around age 33.  Became a problem for her in 2016 and she went to treatment for the first time and also experienced EtOH withdrawal seizures.  Has  been to multiple treatments (most recently [facility] in [place] in Feb 2022).  Drinking tends to correlate with worsening depression.  Has tried naltrexone and acamprosate in past.  History of of cocaine use (1 year in the 1990s).  Established care with [facility] in March 2022 and has continued to struggle with brief episodes of drinking.  Most recent hospitalization related to alcohol use/withdrawal was March 2023      Plan from most recent office visit (3/19/24):  1. Severe alcohol use disorder, in early remission (H)  Stable.  Encouraged continued abstinence.  Reviewed importance of continuing recovery activities.     2. PTSD (post-traumatic stress disorder)  Overall mood is stable but could use improvement.  Continue current medications.  She is not interested in additional individual therapy at this time.  - hydrOXYzine HCl (ATARAX) 50 MG tablet; Take 0.5-1 tablets (25-50 mg) by mouth 3 times daily as needed for anxiety  Dispense: 90 tablet; Refill: 1     3. Tobacco use disorder, moderate, dependence  Encouraged continue cessation efforts.    TODAY'S VISIT  HPI Jun 4, 2024  - Doing really well - describes feeling the best she has in years  - Going to pool every day in her building and feeling good physically and mentally  - Has lost weight   - Was considering decreasing Cymbalta but then decided to continue since things are very stable   - No cravings but occasional thoughts about alcohol, easy to push thoughts away  - Sleep is variable - some night still has interrupted sleep, tries not to take Remeron  - Anxiety is not too bad unless in crowded places, taking hydroxyzine is helpful before going into those situations  - Enjoying having a dog, spending time with her friend [place]  - Relationships with her family continue to improve        1/23/2024    12:00 PM 3/19/2024     1:00 PM 6/4/2024     1:00 PM   PHQ   PHQ-9 Total Score 5 1 1   Q9: Thoughts of better off  "dead/self-harm past 2 weeks Not at all Not at all Not at all           1/23/2024    12:00 PM 3/19/2024     1:00 PM 6/4/2024     1:00 PM   ALISIA-7 SCORE   Total Score 3 2 2       OBJECTIVE                                                    PHYSICAL EXAM:  /71 (BP Location: Right arm, Patient Position: Sitting, Cuff Size: Adult Large)   Pulse 65   Ht 1.613 m (5' 3.5\")   Wt 112.5 kg (248 lb)   LMP  (LMP Unknown)   SpO2 96%   BMI 43.24 kg/m      GENERAL: healthy, alert and no distress  EYES: Eyes grossly normal to inspection, sclerae normal  RESP: No respiratory distress  MS: no gross musculoskeletal defects noted  SKIN: no pallor or jaundice  NEURO: Normal gait, mentation intact and speech normal  MENTAL STATUS EXAM  Appearance/Behavior: No appearant distress  Speech: Normal  Mood/Affect: normal affect  Insight: Adequate    LAB  No results found for any visits on 06/04/24.      HISTORY                                                    Problem list reviewed & adjusted, as indicated.  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)    Diuretic-induced hypokalemia    Dyspnea on exertion    Elevated LFTs    Ganglion of tendon sheath    GI bleed    Hereditary and idiopathic peripheral neuropathy    History of major depression    Homeless    Major depression, recurrent (H24)    Low backache    Airway obstruction due to foreign body, initial encounter    Hypomagnesemia    Mild episode of recurrent major depressive disorder (H24)    Morbid obesity (H)    Smoker    Peripheral edema    " Pneumonia of right lower lobe due to infectious organism    Primary localized osteoarthrosis, hand    Primary osteoarthritis of right knee    PTSD (post-traumatic stress disorder)    Seasonal allergies    Skin lesion    Snoring    Trochanteric bursitis of left hip    Vitamin B12 deficiency (non anemic)    Vitamin D deficiency    Other seizures (H)    Anxiety    Closed fracture of head of left radius    Recurrent falls    Thrombocytopenia (H24)    Dermatitis    Depressive disorder    Leukopenia    Alcoholic cirrhosis of liver without ascites (H)    Sigmoid Diverticulitis    Mixed simple and mucopurulent chronic bronchitis (H)    Suicidal ideation    Infection due to 2019 novel coronavirus    Other specified disorders of carbohydrate metabolism (H24)    Right foot pain    Hallux valgus, acquired, right    Moderate episode of recurrent major depressive disorder (H)         MEDICATION LIST (prior to visit)  Current Outpatient Medications   Medication Sig Dispense Refill    albuterol (PROAIR HFA/PROVENTIL HFA/VENTOLIN HFA) 108 (90 Base) MCG/ACT inhaler Inhale 2 puffs into the lungs every 4 hours as needed for shortness of breath or wheezing 18 g 3    budesonide-formoterol (SYMBICORT) 160-4.5 MCG/ACT Inhaler Inhale 2 puffs into the lungs 2 times daily 10.2 g 11    bumetanide (BUMEX) 1 MG tablet Take 1 tablet (1 mg) by mouth daily 90 tablet 3    DULoxetine (CYMBALTA) 60 MG capsule Take 1 capsule (60 mg) by mouth daily 90 capsule 0    hydrOXYzine HCl (ATARAX) 50 MG tablet Take 0.5-1 tablets (25-50 mg) by mouth 3 times daily as needed for anxiety 90 tablet 1    mirtazapine (REMERON) 15 MG tablet Take 0.5-1 tablets (7.5-15 mg) by mouth nightly as needed (sleep) 30 tablet 1    omeprazole (PRILOSEC) 20 MG DR capsule Take 1 capsule (20 mg) by mouth daily as needed (heartburn, indigestion) 90 capsule 1    pregabalin (LYRICA) 25 MG capsule Take 1 capsule (25 mg) by mouth 3 times daily TAKE 1 CAPSULE BY MOUTH AT BEDTIME X3 DAYS, 1  CAPSULE TWICE DAILY X3 DAYS THEN 1 CAPSULE THREE TIMES DAILY THEREAFTER 270 capsule 0    Eucerin external lotion Apply topically 4 times daily as needed (dry skin) 480 mL 2    fluocinonide (LIDEX) 0.05 % external ointment Apply topically 2 times daily      ipratropium - albuterol 0.5 mg/2.5 mg/3 mL (DUONEB) 0.5-2.5 (3) MG/3ML neb solution Take 1 vial (3 mLs) by nebulization every 4 hours as needed for shortness of breath / dyspnea or wheezing 15 mL 1    semaglutide (OZEMPIC, 0.25 OR 0.5 MG/DOSE,) 2 MG/3ML pen Inject 0.5 mg Subcutaneous every 7 days 3 mL 0    Urea 40 % CREA Externally apply topically 4 times daily as needed for dry skin 227 g 5     No current facility-administered medications for this visit.       MEDICATION LIST (after visit)  Current Outpatient Medications   Medication Sig Dispense Refill    albuterol (PROAIR HFA/PROVENTIL HFA/VENTOLIN HFA) 108 (90 Base) MCG/ACT inhaler Inhale 2 puffs into the lungs every 4 hours as needed for shortness of breath or wheezing 18 g 3    budesonide-formoterol (SYMBICORT) 160-4.5 MCG/ACT Inhaler Inhale 2 puffs into the lungs 2 times daily 10.2 g 11    bumetanide (BUMEX) 1 MG tablet Take 1 tablet (1 mg) by mouth daily 90 tablet 3    DULoxetine (CYMBALTA) 60 MG capsule Take 1 capsule (60 mg) by mouth daily 90 capsule 0    hydrOXYzine HCl (ATARAX) 50 MG tablet Take 0.5-1 tablets (25-50 mg) by mouth 3 times daily as needed for anxiety 90 tablet 1    mirtazapine (REMERON) 15 MG tablet Take 0.5-1 tablets (7.5-15 mg) by mouth nightly as needed (sleep) 30 tablet 1    omeprazole (PRILOSEC) 20 MG DR capsule Take 1 capsule (20 mg) by mouth daily as needed (heartburn, indigestion) 90 capsule 1    pregabalin (LYRICA) 25 MG capsule Take 1 capsule (25 mg) by mouth 3 times daily TAKE 1 CAPSULE BY MOUTH AT BEDTIME X3 DAYS, 1 CAPSULE TWICE DAILY X3 DAYS THEN 1 CAPSULE THREE TIMES DAILY THEREAFTER 270 capsule 0    Eucerin external lotion Apply topically 4 times daily as needed (dry skin)  "480 mL 2    fluocinonide (LIDEX) 0.05 % external ointment Apply topically 2 times daily      ipratropium - albuterol 0.5 mg/2.5 mg/3 mL (DUONEB) 0.5-2.5 (3) MG/3ML neb solution Take 1 vial (3 mLs) by nebulization every 4 hours as needed for shortness of breath / dyspnea or wheezing 15 mL 1    semaglutide (OZEMPIC, 0.25 OR 0.5 MG/DOSE,) 2 MG/3ML pen Inject 0.5 mg Subcutaneous every 7 days 3 mL 0    Urea 40 % CREA Externally apply topically 4 times daily as needed for dry skin 227 g 5     No current facility-administered medications for this visit.         Allergies   Allergen Reactions    Bupropion Diarrhea    Codeine Hives, Itching, Nausea and Rash    Nickel Unknown    Oxycodone Nausea and Vomiting    Percocet [Oxycodone-Acetaminophen]      Patient reports \"vomiting,grossly ill two weeks ago\"    Topiramate Unknown    Diclofenac Nausea     Tolerates the topical gel    Furosemide Rash    Hydrochlorothiazide Rash     phototoxicity - med was d/lora        Sulfa Antibiotics Itching and Rash    Sulfasalazine Rash       Melissa Covington DO  [facility]  [facility]  [phone]        "

## 2024-06-05 DIAGNOSIS — R73.03 PREDIABETES: ICD-10-CM

## 2024-06-05 DIAGNOSIS — E66.01 MORBID OBESITY (H): ICD-10-CM

## 2024-06-05 NOTE — TELEPHONE ENCOUNTER
Patient has [facility] coverage and is part of Medicare Part-D Low Income Subsidy program. With this program, the patient is eligible to get certain prescriptions as a 90-day supply at the 30-day prescription supply cost.      Prescription to be updated to 90-day supply: Ozempic (last filled 4/29/24 for 30 days and due for refill).    New prescription needed given no refills remaining so pended for PCP review and signature.       Thank you!    Sonia Diallo, PharmD, BCACP  Population Health Pharmacist  [phone]

## 2024-06-06 RX ORDER — SEMAGLUTIDE 0.68 MG/ML
0.5 INJECTION, SOLUTION SUBCUTANEOUS
Qty: 9 ML | Refills: 2 | Status: SHIPPED | OUTPATIENT
Start: 2024-06-06

## 2024-07-01 ENCOUNTER — TELEPHONE (OUTPATIENT)
Dept: FAMILY MEDICINE | Facility: CLINIC | Age: 69
End: 2024-07-01
Payer: COMMERCIAL

## 2024-07-01 NOTE — TELEPHONE ENCOUNTER
Spoke with Patsy Santamaria today in regards to Ozempic. Last filled on 04/29 for 28 day supply and due for refill. Per Patsy Santamaria, had two fills back to back and ended up with a stockpile. Has one dose left and then was going to refill the prescription.     Thank you,    Cindy Roberson, PharmD, [facility]  Pharmacy

## 2024-08-05 DIAGNOSIS — M54.6 PAIN IN THORACIC SPINE: ICD-10-CM

## 2024-08-05 NOTE — TELEPHONE ENCOUNTER
Called patient.   LMTCB asking to verify if still taking/how she is taking this med.  Nurse line given for callback.

## 2024-09-01 NOTE — PROGRESS NOTES
Mental Health Visit Note    7/3/2019    Start time: 3:00pm    Stop Time: 3:50pm   Session # 5    Session Type: Patient is presenting for an Individual session.    Patsy Santamaria is a 64 y.o. female is being seen today for    Chief Complaint   Patient presents with      Follow Up     Psychotherapy follow-up visit for anxiety, depression, trauma and alcohol abuse   .     New symptoms or complaints: None    Functional Impairment:   Personal: 4  Family: 4  Work: 4  Social:4    Clinical assessment of mental status:   Grooming: Well groomed  Attire: Appropriate  Age: Appears Stated  Behavior Towards Examiner: Cooperative  Motor Activity: Within normal   Eye Contact: Appropriate  Mood: Sad  Affect: Tearful, Irritable, Anxious and Depressed  Speech/Language: Perseverative  Attention: Distractible  Concentration: Brief  Thought Process: Flight of ideas  Thought Content: Hallucinations: none reported  Delusions: none reported  Orientation: X 3  Memory: No Evidence of Impairment  Judgement: No Evidence of Impairment  Estimated Intelligence: Average  Demonstrated Insight: Adequate  Fund of Knowledge: adequate    Suicidal/Homicidal Ideation present: None Reported This Session    Patient's impression of their current status:   The patient was affirmed for attending the rule 25 assessment scheduled after her last therapy visit. The patient was able to detox from alcohol on her own and has maintained sobriety since 6/21. She is going to restart outpatient treatment at [facility] on 7/5. She discussed recent events triggering an overwhelming stress response. This led to a reflection upon past events such as giving her children up for adoption. The patient had a very difficult childhood and teenage years. She trusted her ex-partner whom she believed really accepted and loved her despite all of her faults. She has never achieved this sense of belonging again which has led to unresolved grief and loss. She has nightmares about her  ex-partner and still feels much emotional pain about being abandoned. She is still experiencing the urge to numb her emotions. She expressed feelings of shame about her personal story. She has yet to share aspects of her story with anyone other than her ex-partner and therapist. She understands that sharing her story would allow her to take ownership and then eventually gain acceptance but it's difficult for her to be vulnerable in front of others. She is still held back by feelings of shame, guilt and resentment.    Therapist impression of patients current state:   The patient arrived on-time for a follow-up psychotherapy visit. The patient appeared very tearful today while openly expressing her thoughts and feelings about current and past experiences. She demonstrated increased insight about the long-standing impact of adverse and traumatic life events which she has tried to ignore and numb for many years. She continues to hayes with feelings of shame and guilt about her past decisions which leads to low self-worth and self-judgment. She was encouraged to embrace feeling vulnerable while honoring her experiences and emotions as opposed to applying defense mechanisms such as avoidance and numbing. She has demonstrated marked progress in this area while she engages in the therapeutic process. The therapist is attempting to help patient translate this progress into her personal life and out in the community. She was encouraged to increase participation and frequency with AA meetings, finding a way to slowly tell her story to build shame resilience. She was encouraged to obtain a sponsor immediately and consult with staff members at [facility] for additional resources to prevent social isolation. The therapist plans to coordinate with her care team in regards to future care plans. Ongoing participation in psychotherapy services is strongly recommended, as patient continues to struggle with many unresolved feelings  [] : Resident associated with painful life experiences.     Type of psychotherapeutic technique provided: Client centered, Solution-focused and CBT    Progress toward short term goals:Progress as expected, as patient was able to detox independently and restart participation in CD program at [facility]. The patient struggles to ask for help and demonstrate vulnerability in front of others which often leads to social isolation. She agreed to lean into her discomfort which she demonstrated during her therapy visit today. She has exhibited progress in regards to trusting the therapeutic process.    Review of long term goals: Not done at today's visit   Treatment plan updated on 6/20/19  Date of next review: August 2019    Diagnosis:   1. Severe alcohol use disorder (H)    2. PTSD (post-traumatic stress disorder)    3. Severe episode of recurrent major depressive disorder, without psychotic features (H)        Plan and Follow up: The patient is scheduled to return for a follow-up psychotherapy visit on 7/17/19.  Patient plans to start outpatient CD treatment at [facility] on 7/5/19.  The patient is strongly recommended to increase her support network in recovery by completing the following tasks as soon as possible:  -Return to AA meetings - increase frequency  -Obtain a sponsor  Patient recommended to continue attending more frequent psychotherapy visits as well.       Discharge Criteria/Planning: Client has chronic symptoms and ongoing therapy for maintenance stability recommended.     Performed and documented by JULIO Mendoza         [[email or web address]] : I, Dr. Jonas Canales, saw and evaluated this patient in the presence of the resident. I discussed the management with the resident. I reviewed the note and agree with the documented plan of care with the following confirmations/corrections/additions: None.

## 2024-09-03 ENCOUNTER — OFFICE VISIT (OUTPATIENT)
Dept: ADDICTION MEDICINE | Facility: CLINIC | Age: 69
End: 2024-09-03
Payer: COMMERCIAL

## 2024-09-03 VITALS
SYSTOLIC BLOOD PRESSURE: 110 MMHG | HEIGHT: 63 IN | WEIGHT: 244 LBS | BODY MASS INDEX: 43.23 KG/M2 | HEART RATE: 64 BPM | DIASTOLIC BLOOD PRESSURE: 63 MMHG | OXYGEN SATURATION: 96 %

## 2024-09-03 DIAGNOSIS — F32.A DEPRESSIVE DISORDER: ICD-10-CM

## 2024-09-03 DIAGNOSIS — F10.20 ALCOHOL DEPENDENCE, UNCOMPLICATED (H): ICD-10-CM

## 2024-09-03 DIAGNOSIS — F10.21 SEVERE ALCOHOL USE DISORDER, IN SUSTAINED REMISSION (H): Primary | ICD-10-CM

## 2024-09-03 DIAGNOSIS — F43.10 PTSD (POST-TRAUMATIC STRESS DISORDER): ICD-10-CM

## 2024-09-03 DIAGNOSIS — G47.00 INSOMNIA, UNSPECIFIED TYPE: ICD-10-CM

## 2024-09-03 LAB
AMPHETAMINE QUAL URINE POCT: NEGATIVE
BARBITURATE QUAL URINE POCT: NEGATIVE
BENZODIAZEPINE QUAL URINE POCT: NEGATIVE
BUPRENORPHINE QUAL URINE POCT: NEGATIVE
COCAINE QUAL URINE POCT: NEGATIVE
CREATININE QUAL URINE POCT: ABNORMAL
INTERNAL QC QUAL URINE POCT: ABNORMAL
MDMA QUAL URINE POCT: NEGATIVE
METHADONE QUAL URINE POCT: NEGATIVE
METHAMPHETAMINE QUAL URINE POCT: NEGATIVE
OPIATE QUAL URINE POCT: NEGATIVE
OXYCODONE QUAL URINE POCT: NEGATIVE
PH QUAL URINE POCT: ABNORMAL
PHENCYCLIDINE QUAL URINE POCT: NEGATIVE
POCT KIT EXPIRATION DATE: ABNORMAL
POCT KIT LOT NUMBER: ABNORMAL
SPECIFIC GRAVITY POCT: >=1.03
TEMPERATURE URINE POCT: ABNORMAL
THC QUAL URINE POCT: NEGATIVE

## 2024-09-03 PROCEDURE — G0480 DRUG TEST DEF 1-7 CLASSES: HCPCS | Performed by: FAMILY MEDICINE

## 2024-09-03 PROCEDURE — 80305 DRUG TEST PRSMV DIR OPT OBS: CPT | Performed by: FAMILY MEDICINE

## 2024-09-03 PROCEDURE — 99214 OFFICE O/P EST MOD 30 MIN: CPT | Performed by: FAMILY MEDICINE

## 2024-09-03 RX ORDER — MIRTAZAPINE 15 MG/1
7.5-15 TABLET, FILM COATED ORAL
Qty: 30 TABLET | Refills: 2 | Status: SHIPPED | OUTPATIENT
Start: 2024-09-03

## 2024-09-03 RX ORDER — DULOXETIN HYDROCHLORIDE 60 MG/1
60 CAPSULE, DELAYED RELEASE ORAL DAILY
Qty: 90 CAPSULE | Refills: 1 | Status: SHIPPED | OUTPATIENT
Start: 2024-09-03

## 2024-09-03 ASSESSMENT — ANXIETY QUESTIONNAIRES
1. FEELING NERVOUS, ANXIOUS, OR ON EDGE: NOT AT ALL
GAD7 TOTAL SCORE: 0
6. BECOMING EASILY ANNOYED OR IRRITABLE: NOT AT ALL
3. WORRYING TOO MUCH ABOUT DIFFERENT THINGS: NOT AT ALL
5. BEING SO RESTLESS THAT IT IS HARD TO SIT STILL: NOT AT ALL
7. FEELING AFRAID AS IF SOMETHING AWFUL MIGHT HAPPEN: NOT AT ALL
2. NOT BEING ABLE TO STOP OR CONTROL WORRYING: NOT AT ALL
GAD7 TOTAL SCORE: 0
4. TROUBLE RELAXING: NOT AT ALL

## 2024-09-03 ASSESSMENT — PATIENT HEALTH QUESTIONNAIRE - PHQ9: SUM OF ALL RESPONSES TO PHQ QUESTIONS 1-9: 1

## 2024-09-03 ASSESSMENT — PAIN SCALES - GENERAL: PAINLEVEL: EXTREME PAIN (8)

## 2024-09-03 NOTE — PROGRESS NOTES
[facility] - Addiction Medicine       Rooming information: Recheck  Having difficulties getting Lyrica filled, has been out for a couple month, was helping with pain . Feet appears swollen  Going to Gym x 6 per week  Still having anxiety around crowds, many people making her nervous    Point of care urine drug screen positive for:  Lab Results   Component Value Date    BUP Negative 09/03/2024    BZO Negative 09/03/2024    BAR Negative 09/03/2024    ELICEO Negative 09/03/2024    MAMP Negative 09/03/2024    AMP Negative 09/03/2024    MDMA Negative 09/03/2024    MTD Negative 09/03/2024    POD305 Negative 09/03/2024    OXY Negative 09/03/2024    PCP Negative 09/03/2024    THC Negative 09/03/2024    TEMP Invalid (A) 09/03/2024    SGPOCT >=1.030 (A) 09/03/2024       *POC urine drug screen does not screen for Fentanyl    PHQ-9 Scores:       3/19/2024     1:00 PM 6/4/2024     1:00 PM 9/3/2024    12:00 PM   PHQ   PHQ-9 Total Score 1 1 1   Q9: Thoughts of better off dead/self-harm past 2 weeks Not at all Not at all Not at all     ALISIA-7 Scores:      3/19/2024     1:00 PM 6/4/2024     1:00 PM 9/3/2024    12:00 PM   ALISIA-7 SCORE   Total Score 2 2 0       Any other recent substance use:     Denies , having less cravings   NICOTINE-Yes:   If using nicotine, ready to quit? She is getting there, not smoking inside her place    Side effects related to medications pt would like to discuss with provider (constipation, dry mouth, HA, GI upset, sedation?) No     Narcan currently available: N/A    Primary care provider: EJ WELLER MD     Mental health provider: none (follow up on MH referral if needed)    Any housing, insurance deficits?: denies    Contact information up to date? yes    3rd Party Involvement NA (please obtain LUDIN if pt would like to include)      Melissa Bacon LPN  September 3, 2024  12:58 PM

## 2024-09-03 NOTE — PROGRESS NOTES
[facility] Addiction Medicine    A/P                                                    ASSESSMENT/PLAN    1. Severe alcohol use disorder, in sustained remission (H)  2. Alcohol dependence, uncomplicated (H)  Stable.  Reflects on the improvements in her life.  Encouraged continuing activities that support her recovery.  - Drugs of Abuse Screen Urine (POC CUPS) POCT; Standing  - Drugs of Abuse Screen Urine (POC CUPS) POCT  - Ethyl Glucuronide Screen with Reflex to Confirmation, Urine; Future  - Ethyl Glucuronide Screen with Reflex to Confirmation, Urine    3. Insomnia, unspecified type  Still struggles with intermittent insomnia.  Continue mirtazapine as needed.    - mirtazapine (REMERON) 15 MG tablet; Take 0.5-1 tablets (7.5-15 mg) by mouth nightly as needed (sleep).  Dispense: 30 tablet; Refill: 2    4. Depressive disorder  5. PTSD (post-traumatic stress disorder)  Improved.  Continue Cymalta 60mg daily.                                - DULoxetine (CYMBALTA) 60 MG capsule; Take 1 capsule (60 mg) by mouth daily.  Dispense: 90 capsule; Refill: 1      PDMP Review         Value Time User    State PDMP site checked  Yes 9/3/2024  1:19 PM Melissa Covington DO              RTC  Return in 3 months (on 12/3/2024) for Follow up, in person.    The longitudinal plan of care for the diagnosis(es)/condition(s) as documented were addressed during this visit. Due to the added complexity in care, I will continue to support Patsy in the subsequent management and with ongoing continuity of care.      Counseled the patient on the importance of having a recovery program in addition to medication to manage recovery.  Components include avoiding isolating, having willingness to change, avoiding triggers and managing cravings. Encouraged having some type of sober network and practicing honesty with trusted support person(s). Encouraged other services such as counseling, 12 step or other self-help organizations.   "      SUBJECTIVE                                                        Patsy Santamaria is a 69 year old female with a history of peripheral neuropathy, COPD, alcoholic hepatitis, thombocytopenia, arthritis (hands and knees), obesity, anxiety, depression, and AUD who presents to clinic today for follow up.     Brief history of substance use:  Began drinking around age 33.  Became a problem for her in 2016 and she went to treatment for the first time and also experienced EtOH withdrawal seizures.  Has been to multiple treatments (most recently [facility] in [place] in Feb 2022).  Drinking tends to correlate with worsening depression.  Has tried naltrexone and acamprosate in past.  History of of cocaine use (1 year in the 1990s).  Established care with [facility] in March 2022 and has continued to struggle with brief episodes of drinking.  Most recent hospitalization related to alcohol use/withdrawal was March 2023     Plan from most recent office visit (6/4/24):  1. Severe alcohol use disorder, in sustained remission (H)  Stable.  She is over 1 year from her last use of alcohol, sustained remission.  She reflects on all she has gained in her life with sobriety.     2. Depressive disorder  3. PTSD (post-traumatic stress disorder)  Improving.  Continue duloxetine 60mg daily.  OK to continue hydroxyzine as needed.  - DULoxetine (CYMBALTA) 60 MG capsule; Take 1 capsule (60 mg) by mouth daily  Dispense: 90 capsule; Refill: 0    TODAY'S VISIT  HPI Sep 3, 2024  - Doesn't keep track of sober dates...in past would lead to \"I can handle it\" thoughts which make drinking tempting  - Exercising in pool 6x/week - continues working on weight loss, also taking Ozempic  - Physically feels better, more active, less hip pain  - Occasional thoughts about drinking, but these are becoming less intense  - Staying busy and taking care of dog, spending time with sisters  - Continues to " "struggle with sleep intermittently  - Relationship is very supportive, reflects on her ability to see things clearly, first time she hasn't felt taken advantage of  - Having some nerve pain again in right foot (site of previous surgery) and wants to resume Lyrica - seeing PCP end of the month  - Got her fishing license!        3/19/2024     1:00 PM 6/4/2024     1:00 PM 9/3/2024    12:00 PM   PHQ   PHQ-9 Total Score 1 1 1   Q9: Thoughts of better off dead/self-harm past 2 weeks Not at all Not at all Not at all           3/19/2024     1:00 PM 6/4/2024     1:00 PM 9/3/2024    12:00 PM   ALISIA-7 SCORE   Total Score 2 2 0       OBJECTIVE                                                    PHYSICAL EXAM:  /63 (BP Location: Right arm, Patient Position: Sitting, Cuff Size: Adult Large)   Pulse 64   Ht 1.61 m (5' 3.39\")   Wt 110.7 kg (244 lb)   LMP  (LMP Unknown)   SpO2 96%   BMI 42.70 kg/m      GENERAL: healthy, alert and no distress  EYES: Eyes grossly normal to inspection, sclerae normal  RESP: No respiratory distress, no coughing or wheezing  MS: no gross musculoskeletal defects noted  SKIN: no pallor or jaundice  NEURO: Normal gait, no tremor, mentation intact and speech normal  MENTAL STATUS EXAM  Appearance/Behavior: No appearant distress  Speech: Normal  Mood/Affect: normal affect  Insight: Adequate    LAB  Results for orders placed or performed in visit on 09/03/24   Drugs of Abuse Screen Urine (POC CUPS) POCT     Status: Abnormal   Result Value Ref Range    POCT Kit Lot Number [id number]     POCT Kit Expiration Date 2026-02-28     Temperature Urine POCT Invalid (A) 90 F, 92 F, 94 F, 96 F, 98 F, 100 F    Specific Gravity POCT >=1.030 (A) 1.005, 1.015, 1.025    pH Qual Urine POCT 5 pH 4 pH, 5 pH, 7 pH, 9 pH    Creatinine Qual Urine POCT 50 mg/dL 20 mg/dL, 50 mg/dL, 100 mg/dL, 200 mg/dL    Internal QC Qual Urine POCT Valid Valid    Amphetamine Qual Urine POCT Negative Negative    Barbiturate Qual Urine POCT " Negative Negative    Buprenorphine Qual Urine POCT Negative Negative    Benzodiazepine Qual Urine POCT Negative Negative    Cocaine Qual Urine POCT Negative Negative    Methamphetamine Qual Urine POCT Negative Negative    MDMA Qual Urine POCT Negative Negative    Methadone Qual Urine POCT Negative Negative    Opiate Qual Urine POCT Negative Negative    Oxycodone Qual Urine POCT Negative Negative    Phencyclidine Qual Urine POCT Negative Negative    THC Qual Urine POCT Negative Negative         HISTORY                                                    Problem list reviewed & adjusted, as indicated.  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)    Diuretic-induced hypokalemia    Dyspnea on exertion    Elevated LFTs    Ganglion of tendon sheath    GI bleed    Hereditary and idiopathic peripheral neuropathy    History of major depression    Homeless    Major depression, recurrent (H24)    Low backache    Airway obstruction due to foreign body, initial encounter    Hypomagnesemia    Mild episode of recurrent major depressive disorder (H24)    Morbid obesity (H)    Smoker    Peripheral edema    Pneumonia of right lower lobe due to infectious organism    Primary localized osteoarthrosis, hand    Primary osteoarthritis of right knee    PTSD (post-traumatic stress disorder)    Seasonal allergies    Skin lesion    Snoring    Trochanteric bursitis of left hip    Vitamin B12 deficiency (non anemic)    Vitamin D deficiency    Other seizures (H)    Anxiety    Closed fracture of head of left radius     Recurrent falls    Thrombocytopenia (H24)    Dermatitis    Depressive disorder    Leukopenia    Alcoholic cirrhosis of liver without ascites (H)    Sigmoid Diverticulitis    Mixed simple and mucopurulent chronic bronchitis (H)    Suicidal ideation    Infection due to 2019 novel coronavirus    Other specified disorders of carbohydrate metabolism (H24)    Right foot pain    Hallux valgus, acquired, right    Moderate episode of recurrent major depressive disorder (H)         MEDICATION LIST (prior to visit)  Current Outpatient Medications   Medication Sig Dispense Refill    DULoxetine (CYMBALTA) 60 MG capsule Take 1 capsule (60 mg) by mouth daily. 90 capsule 1    hydrOXYzine HCl (ATARAX) 50 MG tablet Take 0.5-1 tablets (25-50 mg) by mouth 3 times daily as needed for anxiety 90 tablet 1    mirtazapine (REMERON) 15 MG tablet Take 0.5-1 tablets (7.5-15 mg) by mouth nightly as needed (sleep). 30 tablet 2    albuterol (PROAIR HFA/PROVENTIL HFA/VENTOLIN HFA) 108 (90 Base) MCG/ACT inhaler Inhale 2 puffs into the lungs every 4 hours as needed for shortness of breath or wheezing 18 g 3    budesonide-formoterol (SYMBICORT) 160-4.5 MCG/ACT Inhaler Inhale 2 puffs into the lungs 2 times daily 10.2 g 11    bumetanide (BUMEX) 1 MG tablet Take 1 tablet (1 mg) by mouth daily 90 tablet 3    Eucerin external lotion Apply topically 4 times daily as needed (dry skin) 480 mL 2    fluocinonide (LIDEX) 0.05 % external ointment Apply topically 2 times daily      ipratropium - albuterol 0.5 mg/2.5 mg/3 mL (DUONEB) 0.5-2.5 (3) MG/3ML neb solution Take 1 vial (3 mLs) by nebulization every 4 hours as needed for shortness of breath / dyspnea or wheezing 15 mL 1    omeprazole (PRILOSEC) 20 MG DR capsule Take 1 capsule (20 mg) by mouth daily as needed (heartburn, indigestion) 90 capsule 1    pregabalin (LYRICA) 25 MG capsule Take 1 capsule (25 mg) by mouth 3 times daily TAKE 1 CAPSULE BY MOUTH AT BEDTIME X3 DAYS, 1 CAPSULE TWICE DAILY X3 DAYS THEN 1  CAPSULE THREE TIMES DAILY THEREAFTER 270 capsule 0    semaglutide (OZEMPIC, 0.25 OR 0.5 MG/DOSE,) 2 MG/3ML pen Inject 0.5 mg Subcutaneous every 7 days 9 mL 2    Urea 40 % CREA Externally apply topically 4 times daily as needed for dry skin 227 g 5     No current facility-administered medications for this visit.       MEDICATION LIST (after visit)  Current Outpatient Medications   Medication Sig Dispense Refill    DULoxetine (CYMBALTA) 60 MG capsule Take 1 capsule (60 mg) by mouth daily. 90 capsule 1    hydrOXYzine HCl (ATARAX) 50 MG tablet Take 0.5-1 tablets (25-50 mg) by mouth 3 times daily as needed for anxiety 90 tablet 1    mirtazapine (REMERON) 15 MG tablet Take 0.5-1 tablets (7.5-15 mg) by mouth nightly as needed (sleep). 30 tablet 2    albuterol (PROAIR HFA/PROVENTIL HFA/VENTOLIN HFA) 108 (90 Base) MCG/ACT inhaler Inhale 2 puffs into the lungs every 4 hours as needed for shortness of breath or wheezing 18 g 3    budesonide-formoterol (SYMBICORT) 160-4.5 MCG/ACT Inhaler Inhale 2 puffs into the lungs 2 times daily 10.2 g 11    bumetanide (BUMEX) 1 MG tablet Take 1 tablet (1 mg) by mouth daily 90 tablet 3    Eucerin external lotion Apply topically 4 times daily as needed (dry skin) 480 mL 2    fluocinonide (LIDEX) 0.05 % external ointment Apply topically 2 times daily      ipratropium - albuterol 0.5 mg/2.5 mg/3 mL (DUONEB) 0.5-2.5 (3) MG/3ML neb solution Take 1 vial (3 mLs) by nebulization every 4 hours as needed for shortness of breath / dyspnea or wheezing 15 mL 1    omeprazole (PRILOSEC) 20 MG DR capsule Take 1 capsule (20 mg) by mouth daily as needed (heartburn, indigestion) 90 capsule 1    pregabalin (LYRICA) 25 MG capsule Take 1 capsule (25 mg) by mouth 3 times daily TAKE 1 CAPSULE BY MOUTH AT BEDTIME X3 DAYS, 1 CAPSULE TWICE DAILY X3 DAYS THEN 1 CAPSULE THREE TIMES DAILY THEREAFTER 270 capsule 0    semaglutide (OZEMPIC, 0.25 OR 0.5 MG/DOSE,) 2 MG/3ML pen Inject 0.5 mg Subcutaneous every 7 days 9 mL 2     "Urea 40 % CREA Externally apply topically 4 times daily as needed for dry skin 227 g 5     No current facility-administered medications for this visit.         Allergies   Allergen Reactions    Bupropion Diarrhea    Codeine Hives, Itching, Nausea and Rash    Nickel Unknown    Oxycodone Nausea and Vomiting    Percocet [Oxycodone-Acetaminophen]      Patient reports \"vomiting,grossly ill two weeks ago\"    Topiramate Unknown    Diclofenac Nausea     Tolerates the topical gel    Furosemide Rash    Hydrochlorothiazide Rash     phototoxicity - med was d/lora        Sulfa Antibiotics Itching and Rash    Sulfasalazine Rash       Melissa Covington, [facility] Addiction Medicine  [facility]  [phone]        "

## 2024-09-04 LAB — ETHYL GLUCURONIDE UR QL SCN: NORMAL NG/ML

## 2024-09-06 ENCOUNTER — PATIENT OUTREACH (OUTPATIENT)
Dept: CARE COORDINATION | Facility: CLINIC | Age: 69
End: 2024-09-06
Payer: COMMERCIAL

## 2024-09-07 LAB
ETHYL GLUCURONIDE UR CFM-MCNC: <100 NG/ML
ETHYL SULFATE UR CFM-MCNC: <100 NG/ML

## 2024-09-17 ENCOUNTER — ANCILLARY PROCEDURE (OUTPATIENT)
Dept: MAMMOGRAPHY | Facility: CLINIC | Age: 69
End: 2024-09-17
Attending: FAMILY MEDICINE
Payer: COMMERCIAL

## 2024-09-17 DIAGNOSIS — Z12.31 VISIT FOR SCREENING MAMMOGRAM: ICD-10-CM

## 2024-09-17 PROCEDURE — 77067 SCR MAMMO BI INCL CAD: CPT | Mod: TC | Performed by: RADIOLOGY

## 2024-09-17 PROCEDURE — 77063 BREAST TOMOSYNTHESIS BI: CPT | Mod: TC | Performed by: RADIOLOGY

## 2024-09-18 RX ORDER — PREGABALIN 25 MG/1
25 CAPSULE ORAL 3 TIMES DAILY
Qty: 270 CAPSULE | Refills: 0 | Status: SHIPPED | OUTPATIENT
Start: 2024-09-18

## 2024-09-20 ENCOUNTER — OFFICE VISIT (OUTPATIENT)
Dept: FAMILY MEDICINE | Facility: CLINIC | Age: 69
End: 2024-09-20
Attending: FAMILY MEDICINE
Payer: COMMERCIAL

## 2024-09-20 VITALS
HEART RATE: 68 BPM | WEIGHT: 250 LBS | DIASTOLIC BLOOD PRESSURE: 72 MMHG | BODY MASS INDEX: 44.3 KG/M2 | RESPIRATION RATE: 16 BRPM | OXYGEN SATURATION: 98 % | TEMPERATURE: 98.7 F | HEIGHT: 63 IN | SYSTOLIC BLOOD PRESSURE: 108 MMHG

## 2024-09-20 DIAGNOSIS — Z87.891 PERSONAL HISTORY OF TOBACCO USE: ICD-10-CM

## 2024-09-20 DIAGNOSIS — D69.6 THROMBOCYTOPENIA (H): ICD-10-CM

## 2024-09-20 DIAGNOSIS — F10.21 SEVERE ALCOHOL USE DISORDER, IN SUSTAINED REMISSION (H): ICD-10-CM

## 2024-09-20 DIAGNOSIS — E66.01 MORBID OBESITY (H): ICD-10-CM

## 2024-09-20 DIAGNOSIS — K70.30 ALCOHOLIC CIRRHOSIS OF LIVER WITHOUT ASCITES (H): ICD-10-CM

## 2024-09-20 DIAGNOSIS — Z00.00 ENCOUNTER FOR MEDICARE ANNUAL WELLNESS EXAM: Primary | ICD-10-CM

## 2024-09-20 DIAGNOSIS — R10.84 ABDOMINAL PAIN, GENERALIZED: ICD-10-CM

## 2024-09-20 DIAGNOSIS — G40.89 OTHER SEIZURES (H): ICD-10-CM

## 2024-09-20 DIAGNOSIS — Z23 NEED FOR PROPHYLACTIC VACCINATION AGAINST HEPATITIS A: ICD-10-CM

## 2024-09-20 DIAGNOSIS — F33.1 MODERATE EPISODE OF RECURRENT MAJOR DEPRESSIVE DISORDER (H): ICD-10-CM

## 2024-09-20 DIAGNOSIS — E74.89 OTHER SPECIFIED DISORDERS OF CARBOHYDRATE METABOLISM (H): ICD-10-CM

## 2024-09-20 DIAGNOSIS — J43.8 OTHER EMPHYSEMA (H): ICD-10-CM

## 2024-09-20 DIAGNOSIS — Z29.11 NEED FOR VACCINATION AGAINST RESPIRATORY SYNCYTIAL VIRUS: ICD-10-CM

## 2024-09-20 DIAGNOSIS — E11.9 TYPE 2 DIABETES MELLITUS WITHOUT COMPLICATION, WITHOUT LONG-TERM CURRENT USE OF INSULIN (H): ICD-10-CM

## 2024-09-20 DIAGNOSIS — Z12.11 COLON CANCER SCREENING: ICD-10-CM

## 2024-09-20 LAB
ALBUMIN SERPL BCG-MCNC: 3.9 G/DL (ref 3.5–5.2)
ALP SERPL-CCNC: 78 U/L (ref 40–150)
ALT SERPL W P-5'-P-CCNC: 12 U/L (ref 0–50)
ANION GAP SERPL CALCULATED.3IONS-SCNC: 7 MMOL/L (ref 7–15)
AST SERPL W P-5'-P-CCNC: 20 U/L (ref 0–45)
BILIRUB SERPL-MCNC: 0.3 MG/DL
BUN SERPL-MCNC: 11.4 MG/DL (ref 8–23)
CALCIUM SERPL-MCNC: 9.4 MG/DL (ref 8.8–10.4)
CHLORIDE SERPL-SCNC: 104 MMOL/L (ref 98–107)
CHOLEST SERPL-MCNC: 187 MG/DL
CREAT SERPL-MCNC: 0.54 MG/DL (ref 0.51–0.95)
EGFRCR SERPLBLD CKD-EPI 2021: >90 ML/MIN/1.73M2
ERYTHROCYTE [DISTWIDTH] IN BLOOD BY AUTOMATED COUNT: 13.8 % (ref 10–15)
EST. AVERAGE GLUCOSE BLD GHB EST-MCNC: 117 MG/DL
FASTING STATUS PATIENT QL REPORTED: YES
FASTING STATUS PATIENT QL REPORTED: YES
GLUCOSE SERPL-MCNC: 93 MG/DL (ref 70–99)
HBA1C MFR BLD: 5.7 % (ref 0–5.6)
HCO3 SERPL-SCNC: 28 MMOL/L (ref 22–29)
HCT VFR BLD AUTO: 44.2 % (ref 35–47)
HDLC SERPL-MCNC: 52 MG/DL
HGB BLD-MCNC: 14.4 G/DL (ref 11.7–15.7)
LDLC SERPL CALC-MCNC: 112 MG/DL
LIPASE SERPL-CCNC: 66 U/L (ref 13–60)
MCH RBC QN AUTO: 29.4 PG (ref 26.5–33)
MCHC RBC AUTO-ENTMCNC: 32.6 G/DL (ref 31.5–36.5)
MCV RBC AUTO: 90 FL (ref 78–100)
NONHDLC SERPL-MCNC: 135 MG/DL
PLATELET # BLD AUTO: 167 10E3/UL (ref 150–450)
POTASSIUM SERPL-SCNC: 4.2 MMOL/L (ref 3.4–5.3)
PROT SERPL-MCNC: 6.6 G/DL (ref 6.4–8.3)
RBC # BLD AUTO: 4.89 10E6/UL (ref 3.8–5.2)
SODIUM SERPL-SCNC: 139 MMOL/L (ref 135–145)
TRIGL SERPL-MCNC: 114 MG/DL
WBC # BLD AUTO: 4.6 10E3/UL (ref 4–11)

## 2024-09-20 PROCEDURE — G0008 ADMIN INFLUENZA VIRUS VAC: HCPCS | Performed by: FAMILY MEDICINE

## 2024-09-20 PROCEDURE — 90480 ADMN SARSCOV2 VAC 1/ONLY CMP: CPT | Performed by: FAMILY MEDICINE

## 2024-09-20 PROCEDURE — 91320 SARSCV2 VAC 30MCG TRS-SUC IM: CPT | Performed by: FAMILY MEDICINE

## 2024-09-20 PROCEDURE — 99214 OFFICE O/P EST MOD 30 MIN: CPT | Mod: 25 | Performed by: FAMILY MEDICINE

## 2024-09-20 PROCEDURE — 83036 HEMOGLOBIN GLYCOSYLATED A1C: CPT | Performed by: FAMILY MEDICINE

## 2024-09-20 PROCEDURE — 85027 COMPLETE CBC AUTOMATED: CPT | Performed by: FAMILY MEDICINE

## 2024-09-20 PROCEDURE — 80053 COMPREHEN METABOLIC PANEL: CPT | Performed by: FAMILY MEDICINE

## 2024-09-20 PROCEDURE — G0296 VISIT TO DETERM LDCT ELIG: HCPCS | Performed by: FAMILY MEDICINE

## 2024-09-20 PROCEDURE — 36415 COLL VENOUS BLD VENIPUNCTURE: CPT | Performed by: FAMILY MEDICINE

## 2024-09-20 PROCEDURE — G0439 PPPS, SUBSEQ VISIT: HCPCS | Performed by: FAMILY MEDICINE

## 2024-09-20 PROCEDURE — 90662 IIV NO PRSV INCREASED AG IM: CPT | Performed by: FAMILY MEDICINE

## 2024-09-20 PROCEDURE — 80061 LIPID PANEL: CPT | Performed by: FAMILY MEDICINE

## 2024-09-20 PROCEDURE — 83690 ASSAY OF LIPASE: CPT | Performed by: FAMILY MEDICINE

## 2024-09-20 RX ORDER — VARENICLINE TARTRATE 0.5 (11)-1
KIT ORAL
Qty: 53 TABLET | Refills: 0 | Status: SHIPPED | OUTPATIENT
Start: 2024-09-20

## 2024-09-20 SDOH — HEALTH STABILITY: PHYSICAL HEALTH: ON AVERAGE, HOW MANY DAYS PER WEEK DO YOU ENGAGE IN MODERATE TO STRENUOUS EXERCISE (LIKE A BRISK WALK)?: 5 DAYS

## 2024-09-20 ASSESSMENT — PATIENT HEALTH QUESTIONNAIRE - PHQ9
SUM OF ALL RESPONSES TO PHQ QUESTIONS 1-9: 0
10. IF YOU CHECKED OFF ANY PROBLEMS, HOW DIFFICULT HAVE THESE PROBLEMS MADE IT FOR YOU TO DO YOUR WORK, TAKE CARE OF THINGS AT HOME, OR GET ALONG WITH OTHER PEOPLE: NOT DIFFICULT AT ALL
SUM OF ALL RESPONSES TO PHQ QUESTIONS 1-9: 0

## 2024-09-20 NOTE — PATIENT INSTRUCTIONS
Colonoscopy - referred to [facility] ([facility]) -  phone # [phone]    Lung Cancer Screening   Frequently Asked Questions  If you are at high-risk for lung cancer, getting screened with low-dose computed tomography (LDCT) every year can help save your life. This handout offers answers to some of the most common questions about lung cancer screening. If you have other questions, please call [phone] ([phone]).     What is it?  Lung cancer screening uses special X-ray technology to create an image of your lung tissue. The exam is quick and easy and takes less than 10 seconds. We don t give you any medicine or use any needles. You can eat before and after the exam. You don t need to change your clothes as long as the clothing on your chest doesn t contain metal. But, you do need to be able to hold your breath for at least 6 seconds during the exam.    What is the goal of lung cancer screening?  The goal of lung cancer screening is to save lives. Many times, lung cancer is not found until a person starts having physical symptoms. Lung cancer screening can help detect lung cancer in the earliest stages when it may be easier to treat.    Who should be screened for lung cancer?  We suggest lung cancer screening for anyone who is at high-risk for lung cancer. You are in the high-risk group if you:      are between the ages of 55 and 79, and    have smoked at least 1 pack of cigarettes a day for 20 or more years, and    still smoke or have quit within the past 15 years.    However, if you have a new cough or shortness of breath, you should talk to your doctor before being screened.    Why does it matter if I have symptoms?  Certain symptoms can be a sign that you have a condition in your lungs that should be checked and treated by your doctor. These symptoms include fever, chest pain, a new or changing cough, shortness of breath that you have never felt before, coughing up blood or unexplained  weight loss. Having any of these symptoms can greatly affect the results of lung cancer screening.       Should all smokers get an LDCT lung cancer screening exam?  It depends. Lung cancer screening is for a very specific group of men and women who have a history of heavy smoking over a long period of time (see  Who should be screened for lung cancer  above).  I am in the high-risk group, but have been diagnosed with cancer in the past. Is LDCT lung cancer screening right for me?  In some cases, you should not have LDCT lung screening, such as when your doctor is already following your cancer with CT scan studies. Your doctor will help you decide if LDCT lung screening is right for you.  Do I need to have a screening exam every year?  Yes. If you are in the high-risk group described earlier, you should get an LDCT lung cancer screening exam every year until you are 79, or are no longer willing or able to undergo screening and possible procedures to diagnose and treat lung cancer.  How effective is LDCT at preventing death from lung cancer?  Studies have shown that LDCT lung cancer screening can lower the risk of death from lung cancer by 20 percent in people who are at high-risk.  What are the risks?  There are some risks and limitations of LDCT lung cancer screening. We want to make sure you understand the risks and benefits, so please let us know if you have any questions. Your doctor may want to talk with you more about these risks.    Radiation exposure: As with any exam that uses radiation, there is a very small increased risk of cancer. The amount of radiation in LDCT is small--about the same amount a person would get from a mammogram. Your doctor orders the exam when he or she feels the potential benefits outweigh the risks.    False negatives: No test is perfect, including LDCT. It is possible that you may have a medical condition, including lung cancer, that is not found during your exam. This is called a  false negative result.    False positives and more testing: LDCT very often finds something in the lung that could be cancer, but in fact is not. This is called a false positive result. False positive tests often cause anxiety. To make sure these findings are not cancer, you may need to have more tests. These tests will be done only if you give us permission. Sometimes patients need a treatment that can have side effects, such as a biopsy. For more information on false positives, see  What can I expect from the results?     Findings not related to lung cancer: Your LDCT exam also takes pictures of areas of your body next to your lungs. In a very small number of cases, the CT scan will show an abnormal finding in one of these areas, such as your kidneys, adrenal glands, liver or thyroid. This finding may not be serious, but you may need more tests. Your doctor can help you decide what other tests you may need, if any.  What can I expect from the results?  About 1 out of 4 LDCT exams will find something that may need more tests. Most of the time, these findings are lung nodules. Lung nodules are very small collections of tissue in the lung. These nodules are very common, and the vast majority--more than 97 percent--are not cancer (benign). Most are normal lymph nodes or small areas of scarring from past infections.  But, if a small lung nodule is found to be cancer, the cancer can be cured more than 90 percent of the time. To know if the nodule is cancer, we may need to get more images before your next yearly screening exam. If the nodule has suspicious features (for example, it is large, has an odd shape or grows over time), we will refer you to a specialist for further testing.  Will my doctor also get the results?  Yes. Your doctor will get a copy of your results.  Is it okay to keep smoking now that there s a cancer screening exam?  No. Tobacco is one of the strongest cancer-causing agents. It causes not only lung  cancer, but other cancers and cardiovascular (heart) diseases as well. The damage caused by smoking builds over time. This means that the longer you smoke, the higher your risk of disease. While it is never too late to quit, the sooner you quit, the better.  Where can I find help to quit smoking?  The best way to prevent lung cancer is to stop smoking. If you have already quit smoking, congratulations and keep it up! For help on quitting smoking, please call [employer] at 5-598-QUITNOW (1-158.980.6736) or the [employer] at 1-310.627.6475 to find local resources near you.  One-on-one health coaching:  If you d prefer to work individually with a health care provider on tobacco cessation, we offer:      Medication Therapy Management:  Our specially trained pharmacists work closely with you and your doctor to help you quit smoking.  Call [phone] or [phone] (toll free).

## 2024-09-20 NOTE — LETTER
September 22, 2024      Patsy Santamaria  [address]   [place] [address]        Dear ,    We are writing to inform you of your test results.    Your labs are generally stable.  Sugars are stable.  Your lipase (pancreas) level is just slightly elevated - I'm not concerned about this - if you are having more issues, we should re-evaluate.      Resulted Orders   Comprehensive metabolic panel (BMP + Alb, Alk Phos, ALT, AST, Total. Bili, TP)   Result Value Ref Range    Sodium 139 135 - 145 mmol/L    Potassium 4.2 3.4 - 5.3 mmol/L    Carbon Dioxide (CO2) 28 22 - 29 mmol/L    Anion Gap 7 7 - 15 mmol/L    Urea Nitrogen 11.4 8.0 - 23.0 mg/dL    Creatinine 0.54 0.51 - 0.95 mg/dL    GFR Estimate >90 >60 mL/min/1.73m2      Comment:      eGFR calculated using 2021 CKD-EPI equation.    Calcium 9.4 8.8 - 10.4 mg/dL      Comment:      Reference intervals for this test were updated on 7/16/2024 to reflect our healthy population more accurately. There may be differences in the flagging of prior results with similar values performed with this method. Those prior results can be interpreted in the context of the updated reference intervals.    Chloride 104 98 - 107 mmol/L    Glucose 93 70 - 99 mg/dL    Alkaline Phosphatase 78 40 - 150 U/L    AST 20 0 - 45 U/L    ALT 12 0 - 50 U/L    Protein Total 6.6 6.4 - 8.3 g/dL    Albumin 3.9 3.5 - 5.2 g/dL    Bilirubin Total 0.3 <=1.2 mg/dL    Patient Fasting > 8hrs? Yes    CBC with platelets   Result Value Ref Range    WBC Count 4.6 4.0 - 11.0 10e3/uL    RBC Count 4.89 3.80 - 5.20 10e6/uL    Hemoglobin 14.4 11.7 - 15.7 g/dL    Hematocrit 44.2 35.0 - 47.0 %    MCV 90 78 - 100 fL    MCH 29.4 26.5 - 33.0 pg    MCHC 32.6 31.5 - 36.5 g/dL    RDW 13.8 10.0 - 15.0 %    Platelet Count 167 150 - 450 10e3/uL   Lipid Profile (Chol, Trig, HDL, LDL calc)   Result Value Ref Range    Cholesterol 187 <200 mg/dL    Triglycerides 114 <150 mg/dL    Direct Measure HDL 52 >=50 mg/dL    LDL  Cholesterol Calculated 112 (H) <100 mg/dL    Non HDL Cholesterol 135 (H) <130 mg/dL    Patient Fasting > 8hrs? Yes     Narrative    Cholesterol  Desirable: < 200 mg/dL  Borderline High: 200 - 239 mg/dL  High: >= 240 mg/dL    Triglycerides  Normal: < 150 mg/dL  Borderline High: 150 - 199 mg/dL  High: 200-499 mg/dL  Very High: >= 500 mg/dL    Direct Measure HDL  Female: >= 50 mg/dL   Male: >= 40 mg/dL    LDL Cholesterol  Desirable: < 100 mg/dL  Above Desirable: 100 - 129 mg/dL   Borderline High: 130 - 159 mg/dL   High:  160 - 189 mg/dL   Very High: >= 190 mg/dL    Non HDL Cholesterol  Desirable: < 130 mg/dL  Above Desirable: 130 - 159 mg/dL  Borderline High: 160 - 189 mg/dL  High: 190 - 219 mg/dL  Very High: >= 220 mg/dL   Lipase   Result Value Ref Range    Lipase 66 (H) 13 - 60 U/L   Hemoglobin A1c   Result Value Ref Range    Estimated Average Glucose 117 (H) <117 mg/dL    Hemoglobin A1C 5.7 (H) 0.0 - 5.6 %      Comment:      Normal <5.7%   Prediabetes 5.7-6.4%    Diabetes 6.5% or higher     Note: Adopted from ADA consensus guidelines.       If you have any questions or concerns, please call the clinic at the number listed above.       Sincerely,      Yanique Cali MD

## 2024-09-20 NOTE — PROGRESS NOTES
Preventive Care Visit  [facility]  EJ JUAQUIN WELLER MD, Family Medicine  Sep 20, 2024    1. Encounter for Medicare annual wellness exam  This is a 70 yo female here for AWV     2. Severe alcohol use disorder, in sustained remission (H)  H/o severe alcohol use disorder - now sober x > 1 year     3. Type 2 diabetes mellitus without complication, without long-term current use of insulin (H)  Type II DM - doesn't check much - now using semaglutide - check labs -   - Comprehensive metabolic panel (BMP + Alb, Alk Phos, ALT, AST, Total. Bili, TP); Future  - Lipid Profile (Chol, Trig, HDL, LDL calc); Future  - Hemoglobin A1c; Future  - Semaglutide, 1 MG/DOSE, (OZEMPIC) 4 MG/3ML pen; Inject 1 mg subcutaneously every 7 days.  Dispense: 9 mL; Refill: 1  - Comprehensive metabolic panel (BMP + Alb, Alk Phos, ALT, AST, Total. Bili, TP)  - Lipid Profile (Chol, Trig, HDL, LDL calc)  - Hemoglobin A1c    4. Alcoholic cirrhosis of liver without ascites (H)  H/o alcoholic cirrhosis of liver - will recheck labs -   - Comprehensive metabolic panel (BMP + Alb, Alk Phos, ALT, AST, Total. Bili, TP); Future  - Comprehensive metabolic panel (BMP + Alb, Alk Phos, ALT, AST, Total. Bili, TP)    5. Other emphysema (H)  COPD - still smoking - discussed - needs to stop smoking     6. Moderate episode of recurrent major depressive disorder (H)  H/o depression, worsened by alcohol use - now improved with sobriety      6/4/2024     1:00 PM 9/3/2024    12:00 PM 9/20/2024     8:28 AM   PHQ   PHQ-9 Total Score 1 1 0   Q9: Thoughts of better off dead/self-harm past 2 weeks Not at all Not at all Not at all         7. Other seizures (H)  H/o seizures - associated with alcohol use (withdrawal) - none recently     8. Other specified disorders of carbohydrate metabolism (H24)  Type II DM -     9. Thrombocytopenia (H24)  H/o thrombocytopenia - alcohol related -   - CBC with platelets; Future  - CBC with platelets    10.  Abdominal pain, generalized  Vague general abdominal pain - has had pancreatitis in past - check lipase -   - Lipase; Future  - Lipase    11. Colon cancer screening  Due for colon cancer screening - due for colonoscopy - discussed - referred   - Colonoscopy Screening  Referral; Future    12. Personal history of tobacco use  Reviewed new recommendations for lung cancer screening - ordered CT lung cancer screening   - Prof fee: Shared Decision Making for Lung Cancer Screening  - CT Chest Lung Cancer Scrn Low Dose wo; Future  - varenicline (CHANTIX RICA) 0.5 MG X 11 & 1 MG X 42 tablet; Take 0.5 mg tab daily for 3 days, THEN 0.5 mg tab twice daily for 4 days, THEN 1 mg twice daily.  Dispense: 53 tablet; Refill: 0    Lung Cancer Screening Shared Decision Making Visit     Patsy Santamaria is eligible for lung cancer screening on the basis of:   has not not experienced symptoms suggestive of lung cancer.   born on 1955, 69 year old years old.   smoked 1/2-1 packs of cigarettes for 54 years for a total of >30 pack-years   has not quit smoking/is currently smoking.      I have discussed with patient the risks and benefits of screening for lung cancer with low-dose CT.     The risks include:   radiation exposure    false positives     over-diagnosis    The benefit of early detection of lung cancer is contingent upon adherence to annual screening or more frequent follow up if indicated.     Furthermore, reaping the benefits of screening requires Patsy Santamaria to be willing and able to undergo diagnostic procedures, if indicated.     We did discuss that the only way to prevent lung cancer is to not smoke. Smoking cessation assistance was offered.    13. Morbid obesity (H)  Body mass index is 44.29 kg/m .  Weight is declining with Ozempic use - discussed weight/diet choices/exercise     14 Need for prophylactic vaccination against hepatitis A  15 Need for vaccination against respiratory syncytial virus  Discussed  vaccine recommendations - could consider RSV and Hepatitis A      Subjective   Patsy is a 69 year old, presenting for the following:  Wellness Visit        9/20/2024     8:31 AM   Additional Questions   Roomed by Carlene AVALOS   Accompanied by self         Health Care Directive  Patient does not have a Health Care Directive or Living Will: Advance Directive received and scanned. Click on Code in the patient header to view.    Was taking Lyrica - for nerve pain  [facility]  Hadn't had a prescription for a while  They told her she needed to contact primary for that -   Would like to restart   Does have rx in chart dated 9/18/2024 - didn't get that     Has lost weight -   Taking Ozempic   Down about 19#  Exercising 5-6 times/week - pool  Left knee is starting to bother her     Right foot - had cyst removed  - nerve is bothering her there    Had breast exam the other day     Needs colonoscopy     Still smoking - 1/2 ppd - 1 ppd  Smoked since age 15   Does well when she is in environment where she cannot smoke, but doesn't want to go back to [facility] for that!    Living independently - has a little puppy - goes to gym nearly every day               9/20/2024   General Health   How would you rate your overall physical health? (!) FAIR   Feel stress (tense, anxious, or unable to sleep) Not at all            9/20/2024   Nutrition   Diet: Regular (no restrictions)            9/20/2024   Exercise   Days per week of moderate/strenous exercise 5 days   Average minutes spent exercising at this level 40 min            9/20/2024   Social Factors   Frequency of gathering with friends or relatives Three times a week   Worry food won't last until get money to buy more No   Food not last or not have enough money for food? No   Do you have housing? (Housing is defined as stable permanent housing and does not include staying ouside in a car, in a tent, in an abandoned building, in an overnight shelter, or couch-surfing.) Yes   Are  you worried about losing your housing? No   Lack of transportation? No   Unable to get utilities (heat,electricity)? No            9/20/2024   Fall Risk   Fallen 2 or more times in the past year? No   Trouble with walking or balance? No             9/20/2024   Activities of Daily Living- Home Safety   Needs help with the following daily activites None of the above   Safety concerns in the home None of the above            9/20/2024   Dental   Dentist two times every year? (!) NO            9/20/2024   Hearing Screening   Hearing concerns? None of the above            9/20/2024   Driving Risk Screening   Patient/family members have concerns about driving No            9/20/2024   General Alertness/Fatigue Screening   Have you been more tired than usual lately? No            9/20/2024   Urinary Incontinence Screening   Bothered by leaking urine in past 6 months No            9/20/2024   TB Screening   Were you born outside of the [place]? Yes          Today's PHQ-9 Score:       9/20/2024     8:28 AM   PHQ-9 SCORE   PHQ-9 Total Score MyChart 0   PHQ-9 Total Score 0         9/20/2024   Substance Use   If I could quit smoking, I would Somewhat agree   I want to quit somking, worry about health affects Somewhat agree   Willing to make a plan to quit smoking Somewhat agree   Willing to cut down before quitting Somewhat agree   Alcohol more than 3/day or more than 7/wk No   Do you have a current opioid prescription? No   How severe/bad is pain from 1 to 10? 4/10   Do you use any other substances recreationally? No        Social History     Tobacco Use    Smoking status: Every Day     Current packs/day: 0.50     Average packs/day: 0.5 packs/day for 49.0 years (24.5 ttl pk-yrs)     Types: Cigarettes     Passive exposure: Never    Smokeless tobacco: Never    Tobacco comments:     Reports on 1/26/23 smoking 1/2 PPD   Vaping Use    Vaping status: Never Used   Substance Use Topics    Alcohol use: Not Currently     Comment: Last use  11/11/22    Drug use: No     Comment: Denies           9/17/2024   LAST FHS-7 RESULTS   1st degree relative breast or ovarian cancer No   Any relative bilateral breast cancer No   Any male have breast cancer No   Any ONE woman have BOTH breast AND ovarian cancer No   Any woman with breast cancer before 50yrs No   2 or more relatives with breast AND/OR ovarian cancer No   2 or more relatives with breast AND/OR bowel cancer No           Mammogram Screening - Mammogram every 1-2 years updated in Health Maintenance based on mutual decision making      History of abnormal Pap smear: No - age 65 or older with adequate negative prior screening test results (3 consecutive negative cytology results, 2 consecutive negative cotesting results, or 2 consecutive negative HrHPV test results within 10 years, with the most recent test occurring within the recommended screening interval for the test used)        9/16/2015    11:06 AM   PAP / HPV   PAP Negative for squamous intraepithelial lesion or malignancy  Electronically signed by Shayy Roberto CT (ASCP) on 9/22/2015 at  9:57 AM        ASCVD Risk   The 10-year ASCVD risk score (Peng REDMAN, et al., 2019) is: 16.3%    Values used to calculate the score:      Age: 69 years      Sex: Female      Is Non- : No      Diabetic: Yes      Tobacco smoker: Yes      Systolic Blood Pressure: 108 mmHg      Is BP treated: No      HDL Cholesterol: 50 mg/dL      Total Cholesterol: 190 mg/dL            Reviewed and updated as needed this visit by Provider                    Past Medical History:   Diagnosis Date    Acute alcoholic intoxication (H24) 9/10/2021    Alcohol use disorder     Alcohol withdrawal seizure without complication (H) 05/14/2016    Alcoholic cirrhosis (H)     Anxiety     Chronic alcohol dependence, continuous (H) 03/16/2018    inpatient 11/2019, sober since then    Chronic Lower Extremity Edema     Chronic reflux esophagitis     COPD (chronic  obstructive pulmonary disease) (H)     Depression     Dermatitis     Diverticulitis of colon 2022    Fibroid uterus     Ganglion     right foot    GERD (gastroesophageal reflux disease)     H. pylori infection     Melanoma (H) 2019    left upper arm    Menopause     age 52    Obesity (BMI 35.0-39.9 without comorbidity)     Osteoarthritis     Bilateral Knees    Peripheral neuropathy     Seizure (H) 2016    during alcohol withdrawal    Sleep apnea     mild, doesn't tolerate pap therapy     Past Surgical History:   Procedure Laterality Date    APPENDECTOMY      Childhood    ARTHROSCOPY KNEE Right     BIOPSY SKIN (LOCATION) Left 2019    left upper arm     SECTION       EXCISION LESION/TENDON-SHEATH/CAPSULE, FOOT      Description: Excision Of Cyst Of Tendon Sheath Of Foot;  Proc Date: 10/28/2011;  Comments: [facility]     KNEE SCOPE, DIAGNOSTIC      Description: Arthroscopy Knee Right;  Proc Date: 10/11/2005;  Comments: for right knee patella subluxation with lateral retinacular release; subpatellar chondroplasty     LATERAL RETINACULAR RELEASE OPEN      Description: Knee Lateral Retinacular Release;  Proc Date: 10/11/2005;    HEMORRHOIDECTOMY INTERNAL LIGATION      Description: Hemorrhoidectomy;  Recorded: 2008;    THUMB SURGERY      Removal of bone spurs    TONSILLECTOMY      Z  DELIVERY ONLY      Description:  Section;  Recorded: 2008;    Z LIGATE FALLOPIAN TUBE      Description: Tubal Ligation;  Recorded: 2008;     OB History    Para Term  AB Living   4 4 4 0 0 0   SAB IAB Ectopic Multiple Live Births   0 0 0 0 0      # Outcome Date GA Lbr Jus/2nd Weight Sex Type Anes PTL Lv   4 Term            3 Term            2 Term            1 Term              BP Readings from Last 3 Encounters:   24 108/72   24 110/63   24 120/71    Wt Readings from Last 3 Encounters:   24 113.4 kg (250 lb)   24 110.7  kg (244 lb)   06/04/24 112.5 kg (248 lb)                  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)    Diuretic-induced hypokalemia    Dyspnea on exertion    Elevated LFTs    Ganglion of tendon sheath    GI bleed    Hereditary and idiopathic peripheral neuropathy    History of major depression    Homeless    Major depression, recurrent (H24)    Low backache    Airway obstruction due to foreign body, initial encounter    Hypomagnesemia    Mild episode of recurrent major depressive disorder (H24)    Morbid obesity (H)    Smoker    Peripheral edema    Pneumonia of right lower lobe due to infectious organism    Primary localized osteoarthrosis, hand    Primary osteoarthritis of right knee    PTSD (post-traumatic stress disorder)    Seasonal allergies    Skin lesion    Snoring    Trochanteric bursitis of left hip    Vitamin B12 deficiency (non anemic)    Vitamin D deficiency    Other seizures (H)    Anxiety    Closed fracture of head of left radius    Recurrent falls    Thrombocytopenia (H24)    Dermatitis    Depressive disorder    Leukopenia    Alcoholic cirrhosis of liver without ascites (H)    Sigmoid Diverticulitis    Mixed simple and mucopurulent chronic bronchitis (H)    Suicidal ideation    Infection due to 2019 novel coronavirus    Other specified disorders of carbohydrate metabolism (H24)    Right foot pain    Hallux valgus, acquired, right    Moderate episode of recurrent major depressive disorder (H)    Type 2 diabetes mellitus without complication, without long-term current use  of insulin (H)     Past Surgical History:   Procedure Laterality Date    APPENDECTOMY      Childhood    ARTHROSCOPY KNEE Right     BIOPSY SKIN (LOCATION) Left 2019    left upper arm     SECTION       EXCISION LESION/TENDON-SHEATH/CAPSULE, FOOT      Description: Excision Of Cyst Of Tendon Sheath Of Foot;  Proc Date: 10/28/2011;  Comments: [facility]     KNEE SCOPE, DIAGNOSTIC      Description: Arthroscopy Knee Right;  Proc Date: 10/11/2005;  Comments: for right knee patella subluxation with lateral retinacular release; subpatellar chondroplasty     LATERAL RETINACULAR RELEASE OPEN      Description: Knee Lateral Retinacular Release;  Proc Date: 10/11/2005;    HEMORRHOIDECTOMY INTERNAL LIGATION      Description: Hemorrhoidectomy;  Recorded: 2008;    THUMB SURGERY      Removal of bone spurs    TONSILLECTOMY      Z  DELIVERY ONLY      Description:  Section;  Recorded: 2008;    ZC LIGATE FALLOPIAN TUBE      Description: Tubal Ligation;  Recorded: 2008;       Social History     Tobacco Use    Smoking status: Every Day     Current packs/day: 0.50     Average packs/day: 0.5 packs/day for 49.0 years (24.5 ttl pk-yrs)     Types: Cigarettes     Passive exposure: Never    Smokeless tobacco: Never    Tobacco comments:     Reports on 23 smoking 1/2 PPD   Substance Use Topics    Alcohol use: Not Currently     Comment: Last use 22     Family History   Problem Relation Age of Onset    CABG Mother     Heart Disease Mother     Aneurysm Father          of ruptured aneurysm at 46    Heart Disease Father     Factor V Leiden deficiency Father     Factor V Leiden deficiency Sister     Anesthesia Reaction No family hx of          Current Outpatient Medications   Medication Sig Dispense Refill    albuterol (PROAIR HFA/PROVENTIL HFA/VENTOLIN HFA) 108 (90 Base) MCG/ACT inhaler Inhale 2 puffs into the lungs every 4 hours as needed for shortness of breath or  "wheezing 18 g 3    budesonide-formoterol (SYMBICORT) 160-4.5 MCG/ACT Inhaler Inhale 2 puffs into the lungs 2 times daily 10.2 g 11    bumetanide (BUMEX) 1 MG tablet Take 1 tablet (1 mg) by mouth daily 90 tablet 3    DULoxetine (CYMBALTA) 60 MG capsule Take 1 capsule (60 mg) by mouth daily. 90 capsule 1    Eucerin external lotion Apply topically 4 times daily as needed (dry skin) 480 mL 2    fluocinonide (LIDEX) 0.05 % external ointment Apply topically 2 times daily      hydrOXYzine HCl (ATARAX) 50 MG tablet Take 0.5-1 tablets (25-50 mg) by mouth 3 times daily as needed for anxiety 90 tablet 1    ipratropium - albuterol 0.5 mg/2.5 mg/3 mL (DUONEB) 0.5-2.5 (3) MG/3ML neb solution Take 1 vial (3 mLs) by nebulization every 4 hours as needed for shortness of breath / dyspnea or wheezing 15 mL 1    mirtazapine (REMERON) 15 MG tablet Take 0.5-1 tablets (7.5-15 mg) by mouth nightly as needed (sleep). 30 tablet 2    omeprazole (PRILOSEC) 20 MG DR capsule Take 1 capsule (20 mg) by mouth daily as needed (heartburn, indigestion) 90 capsule 1    semaglutide (OZEMPIC, 0.25 OR 0.5 MG/DOSE,) 2 MG/3ML pen Inject 0.5 mg Subcutaneous every 7 days 9 mL 2    Semaglutide, 1 MG/DOSE, (OZEMPIC) 4 MG/3ML pen Inject 1 mg subcutaneously every 7 days. 9 mL 1    Urea 40 % CREA Externally apply topically 4 times daily as needed for dry skin 227 g 5    varenicline (CHANTIX RICA) 0.5 MG X 11 & 1 MG X 42 tablet Take 0.5 mg tab daily for 3 days, THEN 0.5 mg tab twice daily for 4 days, THEN 1 mg twice daily. 53 tablet 0    pregabalin (LYRICA) 25 MG capsule TAKE 1 CAPSULE BY MOUTH THREE TIMES DAILY (Patient not taking: Reported on 9/20/2024) 270 capsule 0     Allergies   Allergen Reactions    Bupropion Diarrhea    Codeine Hives, Itching, Nausea and Rash    Nickel Unknown    Oxycodone Nausea and Vomiting    Percocet [Oxycodone-Acetaminophen]      Patient reports \"vomiting,grossly ill two weeks ago\"    Topiramate Unknown    Diclofenac Nausea     " Tolerates the topical gel    Furosemide Rash    Hydrochlorothiazide Rash     phototoxicity - med was d/lora        Sulfa Antibiotics Itching and Rash    Sulfasalazine Rash     Recent Labs   Lab Test 09/20/24  0927 04/29/24  1031 01/17/24  1027 09/13/23  1238 09/22/22  0622 09/21/22  1848 10/25/21  1603 05/27/21  0228 05/26/21  1103 03/08/21  1332 02/18/21  0804   A1C 5.7* 5.7* 6.2* 6.0*   < >  --   --  5.9*  --   --   --    * 102*  --  117*  --   --   --   --   --   --  132*   HDL 52  --   --  50  --   --   --   --   --   --  59   TRIG 114  --   --  117  --   --   --   --   --   --  174*   ALT 12 17 16 13   < >  --    < >  --  91*   < >  --    CR 0.54 0.60 0.59 0.60   < >  --    < > 0.70 0.61   < >  --    GFRESTIMATED >90 >90 >90 >90   < >  --    < > >90 >90   < >  --    GFRESTBLACK  --   --   --   --   --   --   --  >90 >90   < >  --    POTASSIUM 4.2 4.0 4.9 4.3   < >  --    < > 3.5 3.1*   < >  --    TSH  --   --   --   --   --  0.99  --   --  1.80  --  0.66    < > = values in this interval not displayed.      Current providers sharing in care for this patient include:  Patient Care Team:  Yanique Cali MD as PCP - General (Family Practice)  Ana Cristina Romeo MD as Fellow (Gastroenterology)  Keyon Kapoor DPM as Assigned Surgical Provider  Kaia Mosqueda PA-C as Assigned Musculoskeletal Provider  Yanique Cali MD as Assigned PCP    The following health maintenance items are reviewed in Epic and correct as of today:  Health Maintenance   Topic Date Due    COPD ACTION PLAN  Never done    DEPRESSION ACTION PLAN  Never done    COLORECTAL CANCER SCREENING  Never done    HEPATITIS A IMMUNIZATION (1 of 2 - Risk 2-dose series) Never done    RSV VACCINE (1 - Risk 60-74 years 1-dose series) Never done    LUNG CANCER SCREENING  01/28/2021    NICOTINE/TOBACCO CESSATION COUNSELING Q 1 YR  01/27/2023    URINE DRUG SCREEN  03/20/2024    MEDICARE ANNUAL WELLNESS VISIT   "09/13/2024    PHQ-9  03/20/2025    ANNUAL REVIEW OF HM ORDERS  09/20/2025    FALL RISK ASSESSMENT  09/20/2025    MAMMO SCREENING  09/17/2026    GLUCOSE  04/29/2027    LIPID  09/13/2028    ADVANCE CARE PLANNING  09/20/2029    DTAP/TDAP/TD IMMUNIZATION (3 - Td or Tdap) 12/03/2030    DEXA  10/06/2037    SPIROMETRY  Completed    HEPATITIS C SCREENING  Completed    INFLUENZA VACCINE  Completed    Pneumococcal Vaccine: 65+ Years  Completed    ZOSTER IMMUNIZATION  Completed    HEPATITIS B IMMUNIZATION  Completed    COVID-19 Vaccine  Completed    HPV IMMUNIZATION  Aged Out    MENINGITIS IMMUNIZATION  Aged Out    RSV MONOCLONAL ANTIBODY  Aged Out    PAP  Discontinued       Review of Systems   Constitutional:  Negative for chills and fever.   HENT:  Negative for ear pain and sore throat.    Eyes:  Negative for pain and visual disturbance.   Respiratory:  Negative for cough and shortness of breath.    Cardiovascular:  Negative for chest pain and palpitations.   Gastrointestinal:  Positive for abdominal pain (epigastric/generalized). Negative for vomiting.   Genitourinary:  Negative for dysuria and hematuria.   Musculoskeletal:  Negative for arthralgias and back pain.   Skin:  Negative for color change and rash.   Neurological:  Negative for seizures and syncope.   All other systems reviewed and are negative.         Objective    Exam  /72 (BP Location: Left arm, Patient Position: Sitting, Cuff Size: Adult Large)   Pulse 68   Temp 98.7  F (37.1  C) (Temporal)   Resp 16   Ht 1.6 m (5' 3\")   Wt 113.4 kg (250 lb)   LMP  (LMP Unknown)   SpO2 98%   BMI 44.29 kg/m     Estimated body mass index is 44.29 kg/m  as calculated from the following:    Height as of this encounter: 1.6 m (5' 3\").    Weight as of this encounter: 113.4 kg (250 lb).    Physical Exam  Vitals reviewed.   Constitutional:       General: She is not in acute distress.     Appearance: Normal appearance.   HENT:      Head: Normocephalic.      Right Ear: " Tympanic membrane, ear canal and external ear normal.      Left Ear: Tympanic membrane, ear canal and external ear normal.      Nose: Nose normal.      Mouth/Throat:      Mouth: Mucous membranes are moist.      Pharynx: No posterior oropharyngeal erythema.   Eyes:      Extraocular Movements: Extraocular movements intact.      Conjunctiva/sclera: Conjunctivae normal.      Pupils: Pupils are equal, round, and reactive to light.   Cardiovascular:      Rate and Rhythm: Normal rate and regular rhythm.      Pulses: Normal pulses.      Heart sounds: Normal heart sounds. No murmur heard.  Pulmonary:      Effort: Pulmonary effort is normal.      Breath sounds: Normal breath sounds.   Abdominal:      Palpations: Abdomen is soft. There is no mass.      Tenderness: There is no abdominal tenderness. There is no guarding or rebound.   Musculoskeletal:         General: No deformity. Normal range of motion.      Cervical back: Normal range of motion and neck supple.   Lymphadenopathy:      Cervical: No cervical adenopathy.   Skin:     General: Skin is warm and dry.   Neurological:      General: No focal deficit present.      Mental Status: She is alert.   Psychiatric:         Mood and Affect: Mood normal.         Behavior: Behavior normal.       Results for orders placed or performed in visit on 09/20/24   Comprehensive metabolic panel (BMP + Alb, Alk Phos, ALT, AST, Total. Bili, TP)     Status: None   Result Value Ref Range    Sodium 139 135 - 145 mmol/L    Potassium 4.2 3.4 - 5.3 mmol/L    Carbon Dioxide (CO2) 28 22 - 29 mmol/L    Anion Gap 7 7 - 15 mmol/L    Urea Nitrogen 11.4 8.0 - 23.0 mg/dL    Creatinine 0.54 0.51 - 0.95 mg/dL    GFR Estimate >90 >60 mL/min/1.73m2    Calcium 9.4 8.8 - 10.4 mg/dL    Chloride 104 98 - 107 mmol/L    Glucose 93 70 - 99 mg/dL    Alkaline Phosphatase 78 40 - 150 U/L    AST 20 0 - 45 U/L    ALT 12 0 - 50 U/L    Protein Total 6.6 6.4 - 8.3 g/dL    Albumin 3.9 3.5 - 5.2 g/dL    Bilirubin Total 0.3  <=1.2 mg/dL    Patient Fasting > 8hrs? Yes    CBC with platelets     Status: Normal   Result Value Ref Range    WBC Count 4.6 4.0 - 11.0 10e3/uL    RBC Count 4.89 3.80 - 5.20 10e6/uL    Hemoglobin 14.4 11.7 - 15.7 g/dL    Hematocrit 44.2 35.0 - 47.0 %    MCV 90 78 - 100 fL    MCH 29.4 26.5 - 33.0 pg    MCHC 32.6 31.5 - 36.5 g/dL    RDW 13.8 10.0 - 15.0 %    Platelet Count 167 150 - 450 10e3/uL   Lipid Profile (Chol, Trig, HDL, LDL calc)     Status: Abnormal   Result Value Ref Range    Cholesterol 187 <200 mg/dL    Triglycerides 114 <150 mg/dL    Direct Measure HDL 52 >=50 mg/dL    LDL Cholesterol Calculated 112 (H) <100 mg/dL    Non HDL Cholesterol 135 (H) <130 mg/dL    Patient Fasting > 8hrs? Yes     Narrative    Cholesterol  Desirable: < 200 mg/dL  Borderline High: 200 - 239 mg/dL  High: >= 240 mg/dL    Triglycerides  Normal: < 150 mg/dL  Borderline High: 150 - 199 mg/dL  High: 200-499 mg/dL  Very High: >= 500 mg/dL    Direct Measure HDL  Female: >= 50 mg/dL   Male: >= 40 mg/dL    LDL Cholesterol  Desirable: < 100 mg/dL  Above Desirable: 100 - 129 mg/dL   Borderline High: 130 - 159 mg/dL   High:  160 - 189 mg/dL   Very High: >= 190 mg/dL    Non HDL Cholesterol  Desirable: < 130 mg/dL  Above Desirable: 130 - 159 mg/dL  Borderline High: 160 - 189 mg/dL  High: 190 - 219 mg/dL  Very High: >= 220 mg/dL   Lipase     Status: Abnormal   Result Value Ref Range    Lipase 66 (H) 13 - 60 U/L   Hemoglobin A1c     Status: Abnormal   Result Value Ref Range    Estimated Average Glucose 117 (H) <117 mg/dL    Hemoglobin A1C 5.7 (H) 0.0 - 5.6 %             9/20/2024   Mini Cog   Clock Draw Score 2 Normal   3 Item Recall 3 objects recalled   Mini Cog Total Score 5                 Signed Electronically by: EJ WELLER MD    Answers submitted by the patient for this visit:  Patient Health Questionnaire (Submitted on 9/20/2024)  If you checked off any problems, how difficult have these problems made it for you to do  your work, take care of things at home, or get along with other people?: Not difficult at all  PHQ9 TOTAL SCORE: 0    Lung Cancer Screening Shared Decision Making Visit     Patsy Santamaria, a 69 year old female, is eligible for lung cancer screening    History   Smoking Status    Every Day    Types: Cigarettes   Smokeless Tobacco    Never       I have discussed with patient the risks and benefits of screening for lung cancer with low-dose CT.     The risks include:    radiation exposure: one low dose chest CT has as much ionizing radiation as about 15 chest x-rays, or 6 months of background radiation living in [place]      false positives: most findings/nodules are NOT cancer, but some might still require additional diagnostic evaluation, including biopsy    over-diagnosis: some slow growing cancers that might never have been clinically significant will be detected and treated unnecessarily     The benefit of early detection of lung cancer is contingent upon adherence to annual screening or more frequent follow up if indicated.     Furthermore, to benefit from screening, Patsy must be willing and able to undergo diagnostic procedures, if indicated. Although no specific guide is available for determining severity of comorbidities, it is reasonable to withhold screening in patients who have greater mortality risk from other diseases.     We did discuss that the best way to prevent lung cancer is to not smoke.    Some patients may value a numeric estimation of lung cancer risk when evaluating if lung cancer screening is right for them, here is one calculator:    ShouldIScreen

## 2024-09-22 ASSESSMENT — ENCOUNTER SYMPTOMS
FEVER: 0
ABDOMINAL PAIN: 1
COUGH: 0
ARTHRALGIAS: 0
SHORTNESS OF BREATH: 0
COLOR CHANGE: 0
BACK PAIN: 0
VOMITING: 0
DYSURIA: 0
PALPITATIONS: 0
EYE PAIN: 0
SORE THROAT: 0
CHILLS: 0
SEIZURES: 0
HEMATURIA: 0

## 2024-11-25 ENCOUNTER — TRANSFERRED RECORDS (OUTPATIENT)
Dept: HEALTH INFORMATION MANAGEMENT | Facility: CLINIC | Age: 69
End: 2024-11-25
Payer: COMMERCIAL

## 2024-12-10 ENCOUNTER — TRANSFERRED RECORDS (OUTPATIENT)
Dept: HEALTH INFORMATION MANAGEMENT | Facility: CLINIC | Age: 69
End: 2024-12-10
Payer: COMMERCIAL

## 2024-12-10 NOTE — PROGRESS NOTES
Problem: Pain  Goal: Acceptable pain level achieved/maintained at rest using appropriate pain scale for the patient  Outcome: Monitoring/Evaluating progress     Problem: At Risk for Falls  Goal: Patient does not fall  Outcome: Monitoring/Evaluating progress     Problem: Nausea/Vomiting  Goal: Maintains oral intake with decreased/no reports of nausea/vomiting  Outcome: Monitoring/Evaluating progress      Progress Note    Assessment/Plan  67-year-old morbidly obese lady medical history of alcohol dependence COPD diverticulitis who presented with first episode of diverticulitis.  She took Augmentin as outpatient for 10 days but have recurrence of pain.    Acute diverticulitis:   Has LLQ and RLQ abdominal pain for 2 weeks.  Failed outpatient therapy with Augmentin. CT scan on 9/9/22 revealed sigmoid diverticulitis.  Repeat CT scan 09/18 showed new mild changes of acute diverticulitis involving the lower descending colon, slight improvement of acute diverticulitis involving the sigmoid colon within the right aspect of the pelvis.  - Remains hemodynamically stable, no overt signs of SRS or sepsis  - Continue Zosyn  - Urinalysis is unremarkable.  - Pain control: tylenol and oxycodone prn  - Antiemetics  - Advanced to regular diet. Discontinued IVF  --May need outpatient colonoscopy in 6 to 8 weeks  --Patient still uncomfortable with abdominal pain, would like to stay one more day before returning home.     Hypotension likely due to combination of sepsis and dehydration, resolved  -- Discontinued IVF 09/20  -- continue to monitor    COPD: Not in exacerbation.    Continue home inhalers     Bilateral peripheral edema:   Resume PTA Bumex    Mood disorder:   Continue PTA Lexapro and Remeron      GERD: on PPI    MELVI.  Does not like to use CPAP at home.  Patient agrees to use CPAP in the hospital.  -- Ordered CPAP    Sinus bradycardia  EKG 9/21 with Sinus bradycardia with sinus arrhythmia, otherwise unchanged from prior. QTc 428  --check TSH    History of cirrhosis likely due to combination of alcohol and nonalcoholic fatty liver disease.  --Reviewed CT abdomen pelvis ordered on admission w/ cirrhosis with fatty change in liver. Gallbladder wall thickening which can be seen with hepatic parenchymal disease  - AST 23, ALT 28, T.bobby 0.6,  -- May need outpatient follow-up with liver clinic.    Barriers to discharge: IV  "abx    Anticipated discharge date:  9/22    Subjective  No acute events reported overnight.  Seen and examined.   Tells me she continues to have lower bilateral abdominal pain. Feels generalised achiness and fatigue. Denies fevers or chills. Denies SOB chest pain or palpitations.   Patient with intermittent nausea but no vomiting on regular diet.  Patient still uncomfortable with abdominal pain, would like to stay one more day before returning home  Plan of care: continued antibiotics, regular diet, remains inpatient    Objective    /67 (BP Location: Left arm)   Pulse 69   Temp 98.1  F (36.7  C) (Oral)   Resp 18   Ht 1.613 m (5' 3.5\")   Wt 119.9 kg (264 lb 4.8 oz)   SpO2 96%   BMI 46.08 kg/m    Weight:   Wt Readings from Last 5 Encounters:   09/20/22 119.9 kg (264 lb 4.8 oz)   09/08/22 116.6 kg (257 lb)   07/26/22 113.4 kg (250 lb)   06/28/22 113.4 kg (250 lb)   06/08/22 110.2 kg (243 lb)       I/O last 3 completed shifts:  In: -   Out: 250 [Urine:250]  I/O this shift:  In: 480 [P.O.:480]  Out: -       Physical Exam  Body mass index is 46.08 kg/m .  Morbidly obese    General: AAOx3, NAD  HEENT: Oral mucosa moist and non-erythematous, PERRLA, EOM intact  CV: RRR, normal S1S2, no murmur, clicks, rubs  Resp: Clear to auscultation bilaterally, no wheezes, rhonchi  Abd: Soft, diffuse mild tenderness, no rebound or guarding, BS+, no masses appreciated  Extremities: Radial and pedal pulses intact and symmetric, no pedal edema  Neuro: No lateralizing symptoms or focal neurologic deficits      Pertinent Labs  ----------------------  Recent Labs   Lab 09/20/22  0730 09/18/22  2040   * 136   POTASSIUM 4.2 4.3   CO2 25 22   BUN 7* 12   CR 0.69 0.74   * 127*   ALBUMIN 3.0* 3.8   BILITOTAL 0.6 1.3*   ALKPHOS 70 124*   ALT 28 40   AST 23 51*     Recent Labs   Lab 09/20/22  0730 09/18/22 2040   WBC 9.0 4.3   HGB 13.0 15.9*   HCT 39.7 46.6   * 176     No results for input(s): INR in the last 168 " hours.  Glucose Values Latest Ref Rng & Units 9/18/2022 9/20/2022   Bedside Glucose (mg/dl )  - -- --   GLUCOSE 70 - 125 mg/dL 127(H) 155(H)   Some recent data might be hidden       Pertinent Radiology   Radiology Results: Personally reviewed impression/s  CT Abdomen Pelvis w Contrast    Result Date: 9/18/2022  EXAM: CT ABDOMEN PELVIS W CONTRAST [facility]ATION: [facility] DATE/TIME: 9/18/2022 10:05 PM INDICATION: Abdominal pain, recent diverticulitis, question abscess, perforation. COMPARISON: 09/09/2022. TECHNIQUE: CT scan of the abdomen and pelvis was performed following injection of IV contrast. Multiplanar reformats were obtained. Dose reduction techniques were used. CONTRAST: 100 ml isovue 370. FINDINGS: LOWER CHEST: Atelectasis. HEPATOBILIARY: Cirrhosis. Fatty infiltration. Stable mildly enlarged periportal and katja hepatis lymph nodes compatible with reactive nodes. PANCREAS: Normal. SPLEEN: Normal. ADRENAL GLANDS: Normal. KIDNEYS/BLADDER: Normal. BOWEL: Changes of acute diverticulitis involving the lower descending colon at the sigmoid junction which are new. No abscess. The changes of acute diverticulitis involving the sigmoid colon within the lower right abdomen again seen with slight improvement. LYMPH NODES: Prominent but subcentimeter retroperitoneal nodes with stable enlarged katja hepatis and periportal lymph nodes. VASCULATURE: Unremarkable. PELVIC ORGANS: Uterine fibroids. MUSCULOSKELETAL: Normal.     IMPRESSION: 1.  New mild changes of acute diverticulitis involving the lower descending colon. Slight improvement of acute diverticulitis involving the sigmoid colon within the right aspect of the pelvis. 2.  Cirrhosis with fatty change in liver. Gallbladder wall thickening which can be seen with hepatic parenchymal disease. 3.  Uterine fibroids.    EKG Results: personally reviewed.     Lindsey Vazquez MD, MPH  [facility]  [facility]  Pager: [phone]       Crescentic Complex Repair Preamble Text (Leave Blank If You Do Not Want): Extensive wide undermining was performed.

## 2025-02-03 ASSESSMENT — ANXIETY QUESTIONNAIRES
IF YOU CHECKED OFF ANY PROBLEMS ON THIS QUESTIONNAIRE, HOW DIFFICULT HAVE THESE PROBLEMS MADE IT FOR YOU TO DO YOUR WORK, TAKE CARE OF THINGS AT HOME, OR GET ALONG WITH OTHER PEOPLE: SOMEWHAT DIFFICULT
7. FEELING AFRAID AS IF SOMETHING AWFUL MIGHT HAPPEN: NOT AT ALL
2. NOT BEING ABLE TO STOP OR CONTROL WORRYING: NOT AT ALL
3. WORRYING TOO MUCH ABOUT DIFFERENT THINGS: NOT AT ALL
1. FEELING NERVOUS, ANXIOUS, OR ON EDGE: NOT AT ALL
7. FEELING AFRAID AS IF SOMETHING AWFUL MIGHT HAPPEN: NOT AT ALL
GAD7 TOTAL SCORE: 1
5. BEING SO RESTLESS THAT IT IS HARD TO SIT STILL: NOT AT ALL
6. BECOMING EASILY ANNOYED OR IRRITABLE: SEVERAL DAYS
8. IF YOU CHECKED OFF ANY PROBLEMS, HOW DIFFICULT HAVE THESE MADE IT FOR YOU TO DO YOUR WORK, TAKE CARE OF THINGS AT HOME, OR GET ALONG WITH OTHER PEOPLE?: SOMEWHAT DIFFICULT
GAD7 TOTAL SCORE: 1
GAD7 TOTAL SCORE: 1
4. TROUBLE RELAXING: NOT AT ALL

## 2025-02-07 ENCOUNTER — OFFICE VISIT (OUTPATIENT)
Dept: ADDICTION MEDICINE | Facility: CLINIC | Age: 70
End: 2025-02-07
Payer: COMMERCIAL

## 2025-02-07 VITALS — OXYGEN SATURATION: 97 % | SYSTOLIC BLOOD PRESSURE: 121 MMHG | DIASTOLIC BLOOD PRESSURE: 78 MMHG | HEART RATE: 84 BPM

## 2025-02-07 DIAGNOSIS — F32.A DEPRESSIVE DISORDER: ICD-10-CM

## 2025-02-07 DIAGNOSIS — F10.11 ALCOHOL ABUSE, IN REMISSION: ICD-10-CM

## 2025-02-07 DIAGNOSIS — F43.10 PTSD (POST-TRAUMATIC STRESS DISORDER): ICD-10-CM

## 2025-02-07 DIAGNOSIS — Z87.891 PERSONAL HISTORY OF TOBACCO USE: ICD-10-CM

## 2025-02-07 DIAGNOSIS — F10.21 SEVERE ALCOHOL USE DISORDER, IN SUSTAINED REMISSION (H): Primary | ICD-10-CM

## 2025-02-07 LAB
AMPHETAMINE QUAL URINE POCT: NEGATIVE
BARBITURATE QUAL URINE POCT: NEGATIVE
BENZODIAZEPINE QUAL URINE POCT: NEGATIVE
BUPRENORPHINE QUAL URINE POCT: NEGATIVE
COCAINE QUAL URINE POCT: NEGATIVE
CREATININE QUAL URINE POCT: NORMAL
INTERNAL QC QUAL URINE POCT: NORMAL
MDMA QUAL URINE POCT: NEGATIVE
METHADONE QUAL URINE POCT: NEGATIVE
METHAMPHETAMINE QUAL URINE POCT: NEGATIVE
OPIATE QUAL URINE POCT: NEGATIVE
OXYCODONE QUAL URINE POCT: NEGATIVE
PH QUAL URINE POCT: NORMAL
PHENCYCLIDINE QUAL URINE POCT: NEGATIVE
POCT KIT EXPIRATION DATE: NORMAL
POCT KIT LOT NUMBER: NORMAL
SPECIFIC GRAVITY POCT: 1.01
TEMPERATURE URINE POCT: NORMAL
THC QUAL URINE POCT: NEGATIVE

## 2025-02-07 PROCEDURE — 99000 SPECIMEN HANDLING OFFICE-LAB: CPT | Performed by: FAMILY MEDICINE

## 2025-02-07 PROCEDURE — G2211 COMPLEX E/M VISIT ADD ON: HCPCS | Performed by: FAMILY MEDICINE

## 2025-02-07 PROCEDURE — 99214 OFFICE O/P EST MOD 30 MIN: CPT | Performed by: FAMILY MEDICINE

## 2025-02-07 PROCEDURE — 80305 DRUG TEST PRSMV DIR OPT OBS: CPT | Performed by: FAMILY MEDICINE

## 2025-02-07 PROCEDURE — 80307 DRUG TEST PRSMV CHEM ANLYZR: CPT | Mod: 90 | Performed by: FAMILY MEDICINE

## 2025-02-07 RX ORDER — PRAZOSIN HYDROCHLORIDE 1 MG/1
1 CAPSULE ORAL AT BEDTIME
Qty: 30 CAPSULE | Refills: 0 | Status: SHIPPED | OUTPATIENT
Start: 2025-02-07

## 2025-02-07 RX ORDER — DULOXETIN HYDROCHLORIDE 60 MG/1
60 CAPSULE, DELAYED RELEASE ORAL DAILY
Qty: 90 CAPSULE | Refills: 1 | Status: SHIPPED | OUTPATIENT
Start: 2025-02-07

## 2025-02-07 RX ORDER — VARENICLINE TARTRATE 0.5 (11)-1
KIT ORAL
Qty: 53 TABLET | Refills: 0 | Status: SHIPPED | OUTPATIENT
Start: 2025-02-07

## 2025-02-07 NOTE — NURSING NOTE
[facility] - Addiction Medicine       Rooming information:    Point of care urine drug screen positive for:  Lab Results   Component Value Date    BUP Negative 02/07/2025    BZO Negative 02/07/2025    BAR Negative 02/07/2025    ELICEO Negative 02/07/2025    MAMP Negative 02/07/2025    AMP Negative 02/07/2025    MDMA Negative 02/07/2025    MTD Negative 02/07/2025    TIV589 Negative 02/07/2025    OXY Negative 02/07/2025    PCP Negative 02/07/2025    THC Negative 02/07/2025    TEMP 92 F 02/07/2025    SGPOCT 1.015 02/07/2025       *POC urine drug screen does not screen for Fentanyl    PHQ-9 Scores:       9/3/2024    12:00 PM 9/20/2024     8:28 AM 12/6/2024     9:00 AM   PHQ   PHQ-9 Total Score 1 0 1   Q9: Thoughts of better off dead/self-harm past 2 weeks Not at all Not at all  Not at all       Proxy-reported     ALISIA-7 Scores:      9/3/2024    12:00 PM 12/1/2024    12:54 PM 2/3/2025    10:42 AM   ALISIA-7 SCORE   Total Score  2 (minimal anxiety) 1 (minimal anxiety)   Total Score 0 2  1        Patient-reported       Any other recent substance use:     Denies    NICOTINE-Yes:   If using nicotine, ready to quit? Yes: using chantix    Side effects related to medications pt would like to discuss with provider (constipation, dry mouth, HA, GI upset, sedation?) No     Narcan currently available: N/A    Primary care provider: EJ WELLER MD     Mental health provider: n/a (follow up on MH referral if needed)    Any housing, insurance deficits?: stable    Contact information up to date? On file    3rd Party Involvement none today  (please obtain LUDIN if pt would like to include)        Messi Reyes MA  February 7, 2025  9:56 AM

## 2025-02-07 NOTE — PROGRESS NOTES
[facility]    A/P                                                    ASSESSMENT/PLAN    1. Severe alcohol use disorder, in sustained remission (H) (Primary)  Stable, nearing 2 years of sobriety.  No significant cravings recently.  We discussed how avoiding alcohol is the most important thing for her liver (previous imaging has shown cirrhosis, recent LFTs normal).    - Drugs of Abuse Screen Urine (POC CUPS) POCT  - Ethyl Glucuronide Screen with Reflex to Confirmation, Urine; Future  - Ethyl Glucuronide Screen with Reflex to Confirmation, Urine    2. Alcohol abuse, in remission  - Ethyl Glucuronide Screen with Reflex to Confirmation, Urine; Future  - Ethyl Glucuronide Screen with Reflex to Confirmation, Urine    3. PTSD (post-traumatic stress disorder)  4. Depressive disorder  Needs improvement.  Her PTSD symptoms seem to be escalating (more anxiety in crowds, worsening nightmares).  She has tried prazosin in the past but was drinking alcohol at the time.  Discussed option of trialing this again now that she is sober to see if this helps with her nightmares.  Cymbalta has been the most helpful medication for her depression and has the added benefit of managing her pain quite well so I am hesitant to change this.  Given she has tried so many different medications, I think it would be helpful for her to see psychiatry.  She is agreeable to this and would like to see someone at [facility].  If prazosin is helpful, she will let me know and I will send refill.    - prazosin (MINIPRESS) 1 MG capsule; Take 1 capsule (1 mg) by mouth at bedtime.  Dispense: 30 capsule; Refill: 0  - DULoxetine (CYMBALTA) 60 MG capsule; Take 1 capsule (60 mg) by mouth daily.  Dispense: 90 capsule; Refill: 1  - Adult Mental Health  Referral; Future    5. Personal history of tobacco use  She is motivated to quit smoking - reflects on other changes she has made to improve her health and this is a big change she  would like to make.  She would like to resume Chantix.  New script sent.    - varenicline (CHANTIX RICA) 0.5 MG X 11 & 1 MG X 42 tablet; Take 0.5 mg tab daily for 3 days, THEN 0.5 mg tab twice daily for 4 days, THEN 1 mg twice daily.  Dispense: 53 tablet; Refill: 0        PDMP Review         Value Time User    State PDMP site checked  Yes 2/7/2025 10:11 AM Melissa Covington DO              RTC  Return in about 3 months (around 5/7/2025) for in person.    The longitudinal plan of care for the diagnosis(es)/condition(s) as documented were addressed during this visit. Due to the added complexity in care, I will continue to support Patsy in the subsequent management and with ongoing continuity of care.      Counseled the patient on the importance of having a recovery program in addition to medication to manage recovery.  Components include avoiding isolating, having willingness to change, avoiding triggers and managing cravings. Encouraged having some type of sober network and practicing honesty with trusted support person(s). Encouraged other services such as counseling, 12 step or other self-help organizations.      SUBJECTIVE                                                      Patsy Santamaria is a 70 year old female with a history of peripheral neuropathy, COPD, alcoholic hepatitis, thombocytopenia, arthritis (hands and knees), obesity, anxiety, depression, and AUD who presents to clinic today for follow up.     Brief history of substance use:  Began drinking around age 33.  Became a problem for her in 2016 and she went to treatment for the first time and also experienced EtOH withdrawal seizures.  Has been to multiple treatments (most recently [facility] in [place] in Feb 2022).  Drinking tends to correlate with worsening depression.  Has tried naltrexone and acamprosate in past.  History of of cocaine use (1 year in the 1990s).  Established care with [facility] in  March 2022 and has continued to struggle with brief episodes of drinking.  Most recent hospitalization related to alcohol use/withdrawal was March 2023    Plan from most recent office visit (12/6/24):  1. Severe alcohol use disorder, in sustained remission (H) (Primary)  Stable.  No use for >1 year.  Discussed some recent triggers and desires to drink.  Encouraged her to look at mutual support groups or therapy.  She is looking for a [employer] to join as well.  She has tried multiple medications in the past for AUD to minimize cravings but none have been particularly helpful.  We could re-trial naltrexone or acamprosate if needed.  - Drugs of Abuse Screen Urine (POC CUPS) POCT     2. Insomnia, unspecified type  Overall stable.  Intermittent issues.  Continue Remeron as needed, no refill needed.     3. Depressive disorder  4. PTSD (post-traumatic stress disorder)  Improving overall.  Some heightened anxiety recently and she plans to use hydroxyzine as needed (has this at home).  Continue duloxetine.  Consider individual therapy in the future.    TODAY'S VISIT  HPI Feb 7, 2025  - Still having anxiety when out and about even when taking hydroxyzine, especially when in public like in a store, it stops her from going to do things, otherwise anxiety is minimal and mood is good  - Sleep is still variable - some nights wakes 2-3x/night to use restroom, some nights has nightmares (feel very real, like they are physically happening), tries to avoid eating before bed, not so much of an issue falling asleep  - Cravings better now that holidays have passed  - Continues to work on her weight loss goals, exercising regularly  - Would like to quit smoking        9/3/2024    12:00 PM 9/20/2024     8:28 AM 12/6/2024     9:00 AM   PHQ   PHQ-9 Total Score 1 0 1   Q9: Thoughts of better off dead/self-harm past 2 weeks Not at all Not at all  Not at all       Proxy-reported           9/3/2024    12:00 PM 12/1/2024    12:54 PM 2/3/2025     10:42 AM   ALISIA-7 SCORE   Total Score  2 (minimal anxiety) 1 (minimal anxiety)   Total Score 0 2  1        Patient-reported       OBJECTIVE                                                    PHYSICAL EXAM:  /78   Pulse 84   LMP  (LMP Unknown)   SpO2 97%     GENERAL: healthy, alert and no distress  EYES: Eyes grossly normal to inspection, sclerae normal  RESP: No respiratory distress, no coughing or wheezing  MS: no gross musculoskeletal defects noted  SKIN: no suspicious lesions or rashes  NEURO: Normal strength and tone, mentation intact and speech normal  MENTAL STATUS EXAM  Appearance/Behavior: No appearant distress  Speech: Normal  Mood/Affect: normal affect  Insight: Adequate    LAB  Results for orders placed or performed in visit on 02/07/25   Ethyl Glucuronide Screen with Reflex to Confirmation, Urine     Status: None   Result Value Ref Range    Ethyl Glucuronide Urine Negative Cutoff 500 ng/mL   Drugs of Abuse Screen Urine (POC CUPS) POCT     Status: Normal   Result Value Ref Range    POCT Kit Lot Number [id number]     POCT Kit Expiration Date 02/08/2026     Temperature Urine POCT 92 F 90 F, 92 F, 94 F, 96 F, 98 F, 100 F    Specific [place] POCT 1.015 1.005, 1.015, 1.025    pH Qual Urine POCT 5 pH 4 pH, 5 pH, 7 pH, 9 pH    Creatinine Qual Urine POCT 100 mg/dL 20 mg/dL, 50 mg/dL, 100 mg/dL, 200 mg/dL    Internal QC Qual Urine POCT Valid Valid    Amphetamine Qual Urine POCT Negative Negative    Barbiturate Qual Urine POCT Negative Negative    Buprenorphine Qual Urine POCT Negative Negative    Benzodiazepine Qual Urine POCT Negative Negative    Cocaine Qual Urine POCT Negative Negative    Methamphetamine Qual Urine POCT Negative Negative    MDMA Qual Urine POCT Negative Negative    Methadone Qual Urine POCT Negative Negative    Opiate Qual Urine POCT Negative Negative    Oxycodone Qual Urine POCT Negative Negative    Phencyclidine Qual Urine POCT Negative Negative    THC Qual Urine POCT Negative Negative          HISTORY                                                    Problem list reviewed & adjusted, as indicated.  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)    Diuretic-induced hypokalemia    Dyspnea on exertion    Elevated LFTs    Ganglion of tendon sheath    GI bleed    Hereditary and idiopathic peripheral neuropathy    History of major depression    Homeless    Major depression, recurrent    Low backache    Airway obstruction due to foreign body, initial encounter    Hypomagnesemia    Mild episode of recurrent major depressive disorder    Morbid obesity (H)    Smoker    Peripheral edema    Pneumonia of right lower lobe due to infectious organism    Primary localized osteoarthrosis, hand    Primary osteoarthritis of right knee    PTSD (post-traumatic stress disorder)    Seasonal allergies    Skin lesion    Snoring    Trochanteric bursitis of left hip    Vitamin B12 deficiency (non anemic)    Vitamin D deficiency    Other seizures (H)    Anxiety    Closed fracture of head of left radius    Recurrent falls    Thrombocytopenia    Dermatitis    Depressive disorder    Leukopenia    Alcoholic cirrhosis of liver without ascites (H)    Sigmoid Diverticulitis    Mixed simple and mucopurulent chronic bronchitis (H)    Suicidal ideation    Infection due to 2019 novel coronavirus    Other specified disorders of carbohydrate metabolism    Right foot pain    Hallux valgus, acquired, right    Moderate episode of recurrent major depressive disorder (H)    Type 2 diabetes mellitus without  complication, without long-term current use of insulin (H)         MEDICATION LIST (prior to visit)  Current Outpatient Medications   Medication Sig Dispense Refill    albuterol (PROAIR HFA/PROVENTIL HFA/VENTOLIN HFA) 108 (90 Base) MCG/ACT inhaler Inhale 2 puffs into the lungs every 4 hours as needed for shortness of breath or wheezing 18 g 3    budesonide-formoterol (SYMBICORT) 160-4.5 MCG/ACT Inhaler Inhale 2 puffs into the lungs 2 times daily 10.2 g 11    bumetanide (BUMEX) 1 MG tablet Take 1 tablet (1 mg) by mouth daily 90 tablet 3    DULoxetine (CYMBALTA) 60 MG capsule Take 1 capsule (60 mg) by mouth daily. 90 capsule 1    hydrOXYzine HCl (ATARAX) 50 MG tablet Take 0.5-1 tablets (25-50 mg) by mouth 3 times daily as needed for anxiety 90 tablet 1    ipratropium - albuterol 0.5 mg/2.5 mg/3 mL (DUONEB) 0.5-2.5 (3) MG/3ML neb solution Take 1 vial (3 mLs) by nebulization every 4 hours as needed for shortness of breath / dyspnea or wheezing 15 mL 1    mirtazapine (REMERON) 15 MG tablet Take 0.5-1 tablets (7.5-15 mg) by mouth nightly as needed (sleep). 30 tablet 2    omeprazole (PRILOSEC) 20 MG DR capsule TAKE 1 CAPSULE(20 MG) BY MOUTH DAILY AS NEEDED FOR HEARTBURN OR INDIGESTION 90 capsule 2    prazosin (MINIPRESS) 1 MG capsule Take 1 capsule (1 mg) by mouth at bedtime. 30 capsule 0    pregabalin (LYRICA) 25 MG capsule TAKE 1 CAPSULE BY MOUTH THREE TIMES DAILY 270 capsule 0    semaglutide (OZEMPIC, 0.25 OR 0.5 MG/DOSE,) 2 MG/3ML pen Inject 0.5 mg Subcutaneous every 7 days 9 mL 2    Semaglutide, 1 MG/DOSE, (OZEMPIC) 4 MG/3ML pen Inject 1 mg subcutaneously every 7 days. 9 mL 1    Urea 40 % CREA Externally apply topically 4 times daily as needed for dry skin 227 g 5    varenicline (CHANTIX RICA) 0.5 MG X 11 & 1 MG X 42 tablet Take 0.5 mg tab daily for 3 days, THEN 0.5 mg tab twice daily for 4 days, THEN 1 mg twice daily. 53 tablet 0    Eucerin external lotion Apply topically 4 times daily as needed (dry skin) 480 mL 2     fluocinonide (LIDEX) 0.05 % external ointment Apply topically 2 times daily       No current facility-administered medications for this visit.       MEDICATION LIST (after visit)  Current Outpatient Medications   Medication Sig Dispense Refill    albuterol (PROAIR HFA/PROVENTIL HFA/VENTOLIN HFA) 108 (90 Base) MCG/ACT inhaler Inhale 2 puffs into the lungs every 4 hours as needed for shortness of breath or wheezing 18 g 3    budesonide-formoterol (SYMBICORT) 160-4.5 MCG/ACT Inhaler Inhale 2 puffs into the lungs 2 times daily 10.2 g 11    bumetanide (BUMEX) 1 MG tablet Take 1 tablet (1 mg) by mouth daily 90 tablet 3    DULoxetine (CYMBALTA) 60 MG capsule Take 1 capsule (60 mg) by mouth daily. 90 capsule 1    hydrOXYzine HCl (ATARAX) 50 MG tablet Take 0.5-1 tablets (25-50 mg) by mouth 3 times daily as needed for anxiety 90 tablet 1    ipratropium - albuterol 0.5 mg/2.5 mg/3 mL (DUONEB) 0.5-2.5 (3) MG/3ML neb solution Take 1 vial (3 mLs) by nebulization every 4 hours as needed for shortness of breath / dyspnea or wheezing 15 mL 1    mirtazapine (REMERON) 15 MG tablet Take 0.5-1 tablets (7.5-15 mg) by mouth nightly as needed (sleep). 30 tablet 2    omeprazole (PRILOSEC) 20 MG DR capsule TAKE 1 CAPSULE(20 MG) BY MOUTH DAILY AS NEEDED FOR HEARTBURN OR INDIGESTION 90 capsule 2    prazosin (MINIPRESS) 1 MG capsule Take 1 capsule (1 mg) by mouth at bedtime. 30 capsule 0    pregabalin (LYRICA) 25 MG capsule TAKE 1 CAPSULE BY MOUTH THREE TIMES DAILY 270 capsule 0    semaglutide (OZEMPIC, 0.25 OR 0.5 MG/DOSE,) 2 MG/3ML pen Inject 0.5 mg Subcutaneous every 7 days 9 mL 2    Semaglutide, 1 MG/DOSE, (OZEMPIC) 4 MG/3ML pen Inject 1 mg subcutaneously every 7 days. 9 mL 1    Urea 40 % CREA Externally apply topically 4 times daily as needed for dry skin 227 g 5    varenicline (CHANTIX RICA) 0.5 MG X 11 & 1 MG X 42 tablet Take 0.5 mg tab daily for 3 days, THEN 0.5 mg tab twice daily for 4 days, THEN 1 mg twice daily. 53 tablet 0    Eucerin  "external lotion Apply topically 4 times daily as needed (dry skin) 480 mL 2    fluocinonide (LIDEX) 0.05 % external ointment Apply topically 2 times daily       No current facility-administered medications for this visit.         Allergies   Allergen Reactions    Bupropion Diarrhea    Codeine Hives, Itching, Nausea and Rash    Nickel Unknown    Oxycodone Nausea and Vomiting    Percocet [Oxycodone-Acetaminophen]      Patient reports \"vomiting,grossly ill two weeks ago\"    Topiramate Unknown    Diclofenac Nausea     Tolerates the topical gel    Furosemide Rash    Hydrochlorothiazide Rash     phototoxicity - med was d/lora        Sulfa Antibiotics Itching and Rash    Sulfasalazine Rash       Melissa Covington, [facility]  [facility]  [phone]    "

## 2025-02-08 LAB — ETHYL GLUCURONIDE UR QL SCN: NEGATIVE NG/ML

## 2025-03-03 DIAGNOSIS — E11.9 TYPE 2 DIABETES MELLITUS WITHOUT COMPLICATION, WITHOUT LONG-TERM CURRENT USE OF INSULIN (H): ICD-10-CM

## 2025-03-03 RX ORDER — SEMAGLUTIDE 1.34 MG/ML
INJECTION, SOLUTION SUBCUTANEOUS
Qty: 9 ML | Refills: 0 | Status: SHIPPED | OUTPATIENT
Start: 2025-03-03

## 2025-03-10 ENCOUNTER — MYC MEDICAL ADVICE (OUTPATIENT)
Dept: ADDICTION MEDICINE | Facility: CLINIC | Age: 70
End: 2025-03-10
Payer: COMMERCIAL

## 2025-03-25 ENCOUNTER — OFFICE VISIT (OUTPATIENT)
Dept: FAMILY MEDICINE | Facility: CLINIC | Age: 70
End: 2025-03-25
Payer: COMMERCIAL

## 2025-03-25 VITALS
RESPIRATION RATE: 16 BRPM | HEIGHT: 63 IN | TEMPERATURE: 97.2 F | WEIGHT: 228 LBS | HEART RATE: 59 BPM | DIASTOLIC BLOOD PRESSURE: 72 MMHG | BODY MASS INDEX: 40.4 KG/M2 | SYSTOLIC BLOOD PRESSURE: 106 MMHG | OXYGEN SATURATION: 98 %

## 2025-03-25 DIAGNOSIS — F33.1 MODERATE EPISODE OF RECURRENT MAJOR DEPRESSIVE DISORDER (H): ICD-10-CM

## 2025-03-25 DIAGNOSIS — E55.9 VITAMIN D DEFICIENCY: ICD-10-CM

## 2025-03-25 DIAGNOSIS — Z79.899 MEDICATION MANAGEMENT: ICD-10-CM

## 2025-03-25 DIAGNOSIS — G40.89 OTHER SEIZURES (H): ICD-10-CM

## 2025-03-25 DIAGNOSIS — E78.2 MIXED HYPERLIPIDEMIA: ICD-10-CM

## 2025-03-25 DIAGNOSIS — F19.11 OTHER PSYCHOACTIVE SUBSTANCE ABUSE, IN REMISSION (H): ICD-10-CM

## 2025-03-25 DIAGNOSIS — E53.8 VITAMIN B12 DEFICIENCY (NON ANEMIC): ICD-10-CM

## 2025-03-25 DIAGNOSIS — E11.9 TYPE 2 DIABETES MELLITUS WITHOUT COMPLICATION, WITHOUT LONG-TERM CURRENT USE OF INSULIN (H): ICD-10-CM

## 2025-03-25 DIAGNOSIS — J44.1 COPD EXACERBATION (H): ICD-10-CM

## 2025-03-25 DIAGNOSIS — K70.30 ALCOHOLIC CIRRHOSIS OF LIVER WITHOUT ASCITES (H): ICD-10-CM

## 2025-03-25 DIAGNOSIS — Z23 NEED FOR COVID-19 VACCINE: ICD-10-CM

## 2025-03-25 DIAGNOSIS — E11.9 TYPE 2 DIABETES MELLITUS WITHOUT COMPLICATION, WITHOUT LONG-TERM CURRENT USE OF INSULIN (H): Primary | ICD-10-CM

## 2025-03-25 DIAGNOSIS — G89.29 CHRONIC MIDLINE LOW BACK PAIN WITHOUT SCIATICA: Primary | ICD-10-CM

## 2025-03-25 DIAGNOSIS — M65.30 TRIGGER FINGER, ACQUIRED: ICD-10-CM

## 2025-03-25 DIAGNOSIS — M54.50 CHRONIC MIDLINE LOW BACK PAIN WITHOUT SCIATICA: Primary | ICD-10-CM

## 2025-03-25 LAB
ALBUMIN SERPL BCG-MCNC: 4.1 G/DL (ref 3.5–5.2)
ALP SERPL-CCNC: 72 U/L (ref 40–150)
ALT SERPL W P-5'-P-CCNC: 18 U/L (ref 0–50)
ANION GAP SERPL CALCULATED.3IONS-SCNC: 10 MMOL/L (ref 7–15)
AST SERPL W P-5'-P-CCNC: 21 U/L (ref 0–45)
BILIRUB SERPL-MCNC: 0.4 MG/DL
BUN SERPL-MCNC: 7.1 MG/DL (ref 8–23)
CALCIUM SERPL-MCNC: 9.6 MG/DL (ref 8.8–10.4)
CHLORIDE SERPL-SCNC: 102 MMOL/L (ref 98–107)
CHOLEST SERPL-MCNC: 206 MG/DL
CREAT SERPL-MCNC: 0.59 MG/DL (ref 0.51–0.95)
CREAT UR-MCNC: 229 MG/DL
EGFRCR SERPLBLD CKD-EPI 2021: >90 ML/MIN/1.73M2
EST. AVERAGE GLUCOSE BLD GHB EST-MCNC: 108 MG/DL
FASTING STATUS PATIENT QL REPORTED: NO
FASTING STATUS PATIENT QL REPORTED: NO
GLUCOSE SERPL-MCNC: 88 MG/DL (ref 70–99)
HBA1C MFR BLD: 5.4 % (ref 0–5.6)
HCO3 SERPL-SCNC: 29 MMOL/L (ref 22–29)
HDLC SERPL-MCNC: 57 MG/DL
LDLC SERPL CALC-MCNC: 123 MG/DL
MICROALBUMIN UR-MCNC: <12 MG/L
MICROALBUMIN/CREAT UR: NORMAL MG/G{CREAT}
NONHDLC SERPL-MCNC: 149 MG/DL
POTASSIUM SERPL-SCNC: 4.4 MMOL/L (ref 3.4–5.3)
PROT SERPL-MCNC: 7.1 G/DL (ref 6.4–8.3)
SODIUM SERPL-SCNC: 141 MMOL/L (ref 135–145)
TRIGL SERPL-MCNC: 129 MG/DL
VIT B12 SERPL-MCNC: 251 PG/ML (ref 232–1245)
VIT D+METAB SERPL-MCNC: 23 NG/ML (ref 20–50)

## 2025-03-25 PROCEDURE — 82570 ASSAY OF URINE CREATININE: CPT | Performed by: FAMILY MEDICINE

## 2025-03-25 PROCEDURE — 82607 VITAMIN B-12: CPT | Performed by: FAMILY MEDICINE

## 2025-03-25 PROCEDURE — 80053 COMPREHEN METABOLIC PANEL: CPT | Performed by: FAMILY MEDICINE

## 2025-03-25 PROCEDURE — 36415 COLL VENOUS BLD VENIPUNCTURE: CPT | Performed by: FAMILY MEDICINE

## 2025-03-25 PROCEDURE — 82043 UR ALBUMIN QUANTITATIVE: CPT | Performed by: FAMILY MEDICINE

## 2025-03-25 PROCEDURE — 96127 BRIEF EMOTIONAL/BEHAV ASSMT: CPT | Performed by: FAMILY MEDICINE

## 2025-03-25 PROCEDURE — 83036 HEMOGLOBIN GLYCOSYLATED A1C: CPT | Performed by: FAMILY MEDICINE

## 2025-03-25 PROCEDURE — 82306 VITAMIN D 25 HYDROXY: CPT | Performed by: FAMILY MEDICINE

## 2025-03-25 PROCEDURE — G2211 COMPLEX E/M VISIT ADD ON: HCPCS | Performed by: FAMILY MEDICINE

## 2025-03-25 PROCEDURE — 99214 OFFICE O/P EST MOD 30 MIN: CPT | Mod: 25 | Performed by: FAMILY MEDICINE

## 2025-03-25 PROCEDURE — 90480 ADMN SARSCOV2 VAC 1/ONLY CMP: CPT | Performed by: FAMILY MEDICINE

## 2025-03-25 PROCEDURE — 3074F SYST BP LT 130 MM HG: CPT | Performed by: FAMILY MEDICINE

## 2025-03-25 PROCEDURE — 80061 LIPID PANEL: CPT | Performed by: FAMILY MEDICINE

## 2025-03-25 PROCEDURE — 3078F DIAST BP <80 MM HG: CPT | Performed by: FAMILY MEDICINE

## 2025-03-25 PROCEDURE — 91320 SARSCV2 VAC 30MCG TRS-SUC IM: CPT | Performed by: FAMILY MEDICINE

## 2025-03-25 ASSESSMENT — ENCOUNTER SYMPTOMS: BACK PAIN: 1

## 2025-03-25 NOTE — PROGRESS NOTES
"  {PROVIDER CHARTING PREFERENCE:035068}    Subjective   Patsy is a 70 year old, presenting for the following health issues:  Back Pain (Pt stated been having back pain lower back), Finger (Pt stated left ring finger keep locking up ), and Recheck Medication        3/25/2025     9:39 AM   Additional Questions   Roomed by Ingris     Needs refill on Ozempic - has lost about 40 pounds in last year   Feels better  More active   Walks dog 3x/day    Had backache x 1 week - tailbone/hips - would get stuck  No injury  Does water exercise 6x/week      Right ring finger is sticking     Had new orthotics in shoes - the wrong kind - wondered if that triggered her back pain   Back is getting better -     Still sober   Trying to kick the smoking - has Chantix    Wonders about vitamins  tired      History of Present Illness       Reason for visit:  Back pain medicine check finger problem   She is taking medications regularly.        {MA/LPN/RN Pre-Provider Visit Orders- hCG/UA/Strep (Optional):332802}  {SUPERLIST (Optional):323250}  {additonal problems for provider to add (Optional):224737}    {ROS Picklists (Optional):293843}      Objective    /72 (BP Location: Right arm, Patient Position: Sitting, Cuff Size: Adult Regular)   Pulse 59   Temp 97.2  F (36.2  C) (Temporal)   Resp 16   Ht 1.6 m (5' 2.99\")   Wt 103.4 kg (228 lb)   LMP  (LMP Unknown)   SpO2 98%   BMI 40.40 kg/m    Body mass index is 40.4 kg/m .  Physical Exam   {Exam List (Optional):457582}    {Diagnostic Test Results (Optional):711598}        Signed Electronically by: EJ WELLER MD  {Email feedback regarding this note to [email or web address]   :[id number]}Prior to immunization administration, verified patients identity using patient s name and date of birth. Please see Immunization Activity for additional information.     Screening Questionnaire for Adult Immunization    Are you sick today?   No   Do you have " allergies to medications, food, a vaccine component or latex?   Yes   Have you ever had a serious reaction after receiving a vaccination?   No   Do you have a long-term health problem with heart, lung, kidney, or metabolic disease (e.g., diabetes), asthma, a blood disorder, no spleen, complement component deficiency, a cochlear implant, or a spinal fluid leak?  Are you on long-term aspirin therapy?   No   Do you have cancer, leukemia, HIV/AIDS, or any other immune system problem?   No   Do you have a parent, brother, or sister with an immune system problem?   No   In the past 3 months, have you taken medications that affect  your immune system, such as prednisone, other steroids, or anticancer drugs; drugs for the treatment of rheumatoid arthritis, Crohn s disease, or psoriasis; or have you had radiation treatments?   No   Have you had a seizure, or a brain or other nervous system problem?   No   During the past year, have you received a transfusion of blood or blood    products, or been given immune (gamma) globulin or antiviral drug?   No   For women: Are you pregnant or is there a chance you could become       pregnant during the next month?   No   Have you received any vaccinations in the past 4 weeks?   No     Immunization questionnaire was positive for at least one answer.  Notified DR LEHMAN.      Patient instructed to remain in clinic for 15 minutes afterwards, and to report any adverse reactions.     Screening performed by Ingris Lester on 3/25/2025 at 9:42 AM.    "  Gastrointestinal:  Negative for abdominal pain and vomiting.   Genitourinary:  Negative for dysuria and hematuria.   Musculoskeletal:  Positive for back pain. Negative for arthralgias.   Skin:  Negative for color change and rash.   Neurological:  Negative for seizures and syncope.   All other systems reviewed and are negative.          Objective    /72 (BP Location: Right arm, Patient Position: Sitting, Cuff Size: Adult Regular)   Pulse 59   Temp 97.2  F (36.2  C) (Temporal)   Resp 16   Ht 1.6 m (5' 2.99\")   Wt 103.4 kg (228 lb)   LMP  (LMP Unknown)   SpO2 98%   BMI 40.40 kg/m    Body mass index is 40.4 kg/m .  Physical Exam  Vitals reviewed.   Constitutional:       General: She is not in acute distress.     Appearance: Normal appearance. She is obese.   HENT:      Head: Normocephalic.      Right Ear: Tympanic membrane, ear canal and external ear normal.      Left Ear: Tympanic membrane, ear canal and external ear normal.      Nose: Nose normal.      Mouth/Throat:      Mouth: Mucous membranes are moist.      Pharynx: No posterior oropharyngeal erythema.   Eyes:      Extraocular Movements: Extraocular movements intact.      Conjunctiva/sclera: Conjunctivae normal.      Pupils: Pupils are equal, round, and reactive to light.   Cardiovascular:      Rate and Rhythm: Normal rate and regular rhythm.      Pulses: Normal pulses.      Heart sounds: Normal heart sounds. No murmur heard.  Pulmonary:      Effort: Pulmonary effort is normal.      Breath sounds: Normal breath sounds.   Abdominal:      Palpations: Abdomen is soft. There is no mass.      Tenderness: There is no abdominal tenderness. There is no guarding or rebound.   Musculoskeletal:         General: No deformity. Normal range of motion.      Cervical back: Normal range of motion and neck supple.      Comments: Neg SLR     Lymphadenopathy:      Cervical: No cervical adenopathy.   Skin:     General: Skin is warm and dry.   Neurological:      General: " No focal deficit present.      Mental Status: She is alert.   Psychiatric:         Mood and Affect: Mood normal.         Behavior: Behavior normal.            Results for orders placed or performed in visit on 03/25/25   Albumin Random Urine Quantitative with Creat Ratio     Status: None   Result Value Ref Range    Creatinine Urine mg/dL 229.0 mg/dL    Albumin Urine mg/L <12.0 mg/L    Albumin Urine mg/g Cr     HEMOGLOBIN A1C     Status: Normal   Result Value Ref Range    Estimated Average Glucose 108 <117 mg/dL    Hemoglobin A1C 5.4 0.0 - 5.6 %   Vitamin B12     Status: Normal   Result Value Ref Range    Vitamin B12 251 232 - 1,245 pg/mL   Vitamin D Deficiency     Status: Normal   Result Value Ref Range    Vitamin D, Total (25-Hydroxy) 23 20 - 50 ng/mL    Narrative    Season, race, dietary intake, and treatment affect the concentration of 25-hydroxy-Vitamin D. Values may decrease during winter months and increase during summer months.    Vitamin D determination is routinely performed by an immunoassay specific for 25 hydroxyvitamin D3.  If an individual is on vitamin D2(ergocalciferol) supplementation, please specify 25 OH vitamin D2 and D3 level determination by LCMSMS test VITD23.     Comprehensive metabolic panel (BMP + Alb, Alk Phos, ALT, AST, Total. Bili, TP)     Status: Abnormal   Result Value Ref Range    Sodium 141 135 - 145 mmol/L    Potassium 4.4 3.4 - 5.3 mmol/L    Carbon Dioxide (CO2) 29 22 - 29 mmol/L    Anion Gap 10 7 - 15 mmol/L    Urea Nitrogen 7.1 (L) 8.0 - 23.0 mg/dL    Creatinine 0.59 0.51 - 0.95 mg/dL    GFR Estimate >90 >60 mL/min/1.73m2    Calcium 9.6 8.8 - 10.4 mg/dL    Chloride 102 98 - 107 mmol/L    Glucose 88 70 - 99 mg/dL    Alkaline Phosphatase 72 40 - 150 U/L    AST 21 0 - 45 U/L    ALT 18 0 - 50 U/L    Protein Total 7.1 6.4 - 8.3 g/dL    Albumin 4.1 3.5 - 5.2 g/dL    Bilirubin Total 0.4 <=1.2 mg/dL    Patient Fasting > 8hrs? No    Lipid Profile (Chol, Trig, HDL, LDL calc)     Status:  Abnormal   Result Value Ref Range    Cholesterol 206 (H) <200 mg/dL    Triglycerides 129 <150 mg/dL    Direct Measure HDL 57 >=50 mg/dL    LDL Cholesterol Calculated 123 (H) <100 mg/dL    Non HDL Cholesterol 149 (H) <130 mg/dL    Patient Fasting > 8hrs? No     Narrative    Cholesterol  Desirable: < 200 mg/dL  Borderline High: 200 - 239 mg/dL  High: >= 240 mg/dL    Triglycerides  Normal: < 150 mg/dL  Borderline High: 150 - 199 mg/dL  High: 200-499 mg/dL  Very High: >= 500 mg/dL    Direct Measure HDL  Female: >= 50 mg/dL   Male: >= 40 mg/dL    LDL Cholesterol  Desirable: < 100 mg/dL  Above Desirable: 100 - 129 mg/dL   Borderline High: 130 - 159 mg/dL   High:  160 - 189 mg/dL   Very High: >= 190 mg/dL    Non HDL Cholesterol  Desirable: < 130 mg/dL  Above Desirable: 130 - 159 mg/dL  Borderline High: 160 - 189 mg/dL  High: 190 - 219 mg/dL  Very High: >= 220 mg/dL           Signed Electronically by: EJ WELLER MD  Prior to immunization administration, verified patients identity using patient s name and date of birth. Please see Immunization Activity for additional information.     Screening Questionnaire for Adult Immunization    Are you sick today?   No   Do you have allergies to medications, food, a vaccine component or latex?   Yes   Have you ever had a serious reaction after receiving a vaccination?   No   Do you have a long-term health problem with heart, lung, kidney, or metabolic disease (e.g., diabetes), asthma, a blood disorder, no spleen, complement component deficiency, a cochlear implant, or a spinal fluid leak?  Are you on long-term aspirin therapy?   No   Do you have cancer, leukemia, HIV/AIDS, or any other immune system problem?   No   Do you have a parent, brother, or sister with an immune system problem?   No   In the past 3 months, have you taken medications that affect  your immune system, such as prednisone, other steroids, or anticancer drugs; drugs for the treatment of rheumatoid  arthritis, Crohn s disease, or psoriasis; or have you had radiation treatments?   No   Have you had a seizure, or a brain or other nervous system problem?   No   During the past year, have you received a transfusion of blood or blood    products, or been given immune (gamma) globulin or antiviral drug?   No   For women: Are you pregnant or is there a chance you could become       pregnant during the next month?   No   Have you received any vaccinations in the past 4 weeks?   No     Immunization questionnaire was positive for at least one answer.  Notified DR LEHMAN.      Patient instructed to remain in clinic for 15 minutes afterwards, and to report any adverse reactions.     Screening performed by Ingris Lester on 3/25/2025 at 9:42 AM.

## 2025-03-26 ENCOUNTER — PATIENT OUTREACH (OUTPATIENT)
Dept: CARE COORDINATION | Facility: CLINIC | Age: 70
End: 2025-03-26
Payer: COMMERCIAL

## 2025-03-27 ENCOUNTER — TELEPHONE (OUTPATIENT)
Dept: FAMILY MEDICINE | Facility: CLINIC | Age: 70
End: 2025-03-27
Payer: COMMERCIAL

## 2025-03-27 NOTE — TELEPHONE ENCOUNTER
MTM referral from: [facility] visit (referral by provider)    MTM referral outreach attempt #2 on March 27, 2025 at 1:35 PM      Outcome: Patient not reachable after several attempts, routed to Pharmacist Team/Provider as an FYI    Use   [facility] med adv        for the carrier/Plan on the flowsheet      MTM Practitioner please send patient letter    Sonia Deleon [facility]    [phone]

## 2025-04-01 NOTE — TELEPHONE ENCOUNTER
Future Appointments 4/1/2025 - 9/28/2025        Date Visit Type Length Department Provider     4/7/2025 11:00 AM DIABETES ED 60 min [facility] DIABETES EDUCATION Carey Ni, RN    Location Instructions:     We are located at [address] [place] [address]. We offer free parking in our on-site lot. Please check in at our  located just inside the clinic entrance.              5/9/2025 10:30 AM ADDICTION MED RETURN 30 min [facility] ADDICTION MEDICINE Melissa Covington DO                   Patient will wait for 4/7/25 appt.

## 2025-04-06 ASSESSMENT — ENCOUNTER SYMPTOMS
EYE PAIN: 0
SORE THROAT: 0
ABDOMINAL PAIN: 0
SEIZURES: 0
SHORTNESS OF BREATH: 0
COUGH: 0
DYSURIA: 0
PALPITATIONS: 0
FEVER: 0
CHILLS: 0
VOMITING: 0
ARTHRALGIAS: 0
COLOR CHANGE: 0
HEMATURIA: 0

## 2025-04-28 ENCOUNTER — ALLIED HEALTH/NURSE VISIT (OUTPATIENT)
Dept: EDUCATION SERVICES | Facility: CLINIC | Age: 70
End: 2025-04-28
Attending: FAMILY MEDICINE
Payer: COMMERCIAL

## 2025-04-28 DIAGNOSIS — E11.9 TYPE 2 DIABETES MELLITUS WITHOUT COMPLICATION, WITHOUT LONG-TERM CURRENT USE OF INSULIN (H): ICD-10-CM

## 2025-04-28 PROCEDURE — G0108 DIAB MANAGE TRN  PER INDIV: HCPCS

## 2025-04-28 PROCEDURE — 99207 PR NO CHARGE NURSE ONLY: CPT

## 2025-04-28 NOTE — LETTER
"    4/28/2025         RE: Patsy Santamaria  [address]  [place] [address]        Dear Colleague,    Thank you for referring your patient, Patsy Santamaria, to the [facility]. Please see a copy of my visit note below.    Diabetes Self-Management Education & Support    Presents for: Individual review    Type of Service: In Person Visit      Assessment  Patsy is a 70-year-old who comes to clinic today for consult regarding weight loss with a GLP-1 agonist.  She arrived today accompanied by her friend.  Diabetes medications were reviewed and she confirms she is currently taking 2 mg of Ozempic weekly.  She has been on this increased dose for approximately 2 weeks.  Patsy is currently not monitoring her blood glucose at home and has an optimal glycemic control currently.  A1c of 5.4%.  I did inform her during our visit that weight loss is not my area of expertise or specialty, diabetes education was.  It was somewhat unsure as to why her provider referred her to diabetes education, but try to address her concerns and answer her questions the best I could today.  After reviewing her current dietary intake, it became clear that #1 Patsy is not eating enough food throughout the day and #2 her meals are quite devoid of any type of protein.  I tried to explain to her that eating at regular intervals throughout the day is important as it keeps the body's metabolism going.  I told her it does not have to be 3 \"fine dining\" experiences, however she needed to supply her body with the fuel it needed.  She feels her weight has plateaued with the Ozempic, so I suggested to her if covered by her formulary, Mounjaro might be a good alternative to try.  She was very willing to do this so I placed the order for her today for the change.  Per her report she is active on a daily basis-does pool exercises daily 40 minutes and is walking her dog at least 3 times a day.  I also did explain to her that " with the exercise she does, she most likely has created muscle which does weigh more than fat.  I encouraged water drinking and avoiding sugary beverages that just contain empty calories.    Patient's most recent   Lab Results   Component Value Date    A1C 5.4 03/25/2025    A1C 5.9 05/27/2021     is meeting goal of <7.0    Diabetes knowledge and skills assessment:   Patient is knowledgeable in diabetes management concepts related to: Being Active and Taking Medication    Based on learning assessment above, most appropriate setting for further diabetes education would be: Individual setting.    Care Plan and Education Provided:  Healthy Eating: Balanced meals, Carbohydrate Counting, Consistency in amount and timing of carbohydrate intake, Eating out, Portion control, and Weight Management  Being Active: Amount recommended (150 minutes moderate or 75 minutes vigorous activity and 2-3 days strength training per week), Finding a physical activity routine that works for you, and Relationship of activity to glucose  Monitoring: Frequency of monitoring and Individual glucose targets  Taking Medication: Action of prescribed medication(s), Proper site selection and rotation for injections, Side effects of prescribed medication(s), When to take medication(s), and GLP-1/GIP Instruction - Mounjaro administration technique taught today. Patient verbalized understanding and was able to perform an accurate return demonstration of administration technique. Side effects were discussed, if patient has any abdominal pain, with or without nausea and/or vomiting, stop medication, call provider, clinic or go to the emergency room.  Problem Solving: High glucose - causes, signs/symptoms, treatment and prevention, Low glucose - causes, signs/symptoms, treatment and prevention, and Rule of 15 and carrying a carbohydrate source at all times in case of low glucose  Reducing Risks: Complications of diabetes, Goal for A1c, how it relates to  glucose and how often to check, Prevention, early diagnostic measures and treatment of complications, A1C - goals, relating to blood glucose levels, how often to check, and Smoking cessation  Healthy Coping: Benefits of making appropriate lifestyle changes, Identifying helpful resources, and Utilize support systems    Patient verbalized understanding of diabetes self-management education concepts discussed, opportunities for ongoing education and support, and recommendations provided today.    Plan  1. Eat 3 balanced meals each day - Monitor carb intake and limit to 45-60 grams per meal  This would be equal to 3-4 choices ~  1 choice = 15 grams    Do not wait longer than 4-5 hours to eat something  Snacks limit to no more than 30 grams of carbohydrates or 2 choices  Make sure you include protein source with each meal and at bedtime - this has been shown to help with blood glucose elevations    2.  Check blood sugars once each day - please try and alternate the times you check between am fasting( right after you wake before eating) and 2 hours AFTER dinner - this allows us to get a better idea of what is happening throughout your day , not at just one time     Fasting and before meal target is 80 - 130   2 hours after a meal target is < 180  remember to bring meter and log book to all appointments    3. Incorporate 30 minutes activity into each day - does not need to be all at one time & walking counts    4. Take diabetes medications as prescribed Continue Ozempic 2 mg weekly until we find out if Mounjaro will be covered . If it is we will transition from Ozempic to Mounjaro, we will start with 2.5 mg dose and work up     Topics to cover at upcoming visits: Healthy Eating, Taking Medication, Problem Solving, Reducing Risks, and Healthy Coping    Follow-up:  Upcoming Diabetes Ed Appointments     No appointments to display        See Care Plan for co-developed, patient-state behavior change goals.    Education  "Materials Provided:  -- Mounjaro Medication Information      Subjective/Objective  Patsy is an 70 year old, presenting for the following diabetes education related to: Individual review  Cultural Influences/Ethnic Background:  Not  or       Diabetes Symptoms & Complications:  Weight trend: Stable (lost 40# in last year and has stopped)  Disease course: Stable  Complications assessed today?: No    Patient Problem List and Family Medical History reviewed for relevant medical history, current medical status, and diabetes risk factors.    Vitals:  LMP  (LMP Unknown)   Estimated body mass index is 40.4 kg/m  as calculated from the following:    Height as of 3/25/25: 1.6 m (5' 2.99\").    Weight as of 3/25/25: 103.4 kg (228 lb).   Last 3 BP:   BP Readings from Last 3 Encounters:   03/25/25 106/72   09/20/24 108/72   04/29/24 109/75       History   Smoking Status     Every Day     Types: Cigarettes   Smokeless Tobacco     Never       Labs:  Lab Results   Component Value Date    A1C 5.4 03/25/2025    A1C 5.9 05/27/2021     Lab Results   Component Value Date    GLC 88 03/25/2025     09/20/2022     05/27/2021     Lab Results   Component Value Date     03/25/2025     02/18/2021     HDL Cholesterol   Date Value Ref Range Status   02/18/2021 59 >49 mg/dL Final     Direct Measure HDL   Date Value Ref Range Status   03/25/2025 57 >=50 mg/dL Final   ]  GFR Estimate   Date Value Ref Range Status   03/25/2025 >90 >60 mL/min/1.73m2 Final     Comment:     eGFR calculated using 2021 CKD-EPI equation.   05/27/2021 >90 >60 mL/min/[1.73_m2] Final     Comment:     Non  GFR Calc  Starting 12/18/2018, serum creatinine based estimated GFR (eGFR) will be   calculated using the Chronic Kidney Disease Epidemiology Collaboration   (CKD-EPI) equation.       GFR Estimate If Black   Date Value Ref Range Status   05/27/2021 >90 >60 mL/min/[1.73_m2] Final     Comment:      GFR " Calc  Starting 12/18/2018, serum creatinine based estimated GFR (eGFR) will be   calculated using the Chronic Kidney Disease Epidemiology Collaboration   (CKD-EPI) equation.       Lab Results   Component Value Date    CR 0.59 03/25/2025    CR 0.70 05/27/2021     Lab Results   Component Value Date    MICROL <12.0 03/25/2025    UMALCR  03/25/2025      Comment:      Unable to calculate, urine albumin and/or urine creatinine is outside detectable limits.  Microalbuminuria is defined as an albumin:creatinine ratio of 17 to 299 for males and 25 to 299 for females. A ratio of albumin:creatinine of 300 or higher is indicative of overt proteinuria.  Due to biologic variability, positive results should be confirmed by a second, first-morning random or 24-hour timed urine specimen. If there is discrepancy, a third specimen is recommended. When 2 out of 3 results are in the microalbuminuria range, this is evidence for incipient nephropathy and warrants increased efforts at glucose control, blood pressure control, and institution of therapy with an angiotensin-converting-enzyme (ACE) inhibitor (if the patient can tolerate it).      UCRR 229.0 03/25/2025 4/29/2025   Healthy Eating   Healthy Eating Assessed Today Yes   Cultural/Protestant diet restrictions? No   Do you have any food allergies or intolerances? No   Meal planning/habits Low carb;Other   Who cooks/prepares meals for you? Self   Who purchases food in  your home? Self   Meals include Lunch   Breakfast 7-8 coffee with flavored creamer   Lunch noon - 2 pm : greek yogurt or a salad or might have some Honey Nut Cheerios   Dinner only might eat this meal 1-2 times a week  when she does it is 1/2 cup meat, 1/2 cup veg, 1/2 cup starch   Snacks likes salty snacks like chips, cake, cheese, apples   Beverages Tea;Coffee;Soda         4/29/2025   Being Active   Being Active Assessed Today Yes   Exercise: Yes   Days per week of moderate to strenuous exercise (like a brisk  walk) 6   On average, minutes per day of exercise at this level 40   How intense was your typical exercise?  Heavy (like jogging or swimming)   Exercise Minutes per Week 240   Barrier to exercise None         4/29/2025   Monitoring   Monitoring Assessed Today Yes   Did patient bring glucose meter to appointment?  No   Times checking blood sugar at home (number) Never     Patient is not presently checking BG   Diabetes Medication(s)       Incretin Mimetic Agents       Semaglutide, 2 MG/DOSE, (OZEMPIC) 8 MG/3ML pen Inject 2 mg subcutaneously every 7 days.     tirzepatide (MOUNJARO) 2.5 MG/0.5ML SOAJ auto-injector pen Inject 0.5 mLs (2.5 mg) subcutaneously once a week.     tirzepatide (MOUNJARO) 5 MG/0.5ML SOAJ auto-injector pen Inject 0.5 mLs (5 mg) subcutaneously once a week.     semaglutide (OZEMPIC, 0.25 OR 0.5 MG/DOSE,) 2 MG/3ML pen Inject 0.5 mg Subcutaneous every 7 days     Patient not taking: Reported on 4/28/2025     Semaglutide, 1 MG/DOSE, (OZEMPIC, 1 MG/DOSE,) 4 MG/3ML pen INJECT 1 MG UNDER THE SKIN ONE DAY A WEEK     Patient not taking: Reported on 4/28/2025 4/29/2025   Taking Medications   Taking Medication Assessed Today Yes   Current Treatments Non-insulin Injectables   Problems taking diabetes medications regularly? No   Diabetes medication side effects? No         4/29/2025   Reducing Risks   Reducing Risks Assessed Today Yes   Diabetes Risks Age over 45 years   CAD Risks Diabetes Mellitus;Post-menopausal;Obesity;Tobacco exposure         4/29/2025   Healthy Coping: Diabetes Distress Assessment   Healthy Coping Assessed Today Yes   Informal Support system: Friends       LILI Mackenzie  Time Spent: 60 minutes  Encounter Type: Individual    Any diabetes medication dose changes were made via the [facility] Standing Orders under the patient's referring provider.

## 2025-04-28 NOTE — PATIENT INSTRUCTIONS
1. Eat 3 balanced meals each day - Monitor carb intake and limit to 45-60 grams per meal  This would be equal to 3-4 choices ~  1 choice = 15 grams    Do not wait longer than 4-5 hours to eat something  Snacks limit to no more than 30 grams of carbohydrates or 2 choices  Make sure you include protein source with each meal and at bedtime - this has been shown to help with blood glucose elevations    2.  Check blood sugars once each day - please try and alternate the times you check between am fasting( right after you wake before eating) and 2 hours AFTER dinner - this allows us to get a better idea of what is happening throughout your day , not at just one time     Fasting and before meal target is 80 - 130   2 hours after a meal target is < 180  remember to bring meter and log book to all appointments    3. Incorporate 30 minutes activity into each day - does not need to be all at one time & walking counts    4. Take diabetes medications as prescribed Continue Ozempic 2 mg weekly when you start the Mounjaro, you will STOP the Ozempic !     You have been started on a new medication for your diabetes called Mounjaro.    In Type 2 Diabetes, you may not be making enough gut hormones that help manage appetite, stomach fullness, glucose levels after meals and weight.     You should NOT use Mounjaro if you have a history of pancreatitis or thyroid cancer in you or your family.      Mounjaro is a once a week non-insulin injection with a combination of 2 gut hormones.    With Mounjaro, there are different doses which are gradually increased to limit side effects. The starting dose is 2.5mg once a week.       Benefits: This medication will help you feel full with a smaller amount of food. It will lower your blood sugars after meals. The benefits are improved blood sugars, decrease in appetite and possible weight loss.     Side effects: Side effects from Mounjaro are more likely in the first 10-14 days of use and usually  decrease after that. You may experience nausea if you eat too much with this medication, therefore you should eat LESS to avoid nausea.  It can be helpful to eat slower and eat smaller, more frequent meals rather than 3 big meals per day. Stop eating when you are no longer hungry. Eat bland foods like crackers, rice, toast.  Foods/drinks with jovana can also help (sugar free gingerale or jovana tea).  For severe nausea, you can ask your doctor for an anti-nausea prescription.  Heartburn or indigestion are more likely with this medication especially if you eat a larger amount of food or you lay down after eating. Avoiding fried or greasy foods helps as well. You may also notice a change in your bowel movements (diarrhea or constipation).  If you experience extreme abdominal pain that radiates into your back, STOP THIS MEDICATION IMMEDIATELY AND CALL YOUR PROVIDER.     With Mounjaro, if you are of child-bearing age and on oral contraception, use non-oral, barrier contraception for 4 weeks at the start AND at each dose change in Mounjaro.  Do not take Mounjaro if you are pregnant, planning to become pregnant or breastfeeding. Check with your healthcare provider about an alternative.     Visit the Mounjaro website for more information on the medication and for product vouchers/savings cards: [email or web address]    Instructions on how to use the Mounjaro pen:               Thank you for coming in to see me today !      I value your experience and would be very thankful for your time in providing feedback in our clinic survey.    You may receive an e-mail, text message or even something in the mail from [employer].  This is a survey to let us know how we are doing - the survey will be related to your diabetes education and me.

## 2025-05-12 ENCOUNTER — TRANSFERRED RECORDS (OUTPATIENT)
Dept: MULTI SPECIALTY CLINIC | Facility: CLINIC | Age: 70
End: 2025-05-12

## 2025-05-12 LAB — RETINOPATHY: NORMAL

## 2025-05-27 ENCOUNTER — TELEPHONE (OUTPATIENT)
Dept: FAMILY MEDICINE | Facility: CLINIC | Age: 70
End: 2025-05-27
Payer: COMMERCIAL

## 2025-05-27 NOTE — TELEPHONE ENCOUNTER
Pt called reporting that she saw DM educator awhile back and was prescribed mounjaro. The DM educator started her off with 2.5 mg dose and informed pt to call back for next dose. Pt last shot for the 2.5mg was Friday. Writer chart reviewed. There is already an rx for the 5mg sent on 4/28/25. Informed pt this and she can call pharmacy to get this fill for her. Pt verbalized understanding.    Daryl Uribe, BSN, PHN, RN-[facility]

## 2025-05-30 ENCOUNTER — OFFICE VISIT (OUTPATIENT)
Dept: ADDICTION MEDICINE | Facility: CLINIC | Age: 70
End: 2025-05-30
Payer: COMMERCIAL

## 2025-05-30 VITALS
BODY MASS INDEX: 40.4 KG/M2 | SYSTOLIC BLOOD PRESSURE: 105 MMHG | OXYGEN SATURATION: 95 % | HEART RATE: 62 BPM | WEIGHT: 228 LBS | DIASTOLIC BLOOD PRESSURE: 67 MMHG

## 2025-05-30 DIAGNOSIS — F17.200 TOBACCO USE DISORDER, MODERATE, DEPENDENCE: ICD-10-CM

## 2025-05-30 DIAGNOSIS — F10.21 SEVERE ALCOHOL USE DISORDER, IN SUSTAINED REMISSION (H): Primary | ICD-10-CM

## 2025-05-30 DIAGNOSIS — F43.10 PTSD (POST-TRAUMATIC STRESS DISORDER): ICD-10-CM

## 2025-05-30 DIAGNOSIS — K21.00 GASTROESOPHAGEAL REFLUX DISEASE WITH ESOPHAGITIS WITHOUT HEMORRHAGE: ICD-10-CM

## 2025-05-30 DIAGNOSIS — G47.00 INSOMNIA, UNSPECIFIED TYPE: ICD-10-CM

## 2025-05-30 LAB
AMPHETAMINE QUAL URINE POCT: NEGATIVE
BARBITURATE QUAL URINE POCT: NEGATIVE
BENZODIAZEPINE QUAL URINE POCT: NEGATIVE
BUPRENORPHINE QUAL URINE POCT: NEGATIVE
COCAINE QUAL URINE POCT: NEGATIVE
CREATININE QUAL URINE POCT: ABNORMAL
INTERNAL QC QUAL URINE POCT: ABNORMAL
MDMA QUAL URINE POCT: NEGATIVE
METHADONE QUAL URINE POCT: NEGATIVE
METHAMPHETAMINE QUAL URINE POCT: NEGATIVE
OPIATE QUAL URINE POCT: NEGATIVE
OXYCODONE QUAL URINE POCT: NEGATIVE
PH QUAL URINE POCT: ABNORMAL
PHENCYCLIDINE QUAL URINE POCT: NEGATIVE
POCT KIT EXPIRATION DATE: ABNORMAL
POCT KIT LOT NUMBER: ABNORMAL
SPECIFIC GRAVITY POCT: 1.02
TEMPERATURE URINE POCT: ABNORMAL
THC QUAL URINE POCT: NEGATIVE

## 2025-05-30 PROCEDURE — 80307 DRUG TEST PRSMV CHEM ANLYZR: CPT | Mod: 90 | Performed by: FAMILY MEDICINE

## 2025-05-30 PROCEDURE — 99214 OFFICE O/P EST MOD 30 MIN: CPT | Performed by: FAMILY MEDICINE

## 2025-05-30 PROCEDURE — 99000 SPECIMEN HANDLING OFFICE-LAB: CPT | Performed by: FAMILY MEDICINE

## 2025-05-30 PROCEDURE — 3074F SYST BP LT 130 MM HG: CPT | Performed by: FAMILY MEDICINE

## 2025-05-30 PROCEDURE — 3078F DIAST BP <80 MM HG: CPT | Performed by: FAMILY MEDICINE

## 2025-05-30 PROCEDURE — G2211 COMPLEX E/M VISIT ADD ON: HCPCS | Performed by: FAMILY MEDICINE

## 2025-05-30 RX ORDER — HYDROXYZINE HYDROCHLORIDE 50 MG/1
25-50 TABLET, FILM COATED ORAL 3 TIMES DAILY PRN
Qty: 90 TABLET | Refills: 1 | Status: SHIPPED | OUTPATIENT
Start: 2025-05-30

## 2025-05-30 RX ORDER — OMEPRAZOLE 20 MG/1
CAPSULE, DELAYED RELEASE ORAL
Qty: 90 CAPSULE | Refills: 2 | Status: CANCELLED | OUTPATIENT
Start: 2025-05-30

## 2025-05-30 RX ORDER — VARENICLINE TARTRATE 0.5 (11)-1
KIT ORAL
Qty: 53 TABLET | Refills: 0 | Status: SHIPPED | OUTPATIENT
Start: 2025-05-30

## 2025-05-30 RX ORDER — VARENICLINE TARTRATE 1 MG/1
1 TABLET, FILM COATED ORAL 2 TIMES DAILY
Qty: 90 TABLET | Refills: 1 | Status: SHIPPED | OUTPATIENT
Start: 2025-05-30

## 2025-05-30 RX ORDER — POLYETHYLENE GLYCOL 3350 17 G
2 POWDER IN PACKET (EA) ORAL
Qty: 168 LOZENGE | Refills: 2 | Status: SHIPPED | OUTPATIENT
Start: 2025-05-30

## 2025-05-30 RX ORDER — FAMOTIDINE 40 MG/1
40 TABLET, FILM COATED ORAL 2 TIMES DAILY PRN
Qty: 60 TABLET | Refills: 2 | Status: SHIPPED | OUTPATIENT
Start: 2025-05-30 | End: 2025-05-30

## 2025-05-30 RX ORDER — MIRTAZAPINE 15 MG/1
7.5-15 TABLET, FILM COATED ORAL
Qty: 30 TABLET | Refills: 2 | Status: SHIPPED | OUTPATIENT
Start: 2025-05-30

## 2025-05-30 ASSESSMENT — PATIENT HEALTH QUESTIONNAIRE - PHQ9
SUM OF ALL RESPONSES TO PHQ QUESTIONS 1-9: 2
SUM OF ALL RESPONSES TO PHQ QUESTIONS 1-9: 2
10. IF YOU CHECKED OFF ANY PROBLEMS, HOW DIFFICULT HAVE THESE PROBLEMS MADE IT FOR YOU TO DO YOUR WORK, TAKE CARE OF THINGS AT HOME, OR GET ALONG WITH OTHER PEOPLE: NOT DIFFICULT AT ALL

## 2025-05-30 NOTE — PROGRESS NOTES
[facility]    A/P                                                    ASSESSMENT/PLAN    1. Severe alcohol use disorder, in sustained remission (H) (Primary)  Stable, sustained remission.  Good insight.  Reflects on triggers.  Encouraged continued abstinence.  After visit reviewed previous hepatology note from 2022 - noted she likely had fibrosis (vs cirrhosis) and recommended fibroscan when sober for 6 months.  Will discuss at follow-up.    - Drugs of Abuse Screen Urine (POC CUPS) POCT  - Ethyl Glucuronide Screen with Reflex to Confirmation, Urine; Future  - Ethyl Glucuronide Screen with Reflex to Confirmation, Urine    2. Tobacco use disorder, moderate, dependence  Motivated to quit smoking.  Has found Chantix helpful in the past but returns to smoking after short period.  Would like to try again.  We discussed other behavior changes to help with cessation.    - varenicline (CHANTIX RICA) 0.5 MG X 11 & 1 MG X 42 tablet; Take 0.5 mg tab daily for 3 days, THEN 0.5 mg tab twice daily for 4 days, THEN 1 mg twice daily.  Dispense: 53 tablet; Refill: 0  - varenicline (CHANTIX) 1 MG tablet; Take 1 tablet (1 mg) by mouth 2 times daily.  Dispense: 90 tablet; Refill: 1  - nicotine (COMMIT) 2 MG lozenge; Place 1 lozenge (2 mg) inside cheek every hour as needed for nicotine withdrawal symptoms.  Dispense: 168 lozenge; Refill: 2    3. Insomnia, unspecified type  4. PTSD (post-traumatic stress disorder)  Improving.  Taking mirtazapine occasionally.  Continue hydroxyzine prn.  Also trying Cymbalta, no refill needed.    - mirtazapine (REMERON) 15 MG tablet; Take 0.5-1 tablets (7.5-15 mg) by mouth nightly as needed (sleep).  Dispense: 30 tablet; Refill: 2  - hydrOXYzine HCl (ATARAX) 50 MG tablet; Take 0.5-1 tablets (25-50 mg) by mouth 3 times daily as needed for anxiety.  Dispense: 90 tablet; Refill: 1    5. Gastroesophageal reflux disease with esophagitis without hemorrhage  She requested refill of  omeprazole.  Notes she does not need as frequently.  Has been on for many years.  History of osteopenia.  I suggested we trial famotidine instead to decrease impact on calcium absorption and she is in agreement.  - famotidine (PEPCID) 40 MG tablet; Take 1 tablet 2 times daily as needed for heartburn.  Dispense: 60 tablet; Refill: 2        PDMP Review         Value Time User    State PDMP site checked  Yes 5/30/2025 11:23 AM Melissa Covington,               RTC  Return in about 5 months (around 10/30/2025) for in person.    The longitudinal plan of care for the diagnosis(es)/condition(s) as documented were addressed during this visit. Due to the added complexity in care, I will continue to support Patsy in the subsequent management and with ongoing continuity of care.      Counseled the patient on the importance of having a recovery program in addition to medication to manage recovery.  Components include avoiding isolating, having willingness to change, avoiding triggers and managing cravings. Encouraged having some type of sober network and practicing honesty with trusted support person(s). Encouraged other services such as counseling, 12 step or other self-help organizations.        SUBJECTIVE                                                        Patsy Santamaria is a 70 year old female with a history of peripheral neuropathy, COPD, alcoholic hepatitis, thombocytopenia, arthritis (hands and knees), obesity, anxiety, depression, and AUD who presents to clinic today for follow up.     Brief history of substance use:  Began drinking around age 33.  Became a problem for her in 2016 and she went to treatment for the first time and also experienced EtOH withdrawal seizures.  Has been to multiple treatments (most recently [facility] in [place] in Feb 2022).  Drinking tends to correlate with worsening depression.  Has tried naltrexone and acamprosate in past.  History of of cocaine use (1 year in the  1990s).  Established care with [facility] in March 2022 and has continued to struggle with brief episodes of drinking.  Most recent hospitalization related to alcohol use/withdrawal was March 2023      Plan from most recent office visit (2/7/25):  1. Severe alcohol use disorder, in sustained remission (H) (Primary)  Stable, nearing 2 years of sobriety.  No significant cravings recently.  We discussed how avoiding alcohol is the most important thing for her liver (previous imaging has shown cirrhosis, recent LFTs normal).    - Drugs of Abuse Screen Urine (POC CUPS) POCT  - Ethyl Glucuronide Screen with Reflex to Confirmation, Urine; Future  - Ethyl Glucuronide Screen with Reflex to Confirmation, Urine     2. Alcohol abuse, in remission  - Ethyl Glucuronide Screen with Reflex to Confirmation, Urine; Future  - Ethyl Glucuronide Screen with Reflex to Confirmation, Urine     3. PTSD (post-traumatic stress disorder)  4. Depressive disorder  Needs improvement.  Her PTSD symptoms seem to be escalating (more anxiety in crowds, worsening nightmares).  She has tried prazosin in the past but was drinking alcohol at the time.  Discussed option of trialing this again now that she is sober to see if this helps with her nightmares.  Cymbalta has been the most helpful medication for her depression and has the added benefit of managing her pain quite well so I am hesitant to change this.  Given she has tried so many different medications, I think it would be helpful for her to see psychiatry.  She is agreeable to this and would like to see [facility] at [facility].  If prazosin is helpful, she will let me know and I will send refill.    - prazosin (MINIPRESS) 1 MG capsule; Take 1 capsule (1 mg) by mouth at bedtime.  Dispense: 30 capsule; Refill: 0  - DULoxetine (CYMBALTA) 60 MG capsule; Take 1 capsule (60 mg) by mouth daily.  Dispense: 90 capsule; Refill: 1  - Adult Mental Health  Referral; Future    "  5. Personal history of tobacco use  She is motivated to quit smoking - reflects on other changes she has made to improve her health and this is a big change she would like to make.  She would like to resume Chantix.  New script sent.    - varenicline (CHANTIX RICA) 0.5 MG X 11 & 1 MG X 42 tablet; Take 0.5 mg tab daily for 3 days, THEN 0.5 mg tab twice daily for 4 days, THEN 1 mg twice daily.  Dispense: 53 tablet; Refill: 0       TODAY'S VISIT  HPI May 30, 2025  - [place] moved into her building so they see each other more which is nice  - On Mounjaro now, continues to work on weight loss - exercising regularly  - Some occasional thoughts of use - \"would be nice\" but passes  - Enjoying herself sober, \"I can have fun sober.\"  - Taking mirtazapine occasionally, prazosin was not helpful  - Wants to quit smoking - would like to try Chantix again, currently 1ppd - after meals, when she gets up in the morning are the hardest          9/20/2024     8:28 AM 12/6/2024     9:00 AM 5/30/2025    10:54 AM   PHQ   PHQ-9 Total Score 0 1 2    Q9: Thoughts of better off dead/self-harm past 2 weeks Not at all  Not at all Not at all        Proxy-reported           9/3/2024    12:00 PM 12/1/2024    12:54 PM 2/3/2025    10:42 AM   ALISIA-7 SCORE   Total Score  2 (minimal anxiety) 1 (minimal anxiety)   Total Score 0 2  1        Patient-reported       OBJECTIVE                                                    PHYSICAL EXAM:  /67   Pulse 62   Wt 103.4 kg (228 lb)   LMP  (LMP Unknown)   SpO2 95%   BMI 40.40 kg/m      GENERAL: healthy, alert and no distress  EYES: Eyes grossly normal to inspection, sclerae normal  RESP: No respiratory distress, no coughing or wheezing  SKIN: no pallor or jaundice  NEURO: Normal gait, mentation intact and speech normal  MENTAL STATUS EXAM  Appearance/Behavior: No appearant distress  Speech: Normal  Mood/Affect: normal affect  Insight: Adequate    LAB  Results for orders placed or performed in visit on " 05/30/25   Ethyl Glucuronide Screen with Reflex to Confirmation, Urine     Status: None   Result Value Ref Range    Ethyl Glucuronide Urine Negative Cutoff 500 ng/mL   Drugs of Abuse Screen Urine (POC CUPS) POCT     Status: Abnormal   Result Value Ref Range    POCT Kit Lot Number [id number]     POCT Kit Expiration Date 01/18/2026     Temperature Urine POCT Invalid (A) 90 F, 92 F, 94 F, 96 F, 98 F, 100 F    Specific [place] POCT 1.025 1.005, 1.015, 1.025    pH Qual Urine POCT 5 pH 4 pH, 5 pH, 7 pH, 9 pH    Creatinine Qual Urine POCT 20 mg/dL 20 mg/dL, 50 mg/dL, 100 mg/dL, 200 mg/dL    Internal QC Qual Urine POCT Valid Valid    Amphetamine Qual Urine POCT Negative Negative    Barbiturate Qual Urine POCT Negative Negative    Buprenorphine Qual Urine POCT Negative Negative    Benzodiazepine Qual Urine POCT Negative Negative    Cocaine Qual Urine POCT Negative Negative    Methamphetamine Qual Urine POCT Negative Negative    MDMA Qual Urine POCT Negative Negative    Methadone Qual Urine POCT Negative Negative    Opiate Qual Urine POCT Negative Negative    Oxycodone Qual Urine POCT Negative Negative    Phencyclidine Qual Urine POCT Negative Negative    THC Qual Urine POCT Negative Negative         HISTORY                                                    Problem list reviewed & adjusted, as indicated.  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)    Diuretic-induced hypokalemia    Dyspnea on exertion    Elevated LFTs    Ganglion of tendon  sheath    GI bleed    Hereditary and idiopathic peripheral neuropathy    History of major depression    Homeless    Major depression, recurrent    Low backache    Airway obstruction due to foreign body, initial encounter    Hypomagnesemia    Mild episode of recurrent major depressive disorder    Morbid obesity (H)    Smoker    Peripheral edema    Pneumonia of right lower lobe due to infectious organism    Primary localized osteoarthrosis, hand    Primary osteoarthritis of right knee    PTSD (post-traumatic stress disorder)    Seasonal allergies    Skin lesion    Snoring    Trochanteric bursitis of left hip    Vitamin B12 deficiency (non anemic)    Vitamin D deficiency    Other seizures (H)    Anxiety    Closed fracture of head of left radius    Recurrent falls    Thrombocytopenia    Dermatitis    Depressive disorder    Leukopenia    Alcoholic cirrhosis of liver without ascites (H)    Sigmoid Diverticulitis    Mixed simple and mucopurulent chronic bronchitis (H)    Suicidal ideation    Infection due to 2019 novel coronavirus    Other specified disorders of carbohydrate metabolism    Right foot pain    Hallux valgus, acquired, right    Moderate episode of recurrent major depressive disorder (H)    Type 2 diabetes mellitus without complication, without long-term current use of insulin (H)    Trigger finger, acquired         MEDICATION LIST (prior to visit)  Current Outpatient Medications   Medication Sig Dispense Refill    albuterol (PROAIR HFA/PROVENTIL HFA/VENTOLIN HFA) 108 (90 Base) MCG/ACT inhaler Inhale 2 puffs into the lungs every 4 hours as needed for shortness of breath or wheezing 18 g 3    budesonide-formoterol (SYMBICORT) 160-4.5 MCG/ACT Inhaler Inhale 2 puffs into the lungs 2 times daily 10.2 g 11    bumetanide (BUMEX) 1 MG tablet Take 1 tablet (1 mg) by mouth daily 90 tablet 3    DULoxetine (CYMBALTA) 60 MG capsule Take 1 capsule (60 mg) by mouth daily. 90 capsule 1    hydrOXYzine HCl (ATARAX) 50 MG tablet  Take 0.5-1 tablets (25-50 mg) by mouth 3 times daily as needed for anxiety. 90 tablet 1    ipratropium - albuterol 0.5 mg/2.5 mg/3 mL (DUONEB) 0.5-2.5 (3) MG/3ML neb solution Take 1 vial (3 mLs) by nebulization every 4 hours as needed for shortness of breath / dyspnea or wheezing 15 mL 1    mirtazapine (REMERON) 15 MG tablet Take 0.5-1 tablets (7.5-15 mg) by mouth nightly as needed (sleep). 30 tablet 2    nicotine (COMMIT) 2 MG lozenge Place 1 lozenge (2 mg) inside cheek every hour as needed for nicotine withdrawal symptoms. 168 lozenge 2    omeprazole (PRILOSEC) 20 MG DR capsule TAKE 1 CAPSULE(20 MG) BY MOUTH DAILY AS NEEDED FOR HEARTBURN OR INDIGESTION 90 capsule 2    tirzepatide (MOUNJARO) 2.5 MG/0.5ML SOAJ auto-injector pen Inject 0.5 mLs (2.5 mg) subcutaneously once a week. 2 mL 0    tirzepatide (MOUNJARO) 5 MG/0.5ML SOAJ auto-injector pen Inject 0.5 mLs (5 mg) subcutaneously once a week. 2 mL 0    Urea 40 % CREA Externally apply topically 4 times daily as needed for dry skin 227 g 5    varenicline (CHANTIX RICA) 0.5 MG X 11 & 1 MG X 42 tablet Take 0.5 mg tab daily for 3 days, THEN 0.5 mg tab twice daily for 4 days, THEN 1 mg twice daily. 53 tablet 0    varenicline (CHANTIX) 1 MG tablet Take 1 tablet (1 mg) by mouth 2 times daily. 90 tablet 1    famotidine (PEPCID) 40 MG tablet TAKE 1 TABLET(40 MG) BY MOUTH TWICE DAILY AS NEEDED FOR HEARTBURN 180 tablet 0     No current facility-administered medications for this visit.       MEDICATION LIST (after visit)  Current Outpatient Medications   Medication Sig Dispense Refill    albuterol (PROAIR HFA/PROVENTIL HFA/VENTOLIN HFA) 108 (90 Base) MCG/ACT inhaler Inhale 2 puffs into the lungs every 4 hours as needed for shortness of breath or wheezing 18 g 3    budesonide-formoterol (SYMBICORT) 160-4.5 MCG/ACT Inhaler Inhale 2 puffs into the lungs 2 times daily 10.2 g 11    bumetanide (BUMEX) 1 MG tablet Take 1 tablet (1 mg) by mouth daily 90 tablet 3    DULoxetine (CYMBALTA)  "60 MG capsule Take 1 capsule (60 mg) by mouth daily. 90 capsule 1    hydrOXYzine HCl (ATARAX) 50 MG tablet Take 0.5-1 tablets (25-50 mg) by mouth 3 times daily as needed for anxiety. 90 tablet 1    ipratropium - albuterol 0.5 mg/2.5 mg/3 mL (DUONEB) 0.5-2.5 (3) MG/3ML neb solution Take 1 vial (3 mLs) by nebulization every 4 hours as needed for shortness of breath / dyspnea or wheezing 15 mL 1    mirtazapine (REMERON) 15 MG tablet Take 0.5-1 tablets (7.5-15 mg) by mouth nightly as needed (sleep). 30 tablet 2    nicotine (COMMIT) 2 MG lozenge Place 1 lozenge (2 mg) inside cheek every hour as needed for nicotine withdrawal symptoms. 168 lozenge 2    omeprazole (PRILOSEC) 20 MG DR capsule TAKE 1 CAPSULE(20 MG) BY MOUTH DAILY AS NEEDED FOR HEARTBURN OR INDIGESTION 90 capsule 2    tirzepatide (MOUNJARO) 2.5 MG/0.5ML SOAJ auto-injector pen Inject 0.5 mLs (2.5 mg) subcutaneously once a week. 2 mL 0    tirzepatide (MOUNJARO) 5 MG/0.5ML SOAJ auto-injector pen Inject 0.5 mLs (5 mg) subcutaneously once a week. 2 mL 0    Urea 40 % CREA Externally apply topically 4 times daily as needed for dry skin 227 g 5    varenicline (CHANTIX RICA) 0.5 MG X 11 & 1 MG X 42 tablet Take 0.5 mg tab daily for 3 days, THEN 0.5 mg tab twice daily for 4 days, THEN 1 mg twice daily. 53 tablet 0    varenicline (CHANTIX) 1 MG tablet Take 1 tablet (1 mg) by mouth 2 times daily. 90 tablet 1    famotidine (PEPCID) 40 MG tablet TAKE 1 TABLET(40 MG) BY MOUTH TWICE DAILY AS NEEDED FOR HEARTBURN 180 tablet 0     No current facility-administered medications for this visit.         Allergies   Allergen Reactions    Bupropion Diarrhea    Codeine Hives, Itching, Nausea and Rash    Nickel Unknown    Oxycodone Nausea and Vomiting    Percocet [Oxycodone-Acetaminophen]      Patient reports \"vomiting,grossly ill two weeks ago\"    Topiramate Unknown    Diclofenac Nausea     Tolerates the topical gel    Furosemide Rash    Hydrochlorothiazide Rash     phototoxicity - med " was d/lora        Sulfa Antibiotics Itching and Rash    Sulfasalazine Rash       Melissa Covington DO  [facility]  [facility]  [phone]

## 2025-05-30 NOTE — NURSING NOTE
[facility]       Rooming information:        Point of care urine drug screen positive for:  Lab Results   Component Value Date    BUP Negative 05/30/2025    BZO Negative 05/30/2025    BAR Negative 05/30/2025    ELICEO Negative 05/30/2025    MAMP Negative 05/30/2025    AMP Negative 05/30/2025    MDMA Negative 05/30/2025    MTD Negative 05/30/2025    DLV709 Negative 05/30/2025    OXY Negative 05/30/2025    PCP Negative 05/30/2025    THC Negative 05/30/2025    TEMP Invalid (A) 05/30/2025    SGPOCT 1.025 05/30/2025       *POC urine drug screen does not screen for Fentanyl    PHQ-9 Scores:       9/20/2024     8:28 AM 12/6/2024     9:00 AM 5/30/2025    10:54 AM   PHQ   PHQ-9 Total Score 0 1 2    Q9: Thoughts of better off dead/self-harm past 2 weeks Not at all  Not at all Not at all        Proxy-reported     ALISIA-7 Scores:      9/3/2024    12:00 PM 12/1/2024    12:54 PM 2/3/2025    10:42 AM   ALISIA-7 SCORE   Total Score  2 (minimal anxiety) 1 (minimal anxiety)   Total Score 0 2  1        Patient-reported       Any other recent substance use:     Denies- 2 years ago   NICOTINE-Yes:   If using nicotine, ready to quit? Yes, would like chantix    Side effects related to medications pt would like to discuss with provider (constipation, dry mouth, HA, GI upset, sedation?) No     Narcan currently available: N/A    Primary care provider: EJ WELLER MD     Mental health provider: n/a (follow up on MH referral if needed)    Any housing, insurance deficits?: stable    Contact information up to date? On file    3rd Party Involvement none today (please obtain LUDIN if pt would like to include)        Messi Reyes MA  May 30, 2025  10:49 AM

## 2025-05-31 LAB — ETHYL GLUCURONIDE UR QL SCN: NEGATIVE NG/ML

## 2025-06-25 NOTE — PROGRESS NOTES
11-6-19  [facility] referral  Patient was trying to apply for MA while inpatient. Hx of ETOH. SW & Nursing Assessment needed due to insurance and medical needs.    Attempt 1 Community Health Worker called and left a message for the patient. If the patient is returning my call, please transfer the patient to [phone].    Upcoming appt with PCP 11-18-19 at 3pm    Plan  Follow up 11-7-19         
stated

## 2025-06-30 DIAGNOSIS — E11.9 TYPE 2 DIABETES MELLITUS WITHOUT COMPLICATION, WITHOUT LONG-TERM CURRENT USE OF INSULIN (H): ICD-10-CM

## 2025-06-30 NOTE — TELEPHONE ENCOUNTER
Medication Question or Refill        What medication are you calling about (include dose and sig)?: tirzepatide (MOUNJARO) 5 MG/0.5ML SOAJ auto-injector pen     Preferred Pharmacy:   [id number] - [place], [place] - [address] AT [facility] [address]  [address]  [place] [address]  Phone: [phone] Fax: [phone]      Controlled Substance Agreement on file:   CSA -- Patient Level:    CSA: None found at the patient level.       Who prescribed the medication?: Dr. Wade    Do you need a refill? Yes    When did you use the medication last? Out of medication    Patient offered an appointment? No    Do you have any questions or concerns?  No      Okay to leave a detailed message?: Yes at Home number on file [phone] (home) or Cell number on file:    Telephone Information:   Mobile [phone]

## 2025-07-16 ENCOUNTER — HOSPITAL ENCOUNTER (OUTPATIENT)
Dept: GENERAL RADIOLOGY | Facility: HOSPITAL | Age: 70
Discharge: HOME | End: 2025-07-16
Attending: PODIATRIST
Payer: COMMERCIAL

## 2025-07-16 ENCOUNTER — OFFICE VISIT (OUTPATIENT)
Dept: PODIATRY | Facility: CLINIC | Age: 70
End: 2025-07-16
Payer: COMMERCIAL

## 2025-07-16 DIAGNOSIS — M79.89 PALPABLE MASS OF SOFT TISSUE OF FOOT: ICD-10-CM

## 2025-07-16 DIAGNOSIS — M76.72 PERONEAL TENDINITIS, LEFT: ICD-10-CM

## 2025-07-16 DIAGNOSIS — M19.071 DJD (DEGENERATIVE JOINT DISEASE), ANKLE AND FOOT, RIGHT: ICD-10-CM

## 2025-07-16 DIAGNOSIS — M77.51 CAPSULITIS OF METATARSOPHALANGEAL (MTP) JOINT OF RIGHT FOOT: ICD-10-CM

## 2025-07-16 DIAGNOSIS — M19.071 DJD (DEGENERATIVE JOINT DISEASE), ANKLE AND FOOT, RIGHT: Primary | ICD-10-CM

## 2025-07-16 PROCEDURE — 73630 X-RAY EXAM OF FOOT: CPT | Mod: RT

## 2025-07-16 PROCEDURE — 99214 OFFICE O/P EST MOD 30 MIN: CPT | Performed by: PODIATRIST

## 2025-07-16 PROCEDURE — 73630 X-RAY EXAM OF FOOT: CPT | Mod: 26 | Performed by: PODIATRIST

## 2025-07-16 RX ORDER — PREDNISONE 10 MG/1
TABLET ORAL
Qty: 30 TABLET | Refills: 0 | Status: SHIPPED | OUTPATIENT
Start: 2025-07-16

## 2025-07-16 NOTE — PROGRESS NOTES
FOOT AND ANKLE SURGERY/PODIATRY CONSULT NOTE         ASSESSMENT:   DJD right midfoot  Soft tissue mass right midfoot  Peroneal tendinitis left  Capsulitis second MPJ right foot  Hammertoe        TREATMENT:  - I discussed the patient she has discomfort on exam today at multiple areas including the second MPJ on the right foot consistent with capsulitis.  There is also discomfort along the peroneal brevis insertion to the fifth metatarsal base on the left foot consistent with insertional tendinitis.    -Based on the above, I referred the patient to [facility] for custom inserts with a metatarsal pad to offload the second MPJ right foot.    -We reviewed anti-inflammatory medication options today.  I will start the patient on a 12-day taper course of oral prednisone.  Will consider steroid injection along the second MPJ on the right foot if symptoms persist.    -There is a soft tissue prominence along the dorsal right midfoot with pain to palpation at this location.  We discussed that this may be due to underlying degenerative changes within the midfoot or soft tissue mass.  I referred the patient for weightbearing x-rays of the right foot and MRI of the right foot for further evaluation.    -I will contact the patient with the MRI report when available and we will be guided by the results.     -Patient's questions invited and answered. She was encouraged to call my office with any further questions or concerns.     30 minutes spent on the day of encounter doing chart review, history and exam, documentation, and further activities as noted.     Jigar Doty DPM  [facility] Podiat[facility]/Foot & Ankle Surgery      HPI: I was asked to see Patsy Santamaria today for bilateral foot concerns.  Patient reports she has had ongoing pain along the second MPJ on the right foot for the past 2 months.  Denies trauma.  She also complains of pain along the lateral aspect of the left foot.   Patient does admit doing water aerobics almost on a daily basis.  She has tried over-the-counter inserts with no relief.  She also complains of pain along the dorsal right midfoot at site of previous soft tissue mass removal several years ago.  Past medical history reviewed.    Past Medical History:   Diagnosis Date    Acute alcoholic intoxication 9/10/2021    Alcohol use disorder     Alcohol withdrawal seizure without complication (H) 05/14/2016    Alcoholic cirrhosis (H)     Anxiety     Chronic alcohol dependence, continuous (H) 03/16/2018    inpatient 11/2019, sober since then    Chronic Lower Extremity Edema     Chronic reflux esophagitis     COPD (chronic obstructive pulmonary disease) (H)     Depression     Dermatitis     Diverticulitis of colon 09/2022    Fibroid uterus     Ganglion     right foot    GERD (gastroesophageal reflux disease)     H. pylori infection     Melanoma (H) 07/01/2019    left upper arm    Menopause     age 52    Obesity (BMI 35.0-39.9 without comorbidity)     Osteoarthritis     Bilateral Knees    Peripheral neuropathy     Seizure (H) 01/01/2016    during alcohol withdrawal    Sleep apnea     mild, doesn't tolerate pap therapy         Social History     Socioeconomic History    Marital status: Single     Spouse name: Not on file    Number of children: Not on file    Years of education: Not on file    Highest education level: Not on file   Occupational History    Not on file   Tobacco Use    Smoking status: Every Day     Current packs/day: 0.50     Average packs/day: 0.5 packs/day for 49.0 years (24.5 ttl pk-yrs)     Types: Cigarettes     Passive exposure: Never    Smokeless tobacco: Never    Tobacco comments:     Reports on 1/26/23 smoking 1/2 PPD   Vaping Use    Vaping status: Never Used   Substance and Sexual Activity    Alcohol use: Not Currently     Comment: Last use 11/11/22    Drug use: No     Comment: Denies    Sexual activity: Yes     Partners: Male     Birth control/protection:  None   Other Topics Concern    Not on file   Social History Narrative    Not on file     Social Drivers of Health     Financial Resource Strain: Low Risk  (9/20/2024)    Financial Resource Strain     Within the past 12 months, have you or your family members you live with been unable to get utilities (heat, electricity) when it was really needed?: No   Food Insecurity: Low Risk  (9/20/2024)    Food Insecurity     Within the past 12 months, did you worry that your food would run out before you got money to buy more?: No     Within the past 12 months, did the food you bought just not last and you didn t have money to get more?: No   Transportation Needs: Low Risk  (9/20/2024)    Transportation Needs     Within the past 12 months, has lack of transportation kept you from medical appointments, getting your medicines, non-medical meetings or appointments, work, or from getting things that you need?: No   Physical Activity: Sufficiently Active (9/20/2024)    Exercise Vital Sign     Days of Exercise per Week: 5 days     Minutes of Exercise per Session: 40 min   Stress: No Stress Concern Present (9/20/2024)    [employer] - Occupational Stress Questionnaire     Feeling of Stress : Not at all   Social Connections: Unknown (9/20/2024)    Social Connection and Isolation Panel [NHANES]     Frequency of Communication with Friends and Family: Not on file     Frequency of Social Gatherings with Friends and Family: Three times a week     Attends Zoroastrian Services: Not on file     Active Member of Clubs or Organizations: Not on file     Attends Club or Organization Meetings: Not on file     Marital Status: Not on file   Interpersonal Safety: Low Risk  (3/25/2025)    Interpersonal Safety     Do you feel physically and emotionally safe where you currently live?: Yes     Within the past 12 months, have you been hit, slapped, kicked or otherwise physically hurt by someone?: No     Within the past 12 months,  "have you been humiliated or emotionally abused in other ways by your partner or ex-partner?: No   Housing Stability: Low Risk  (9/20/2024)    Housing Stability     Do you have housing? : Yes     Are you worried about losing your housing?: No            Allergies   Allergen Reactions    Bupropion Diarrhea    Codeine Hives, Itching, Nausea and Rash    Nickel Unknown    Oxycodone Nausea and Vomiting    Percocet [Oxycodone-Acetaminophen]      Patient reports \"vomiting,grossly ill two weeks ago\"    Topiramate Unknown    Diclofenac Nausea     Tolerates the topical gel    Furosemide Rash    Hydrochlorothiazide Rash     phototoxicity - med was d/lora        Sulfa Antibiotics Itching and Rash    Sulfasalazine Rash         MEDICATIONS:   Current Outpatient Medications   Medication Sig Dispense Refill    albuterol (PROAIR HFA/PROVENTIL HFA/VENTOLIN HFA) 108 (90 Base) MCG/ACT inhaler Inhale 2 puffs into the lungs every 4 hours as needed for shortness of breath or wheezing 18 g 3    budesonide-formoterol (SYMBICORT) 160-4.5 MCG/ACT Inhaler Inhale 2 puffs into the lungs 2 times daily 10.2 g 11    bumetanide (BUMEX) 1 MG tablet Take 1 tablet (1 mg) by mouth daily 90 tablet 3    DULoxetine (CYMBALTA) 60 MG capsule Take 1 capsule (60 mg) by mouth daily. 90 capsule 1    famotidine (PEPCID) 40 MG tablet TAKE 1 TABLET(40 MG) BY MOUTH TWICE DAILY AS NEEDED FOR HEARTBURN 180 tablet 0    hydrOXYzine HCl (ATARAX) 50 MG tablet Take 0.5-1 tablets (25-50 mg) by mouth 3 times daily as needed for anxiety. 90 tablet 1    ipratropium - albuterol 0.5 mg/2.5 mg/3 mL (DUONEB) 0.5-2.5 (3) MG/3ML neb solution Take 1 vial (3 mLs) by nebulization every 4 hours as needed for shortness of breath / dyspnea or wheezing 15 mL 1    mirtazapine (REMERON) 15 MG tablet Take 0.5-1 tablets (7.5-15 mg) by mouth nightly as needed (sleep). 30 tablet 2    nicotine (COMMIT) 2 MG lozenge Place 1 lozenge (2 mg) inside cheek every hour as needed for nicotine withdrawal " symptoms. 168 lozenge 2    omeprazole (PRILOSEC) 20 MG DR capsule TAKE 1 CAPSULE(20 MG) BY MOUTH DAILY AS NEEDED FOR HEARTBURN OR INDIGESTION 90 capsule 2    predniSONE (DELTASONE) 10 MG tablet 40,30,20,10 mg x3 days each 30 tablet 0    tirzepatide (MOUNJARO) 5 MG/0.5ML SOAJ auto-injector pen Inject 0.5 mLs (5 mg) subcutaneously once a week. 2 mL 2    Urea 40 % CREA Externally apply topically 4 times daily as needed for dry skin 227 g 5    varenicline (CHANTIX RICA) 0.5 MG X 11 & 1 MG X 42 tablet Take 0.5 mg tab daily for 3 days, THEN 0.5 mg tab twice daily for 4 days, THEN 1 mg twice daily. 53 tablet 0    varenicline (CHANTIX) 1 MG tablet Take 1 tablet (1 mg) by mouth 2 times daily. 90 tablet 1    tirzepatide (MOUNJARO) 2.5 MG/0.5ML SOAJ auto-injector pen Inject 0.5 mLs (2.5 mg) subcutaneously once a week. (Patient not taking: Reported on 2025) 2 mL 0     No current facility-administered medications for this visit.        Family History   Problem Relation Age of Onset    CABG Mother     Heart Disease Mother     Aneurysm Father          of ruptured aneurysm at 46    Heart Disease Father     Factor V Leiden deficiency Father     Factor V Leiden deficiency Sister     Anesthesia Reaction No family hx of           Review of Systems - 10 point Review of Systems is negative except for bilateral foot pain which is noted in HPI.    OBJECTIVE:  Appearance: alert, well appearing, and in no distress.    VITAL SIGNS: LMP  (LMP Unknown)       General appearance: Patient is alert and fully cooperative with history & exam.  No sign of distress is noted during the visit.     Psychiatric: Affect is pleasant & appropriate.  Patient appears motivated to improve health.     Respiratory: Breathing is regular & unlabored while sitting.     HEENT: Hearing is intact to spoken word.  Speech is clear.  No gross evidence of visual impairment that would impact ambulation.      Vascular: Dorsalis pedis palpable  bilaterally.  Dermatologic: Turgor and texture are within normal limits. No coloration or temperature changes. No primary or secondary lesions noted.  Neurologic: All epicritic and proprioceptive sensations are grossly intact bilateral.  Musculoskeletal: Mild pain to palpation second MPJ right foot.  Mild pain along insertion of peroneal brevis to fifth metatarsal base left foot.  Contracted digits bilaterally.  Mild pain along dorsal right midfoot and area of soft tissue prominence with possible soft tissue mass.

## 2025-07-16 NOTE — LETTER
7/16/2025      Patsy Santamaria  [address]  [place] [address]      Dear Colleague,    Thank you for referring your patient, Patsy Santamaria, to the [facility]. Please see a copy of my visit note below.          FOOT AND ANKLE SURGERY/PODIATRY CONSULT NOTE         ASSESSMENT:   DJD right midfoot  Soft tissue mass right midfoot  Peroneal tendinitis left  Capsulitis second MPJ right foot  Hammertoe        TREATMENT:  - I discussed the patient she has discomfort on exam today at multiple areas including the second MPJ on the right foot consistent with capsulitis.  There is also discomfort along the peroneal brevis insertion to the fifth metatarsal base on the left foot consistent with insertional tendinitis.    -Based on the above, I referred the patient to [facility] for custom inserts with a metatarsal pad to offload the second MPJ right foot.    -We reviewed anti-inflammatory medication options today.  I will start the patient on a 12-day taper course of oral prednisone.  Will consider steroid injection along the second MPJ on the right foot if symptoms persist.    -There is a soft tissue prominence along the dorsal right midfoot with pain to palpation at this location.  We discussed that this may be due to underlying degenerative changes within the midfoot or soft tissue mass.  I referred the patient for weightbearing x-rays of the right foot and MRI of the right foot for further evaluation.    -I will contact the patient with the MRI report when available and we will be guided by the results.     -Patient's questions invited and answered. She was encouraged to call my office with any further questions or concerns.     30 minutes spent on the day of encounter doing chart review, history and exam, documentation, and further activities as noted.     Jigar Doty DPM  [facility] Podiatry/Foot & Ankle Surgery      HPI: I was asked to see  Patsy Santamaria today for bilateral foot concerns.  Patient reports she has had ongoing pain along the second MPJ on the right foot for the past 2 months.  Denies trauma.  She also complains of pain along the lateral aspect of the left foot.  Patient does admit doing water aerobics almost on a daily basis.  She has tried over-the-counter inserts with no relief.  She also complains of pain along the dorsal right midfoot at site of previous soft tissue mass removal several years ago.  Past medical history reviewed.    Past Medical History:   Diagnosis Date     Acute alcoholic intoxication 9/10/2021     Alcohol use disorder      Alcohol withdrawal seizure without complication (H) 05/14/2016     Alcoholic cirrhosis (H)      Anxiety      Chronic alcohol dependence, continuous (H) 03/16/2018    inpatient 11/2019, sober since then     Chronic Lower Extremity Edema      Chronic reflux esophagitis      COPD (chronic obstructive pulmonary disease) (H)      Depression      Dermatitis      Diverticulitis of colon 09/2022     Fibroid uterus      Ganglion     right foot     GERD (gastroesophageal reflux disease)      H. pylori infection      Melanoma (H) 07/01/2019    left upper arm     Menopause     age 52     Obesity (BMI 35.0-39.9 without comorbidity)      Osteoarthritis     Bilateral Knees     Peripheral neuropathy      Seizure (H) 01/01/2016    during alcohol withdrawal     Sleep apnea     mild, doesn't tolerate pap therapy         Social History     Socioeconomic History     Marital status: Single     Spouse name: Not on file     Number of children: Not on file     Years of education: Not on file     Highest education level: Not on file   Occupational History     Not on file   Tobacco Use     Smoking status: Every Day     Current packs/day: 0.50     Average packs/day: 0.5 packs/day for 49.0 years (24.5 ttl pk-yrs)     Types: Cigarettes     Passive exposure: Never     Smokeless tobacco: Never     Tobacco comments:      Reports on 1/26/23 smoking 1/2 PPD   Vaping Use     Vaping status: Never Used   Substance and Sexual Activity     Alcohol use: Not Currently     Comment: Last use 11/11/22     Drug use: No     Comment: Denies     Sexual activity: Yes     Partners: Male     Birth control/protection: None   Other Topics Concern     Not on file   Social History Narrative     Not on file     Social Drivers of Health     Financial Resource Strain: Low Risk  (9/20/2024)    Financial Resource Strain      Within the past 12 months, have you or your family members you live with been unable to get utilities (heat, electricity) when it was really needed?: No   Food Insecurity: Low Risk  (9/20/2024)    Food Insecurity      Within the past 12 months, did you worry that your food would run out before you got money to buy more?: No      Within the past 12 months, did the food you bought just not last and you didn t have money to get more?: No   Transportation Needs: Low Risk  (9/20/2024)    Transportation Needs      Within the past 12 months, has lack of transportation kept you from medical appointments, getting your medicines, non-medical meetings or appointments, work, or from getting things that you need?: No   Physical Activity: Sufficiently Active (9/20/2024)    Exercise Vital Sign      Days of Exercise per Week: 5 days      Minutes of Exercise per Session: 40 min   Stress: No Stress Concern Present (9/20/2024)    [employer] - Occupational Stress Questionnaire      Feeling of Stress : Not at all   Social Connections: Unknown (9/20/2024)    Social Connection and Isolation Panel [NHANES]      Frequency of Communication with Friends and Family: Not on file      Frequency of Social Gatherings with Friends and Family: Three times a week      Attends Restoration Services: Not on file      Active Member of Clubs or Organizations: Not on file      Attends Club or Organization Meetings: Not on file      Marital Status: Not  "on file   Interpersonal Safety: Low Risk  (3/25/2025)    Interpersonal Safety      Do you feel physically and emotionally safe where you currently live?: Yes      Within the past 12 months, have you been hit, slapped, kicked or otherwise physically hurt by someone?: No      Within the past 12 months, have you been humiliated or emotionally abused in other ways by your partner or ex-partner?: No   Housing Stability: Low Risk  (9/20/2024)    Housing Stability      Do you have housing? : Yes      Are you worried about losing your housing?: No            Allergies   Allergen Reactions     Bupropion Diarrhea     Codeine Hives, Itching, Nausea and Rash     Nickel Unknown     Oxycodone Nausea and Vomiting     Percocet [Oxycodone-Acetaminophen]      Patient reports \"vomiting,grossly ill two weeks ago\"     Topiramate Unknown     Diclofenac Nausea     Tolerates the topical gel     Furosemide Rash     Hydrochlorothiazide Rash     phototoxicity - med was d/lora         Sulfa Antibiotics Itching and Rash     Sulfasalazine Rash         MEDICATIONS:   Current Outpatient Medications   Medication Sig Dispense Refill     albuterol (PROAIR HFA/PROVENTIL HFA/VENTOLIN HFA) 108 (90 Base) MCG/ACT inhaler Inhale 2 puffs into the lungs every 4 hours as needed for shortness of breath or wheezing 18 g 3     budesonide-formoterol (SYMBICORT) 160-4.5 MCG/ACT Inhaler Inhale 2 puffs into the lungs 2 times daily 10.2 g 11     bumetanide (BUMEX) 1 MG tablet Take 1 tablet (1 mg) by mouth daily 90 tablet 3     DULoxetine (CYMBALTA) 60 MG capsule Take 1 capsule (60 mg) by mouth daily. 90 capsule 1     famotidine (PEPCID) 40 MG tablet TAKE 1 TABLET(40 MG) BY MOUTH TWICE DAILY AS NEEDED FOR HEARTBURN 180 tablet 0     hydrOXYzine HCl (ATARAX) 50 MG tablet Take 0.5-1 tablets (25-50 mg) by mouth 3 times daily as needed for anxiety. 90 tablet 1     ipratropium - albuterol 0.5 mg/2.5 mg/3 mL (DUONEB) 0.5-2.5 (3) MG/3ML neb solution Take 1 vial (3 mLs) by " nebulization every 4 hours as needed for shortness of breath / dyspnea or wheezing 15 mL 1     mirtazapine (REMERON) 15 MG tablet Take 0.5-1 tablets (7.5-15 mg) by mouth nightly as needed (sleep). 30 tablet 2     nicotine (COMMIT) 2 MG lozenge Place 1 lozenge (2 mg) inside cheek every hour as needed for nicotine withdrawal symptoms. 168 lozenge 2     omeprazole (PRILOSEC) 20 MG DR capsule TAKE 1 CAPSULE(20 MG) BY MOUTH DAILY AS NEEDED FOR HEARTBURN OR INDIGESTION 90 capsule 2     predniSONE (DELTASONE) 10 MG tablet 40,30,20,10 mg x3 days each 30 tablet 0     tirzepatide (MOUNJARO) 5 MG/0.5ML SOAJ auto-injector pen Inject 0.5 mLs (5 mg) subcutaneously once a week. 2 mL 2     Urea 40 % CREA Externally apply topically 4 times daily as needed for dry skin 227 g 5     varenicline (CHANTIX RICA) 0.5 MG X 11 & 1 MG X 42 tablet Take 0.5 mg tab daily for 3 days, THEN 0.5 mg tab twice daily for 4 days, THEN 1 mg twice daily. 53 tablet 0     varenicline (CHANTIX) 1 MG tablet Take 1 tablet (1 mg) by mouth 2 times daily. 90 tablet 1     tirzepatide (MOUNJARO) 2.5 MG/0.5ML SOAJ auto-injector pen Inject 0.5 mLs (2.5 mg) subcutaneously once a week. (Patient not taking: Reported on 2025) 2 mL 0     No current facility-administered medications for this visit.        Family History   Problem Relation Age of Onset     CABG Mother      Heart Disease Mother      Aneurysm Father          of ruptured aneurysm at 46     Heart Disease Father      Factor V Leiden deficiency Father      Factor V Leiden deficiency Sister      Anesthesia Reaction No family hx of           Review of Systems - 10 point Review of Systems is negative except for bilateral foot pain which is noted in HPI.    OBJECTIVE:  Appearance: alert, well appearing, and in no distress.    VITAL SIGNS: LMP  (LMP Unknown)       General appearance: Patient is alert and fully cooperative with history & exam.  No sign of distress is noted during the visit.     Psychiatric:  Affect is pleasant & appropriate.  Patient appears motivated to improve health.     Respiratory: Breathing is regular & unlabored while sitting.     HEENT: Hearing is intact to spoken word.  Speech is clear.  No gross evidence of visual impairment that would impact ambulation.      Vascular: Dorsalis pedis palpable bilaterally.  Dermatologic: Turgor and texture are within normal limits. No coloration or temperature changes. No primary or secondary lesions noted.  Neurologic: All epicritic and proprioceptive sensations are grossly intact bilateral.  Musculoskeletal: Mild pain to palpation second MPJ right foot.  Mild pain along insertion of peroneal brevis to fifth metatarsal base left foot.  Contracted digits bilaterally.  Mild pain along dorsal right midfoot and area of soft tissue prominence with possible soft tissue mass.          Again, thank you for allowing me to participate in the care of your patient.        Sincerely,        Jigar Doty DPM    Electronically signed

## 2025-07-16 NOTE — PATIENT INSTRUCTIONS
Please stop downstairs to get your xray. You can also schedule your MRI while you are there.   We will call you with the MRI results.     Prednisone tablets    Brand Names: Deltasone, Predone, Sterapred, Sterapred DS   What is this medicine?  PREDNISONE (PRED ni sone) is a corticosteroid. It is commonly used to treat inflammation of the skin, joints, lungs, and other organs. Common conditions treated include asthma, allergies, and arthritis. It is also used for other conditions, such as blood disorders and diseases of the adrenal glands.  How should I use this medicine?  Take this medicine by mouth with a glass of water. Follow the directions on the prescription label. Take this medicine with food. If you are taking this medicine once a day, take it in the morning. Do not take more medicine than you are told to take. Do not suddenly stop taking your medicine because you may develop a severe reaction. Your doctor will tell you how much medicine to take. If your doctor wants you to stop the medicine, the dose may be slowly lowered over time to avoid any side effects.  Talk to your pediatrician regarding the use of this medicine in children. Special care may be needed.  What side effects may I notice from receiving this medicine?  Side effects that you should report to your doctor or health care professional as soon as possible:  allergic reactions like skin rash, itching or hives, swelling of the face, lips, or tongue  changes in emotions or moods  changes in vision  depressed mood  eye pain  fever or chills, cough, sore throat, pain or difficulty passing urine  increased thirst  swelling of ankles, feet  Side effects that usually do not require medical attention (report to your doctor or health care professional if they continue or are bothersome):  confusion, excitement, restlessness  headache  nausea, vomiting  skin problems, acne, thin and shiny skin  trouble sleeping  weight gain  What may interact with this  medicine?  Do not take this medicine with any of the following medications:  metyrapone  mifepristone  This medicine may also interact with the following medications:  aminoglutethimide  amphotericin B  aspirin and aspirin-like medicines  barbiturates  certain medicines for diabetes, like glipizide or glyburide  cholestyramine  cholinesterase inhibitors  cyclosporine  digoxin  diuretics  ephedrine  female hormones, like estrogens and birth control pills  isoniazid  ketoconazole  NSAIDS, medicines for pain and inflammation, like ibuprofen or naproxen  phenytoin  rifampin  toxoids  vaccines  warfarin  What if I miss a dose?  If you miss a dose, take it as soon as you can. If it is almost time for your next dose, talk to your doctor or health care professional. You may need to miss a dose or take an extra dose. Do not take double or extra doses without advice.  Where should I keep my medicine?  Keep out of the reach of children.  Store at room temperature between 15 and 30 degrees C (59 and 86 degrees F). Protect from light. Keep container tightly closed. Throw away any unused medicine after the expiration date.  What should I tell my health care provider before I take this medicine?  They need to know if you have any of these conditions:  Cushing's syndrome  diabetes  glaucoma  heart disease  high blood pressure  infection (especially a virus infection such as chickenpox, cold sores, or herpes)  kidney disease  liver disease  mental illness  myasthenia gravis  osteoporosis  seizures  stomach or intestine problems  thyroid disease  an unusual or allergic reaction to lactose, prednisone, other medicines, foods, dyes, or preservatives  pregnant or trying to get pregnant  breast-feeding  What should I watch for while using this medicine?  Visit your doctor or health care professional for regular checks on your progress. If you are taking this medicine over a prolonged period, carry an identification card with your name  and address, the type and dose of your medicine, and your doctor's name and address.  This medicine may increase your risk of getting an infection. Tell your doctor or health care professional if you are around anyone with measles or chickenpox, or if you develop sores or blisters that do not heal properly.  If you are going to have surgery, tell your doctor or health care professional that you have taken this medicine within the last twelve months.  Ask your doctor or health care professional about your diet. You may need to lower the amount of salt you eat.  This medicine may affect blood sugar levels. If you have diabetes, check with your doctor or health care professional before you change your diet or the dose of your diabetic medicine.      [place] [facility] LOCATIONS  [facility]  [address]  [place] [address]  Phone: [phone]  Fax: [phone] [facility]  [address] [place] [address]  Home Medical Equipment, [address] Phone: [phone]  Orthotics and Prosthetics, [address]  Phone [phone] [facility]  [address]  [place] [address]  Phone: [phone]   Fax: [phone]   [facility]  [address]  [place] [address]  Phone: [phone]  Fax: [phone] [facility]   Home Medical Equipment   [address], [place] [address]  Phone :[phone]  Orthotics and Prosthetics  [address] [facility] [address]  Phone:[phone]   [facility]  [address]  [place] [address]  Phone: [phone]   Fax: [phone]   [facility]   [address]  [place] [address]  Phone: [phone]  Fax: [phone] [facility]   [address]  [place] [address]  Phone:  [phone] [place]  [address] [place] [address]  Phone :[phone]             WEARING YOUR CUSTOM FOOT ORTHOTICS   Most insurance plans cover one pair of orthotics per year. You must check with your   insurance plan to see what your payment responsibility will be. Please call your   insurance company by calling the number on the back of your insurance card.   Orthotic's are non-refundable and non-returnable.   Orthotics are made of various designs. Some orthotics are covered with material that extends beyond your toes. If your orthotic is of this design, you will likely need to trim the toe end to get a proper fit. The insole from your shoe can be used as a template. Simply overlay the shoe insert on top of the custom orthotic. Align the heel end while tracing the length of the insert onto the custom orthotic. Use a large scissor to trim the toe end until you get a proper fit in the shoe.   The orthotic needs to be pushed as far back in the shoe as possible. The heel portion should not ride forward so as not to irritate your heel.   Orthotics are designed to work with socks. Excessive perspiration will shorten the life span of the orthotics. Remove the orthotic from the shoe frequently for proper drying.   The break-in period lasts for weeks. People new to orthotics will likely experience new aches and pains. The orthotic is forcing your foot into a new position. Arch, foot and leg muscle aches and fatigue are common during these weeks. Minor discomfort can be considered normal break in phenomenon. Start wearing your orthotic around your home your first day. Limited activity for one to two hours is recommended. You can increase one or two additional hours each day provided the aches and pains are subsiding. The degree of discomfort, fatigue and problems will dictate the speed of break in. You may require multiple weeks to work up to full time use.   Do not continue wearing your orthotics if they are  creating problems such as blisters or sores. Do not hesitate to call the clinic to speak with a nurse regarding orthotic   break in, fit, trimming, etc. You may also need to see the doctor if the orthotics are   simply not working out. Adjustments are sometimes made to improve orthotic   function.     Orthotics will only work in certain styles and types of shoes. Orthotics rarely work in dress shoes. Slip-ons, clogs, sandals and heels are particularly troublesome. Specially designed orthotics may be necessary for these types of shoes. Your custom orthotic was designed for activities that require appropriate walking or running shoes. Lace up athletic shoes, walking shoes or work boots should work appropriately. You may need a wider or longer shoe. Shoes with a removable  or insert work best. In general, you want to remove an insert from the shoe before placing the orthotic into the shoe. Shoes without a removable liner may not work as well.     When purchasing new shoes, bring your orthotics along to get a proper fit. Shop at stores that are familiar with orthotics.   Frequent washing of the orthotic may shorten the life span of the top cover. The top cover can be replaced but will generally last one to five years depending on use and foot perspiration.

## 2025-07-26 ENCOUNTER — APPOINTMENT (OUTPATIENT)
Dept: CT IMAGING | Facility: HOSPITAL | Age: 70
End: 2025-07-26
Payer: COMMERCIAL

## 2025-07-26 ENCOUNTER — HOSPITAL ENCOUNTER (EMERGENCY)
Facility: HOSPITAL | Age: 70
Discharge: HOME | End: 2025-07-26
Payer: COMMERCIAL

## 2025-07-26 ENCOUNTER — APPOINTMENT (OUTPATIENT)
Dept: RADIOLOGY | Facility: HOSPITAL | Age: 70
End: 2025-07-26
Payer: COMMERCIAL

## 2025-07-26 VITALS
BODY MASS INDEX: 42.51 KG/M2 | HEART RATE: 62 BPM | SYSTOLIC BLOOD PRESSURE: 126 MMHG | TEMPERATURE: 98.1 F | DIASTOLIC BLOOD PRESSURE: 71 MMHG | OXYGEN SATURATION: 99 % | WEIGHT: 239.9 LBS | RESPIRATION RATE: 18 BRPM | HEIGHT: 63 IN

## 2025-07-26 DIAGNOSIS — R10.9 ABDOMINAL PAIN: Primary | ICD-10-CM

## 2025-07-26 LAB
ALBUMIN SERPL BCG-MCNC: 3.8 G/DL (ref 3.5–5.2)
ALBUMIN UR-MCNC: NEGATIVE MG/DL
ALP SERPL-CCNC: 65 U/L (ref 40–150)
ALT SERPL W P-5'-P-CCNC: 35 U/L (ref 0–50)
ANION GAP SERPL CALCULATED.3IONS-SCNC: 7 MMOL/L (ref 7–15)
APPEARANCE UR: ABNORMAL
AST SERPL W P-5'-P-CCNC: 38 U/L (ref 0–45)
BACTERIA #/AREA URNS HPF: ABNORMAL /HPF
BASOPHILS # BLD AUTO: 0 10E3/UL (ref 0–0.2)
BASOPHILS NFR BLD AUTO: 0 %
BILIRUB DIRECT SERPL-MCNC: 0.09 MG/DL (ref 0–0.3)
BILIRUB SERPL-MCNC: 0.2 MG/DL
BILIRUB UR QL STRIP: NEGATIVE
BUN SERPL-MCNC: 9.7 MG/DL (ref 8–23)
CALCIUM SERPL-MCNC: 9 MG/DL (ref 8.8–10.4)
CHLORIDE SERPL-SCNC: 105 MMOL/L (ref 98–107)
COLOR UR AUTO: YELLOW
CREAT SERPL-MCNC: 0.58 MG/DL (ref 0.51–0.95)
EGFRCR SERPLBLD CKD-EPI 2021: >90 ML/MIN/1.73M2
EOSINOPHIL # BLD AUTO: 0.3 10E3/UL (ref 0–0.7)
EOSINOPHIL NFR BLD AUTO: 3 %
ERYTHROCYTE [DISTWIDTH] IN BLOOD BY AUTOMATED COUNT: 14.7 % (ref 10–15)
GLUCOSE SERPL-MCNC: 75 MG/DL (ref 70–99)
GLUCOSE UR STRIP-MCNC: NEGATIVE MG/DL
HCO3 SERPL-SCNC: 33 MMOL/L (ref 22–29)
HCT VFR BLD AUTO: 43.5 % (ref 35–47)
HGB BLD-MCNC: 14.4 G/DL (ref 11.7–15.7)
HGB UR QL STRIP: NEGATIVE
HYALINE CASTS: 1 /LPF
IMM GRANULOCYTES # BLD: 0 10E3/UL
IMM GRANULOCYTES NFR BLD: 0 %
KETONES UR STRIP-MCNC: NEGATIVE MG/DL
LEUKOCYTE ESTERASE UR QL STRIP: NEGATIVE
LIPASE SERPL-CCNC: 22 U/L (ref 13–60)
LYMPHOCYTES # BLD AUTO: 2.5 10E3/UL (ref 0.8–5.3)
LYMPHOCYTES NFR BLD AUTO: 34 %
MAGNESIUM SERPL-MCNC: 1.9 MG/DL (ref 1.7–2.3)
MCH RBC QN AUTO: 30.9 PG (ref 26.5–33)
MCHC RBC AUTO-ENTMCNC: 33.1 G/DL (ref 31.5–36.5)
MCV RBC AUTO: 93 FL (ref 78–100)
MONOCYTES # BLD AUTO: 0.9 10E3/UL (ref 0–1.3)
MONOCYTES NFR BLD AUTO: 12 %
NEUTROPHILS # BLD AUTO: 3.7 10E3/UL (ref 1.6–8.3)
NEUTROPHILS NFR BLD AUTO: 50 %
NITRATE UR QL: NEGATIVE
NRBC # BLD AUTO: 0 10E3/UL
NRBC BLD AUTO-RTO: 0 /100
PH UR STRIP: 6 [PH] (ref 5–7)
PLATELET # BLD AUTO: 222 10E3/UL (ref 150–450)
POTASSIUM SERPL-SCNC: 4 MMOL/L (ref 3.4–5.3)
PROT SERPL-MCNC: 6.3 G/DL (ref 6.4–8.3)
RBC # BLD AUTO: 4.66 10E6/UL (ref 3.8–5.2)
RBC URINE: 1 /HPF
SODIUM SERPL-SCNC: 145 MMOL/L (ref 135–145)
SP GR UR STRIP: 1.03 (ref 1–1.03)
SQUAMOUS EPITHELIAL: 14 /HPF
TROPONIN T SERPL HS-MCNC: <6 NG/L
UROBILINOGEN UR STRIP-MCNC: 2 MG/DL
WBC # BLD AUTO: 7.5 10E3/UL (ref 4–11)
WBC URINE: 1 /HPF

## 2025-07-26 PROCEDURE — 81001 URINALYSIS AUTO W/SCOPE: CPT

## 2025-07-26 PROCEDURE — 74177 CT ABD & PELVIS W/CONTRAST: CPT

## 2025-07-26 PROCEDURE — 36415 COLL VENOUS BLD VENIPUNCTURE: CPT

## 2025-07-26 PROCEDURE — 82374 ASSAY BLOOD CARBON DIOXIDE: CPT

## 2025-07-26 PROCEDURE — 250N000011 HC RX IP 250 OP 636

## 2025-07-26 PROCEDURE — 84484 ASSAY OF TROPONIN QUANT: CPT

## 2025-07-26 PROCEDURE — 85025 COMPLETE CBC W/AUTO DIFF WBC: CPT

## 2025-07-26 PROCEDURE — 71046 X-RAY EXAM CHEST 2 VIEWS: CPT

## 2025-07-26 PROCEDURE — 82248 BILIRUBIN DIRECT: CPT

## 2025-07-26 PROCEDURE — 82310 ASSAY OF CALCIUM: CPT

## 2025-07-26 PROCEDURE — 99285 EMERGENCY DEPT VISIT HI MDM: CPT | Mod: 25

## 2025-07-26 PROCEDURE — 85048 AUTOMATED LEUKOCYTE COUNT: CPT

## 2025-07-26 PROCEDURE — 83735 ASSAY OF MAGNESIUM: CPT

## 2025-07-26 PROCEDURE — 93005 ELECTROCARDIOGRAM TRACING: CPT

## 2025-07-26 PROCEDURE — 96374 THER/PROPH/DIAG INJ IV PUSH: CPT | Mod: 59

## 2025-07-26 PROCEDURE — 83690 ASSAY OF LIPASE: CPT

## 2025-07-26 RX ORDER — ONDANSETRON 4 MG/1
4 TABLET, ORALLY DISINTEGRATING ORAL EVERY 8 HOURS PRN
Qty: 20 TABLET | Refills: 0 | Status: SHIPPED | OUTPATIENT
Start: 2025-07-26

## 2025-07-26 RX ORDER — KETOROLAC TROMETHAMINE 15 MG/ML
15 INJECTION, SOLUTION INTRAMUSCULAR; INTRAVENOUS ONCE
Status: COMPLETED | OUTPATIENT
Start: 2025-07-26 | End: 2025-07-26

## 2025-07-26 RX ORDER — IOPAMIDOL 755 MG/ML
90 INJECTION, SOLUTION INTRAVASCULAR ONCE
Status: COMPLETED | OUTPATIENT
Start: 2025-07-26 | End: 2025-07-26

## 2025-07-26 RX ORDER — FAMOTIDINE 20 MG/1
20 TABLET, FILM COATED ORAL 2 TIMES DAILY PRN
Qty: 30 TABLET | Refills: 0 | Status: SHIPPED | OUTPATIENT
Start: 2025-07-26

## 2025-07-26 RX ORDER — DICYCLOMINE HCL 20 MG
20 TABLET ORAL 4 TIMES DAILY PRN
Qty: 20 TABLET | Refills: 0 | Status: SHIPPED | OUTPATIENT
Start: 2025-07-26

## 2025-07-26 RX ADMIN — IOPAMIDOL 90 ML: 755 INJECTION, SOLUTION INTRAVENOUS at 10:22

## 2025-07-26 RX ADMIN — KETOROLAC TROMETHAMINE 15 MG: 15 INJECTION, SOLUTION INTRAMUSCULAR; INTRAVENOUS at 09:38

## 2025-07-26 ASSESSMENT — ACTIVITIES OF DAILY LIVING (ADL)
ADLS_ACUITY_SCORE: 52

## 2025-07-26 ASSESSMENT — COLUMBIA-SUICIDE SEVERITY RATING SCALE - C-SSRS
6. HAVE YOU EVER DONE ANYTHING, STARTED TO DO ANYTHING, OR PREPARED TO DO ANYTHING TO END YOUR LIFE?: NO
2. HAVE YOU ACTUALLY HAD ANY THOUGHTS OF KILLING YOURSELF IN THE PAST MONTH?: NO
1. IN THE PAST MONTH, HAVE YOU WISHED YOU WERE DEAD OR WISHED YOU COULD GO TO SLEEP AND NOT WAKE UP?: NO

## 2025-07-26 NOTE — DISCHARGE INSTRUCTIONS
You were seen in the emergency department for abdominal pain.  Your workup here is very reassuring.  I did not find a clear cause of your pain today but I do think it could be related to inflammation of your stomach known as gastritis.  I would recommend you start taking the Prilosec again.  You can also take famotidine twice a day before your 2 major meals.  Also considered side effects of your increased dose of your Mounjaro.  To help with the abdominal pain/cramping, will prescribe Bentyl.  Watch your symptoms, return if you develop new or worsening symptoms.  Follow back up with your PCP.

## 2025-07-26 NOTE — ED PROVIDER NOTES
EMERGENCY DEPARTMENT ENCOUNTER      NAME: Patsy Santamaria  AGE: 70 year old female  YOB: 1955  MRN: [id number]  EVALUATION DATE & TIME: 7/26/2025  8:56 AM    PCP: Yanique Cali    ED PROVIDER: Abril Van PA-C    CHIEF COMPLAINT  Abdominal Pain      FINAL IMPRESSION:      ICD-10-CM    1. Abdominal pain  R10.9           MEDICAL DECISION MAKING AND ED COURSE:  Pertinent Labs & Imaging studies reviewed (See chart for details)  ED Course as of 07/26/25 1612   Sat Jul 26, 2025   0913 I met the patient and performed an initial exam.    0931 70-year-old female, pertinent history of appendectomy and diverticulitis who presents to the ER today with mid abdominal pain for the past several days that is worse after eating and during a bowel movement.  Vitals reviewed and unremarkable.  On exam, well-appearing no distress.  Abdominal tenderness to palpation in the mid/umbilical region.  Abdomen is slightly distended but soft.  Cardiopulmonary examination unremarkable.  Mucous membranes moist.    Considering broad differential including gastritis, choledocholithiasis, cholecystitis, viral gastroenteritis, bowel obstruction, constipation, diverticulitis, cystitis, among many considered.  Will obtain labs, urine, and CT imaging.   1130 Troponin less than 6 and EKG nonischemic.  Unlikely ACS chest pain difficulties, no chest pain here currently.  Normal lipase, LFTs, electrolytes, kidney function.  No leukocytosis or anemia.  Urine without signs of infection.  CT abdomen pelvis without any clear causes of her abdominal pain.  She does report a longstanding history of gastritis and stopped taking her omeprazole which I do think could be contributing with her postprandial abdominal pain.  No red flag symptoms such as fevers, melena, hematochezia, hematemesis.  She also recently upped her dose of Mounjaro which I also think could be contributing.  Discussed restarting the PPI and follow back up with  "PCP.     1140 Updated patient.    1208 We discussed the plan for discharge and the patient is agreeable. Reviewed supportive cares, symptomatic treatment, outpatient follow up, and reasons to return to the Emergency Department. All questions and concerns were addressed. Patient to be discharged by ED RN.       MEDICATIONS GIVEN IN THE EMERGENCY:  Medications   ketorolac (TORADOL) injection 15 mg (15 mg Intravenous $Given 7/26/25 6316)   iopamidol (ISOVUE-370) solution 90 mL (90 mLs Intravenous $Given 7/26/25 1022)       NEW PRESCRIPTIONS STARTED AT TODAY'S ER VISIT  Discharge Medication List as of 7/26/2025 11:59 AM        START taking these medications    Details   dicyclomine (BENTYL) 20 MG tablet Take 1 tablet (20 mg) by mouth 4 times daily as needed (abdominal pain)., Disp-20 tablet, R-0, E-Prescribe      !! famotidine (PEPCID) 20 MG tablet Take 1 tablet (20 mg) by mouth 2 times daily as needed (gastritis/GERD)., Disp-30 tablet, R-0, E-Prescribe      ondansetron (ZOFRAN ODT) 4 MG ODT tab Take 1 tablet (4 mg) by mouth every 8 hours as needed for nausea or vomiting., Disp-20 tablet, R-0, E-Prescribe       !! - Potential duplicate medications found. Please discuss with provider.        Discharge Medication List as of 7/26/2025 11:59 AM        =================================================================    HPI    Patient information was obtained from: patient  Use of : N/A     Patsy Santamaria is a 70 year old female with a pertinent history of type 2 diabetes, alcohol dependence, alcohol hepatitis, liver cirrhosis, COPD, GI bleeds, hypomagnesemia, anxiety, and substance abuse who presents to this ED via private vehicle for evaluation of abdominal pain.    While having a bowel movement on 7/22 (4 days ago), the patient developed central \"pulling\" abdominal pain that did not radiate. The pain slightly improved afterwards but worsens each time she has a bowel movement and after she eats. She has a " "history of diverticulitis but says this pain feels different. Yesterday, she noticed that the pain was worse after eating 1/4 of a hot dog and a custard ice cream. She has been having increased urinary and bowel frequency and has intermittent nausea and chills. Due to her symptoms, she has not been able do her water exercises at the gym for the last 2 days. Her abdominal surgical history includes an appendectomy and a  section (40 years ago).  She has a history of GERD and was previously on Prilosec but stopped because her symptoms improved. Additionally, she is taking Mounjaro and her dose was increased 2 weeks ago. In the past, she was taking Ozempic and is unsure why she was switched over to Mounjaro.     The patient also reports a single episode of left upper chest pain that radiated to her back on the evening of  while laying in bed. She has no previous cardiac history.     She denies constipation, diarrhea, vomiting, shortness of breath, fever, blood in stool, lightheadedness, or any other concerns at this time.     PHYSICAL EXAM    /71   Pulse 62   Temp 98.1  F (36.7  C) (Oral)   Resp 18   Ht 1.6 m (5' 3\")   Wt 108.8 kg (239 lb 14.4 oz)   LMP  (LMP Unknown)   SpO2 99%   BMI 42.50 kg/m    Constitutional: Well developed, Well nourished, NAD, GCS 15  HENT: Normocephalic, Atraumatic, Bilateral external ears normal, Oropharynx normal, mucous membranes moist, Nose normal. Neck- Normal range of motion  Eyes: PERRL, EOMI, Conjunctiva normal, No discharge.   Respiratory: Normal breath sounds, No respiratory distress, No wheezing, Speaks full sentences easily. No cough.    Cardiovascular: Normal heart rate, Regular rhythm, No murmurs, No rubs, No gallops.   GI: obese, Soft, epigastric and umbilical tenderness, No masses, No flank tenderness. No rebound or guarding.    Musculoskeletal: No edema. No cyanosis, No clubbing. Good range of motion in all major joints.  Integument: Warm, Dry, No " erythema, No rash.    Neurologic: Alert & oriented x 3, Normal motor function, Normal sensory function, No focal deficits noted. Normal gait.    Psychiatric: Affect normal, Judgment normal, anxious appearing. Cooperative.      LAB:  All pertinent labs reviewed and interpreted.  Results for orders placed or performed during the hospital encounter of 07/26/25   CT Abdomen Pelvis w Contrast    Impression    IMPRESSION:   1.  No specific finding to explain the patient's symptoms. Colonic diverticulosis without diverticulitis.  2.  Cirrhotic appearance of the liver, similar to previous.       Chest XR,  PA & LAT    Impression    IMPRESSION: Heart and mediastinal size are normal. Some bandlike opacity within the lingula represents either atelectasis or infectious process. No pleural effusion or pneumothorax.     Basic Metabolic Panel (Limited Occurrences)   Result Value Ref Range    Sodium 145 135 - 145 mmol/L    Potassium 4.0 3.4 - 5.3 mmol/L    Chloride 105 98 - 107 mmol/L    Carbon Dioxide (CO2) 33 (H) 22 - 29 mmol/L    Anion Gap 7 7 - 15 mmol/L    Urea Nitrogen 9.7 8.0 - 23.0 mg/dL    Creatinine 0.58 0.51 - 0.95 mg/dL    GFR Estimate >90 >60 mL/min/1.73m2    Calcium 9.0 8.8 - 10.4 mg/dL    Glucose 75 70 - 99 mg/dL   Hepatic Function Panel (Limited Occurrences)   Result Value Ref Range    Protein Total 6.3 (L) 6.4 - 8.3 g/dL    Albumin 3.8 3.5 - 5.2 g/dL    Bilirubin Total 0.2 <=1.2 mg/dL    Alkaline Phosphatase 65 40 - 150 U/L    AST 38 0 - 45 U/L    ALT 35 0 - 50 U/L    Bilirubin Direct 0.09 0.00 - 0.30 mg/dL   Result Value Ref Range    Lipase 22 13 - 60 U/L   UA with Microscopic reflex to Culture    Specimen: Urine, Clean Catch   Result Value Ref Range    Color Urine Yellow Colorless, Straw, Light Yellow, Yellow    Appearance Urine Cloudy (A) Clear    Glucose Urine Negative Negative mg/dL    Bilirubin Urine Negative Negative    Ketones Urine Negative Negative mg/dL    Specific Gravity Urine 1.026 1.001 - 1.030     Blood Urine Negative Negative    pH Urine 6.0 5.0 - 7.0    Protein Albumin Urine Negative Negative mg/dL    Urobilinogen Urine 2.0 (A) Normal mg/dL    Nitrite Urine Negative Negative    Leukocyte Esterase Urine Negative Negative    Bacteria Urine Few (A) None Seen /HPF    RBC Urine 1 <=2 /HPF    WBC Urine 1 <=5 /HPF    Squamous Epithelials Urine 14 (H) <=1 /HPF    Hyaline Casts Urine 1 <=2 /LPF   CBC with platelets and differential   Result Value Ref Range    WBC Count 7.5 4.0 - 11.0 10e3/uL    RBC Count 4.66 3.80 - 5.20 10e6/uL    Hemoglobin 14.4 11.7 - 15.7 g/dL    Hematocrit 43.5 35.0 - 47.0 %    MCV 93 78 - 100 fL    MCH 30.9 26.5 - 33.0 pg    MCHC 33.1 31.5 - 36.5 g/dL    RDW 14.7 10.0 - 15.0 %    Platelet Count 222 150 - 450 10e3/uL    % Neutrophils 50 %    % Lymphocytes 34 %    % Monocytes 12 %    % Eosinophils 3 %    % Basophils 0 %    % Immature Granulocytes 0 %    NRBCs per 100 WBC 0 <1 /100    Absolute Neutrophils 3.7 1.6 - 8.3 10e3/uL    Absolute Lymphocytes 2.5 0.8 - 5.3 10e3/uL    Absolute Monocytes 0.9 0.0 - 1.3 10e3/uL    Absolute Eosinophils 0.3 0.0 - 0.7 10e3/uL    Absolute Basophils 0.0 0.0 - 0.2 10e3/uL    Absolute Immature Granulocytes 0.0 <=0.4 10e3/uL    Absolute NRBCs 0.0 10e3/uL   Result Value Ref Range    Troponin T, High Sensitivity <6 <=14 ng/L   Magnesium (Limited Occurrences)   Result Value Ref Range    Magnesium 1.9 1.7 - 2.3 mg/dL       RADIOLOGY:  Reviewed all pertinent imaging. Please see official radiology report.  CT Abdomen Pelvis w Contrast   Final Result   IMPRESSION:    1.  No specific finding to explain the patient's symptoms. Colonic diverticulosis without diverticulitis.   2.  Cirrhotic appearance of the liver, similar to previous.            Chest XR,  PA & LAT   Final Result   IMPRESSION: Heart and mediastinal size are normal. Some bandlike opacity within the lingula represents either atelectasis or infectious process. No pleural effusion or pneumothorax.             EKG:     Performed at: 9:56 AM    Impression: Sinus rhythm. Low voltage. QRS Borderline ECG. When compared with ECG of 20-Mar-2023 02:56, No significant change was found.    Rate: 65 BPM  Rhythm: Sinus rhythm  Axis: 54 -7 48  CO Interval: 148 ms  QRS Interval: 86 ms   QTc Interval: 420/436 ms  ST Changes: No STEMI  Comparison: When compared with ECG of 20-Mar-2023 02:56, No significant change was found.    I independently reviewed and interpreted the EKG(s) documented above.    PROCEDURES:   None    Medical Decision Making  I obtained history from Family Member/Significant Other  Discharge. I prescribed additional prescription strength medication(s) as charted. See documentation for any additional details.    MIPS (CTPE, Dental pain, Viveros, Sinusitis, Asthma/COPD, Head Trauma): Not Applicable    SEPSIS: None      I, Choco Shabazz, am serving as a scribe to document services personally performed by Abril, based on my observations and the provider's statements to me. I,  , attest that Choco Shabazz is acting in a scribe capacity, has observed my performance of the services and has documented them in accordance with my direction.     Abril Van PA-C  [facility] EMERGENCY DEPARTMENT  [facility] [facility] [address]  [phone]      Abril Van PA-C  07/26/25 1617       Abril Van PA-C  07/26/25 1617

## 2025-07-26 NOTE — ED TRIAGE NOTES
Pt reports mid abd pain , after eating any foods. Leigh. Bad when having bowel movement for 5 days. Appendix removed, still has gall bladder.     Triage Assessment (Adult)       Row Name 07/26/25 0857          Triage Assessment    Airway WDL WDL        Respiratory WDL    Respiratory WDL WDL        Skin Circulation/Temperature WDL    Skin Circulation/Temperature WDL WDL        Cardiac WDL    Cardiac WDL WDL        Peripheral/Neurovascular WDL    Peripheral Neurovascular WDL WDL        Cognitive/Neuro/Behavioral WDL    Cognitive/Neuro/Behavioral WDL WDL

## 2025-07-28 ENCOUNTER — TELEPHONE (OUTPATIENT)
Dept: FAMILY MEDICINE | Facility: CLINIC | Age: 70
End: 2025-07-28
Payer: COMMERCIAL

## 2025-07-28 NOTE — TELEPHONE ENCOUNTER
Reason for call:  Other   Patient called regarding (reason for call): call back  Additional comments: patient was in ER for severe abdominal pain  She is concerned about taking her next mounjaro shot  Call to advise / she does have a f-up with you on wed    Phone number to reach patient:  Home number on file [phone] (home)    Best Time:  any    Can we leave a detailed message on this number?  YES    Travel screening: Not Applicable

## 2025-07-29 NOTE — TELEPHONE ENCOUNTER
"Dr. Cali-Please review and advise.  Would you prefer to discuss at follow up visit on 7/30/25?    Per chart review, patient evaluated in ER on 7/26/25 for abdominal pain.    Per 7/26/25 ER visit note:  \"She also recently upped her dose of Mounjaro which I also think could be contributing. Discussed restarting the PPI and follow back up with PCP. \"    Thank you!  MIYA GreenN, RN-[facility]        "

## 2025-07-30 ENCOUNTER — OFFICE VISIT (OUTPATIENT)
Dept: FAMILY MEDICINE | Facility: CLINIC | Age: 70
End: 2025-07-30
Payer: COMMERCIAL

## 2025-07-30 VITALS
RESPIRATION RATE: 18 BRPM | WEIGHT: 240 LBS | DIASTOLIC BLOOD PRESSURE: 77 MMHG | SYSTOLIC BLOOD PRESSURE: 115 MMHG | HEIGHT: 63 IN | BODY MASS INDEX: 42.52 KG/M2 | OXYGEN SATURATION: 97 % | TEMPERATURE: 97 F | HEART RATE: 67 BPM

## 2025-07-30 DIAGNOSIS — E66.01 MORBID OBESITY (H): ICD-10-CM

## 2025-07-30 DIAGNOSIS — E11.9 TYPE 2 DIABETES MELLITUS WITHOUT COMPLICATION, WITHOUT LONG-TERM CURRENT USE OF INSULIN (H): Primary | ICD-10-CM

## 2025-07-30 DIAGNOSIS — E66.813 CLASS 3 SEVERE OBESITY DUE TO EXCESS CALORIES WITH BODY MASS INDEX (BMI) OF 40.0 TO 44.9 IN ADULT, UNSPECIFIED WHETHER SERIOUS COMORBIDITY PRESENT (H): ICD-10-CM

## 2025-07-30 PROCEDURE — 3078F DIAST BP <80 MM HG: CPT | Performed by: FAMILY MEDICINE

## 2025-07-30 PROCEDURE — 99214 OFFICE O/P EST MOD 30 MIN: CPT | Performed by: FAMILY MEDICINE

## 2025-07-30 PROCEDURE — 3074F SYST BP LT 130 MM HG: CPT | Performed by: FAMILY MEDICINE

## 2025-07-30 NOTE — PROGRESS NOTES
"  {PROVIDER CHARTING PREFERENCE:823441}    Subjective   Patsy is a 70 year old, presenting for the following health issues:  Hospital F/U (Abdominal pain pt stated still having pain ) and Recheck Medication (MOUNJARO pt stated think having side effect )  {(!) Visit Details have not yet been documented.  Please enter Visit Details and then use this list to pull in documentation. (Optional):084325}  Was having abdominal pain -   Switched to Mounjaro - having more troubles  Scared to take it because of pain -   When she eats, it hurts  Has been on the mounjaro - for a couple months   Dietician told her she didn't need  Ozempic - because diabetes was good, and needed Mounjaro instead    Works out every day   Gaining weight -             {MA/LPN/RN Pre-Provider Visit Orders- hCG/UA/Strep (Optional):517263}  {SUPERLIST (Optional):933085}  {additonal problems for provider to add (Optional):660664}    {ROS Picklists (Optional):339172}      Objective    /77 (BP Location: Right arm, Patient Position: Sitting, Cuff Size: Adult Regular)   Pulse 67   Temp 97  F (36.1  C) (Temporal)   Resp 18   Ht 1.6 m (5' 2.99\")   Wt 108.9 kg (240 lb)   LMP  (LMP Unknown)   SpO2 97%   BMI 42.52 kg/m    Body mass index is 42.52 kg/m .  Physical Exam   {Exam List (Optional):891727}    {Diagnostic Test Results (Optional):484920}        Signed Electronically by: EJ WELLER MD  {Email feedback regarding this note to [email or web address]   :507065}  " "  Respiratory:  Negative for cough and shortness of breath.    Cardiovascular:  Negative for chest pain and palpitations.   Gastrointestinal:  Positive for abdominal pain. Negative for vomiting.   Genitourinary:  Negative for dysuria and hematuria.   Musculoskeletal:  Negative for arthralgias and back pain.   Skin:  Negative for color change and rash.   Neurological:  Negative for seizures and syncope.   Psychiatric/Behavioral:  Positive for dysphoric mood. The patient is nervous/anxious.    All other systems reviewed and are negative.          Objective    /77 (BP Location: Right arm, Patient Position: Sitting, Cuff Size: Adult Regular)   Pulse 67   Temp 97  F (36.1  C) (Temporal)   Resp 18   Ht 1.6 m (5' 2.99\")   Wt 108.9 kg (240 lb)   LMP  (LMP Unknown)   SpO2 97%   BMI 42.52 kg/m    Body mass index is 42.52 kg/m .  Physical Exam  Vitals reviewed.   Constitutional:       General: She is not in acute distress.     Appearance: Normal appearance.   HENT:      Head: Normocephalic.      Right Ear: Tympanic membrane, ear canal and external ear normal.      Left Ear: Tympanic membrane, ear canal and external ear normal.      Nose: Nose normal.      Mouth/Throat:      Mouth: Mucous membranes are moist.      Pharynx: No posterior oropharyngeal erythema.   Eyes:      Extraocular Movements: Extraocular movements intact.      Conjunctiva/sclera: Conjunctivae normal.      Pupils: Pupils are equal, round, and reactive to light.   Cardiovascular:      Rate and Rhythm: Normal rate and regular rhythm.      Pulses: Normal pulses.      Heart sounds: Normal heart sounds. No murmur heard.  Pulmonary:      Effort: Pulmonary effort is normal.      Breath sounds: Normal breath sounds.   Abdominal:      Palpations: Abdomen is soft. There is no mass.      Tenderness: There is no abdominal tenderness. There is no guarding or rebound.   Musculoskeletal:         General: No deformity. Normal range of motion.      Cervical back: " Normal range of motion and neck supple.   Lymphadenopathy:      Cervical: No cervical adenopathy.   Skin:     General: Skin is warm and dry.   Neurological:      General: No focal deficit present.      Mental Status: She is alert.   Psychiatric:         Mood and Affect: Mood normal.         Behavior: Behavior normal.       No results found for any visits on 07/30/25.          Signed Electronically by: EJ WELLER MD

## 2025-08-01 LAB
ATRIAL RATE - MUSE: 65 BPM
DIASTOLIC BLOOD PRESSURE - MUSE: NORMAL MMHG
INTERPRETATION ECG - MUSE: NORMAL
P AXIS - MUSE: 54 DEGREES
PR INTERVAL - MUSE: 148 MS
QRS DURATION - MUSE: 86 MS
QT - MUSE: 420 MS
QTC - MUSE: 436 MS
R AXIS - MUSE: -7 DEGREES
SYSTOLIC BLOOD PRESSURE - MUSE: NORMAL MMHG
T AXIS - MUSE: 48 DEGREES
VENTRICULAR RATE- MUSE: 65 BPM

## 2025-08-03 PROBLEM — F19.11 OTHER PSYCHOACTIVE SUBSTANCE ABUSE, IN REMISSION (H): Status: RESOLVED | Noted: 2021-02-17 | Resolved: 2025-08-03

## 2025-08-03 ASSESSMENT — ENCOUNTER SYMPTOMS
HEMATURIA: 0
BACK PAIN: 0
DYSURIA: 0
SORE THROAT: 0
EYE PAIN: 0
SEIZURES: 0
DYSPHORIC MOOD: 1
FEVER: 0
VOMITING: 0
COUGH: 0
NERVOUS/ANXIOUS: 1
CHILLS: 0
SHORTNESS OF BREATH: 0
ABDOMINAL PAIN: 1
PALPITATIONS: 0
COLOR CHANGE: 0
ARTHRALGIAS: 0

## 2025-08-14 ENCOUNTER — TELEPHONE (OUTPATIENT)
Dept: FAMILY MEDICINE | Facility: CLINIC | Age: 70
End: 2025-08-14
Payer: COMMERCIAL

## 2025-08-18 ENCOUNTER — OFFICE VISIT (OUTPATIENT)
Dept: FAMILY MEDICINE | Facility: CLINIC | Age: 70
End: 2025-08-18
Payer: COMMERCIAL

## 2025-08-18 VITALS
HEIGHT: 63 IN | TEMPERATURE: 97.8 F | WEIGHT: 240 LBS | HEART RATE: 58 BPM | RESPIRATION RATE: 18 BRPM | SYSTOLIC BLOOD PRESSURE: 113 MMHG | DIASTOLIC BLOOD PRESSURE: 72 MMHG | OXYGEN SATURATION: 98 % | BODY MASS INDEX: 42.52 KG/M2

## 2025-08-18 DIAGNOSIS — J43.8 OTHER EMPHYSEMA (H): ICD-10-CM

## 2025-08-18 DIAGNOSIS — E66.01 MORBID OBESITY (H): ICD-10-CM

## 2025-08-18 DIAGNOSIS — H25.9 AGE-RELATED CATARACT OF BOTH EYES, UNSPECIFIED AGE-RELATED CATARACT TYPE: ICD-10-CM

## 2025-08-18 DIAGNOSIS — K70.30 ALCOHOLIC CIRRHOSIS OF LIVER WITHOUT ASCITES (H): ICD-10-CM

## 2025-08-18 DIAGNOSIS — Z01.818 PREOP EXAMINATION: Primary | ICD-10-CM

## 2025-08-18 DIAGNOSIS — Z01.818 PREOP GENERAL PHYSICAL EXAM: ICD-10-CM

## 2025-08-18 DIAGNOSIS — E11.9 TYPE 2 DIABETES MELLITUS WITHOUT COMPLICATION, WITHOUT LONG-TERM CURRENT USE OF INSULIN (H): ICD-10-CM

## 2025-08-18 DIAGNOSIS — I89.0 LYMPHEDEMA OF BOTH LOWER EXTREMITIES: ICD-10-CM

## 2025-08-18 LAB
ANION GAP SERPL CALCULATED.3IONS-SCNC: 8 MMOL/L (ref 7–15)
BUN SERPL-MCNC: 11.4 MG/DL (ref 8–23)
CALCIUM SERPL-MCNC: 9.5 MG/DL (ref 8.8–10.4)
CHLORIDE SERPL-SCNC: 103 MMOL/L (ref 98–107)
CREAT SERPL-MCNC: 0.58 MG/DL (ref 0.51–0.95)
EGFRCR SERPLBLD CKD-EPI 2021: >90 ML/MIN/1.73M2
EST. AVERAGE GLUCOSE BLD GHB EST-MCNC: 114 MG/DL
GLUCOSE SERPL-MCNC: 102 MG/DL (ref 70–99)
HBA1C MFR BLD: 5.6 % (ref 0–5.6)
HCO3 SERPL-SCNC: 29 MMOL/L (ref 22–29)
POTASSIUM SERPL-SCNC: 4.9 MMOL/L (ref 3.4–5.3)
SODIUM SERPL-SCNC: 140 MMOL/L (ref 135–145)

## 2025-08-18 PROCEDURE — 3078F DIAST BP <80 MM HG: CPT | Performed by: FAMILY MEDICINE

## 2025-08-18 PROCEDURE — 83036 HEMOGLOBIN GLYCOSYLATED A1C: CPT | Performed by: FAMILY MEDICINE

## 2025-08-18 PROCEDURE — 36415 COLL VENOUS BLD VENIPUNCTURE: CPT | Performed by: FAMILY MEDICINE

## 2025-08-18 PROCEDURE — 99214 OFFICE O/P EST MOD 30 MIN: CPT | Performed by: FAMILY MEDICINE

## 2025-08-18 PROCEDURE — 3044F HG A1C LEVEL LT 7.0%: CPT | Performed by: FAMILY MEDICINE

## 2025-08-18 PROCEDURE — 3074F SYST BP LT 130 MM HG: CPT | Performed by: FAMILY MEDICINE

## 2025-08-18 PROCEDURE — 80048 BASIC METABOLIC PNL TOTAL CA: CPT | Performed by: FAMILY MEDICINE

## 2025-08-18 RX ORDER — BUMETANIDE 1 MG/1
1 TABLET ORAL
Qty: 180 TABLET | Refills: 3 | Status: SHIPPED | OUTPATIENT
Start: 2025-08-18

## 2025-08-18 RX ORDER — CLOBETASOL PROPIONATE 0.5 MG/G
OINTMENT TOPICAL 2 TIMES DAILY
COMMUNITY
Start: 2024-11-25

## 2025-08-18 RX ORDER — PREDNISOLONE ACETATE 10 MG/ML
1 SUSPENSION/ DROPS OPHTHALMIC 4 TIMES DAILY
COMMUNITY
Start: 2025-08-14

## 2025-08-18 ASSESSMENT — ENCOUNTER SYMPTOMS
CHILLS: 0
DIARRHEA: 0
DIZZINESS: 0
ABDOMINAL PAIN: 0
FATIGUE: 0
CONSTIPATION: 0
FEVER: 0
LIGHT-HEADEDNESS: 0
BRUISES/BLEEDS EASILY: 1
DYSURIA: 0
SHORTNESS OF BREATH: 0
SORE THROAT: 1
COUGH: 0
MUSCULOSKELETAL NEGATIVE: 1

## 2025-08-21 ENCOUNTER — PATIENT OUTREACH (OUTPATIENT)
Dept: CARE COORDINATION | Facility: CLINIC | Age: 70
End: 2025-08-21
Payer: COMMERCIAL

## 2025-09-04 ENCOUNTER — PATIENT OUTREACH (OUTPATIENT)
Dept: CARE COORDINATION | Facility: CLINIC | Age: 70
End: 2025-09-04
Payer: COMMERCIAL